# Patient Record
Sex: MALE | Race: WHITE | NOT HISPANIC OR LATINO | Employment: OTHER | URBAN - METROPOLITAN AREA
[De-identification: names, ages, dates, MRNs, and addresses within clinical notes are randomized per-mention and may not be internally consistent; named-entity substitution may affect disease eponyms.]

---

## 2021-08-11 ENCOUNTER — TRANSFERRED RECORDS (OUTPATIENT)
Dept: HEALTH INFORMATION MANAGEMENT | Facility: CLINIC | Age: 51
End: 2021-08-11
Payer: MEDICARE

## 2022-04-27 ENCOUNTER — APPOINTMENT (OUTPATIENT)
Dept: GENERAL RADIOLOGY | Facility: CLINIC | Age: 52
End: 2022-04-27
Attending: EMERGENCY MEDICINE
Payer: MEDICARE

## 2022-04-27 ENCOUNTER — HOSPITAL ENCOUNTER (OUTPATIENT)
Facility: CLINIC | Age: 52
Setting detail: OBSERVATION
Discharge: HOME | End: 2022-04-29
Attending: EMERGENCY MEDICINE | Admitting: INTERNAL MEDICINE
Payer: MEDICARE

## 2022-04-27 ENCOUNTER — APPOINTMENT (OUTPATIENT)
Dept: CT IMAGING | Facility: CLINIC | Age: 52
End: 2022-04-27
Attending: EMERGENCY MEDICINE
Payer: MEDICARE

## 2022-04-27 DIAGNOSIS — R41.82 ALTERED MENTAL STATUS, UNSPECIFIED ALTERED MENTAL STATUS TYPE: ICD-10-CM

## 2022-04-27 DIAGNOSIS — R53.81 PHYSICAL DECONDITIONING: Primary | ICD-10-CM

## 2022-04-27 DIAGNOSIS — S09.90XA CLOSED HEAD INJURY, INITIAL ENCOUNTER: ICD-10-CM

## 2022-04-27 LAB
ALBUMIN SERPL-MCNC: 2.6 G/DL (ref 3.4–5)
ALP SERPL-CCNC: 54 U/L (ref 40–150)
ALT SERPL W P-5'-P-CCNC: 28 U/L (ref 0–70)
ANION GAP SERPL CALCULATED.3IONS-SCNC: 7 MMOL/L (ref 3–14)
AST SERPL W P-5'-P-CCNC: 57 U/L (ref 0–45)
BASOPHILS # BLD AUTO: 0 10E3/UL (ref 0–0.2)
BASOPHILS NFR BLD AUTO: 0 %
BILIRUB SERPL-MCNC: 0.6 MG/DL (ref 0.2–1.3)
BUN SERPL-MCNC: 19 MG/DL (ref 7–30)
CALCIUM SERPL-MCNC: 9.2 MG/DL (ref 8.5–10.1)
CHLORIDE BLD-SCNC: 107 MMOL/L (ref 94–109)
CO2 SERPL-SCNC: 27 MMOL/L (ref 20–32)
CREAT SERPL-MCNC: 1.19 MG/DL (ref 0.66–1.25)
EOSINOPHIL # BLD AUTO: 0.1 10E3/UL (ref 0–0.7)
EOSINOPHIL NFR BLD AUTO: 1 %
ERYTHROCYTE [DISTWIDTH] IN BLOOD BY AUTOMATED COUNT: 14.1 % (ref 10–15)
GFR SERPL CREATININE-BSD FRML MDRD: 73 ML/MIN/1.73M2
GLUCOSE BLD-MCNC: 74 MG/DL (ref 70–99)
HCT VFR BLD AUTO: 35.6 % (ref 40–53)
HGB BLD-MCNC: 11.6 G/DL (ref 13.3–17.7)
IMM GRANULOCYTES # BLD: 0 10E3/UL
IMM GRANULOCYTES NFR BLD: 0 %
INR PPP: 1.26 (ref 0.85–1.15)
LYMPHOCYTES # BLD AUTO: 2.9 10E3/UL (ref 0.8–5.3)
LYMPHOCYTES NFR BLD AUTO: 40 %
MCH RBC QN AUTO: 30 PG (ref 26.5–33)
MCHC RBC AUTO-ENTMCNC: 32.6 G/DL (ref 31.5–36.5)
MCV RBC AUTO: 92 FL (ref 78–100)
MONOCYTES # BLD AUTO: 0.9 10E3/UL (ref 0–1.3)
MONOCYTES NFR BLD AUTO: 13 %
NEUTROPHILS # BLD AUTO: 3.3 10E3/UL (ref 1.6–8.3)
NEUTROPHILS NFR BLD AUTO: 46 %
NRBC # BLD AUTO: 0 10E3/UL
NRBC BLD AUTO-RTO: 0 /100
PLATELET # BLD AUTO: 151 10E3/UL (ref 150–450)
POTASSIUM BLD-SCNC: 3.6 MMOL/L (ref 3.4–5.3)
PROT SERPL-MCNC: 6.1 G/DL (ref 6.8–8.8)
RBC # BLD AUTO: 3.87 10E6/UL (ref 4.4–5.9)
SODIUM SERPL-SCNC: 141 MMOL/L (ref 133–144)
WBC # BLD AUTO: 7.2 10E3/UL (ref 4–11)

## 2022-04-27 PROCEDURE — 36415 COLL VENOUS BLD VENIPUNCTURE: CPT | Performed by: EMERGENCY MEDICINE

## 2022-04-27 PROCEDURE — 99285 EMERGENCY DEPT VISIT HI MDM: CPT | Mod: 25

## 2022-04-27 PROCEDURE — 70450 CT HEAD/BRAIN W/O DYE: CPT

## 2022-04-27 PROCEDURE — 85610 PROTHROMBIN TIME: CPT | Performed by: EMERGENCY MEDICINE

## 2022-04-27 PROCEDURE — 80053 COMPREHEN METABOLIC PANEL: CPT | Performed by: EMERGENCY MEDICINE

## 2022-04-27 PROCEDURE — 72040 X-RAY EXAM NECK SPINE 2-3 VW: CPT

## 2022-04-27 PROCEDURE — C9803 HOPD COVID-19 SPEC COLLECT: HCPCS

## 2022-04-27 PROCEDURE — 96360 HYDRATION IV INFUSION INIT: CPT

## 2022-04-27 PROCEDURE — 96361 HYDRATE IV INFUSION ADD-ON: CPT

## 2022-04-27 PROCEDURE — 85014 HEMATOCRIT: CPT | Performed by: EMERGENCY MEDICINE

## 2022-04-27 ASSESSMENT — ENCOUNTER SYMPTOMS
HEADACHES: 1
ARTHRALGIAS: 0
MYALGIAS: 0
BACK PAIN: 0
NECK PAIN: 1
CONFUSION: 1

## 2022-04-28 PROBLEM — R41.82 ALTERED MENTAL STATUS, UNSPECIFIED ALTERED MENTAL STATUS TYPE: Status: ACTIVE | Noted: 2022-04-28

## 2022-04-28 PROBLEM — S09.90XA CLOSED HEAD INJURY, INITIAL ENCOUNTER: Status: ACTIVE | Noted: 2022-04-28

## 2022-04-28 LAB
ALBUMIN UR-MCNC: NEGATIVE MG/DL
AMMONIA PLAS-SCNC: 36 UMOL/L (ref 10–50)
AMPHETAMINES UR QL SCN: NORMAL
APPEARANCE UR: CLEAR
BARBITURATES UR QL: NORMAL
BENZODIAZ UR QL: NORMAL
BILIRUB UR QL STRIP: NEGATIVE
CANNABINOIDS UR QL SCN: NORMAL
CK SERPL-CCNC: 682 U/L (ref 30–300)
COCAINE UR QL: NORMAL
COLOR UR AUTO: YELLOW
ETHANOL SERPL-MCNC: <0.01 G/DL
FOLATE SERPL-MCNC: 55.1 NG/ML
GLUCOSE UR STRIP-MCNC: NEGATIVE MG/DL
HGB UR QL STRIP: NEGATIVE
HYALINE CASTS: 1 /LPF
KETONES UR STRIP-MCNC: ABNORMAL MG/DL
LEUKOCYTE ESTERASE UR QL STRIP: NEGATIVE
MUCOUS THREADS #/AREA URNS LPF: PRESENT /LPF
NITRATE UR QL: NEGATIVE
NT-PROBNP SERPL-MCNC: 653 PG/ML (ref 0–900)
OPIATES UR QL SCN: NORMAL
PCP UR QL SCN: NORMAL
PH UR STRIP: 5.5 [PH] (ref 5–7)
RBC URINE: 0 /HPF
SARS-COV-2 RNA RESP QL NAA+PROBE: NEGATIVE
SP GR UR STRIP: 1.01 (ref 1–1.03)
TSH SERPL DL<=0.005 MIU/L-ACNC: 1.63 MU/L (ref 0.4–4)
UROBILINOGEN UR STRIP-MCNC: 2 MG/DL
VALPROATE SERPL-MCNC: 106 MG/L
VIT B12 SERPL-MCNC: 603 PG/ML (ref 193–986)
WBC URINE: 1 /HPF

## 2022-04-28 PROCEDURE — 82140 ASSAY OF AMMONIA: CPT | Performed by: EMERGENCY MEDICINE

## 2022-04-28 PROCEDURE — U0005 INFEC AGEN DETEC AMPLI PROBE: HCPCS | Performed by: EMERGENCY MEDICINE

## 2022-04-28 PROCEDURE — 36415 COLL VENOUS BLD VENIPUNCTURE: CPT | Performed by: HOSPITALIST

## 2022-04-28 PROCEDURE — 82746 ASSAY OF FOLIC ACID SERUM: CPT | Performed by: HOSPITALIST

## 2022-04-28 PROCEDURE — 96361 HYDRATE IV INFUSION ADD-ON: CPT

## 2022-04-28 PROCEDURE — 80164 ASSAY DIPROPYLACETIC ACD TOT: CPT | Performed by: EMERGENCY MEDICINE

## 2022-04-28 PROCEDURE — 99225 PR SUBSEQUENT OBSERVATION CARE,LEVEL II: CPT | Performed by: HOSPITALIST

## 2022-04-28 PROCEDURE — 80307 DRUG TEST PRSMV CHEM ANLYZR: CPT | Performed by: EMERGENCY MEDICINE

## 2022-04-28 PROCEDURE — 99220 PR INITIAL OBSERVATION CARE,LEVEL III: CPT | Performed by: INTERNAL MEDICINE

## 2022-04-28 PROCEDURE — 36415 COLL VENOUS BLD VENIPUNCTURE: CPT | Performed by: EMERGENCY MEDICINE

## 2022-04-28 PROCEDURE — 84443 ASSAY THYROID STIM HORMONE: CPT | Performed by: INTERNAL MEDICINE

## 2022-04-28 PROCEDURE — 96360 HYDRATION IV INFUSION INIT: CPT

## 2022-04-28 PROCEDURE — 82077 ASSAY SPEC XCP UR&BREATH IA: CPT | Performed by: EMERGENCY MEDICINE

## 2022-04-28 PROCEDURE — G0378 HOSPITAL OBSERVATION PER HR: HCPCS

## 2022-04-28 PROCEDURE — 250N000013 HC RX MED GY IP 250 OP 250 PS 637: Performed by: INTERNAL MEDICINE

## 2022-04-28 PROCEDURE — 82607 VITAMIN B-12: CPT | Performed by: HOSPITALIST

## 2022-04-28 PROCEDURE — C9803 HOPD COVID-19 SPEC COLLECT: HCPCS

## 2022-04-28 PROCEDURE — 83880 ASSAY OF NATRIURETIC PEPTIDE: CPT | Performed by: HOSPITALIST

## 2022-04-28 PROCEDURE — 81001 URINALYSIS AUTO W/SCOPE: CPT | Performed by: EMERGENCY MEDICINE

## 2022-04-28 PROCEDURE — 82550 ASSAY OF CK (CPK): CPT | Performed by: HOSPITALIST

## 2022-04-28 PROCEDURE — 250N000009 HC RX 250: Performed by: INTERNAL MEDICINE

## 2022-04-28 PROCEDURE — 258N000003 HC RX IP 258 OP 636: Performed by: HOSPITALIST

## 2022-04-28 PROCEDURE — 258N000003 HC RX IP 258 OP 636: Performed by: INTERNAL MEDICINE

## 2022-04-28 RX ORDER — TOPIRAMATE 100 MG/1
100 TABLET, FILM COATED ORAL AT BEDTIME
Status: ON HOLD | COMMUNITY
End: 2022-07-07

## 2022-04-28 RX ORDER — ATENOLOL 25 MG/1
25 TABLET ORAL DAILY
Status: DISCONTINUED | OUTPATIENT
Start: 2022-04-28 | End: 2022-04-29 | Stop reason: HOSPADM

## 2022-04-28 RX ORDER — AMOXICILLIN 250 MG
1 CAPSULE ORAL 2 TIMES DAILY PRN
Status: DISCONTINUED | OUTPATIENT
Start: 2022-04-28 | End: 2022-04-29 | Stop reason: HOSPADM

## 2022-04-28 RX ORDER — SODIUM CHLORIDE 9 MG/ML
INJECTION, SOLUTION INTRAVENOUS CONTINUOUS
Status: ACTIVE | OUTPATIENT
Start: 2022-04-28 | End: 2022-04-28

## 2022-04-28 RX ORDER — LEVOCETIRIZINE DIHYDROCHLORIDE 5 MG/1
5 TABLET, FILM COATED ORAL EVERY EVENING
Status: ON HOLD | COMMUNITY
End: 2022-07-07

## 2022-04-28 RX ORDER — IPRATROPIUM BROMIDE 42 UG/1
2 SPRAY, METERED NASAL 3 TIMES DAILY
Status: DISCONTINUED | OUTPATIENT
Start: 2022-04-28 | End: 2022-04-29 | Stop reason: HOSPADM

## 2022-04-28 RX ORDER — PREDNISOLONE ACETATE 10 MG/ML
1 SUSPENSION/ DROPS OPHTHALMIC DAILY
Status: ON HOLD | COMMUNITY
End: 2022-07-07

## 2022-04-28 RX ORDER — ALBUTEROL SULFATE 90 UG/1
2 AEROSOL, METERED RESPIRATORY (INHALATION) EVERY 6 HOURS PRN
COMMUNITY
End: 2022-07-07

## 2022-04-28 RX ORDER — CARBOXYMETHYLCELLULOSE SODIUM 10 MG/ML
1 GEL OPHTHALMIC AT BEDTIME
Status: ON HOLD | COMMUNITY
End: 2022-07-07

## 2022-04-28 RX ORDER — NICOTINE POLACRILEX 4 MG/1
20 GUM, CHEWING ORAL 2 TIMES DAILY
Status: DISCONTINUED | OUTPATIENT
Start: 2022-04-28 | End: 2022-04-28

## 2022-04-28 RX ORDER — CALCIUM POLYCARBOPHIL 625 MG 625 MG/1
1 TABLET ORAL 2 TIMES DAILY
Status: ON HOLD | COMMUNITY
End: 2022-07-07

## 2022-04-28 RX ORDER — NICOTINE POLACRILEX 4 MG/1
20 GUM, CHEWING ORAL 2 TIMES DAILY
Status: ON HOLD | COMMUNITY
End: 2022-07-07

## 2022-04-28 RX ORDER — TOPIRAMATE 50 MG/1
100 TABLET, FILM COATED ORAL DAILY
Status: DISCONTINUED | OUTPATIENT
Start: 2022-04-28 | End: 2022-04-29 | Stop reason: HOSPADM

## 2022-04-28 RX ORDER — DIVALPROEX SODIUM 500 MG/1
500 TABLET, DELAYED RELEASE ORAL EVERY 8 HOURS SCHEDULED
Status: DISCONTINUED | OUTPATIENT
Start: 2022-04-28 | End: 2022-04-29 | Stop reason: HOSPADM

## 2022-04-28 RX ORDER — BENZOCAINE/MENTHOL 6 MG-10 MG
LOZENGE MUCOUS MEMBRANE 2 TIMES DAILY
Status: ON HOLD | COMMUNITY
End: 2022-07-07

## 2022-04-28 RX ORDER — PREDNISOLONE ACETATE 10 MG/ML
1 SUSPENSION/ DROPS OPHTHALMIC DAILY
Status: DISCONTINUED | OUTPATIENT
Start: 2022-04-28 | End: 2022-04-29 | Stop reason: HOSPADM

## 2022-04-28 RX ORDER — ALBUTEROL SULFATE 90 UG/1
2 AEROSOL, METERED RESPIRATORY (INHALATION) EVERY 6 HOURS PRN
Status: DISCONTINUED | OUTPATIENT
Start: 2022-04-28 | End: 2022-04-29 | Stop reason: HOSPADM

## 2022-04-28 RX ORDER — IPRATROPIUM BROMIDE 42 UG/1
2 SPRAY, METERED NASAL 3 TIMES DAILY
Status: ON HOLD | COMMUNITY
End: 2022-07-07

## 2022-04-28 RX ORDER — LEVOTHYROXINE SODIUM 50 UG/1
50 TABLET ORAL DAILY
Status: DISCONTINUED | OUTPATIENT
Start: 2022-04-28 | End: 2022-04-29 | Stop reason: HOSPADM

## 2022-04-28 RX ORDER — CLONAZEPAM 0.12 MG/1
0.12 TABLET, ORALLY DISINTEGRATING ORAL DAILY PRN
Status: ON HOLD | COMMUNITY
End: 2022-06-09

## 2022-04-28 RX ORDER — KETOCONAZOLE 20 MG/G
CREAM TOPICAL 2 TIMES DAILY PRN
Status: ON HOLD | COMMUNITY
End: 2022-07-07

## 2022-04-28 RX ORDER — MONTELUKAST SODIUM 10 MG/1
10 TABLET ORAL AT BEDTIME
Status: ON HOLD | COMMUNITY
End: 2022-07-07

## 2022-04-28 RX ORDER — LOPERAMIDE HCL 2 MG
2 CAPSULE ORAL 4 TIMES DAILY PRN
Status: ON HOLD | COMMUNITY
End: 2022-07-07

## 2022-04-28 RX ORDER — ONDANSETRON 4 MG/1
4 TABLET, ORALLY DISINTEGRATING ORAL EVERY 6 HOURS PRN
Status: DISCONTINUED | OUTPATIENT
Start: 2022-04-28 | End: 2022-04-29 | Stop reason: HOSPADM

## 2022-04-28 RX ORDER — SERTRALINE HYDROCHLORIDE 100 MG/1
100 TABLET, FILM COATED ORAL DAILY
Status: DISCONTINUED | OUTPATIENT
Start: 2022-04-28 | End: 2022-04-29 | Stop reason: HOSPADM

## 2022-04-28 RX ORDER — RISPERIDONE 2 MG/1
2 TABLET ORAL 2 TIMES DAILY
Status: DISCONTINUED | OUTPATIENT
Start: 2022-04-28 | End: 2022-04-29 | Stop reason: HOSPADM

## 2022-04-28 RX ORDER — MULTIVIT-MIN/IRON/FOLIC ACID/K 18-600-40
2000 CAPSULE ORAL DAILY
Status: ON HOLD | COMMUNITY
End: 2022-07-07

## 2022-04-28 RX ORDER — LORAZEPAM 2 MG/1
2 TABLET ORAL DAILY PRN
Status: ON HOLD | COMMUNITY
End: 2022-06-09

## 2022-04-28 RX ORDER — CARBOXYMETHYLCELLULOSE SODIUM 10 MG/ML
1 GEL OPHTHALMIC AT BEDTIME
Status: DISCONTINUED | OUTPATIENT
Start: 2022-04-28 | End: 2022-04-29 | Stop reason: HOSPADM

## 2022-04-28 RX ORDER — MULTIVITAMIN,THERAPEUTIC
1 TABLET ORAL DAILY
Status: ON HOLD | COMMUNITY
End: 2022-07-07

## 2022-04-28 RX ORDER — ATENOLOL 25 MG/1
25 TABLET ORAL DAILY
Status: ON HOLD | COMMUNITY
End: 2022-04-29

## 2022-04-28 RX ORDER — DOCUSATE SODIUM 100 MG/1
100 CAPSULE, LIQUID FILLED ORAL DAILY
Status: ON HOLD | COMMUNITY
End: 2022-07-07

## 2022-04-28 RX ORDER — RISPERIDONE 2 MG/1
2 TABLET ORAL 2 TIMES DAILY
Status: ON HOLD | COMMUNITY
End: 2022-07-07

## 2022-04-28 RX ORDER — DOCUSATE SODIUM 100 MG/1
100 CAPSULE, LIQUID FILLED ORAL DAILY
Status: DISCONTINUED | OUTPATIENT
Start: 2022-04-28 | End: 2022-04-29 | Stop reason: HOSPADM

## 2022-04-28 RX ORDER — RISPERIDONE 0.5 MG/1
0.5 TABLET ORAL
Status: ON HOLD | COMMUNITY
End: 2022-04-29

## 2022-04-28 RX ORDER — ACETAMINOPHEN 325 MG/1
650 TABLET ORAL EVERY 6 HOURS PRN
Status: DISCONTINUED | OUTPATIENT
Start: 2022-04-28 | End: 2022-04-29 | Stop reason: HOSPADM

## 2022-04-28 RX ORDER — SERTRALINE HYDROCHLORIDE 100 MG/1
100 TABLET, FILM COATED ORAL DAILY
Status: ON HOLD | COMMUNITY
End: 2022-07-07

## 2022-04-28 RX ORDER — DIVALPROEX SODIUM 500 MG/1
500 TABLET, DELAYED RELEASE ORAL 3 TIMES DAILY
Status: ON HOLD | COMMUNITY
End: 2022-06-10

## 2022-04-28 RX ORDER — CHLORHEXIDINE GLUCONATE ORAL RINSE 1.2 MG/ML
15 SOLUTION DENTAL DAILY
Status: ON HOLD | COMMUNITY
End: 2022-07-07

## 2022-04-28 RX ORDER — LEVOCARNITINE 330 MG/1
330 TABLET ORAL 3 TIMES DAILY
Status: DISCONTINUED | OUTPATIENT
Start: 2022-04-28 | End: 2022-04-29 | Stop reason: HOSPADM

## 2022-04-28 RX ORDER — LEVOTHYROXINE SODIUM 50 UG/1
50 TABLET ORAL DAILY
Status: ON HOLD | COMMUNITY
End: 2022-07-07

## 2022-04-28 RX ORDER — SODIUM CHLORIDE 9 MG/ML
INJECTION, SOLUTION INTRAVENOUS CONTINUOUS
Status: DISCONTINUED | OUTPATIENT
Start: 2022-04-28 | End: 2022-04-28

## 2022-04-28 RX ORDER — CLONAZEPAM 0.5 MG/1
0.25 TABLET ORAL DAILY PRN
Status: DISCONTINUED | OUTPATIENT
Start: 2022-04-28 | End: 2022-04-29 | Stop reason: HOSPADM

## 2022-04-28 RX ORDER — AMOXICILLIN 250 MG
2 CAPSULE ORAL 2 TIMES DAILY PRN
Status: DISCONTINUED | OUTPATIENT
Start: 2022-04-28 | End: 2022-04-29 | Stop reason: HOSPADM

## 2022-04-28 RX ORDER — HYDROCORTISONE 10 MG/G
CREAM TOPICAL 2 TIMES DAILY
COMMUNITY
End: 2022-04-28

## 2022-04-28 RX ORDER — ONDANSETRON 2 MG/ML
4 INJECTION INTRAMUSCULAR; INTRAVENOUS EVERY 6 HOURS PRN
Status: DISCONTINUED | OUTPATIENT
Start: 2022-04-28 | End: 2022-04-29 | Stop reason: HOSPADM

## 2022-04-28 RX ORDER — ACETAMINOPHEN 650 MG/1
650 SUPPOSITORY RECTAL EVERY 6 HOURS PRN
Status: DISCONTINUED | OUTPATIENT
Start: 2022-04-28 | End: 2022-04-29 | Stop reason: HOSPADM

## 2022-04-28 RX ORDER — CICLOPIROX OLAMINE 7.7 MG/G
CREAM TOPICAL
Status: ON HOLD | COMMUNITY
End: 2022-07-07

## 2022-04-28 RX ORDER — PANTOPRAZOLE SODIUM 40 MG/1
40 TABLET, DELAYED RELEASE ORAL 2 TIMES DAILY
Status: DISCONTINUED | OUTPATIENT
Start: 2022-04-28 | End: 2022-04-29 | Stop reason: HOSPADM

## 2022-04-28 RX ORDER — CARBOXYMETHYLCELLULOSE SODIUM 5 MG/ML
1 SOLUTION/ DROPS OPHTHALMIC DAILY PRN
Status: ON HOLD | COMMUNITY
End: 2022-07-07

## 2022-04-28 RX ORDER — CARBOXYMETHYLCELLULOSE SODIUM 5 MG/ML
1 SOLUTION/ DROPS OPHTHALMIC DAILY PRN
Status: DISCONTINUED | OUTPATIENT
Start: 2022-04-28 | End: 2022-04-29 | Stop reason: HOSPADM

## 2022-04-28 RX ADMIN — DIVALPROEX SODIUM 500 MG: 500 TABLET, DELAYED RELEASE ORAL at 22:25

## 2022-04-28 RX ADMIN — IPRATROPIUM BROMIDE 2 SPRAY: 42 SPRAY, METERED NASAL at 13:32

## 2022-04-28 RX ADMIN — DIVALPROEX SODIUM 500 MG: 500 TABLET, DELAYED RELEASE ORAL at 05:53

## 2022-04-28 RX ADMIN — CARBOXYMETHYLCELLULOSE SODIUM 1 DROP: 10 GEL OPHTHALMIC at 22:25

## 2022-04-28 RX ADMIN — PREDNISOLONE ACETATE 1 DROP: 10 SUSPENSION/ DROPS OPHTHALMIC at 13:32

## 2022-04-28 RX ADMIN — IPRATROPIUM BROMIDE 2 SPRAY: 42 SPRAY, METERED NASAL at 22:22

## 2022-04-28 RX ADMIN — SERTRALINE HYDROCHLORIDE 100 MG: 100 TABLET ORAL at 08:49

## 2022-04-28 RX ADMIN — LEVOTHYROXINE SODIUM 50 MCG: 50 TABLET ORAL at 13:31

## 2022-04-28 RX ADMIN — RISPERIDONE 2 MG: 2 TABLET ORAL at 08:49

## 2022-04-28 RX ADMIN — PANTOPRAZOLE SODIUM 40 MG: 40 TABLET, DELAYED RELEASE ORAL at 19:46

## 2022-04-28 RX ADMIN — LEVOCARNITINE 330 MG: 330 TABLET ORAL at 19:47

## 2022-04-28 RX ADMIN — SODIUM CHLORIDE: 9 INJECTION, SOLUTION INTRAVENOUS at 13:23

## 2022-04-28 RX ADMIN — DOCUSATE SODIUM 100 MG: 100 CAPSULE, LIQUID FILLED ORAL at 13:32

## 2022-04-28 RX ADMIN — DIVALPROEX SODIUM 500 MG: 500 TABLET, DELAYED RELEASE ORAL at 13:31

## 2022-04-28 RX ADMIN — TOPIRAMATE 100 MG: 50 TABLET, FILM COATED ORAL at 08:49

## 2022-04-28 RX ADMIN — SODIUM CHLORIDE: 9 INJECTION, SOLUTION INTRAVENOUS at 03:27

## 2022-04-28 RX ADMIN — LEVOCARNITINE 330 MG: 330 TABLET ORAL at 15:04

## 2022-04-28 RX ADMIN — RISPERIDONE 2 MG: 2 TABLET ORAL at 19:47

## 2022-04-28 NOTE — PROGRESS NOTES
Observation goals  PRIOR TO DISCHARGE        Comments: -diagnostic tests and consults completed and resulted not met  -vital signs normal or at patient baseline met  -tolerating oral intake to maintain hydration JOSE  -returns to baseline functional status not met  -safe disposition plan has been identified not met  Nurse to notify provider when observation goals have been met and patient is ready for discharge.

## 2022-04-28 NOTE — PROGRESS NOTES
Jagdeep Walker is a 51 year old male admitted early this morning by my partner Dr. Freeman for unwitnessed fall and altered mental status    Patient comfortable lying in bed.  Has difficulty communicating but seems to follow simple commands.  Small bruising over forehead.    Lab studies and imaging results reviewed.      Impression.  Unwitnessed fall.   Altered mental status with history of intellectual disability, schizoaffective disorder, anxiety, neuroleptic malignant syndrome -mental status seems to be improving.    Plan.  Continue monitoring and observation unit.  Telemetry monitoring.  Follow CK levels  Follow TSH, vitamin B12, folic acid levels.  Monitor mental status closely.  Fall precautions.   PT, OT evaluation.  Social work assistance with transition.  Discussed with patient, ED team.  Total time > 25 min

## 2022-04-28 NOTE — CARE PLAN
Observation goals  PRIOR TO DISCHARGE        Comments: -diagnostic tests and consults completed and resulted not met  -vital signs normal or at patient baseline met  -tolerating oral intake to maintain hydration met  -returns to baseline functional status not met  -safe disposition plan has been identified not met  Nurse to notify provider when observation goals have been met and patient is ready for discharge.

## 2022-04-28 NOTE — PHARMACY-ADMISSION MEDICATION HISTORY
Pharmacy Medication History  Admission medication history interview status for the 4/27/2022  admission is complete. See EPIC admission navigator for prior to admission medications     Medication history sources: medication list from group home    Significant changes made to the medication list:   Added all medications.     Medication reconciliation completed by provider prior to medication history? No    Time spent in this activity: 30 minutes     Prior to Admission medications    Medication Sig Last Dose Taking? Auth Provider   albuterol (PROAIR HFA/PROVENTIL HFA/VENTOLIN HFA) 108 (90 Base) MCG/ACT inhaler Inhale 2 puffs into the lungs every 6 hours as needed for shortness of breath / dyspnea or wheezing  Yes Unknown, Entered By History   atenolol (TENORMIN) 25 MG tablet Take 25 mg by mouth daily  Yes Unknown, Entered By History   calcium carbonate 600 mg-vitamin D 400 units (CALTRATE) 600-400 MG-UNIT per tablet Take 1 tablet by mouth daily  Yes Unknown, Entered By History   calcium polycarbophil (FIBERCON) 625 MG tablet Take 1 tablet by mouth 2 times daily  Yes Unknown, Entered By History   carboxymethylcellulose PF (REFRESH LIQUIGEL) 1 % ophthalmic gel Place 1 drop into the right eye At Bedtime  Yes Unknown, Entered By History   carboxymethylcellulose PF (REFRESH PLUS) 0.5 % ophthalmic solution Place 1 drop into both eyes daily as needed for dry eyes  Yes Unknown, Entered By History   chlorhexidine (PERIDEX) 0.12 % solution Swish and spit 15 mLs in mouth daily  Yes Unknown, Entered By History   ciclopirox (LOPROX) 0.77 % cream Apply topically nightly as needed  Yes Unknown, Entered By History   clonazePAM (KLONOPIN) 0.125 MG TBDP ODT tab Take 0.125 mg by mouth daily as needed for anxiety  Yes Unknown, Entered By History   divalproex sodium delayed-release (DEPAKOTE) 500 MG DR tablet Take 500 mg by mouth 3 times daily  Yes Unknown, Entered By History   docusate sodium (COLACE) 100 MG capsule Take 100 mg by mouth  daily  Yes Unknown, Entered By History   hydrocortisone (CORTAID) 1 % external cream Apply topically 2 times daily  Yes Unknown, Entered By History   ipratropium (ATROVENT) 0.06 % nasal spray Spray 2 sprays into both nostrils 3 times daily  Yes Unknown, Entered By History   ketoconazole (NIZORAL) 2 % external cream Apply topically 2 times daily as needed for itching R foot  Yes Unknown, Entered By History   levOCARNitine (CARNITOR) 330 MG tablet Take by mouth 3 times daily  Yes Unknown, Entered By History   levocetirizine (XYZAL) 5 MG tablet Take 5 mg by mouth every evening  Yes Unknown, Entered By History   levothyroxine (SYNTHROID/LEVOTHROID) 50 MCG tablet Take 50 mcg by mouth daily  Yes Unknown, Entered By History   loperamide (IMODIUM) 2 MG capsule Take 2 mg by mouth 4 times daily as needed for diarrhea  Yes Unknown, Entered By History   LORazepam (ATIVAN) 2 MG tablet 2 mg daily as needed for seizures Give bucally  Yes Unknown, Entered By History   montelukast (SINGULAIR) 10 MG tablet Take 10 mg by mouth At Bedtime  Yes Unknown, Entered By History   multivitamin, therapeutic (THERA-VIT) TABS tablet Take 1 tablet by mouth daily  Yes Unknown, Entered By History   omeprazole 20 MG tablet Take 20 mg by mouth 2 times daily  Yes Unknown, Entered By History   polyethylene glycol-propylene glycol (SYSTANE ULTRA) 0.4-0.3 % SOLN ophthalmic solution Place 2 drops into both eyes daily as needed for dry eyes  Yes Unknown, Entered By History   prednisoLONE acetate (PRED FORTE) 1 % ophthalmic suspension Place 1 drop into the right eye daily  Yes Unknown, Entered By History   risperiDONE (RISPERDAL) 0.5 MG tablet Take 0.5 mg by mouth daily (with lunch)  Yes Unknown, Entered By History   risperiDONE (RISPERDAL) 2 MG tablet Take 2 mg by mouth 2 times daily  Yes Unknown, Entered By History   sertraline (ZOLOFT) 100 MG tablet Take 100 mg by mouth daily  Yes Unknown, Entered By History   topiramate (TOPAMAX) 100 MG tablet Take 100  mg by mouth At Bedtime  Yes Unknown, Entered By History   Vitamin D, Cholecalciferol, 25 MCG (1000 UT) TABS Take 2,000 Units by mouth daily  Yes Unknown, Entered By History       The information provided in this note is only as accurate as the sources available at the time of update(s)     Selam Stacy, PharmD, BCCCP

## 2022-04-28 NOTE — ED NOTES
[facility]  ED Nurse Handoff Report    ED Chief complaint: Fall, Head Injury, and Altered Mental Status      ED Diagnosis:   Final diagnoses:   None       Code Status: Full Code    Allergies: No Known Allergies    Patient Story: pt with hx of seizure disorder and schizoaffective disorder brought in by staff from group home for altered mental status that has been getting progressively worse since an unwitnessed fall yesterday.  Focused Assessment:  Pt a&o 3, respirations easy and unlabored. Pt drowsy, but follows commands. Pt has slow speech. Abrasion noted to forehead. Staff was concerned due to worsening altered mental status - pt normally walks independently but needing assistance in ED to stand and walk.     Treatments and/or interventions provided: labs, urine, imaging   CT results as follows:  Final Result   IMPRESSION:     1.  No evidence of acute intracranial hemorrhage or mass effect.   2.  Mild nonspecific white matter changes.   3.  Moderate brain parenchymal volume loss.       Patient's response to treatments and/or interventions: fair    To be done/followed up on inpatient unit:  n/a    Does this patient have any cognitive concerns?: n/a    Activity level - Baseline/Home:  Independent  Activity Level - Current:   Independent and Stand with assist x2    Patient's Preferred language: English   Needed?: No    Isolation: None  Infection: Not Applicable  Patient tested for COVID 19 prior to admission: YES  Bariatric?: No    Vital Signs:   Vitals:    04/27/22 2022 04/27/22 2245 04/27/22 2300 04/28/22 0045   BP: 112/41 (!) 125/97 117/47 (!) 64/49   Pulse: 72  72 67   Resp: 14  11 12   Temp: 99.5  F (37.5  C)      TempSrc: Temporal      SpO2: 95%  95% 93%   Weight:           Cardiac Rhythm:     Was the PSS-3 completed:   Yes  What interventions are required if any?               Family Comments: sister will call for update tomorrow.   OBS brochure/video discussed/provided to  patient/family: N/A              Name of person given brochure if not patient: n/a              Relationship to patient: n/a    For the majority of the shift this patient's behavior was Green.   Behavioral interventions performed were reassurance and information.    ED NURSE PHONE NUMBER: *[phone]

## 2022-04-28 NOTE — ED TRIAGE NOTES
patient brought into ED by group home staff. Patient had unwitnessed fall last night and sustained a head injury. Patient was found down on the ground last night. Staff member who came on staff today noticed patient to be altered. Abrasion and swelling to forehead. Patient sleepy and not himself. Not taking blood thinners per medication paperwork.

## 2022-04-28 NOTE — PROGRESS NOTES
RECEIVING UNIT ED HANDOFF REVIEW    ED Nurse Handoff Report was reviewed by: Radha Fernandez RN on April 28, 2022 at 12:35 PM

## 2022-04-28 NOTE — ED NOTES
Pt urinated in his pants a little bit.  Changed him into diapers, and assisted him with the urinal.

## 2022-04-28 NOTE — ED PROVIDER NOTES
History   Chief Complaint:  Fall, Head Injury, and Altered Mental Status     The history is provided by the patient, a caregiver and medical records.      Jagdeep Walker is a 51 year old male with history of schizoaffective disorder, bipolar disorder, hypothyroidism, and seizure disorder who presents from a group home after an unwitnessed fall. According to group home staff, the patient had an unwitnessed fall last night and sustained a head injury. The patient was found on the ground by staff last night and they believe the fall may have occurred around 1700 yesterday evening. Staff states the patient seems more altered and disoriented compared to his baseline. Staff member notes the patient has had difficulty ambulating, following commands, or answering questions. The patient does not remembered what caused his fall last night. He endorses headaches secondary to his head injury. He endorses neck pain as well. He denies neck pain or pain is his upper or lower extremities. He denies any other recent falls or injuries. The patient has not been maintaining good PO intake.     Review of Systems   Musculoskeletal: Positive for neck pain. Negative for arthralgias, back pain and myalgias.   Neurological: Positive for headaches.   Psychiatric/Behavioral: Positive for confusion.   All other systems reviewed and are negative.    Allergies:  The patient has no known allergies.     Medications:  Atenolol   Klonopin   Depakote   Colace  Carnitor   Ativan   Synthroid   Singulair   Prilosec  Zofran  Risperdal  Zoloft  Topamax   Xyzal    Past Medical History:     Morbid exogenous obesity   Seizures   Depression  Schizoaffective disorder, bipolar type  Intellectual disability  Obstructive sleep apnea on CPAP  Fisher's esophagus  GERD  Hypothyroidism  Bipolar affective disorder  Hypertension   Neuroleptic malignant syndrome     Past Surgical History:    Partial thyroidectomy  Corneal transplant   Esophagogastroduodenoscopy    Cataract removal with IOL, left   Cataract removal      Family History:    Mother - Stroke  Father - Prostate Cancer     Social History:  The patient was accompanied to the emergency department by a group home staff member.  He lives in a group home.     Physical Exam     Patient Vitals for the past 24 hrs:   BP Temp Temp src Pulse Resp SpO2 Weight   04/27/22 2300 117/47 -- -- 72 11 95 % --   04/27/22 2245 (!) 125/97 -- -- -- -- -- --   04/27/22 2022 112/41 99.5  F (37.5  C) Temporal 72 14 95 % --   04/27/22 2016 (!) 112/31 99.8  F (37.7  C) -- 72 18 95 % 88.5 kg (195 lb)     Physical Exam  General: Lying on the gurney with eyes closed, responds to voice.  HENT: mucous membranes moist  CV: regular rate, regular rhythm  Resp: normal effort, clear throughout, no crackles or wheezing  GI: abdomen soft and nontender, no guarding  MSK: no bony tenderness  Skin: Hematoma and abrasion to the forehead.  Extremities: Trace lower extremity edema.  Neuro: Slow to respond, but answers questions.  Somewhat rambling speech.  Pupils equal round and reactive to light, symmetric smile.  Extraocular movements intact.  Strength generally weak, but symmetric in bilateral upper and lower extremities.  Attempts to walk with shuffling gait, but very shaky and unsteady.  Psych: Cooperative    Emergency Department Course   Imaging:  Cervical spine XR, 2-3 views   Final Result   IMPRESSION: No fracture. Normal vertebral heights and alignment. Normal disc spaces and facets for age. Normal extraspinal structures.            Head CT w/o contrast   Final Result   IMPRESSION:     1.  No evidence of acute intracranial hemorrhage or mass effect.   2.  Mild nonspecific white matter changes.   3.  Moderate brain parenchymal volume loss.      Report per radiology    Laboratory:  Labs Ordered and Resulted from Time of ED Arrival to Time of ED Departure   COMPREHENSIVE METABOLIC PANEL - Abnormal       Result Value    Sodium 141      Potassium 3.6       Chloride 107      Carbon Dioxide (CO2) 27      Anion Gap 7      Urea Nitrogen 19      Creatinine 1.19      Calcium 9.2      Glucose 74      Alkaline Phosphatase 54      AST 57 (*)     ALT 28      Protein Total 6.1 (*)     Albumin 2.6 (*)     Bilirubin Total 0.6      GFR Estimate 73     INR - Abnormal    INR 1.26 (*)    CBC WITH PLATELETS AND DIFFERENTIAL - Abnormal    WBC Count 7.2      RBC Count 3.87 (*)     Hemoglobin 11.6 (*)     Hematocrit 35.6 (*)     MCV 92      MCH 30.0      MCHC 32.6      RDW 14.1      Platelet Count 151      % Neutrophils 46      % Lymphocytes 40      % Monocytes 13      % Eosinophils 1      % Basophils 0      % Immature Granulocytes 0      NRBCs per 100 WBC 0      Absolute Neutrophils 3.3      Absolute Lymphocytes 2.9      Absolute Monocytes 0.9      Absolute Eosinophils 0.1      Absolute Basophils 0.0      Absolute Immature Granulocytes 0.0      Absolute NRBCs 0.0     ROUTINE UA WITH MICROSCOPIC REFLEX TO CULTURE   ETHYL ALCOHOL LEVEL   AMMONIA   COVID-19 VIRUS (CORONAVIRUS) BY PCR   DRUG ABUSE SCREEN 77 URINE (FL, RH, SH)      Emergency Department Course:     Reviewed:  I reviewed nursing notes, vitals, past medical history and Care Everywhere    Assessments:  2226 I obtained history and examined the patient as noted above.   0006 I rechecked the patient and explained findings.    Consults:  0200 I spoke to Dr. Freeman of the hospitalist service who accepts the patient for admission.     Disposition:  The patient was admitted to the hospital under the care of Dr. Freeman.     Impression & Plan   Medical Decision Making:  Jagdeep Walker is a 51 year old male with history of schizoaffective disorder, bipolar disorder, hypothyroidism, and seizure disorder who presents from a group home after an unwitnessed fall.   Group home staff at the bedside know the patient well, and states that he is more confused and unsteady on his feet compared to baseline.  The patient is unable to provide  specific history.  On exam, he has low-grade temperature, and low but stable blood pressures.  He has a nonfocal exam, but seems to be processing information slowly, and is shaky and unsteady on his feet.  Labs do not demonstrate an obvious cause of symptoms, including ammonia level, ethanol level, electrolytes, and urinalysis.  CT of the head is negative for intracranial hemorrhage, mass, or other abnormality to explain the patient's symptoms.  Medications could be contributing, and Depakote level is pending.  Patient symptoms could be postconcussive, however the symptoms of shakiness are somewhat unusual for concussion.  He will be brought in for observation and continued work-up.    Diagnosis:    ICD-10-CM    1. Altered mental status, unspecified altered mental status type  R41.82    2. Closed head injury, initial encounter  S09.90XA        Scribe Disclosure:  I, Wendy Zimmerman, am serving as a scribe at 10:16 PM on 4/27/2022 to document services personally performed by Maura Gilliland MD based on my observations and the provider's statements to me.     Maura Gilliland MD  04/28/22 0217

## 2022-04-28 NOTE — H&P
[facility]    History and Physical  Hospitalist       Date of Admission:  4/27/2022  Date of Service (when I saw the patient): 04/28/22    Assessment & Plan   Jagdeep Walker is a 51 year old male who presents with fall, AMS    Note: minimal history obtained from pt given his encephalopathy and underlying cognitive/ mental health issues    Unwitnessed Fall  Encephalopathy, unspecified  Lives in group home. Had unwitnessed fall with head 2 days ago. Staff noted mental status was altered compared with baseline since then. In ED temp 99.8, hypotension as below. Labs generally unremarkable. UA bland. UDS negative. CT head without acute findings. C-spine w/o fx. Possible concussion (has large bruise on forehead), otherwise unclear etiology  - IV fluids  - PT  - monitor    Hypotension  Hx hypertension  [atenolol 25 mg daily]  One reading in ED 64/49, suspect spurious. Other pressures low normal 90-100s systolic.   - IV fluids  - monitor    Seizure disorder  [depakote 500 mg TID, topamax 100 mg daily, levocarnitine 330 mg TID]  - check valproic acid level    Schizoaffective disorder, bipolar type  Intellectual disability  Anxiety   Hx neuroleptic malignant syndrome  [sertraline 100 mg daily, risperidone 2 mg BID and 0.5 mg with lunch, clonazepam 0.125 mg prn daily anxiety]  Follows with Dr. Davila through [facility]. Saw 3/1, no changes to medications noted. Pharmacy rec states still on risperidone 0.5 mg at lunch but on med rec form states no longer on and 3/1 psych note states on risperidone 2 mg BI  - resume meds, will hold risperidone 0.5 mg at lunch for now  - prn clonazepam available    Hypothyroidism   - PTA levothyroxine 50 mcg daily, continue  - check TSH    GERD  - PTA omeprazole 20 mg BID, continue    JACLYN  On CPAP at home, resume    COVID-19 negative    DVT Prophylaxis: Pneumatic Compression Devices  Code Status: Full Code by default    Disposition: Expected discharge in 1-3 days     Donnie  LAUREN Freeman MD  [phone] (P)  Text Page     Primary Care Physician   Physician No Ref-Primary    Chief Complaint   Altered mental status    History is obtained from the medical records    History of Present Illness   Jagdeep Walker is a 51 year old male who presents with altered mental status.  Patient is really not able to provide provide history at this time.  He apparently was at his group home 2 days ago when he had a fall at dinnertime.  He tries to describe the fall and he is able to say bad but he is not able to communicate any symptoms of circumstances surrounding the fall.  He does have a bump on his forehead.  Reportedly per group home staff the last day or 2 he has not been himself and appeared to be more confused prompting his visit today.  He really is unable to state if he has pain or not are any complaints.  He does follow commands readily and easily.  Further history is not obtainable.    Past Medical History    I have reviewed this patient's medical history and updated it with pertinent information if needed.   Seizure disorder  Hypertension  Schizoaffective disorder, bipolar type  Anxiety  Intellectual disability  Hypothyroidism  GERD  JACLYN    Past Surgical History   I have reviewed this patient's surgical history and updated it with pertinent information if needed.  No past surgical history on file.    Prior to Admission Medications   Prior to Admission Medications   Prescriptions Last Dose Informant Patient Reported? Taking?   LORazepam (ATIVAN) 2 MG tablet   Yes Yes   Si mg daily as needed for seizures Give bucally   Vitamin D, Cholecalciferol, 25 MCG (1000 UT) TABS   Yes Yes   Sig: Take 2,000 Units by mouth daily   albuterol (PROAIR HFA/PROVENTIL HFA/VENTOLIN HFA) 108 (90 Base) MCG/ACT inhaler   Yes Yes   Sig: Inhale 2 puffs into the lungs every 6 hours as needed for shortness of breath / dyspnea or wheezing   atenolol (TENORMIN) 25 MG tablet   Yes Yes   Sig: Take 25 mg by mouth  daily   calcium carbonate 600 mg-vitamin D 400 units (CALTRATE) 600-400 MG-UNIT per tablet   Yes Yes   Sig: Take 1 tablet by mouth daily   calcium polycarbophil (FIBERCON) 625 MG tablet   Yes Yes   Sig: Take 1 tablet by mouth 2 times daily   carboxymethylcellulose PF (REFRESH LIQUIGEL) 1 % ophthalmic gel   Yes Yes   Sig: Place 1 drop into the right eye At Bedtime   carboxymethylcellulose PF (REFRESH PLUS) 0.5 % ophthalmic solution   Yes Yes   Sig: Place 1 drop into both eyes daily as needed for dry eyes   chlorhexidine (PERIDEX) 0.12 % solution   Yes Yes   Sig: Swish and spit 15 mLs in mouth daily   ciclopirox (LOPROX) 0.77 % cream   Yes Yes   Sig: Apply topically nightly as needed   clonazePAM (KLONOPIN) 0.125 MG TBDP ODT tab   Yes Yes   Sig: Take 0.125 mg by mouth daily as needed for anxiety   divalproex sodium delayed-release (DEPAKOTE) 500 MG DR tablet   Yes Yes   Sig: Take 500 mg by mouth 3 times daily   docusate sodium (COLACE) 100 MG capsule   Yes Yes   Sig: Take 100 mg by mouth daily   hydrocortisone (CORTAID) 1 % external cream   Yes Yes   Sig: Apply topically 2 times daily   ipratropium (ATROVENT) 0.06 % nasal spray   Yes Yes   Sig: Spray 2 sprays into both nostrils 3 times daily   ketoconazole (NIZORAL) 2 % external cream   Yes Yes   Sig: Apply topically 2 times daily as needed for itching R foot   levOCARNitine (CARNITOR) 330 MG tablet   Yes Yes   Sig: Take by mouth 3 times daily   levocetirizine (XYZAL) 5 MG tablet   Yes Yes   Sig: Take 5 mg by mouth every evening   levothyroxine (SYNTHROID/LEVOTHROID) 50 MCG tablet   Yes Yes   Sig: Take 50 mcg by mouth daily   loperamide (IMODIUM) 2 MG capsule   Yes Yes   Sig: Take 2 mg by mouth 4 times daily as needed for diarrhea   montelukast (SINGULAIR) 10 MG tablet   Yes Yes   Sig: Take 10 mg by mouth At Bedtime   multivitamin, therapeutic (THERA-VIT) TABS tablet   Yes Yes   Sig: Take 1 tablet by mouth daily   omeprazole 20 MG tablet   Yes Yes   Sig: Take 20 mg by  mouth 2 times daily   polyethylene glycol-propylene glycol (SYSTANE ULTRA) 0.4-0.3 % SOLN ophthalmic solution   Yes Yes   Sig: Place 2 drops into both eyes daily as needed for dry eyes   prednisoLONE acetate (PRED FORTE) 1 % ophthalmic suspension   Yes Yes   Sig: Place 1 drop into the right eye daily   risperiDONE (RISPERDAL) 0.5 MG tablet   Yes Yes   Sig: Take 0.5 mg by mouth daily (with lunch)   risperiDONE (RISPERDAL) 2 MG tablet   Yes Yes   Sig: Take 2 mg by mouth 2 times daily   sertraline (ZOLOFT) 100 MG tablet   Yes Yes   Sig: Take 100 mg by mouth daily   topiramate (TOPAMAX) 100 MG tablet   Yes Yes   Sig: Take 100 mg by mouth At Bedtime      Facility-Administered Medications: None     Allergies   No Known Allergies    Social History   I have reviewed this patient's social history and updated it with pertinent information if needed. Jagdeep Walker   does not use tobacco or Etoh. He lives in a group home    Family History   I have reviewed this patient's family history and updated it with pertinent information if needed.   Unable to obtain from pt at this time 2/2 encephalopathy    Review of Systems   The 10 point Review of Systems is unable to be obtained    Physical Exam   Temp: 99.5  F (37.5  C) Temp src: Temporal BP: 114/53 Pulse: 68   Resp: 10 SpO2: 96 % O2 Device: None (Room air)    Vital Signs with Ranges  195 lbs 0 oz    Constitutional: alert, no distress. Difficulty communicating  Eyes: EOMI, PERRL  HEENT: OP clear  Respiratory: CTA B without w/c  Cardiovascular: RRR no murmur. no edema.  GI: soft, nontender, nondistended, BS present  Lymph/Hematologic: no cervical LAD  Genitourinary: deferred  Skin: no rashes or lesions grossly  Musculoskeletal: no deformities or arthritis  Neurologic: CN II-XII, CARLOS  Psychiatric: encephalopathic    Data   Data reviewed today:  I personally reviewed the head CT image(s) showing no acute process.  Recent Labs   Lab 04/27/22  2248   WBC 7.2   HGB 11.6*   MCV 92       INR 1.26*      POTASSIUM 3.6   CHLORIDE 107   CO2 27   BUN 19   CR 1.19   ANIONGAP 7   NASIR 9.2   GLC 74   ALBUMIN 2.6*   PROTTOTAL 6.1*   BILITOTAL 0.6   ALKPHOS 54   ALT 28   AST 57*       Recent Results (from the past 24 hour(s))   Head CT w/o contrast    Narrative    EXAM: CT HEAD W/O CONTRAST  LOCATION: [facility]  DATE/TIME: 4/27/2022 9:32 PM    INDICATION: Facial trauma  COMPARISON: None.  TECHNIQUE: Routine CT Head without IV contrast. Multiplanar reformats. Dose reduction techniques were used.    FINDINGS:  INTRACRANIAL CONTENTS: No evidence of acute intracranial hemorrhage or mass effect. Scattered foci of decreased attenuation within the cerebral hemispheric white matter which are nonspecific, though most commonly ascribed to chronic small vessel ischemic   disease. The ventricles and sulci are prominent consistent with mild brain parenchymal volume loss. Gray-white matter differentiation is maintained. The basilar cisterns are patent.    VISUALIZED ORBITS/SINUSES/MASTOIDS: Bilateral cataract surgery. The partially imaged globes are otherwise unremarkable. The partially imaged paranasal sinuses, mastoid air cells and middle ear cavities are unremarkable.     BONES/SOFT TISSUES: The visualized skull base and calvarium are unremarkable.      Impression    IMPRESSION:    1.  No evidence of acute intracranial hemorrhage or mass effect.  2.  Mild nonspecific white matter changes.  3.  Moderate brain parenchymal volume loss.   Cervical spine XR, 2-3 views    Narrative    EXAM: XR CERVICAL SPINE 2/3 VWS  LOCATION: [facility]  DATE/TIME: 4/27/2022 11:06 PM    INDICATION: fall 2 days ago  COMPARISON: None.  TECHNIQUE: CR Cervical Spine.      Impression    IMPRESSION: No fracture. Normal vertebral heights and alignment. Normal disc spaces and facets for age. Normal extraspinal structures.

## 2022-04-28 NOTE — PLAN OF CARE
Orientation/Cognitive: Aox4; slow to respond  Observation Goals (Met/ Not Met): not met  Mobility Level/Assist Equipment: JOSE: A1 GB + IV pole to bathroom  Fall Risk (Y/N): Y  Behavior Concerns: None  Pain Management:   Tele/VS/O2: VSS on RA. Tele: SR  ABNL Lab/BG:   Diet: Reg  Bowel/Bladder: occasional incontinence  Skin Concerns: contusion, abrasion on forehead  Drains/Devices: PIV: NS infusing @ 100  Tests/Procedures for next shift: PT, OT CC/SW   Anticipated DC date & active delays: 1-2 days  Patient Stated Goal for Today: To discharge tomorrow

## 2022-04-29 ENCOUNTER — APPOINTMENT (OUTPATIENT)
Dept: PHYSICAL THERAPY | Facility: CLINIC | Age: 52
End: 2022-04-29
Attending: INTERNAL MEDICINE
Payer: MEDICARE

## 2022-04-29 VITALS
RESPIRATION RATE: 16 BRPM | DIASTOLIC BLOOD PRESSURE: 71 MMHG | BODY MASS INDEX: 32.78 KG/M2 | WEIGHT: 204 LBS | HEIGHT: 66 IN | HEART RATE: 57 BPM | OXYGEN SATURATION: 100 % | SYSTOLIC BLOOD PRESSURE: 117 MMHG | TEMPERATURE: 98.2 F

## 2022-04-29 LAB
ALBUMIN SERPL-MCNC: 2.1 G/DL (ref 3.4–5)
ANION GAP SERPL CALCULATED.3IONS-SCNC: 4 MMOL/L (ref 3–14)
BUN SERPL-MCNC: 17 MG/DL (ref 7–30)
CALCIUM SERPL-MCNC: 9.1 MG/DL (ref 8.5–10.1)
CHLORIDE BLD-SCNC: 115 MMOL/L (ref 94–109)
CK SERPL-CCNC: 335 U/L (ref 30–300)
CO2 SERPL-SCNC: 26 MMOL/L (ref 20–32)
CREAT SERPL-MCNC: 0.82 MG/DL (ref 0.66–1.25)
GFR SERPL CREATININE-BSD FRML MDRD: >90 ML/MIN/1.73M2
GLUCOSE BLD-MCNC: 92 MG/DL (ref 70–99)
HGB BLD-MCNC: 11.1 G/DL (ref 13.3–17.7)
MAGNESIUM SERPL-MCNC: 2.2 MG/DL (ref 1.6–2.3)
POTASSIUM BLD-SCNC: 3.4 MMOL/L (ref 3.4–5.3)
SODIUM SERPL-SCNC: 145 MMOL/L (ref 133–144)

## 2022-04-29 PROCEDURE — 82550 ASSAY OF CK (CPK): CPT | Performed by: HOSPITALIST

## 2022-04-29 PROCEDURE — 83735 ASSAY OF MAGNESIUM: CPT | Performed by: HOSPITALIST

## 2022-04-29 PROCEDURE — G0378 HOSPITAL OBSERVATION PER HR: HCPCS

## 2022-04-29 PROCEDURE — 99232 SBSQ HOSP IP/OBS MODERATE 35: CPT | Mod: 25

## 2022-04-29 PROCEDURE — 97161 PT EVAL LOW COMPLEX 20 MIN: CPT | Mod: GP

## 2022-04-29 PROCEDURE — 99217 PR OBSERVATION CARE DISCHARGE: CPT | Performed by: HOSPITALIST

## 2022-04-29 PROCEDURE — 97116 GAIT TRAINING THERAPY: CPT | Mod: GP

## 2022-04-29 PROCEDURE — 36415 COLL VENOUS BLD VENIPUNCTURE: CPT | Performed by: HOSPITALIST

## 2022-04-29 PROCEDURE — 250N000013 HC RX MED GY IP 250 OP 250 PS 637: Performed by: INTERNAL MEDICINE

## 2022-04-29 PROCEDURE — 80048 BASIC METABOLIC PNL TOTAL CA: CPT | Performed by: HOSPITALIST

## 2022-04-29 PROCEDURE — 82040 ASSAY OF SERUM ALBUMIN: CPT | Performed by: HOSPITALIST

## 2022-04-29 PROCEDURE — 85018 HEMOGLOBIN: CPT | Performed by: HOSPITALIST

## 2022-04-29 RX ORDER — ATENOLOL 25 MG/1
25 TABLET ORAL DAILY
Qty: 1 TABLET | Refills: 0 | COMMUNITY
Start: 2022-04-29 | End: 2022-07-07

## 2022-04-29 RX ADMIN — SERTRALINE HYDROCHLORIDE 100 MG: 100 TABLET ORAL at 08:08

## 2022-04-29 RX ADMIN — DOCUSATE SODIUM 100 MG: 100 CAPSULE, LIQUID FILLED ORAL at 08:09

## 2022-04-29 RX ADMIN — IPRATROPIUM BROMIDE 2 SPRAY: 42 SPRAY, METERED NASAL at 13:06

## 2022-04-29 RX ADMIN — DIVALPROEX SODIUM 500 MG: 500 TABLET, DELAYED RELEASE ORAL at 13:06

## 2022-04-29 RX ADMIN — ACETAMINOPHEN 650 MG: 325 TABLET ORAL at 03:53

## 2022-04-29 RX ADMIN — LEVOTHYROXINE SODIUM 50 MCG: 50 TABLET ORAL at 08:09

## 2022-04-29 RX ADMIN — LEVOCARNITINE 330 MG: 330 TABLET ORAL at 13:06

## 2022-04-29 RX ADMIN — ATENOLOL 25 MG: 25 TABLET ORAL at 08:09

## 2022-04-29 RX ADMIN — PANTOPRAZOLE SODIUM 40 MG: 40 TABLET, DELAYED RELEASE ORAL at 08:09

## 2022-04-29 RX ADMIN — RISPERIDONE 2 MG: 2 TABLET ORAL at 08:08

## 2022-04-29 RX ADMIN — LEVOCARNITINE 330 MG: 330 TABLET ORAL at 08:09

## 2022-04-29 RX ADMIN — TOPIRAMATE 100 MG: 50 TABLET, FILM COATED ORAL at 08:08

## 2022-04-29 RX ADMIN — PREDNISOLONE ACETATE 1 DROP: 10 SUSPENSION/ DROPS OPHTHALMIC at 13:07

## 2022-04-29 RX ADMIN — DIVALPROEX SODIUM 500 MG: 500 TABLET, DELAYED RELEASE ORAL at 06:38

## 2022-04-29 NOTE — PROGRESS NOTES
PRIOR TO DISCHARGE        Comments: -diagnostic tests and consults completed and resulted met  -vital signs normal or at patient baseline met  -tolerating oral intake to maintain hydration met  -returns to baseline functional status met  -safe disposition plan has been identified met  Nurse to notify provider when observation goals have been met and patient is ready for discharge.

## 2022-04-29 NOTE — UTILIZATION REVIEW
"  Admission Status; Secondary Review Determination         Under the authority of the Utilization Management Committee, the utilization review process indicated a secondary review on the above patient.  The review outcome is based on review of the medical records, discussions with staff, and applying clinical experience noted on the date of the review.        ()      Inpatient Status Appropriate - This patient's medical care is consistent with medical management for inpatient care and reasonable inpatient medical practice.      (xxx) Observation Status Appropriate - This patient does not meet hospital inpatient criteria and is placed in observation status. If this patient's primary payer is Medicare and was admitted as an inpatient, Condition Code 44 should be used and patient status changed to \"observation\".   () Admission Status NOT Appropriate - This patient's medical care is not consistent with medical management for Inpatient or Observation Status.          RATIONALE FOR DETERMINATION     Jagdeep Walker is a 51 year old male with a history of intellectual disability, schizoaffective disorder, anxiety, and neuroleptic malignant syndrome who was admitted on 4/27/2022 due to unwitnessed fall and altered mental status.  Imaging studies negative for acute bleed or fractures.  He was initially admitted to observation status for close clinical monitoring and to assess for need for further investigation..  His mental status has improved slightly but is not back to baseline.  At this time, neurology and psychiatry consultations have been requested.  I spoke with Dr. Glass who states he will likely discharge today.  Observation status is appropriate.    The severity of illness, intensity of service provided, expected LOS and risk for adverse outcome make the care complex, high risk and appropriate for hospital admission.        The information on this document is developed by the utilization review team in order " for the business office to ensure compliance.  This only denotes the appropriateness of proper admission status and does not reflect the quality of care rendered.         The definitions of Inpatient Status and Observation Status used in making the determination above are those provided in the CMS Coverage Manual, Chapter 1 and Chapter 6, section 70.4.      Sincerely,     Shelly Escobedo MD  Physician Advisor   Utilization Review/ Case Management  [facility].

## 2022-04-29 NOTE — PLAN OF CARE
Orientation/Cognitive: AO X4, slow to respond, Seizure precaution, No episode of seizure this shift   Observation Goals (Met/ Not Met): Not Met  Mobility Level/Assist Equipment: A1 GB and walker to bathroom  Fall Risk (Y/N): Yes  Behavior Concerns: None  Pain Management: Prn tylenol x1 for back pain  Tele/VS/O2: Tele SB, VSS on RA  ABNL Lab/BG: , Hgb 11.6 and Albumin 2.6  Diet: Regular  Bowel/Bladder: Incontinence at times  Skin Concerns: Contusion, abrasion on forehead  Drains/Devices: PIV SL  Tests/Procedures for next shift: PT/OT/SW consult today  Anticipated DC date & active delays: 1-2 days  Patient Stated Goal for Today: To discharge today    Observation goals  PRIOR TO DISCHARGE       Comments:   -diagnostic tests and consults completed and resulted Not Met  -vital signs normal or at patient baseline Met  -tolerating oral intake to maintain hydration Met  -returns to baseline functional status Not Met  -safe disposition plan has been identified Not Met  Nurse to notify provider when observation goals have been met and patient is ready for discharge.

## 2022-04-29 NOTE — PROGRESS NOTES
Patient discharged at 4:50 PM to Discharged to home/group home.  IV was discontinued. Pain at time of discharge was 0/10. Belongings returned to patient.  Discharge instructions and medications reviewed with patient.  Patient verbalized understanding and all questions were answered.  Prescriptions given to patient.  At time of discharge, patient condition was stable and left the unit escorted by CNA.

## 2022-04-29 NOTE — PROGRESS NOTES
"   04/29/22 1350   Quick Adds   Quick Adds Certification   Type of Visit Initial PT Evaluation   Living Environment   People in Home other (see comments)  (4 other residents)   Current Living Arrangements group home   Home Accessibility no concerns   Transportation Anticipated health plan transportation   Living Environment Comments Patient reports his bedroom and bathroom are on the main level of the house. There is a basement level but he is able to stay on the main level if needed.   Self-Care   Usual Activity Tolerance good   Current Activity Tolerance moderate   Equipment Currently Used at Home none   Fall history within last six months yes   Number of times patient has fallen within last six months 1   Activity/Exercise/Self-Care Comment Patient reports at baseline he does not use an assistive device. \"I never ask for any help\" Patient states he performs ADLs independently. Group home staff assists with medications, provides meals.   General Information   Onset of Illness/Injury or Date of Surgery 04/27/22   Referring Physician Donnie Freeman MD   Patient/Family Therapy Goals Statement (PT) to go home   Pertinent History of Current Problem (include personal factors and/or comorbidities that impact the POC) Patient is 52 YO M admitted on 4/27/22 after an unwitnessed fall and AMS from his group home.  Patient with PMH including intellectual disability, schizoaffective disorder, anxiety and neuroleptic malignant syndrome.   Existing Precautions/Restrictions fall   General Observations Activity orders: ambulate with assist   Cognition   Affect/Mental Status (Cognition) WFL   Orientation Status (Cognition) oriented x 4   Follows Commands (Cognition) increased processing time needed   Safety Deficit (Cognition) problem-solving   Cognitive Status Comments Patient AOx4, but requires increased time to answer all questions. Increased difficulty with multi-step questions or commands.   Pain Assessment   Patient " Currently in Pain No   Integumentary/Edema   Integumentary/Edema Comments Abrasion with new scab on forehead   Posture    Posture Forward head position;Protracted shoulders   Range of Motion (ROM)   Range of Motion ROM is WFL   Strength (Manual Muscle Testing)   Strength (Manual Muscle Testing) Able to perform R SLR;Able to perform L SLR   Strength Comments WFL for transfers and gait; B hip flexion 3+/5   Bed Mobility   Bed Mobility supine-sit;sit-supine   Supine-Sit [place] (Bed Mobility) supervision   Sit-Supine [place] (Bed Mobility) supervision   Assistive Device (Bed Mobility) bed rails   Comment, (Bed Mobility) Incresaed time   Transfers   Transfers sit-stand transfer   Sit-Stand Transfer   Sit-Stand [place] (Transfers) supervision   Assistive Device (Sit-Stand Transfers) walker, front-wheeled   Comment, (Sit-Stand Transfer) From EOB at lowest height   Gait/Stairs (Locomotion)   [place] Level (Gait) contact guard   Assistive Device (Gait) walker, front-wheeled   Distance in Feet (Required for LE Total Joints) 15' during eval + 140' during treatment   Pattern (Gait) step-through   Comment, (Gait/Stairs) Patient ambulated ~15' in room with FWW and CGA for safety due to poor walker management, pushing out in front of him and decreased safety with turning   Balance   Balance Comments Independent sitting balance; Needs support of walker in standing - guarded posture standing without UE support   Sensory Examination   Sensory Perception patient reports no sensory changes   Coordination   Coordination no deficits were identified   Clinical Impression   Criteria for Skilled Therapeutic Intervention Yes, treatment indicated   PT Diagnosis (PT) impaired mobility   Influenced by the following impairments decreased balance, decreased activity tolerance   Functional limitations due to impairments increased difficulty with bed mobility, transfers and gait   Clinical Presentation (PT Evaluation  Complexity) Stable/Uncomplicated   Clinical Presentation Rationale medical status, clinical judgement   Clinical Decision Making (Complexity) low complexity   Planned Therapy Interventions (PT) balance training;bed mobility training;gait training;patient/family education;strengthening;transfer training   Anticipated Equipment Needs at Discharge (PT) walker, rolling  (DME ordered)   Risk & Benefits of therapy have been explained evaluation/treatment results reviewed;care plan/treatment goals reviewed;risks/benefits reviewed;current/potential barriers reviewed;participants voiced agreement with care plan;participants included;patient   PT Discharge Planning   PT Discharge Recommendation (DC Rec) home with assist;home with home care physical therapy   PT Rationale for DC Rec Patient lives in a group home and is independent with mobility at baseline. Currently, patient requires SBA with support of a walker for mobility. He has assist available at his group home. FWW ordered for home. HHPT recommended to progress safety and independence with mobility.   Therapy Certification   Start of care date 04/29/22   Certification date from 04/29/22   Certification date to 05/05/22   Medical Diagnosis Closed head injury, initial encounter   Total Evaluation Time   Total Evaluation Time (Minutes) 15   Physical Therapy Goals   PT Frequency 3x/week   PT Predicted Duration/Target Date for Goal Attainment 05/04/22   PT Goals Bed Mobility;Transfers;Gait   PT: Bed Mobility Independent;Supine to/from sit   PT: Transfers Modified independent;Sit to/from stand;Assistive device  (FWW)   PT: Gait Modified independent;Rolling walker;150 feet

## 2022-04-29 NOTE — PROGRESS NOTES
PRIOR TO DISCHARGE        Comments: -diagnostic tests and consults completed and resulted not met  -vital signs normal or at patient baseline met  -tolerating oral intake to maintain hydration met  -returns to baseline functional status met  -safe disposition plan has been identified not met  Nurse to notify provider when observation goals have been met and patient is ready for discharge.

## 2022-04-29 NOTE — DISCHARGE SUMMARY
Discharge Summary  Hospitalist    Date of Admission:  4/27/2022  Date of Discharge:  4/29/2022  Discharging Provider: Sarika Glass MD, MD    Primary Care Physician   Physician No Ref-Primary  Primary Care Provider Phone Number: None  Primary Care Provider Fax Number: [phone]    PRINCIPAL DIAGNOSIS  Status post unwitnessed Fall at group home, small bruising over forehead.  Altered mental status -possible concussion related, improved to baseline.  Physical deconditioning in the setting of chronic medical illness.  Mild elevated AST, mild rhabdomyolysis likely from fall.  Mild coagulopathy unclear etiology.  Hypotension on admission  Mild elevated sodium, chloride from iatrogenic fluid resuscitation.  Hypoalbuminemia likely from poor oral intake, dilutional.    Medical problems.  Seizure disorder  Schizoaffective disorder, bipolar type  Intellectual disability  Anxiety   Hx neuroleptic malignant syndrome  Hypothyroidism   GERD  JACLYN  Hypertension.      History of Present Illness   Jagdeep Walker is an 51 year old male who presented with fall.     Hospital Course   Jagdeep Walker is a 51 year old male with history of schizoaffective disorder, intellectual disability, anxiety, group home resident sent into the ED after an unwitnessed fall and altered mental status.    Status post unwitnessed Fall at group home, small bruising over forehead.  Altered mental status -possible concussion related, improved to baseline.  Physical deconditioning in the setting of chronic medical illness.  Lives in group home. Had unwitnessed fall with head 2 days ago. Staff noted mental status was altered compared with baseline since then  And sent him to ED for evaluation.  Admitted to the observation unit.  Afebrile, mild low blood pressures and received fluids patient  No leukocytosis, signs or symptoms of infection.   CT head no acute pathology. X-ray  C-spine w/o fx.  UA bland, UDS negative.  Ethanol level less than 0.01.   Valproic acid level 106.  Vitamin B12, folic acid, TSH within normal limits.  --With supportive care patient's mental status back to baseline.  Psychiatry followed, delirium precautions.  Rehab team followed, recommend ambulate with walker.    Home care services for home PT, RN requested on discharge.  Patient sister who is also the guardian by bedside in agreement with transition back to group home and close neurology follow-up as outpatient.    Mild elevated AST, mild rhabdomyolysis likely from fall.  Mild coagulopathy unclear etiology.  AST 57, .  Bilirubin within normal limits.  INR 1.26.  Follow liver enzymes, INR, CK levels in 1 week.    Hypotension on admission  Hx hypertension  One reading in ED 64/49, suspect spurious. Other pressures low normal 90-100s systolic.   Decreased prior to admission atenolol from 25 mg twice daily to 12.5 mg twice daily   ( blood pressure systolics in 100s to 110s.)  Monitor blood pressure readings, review on provider visit.      Mild elevated sodium, chloride from iatrogenic fluid resuscitation.  Sodium this morning 145, chloride 115.  Received normal saline.  Monitor levels in 1 week.    Hypoalbuminemia likely from poor oral intake, dilutional.  Serum albumin 2.1.  proBNP 653.  No signs or symptoms of volume overload.  Monitor levels in 1 week.  Dietary supplements with Ensure     Seizure disorder  [depakote 500 mg TID, topamax 100 mg daily, levocarnitine 330 mg TID]  Continue PTA meds.  Seizure precautions.  Discussed with patient, sister to follow-up with primary neurologist at the earliest.    Schizoaffective disorder, bipolar type  Intellectual disability  Anxiety   Hx neuroleptic malignant syndrome  [sertraline 100 mg daily, risperidone 2 mg BID and 0.5 mg with lunch, clonazepam 0.125 mg prn daily anxiety]  Follows with Dr. Davila through [facility]. Saw 3/1, no changes to medications noted. Pharmacy rec states still on risperidone 0.5 mg at lunch but on med rec form  states no longer on and 3/1 psych note states on risperidone 2 mg BID.  Inpatient psychiatry team followed, recommend to continue risperidone 2 mg twice daily.  Continue PTA meds as above.  Follow-up with primary psychiatry team per schedule.    Hypothyroidism   TSH within normal limits.  Continue PTA levothyroxine.     GERD  - PTA omeprazole 20 mg BID, continue     JACLYN  On CPAP at home-continue     COVID-19 negative    Sarika Glass MD.    Pending Results   Unresulted Labs Ordered in the Past 30 Days of this Admission     No orders found from 3/28/2022 to 4/28/2022.             Physical Exam   Vitals:    04/27/22 2016 04/28/22 2228   Weight: 88.5 kg (195 lb) 92.5 kg (204 lb)     Vital Signs with Ranges  Temp:  [97.6  F (36.4  C)-99.4  F (37.4  C)] 98.2  F (36.8  C)  Pulse:  [57-78] 57  Resp:  [16] 16  BP: (107-117)/(69-76) 117/71  SpO2:  [94 %-100 %] 100 %  I/O last 3 completed shifts:  In: -   Out: 900 [Urine:900]  PHYSICAL EXAM  GENERAL: Patient is in no distress.  Awake, oriented and can answer simple questions.  HEENT: Bruising over the forehead.  Oropharynx pink, moist. Pupils equal, extraocular muscle movements intact.  No frontal tenderness.  HEART: Regular rate and rhythm. S1S2. No murmurs  LUNGS: Clear to auscultation bilaterally. No expiratory wheeze.  Respirations unlabored  ABDOMEN: Soft, no abdominal tenderness, bowel sounds heard   NEURO: Moving all extremities, no focal weakness.  EXTREMITIES: No pedal edema. 2+ peripheral pulses.  SKIN: Warm, dry. No rash or bruising.  PSYCHIATRY Cooperative  )Consultations This Hospital Stay   PHYSICAL THERAPY ADULT IP CONSULT  OCCUPATIONAL THERAPY ADULT IP CONSULT  CARE MANAGEMENT / SOCIAL WORK IP CONSULT  PSYCHIATRY IP CONSULT    Time Spent on this Encounter   Sarika LABOY MD, personally saw the patient today and spent greater than 30 minutes discharging this patient. Discussed with patient, his sister by the bedside, bedside RN    Discharge  Disposition   Discharged to home  Condition at discharge: Good    Discharge Orders      Home Care Referral      Reason for your hospital stay    You were admitted with concern for unwitnessed fall and altered mental status from group home.  With supportive care symptoms improving, back to baseline.  Noted to have small bruising of the forehead.  Discussed with patient, sister plan for discharge back to group home with close neurology follow-up as outpatient.     Activity    Your activity upon discharge: activity as tolerated     Discharge Instructions    Encourage oral intake.  Fall precautions.  Delirium precautions including   Up during the day with lights on   Lights off at night, avoid interruptions during the night as much as possible   Family visits   Encourage wearing glasses   Reorientation   Avoid opioids, benzodiazepines, anticholinergics if possible.     Continue to ensure proper nutrition, fluid and electrolyte balance     Follow-up and recommended labs and tests     Follow up with primary care provider, Physician No Ref-Primary, within 7 days for hospital follow- up.  The following labs/tests are recommended: Follow-up with PCP in 5 to 7 days or earlier if symptomatic.  Follow BMP, hepatic function panel, CK, INR, hemoglobin in 7 days or earlier if symptomatic.  Must follow-up with primary neurologist at the earliest.  Follow-up with primary psychiatry team per schedule.        .     Monitor and record    Must monitor home blood pressure, heart rate and review on provider visit.     Discharge Instructions    Continue prior to admission CPAP.     Walker Order    DME Documentation:   Describe the reason for need to support medical necessity: unsteady gait with recent fall.     I, the undersigned, certify that the above prescribed supplies are medically necessary for this patient and is both reasonable and necessary in reference to accepted standards of medical and necessary in reference to accepted  standards of medical practice in the treatment of this patient's condition and is not prescribed as a convenience.     Diet    Follow this diet upon discharge: Orders Placed This Encounter      Regular Diet Adult     Diet    Dietary supplements with Ensure as tolerated.       Discharge Medications   Current Discharge Medication List      CONTINUE these medications which have CHANGED    Details   atenolol (TENORMIN) 25 MG tablet Take 0.5 tablets (12.5 mg) by mouth daily Prior to admission dose lowered from 25 mg to 12.5 mg.  Qty: 1 tablet, Refills: 0         CONTINUE these medications which have NOT CHANGED    Details   albuterol (PROAIR HFA/PROVENTIL HFA/VENTOLIN HFA) 108 (90 Base) MCG/ACT inhaler Inhale 2 puffs into the lungs every 6 hours as needed for shortness of breath / dyspnea or wheezing      calcium carbonate 600 mg-vitamin D 400 units (CALTRATE) 600-400 MG-UNIT per tablet Take 1 tablet by mouth daily      calcium polycarbophil (FIBERCON) 625 MG tablet Take 1 tablet by mouth 2 times daily      carboxymethylcellulose PF (REFRESH LIQUIGEL) 1 % ophthalmic gel Place 1 drop into the right eye At Bedtime      carboxymethylcellulose PF (REFRESH PLUS) 0.5 % ophthalmic solution Place 1 drop into both eyes daily as needed for dry eyes      chlorhexidine (PERIDEX) 0.12 % solution Swish and spit 15 mLs in mouth daily      ciclopirox (LOPROX) 0.77 % cream Apply topically nightly as needed      clonazePAM (KLONOPIN) 0.125 MG TBDP ODT tab Take 0.125 mg by mouth daily as needed for anxiety      divalproex sodium delayed-release (DEPAKOTE) 500 MG DR tablet Take 500 mg by mouth 3 times daily      docusate sodium (COLACE) 100 MG capsule Take 100 mg by mouth daily      hydrocortisone (CORTAID) 1 % external cream Apply topically 2 times daily      ipratropium (ATROVENT) 0.06 % nasal spray Spray 2 sprays into both nostrils 3 times daily      ketoconazole (NIZORAL) 2 % external cream Apply topically 2 times daily as needed for  itching R foot      levOCARNitine (CARNITOR) 330 MG tablet Take by mouth 3 times daily      levocetirizine (XYZAL) 5 MG tablet Take 5 mg by mouth every evening      levothyroxine (SYNTHROID/LEVOTHROID) 50 MCG tablet Take 50 mcg by mouth daily      loperamide (IMODIUM) 2 MG capsule Take 2 mg by mouth 4 times daily as needed for diarrhea      LORazepam (ATIVAN) 2 MG tablet 2 mg daily as needed for seizures Give bucally      montelukast (SINGULAIR) 10 MG tablet Take 10 mg by mouth At Bedtime      multivitamin, therapeutic (THERA-VIT) TABS tablet Take 1 tablet by mouth daily      omeprazole 20 MG tablet Take 20 mg by mouth 2 times daily      polyethylene glycol-propylene glycol (SYSTANE ULTRA) 0.4-0.3 % SOLN ophthalmic solution Place 2 drops into both eyes daily as needed for dry eyes      prednisoLONE acetate (PRED FORTE) 1 % ophthalmic suspension Place 1 drop into the right eye daily      risperiDONE (RISPERDAL) 2 MG tablet Take 2 mg by mouth 2 times daily      sertraline (ZOLOFT) 100 MG tablet Take 100 mg by mouth daily      topiramate (TOPAMAX) 100 MG tablet Take 100 mg by mouth At Bedtime      Vitamin D, Cholecalciferol, 25 MCG (1000 UT) TABS Take 2,000 Units by mouth daily           Allergies   No Known Allergies    DATA  Most Recent 3 CBC's:Recent Labs   Lab Test 04/29/22  0625 04/27/22 2248   WBC  --  7.2   HGB 11.1* 11.6*   MCV  --  92   PLT  --  151      Most Recent 3 BMP's:  Recent Labs   Lab Test 04/29/22  0625 04/27/22 2248   * 141   POTASSIUM 3.4 3.6   CHLORIDE 115* 107   CO2 26 27   BUN 17 19   CR 0.82 1.19   ANIONGAP 4 7   NASIR 9.1 9.2   GLC 92 74     Most Recent 2 LFT's:  Recent Labs   Lab Test 04/27/22 2248   AST 57*   ALT 28   ALKPHOS 54   BILITOTAL 0.6     Most Recent INR's and Anticoagulation Dosing History:  Anticoagulation Dose History     Recent Dosing and Labs Latest Ref Rng & Units 4/27/2022    INR 0.85 - 1.15 1.26(H)        Most Recent TSH, T4 and A1c Labs:  Recent Labs   Lab Test  04/28/22  0016   TSH 1.63     Results for orders placed or performed during the hospital encounter of 04/27/22   Head CT w/o contrast    Narrative    EXAM: CT HEAD W/O CONTRAST  LOCATION: [facility]  DATE/TIME: 4/27/2022 9:32 PM    INDICATION: Facial trauma  COMPARISON: None.  TECHNIQUE: Routine CT Head without IV contrast. Multiplanar reformats. Dose reduction techniques were used.    FINDINGS:  INTRACRANIAL CONTENTS: No evidence of acute intracranial hemorrhage or mass effect. Scattered foci of decreased attenuation within the cerebral hemispheric white matter which are nonspecific, though most commonly ascribed to chronic small vessel ischemic   disease. The ventricles and sulci are prominent consistent with mild brain parenchymal volume loss. Gray-white matter differentiation is maintained. The basilar cisterns are patent.    VISUALIZED ORBITS/SINUSES/MASTOIDS: Bilateral cataract surgery. The partially imaged globes are otherwise unremarkable. The partially imaged paranasal sinuses, mastoid air cells and middle ear cavities are unremarkable.     BONES/SOFT TISSUES: The visualized skull base and calvarium are unremarkable.      Impression    IMPRESSION:    1.  No evidence of acute intracranial hemorrhage or mass effect.  2.  Mild nonspecific white matter changes.  3.  Moderate brain parenchymal volume loss.   Cervical spine XR, 2-3 views    Narrative    EXAM: XR CERVICAL SPINE 2/3 VWS  LOCATION: [facility]  DATE/TIME: 4/27/2022 11:06 PM    INDICATION: fall 2 days ago  COMPARISON: None.  TECHNIQUE: CR Cervical Spine.      Impression    IMPRESSION: No fracture. Normal vertebral heights and alignment. Normal disc spaces and facets for age. Normal extraspinal structures.

## 2022-04-29 NOTE — CONSULTS
"      Initial Psychiatric Consult   Consult date: April 29, 2022         Reason for Consult, requesting source:    Altered mental status, history of schizoaffective disorder, intellectual disability  Requesting source: Donnie Freeman    Labs and imaging reviewed. Discussed with nursing staff        HPI:   Jagdeep Walker is a 51 year old male with a history of seizure disorder, hypothyroidism, GERD, JACLYN, schizoaffective disorder, and intellectual disability admitted for unwitnessed fall at his group home and altered mental status. Psychiatry is consulted to evaluate altered mental status in context of psychiatric history.    I met with Jose in his room, he is seen sitting up getting ready to get in shower. He has a large bruise on his forehead. Sister is in room at time of interview, pt agreeable to sister staying. He states that he fell between his bed and the wall and that he was \"banging my head on the wall over and over\". It sounds like he fell either getting into or out of bed, couldn't get up, and started banging his head on the wall in attempt to get someone's attention to help him get up. He's unable to state or recall if he got dizzy before he fell. He denies ever falling in the past, sister confirms this. He is oriented to person, time, place, and situation but has notably slow responses to questions. Sister reports that at baseline he is somewhat slow to respond, but this is worse than usual. He denies any hallucinations, SI or HI. Denies side effects from his medications. Reports he had COVID about 6 months ago and has had poor appetite since then, leading to 20-30 pound weight loss but sister reports that he still eats adequately, just less than his previous large amounts of food. Sleep is okay, \"not as much as I'd like\"- referring to group home waking him up during the week for activities. He doesn't like being in the hospital, hoping to go back to the home soon.        Past Psychiatric History: "   Past history of schizoaffective disorder, bipolar type; intellectual disability; unspecified anxiety disorder. Sees Dr. Sauer outpatient at [facility] who prescribes risperdal 2mg BID, sertraline 100mg daily, and clonazepam 0.125mg daily PRN for anxiety/agitation. He is prescribed Depakote and ativan PRN per neurology for management of seizure disorder        Substance Use and History:   Denies history of substance abuse.        Past Medical History:   PAST MEDICAL HISTORY: No past medical history on file.    PAST SURGICAL HISTORY: No past surgical history on file.          Family History:   FAMILY HISTORY: No family history on file.        Social History:   SOCIAL HISTORY:   Social History     Tobacco Use     Smoking status: Not on file     Smokeless tobacco: Not on file   Substance Use Topics     Alcohol use: Not on file       Lives in a group home in [place]. Has lived in group home settings since age 19 after leaving parents home.         Physical ROS:   The 10 point Review of Systems is negative other than noted in the HPI or here.    Review Of Systems  Skin: bruising  Eyes: negative  Ears/Nose/Throat: negative  Respiratory: No shortness of breath, dyspnea on exertion, cough, or hemoptysis  Cardiovascular: negative  Gastrointestinal: negative  Genitourinary: negative  Musculoskeletal: negative  Neurologic: negative  Psychiatric: negative  Hematologic/Lymphatic/Immunologic: negative  Endocrine: negative          Medications:       atenolol  25 mg Oral Daily     carboxymethylcellulose PF  1 drop Right Eye At Bedtime     divalproex sodium delayed-release  500 mg Oral Q8H CONSTANZA     docusate sodium  100 mg Oral Daily     ipratropium  2 spray Both Nostrils TID     levOCARNitine  330 mg Oral TID     levothyroxine  50 mcg Oral Daily     pantoprazole  40 mg Oral BID     prednisoLONE acetate  1 drop Right Eye Daily     risperiDONE  2 mg Oral BID     sertraline  100 mg Oral Daily     topiramate  100 mg Oral Daily               Allergies:   No Known Allergies       Labs:     Recent Results (from the past 48 hour(s))   Comprehensive metabolic panel    Collection Time: 04/27/22 10:48 PM   Result Value Ref Range    Sodium 141 133 - 144 mmol/L    Potassium 3.6 3.4 - 5.3 mmol/L    Chloride 107 94 - 109 mmol/L    Carbon Dioxide (CO2) 27 20 - 32 mmol/L    Anion Gap 7 3 - 14 mmol/L    Urea Nitrogen 19 7 - 30 mg/dL    Creatinine 1.19 0.66 - 1.25 mg/dL    Calcium 9.2 8.5 - 10.1 mg/dL    Glucose 74 70 - 99 mg/dL    Alkaline Phosphatase 54 40 - 150 U/L    AST 57 (H) 0 - 45 U/L    ALT 28 0 - 70 U/L    Protein Total 6.1 (L) 6.8 - 8.8 g/dL    Albumin 2.6 (L) 3.4 - 5.0 g/dL    Bilirubin Total 0.6 0.2 - 1.3 mg/dL    GFR Estimate 73 >60 mL/min/1.73m2   INR    Collection Time: 04/27/22 10:48 PM   Result Value Ref Range    INR 1.26 (H) 0.85 - 1.15   CBC with platelets and differential    Collection Time: 04/27/22 10:48 PM   Result Value Ref Range    WBC Count 7.2 4.0 - 11.0 10e3/uL    RBC Count 3.87 (L) 4.40 - 5.90 10e6/uL    Hemoglobin 11.6 (L) 13.3 - 17.7 g/dL    Hematocrit 35.6 (L) 40.0 - 53.0 %    MCV 92 78 - 100 fL    MCH 30.0 26.5 - 33.0 pg    MCHC 32.6 31.5 - 36.5 g/dL    RDW 14.1 10.0 - 15.0 %    Platelet Count 151 150 - 450 10e3/uL    % Neutrophils 46 %    % Lymphocytes 40 %    % Monocytes 13 %    % Eosinophils 1 %    % Basophils 0 %    % Immature Granulocytes 0 %    NRBCs per 100 WBC 0 <1 /100    Absolute Neutrophils 3.3 1.6 - 8.3 10e3/uL    Absolute Lymphocytes 2.9 0.8 - 5.3 10e3/uL    Absolute Monocytes 0.9 0.0 - 1.3 10e3/uL    Absolute Eosinophils 0.1 0.0 - 0.7 10e3/uL    Absolute Basophils 0.0 0.0 - 0.2 10e3/uL    Absolute Immature Granulocytes 0.0 <=0.4 10e3/uL    Absolute NRBCs 0.0 10e3/uL   UA with Microscopic reflex to Culture    Collection Time: 04/28/22 12:16 AM    Specimen: Urine, Midstream   Result Value Ref Range    Color Urine Yellow Colorless, Straw, Light Yellow, Yellow    Appearance Urine Clear Clear    Glucose Urine  Negative Negative mg/dL    Bilirubin Urine Negative Negative    Ketones Urine Trace (A) Negative mg/dL    Specific Gravity Urine 1.010 1.003 - 1.035    Blood Urine Negative Negative    pH Urine 5.5 5.0 - 7.0    Protein Albumin Urine Negative Negative mg/dL    Urobilinogen Urine 2.0 Normal, 2.0 mg/dL    Nitrite Urine Negative Negative    Leukocyte Esterase Urine Negative Negative    Mucus Urine Present (A) None Seen /LPF    RBC Urine 0 <=2 /HPF    WBC Urine 1 <=5 /HPF    Hyaline Casts Urine 1 <=2 /LPF   Alcohol level blood    Collection Time: 04/28/22 12:16 AM   Result Value Ref Range    Alcohol ethyl <0.01 <=0.01 g/dL   Ammonia (on ice)    Collection Time: 04/28/22 12:16 AM   Result Value Ref Range    Ammonia 36 10 - 50 umol/L   Drug abuse screen 77 urine (FL, RH, SH)    Collection Time: 04/28/22 12:16 AM   Result Value Ref Range    Amphetamines Urine Screen Negative Screen Negative    Barbiturates Urine Screen Negative Screen Negative    Benzodiazepines Urine Screen Negative Screen Negative    Cannabinoids Urine Screen Negative Screen Negative    Cocaine Urine Screen Negative Screen Negative    Opiates Urine Screen Negative Screen Negative    PCP Urine Screen Negative Screen Negative   TSH with free T4 reflex    Collection Time: 04/28/22 12:16 AM   Result Value Ref Range    TSH 1.63 0.40 - 4.00 mU/L   Asymptomatic COVID-19 Virus (Coronavirus) by PCR Nasopharyngeal    Collection Time: 04/28/22 12:19 AM    Specimen: Nasopharyngeal; Swab   Result Value Ref Range    SARS CoV2 PCR Negative Negative   Valproic acid (Depakote level)    Collection Time: 04/28/22  2:13 AM   Result Value Ref Range    Valproic acid 106   mg/L   Vitamin B12    Collection Time: 04/28/22 10:34 AM   Result Value Ref Range    Vitamin B12 603 193 - 986 pg/mL   Folate    Collection Time: 04/28/22 10:34 AM   Result Value Ref Range    Folic Acid 55.1 >=5.4 ng/mL   CK total    Collection Time: 04/28/22 10:34 AM   Result Value Ref Range     (H) 30  "- 300 U/L   N terminal pro BNP outpatient    Collection Time: 04/28/22 10:34 AM   Result Value Ref Range    N Terminal Pro BNP Outpatient 653 0 - 900 pg/mL   CK total    Collection Time: 04/29/22  6:25 AM   Result Value Ref Range     (H) 30 - 300 U/L   Albumin level    Collection Time: 04/29/22  6:25 AM   Result Value Ref Range    Albumin 2.1 (L) 3.4 - 5.0 g/dL   Hemoglobin    Collection Time: 04/29/22  6:25 AM   Result Value Ref Range    Hemoglobin 11.1 (L) 13.3 - 17.7 g/dL   Basic metabolic panel    Collection Time: 04/29/22  6:25 AM   Result Value Ref Range    Sodium 145 (H) 133 - 144 mmol/L    Potassium 3.4 3.4 - 5.3 mmol/L    Chloride 115 (H) 94 - 109 mmol/L    Carbon Dioxide (CO2) 26 20 - 32 mmol/L    Anion Gap 4 3 - 14 mmol/L    Urea Nitrogen 17 7 - 30 mg/dL    Creatinine 0.82 0.66 - 1.25 mg/dL    Calcium 9.1 8.5 - 10.1 mg/dL    Glucose 92 70 - 99 mg/dL    GFR Estimate >90 >60 mL/min/1.73m2   Magnesium    Collection Time: 04/29/22  6:25 AM   Result Value Ref Range    Magnesium 2.2 1.6 - 2.3 mg/dL          Physical and Psychiatric Examination:     /70 (BP Location: Right arm)   Pulse 78   Temp 97.9  F (36.6  C) (Oral)   Resp 16   Ht 1.676 m (5' 6\")   Wt 92.5 kg (204 lb)   SpO2 94%   BMI 32.93 kg/m    Weight is 204 lbs 0 oz  Body mass index is 32.93 kg/m .    Physical Exam:  I have reviewed the physical exam as documented by by the medical team and agree with findings and assessment and have no additional findings to add at this time.    Mental Status Exam:    Appearance: awake, alert  Attitude:  cooperative  Eye Contact:  fair  Mood:  \"okay\"  Affect:  flat  Speech:  delayed responses, monotone speech  Psychomotor Behavior:  no evidence of tardive dyskinesia, dystonia, or tics  Thought Process:  linear and goal oriented  Associations:  no loose associations  Thought Content:  no evidence of suicidal ideation or homicidal ideation and no evidence of psychotic thought  Insight:  " limited  Judgement:  limited  Oriented to:  time, person, and place  Attention Span and Concentration:  limited  Recent and Remote Memory:  limited                 DSM-5 Diagnosis:   Intellectual Disabilites  319 (F79) Unspecified Intellectual Disability (Intellectual Developmental Disorder)  295.70  (F25) Schizoaffective Disorder Bipolar Type   Delirium, resolving          Assessment:   Jagdeep Walker is a 51 year old male with a history of seizure disorder, schizoaffective disorder and intellectual disability who presented for altered mental status following an unwitnessed fall. He is currently alert and oriented x4 with notable delayed responses that are somewhat more delayed than normal according to his sister. His psychiatric symptoms seem well-managed on his current psychotropic medications and does not seem to be experiencing adverse effects. He does not seem to be oversedated from his medications. It is unclear what caused his fall and subsequent delirium, but unlikely to be related to psychiatric medications.           Summary of Recommendations:   1. Continue current psychotropic medications as ordered    2. Delirium precautions:    Up during the day with lights on    Lights off at night, avoid interruptions during the night as much as possible    Family visits    Encourage wearing glasses    Reorientation     Avoid opioids, benzodiazepines, anticholinergics if possible.      Continue to ensure proper nutrition, fluid and electrolyte balance. Monitor for infections, hypoxia, metabolic derangements, or other causes of delirium.     3. Please reconsult psychiatry if further assistance needed      DILLON Valdivia CNP      Consult/Liaison Psychiatry   [facility]  Contact information available via [facility] Paging/Directory  If I am not available, then [facility] CL line ([phone]) should know who is covering our consult service.

## 2022-04-29 NOTE — PLAN OF CARE
OT orders received, and per consult with PT and nursing it was determined no skilled OT intervention needed as patient at baseline with self-cares and will be returning to his group home at discharge where services provided for IADLs as needed. Therefore, OT to sign off and complete order.

## 2022-04-29 NOTE — CONSULTS
Care Management Initial Consult    General Information  Assessment completed with: Family, Huong and GH  Type of CM/SW Visit: Initial Assessment  -Writer was told patient will be leaving later this afternoon. Writer tried multiple times to call group home, finally getting through.   Writer also spoke to sister, Huong who appears to be patient's Guardian. Writer asked for paperwork, she did not have on hand and referred to group home.  Primary Care Provider verified and updated as needed:     Readmission within the last 30 days:none           Advance Care Planning: Advance Care Planning Reviewed: concerns discussed          Communication Assessment  Patient's communication style: spoken language (English or Bilingual)    Hearing Difficulty or Deaf: no        Cognitive  Cognitive/Neuro/Behavioral: .WDL except, orientation  Level of Consciousness: alert  Arousal Level: opens eyes spontaneously  Orientation: disoriented to, time     Best Language: 0 - No aphasia  Speech: word-finding difficulty    Living Environment:   People in home: facility resident     Current living Arrangements: group home      Able to return to prior arrangements: yes       Family/Social Support:  Care provided by: other (see comments)  Provides care for: no one, unable/limited ability to care for self  Marital Status: Single  Sibling(s), Facility resident(s)/Staff          Description of Support System: Supportive         Current Resources:   Patient receiving home care services: No     Community Resources: Group Home  Equipment currently used at home: none  Supplies currently used at home: Other (CPAP per notes)    Employment/Financial:  Employment Status: disabled        Financial Concerns:             Lifestyle & Psychosocial Needs:  Social Determinants of Health     Tobacco Use: Not on file   Alcohol Use: Not on file   Financial Resource Strain: Not on file   Food Insecurity: Not on file   Transportation Needs: Not on file   Physical  Activity: Not on file   Stress: Not on file   Social Connections: Not on file   Intimate Partner Violence: Not on file   Depression: Not on file   Housing Stability: Not on file       Functional Status:  Prior to admission patient needed assistance:   Dependent ADLs:: Independent  Dependent IADLs:: Cooking, Laundry, Meal Preparation, Medication Management, Money Management, Transportation  Assesssment of Functional Status: At functional baseline    Mental Health Status:  Mental Health Status: No Current Concerns       Chemical Dependency Status:      None noted          Values/Beliefs:  Spiritual, Cultural Beliefs, Religion Practices, Values that affect care: no               Additional Information:  Writer called group home with update. They will pick patient around 4:00pm. Patient did call them while writer was on the phone and they confirmed with the patient.    Olga Louise RN

## 2022-05-02 NOTE — PLAN OF CARE
Physical Therapy Discharge Summary    Reason for therapy discharge:    Discharged to home.    Progress towards therapy goal(s). See goals on Care Plan in [facility] electronic health record for goal details.  Goals not met.  Barriers to achieving goals:   discharge from facility.    Therapy recommendation(s):    Continued therapy is recommended.  Rationale/Recommendations:  Patient lives in a group home and is independent with mobility at baseline. Currently, patient requires SBA with support of a walker for mobility. He has assist available at his group home. FWW ordered for home. HHPT recommended to progress safety and independence with mobility..

## 2022-05-05 NOTE — PROGRESS NOTES
[facility]      OUTPATIENT PHYSICAL THERAPY EVALUATION  PLAN OF TREATMENT FOR OUTPATIENT REHABILITATION  (COMPLETE FOR INITIAL CLAIMS ONLY)  Patient's Last Name, First Name, M.I.  YOB: 1970  RicardoJagdeep gomez                        Provider's Name  [facility] Medical Record No.  [id number]                               Onset Date:  04/27/22   Start of Care Date:  04/29/22      Type:     _X_PT   ___OT   ___SLP Medical Diagnosis:  Closed head injury, initial encounter                        PT Diagnosis:  impaired mobility   Visits from SOC:  1   _________________________________________________________________________________  Plan of Treatment/Functional Goals    Planned Interventions: balance training, bed mobility training, gait training, patient/family education, strengthening, transfer training     Goals: See Physical Therapy Goals on Care Plan in [facility] electronic health record.    Therapy Frequency: 3x/week  Predicted Duration of Therapy Intervention: 05/04/22  _________________________________________________________________________________    I CERTIFY THE NEED FOR THESE SERVICES FURNISHED UNDER        THIS PLAN OF TREATMENT AND WHILE UNDER MY CARE     (Physician co-signature of this document indicates review and certification of the therapy plan).              Certification date from: 04/29/22, Certification date to: 05/05/22    Referring Physician: Donnie Freeman MD            Initial Assessment        See Physical Therapy evaluation dated 04/29/22 in Epic electronic health record.

## 2022-05-08 ENCOUNTER — APPOINTMENT (OUTPATIENT)
Dept: CT IMAGING | Facility: CLINIC | Age: 52
DRG: 438 | End: 2022-05-08
Attending: EMERGENCY MEDICINE
Payer: MEDICARE

## 2022-05-08 ENCOUNTER — APPOINTMENT (OUTPATIENT)
Dept: GENERAL RADIOLOGY | Facility: CLINIC | Age: 52
DRG: 438 | End: 2022-05-08
Attending: EMERGENCY MEDICINE
Payer: MEDICARE

## 2022-05-08 ENCOUNTER — APPOINTMENT (OUTPATIENT)
Dept: MRI IMAGING | Facility: CLINIC | Age: 52
DRG: 438 | End: 2022-05-08
Attending: HOSPITALIST
Payer: MEDICARE

## 2022-05-08 ENCOUNTER — HOSPITAL ENCOUNTER (INPATIENT)
Facility: CLINIC | Age: 52
LOS: 9 days | Discharge: HOME HEALTH | DRG: 438 | End: 2022-05-17
Attending: EMERGENCY MEDICINE | Admitting: HOSPITALIST
Payer: MEDICARE

## 2022-05-08 DIAGNOSIS — R41.82 ALTERED MENTAL STATUS, UNSPECIFIED ALTERED MENTAL STATUS TYPE: ICD-10-CM

## 2022-05-08 DIAGNOSIS — S09.90XA CLOSED HEAD INJURY, INITIAL ENCOUNTER: ICD-10-CM

## 2022-05-08 DIAGNOSIS — K86.89 PERIPANCREATIC FLUID COLLECTION: ICD-10-CM

## 2022-05-08 DIAGNOSIS — R53.1 GENERALIZED WEAKNESS: Primary | ICD-10-CM

## 2022-05-08 DIAGNOSIS — J90 PLEURAL EFFUSION: ICD-10-CM

## 2022-05-08 DIAGNOSIS — R18.8 OTHER ASCITES: ICD-10-CM

## 2022-05-08 DIAGNOSIS — I81 PORTAL VEIN THROMBOSIS: ICD-10-CM

## 2022-05-08 DIAGNOSIS — K85.90 ACUTE PANCREATITIS, UNSPECIFIED COMPLICATION STATUS, UNSPECIFIED PANCREATITIS TYPE: ICD-10-CM

## 2022-05-08 LAB
ALBUMIN SERPL-MCNC: 2.5 G/DL (ref 3.4–5)
ALBUMIN SERPL-MCNC: 2.7 G/DL (ref 3.4–5)
ALBUMIN UR-MCNC: NEGATIVE MG/DL
ALP SERPL-CCNC: 48 U/L (ref 40–150)
ALP SERPL-CCNC: 50 U/L (ref 40–150)
ALT SERPL W P-5'-P-CCNC: 39 U/L (ref 0–70)
ALT SERPL W P-5'-P-CCNC: 39 U/L (ref 0–70)
ANION GAP SERPL CALCULATED.3IONS-SCNC: 5 MMOL/L (ref 3–14)
ANION GAP SERPL CALCULATED.3IONS-SCNC: 8 MMOL/L (ref 3–14)
APPEARANCE UR: CLEAR
AST SERPL W P-5'-P-CCNC: 115 U/L (ref 0–45)
AST SERPL W P-5'-P-CCNC: 123 U/L (ref 0–45)
ATRIAL RATE - MUSE: 468 BPM
BASOPHILS # BLD AUTO: 0 10E3/UL (ref 0–0.2)
BASOPHILS NFR BLD AUTO: 0 %
BILIRUB SERPL-MCNC: 1.3 MG/DL (ref 0.2–1.3)
BILIRUB SERPL-MCNC: 1.5 MG/DL (ref 0.2–1.3)
BILIRUB UR QL STRIP: NEGATIVE
BUN SERPL-MCNC: 20 MG/DL (ref 7–30)
BUN SERPL-MCNC: 24 MG/DL (ref 7–30)
CALCIUM SERPL-MCNC: 10 MG/DL (ref 8.5–10.1)
CALCIUM SERPL-MCNC: 9.3 MG/DL (ref 8.5–10.1)
CHLORIDE BLD-SCNC: 106 MMOL/L (ref 94–109)
CHLORIDE BLD-SCNC: 109 MMOL/L (ref 94–109)
CO2 SERPL-SCNC: 24 MMOL/L (ref 20–32)
CO2 SERPL-SCNC: 26 MMOL/L (ref 20–32)
COLOR UR AUTO: YELLOW
CREAT SERPL-MCNC: 0.77 MG/DL (ref 0.66–1.25)
CREAT SERPL-MCNC: 0.87 MG/DL (ref 0.66–1.25)
CRP SERPL-MCNC: 77.9 MG/L (ref 0–8)
DIASTOLIC BLOOD PRESSURE - MUSE: NORMAL MMHG
EOSINOPHIL # BLD AUTO: 0 10E3/UL (ref 0–0.7)
EOSINOPHIL NFR BLD AUTO: 0 %
ERYTHROCYTE [DISTWIDTH] IN BLOOD BY AUTOMATED COUNT: 14.1 % (ref 10–15)
GFR SERPL CREATININE-BSD FRML MDRD: >90 ML/MIN/1.73M2
GFR SERPL CREATININE-BSD FRML MDRD: >90 ML/MIN/1.73M2
GLUCOSE BLD-MCNC: 73 MG/DL (ref 70–99)
GLUCOSE BLD-MCNC: 81 MG/DL (ref 70–99)
GLUCOSE UR STRIP-MCNC: NEGATIVE MG/DL
HCO3 BLDV-SCNC: 23 MMOL/L (ref 21–28)
HCT VFR BLD AUTO: 36.5 % (ref 40–53)
HGB BLD-MCNC: 11.8 G/DL (ref 13.3–17.7)
HGB UR QL STRIP: NEGATIVE
HOLD SPECIMEN: NORMAL
IMM GRANULOCYTES # BLD: 0 10E3/UL
IMM GRANULOCYTES NFR BLD: 1 %
INTERPRETATION ECG - MUSE: NORMAL
KETONES UR STRIP-MCNC: ABNORMAL MG/DL
LACTATE BLD-SCNC: 1.4 MMOL/L
LEUKOCYTE ESTERASE UR QL STRIP: NEGATIVE
LIPASE SERPL-CCNC: 1730 U/L (ref 73–393)
LYMPHOCYTES # BLD AUTO: 1.5 10E3/UL (ref 0.8–5.3)
LYMPHOCYTES NFR BLD AUTO: 21 %
MCH RBC QN AUTO: 29.7 PG (ref 26.5–33)
MCHC RBC AUTO-ENTMCNC: 32.3 G/DL (ref 31.5–36.5)
MCV RBC AUTO: 92 FL (ref 78–100)
MONOCYTES # BLD AUTO: 1.3 10E3/UL (ref 0–1.3)
MONOCYTES NFR BLD AUTO: 18 %
NEUTROPHILS # BLD AUTO: 4.4 10E3/UL (ref 1.6–8.3)
NEUTROPHILS NFR BLD AUTO: 60 %
NITRATE UR QL: NEGATIVE
NRBC # BLD AUTO: 0 10E3/UL
NRBC BLD AUTO-RTO: 0 /100
P AXIS - MUSE: NORMAL DEGREES
PCO2 BLDV: 37 MM HG (ref 40–50)
PH BLDV: 7.4 [PH] (ref 7.32–7.43)
PH UR STRIP: 6 [PH] (ref 5–7)
PLATELET # BLD AUTO: 155 10E3/UL (ref 150–450)
PO2 BLDV: 42 MM HG (ref 25–47)
POTASSIUM BLD-SCNC: 3.4 MMOL/L (ref 3.4–5.3)
POTASSIUM BLD-SCNC: 3.5 MMOL/L (ref 3.4–5.3)
PR INTERVAL - MUSE: NORMAL MS
PROT SERPL-MCNC: 6 G/DL (ref 6.8–8.8)
PROT SERPL-MCNC: 6.5 G/DL (ref 6.8–8.8)
QRS DURATION - MUSE: 106 MS
QT - MUSE: 412 MS
QTC - MUSE: 466 MS
R AXIS - MUSE: 27 DEGREES
RBC # BLD AUTO: 3.97 10E6/UL (ref 4.4–5.9)
SAO2 % BLDV: 78 % (ref 94–100)
SARS-COV-2 RNA RESP QL NAA+PROBE: NEGATIVE
SODIUM SERPL-SCNC: 138 MMOL/L (ref 133–144)
SODIUM SERPL-SCNC: 140 MMOL/L (ref 133–144)
SP GR UR STRIP: 1.01 (ref 1–1.03)
SYSTOLIC BLOOD PRESSURE - MUSE: NORMAL MMHG
T AXIS - MUSE: 33 DEGREES
TROPONIN I SERPL HS-MCNC: 24 NG/L
TSH SERPL DL<=0.005 MIU/L-ACNC: 2.16 MU/L (ref 0.4–4)
UFH PPP CHRO-ACNC: 1 IU/ML
UROBILINOGEN UR STRIP-MCNC: NORMAL MG/DL
VALPROATE SERPL-MCNC: 88 MG/L
VENTRICULAR RATE- MUSE: 77 BPM
WBC # BLD AUTO: 7.2 10E3/UL (ref 4–11)

## 2022-05-08 PROCEDURE — 99285 EMERGENCY DEPT VISIT HI MDM: CPT | Mod: 25

## 2022-05-08 PROCEDURE — 258N000003 HC RX IP 258 OP 636: Performed by: HOSPITALIST

## 2022-05-08 PROCEDURE — U0003 INFECTIOUS AGENT DETECTION BY NUCLEIC ACID (DNA OR RNA); SEVERE ACUTE RESPIRATORY SYNDROME CORONAVIRUS 2 (SARS-COV-2) (CORONAVIRUS DISEASE [COVID-19]), AMPLIFIED PROBE TECHNIQUE, MAKING USE OF HIGH THROUGHPUT TECHNOLOGIES AS DESCRIBED BY CMS-2020-01-R: HCPCS | Performed by: EMERGENCY MEDICINE

## 2022-05-08 PROCEDURE — 85025 COMPLETE CBC W/AUTO DIFF WBC: CPT | Performed by: EMERGENCY MEDICINE

## 2022-05-08 PROCEDURE — 71046 X-RAY EXAM CHEST 2 VIEWS: CPT

## 2022-05-08 PROCEDURE — 93005 ELECTROCARDIOGRAM TRACING: CPT

## 2022-05-08 PROCEDURE — 36415 COLL VENOUS BLD VENIPUNCTURE: CPT | Performed by: INTERNAL MEDICINE

## 2022-05-08 PROCEDURE — 74177 CT ABD & PELVIS W/CONTRAST: CPT

## 2022-05-08 PROCEDURE — 96366 THER/PROPH/DIAG IV INF ADDON: CPT

## 2022-05-08 PROCEDURE — 84155 ASSAY OF PROTEIN SERUM: CPT | Performed by: INTERNAL MEDICINE

## 2022-05-08 PROCEDURE — 250N000009 HC RX 250: Performed by: EMERGENCY MEDICINE

## 2022-05-08 PROCEDURE — 80165 DIPROPYLACETIC ACID FREE: CPT | Performed by: EMERGENCY MEDICINE

## 2022-05-08 PROCEDURE — 80201 ASSAY OF TOPIRAMATE: CPT | Performed by: EMERGENCY MEDICINE

## 2022-05-08 PROCEDURE — 81003 URINALYSIS AUTO W/O SCOPE: CPT | Performed by: EMERGENCY MEDICINE

## 2022-05-08 PROCEDURE — 85520 HEPARIN ASSAY: CPT | Performed by: EMERGENCY MEDICINE

## 2022-05-08 PROCEDURE — 80164 ASSAY DIPROPYLACETIC ACD TOT: CPT | Performed by: EMERGENCY MEDICINE

## 2022-05-08 PROCEDURE — 36415 COLL VENOUS BLD VENIPUNCTURE: CPT | Performed by: EMERGENCY MEDICINE

## 2022-05-08 PROCEDURE — 250N000011 HC RX IP 250 OP 636: Performed by: EMERGENCY MEDICINE

## 2022-05-08 PROCEDURE — 96365 THER/PROPH/DIAG IV INF INIT: CPT | Mod: 59

## 2022-05-08 PROCEDURE — C9803 HOPD COVID-19 SPEC COLLECT: HCPCS

## 2022-05-08 PROCEDURE — 70450 CT HEAD/BRAIN W/O DYE: CPT

## 2022-05-08 PROCEDURE — 99223 1ST HOSP IP/OBS HIGH 75: CPT | Mod: AI | Performed by: HOSPITALIST

## 2022-05-08 PROCEDURE — 70553 MRI BRAIN STEM W/O & W/DYE: CPT

## 2022-05-08 PROCEDURE — 84484 ASSAY OF TROPONIN QUANT: CPT | Performed by: EMERGENCY MEDICINE

## 2022-05-08 PROCEDURE — 120N000001 HC R&B MED SURG/OB

## 2022-05-08 PROCEDURE — 83605 ASSAY OF LACTIC ACID: CPT

## 2022-05-08 PROCEDURE — A9585 GADOBUTROL INJECTION: HCPCS | Performed by: EMERGENCY MEDICINE

## 2022-05-08 PROCEDURE — 83690 ASSAY OF LIPASE: CPT | Performed by: EMERGENCY MEDICINE

## 2022-05-08 PROCEDURE — 250N000013 HC RX MED GY IP 250 OP 250 PS 637: Performed by: HOSPITALIST

## 2022-05-08 PROCEDURE — 80053 COMPREHEN METABOLIC PANEL: CPT | Performed by: EMERGENCY MEDICINE

## 2022-05-08 PROCEDURE — 84443 ASSAY THYROID STIM HORMONE: CPT | Performed by: HOSPITALIST

## 2022-05-08 PROCEDURE — 96361 HYDRATE IV INFUSION ADD-ON: CPT

## 2022-05-08 PROCEDURE — 96376 TX/PRO/DX INJ SAME DRUG ADON: CPT

## 2022-05-08 PROCEDURE — 255N000002 HC RX 255 OP 636: Performed by: EMERGENCY MEDICINE

## 2022-05-08 PROCEDURE — 250N000009 HC RX 250: Performed by: HOSPITALIST

## 2022-05-08 PROCEDURE — 86140 C-REACTIVE PROTEIN: CPT | Performed by: INTERNAL MEDICINE

## 2022-05-08 PROCEDURE — 258N000003 HC RX IP 258 OP 636: Performed by: EMERGENCY MEDICINE

## 2022-05-08 PROCEDURE — G0378 HOSPITAL OBSERVATION PER HR: HCPCS

## 2022-05-08 RX ORDER — ONDANSETRON 4 MG/1
4 TABLET, ORALLY DISINTEGRATING ORAL EVERY 6 HOURS PRN
Status: DISCONTINUED | OUTPATIENT
Start: 2022-05-08 | End: 2022-05-17 | Stop reason: HOSPADM

## 2022-05-08 RX ORDER — TOPIRAMATE 100 MG/1
100 TABLET, FILM COATED ORAL AT BEDTIME
Status: DISCONTINUED | OUTPATIENT
Start: 2022-05-08 | End: 2022-05-17 | Stop reason: HOSPADM

## 2022-05-08 RX ORDER — ALBUTEROL SULFATE 90 UG/1
2 AEROSOL, METERED RESPIRATORY (INHALATION) EVERY 6 HOURS PRN
Status: DISCONTINUED | OUTPATIENT
Start: 2022-05-08 | End: 2022-05-17 | Stop reason: HOSPADM

## 2022-05-08 RX ORDER — NALOXONE HYDROCHLORIDE 0.4 MG/ML
0.4 INJECTION, SOLUTION INTRAMUSCULAR; INTRAVENOUS; SUBCUTANEOUS
Status: DISCONTINUED | OUTPATIENT
Start: 2022-05-08 | End: 2022-05-17 | Stop reason: HOSPADM

## 2022-05-08 RX ORDER — CARBOXYMETHYLCELLULOSE SODIUM 5 MG/ML
1 SOLUTION/ DROPS OPHTHALMIC DAILY PRN
Status: DISCONTINUED | OUTPATIENT
Start: 2022-05-08 | End: 2022-05-17 | Stop reason: HOSPADM

## 2022-05-08 RX ORDER — GADOBUTROL 604.72 MG/ML
9 INJECTION INTRAVENOUS ONCE
Status: COMPLETED | OUTPATIENT
Start: 2022-05-08 | End: 2022-05-08

## 2022-05-08 RX ORDER — CARBOXYMETHYLCELLULOSE SODIUM 10 MG/ML
1 GEL OPHTHALMIC AT BEDTIME
Status: DISCONTINUED | OUTPATIENT
Start: 2022-05-08 | End: 2022-05-17 | Stop reason: HOSPADM

## 2022-05-08 RX ORDER — POLYETHYLENE GLYCOL 3350 17 G/17G
17 POWDER, FOR SOLUTION ORAL DAILY PRN
Status: DISCONTINUED | OUTPATIENT
Start: 2022-05-08 | End: 2022-05-17 | Stop reason: HOSPADM

## 2022-05-08 RX ORDER — AMOXICILLIN 250 MG
2 CAPSULE ORAL 2 TIMES DAILY
Status: DISCONTINUED | OUTPATIENT
Start: 2022-05-08 | End: 2022-05-17 | Stop reason: HOSPADM

## 2022-05-08 RX ORDER — NALOXONE HYDROCHLORIDE 0.4 MG/ML
0.2 INJECTION, SOLUTION INTRAMUSCULAR; INTRAVENOUS; SUBCUTANEOUS
Status: DISCONTINUED | OUTPATIENT
Start: 2022-05-08 | End: 2022-05-17 | Stop reason: HOSPADM

## 2022-05-08 RX ORDER — OXYCODONE HYDROCHLORIDE 5 MG/1
5 TABLET ORAL EVERY 4 HOURS PRN
Status: DISCONTINUED | OUTPATIENT
Start: 2022-05-08 | End: 2022-05-17 | Stop reason: HOSPADM

## 2022-05-08 RX ORDER — RISPERIDONE 2 MG/1
2 TABLET ORAL 2 TIMES DAILY
Status: DISCONTINUED | OUTPATIENT
Start: 2022-05-08 | End: 2022-05-17 | Stop reason: HOSPADM

## 2022-05-08 RX ORDER — ONDANSETRON 2 MG/ML
4 INJECTION INTRAMUSCULAR; INTRAVENOUS EVERY 6 HOURS PRN
Status: DISCONTINUED | OUTPATIENT
Start: 2022-05-08 | End: 2022-05-17 | Stop reason: HOSPADM

## 2022-05-08 RX ORDER — HEPARIN SODIUM 10000 [USP'U]/100ML
0-5000 INJECTION, SOLUTION INTRAVENOUS CONTINUOUS
Status: DISCONTINUED | OUTPATIENT
Start: 2022-05-08 | End: 2022-05-10

## 2022-05-08 RX ORDER — PREDNISOLONE ACETATE 10 MG/ML
1 SUSPENSION/ DROPS OPHTHALMIC DAILY
Status: DISCONTINUED | OUTPATIENT
Start: 2022-05-08 | End: 2022-05-17 | Stop reason: HOSPADM

## 2022-05-08 RX ORDER — CLONAZEPAM 0.5 MG/1
0.12 TABLET ORAL DAILY PRN
Status: DISCONTINUED | OUTPATIENT
Start: 2022-05-08 | End: 2022-05-17 | Stop reason: HOSPADM

## 2022-05-08 RX ORDER — IPRATROPIUM BROMIDE 42 UG/1
2 SPRAY, METERED NASAL 3 TIMES DAILY
Status: DISCONTINUED | OUTPATIENT
Start: 2022-05-08 | End: 2022-05-17 | Stop reason: HOSPADM

## 2022-05-08 RX ORDER — IOPAMIDOL 755 MG/ML
103 INJECTION, SOLUTION INTRAVASCULAR ONCE
Status: COMPLETED | OUTPATIENT
Start: 2022-05-08 | End: 2022-05-08

## 2022-05-08 RX ORDER — AMOXICILLIN 250 MG
1 CAPSULE ORAL 2 TIMES DAILY
Status: DISCONTINUED | OUTPATIENT
Start: 2022-05-08 | End: 2022-05-17 | Stop reason: HOSPADM

## 2022-05-08 RX ORDER — ACETAMINOPHEN 325 MG/1
650 TABLET ORAL EVERY 6 HOURS PRN
Status: DISCONTINUED | OUTPATIENT
Start: 2022-05-08 | End: 2022-05-17 | Stop reason: HOSPADM

## 2022-05-08 RX ORDER — ACETAMINOPHEN 650 MG/1
650 SUPPOSITORY RECTAL EVERY 6 HOURS PRN
Status: DISCONTINUED | OUTPATIENT
Start: 2022-05-08 | End: 2022-05-17 | Stop reason: HOSPADM

## 2022-05-08 RX ORDER — SERTRALINE HYDROCHLORIDE 100 MG/1
100 TABLET, FILM COATED ORAL DAILY
Status: DISCONTINUED | OUTPATIENT
Start: 2022-05-08 | End: 2022-05-17 | Stop reason: HOSPADM

## 2022-05-08 RX ORDER — SODIUM CHLORIDE 9 MG/ML
INJECTION, SOLUTION INTRAVENOUS CONTINUOUS
Status: DISCONTINUED | OUTPATIENT
Start: 2022-05-08 | End: 2022-05-09

## 2022-05-08 RX ORDER — DIVALPROEX SODIUM 500 MG/1
500 TABLET, DELAYED RELEASE ORAL 3 TIMES DAILY
Status: DISCONTINUED | OUTPATIENT
Start: 2022-05-08 | End: 2022-05-17 | Stop reason: HOSPADM

## 2022-05-08 RX ADMIN — IPRATROPIUM BROMIDE 2 SPRAY: 42 SPRAY NASAL at 21:11

## 2022-05-08 RX ADMIN — SODIUM CHLORIDE: 9 INJECTION, SOLUTION INTRAVENOUS at 18:42

## 2022-05-08 RX ADMIN — HEPARIN SODIUM 1650 UNITS/HR: 10000 INJECTION, SOLUTION INTRAVENOUS at 14:40

## 2022-05-08 RX ADMIN — DIVALPROEX SODIUM 500 MG: 500 TABLET, DELAYED RELEASE ORAL at 21:13

## 2022-05-08 RX ADMIN — SERTRALINE HYDROCHLORIDE 100 MG: 100 TABLET ORAL at 21:13

## 2022-05-08 RX ADMIN — PREDNISOLONE ACETATE 1 DROP: 10 SUSPENSION/ DROPS OPHTHALMIC at 21:07

## 2022-05-08 RX ADMIN — GADOBUTROL 9 ML: 604.72 INJECTION INTRAVENOUS at 14:32

## 2022-05-08 RX ADMIN — SODIUM CHLORIDE 1000 ML: 9 INJECTION, SOLUTION INTRAVENOUS at 10:34

## 2022-05-08 RX ADMIN — IOPAMIDOL 103 ML: 755 INJECTION, SOLUTION INTRAVENOUS at 12:06

## 2022-05-08 RX ADMIN — RISPERIDONE 2 MG: 2 TABLET ORAL at 21:13

## 2022-05-08 RX ADMIN — CARBOXYMETHYLCELLULOSE SODIUM 1 DROP: 10 GEL OPHTHALMIC at 21:12

## 2022-05-08 RX ADMIN — SODIUM CHLORIDE 69 ML: 9 INJECTION, SOLUTION INTRAVENOUS at 12:07

## 2022-05-08 RX ADMIN — TOPIRAMATE 100 MG: 100 TABLET, FILM COATED ORAL at 21:13

## 2022-05-08 ASSESSMENT — ACTIVITIES OF DAILY LIVING (ADL)
ADLS_ACUITY_SCORE: 12
ADLS_ACUITY_SCORE: 22
ADLS_ACUITY_SCORE: 22
ADLS_ACUITY_SCORE: 12
ADLS_ACUITY_SCORE: 22
ADLS_ACUITY_SCORE: 12

## 2022-05-08 NOTE — ED NOTES
[facility]    ED Nurse Handoff Report    ED Chief complaint: Generalized Weakness      ED Diagnosis:   Final diagnoses:   Acute pancreatitis, unspecified complication status, unspecified pancreatitis type   Portal vein thrombosis   Peripancreatic fluid collection   Other ascites   Pleural effusion       Code Status: Full Code    Allergies: No Known Allergies    Patient Story:  Pt arrives from group home with increased weakness. Pt had fall on 4/27 with concussion and facial abrasion. Baseline cognitive delay, seizure disorder, independently moves. Upon arrival appears weak, difficulty finding words and somewhat illogical speech.     Focused Assessment:    History from pt's legal guardian, his sister Lissett, pt has not been acting like like himself for 1 week, c/o stomach pain and has steadily lost 30 pounds in the past 4 months. Pt is able to answer yes or no questions, is poor historian and has no complaints other than leg weakness. Pt road tested in ED with walker + 1x staff, walked unsteadily with slow gait.     Pt appeared with abrasion on forehead from previous fall and has a history of right eye blindness.     Labs Ordered and Resulted from Time of ED Arrival to Time of ED Departure   URINE MACROSCOPIC WITH REFLEX TO MICRO - Abnormal       Result Value    Color Urine Yellow      Appearance Urine Clear      Glucose Urine Negative      Bilirubin Urine Negative      Ketones Urine Trace (*)     Specific Gravity Urine 1.013      Blood Urine Negative      pH Urine 6.0      Protein Albumin Urine Negative      Urobilinogen Urine Normal      Nitrite Urine Negative      Leukocyte Esterase Urine Negative     COMPREHENSIVE METABOLIC PANEL - Abnormal    Sodium 138      Potassium 3.5      Chloride 106      Carbon Dioxide (CO2) 24      Anion Gap 8      Urea Nitrogen 24      Creatinine 0.87      Calcium 10.0      Glucose 73      Alkaline Phosphatase 50       (*)     ALT 39      Protein Total 6.5 (*)      Albumin 2.7 (*)     Bilirubin Total 1.3      GFR Estimate >90     ISTAT GASES LACTATE VENOUS POCT - Abnormal    Lactic Acid POCT 1.4      Bicarbonate Venous POCT 23      O2 Sat, Venous POCT 78 (*)     pCO2V Venous POCT 37 (*)     pH Venous POCT 7.40      pO2 Venous POCT 42     CBC WITH PLATELETS AND DIFFERENTIAL - Abnormal    WBC Count 7.2      RBC Count 3.97 (*)     Hemoglobin 11.8 (*)     Hematocrit 36.5 (*)     MCV 92      MCH 29.7      MCHC 32.3      RDW 14.1      Platelet Count 155      % Neutrophils 60      % Lymphocytes 21      % Monocytes 18      % Eosinophils 0      % Basophils 0      % Immature Granulocytes 1      NRBCs per 100 WBC 0      Absolute Neutrophils 4.4      Absolute Lymphocytes 1.5      Absolute Monocytes 1.3      Absolute Eosinophils 0.0      Absolute Basophils 0.0      Absolute Immature Granulocytes 0.0      Absolute NRBCs 0.0     LIPASE - Abnormal    Lipase 1,730 (*)    COVID-19 VIRUS (CORONAVIRUS) BY PCR - Normal    SARS CoV2 PCR Negative     TROPONIN I - Normal    Troponin I High Sensitivity 24     VALPROIC ACID - Normal    Valproic acid 88     TSH WITH FREE T4 REFLEX - Normal    TSH 2.16     TOPIRAMATE LEVEL   VALPROIC ACID FREE AND TOTAL       MR Brain w/o & w Contrast   Final Result   IMPRESSION:   1.  No acute intracranial abnormality. 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   2.  No mass lesion, obstructive hydrocephalus or pathologic contrast enhancement.   3.  Mild to moderate generalized volume loss.      CT Abdomen Pelvis w Contrast   Final Result   IMPRESSION:    1.  Occlusive thrombosis of the portal vein.   2.  Acute edematous interstitial pancreatitis.   3.  Acute peripancreatic fluid collections measuring 3.0 and 2.7 cm.   4.  Small-moderate volume ascites.   5.  Small pleural effusions.   6.  Groundglass opacities in the lungs may represent infection or edema.      These findings were discussed via telephone by Dr. Solares with Dr. Bravo at 12:55 PM on 5/8/2022.      CT Head  "w/o Contrast   Final Result   IMPRESSION:   1.  No CT evidence for acute intracranial process.   2.  Mild chronic microvascular ischemic changes as above.      XR Chest 2 Views   Final Result   IMPRESSION: Better inspiration on the lateral view.      No hydropneumothorax or fracture. Minimal linear scarring in the right middle lobe. Lungs are otherwise clear. Heart and pulmonary vascularity are normal.            Treatments and/or interventions provided:    Road tested, fluids, labs,  Medications   heparin 25,000 units in 0.45% NaCl 250 mL ANTICOAGULANT infusion (1,650 Units/hr Intravenous New Bag 5/8/22 1440)   0.9% sodium chloride BOLUS (0 mLs Intravenous Stopped 5/8/22 1444)   iopamidol (ISOVUE-370) solution 103 mL (103 mLs Intravenous Given 5/8/22 1206)   Saline flush (69 mLs Intravenous Given 5/8/22 1207)   heparin ANTICOAGULANT Loading dose for HIGH INTENSITY TREATMENT * Give BEFORE starting heparin infusion (7,400 Units Intravenous Given 5/8/22 1438)   gadobutrol (GADAVIST) injection 9 mL (9 mLs Intravenous Given 5/8/22 1432)       Patient's response to treatments and/or interventions:    Pt comforitable laying in bed.     To be done/followed up on inpatient unit:   See any in-patient orders    Does this patient have any cognitive concerns?: Forgetful    Activity level - Baseline/Home:    Cane    Activity Level - Current:    Walker    Patient's Preferred language: English     Needed?: No    Isolation: None  Infection: Not Applicable  Patient tested for COVID 19 prior to admission: YES    Bariatric?: Yes    Vital Signs:   Vitals:    05/08/22 0907 05/08/22 1030 05/08/22 1130 05/08/22 1437   BP: 132/64 96/68 124/88 114/52   Pulse: 83  80 75   Resp: 18      Temp: 98.4  F (36.9  C)      TempSrc: Oral      SpO2: 98% 97% 98% 95%   Height: 1.651 m (5' 5\")          Cardiac Rhythm:     Was the PSS-3 completed:   Yes  What interventions are required if any?               Family Comments: Huong is legal " guardian, would like updates when available. Great historian. [phone]  OBS brochure/video discussed/provided to patient/family: Yes              Name of person given brochure if not patient: Verbally given to Huong              Relationship to patient: Legal Guardian/sister    For the majority of the shift this patient's behavior was Green.  Behavioral interventions performed were .    ED NURSE PHONE NUMBER: *[phone]

## 2022-05-08 NOTE — ED NOTES
Pt walked down hallway and back to room with walker assistance. In total about 30 ft. Pt used staggered steps and swerved as we walked. Pt did not appear stable, and when asked, he did not feel completely stable. Pt stated he would feel better staying in the hospital overnight.

## 2022-05-08 NOTE — PHARMACY-ADMISSION MEDICATION HISTORY
Pharmacy Medication History  Admission medication history interview status for the 5/8/2022  admission is complete. See EPIC admission navigator for prior to admission medications     Location of Interview: Phone with patient's guardian  Medication history sources: Patient's family/friend (Lissett [phone]), Surescripts and MAR ([facility] )    Significant changes made to the medication list:  - No Changes to the medication list.     In the past week, patient estimated taking medication this percent of the time: greater than 90%    Additional medication history information:   - I reviewed the medication list with the patient's guardian via phone.  She did express the following concerns about his medication regimen:   - Divalproex - concern about Three times daily dosing with levels being slightly high. (level today was 88)  - Omeprazole - concern about twice per day dosing and interested in decreasing to once per day dosing.    - Bowel Regimen - concern about scheduled docusate and fiber and some loose stools.     Medication reconciliation completed by provider prior to medication history? No    Time spent in this activity: 30 minutes.     Prior to Admission medications    Medication Sig Last Dose Taking? Auth Provider   albuterol (PROAIR HFA/PROVENTIL HFA/VENTOLIN HFA) 108 (90 Base) MCG/ACT inhaler Inhale 2 puffs into the lungs every 6 hours as needed for shortness of breath / dyspnea or wheezing Past Month at Unknown time Yes Unknown, Entered By History   atenolol (TENORMIN) 25 MG tablet Take 25 mg by mouth daily 5/7/2022 at Unknown time Yes Sarika Glass MD   calcium carbonate 600 mg-vitamin D 400 units (CALTRATE) 600-400 MG-UNIT per tablet Take 1 tablet by mouth daily (with breakfast) 5/7/2022 at Unknown time Yes Unknown, Entered By History   calcium polycarbophil (FIBERCON) 625 MG tablet Take 1 tablet by mouth 2 times daily 5/7/2022 at Unknown time Yes Unknown, Entered By History    carboxymethylcellulose PF (REFRESH LIQUIGEL) 1 % ophthalmic gel Place 1 drop into the right eye At Bedtime 5/7/2022 at Unknown time Yes Unknown, Entered By History   carboxymethylcellulose PF (REFRESH PLUS) 0.5 % ophthalmic solution Place 1 drop into both eyes daily as needed for dry eyes Past Month at Unknown time Yes Unknown, Entered By History   chlorhexidine (PERIDEX) 0.12 % solution Swish and spit 15 mLs in mouth daily 5/7/2022 at Unknown time Yes Unknown, Entered By History   ciclopirox (LOPROX) 0.77 % cream Apply topically nightly as needed Past Month at Unknown time Yes Unknown, Entered By History   clonazePAM (KLONOPIN) 0.125 MG TBDP ODT tab Take 0.125 mg by mouth daily as needed for anxiety Past Month at Unknown time Yes Unknown, Entered By History   divalproex sodium delayed-release (DEPAKOTE) 500 MG DR tablet Take 500 mg by mouth 3 times daily 5/7/2022 at Unknown time Yes Unknown, Entered By History   docusate sodium (COLACE) 100 MG capsule Take 100 mg by mouth daily 5/7/2022 at Unknown time Yes Unknown, Entered By History   hydrocortisone (CORTAID) 1 % external cream Apply topically 2 times daily Past Month at Unknown time Yes Unknown, Entered By History   ipratropium (ATROVENT) 0.06 % nasal spray Spray 2 sprays into both nostrils 3 times daily 5/7/2022 at Unknown time Yes Unknown, Entered By History   ketoconazole (NIZORAL) 2 % external cream Apply topically 2 times daily as needed for itching R foot Past Month at Unknown time Yes Unknown, Entered By History   levOCARNitine (CARNITOR) 330 MG tablet Take by mouth 3 times daily 5/7/2022 at Unknown time Yes Unknown, Entered By History   levocetirizine (XYZAL) 5 MG tablet Take 5 mg by mouth every evening 5/7/2022 at Unknown time Yes Unknown, Entered By History   levothyroxine (SYNTHROID/LEVOTHROID) 50 MCG tablet Take 50 mcg by mouth daily 5/7/2022 at Unknown time Yes Unknown, Entered By History   loperamide (IMODIUM) 2 MG capsule Take 2 mg by mouth 4  times daily as needed for diarrhea Past Month at Unknown time Yes Unknown, Entered By History   LORazepam (ATIVAN) 2 MG tablet Take 2 mg by mouth daily as needed for seizures Give bucally Past Month at Unknown time Yes Unknown, Entered By History   montelukast (SINGULAIR) 10 MG tablet Take 10 mg by mouth At Bedtime 5/7/2022 at Unknown time Yes Unknown, Entered By History   multivitamin, therapeutic (THERA-VIT) TABS tablet Take 1 tablet by mouth daily 5/7/2022 at Unknown time Yes Unknown, Entered By History   omeprazole 20 MG tablet Take 20 mg by mouth 2 times daily 5/7/2022 at Unknown time Yes Unknown, Entered By History   polyethylene glycol-propylene glycol (SYSTANE ULTRA) 0.4-0.3 % SOLN ophthalmic solution Place 2 drops into both eyes daily as needed for dry eyes Past Month at Unknown time Yes Unknown, Entered By History   prednisoLONE acetate (PRED FORTE) 1 % ophthalmic suspension Place 1 drop into the right eye daily 5/7/2022 at Unknown time Yes Unknown, Entered By History   risperiDONE (RISPERDAL) 2 MG tablet Take 2 mg by mouth 2 times daily 5/7/2022 at Unknown time Yes Unknown, Entered By History   sertraline (ZOLOFT) 100 MG tablet Take 100 mg by mouth daily 5/7/2022 at Unknown time Yes Unknown, Entered By History   topiramate (TOPAMAX) 100 MG tablet Take 100 mg by mouth At Bedtime 5/7/2022 at Unknown time Yes Unknown, Entered By History   Vitamin D (Cholecalciferol) 25 MCG (1000 UT) TABS Take 2,000 Units by mouth daily 5/7/2022 at Unknown time Yes Unknown, Entered By History     The information provided in this note is only as accurate as the sources available at the time of update(s)

## 2022-05-08 NOTE — H&P
[facility]    History and Physical - Hospitalist Service       Date of Admission:  5/8/2022    Assessment & Plan      Jagdeep Walker is a 51 year old male with history of developmental delay who lives in a group home, hypothyroidism, GERD, JACLYN, seizures, schizoaffective disorder, bipolar affective disorder, and recent fall with concussion and facial abrasions about 2 weeks ago who presented to the ED via EMS with generalized weakness.  Patient is not the best historian, but does confirm difficulty standing and walking.  He is findings were not lateralizing to one side or the other persistently.  Usually uses a walker but is needing assistance of 2 today.  No report of another fall.  He does have obvious facial abrasion on his forehead which is reportedly from the fall the end of April.  He denies any fevers, chills, coughing, shortness of breath, chest pain, abdominal pain, nausea, vomiting, diarrhea, or changes in urinary habits.  However his sister has relayed that he has been complaining of abdominal pain and has lost 30 pounds in the last 4 months or so.       Acute pancreatitis   Acute peripancreatic fluid collections  Occlusive portal vein thrombosis  Denies abdominal pain today, but has reportedly been telling his sister that it is hurting.  Non-tender on fairly deep palpation in ED. Lipase 1,730.   (h). BR, Alk phos, and ALT WNL.  - CT abdo/pelvis came showing: acute edematous interstitial pancreatitis, acute peripancreatic fluid collections 3.0 and 2.7 cm, small-mod volume ascites, small pleural effusions, GGO in the lungs infection vs edema.  Findings concerning for underlying malignancy also.  I have not been able to update patient's sister with these findings as of mid afternoon - will attempt calling.   - adm to inpatient (initially reg to obs prior to CT abdo results)  - IVF in ED, will continue mIVF  - heparin gtt  - [facility] consultation    Generalized  "weakness  Weight loss   Recent fall and concussion with facial abrasion 4/27  Found to be notably weak at group home which is not his baseline. No report of another fall.  Nothing lateralizing. Answering questions seemingly at his baseline with history of developmental delay and he is oriented appropriately.  He denies any pain currently in ED but had reported abdominal pain to sister (guardian) recently and has had 30 lbs weight loss in the past 4 or 5 months. He was unsteady with walker today on \"road testing\" in ED.  EKG nonischemic, trop neg, UA not infected, valproic acid WNL, lytes & creat normal, , VBG fairly unremarkable, no leukocytosis, hgb 11.8. CT Head no acute pathology.   - PT  - MRI brain ordered  - TSH WNL    History of seizures  No report of recent seizure. PTA depakote noted. Also on topiramate. Level wnl in ED.  - continue PTA regimen when confirmed    History of schizoaffective disorder  History of bipolar affective disorder  Appears stable upon admission.  PTA regimen includes risperidone, sertraline,   - Continue PTA regimen.    Hypothyroidism  PTA replacement to continue once verified    Obstructive of sleep apnea on BiPAP  Appears stable upon admission.  Continue with BiPAP or CPAP when sleeping.    GERD  Stable.  PTA PPI to continue.     COVID 19 screening  Low suspicion, PCR negative 5/8/22      Diet:  reg if passes RN dysphagia screen  DVT Prophylaxis: Pneumatic Compression Devices  Santana Catheter: Not present  Central Lines: None  Cardiac Monitoring: None  Code Status:   Full Code    Clinically Significant Risk Factors Present on Admission             # Hypoalbuminemia: Albumin = 2.7 g/dL (Ref range: 3.4 - 5.0 g/dL) on admission, will monitor as appropriate      # Obesity: Estimated body mass index is 33.95 kg/m  as calculated from the following:    Height as of this encounter: 1.651 m (5' 5\").    Weight as of 4/28/22: 92.5 kg (204 lb).      Disposition Plan   Expected Discharge: " 1-2 days pending work up       The patient's care was discussed with the Bedside Nurse, Patient and ED MD and will attempt to speak with sister.    Barber Tapia MD  Hospitalist Service  [facility]  Securely message with the Vocera Web Console (learn more here)  Text page via [facility] Paging/Directory         ______________________________________________________________________    Chief Complaint   Generalized weakness    History is obtained from the patient and ED staff per group home report - pt is not the most reliable historian    History of Present Illness   Jagdeep Walker is a 51 year old male with history of developmental delay who lives in a group home, hypothyroidism, GERD, JACLYN, seizures, schizoaffective disorder, bipolar affective disorder, and recent fall with concussion and facial abrasions about 2 weeks ago who presented to the ED via EMS with generalized weakness.  Patient is not the best historian, but does confirm difficulty standing and walking.  He is findings were not lateralizing to one side or the other persistently.  Usually uses a walker but is needing assistance of 2 today.  No report of another fall.  He does have obvious facial abrasion on his forehead which is reportedly from the fall the end of April.  He denies any fevers, chills, coughing, shortness of breath, chest pain, abdominal pain, nausea, vomiting, diarrhea, or changes in urinary habits.  However his sister has relayed that he has been complaining of abdominal pain and has lost 30 pounds in the last 4 months or so.     Review of Systems    The 10 point Review of Systems is negative other than noted in the HPI or here.     Past Medical History    I have reviewed this patient's medical history and updated it with pertinent information if needed.   Developmental delay - lives at group home  Hypothyroidism  GERD  Concussion and facial abrasion 4/27/22  Seizure disorder  Schizoaffective disorder  Bipolar  affective disorder  JACLYN on NIPPV    Past Surgical History   I have reviewed this patient's surgical history and updated it with pertinent information if needed.  Partial thyroidectomy  Corneal transplant  EGD, combined  Cataract removal  Cataract extraction with intraocular lens implant    Social History   I have reviewed this patient's social history and updated it with pertinent information if needed.       Family History     Father - prostate cancer  Mother - stroke    Prior to Admission Medications   Prior to Admission Medications   Prescriptions Last Dose Informant Patient Reported? Taking?   LORazepam (ATIVAN) 2 MG tablet   Yes No   Si mg daily as needed for seizures Give bucally   Vitamin D, Cholecalciferol, 25 MCG (1000 UT) TABS   Yes No   Sig: Take 2,000 Units by mouth daily   albuterol (PROAIR HFA/PROVENTIL HFA/VENTOLIN HFA) 108 (90 Base) MCG/ACT inhaler   Yes No   Sig: Inhale 2 puffs into the lungs every 6 hours as needed for shortness of breath / dyspnea or wheezing   atenolol (TENORMIN) 25 MG tablet   Yes No   Sig: Take 0.5 tablets (12.5 mg) by mouth daily Prior to admission dose lowered from 25 mg to 12.5 mg.   calcium carbonate 600 mg-vitamin D 400 units (CALTRATE) 600-400 MG-UNIT per tablet   Yes No   Sig: Take 1 tablet by mouth daily   calcium polycarbophil (FIBERCON) 625 MG tablet   Yes No   Sig: Take 1 tablet by mouth 2 times daily   carboxymethylcellulose PF (REFRESH LIQUIGEL) 1 % ophthalmic gel   Yes No   Sig: Place 1 drop into the right eye At Bedtime   carboxymethylcellulose PF (REFRESH PLUS) 0.5 % ophthalmic solution   Yes No   Sig: Place 1 drop into both eyes daily as needed for dry eyes   chlorhexidine (PERIDEX) 0.12 % solution   Yes No   Sig: Swish and spit 15 mLs in mouth daily   ciclopirox (LOPROX) 0.77 % cream   Yes No   Sig: Apply topically nightly as needed   clonazePAM (KLONOPIN) 0.125 MG TBDP ODT tab   Yes No   Sig: Take 0.125 mg by mouth daily as needed for anxiety   divalproex  sodium delayed-release (DEPAKOTE) 500 MG DR tablet   Yes No   Sig: Take 500 mg by mouth 3 times daily   docusate sodium (COLACE) 100 MG capsule   Yes No   Sig: Take 100 mg by mouth daily   hydrocortisone (CORTAID) 1 % external cream   Yes No   Sig: Apply topically 2 times daily   ipratropium (ATROVENT) 0.06 % nasal spray   Yes No   Sig: Spray 2 sprays into both nostrils 3 times daily   ketoconazole (NIZORAL) 2 % external cream   Yes No   Sig: Apply topically 2 times daily as needed for itching R foot   levOCARNitine (CARNITOR) 330 MG tablet   Yes No   Sig: Take by mouth 3 times daily   levocetirizine (XYZAL) 5 MG tablet   Yes No   Sig: Take 5 mg by mouth every evening   levothyroxine (SYNTHROID/LEVOTHROID) 50 MCG tablet   Yes No   Sig: Take 50 mcg by mouth daily   loperamide (IMODIUM) 2 MG capsule   Yes No   Sig: Take 2 mg by mouth 4 times daily as needed for diarrhea   montelukast (SINGULAIR) 10 MG tablet   Yes No   Sig: Take 10 mg by mouth At Bedtime   multivitamin, therapeutic (THERA-VIT) TABS tablet   Yes No   Sig: Take 1 tablet by mouth daily   omeprazole 20 MG tablet   Yes No   Sig: Take 20 mg by mouth 2 times daily   polyethylene glycol-propylene glycol (SYSTANE ULTRA) 0.4-0.3 % SOLN ophthalmic solution   Yes No   Sig: Place 2 drops into both eyes daily as needed for dry eyes   prednisoLONE acetate (PRED FORTE) 1 % ophthalmic suspension   Yes No   Sig: Place 1 drop into the right eye daily   risperiDONE (RISPERDAL) 2 MG tablet   Yes No   Sig: Take 2 mg by mouth 2 times daily   sertraline (ZOLOFT) 100 MG tablet   Yes No   Sig: Take 100 mg by mouth daily   topiramate (TOPAMAX) 100 MG tablet   Yes No   Sig: Take 100 mg by mouth At Bedtime      Facility-Administered Medications: None     Allergies   No Known Allergies    Physical Exam   Vital Signs: Temp: 98.4  F (36.9  C) Temp src: Oral BP: 96/68 Pulse: 83   Resp: 18 SpO2: 97 % O2 Device: None (Room air)    Weight: 204 lb    Gen: NAD, pleasant, a little  somnolent but able to answer questions reasonably well  HEENT: EOMI, MMM  Resp: no crackles,  no wheezes, no increased work of resp  CV: S1S2 heard, reg rhythm, reg rate  Abdo: soft, nontender, nondistended, bowel sounds present  Ext: calves nontender, well perfused  Neuro: aaox3 - needed a little while to recall year but was able to do so, developmental delay at baseline, CN grossly intact, no facial asymmetry      Data   Data reviewed today: I reviewed all medications, new labs and imaging results over the last 24 hours. I personally reviewed no images or EKG's today.    Recent Labs   Lab 05/08/22  0917   WBC 7.2   HGB 11.8*   MCV 92         POTASSIUM 3.5   CHLORIDE 106   CO2 24   BUN 24   CR 0.87   ANIONGAP 8   NASIR 10.0   GLC 73   ALBUMIN 2.7*   PROTTOTAL 6.5*   BILITOTAL 1.3   ALKPHOS 50   ALT 39   *   LIPASE 1,730*     Recent Results (from the past 24 hour(s))   XR Chest 2 Views    Narrative    EXAM: XR CHEST 2 VW  LOCATION: [facility]  DATE/TIME: 5/8/2022 9:35 AM    INDICATION: generalized weakness, poor historian, recent fall  COMPARISON: None.      Impression    IMPRESSION: Better inspiration on the lateral view.    No hydropneumothorax or fracture. Minimal linear scarring in the right middle lobe. Lungs are otherwise clear. Heart and pulmonary vascularity are normal.   CT Head w/o Contrast    Narrative    EXAM: CT HEAD W/O CONTRAST  LOCATION: [facility]  DATE/TIME: 5/8/2022 9:53 AM    INDICATION: Head trauma, abnormal mental status (Age 19-64y)  COMPARISON: 4/27/2022.  TECHNIQUE: Routine CT Head without IV contrast. Multiplanar reformats. Dose reduction techniques were used.    FINDINGS:  INTRACRANIAL CONTENTS: No intracranial hemorrhage, extraaxial collection, or mass effect.  No CT evidence of acute infarct. Mild presumed chronic small vessel ischemic changes. Posterior fossa structures including cerebellar hemispheres, fourth  ventricle   and CP angle cisterns are clear. Nasopharynx is clear.    Region of the sella and suprasellar cistern are clear.    VISUALIZED ORBITS/SINUSES/MASTOIDS: Prior bilateral cataract surgery. Visualized portions of the orbits are otherwise unremarkable. No paranasal sinus mucosal disease. No middle ear or mastoid effusion.    BONES/SOFT TISSUES: No acute abnormality.      Impression    IMPRESSION:  1.  No CT evidence for acute intracranial process.  2.  Mild chronic microvascular ischemic changes as above.

## 2022-05-08 NOTE — ED NOTES
"Conversation with legal guardien, pt's sister Huong [phone]. She states that pt has seemed \"off\" for the past week and has lost over 30 pounds since January. Pt usually is \"jovial and talks all the time\", now presents as mildly confused, cannot finish his sentences, can't answer many questions. Huong also states that pt has complained of stomach pain, urinating a lot and drinking a lot of fluids. She feels as though he is \"overly sedated\".    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   "

## 2022-05-08 NOTE — ED TRIAGE NOTES
From group home, staff reported increased generalized weakness. At ED 11 days ago from unknown fall, Dx of concussion, and forehead abrasion. Pt required 2x staff assist to walk to stretcher.

## 2022-05-08 NOTE — ED PROVIDER NOTES
History   Chief Complaint:  Generalized Weakness    The history is provided by the patient. The history is limited by a developmental delay.      Jagdeep Walker is a 51 year old male with a history of seizure disorder on Depakote and Topamax, intellectual disability, and mental illness who presents via EMS alone for generalized weakness. Per EMS report, Jagdeep's group home has noticed increasing generalized weakness since he had an unwitnessed fall 4/27/22 after which he was seen in this ER and was admitted to the hospital with diagnosis of concussion and deconditioning. EMS report he typically ambulates with a cane but required a 2 person assist to the stretcher today. Jagdeep tells me he is here for a seizure but is unable to verbalize why he thinks he had a seizure. His primary complaint is leg weakness. He denies any pain, vomiting, diarrhea, cough, or fever, but admits he has recently had poor appetite.    CT HEAD WITHOUT CONTRAST 4/27/2022:  1.  No evidence of acute intracranial hemorrhage or mass effect.  2.  Mild nonspecific white matter changes.  3.  Moderate brain parenchymal volume loss.    Review of Systems   Unable to perform ROS: Other (Intellectual disability)   Constitutional: Positive for appetite change. Negative for fever.   Respiratory: Negative for cough.    Cardiovascular: Negative for chest pain.   Gastrointestinal: Negative for abdominal pain, diarrhea and vomiting.   Neurological: Positive for weakness. Negative for headaches.     Allergies:  Jagdeep denies having any allergies.    Medications:  atenolol   calcium polycarbophil   clonazePAM  divalproex sodium delayed-release  docusate sodium   levOCARNitine  levocetirizine   levothyroxine   loperamide   LORazepam  montelukast   omeprazole   risperiDONE  sertraline  topiramate     Past Medical History:      Bipolar affective disorder  Hypertension   Neuroleptic malignant syndrome   Keratoconus   Schizoaffective disorder  Depression  GERD  "  JACLYN on CPAP   Chronic rhinitis   Fatty liver    Seizures   Hypothyroidism    Past Surgical History:    Partial thyroidectomy  Corneal transplant  EGD, combined  Cataract removal  Cataract extraction with intraocular lens implant    Family History:    Prostate Cancer - Father  Stroke - Mother    Social History:  Jagdeep lives at a group home.   Jagdeep presents via EMS alone.  Has COVID-19 vaccine/booster.    Physical Exam     Patient Vitals for the past 24 hrs:   BP Temp Temp src Pulse Resp SpO2 Height   05/08/22 1437 114/52 -- -- 75 -- 95 % --   05/08/22 1130 124/88 -- -- 80 -- 98 % --   05/08/22 1030 96/68 -- -- -- -- 97 % --   05/08/22 0907 132/64 98.4  F (36.9  C) Oral 83 18 98 % 1.651 m (5' 5\")     Physical Exam  General: Well-developed and well-nourished. Well appearing middle-aged  man. Cooperative.  Head:  Old appearing facial abrasions without evidence of infection.  Eyes:  Conjunctivae, lids, and sclerae are normal.  Neck:  Supple. Normal range of motion.  CV:  Regular rate and rhythm. Normal heart sounds with no murmurs, rubs, or gallops detected.  Resp:  No respiratory distress. Clear to auscultation bilaterally without decreased breath sounds, wheezing, rales, or rhonchi.  GI:  Soft. Non-distended. Non-tender.    MS:  Normal ROM. No bilateral lower extremity edema.  Skin:  Warm. Non-diaphoretic. No pallor.  Neuro: Awake and alert. Normal strength.  Right pupil is fixed, chronic.  Left pupil is round and reactive.  Psych:  Slow, stuttering speech.  Vitals reviewed.    Emergency Department Course     EKG  Indication: Generalized Weakness  Time: 0957  Rate 77 bpm. QRS duration 106. QT/QTc 412/466.   Accelerated Junctional rhythm  Abnormal ECG   No acute ST changes.  No prior EKG for comparison.    Imaging:  MR Brain w/o & w Contrast   Final Result   IMPRESSION:   1.  No acute intracranial abnormality. 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   2.  No mass lesion, obstructive hydrocephalus or " pathologic contrast enhancement.   3.  Mild to moderate generalized volume loss.      CT Abdomen Pelvis w Contrast   Final Result   IMPRESSION:    1.  Occlusive thrombosis of the portal vein.   2.  Acute edematous interstitial pancreatitis.   3.  Acute peripancreatic fluid collections measuring 3.0 and 2.7 cm.   4.  Small-moderate volume ascites.   5.  Small pleural effusions.   6.  Groundglass opacities in the lungs may represent infection or edema.      These findings were discussed via telephone by Dr. Solares with Dr. Bravo at 12:55 PM on 5/8/2022.      CT Head w/o Contrast   Final Result   IMPRESSION:   1.  No CT evidence for acute intracranial process.   2.  Mild chronic microvascular ischemic changes as above.      XR Chest 2 Views   Final Result   IMPRESSION: Better inspiration on the lateral view.      No hydropneumothorax or fracture. Minimal linear scarring in the right middle lobe. Lungs are otherwise clear. Heart and pulmonary vascularity are normal.        Report per radiology    Laboratory:  Labs Ordered and Resulted from Time of ED Arrival to Time of ED Departure   URINE MACROSCOPIC WITH REFLEX TO MICRO - Abnormal       Result Value    Color Urine Yellow      Appearance Urine Clear      Glucose Urine Negative      Bilirubin Urine Negative      Ketones Urine Trace (*)     Specific Gravity Urine 1.013      Blood Urine Negative      pH Urine 6.0      Protein Albumin Urine Negative      Urobilinogen Urine Normal      Nitrite Urine Negative      Leukocyte Esterase Urine Negative     COMPREHENSIVE METABOLIC PANEL - Abnormal    Sodium 138      Potassium 3.5      Chloride 106      Carbon Dioxide (CO2) 24      Anion Gap 8      Urea Nitrogen 24      Creatinine 0.87      Calcium 10.0      Glucose 73      Alkaline Phosphatase 50       (*)     ALT 39      Protein Total 6.5 (*)     Albumin 2.7 (*)     Bilirubin Total 1.3      GFR Estimate >90     ISTAT GASES LACTATE VENOUS POCT - Abnormal    Lactic Acid POCT  1.4      Bicarbonate Venous POCT 23      O2 Sat, Venous POCT 78 (*)     pCO2V Venous POCT 37 (*)     pH Venous POCT 7.40      pO2 Venous POCT 42     CBC WITH PLATELETS AND DIFFERENTIAL - Abnormal    WBC Count 7.2      RBC Count 3.97 (*)     Hemoglobin 11.8 (*)     Hematocrit 36.5 (*)     MCV 92      MCH 29.7      MCHC 32.3      RDW 14.1      Platelet Count 155      % Neutrophils 60      % Lymphocytes 21      % Monocytes 18      % Eosinophils 0      % Basophils 0      % Immature Granulocytes 1      NRBCs per 100 WBC 0      Absolute Neutrophils 4.4      Absolute Lymphocytes 1.5      Absolute Monocytes 1.3      Absolute Eosinophils 0.0      Absolute Basophils 0.0      Absolute Immature Granulocytes 0.0      Absolute NRBCs 0.0     LIPASE - Abnormal    Lipase 1,730 (*)    COVID-19 VIRUS (CORONAVIRUS) BY PCR - Normal    SARS CoV2 PCR Negative     TROPONIN I - Normal    Troponin I High Sensitivity 24     VALPROIC ACID - Normal    Valproic acid 88     TSH WITH FREE T4 REFLEX - Normal    TSH 2.16     TOPIRAMATE LEVEL   VALPROIC ACID FREE AND TOTAL      Emergency Department Course:     Reviewed:  I reviewed nursing notes, vitals, past medical history, and Care Everywhere.    Assessments:  0924 I obtained history and examined the patient as noted above.   1037 Nurse updated me that the patient's sister reported he has had unintentional weight loss, complaints of abdominal pain, and has recently been less talkative than baseline.  1100 I rechecked the patient after his road test. He walked with a walker but was weak and unsteady. He feels too weak to go home.   1336 I checked Jagdeep who was sleeping.    Consults:  1140 I consulted with Dr. Tapia, hospitalist.  1253 I consulted with the radiologist.   1317 I consulted with Dr. Tapia, hospitalist, again about Jagdeep's CT scan.    Interventions:  1034 0.9% sodium chloride BOLUS, IV    Disposition:  The patient was admitted to the hospital under the care of Dr. Tapia  "hospitalist.    Impression & Plan     Medical Decision Making:  Jagdeep is a 51-year-old man who was sent from his group home for generalized weakness which reportedly has worsened in the last 11 days after a fall.  He was seen after this fall with an unremarkable head CT in this emergency department.  History obtained by nurse from the patient's sister is that he also has had complaints of abdominal pain and has been less talkative an \"jovial\" than usual with unintentional weight loss.  Jagdeep has no current abdominal pain and his abdomen is soft and nontender. His only complaint is that his legs feel weak.  He has no focal deficits on exam and repeat head CT today does not reveal intracranial hemorrhage.      There is no evidence of infection including chest x-ray without pneumonia, urinalysis without UTI, and negative COVID-19 testing.  TSH is normal.  Lactate is normal.  There is mildly elevated AST and elevated lipase of 1730.  There is no kidney injury or electrolyte derangements.  EKG is without ischemic changes and troponin is normal.  Topiramate level is pending and he has a therapeutic Depakote level.  He ambulated in the emergency department but was very weak and unsteady.  He is comfortable with plan for admission.  He would likely benefit from a brain MRI.      Given abnormal lipase and AST, in discussion with the hospitalist, he was also sent for CT of the abdomen and pelvis.  Unfortunately, this reveals a portal vein thrombosis in addition to pancreatitis, peripancreatic fluid, ascites, pleural fluid, and groundglass opacities in the lungs.  Heparin was initiated for the portal vein thrombosis.  Dr. Tapia, hospitalist, accepted this patient and has no further orders.    Diagnosis:    ICD-10-CM    1. Acute pancreatitis, unspecified complication status, unspecified pancreatitis type  K85.90    2. Portal vein thrombosis  I81    3. Peripancreatic fluid collection  K86.89    4. Other ascites  R18.8    5. " Pleural effusion  J90       Scribe Disclosure:  I, Norma Freemanong, am serving as a scribe at 9:30 AM on 5/8/2022 to document services personally performed by Tarah Bravo MD based on my observations and the provider's statements to me.     Tarah Bravo MD  05/09/22 1019

## 2022-05-08 NOTE — ED NOTES
Bed: ED02  Expected date:   Expected time:   Means of arrival:   Comments:  Dillon [age] M weakness over 11 days, hx concussion ETA 0813

## 2022-05-09 ENCOUNTER — APPOINTMENT (OUTPATIENT)
Dept: MRI IMAGING | Facility: CLINIC | Age: 52
DRG: 438 | End: 2022-05-09
Attending: PHYSICIAN ASSISTANT
Payer: MEDICARE

## 2022-05-09 LAB
ALBUMIN SERPL-MCNC: 2.3 G/DL (ref 3.4–5)
ALP SERPL-CCNC: 48 U/L (ref 40–150)
ALT SERPL W P-5'-P-CCNC: 35 U/L (ref 0–70)
ANION GAP SERPL CALCULATED.3IONS-SCNC: 7 MMOL/L (ref 3–14)
AST SERPL W P-5'-P-CCNC: 97 U/L (ref 0–45)
BILIRUB SERPL-MCNC: 1.4 MG/DL (ref 0.2–1.3)
BUN SERPL-MCNC: 16 MG/DL (ref 7–30)
CALCIUM SERPL-MCNC: 9.3 MG/DL (ref 8.5–10.1)
CHLORIDE BLD-SCNC: 112 MMOL/L (ref 94–109)
CO2 SERPL-SCNC: 23 MMOL/L (ref 20–32)
CREAT SERPL-MCNC: 0.7 MG/DL (ref 0.66–1.25)
CRP SERPL-MCNC: 94.9 MG/L (ref 0–8)
ERYTHROCYTE [DISTWIDTH] IN BLOOD BY AUTOMATED COUNT: 14.5 % (ref 10–15)
FERRITIN SERPL-MCNC: 630 NG/ML (ref 26–388)
GFR SERPL CREATININE-BSD FRML MDRD: >90 ML/MIN/1.73M2
GLUCOSE BLD-MCNC: 77 MG/DL (ref 70–99)
HCT VFR BLD AUTO: 35.7 % (ref 40–53)
HGB BLD-MCNC: 11.8 G/DL (ref 13.3–17.7)
IRON SATN MFR SERPL: 11 % (ref 15–46)
IRON SERPL-MCNC: 39 UG/DL (ref 35–180)
LIPASE SERPL-CCNC: 1555 U/L (ref 73–393)
MAGNESIUM SERPL-MCNC: 2.4 MG/DL (ref 1.6–2.3)
MAGNESIUM SERPL-MCNC: 2.5 MG/DL (ref 1.6–2.3)
MCH RBC QN AUTO: 30.3 PG (ref 26.5–33)
MCHC RBC AUTO-ENTMCNC: 33.1 G/DL (ref 31.5–36.5)
MCV RBC AUTO: 92 FL (ref 78–100)
PLATELET # BLD AUTO: 159 10E3/UL (ref 150–450)
POTASSIUM BLD-SCNC: 3.2 MMOL/L (ref 3.4–5.3)
POTASSIUM BLD-SCNC: 3.4 MMOL/L (ref 3.4–5.3)
PROT SERPL-MCNC: 5.9 G/DL (ref 6.8–8.8)
RBC # BLD AUTO: 3.89 10E6/UL (ref 4.4–5.9)
SODIUM SERPL-SCNC: 142 MMOL/L (ref 133–144)
TIBC SERPL-MCNC: 344 UG/DL (ref 240–430)
TRIGL SERPL-MCNC: 118 MG/DL
UFH PPP CHRO-ACNC: 0.32 IU/ML
UFH PPP CHRO-ACNC: 0.36 IU/ML
WBC # BLD AUTO: 6.6 10E3/UL (ref 4–11)

## 2022-05-09 PROCEDURE — 36415 COLL VENOUS BLD VENIPUNCTURE: CPT | Performed by: INTERNAL MEDICINE

## 2022-05-09 PROCEDURE — 36415 COLL VENOUS BLD VENIPUNCTURE: CPT | Performed by: HOSPITALIST

## 2022-05-09 PROCEDURE — 36415 COLL VENOUS BLD VENIPUNCTURE: CPT | Performed by: NURSE PRACTITIONER

## 2022-05-09 PROCEDURE — 85520 HEPARIN ASSAY: CPT | Performed by: HOSPITALIST

## 2022-05-09 PROCEDURE — 85027 COMPLETE CBC AUTOMATED: CPT | Performed by: INTERNAL MEDICINE

## 2022-05-09 PROCEDURE — 250N000011 HC RX IP 250 OP 636: Performed by: INTERNAL MEDICINE

## 2022-05-09 PROCEDURE — C9113 INJ PANTOPRAZOLE SODIUM, VIA: HCPCS | Performed by: INTERNAL MEDICINE

## 2022-05-09 PROCEDURE — 86140 C-REACTIVE PROTEIN: CPT | Performed by: PHYSICIAN ASSISTANT

## 2022-05-09 PROCEDURE — 99233 SBSQ HOSP IP/OBS HIGH 50: CPT | Performed by: NURSE PRACTITIONER

## 2022-05-09 PROCEDURE — 999N000157 HC STATISTIC RCP TIME EA 10 MIN

## 2022-05-09 PROCEDURE — 83550 IRON BINDING TEST: CPT | Performed by: NURSE PRACTITIONER

## 2022-05-09 PROCEDURE — 999N000128 HC STATISTIC PERIPHERAL IV START W/O US GUIDANCE

## 2022-05-09 PROCEDURE — 94660 CPAP INITIATION&MGMT: CPT

## 2022-05-09 PROCEDURE — 80053 COMPREHEN METABOLIC PANEL: CPT | Performed by: HOSPITALIST

## 2022-05-09 PROCEDURE — 250N000013 HC RX MED GY IP 250 OP 250 PS 637: Performed by: HOSPITALIST

## 2022-05-09 PROCEDURE — 258N000003 HC RX IP 258 OP 636: Performed by: NURSE PRACTITIONER

## 2022-05-09 PROCEDURE — 83735 ASSAY OF MAGNESIUM: CPT | Performed by: NURSE PRACTITIONER

## 2022-05-09 PROCEDURE — 84478 ASSAY OF TRIGLYCERIDES: CPT | Performed by: NURSE PRACTITIONER

## 2022-05-09 PROCEDURE — 250N000011 HC RX IP 250 OP 636: Performed by: EMERGENCY MEDICINE

## 2022-05-09 PROCEDURE — 255N000002 HC RX 255 OP 636: Performed by: HOSPITALIST

## 2022-05-09 PROCEDURE — 74183 MRI ABD W/O CNTR FLWD CNTR: CPT | Mod: MG

## 2022-05-09 PROCEDURE — 84132 ASSAY OF SERUM POTASSIUM: CPT | Performed by: NURSE PRACTITIONER

## 2022-05-09 PROCEDURE — 258N000003 HC RX IP 258 OP 636: Performed by: HOSPITALIST

## 2022-05-09 PROCEDURE — 250N000013 HC RX MED GY IP 250 OP 250 PS 637: Performed by: NURSE PRACTITIONER

## 2022-05-09 PROCEDURE — A9585 GADOBUTROL INJECTION: HCPCS | Performed by: HOSPITALIST

## 2022-05-09 PROCEDURE — 83690 ASSAY OF LIPASE: CPT | Performed by: HOSPITALIST

## 2022-05-09 PROCEDURE — 82728 ASSAY OF FERRITIN: CPT | Performed by: NURSE PRACTITIONER

## 2022-05-09 PROCEDURE — 120N000001 HC R&B MED SURG/OB

## 2022-05-09 RX ORDER — SODIUM CHLORIDE, SODIUM LACTATE, POTASSIUM CHLORIDE, CALCIUM CHLORIDE 600; 310; 30; 20 MG/100ML; MG/100ML; MG/100ML; MG/100ML
INJECTION, SOLUTION INTRAVENOUS CONTINUOUS
Status: DISCONTINUED | OUTPATIENT
Start: 2022-05-09 | End: 2022-05-12

## 2022-05-09 RX ORDER — POTASSIUM CHLORIDE 1500 MG/1
40 TABLET, EXTENDED RELEASE ORAL ONCE
Status: COMPLETED | OUTPATIENT
Start: 2022-05-09 | End: 2022-05-09

## 2022-05-09 RX ORDER — LEVOTHYROXINE SODIUM 50 UG/1
50 TABLET ORAL
Status: DISCONTINUED | OUTPATIENT
Start: 2022-05-09 | End: 2022-05-17 | Stop reason: HOSPADM

## 2022-05-09 RX ORDER — MONTELUKAST SODIUM 10 MG/1
10 TABLET ORAL AT BEDTIME
Status: DISCONTINUED | OUTPATIENT
Start: 2022-05-09 | End: 2022-05-17 | Stop reason: HOSPADM

## 2022-05-09 RX ORDER — GADOBUTROL 604.72 MG/ML
9 INJECTION INTRAVENOUS ONCE
Status: COMPLETED | OUTPATIENT
Start: 2022-05-09 | End: 2022-05-09

## 2022-05-09 RX ORDER — LEVOCARNITINE 330 MG/1
330 TABLET ORAL 3 TIMES DAILY
Status: DISCONTINUED | OUTPATIENT
Start: 2022-05-09 | End: 2022-05-17 | Stop reason: HOSPADM

## 2022-05-09 RX ORDER — ATENOLOL 25 MG/1
25 TABLET ORAL DAILY
Status: DISCONTINUED | OUTPATIENT
Start: 2022-05-09 | End: 2022-05-17 | Stop reason: HOSPADM

## 2022-05-09 RX ORDER — POTASSIUM CHLORIDE 1.5 G/1.58G
40 POWDER, FOR SOLUTION ORAL ONCE
Status: DISCONTINUED | OUTPATIENT
Start: 2022-05-09 | End: 2022-05-09

## 2022-05-09 RX ADMIN — IPRATROPIUM BROMIDE 2 SPRAY: 42 SPRAY NASAL at 23:58

## 2022-05-09 RX ADMIN — LEVOCARNITINE 330 MG: 330 TABLET ORAL at 23:47

## 2022-05-09 RX ADMIN — GADOBUTROL 9 ML: 604.72 INJECTION INTRAVENOUS at 22:45

## 2022-05-09 RX ADMIN — ACETAMINOPHEN 650 MG: 325 TABLET ORAL at 12:05

## 2022-05-09 RX ADMIN — RISPERIDONE 2 MG: 2 TABLET ORAL at 08:32

## 2022-05-09 RX ADMIN — DIVALPROEX SODIUM 500 MG: 500 TABLET, DELAYED RELEASE ORAL at 16:09

## 2022-05-09 RX ADMIN — LEVOTHYROXINE SODIUM 50 MCG: 50 TABLET ORAL at 16:09

## 2022-05-09 RX ADMIN — DIVALPROEX SODIUM 500 MG: 500 TABLET, DELAYED RELEASE ORAL at 23:47

## 2022-05-09 RX ADMIN — HEPARIN SODIUM 1450 UNITS/HR: 10000 INJECTION, SOLUTION INTRAVENOUS at 05:02

## 2022-05-09 RX ADMIN — PREDNISOLONE ACETATE 1 DROP: 10 SUSPENSION/ DROPS OPHTHALMIC at 09:20

## 2022-05-09 RX ADMIN — SODIUM CHLORIDE: 9 INJECTION, SOLUTION INTRAVENOUS at 08:19

## 2022-05-09 RX ADMIN — CARBOXYMETHYLCELLULOSE SODIUM 1 DROP: 10 GEL OPHTHALMIC at 23:47

## 2022-05-09 RX ADMIN — IPRATROPIUM BROMIDE 2 SPRAY: 42 SPRAY NASAL at 12:45

## 2022-05-09 RX ADMIN — PANTOPRAZOLE SODIUM 40 MG: 40 INJECTION, POWDER, FOR SOLUTION INTRAVENOUS at 08:31

## 2022-05-09 RX ADMIN — MONTELUKAST 10 MG: 10 TABLET, FILM COATED ORAL at 23:47

## 2022-05-09 RX ADMIN — TOPIRAMATE 100 MG: 100 TABLET, FILM COATED ORAL at 23:47

## 2022-05-09 RX ADMIN — POTASSIUM CHLORIDE 40 MEQ: 1500 TABLET, EXTENDED RELEASE ORAL at 18:51

## 2022-05-09 RX ADMIN — HEPARIN SODIUM 1450 UNITS/HR: 10000 INJECTION, SOLUTION INTRAVENOUS at 23:49

## 2022-05-09 RX ADMIN — POTASSIUM CHLORIDE 40 MEQ: 1500 TABLET, EXTENDED RELEASE ORAL at 13:20

## 2022-05-09 RX ADMIN — ATENOLOL 25 MG: 25 TABLET ORAL at 16:09

## 2022-05-09 RX ADMIN — SERTRALINE HYDROCHLORIDE 100 MG: 100 TABLET ORAL at 08:32

## 2022-05-09 RX ADMIN — RISPERIDONE 2 MG: 2 TABLET ORAL at 23:47

## 2022-05-09 RX ADMIN — IPRATROPIUM BROMIDE 2 SPRAY: 42 SPRAY NASAL at 16:15

## 2022-05-09 RX ADMIN — SODIUM CHLORIDE, POTASSIUM CHLORIDE, SODIUM LACTATE AND CALCIUM CHLORIDE 1000 ML: 600; 310; 30; 20 INJECTION, SOLUTION INTRAVENOUS at 13:19

## 2022-05-09 RX ADMIN — DIVALPROEX SODIUM 500 MG: 500 TABLET, DELAYED RELEASE ORAL at 08:32

## 2022-05-09 RX ADMIN — SODIUM CHLORIDE, POTASSIUM CHLORIDE, SODIUM LACTATE AND CALCIUM CHLORIDE: 600; 310; 30; 20 INJECTION, SOLUTION INTRAVENOUS at 20:13

## 2022-05-09 ASSESSMENT — ENCOUNTER SYMPTOMS
VOMITING: 0
WEAKNESS: 1
DIARRHEA: 0
ABDOMINAL PAIN: 0
FEVER: 0
HEADACHES: 0
APPETITE CHANGE: 1
COUGH: 0

## 2022-05-09 ASSESSMENT — ACTIVITIES OF DAILY LIVING (ADL)
ADLS_ACUITY_SCORE: 23
ADLS_ACUITY_SCORE: 44
ADLS_ACUITY_SCORE: 48
ADLS_ACUITY_SCORE: 23
ADLS_ACUITY_SCORE: 23
ADLS_ACUITY_SCORE: 48
ADLS_ACUITY_SCORE: 22
ADLS_ACUITY_SCORE: 44
ADLS_ACUITY_SCORE: 22
ADLS_ACUITY_SCORE: 23
ADLS_ACUITY_SCORE: 44
ADLS_ACUITY_SCORE: 44
ADLS_ACUITY_SCORE: 23
ADLS_ACUITY_SCORE: 44
ADLS_ACUITY_SCORE: 44
ADLS_ACUITY_SCORE: 48
ADLS_ACUITY_SCORE: 22
ADLS_ACUITY_SCORE: 23
ADLS_ACUITY_SCORE: 23
ADLS_ACUITY_SCORE: 44
ADLS_ACUITY_SCORE: 23
ADLS_ACUITY_SCORE: 44
ADLS_ACUITY_SCORE: 44
ADLS_ACUITY_SCORE: 23

## 2022-05-09 NOTE — UTILIZATION REVIEW
Admission Status; Secondary Review Determination    Under the authority of the Utilization Management Committee, the utilization review process indicated a secondary review on the above patient. The review outcome is based on review of the medical records, discussions with staff, and applying clinical experience noted on the date of the review.    (x) Inpatient Status Appropriate - This patient's medical care is consistent with medical management for inpatient care and reasonable inpatient medical practice.    RATIONALE FOR DETERMINATION: 51-year-old male with developmental delay, seizure disorder, intellectual disability, mental illness who presented to the hospital with profound generalized weakness and a 30 pound weight loss during the past 4 months.  Patient found to have intermittent hypotension, CT imaging of the abdomen/pelvis reveals occlusive thrombosis of the portal vein, acute edematous interstitial pancreatitis, acute peripancreatic fluid collections measuring 3 and 2.7 cm, small-moderate volume ascites along with small pleural effusions and ground glass opacities in the lungs.  Patient will require acute anticoagulation and management/evaluation of acute pancreatitis and portal vein thrombosis with IV fluids and further testing including MRCP and possible more intervention more difficult due to patient's need for acute anticoagulation.    At the time of admission with the information available to the attending physician more than 2 nights Hospital complex care was anticipated, based on patient risk of adverse outcome if treated as outpatient and complex care required. Inpatient admission is appropriate based on the Medicare guidelines.    This document was produced using voice recognition software    The information on this document is developed by the utilization review team in order for the business office to ensure compliance. This only denotes the appropriateness of proper admission status and  does not reflect the quality of care rendered.    The definitions of Inpatient Status and Observation Status used in making the determination above are those provided in the CMS Coverage Manual, Chapter 1 and Chapter 6, section 70.4.    Sincerely,    Jone Abarca MD  Utilization Review  Physician Advisor  [facility].

## 2022-05-09 NOTE — PROGRESS NOTES
[facility]    Medicine Progress Note - Hospitalist Service    Date of Admission:  5/8/2022    Assessment & Plan           Jagdeep Walker is a 51 year old male with history of developmental delay who lives in a group home, hypothyroidism, GERD, JACLYN, seizures, schizoaffective disorder, bipolar affective disorder, and recent fall with concussion and facial abrasions about 2 weeks ago who presented to the ED via EMS with generalized weakness.  History from his sister his sister has revealed that he has been complaining of abdominal pain and has lost 30 pounds in the last 4 months or so, diagnosed with acute pancreatitis.     Acute pancreatitis   Acute peripancreatic fluid collections  Occlusive portal vein thrombosis  Abnormal ALT, improving  Denies abdominal pain today, but has reportedly been telling his sister that it is hurting.  Non-tender on fairly deep palpation in ED. Lipase peaked at 1,730.  AST elevated on admission but now downtrending and other LFTs are WNL.    - CT abdo/pelvis 5/8/22 showing: acute edematous interstitial pancreatitis, acute peripancreatic fluid collections 3.0 and 2.7 cm, small-mod volume ascites, small pleural effusions, groundglass opacity in the lungs infection vs edema.  Findings concerning for underlying malignancy also. The gallbladder is distended measuring 9.7 cm in maximal dimension. Thin gallbladder wall. Trace pericholecystic fluid. No biliary ductal dilatation.  - IVF bolus and increase mIVF rate  - heparin gtt for PVT, anticipate he will require at least 6 months of anticoagulation if no other indication arises  -Appreciate recs from [facility] GI consultation patient is pending MRCP hopefully on 5/9/2022 which will give further insight as to etiology  -Check triglycerides  -Recheck CMP in a.m.  -advance diet as soon as possible post GI imaging    Generalized weakness  Weight loss   Recent fall and concussion with facial abrasion 4/27  Found to be  "notably weak at group home which is not his baseline. No report of another fall.  Nothing lateralizing. Answering questions seemingly at his baseline with history of developmental delay and he is oriented appropriately.  He denies any pain currently in ED but had reported abdominal pain to sister (guardian) recently and has had 30 lbs weight loss in the past 4 or 5 months. He was unsteady with walker today on \"road testing\" in ED.  EKG nonischemic, trop neg, UA not infected, valproic acid WNL, lytes & creat normal, , VBG fairly unremarkable, no leukocytosis, hgb 11.8. CT Head no acute pathology.   - PT  - MRI brain 5/8/22 showing small chronic cerebellar infarcts which may contribute to his gait instability  - TSH WNL  -fall precautions    Hypokalemia  Hyperchloremia  - Change IV fluids to LR  - Start electrolyte replacement protocols  - Add on magnesium  -BMP in the a.m.    Anemia, normocytic, mild had been 14 g in 2020, 11.6 end of April 2022 no overt blood loss but now anticoagulated with heparin infusion and anticipate he will require at least 6 months if not longer term anticoagulation  -Normal B12 and folate in April 2022  - Iron studies  - Outpatient follow-up    History of seizures  No report of recent seizure. PTA depakote noted. Also on topiramate. Level wnl in ED.  - continue PTA regimen of Topamax, valproic acid    History of schizoaffective disorder  History of bipolar affective disorder  Appears compensated at time of admission.   - Continue PTA regimen of  risperidone, sertraline    Hypothyroidism  -Continue PTA r levothyroxine    Obstructive of sleep apnea on BiPAP  - Continue with BiPAP or CPAP when sleeping.    GERD  -PTA PPI to continue.     COVID 19 screening  Low suspicion, PCR negative 5/8/22    Hypertension  - Resume PTA atenolol with hold parameters    carnitine deficiency  - Continue PTA levocarnitine    Diet:  reg if passes RN dysphagia screen  DVT Prophylaxis: Pneumatic Compression " "Devices  Santana Catheter: Not present  Central Lines: None  Cardiac Monitoring: None  Code Status:   Full Code         Diet: NPO for Medical/Clinical Reasons Except for: Meds, Ice Chips    DVT Prophylaxis: Heparin infusion for treatment of portal vein thrombus  Santana Catheter: Not present  Central Lines: None  Cardiac Monitoring: None  Code Status: Full Code      Disposition Plan   Expected Discharge: TBD pending further diagnostic  Anticipated discharge location:  Awaiting care coordination huddle  Delays:    Completion of diagnostics       The patient's care was discussed with the Attending Physician, Dr. Fede Jones, Bedside Nurse and Patient's guardian.    DILLON [facility]  Hospitalist Service  [facility]  Securely message with the Vocera Web Console (learn more here)  Text page via HistoryFile Paging/Directory     Clinically Significant Risk Factors Present on Admission             # Obesity: Estimated body mass index is 32.68 kg/m  as calculated from the following:    Height as of this encounter: 1.651 m (5' 5\").    Weight as of this encounter: 89.1 kg (196 lb 6.4 oz).    -Increases all cause mortality  - Outpatient follow-up is appropriate  ______________________________________________________________________    Interval History   No acute events, patient denies dyspnea, BM, nausea, abdominal pain, history is limited by his developmental delay    Data reviewed today: I reviewed all medications, new labs and imaging results over the last 24 hours. I personally reviewed no images or EKG's today.    Physical Exam   Vital Signs: Temp: 98.5  F (36.9  C) Temp src: Axillary BP: 119/59 Pulse: 67   Resp: 18 SpO2: 93 % O2 Device: None (Room air)    Weight: 196 lbs 6.4 oz    General:  adult pt lying in bed without acute distress  Neuro: +follows commands wiggle toes and show 2 fingers bilat, face symmetric, tongue midline, speech fluent  Eyes pupils equal round 3mm briskly reactive bilat, " sclera nonicteric, noninjected, conjunctiva pink,  Head, ENT & mouth: abrasion on L side of forehead, dry oral mucosa  Neck: supple  CV S1S2 no murmurs  resp: CTAB upper lobes  gi:normoactive bowel sounds, soft, nontender, nondisteded  Ext: no edema  Skin: no rashes on exposed skin  Lymph: Deferred  Musculoskeletal no bony joint deformities    Data   Recent Labs   Lab 05/09/22 0923 05/08/22 2032 05/08/22 0917   WBC 6.6  --  7.2   HGB 11.8*  --  11.8*   MCV 92  --  92     --  155    140 138   POTASSIUM 3.2* 3.4 3.5   CHLORIDE 112* 109 106   CO2 23 26 24   BUN 16 20 24   CR 0.70 0.77 0.87   ANIONGAP 7 5 8   NASIR 9.3 9.3 10.0   GLC 77 81 73   ALBUMIN 2.3* 2.5* 2.7*   PROTTOTAL 5.9* 6.0* 6.5*   BILITOTAL 1.4* 1.5* 1.3   ALKPHOS 48 48 50   ALT 35 39 39   AST 97* 115* 123*   LIPASE 1,555*  --  1,730*     No results found for this or any previous visit (from the past 24 hour(s)).  Medications     heparin 1,450 Units/hr (05/09/22 0502)     lactated ringers       sodium chloride 75 mL/hr at 05/09/22 0819       carboxymethylcellulose PF  1 drop Right Eye At Bedtime     divalproex sodium delayed-release  500 mg Oral TID     ipratropium  2 spray Both Nostrils TID     pantoprazole (PROTONIX) IV  40 mg Intravenous Daily with breakfast     prednisoLONE acetate  1 drop Right Eye Daily     risperiDONE  2 mg Oral BID     senna-docusate  1 tablet Oral BID    Or     senna-docusate  2 tablet Oral BID     sertraline  100 mg Oral Daily     topiramate  100 mg Oral At Bedtime

## 2022-05-09 NOTE — CONSULTS
[facility]  Gastroenterology Consultation         Jagdeep Walker  [address] [facility] [address]  51 year old male    Admission Date/Time: 5/8/2022  Primary Care Provider: No Ref-Primary, Physician  Referring / Attending Physician:  Dr. Barber Tapia    We were asked to see the patient in consultation by Dr. Barber Tapia for evaluation of pancreatitis.      CC: generalized weakness    HPI:  Jagdeep Walker is a 51 year old male who has a history of developmental delay who lives in a group home, hypothyroidism, GERD, JACLYN, seizures, schizoaffective disorder, bipolar affective disorder, and recent fall with concussion and facial abrasions about 2 weeks ago who presented with generalized weakness. He is a poor historian.  He denies any fevers, chills, coughing, shortness of breath, chest pain, abdominal pain, nausea, vomiting, diarrhea, or changes in urinary habits.  However his sister has relayed that he has been complaining of abdominal pain and has lost 30 pounds in the last 4 months or so.    His workup has included labs; Lipase 1,730, , ALT and Alk Phos normal.     CT A/P noted acute edematous interstitial pancreatitis, acute peripancreatic fluid collections 3.0 and 2.7 cm, small-mod volume ascites, small pleural effusions, occlusive thrombosis of portal vein.  No ductal dilation. Findings concerning for underlying malignancy also.     ROS: A comprehensive ten point review of systems was negative aside from those in mentioned in the HPI.      PAST MED HX:  I have reviewed this patient's medical history and updated it with pertinent information if needed.   No past medical history on file.    MEDICATIONS: Prior to Admission Medications   Prescriptions Last Dose Informant Patient Reported? Taking?   LORazepam (ATIVAN) 2 MG tablet Past Month at Unknown time  Yes Yes   Sig: Take 2 mg by mouth daily as needed for seizures Give bucally   Vitamin D (Cholecalciferol)  25 MCG (1000 UT) TABS 5/7/2022 at Unknown time  Yes Yes   Sig: Take 2,000 Units by mouth daily   albuterol (PROAIR HFA/PROVENTIL HFA/VENTOLIN HFA) 108 (90 Base) MCG/ACT inhaler Past Month at Unknown time  Yes Yes   Sig: Inhale 2 puffs into the lungs every 6 hours as needed for shortness of breath / dyspnea or wheezing   atenolol (TENORMIN) 25 MG tablet 5/7/2022 at Unknown time  Yes Yes   Sig: Take 25 mg by mouth daily   calcium carbonate 600 mg-vitamin D 400 units (CALTRATE) 600-400 MG-UNIT per tablet 5/7/2022 at Unknown time  Yes Yes   Sig: Take 1 tablet by mouth daily (with breakfast)   calcium polycarbophil (FIBERCON) 625 MG tablet 5/7/2022 at Unknown time  Yes Yes   Sig: Take 1 tablet by mouth 2 times daily   carboxymethylcellulose PF (REFRESH LIQUIGEL) 1 % ophthalmic gel 5/7/2022 at Unknown time  Yes Yes   Sig: Place 1 drop into the right eye At Bedtime   carboxymethylcellulose PF (REFRESH PLUS) 0.5 % ophthalmic solution Past Month at Unknown time  Yes Yes   Sig: Place 1 drop into both eyes daily as needed for dry eyes   chlorhexidine (PERIDEX) 0.12 % solution 5/7/2022 at Unknown time  Yes Yes   Sig: Swish and spit 15 mLs in mouth daily   ciclopirox (LOPROX) 0.77 % cream Past Month at Unknown time  Yes Yes   Sig: Apply topically nightly as needed   clonazePAM (KLONOPIN) 0.125 MG TBDP ODT tab Past Month at Unknown time  Yes Yes   Sig: Take 0.125 mg by mouth daily as needed for anxiety   divalproex sodium delayed-release (DEPAKOTE) 500 MG DR tablet 5/7/2022 at Unknown time  Yes Yes   Sig: Take 500 mg by mouth 3 times daily   docusate sodium (COLACE) 100 MG capsule 5/7/2022 at Unknown time  Yes Yes   Sig: Take 100 mg by mouth daily   hydrocortisone (CORTAID) 1 % external cream Past Month at Unknown time  Yes Yes   Sig: Apply topically 2 times daily   ipratropium (ATROVENT) 0.06 % nasal spray 5/7/2022 at Unknown time  Yes Yes   Sig: Spray 2 sprays into both nostrils 3 times daily   ketoconazole (NIZORAL) 2 %  external cream Past Month at Unknown time  Yes Yes   Sig: Apply topically 2 times daily as needed for itching R foot   levOCARNitine (CARNITOR) 330 MG tablet 5/7/2022 at Unknown time  Yes Yes   Sig: Take by mouth 3 times daily   levocetirizine (XYZAL) 5 MG tablet 5/7/2022 at Unknown time  Yes Yes   Sig: Take 5 mg by mouth every evening   levothyroxine (SYNTHROID/LEVOTHROID) 50 MCG tablet 5/7/2022 at Unknown time  Yes Yes   Sig: Take 50 mcg by mouth daily   loperamide (IMODIUM) 2 MG capsule Past Month at Unknown time  Yes Yes   Sig: Take 2 mg by mouth 4 times daily as needed for diarrhea   montelukast (SINGULAIR) 10 MG tablet 5/7/2022 at Unknown time  Yes Yes   Sig: Take 10 mg by mouth At Bedtime   multivitamin, therapeutic (THERA-VIT) TABS tablet 5/7/2022 at Unknown time  Yes Yes   Sig: Take 1 tablet by mouth daily   omeprazole 20 MG tablet 5/7/2022 at Unknown time  Yes Yes   Sig: Take 20 mg by mouth 2 times daily   polyethylene glycol-propylene glycol (SYSTANE ULTRA) 0.4-0.3 % SOLN ophthalmic solution Past Month at Unknown time  Yes Yes   Sig: Place 2 drops into both eyes daily as needed for dry eyes   prednisoLONE acetate (PRED FORTE) 1 % ophthalmic suspension 5/7/2022 at Unknown time  Yes Yes   Sig: Place 1 drop into the right eye daily   risperiDONE (RISPERDAL) 2 MG tablet 5/7/2022 at Unknown time  Yes Yes   Sig: Take 2 mg by mouth 2 times daily   sertraline (ZOLOFT) 100 MG tablet 5/7/2022 at Unknown time  Yes Yes   Sig: Take 100 mg by mouth daily   topiramate (TOPAMAX) 100 MG tablet 5/7/2022 at Unknown time  Yes Yes   Sig: Take 100 mg by mouth At Bedtime      Facility-Administered Medications: None       ALLERGIES: No Known Allergies    SOCIAL HISTORY:       FAMILY HISTORY:  No family history on file.    PHYSICAL EXAM:   General  Alert, oriented and comfortable  Vital Signs with Ranges  Temp: 98.5  F (36.9  C) Temp src: Axillary BP: 119/59 Pulse: 67   Resp: 18 SpO2: 93 % O2 Device: None (Room air)    I/O last 3  completed shifts:  In: 1165 [I.V.:1165]  Out: 400 [Urine:400]    Constitutional: healthy, alert, no distress and cooperative   Cardiovascular: negative, PMI normal. No lifts, heaves, or thrills. RRR. No murmurs, clicks gallops or rub  Respiratory: negative, Percussion normal. Good diaphragmatic excursion. Lungs clear  Abdomen: Abdomen soft, tender. BS normal. No masses, organomegaly, positive findings: tenderness mild epigastric and RUQ          ADDITIONAL COMMENTS:   I reviewed the patient's new clinical lab test results.   Recent Labs   Lab Test 05/08/22 0917 04/29/22  0625 04/27/22  2248   WBC 7.2  --  7.2   HGB 11.8* 11.1* 11.6*   MCV 92  --  92     --  151   INR  --   --  1.26*     Recent Labs   Lab Test 05/08/22 2032 05/08/22  0917 04/29/22  0625   POTASSIUM 3.4 3.5 3.4   CHLORIDE 109 106 115*   CO2 26 24 26   BUN 20 24 17   ANIONGAP 5 8 4     Recent Labs   Lab Test 05/08/22 2032 05/08/22  0934 05/08/22 0917 04/29/22  0625 04/28/22  0016 04/27/22  2248   ALBUMIN 2.5*  --  2.7* 2.1*  --  2.6*   BILITOTAL 1.5*  --  1.3  --   --  0.6   ALT 39  --  39  --   --  28   *  --  123*  --   --  57*   PROTEIN  --  Negative  --   --  Negative  --    LIPASE  --   --  1,730*  --   --   --        I reviewed the patient's new imaging results.        CONSULTATION ASSESSMENT AND PLAN:    Jagdeep Walker is a 51 year old with h/o developmental delay whom was admitted with weakness and found to have pancreatitis and occlusive thrombosis of portal vein.    Active Problems:  Portal vein thrombosis  Acute pancreatitis   Acute peripancreatic fluid collections  Lipase 1,730, , ALT and Alk Phos normal.   CT A/P noted acute edematous interstitial pancreatitis, acute peripancreatic fluid collections 3.0 and 2.7 cm, small-mod volume ascites, small pleural effusions, occlusive thrombosis of portable vein.  No ductal dilation. Findings concerning for underlying malignancy also.   Findings concerning for acute vs  chronic pancreatitis vs malignancy  Further evaluation with MRCP vs EGD and EUS is warranted. Currently on heparin for portal vein thrombosis, therefore MRCP is recommended    -- MRCP today  -- Daily labs  -- NPO  -- IV pantoprazole        [facility].  Office: [phone]  Cell : [phone] (Dr. Clark)  Cell: [phone] (Isabelle Pantoja PA-C)

## 2022-05-09 NOTE — PLAN OF CARE
Goal Outcome Evaluation:        A&Ox3 - disoriented to situation. Development delays at baseline. VSS on RA. Right eye blindness. Denied pain or nausea this shift. Up A1-2 + GB/W. PIV infusing NS @ 75ml/hr and Heparin @ 1450 units/hr. Next HepXa check scheduled for 0915 - currently one value within range. NPO except ice chips. GI following. Incontinent of B&B - ex cath in place. SW and PT consults in place. Discharge pending improvement.

## 2022-05-09 NOTE — PLAN OF CARE
A&Ox4, developmental delays at baseline. VSS on RA. C/o of headache, prn tylenol given. PIV infusing hep gtt at 1450 units/hr next hepXa check is 5/10 in the am. NPO except ice chips. Incontinent of B&B male external cath. A2 gaitbelt and walker pivot to commode.

## 2022-05-09 NOTE — PLAN OF CARE
Goal Outcome Evaluation:  Shift Note:   Admitted from the the ER at 2015. A&Ox3, disorientated to situation, form a group home. Right eye blind. Pt shaky. VSS on RA. Denies abdominal pain. Incontinent at times. Ax1-2 GB/W. IV infusing NS and Heparin (infusing at 1450 units per hr, Next HepXa recheck is 0315. NPO except for ice chips. GI following. Social work and PT consulted. Discharge pending. Sister Bree updated on pt.

## 2022-05-10 ENCOUNTER — APPOINTMENT (OUTPATIENT)
Dept: PHYSICAL THERAPY | Facility: CLINIC | Age: 52
DRG: 438 | End: 2022-05-10
Attending: HOSPITALIST
Payer: MEDICARE

## 2022-05-10 LAB
ALBUMIN SERPL-MCNC: 2.2 G/DL (ref 3.4–5)
ALP SERPL-CCNC: 46 U/L (ref 40–150)
ALT SERPL W P-5'-P-CCNC: 36 U/L (ref 0–70)
ANION GAP SERPL CALCULATED.3IONS-SCNC: 5 MMOL/L (ref 3–14)
AST SERPL W P-5'-P-CCNC: 69 U/L (ref 0–45)
BILIRUB SERPL-MCNC: 1.2 MG/DL (ref 0.2–1.3)
BUN SERPL-MCNC: 15 MG/DL (ref 7–30)
CALCIUM SERPL-MCNC: 9.3 MG/DL (ref 8.5–10.1)
CHLORIDE BLD-SCNC: 116 MMOL/L (ref 94–109)
CO2 SERPL-SCNC: 25 MMOL/L (ref 20–32)
CREAT SERPL-MCNC: 0.68 MG/DL (ref 0.66–1.25)
CRP SERPL-MCNC: 76.7 MG/L (ref 0–8)
GFR SERPL CREATININE-BSD FRML MDRD: >90 ML/MIN/1.73M2
GLUCOSE BLD-MCNC: 73 MG/DL (ref 70–99)
MAGNESIUM SERPL-MCNC: 2.4 MG/DL (ref 1.6–2.3)
POTASSIUM BLD-SCNC: 3.9 MMOL/L (ref 3.4–5.3)
POTASSIUM BLD-SCNC: 4.1 MMOL/L (ref 3.4–5.3)
PROT SERPL-MCNC: 5.5 G/DL (ref 6.8–8.8)
SODIUM SERPL-SCNC: 146 MMOL/L (ref 133–144)
TOPIRAMATE SERPL-MCNC: 4.3 UG/ML
UFH PPP CHRO-ACNC: 0.46 IU/ML
VALPROATE FREE MFR SERPL: 45 %
VALPROATE FREE SERPL-MCNC: 34 UG/ML
VALPROATE SERPL-MCNC: 75 UG/ML

## 2022-05-10 PROCEDURE — 80053 COMPREHEN METABOLIC PANEL: CPT | Performed by: NURSE PRACTITIONER

## 2022-05-10 PROCEDURE — 94660 CPAP INITIATION&MGMT: CPT

## 2022-05-10 PROCEDURE — 36415 COLL VENOUS BLD VENIPUNCTURE: CPT | Performed by: HOSPITALIST

## 2022-05-10 PROCEDURE — 97116 GAIT TRAINING THERAPY: CPT | Mod: GP

## 2022-05-10 PROCEDURE — 99233 SBSQ HOSP IP/OBS HIGH 50: CPT | Performed by: HOSPITALIST

## 2022-05-10 PROCEDURE — 85520 HEPARIN ASSAY: CPT | Performed by: HOSPITALIST

## 2022-05-10 PROCEDURE — 83735 ASSAY OF MAGNESIUM: CPT | Performed by: NURSE PRACTITIONER

## 2022-05-10 PROCEDURE — 250N000013 HC RX MED GY IP 250 OP 250 PS 637: Performed by: HOSPITALIST

## 2022-05-10 PROCEDURE — 97161 PT EVAL LOW COMPLEX 20 MIN: CPT | Mod: GP

## 2022-05-10 PROCEDURE — 86140 C-REACTIVE PROTEIN: CPT | Performed by: PHYSICIAN ASSISTANT

## 2022-05-10 PROCEDURE — 250N000013 HC RX MED GY IP 250 OP 250 PS 637: Performed by: NURSE PRACTITIONER

## 2022-05-10 PROCEDURE — 120N000001 HC R&B MED SURG/OB

## 2022-05-10 PROCEDURE — C9113 INJ PANTOPRAZOLE SODIUM, VIA: HCPCS | Performed by: INTERNAL MEDICINE

## 2022-05-10 PROCEDURE — 999N000157 HC STATISTIC RCP TIME EA 10 MIN

## 2022-05-10 PROCEDURE — 86301 IMMUNOASSAY TUMOR CA 19-9: CPT | Performed by: PHYSICIAN ASSISTANT

## 2022-05-10 PROCEDURE — 250N000011 HC RX IP 250 OP 636: Performed by: INTERNAL MEDICINE

## 2022-05-10 PROCEDURE — 97530 THERAPEUTIC ACTIVITIES: CPT | Mod: GP

## 2022-05-10 PROCEDURE — 258N000003 HC RX IP 258 OP 636: Performed by: NURSE PRACTITIONER

## 2022-05-10 RX ADMIN — SENNOSIDES AND DOCUSATE SODIUM 2 TABLET: 50; 8.6 TABLET ORAL at 21:16

## 2022-05-10 RX ADMIN — IPRATROPIUM BROMIDE 2 SPRAY: 42 SPRAY NASAL at 21:22

## 2022-05-10 RX ADMIN — IPRATROPIUM BROMIDE 2 SPRAY: 42 SPRAY NASAL at 08:56

## 2022-05-10 RX ADMIN — SODIUM CHLORIDE, POTASSIUM CHLORIDE, SODIUM LACTATE AND CALCIUM CHLORIDE: 600; 310; 30; 20 INJECTION, SOLUTION INTRAVENOUS at 10:13

## 2022-05-10 RX ADMIN — SERTRALINE HYDROCHLORIDE 100 MG: 100 TABLET ORAL at 08:26

## 2022-05-10 RX ADMIN — DIVALPROEX SODIUM 500 MG: 500 TABLET, DELAYED RELEASE ORAL at 16:41

## 2022-05-10 RX ADMIN — LEVOTHYROXINE SODIUM 50 MCG: 50 TABLET ORAL at 06:47

## 2022-05-10 RX ADMIN — ACETAMINOPHEN 650 MG: 325 TABLET ORAL at 18:38

## 2022-05-10 RX ADMIN — IPRATROPIUM BROMIDE 2 SPRAY: 42 SPRAY NASAL at 16:41

## 2022-05-10 RX ADMIN — SODIUM CHLORIDE, POTASSIUM CHLORIDE, SODIUM LACTATE AND CALCIUM CHLORIDE: 600; 310; 30; 20 INJECTION, SOLUTION INTRAVENOUS at 23:56

## 2022-05-10 RX ADMIN — LEVOCARNITINE 330 MG: 330 TABLET ORAL at 21:15

## 2022-05-10 RX ADMIN — PANTOPRAZOLE SODIUM 40 MG: 40 INJECTION, POWDER, FOR SOLUTION INTRAVENOUS at 08:26

## 2022-05-10 RX ADMIN — ATENOLOL 25 MG: 25 TABLET ORAL at 08:26

## 2022-05-10 RX ADMIN — SODIUM CHLORIDE, POTASSIUM CHLORIDE, SODIUM LACTATE AND CALCIUM CHLORIDE: 600; 310; 30; 20 INJECTION, SOLUTION INTRAVENOUS at 03:48

## 2022-05-10 RX ADMIN — DIVALPROEX SODIUM 500 MG: 500 TABLET, DELAYED RELEASE ORAL at 08:26

## 2022-05-10 RX ADMIN — CARBOXYMETHYLCELLULOSE SODIUM 1 DROP: 10 GEL OPHTHALMIC at 21:16

## 2022-05-10 RX ADMIN — RISPERIDONE 2 MG: 2 TABLET ORAL at 21:16

## 2022-05-10 RX ADMIN — LEVOCARNITINE 330 MG: 330 TABLET ORAL at 16:41

## 2022-05-10 RX ADMIN — SODIUM CHLORIDE, POTASSIUM CHLORIDE, SODIUM LACTATE AND CALCIUM CHLORIDE: 600; 310; 30; 20 INJECTION, SOLUTION INTRAVENOUS at 16:59

## 2022-05-10 RX ADMIN — DIVALPROEX SODIUM 500 MG: 500 TABLET, DELAYED RELEASE ORAL at 21:16

## 2022-05-10 RX ADMIN — TOPIRAMATE 100 MG: 100 TABLET, FILM COATED ORAL at 21:16

## 2022-05-10 RX ADMIN — PREDNISOLONE ACETATE 1 DROP: 10 SUSPENSION/ DROPS OPHTHALMIC at 08:55

## 2022-05-10 RX ADMIN — LEVOCARNITINE 330 MG: 330 TABLET ORAL at 08:26

## 2022-05-10 RX ADMIN — RISPERIDONE 2 MG: 2 TABLET ORAL at 08:26

## 2022-05-10 RX ADMIN — MONTELUKAST 10 MG: 10 TABLET, FILM COATED ORAL at 21:15

## 2022-05-10 ASSESSMENT — ACTIVITIES OF DAILY LIVING (ADL)
ADLS_ACUITY_SCORE: 44
ADLS_ACUITY_SCORE: 44
ADLS_ACUITY_SCORE: 40
ADLS_ACUITY_SCORE: 44
ADLS_ACUITY_SCORE: 40
ADLS_ACUITY_SCORE: 44
ADLS_ACUITY_SCORE: 40
ADLS_ACUITY_SCORE: 44
ADLS_ACUITY_SCORE: 40
ADLS_ACUITY_SCORE: 44

## 2022-05-10 NOTE — PLAN OF CARE
A&Ox4, occasionally forgetful. VSS on RA, denies pain. PIV continues to infuse LR and hep gett, hep xa recheck at 0600 this AM. NPO with exception of ice chips. Not oob this shit, external cath in place, incontinent of stool. Went down for an MRI over night. Discharge pending continued progress, GI continuing to follow

## 2022-05-10 NOTE — PLAN OF CARE
A&Ox4, forgetful. VSS on RA. Denies pain. Clear liquid diet tolerating well. Incontinent at times, male external catheter in place with good UOP. PIV infusing LR. A2 gaitbelt and walker.

## 2022-05-10 NOTE — PROGRESS NOTES
[facility]  Gastroenterology Progress Note     Jagdeep Walker MRN# [id number]   YOB: 1970 Age: 51 year old          Assessment and Plan:   Jagdeep Walker is a 51 year old with h/o developmental delay whom was admitted with weakness and found to have pancreatitis and occlusive thrombosis of portal vein.     Active Problems:  Portal vein thrombosis  Acute pancreatitis   Acute peripancreatic fluid collections  -CRP trending up 94.9. LFTs near normal  -CT A/P noted acute edematous interstitial pancreatitis, acute peripancreatic fluid collections 3.0 and 2.7 cm, small-mod volume ascites, small pleural effusions, occlusive thrombosis of portable vein. No ductal dilation. Findings concerning for underlying malignancy also.   - Further evaluation with MRCP vs EGD and EUS is warranted. Currently on heparin for portal vein thrombosis, therefore MRCP is recommended  - MRCP resulted in findings of sequelae of prior pancreatitis, possibly neoplastic cause. Notes occulusion of splenic and portal veins. NO evidence for stones or obstruction of the cystic duct  - Findings concerning for acute on chronic pancreatitis vs malignancy  - Discussed findings with sister, Huong, and did recommend endoscopic ultrasound with possible biopsy to help determine definitive diagnosis. She agrees to further workup.    -- Draw CA 19-9 as pancreatic cancer marker  -- Start clear liquid diet and advance as tolerated  --Consider discontinuation of heparin as limited benefit for splenic/portal vein thrombosis in setting of pancreatitis. WIll need held in setting of potential EUS tomorrow  -- Daily labs  -- plan EUS as inpatient tomorrow vs Thursday  -- NPO at midnight  -- IV pantoprazole              Interval History:   no new complaints, doing well and doing well; no cp, sob, n/v/d, or abd pain.              Review of Systems:   C: NEGATIVE for fever, chills, change in weight  E/M: NEGATIVE for ear, mouth  and throat problems  R: NEGATIVE for significant cough or SOB  CV: NEGATIVE for chest pain, palpitations or peripheral edema             Medications:   I have reviewed this patient's current medications    atenolol  25 mg Oral Daily     carboxymethylcellulose PF  1 drop Right Eye At Bedtime     divalproex sodium delayed-release  500 mg Oral TID     ipratropium  2 spray Both Nostrils TID     levOCARNitine  330 mg Oral TID     levothyroxine  50 mcg Oral QAM AC     montelukast  10 mg Oral At Bedtime     pantoprazole (PROTONIX) IV  40 mg Intravenous Daily with breakfast     prednisoLONE acetate  1 drop Right Eye Daily     risperiDONE  2 mg Oral BID     senna-docusate  1 tablet Oral BID    Or     senna-docusate  2 tablet Oral BID     sertraline  100 mg Oral Daily     topiramate  100 mg Oral At Bedtime                  Physical Exam:   Vitals were reviewed  Vital Signs with Ranges  Temp:  [97.4  F (36.3  C)-98.5  F (36.9  C)] 98.1  F (36.7  C)  Pulse:  [55-71] 60  Resp:  [14-17] 16  BP: (107-124)/(54-67) 112/62  SpO2:  [93 %-96 %] 94 %  I/O last 3 completed shifts:  In: 1600 [I.V.:1600]  Out: -   Constitutional: healthy, alert and no distress   Cardiovascular: negative, PMI normal. No lifts, heaves, or thrills. RRR. No murmurs, clicks gallops or rub  Respiratory: negative, Percussion normal. Good diaphragmatic excursion. Lungs clear  Abdomen: Abdomen soft, non-tender. BS normal. No masses, organomegaly           Data:   I reviewed the patient's new clinical lab test results.   Recent Labs   Lab Test 05/09/22 0923 05/08/22  0917 04/29/22  0625 04/27/22  2248   WBC 6.6 7.2  --  7.2   HGB 11.8* 11.8* 11.1* 11.6*   MCV 92 92  --  92    155  --  151   INR  --   --   --  1.26*     Recent Labs   Lab Test 05/10/22  0724 05/09/22  2350 05/09/22  1755 05/09/22  0923 05/08/22  2032   POTASSIUM 3.9 4.1 3.4 3.2* 3.4   CHLORIDE 116*  --   --  112* 109   CO2 25  --   --  23 26   BUN 15  --   --  16 20   ANIONGAP 5  --   --  7 5      Recent Labs   Lab Test 05/10/22  0724 05/09/22 0923 05/08/22 2032 05/08/22  0934 05/08/22  0917 04/29/22  0625 04/28/22  0016   ALBUMIN 2.2* 2.3* 2.5*  --  2.7*   < >  --    BILITOTAL 1.2 1.4* 1.5*  --  1.3  --   --    ALT 36 35 39  --  39  --   --    AST 69* 97* 115*  --  123*  --   --    PROTEIN  --   --   --  Negative  --   --  Negative   LIPASE  --  1,555*  --   --  1,730*  --   --     < > = values in this interval not displayed.       I reviewed the patient's new imaging results.    All laboratory data reviewed  All imaging studies reviewed by me.    Isabelle Pantoja PA-C,  5/10/2022  [facility]  Office : [phone]  Cell: [phone] (Dr. Clark)  Cell: [phone] (Isabelle Pantoja PA-C)

## 2022-05-10 NOTE — PROGRESS NOTES
"   05/10/22 1015   Quick Adds   Type of Visit Initial PT Evaluation   Living Environment   People in Home other (see comments)  (group home with 4 other residents)   Current Living Arrangements group home   Home Accessibility no concerns   Transportation Anticipated health plan transportation   Living Environment Comments Patient reports his bedroom and bathroom are on the main level of the house. There is a basement level but he is able to stay on the main level if needed.   Self-Care   Usual Activity Tolerance moderate   Current Activity Tolerance fair   Equipment Currently Used at Home none   Fall history within last six months yes   Number of times patient has fallen within last six months 2   Activity/Exercise/Self-Care Comment Patient reports at baseline he does not use an assistive device. Was hospitalized 2 weeks ago following a fall, was supposed to get a FWW from the house but they never received one. \"I never ask for any help\". Patient states he performs ADLs independently. Group home staff assists with medications, provides meals.   General Information   Onset of Illness/Injury or Date of Surgery 05/08/22   Referring Physician Iasbelle Gonzalez, DILLON CNP   Patient/Family Therapy Goals Statement (PT) Patient would like to return home.   Pertinent History of Current Problem (include personal factors and/or comorbidities that impact the POC) 51 year old male with history of developmental delay who lives in a group home, hypothyroidism, GERD, JACLYN, seizures, schizoaffective disorder, bipolar affective disorder, and recent fall with concussion and facial abrasions about 2 weeks ago who presented to the ED via EMS with generalized weakness.  History from his sister his sister has revealed that he has been complaining of abdominal pain and has lost 30 pounds in the last 4 months or so, diagnosed with acute pancreatitis.   Existing Precautions/Restrictions fall   General Observations At group home, should be " checked on intermittently but fell last Saturday night and was unable to get up. Patient reported that he was on the ground for most of the night before he was found.   Cognition   Affect/Mental Status (Cognition) WFL  (developmental delay)   Orientation Status (Cognition) oriented x 4   Follows Commands (Cognition) increased processing time needed   Pain Assessment   Patient Currently in Pain No   Strength (Manual Muscle Testing)   Strength Comments Grossly 4/5 LE strength   Bed Mobility   Bed Mobility supine-sit;sit-supine   Supine-Sit [place] (Bed Mobility) contact guard   Assistive Device (Bed Mobility) bed rails   Comment, (Bed Mobility) Performed supine to sit transfer with CGA and use of bedrails - HOB elevated. Increased time to perform and initially requesting assistance. Patient performed sit to supine transfer with CGA - bed flat and able to swing LE's into bed with no assistance.   Transfers   Transfers sit-stand transfer   Sit-Stand Transfer   Sit-Stand [place] (Transfers) contact guard;minimum assist (75% patient effort)   Assistive Device (Sit-Stand Transfers) walker, front-wheeled   Comment, (Sit-Stand Transfer) Patient performing sit<>stand transfer from EOB with FWW and CGA-min assist x1. Assist to correct posterior lean while standing.   Gait/Stairs (Locomotion)   [place] Level (Gait) contact guard;minimum assist (75% patient effort)   Assistive Device (Gait) walker, front-wheeled   Distance in Feet (Required for LE Total Joints) 5' eval + 10' and 15' treatment   Comment, (Gait/Stairs) Patient completed bout of ambulation during session with CGA-min assist x1 and FWW.   Sensory Examination   Sensory Perception patient reports no sensory changes   Clinical Impression   Criteria for Skilled Therapeutic Intervention Yes, treatment indicated   PT Diagnosis (PT) impaired mobility   Influenced by the following impairments generalized weakness, impaired balance, impaired activity tolerance    Functional limitations due to impairments impaired functional mobility, impaired gait, transfers, and bed mobility   Clinical Presentation (PT Evaluation Complexity) Stable/Uncomplicated   Clinical Presentation Rationale clinical judgement   Clinical Decision Making (Complexity) low complexity   Planned Therapy Interventions (PT) balance training;bed mobility training;gait training;patient/family education;strengthening;transfer training   Anticipated Equipment Needs at Discharge (PT) walker, rolling   Risk & Benefits of therapy have been explained evaluation/treatment results reviewed;care plan/treatment goals reviewed;risks/benefits reviewed;current/potential barriers reviewed;participants voiced agreement with care plan;participants included;patient;sibling   PT Discharge Planning   PT Discharge Recommendation (DC Rec) Transitional Care Facility;home with assist;home with home care physical therapy   PT Rationale for DC Rec Patient below baseline functional status at this time. Requiring CGA-min assist x1 with FWW for transfers and ambulation. Patient previously independent with mobility at baseline. Assist available at group home but unclear to what extent assist can be provided per patient and family. Recommending discharge to TCU for improvement of functional mobility, strength, balance, and independence. If patient/family refuses or patient progresses to mod I while in hospital, patinet could return home with supervision and assist as needed for mobility and cares. Would also recommend HHPT for improvement of balance, safety, and functional mobility.   Total Evaluation Time   Total Evaluation Time (Minutes) 13   Physical Therapy Goals   PT Frequency 5x/week   PT Predicted Duration/Target Date for Goal Attainment 05/17/22   PT Goals Bed Mobility;Transfers;Gait   PT: Bed Mobility Modified independent;Supine to/from sit   PT: Transfers Modified independent;Sit to/from stand;Assistive device   PT: Gait Modified  independent;Rolling walker;150 feet

## 2022-05-10 NOTE — PROGRESS NOTES
[facility]    Medicine Progress Note - Hospitalist Service    Date of Admission:  5/8/2022    Assessment & Plan        Jagdeep Walker is a 51 year old male with history of developmental delay who lives in a group home, hypothyroidism, GERD, JACLYN, seizures, schizoaffective disorder, bipolar affective disorder, and recent fall with concussion and facial abrasions about 2 weeks ago who presented to the ED via EMS with generalized weakness.  History from his sister his sister has revealed that he has been complaining of abdominal pain and has lost 30 pounds in the last 4 months or so, diagnosed with acute pancreatitis.     Acute pancreatitis   Acute peripancreatic fluid collections  Occlusive portal vein thrombosis  Abnormal ALT, improving  Denied abdominal pain in the ED, but had reportedly been telling his sister that it is hurting.  Non-tender on fairly deep palpation in ED. Lipase peaked at 1,730.  AST elevated on admission but now downtrending and other LFTs are WNL.  CT abdo/pelvis 5/8/22 showing: acute edematous interstitial pancreatitis, acute peripancreatic fluid collections 3.0 and 2.7 cm, small-mod volume ascites, small pleural effusions, groundglass opacity in the lungs infection vs edema.  Findings concerning for underlying malignancy also. The gallbladder is distended measuring 9.7 cm in maximal dimension. Thin gallbladder wall. Trace pericholecystic fluid. No biliary ductal dilatation. Occlusive thrombosis of the portal vein.    Admitted to inpatient.    Continue IV fluids.    [facility] consulted, appreciate their assistance.    MRCP on 5/9 showed likely chronic findings in the pancreas, occlusion of the splenic and portal veins, mild right pleural effusion and abdominal ascites.    Initially anticoagulated with IV heparin, felt to be of limited benefit for splenic/portal vein thrombosis in the setting of pancreatitis, will discontinue IV heparin.    Plan for EUS  "tomorrow.    NPO at midnight, clear liquid diet until then.    IV protonix.    Triglycerides within normal limits.    Generalized weakness  Weight loss   Recent fall and concussion with facial abrasion 4/27  Found to be notably weak at group home which is not his baseline. No report of another fall.  Nothing lateralizing. Answering questions seemingly at his baseline with history of developmental delay and he is oriented appropriately.  He denies any pain currently in ED but had reported abdominal pain to sister (guardian) recently and has had 30 lbs weight loss in the past 4 or 5 months. He was unsteady with walker today on \"road testing\" in ED.  EKG nonischemic, trop neg, UA not infected, valproic acid WNL, lytes & creat normal, , VBG fairly unremarkable, no leukocytosis, hgb 11.8. CT Head no acute pathology.     PT consult.    MRI brain 5/8/22 showing small chronic cerebellar infarcts which may contribute to his gait instability.    TSH WNL.    Fall precautions.    Hypokalemia  Hyperchloremia    Continue IV fluids with LR.    Magnesium slightly elevated.    Potassium improved with replacement.    Recheck labs in AM.    Anemia, normocytic, mild   Had been 14 g in 2020, 11.6 end of April 2022, no overt blood loss but now was anticoagulated with heparin infusion.  Normal B12 and folate in April 2022.    Iron studies.    Recheck hemoglobin in AM.    History of seizures  No report of recent seizure. PTA depakote noted. Also on topiramate. Level wnl in ED.    Continue PTA regimen of Topamax and valproic acid.    History of schizoaffective disorder  History of bipolar affective disorder  Appears compensated at time of admission.     Continue PTA regimen of risperidone, sertraline.    Hypothyroidism    Continue PTA levothyroxine.    Obstructive of sleep apnea on BiPAP    Continue with BiPAP or CPAP when sleeping.    GERD    Continue PTA PPI.     COVID 19 screening    Low suspicion, PCR negative " "5/8/22.    Hypertension    Continue PTA atenolol with hold parameters.    Carnitine deficiency    Continue PTA levocarnitine.       Diet: Clear Liquid Diet    DVT Prophylaxis: Pneumatic Compression Devices  Santana Catheter: Not present  Central Lines: None  Cardiac Monitoring: None  Code Status: Full Code      Disposition Plan   Expected Discharge: 05/11/2022     Anticipated discharge location:  Awaiting care coordination huddle  Delays:            The patient's care was discussed with the Bedside Nurse, Patient and GI Consultant.    Fede Jones MD  Hospitalist Service  [facility]  Securely message with the Vocera Web Console (learn more here)  Text page via [employer] Paging/Directory         Clinically Significant Risk Factors Present on Admission             # Obesity: Estimated body mass index is 32.68 kg/m  as calculated from the following:    Height as of this encounter: 1.651 m (5' 5\").    Weight as of this encounter: 89.1 kg (196 lb 6.4 oz).      ______________________________________________________________________    Interval History   Jagdeep Walker was seen this afternoon. He feels OK today. No new complaints/concerns. Denies fevers, chest pain, shortness of breath, nausea, abdominal pain. Discussed plan of care with Dr. Clark.    Data reviewed today: I reviewed all medications, new labs and imaging results over the last 24 hours. I personally reviewed no images or EKG's today.    Physical Exam   Vital Signs: Temp: 98.1  F (36.7  C) Temp src: Axillary BP: 112/62 Pulse: 60   Resp: 16 SpO2: 94 % O2 Device: None (Room air)    Weight: 196 lbs 6.4 oz  Constitutional: awake, alert, cooperative, no apparent distress  Respiratory: clear to auscultation bilaterally, no crackles or wheezing  Cardiovascular: regular rate and rhythm, normal S1 and S2, no murmur noted  GI: normal bowel sounds, soft, non-distended, non-tender  Skin: warm, dry  Musculoskeletal: no lower extremity pitting " edema present  Neurologic: awake, alert, answered questions appropriately, moves all extremities    Data   Recent Labs   Lab 05/10/22  0724 05/09/22  2350 05/09/22  1755 05/09/22 0923 05/08/22 2032 05/08/22  0917   WBC  --   --   --  6.6  --  7.2   HGB  --   --   --  11.8*  --  11.8*   MCV  --   --   --  92  --  92   PLT  --   --   --  159  --  155   *  --   --  142 140 138   POTASSIUM 3.9 4.1 3.4 3.2* 3.4 3.5   CHLORIDE 116*  --   --  112* 109 106   CO2 25  --   --  23 26 24   BUN 15  --   --  16 20 24   CR 0.68  --   --  0.70 0.77 0.87   ANIONGAP 5  --   --  7 5 8   NASIR 9.3  --   --  9.3 9.3 10.0   GLC 73  --   --  77 81 73   ALBUMIN 2.2*  --   --  2.3* 2.5* 2.7*   PROTTOTAL 5.5*  --   --  5.9* 6.0* 6.5*   BILITOTAL 1.2  --   --  1.4* 1.5* 1.3   ALKPHOS 46  --   --  48 48 50   ALT 36  --   --  35 39 39   AST 69*  --   --  97* 115* 123*   LIPASE  --   --   --  1,555*  --  1,730*     Medications     lactated ringers 150 mL/hr at 05/10/22 1013       atenolol  25 mg Oral Daily     carboxymethylcellulose PF  1 drop Right Eye At Bedtime     divalproex sodium delayed-release  500 mg Oral TID     ipratropium  2 spray Both Nostrils TID     levOCARNitine  330 mg Oral TID     levothyroxine  50 mcg Oral QAM AC     montelukast  10 mg Oral At Bedtime     pantoprazole (PROTONIX) IV  40 mg Intravenous Daily with breakfast     prednisoLONE acetate  1 drop Right Eye Daily     risperiDONE  2 mg Oral BID     senna-docusate  1 tablet Oral BID    Or     senna-docusate  2 tablet Oral BID     sertraline  100 mg Oral Daily     topiramate  100 mg Oral At Bedtime

## 2022-05-10 NOTE — ACP (ADVANCE CARE PLANNING)
"Noted chart contains mention of a guardian but we do not have guardianship order / papers from anyone.  These would normally be found scanned in to the ACP documents section. In the absence of papers, sister may be \"next of kin.\"  Papers need to be emailed to [email or web address] for review and addition to chart, if obtained.     CM-RN alerted [employer] team, as they may be able to check the court system for documents.     ADDENDUM: [employer] TEAM HAS OBTAINED DOCUMENTS AND CHART IS UPDATED         System Definitions    Advance Care Planning: Process of discussing and documenting goals, values, beliefs and choices regarding future health care decisions.      Guardian: Court-appointed adult(s) with authority to make designated decisions for an individual who has been deemed by a  to lack competence to make some or all of their own decisions.      Health Care Agents: Adult(s) appointed in a Health Care Directive by an adult with decisional capacity to make health care decisions on their behalf if/when the person loses decisional capacity (as determined by a physician).     Health Care Directive: Legal document created by an adult with decisional capacity. Designates health care treatment choices and/or who would make health care decisions if/when the person loses decisional capacity (as determined by a physician).      POLST/Resuscitation Guidelines:  Signed medical orders outlining patient s choices for life-supporting treatment options. POLST is a nationally standardized form. Resuscitation Guidelines is used for all other signed medical orders forms related to life-support choices. Orders are completed following an informed goals of care discussion with the patient or their surrogate decision maker and are intended to travel with the patient across the care-continuum.      Power of  (POA): NOT related to medical decision making. The term POA should not be used in documentation or " discussions. A POA is a legal document granting authority for financial and property matters.

## 2022-05-10 NOTE — CONSULTS
Care Management Initial Consult    General Information  Assessment completed with: Other, Huong Keenan (GUARDIAN) Sister   [phone]  Type of CM/SW Visit: Initial Assessment  Primary Care Provider verified and updated as needed: Yes (Joel Werner W [phone] ANGELA)   General Information Comments: Neurologist is Dr. Tank Flaherty [facility]  Readmission within the last 30 days:   no  Advance Care Planning:    notified [employer] that Guardian documents needed; they were able to pursue and obtain documentation which is now on file in EPIC    Communication Assessment  Patient's communication style: spoken language (English or Bilingual)    Hearing Difficulty or Deaf: no   Wear Glasses or Blind: no    Cognitive  Cognitive/Neuro/Behavioral: .WDL except, orientation  Level of Consciousness: alert  Arousal Level: opens eyes spontaneously  Orientation: oriented x 4 (forgetfu)  Mood/Behavior: calm, cooperative  Best Language: 0 - No aphasia  Speech: slow    Living Environment:   People in home: facility resident  n/a  Current living Arrangements: group home  Name of Facility: [facility]   Address: [address] [place] [id number]   Able to return to prior arrangements: other (see comments)  Living Arrangement Comments: return to group home TBD pending therapy recommendations and safe discharge plan    Family/Social Support:  Care provided by: self, other (see comments) ([facility] staff)  Provides care for: no one  Marital Status: Single  Support system: Sibling(s), Guardian, Facility resident(s)/Staff          Description of Support System: Supportive, Involved    Support Assessment: Adequate family and caregiver support    Current Resources:   Patient receiving home care services: No   Community Resources: Group Home, Transportation Services ([employer])  Equipment currently used at home: cane, straight  Supplies currently used at home: Other  "(CPAP)    Employment/Financial:  Employment Status: disabled     Financial Concerns: No concerns identified   Finance Comments: active MEDICARE/MEDICARE and MEDICA/MEDICA ACCESS ABILITY [place] insurance    Lifestyle & Psychosocial Needs:  Outpatient \"Social determinants of health\" assessment not done.    Functional Status:  Prior to admission patient needed assistance: See \"additional information\" below     Mental Health Status:  Mental Health Status: Past Concern (developmental delay - does have a guardian)       Chemical Dependency Status:  Chemical Dependency Status: No Current Concerns           Values/Beliefs:  Spiritual, Cultural Beliefs, Latter day Practices, Values that affect care: no             Additional Information:  Spoke with pt's legal guardian (per  and sister Huong Keenan ([phone]) via phone.        Pt lives at Group Home (she notes the phone number is right even though when you call and if they don't answer it sounds funny):      [facility] [place]   Phone: [phone]   Address: [address] [place] [address]  Web: [email or web address]    She is not sure who pt's PCP is.  His Neurologist is Dr. Flaherty (\"has seen for a long time\").   The Pharmacy he uses is \"[facility]  For DME pt has a CPAP machine and cane.  The house does have grab bars in the bathroom.  He has a regular bed but it has a feature that allows the head to be raised.  The group home staff provide medication setup, pt takes the medications independently.  Pt dresses self, but lately group home staff have helped with bathing due to unsteadiness.     For needs, sister states \"he could use a walker.\"  Therapies have not yet assessed, but if home care is needed \"he has not had home care before.\"  Pt/family was provided with the Medicare Compare list for Home Care.  Discussed associated Medicare star ratings to assist with choice for referrals/discharge planning Yes.  Education was given to pt/family that star " "ratings are updated/maintained by Medicare and can be reviewed by visiting www.medicare.gov Yes.  No preference obtained; only prefer whomever the agency with which the group home might normally work.  Whether Home Health or TCU is needed, Guardian would appreciate updates for recommendations when obtained (therapy consult is ordered).      ADDENDUM: after speaking with pt sister, Therapy did add their recommendation.  Note IF TCU, pt may need Level 2 Screen.  Did not talk with sister yet re: TCU.     Recommendation: Transitional Care Facility;home with assist;home with home care physical therapy;  Rationale: Patient below baseline functional status at this time. Requiring CGA-min assist x1 with FWW for transfers and ambulation. Patient previously independent with mobility at baseline. Assist available at home but unclear to what extent assist can be provided per patient and family. Recommending discharge to TCU for improvement of functional mobility, strength, balance, and independence. If patient/family refuses or patient progresses to mod I while in hospital, patinet could return home with supervision and assist as needed for mobility and cares. Would also recommend HHPT for improvement of balance, safety, and functional mobility.      SHAWNEE-RN received at 1515 \"Nursing Facility Chart Coverage Guid for [employer]\" fax noting the following helpful care planning information:    Care Coordinator: Vero Lim phone [phone] email [email or web address] worker: Bree Olvera [phone] [email or web address] waiver program: DD   Primary Physician: Joel Werner phone [phone] / fax [phone]  [facility] [address] [place] [address]  Psychaitrist Tank Sauer -[phone]  Care Team Members (group home)  Ramonita Moreno Group Home Coordinator [phone]  [facility]      Provider name / " Service provided / payment type:   [employer] / Congregate Living / Waiver  [facility] / Case Management / Waiver  [facility]  / Care Coordination / [facility]  [facility] / Supplies and Equipment / [employer]  [employer] / Transportation / Waiver  [employer] / Transportation / Waiver      CM-RN was not able to attempt to reach Group Home caregivers at the updated numbers.  The number at the top of this additional information was unsucessful .        CM team to continue to follow, will assist with referrals to HHC vs TCU as needed.  At this time pt is NPO for biopsy tomorrow.  Will need to speak with group home to determine if pt can return to Group Home with home health or if he will need TCU (likely will require Level 2 screen).     Breana Belcher RN BSN  IP Float Care Coordinator

## 2022-05-11 ENCOUNTER — ANESTHESIA EVENT (OUTPATIENT)
Dept: GASTROENTEROLOGY | Facility: CLINIC | Age: 52
DRG: 438 | End: 2022-05-11
Payer: MEDICARE

## 2022-05-11 LAB
ALBUMIN SERPL-MCNC: 2 G/DL (ref 3.4–5)
ALP SERPL-CCNC: 47 U/L (ref 40–150)
ALT SERPL W P-5'-P-CCNC: 33 U/L (ref 0–70)
ANION GAP SERPL CALCULATED.3IONS-SCNC: 3 MMOL/L (ref 3–14)
AST SERPL W P-5'-P-CCNC: 59 U/L (ref 0–45)
BILIRUB SERPL-MCNC: 1 MG/DL (ref 0.2–1.3)
BUN SERPL-MCNC: 11 MG/DL (ref 7–30)
CALCIUM SERPL-MCNC: 9.5 MG/DL (ref 8.5–10.1)
CANCER AG19-9 SERPL IA-ACNC: 15 U/ML
CHLORIDE BLD-SCNC: 115 MMOL/L (ref 94–109)
CO2 SERPL-SCNC: 25 MMOL/L (ref 20–32)
CREAT SERPL-MCNC: 0.72 MG/DL (ref 0.66–1.25)
ERYTHROCYTE [DISTWIDTH] IN BLOOD BY AUTOMATED COUNT: 15.1 % (ref 10–15)
GFR SERPL CREATININE-BSD FRML MDRD: >90 ML/MIN/1.73M2
GLUCOSE BLD-MCNC: 79 MG/DL (ref 70–99)
HCT VFR BLD AUTO: 38.8 % (ref 40–53)
HGB BLD-MCNC: 12.5 G/DL (ref 13.3–17.7)
MAGNESIUM SERPL-MCNC: 2.2 MG/DL (ref 1.6–2.3)
MCH RBC QN AUTO: 30.4 PG (ref 26.5–33)
MCHC RBC AUTO-ENTMCNC: 32.2 G/DL (ref 31.5–36.5)
MCV RBC AUTO: 94 FL (ref 78–100)
PLATELET # BLD AUTO: 181 10E3/UL (ref 150–450)
POTASSIUM BLD-SCNC: 4.1 MMOL/L (ref 3.4–5.3)
PROT SERPL-MCNC: 5.6 G/DL (ref 6.8–8.8)
RBC # BLD AUTO: 4.11 10E6/UL (ref 4.4–5.9)
SODIUM SERPL-SCNC: 143 MMOL/L (ref 133–144)
WBC # BLD AUTO: 4.3 10E3/UL (ref 4–11)

## 2022-05-11 PROCEDURE — 999N000157 HC STATISTIC RCP TIME EA 10 MIN

## 2022-05-11 PROCEDURE — 250N000013 HC RX MED GY IP 250 OP 250 PS 637: Performed by: HOSPITALIST

## 2022-05-11 PROCEDURE — 120N000001 HC R&B MED SURG/OB

## 2022-05-11 PROCEDURE — 36415 COLL VENOUS BLD VENIPUNCTURE: CPT | Performed by: HOSPITALIST

## 2022-05-11 PROCEDURE — 85027 COMPLETE CBC AUTOMATED: CPT | Performed by: HOSPITALIST

## 2022-05-11 PROCEDURE — 258N000003 HC RX IP 258 OP 636: Performed by: NURSE PRACTITIONER

## 2022-05-11 PROCEDURE — 99232 SBSQ HOSP IP/OBS MODERATE 35: CPT | Performed by: HOSPITALIST

## 2022-05-11 PROCEDURE — 83735 ASSAY OF MAGNESIUM: CPT | Performed by: HOSPITALIST

## 2022-05-11 PROCEDURE — 250N000013 HC RX MED GY IP 250 OP 250 PS 637: Performed by: NURSE PRACTITIONER

## 2022-05-11 PROCEDURE — C9113 INJ PANTOPRAZOLE SODIUM, VIA: HCPCS | Performed by: INTERNAL MEDICINE

## 2022-05-11 PROCEDURE — 94660 CPAP INITIATION&MGMT: CPT

## 2022-05-11 PROCEDURE — 80053 COMPREHEN METABOLIC PANEL: CPT | Performed by: HOSPITALIST

## 2022-05-11 PROCEDURE — 250N000011 HC RX IP 250 OP 636: Performed by: INTERNAL MEDICINE

## 2022-05-11 RX ADMIN — SODIUM CHLORIDE, POTASSIUM CHLORIDE, SODIUM LACTATE AND CALCIUM CHLORIDE: 600; 310; 30; 20 INJECTION, SOLUTION INTRAVENOUS at 21:04

## 2022-05-11 RX ADMIN — CARBOXYMETHYLCELLULOSE SODIUM 1 DROP: 10 GEL OPHTHALMIC at 21:57

## 2022-05-11 RX ADMIN — LEVOTHYROXINE SODIUM 50 MCG: 50 TABLET ORAL at 06:56

## 2022-05-11 RX ADMIN — IPRATROPIUM BROMIDE 2 SPRAY: 42 SPRAY NASAL at 15:34

## 2022-05-11 RX ADMIN — RISPERIDONE 2 MG: 2 TABLET ORAL at 21:57

## 2022-05-11 RX ADMIN — IPRATROPIUM BROMIDE 2 SPRAY: 42 SPRAY NASAL at 21:58

## 2022-05-11 RX ADMIN — TOPIRAMATE 100 MG: 100 TABLET, FILM COATED ORAL at 21:57

## 2022-05-11 RX ADMIN — SERTRALINE HYDROCHLORIDE 100 MG: 100 TABLET ORAL at 08:27

## 2022-05-11 RX ADMIN — DIVALPROEX SODIUM 500 MG: 500 TABLET, DELAYED RELEASE ORAL at 08:27

## 2022-05-11 RX ADMIN — SENNOSIDES AND DOCUSATE SODIUM 1 TABLET: 50; 8.6 TABLET ORAL at 21:57

## 2022-05-11 RX ADMIN — PREDNISOLONE ACETATE 1 DROP: 10 SUSPENSION/ DROPS OPHTHALMIC at 08:30

## 2022-05-11 RX ADMIN — DIVALPROEX SODIUM 500 MG: 500 TABLET, DELAYED RELEASE ORAL at 21:57

## 2022-05-11 RX ADMIN — RISPERIDONE 2 MG: 2 TABLET ORAL at 08:27

## 2022-05-11 RX ADMIN — ATENOLOL 25 MG: 25 TABLET ORAL at 08:27

## 2022-05-11 RX ADMIN — IPRATROPIUM BROMIDE 2 SPRAY: 42 SPRAY NASAL at 08:30

## 2022-05-11 RX ADMIN — SODIUM CHLORIDE, POTASSIUM CHLORIDE, SODIUM LACTATE AND CALCIUM CHLORIDE: 600; 310; 30; 20 INJECTION, SOLUTION INTRAVENOUS at 13:15

## 2022-05-11 RX ADMIN — DIVALPROEX SODIUM 500 MG: 500 TABLET, DELAYED RELEASE ORAL at 15:34

## 2022-05-11 RX ADMIN — SODIUM CHLORIDE, POTASSIUM CHLORIDE, SODIUM LACTATE AND CALCIUM CHLORIDE: 600; 310; 30; 20 INJECTION, SOLUTION INTRAVENOUS at 06:39

## 2022-05-11 RX ADMIN — LEVOCARNITINE 330 MG: 330 TABLET ORAL at 15:34

## 2022-05-11 RX ADMIN — LEVOCARNITINE 330 MG: 330 TABLET ORAL at 21:57

## 2022-05-11 RX ADMIN — LEVOCARNITINE 330 MG: 330 TABLET ORAL at 08:27

## 2022-05-11 RX ADMIN — PANTOPRAZOLE SODIUM 40 MG: 40 INJECTION, POWDER, FOR SOLUTION INTRAVENOUS at 08:27

## 2022-05-11 RX ADMIN — MONTELUKAST 10 MG: 10 TABLET, FILM COATED ORAL at 21:57

## 2022-05-11 ASSESSMENT — ACTIVITIES OF DAILY LIVING (ADL)
ADLS_ACUITY_SCORE: 44
ADLS_ACUITY_SCORE: 44
ADLS_ACUITY_SCORE: 40
ADLS_ACUITY_SCORE: 44
ADLS_ACUITY_SCORE: 41
ADLS_ACUITY_SCORE: 41
ADLS_ACUITY_SCORE: 44
ADLS_ACUITY_SCORE: 41
ADLS_ACUITY_SCORE: 44
ADLS_ACUITY_SCORE: 44
ADLS_ACUITY_SCORE: 41
ADLS_ACUITY_SCORE: 44

## 2022-05-11 NOTE — PROGRESS NOTES
[facility]    Medicine Progress Note - Hospitalist Service    Date of Admission:  5/8/2022    Assessment & Plan        Jagdeep Walker is a 51 year old male with history of developmental delay who lives in a group home, hypothyroidism, GERD, JACLYN, seizures, schizoaffective disorder, bipolar affective disorder, and recent fall with concussion and facial abrasions about 2 weeks ago who presented to the ED via EMS with generalized weakness.  History from his sister his sister has revealed that he has been complaining of abdominal pain and has lost 30 pounds in the last 4 months or so, diagnosed with acute pancreatitis.     Acute pancreatitis   Acute peripancreatic fluid collections  Occlusive portal vein thrombosis  Abnormal ALT, improving  Denied abdominal pain in the ED, but had reportedly been telling his sister that it is hurting.  Non-tender on fairly deep palpation in ED. Lipase peaked at 1,730.  AST elevated on admission but now downtrending and other LFTs are WNL.  CT abdo/pelvis 5/8/22 showing: acute edematous interstitial pancreatitis, acute peripancreatic fluid collections 3.0 and 2.7 cm, small-mod volume ascites, small pleural effusions, groundglass opacity in the lungs infection vs edema.  Findings concerning for underlying malignancy also. The gallbladder is distended measuring 9.7 cm in maximal dimension. Thin gallbladder wall. Trace pericholecystic fluid. No biliary ductal dilatation. Occlusive thrombosis of the portal vein.    Admitted to inpatient.    Continue IV fluids.    [facility] consulted, appreciate their assistance.    MRCP on 5/9 showed likely chronic findings in the pancreas, occlusion of the splenic and portal veins, mild right pleural effusion and abdominal ascites.    Initially anticoagulated with IV heparin, felt to be of limited benefit for splenic/portal vein thrombosis in the setting of pancreatitis.  IV heparin discontinued on 5/10/2022, no plans to restart  "anticoagulation at this time.    GI planning EUS, unable to be done today, plan for tomorrow.    Regular diet for now, n.p.o. at midnight.    IV protonix.    Triglycerides within normal limits.    Generalized weakness  Weight loss   Recent fall and concussion with facial abrasion 4/27  Found to be notably weak at group home which is not his baseline. No report of another fall.  Nothing lateralizing. Answering questions seemingly at his baseline with history of developmental delay and he is oriented appropriately.  He denies any pain currently in ED but had reported abdominal pain to sister (guardian) recently and has had 30 lbs weight loss in the past 4 or 5 months. He was unsteady with walker today on \"road testing\" in ED.  EKG nonischemic, trop neg, UA not infected, valproic acid WNL, lytes & creat normal, , VBG fairly unremarkable, no leukocytosis, hgb 11.8. CT Head no acute pathology.     PT consult.    MRI brain 5/8/22 showed small chronic cerebellar infarcts which may contribute to his gait instability.    TSH WNL.    Fall precautions.    Hypokalemia  Hyperchloremia    Continue IV fluids with LR.    Magnesium slightly elevated.    Potassium improved with replacement.    Recheck labs in AM.    Anemia, normocytic, mild   Had been 14 g in 2020, 11.6 end of April 2022, no overt blood loss but now was anticoagulated with heparin infusion.  Normal B12 and folate in April 2022.    Iron studies suggestive of anemia of chronic disease.    Recheck hemoglobin with labs tomorrow.    History of seizures  No report of recent seizure. PTA depakote noted. Also on topiramate. Level wnl in ED.    Continue PTA regimen of Topamax and valproic acid.    History of schizoaffective disorder  History of bipolar affective disorder  Appears compensated at time of admission.     Continue PTA regimen of risperidone, sertraline.    Hypothyroidism    Continue PTA levothyroxine.    Obstructive of sleep apnea on BiPAP    Continue with " "BiPAP or CPAP when sleeping.    GERD    Continue PTA PPI.     COVID 19 screening    Low suspicion, PCR negative 5/8/22.    Hypertension    Continue PTA atenolol with hold parameters.    Carnitine deficiency    Continue PTA levocarnitine.       Diet: Mechanical/Dental Soft Diet  NPO per Anesthesia Guidelines for Procedure/Surgery Except for: Meds    DVT Prophylaxis: Pneumatic Compression Devices  [place] Catheter: Not present  Central Lines: None  Cardiac Monitoring: None  Code Status: Full Code      Disposition Plan   Expected Discharge: 05/13/2022     Anticipated discharge location:  Awaiting care coordination huddle  Delays:            The patient's care was discussed with the Bedside Nurse, Patient and Patient's Family.    Fede Jones MD  Hospitalist Service  [facility]  Securely message with the Vocera Web Console (learn more here)  Text page via mydeco Paging/Directory         Clinically Significant Risk Factors Present on Admission             # Obesity: Estimated body mass index is 32.68 kg/m  as calculated from the following:    Height as of this encounter: 1.651 m (5' 5\").    Weight as of this encounter: 89.1 kg (196 lb 6.4 oz).      ______________________________________________________________________    Interval History   Jagdeep Walker was seen this morning.  He feels okay today.  Had pancakes for breakfast, thinks he put too much syrup on them.  Denies fevers, chest pain, shortness of breath, nausea, abdominal pain.  His Sister Laura was in the room with him, discussed plan of care.      Data reviewed today: I reviewed all medications, new labs and imaging results over the last 24 hours. I personally reviewed no images or EKG's today.    Physical Exam   Vital Signs: Temp: 98.2  F (36.8  C) Temp src: Oral BP: 119/65 Pulse: 58   Resp: 16 SpO2: 94 % O2 Device: None (Room air)    Weight: 196 lbs 6.4 oz  Constitutional: awake, alert, cooperative, no apparent distress, " sitting up in a chair  Respiratory: clear to auscultation bilaterally, no crackles or wheezing  Cardiovascular: regular rate and rhythm, normal S1 and S2, no murmur noted  GI: normal bowel sounds, soft, non-distended, non-tender  Skin: warm, dry  Musculoskeletal: no lower extremity pitting edema present  Neurologic: awake, alert, answers questions appropriately, moves all extremities    Data   Recent Labs   Lab 05/11/22  0719 05/10/22  0724 05/09/22  2350 05/09/22  1755 05/09/22  0923 05/08/22 2032 05/08/22  0917   WBC  --   --   --   --  6.6  --  7.2   HGB  --   --   --   --  11.8*  --  11.8*   MCV  --   --   --   --  92  --  92   PLT  --   --   --   --  159  --  155    146*  --   --  142   < > 138   POTASSIUM 4.1 3.9 4.1   < > 3.2*   < > 3.5   CHLORIDE 115* 116*  --   --  112*   < > 106   CO2 25 25  --   --  23   < > 24   BUN 11 15  --   --  16   < > 24   CR 0.72 0.68  --   --  0.70   < > 0.87   ANIONGAP 3 5  --   --  7   < > 8   NASIR 9.5 9.3  --   --  9.3   < > 10.0   GLC 79 73  --   --  77   < > 73   ALBUMIN 2.0* 2.2*  --   --  2.3*   < > 2.7*   PROTTOTAL 5.6* 5.5*  --   --  5.9*   < > 6.5*   BILITOTAL 1.0 1.2  --   --  1.4*   < > 1.3   ALKPHOS 47 46  --   --  48   < > 50   ALT 33 36  --   --  35   < > 39   AST 59* 69*  --   --  97*   < > 123*   LIPASE  --   --   --   --  1,555*  --  1,730*    < > = values in this interval not displayed.     Medications     lactated ringers 150 mL/hr at 05/11/22 0639       atenolol  25 mg Oral Daily     carboxymethylcellulose PF  1 drop Right Eye At Bedtime     divalproex sodium delayed-release  500 mg Oral TID     ipratropium  2 spray Both Nostrils TID     levOCARNitine  330 mg Oral TID     levothyroxine  50 mcg Oral QAM AC     montelukast  10 mg Oral At Bedtime     pantoprazole (PROTONIX) IV  40 mg Intravenous Daily with breakfast     prednisoLONE acetate  1 drop Right Eye Daily     risperiDONE  2 mg Oral BID     senna-docusate  1 tablet Oral BID    Or     senna-docusate   2 tablet Oral BID     sertraline  100 mg Oral Daily     topiramate  100 mg Oral At Bedtime

## 2022-05-11 NOTE — PLAN OF CARE
Goal Outcome Evaluation:    A&Ox4, forgetful at times. VSS on RA. Denies pain. NPO for EUS today. Incontinent at times, male external catheter in place with good UOP. PIV infusing LR. A2 gaitbelt and walker.

## 2022-05-11 NOTE — PROGRESS NOTES
[facility]  Gastroenterology Progress Note     Jagdeep Walker MRN# [id number]   YOB: 1970 Age: 51 year old          Assessment and Plan:   Jagdeep Walker is a 51 year old with h/o developmental delay whom was admitted with weakness and found to have pancreatitis and occlusive thrombosis of portal vein.     Active Problems:  Portal vein thrombosis  Acute pancreatitis   Acute peripancreatic fluid collections  -CRP trending up 94.9. LFTs near normal  -CT A/P noted acute edematous interstitial pancreatitis, acute peripancreatic fluid collections 3.0 and 2.7 cm, small-mod volume ascites, small pleural effusions, occlusive thrombosis of portable vein. No ductal dilation. Findings concerning for underlying malignancy also.   - Further evaluation with MRCP vs EGD and EUS is warranted. Currently on heparin for portal vein thrombosis, therefore MRCP is recommended  - MRCP resulted in findings of sequelae of prior pancreatitis, possibly neoplastic cause. Notes occulusion of splenic and portal veins. NO evidence for stones or obstruction of the cystic duct  - Findings concerning for acute on chronic pancreatitis vs malignancy  - Discussed findings with sister, Huong, and did recommend endoscopic ultrasound with possible biopsy to help determine definitive diagnosis. She agrees to further workup.  CA 19-9 is normal at 15.    -- Soft diet  --Consider discontinuation of heparin as limited benefit for splenic/portal vein thrombosis in setting of pancreatitis do not recommend restarting. WIll need held in setting of potential EUS tomorrow  -- Daily labs  -- plan EUS as inpatient tomorrow   -- NPO at midnight  -- IV pantoprazole              Interval History:   no new complaints, doing well and doing well; no cp, sob, n/v/d, or abd pain.              Review of Systems:   C: NEGATIVE for fever, chills, change in weight  E/M: NEGATIVE for ear, mouth and throat problems  R: NEGATIVE for  significant cough or SOB  CV: NEGATIVE for chest pain, palpitations or peripheral edema             Medications:   I have reviewed this patient's current medications    atenolol  25 mg Oral Daily     carboxymethylcellulose PF  1 drop Right Eye At Bedtime     divalproex sodium delayed-release  500 mg Oral TID     ipratropium  2 spray Both Nostrils TID     levOCARNitine  330 mg Oral TID     levothyroxine  50 mcg Oral QAM AC     montelukast  10 mg Oral At Bedtime     pantoprazole (PROTONIX) IV  40 mg Intravenous Daily with breakfast     prednisoLONE acetate  1 drop Right Eye Daily     risperiDONE  2 mg Oral BID     senna-docusate  1 tablet Oral BID    Or     senna-docusate  2 tablet Oral BID     sertraline  100 mg Oral Daily     topiramate  100 mg Oral At Bedtime                  Physical Exam:   Vitals were reviewed  Vital Signs with Ranges  Temp:  [97.4  F (36.3  C)-98.4  F (36.9  C)] 98.2  F (36.8  C)  Pulse:  [54-62] 58  Resp:  [16] 16  BP: (100-119)/(60-72) 119/65  SpO2:  [94 %-98 %] 94 %  I/O last 3 completed shifts:  In: 240 [P.O.:240]  Out: 2675 [Urine:2675]  Constitutional: healthy, alert and no distress   Cardiovascular: negative, PMI normal. No lifts, heaves, or thrills. RRR. No murmurs, clicks gallops or rub  Respiratory: negative, Percussion normal. Good diaphragmatic excursion. Lungs clear  Abdomen: Abdomen soft, non-tender. BS normal. No masses, organomegaly           Data:   I reviewed the patient's new clinical lab test results.   Recent Labs   Lab Test 05/09/22  0923 05/08/22  0917 04/29/22  0625 04/27/22  2248   WBC 6.6 7.2  --  7.2   HGB 11.8* 11.8* 11.1* 11.6*   MCV 92 92  --  92    155  --  151   INR  --   --   --  1.26*     Recent Labs   Lab Test 05/11/22  0719 05/10/22  0724 05/09/22  2350 05/09/22  1755 05/09/22  0923   POTASSIUM 4.1 3.9 4.1   < > 3.2*   CHLORIDE 115* 116*  --   --  112*   CO2 25 25  --   --  23   BUN 11 15  --   --  16   ANIONGAP 3 5  --   --  7    < > = values in this  interval not displayed.     Recent Labs   Lab Test 05/11/22  0719 05/10/22  0724 05/09/22  0923 05/08/22 2032 05/08/22  0934 05/08/22  0917 04/29/22  0625 04/28/22  0016   ALBUMIN 2.0* 2.2* 2.3*   < >  --  2.7*   < >  --    BILITOTAL 1.0 1.2 1.4*   < >  --  1.3  --   --    ALT 33 36 35   < >  --  39  --   --    AST 59* 69* 97*   < >  --  123*  --   --    PROTEIN  --   --   --   --  Negative  --   --  Negative   LIPASE  --   --  1,555*  --   --  1,730*  --   --     < > = values in this interval not displayed.       I reviewed the patient's new imaging results.    All laboratory data reviewed  All imaging studies reviewed by me.    Isabelle Pantoja PA-C,  5/10/2022  [facility]  Office : [phone]  Cell: [phone] (Dr. Clark)  Cell: [phone] (Isabelle Pantoja PA-C)

## 2022-05-11 NOTE — PLAN OF CARE
A&Ox4, forgetful at times. VSS on RA. Denies pain. Mechanical soft diet tolerating well, NPO at midnight. Incontinent at times, male external catheter in place with good UOP. PIV x2 SL and infusing LR @ 150 ml/hr. A1-2 gait belt and walker, sat up in the chair throughout the shift.

## 2022-05-11 NOTE — PROGRESS NOTES
MD Notification    Notified Person: MD    Notified Person Name: Krystina Anderson    Notification Date/Time: 0108    Notification Interaction: Am[employer] pager    Purpose of Notification: FYI level of valproic acid (34)    Orders Received: n/a    Comments: n/a

## 2022-05-12 ENCOUNTER — ANESTHESIA (OUTPATIENT)
Dept: GASTROENTEROLOGY | Facility: CLINIC | Age: 52
DRG: 438 | End: 2022-05-12
Payer: MEDICARE

## 2022-05-12 LAB
ALBUMIN SERPL-MCNC: 2.2 G/DL (ref 3.4–5)
ALP SERPL-CCNC: 50 U/L (ref 40–150)
ALT SERPL W P-5'-P-CCNC: 32 U/L (ref 0–70)
ANION GAP SERPL CALCULATED.3IONS-SCNC: 7 MMOL/L (ref 3–14)
AST SERPL W P-5'-P-CCNC: 35 U/L (ref 0–45)
BILIRUB SERPL-MCNC: 0.6 MG/DL (ref 0.2–1.3)
BUN SERPL-MCNC: 10 MG/DL (ref 7–30)
CALCIUM SERPL-MCNC: 9 MG/DL (ref 8.5–10.1)
CHLORIDE BLD-SCNC: 108 MMOL/L (ref 94–109)
CO2 SERPL-SCNC: 23 MMOL/L (ref 20–32)
CREAT SERPL-MCNC: 0.81 MG/DL (ref 0.66–1.25)
ERYTHROCYTE [DISTWIDTH] IN BLOOD BY AUTOMATED COUNT: 15.2 % (ref 10–15)
GFR SERPL CREATININE-BSD FRML MDRD: >90 ML/MIN/1.73M2
GLUCOSE BLD-MCNC: 115 MG/DL (ref 70–99)
HCT VFR BLD AUTO: 36.3 % (ref 40–53)
HGB BLD-MCNC: 11.4 G/DL (ref 13.3–17.7)
MAGNESIUM SERPL-MCNC: 2 MG/DL (ref 1.6–2.3)
MCH RBC QN AUTO: 30 PG (ref 26.5–33)
MCHC RBC AUTO-ENTMCNC: 31.4 G/DL (ref 31.5–36.5)
MCV RBC AUTO: 96 FL (ref 78–100)
PLATELET # BLD AUTO: 169 10E3/UL (ref 150–450)
POTASSIUM BLD-SCNC: 3.2 MMOL/L (ref 3.4–5.3)
POTASSIUM BLD-SCNC: 3.7 MMOL/L (ref 3.4–5.3)
PROT SERPL-MCNC: 5.6 G/DL (ref 6.8–8.8)
RBC # BLD AUTO: 3.8 10E6/UL (ref 4.4–5.9)
SODIUM SERPL-SCNC: 138 MMOL/L (ref 133–144)
UPPER EUS: NORMAL
UPPER GI ENDOSCOPY: NORMAL
WBC # BLD AUTO: 4.8 10E3/UL (ref 4–11)

## 2022-05-12 PROCEDURE — C9113 INJ PANTOPRAZOLE SODIUM, VIA: HCPCS | Performed by: INTERNAL MEDICINE

## 2022-05-12 PROCEDURE — 250N000013 HC RX MED GY IP 250 OP 250 PS 637: Performed by: HOSPITALIST

## 2022-05-12 PROCEDURE — 250N000011 HC RX IP 250 OP 636: Performed by: INTERNAL MEDICINE

## 2022-05-12 PROCEDURE — 94660 CPAP INITIATION&MGMT: CPT

## 2022-05-12 PROCEDURE — 250N000013 HC RX MED GY IP 250 OP 250 PS 637: Performed by: NURSE PRACTITIONER

## 2022-05-12 PROCEDURE — 999N000157 HC STATISTIC RCP TIME EA 10 MIN

## 2022-05-12 PROCEDURE — 85027 COMPLETE CBC AUTOMATED: CPT | Performed by: HOSPITALIST

## 2022-05-12 PROCEDURE — 258N000003 HC RX IP 258 OP 636: Performed by: NURSE PRACTITIONER

## 2022-05-12 PROCEDURE — 999N000010 HC STATISTIC ANES STAT CODE-CRNA PER MINUTE: Performed by: INTERNAL MEDICINE

## 2022-05-12 PROCEDURE — 83735 ASSAY OF MAGNESIUM: CPT | Performed by: HOSPITALIST

## 2022-05-12 PROCEDURE — 43239 EGD BIOPSY SINGLE/MULTIPLE: CPT | Performed by: INTERNAL MEDICINE

## 2022-05-12 PROCEDURE — 99232 SBSQ HOSP IP/OBS MODERATE 35: CPT | Performed by: HOSPITALIST

## 2022-05-12 PROCEDURE — 36415 COLL VENOUS BLD VENIPUNCTURE: CPT | Performed by: HOSPITALIST

## 2022-05-12 PROCEDURE — 370N000017 HC ANESTHESIA TECHNICAL FEE, PER MIN: Performed by: INTERNAL MEDICINE

## 2022-05-12 PROCEDURE — 0DB78ZX EXCISION OF STOMACH, PYLORUS, VIA NATURAL OR ARTIFICIAL OPENING ENDOSCOPIC, DIAGNOSTIC: ICD-10-PCS | Performed by: INTERNAL MEDICINE

## 2022-05-12 PROCEDURE — 0DB68ZX EXCISION OF STOMACH, VIA NATURAL OR ARTIFICIAL OPENING ENDOSCOPIC, DIAGNOSTIC: ICD-10-PCS | Performed by: INTERNAL MEDICINE

## 2022-05-12 PROCEDURE — 88305 TISSUE EXAM BY PATHOLOGIST: CPT | Mod: TC | Performed by: INTERNAL MEDICINE

## 2022-05-12 PROCEDURE — 250N000011 HC RX IP 250 OP 636

## 2022-05-12 PROCEDURE — 43237 ENDOSCOPIC US EXAM ESOPH: CPT | Performed by: INTERNAL MEDICINE

## 2022-05-12 PROCEDURE — 250N000009 HC RX 250

## 2022-05-12 PROCEDURE — 84132 ASSAY OF SERUM POTASSIUM: CPT | Performed by: HOSPITALIST

## 2022-05-12 PROCEDURE — 120N000001 HC R&B MED SURG/OB

## 2022-05-12 RX ORDER — EPHEDRINE SULFATE 50 MG/ML
INJECTION, SOLUTION INTRAMUSCULAR; INTRAVENOUS; SUBCUTANEOUS PRN
Status: DISCONTINUED | OUTPATIENT
Start: 2022-05-12 | End: 2022-05-12

## 2022-05-12 RX ORDER — ONDANSETRON 4 MG/1
4 TABLET, ORALLY DISINTEGRATING ORAL EVERY 30 MIN PRN
Status: CANCELLED | OUTPATIENT
Start: 2022-05-12

## 2022-05-12 RX ORDER — FLUMAZENIL 0.1 MG/ML
0.2 INJECTION, SOLUTION INTRAVENOUS
Status: CANCELLED | OUTPATIENT
Start: 2022-05-12 | End: 2022-05-12

## 2022-05-12 RX ORDER — ONDANSETRON 2 MG/ML
INJECTION INTRAMUSCULAR; INTRAVENOUS PRN
Status: DISCONTINUED | OUTPATIENT
Start: 2022-05-12 | End: 2022-05-12

## 2022-05-12 RX ORDER — ONDANSETRON 2 MG/ML
4 INJECTION INTRAMUSCULAR; INTRAVENOUS EVERY 30 MIN PRN
Status: CANCELLED | OUTPATIENT
Start: 2022-05-12

## 2022-05-12 RX ORDER — DEXMEDETOMIDINE HYDROCHLORIDE 4 UG/ML
INJECTION, SOLUTION INTRAVENOUS PRN
Status: DISCONTINUED | OUTPATIENT
Start: 2022-05-12 | End: 2022-05-12

## 2022-05-12 RX ORDER — PANTOPRAZOLE SODIUM 40 MG/1
40 TABLET, DELAYED RELEASE ORAL
Status: DISCONTINUED | OUTPATIENT
Start: 2022-05-13 | End: 2022-05-17 | Stop reason: HOSPADM

## 2022-05-12 RX ORDER — SODIUM CHLORIDE, SODIUM LACTATE, POTASSIUM CHLORIDE, CALCIUM CHLORIDE 600; 310; 30; 20 MG/100ML; MG/100ML; MG/100ML; MG/100ML
INJECTION, SOLUTION INTRAVENOUS CONTINUOUS
Status: CANCELLED | OUTPATIENT
Start: 2022-05-12

## 2022-05-12 RX ORDER — ONDANSETRON 2 MG/ML
4 INJECTION INTRAMUSCULAR; INTRAVENOUS EVERY 6 HOURS PRN
Status: CANCELLED | OUTPATIENT
Start: 2022-05-12

## 2022-05-12 RX ORDER — PROPOFOL 10 MG/ML
INJECTION, EMULSION INTRAVENOUS CONTINUOUS PRN
Status: DISCONTINUED | OUTPATIENT
Start: 2022-05-12 | End: 2022-05-12

## 2022-05-12 RX ORDER — MAGNESIUM OXIDE 400 MG/1
400 TABLET ORAL 2 TIMES DAILY
Status: COMPLETED | OUTPATIENT
Start: 2022-05-12 | End: 2022-05-14

## 2022-05-12 RX ORDER — POTASSIUM CHLORIDE 1500 MG/1
40 TABLET, EXTENDED RELEASE ORAL ONCE
Status: COMPLETED | OUTPATIENT
Start: 2022-05-12 | End: 2022-05-12

## 2022-05-12 RX ORDER — LIDOCAINE HYDROCHLORIDE 20 MG/ML
INJECTION, SOLUTION INFILTRATION; PERINEURAL PRN
Status: DISCONTINUED | OUTPATIENT
Start: 2022-05-12 | End: 2022-05-12

## 2022-05-12 RX ORDER — ONDANSETRON 4 MG/1
4 TABLET, ORALLY DISINTEGRATING ORAL EVERY 6 HOURS PRN
Status: CANCELLED | OUTPATIENT
Start: 2022-05-12

## 2022-05-12 RX ADMIN — PROPOFOL 100 MCG/KG/MIN: 10 INJECTION, EMULSION INTRAVENOUS at 09:09

## 2022-05-12 RX ADMIN — Medication 5 MG: at 09:36

## 2022-05-12 RX ADMIN — IPRATROPIUM BROMIDE 2 SPRAY: 42 SPRAY NASAL at 10:30

## 2022-05-12 RX ADMIN — Medication 8 MCG: at 09:12

## 2022-05-12 RX ADMIN — RISPERIDONE 2 MG: 2 TABLET ORAL at 21:11

## 2022-05-12 RX ADMIN — ONDANSETRON 4 MG: 2 INJECTION INTRAMUSCULAR; INTRAVENOUS at 09:10

## 2022-05-12 RX ADMIN — SENNOSIDES AND DOCUSATE SODIUM 1 TABLET: 50; 8.6 TABLET ORAL at 21:11

## 2022-05-12 RX ADMIN — LEVOTHYROXINE SODIUM 50 MCG: 50 TABLET ORAL at 06:35

## 2022-05-12 RX ADMIN — Medication 4 MCG: at 09:08

## 2022-05-12 RX ADMIN — MONTELUKAST 10 MG: 10 TABLET, FILM COATED ORAL at 21:11

## 2022-05-12 RX ADMIN — LEVOCARNITINE 330 MG: 330 TABLET ORAL at 10:28

## 2022-05-12 RX ADMIN — POTASSIUM CHLORIDE 40 MEQ: 1500 TABLET, EXTENDED RELEASE ORAL at 15:50

## 2022-05-12 RX ADMIN — PREDNISOLONE ACETATE 1 DROP: 10 SUSPENSION/ DROPS OPHTHALMIC at 10:30

## 2022-05-12 RX ADMIN — Medication 8 MCG: at 09:10

## 2022-05-12 RX ADMIN — SODIUM CHLORIDE, POTASSIUM CHLORIDE, SODIUM LACTATE AND CALCIUM CHLORIDE: 600; 310; 30; 20 INJECTION, SOLUTION INTRAVENOUS at 03:36

## 2022-05-12 RX ADMIN — IPRATROPIUM BROMIDE 2 SPRAY: 42 SPRAY NASAL at 15:50

## 2022-05-12 RX ADMIN — PANTOPRAZOLE SODIUM 40 MG: 40 INJECTION, POWDER, FOR SOLUTION INTRAVENOUS at 10:26

## 2022-05-12 RX ADMIN — DIVALPROEX SODIUM 500 MG: 500 TABLET, DELAYED RELEASE ORAL at 21:11

## 2022-05-12 RX ADMIN — SODIUM CHLORIDE, POTASSIUM CHLORIDE, SODIUM LACTATE AND CALCIUM CHLORIDE: 600; 310; 30; 20 INJECTION, SOLUTION INTRAVENOUS at 09:36

## 2022-05-12 RX ADMIN — DIVALPROEX SODIUM 500 MG: 500 TABLET, DELAYED RELEASE ORAL at 10:28

## 2022-05-12 RX ADMIN — ATENOLOL 25 MG: 25 TABLET ORAL at 10:28

## 2022-05-12 RX ADMIN — SERTRALINE HYDROCHLORIDE 100 MG: 100 TABLET ORAL at 10:29

## 2022-05-12 RX ADMIN — DIVALPROEX SODIUM 500 MG: 500 TABLET, DELAYED RELEASE ORAL at 15:50

## 2022-05-12 RX ADMIN — Medication 5 MG: at 09:28

## 2022-05-12 RX ADMIN — Medication 5 MG: at 09:26

## 2022-05-12 RX ADMIN — CARBOXYMETHYLCELLULOSE SODIUM 1 DROP: 10 GEL OPHTHALMIC at 21:11

## 2022-05-12 RX ADMIN — LEVOCARNITINE 330 MG: 330 TABLET ORAL at 15:50

## 2022-05-12 RX ADMIN — LIDOCAINE HYDROCHLORIDE 40 MG: 20 INJECTION, SOLUTION INFILTRATION; PERINEURAL at 09:10

## 2022-05-12 RX ADMIN — ACETAMINOPHEN 650 MG: 325 TABLET ORAL at 08:07

## 2022-05-12 RX ADMIN — RISPERIDONE 2 MG: 2 TABLET ORAL at 10:29

## 2022-05-12 RX ADMIN — Medication 400 MG: at 21:11

## 2022-05-12 RX ADMIN — SENNOSIDES AND DOCUSATE SODIUM 1 TABLET: 50; 8.6 TABLET ORAL at 10:29

## 2022-05-12 RX ADMIN — IPRATROPIUM BROMIDE 2 SPRAY: 42 SPRAY NASAL at 21:11

## 2022-05-12 RX ADMIN — TOPIRAMATE 100 MG: 100 TABLET, FILM COATED ORAL at 21:11

## 2022-05-12 RX ADMIN — LEVOCARNITINE 330 MG: 330 TABLET ORAL at 21:11

## 2022-05-12 ASSESSMENT — ACTIVITIES OF DAILY LIVING (ADL)
ADLS_ACUITY_SCORE: 42
ADLS_ACUITY_SCORE: 41
ADLS_ACUITY_SCORE: 42
ADLS_ACUITY_SCORE: 41
ADLS_ACUITY_SCORE: 41

## 2022-05-12 ASSESSMENT — ENCOUNTER SYMPTOMS
SEIZURES: 1
DYSRHYTHMIAS: 0

## 2022-05-12 ASSESSMENT — LIFESTYLE VARIABLES: TOBACCO_USE: 0

## 2022-05-12 NOTE — PLAN OF CARE
Goal Outcome Evaluation:    A&Ox4, forgetful at times. VSS on RA. Denies pain. Mechanical soft diet. NPO at midnight for EUS today. PIV x2 SL and infusing LR @ 150 ml/hr. A1-2 gait belt and walker. Discharge pending.

## 2022-05-12 NOTE — ANESTHESIA POSTPROCEDURE EVALUATION
Patient: Jagdeep Walker    Procedure: Procedure(s):  ENDOSCOPIC ULTRASOUND, ESOPHAGOSCOPY / UPPER GASTROINTESTINAL TRACT (GI)  ESOPHAGOGASTRODUODENOSCOPY, WITH BIOPSY       Anesthesia Type:  MAC    Note:  Disposition: Outpatient   Postop Pain Control: Uneventful            Sign Out: Well controlled pain   PONV: No   Neuro/Psych: Uneventful            Sign Out: Acceptable/Baseline neuro status   Airway/Respiratory: Uneventful            Sign Out: Acceptable/Baseline resp. status   CV/Hemodynamics: Uneventful            Sign Out: Acceptable CV status   Other NRE: NONE   DID A NON-ROUTINE EVENT OCCUR? No           Last vitals:  Vitals Value Taken Time   BP 95/60 05/12/22 1112   Temp 36.2  C (97.2  F) 05/12/22 1112   Pulse 56 05/12/22 1112   Resp 18 05/12/22 1112   SpO2 95 % 05/12/22 1112       Electronically Signed By: Fermin Lam MD  May 12, 2022  1:51 PM

## 2022-05-12 NOTE — ANESTHESIA CARE TRANSFER NOTE
Patient: Jagdeep Walker    Procedure: Procedure(s):  ENDOSCOPIC ULTRASOUND, ESOPHAGOSCOPY / UPPER GASTROINTESTINAL TRACT (GI)  ESOPHAGOGASTRODUODENOSCOPY, WITH BIOPSY       Diagnosis: Pancreatitis [K85.90]  Diagnosis Additional Information: No value filed.    Anesthesia Type:   MAC     Note:    Oropharynx: oropharynx clear of all foreign objects  Level of Consciousness: drowsy  Oxygen Supplementation: face mask  Level of Supplemental Oxygen (L/min / FiO2): 10  Independent Airway: airway patency satisfactory and stable  Dentition: dentition unchanged  Vital Signs Stable: post-procedure vital signs reviewed and stable  Report to RN Given: handoff report given  Patient transferred to: Phase II (endo)    Handoff Report: Identifed the Patient, Identified the Reponsible Provider, Reviewed the pertinent medical history, Discussed the surgical course, Reviewed Intra-OP anesthesia mangement and issues during anesthesia, Set expectations for post-procedure period and Allowed opportunity for questions and acknowledgement of understanding      Vitals:  Vitals Value Taken Time   /62    Temp     Pulse 61    Resp 24 05/12/22 0941   SpO2 90 % 05/12/22 0941   Vitals shown include unvalidated device data.    Electronically Signed By: DILLON Arenas CRNA  May 12, 2022  9:42 AM

## 2022-05-12 NOTE — PROGRESS NOTES
Care Management Follow Up    Length of Stay (days): 4    Expected Discharge Date: 05/13/2022     Concerns to be Addressed:       Patient plan of care discussed at interdisciplinary rounds: Yes    Anticipated Discharge Disposition:       Anticipated Discharge Services:    Anticipated Discharge DME:      Patient/family educated on Medicare website which has current facility and service quality ratings:    Education Provided on the Discharge Plan:    Patient/Family in Agreement with the Plan:      Referrals Placed by CM/SW:    Private pay costs discussed: Not applicable    Additional Information:  Writer completed PAS. PAS Triggered for a level II screening that needs to happen before patient can discharge.     PAS-RR    D: Per DHS regulation, SW completed and submitted PAS-RR to [place] [employer] Direct Connect via the Senior LinkAge Line.  PAS-RR confirmation # is : [id number]    I: SW spoke with guardian and they are aware a PAS-RR has been submitted.  SW reviewed with guardian that they may be contacted for a follow up appointment within 10 days of hospital discharge if their SNF stay is < 30 days.  Contact information for [facility] LinkAge Line was also provided.    A: guardian verbalized understanding.    P: Further questions may be directed to [facility] LinkAge Line at #[phone], option #4 for PAS-RR staff.    BARAK Rocha LSW

## 2022-05-12 NOTE — PROGRESS NOTES
[facility]    Medicine Progress Note - Hospitalist Service    Date of Admission:  5/8/2022    Assessment & Plan        Jagdeep Walker is a 51 year old male with history of developmental delay who lives in a group home, hypothyroidism, GERD, JACLYN, seizures, schizoaffective disorder, bipolar affective disorder, and recent fall with concussion and facial abrasions about 2 weeks ago who presented to the ED via EMS with generalized weakness.  History from his sister his sister has revealed that he has been complaining of abdominal pain and has lost 30 pounds in the last 4 months or so, diagnosed with acute pancreatitis.     Acute pancreatitis   Acute peripancreatic fluid collections  Occlusive portal vein thrombosis  Abnormal ALT, improving  Denied abdominal pain in the ED, but had reportedly been telling his sister that it is hurting.  Non-tender on fairly deep palpation in ED. Lipase peaked at 1,730.  AST elevated on admission but now downtrending and other LFTs are WNL.  Triglycerides within normal limits.  CT abdo/pelvis 5/8/22 showing: acute edematous interstitial pancreatitis, acute peripancreatic fluid collections 3.0 and 2.7 cm, small-mod volume ascites, small pleural effusions, groundglass opacity in the lungs infection vs edema.  Findings concerning for underlying malignancy also. The gallbladder is distended measuring 9.7 cm in maximal dimension. Thin gallbladder wall. Trace pericholecystic fluid. No biliary ductal dilatation. Occlusive thrombosis of the portal vein.    Admitted to inpatient.    Continue IV fluids.    [facility] consulted, appreciate their assistance.    MRCP on 5/9 showed likely chronic findings in the pancreas, occlusion of the splenic and portal veins, mild right pleural effusion and abdominal ascites.    Initially anticoagulated with IV heparin, felt to be of limited benefit for splenic/portal vein thrombosis in the setting of pancreatitis.  IV heparin  "discontinued on 5/10/2022, no plans to restart anticoagulation at this time.    S/p EUS on 5/12/22, showed findings of chronic pancreatitis with pseudocyst, discussed with Dr. Clark.    Low-fat diet.    Switch IV Protonix to oral.    If he does well with a diet today, likely will be ready for discharge tomorrow.    PT recommending TCU, social work is looking into options.    Generalized weakness  Weight loss   Recent fall and concussion with facial abrasion 4/27  Found to be notably weak at group home which is not his baseline. No report of another fall.  Nothing lateralizing. Answering questions seemingly at his baseline with history of developmental delay and he is oriented appropriately.  He denies any pain currently in ED but had reported abdominal pain to sister (guardian) recently and has had 30 lbs weight loss in the past 4 or 5 months. He was unsteady with walker today on \"road testing\" in ED.  EKG nonischemic, trop neg, UA not infected, valproic acid WNL, lytes & creat normal, , VBG fairly unremarkable, no leukocytosis, hgb 11.8. CT Head no acute pathology.     PT consult.    MRI brain 5/8/22 showed small chronic cerebellar infarcts which may contribute to his gait instability.    TSH WNL.    Fall precautions.    Hypokalemia  Hyperchloremia    Stop IV fluids.    Magnesium slightly elevated.    Replace and recheck potassium per RN managed protocol.    Recheck labs in AM.    Anemia, normocytic, mild   Had been 14 g in 2020, 11.6 end of April 2022, no overt blood loss but now was anticoagulated with heparin infusion.  Normal B12 and folate in April 2022.    Iron studies suggestive of anemia of chronic disease.    Hemoglobin 11.4 on 5/12/22.    History of seizures  No report of recent seizure. PTA depakote noted. Also on topiramate. Level wnl in ED.    Continue PTA regimen of Topamax and valproic acid.    History of schizoaffective disorder  History of bipolar affective disorder  Appears compensated at " "time of admission.     Continue PTA regimen of risperidone, sertraline.    Hypothyroidism    Continue PTA levothyroxine.    Obstructive of sleep apnea on BiPAP    Continue with BiPAP or CPAP when sleeping.    GERD    Continue PTA PPI.     COVID 19 screening    Low suspicion, PCR negative 5/8/22.    Hypertension    Continue PTA atenolol with hold parameters.    Carnitine deficiency    Continue PTA levocarnitine.       Diet: Combination Diet Mechanical/Dental Soft Diet; Low Fat Diet    DVT Prophylaxis: Pneumatic Compression Devices  Santana Catheter: Not present  Central Lines: None  Cardiac Monitoring: None  Code Status: Full Code      Disposition Plan   Expected Discharge: 05/14/2022     Anticipated discharge location:  Awaiting care coordination huddle  Delays:            The patient's care was discussed with the Bedside Nurse, Care Coordinator/, Patient and Patient's Family.    Fede Jones MD  Hospitalist Service  [facility]  Securely message with the Vocera Web Console (learn more here)  Text page via [employer] Paging/Directory         Clinically Significant Risk Factors Present on Admission             # Obesity: Estimated body mass index is 32.68 kg/m  as calculated from the following:    Height as of this encounter: 1.651 m (5' 5\").    Weight as of this encounter: 89.1 kg (196 lb 6.4 oz).      ______________________________________________________________________    Interval History   Jagdeep CHIRINOS Denise was seen this morning.  He had endoscopy and EUS this morning, tolerated it well.  Denies fevers, chest pain, shortness breath, nausea, abdominal pain.  Hungry.  Updated his Sister Huong by phone.  Discussed discharge plans.  Will likely be ready to go tomorrow if he is able to tolerate oral intake.  Concerned about him going back to group home right away as he is deconditioned.  Discussed with social work, looking into options for TCU.    Data reviewed today: I reviewed " all medications, new labs and imaging results over the last 24 hours. I personally reviewed no images or EKG's today.    Physical Exam   Vital Signs: Temp: 97.2  F (36.2  C) Temp src: Axillary BP: 95/60 Pulse: 56   Resp: 18 SpO2: 95 % O2 Device: None (Room air) Oxygen Delivery: 4 LPM  Weight: 196 lbs 6.4 oz  Constitutional: awake, alert, cooperative, no apparent distress, sitting up in a chair  Respiratory: clear to auscultation bilaterally, no crackles or wheezing  Cardiovascular: regular rate and rhythm, normal S1 and S2, no murmur noted  GI: normal bowel sounds, soft, non-distended, non-tender  Skin: warm, dry, abrasions on his forehead  Musculoskeletal: no lower extremity pitting edema present  Neurologic: awake, alert, answers questions appropriately, moves all extremities    Data   Recent Labs   Lab 05/12/22  1447 05/11/22  0719 05/10/22  0724 05/09/22  1755 05/09/22  0923 05/08/22  2032 05/08/22  0917   WBC 4.8 4.3  --   --  6.6  --  7.2   HGB 11.4* 12.5*  --   --  11.8*  --  11.8*   MCV 96 94  --   --  92  --  92    181  --   --  159  --  155    143 146*  --  142   < > 138   POTASSIUM 3.2* 4.1 3.9   < > 3.2*   < > 3.5   CHLORIDE 108 115* 116*  --  112*   < > 106   CO2  --  25 25  --  23   < > 24   BUN  --  11 15  --  16   < > 24   CR  --  0.72 0.68  --  0.70   < > 0.87   ANIONGAP  --  3 5  --  7   < > 8   NASIR  --  9.5 9.3  --  9.3   < > 10.0   GLC  --  79 73  --  77   < > 73   ALBUMIN  --  2.0* 2.2*  --  2.3*   < > 2.7*   PROTTOTAL  --  5.6* 5.5*  --  5.9*   < > 6.5*   BILITOTAL  --  1.0 1.2  --  1.4*   < > 1.3   ALKPHOS  --  47 46  --  48   < > 50   ALT  --  33 36  --  35   < > 39   AST  --  59* 69*  --  97*   < > 123*   LIPASE  --   --   --   --  1,555*  --  1,730*    < > = values in this interval not displayed.     Medications       atenolol  25 mg Oral Daily     carboxymethylcellulose PF  1 drop Right Eye At Bedtime     divalproex sodium delayed-release  500 mg Oral TID     ipratropium  2  spray Both Nostrils TID     levOCARNitine  330 mg Oral TID     levothyroxine  50 mcg Oral QAM AC     montelukast  10 mg Oral At Bedtime     pantoprazole (PROTONIX) IV  40 mg Intravenous Daily with breakfast     prednisoLONE acetate  1 drop Right Eye Daily     risperiDONE  2 mg Oral BID     senna-docusate  1 tablet Oral BID    Or     senna-docusate  2 tablet Oral BID     sertraline  100 mg Oral Daily     topiramate  100 mg Oral At Bedtime

## 2022-05-12 NOTE — PLAN OF CARE
Goal Outcome Evaluation:    Plan of care discussed with patient at bedside; unsure of retention.     A/O x 3; disoriented to situation; developmental delay; has a legal gaurdian--sister, Huong Keenan. VSS on RA. Denies pain, nausea, vomiting, or shortness of breath.  Up with A/x 1; gait belt and walker.  Incontinent of bowel and bladder.  K+ 3.2; replaced recheck at 2000; Magnesium 2.0--currently replacing; recheck in am on 5/15/22.  Discharge pending placement.  Follow up outpt on MRI and EUS results.

## 2022-05-12 NOTE — PROGRESS NOTES
Care Management Follow Up    Length of Stay (days): 4    Expected Discharge Date: 05/13/2022     Concerns to be Addressed:     Discharge planning  Patient plan of care discussed at interdisciplinary rounds: Yes    Anticipated Discharge Disposition:  TCU     Anticipated Discharge Services:  TCU  Anticipated Discharge DME:      Patient/family educated on Medicare website which has current facility and service quality ratings:    Education Provided on the Discharge Plan:  Yes, sister Huong (Guardian).  Patient/Family in Agreement with the Plan:  Yes    Referrals Placed by CM/FERNADNO:  Referrals out to [facility] and [facility] per sisters request.   Private pay costs discussed: Not applicable    Additional Information:  FERNANDO spoke with patient's sister Huong regarding TCU placement options. Referrals faxed via [facility].  FERNANDO discussed with Huong that patient triggered for a Level 2 and assessment will need to be completed with [facility]. Huong in understanding.     JESUS MANUEL Gispon, LGSW  [phone]  [facility]

## 2022-05-12 NOTE — ANESTHESIA PREPROCEDURE EVALUATION
Anesthesia Pre-Procedure Evaluation    Patient: Jagdeep Walker   MRN: [id number] : 1970        Procedure : Procedure(s):  ENDOSCOPIC ULTRASOUND, ESOPHAGOSCOPY / UPPER GASTROINTESTINAL TRACT (GI)          No past medical history on file.   No past surgical history on file.   No Known Allergies   Social History     Tobacco Use     Smoking status: Not on file     Smokeless tobacco: Not on file   Substance Use Topics     Alcohol use: Not on file      Wt Readings from Last 1 Encounters:   22 89.1 kg (196 lb 6.4 oz)        Anesthesia Evaluation   Pt has had prior anesthetic. Type: General.    No history of anesthetic complications       ROS/MED HX  ENT/Pulmonary:     (+) sleep apnea, uses CPAP,  (-) tobacco use and asthma   Neurologic:     (+) no peripheral neuropathy seizures, Developmental delay,  (-) no CVA   Cardiovascular:    (-) hypertension, CAD and arrhythmias   METS/Exercise Tolerance:     Hematologic:       Musculoskeletal:       GI/Hepatic: Comment: Pancreatitis, portal vein thrombosis    (+) GERD,     Renal/Genitourinary:    (-) renal disease   Endo:    (-) Type II DM and thyroid disease   Psychiatric/Substance Use:     (+) psychiatric history     Infectious Disease:    (-) Recent Fever   Malignancy:       Other:            Physical Exam    Airway  airway exam normal      Mallampati: II   TM distance: > 3 FB   Neck ROM: full   Mouth opening: > 3 cm    Respiratory Devices and Support         Dental  no notable dental history         Cardiovascular   cardiovascular exam normal          Pulmonary   pulmonary exam normal                     Recent Results (from the past 744 hour(s))   Head CT w/o contrast    Narrative    EXAM: CT HEAD W/O CONTRAST  LOCATION: [facility]  DATE/TIME: 2022 9:32 PM    INDICATION: Facial trauma  COMPARISON: None.  TECHNIQUE: Routine CT Head without IV contrast. Multiplanar reformats. Dose reduction techniques were  used.    FINDINGS:  INTRACRANIAL CONTENTS: No evidence of acute intracranial hemorrhage or mass effect. Scattered foci of decreased attenuation within the cerebral hemispheric white matter which are nonspecific, though most commonly ascribed to chronic small vessel ischemic   disease. The ventricles and sulci are prominent consistent with mild brain parenchymal volume loss. Gray-white matter differentiation is maintained. The basilar cisterns are patent.    VISUALIZED ORBITS/SINUSES/MASTOIDS: Bilateral cataract surgery. The partially imaged globes are otherwise unremarkable. The partially imaged paranasal sinuses, mastoid air cells and middle ear cavities are unremarkable.     BONES/SOFT TISSUES: The visualized skull base and calvarium are unremarkable.      Impression    IMPRESSION:    1.  No evidence of acute intracranial hemorrhage or mass effect.  2.  Mild nonspecific white matter changes.  3.  Moderate brain parenchymal volume loss.   Cervical spine XR, 2-3 views    Addendum: 5/2/2022    Addendum for clinical signs and symptoms: 51 year old male with history of?schizoaffective disorder, bipolar disorder, hypothyroidism, and seizure disorder?who presents from a group home after an unwitnessed fall.?According to group home staff, the   patient had an unwitnessed fall sustained a head injury. The patient was found on the ground by staff. Staff states the patient seems more altered and disoriented compared to his baseline. Staff member notes the patient has had difficulty ambulating,   following commands, or answering questions. The patient does not remember what caused his fall last night. He endorses headaches secondary to his head injury. He endorses neck pain as well.       Narrative    EXAM: XR CERVICAL SPINE 2/3 VWS  LOCATION: [facility]  DATE/TIME: 4/27/2022 11:06 PM    INDICATION: fall 2 days ago  COMPARISON: None.  TECHNIQUE: CR Cervical Spine.      Impression    IMPRESSION: No  fracture. Normal vertebral heights and alignment. Normal disc spaces and facets for age. Normal extraspinal structures.       XR Chest 2 Views    Narrative    EXAM: XR CHEST 2 VW  LOCATION: [facility]  DATE/TIME: 5/8/2022 9:35 AM    INDICATION: generalized weakness, poor historian, recent fall  COMPARISON: None.      Impression    IMPRESSION: Better inspiration on the lateral view.    No hydropneumothorax or fracture. Minimal linear scarring in the right middle lobe. Lungs are otherwise clear. Heart and pulmonary vascularity are normal.   CT Head w/o Contrast    Narrative    EXAM: CT HEAD W/O CONTRAST  LOCATION: [facility]  DATE/TIME: 5/8/2022 9:53 AM    INDICATION: Head trauma, abnormal mental status (Age 19-64y)  COMPARISON: 4/27/2022.  TECHNIQUE: Routine CT Head without IV contrast. Multiplanar reformats. Dose reduction techniques were used.    FINDINGS:  INTRACRANIAL CONTENTS: No intracranial hemorrhage, extraaxial collection, or mass effect.  No CT evidence of acute infarct. Mild presumed chronic small vessel ischemic changes. Posterior fossa structures including cerebellar hemispheres, fourth ventricle   and CP angle cisterns are clear. Nasopharynx is clear.    Region of the sella and suprasellar cistern are clear.    VISUALIZED ORBITS/SINUSES/MASTOIDS: Prior bilateral cataract surgery. Visualized portions of the orbits are otherwise unremarkable. No paranasal sinus mucosal disease. No middle ear or mastoid effusion.    BONES/SOFT TISSUES: No acute abnormality.      Impression    IMPRESSION:  1.  No CT evidence for acute intracranial process.  2.  Mild chronic microvascular ischemic changes as above.   CT Abdomen Pelvis w Contrast    Narrative    EXAM: CT ABDOMEN PELVIS W CONTRAST  LOCATION: [facility]  DATE/TIME: 5/8/2022 12:03 PM    INDICATION: Abdominal pain, acute, nonlocalized  COMPARISON: 11/27/2018  TECHNIQUE: CT scan of the  abdomen and pelvis was performed following injection of IV contrast. Multiplanar reformats were obtained. Dose reduction techniques were used.  CONTRAST: 103mL Isovue 370    FINDINGS: Moderate motion artifact.    LOWER CHEST: Groundglass opacities in the lungs. Small pleural effusions, right greater than left.    HEPATOBILIARY: Diffuse heterogeneous hepatic enhancement. The gallbladder is distended measuring 9.7 cm in maximal dimension. Thin gallbladder wall. Trace pericholecystic fluid. No biliary ductal dilatation.    PANCREAS: Moderate peripancreatic inflammation is consistent with acute edematous interstitial pancreatitis. A 3.0 x 2.7 cm fluid collection in the pancreatic head image 95:3. A 2.7 x 2.7 cm fluid collection pancreatic tail, image 71:3. The main   pancreatic duct is dilated at 0.5 cm.    SPLEEN: The spleen is enlarged measuring 13.9 cm in maximal dimension.    ADRENAL GLANDS: Normal.    KIDNEYS/BLADDER: Simple renal cysts, no follow-up required. Subcentimeter renal hypodensities which are too small to characterize. No hydronephrosis. Mild circumferential bladder wall thickening, likely due to chronic outlet obstruction.    BOWEL: Diverticulosis of the colon. No acute inflammatory change. No obstruction.    LYMPH NODES: Normal.    VASCULATURE: Thrombosis and occlusion of the main portal vein which extends into the right and left branches. The splenic vein is thrombosed. Gastric varices.    PELVIC ORGANS: Small-moderate volume ascites.    MUSCULOSKELETAL: Degenerative changes of the spine and hips.      Impression    IMPRESSION:   1.  Occlusive thrombosis of the portal vein.  2.  Acute edematous interstitial pancreatitis.  3.  Acute peripancreatic fluid collections measuring 3.0 and 2.7 cm.  4.  Small-moderate volume ascites.  5.  Small pleural effusions.  6.  Groundglass opacities in the lungs may represent infection or edema.    These findings were discussed via telephone by Dr. Solares with Dr. Bravo  at 12:55 PM on 5/8/2022.   MR Brain w/o & w Contrast    Narrative    EXAM: MR BRAIN W/O and W CONTRAST  LOCATION: [facility]  DATE/TIME: 5/8/2022 1:55 PM    INDICATION: Head trauma, abnormal mental status (Age 19-64y) Mental status change, persistent or worsening, generalized weakness, gait problems, developmental delay.  COMPARISON: CT head earlier today.  CONTRAST: 9mL Gadavist  TECHNIQUE: Routine multiplanar multisequence head MRI without and with intravenous contrast.    FINDINGS:  INTRACRANIAL CONTENTS: No acute or subacute infarct. 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 No mass, acute hemorrhage, or extra-axial fluid collections. Scattered nonspecific T2/FLAIR hyperintensities within the cerebral white matter most consistent   with mild chronic microvascular ischemic change. Mild-moderate generalized cerebral atrophy. No hydrocephalus. Normal position of the cerebellar tonsils. No pathologic contrast enhancement.    SELLA: No abnormality accounting for technique.    OSSEOUS STRUCTURES/SOFT TISSUES: Normal marrow signal. The major intracranial vascular flow voids are maintained.     ORBITS: No abnormality accounting for technique.     SINUSES/MASTOIDS: No paranasal sinus mucosal disease. No middle ear or mastoid effusion.       Impression    IMPRESSION:  1.  No acute intracranial abnormality. 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  2.  No mass lesion, obstructive hydrocephalus or pathologic contrast enhancement.  3.  Mild to moderate generalized volume loss.   MR Abdomen MRCP w/o & w Contrast    Narrative    EXAM: MR ABDOMEN MRCP W/O and W CONTRAST  LOCATION: [facility]  DATE/TIME: 5/9/2022 10:43 PM    INDICATION: Abdominal pain, biliary obstruction suspected (Ped 0-18y)  COMPARISON: None.  TECHNIQUE: Routine MR liver/pancreas protocol including axial and coronal MRCP sequences. 2D and 3D reconstruction performed by MR technologist including MIP  reconstruction and slab cholangiograms. If performed with contrast, additional dynamic T1 post   IV contrast images.  CONTRAST: 9mL Gadavist     FINDINGS:     MRCP: Gallbladder and common bile duct are normal in caliber with no filling defects seen. The distal common bile duct is partially obscured secondary to a cyst in the pancreatic head. There is no evidence for intrahepatic bile duct dilatation. The   visualized bile ducts show smooth walls.    LIVER: The portal vein is thrombosed with changes most typical for cavernous transformation.    PANCREAS: There are 2 debris laden cysts seen within the pancreas with one extending superiorly from the mid tail and this measures 2.5 cm x 2.3 cm x 1.3 cm and the other is seen in the pancreatic head and this measures 3.1 cm x 2.8 cm x 2.8 cm. There is   no abnormal enhancement seen of the cysts. Cysts appear to communicate with the pancreatic duct. There is moderate atrophy seen involving the pancreas with some associated slight prominence of the pancreatic duct measuring up to 5.2 mm in greatest   radial dimension. There is some irregularity involving the margins of the pancreatic duct most pronounced in the tail region. The above findings are nonspecific, but may represent sequela of prior pancreatitis. Dose surveillance would be of benefit with   a follow-up MRI examination in approximately 3-4 months.    ADDITIONAL FINDINGS: There is a mild amount of abdominal ascites. Mild right pleural effusion. Splenic vein is not seen and there appears to be collaterals present consistent with occlusion.      Impression    IMPRESSION:  1.  Likely chronic findings seen of the pancreas which can be seen with sequelae of prior pancreatitis, although there are some overlapping features which can be seen with changes of neoplasm. Close surveillance with follow-up MRI examination in 3-4   months is recommended.    2.  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    3.  Mild right pleural effusion and abdominal ascites.    4.  No evidence for stones or obstruction of the cystic duct or the visualized portions of the common bile duct. No evidence for bile duct dilatation.       OUTSIDE LABS:  CBC:   Lab Results   Component Value Date    WBC 4.3 05/11/2022    WBC 6.6 05/09/2022    HGB 12.5 (L) 05/11/2022    HGB 11.8 (L) 05/09/2022    HCT 38.8 (L) 05/11/2022    HCT 35.7 (L) 05/09/2022     05/11/2022     05/09/2022     BMP:   Lab Results   Component Value Date     05/11/2022     (H) 05/10/2022    POTASSIUM 4.1 05/11/2022    POTASSIUM 3.9 05/10/2022    CHLORIDE 115 (H) 05/11/2022    CHLORIDE 116 (H) 05/10/2022    CO2 25 05/11/2022    CO2 25 05/10/2022    BUN 11 05/11/2022    BUN 15 05/10/2022    CR 0.72 05/11/2022    CR 0.68 05/10/2022    GLC 79 05/11/2022    GLC 73 05/10/2022     COAGS:   Lab Results   Component Value Date    INR 1.26 (H) 04/27/2022     POC: No results found for: BGM, HCG, HCGS  HEPATIC:   Lab Results   Component Value Date    ALBUMIN 2.0 (L) 05/11/2022    PROTTOTAL 5.6 (L) 05/11/2022    ALT 33 05/11/2022    AST 59 (H) 05/11/2022    ALKPHOS 47 05/11/2022    BILITOTAL 1.0 05/11/2022    YUDITH 36 04/28/2022     OTHER:   Lab Results   Component Value Date    PH 7.40 05/08/2022    LACT 1.4 05/08/2022    NASIR 9.5 05/11/2022    MAG 2.2 05/11/2022    LIPASE 1,555 (H) 05/09/2022    TSH 2.16 05/08/2022    CRP 76.7 (H) 05/10/2022       Anesthesia Plan    ASA Status:  3   NPO Status:  NPO Appropriate    Anesthesia Type: MAC.              Consents    Anesthesia Plan(s) and associated risks, benefits, and realistic alternatives discussed. Questions answered and patient/representative(s) expressed understanding.    - Discussed:     - Discussed with:  Patient, Legal Guardian      - Specific Concerns: Specific risks discussed (but  not limited to): Possibility of intraoperative awareness..        Postoperative Care    Pain management: IV analgesics, Oral pain medications.   PONV prophylaxis: Ondansetron (or other 5HT-3), Dexamethasone or Solumedrol     Comments:                Fermin Lam MD

## 2022-05-12 NOTE — PLAN OF CARE
Goal Outcome Evaluation:      Patient remains stable with vitals in the normal range. Alert and oriented but difficulty explaining his admission. Reported head ache. Relief with tylenol. EUS completed this morning.   Diet advanced to mechanically soft  low fat. Good appetite. Ambulated in the hallways twice this shift. PT recommendation are in for discharge. Will need a new consult if patient's condition changes. Discharge possibly tomorrow.

## 2022-05-12 NOTE — PROGRESS NOTES
I put pt on CPAP +8,21% FOR THE NIGHT.  BBS: CLEAR.SPO2 94%.    MEPLEX/QCELL PLACED. SOME SKIN BREAK DOWN NOTED AROUND FOREHEAD AREA.

## 2022-05-13 ENCOUNTER — APPOINTMENT (OUTPATIENT)
Dept: PHYSICAL THERAPY | Facility: CLINIC | Age: 52
DRG: 438 | End: 2022-05-13
Payer: MEDICARE

## 2022-05-13 LAB
ANION GAP SERPL CALCULATED.3IONS-SCNC: 6 MMOL/L (ref 3–14)
BUN SERPL-MCNC: 8 MG/DL (ref 7–30)
CALCIUM SERPL-MCNC: 9.3 MG/DL (ref 8.5–10.1)
CHLORIDE BLD-SCNC: 110 MMOL/L (ref 94–109)
CO2 SERPL-SCNC: 25 MMOL/L (ref 20–32)
CREAT SERPL-MCNC: 0.77 MG/DL (ref 0.66–1.25)
GFR SERPL CREATININE-BSD FRML MDRD: >90 ML/MIN/1.73M2
GLUCOSE BLD-MCNC: 92 MG/DL (ref 70–99)
POTASSIUM BLD-SCNC: 4.3 MMOL/L (ref 3.4–5.3)
SODIUM SERPL-SCNC: 141 MMOL/L (ref 133–144)

## 2022-05-13 PROCEDURE — 88305 TISSUE EXAM BY PATHOLOGIST: CPT | Mod: 26

## 2022-05-13 PROCEDURE — 94660 CPAP INITIATION&MGMT: CPT

## 2022-05-13 PROCEDURE — 82310 ASSAY OF CALCIUM: CPT | Performed by: HOSPITALIST

## 2022-05-13 PROCEDURE — 97116 GAIT TRAINING THERAPY: CPT | Mod: GP

## 2022-05-13 PROCEDURE — 36415 COLL VENOUS BLD VENIPUNCTURE: CPT | Performed by: HOSPITALIST

## 2022-05-13 PROCEDURE — 999N000157 HC STATISTIC RCP TIME EA 10 MIN

## 2022-05-13 PROCEDURE — 99232 SBSQ HOSP IP/OBS MODERATE 35: CPT | Performed by: INTERNAL MEDICINE

## 2022-05-13 PROCEDURE — 120N000001 HC R&B MED SURG/OB

## 2022-05-13 PROCEDURE — 88342 IMHCHEM/IMCYTCHM 1ST ANTB: CPT | Mod: 26

## 2022-05-13 PROCEDURE — 250N000013 HC RX MED GY IP 250 OP 250 PS 637: Performed by: NURSE PRACTITIONER

## 2022-05-13 PROCEDURE — 250N000013 HC RX MED GY IP 250 OP 250 PS 637: Performed by: HOSPITALIST

## 2022-05-13 RX ADMIN — MONTELUKAST 10 MG: 10 TABLET, FILM COATED ORAL at 21:15

## 2022-05-13 RX ADMIN — IPRATROPIUM BROMIDE 2 SPRAY: 42 SPRAY NASAL at 21:15

## 2022-05-13 RX ADMIN — LEVOCARNITINE 330 MG: 330 TABLET ORAL at 09:32

## 2022-05-13 RX ADMIN — IPRATROPIUM BROMIDE 2 SPRAY: 42 SPRAY NASAL at 10:39

## 2022-05-13 RX ADMIN — SERTRALINE HYDROCHLORIDE 100 MG: 100 TABLET ORAL at 09:32

## 2022-05-13 RX ADMIN — RISPERIDONE 2 MG: 2 TABLET ORAL at 20:19

## 2022-05-13 RX ADMIN — TOPIRAMATE 100 MG: 100 TABLET, FILM COATED ORAL at 21:14

## 2022-05-13 RX ADMIN — Medication 400 MG: at 09:32

## 2022-05-13 RX ADMIN — LEVOCARNITINE 330 MG: 330 TABLET ORAL at 21:15

## 2022-05-13 RX ADMIN — ATENOLOL 25 MG: 25 TABLET ORAL at 09:32

## 2022-05-13 RX ADMIN — LEVOCARNITINE 330 MG: 330 TABLET ORAL at 15:29

## 2022-05-13 RX ADMIN — PREDNISOLONE ACETATE 1 DROP: 10 SUSPENSION/ DROPS OPHTHALMIC at 10:39

## 2022-05-13 RX ADMIN — PANTOPRAZOLE SODIUM 40 MG: 40 TABLET, DELAYED RELEASE ORAL at 06:38

## 2022-05-13 RX ADMIN — IPRATROPIUM BROMIDE 2 SPRAY: 42 SPRAY NASAL at 15:29

## 2022-05-13 RX ADMIN — SENNOSIDES AND DOCUSATE SODIUM 1 TABLET: 50; 8.6 TABLET ORAL at 09:32

## 2022-05-13 RX ADMIN — DIVALPROEX SODIUM 500 MG: 500 TABLET, DELAYED RELEASE ORAL at 21:14

## 2022-05-13 RX ADMIN — LEVOTHYROXINE SODIUM 50 MCG: 50 TABLET ORAL at 06:39

## 2022-05-13 RX ADMIN — SENNOSIDES AND DOCUSATE SODIUM 1 TABLET: 50; 8.6 TABLET ORAL at 20:19

## 2022-05-13 RX ADMIN — DIVALPROEX SODIUM 500 MG: 500 TABLET, DELAYED RELEASE ORAL at 15:29

## 2022-05-13 RX ADMIN — RISPERIDONE 2 MG: 2 TABLET ORAL at 09:32

## 2022-05-13 RX ADMIN — Medication 400 MG: at 20:19

## 2022-05-13 RX ADMIN — DIVALPROEX SODIUM 500 MG: 500 TABLET, DELAYED RELEASE ORAL at 09:32

## 2022-05-13 RX ADMIN — CARBOXYMETHYLCELLULOSE SODIUM 1 DROP: 10 GEL OPHTHALMIC at 21:14

## 2022-05-13 ASSESSMENT — ACTIVITIES OF DAILY LIVING (ADL)
ADLS_ACUITY_SCORE: 42
ADLS_ACUITY_SCORE: 46
ADLS_ACUITY_SCORE: 42
ADLS_ACUITY_SCORE: 46
ADLS_ACUITY_SCORE: 42
ADLS_ACUITY_SCORE: 42

## 2022-05-13 NOTE — PROGRESS NOTES
Care Management Follow Up    Length of Stay (days): 5    Expected Discharge Date: 05/16/2022     Concerns to be Addressed:       Patient plan of care discussed at interdisciplinary rounds: Yes    Anticipated Discharge Disposition:       Anticipated Discharge Services:    Anticipated Discharge DME:      Patient/family educated on Medicare website which has current facility and service quality ratings:    Education Provided on the Discharge Plan:    Patient/Family in Agreement with the Plan:      Referrals Placed by CM/SW:    Private pay costs discussed: Not applicable    Additional Information:  Writer spent time leaving a voicemail for palcement at [facility]. Patient declined at [facility] and [employer]. Writer received a VM from Gloria ALLISON at [facility] asking for a call back asking for admission date so they can complete DD level 2 screening. Writer called back and left VM. Writer received a call from Adon[facility] ([phone]) and [facility] line asking for disposition plan so they can update pas. Writer called back and spoke to adonis that we are still looking for placement.     Elizabeth Way, JESUS MANUEL, LGSW   Social Work   [facility]

## 2022-05-13 NOTE — PLAN OF CARE
Goal Outcome Evaluation:    Patient is A&Ox4, forgetful at times. VSS. Denies pain. Up with assist of 1 and walker/gait belt. L PIV SL. Tolerating soft diet. Bruising generalized. Scabbing on forehead. Discharge to TCU pending Level II assessment, possibly to [facility]. Calls appropriately.

## 2022-05-13 NOTE — PROGRESS NOTES
Pt wore CPAP for the night. Facial abrasion. Mepilex and liquicell on skin.     Veronica Crenshaw, RT

## 2022-05-13 NOTE — PROGRESS NOTES
[facility]    HOSPITALIST PROGRESS NOTE :   --------------------------------------------------    Date of Admission:  5/8/2022    Cumulative Summary: Andrea Walker is a 51 year old male with history of developmental delay who lives in a group home, hypothyroidism, GERD, JACLYN, seizures, schizoaffective disorder, bipolar affective disorder, and recent fall with concussion and facial abrasions about 2 weeks ago who presented to the ED via EMS with generalized weakness.  History from his sister his sister has revealed that he has been complaining of abdominal pain and has lost 30 pounds in the last 4 months or so, diagnosed with acute pancreatitis    Assessment & Plan     Acute pancreatitis   Acute peripancreatic fluid collections  Occlusive portal vein thrombosis  Abnormal ALT, improving  Denied abdominal pain in the ED, but had reportedly been telling his sister that it is hurting.  Non-tender on fairly deep palpation in ED. Lipase peaked at 1,730.  AST elevated on admission but now downtrending and other LFTs are WNL.  Triglycerides within normal limits.  CT abdo/pelvis 5/8/22 showing: acute edematous interstitial pancreatitis, acute peripancreatic fluid collections 3.0 and 2.7 cm, small-mod volume ascites, small pleural effusions, groundglass opacity in the lungs infection vs edema.  Findings concerning for underlying malignancy also. The gallbladder is distended measuring 9.7 cm in maximal dimension. Thin gallbladder wall. Trace pericholecystic fluid. No biliary ductal dilatation. Occlusive thrombosis of the portal vein.    -- Patient care was assumed this morning , seen and examined   -- Off IV fluids   -- [facility] consulted, appreciate their assistance.  -- MRCP on 5/9 showed likely chronic findings in the pancreas, occlusion of the splenic and portal veins, mild right pleural effusion and abdominal ascites.  -- Initially anticoagulated with IV heparin, felt to be of limited benefit  "for splenic/portal vein thrombosis in the setting of pancreatitis.  IV heparin discontinued on 5/10/2022, no plans to restart anticoagulation at this time.  -- S/p EUS on 5/12/22, showed findings of chronic pancreatitis with pseudocyst, discussed with Dr. Clark.  -- GI is recommending to continue patient onoral pantoprazole 40 mg daily  -- CT of A/P in one month to assess cyst and if remains present will recommend necrosectomy  -- Low-fat diet.  -- Patient is medically ready for discharge , discussed with CC and SW , working on safe disposition plan      Generalized weakness  Weight loss   Recent fall and concussion with facial abrasion 4/27  Found to be notably weak at group home which is not his baseline. No report of another fall.  Nothing lateralizing. Answering questions seemingly at his baseline with history of developmental delay and he is oriented appropriately.  He denies any pain currently in ED but had reported abdominal pain to sister (guardian) recently and has had 30 lbs weight loss in the past 4 or 5 months. He was unsteady with walker today on \"road testing\" in ED.  EKG nonischemic, trop neg, UA not infected, valproic acid WNL, lytes & creat normal, , VBG fairly unremarkable, no leukocytosis, hgb 11.8. CT Head no acute pathology.     -- PT consult.  -- MRI brain 5/8/22 showed small chronic cerebellar infarcts which may contribute to his gait instability.  --TSH WNL.  -- Fall precautions.     Hypokalemia  Hyperchloremia    --Stop IV fluids.  --Magnesium slightly elevated.  --Replace and recheck potassium per RN managed protocol.  -- Recheck labs in AM.     Anemia, normocytic, mild   Had been 14 g in 2020, 11.6 end of April 2022, no overt blood loss but now was anticoagulated with heparin infusion.  Normal B12 and folate in April 2022.  -- Iron studies suggestive of anemia of chronic disease.  -- Hemoglobin 11.4 on 5/12/22.     History of seizures  No report of recent seizure. PTA depakote " noted. Also on topiramate. Level wnl in ED.  -- Continue PTA regimen of Topamax and valproic acid.  -- will not make any changes to Valproic acid at this time , will let neuro adjust further in an outpatient setting      History of schizoaffective disorder  History of bipolar affective disorder  Appears compensated at time of admission.   -- Continue PTA regimen of risperidone, sertraline.     Hypothyroidism  -- Continue PTA levothyroxine.     Obstructive of sleep apnea on BiPAP  -- Continue with BiPAP or CPAP when sleeping.     GERD  -- Continue PTA PPI.      COVID 19 screening  -- Low suspicion, PCR negative 5/8/22.     Hypertension;  -- Continue PTA atenolol with hold parameters.     Carnitine deficiency  -- Continue PTA levocarnitine.    Clinically Significant Risk Factors Present on Admission     Diet: Combination Diet Mechanical/Dental Soft Diet; Low Fat Diet    S[place]ana Catheter: Not present  DVT Prophylaxis: Pneumatic Compression Devices  Code Status: Full Code    The patient's care was discussed with the Care Coordinator/ and Patient.    Disposition Plan   Expected Discharge: 05/13/2022     Anticipated discharge location:  Awaiting care coordination huddle  Delays:     Placement - TCU         Entered: Michell Dykes MD 05/13/2022, 8:10 AM       Michell Dykes MD, MD, FACP  Text Page (7am - 6pm)    ----------------------------------------------------------------------------------------------------------------------    Interval History   Care was assumed this morning, patient was seen and examined, sitting in chair, feeling well.  He underwent endoscopy and EUS yesterday, tolerated well.  Discussed with him regarding GI recommendation to continue him on low fiber diet and repeat the CT scan in 1 month.  Plan is for him to go to TCU before he returns to group home.    -Data reviewed today: I reviewed all new labs and imaging results over the last 24 hours.    I personally reviewed no images or EKG's  today.    Physical Exam   Temp: 98.1  F (36.7  C) Temp src: Axillary BP: 100/70 Pulse: 72   Resp: 18 SpO2: 92 % O2 Device: None (Room air) Oxygen Delivery: 4 LPM  Vitals:    05/08/22 2013 05/09/22 0531 05/13/22 0647   Weight: 90.9 kg (200 lb 6.4 oz) 89.1 kg (196 lb 6.4 oz) 97.8 kg (215 lb 11.2 oz)     Vital Signs with Ranges  Temp:  [97.2  F (36.2  C)-98.1  F (36.7  C)] 98.1  F (36.7  C)  Pulse:  [56-72] 72  Resp:  [10-38] 18  BP: ()/(55-81) 100/70  FiO2 (%):  [90 %] 90 %  SpO2:  [90 %-99 %] 92 %  I/O last 3 completed shifts:  In: 2520 [P.O.:1520; I.V.:1000]  Out: 1400 [Urine:1400]    GENERAL: Alert , awake and oriented. NAD. Conversational, appropriate.   HEENT: Normocephalic. EOMI. No icterus or injection. Nares normal.   LUNGS: Clear to auscultation. No dyspnea at rest.   HEART: Regular rate. Extremities perfused.   ABDOMEN: Soft, nontender, and nondistended. Positive bowel sounds.   EXTREMITIES: No LE edema noted.   NEUROLOGIC: Moves extremities x4 on command. No acute focal neurologic abnormalities noted.     Medications       atenolol  25 mg Oral Daily     carboxymethylcellulose PF  1 drop Right Eye At Bedtime     divalproex sodium delayed-release  500 mg Oral TID     ipratropium  2 spray Both Nostrils TID     levOCARNitine  330 mg Oral TID     levothyroxine  50 mcg Oral QAM AC     magnesium oxide  400 mg Oral BID     montelukast  10 mg Oral At Bedtime     pantoprazole  40 mg Oral QAM AC     prednisoLONE acetate  1 drop Right Eye Daily     risperiDONE  2 mg Oral BID     senna-docusate  1 tablet Oral BID    Or     senna-docusate  2 tablet Oral BID     sertraline  100 mg Oral Daily     topiramate  100 mg Oral At Bedtime       Data   Recent Labs   Lab 05/12/22  2030 05/12/22  1447 05/11/22  0719 05/10/22  0724 05/09/22  1755 05/09/22 0923 05/08/22 2032 05/08/22 0917   WBC  --  4.8 4.3  --   --  6.6  --  7.2   HGB  --  11.4* 12.5*  --   --  11.8*  --  11.8*   MCV  --  96 94  --   --  92  --  92   PLT  --   169 181  --   --  159  --  155   NA  --  138 143 146*  --  142   < > 138   POTASSIUM 3.7 3.2* 4.1 3.9   < > 3.2*   < > 3.5   CHLORIDE  --  108 115* 116*  --  112*   < > 106   CO2  --  23 25 25  --  23   < > 24   BUN  --  10 11 15  --  16   < > 24   CR  --  0.81 0.72 0.68  --  0.70   < > 0.87   ANIONGAP  --  7 3 5  --  7   < > 8   NASIR  --  9.0 9.5 9.3  --  9.3   < > 10.0   GLC  --  115* 79 73  --  77   < > 73   ALBUMIN  --  2.2* 2.0* 2.2*  --  2.3*   < > 2.7*   PROTTOTAL  --  5.6* 5.6* 5.5*  --  5.9*   < > 6.5*   BILITOTAL  --  0.6 1.0 1.2  --  1.4*   < > 1.3   ALKPHOS  --  50 47 46  --  48   < > 50   ALT  --  32 33 36  --  35   < > 39   AST  --  35 59* 69*  --  97*   < > 123*   LIPASE  --   --   --   --   --  1,555*  --  1,730*    < > = values in this interval not displayed.       Imaging:   No results found for this or any previous visit (from the past 24 hour(s)).

## 2022-05-13 NOTE — PROGRESS NOTES
[facility]  Gastroenterology Progress Note     Jagdeep Walker MRN# [id number]   YOB: 1970 Age: 51 year old          Assessment and Plan:     Jagdeep Walker is a 51 year old with h/o developmental delay whom was admitted with weakness and found to have pancreatitis and occlusive thrombosis of portal vein.     Active Problems:  Portal vein thrombosis  Acute pancreatitis   Acute peripancreatic fluid collections  -CRP trending up 94.9. LFTs near normal  -CT A/P noted acute edematous interstitial pancreatitis, acute peripancreatic fluid collections 3.0 and 2.7 cm, small-mod volume ascites, small pleural effusions, occlusive thrombosis of portable vein. No ductal dilation. Findings concerning for underlying malignancy also.   - Further evaluation with MRCP vs EGD and EUS is warranted. Currently on heparin for portal vein thrombosis, therefore MRCP is recommended  - MRCP resulted in findings of sequelae of prior pancreatitis, possibly neoplastic cause. Notes occulusion of splenic and portal veins. NO evidence for stones or obstruction of the cystic duct  - Findings concerning for acute on chronic pancreatitis vs malignancy  - Discussed findings with sister, Huong, and did recommend endoscopic ultrasound with possible biopsy to help determine definitive diagnosis. She agrees to further workup.  CA 19-9 is normal at 15.  - 5/12 EGD noted grade C esophagitis and erosive gastropathy  - 5/12 EUS noted 35 mm x 30 mm pseudocyst in the pancreatic head- consistent with necrotic pancreas/chronic pancreatitis    -- Low fat diet  -- Daily labs  -- oral pantoprazole 40 mg daily  -- CT of A/P in one month to assess cyst and if remains present will recommend necrosectomy  -- Ok with GI to discharge patient              Interval History:     no new complaints, doing well and doing well; no cp, sob, n/v/d, or abd pain.              Review of Systems:     C: NEGATIVE for fever, chills, change in  weight  E/M: NEGATIVE for ear, mouth and throat problems  R: NEGATIVE for significant cough or SOB  CV: NEGATIVE for chest pain, palpitations or peripheral edema             Medications:   I have reviewed this patient's current medications   atenolol  25 mg Oral Daily    carboxymethylcellulose PF  1 drop Right Eye At Bedtime    divalproex sodium delayed-release  500 mg Oral TID    ipratropium  2 spray Both Nostrils TID    levOCARNitine  330 mg Oral TID    levothyroxine  50 mcg Oral QAM AC    magnesium oxide  400 mg Oral BID    montelukast  10 mg Oral At Bedtime    pantoprazole  40 mg Oral QAM AC    prednisoLONE acetate  1 drop Right Eye Daily    risperiDONE  2 mg Oral BID    senna-docusate  1 tablet Oral BID    Or    senna-docusate  2 tablet Oral BID    sertraline  100 mg Oral Daily    topiramate  100 mg Oral At Bedtime                  Physical Exam:   Vitals were reviewed  Vital Signs with Ranges  Temp:  [97.2  F (36.2  C)-98.1  F (36.7  C)] 98.1  F (36.7  C)  Pulse:  [56-81] 81  Resp:  [16-38] 18  BP: ()/(60-81) 100/70  FiO2 (%):  [90 %] 90 %  SpO2:  [90 %-96 %] 92 %  I/O last 3 completed shifts:  In: 2520 [P.O.:1520; I.V.:1000]  Out: 1400 [Urine:1400]  Constitutional: healthy, alert and no distress   Cardiovascular: negative, PMI normal. No lifts, heaves, or thrills. RRR. No murmurs, clicks gallops or rub  Respiratory: negative, Percussion normal. Good diaphragmatic excursion. Lungs clear  Abdomen: Abdomen soft, non-tender. BS normal. No masses, organomegaly           Data:   I reviewed the patient's new clinical lab test results.   Recent Labs   Lab Test 05/12/22  1447 05/11/22  0719 05/09/22  0923 04/29/22  0625 04/27/22  2248   WBC 4.8 4.3 6.6   < > 7.2   HGB 11.4* 12.5* 11.8*   < > 11.6*   MCV 96 94 92   < > 92    181 159   < > 151   INR  --   --   --   --  1.26*    < > = values in this interval not displayed.     Recent Labs   Lab Test 05/12/22  2030 05/12/22  1447 05/11/22  0719 05/10/22  0753    POTASSIUM 3.7 3.2* 4.1 3.9   CHLORIDE  --  108 115* 116*   CO2  --  23 25 25   BUN  --  10 11 15   ANIONGAP  --  7 3 5     Recent Labs   Lab Test 05/12/22  1447 05/11/22  0719 05/10/22  0724 05/09/22 0923 05/08/22 2032 05/08/22  0934 05/08/22  0917 04/29/22  0625 04/28/22  0016   ALBUMIN 2.2* 2.0* 2.2* 2.3*   < >  --  2.7*   < >  --    BILITOTAL 0.6 1.0 1.2 1.4*   < >  --  1.3  --   --    ALT 32 33 36 35   < >  --  39  --   --    AST 35 59* 69* 97*   < >  --  123*  --   --    PROTEIN  --   --   --   --   --  Negative  --   --  Negative   LIPASE  --   --   --  1,555*  --   --  1,730*  --   --     < > = values in this interval not displayed.       I reviewed the patient's new imaging results.    All laboratory data reviewed  All imaging studies reviewed by me.    Isabelle Pantoja PA-C,  5/10/2022  [facility]  Office : [phone]  Cell: [phone] (Dr. Clark)  Cell: [phone] (Isabelle Pantoja PA-C)

## 2022-05-13 NOTE — PLAN OF CARE
Goal Outcome Evaluation:      [id number]:    A/Ox4, forgetful at times. VSS with CPAP when sleep, RA baseline. Denies pain/N/V/SOB. Slow response sometimes. On soft diet, not eating this shift. Incontinent B/B at times, no BM this shift, urinal bedside, good voiding. 2 PIV SL Left arm. On K and Mg protocol. Recheck Mg in AM 5/15/22. Follow up with outpt on MRI and EUS results. Discharge pending placement.

## 2022-05-13 NOTE — PLAN OF CARE
Goal Outcome Evaluation:      Patient remains stable with vitals in the normal range. Denies pain. Alert and oriented, but difficulty with details. Good appetite. Up and walking in the hallways with one assist and a walker. Patient's sister has been here this morning. She wanted to know the plan as far as discharge facility and finally getting patient to his original living place.  was contacted. Will update family accordingly.

## 2022-05-14 PROCEDURE — 250N000013 HC RX MED GY IP 250 OP 250 PS 637: Performed by: NURSE PRACTITIONER

## 2022-05-14 PROCEDURE — 250N000013 HC RX MED GY IP 250 OP 250 PS 637: Performed by: HOSPITALIST

## 2022-05-14 PROCEDURE — 120N000001 HC R&B MED SURG/OB

## 2022-05-14 PROCEDURE — 999N000157 HC STATISTIC RCP TIME EA 10 MIN

## 2022-05-14 PROCEDURE — 94660 CPAP INITIATION&MGMT: CPT

## 2022-05-14 PROCEDURE — 99232 SBSQ HOSP IP/OBS MODERATE 35: CPT | Performed by: INTERNAL MEDICINE

## 2022-05-14 RX ADMIN — LEVOCARNITINE 330 MG: 330 TABLET ORAL at 08:41

## 2022-05-14 RX ADMIN — CARBOXYMETHYLCELLULOSE SODIUM 1 DROP: 10 GEL OPHTHALMIC at 21:16

## 2022-05-14 RX ADMIN — DIVALPROEX SODIUM 500 MG: 500 TABLET, DELAYED RELEASE ORAL at 15:49

## 2022-05-14 RX ADMIN — PREDNISOLONE ACETATE 1 DROP: 10 SUSPENSION/ DROPS OPHTHALMIC at 08:46

## 2022-05-14 RX ADMIN — SERTRALINE HYDROCHLORIDE 100 MG: 100 TABLET ORAL at 08:41

## 2022-05-14 RX ADMIN — PANTOPRAZOLE SODIUM 40 MG: 40 TABLET, DELAYED RELEASE ORAL at 06:27

## 2022-05-14 RX ADMIN — DIVALPROEX SODIUM 500 MG: 500 TABLET, DELAYED RELEASE ORAL at 08:41

## 2022-05-14 RX ADMIN — IPRATROPIUM BROMIDE 2 SPRAY: 42 SPRAY NASAL at 15:49

## 2022-05-14 RX ADMIN — Medication 400 MG: at 08:41

## 2022-05-14 RX ADMIN — RISPERIDONE 2 MG: 2 TABLET ORAL at 21:17

## 2022-05-14 RX ADMIN — TOPIRAMATE 100 MG: 100 TABLET, FILM COATED ORAL at 21:17

## 2022-05-14 RX ADMIN — LEVOTHYROXINE SODIUM 50 MCG: 50 TABLET ORAL at 06:27

## 2022-05-14 RX ADMIN — DIVALPROEX SODIUM 500 MG: 500 TABLET, DELAYED RELEASE ORAL at 21:17

## 2022-05-14 RX ADMIN — LEVOCARNITINE 330 MG: 330 TABLET ORAL at 15:49

## 2022-05-14 RX ADMIN — IPRATROPIUM BROMIDE 2 SPRAY: 42 SPRAY NASAL at 08:36

## 2022-05-14 RX ADMIN — RISPERIDONE 2 MG: 2 TABLET ORAL at 08:41

## 2022-05-14 RX ADMIN — Medication 1 DROP: at 08:40

## 2022-05-14 RX ADMIN — ACETAMINOPHEN 650 MG: 325 TABLET ORAL at 08:40

## 2022-05-14 RX ADMIN — ACETAMINOPHEN 650 MG: 325 TABLET ORAL at 13:33

## 2022-05-14 RX ADMIN — SENNOSIDES AND DOCUSATE SODIUM 1 TABLET: 50; 8.6 TABLET ORAL at 21:17

## 2022-05-14 RX ADMIN — IPRATROPIUM BROMIDE 2 SPRAY: 42 SPRAY NASAL at 21:19

## 2022-05-14 RX ADMIN — ATENOLOL 25 MG: 25 TABLET ORAL at 08:41

## 2022-05-14 RX ADMIN — MONTELUKAST 10 MG: 10 TABLET, FILM COATED ORAL at 21:18

## 2022-05-14 RX ADMIN — LEVOCARNITINE 330 MG: 330 TABLET ORAL at 21:17

## 2022-05-14 ASSESSMENT — ACTIVITIES OF DAILY LIVING (ADL)
ADLS_ACUITY_SCORE: 41

## 2022-05-14 NOTE — PROGRESS NOTES
[facility]    HOSPITALIST PROGRESS NOTE :   --------------------------------------------------    Date of Admission:  5/8/2022    Cumulative Summary: Andrea Walker is a 51 year old male with history of developmental delay who lives in a group home, hypothyroidism, GERD, JACLYN, seizures, schizoaffective disorder, bipolar affective disorder, and recent fall with concussion and facial abrasions about 2 weeks ago who presented to the ED via EMS with generalized weakness.  History from his sister his sister has revealed that he has been complaining of abdominal pain and has lost 30 pounds in the last 4 months or so, diagnosed with acute pancreatitis    Assessment & Plan     Acute pancreatitis   Acute peripancreatic fluid collections  Occlusive portal vein thrombosis  Abnormal ALT, improving  Denied abdominal pain in the ED, but had reportedly been telling his sister that it is hurting.  Non-tender on fairly deep palpation in ED. Lipase peaked at 1,730.  AST elevated on admission but now downtrending and other LFTs are WNL.  Triglycerides within normal limits.  CT abdo/pelvis 5/8/22 showing: acute edematous interstitial pancreatitis, acute peripancreatic fluid collections 3.0 and 2.7 cm, small-mod volume ascites, small pleural effusions, groundglass opacity in the lungs infection vs edema.  Findings concerning for underlying malignancy also. The gallbladder is distended measuring 9.7 cm in maximal dimension. Thin gallbladder wall. Trace pericholecystic fluid. No biliary ductal dilatation. Occlusive thrombosis of the portal vein.    -- Patient was seen and examined, feeling well , asking regarding the plan and was updated   -- Off IV fluids for more than 48 hours   -- [facility] were consulted during the stay , appreciate their assistance.  -- MRCP on 5/9 showed likely chronic findings in the pancreas, occlusion of the splenic and portal veins, mild right pleural effusion and abdominal  "ascites.  -- Initially anticoagulated with IV heparin, felt to be of limited benefit for splenic/portal vein thrombosis in the setting of pancreatitis.  IV heparin discontinued on 5/10/2022, no plans to restart anticoagulation at this time.  -- S/p EUS on 5/12/22, showed findings of chronic pancreatitis with pseudocyst, discussed with Dr. Clark.  -- GI is recommending to continue patient on oral pantoprazole 40 mg daily  -- CT of A/P in one month to assess cyst and if remains present will recommend necrosectomy  -- Low-fat diet.  -- Patient is medically ready for discharge , discussed with CC and SW , working on safe disposition plan , referrals are out for TCU     Generalized weakness  Weight loss   Recent fall and concussion with facial abrasion 4/27  Found to be notably weak at group home which is not his baseline. No report of another fall.  Nothing lateralizing. Answering questions seemingly at his baseline with history of developmental delay and he is oriented appropriately.  He denies any pain currently in ED but had reported abdominal pain to sister (guardian) recently and has had 30 lbs weight loss in the past 4 or 5 months. He was unsteady with walker today on \"road testing\" in ED.  EKG nonischemic, trop neg, UA not infected, valproic acid WNL, lytes & creat normal, , VBG fairly unremarkable, no leukocytosis, hgb 11.8. CT Head no acute pathology.     -- PT consult was placed , last assessment was on 05/13 , recommending to be discharged to TCU or home with therapies   -- MRI brain 5/8/22 showed small chronic cerebellar infarcts which may contribute to his gait instability.  --TSH WNL.  -- Fall precautions, plan for discharge to TCU      Hypokalemia  Hyperchloremia    -- Off IV fluids.  -- Magnesium slightly elevated.  -- Replace and recheck potassium per RN managed protocol.  -- Recheck labs intermittently , electrolytes are in normal range       Anemia, normocytic, mild   Had been 14 g in 2020, " 11.6 end of April 2022, no overt blood loss but now was anticoagulated with heparin infusion.  Normal B12 and folate in April 2022.  -- Iron studies suggestive of anemia of chronic disease.  -- Hemoglobin 11.4 on 5/12/22.     History of seizures  No report of recent seizure. PTA depakote noted. Also on topiramate. Level wnl in ED.  -- Continue PTA regimen of Topamax and valproic acid.  -- will not make any changes to Valproic acid at this time , will let neuro adjust further in an outpatient setting      History of schizoaffective disorder  History of bipolar affective disorder  Appears compensated at time of admission.   -- Continue PTA regimen of risperidone, sertraline.     Hypothyroidism  -- Continue PTA levothyroxine.     Obstructive of sleep apnea on BiPAP  -- Continue with BiPAP or CPAP when sleeping.     GERD  -- Continue PTA PPI.      COVID 19 screening  -- Low suspicion, PCR negative 5/8/22.     Hypertension;  -- Continue PTA atenolol with hold parameters.     Carnitine deficiency  -- Continue PTA levocarnitine.    Clinically Significant Risk Factors Present on Admission     Diet: Combination Diet Mechanical/Dental Soft Diet; Low Fat Diet    Santana Catheter: Not present  DVT Prophylaxis: Pneumatic Compression Devices  Code Status: Full Code    The patient's care was discussed with the Care Coordinator/ and Patient.    Disposition Plan   Expected Discharge: 05/16/2022     Anticipated discharge location:  Awaiting care coordination huddle  Delays:     Placement - TCU         Entered: Michell Dykes MD 05/14/2022, 7:47 AM       Michell Dykes MD, MD, FACP  Text Page (7am - 6pm)    ----------------------------------------------------------------------------------------------------------------------    Interval History    Patient was seen and examined , feeling well ,sitting in chair , wanted to know about the discharge plan, updated him that  are working closely and sending referrals to  facilities   Discussed wit patient the plan to have follow up with GI in one month time period   Aware about plan for discharge to TCU before he can be discharged to group home     -Data reviewed today: I reviewed all new labs and imaging results over the last 24 hours.    I personally reviewed no images or EKG's today.    Physical Exam   Temp: 97.6  F (36.4  C) Temp src: Oral BP: 122/73 Pulse: 74   Resp: 16 SpO2: 93 % O2 Device: None (Room air)    Vitals:    05/09/22 0531 05/13/22 0647 05/14/22 0649   Weight: 89.1 kg (196 lb 6.4 oz) 97.8 kg (215 lb 11.2 oz) 97.6 kg (215 lb 3.2 oz)     Vital Signs with Ranges  Temp:  [97.6  F (36.4  C)-99.3  F (37.4  C)] 97.6  F (36.4  C)  Pulse:  [62-81] 74  Resp:  [12-18] 16  BP: (100-122)/(51-73) 122/73  SpO2:  [92 %-97 %] 93 %  I/O last 3 completed shifts:  In: 660 [P.O.:660]  Out: 850 [Urine:850]    GENERAL: Alert , awake and oriented. NAD. Conversational, appropriate.   HEENT: Normocephalic. EOMI. No icterus or injection. Nares normal.   LUNGS: Clear to auscultation. No dyspnea at rest.   HEART: Regular rate. Extremities perfused.   ABDOMEN: Soft, nontender, and nondistended. Positive bowel sounds.   EXTREMITIES: No LE edema noted.   NEUROLOGIC: Moves extremities x4 on command. No acute focal neurologic abnormalities noted.     Medications       atenolol  25 mg Oral Daily     carboxymethylcellulose PF  1 drop Right Eye At Bedtime     divalproex sodium delayed-release  500 mg Oral TID     ipratropium  2 spray Both Nostrils TID     levOCARNitine  330 mg Oral TID     levothyroxine  50 mcg Oral QAM AC     magnesium oxide  400 mg Oral BID     montelukast  10 mg Oral At Bedtime     pantoprazole  40 mg Oral QAM AC     prednisoLONE acetate  1 drop Right Eye Daily     risperiDONE  2 mg Oral BID     senna-docusate  1 tablet Oral BID    Or     senna-docusate  2 tablet Oral BID     sertraline  100 mg Oral Daily     topiramate  100 mg Oral At Bedtime       Data   Recent Labs   Lab  05/13/22  1135 05/12/22  2030 05/12/22  1447 05/11/22  0719 05/09/22  1755 05/09/22 0923 05/08/22 2032 05/08/22 0917   WBC  --   --  4.8 4.3  --  6.6  --  7.2   HGB  --   --  11.4* 12.5*  --  11.8*  --  11.8*   MCV  --   --  96 94  --  92  --  92   PLT  --   --  169 181  --  159  --  155     --  138 143   < > 142   < > 138   POTASSIUM 4.3 3.7 3.2* 4.1   < > 3.2*   < > 3.5   CHLORIDE 110*  --  108 115*   < > 112*   < > 106   CO2 25  --  23 25   < > 23   < > 24   BUN 8  --  10 11   < > 16   < > 24   CR 0.77  --  0.81 0.72   < > 0.70   < > 0.87   ANIONGAP 6  --  7 3   < > 7   < > 8   NASIR 9.3  --  9.0 9.5   < > 9.3   < > 10.0   GLC 92  --  115* 79   < > 77   < > 73   ALBUMIN  --   --  2.2* 2.0*   < > 2.3*   < > 2.7*   PROTTOTAL  --   --  5.6* 5.6*   < > 5.9*   < > 6.5*   BILITOTAL  --   --  0.6 1.0   < > 1.4*   < > 1.3   ALKPHOS  --   --  50 47   < > 48   < > 50   ALT  --   --  32 33   < > 35   < > 39   AST  --   --  35 59*   < > 97*   < > 123*   LIPASE  --   --   --   --   --  1,555*  --  1,730*    < > = values in this interval not displayed.       Imaging:   No results found for this or any previous visit (from the past 24 hour(s)).

## 2022-05-14 NOTE — PROVIDER NOTIFICATION
Notified MD at  1200 and 1408 regarding advancement to regular diet request and permission to go outdoors in wheelchair with family escort.       Text Paged.    Orders were obtained.    Comments: May leave outside with family escort.

## 2022-05-14 NOTE — PLAN OF CARE
Goal Outcome Evaluation:        Pt is AO x4, slightly forgetful/slow to respond. VSS on RA. Denies pain/nausea. Up A1 GB/W to BR, uses urinal while in bed. Tolerating mechanical soft diet well. L PIV SL. Scabs on forehead and knees. Discharge to TCU pending Level II assessment.

## 2022-05-14 NOTE — PROGRESS NOTES
Care Management Follow Up    Length of Stay (days): 6    Expected Discharge Date: 05/16/2022     Concerns to be Addressed:       Patient plan of care discussed at interdisciplinary rounds: Yes    Anticipated Discharge Disposition:       Anticipated Discharge Services:    Anticipated Discharge DME:      Patient/family educated on Medicare website which has current facility and service quality ratings:    Education Provided on the Discharge Plan:    Patient/Family in Agreement with the Plan:      Referrals Placed by CM/SW:    Private pay costs discussed: Not applicable    Additional Information:  Writer received a message from Lilo RUIZ ([phone]) asking for an update on if patient will be discharging to his group home or to a tcu. Writer left a message letting her know that patient will be discharging to a tcu and that referrals are out.    Writer received a message from Bree Crane ([phone]) stating that she is patient's  and asked for an update. Writer let her know that patient will discharge to a tcu and that we haven't found a tcu yet, but referrals are out.    Writer called Huong (guardian and sister) and let her know that we need more tcu choices. She said that she's going to look into that and get back to  with more choices. Writer received a message from Huong stating that she would like tcu referrals sent to [facility], [facility], [facility], [facility], and [facility].    Will continue to follow.    BARAK Bella

## 2022-05-14 NOTE — PLAN OF CARE
[id number] shift. Vitals stable. A&O. Up to chair most of shift, up to bathroom w/ 1 assist walker/gb. Eating well. Stress incontinence at times. Scabs to face and knees from previous fall. Awaiting TCU placement for discharge.

## 2022-05-14 NOTE — PLAN OF CARE
Shift summary [id number]    Neuro: Near Baseline with developmental delay. Pleasant mood, calm, cooperative.  CV:  VSS  GI/:  Taking PO well and voiding well in toilet and urinal with assist.  Ambulation with gait belt and one assist with walker.    Request made to advance to regular diet. Denies abd. Pain or nausea.  Wearing adult briefs.   SKIN:   No changes. Shower completed today.     Tylenol given for generalized aches and hip discomfort.     Discharge plan to TCC in progress.      Goal Outcome Evaluation:    Plan of Care Reviewed With: patient, durable power of      Overall Patient Progress: improving

## 2022-05-15 ENCOUNTER — TRANSFERRED RECORDS (OUTPATIENT)
Dept: MEDSURG UNIT | Facility: CLINIC | Age: 52
End: 2022-05-15
Payer: MEDICARE

## 2022-05-15 LAB
MAGNESIUM SERPL-MCNC: 2.3 MG/DL (ref 1.6–2.3)
POTASSIUM BLD-SCNC: 4 MMOL/L (ref 3.4–5.3)
SARS-COV-2 RNA RESP QL NAA+PROBE: NEGATIVE

## 2022-05-15 PROCEDURE — U0003 INFECTIOUS AGENT DETECTION BY NUCLEIC ACID (DNA OR RNA); SEVERE ACUTE RESPIRATORY SYNDROME CORONAVIRUS 2 (SARS-COV-2) (CORONAVIRUS DISEASE [COVID-19]), AMPLIFIED PROBE TECHNIQUE, MAKING USE OF HIGH THROUGHPUT TECHNOLOGIES AS DESCRIBED BY CMS-2020-01-R: HCPCS | Performed by: INTERNAL MEDICINE

## 2022-05-15 PROCEDURE — 36415 COLL VENOUS BLD VENIPUNCTURE: CPT | Performed by: HOSPITALIST

## 2022-05-15 PROCEDURE — 99232 SBSQ HOSP IP/OBS MODERATE 35: CPT | Performed by: INTERNAL MEDICINE

## 2022-05-15 PROCEDURE — 250N000013 HC RX MED GY IP 250 OP 250 PS 637: Performed by: HOSPITALIST

## 2022-05-15 PROCEDURE — 84132 ASSAY OF SERUM POTASSIUM: CPT | Performed by: INTERNAL MEDICINE

## 2022-05-15 PROCEDURE — 120N000001 HC R&B MED SURG/OB

## 2022-05-15 PROCEDURE — 250N000013 HC RX MED GY IP 250 OP 250 PS 637: Performed by: NURSE PRACTITIONER

## 2022-05-15 PROCEDURE — 999N000157 HC STATISTIC RCP TIME EA 10 MIN

## 2022-05-15 PROCEDURE — 94660 CPAP INITIATION&MGMT: CPT

## 2022-05-15 PROCEDURE — 83735 ASSAY OF MAGNESIUM: CPT | Performed by: HOSPITALIST

## 2022-05-15 RX ADMIN — IPRATROPIUM BROMIDE 2 SPRAY: 42 SPRAY NASAL at 08:22

## 2022-05-15 RX ADMIN — LEVOTHYROXINE SODIUM 50 MCG: 50 TABLET ORAL at 06:28

## 2022-05-15 RX ADMIN — CARBOXYMETHYLCELLULOSE SODIUM 1 DROP: 10 GEL OPHTHALMIC at 21:50

## 2022-05-15 RX ADMIN — DIVALPROEX SODIUM 500 MG: 500 TABLET, DELAYED RELEASE ORAL at 08:19

## 2022-05-15 RX ADMIN — RISPERIDONE 2 MG: 2 TABLET ORAL at 08:19

## 2022-05-15 RX ADMIN — RISPERIDONE 2 MG: 2 TABLET ORAL at 20:42

## 2022-05-15 RX ADMIN — TOPIRAMATE 100 MG: 100 TABLET, FILM COATED ORAL at 21:50

## 2022-05-15 RX ADMIN — MONTELUKAST 10 MG: 10 TABLET, FILM COATED ORAL at 21:50

## 2022-05-15 RX ADMIN — DIVALPROEX SODIUM 500 MG: 500 TABLET, DELAYED RELEASE ORAL at 15:38

## 2022-05-15 RX ADMIN — PANTOPRAZOLE SODIUM 40 MG: 40 TABLET, DELAYED RELEASE ORAL at 06:28

## 2022-05-15 RX ADMIN — IPRATROPIUM BROMIDE 2 SPRAY: 42 SPRAY NASAL at 15:37

## 2022-05-15 RX ADMIN — LEVOCARNITINE 330 MG: 330 TABLET ORAL at 15:38

## 2022-05-15 RX ADMIN — SERTRALINE HYDROCHLORIDE 100 MG: 100 TABLET ORAL at 08:18

## 2022-05-15 RX ADMIN — DIVALPROEX SODIUM 500 MG: 500 TABLET, DELAYED RELEASE ORAL at 21:50

## 2022-05-15 RX ADMIN — IPRATROPIUM BROMIDE 2 SPRAY: 42 SPRAY NASAL at 21:51

## 2022-05-15 RX ADMIN — LEVOCARNITINE 330 MG: 330 TABLET ORAL at 08:18

## 2022-05-15 RX ADMIN — ACETAMINOPHEN 650 MG: 325 TABLET ORAL at 20:42

## 2022-05-15 RX ADMIN — LEVOCARNITINE 330 MG: 330 TABLET ORAL at 21:50

## 2022-05-15 RX ADMIN — ATENOLOL 25 MG: 25 TABLET ORAL at 08:18

## 2022-05-15 RX ADMIN — SENNOSIDES AND DOCUSATE SODIUM 1 TABLET: 50; 8.6 TABLET ORAL at 20:42

## 2022-05-15 RX ADMIN — SENNOSIDES AND DOCUSATE SODIUM 1 TABLET: 50; 8.6 TABLET ORAL at 08:19

## 2022-05-15 RX ADMIN — PREDNISOLONE ACETATE 1 DROP: 10 SUSPENSION/ DROPS OPHTHALMIC at 08:21

## 2022-05-15 ASSESSMENT — ACTIVITIES OF DAILY LIVING (ADL)
ADLS_ACUITY_SCORE: 41
ADLS_ACUITY_SCORE: 41
ADLS_ACUITY_SCORE: 40
ADLS_ACUITY_SCORE: 41
ADLS_ACUITY_SCORE: 42
ADLS_ACUITY_SCORE: 40
ADLS_ACUITY_SCORE: 41
ADLS_ACUITY_SCORE: 40
ADLS_ACUITY_SCORE: 41
ADLS_ACUITY_SCORE: 41

## 2022-05-15 NOTE — PROGRESS NOTES
Care Management Follow Up    Length of Stay (days): 7    Expected Discharge Date: 05/16/2022     Concerns to be Addressed:       Patient plan of care discussed at interdisciplinary rounds: Yes    Anticipated Discharge Disposition:       Anticipated Discharge Services:    Anticipated Discharge DME:      Patient/family educated on Medicare website which has current facility and service quality ratings:    Education Provided on the Discharge Plan:    Patient/Family in Agreement with the Plan:      Referrals Placed by CM/SW:    Private pay costs discussed: Not applicable    Additional Information:  Writer left messages for [facility], [facility], [facility], [facility], and [facility].    SW to follow up on Monday.    BARAK Bella

## 2022-05-15 NOTE — PROGRESS NOTES
[facility]    HOSPITALIST PROGRESS NOTE :   --------------------------------------------------    Date of Admission:  5/8/2022    Cumulative Summary: Andrea Walker is a 51 year old male with history of developmental delay who lives in a group home, hypothyroidism, GERD, JACLYN, seizures, schizoaffective disorder, bipolar affective disorder, and recent fall with concussion and facial abrasions about 2 weeks ago who presented to the ED via EMS with generalized weakness.  History from his sister his sister has revealed that he has been complaining of abdominal pain and has lost 30 pounds in the last 4 months or so, diagnosed with acute pancreatitis    Assessment & Plan     Acute pancreatitis   Acute peripancreatic fluid collections  Occlusive portal vein thrombosis  Abnormal ALT, improving  Denied abdominal pain in the ED, but had reportedly been telling his sister that it is hurting.  Non-tender on fairly deep palpation in ED. Lipase peaked at 1,730.  AST elevated on admission but now downtrending and other LFTs are WNL.  Triglycerides within normal limits.  CT abdo/pelvis 5/8/22 showing: acute edematous interstitial pancreatitis, acute peripancreatic fluid collections 3.0 and 2.7 cm, small-mod volume ascites, small pleural effusions, groundglass opacity in the lungs infection vs edema.  Findings concerning for underlying malignancy also. The gallbladder is distended measuring 9.7 cm in maximal dimension. Thin gallbladder wall. Trace pericholecystic fluid. No biliary ductal dilatation. Occlusive thrombosis of the portal vein.    -- Patient was seen and examined , feeling well , asking regarding plans for discharge , discussed with him that CC and SW are working on the plan   -- Off IV fluids for 3 days , tolerating low fat diet   -- [facility] were consulted during the stay , appreciate their assistance.  -- MRCP on 5/9 showed likely chronic findings in the pancreas, occlusion of the splenic  "and portal veins, mild right pleural effusion and abdominal ascites.  -- Initially anticoagulated with IV heparin, felt to be of limited benefit for splenic/portal vein thrombosis in the setting of pancreatitis.  IV heparin discontinued on 5/10/2022, no plans to restart anticoagulation at this time.  -- S/p EUS on 5/12/22, showed findings of chronic pancreatitis with pseudocyst, discussed with Dr. Clark.  -- GI is recommending to continue patient on oral pantoprazole 40 mg daily  -- CT of A/P in one month to assess cyst and if remains present will recommend necrosectomy  -- Patient is medically ready for discharge , discussed with CC and SW , working on safe disposition plan , referrals are out for TCU     Generalized weakness  Weight loss   Recent fall and concussion with facial abrasion 4/27  Found to be notably weak at group home which is not his baseline. No report of another fall.  Nothing lateralizing. Answering questions seemingly at his baseline with history of developmental delay and he is oriented appropriately.  He denies any pain currently in ED but had reported abdominal pain to sister (guardian) recently and has had 30 lbs weight loss in the past 4 or 5 months. He was unsteady with walker today on \"road testing\" in ED.  EKG nonischemic, trop neg, UA not infected, valproic acid WNL, lytes & creat normal, , VBG fairly unremarkable, no leukocytosis, hgb 11.8. CT Head no acute pathology.     -- PT consult was placed , last assessment was on 05/13 , recommending to be discharged to TCU or home with therapies   -- MRI brain 5/8/22 showed small chronic cerebellar infarcts which may contribute to his gait instability.  --TSH WNL.  -- Fall precautions, plan for discharge to TCU      Hypokalemia  Hyperchloremia    -- Off IV fluids.  -- Replace and recheck potassium per RN managed protocol.  -- Recheck labs intermittently , electrolytes are in normal range       Anemia, normocytic, mild   Had been 14 g in " 2020, 11.6 end of April 2022, no overt blood loss but now was anticoagulated with heparin infusion.  Normal B12 and folate in April 2022.  -- Iron studies suggestive of anemia of chronic disease.  -- Hemoglobin 11.4 on 5/12/22.     History of seizures  No report of recent seizure. PTA depakote noted. Also on topiramate. Level wnl in ED.  -- Continue PTA regimen of Topamax and valproic acid.  -- will not make any changes to Valproic acid at this time , will let neuro adjust further in an outpatient setting      History of schizoaffective disorder  History of bipolar affective disorder  Appears compensated at time of admission.   -- Continue PTA regimen of risperidone, sertraline.     Hypothyroidism  -- Continue PTA levothyroxine.     Obstructive of sleep apnea on BiPAP  -- Continue with BiPAP or CPAP when sleeping.     GERD  -- Continue PTA PPI.      COVID 19 screening  -- Low suspicion, PCR negative 5/8/22.     Hypertension;  -- Continue PTA atenolol with hold parameters.     Carnitine deficiency  -- Continue PTA levocarnitine.    Clinically Significant Risk Factors Present on Admission     Diet: Low Fat Diet    Santana Catheter: Not present  DVT Prophylaxis: Pneumatic Compression Devices  Code Status: Full Code    The patient's care was discussed with the Care Coordinator/ and Patient.    Disposition Plan   Expected Discharge: 05/16/2022     Anticipated discharge location:  Awaiting care coordination huddle  Delays:     Placement - TCU         Entered: Michell Dykes MD 05/15/2022, 7:09 AM     Michell Dykes MD, MD, FACP  Text Page (7am - 6pm)    ----------------------------------------------------------------------------------------------------------------------    Interval History    Patient was seen and examined , feeling well , sitting in chair , discussed with him in detail regarding the discharge plan, updated him that  are working closely and sending referrals to facilities.  Discussed  with patient regarding the plan to have follow-up with GI in 1 month time.  Aware that currently the plan for discharge is to TCU before he can be discharged to group home. He has been tolerating low-fat diet at this time.    -Data reviewed today: I reviewed all new labs and imaging results over the last 24 hours.    I personally reviewed no images or EKG's today.    Physical Exam   Temp: 97.6  F (36.4  C) Temp src: Oral BP: 111/61 Pulse: 59   Resp: 14 SpO2: 95 % O2 Device: None (Room air)    Vitals:    05/13/22 0647 05/14/22 0649 05/15/22 0300   Weight: 97.8 kg (215 lb 11.2 oz) 97.6 kg (215 lb 3.2 oz) 97.2 kg (214 lb 3.2 oz)     Vital Signs with Ranges  Temp:  [97.4  F (36.3  C)-98.2  F (36.8  C)] 97.6  F (36.4  C)  Pulse:  [54-74] 59  Resp:  [14-16] 14  BP: (104-122)/(56-73) 111/61  SpO2:  [93 %-97 %] 95 %  I/O last 3 completed shifts:  In: 2520 [P.O.:2520]  Out: 2175 [Urine:2175]    GENERAL: Alert , awake and oriented. NAD. Conversational, appropriate.   HEENT: Normocephalic. EOMI. No icterus or injection. Nares normal.   LUNGS: Clear to auscultation. No dyspnea at rest.   HEART: Regular rate. Extremities perfused.   ABDOMEN: Soft, nontender, and nondistended. Positive bowel sounds.   EXTREMITIES: No LE edema noted.   NEUROLOGIC: Moves extremities x4 on command. No acute focal neurologic abnormalities noted.     Medications       atenolol  25 mg Oral Daily     carboxymethylcellulose PF  1 drop Right Eye At Bedtime     divalproex sodium delayed-release  500 mg Oral TID     ipratropium  2 spray Both Nostrils TID     levOCARNitine  330 mg Oral TID     levothyroxine  50 mcg Oral QAM AC     montelukast  10 mg Oral At Bedtime     pantoprazole  40 mg Oral QAM AC     prednisoLONE acetate  1 drop Right Eye Daily     risperiDONE  2 mg Oral BID     senna-docusate  1 tablet Oral BID    Or     senna-docusate  2 tablet Oral BID     sertraline  100 mg Oral Daily     topiramate  100 mg Oral At Bedtime       Data   Recent Labs    Lab 05/13/22  1135 05/12/22  2030 05/12/22  1447 05/11/22  0719 05/09/22  1755 05/09/22 0923 05/08/22 2032 05/08/22 0917   WBC  --   --  4.8 4.3  --  6.6  --  7.2   HGB  --   --  11.4* 12.5*  --  11.8*  --  11.8*   MCV  --   --  96 94  --  92  --  92   PLT  --   --  169 181  --  159  --  155     --  138 143   < > 142   < > 138   POTASSIUM 4.3 3.7 3.2* 4.1   < > 3.2*   < > 3.5   CHLORIDE 110*  --  108 115*   < > 112*   < > 106   CO2 25  --  23 25   < > 23   < > 24   BUN 8  --  10 11   < > 16   < > 24   CR 0.77  --  0.81 0.72   < > 0.70   < > 0.87   ANIONGAP 6  --  7 3   < > 7   < > 8   NASIR 9.3  --  9.0 9.5   < > 9.3   < > 10.0   GLC 92  --  115* 79   < > 77   < > 73   ALBUMIN  --   --  2.2* 2.0*   < > 2.3*   < > 2.7*   PROTTOTAL  --   --  5.6* 5.6*   < > 5.9*   < > 6.5*   BILITOTAL  --   --  0.6 1.0   < > 1.4*   < > 1.3   ALKPHOS  --   --  50 47   < > 48   < > 50   ALT  --   --  32 33   < > 35   < > 39   AST  --   --  35 59*   < > 97*   < > 123*   LIPASE  --   --   --   --   --  1,555*  --  1,730*    < > = values in this interval not displayed.       Imaging:   No results found for this or any previous visit (from the past 24 hour(s)).

## 2022-05-15 NOTE — PLAN OF CARE
Uneventful day. Vitals stable. A&O. Up to chair most of the day, ambulated halls a couple times and up to bathroom w/ 1 assist walker/gb. Eating well. Stress incontinence at times. Scabs to face and knees from previous fall. Showered this AM. Awaiting TCU placement for discharge.

## 2022-05-15 NOTE — PLAN OF CARE
Goal Outcome Evaluation:        Pt is AO x4, forgetful at times. VSS on RA. Denies pain. Up A1 GB+W to BR. Uses urinal at bedside, but incontinent at times. No BMs this shift. Tolerating low fat diet, good appetite. Scabbing on face and knees. Discharge pending TCU placement, level 2 screen.

## 2022-05-16 ENCOUNTER — APPOINTMENT (OUTPATIENT)
Dept: PHYSICAL THERAPY | Facility: CLINIC | Age: 52
DRG: 438 | End: 2022-05-16
Payer: MEDICARE

## 2022-05-16 LAB
ALBUMIN SERPL-MCNC: 2.4 G/DL (ref 3.4–5)
ALP SERPL-CCNC: 109 U/L (ref 40–150)
ALT SERPL W P-5'-P-CCNC: 34 U/L (ref 0–70)
ANION GAP SERPL CALCULATED.3IONS-SCNC: 7 MMOL/L (ref 3–14)
AST SERPL W P-5'-P-CCNC: 37 U/L (ref 0–45)
BILIRUB SERPL-MCNC: 0.6 MG/DL (ref 0.2–1.3)
BUN SERPL-MCNC: 15 MG/DL (ref 7–30)
CALCIUM SERPL-MCNC: 9.3 MG/DL (ref 8.5–10.1)
CHLORIDE BLD-SCNC: 108 MMOL/L (ref 94–109)
CO2 SERPL-SCNC: 25 MMOL/L (ref 20–32)
CREAT SERPL-MCNC: 0.9 MG/DL (ref 0.66–1.25)
CRP SERPL-MCNC: 8.1 MG/L (ref 0–8)
GFR SERPL CREATININE-BSD FRML MDRD: >90 ML/MIN/1.73M2
GLUCOSE BLD-MCNC: 115 MG/DL (ref 70–99)
LIPASE SERPL-CCNC: 1461 U/L (ref 73–393)
MAGNESIUM SERPL-MCNC: 2.5 MG/DL (ref 1.6–2.3)
PATH REPORT.ADDENDUM SPEC: NORMAL
PATH REPORT.COMMENTS IMP SPEC: NORMAL
PATH REPORT.FINAL DX SPEC: NORMAL
PATH REPORT.GROSS SPEC: NORMAL
PATH REPORT.MICROSCOPIC SPEC OTHER STN: NORMAL
PATH REPORT.RELEVANT HX SPEC: NORMAL
PHOTO IMAGE: NORMAL
POTASSIUM BLD-SCNC: 3.9 MMOL/L (ref 3.4–5.3)
PROT SERPL-MCNC: 6 G/DL (ref 6.8–8.8)
SODIUM SERPL-SCNC: 140 MMOL/L (ref 133–144)

## 2022-05-16 PROCEDURE — 97116 GAIT TRAINING THERAPY: CPT | Mod: GP

## 2022-05-16 PROCEDURE — 250N000013 HC RX MED GY IP 250 OP 250 PS 637: Performed by: NURSE PRACTITIONER

## 2022-05-16 PROCEDURE — 36415 COLL VENOUS BLD VENIPUNCTURE: CPT | Performed by: PHYSICIAN ASSISTANT

## 2022-05-16 PROCEDURE — 999N000157 HC STATISTIC RCP TIME EA 10 MIN

## 2022-05-16 PROCEDURE — 83735 ASSAY OF MAGNESIUM: CPT | Performed by: INTERNAL MEDICINE

## 2022-05-16 PROCEDURE — 250N000013 HC RX MED GY IP 250 OP 250 PS 637: Performed by: HOSPITALIST

## 2022-05-16 PROCEDURE — 97530 THERAPEUTIC ACTIVITIES: CPT | Mod: GP

## 2022-05-16 PROCEDURE — 250N000013 HC RX MED GY IP 250 OP 250 PS 637: Performed by: PHYSICIAN ASSISTANT

## 2022-05-16 PROCEDURE — 94660 CPAP INITIATION&MGMT: CPT

## 2022-05-16 PROCEDURE — 120N000001 HC R&B MED SURG/OB

## 2022-05-16 PROCEDURE — 86140 C-REACTIVE PROTEIN: CPT | Performed by: INTERNAL MEDICINE

## 2022-05-16 PROCEDURE — 99232 SBSQ HOSP IP/OBS MODERATE 35: CPT | Performed by: INTERNAL MEDICINE

## 2022-05-16 PROCEDURE — 80053 COMPREHEN METABOLIC PANEL: CPT | Performed by: PHYSICIAN ASSISTANT

## 2022-05-16 PROCEDURE — 83690 ASSAY OF LIPASE: CPT | Performed by: PHYSICIAN ASSISTANT

## 2022-05-16 RX ORDER — DICYCLOMINE HCL 20 MG
20 TABLET ORAL
Status: DISCONTINUED | OUTPATIENT
Start: 2022-05-16 | End: 2022-05-17 | Stop reason: HOSPADM

## 2022-05-16 RX ADMIN — RISPERIDONE 2 MG: 2 TABLET ORAL at 09:36

## 2022-05-16 RX ADMIN — Medication 1 DROP: at 22:15

## 2022-05-16 RX ADMIN — LEVOCARNITINE 330 MG: 330 TABLET ORAL at 22:15

## 2022-05-16 RX ADMIN — IPRATROPIUM BROMIDE 2 SPRAY: 42 SPRAY NASAL at 16:10

## 2022-05-16 RX ADMIN — LEVOTHYROXINE SODIUM 50 MCG: 50 TABLET ORAL at 06:13

## 2022-05-16 RX ADMIN — MONTELUKAST 10 MG: 10 TABLET, FILM COATED ORAL at 22:16

## 2022-05-16 RX ADMIN — DIVALPROEX SODIUM 500 MG: 500 TABLET, DELAYED RELEASE ORAL at 09:36

## 2022-05-16 RX ADMIN — PANTOPRAZOLE SODIUM 40 MG: 40 TABLET, DELAYED RELEASE ORAL at 06:13

## 2022-05-16 RX ADMIN — LEVOCARNITINE 330 MG: 330 TABLET ORAL at 09:35

## 2022-05-16 RX ADMIN — OXYCODONE HYDROCHLORIDE 5 MG: 5 TABLET ORAL at 11:00

## 2022-05-16 RX ADMIN — SERTRALINE HYDROCHLORIDE 100 MG: 100 TABLET ORAL at 09:35

## 2022-05-16 RX ADMIN — LEVOCARNITINE 330 MG: 330 TABLET ORAL at 16:10

## 2022-05-16 RX ADMIN — PREDNISOLONE ACETATE 1 DROP: 10 SUSPENSION/ DROPS OPHTHALMIC at 09:39

## 2022-05-16 RX ADMIN — DICYCLOMINE HYDROCHLORIDE 20 MG: 20 TABLET ORAL at 12:55

## 2022-05-16 RX ADMIN — SENNOSIDES AND DOCUSATE SODIUM 1 TABLET: 50; 8.6 TABLET ORAL at 22:15

## 2022-05-16 RX ADMIN — DIVALPROEX SODIUM 500 MG: 500 TABLET, DELAYED RELEASE ORAL at 22:15

## 2022-05-16 RX ADMIN — DIVALPROEX SODIUM 500 MG: 500 TABLET, DELAYED RELEASE ORAL at 16:10

## 2022-05-16 RX ADMIN — CARBOXYMETHYLCELLULOSE SODIUM 1 DROP: 10 GEL OPHTHALMIC at 22:20

## 2022-05-16 RX ADMIN — IPRATROPIUM BROMIDE 2 SPRAY: 42 SPRAY NASAL at 09:36

## 2022-05-16 RX ADMIN — DICYCLOMINE HYDROCHLORIDE 20 MG: 20 TABLET ORAL at 22:17

## 2022-05-16 RX ADMIN — IPRATROPIUM BROMIDE 2 SPRAY: 42 SPRAY NASAL at 22:15

## 2022-05-16 RX ADMIN — DICYCLOMINE HYDROCHLORIDE 20 MG: 20 TABLET ORAL at 16:10

## 2022-05-16 RX ADMIN — TOPIRAMATE 100 MG: 100 TABLET, FILM COATED ORAL at 22:15

## 2022-05-16 RX ADMIN — SENNOSIDES AND DOCUSATE SODIUM 1 TABLET: 50; 8.6 TABLET ORAL at 09:35

## 2022-05-16 RX ADMIN — RISPERIDONE 2 MG: 2 TABLET ORAL at 22:15

## 2022-05-16 ASSESSMENT — ACTIVITIES OF DAILY LIVING (ADL)
ADLS_ACUITY_SCORE: 42
ADLS_ACUITY_SCORE: 41
ADLS_ACUITY_SCORE: 42

## 2022-05-16 NOTE — PROGRESS NOTES
[facility]    HOSPITALIST PROGRESS NOTE :   --------------------------------------------------    Date of Admission:  5/8/2022    Cumulative Summary: Jagdeep Walker is a 51 year old male from [facility], with intellectual, hypothyroidism, GERD, JACLYN, seizures, schizoaffective disorder, bipolar disorder, recent fall with concussion and facial abrasions about 2 weeks before admission who presented to the ED on 5/8 with generalized weakness, abdominal pain and 30 pounds weight loss in the last 4 months.  He was found to have acute on chronic pancreatitis    Assessment & Plan     Acute pancreatitis   Acute peripancreatic fluid collections  Occlusive portal vein thrombosis  Abnormal ALT, improving  -Presented with abdominal pain and 30 pound weight loss in 4 months.    - Lipase peaked at 1,730.    - AST elevated on admission but now downtrending and other LFTs are WNL.    - Triglycerides within normal limits.    - CT abdo/pelvis 5/8/22 showed: acute edematous interstitial pancreatitis, acute peripancreatic fluid collections 3.0 and 2.7 cm, small-mod volume ascites, small pleural effusions, groundglass opacity in the lungs infection vs edema.  Findings concerning for underlying malignancy also. The gallbladder is distended measuring 9.7 cm in maximal dimension. Thin gallbladder wall. Trace pericholecystic fluid. No biliary ductal dilatation. Occlusive thrombosis of the portal vein.  -- [facility] consulted.  -- MRCP on 5/9 showed - sequelae of prior pancreatitis, occlusion of the splenic and portal veins, mild right pleural effusion and abdominal ascites. No stones or obstruction of the cystic duct  - EUS 5/12/22, showed findings of chronic pancreatitis with 35 mm x 30 mm pseudocyst in the pancreatic head.  -- Initially anticoagulated with IV heparin, felt to be of limited benefit for splenic/portal vein thrombosis in the setting of pancreatitis.  IV heparin discontinued on 5/10/2022, no plans  to restart anticoagulation at this time.    --Managed conservatively for acute pancreatitis.  Now off IV fluids for past 3-4 days and tolerating low-fat diet.    -- continue pantoprazole 40 mg daily  -- CT of A/P in one month to assess cyst and if remains present GI will recommend necrosectomy  -- CC and SW working on safe disposition plan, referrals are out for TCU  -- 5/15 -complains of increased lower abdominal pain  --Continue diet  --Monitor labs-lipase, CMP, CRP     Generalized weakness  Unintentional weight loss   Recent fall and concussion with facial abrasion 4/27  - notably weak at group home which is not his baseline.  Neurologically at baseline.    - 30 lbs weight loss in the past 4-5 months.   -Unsteady gait   - EKG nonischemic, trop neg, UA not infected, valproic acid WNL, lytes & creat normal, , VBG fairly unremarkable, no leukocytosis, hgb 11.8. TSH normal  - CT Head no acute pathology.   -- MRI brain 5/8/22 showed small chronic cerebellar infarcts which may contribute to his gait instability.    -- PT consulted, recommending TCU or home with therapies   -- Fall precautions, plan for discharge to TCU      Hypokalemia  Hyperchloremia  -- Off IV fluids.  -- Replace and recheck potassium per RN managed protocol.  -- Recheck labs intermittently , electrolytes are in normal range       Anemia, normocytic, mild   Had been 14 g in 2020, 11.6 end of April 2022, no overt blood loss but now was anticoagulated with heparin infusion.  Normal B12 and folate in April 2022.  -- Iron studies suggestive of anemia of chronic disease.  -- Hemoglobin 11.4 on 5/12/22.     Seizure disorder  No report of recent seizure. PTA - depakote, topiramate. Level wnl in ED.  -- Continue PTA regimen of Topamax and valproic acid.  -- will not make any changes to Valproic acid at this time , will let neuro adjust further in an outpatient setting      Schizoaffective disorder  Bipolar affective disorder  Appears compensated   --  Continue PTA regimen of risperidone, sertraline.     Hypothyroidism  -- Continue PTA levothyroxine.     Obstructive of sleep apnea on BiPAP  -- Continue with BiPAP or CPAP when sleeping.     GERD  -- Continue PTA PPI.      COVID 19 screening  -- Low suspicion, PCR negative 5/8/22.     Hypertension;  -- Continue PTA atenolol with hold parameters.     Carnitine deficiency  -- Continue PTA levocarnitine.    Clinically Significant Risk Factors Present on Admission     Diet: Low Fat Diet    [place] Catheter: Not present  DVT Prophylaxis: Pneumatic Compression Devices  Code Status: Full Code    The patient's care was discussed with the Patient.    Disposition Plan   Expected Discharge: 05/17/2022     Anticipated discharge location:  Awaiting care coordination huddle  Delays:           Entered: Cheryl Lorenzo MD 05/16/2022, 2:22 PM     Cheryl Lorenzo MD  Text Page (7am - 6pm)    ----------------------------------------------------------------------------------------------------------------------    Interval History    Patient lying in bed.  On low-fat diet.  Reports some increased lower abdominal pain today, more than yesterday   No nausea or vomiting   Is wondering about his discharge       -Data reviewed today: I reviewed all new labs and imaging results over the last 24 hours.    I personally reviewed no images or EKG's today.    Physical Exam   Temp: 97.8  F (36.6  C) Temp src: Oral BP: 122/63 Pulse: 75   Resp: 16 SpO2: 97 % O2 Device: None (Room air)    Vitals:    05/14/22 0649 05/15/22 0300 05/16/22 0402   Weight: 97.6 kg (215 lb 3.2 oz) 97.2 kg (214 lb 3.2 oz) 98.2 kg (216 lb 6.4 oz)     Vital Signs with Ranges  Temp:  [97.3  F (36.3  C)-98.1  F (36.7  C)] 97.8  F (36.6  C)  Pulse:  [56-75] 75  Resp:  [16-18] 16  BP: ()/(55-63) 122/63  FiO2 (%):  [1 %] 1 %  SpO2:  [91 %-97 %] 97 %  I/O last 3 completed shifts:  In: 1440 [P.O.:1440]  Out: 2625 [Urine:2625]    GENERAL: Alert , awake and oriented. NAD.  Conversational, appropriate.   HEENT: Normocephalic. EOMI. No icterus or injection. Nares normal.   LUNGS: Clear to auscultation. No dyspnea at rest.   HEART: Regular rate. Extremities perfused.   ABDOMEN: Soft, tenderness in lower abdomen , nondistended. Positive bowel sounds.   EXTREMITIES: No LE edema noted.   NEUROLOGIC: Moves extremities x4 on command. No acute focal neurologic abnormalities noted.     Medications       atenolol  25 mg Oral Daily     carboxymethylcellulose PF  1 drop Right Eye At Bedtime     dicyclomine  20 mg Oral 4x Daily AC & HS     divalproex sodium delayed-release  500 mg Oral TID     ipratropium  2 spray Both Nostrils TID     levOCARNitine  330 mg Oral TID     levothyroxine  50 mcg Oral QAM AC     montelukast  10 mg Oral At Bedtime     pantoprazole  40 mg Oral QAM AC     prednisoLONE acetate  1 drop Right Eye Daily     risperiDONE  2 mg Oral BID     senna-docusate  1 tablet Oral BID    Or     senna-docusate  2 tablet Oral BID     sertraline  100 mg Oral Daily     topiramate  100 mg Oral At Bedtime       Data   Recent Labs   Lab 05/16/22  1302 05/15/22  0846 05/13/22  1135 05/12/22  2030 05/12/22  1447 05/11/22  0719   WBC  --   --   --   --  4.8 4.3   HGB  --   --   --   --  11.4* 12.5*   MCV  --   --   --   --  96 94   PLT  --   --   --   --  169 181     --  141  --  138 143   POTASSIUM 3.9 4.0 4.3   < > 3.2* 4.1   CHLORIDE 108  --  110*  --  108 115*   CO2 25  --  25  --  23 25   BUN 15  --  8  --  10 11   CR 0.90  --  0.77  --  0.81 0.72   ANIONGAP 7  --  6  --  7 3   NASIR 9.3  --  9.3  --  9.0 9.5   *  --  92  --  115* 79   ALBUMIN 2.4*  --   --   --  2.2* 2.0*   PROTTOTAL 6.0*  --   --   --  5.6* 5.6*   BILITOTAL 0.6  --   --   --  0.6 1.0   ALKPHOS 109  --   --   --  50 47   ALT 34  --   --   --  32 33   AST 37  --   --   --  35 59*   LIPASE 1,461*  --   --   --   --   --     < > = values in this interval not displayed.       Imaging:   No results found for this or any  previous visit (from the past 24 hour(s)).

## 2022-05-16 NOTE — PROGRESS NOTES
"Care Management Follow Up    Length of Stay (days): 8    Expected Discharge Date: 05/17/2022     Concerns to be Addressed:       Patient plan of care discussed at interdisciplinary rounds: Yes    Anticipated Discharge Disposition:       Anticipated Discharge Services:    Anticipated Discharge DME:      Patient/family educated on Medicare website which has current facility and service quality ratings:    Education Provided on the Discharge Plan:    Patient/Family in Agreement with the Plan:      Referrals Placed by CM/SW:    Private pay costs discussed: Not applicable    Additional Information:  Per bedside RN, PT is recommending pt be discharge back to .  Called and spoke to Maurizio Max PT asking recommendations and per Maurizio, recommends home care PT.  Called and spoke to pt's Guardian Huong.  Informed Huong of PT recommending home with  PT and Huong was in agreement with this plan.  Offered home care agencies and per Huong, she is ok with any home care agency covered by pt's insurance.  Per Huong, she would like pt to have a walker dispensed at discharge.  Informed Huong that Provider will be notified regarding this.  Informed Huong that pt has a PCP follow-up on May 23rd at 10\"20 AM.  Called pt's  and spoke to Sadaf.  Informed her that pt will be discharging home tomorrow in the am with [facility] PT.  Per Sadaf,  staff will be able to transport pt home.  Per Sadaf, discharge orders will need to be faxed to [phone].  Sent referral to [facility], await if they are able to accept.     Addendum:  Sent referral to [facility] and [facility] Liaison for [facility] confirmed that they are able to accept for  PT.  Informed [facility] to disregard referral.    Addendum:  Received call from Polly,  of  & [facility] for  with questions regarding discharge.  Shared PT eval from today and informed them that [facility] would be following for discharge planning.  Provided Polly with Danyelle- liaison " for [facility] to call and questions regarding home care.  Informed them that pt will discharge home with a walker.  Polly requested new medications at discharge be filled at [facility] and [facility]phany requested discharge order be faxed to [phone].   Care Coordinator will continue to follow for discharge needs.  Marti Rivera, MICHELLE Rivera RN, BSN, OCN   Inpatient Care Coordination [address]  Office: [phone]

## 2022-05-16 NOTE — PLAN OF CARE
Goal Outcome Evaluation:        Pt is AO x4, forgetful at times. VSS on RA. C/o abdominal pain/pressure at start of shift, relieved w/ Tylenol PRN and a BM. Up A1 GB+W to BR. Uses urinal in bed, stress incontinence at times. Tolerating low fat diet, v good appetite. Scabbing on face and knees. Discharge pending TCU placement, level 2 screen.

## 2022-05-16 NOTE — PLAN OF CARE
AOx4, forgetful. BP soft, other VSS on RA. C/o 7/10 abd pain after eating, refused tylenol, oxycodone given x1 with little relief. GI MD aware, see note. Scabs to face and knees. BLE edema +1. Low fat diet, good appetite. Chair for meals. Up A1 GB+W, BR/urinal with assistance. Showered. L PIV SL. Plan discharge back to group home tomorrow with  in home PT and walker. Group home will transport.

## 2022-05-16 NOTE — PROGRESS NOTES
[facility]  Gastroenterology Progress Note     Jagdeep Walker MRN# [id number]   YOB: 1970 Age: 51 year old          Assessment and Plan:   Jagdeep Walker is a 51 year old with h/o developmental delay whom was admitted with weakness and found to have pancreatitis and occlusive thrombosis of portal vein.     Active Problems:  Portal vein thrombosis  Acute pancreatitis   Acute peripancreatic fluid collections  -CRP trending up 94.9. LFTs near normal  -CT A/P noted acute edematous interstitial pancreatitis, acute peripancreatic fluid collections 3.0 and 2.7 cm, small-mod volume ascites, small pleural effusions, occlusive thrombosis of portable vein. No ductal dilation. Findings concerning for underlying malignancy also.   - Further evaluation with MRCP vs EGD and EUS is warranted. Currently on heparin for portal vein thrombosis, therefore MRCP is recommended  - MRCP resulted in findings of sequelae of prior pancreatitis, possibly neoplastic cause. Notes occulusion of splenic and portal veins. NO evidence for stones or obstruction of the cystic duct  - Findings concerning for acute on chronic pancreatitis vs malignancy  - Discussed findings with sister, Huong, and did recommend endoscopic ultrasound with possible biopsy to help determine definitive diagnosis. She agrees to further workup.  CA 19-9 is normal at 15.  5/12 EGD noted grade C esophagitis and erosive gastropathy  - 5/12 EUS noted 35 mm x 30 mm pseudocyst in the pancreatic head- consistent with necrotic pancreas/chronic pancreatitis  - Had lower abdominal pain today after eating- suggestive of colon cramping and less concern for acute pancreatitis. Will order lipase and cmp and start on bentyl 20 mg QID    -- Lowfat diet  -- ordered lipase and cmp  -- CT of A/P in one month to assess cyst and if remains present will recommend necrosectomy  -- oral pantoprazole 40 mg daily              Interval History:   Patient had  n/c until today developed lower abdominal pain after eating. Denies upper abdominal pain              Review of Systems:   C: NEGATIVE for fever, chills, change in weight  E/M: NEGATIVE for ear, mouth and throat problems  R: NEGATIVE for significant cough or SOB  CV: NEGATIVE for chest pain, palpitations or peripheral edema             Medications:   I have reviewed this patient's current medications    atenolol  25 mg Oral Daily     carboxymethylcellulose PF  1 drop Right Eye At Bedtime     dicyclomine  20 mg Oral 4x Daily AC & HS     divalproex sodium delayed-release  500 mg Oral TID     ipratropium  2 spray Both Nostrils TID     levOCARNitine  330 mg Oral TID     levothyroxine  50 mcg Oral QAM AC     montelukast  10 mg Oral At Bedtime     pantoprazole  40 mg Oral QAM AC     prednisoLONE acetate  1 drop Right Eye Daily     risperiDONE  2 mg Oral BID     senna-docusate  1 tablet Oral BID    Or     senna-docusate  2 tablet Oral BID     sertraline  100 mg Oral Daily     topiramate  100 mg Oral At Bedtime                  Physical Exam:   Vitals were reviewed  Vital Signs with Ranges  Temp:  [97.3  F (36.3  C)-98.1  F (36.7  C)] 97.8  F (36.6  C)  Pulse:  [56-75] 75  Resp:  [16-18] 16  BP: ()/(55-63) 122/63  FiO2 (%):  [1 %] 1 %  SpO2:  [91 %-97 %] 97 %  I/O last 3 completed shifts:  In: 1440 [P.O.:1440]  Out: 2625 [Urine:2625]  Constitutional: healthy, alert and no distress   Cardiovascular: negative, PMI normal. No lifts, heaves, or thrills. RRR. No murmurs, clicks gallops or rub  Respiratory: negative, Percussion normal. Good diaphragmatic excursion. Lungs clear  Abdomen: Abdomen soft, non-tender. BS normal. No masses, organomegaly           Data:   I reviewed the patient's new clinical lab test results.   Recent Labs   Lab Test 05/12/22  1447 05/11/22  0719 05/09/22  0923 04/29/22  0625 04/27/22  2248   WBC 4.8 4.3 6.6   < > 7.2   HGB 11.4* 12.5* 11.8*   < > 11.6*   MCV 96 94 92   < > 92    181 159    < > 151   INR  --   --   --   --  1.26*    < > = values in this interval not displayed.     Recent Labs   Lab Test 05/15/22  0846 05/13/22  1135 05/12/22  2030 05/12/22 1447 05/11/22  0719   POTASSIUM 4.0 4.3 3.7 3.2* 4.1   CHLORIDE  --  110*  --  108 115*   CO2  --  25  --  23 25   BUN  --  8  --  10 11   ANIONGAP  --  6  --  7 3     Recent Labs   Lab Test 05/12/22  1447 05/11/22  0719 05/10/22  0724 05/09/22  0923 05/08/22 2032 05/08/22 0934 05/08/22 0917 04/29/22  0625 04/28/22  0016   ALBUMIN 2.2* 2.0* 2.2* 2.3*   < >  --  2.7*   < >  --    BILITOTAL 0.6 1.0 1.2 1.4*   < >  --  1.3  --   --    ALT 32 33 36 35   < >  --  39  --   --    AST 35 59* 69* 97*   < >  --  123*  --   --    PROTEIN  --   --   --   --   --  Negative  --   --  Negative   LIPASE  --   --   --  1,555*  --   --  1,730*  --   --     < > = values in this interval not displayed.       I reviewed the patient's new imaging results.    All laboratory data reviewed  All imaging studies reviewed by me.    Isabelle Pantoja PA-C,  5/10/2022  [facility]  Office : [phone]  Cell: [phone] (Dr. Clark)  Cell: [phone] (Isabelle Pantoja PA-C)

## 2022-05-17 ENCOUNTER — APPOINTMENT (OUTPATIENT)
Dept: PHYSICAL THERAPY | Facility: CLINIC | Age: 52
DRG: 438 | End: 2022-05-17
Payer: MEDICARE

## 2022-05-17 VITALS
OXYGEN SATURATION: 96 % | RESPIRATION RATE: 18 BRPM | WEIGHT: 212.1 LBS | BODY MASS INDEX: 35.34 KG/M2 | HEART RATE: 75 BPM | DIASTOLIC BLOOD PRESSURE: 75 MMHG | TEMPERATURE: 97.4 F | HEIGHT: 65 IN | SYSTOLIC BLOOD PRESSURE: 120 MMHG

## 2022-05-17 LAB
MAGNESIUM SERPL-MCNC: 2.4 MG/DL (ref 1.6–2.3)
POTASSIUM BLD-SCNC: 3.8 MMOL/L (ref 3.4–5.3)

## 2022-05-17 PROCEDURE — 36415 COLL VENOUS BLD VENIPUNCTURE: CPT | Performed by: INTERNAL MEDICINE

## 2022-05-17 PROCEDURE — 99239 HOSP IP/OBS DSCHRG MGMT >30: CPT | Performed by: INTERNAL MEDICINE

## 2022-05-17 PROCEDURE — 97530 THERAPEUTIC ACTIVITIES: CPT | Mod: GP

## 2022-05-17 PROCEDURE — 97116 GAIT TRAINING THERAPY: CPT | Mod: GP

## 2022-05-17 PROCEDURE — 84132 ASSAY OF SERUM POTASSIUM: CPT | Performed by: INTERNAL MEDICINE

## 2022-05-17 PROCEDURE — 250N000013 HC RX MED GY IP 250 OP 250 PS 637: Performed by: PHYSICIAN ASSISTANT

## 2022-05-17 PROCEDURE — 250N000013 HC RX MED GY IP 250 OP 250 PS 637: Performed by: HOSPITALIST

## 2022-05-17 PROCEDURE — 83735 ASSAY OF MAGNESIUM: CPT | Performed by: INTERNAL MEDICINE

## 2022-05-17 PROCEDURE — 250N000013 HC RX MED GY IP 250 OP 250 PS 637: Performed by: NURSE PRACTITIONER

## 2022-05-17 RX ORDER — DICYCLOMINE HCL 20 MG
20 TABLET ORAL
Qty: 120 TABLET | Refills: 0 | Status: ON HOLD | OUTPATIENT
Start: 2022-05-17 | End: 2022-07-07

## 2022-05-17 RX ADMIN — LEVOCARNITINE 330 MG: 330 TABLET ORAL at 08:49

## 2022-05-17 RX ADMIN — PREDNISOLONE ACETATE 1 DROP: 10 SUSPENSION/ DROPS OPHTHALMIC at 09:08

## 2022-05-17 RX ADMIN — SENNOSIDES AND DOCUSATE SODIUM 1 TABLET: 50; 8.6 TABLET ORAL at 08:49

## 2022-05-17 RX ADMIN — PANTOPRAZOLE SODIUM 40 MG: 40 TABLET, DELAYED RELEASE ORAL at 06:18

## 2022-05-17 RX ADMIN — LEVOTHYROXINE SODIUM 50 MCG: 50 TABLET ORAL at 06:18

## 2022-05-17 RX ADMIN — ATENOLOL 25 MG: 25 TABLET ORAL at 08:49

## 2022-05-17 RX ADMIN — DICYCLOMINE HYDROCHLORIDE 20 MG: 20 TABLET ORAL at 06:18

## 2022-05-17 RX ADMIN — SERTRALINE HYDROCHLORIDE 100 MG: 100 TABLET ORAL at 08:49

## 2022-05-17 RX ADMIN — DICYCLOMINE HYDROCHLORIDE 20 MG: 20 TABLET ORAL at 10:57

## 2022-05-17 RX ADMIN — RISPERIDONE 2 MG: 2 TABLET ORAL at 08:50

## 2022-05-17 RX ADMIN — DIVALPROEX SODIUM 500 MG: 500 TABLET, DELAYED RELEASE ORAL at 08:50

## 2022-05-17 ASSESSMENT — ACTIVITIES OF DAILY LIVING (ADL)
ADLS_ACUITY_SCORE: 41
ADLS_ACUITY_SCORE: 44
ADLS_ACUITY_SCORE: 41

## 2022-05-17 NOTE — PROGRESS NOTES
PIV removed. External cath removed. Patient helped to get dressed into his clothes. Belongings gathered, bagged and returned to patient.  Discharge instructions reviewed with patient. All questions answered. Discharge Medication given to patient.   staff called updated and planned for ride to come  patient.

## 2022-05-17 NOTE — PROGRESS NOTES
"Pt has been put on CPAP +8cmH20 with O2 bleed-in and vitals /63 (BP Location: Right arm)   Pulse 68   Temp 98.3  F (36.8  C) (Oral)   Resp 16   Ht 1.651 m (5' 5\")   Wt 98.2 kg (216 lb 6.4 oz)   SpO2 96%   BMI 36.01 kg/m      "

## 2022-05-17 NOTE — PLAN OF CARE
Goal Outcome Evaluation:  1930-0700  A&O x4, forgetful. VSS on RA, taken q4 as ordered. 1 assist GB/RW. Denied pain at rest, stated abdominal pain with ambulation. Was Incontinent of bladder x2, external catheter placed, adequate output. Last BM 5/16. L PIV SL. Low fat diet.  High Pot and Mag protocols, recheck this am.    Discharge back to  today, 5/17.

## 2022-05-17 NOTE — DISCHARGE SUMMARY
[facility]  Hospitalist Discharge Summary      Date of Admission:  5/8/2022  Date of Discharge:  5/17/2022  Discharging Provider: Cheryl Lorenzo MD  Discharge Service: Hospitalist Service    Discharge Diagnoses     Principal diagnosis -     Acute pancreatitis   Acute peripancreatic fluid collections  -Improved with conservative management  -Repeat CT in 1 month.  To be ordered by PCP or GI  -Follow-up with GI in 2 weeks    Additional diagnosis -     Abnormal AST, resolved     Occlusive portal vein thrombosis  -- Noted on CT abdomen/pelvis on 5/8/2022   -- not on anticoagulation     Generalized weakness  -  Improved with PT  -  discharged back to group home with home PT.    Unintentional 30 lb weight loss in 4 months    Recent fall and concussion with facial abrasion 4/27     Hypokalemia  -- Replaced    Hyperchloremia     Anemia, normocytic, mild   -- Iron studies suggestive of anemia of chronic disease.  -- Hemoglobin remained stable during hospital stay      Seizure disorder  - stable on depakote, topiramate     Schizoaffective disorder  Bipolar affective disorder  - stable on risperidone, sertraline.     Hypothyroidism,on levothyroxine.     Obstructive of sleep apnea on BiPAP  -- Continue BiPAP.     GERD, on PPI.      Hypertension, on atenolol .     Carnitine deficiency, on levocarnitine.      Follow-ups Needed After Discharge   Follow-up Appointments     Follow-up and recommended labs and tests       You are scheduled to see Dr. Werner on May 23rd at 10:20 AM for an after   hospital stay follow-up appointment at the [facility]         Follow-up and recommended labs and tests       Follow up with primary care provider, Joel Werner, within 7 days for   hospital follow- up.  No follow up labs or test are needed.    Follow up with Dr. Clark  (GI) within 2-3  for hospital follow- up. No   follow up labs or test are needed.         {Additional follow-up instructions/to-do's  for PCP    : please order CT A/P with IV contrast in 1 month to re-evaluate     Unresulted Labs Ordered in the Past 30 Days of this Admission     No orders found from 4/8/2022 to 5/9/2022.          Discharge Disposition   Discharged to Group home  Condition at discharge: Stable      Hospital Course     Jagdeep Walker, 51 year old male from [facility], with intellectual disability, hypothyroidism, GERD, JACLYN, seizures, schizoaffective disorder, bipolar disorder, recent fall with concussion and facial abrasions about 2 weeks before admission, presented on 5/8/2022 with generalized weakness, abdominal pain and 30 pounds weight loss in the last 4 months.  He was found to have acute on chronic pancreatitis        Acute pancreatitis   Acute peripancreatic fluid collections  Abnormal ALT, improving  - Lipase peaked at 1,730 and then slowly trended down  - AST elevated on admission but then down trending. All LFTs in normal range at discharge    - Triglycerides within normal limits.    - CT abdo/pelvis 5/8/22 showed: acute edematous interstitial pancreatitis, acute peripancreatic fluid collections 3.0 and 2.7 cm, small-mod volume ascites, small pleural effusions, groundglass opacity in the lungs infection vs edema.  Findings concerning for underlying malignancy also. The gallbladder is distended measuring 9.7 cm in maximal dimension. Thin gallbladder wall. Trace pericholecystic fluid. No biliary ductal dilatation. Occlusive thrombosis of the portal vein.  -- [facility] consulted.  -- MRCP on 5/9 showed - sequelae of prior pancreatitis, occlusion of the splenic and portal veins, mild right pleural effusion and abdominal ascites. No stones or obstruction of the cystic duct  - EUS 5/12/22, showed findings of chronic pancreatitis with 35 mm x 30 mm pseudocyst in the pancreatic head.  -- Managed conservatively for acute pancreatitis.  Improved.CRP 95 --> 8.1  -- continue pantoprazole 40 mg daily  -- CT of A/P in one month to  assess cyst and if remains present GI will recommend necrosectomy    Occlusive portal vein thrombosis  -- Noted on CT abdomen/pelvis on 5/8/2022   -- initially anticoagulated with IV heparin, felt to be of limited benefit for splenic/portal vein thrombosis in the setting of pancreatitis.  IV heparin discontinued on 5/10/2022.         Generalized weakness  Unintentional weight loss   Recent fall and concussion with facial abrasion 4/27  - notably weak at group home which is not his baseline.  Neurologically at baseline.    - 30 lbs weight loss in the past 4-5 months.   - Unsteady gait   - EKG nonischemic, trop neg, UA not infected, valproic acid WNL, lytes & creat normal, , VBG fairly unremarkable, no leukocytosis, hgb 11.8. TSH normal  - CT Head no acute pathology.   -- MRI brain 5/8/22 showed small chronic cerebellar infarcts which may contribute to his gait instability.     -- PT consulted.  Patient improved significantly during hospital stay.  He was discharged back to group home with home PT.     Hypokalemia  Hyperchloremia  -- Replaced     Anemia, normocytic, mild   - Hb 14 in 2020, 11.6 end of April 2022, no overt blood loss but now was anticoagulated with heparin infusion.  Normal B12 and folate in April 2022.  -- Iron studies suggestive of anemia of chronic disease.  -- Hemoglobin remained stable during hospital stay      Seizure disorder  - No report of recent seizure.   - PTA - depakote, topiramate. Continue   -- Continue PTA regimen of Topamax and valproic acid.     Schizoaffective disorder  Bipolar affective disorder  - stable  -- Continue PTA regimen of risperidone, sertraline.     Hypothyroidism  -- Continue PTA levothyroxine.     Obstructive of sleep apnea on BiPAP  -- Continue with BiPAP or CPAP when sleeping.     GERD  -- Continue PTA PPI.      COVID 19 screening - PCR negative 5/8/22.     Hypertension;  -- Continue PTA atenolol .     Carnitine deficiency  -- Continue PTA  levocarnitine.    Consultations This Hospital Stay   PHARMACY IP CONSULT  PHARMACY IP CONSULT  GASTROENTEROLOGY IP CONSULT  CARE MANAGEMENT / SOCIAL WORK IP CONSULT  PHYSICAL THERAPY ADULT IP CONSULT  IV TEAM IP CONSULT  PHYSICAL THERAPY ADULT IP CONSULT    Code Status   Full Code    Time Spent on this Encounter   I, Cheryl Lorenzo MD, personally saw the patient today and spent greater than 30 minutes discharging this patient.       Cheryl Lorenzo MD  [place] [id number] [facility]  [address] [place] [address]., [address]  [facility] [address]  Phone: [phone]  ______________________________________________________________________    Physical Exam   Vital Signs: Temp: 97.4  F (36.3  C) Temp src: Oral BP: 120/75 Pulse: 75   Resp: 18 SpO2: 96 % O2 Device: None (Room air)    Weight: 212 lbs 1.6 oz    GENERAL: Alert , awake and oriented. NAD. Conversational, appropriate.   HEENT: Normocephalic. EOMI. No icterus or injection. Nares normal.   LUNGS: Clear to auscultation. No dyspnea at rest.   HEART: Regular rate. Extremities perfused.   ABDOMEN: Soft, tenderness in lower abdomen , nondistended. Positive bowel sounds.   EXTREMITIES: No LE edema noted.   NEUROLOGIC: Moves extremities x4 on command. No acute focal neurologic abnormalities noted.           Primary Care Physician   Joel Werner    Discharge Orders      Home Care Referral      Follow-up and recommended labs and tests     You are scheduled to see Dr. Werner on May 23rd at 10:20 AM for an after hospital stay follow-up appointment at the [facility]     Reason for your hospital stay    pancreatitis     Follow-up and recommended labs and tests     Follow up with primary care provider, Joel Werner, within 7 days for hospital follow- up.  No follow up labs or test are needed.    Follow up with Dr. Clark  (GI) within 2-3  for hospital follow- up. No follow up labs or test are needed.     Activity    Your activity upon discharge: activity as  tolerated     Walker Order    DME Documentation:   Describe the reason for need to support medical necessity: for improvement of balance and safety with functional mobility.     I, the undersigned, certify that the above prescribed supplies are medically necessary for this patient and is both reasonable and necessary in reference to accepted standards of medical and necessary in reference to accepted standards of medical practice in the treatment of this patient's condition and is not prescribed as a convenience.     Diet    Follow this diet upon discharge: Orders Placed This Encounter      Low Fat Diet       Significant Results and Procedures   Results for orders placed or performed during the hospital encounter of 05/08/22   XR Chest 2 Views    Narrative    EXAM: XR CHEST 2 VW  LOCATION: [facility]  DATE/TIME: 5/8/2022 9:35 AM    INDICATION: generalized weakness, poor historian, recent fall  COMPARISON: None.      Impression    IMPRESSION: Better inspiration on the lateral view.    No hydropneumothorax or fracture. Minimal linear scarring in the right middle lobe. Lungs are otherwise clear. Heart and pulmonary vascularity are normal.   CT Head w/o Contrast    Narrative    EXAM: CT HEAD W/O CONTRAST  LOCATION: [facility]  DATE/TIME: 5/8/2022 9:53 AM    INDICATION: Head trauma, abnormal mental status (Age 19-64y)  COMPARISON: 4/27/2022.  TECHNIQUE: Routine CT Head without IV contrast. Multiplanar reformats. Dose reduction techniques were used.    FINDINGS:  INTRACRANIAL CONTENTS: No intracranial hemorrhage, extraaxial collection, or mass effect.  No CT evidence of acute infarct. Mild presumed chronic small vessel ischemic changes. Posterior fossa structures including cerebellar hemispheres, fourth ventricle   and CP angle cisterns are clear. Nasopharynx is clear.    Region of the sella and suprasellar cistern are clear.    VISUALIZED ORBITS/SINUSES/MASTOIDS: Prior  bilateral cataract surgery. Visualized portions of the orbits are otherwise unremarkable. No paranasal sinus mucosal disease. No middle ear or mastoid effusion.    BONES/SOFT TISSUES: No acute abnormality.      Impression    IMPRESSION:  1.  No CT evidence for acute intracranial process.  2.  Mild chronic microvascular ischemic changes as above.   CT Abdomen Pelvis w Contrast    Narrative    EXAM: CT ABDOMEN PELVIS W CONTRAST  LOCATION: [facility]  DATE/TIME: 5/8/2022 12:03 PM    INDICATION: Abdominal pain, acute, nonlocalized  COMPARISON: 11/27/2018  TECHNIQUE: CT scan of the abdomen and pelvis was performed following injection of IV contrast. Multiplanar reformats were obtained. Dose reduction techniques were used.  CONTRAST: 103mL Isovue 370    FINDINGS: Moderate motion artifact.    LOWER CHEST: Groundglass opacities in the lungs. Small pleural effusions, right greater than left.    HEPATOBILIARY: Diffuse heterogeneous hepatic enhancement. The gallbladder is distended measuring 9.7 cm in maximal dimension. Thin gallbladder wall. Trace pericholecystic fluid. No biliary ductal dilatation.    PANCREAS: Moderate peripancreatic inflammation is consistent with acute edematous interstitial pancreatitis. A 3.0 x 2.7 cm fluid collection in the pancreatic head image 95:3. A 2.7 x 2.7 cm fluid collection pancreatic tail, image 71:3. The main   pancreatic duct is dilated at 0.5 cm.    SPLEEN: The spleen is enlarged measuring 13.9 cm in maximal dimension.    ADRENAL GLANDS: Normal.    KIDNEYS/BLADDER: Simple renal cysts, no follow-up required. Subcentimeter renal hypodensities which are too small to characterize. No hydronephrosis. Mild circumferential bladder wall thickening, likely due to chronic outlet obstruction.    BOWEL: Diverticulosis of the colon. No acute inflammatory change. No obstruction.    LYMPH NODES: Normal.    VASCULATURE: Thrombosis and occlusion of the main portal vein which  extends into the right and left branches. The splenic vein is thrombosed. Gastric varices.    PELVIC ORGANS: Small-moderate volume ascites.    MUSCULOSKELETAL: Degenerative changes of the spine and hips.      Impression    IMPRESSION:   1.  Occlusive thrombosis of the portal vein.  2.  Acute edematous interstitial pancreatitis.  3.  Acute peripancreatic fluid collections measuring 3.0 and 2.7 cm.  4.  Small-moderate volume ascites.  5.  Small pleural effusions.  6.  Groundglass opacities in the lungs may represent infection or edema.    These findings were discussed via telephone by Dr. Solares with Dr. Bravo at 12:55 PM on 5/8/2022.   MR Brain w/o & w Contrast    Narrative    EXAM: MR BRAIN W/O and W CONTRAST  LOCATION: [facility]  DATE/TIME: 5/8/2022 1:55 PM    INDICATION: Head trauma, abnormal mental status (Age 19-64y) Mental status change, persistent or worsening, generalized weakness, gait problems, developmental delay.  COMPARISON: CT head earlier today.  CONTRAST: 9mL Gadavist  TECHNIQUE: Routine multiplanar multisequence head MRI without and with intravenous contrast.    FINDINGS:  INTRACRANIAL CONTENTS: No acute or subacute infarct. 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 No mass, acute hemorrhage, or extra-axial fluid collections. Scattered nonspecific T2/FLAIR hyperintensities within the cerebral white matter most consistent   with mild chronic microvascular ischemic change. Mild-moderate generalized cerebral atrophy. No hydrocephalus. Normal position of the cerebellar tonsils. No pathologic contrast enhancement.    SELLA: No abnormality accounting for technique.    OSSEOUS STRUCTURES/SOFT TISSUES: Normal marrow signal. The major intracranial vascular flow voids are maintained.     ORBITS: No abnormality accounting for technique.     SINUSES/MASTOIDS: No paranasal sinus mucosal disease. No middle ear or mastoid effusion.       Impression    IMPRESSION:  1.  No acute  intracranial abnormality. 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  2.  No mass lesion, obstructive hydrocephalus or pathologic contrast enhancement.  3.  Mild to moderate generalized volume loss.   MR Abdomen MRCP w/o & w Contrast    Narrative    EXAM: MR ABDOMEN MRCP W/O and W CONTRAST  LOCATION: [facility]  DATE/TIME: 5/9/2022 10:43 PM    INDICATION: Abdominal pain, biliary obstruction suspected (Ped 0-18y)  COMPARISON: None.  TECHNIQUE: Routine MR liver/pancreas protocol including axial and coronal MRCP sequences. 2D and 3D reconstruction performed by MR technologist including MIP reconstruction and slab cholangiograms. If performed with contrast, additional dynamic T1 post   IV contrast images.  CONTRAST: 9mL Gadavist     FINDINGS:     MRCP: Gallbladder and common bile duct are normal in caliber with no filling defects seen. The distal common bile duct is partially obscured secondary to a cyst in the pancreatic head. There is no evidence for intrahepatic bile duct dilatation. The   visualized bile ducts show smooth walls.    LIVER: The portal vein is thrombosed with changes most typical for cavernous transformation.    PANCREAS: There are 2 debris laden cysts seen within the pancreas with one extending superiorly from the mid tail and this measures 2.5 cm x 2.3 cm x 1.3 cm and the other is seen in the pancreatic head and this measures 3.1 cm x 2.8 cm x 2.8 cm. There is   no abnormal enhancement seen of the cysts. Cysts appear to communicate with the pancreatic duct. There is moderate atrophy seen involving the pancreas with some associated slight prominence of the pancreatic duct measuring up to 5.2 mm in greatest   radial dimension. There is some irregularity involving the margins of the pancreatic duct most pronounced in the tail region. The above findings are nonspecific, but may represent sequela of prior pancreatitis. Dose surveillance would be of benefit with   a follow-up MRI  examination in approximately 3-4 months.    ADDITIONAL FINDINGS: There is a mild amount of abdominal ascites. Mild right pleural effusion. Splenic vein is not seen and there appears to be collaterals present consistent with occlusion.      Impression    IMPRESSION:  1.  Likely chronic findings seen of the pancreas which can be seen with sequelae of prior pancreatitis, although there are some overlapping features which can be seen with changes of neoplasm. Close surveillance with follow-up MRI examination in 3-4   months is recommended.    2.  Occlusion of the splenic and portal veins with cavernous transformation seen involving the intrahepatic portal veins and there are numerous collaterals seen adjacent to the thrombosed splenic vein.    3.  Mild right pleural effusion and abdominal ascites.    4.  No evidence for stones or obstruction of the cystic duct or the visualized portions of the common bile duct. No evidence for bile duct dilatation.       Discharge Medications   Current Discharge Medication List      START taking these medications    Details   dicyclomine (BENTYL) 20 MG tablet Take 1 tablet (20 mg) by mouth 4 times daily (before meals and nightly)  Qty: 120 tablet, Refills: 0    Associated Diagnoses: Acute pancreatitis, unspecified complication status, unspecified pancreatitis type         CONTINUE these medications which have NOT CHANGED    Details   albuterol (PROAIR HFA/PROVENTIL HFA/VENTOLIN HFA) 108 (90 Base) MCG/ACT inhaler Inhale 2 puffs into the lungs every 6 hours as needed for shortness of breath / dyspnea or wheezing      atenolol (TENORMIN) 25 MG tablet Take 25 mg by mouth daily  Qty: 1 tablet, Refills: 0      calcium carbonate 600 mg-vitamin D 400 units (CALTRATE) 600-400 MG-UNIT per tablet Take 1 tablet by mouth daily (with breakfast)      calcium polycarbophil (FIBERCON) 625 MG tablet Take 1 tablet by mouth 2 times daily      carboxymethylcellulose PF (REFRESH LIQUIGEL) 1 % ophthalmic gel  Place 1 drop into the right eye At Bedtime      carboxymethylcellulose PF (REFRESH PLUS) 0.5 % ophthalmic solution Place 1 drop into both eyes daily as needed for dry eyes      chlorhexidine (PERIDEX) 0.12 % solution Swish and spit 15 mLs in mouth daily      ciclopirox (LOPROX) 0.77 % cream Apply topically nightly as needed      clonazePAM (KLONOPIN) 0.125 MG TBDP ODT tab Take 0.125 mg by mouth daily as needed for anxiety      divalproex sodium delayed-release (DEPAKOTE) 500 MG DR tablet Take 500 mg by mouth 3 times daily      docusate sodium (COLACE) 100 MG capsule Take 100 mg by mouth daily      hydrocortisone (CORTAID) 1 % external cream Apply topically 2 times daily      ipratropium (ATROVENT) 0.06 % nasal spray Spray 2 sprays into both nostrils 3 times daily      ketoconazole (NIZORAL) 2 % external cream Apply topically 2 times daily as needed for itching R foot      levOCARNitine (CARNITOR) 330 MG tablet Take by mouth 3 times daily      levocetirizine (XYZAL) 5 MG tablet Take 5 mg by mouth every evening      levothyroxine (SYNTHROID/LEVOTHROID) 50 MCG tablet Take 50 mcg by mouth daily      loperamide (IMODIUM) 2 MG capsule Take 2 mg by mouth 4 times daily as needed for diarrhea      LORazepam (ATIVAN) 2 MG tablet Take 2 mg by mouth daily as needed for seizures Give bucally      montelukast (SINGULAIR) 10 MG tablet Take 10 mg by mouth At Bedtime      multivitamin, therapeutic (THERA-VIT) TABS tablet Take 1 tablet by mouth daily      omeprazole 20 MG tablet Take 20 mg by mouth 2 times daily      polyethylene glycol-propylene glycol (SYSTANE ULTRA) 0.4-0.3 % SOLN ophthalmic solution Place 2 drops into both eyes daily as needed for dry eyes      prednisoLONE acetate (PRED FORTE) 1 % ophthalmic suspension Place 1 drop into the right eye daily      risperiDONE (RISPERDAL) 2 MG tablet Take 2 mg by mouth 2 times daily      sertraline (ZOLOFT) 100 MG tablet Take 100 mg by mouth daily      topiramate (TOPAMAX) 100 MG  tablet Take 100 mg by mouth At Bedtime      Vitamin D (Cholecalciferol) 25 MCG (1000 UT) TABS Take 2,000 Units by mouth daily           Allergies   No Known Allergies

## 2022-05-17 NOTE — PROGRESS NOTES
Care Management Discharge Note    Discharge Date: 05/17/2022       Discharge Disposition:      Discharge Services:      Discharge DME:      Discharge Transportation: health plan transportation    Private pay costs discussed: Not applicable    PAS Confirmation Code:    Patient/family educated on Medicare website which has current facility and service quality ratings:      Education Provided on the Discharge Plan:    Persons Notified of Discharge Plans: bedside RN, pt's Guardian Huong.  Patient/Family in Agreement with the Plan:      Handoff Referral Completed: No    Additional Information:  Patient ready to discharge back to [facility] today with [facility].  Called  and spoke to Cat.  Informed her that pt is ready to discharge back home with [facility], a walker and that discharge orders were faxed to  at  [phone] and that discharge orders were faxed to [facility] at [phone].   Per Cat,  staff will transport and she requested bedside RN to call  once pt is ready for discharge.  Called pt's Guardian, his sister Huong and informed her that pt is discharging back to  with [facility].  Per Huong she had already received a call from [facility].  Informed her of pt's PCP appt and that  will be transporting pt home.  Informed beside RN to call  when pt is ready for discharge and that pt will be discharging with a medication filled here and a walker.    Marti Rivera RN  Marti Rivera RN, BSN, OCN   Inpatient Care Coordination [address]  Office: [phone]

## 2022-05-17 NOTE — PROGRESS NOTES
[facility]  Gastroenterology Progress Note     Jagdeep Walker MRN# [id number]   YOB: 1970 Age: 51 year old          Assessment and Plan:   Jagdeep Walker is a 51 year old with h/o developmental delay whom was admitted with weakness and found to have pancreatitis and occlusive thrombosis of portal vein.     Active Problems:  Portal vein thrombosis  Acute pancreatitis   Acute peripancreatic fluid collections  -CRP trending up 94.9. LFTs near normal  -CT A/P noted acute edematous interstitial pancreatitis, acute peripancreatic fluid collections 3.0 and 2.7 cm, small-mod volume ascites, small pleural effusions, occlusive thrombosis of portable vein. No ductal dilation. Findings concerning for underlying malignancy also.   - Further evaluation with MRCP vs EGD and EUS is warranted. Currently on heparin for portal vein thrombosis, therefore MRCP is recommended  - MRCP resulted in findings of sequelae of prior pancreatitis, possibly neoplastic cause. Notes occulusion of splenic and portal veins. NO evidence for stones or obstruction of the cystic duct  - Findings concerning for acute on chronic pancreatitis vs malignancy  - Discussed findings with sister, Huong, and did recommend endoscopic ultrasound with possible biopsy to help determine definitive diagnosis. She agrees to further workup.  CA 19-9 is normal at 15.  5/12 EGD noted grade C esophagitis and erosive gastropathy  - 5/12 EUS noted 35 mm x 30 mm pseudocyst in the pancreatic head- consistent with necrotic pancreas/chronic pancreatitis  - Had lower abdominal pain today after eating- suggestive of colon cramping and less concern for acute pancreatitis. Will order lipase and cmp and start on bentyl 20 mg QID    -- Lowfat diet  -- ordered lipase and cmp ( lipase elevated but trending down)  -- CT of A/P in one month to assess cyst and if remains present will recommend necrosectomy  -- oral pantoprazole 40 mg daily  -- ok  with GI to discharge patient with plans for outpatient f/u in 1-2 weeks              Interval History:   Patient had n/c. Denies upper abdominal pain              Review of Systems:   C: NEGATIVE for fever, chills, change in weight  E/M: NEGATIVE for ear, mouth and throat problems  R: NEGATIVE for significant cough or SOB  CV: NEGATIVE for chest pain, palpitations or peripheral edema             Medications:   I have reviewed this patient's current medications    atenolol  25 mg Oral Daily     carboxymethylcellulose PF  1 drop Right Eye At Bedtime     dicyclomine  20 mg Oral 4x Daily AC & HS     divalproex sodium delayed-release  500 mg Oral TID     ipratropium  2 spray Both Nostrils TID     levOCARNitine  330 mg Oral TID     levothyroxine  50 mcg Oral QAM AC     montelukast  10 mg Oral At Bedtime     pantoprazole  40 mg Oral QAM AC     prednisoLONE acetate  1 drop Right Eye Daily     risperiDONE  2 mg Oral BID     senna-docusate  1 tablet Oral BID    Or     senna-docusate  2 tablet Oral BID     sertraline  100 mg Oral Daily     topiramate  100 mg Oral At Bedtime                  Physical Exam:   Vitals were reviewed  Vital Signs with Ranges  Temp:  [97.4  F (36.3  C)-98.6  F (37  C)] 97.4  F (36.3  C)  Pulse:  [68-77] 75  Resp:  [16-18] 18  BP: (109-132)/(63-75) 120/75  SpO2:  [93 %-96 %] 96 %  I/O last 3 completed shifts:  In: 2190 [P.O.:2190]  Out: 725 [Urine:725]  Constitutional: healthy, alert and no distress   Cardiovascular: negative, PMI normal. No lifts, heaves, or thrills. RRR. No murmurs, clicks gallops or rub  Respiratory: negative, Percussion normal. Good diaphragmatic excursion. Lungs clear  Abdomen: Abdomen soft, non-tender. BS normal. No masses, organomegaly           Data:   I reviewed the patient's new clinical lab test results.   Recent Labs   Lab Test 05/12/22  1447 05/11/22  0719 05/09/22  0923 04/29/22  0625 04/27/22  2248   WBC 4.8 4.3 6.6   < > 7.2   HGB 11.4* 12.5* 11.8*   < > 11.6*   MCV 96  94 92   < > 92    181 159   < > 151   INR  --   --   --   --  1.26*    < > = values in this interval not displayed.     Recent Labs   Lab Test 05/17/22  0753 05/16/22  1302 05/15/22  0846 05/13/22  1135 05/12/22  2030 05/12/22  1447   POTASSIUM 3.8 3.9 4.0 4.3   < > 3.2*   CHLORIDE  --  108  --  110*  --  108   CO2  --  25  --  25  --  23   BUN  --  15  --  8  --  10   ANIONGAP  --  7  --  6  --  7    < > = values in this interval not displayed.     Recent Labs   Lab Test 05/16/22  1302 05/12/22  1447 05/11/22  0719 05/10/22  0724 05/09/22  0923 05/08/22 2032 05/08/22  0934 05/08/22  0917 04/29/22  0625 04/28/22  0016   ALBUMIN 2.4* 2.2* 2.0*   < > 2.3*   < >  --  2.7*   < >  --    BILITOTAL 0.6 0.6 1.0   < > 1.4*   < >  --  1.3  --   --    ALT 34 32 33   < > 35   < >  --  39  --   --    AST 37 35 59*   < > 97*   < >  --  123*  --   --    PROTEIN  --   --   --   --   --   --  Negative  --   --  Negative   LIPASE 1,461*  --   --   --  1,555*  --   --  1,730*  --   --     < > = values in this interval not displayed.       I reviewed the patient's new imaging results.    All laboratory data reviewed  All imaging studies reviewed by me.    Isabelle Pantoja PA-C,  5/10/2022  [facility]  Office : [phone]  Cell: [phone] (Dr. Clark)  Cell: [phone] (Isabelle Pantoja PA-C)

## 2022-05-17 NOTE — PROGRESS NOTES
Documentation of Face to Face and Certification for Home Health Services    I certify that patient: Jagdeep Walker is under my care and that I, or a nurse practitioner or physician's assistant working with me, had a face-to-face encounter that meets the physician face-to-face encounter requirements with this patient on: 5/17/2022.    This encounter with the patient was in whole, or in part, for the following medical condition, which is the primary reason for home health care: Acute pancreatitis, unstable gait, generalized weakness, intellectual disability .    I certify that, based on my findings, the following services are medically necessary home health services: Physical Therapy.    My clinical findings support the need for the above services because: Physical Therapy Services are needed to assess and treat the following functional impairments: generalized weakness and unstable gait.    Further, I certify that my clinical findings support that this patient is homebound (i.e. absences from home require considerable and taxing effort and are for medical reasons or Islam services or infrequently or of short duration when for other reasons) because: Patient is bedbound due to: intellucatual disability..    Based on the above findings. I certify that this patient is confined to the home and needs intermittent skilled nursing care, physical therapy and/or speech therapy.  The patient is under my care, and I have initiated the establishment of the plan of care.  This patient will be followed by a physician who will periodically review the plan of care.  Physician/Provider to provide follow up care: Joel Werner    Attending [facility] physician (the Medicare certified [facility] provider): Cheryl Lorenzo MD  Physician Signature: See electronic signature associated with these discharge orders.  Date: 5/17/2022

## 2022-05-17 NOTE — PLAN OF CARE
Goal Outcome Evaluation:                  Patient A&0x4. Up with SBA GB and walker.  Abdomin is rounded. He c/o epigastric abdominal pain with touch. Bowel sounds are active.  +2 edema noted in bilateral feet and legs. Lungs clear. On RA. Plan to discharge this shift.

## 2022-05-18 ENCOUNTER — MEDICAL CORRESPONDENCE (OUTPATIENT)
Dept: HEALTH INFORMATION MANAGEMENT | Facility: CLINIC | Age: 52
End: 2022-05-18
Payer: MEDICARE

## 2022-05-18 NOTE — PLAN OF CARE
Physical Therapy Discharge Summary    Reason for therapy discharge:    Discharged to home with home therapy.    Progress towards therapy goal(s). See goals on Care Plan in [facility] electronic health record for goal details.  Goals partially met.  Barriers to achieving goals:   discharge from facility.    Therapy recommendation(s):    Continued therapy is recommended.  Rationale/Recommendations:  home health PT recommended upon discharge for improvement of safety, strength, functional mobility, and overall independence at his facility. .

## 2022-06-04 ENCOUNTER — APPOINTMENT (OUTPATIENT)
Dept: ULTRASOUND IMAGING | Facility: CLINIC | Age: 52
DRG: 438 | End: 2022-06-04
Attending: EMERGENCY MEDICINE
Payer: MEDICARE

## 2022-06-04 ENCOUNTER — APPOINTMENT (OUTPATIENT)
Dept: CT IMAGING | Facility: CLINIC | Age: 52
DRG: 438 | End: 2022-06-04
Attending: HOSPITALIST
Payer: MEDICARE

## 2022-06-04 ENCOUNTER — APPOINTMENT (OUTPATIENT)
Dept: MRI IMAGING | Facility: CLINIC | Age: 52
DRG: 438 | End: 2022-06-04
Attending: HOSPITALIST
Payer: MEDICARE

## 2022-06-04 ENCOUNTER — APPOINTMENT (OUTPATIENT)
Dept: CT IMAGING | Facility: CLINIC | Age: 52
DRG: 438 | End: 2022-06-04
Attending: EMERGENCY MEDICINE
Payer: MEDICARE

## 2022-06-04 ENCOUNTER — HOSPITAL ENCOUNTER (INPATIENT)
Facility: CLINIC | Age: 52
LOS: 6 days | Discharge: NURSING, REHAB OR LONG-TERM CARE | DRG: 438 | End: 2022-06-10
Attending: EMERGENCY MEDICINE | Admitting: HOSPITALIST
Payer: MEDICARE

## 2022-06-04 ENCOUNTER — APPOINTMENT (OUTPATIENT)
Dept: ULTRASOUND IMAGING | Facility: CLINIC | Age: 52
DRG: 438 | End: 2022-06-04
Attending: HOSPITALIST
Payer: MEDICARE

## 2022-06-04 DIAGNOSIS — J90 PLEURAL EFFUSION: ICD-10-CM

## 2022-06-04 DIAGNOSIS — K85.90 ACUTE PANCREATITIS, UNSPECIFIED COMPLICATION STATUS, UNSPECIFIED PANCREATITIS TYPE: ICD-10-CM

## 2022-06-04 DIAGNOSIS — R18.8 OTHER ASCITES: ICD-10-CM

## 2022-06-04 DIAGNOSIS — R29.6 FALLS FREQUENTLY: ICD-10-CM

## 2022-06-04 DIAGNOSIS — M62.81 GENERALIZED MUSCLE WEAKNESS: ICD-10-CM

## 2022-06-04 DIAGNOSIS — R41.82 ALTERED MENTAL STATUS, UNSPECIFIED ALTERED MENTAL STATUS TYPE: Primary | ICD-10-CM

## 2022-06-04 LAB
ABO/RH(D): NORMAL
ALBUMIN SERPL-MCNC: 2.4 G/DL (ref 3.4–5)
ALBUMIN UR-MCNC: NEGATIVE MG/DL
ALP SERPL-CCNC: 55 U/L (ref 40–150)
ALT SERPL W P-5'-P-CCNC: 28 U/L (ref 0–70)
AMMONIA PLAS-SCNC: 33 UMOL/L (ref 10–50)
ANION GAP SERPL CALCULATED.3IONS-SCNC: 9 MMOL/L (ref 3–14)
ANTIBODY SCREEN: NEGATIVE
APPEARANCE UR: CLEAR
AST SERPL W P-5'-P-CCNC: 76 U/L (ref 0–45)
BASE EXCESS BLDV CALC-SCNC: 0.5 MMOL/L (ref -7.7–1.9)
BASOPHILS # BLD AUTO: 0 10E3/UL (ref 0–0.2)
BASOPHILS NFR BLD AUTO: 0 %
BILIRUB SERPL-MCNC: 0.7 MG/DL (ref 0.2–1.3)
BILIRUB UR QL STRIP: NEGATIVE
BUN SERPL-MCNC: 19 MG/DL (ref 7–30)
CALCIUM SERPL-MCNC: 9.2 MG/DL (ref 8.5–10.1)
CHLORIDE BLD-SCNC: 110 MMOL/L (ref 94–109)
CK SERPL-CCNC: 705 U/L (ref 30–300)
CO2 SERPL-SCNC: 23 MMOL/L (ref 20–32)
COLOR UR AUTO: YELLOW
CREAT SERPL-MCNC: 1.01 MG/DL (ref 0.66–1.25)
CRP SERPL-MCNC: 27.5 MG/L (ref 0–8)
EOSINOPHIL # BLD AUTO: 0.1 10E3/UL (ref 0–0.7)
EOSINOPHIL NFR BLD AUTO: 3 %
ERYTHROCYTE [DISTWIDTH] IN BLOOD BY AUTOMATED COUNT: 15.7 % (ref 10–15)
GFR SERPL CREATININE-BSD FRML MDRD: 89 ML/MIN/1.73M2
GLUCOSE BLD-MCNC: 69 MG/DL (ref 70–99)
GLUCOSE UR STRIP-MCNC: NEGATIVE MG/DL
HCO3 BLDV-SCNC: 25 MMOL/L (ref 21–28)
HCT VFR BLD AUTO: 38.7 % (ref 40–53)
HGB BLD-MCNC: 12.5 G/DL (ref 13.3–17.7)
HGB UR QL STRIP: NEGATIVE
HOLD SPECIMEN: NORMAL
HOLD SPECIMEN: NORMAL
IMM GRANULOCYTES # BLD: 0 10E3/UL
IMM GRANULOCYTES NFR BLD: 0 %
KETONES UR STRIP-MCNC: NEGATIVE MG/DL
LEUKOCYTE ESTERASE UR QL STRIP: NEGATIVE
LIPASE SERPL-CCNC: 466 U/L (ref 73–393)
LYMPHOCYTES # BLD AUTO: 1.6 10E3/UL (ref 0.8–5.3)
LYMPHOCYTES NFR BLD AUTO: 36 %
MAGNESIUM SERPL-MCNC: 2.5 MG/DL (ref 1.6–2.3)
MCH RBC QN AUTO: 30.7 PG (ref 26.5–33)
MCHC RBC AUTO-ENTMCNC: 32.3 G/DL (ref 31.5–36.5)
MCV RBC AUTO: 95 FL (ref 78–100)
MONOCYTES # BLD AUTO: 0.5 10E3/UL (ref 0–1.3)
MONOCYTES NFR BLD AUTO: 12 %
NEUTROPHILS # BLD AUTO: 2.2 10E3/UL (ref 1.6–8.3)
NEUTROPHILS NFR BLD AUTO: 49 %
NITRATE UR QL: NEGATIVE
NRBC # BLD AUTO: 0 10E3/UL
NRBC BLD AUTO-RTO: 0 /100
NT-PROBNP SERPL-MCNC: 1029 PG/ML (ref 0–900)
O2/TOTAL GAS SETTING VFR VENT: 100 %
PCO2 BLDV: 41 MM HG (ref 40–50)
PH BLDV: 7.4 [PH] (ref 7.32–7.43)
PH UR STRIP: 6 [PH] (ref 5–7)
PLATELET # BLD AUTO: 106 10E3/UL (ref 150–450)
PO2 BLDV: 32 MM HG (ref 25–47)
POTASSIUM BLD-SCNC: 3.5 MMOL/L (ref 3.4–5.3)
PROCALCITONIN SERPL-MCNC: 0.19 NG/ML
PROT SERPL-MCNC: 5.8 G/DL (ref 6.8–8.8)
RBC # BLD AUTO: 4.07 10E6/UL (ref 4.4–5.9)
RBC URINE: <1 /HPF
SARS-COV-2 RNA RESP QL NAA+PROBE: NEGATIVE
SODIUM SERPL-SCNC: 142 MMOL/L (ref 133–144)
SP GR UR STRIP: 1.02 (ref 1–1.03)
SPECIMEN EXPIRATION DATE: NORMAL
SQUAMOUS EPITHELIAL: <1 /HPF
TSH SERPL DL<=0.005 MIU/L-ACNC: 3.42 MU/L (ref 0.4–4)
UROBILINOGEN UR STRIP-MCNC: 2 MG/DL
WBC # BLD AUTO: 4.5 10E3/UL (ref 4–11)
WBC URINE: 1 /HPF

## 2022-06-04 PROCEDURE — 36415 COLL VENOUS BLD VENIPUNCTURE: CPT | Performed by: HOSPITALIST

## 2022-06-04 PROCEDURE — 83880 ASSAY OF NATRIURETIC PEPTIDE: CPT | Performed by: EMERGENCY MEDICINE

## 2022-06-04 PROCEDURE — U0005 INFEC AGEN DETEC AMPLI PROBE: HCPCS | Performed by: EMERGENCY MEDICINE

## 2022-06-04 PROCEDURE — 250N000013 HC RX MED GY IP 250 OP 250 PS 637: Performed by: HOSPITALIST

## 2022-06-04 PROCEDURE — 96360 HYDRATION IV INFUSION INIT: CPT

## 2022-06-04 PROCEDURE — 82803 BLOOD GASES ANY COMBINATION: CPT | Performed by: HOSPITALIST

## 2022-06-04 PROCEDURE — 80165 DIPROPYLACETIC ACID FREE: CPT | Performed by: HOSPITALIST

## 2022-06-04 PROCEDURE — 86901 BLOOD TYPING SEROLOGIC RH(D): CPT | Performed by: EMERGENCY MEDICINE

## 2022-06-04 PROCEDURE — 82140 ASSAY OF AMMONIA: CPT | Performed by: HOSPITALIST

## 2022-06-04 PROCEDURE — 86140 C-REACTIVE PROTEIN: CPT | Performed by: HOSPITALIST

## 2022-06-04 PROCEDURE — A9585 GADOBUTROL INJECTION: HCPCS | Performed by: HOSPITALIST

## 2022-06-04 PROCEDURE — 250N000011 HC RX IP 250 OP 636: Performed by: EMERGENCY MEDICINE

## 2022-06-04 PROCEDURE — 36415 COLL VENOUS BLD VENIPUNCTURE: CPT | Performed by: EMERGENCY MEDICINE

## 2022-06-04 PROCEDURE — G1004 CDSM NDSC: HCPCS

## 2022-06-04 PROCEDURE — 258N000003 HC RX IP 258 OP 636: Performed by: EMERGENCY MEDICINE

## 2022-06-04 PROCEDURE — 76705 ECHO EXAM OF ABDOMEN: CPT

## 2022-06-04 PROCEDURE — 84443 ASSAY THYROID STIM HORMONE: CPT | Performed by: EMERGENCY MEDICINE

## 2022-06-04 PROCEDURE — 74177 CT ABD & PELVIS W/CONTRAST: CPT | Mod: ME

## 2022-06-04 PROCEDURE — C9803 HOPD COVID-19 SPEC COLLECT: HCPCS

## 2022-06-04 PROCEDURE — 84145 PROCALCITONIN (PCT): CPT | Performed by: HOSPITALIST

## 2022-06-04 PROCEDURE — 96361 HYDRATE IV INFUSION ADD-ON: CPT

## 2022-06-04 PROCEDURE — 80053 COMPREHEN METABOLIC PANEL: CPT | Performed by: EMERGENCY MEDICINE

## 2022-06-04 PROCEDURE — 120N000001 HC R&B MED SURG/OB

## 2022-06-04 PROCEDURE — 82040 ASSAY OF SERUM ALBUMIN: CPT | Performed by: EMERGENCY MEDICINE

## 2022-06-04 PROCEDURE — 83690 ASSAY OF LIPASE: CPT | Performed by: EMERGENCY MEDICINE

## 2022-06-04 PROCEDURE — 82550 ASSAY OF CK (CPK): CPT | Performed by: EMERGENCY MEDICINE

## 2022-06-04 PROCEDURE — 250N000009 HC RX 250: Performed by: HOSPITALIST

## 2022-06-04 PROCEDURE — 70553 MRI BRAIN STEM W/O & W/DYE: CPT | Mod: ME

## 2022-06-04 PROCEDURE — 81001 URINALYSIS AUTO W/SCOPE: CPT | Performed by: HOSPITALIST

## 2022-06-04 PROCEDURE — 93971 EXTREMITY STUDY: CPT | Mod: RT

## 2022-06-04 PROCEDURE — 250N000009 HC RX 250: Performed by: EMERGENCY MEDICINE

## 2022-06-04 PROCEDURE — 99285 EMERGENCY DEPT VISIT HI MDM: CPT | Mod: 25

## 2022-06-04 PROCEDURE — 83735 ASSAY OF MAGNESIUM: CPT | Performed by: EMERGENCY MEDICINE

## 2022-06-04 PROCEDURE — 255N000002 HC RX 255 OP 636: Performed by: HOSPITALIST

## 2022-06-04 PROCEDURE — 99223 1ST HOSP IP/OBS HIGH 75: CPT | Mod: AI | Performed by: HOSPITALIST

## 2022-06-04 PROCEDURE — G0378 HOSPITAL OBSERVATION PER HR: HCPCS

## 2022-06-04 PROCEDURE — 85025 COMPLETE CBC W/AUTO DIFF WBC: CPT | Performed by: EMERGENCY MEDICINE

## 2022-06-04 RX ORDER — AMOXICILLIN 250 MG
2 CAPSULE ORAL 2 TIMES DAILY PRN
Status: DISCONTINUED | OUTPATIENT
Start: 2022-06-04 | End: 2022-06-10 | Stop reason: HOSPADM

## 2022-06-04 RX ORDER — ACETAMINOPHEN 650 MG/1
650 SUPPOSITORY RECTAL EVERY 6 HOURS PRN
Status: DISCONTINUED | OUTPATIENT
Start: 2022-06-04 | End: 2022-06-10 | Stop reason: HOSPADM

## 2022-06-04 RX ORDER — LIDOCAINE 40 MG/G
CREAM TOPICAL
Status: DISCONTINUED | OUTPATIENT
Start: 2022-06-04 | End: 2022-06-10 | Stop reason: HOSPADM

## 2022-06-04 RX ORDER — IPRATROPIUM BROMIDE 42 UG/1
2 SPRAY, METERED NASAL 3 TIMES DAILY
Status: DISCONTINUED | OUTPATIENT
Start: 2022-06-04 | End: 2022-06-10 | Stop reason: HOSPADM

## 2022-06-04 RX ORDER — IOPAMIDOL 755 MG/ML
106 INJECTION, SOLUTION INTRAVASCULAR ONCE
Status: COMPLETED | OUTPATIENT
Start: 2022-06-04 | End: 2022-06-04

## 2022-06-04 RX ORDER — CARBOXYMETHYLCELLULOSE SODIUM 5 MG/ML
1 SOLUTION/ DROPS OPHTHALMIC AT BEDTIME
Status: DISCONTINUED | OUTPATIENT
Start: 2022-06-04 | End: 2022-06-10 | Stop reason: HOSPADM

## 2022-06-04 RX ORDER — GADOBUTROL 604.72 MG/ML
10 INJECTION INTRAVENOUS ONCE
Status: COMPLETED | OUTPATIENT
Start: 2022-06-04 | End: 2022-06-04

## 2022-06-04 RX ORDER — ATENOLOL 25 MG/1
25 TABLET ORAL DAILY
Status: DISCONTINUED | OUTPATIENT
Start: 2022-06-04 | End: 2022-06-10 | Stop reason: HOSPADM

## 2022-06-04 RX ORDER — ONDANSETRON 2 MG/ML
4 INJECTION INTRAMUSCULAR; INTRAVENOUS EVERY 6 HOURS PRN
Status: DISCONTINUED | OUTPATIENT
Start: 2022-06-04 | End: 2022-06-10 | Stop reason: HOSPADM

## 2022-06-04 RX ORDER — AMOXICILLIN 250 MG
1 CAPSULE ORAL 2 TIMES DAILY PRN
Status: DISCONTINUED | OUTPATIENT
Start: 2022-06-04 | End: 2022-06-10 | Stop reason: HOSPADM

## 2022-06-04 RX ORDER — RISPERIDONE 2 MG/1
2 TABLET ORAL 2 TIMES DAILY
Status: DISCONTINUED | OUTPATIENT
Start: 2022-06-04 | End: 2022-06-10 | Stop reason: HOSPADM

## 2022-06-04 RX ORDER — DOCUSATE SODIUM 100 MG/1
100 CAPSULE, LIQUID FILLED ORAL DAILY
Status: DISCONTINUED | OUTPATIENT
Start: 2022-06-04 | End: 2022-06-10 | Stop reason: HOSPADM

## 2022-06-04 RX ORDER — ONDANSETRON 4 MG/1
4 TABLET, ORALLY DISINTEGRATING ORAL EVERY 6 HOURS PRN
Status: DISCONTINUED | OUTPATIENT
Start: 2022-06-04 | End: 2022-06-10 | Stop reason: HOSPADM

## 2022-06-04 RX ORDER — POLYETHYLENE GLYCOL 3350 17 G/17G
17 POWDER, FOR SOLUTION ORAL DAILY PRN
Status: DISCONTINUED | OUTPATIENT
Start: 2022-06-04 | End: 2022-06-10 | Stop reason: HOSPADM

## 2022-06-04 RX ORDER — DICYCLOMINE HCL 20 MG
20 TABLET ORAL
Status: DISCONTINUED | OUTPATIENT
Start: 2022-06-04 | End: 2022-06-10 | Stop reason: HOSPADM

## 2022-06-04 RX ORDER — LEVOCARNITINE 330 MG/1
330 TABLET ORAL 3 TIMES DAILY
Status: DISCONTINUED | OUTPATIENT
Start: 2022-06-04 | End: 2022-06-10 | Stop reason: HOSPADM

## 2022-06-04 RX ORDER — ACETAMINOPHEN 325 MG/1
650 TABLET ORAL EVERY 6 HOURS PRN
Status: DISCONTINUED | OUTPATIENT
Start: 2022-06-04 | End: 2022-06-10 | Stop reason: HOSPADM

## 2022-06-04 RX ORDER — TOPIRAMATE 100 MG/1
100 TABLET, FILM COATED ORAL AT BEDTIME
Status: DISCONTINUED | OUTPATIENT
Start: 2022-06-04 | End: 2022-06-10 | Stop reason: HOSPADM

## 2022-06-04 RX ORDER — PANTOPRAZOLE SODIUM 40 MG/1
40 TABLET, DELAYED RELEASE ORAL 2 TIMES DAILY
Status: DISCONTINUED | OUTPATIENT
Start: 2022-06-04 | End: 2022-06-10 | Stop reason: HOSPADM

## 2022-06-04 RX ORDER — LEVOTHYROXINE SODIUM 50 UG/1
50 TABLET ORAL DAILY
Status: DISCONTINUED | OUTPATIENT
Start: 2022-06-04 | End: 2022-06-10 | Stop reason: HOSPADM

## 2022-06-04 RX ORDER — SERTRALINE HYDROCHLORIDE 100 MG/1
100 TABLET, FILM COATED ORAL DAILY
Status: DISCONTINUED | OUTPATIENT
Start: 2022-06-04 | End: 2022-06-10 | Stop reason: HOSPADM

## 2022-06-04 RX ORDER — CHLORHEXIDINE GLUCONATE ORAL RINSE 1.2 MG/ML
15 SOLUTION DENTAL DAILY
Status: DISCONTINUED | OUTPATIENT
Start: 2022-06-04 | End: 2022-06-10 | Stop reason: HOSPADM

## 2022-06-04 RX ORDER — DIVALPROEX SODIUM 500 MG/1
500 TABLET, DELAYED RELEASE ORAL 3 TIMES DAILY
Status: DISCONTINUED | OUTPATIENT
Start: 2022-06-04 | End: 2022-06-07 | Stop reason: DRUGHIGH

## 2022-06-04 RX ORDER — SODIUM CHLORIDE 9 MG/ML
INJECTION, SOLUTION INTRAVENOUS CONTINUOUS
Status: DISCONTINUED | OUTPATIENT
Start: 2022-06-04 | End: 2022-06-04

## 2022-06-04 RX ORDER — MONTELUKAST SODIUM 10 MG/1
10 TABLET ORAL AT BEDTIME
Status: DISCONTINUED | OUTPATIENT
Start: 2022-06-04 | End: 2022-06-10 | Stop reason: HOSPADM

## 2022-06-04 RX ORDER — PREDNISOLONE ACETATE 10 MG/ML
1 SUSPENSION/ DROPS OPHTHALMIC DAILY
Status: DISCONTINUED | OUTPATIENT
Start: 2022-06-04 | End: 2022-06-10 | Stop reason: HOSPADM

## 2022-06-04 RX ADMIN — SODIUM CHLORIDE 1000 ML: 9 INJECTION, SOLUTION INTRAVENOUS at 07:54

## 2022-06-04 RX ADMIN — DOCUSATE SODIUM 100 MG: 100 CAPSULE, LIQUID FILLED ORAL at 22:23

## 2022-06-04 RX ADMIN — GADOBUTROL 10 ML: 604.72 INJECTION INTRAVENOUS at 20:19

## 2022-06-04 RX ADMIN — SODIUM CHLORIDE 70 ML: 900 INJECTION INTRAVENOUS at 08:39

## 2022-06-04 RX ADMIN — IPRATROPIUM BROMIDE 2 SPRAY: 42 SPRAY NASAL at 18:06

## 2022-06-04 RX ADMIN — PREDNISOLONE ACETATE 1 DROP: 10 SUSPENSION/ DROPS OPHTHALMIC at 18:05

## 2022-06-04 RX ADMIN — SERTRALINE HYDROCHLORIDE 100 MG: 100 TABLET ORAL at 22:23

## 2022-06-04 RX ADMIN — PANTOPRAZOLE SODIUM 40 MG: 40 TABLET, DELAYED RELEASE ORAL at 21:45

## 2022-06-04 RX ADMIN — IOPAMIDOL 106 ML: 755 INJECTION, SOLUTION INTRAVENOUS at 08:38

## 2022-06-04 RX ADMIN — MONTELUKAST 10 MG: 10 TABLET, FILM COATED ORAL at 21:44

## 2022-06-04 RX ADMIN — CHLORHEXIDINE GLUCONATE 15 ML: 1.2 SOLUTION ORAL at 18:08

## 2022-06-04 RX ADMIN — Medication 1 DROP: at 21:44

## 2022-06-04 RX ADMIN — DICYCLOMINE HYDROCHLORIDE 20 MG: 20 TABLET ORAL at 21:45

## 2022-06-04 RX ADMIN — DIVALPROEX SODIUM 500 MG: 500 TABLET, DELAYED RELEASE ORAL at 21:44

## 2022-06-04 RX ADMIN — TOPIRAMATE 100 MG: 100 TABLET, FILM COATED ORAL at 21:45

## 2022-06-04 RX ADMIN — RISPERIDONE 2 MG: 2 TABLET ORAL at 21:45

## 2022-06-04 ASSESSMENT — ENCOUNTER SYMPTOMS
VOMITING: 0
ABDOMINAL PAIN: 1
NECK PAIN: 0
HEADACHES: 1
NAUSEA: 0

## 2022-06-04 ASSESSMENT — ACTIVITIES OF DAILY LIVING (ADL)
DRESSING/BATHING_DIFFICULTY: YES
WEAR_GLASSES_OR_BLIND: YES
ADLS_ACUITY_SCORE: 37
DIFFICULTY_EATING/SWALLOWING: NO
ADLS_ACUITY_SCORE: 37
EQUIPMENT_CURRENTLY_USED_AT_HOME: WALKER, ROLLING
ADLS_ACUITY_SCORE: 37
DRESSING/BATHING: BATHING DIFFICULTY, ASSISTANCE 1 PERSON;DRESSING DIFFICULTY, ASSISTANCE 1 PERSON
ADLS_ACUITY_SCORE: 37
WALKING_OR_CLIMBING_STAIRS: AMBULATION DIFFICULTY, REQUIRES EQUIPMENT
FALL_HISTORY_WITHIN_LAST_SIX_MONTHS: YES
VISION_MANAGEMENT: GLASSES
TOILETING_ISSUES: NO
DOING_ERRANDS_INDEPENDENTLY_DIFFICULTY: NO
WALKING_OR_CLIMBING_STAIRS_DIFFICULTY: YES
CONCENTRATING,_REMEMBERING_OR_MAKING_DECISIONS_DIFFICULTY: YES
ADLS_ACUITY_SCORE: 37
ADLS_ACUITY_SCORE: 31

## 2022-06-04 NOTE — ED TRIAGE NOTES
Triage Assessment     Row Name 06/04/22 0819       Triage Assessment (Adult)    Airway WDL WDL       Respiratory WDL    Respiratory WDL WDL       Skin Circulation/Temperature WDL    Skin Circulation/Temperature WDL X  abrasions to both knees and right elbow       Cardiac WDL    Cardiac WDL WDL       Peripheral/Neurovascular WDL    Peripheral Neurovascular WDL X  BLE edema       Cognitive/Neuro/Behavioral WDL    Cognitive/Neuro/Behavioral WDL X  alert and oriented, lives in group home, cognitive delay

## 2022-06-04 NOTE — ED NOTES
[facility]  ED Nurse Handoff Report    ED Chief complaint: Fall and Generalized Weakness      ED Diagnosis:   Final diagnoses:   Falls frequently   Acute pancreatitis, unspecified complication status, unspecified pancreatitis type   Other ascites   Pleural effusions   Generalized muscle weakness       Code Status: hospitalist to address     Allergies: No Known Allergies    Patient Story: patient arrived to ED via EMS from group home. Pt had unwitnessed fall pta. EMS had to help pt up off the floor. Patient has legal guardian. Patient has BLE edema, right worse than left. Right leg negative for DVT on ultrasound today. Patient needs assist X 1 to use bedside commode/urinal. Some cognitive delay noted. Lab work: CK, lipase and BNP elevated.   CT scan today:   IMPRESSION:   1.  Moderate amount of ascites has increased since the previous exam.  2.  Nonocclusive thrombus within the main portal vein has improved since the previous exam.  3.  Two cystic lesions in the pancreas have similar appearances to the previous exam, and are suspicious for pseudocysts. There is a new fluid collection along the inferior aspect of the stomach, also suspicious for a pseudocyst.  4.  Mild hazy fat stranding about the pancreatic head has improved, although some degree of persistent acute pancreatitis is not excluded.  5.  Small bilateral pleural effusions have increased slightly.  6.  Nodularity of the liver contour raises the possibility of underlying cirrhosis.     Focused Assessment:  See above    Treatments and/or interventions provided:   Patient's response to treatments and/or interventions:     To be done/followed up on inpatient unit:  none at this time    Does this patient have any cognitive concerns?: Forgetful    Activity level - Baseline/Home:  Stand with Assist- uses walker  Activity Level - Current:   Stand with Assist and Walker    Patient's Preferred language: English   Needed?: No    Isolation:  None  Infection: Not Applicable  Patient tested for COVID 19 prior to admission: YES  Bariatric?: No    Vital Signs:   Vitals:    06/04/22 0743 06/04/22 0744 06/04/22 0748 06/04/22 0955   BP:    100/50   Pulse:  74  75   Resp:    17   Temp:   97.7  F (36.5  C)    TempSrc:   Oral    SpO2: 96%   98%       Cardiac Rhythm:     Was the PSS-3 completed:   Yes  What interventions are required if any?  : N/A             Family Comments: patient's sister ainsley called and updated (legal guardian)  OBS brochure/video discussed/provided to patient/family: Yes              Name of person given brochure if not patient:               Relationship to patient:     For the majority of the shift this patient's behavior was Green.   Behavioral interventions performed were .    ED NURSE PHONE NUMBER: [phone]

## 2022-06-04 NOTE — PHARMACY-ADMISSION MEDICATION HISTORY
Pharmacy Medication History  Admission medication history interview status for the 6/4/2022  admission is complete. See EPIC admission navigator for prior to admission medications     Location of Interview: Outside patient room but on unit  Medication history sources: Pt group home did not answer ([phone]).  List they sent was from September.  Called guardian Huong who stated no medications have changed since he discharged from [facility] last month and he likely did not have any medications this morning since he came in so early at 0730    Significant changes made to the medication list:  none    In the past week, patient estimated taking medication this percent of the time: greater than 90%    Additional medication history information:   Confirmed he is still using prednisone eye drops    Medication reconciliation completed by provider prior to medication history? No    Time spent in this activity: 15 mins    Prior to Admission medications    Medication Sig Last Dose Taking? Auth Provider   atenolol (TENORMIN) 25 MG tablet Take 25 mg by mouth daily 6/3/2022 at Unknown time Yes Sarika Glass MD   calcium carbonate 600 mg-vitamin D 400 units (CALTRATE) 600-400 MG-UNIT per tablet Take 1 tablet by mouth daily (with breakfast) 6/3/2022 at Unknown time Yes Unknown, Entered By History   calcium polycarbophil (FIBERCON) 625 MG tablet Take 1 tablet by mouth 2 times daily 6/3/2022 at Unknown time Yes Unknown, Entered By History   carboxymethylcellulose PF (REFRESH LIQUIGEL) 1 % ophthalmic gel Place 1 drop into the right eye At Bedtime 6/3/2022 at Unknown time Yes Unknown, Entered By History   chlorhexidine (PERIDEX) 0.12 % solution Swish and spit 15 mLs in mouth daily 6/3/2022 at Unknown time Yes Unknown, Entered By History   dicyclomine (BENTYL) 20 MG tablet Take 1 tablet (20 mg) by mouth 4 times daily (before meals and nightly) 6/3/2022 at Unknown time Yes Cheryl Lorenzo MD   divalproex sodium delayed-release  (DEPAKOTE) 500 MG DR tablet Take 500 mg by mouth 3 times daily 6/3/2022 at Unknown time Yes Unknown, Entered By History   docusate sodium (COLACE) 100 MG capsule Take 100 mg by mouth daily 6/3/2022 at Unknown time Yes Unknown, Entered By History   hydrocortisone (CORTAID) 1 % external cream Apply topically 2 times daily 6/3/2022 at Unknown time Yes Unknown, Entered By History   ipratropium (ATROVENT) 0.06 % nasal spray Spray 2 sprays into both nostrils 3 times daily 6/3/2022 at Unknown time Yes Unknown, Entered By History   levOCARNitine (CARNITOR) 330 MG tablet Take by mouth 3 times daily 6/3/2022 at Unknown time Yes Unknown, Entered By History   levocetirizine (XYZAL) 5 MG tablet Take 5 mg by mouth every evening 6/3/2022 at Unknown time Yes Unknown, Entered By History   levothyroxine (SYNTHROID/LEVOTHROID) 50 MCG tablet Take 50 mcg by mouth daily 6/3/2022 at Unknown time Yes Unknown, Entered By History   montelukast (SINGULAIR) 10 MG tablet Take 10 mg by mouth At Bedtime 6/3/2022 at Unknown time Yes Unknown, Entered By History   multivitamin, therapeutic (THERA-VIT) TABS tablet Take 1 tablet by mouth daily 6/3/2022 at Unknown time Yes Unknown, Entered By History   omeprazole 20 MG tablet Take 20 mg by mouth 2 times daily 6/3/2022 at Unknown time Yes Unknown, Entered By History   prednisoLONE acetate (PRED FORTE) 1 % ophthalmic suspension Place 1 drop into the right eye daily 6/3/2022 at Unknown time Yes Unknown, Entered By History   risperiDONE (RISPERDAL) 2 MG tablet Take 2 mg by mouth 2 times daily 6/3/2022 at Unknown time Yes Unknown, Entered By History   sertraline (ZOLOFT) 100 MG tablet Take 100 mg by mouth daily 6/3/2022 at Unknown time Yes Unknown, Entered By History   topiramate (TOPAMAX) 100 MG tablet Take 100 mg by mouth At Bedtime 6/3/2022 at Unknown time Yes Unknown, Entered By History   Vitamin D (Cholecalciferol) 50 MCG (2000 UT) CAPS Take 2,000 Units by mouth daily 6/3/2022 at Unknown time Yes  Unknown, Entered By History   albuterol (PROAIR HFA/PROVENTIL HFA/VENTOLIN HFA) 108 (90 Base) MCG/ACT inhaler Inhale 2 puffs into the lungs every 6 hours as needed for shortness of breath / dyspnea or wheezing PRN  Unknown, Entered By History   carboxymethylcellulose PF (REFRESH PLUS) 0.5 % ophthalmic solution Place 1 drop into both eyes daily as needed for dry eyes ([phone])  Unknown, Entered By History   ciclopirox (LOPROX) 0.77 % cream Apply topically nightly as needed PRN  Unknown, Entered By History   clonazePAM (KLONOPIN) 0.125 MG TBDP ODT tab Take 0.125 mg by mouth daily as needed for anxiety PRN  Unknown, Entered By History   ketoconazole (NIZORAL) 2 % external cream Apply topically 2 times daily as needed for itching R foot PRN  Unknown, Entered By History   loperamide (IMODIUM) 2 MG capsule Take 2 mg by mouth 4 times daily as needed for diarrhea PRN  Unknown, Entered By History   LORazepam (ATIVAN) 2 MG tablet Take 2 mg by mouth daily as needed for seizures Give bucally PRN  Unknown, Entered By History   polyethylene glycol-propylene glycol (SYSTANE ULTRA) 0.4-0.3 % SOLN ophthalmic solution Place 2 drops into both eyes daily as needed for dry eyes PRN  Unknown, Entered By History     Trista Zambrano, PharmD     The information provided in this note is only as accurate as the sources available at the time of update(s)

## 2022-06-04 NOTE — PLAN OF CARE
Goal Outcome Evaluation:    Plan of Care Reviewed With: patient   Pt arrived from the ED this evening. A&Ox4 but forgetful. Neuros intact except for tremors in BUEs; R pupil irregular and larger than left (hx of cataracts surgery per pt); Slow and deliberate with heel to shin assessment. No c/o headache. Able to move all extremities but notable for generalized weakness; Up with assist of 1 person/GB at the bedside. UA sent (see result). Head CT done/ result in process. Brain MRI and Echo pending. Abdominal U/S to be done this evening; currently NPO. Evening meds to be given after U/S; on coming RN to follow; Will continue to monitor.

## 2022-06-04 NOTE — ED TRIAGE NOTES
Patient sent to ED from group home by EMS. Patient had an unwitnessed fall while trying to get out of bed. Patient had walker but fell with the walker. Pt states that he was yelling for staff to help him up out of bed but no one came in time. pararmedics had to help pt off of floor. Pt denies injuries from fall. Patient has BLE edema and abrasions to bi lateral knees and right elbow. Patient unsure if he hit his head. He reports abdominal pain when attempting to have bowel movements. Patient alert and oriented at this time. Patient has had multiple falls since being at group home after hospital discharge last month.

## 2022-06-04 NOTE — H&P
"[facility]    History and Physical - Hospitalist Service       Date of Admission:  6/4/2022    Assessment & Plan      Jagdeep Walker is a 51 year old male admitted on 6/4/2022.   Past history of HTN, hypothyroid, seizure disorder, schizoaffective and bipolar disorder, GERD, recent admission 5/8-5/17/22 for acute pancreatitis with pseudocysts, portal vein thrombus who presents from his group home with repeated falls and metabolic encephalopathy.     Acute metabolic encephalopathy of unclear etiology  Frequent falls  Etiology unclear, very broad differential.  Currently highest concern for intracranial pathology (bleed vs stroke) vs hepatic encephalopathy.  Patient and sister noting tremor, balance difficulty, sister reporting he is \"foggy\".  Nodular liver noted on CT abd, with hypoalbuminema and recently elevated INR - exam unclear as to tremor vs asterixis.  Cannot rule out valproic acid toxicity or polypharmacy or partial seizure.  Lower suspicion for infection as afebrile without leukocytosis and offering no infectious complaints.  Neuro exam notable for mild dysconjugate gaze and asymmetric pupil (suspect chronic as clearly had right eye surgery) as well as tremor, orientation to person, place and month.  Lytes ok.  TSH wnl.     - CT head to rule out bleed (head trauma with recent falls)  - MRI brain to assess for new stroke (MRI 5/2022 with chronic small cerebellar strokes)  - neuro checks Q4H  - orthostatic blood pressure  - telemetry  - check procal and CRP (quite low suspicion for SBP or infected pseudocysts so will monitor off abx and hold on paracentesis for now)  - UA  - ammonia level  - valproic acid level (prior free level was elevated at 34 on 5/8/22)  - VBG (hx JACLYN, rule out CO2 retention)  - PT/OT  - will hold on EEG and Neuro consult for now    ADAN  Baseline Cr 0.7, admission Cr 1.0.  Suspect poor oral intake.   - received 1L bolus in ED, will hold on further fluids given " edema  - monitor closely as received contrast 6/4    Recent acute pancreatitis with pseudocysts  Possible cirrhosis  Lower extremity edema  Hypoalbuminemia   See recent discharge summary for details, was to follow up with [facility] in 1 month with repeat CT imaging.  CT 6/4/22 showing moderate ascites, 2 stable pseudocysts but likely 1 new pseudocyst, improved pancreatic inflammation, liver nodularity concerning for cirrhosis.  Hypoalbuminemia may be due to recent illness vs poor intake but cannot rule out cirrhosis, INR was previously elevated as well.   - [facility] consult  - as above, low suspicion for infection  - ammonia as above, obtain coags  - abd US  - 2g Na diet  - TTE to rule out CHF as source of edema    Recent portal vein thrombosis  Diagnosed during prior admission, has not been treated with anticoagulation.  CT 6/4 showing non-occlusive portal vein thrombus has improved from prior.   - monitor off anticoagulation    Thrombocytopenia   Platelet 106 at admission, no history of low platelets.  Possibly related to cirrhosis.  Recent B12 and folate levels wnl.   - monitor CBC    Chronic anemia   Baseline hgb about 12 g/dL, stable at admission.    Seizure disorder  - continue PTA Depakote, topiramate    Bipolar disorder  Schizoaffective disorder  Resides in group home.   - continue PTA Klonopin, risperdal, sertraline    HTN   - continue PTA atenolol    Asthma  JACLYN  - continue PTA Singulair  - continue Bipap at bedtime    Hypothyroid  TSH wnl 6/4/22.   - continue PTA levothyroxine     GERD  Fisher's esophagus  - continue PTA omeprazole    Carnitine deficiency  - continue PTA levocarnitine      Diet:   2g Na  DVT Prophylaxis: Pneumatic Compression Devices  [place] Catheter: Not present  Central Lines: None  Cardiac Monitoring: None  Code Status:   Full code per discussion with sister (guardian) - though note she plans to discuss this further with family as she was hesitant about intubation but after discussing  "general need for intubation if they want full attempt at heart resuscitation agreed to full code    Clinically Significant Risk Factors Present on Admission             # Hypoalbuminemia: Albumin = 2.4 g/dL (Ref range: 3.4 - 5.0 g/dL) on admission, will monitor as appropriate    # Thrombocytopenia: Plts = 106 10e3/uL (Ref range: 150 - 450 10e3/uL) on admission, will monitor for bleeding   # Obesity: Estimated body mass index is 35.3 kg/m  as calculated from the following:    Height as of 5/8/22: 1.651 m (5' 5\").    Weight as of 5/17/22: 96.2 kg (212 lb 1.6 oz).      Disposition Plan   Expected Discharge:   >3 days  Anticipated discharge location:  Awaiting care coordination huddle  Delays:            The patient's care was discussed with the Patient and Patient's Family.    Jason Maldonado MD  Hospitalist Service  [facility]  Securely message with the Vocera Web Console (learn more here)  Text page via [email or web address] Paging/Directory         ______________________________________________________________________    Chief Complaint   Frequent falls     History is obtained from the patient, his sister via phone, discussion with ED provider as well as chart review.      History of Present Illness   Jagdeep Walker is a 51 year old male who was admitted to [facility] 5/8-5/17/22 with acute pancreatitis with pseudocysts and also found to have portal vein thrombosis.  He was discharged back to group home with home therapies.  Since his discharge, he reports he has continued to have balance issues with falls, with most recent fall being this morning.  He denies head trauma today but believes he may have hit his head in the past.  He reports he feels off balance all the time.  He questions if his left leg is weaker than the right, denies any asymmetric weakness of upper extremities.  He reports his sister feels he is not speaking normally though he is not sure if his speech is any different.  Denies any " "dysphagia or other focal neurologic symptoms.  He does note a tremor which is relatively new.  He reports feeling slightly short of breath today but denies any chest pain/pressure.  Denies any fever/chills and reports a mild chronic non-productive cough is at baseline.  Denies other symptoms to suggest source of infection.  Has an otherwise negative review of systems.      Per his sister, Huong, who is his legal guardian, she reports he has been VERY shaky and unsteady with repeated falls ever since his discharge.  She believes he has fallen essentially every day and believes that he hit his head with at least one of these falls.  She states he sounds \"foggy\" though not necessarily confused, stating his \"voice doesn't sound right\" but also not necessarily slurring his words.  She states at baseline he is very conversant and easy to understand and feels this has been a change.  She otherwise notes increasing lower extremity edema.  She has not noticed any focal weakness and is not aware of any recent fever or other illness following discharge from hospital.     Review of Systems    The 10 point Review of Systems is negative other than noted in the HPI or here.     Past Medical History    Depression  Bipolar disorder  Schizoaffective disorder  Seizure history  GERD  Fisher's esophagus   Hypothyroidism  HTN  Hx NMS  Obesity  Hx pancreatitis with pseudocyst formation  Portal vein thrombosis    Past Surgical History   I have reviewed this patient's surgical history and updated it with pertinent information if needed.  Past Surgical History:   Procedure Laterality Date     ENDOSCOPIC ULTRASOUND UPPER GASTROINTESTINAL TRACT (GI) N/A 5/12/2022    Procedure: ENDOSCOPIC ULTRASOUND, ESOPHAGOSCOPY / UPPER GASTROINTESTINAL TRACT (GI);  Surgeon: Massimo Clark MD;  Location: [facility]     ESOPHAGOSCOPY, GASTROSCOPY, DUODENOSCOPY (EGD), COMBINED N/A 5/12/2022    Procedure: ESOPHAGOGASTRODUODENOSCOPY, WITH BIOPSY;  Surgeon: " Massimo Clark MD;  Location:  GI       Social History   I have reviewed this patient's social history and updated it with pertinent information if needed.       Family History     Mother with stroke at 76.  Father with prostate cancer.     Prior to Admission Medications   Prior to Admission Medications   Prescriptions Last Dose Informant Patient Reported? Taking?   LORazepam (ATIVAN) 2 MG tablet   Yes No   Sig: Take 2 mg by mouth daily as needed for seizures Give bucally   Vitamin D (Cholecalciferol) 50 MCG (2000 UT) CAPS   Yes No   Sig: Take 2,000 Units by mouth daily   albuterol (PROAIR HFA/PROVENTIL HFA/VENTOLIN HFA) 108 (90 Base) MCG/ACT inhaler  at PRN  Yes No   Sig: Inhale 2 puffs into the lungs every 6 hours as needed for shortness of breath / dyspnea or wheezing   atenolol (TENORMIN) 25 MG tablet   Yes No   Sig: Take 25 mg by mouth daily   calcium carbonate 600 mg-vitamin D 400 units (CALTRATE) 600-400 MG-UNIT per tablet   Yes No   Sig: Take 1 tablet by mouth daily (with breakfast)   calcium polycarbophil (FIBERCON) 625 MG tablet   Yes No   Sig: Take 1 tablet by mouth 2 times daily   carboxymethylcellulose PF (REFRESH LIQUIGEL) 1 % ophthalmic gel   Yes No   Sig: Place 1 drop into the right eye At Bedtime   carboxymethylcellulose PF (REFRESH PLUS) 0.5 % ophthalmic solution   Yes No   Sig: Place 1 drop into both eyes daily as needed for dry eyes   chlorhexidine (PERIDEX) 0.12 % solution   Yes No   Sig: Swish and spit 15 mLs in mouth daily   ciclopirox (LOPROX) 0.77 % cream   Yes No   Sig: Apply topically nightly as needed   clonazePAM (KLONOPIN) 0.125 MG TBDP ODT tab   Yes No   Sig: Take 0.125 mg by mouth daily as needed for anxiety   dicyclomine (BENTYL) 20 MG tablet   No No   Sig: Take 1 tablet (20 mg) by mouth 4 times daily (before meals and nightly)   divalproex sodium delayed-release (DEPAKOTE) 500 MG DR tablet   Yes No   Sig: Take 500 mg by mouth 3 times daily   docusate sodium (COLACE) 100 MG  capsule   Yes No   Sig: Take 100 mg by mouth daily   hydrocortisone (CORTAID) 1 % external cream   Yes No   Sig: Apply topically 2 times daily   ipratropium (ATROVENT) 0.06 % nasal spray   Yes No   Sig: Spray 2 sprays into both nostrils 3 times daily   ketoconazole (NIZORAL) 2 % external cream   Yes No   Sig: Apply topically 2 times daily as needed for itching R foot   levOCARNitine (CARNITOR) 330 MG tablet   Yes No   Sig: Take by mouth 3 times daily   levocetirizine (XYZAL) 5 MG tablet   Yes No   Sig: Take 5 mg by mouth every evening   levothyroxine (SYNTHROID/LEVOTHROID) 50 MCG tablet   Yes No   Sig: Take 50 mcg by mouth daily   loperamide (IMODIUM) 2 MG capsule   Yes No   Sig: Take 2 mg by mouth 4 times daily as needed for diarrhea   montelukast (SINGULAIR) 10 MG tablet   Yes No   Sig: Take 10 mg by mouth At Bedtime   multivitamin, therapeutic (THERA-VIT) TABS tablet   Yes No   Sig: Take 1 tablet by mouth daily   omeprazole 20 MG tablet   Yes No   Sig: Take 20 mg by mouth 2 times daily   polyethylene glycol-propylene glycol (SYSTANE ULTRA) 0.4-0.3 % SOLN ophthalmic solution   Yes No   Sig: Place 2 drops into both eyes daily as needed for dry eyes   prednisoLONE acetate (PRED FORTE) 1 % ophthalmic suspension   Yes No   Sig: Place 1 drop into the right eye daily   risperiDONE (RISPERDAL) 2 MG tablet   Yes No   Sig: Take 2 mg by mouth 2 times daily   sertraline (ZOLOFT) 100 MG tablet   Yes No   Sig: Take 100 mg by mouth daily   topiramate (TOPAMAX) 100 MG tablet   Yes No   Sig: Take 100 mg by mouth At Bedtime      Facility-Administered Medications: None     Allergies   No Known Allergies    Physical Exam   Vital Signs: Temp: 97.7  F (36.5  C) Temp src: Oral BP: 100/50 Pulse: 75   Resp: 17 SpO2: 98 %      Weight: 0 lbs 0 oz    General Appearance: well nourished male in NAD  Eyes: PERRL, sclera anicteric   HEENT: mucous membranes moist, no neck LAD  Respiratory: lungs CTAB, no wheezes or crackles, no tachypnea    Cardiovascular: RRR, normal s1/s2 without murmur  GI: abdomen soft, normal bowel sounds, nontender, nondistended  Lymph/Hematologic: 1-2+ pitting lower extremity edema   Skin: no rash, mild abrasions over bilateral knees  Musculoskeletal: extremities warm and well perfused  Neurologic: alert, oriented to person, place and month, mild dysconjugate gaze and asymmetric pupil (suspect chronic as clearly had right eye surgery), difficulty following coordination commands so unable to truly assess but significant tremor with finger-nose-finger, 5/5 strength throughout, sensation to light touch intact  Psychiatric: normal affect     Data   Data reviewed today: I reviewed all medications, new labs and imaging results over the last 24 hours. I personally reviewed no images or EKG's today.    Recent Labs   Lab 06/04/22  0754   WBC 4.5   HGB 12.5*   MCV 95   *      POTASSIUM 3.5   CHLORIDE 110*   CO2 23   BUN 19   CR 1.01   ANIONGAP 9   NASIR 9.2   GLC 69*   ALBUMIN 2.4*   PROTTOTAL 5.8*   BILITOTAL 0.7   ALKPHOS 55   ALT 28   AST 76*   LIPASE 466*     Recent Results (from the past 24 hour(s))   CT Abdomen Pelvis w Contrast    Narrative    EXAM: CT ABDOMEN AND PELVIS WITH CONTRAST  LOCATION: [facility]  DATE/TIME: 06/04/2022, 8:27 AM    INDICATION: Abdominal pain. Generalized weakness. Fall.  COMPARISON: CT of the abdomen and pelvis performed 05/08/2022.  TECHNIQUE: CT scan of the abdomen and pelvis was performed following injection of IV contrast. Multiplanar reformats were obtained. Dose reduction techniques were used.  CONTRAST: 106 mL Isovue 370.    FINDINGS:   LOWER CHEST: Small bilateral pleural effusions have increased slightly in size. Small hiatal hernia.    HEPATOBILIARY: Nodularity of the liver contour raises the possibility of cirrhosis. No hepatic masses. The gallbladder is mildly distended. No calcified gallstones are identified.    PANCREAS: A 3.5 cm cystic lesion in the  pancreatic head and a 2.8 cm cystic lesion in the pancreatic tail have similar appearances to the previous exam, and are suspicious for pseudocysts. A 3.3 cm fluid collection along the inferior aspect of the distal   gastric body (series 4 image 83) is new since the previous exam, and is also suspicious for a pseudocyst. Mild hazy fat stranding about the pancreatic head has improved since the previous exam, however, some degree of persistent acute pancreatic   inflammation is not excluded.    SPLEEN: Normal.    ADRENAL GLANDS: Normal.    KIDNEYS/BLADDER: A few left renal cysts are unchanged, and require no routine follow-up. Several smaller hypodensities in both kidneys are too small to characterize, but are unchanged, and may also represent cysts. No hydronephrosis.    BOWEL: No obstruction or inflammatory change.    LYMPH NODES: No lymphadenopathy.    VASCULATURE: Nonocclusive thrombus is noted within the main portal vein (series 4 image 65), less prominent than on the previous exam. The splenic vein, SMV, hepatic veins and IVC appear patent.    PELVIC ORGANS: Normal.    MUSCULOSKELETAL: Moderate amount of ascites has increased slightly since the previous exam. Mild diffuse subcutaneous edema has also increased slightly. Degenerative changes in the visualized thoracolumbar spine.      Impression    IMPRESSION:   1.  Moderate amount of ascites has increased since the previous exam.  2.  Nonocclusive thrombus within the main portal vein has improved since the previous exam.  3.  Two cystic lesions in the pancreas have similar appearances to the previous exam, and are suspicious for pseudocysts. There is a new fluid collection along the inferior aspect of the stomach, also suspicious for a pseudocyst.  4.  Mild hazy fat stranding about the pancreatic head has improved, although some degree of persistent acute pancreatitis is not excluded.  5.  Small bilateral pleural effusions have increased slightly.  6.  Nodularity  of the liver contour raises the possibility of underlying cirrhosis.     US Lower Extremity Venous Duplex Right    Narrative    EXAM: US LOWER EXTREMITY VENOUS DUPLEX RIGHT  LOCATION: [facility]  DATE/TIME: 6/4/2022 8:50 AM    INDICATION: right leg pain and swelling  COMPARISON: None.  TECHNIQUE: Venous Duplex ultrasound of the right lower extremity with and without compression, augmentation and duplex. Color flow and spectral Doppler with waveform analysis performed.    FINDINGS: Exam includes the common femoral, femoral, popliteal, and contralateral common femoral veins as well as segmentally visualized deep calf veins and greater saphenous vein. The right peroneal vein is not well visualized due to body habitus and   limitations in technique.    RIGHT: No deep vein thrombosis. No superficial thrombophlebitis. No popliteal cyst.      Impression    IMPRESSION:  1.  No deep venous thrombosis in the right lower extremity.

## 2022-06-04 NOTE — ED NOTES
This writer contacted patient's sister- Huong whom is his legal guardian. She provided permission to see and treat patient.

## 2022-06-04 NOTE — PROGRESS NOTES
RECEIVING UNIT ED HANDOFF REVIEW    ED Nurse Handoff Report was reviewed by: Gomez Crenshaw RN on June 4, 2022 at 2:47 PM

## 2022-06-04 NOTE — ED PROVIDER NOTES
History   Chief Complaint:  Fall and Generalized Weakness       The history is provided by the patient. The history is limited by the condition of the patient.      Jagdeep Walker is a 51 year old male with a history of hypothyroidism, intellectual disability, and seizures who presents via EMS with generalized weakness and falls. The patient lives in a group home and was heard screaming for help this morning after he fell. The patient states that nobody came to help him so he tried getting out of bed by himself with just a walker and he fell. He states that he hit his head upon falling. As he presents in the ED, he states that he is experiencing abdominal pain, knee pain, and a slight headache. He denies nausea, vomiting, or neck pain. His abdominal pain is exacerbated when passing a bowel movement.     Review of Systems   Gastrointestinal: Positive for abdominal pain. Negative for nausea and vomiting.   Musculoskeletal: Negative for neck pain.   Neurological: Positive for headaches.   All other systems reviewed and are negative.    Allergies:  No known allergies    Medications:  Albuterol inhaler  Atenolol  Calcium carbonate  Calcium polycarbophil  Carboxymethylcellulose PF  Chlorhexidine  Clonazepam  Dicyclomine  Divalproex sodium delayed-release  Docusate sodium  Levocarnitine  Levocetirizine  Levothyroxine  Loperamide  Lorazepam  Montelukast  Multivitamin  Omeprazole  Polyethylene glycol-propylene glycol  Prednisolone acetate  Risperidone  Sertraline  Topiramate  Vitamin D  Ondansetron  Risperidone  Clonazepam    Past Medical History:     Obesity  Seizures  Depression  Intellectual disability  Allergic rhinitis  CTS  Fisher's esophagus  GERD  Hypothyroidism  Bipolar  Hypertension  Neuroleptic malignant syndrome  Keratoconus  JACLYN  Schizoaffective disorder    Past Surgical History:    Thyroidectomy  Corneal transplant  Esophagogastroduodenoscopy  Cataract removal  Endoscopic ultrasound x2     Family History:     Father: cancer  Mother: stroke    Social History:  The patient presents to the ED alone. He arrived to the ED via EMS services.    Physical Exam     Patient Vitals for the past 24 hrs:   BP Temp Temp src Pulse Resp SpO2   06/04/22 0955 100/50 -- -- 75 17 98 %   06/04/22 0748 -- 97.7  F (36.5  C) Oral -- -- --   06/04/22 0744 -- -- -- 74 -- --   06/04/22 0743 -- -- -- -- -- 96 %   06/04/22 0739 (!) 175/124 -- -- -- 17 --     Physical Exam  Nursing note and vitals reviewed.  Constitutional:  Awake and alert. Cooperative.   HENT:   Head:   Atraumatic.  Nose:    Nose normal.   Mouth/Throat:   Mucous membranes are normal.   Eyes:    Conjunctivae normal and EOM are normal.      Pupils are equal, round, and reactive to light.   Neck:    Trachea normal.   Cardiovascular:  Normal rate, regular rhythm, normal heart sounds and normal pulses. No murmur heard.  Pulmonary/Chest:  Effort normal and breath sounds normal.   Abdominal:   Soft. Normal appearance and bowel sounds are normal.      There is some mild diffuse tenderness to palpation.      There is no rebound and no CVA tenderness.   Musculoskeletal:  No cervical spine tenderness to palpation.  Extremities atraumatic x 4 other than some small abrasions to his knees.  Bilateral lower extremity edema present, right greater than left.  Lymphadenopathy:  No cervical adenopathy.   Neurological:   Awake and alert. Normal strength.      No cranial nerve deficit or sensory deficit. GCS eye subscore is 4. GCS verbal subscore is 5. GCS motor subscore is 6.   Skin:    Skin is intact. No rash noted.   Psychiatric:   Normal mood and affect.      Emergency Department Course     Imaging:  US Lower Extremity Venous Duplex Right   Final Result   IMPRESSION:   1.  No deep venous thrombosis in the right lower extremity.      CT Abdomen Pelvis w Contrast   Preliminary Result   IMPRESSION:    1.  Moderate amount of ascites has increased since the previous exam.   2.  Nonocclusive thrombus  within the main portal vein has improved since the previous exam.   3.  Two cystic lesions in the pancreas have similar appearances to the previous exam, and are suspicious for pseudocysts. There is a new fluid collection along the inferior aspect of the stomach, also suspicious for a pseudocyst.   4.  Mild hazy fat stranding about the pancreatic head has improved, although some degree of persistent acute pancreatitis is not excluded.   5.  Small bilateral pleural effusions have increased slightly.   6.  Nodularity of the liver contour raises the possibility of underlying cirrhosis.           Report per radiology    Laboratory:  Labs Ordered and Resulted from Time of ED Arrival to Time of ED Departure   COMPREHENSIVE METABOLIC PANEL - Abnormal       Result Value    Sodium 142      Potassium 3.5      Chloride 110 (*)     Carbon Dioxide (CO2) 23      Anion Gap 9      Urea Nitrogen 19      Creatinine 1.01      Calcium 9.2      Glucose 69 (*)     Alkaline Phosphatase 55      AST 76 (*)     ALT 28      Protein Total 5.8 (*)     Albumin 2.4 (*)     Bilirubin Total 0.7      GFR Estimate 89     MAGNESIUM - Abnormal    Magnesium 2.5 (*)    NT PROBNP INPATIENT - Abnormal    N terminal Pro BNP Inpatient 1,029 (*)    CK TOTAL - Abnormal     (*)    CBC WITH PLATELETS AND DIFFERENTIAL - Abnormal    WBC Count 4.5      RBC Count 4.07 (*)     Hemoglobin 12.5 (*)     Hematocrit 38.7 (*)     MCV 95      MCH 30.7      MCHC 32.3      RDW 15.7 (*)     Platelet Count 106 (*)     % Neutrophils 49      % Lymphocytes 36      % Monocytes 12      % Eosinophils 3      % Basophils 0      % Immature Granulocytes 0      NRBCs per 100 WBC 0      Absolute Neutrophils 2.2      Absolute Lymphocytes 1.6      Absolute Monocytes 0.5      Absolute Eosinophils 0.1      Absolute Basophils 0.0      Absolute Immature Granulocytes 0.0      Absolute NRBCs 0.0     LIPASE - Abnormal    Lipase 466 (*)    TSH WITH FREE T4 REFLEX - Normal    TSH 3.42      COVID-19 VIRUS (CORONAVIRUS) BY PCR   TYPE AND SCREEN, ADULT    ABO/RH(D) B POS      Antibody Screen Negative      SPECIMEN EXPIRATION DATE [id number]     ABO/RH TYPE AND SCREEN      Emergency Department Course:    Reviewed:  I reviewed nursing notes, vitals, past medical history and Care Everywhere    Assessments:  0742 I obtained history and examined the patient as noted above.     Consults:  1001 I spoke with Dr. Maldonado from the hospitalist service regarding the patient's presentation, workup here in the ED, and plan of care.    Interventions:  0754 NS 1 L IV    Disposition:  The patient was admitted to the hospital under the care of Dr. Maldonado.     Impression & Plan     Medical Decision Making:  This is a 51-year-old male who was brought in by EMS from his group home due to frequent falls and generalized weakness.  He does not appear to have suffered any significant injuries from his falls, and therefore I do not feel that he requires any imaging to assess for injuries such as a CT scan of his head.  However I felt it was reasonable given his history and the recent admission to proceed with the above blood work and CT scan of his abdomen and pelvis.  We also obtained an ultrasound of his right lower extremity to rule out a DVT.  His work-up shows the above findings and specifically what appears to be another pseudocyst of his pancreas as well as ascites and pleural effusions.  He is not in respiratory distress, and he does not appear to have significant acute pancreatitis.  However I think it is best that he be brought into the hospital for further evaluation and management given his repetitive falls and weakness.  I subsequently spoke with Dr. Maldonado, who will be taking care of him.      Diagnosis:    ICD-10-CM    1. Falls frequently  R29.6    2. Acute pancreatitis, unspecified complication status, unspecified pancreatitis type  K85.90    3. Other ascites  R18.8    4. Pleural effusions  J90    5.  Generalized muscle weakness  M62.81      Scribe Disclosure:  I, Tr Miranda, am serving as a scribe at 7:42 AM on 6/4/2022 to document services personally performed by Olman Pascual MD based on my observations and the provider's statements to me.          Olman Pascual MD  06/04/22 1200

## 2022-06-04 NOTE — CONSULTS
51 yr old male with h/o idiopathic pancreatitis and pseudocyst.  Malnutrition. Low Alb  H/O falls and weekness  CT improving from pancreas stand point.  Supportive care.    Full consult in AM    Massimo Clark MD FACP  [facility]

## 2022-06-05 ENCOUNTER — APPOINTMENT (OUTPATIENT)
Dept: GENERAL RADIOLOGY | Facility: CLINIC | Age: 52
DRG: 438 | End: 2022-06-05
Attending: PSYCHIATRY & NEUROLOGY
Payer: MEDICARE

## 2022-06-05 ENCOUNTER — APPOINTMENT (OUTPATIENT)
Dept: OCCUPATIONAL THERAPY | Facility: CLINIC | Age: 52
DRG: 438 | End: 2022-06-05
Attending: HOSPITALIST
Payer: MEDICARE

## 2022-06-05 ENCOUNTER — APPOINTMENT (OUTPATIENT)
Dept: CARDIOLOGY | Facility: CLINIC | Age: 52
DRG: 438 | End: 2022-06-05
Attending: HOSPITALIST
Payer: MEDICARE

## 2022-06-05 ENCOUNTER — HOSPITAL ENCOUNTER (INPATIENT)
Dept: NEUROLOGY | Facility: CLINIC | Age: 52
Discharge: HOME | DRG: 438 | End: 2022-06-05
Attending: HOSPITALIST
Payer: MEDICARE

## 2022-06-05 ENCOUNTER — APPOINTMENT (OUTPATIENT)
Dept: PHYSICAL THERAPY | Facility: CLINIC | Age: 52
DRG: 438 | End: 2022-06-05
Attending: HOSPITALIST
Payer: MEDICARE

## 2022-06-05 LAB
ALBUMIN SERPL-MCNC: 2.4 G/DL (ref 3.4–5)
ALP SERPL-CCNC: 55 U/L (ref 40–150)
ALT SERPL W P-5'-P-CCNC: 27 U/L (ref 0–70)
ANION GAP SERPL CALCULATED.3IONS-SCNC: 7 MMOL/L (ref 3–14)
APTT PPP: 31 SECONDS (ref 22–38)
AST SERPL W P-5'-P-CCNC: 61 U/L (ref 0–45)
BILIRUB SERPL-MCNC: 0.7 MG/DL (ref 0.2–1.3)
BUN SERPL-MCNC: 14 MG/DL (ref 7–30)
CALCIUM SERPL-MCNC: 9.1 MG/DL (ref 8.5–10.1)
CHLORIDE BLD-SCNC: 109 MMOL/L (ref 94–109)
CO2 SERPL-SCNC: 25 MMOL/L (ref 20–32)
CREAT SERPL-MCNC: 0.88 MG/DL (ref 0.66–1.25)
ERYTHROCYTE [DISTWIDTH] IN BLOOD BY AUTOMATED COUNT: 15.8 % (ref 10–15)
GFR SERPL CREATININE-BSD FRML MDRD: >90 ML/MIN/1.73M2
GLUCOSE BLD-MCNC: 88 MG/DL (ref 70–99)
HCT VFR BLD AUTO: 37.5 % (ref 40–53)
HGB BLD-MCNC: 12 G/DL (ref 13.3–17.7)
INR PPP: 1.73 (ref 0.85–1.15)
LVEF ECHO: NORMAL
MCH RBC QN AUTO: 30.2 PG (ref 26.5–33)
MCHC RBC AUTO-ENTMCNC: 32 G/DL (ref 31.5–36.5)
MCV RBC AUTO: 95 FL (ref 78–100)
PLATELET # BLD AUTO: 92 10E3/UL (ref 150–450)
POTASSIUM BLD-SCNC: 3.2 MMOL/L (ref 3.4–5.3)
POTASSIUM BLD-SCNC: 3.5 MMOL/L (ref 3.4–5.3)
PROT SERPL-MCNC: 5.7 G/DL (ref 6.8–8.8)
RBC # BLD AUTO: 3.97 10E6/UL (ref 4.4–5.9)
SODIUM SERPL-SCNC: 141 MMOL/L (ref 133–144)
WBC # BLD AUTO: 3.8 10E3/UL (ref 4–11)

## 2022-06-05 PROCEDURE — 250N000013 HC RX MED GY IP 250 OP 250 PS 637: Performed by: HOSPITALIST

## 2022-06-05 PROCEDURE — 36415 COLL VENOUS BLD VENIPUNCTURE: CPT | Performed by: HOSPITALIST

## 2022-06-05 PROCEDURE — 93306 TTE W/DOPPLER COMPLETE: CPT | Mod: 26 | Performed by: INTERNAL MEDICINE

## 2022-06-05 PROCEDURE — 97535 SELF CARE MNGMENT TRAINING: CPT | Mod: GO

## 2022-06-05 PROCEDURE — 97110 THERAPEUTIC EXERCISES: CPT | Mod: GP

## 2022-06-05 PROCEDURE — 95816 EEG AWAKE AND DROWSY: CPT

## 2022-06-05 PROCEDURE — 80053 COMPREHEN METABOLIC PANEL: CPT | Performed by: HOSPITALIST

## 2022-06-05 PROCEDURE — 85027 COMPLETE CBC AUTOMATED: CPT | Performed by: HOSPITALIST

## 2022-06-05 PROCEDURE — 97530 THERAPEUTIC ACTIVITIES: CPT | Mod: GP

## 2022-06-05 PROCEDURE — 97166 OT EVAL MOD COMPLEX 45 MIN: CPT | Mod: GO

## 2022-06-05 PROCEDURE — 36415 COLL VENOUS BLD VENIPUNCTURE: CPT | Performed by: PSYCHIATRY & NEUROLOGY

## 2022-06-05 PROCEDURE — 84132 ASSAY OF SERUM POTASSIUM: CPT | Performed by: HOSPITALIST

## 2022-06-05 PROCEDURE — 99233 SBSQ HOSP IP/OBS HIGH 50: CPT | Performed by: HOSPITALIST

## 2022-06-05 PROCEDURE — 80201 ASSAY OF TOPIRAMATE: CPT | Performed by: PSYCHIATRY & NEUROLOGY

## 2022-06-05 PROCEDURE — 97161 PT EVAL LOW COMPLEX 20 MIN: CPT | Mod: GP

## 2022-06-05 PROCEDURE — 85610 PROTHROMBIN TIME: CPT | Performed by: HOSPITALIST

## 2022-06-05 PROCEDURE — 85730 THROMBOPLASTIN TIME PARTIAL: CPT | Performed by: HOSPITALIST

## 2022-06-05 PROCEDURE — 120N000001 HC R&B MED SURG/OB

## 2022-06-05 PROCEDURE — 999N000208 ECHOCARDIOGRAM COMPLETE

## 2022-06-05 PROCEDURE — 255N000002 HC RX 255 OP 636: Performed by: HOSPITALIST

## 2022-06-05 PROCEDURE — 72170 X-RAY EXAM OF PELVIS: CPT

## 2022-06-05 RX ORDER — POTASSIUM CHLORIDE 1500 MG/1
40 TABLET, EXTENDED RELEASE ORAL ONCE
Status: COMPLETED | OUTPATIENT
Start: 2022-06-05 | End: 2022-06-05

## 2022-06-05 RX ADMIN — IPRATROPIUM BROMIDE 2 SPRAY: 42 SPRAY NASAL at 08:50

## 2022-06-05 RX ADMIN — DICYCLOMINE HYDROCHLORIDE 20 MG: 20 TABLET ORAL at 07:06

## 2022-06-05 RX ADMIN — MONTELUKAST 10 MG: 10 TABLET, FILM COATED ORAL at 21:29

## 2022-06-05 RX ADMIN — LEVOCARNITINE 330 MG: 330 TABLET ORAL at 17:45

## 2022-06-05 RX ADMIN — DIVALPROEX SODIUM 500 MG: 500 TABLET, DELAYED RELEASE ORAL at 08:43

## 2022-06-05 RX ADMIN — Medication 1 DROP: at 21:29

## 2022-06-05 RX ADMIN — LEVOCARNITINE 330 MG: 330 TABLET ORAL at 21:32

## 2022-06-05 RX ADMIN — POTASSIUM CHLORIDE 40 MEQ: 1500 TABLET, EXTENDED RELEASE ORAL at 08:43

## 2022-06-05 RX ADMIN — DOCUSATE SODIUM 100 MG: 100 CAPSULE, LIQUID FILLED ORAL at 08:43

## 2022-06-05 RX ADMIN — PANTOPRAZOLE SODIUM 40 MG: 40 TABLET, DELAYED RELEASE ORAL at 21:29

## 2022-06-05 RX ADMIN — TOPIRAMATE 100 MG: 100 TABLET, FILM COATED ORAL at 21:29

## 2022-06-05 RX ADMIN — ATENOLOL 25 MG: 25 TABLET ORAL at 08:43

## 2022-06-05 RX ADMIN — DICYCLOMINE HYDROCHLORIDE 20 MG: 20 TABLET ORAL at 17:45

## 2022-06-05 RX ADMIN — DICYCLOMINE HYDROCHLORIDE 20 MG: 20 TABLET ORAL at 11:41

## 2022-06-05 RX ADMIN — PANTOPRAZOLE SODIUM 40 MG: 40 TABLET, DELAYED RELEASE ORAL at 08:43

## 2022-06-05 RX ADMIN — LEVOTHYROXINE SODIUM 50 MCG: 50 TABLET ORAL at 07:06

## 2022-06-05 RX ADMIN — DIVALPROEX SODIUM 500 MG: 500 TABLET, DELAYED RELEASE ORAL at 21:29

## 2022-06-05 RX ADMIN — LEVOCARNITINE 330 MG: 330 TABLET ORAL at 08:43

## 2022-06-05 RX ADMIN — PREDNISOLONE ACETATE 1 DROP: 10 SUSPENSION/ DROPS OPHTHALMIC at 08:50

## 2022-06-05 RX ADMIN — CHLORHEXIDINE GLUCONATE 15 ML: 1.2 SOLUTION ORAL at 08:44

## 2022-06-05 RX ADMIN — HUMAN ALBUMIN MICROSPHERES AND PERFLUTREN 6 ML: 10; .22 INJECTION, SOLUTION INTRAVENOUS at 11:22

## 2022-06-05 RX ADMIN — SERTRALINE HYDROCHLORIDE 100 MG: 100 TABLET ORAL at 08:43

## 2022-06-05 RX ADMIN — DICYCLOMINE HYDROCHLORIDE 20 MG: 20 TABLET ORAL at 21:29

## 2022-06-05 RX ADMIN — RISPERIDONE 2 MG: 2 TABLET ORAL at 21:29

## 2022-06-05 RX ADMIN — DIVALPROEX SODIUM 500 MG: 500 TABLET, DELAYED RELEASE ORAL at 17:45

## 2022-06-05 RX ADMIN — IPRATROPIUM BROMIDE 2 SPRAY: 42 SPRAY NASAL at 21:32

## 2022-06-05 RX ADMIN — IPRATROPIUM BROMIDE 2 SPRAY: 42 SPRAY NASAL at 17:46

## 2022-06-05 RX ADMIN — RISPERIDONE 2 MG: 2 TABLET ORAL at 08:43

## 2022-06-05 ASSESSMENT — ACTIVITIES OF DAILY LIVING (ADL)
ADLS_ACUITY_SCORE: 38
ADLS_ACUITY_SCORE: 37
PREVIOUS_RESPONSIBILITIES: LAUNDRY
ADLS_ACUITY_SCORE: 37
ADLS_ACUITY_SCORE: 38
ADLS_ACUITY_SCORE: 38
ADLS_ACUITY_SCORE: 41
ADLS_ACUITY_SCORE: 37
ADLS_ACUITY_SCORE: 42
ADLS_ACUITY_SCORE: 43
ADLS_ACUITY_SCORE: 43
ADLS_ACUITY_SCORE: 42
ADLS_ACUITY_SCORE: 37

## 2022-06-05 NOTE — PROGRESS NOTES
"[facility]    Medicine Progress Note - Hospitalist Service    Date of Admission:  6/4/2022    Assessment & Plan        Jagdeep Walker is a 51 year old male admitted on 6/4/2022.   Past history of HTN, hypothyroid, seizure disorder, schizoaffective and bipolar disorder, GERD, recent admission 5/8-5/17/22 for acute pancreatitis with pseudocysts, portal vein thrombus who presents from his group home with repeated falls and metabolic encephalopathy.     Acute metabolic encephalopathy of unclear etiology  Frequent falls  Etiology unclear, very broad differential.  Currently highest concern for intracranial pathology (bleed vs stroke) vs hepatic encephalopathy.  Patient and sister noting tremor, balance difficulty, sister reporting he is \"foggy\".  Nodular liver noted on CT abd, with hypoalbuminema and recently elevated INR - exam unclear as to tremor vs asterixis.  Cannot rule out valproic acid toxicity or polypharmacy or partial seizure.  Lower suspicion for infection as afebrile without leukocytosis and offering no infectious complaints.  Neuro exam notable for mild dysconjugate gaze and asymmetric pupil (suspect chronic as clearly had right eye surgery) as well as tremor, orientation to person, place and month.  Lytes ok.  TSH wnl.  - Admitted to inpatient.  - CT head showed no acute changes.  - MRI brain showed no acute changes.  - Neuro checks Qshift.  - Orthostatic blood pressure negative.  - Telemetry.  - Procal and CRP mildly elevated, recheck in AM.  - UA negative for signs of infection.  - Ammonia level within normal limits.  - Valproic acid level pending (prior free level was elevated at 34 on 5/8/22).  - VBG OK on 6/4/22.  - PT/OT consulted.  - Mental status much improved today, etiology still unclear.  - Family has concerns about frequent falls recently.  - Will ask neurology to evaluate patient, appreciate their assistance.  - Plan for EEG tomorrow, apparently this had been " recommended recently by his outpatient neurologist.  - Care transitions consult, suspect he will need TCU at the time of discharge.    ADAN  Baseline Cr 0.7, admission Cr 1.0.  Suspect poor oral intake.   - Received 1L bolus in ED, will hold on further fluids given edema.  - Creatinine improved, recheck in AM.  - Monitor closely as received contrast 6/4.    Recent acute pancreatitis with pseudocysts  Possible cirrhosis  Lower extremity edema  Hypoalbuminemia   See recent discharge summary for details, was to follow up with [facility] in 1 month with repeat CT imaging.  CT 6/4/22 showing moderate ascites, 2 stable pseudocysts but likely 1 new pseudocyst, improved pancreatic inflammation, liver nodularity concerning for cirrhosis.  Hypoalbuminemia may be due to recent illness vs poor intake but cannot rule out cirrhosis, INR was previously elevated as well.   - [facility] consult.  - As above, low suspicion for infection.  - Ammonia as above, obtain coags.  - Abdominal US showed findings concerning for cirrhosis as well as a small amount of ascites.  - 2g Na diet.  - TTE did not show evidence of CHF as source of edema..    Recent portal vein thrombosis  Diagnosed during prior admission, has not been treated with anticoagulation.  CT 6/4 showing non-occlusive portal vein thrombus has improved from prior.   - Anticoagulation not recommended by GI during previous admission.    Thrombocytopenia   Platelet 106 at admission, no history of low platelets.  Possibly related to cirrhosis.  Recent B12 and folate levels wnl.  - Monitor CBC.    Chronic anemia   - Baseline hemoglobin about 12 g/dL, stable at admission.    Seizure disorder  - Continue PTA Depakote, topiramate.    Bipolar disorder  Schizoaffective disorder  Resides in group home.   - Continue PTA Klonopin, risperdal, sertraline.    Hypertension  - Continue PTA atenolol.    Asthma  JACLYN  - Continue PTA Singulair.  - Continue Bipap at bedtime.    Hypothyroid  TSH wnl  "6/4/22.   - Continue PTA levothyroxine.     GERD  Fisher's esophagus  - Continue PTA omeprazole.    Carnitine deficiency  - Continue PTA levocarnitine.        Diet: Combination Diet 2 gm NA Diet    DVT Prophylaxis: Pneumatic Compression Devices  Santana Catheter: Not present  Central Lines: None  Cardiac Monitoring: ACTIVE order. Indication: frequent falls  Code Status: Full Code      Disposition Plan   Expected Discharge: 06/07/2022     Anticipated discharge location:  Awaiting care coordination huddle  Delays:            The patient's care was discussed with the Bedside Nurse, Patient and Patient's Family.    Fede Jones MD  Hospitalist Service  [facility]  Securely message with the Vocera Web Console (learn more here)  Text page via [employer] Paging/[employer]PI ([employer]         Clinically Significant Risk Factors Present on Admission             # Obesity: Estimated body mass index is 35.31 kg/m  as calculated from the following:    Height as of this encounter: 1.651 m (5' 5\").    Weight as of this encounter: 96.3 kg (212 lb 3.2 oz).      ______________________________________________________________________    Interval History   Jagdeep Walker was seen this afternoon. He seems to be doing better today. Two family members were in the room with him, both state he seems to be back to baseline as far as his mental status. Family had questions about his liver, pancreatitis, results of imaging, plan of care. Concern about recent falls, would like further investigation, feel that he needs TCU at time of discharge.    Data reviewed today: I reviewed all medications, new labs and imaging results over the last 24 hours. I personally reviewed no images or EKG's today.    Physical Exam   Vital Signs: Temp: 97.2  F (36.2  C) Temp src: Axillary BP: 99/66 Pulse: 51   Resp: 18 SpO2: 92 %      Weight: 212 lbs 3.2 oz  Constitutional: awake, alert, cooperative, no apparent distress, appropriate in conversation " with his family, laying in the hospital bed, talking on the phone with a friend.  Remainder of exam deferred at this time.    Data   Recent Labs   Lab 06/05/22  1239 06/05/22  0728 06/04/22  0754   WBC  --  3.8* 4.5   HGB  --  12.0* 12.5*   MCV  --  95 95   PLT  --  92* 106*   INR  --  1.73*  --    NA  --  141 142   POTASSIUM 3.5 3.2* 3.5   CHLORIDE  --  109 110*   CO2  --  25 23   BUN  --  14 19   CR  --  0.88 1.01   ANIONGAP  --  7 9   NASIR  --  9.1 9.2   GLC  --  88 69*   ALBUMIN  --  2.4* 2.4*   PROTTOTAL  --  5.7* 5.8*   BILITOTAL  --  0.7 0.7   ALKPHOS  --  55 55   ALT  --  27 28   AST  --  61* 76*   LIPASE  --   --  466*     Medications       atenolol  25 mg Oral Daily     carboxymethylcellulose PF  1 drop Right Eye At Bedtime     chlorhexidine  15 mL Swish & Spit Daily     dicyclomine  20 mg Oral 4x Daily AC & HS     divalproex sodium delayed-release  500 mg Oral TID     docusate sodium  100 mg Oral Daily     ipratropium  2 spray Both Nostrils TID     levOCARNitine  330 mg Oral TID     levothyroxine  50 mcg Oral Daily     montelukast  10 mg Oral At Bedtime     pantoprazole  40 mg Oral BID     prednisoLONE acetate  1 drop Right Eye Daily     risperiDONE  2 mg Oral BID     sertraline  100 mg Oral Daily     sodium chloride (PF)  3 mL Intracatheter Q8H     topiramate  100 mg Oral At Bedtime

## 2022-06-05 NOTE — PROVIDER NOTIFICATION
MD Notification    Notified Person: MD    Notified Person Name: Jason    Notification Date/Time: June 4, 2022 @ 10:10 PM     Notification Interaction: Amcom page    Purpose of Notification: Pt's afternoon meds held d/t NPO for MRI/US. Pt began eating at 2145 after procedure. Should I give synthroid now, late, or hold until AM?    Orders Received: Hold dose until AM.    Comments:

## 2022-06-05 NOTE — PROGRESS NOTES
"SPIRITUAL HEALTH SERVICES Progress Note  [facility]     Admissions Request.    Jagdeep was watching tennis on tv when I visited.     He spoke of concussion and struggling to find words or understand me at times \"so we could connect.\"     He was able to express that he wondered about how long he'd have to stay in hospital and why he has trouble walking.    He also shared that he hopes he doesn't hit his head again, and he spoke of talking with his sisters regularly. Jagdeep named wanting to \"go back to the house again,\" and that his family also wants that for him.    I offered words of reassurance and encouragement, and said a blessing. I invited Jagdeep to ask for help from nursing to contact  again if he desires.     remains available.    Rev Kylee Bearden  Associate   Spiritual Health Phone Line [phone]  Spiritual Health Pager [phone]  "

## 2022-06-05 NOTE — PROGRESS NOTES
06/05/22 1605   Quick Adds   Type of Visit Initial PT Evaluation   Living Environment   People in Home facility resident   Current Living Arrangements group home   Home Accessibility no concerns   Transportation Anticipated health plan transportation   Living Environment Comments Pt lives in accessible group home.   Self-Care   Usual Activity Tolerance moderate   Current Activity Tolerance fair   Equipment Currently Used at Home walker, rolling;grab bar, tub/shower   Fall history within last six months yes   Number of times patient has fallen within last six months 5   Activity/Exercise/Self-Care Comment Pt has been using FWW for ambulation since last admission 5/17. Pt's family reports pt receives assist with shower, dressing, meals, medications. Pt reports occaisionally needed assist to get in and out of the bed.  Pt was working with home PT about 1-2x per week since last hospitalization. Pt was unable to get self off of floor after each fall.   General Information   Onset of Illness/Injury or Date of Surgery 06/04/22   Referring Physician Jason Maldonado MD   Patient/Family Therapy Goals Statement (PT) Family would like pt to go to TCU prior to returning to group home.   Pertinent History of Current Problem (include personal factors and/or comorbidities that impact the POC) Pt is a 51 y.o. male admitted on 6/4/22 with repeated falls and metabolic encephalopathy. PMHx significant for HTN, hypothyroid, seizure disorder, schizoaffective and bipolar disorder, GERD, recent admission 5/8-5/17/22 for acute pancreatitis with pseudocysts, portal vein thrombus.   Existing Precautions/Restrictions fall;cardiac;seizures   Cognition   Affect/Mental Status (Cognition) WFL   Orientation Status (Cognition) oriented x 4   Follows Commands (Cognition) delayed response/completion;increased processing time needed;follows one-step commands;50-74% accuracy;physical/tactile prompts required;repetition of directions required   Pain  Assessment   Patient Currently in Pain No   Integumentary/Edema   Integumentary/Edema no deficits were identifed   Posture    Posture Forward head position;Protracted shoulders   Range of Motion (ROM)   ROM Comment Grossly WFL BLE and UE   Strength (Manual Muscle Testing)   Strength (Manual Muscle Testing) Able to perform R SLR;Able to perform L SLR;MMT: Knee   Strength Comments Global weakness observed with functional transfers.   Left Knee (Manual Muscle Testing)   Knee Flexion - Left Side (3+/5) fair plus, left   Knee Extension - Left Side (4/5) good, left   Right Knee (Manual Muscle Testing)   Knee Flexion - Right Side (3+/5) fair plus, right   Knee Extension - Right Side (4/5) good, right   Bed Mobility   Comment, (Bed Mobility) NA   Transfers   Comment, (Transfers) Sit<>stand to FWW, Starr, pt relies heavily on posterior legs on chair, highlighting weakness in LE's   Gait/Stairs (Locomotion)   Distance in Feet (Required for LE Total Joints) 5'eval + 125' treatment   Comment, (Gait/Stairs) Amb with FWW, Starr. Impaired balance, variable step lengths, wide MONSERRAT.   Balance   Balance Comments Able to maintain seated balance with BUE support. Able to maintain standing balance in FWW, Starr initially.   Sensory Examination   Sensory Perception patient reports no sensory changes   Coordination   Coordination Comments Slight tremor noted in L UE at rest and with movement intermittently.   Clinical Impression   Criteria for Skilled Therapeutic Intervention Yes, treatment indicated   PT Diagnosis (PT) Impaired gait   Influenced by the following impairments decreased functional strength, decreased activity tolerance, impaired balance   Functional limitations due to impairments fall risk, impaired functional independence, impaired ADLs and IADLs   Clinical Presentation (PT Evaluation Complexity) Evolving/Changing   Clinical Presentation Rationale per MR and clinical judgement   Clinical Decision Making (Complexity) low  complexity   Planned Therapy Interventions (PT) balance training;bed mobility training;gait training;home exercise program;neuromuscular re-education;ROM (range of motion);strengthening;stretching;patient/family education;transfer training;progressive activity/exercise   Risk & Benefits of therapy have been explained evaluation/treatment results reviewed;care plan/treatment goals reviewed;risks/benefits reviewed;current/potential barriers reviewed;participants voiced agreement with care plan;participants included;patient;sibling   PT Discharge Planning   PT Discharge Recommendation (DC Rec) Transitional Care Facility   PT Rationale for DC Rec Pt is well below baseline functional independence, strength and balance. Pt has had 5 falls recently and continues to be a fall risk, depsite returning home with home therapies from last hospitalization. Pt would benefit from continued skilled therapy to progress functional strength and balance prior to returning to group home safely. Pt requires close 24H supervision and assist of 1 for transfers and ambulation.   PT Brief overview of current status STS to FWW, Starr. Amb with FWW, Starr   Total Evaluation Time   Total Evaluation Time (Minutes) 5   Physical Therapy Goals   PT Frequency 5x/week   PT Predicted Duration/Target Date for Goal Attainment 06/12/22   PT Goals Bed Mobility;Transfers;Gait;PT Goal 1   PT: Bed Mobility Modified independent;Supine to/from sit;Rolling;Bridging   PT: Transfers Modified independent;Sit to/from stand;Bed to/from chair;Assistive device   PT: Gait Modified independent;Assistive device;Rolling walker;Greater than 200 feet   PT: Goal 1 Pt will score >22/30 on FGA to demonstrate a decreased risk for falls.

## 2022-06-05 NOTE — PLAN OF CARE
Goal Outcome Evaluation:    Plan of Care Reviewed With: patient      [id number]: A&Ox4, baseline mild cognitive delay, forgetful at times. VSS. Tele NSR, can be nikko at times. Denies pain. Up A1/GB/Walker. Tolerating 2g Na diet. Continent - sometimes doesn't make it to the bathroom in time. PIV JEREMY. Neurology saw patient this evening - EEG done. XR of pelvis ordered, noted pain in right hip with ambulation.

## 2022-06-05 NOTE — PROVIDER NOTIFICATION
MD Notification    Notified Person: MD    Notified Person Name: Meche    Notification Date/Time: June 5, 2022 @ 1:23 AM     Notification Interaction: Amcom page    Purpose of Notification: FYI - brain MRI and abd US results available in chart. GI will follow up in AM.    Orders Received:    Comments:

## 2022-06-05 NOTE — PROGRESS NOTES
[facility] EEG #  portable, ordered by Dr. Fede Jones      Pt has developmental delay.      Awake on recording.   He can follow commands.   No activations.

## 2022-06-05 NOTE — PLAN OF CARE
Shift Note: [id number]    A&Ox4, baseline mild cognitive delay, forgetful at times. VSS on RA, ex soft Bps. LS-diminished. Q4 neuros. Irregular, fixed R pupil, nonreactive to light - hx of cataract surgery last year. 2g Na diet, tolerating well. Ate a large lunch, spit up once he sat up once. Tele- NSR. BLE edema 2+. Cont B/B, incontinent of bladder at times. A1- GB/W- walker at baseline. 2 PIV-SL. Echo today. K+ 3.2- started on protocol, recheck 3.5, order in to redraw tomorrow morning. WBC 3.8, Hgb 12 (chronic amenia), Plt 92  Echo today. PT/OT following. Discharge back to group home pending clinical findings.

## 2022-06-05 NOTE — CONSULTS
"REPORT OF CONSULTATION  Patient Name: Jagdeep Walker   MRN: [id number]   : 1970   Admission Date/Time: 2022  7:37 AM   Primary Care Physician: Joel Werner   Consulting Physician: Lynda Huerta MD    REASON FOR CONSULTATION  I am asked to see this patient in consultation at the request of Jason Maldonado MD for evaluation of encephalopathy and frequent falls.     HPI: This is a 51 year old male with history of intellectual disability, hypertension, hypothyroid, schizoaffective and bipolar disorder, seizure disorder, GERD, pancreatitis with pseudocyst, portal vein thrombosis who was admitted on  from his group home with repeated falls and metabolic encephalopathy.  Patient is on Depakote and levocarnitine for his seizure disorder.  He is also on topiramate and clonazepam.  In the ER patient complained of abdominal pain and was evaluated with CT abdomen and pelvis as well as ultrasound of his right lower extremity to rule out DVT.  He was diagnosed with ascites and pleural effusions and was admitted for further cares.  Per patient's family report patient have been having \"foggy \"appearance, balance difficulties and tremor.  Patient was evaluated with CT and MRI of the brain which showed no acute changes.  He had orthostatic blood pressures done due to concern for falls and those were negative.  Ammonia level was within normal limits.  Depakote level drawn and still pending.  Patient was diagnosed with ADAN, suspected to have poor oral intake.  Not able to find notes from patient's neurologist.    Currently patient reported that he has been falling intermittently due to his legs feeling rubbery.  Patient reported that at times he is walking and his legs are trying to give out, he tries to grab onto his walker and can hold his balance.  This been going on since February since patient was sick with his pancreatitis.  Patient has been following with Dr. Flaherty in [facility] for this concern, was " recommended to have EEG.  Per sisters and the patient patient did not have actual seizures for quite a while.  Reportedly had generalized tonic-clonic seizures years back, however, most recently would have nonepileptic seizures concerning for dystonia.  Patient has been having more tremors.  Patient reported that has really hard stools and has pain with defecation.  No sensory loss with his bowel movements or urination.  When asked about his hydration patient reported that sometimes would not drink enough water, he insisted that he was okay to take 1 pop per day.      PAST MEDICAL HISTORY  No past medical history on file.     PAST SURGICAL HISTORY  Past Surgical History:   Procedure Laterality Date     ENDOSCOPIC ULTRASOUND UPPER GASTROINTESTINAL TRACT (GI) N/A 5/12/2022    Procedure: ENDOSCOPIC ULTRASOUND, ESOPHAGOSCOPY / UPPER GASTROINTESTINAL TRACT (GI);  Surgeon: Massimo Clark MD;  Location:  GI     ESOPHAGOSCOPY, GASTROSCOPY, DUODENOSCOPY (EGD), COMBINED N/A 5/12/2022    Procedure: ESOPHAGOGASTRODUODENOSCOPY, WITH BIOPSY;  Surgeon: Massimo Clark MD;  Location:  GI        ALLERGIES/SENSITIVITIES  No Known Allergies      CURRENT MEDS  No current outpatient medications on file.        SOCIAL HISTORY  Social History     Socioeconomic History     Marital status: Single     Spouse name: Not on file     Number of children: Not on file     Years of education: Not on file     Highest education level: Not on file   Occupational History     Not on file   Tobacco Use     Smoking status: Not on file     Smokeless tobacco: Not on file   Substance and Sexual Activity     Alcohol use: Not on file     Drug use: Not on file     Sexual activity: Not on file   Other Topics Concern     Not on file   Social History Narrative     Not on file     Social Determinants of Health     Financial Resource Strain: Not on file   Food Insecurity: Not on file   Transportation Needs: Not on file   Physical Activity: Not on  "file   Stress: Not on file   Social Connections: Not on file   Intimate Partner Violence: Not on file   Housing Stability: Not on file       FAMILY HISTORY  No family history on file.     REVIEW OF SYSTEMS: Systems (general, cardiovascular, respiratory, ENT/eyes, GI, , musculo-skeletal, neuro, psychiatric, allergology-immunologic and endocrine) were reviewed with pertinent positives noted in HPI and otherwise all others were negative.     EXAM: BP 99/66 (BP Location: Left arm)   Pulse 51   Temp 97.2  F (36.2  C) (Axillary)   Resp 18   Ht 1.651 m (5' 5\")   Wt 96.3 kg (212 lb 3.2 oz)   SpO2 92%   BMI 35.31 kg/m     General Appearance: no acute distress  HEENT: NC/AT  Neck: full passive ROM; no carotid bruits  Chest: CTAB; normal respiratory effort  Cardio: RRR; no murmurs; normal peripheral pulses  Abd: NT/ND  Mental status: Alert and able to answer questions well. Speech clear and language are within normal limits.  Cranial nerves: Pupils unequal, right somewhat misshapened, left is round, and they both reactive to light. Extraocular movements intact. Visual fields grossly full to confrontation. Facial strength normal and sensation intact bilaterally. Palate elevation symmetric. Hearing intact. Tongue protrusion midline. Shoulder shrug symmetric.  Motor: Muscle tone and bulk are normal.  Strength within normal limits in all extremities, except the right lower extremity hip flexion was 4/5.  Patient had mild postural tremors right hand more than left.  Sensory: Light touch sensation was normal.  Coordination: Finger-to-nose, heel-to-examiner's hand are performed normally.   Reflexes: Symmetric and within normal limits. Flexor plantar responses bilaterally.  Gait: Antalgic to the right leg, patient was able to ambulate without assist    IMAGING: I have personally reviewed the patient's imaging:   MRI brain:  IMPRESSION:  1.  No recent infarct, intracranial mass, abnormal enhancement or evidence of intracranial " hemorrhage.  2.  Mild to moderate volume loss and mild presumed chronic small vessel ischemic changes.  3.  Tiny chronic infarcts in the cerebellar hemispheres.  LABS:     Component      Latest Ref Rng & Units 6/5/2022   Sodium      133 - 144 mmol/L 141   Potassium      3.4 - 5.3 mmol/L 3.2 (L)   Chloride      94 - 109 mmol/L 109   Carbon Dioxide      20 - 32 mmol/L 25   Anion Gap      3 - 14 mmol/L 7   Urea Nitrogen      7 - 30 mg/dL 14   Creatinine      0.66 - 1.25 mg/dL 0.88   Calcium      8.5 - 10.1 mg/dL 9.1   Glucose      70 - 99 mg/dL 88   Alkaline Phosphatase      40 - 150 U/L 55   AST      0 - 45 U/L 61 (H)   ALT      0 - 70 U/L 27   Protein Total      6.8 - 8.8 g/dL 5.7 (L)   Albumin      3.4 - 5.0 g/dL 2.4 (L)   Bilirubin Total      0.2 - 1.3 mg/dL 0.7   GFR Estimate      >60 mL/min/1.73m2 >90   WBC      4.0 - 11.0 10e3/uL 3.8 (L)   RBC Count      4.40 - 5.90 10e6/uL 3.97 (L)   Hemoglobin      13.3 - 17.7 g/dL 12.0 (L)   Hematocrit      40.0 - 53.0 % 37.5 (L)   MCV      78 - 100 fL 95   MCH      26.5 - 33.0 pg 30.2   MCHC      31.5 - 36.5 g/dL 32.0   RDW      10.0 - 15.0 % 15.8 (H)   Platelet Count      150 - 450 10e3/uL 92 (L)       CONSULTATION IMPRESSION/RECOMMENDATIONS:   Active Problems:    Pleural effusion    Generalized muscle weakness    Falls frequently    Other ascites    Acute pancreatitis, unspecified complication status, unspecified pancreatitis type     This is a 51 year old man with history of epilepsy who presents with frequent falls.  Per patient he falls because his legs giving out.  Apparently it occurs intermittently.  On exam patient has some weakness with his right lower extremity hip flexion.  MRI brain with small vessel ischemic changes.  Patient reportedly found to be dehydrated.  He was just diagnosed with pleural effusions.   Apparently patient seizure disorder has been well controlled, with no apparent seizures for the last 3 years.  - Discussed that I recommend x-ray of the  hips to make sure no fractures or other concerns of his right hip  - Depakote level pending, I ordered Topamax level which is also still pending  - Discussed that in the setting of dehydration and decreased kidney function Topamax can have effect on the brain.  Per discussion with patient and his sisters there was a concern whether he needs both seizure medications, he does not have any headaches.  He has been started on those medications years back and has not been having any active seizures recently  - Will evaluate with EEG, and if normal will consider taper down Depakote given patient has tremors on exam.      Will continue to follow.   Please page with questions: pager [phone]  I thank Dr. Jason aMldonado for the opportunity to participate in the patient's care.     Total consult time 110 minutes with the time spent on chart review, imaging and lab results review, and more than 50 % of the time spent on coordination of cares and face-to-face time with the patient including counseling regarding pathophysiology of the above conditions, results of the tests and further directions of care.     Lynda Huerta MD  [facility]

## 2022-06-05 NOTE — PROVIDER NOTIFICATION
"MD Notification    Notified Person: MD    Notified Person Name: Fede Jones     Notification Date/Time: 06/05/22  8:02 AM      Notification Interaction: Germaine    Purpose of Notification: \"FYI: Pt's AM labs came back. K+ 3.2, do you want him on the protocol?\" \"Platelets 92\"     Orders Received:    Comments:    "

## 2022-06-05 NOTE — UTILIZATION REVIEW
Admission Status; Secondary Review Determination       Under the authority of the Utilization Management Committee, the utilization review process indicated a secondary review on the above patient. The review outcome is based on review of the medical records, discussions with staff, and applying clinical experience noted on the date of the review.     (x) Inpatient Status Appropriate - This patient's medical care is consistent with medical management for inpatient care and reasonable inpatient medical practice.     RATIONALE FOR DETERMINATION   51 year old male admitted on 6/4/2022.   Past history of HTN, hypothyroid, seizure disorder, schizoaffective and bipolar disorder, GERD, recent admission 5/8-5/17/22 for acute pancreatitis with pseudocysts, portal vein thrombus who presents from his group home with repeated falls and metabolic encephalopathy.   Patient requires inpatient admission versus short stay observation or outpatient treatment for the following reasons: Significant past medical history complex with multiple issues to be addressed including patient worsening encephalopathy requiring further work-up specially in the setting of liver cirrhosis, anticipated extensive work-up including neurological work-up, neuroimaging, evaluation by gastroenterology.  Patient has Medicare with extensive past medical history and multiple issues to be addressed as well as comprehensive work-up anticipated care for more than 2 midnights in the hospital is reasonable.    This is a conditional review for a Medicare patient, at the time of this review patient is anticipated to require at least 1 more night of hospital care; however if plan changes and discharges before 2 midnights please send for post discharge review.      This document was produced using voice recognition software       The information on this document is developed by the utilization review team in order for the business office to ensure compliance. This  only denotes the appropriateness of proper admission status and does not reflect the quality of care rendered.   The definitions of Inpatient Status and Observation Status used in making the determination above are those provided in the CMS Coverage Manual, Chapter 1 and Chapter 6, section 70.4.   Sincerely,   STEPHANIE KOENIG MD   System Medical Director   Utilization [facility].

## 2022-06-05 NOTE — PLAN OF CARE
Goal Outcome Evaluation:        A&Ox4, baseline mild cognitive delays. Irregular, fixed R pupil, nonreactive to light - hx of cataract surgery. VSS on RA, refused Bipap overnight. Brain MRI and abd US completed in evening, MD notified - see chart for results. Tolerating 2g Na diet, good appetite. Continent of B&B, no BM this shift. PIV SL. Up A1 with GB. Discharge back to group home pending clinical findings.

## 2022-06-05 NOTE — PROGRESS NOTES
"   06/05/22 1500   Quick Adds   Type of Visit Initial Occupational Therapy Evaluation   Living Environment   People in Home other (see comments)  (lives in group home with 3 other people and supervision staff 24/7)   Current Living Arrangements group home   Home Accessibility no concerns   Transportation Anticipated health plan transportation   Living Environment Comments Pt lives in accessible group home. Pt has walk in shower with grab bars and a standard toilet. uses FWW at baseline. Multiple falls reported   Self-Care   Usual Activity Tolerance moderate   Current Activity Tolerance poor   Equipment Currently Used at Home walker, rolling   Fall history within last six months yes   Number of times patient has fallen within last six months 4   Activity/Exercise/Self-Care Comment Sisters report \"multiple\"   Instrumental Activities of Daily Living (IADL)   Previous Responsibilities laundry  (Also completes dishes)   IADL Comments Supervising staff at group home completes most ADL tasks. Pt not responsible for medications or finances. Pt is a hatfield of the [facility] and has an active guardian   General Information   Onset of Illness/Injury or Date of Surgery 06/04/22   Referring Physician Jason Maldonado MD   Patient/Family Therapy Goal Statement (OT) To go to group home/prevent more falls from happening   Additional Occupational Profile Info/Pertinent History of Current Problem Per ED chart \"Jagdeep Walker is a 51 year old male with a history of hypothyroidism, intellectual disability, and seizures who presents via EMS with generalized weakness and falls. The patient lives in a group home and was heard screaming for help this morning after he fell. The patient states that nobody came to help him so he tried getting out of bed by himself with just a walker and he fell. He states that he hit his head upon falling. As he presents in the ED, he states that he is experiencing abdominal pain, knee pain, and a slight headache. " "He denies nausea, vomiting, or neck pain. His abdominal pain is exacerbated when passing a bowel movement.\"   Existing Precautions/Restrictions fall   Limitations/Impairments safety/cognitive  (Intellectual disability at baseline)   General Observations and Info Pt is a very pleasant 51 year old male admitted to hospital after a fall. Sisters Laura and Bree were present during evaluation and able to give details on prior living situation and baseline notes. VSS on RA, denies dizziness, benefits from repeated instruction and short/simple commands. A&Ox4, but confused   Cognitive Status Examination   Orientation Status orientation to person, place and time   Cognitive Status Comments confused at times, intellectual disability   Visual Perception   Visual Impairment/Limitations corrective lenses full-time   Impact of Vision Impairment on Function (Vision) States that he lost his glasses in the ED   Pain Assessment   Patient Currently in Pain No   Posture   Posture forward head position;protracted shoulders   Range of Motion Comprehensive   General Range of Motion bilateral upper extremity ROM WFL   Coordination   Upper Extremity Coordination No deficits were identified   Bed Mobility   Bed Mobility supine-sit;sit-supine   Supine-Sit [place] (Bed Mobility) supervision   Sit-Supine [place] (Bed Mobility) supervision   Assistive Device (Bed Mobility) bed rails   Comment (Bed Mobility) Increased time   Transfers   Transfers sit-stand transfer   Sit-Stand Transfer   Sit-Stand [place] (Transfers) minimum assist (75% patient effort)   Assistive Device (Sit-Stand Transfers) walker, standard   Sit/Stand Transfer Comments Hand held assist, very unsteady   Balance   Balance Assessment standing balance: dynamic   Balance Comments Increased postural sway noted   Activities of Daily Living   BADL Assessment/Intervention lower body dressing;grooming   Lower Body Dressing Assessment/Training   Position (Lower Body " Dressing) edge of bed sitting   Comment, (Lower Body Dressing) to don socks, unable to reach down/achieve figure four sit   [place] Level (Lower Body Dressing) moderate assist (50% patient effort)   Grooming Assessment/Training   Position (Grooming) sink side   [place] Level (Grooming) modified independence;supervision   Comment, (Grooming) to wash face, vc's for sequencing and thoroughness   Clinical Impression   Criteria for Skilled Therapeutic Interventions Met (OT) Yes, treatment indicated   OT Diagnosis Decreased independence from generalized weakness   Influenced by the following impairments weakness, cognition   OT Problem List-Impairments impacting ADL problems related to;activity tolerance impaired;balance;strength;postural control;inability to direct their own care   Assessment of Occupational Performance 3-5 Performance Deficits   Identified Performance Deficits dressing, toileting, grooming, iadl participation, functional transfer   Planned Therapy Interventions (OT) ADL retraining;strengthening;transfer training;progressive activity/exercise;home program guidelines;balance training   Clinical Decision Making Complexity (OT) moderate complexity   Risk & Benefits of therapy have been explained evaluation/treatment results reviewed;participants voiced agreement with care plan;participants included;patient;sibling   Clinical Impression Comments Pt is below baseline in all adl's. Significant generalized weakness is impacting usual participation. Pt IND at baseline for self cares. Pt reports falling multiple times, sisters confirming. Pt is not safe to return home with strength status   OT Discharge Planning   OT Discharge Recommendation (DC Rec) Transitional Care Facility   OT Rationale for DC Rec OT recommending TCU to regain strength and maximize safety back at group home. At this time, he is unsafe to return without additional therapies. Pt has fallen multiple times within the past two months.  Pt is at increased risk for falling again d/t cognitive status and minimal assistance at the group home   OT Brief overview of current status mod A for ADL, supervision and vc's for sequencing tasks   Total Evaluation Time (Minutes)   Total Evaluation Time (Minutes) 8   OT Goals   Therapy Frequency (OT) 5 times/wk   OT Predicted Duration/Target Date for Goal Attainment 06/11/22   OT Goals Hygiene/Grooming;Upper Body Dressing;Lower Body Dressing;Transfers;Toilet Transfer/Toileting   OT: Hygiene/Grooming modified independent   OT: Upper Body Dressing Modified independent   OT: Lower Body Dressing Modified independent   OT: Transfer Modified independent   OT: Toilet Transfer/Toileting Modified independent

## 2022-06-05 NOTE — PROVIDER NOTIFICATION
MD Notification    Notified Person: MD    Notified Person Name: Yifan    Notification Date/Time: June 4, 2022 @ 8:58 PM     Notification Interaction: Amcom page    Purpose of Notification: MRI complete, US still pending availability with no indication for openings in next few hours. Pt is requesting dinner, okay to eat and make NPO after meal to plan for abd US in morning?    US called @ [phone] and ready for pt, disregard previous page.    Orders Received: Okay    Comments:

## 2022-06-06 ENCOUNTER — APPOINTMENT (OUTPATIENT)
Dept: MRI IMAGING | Facility: CLINIC | Age: 52
DRG: 438 | End: 2022-06-06
Attending: PSYCHIATRY & NEUROLOGY
Payer: MEDICARE

## 2022-06-06 ENCOUNTER — APPOINTMENT (OUTPATIENT)
Dept: MRI IMAGING | Facility: CLINIC | Age: 52
DRG: 438 | End: 2022-06-06
Attending: INTERNAL MEDICINE
Payer: MEDICARE

## 2022-06-06 ENCOUNTER — APPOINTMENT (OUTPATIENT)
Dept: OCCUPATIONAL THERAPY | Facility: CLINIC | Age: 52
DRG: 438 | End: 2022-06-06
Attending: INTERNAL MEDICINE
Payer: MEDICARE

## 2022-06-06 LAB
ANION GAP SERPL CALCULATED.3IONS-SCNC: 5 MMOL/L (ref 3–14)
BUN SERPL-MCNC: 13 MG/DL (ref 7–30)
CALCIUM SERPL-MCNC: 9 MG/DL (ref 8.5–10.1)
CHLORIDE BLD-SCNC: 111 MMOL/L (ref 94–109)
CO2 SERPL-SCNC: 25 MMOL/L (ref 20–32)
CREAT SERPL-MCNC: 0.89 MG/DL (ref 0.66–1.25)
ERYTHROCYTE [DISTWIDTH] IN BLOOD BY AUTOMATED COUNT: 15.9 % (ref 10–15)
FOLATE SERPL-MCNC: 18.7 NG/ML
GFR SERPL CREATININE-BSD FRML MDRD: >90 ML/MIN/1.73M2
GLUCOSE BLD-MCNC: 80 MG/DL (ref 70–99)
HCT VFR BLD AUTO: 36.2 % (ref 40–53)
HGB BLD-MCNC: 11.8 G/DL (ref 13.3–17.7)
HOLD SPECIMEN: NORMAL
HOLD SPECIMEN: NORMAL
MCH RBC QN AUTO: 30.8 PG (ref 26.5–33)
MCHC RBC AUTO-ENTMCNC: 32.6 G/DL (ref 31.5–36.5)
MCV RBC AUTO: 95 FL (ref 78–100)
PLATELET # BLD AUTO: 95 10E3/UL (ref 150–450)
POTASSIUM BLD-SCNC: 3.7 MMOL/L (ref 3.4–5.3)
RBC # BLD AUTO: 3.83 10E6/UL (ref 4.4–5.9)
SODIUM SERPL-SCNC: 141 MMOL/L (ref 133–144)
VALPROATE FREE MFR SERPL: 31 %
VALPROATE FREE SERPL-MCNC: 21 UG/ML
VALPROATE SERPL-MCNC: 67 UG/ML
VIT B12 SERPL-MCNC: 693 PG/ML (ref 193–986)
WBC # BLD AUTO: 3.8 10E3/UL (ref 4–11)

## 2022-06-06 PROCEDURE — 99232 SBSQ HOSP IP/OBS MODERATE 35: CPT | Performed by: HOSPITALIST

## 2022-06-06 PROCEDURE — 85014 HEMATOCRIT: CPT | Performed by: HOSPITALIST

## 2022-06-06 PROCEDURE — 72158 MRI LUMBAR SPINE W/O & W/DYE: CPT | Mod: ME

## 2022-06-06 PROCEDURE — 84630 ASSAY OF ZINC: CPT | Performed by: PSYCHIATRY & NEUROLOGY

## 2022-06-06 PROCEDURE — 97535 SELF CARE MNGMENT TRAINING: CPT | Mod: GO | Performed by: OCCUPATIONAL THERAPIST

## 2022-06-06 PROCEDURE — 74183 MRI ABD W/O CNTR FLWD CNTR: CPT | Mod: MG

## 2022-06-06 PROCEDURE — 36415 COLL VENOUS BLD VENIPUNCTURE: CPT | Performed by: HOSPITALIST

## 2022-06-06 PROCEDURE — 255N000002 HC RX 255 OP 636: Performed by: HOSPITALIST

## 2022-06-06 PROCEDURE — A9585 GADOBUTROL INJECTION: HCPCS | Performed by: HOSPITALIST

## 2022-06-06 PROCEDURE — 36415 COLL VENOUS BLD VENIPUNCTURE: CPT | Performed by: PSYCHIATRY & NEUROLOGY

## 2022-06-06 PROCEDURE — 120N000001 HC R&B MED SURG/OB

## 2022-06-06 PROCEDURE — 94660 CPAP INITIATION&MGMT: CPT

## 2022-06-06 PROCEDURE — 86255 FLUORESCENT ANTIBODY SCREEN: CPT | Performed by: PSYCHIATRY & NEUROLOGY

## 2022-06-06 PROCEDURE — 82746 ASSAY OF FOLIC ACID SERUM: CPT | Performed by: PSYCHIATRY & NEUROLOGY

## 2022-06-06 PROCEDURE — 82525 ASSAY OF COPPER: CPT | Performed by: PSYCHIATRY & NEUROLOGY

## 2022-06-06 PROCEDURE — 999N000157 HC STATISTIC RCP TIME EA 10 MIN

## 2022-06-06 PROCEDURE — 250N000013 HC RX MED GY IP 250 OP 250 PS 637: Performed by: HOSPITALIST

## 2022-06-06 PROCEDURE — 84446 ASSAY OF VITAMIN E: CPT | Performed by: PSYCHIATRY & NEUROLOGY

## 2022-06-06 PROCEDURE — 82607 VITAMIN B-12: CPT | Performed by: PSYCHIATRY & NEUROLOGY

## 2022-06-06 PROCEDURE — 80048 BASIC METABOLIC PNL TOTAL CA: CPT | Performed by: HOSPITALIST

## 2022-06-06 PROCEDURE — 84590 ASSAY OF VITAMIN A: CPT | Performed by: PSYCHIATRY & NEUROLOGY

## 2022-06-06 RX ORDER — GADOBUTROL 604.72 MG/ML
10 INJECTION INTRAVENOUS ONCE
Status: COMPLETED | OUTPATIENT
Start: 2022-06-07 | End: 2022-06-07

## 2022-06-06 RX ORDER — GADOBUTROL 604.72 MG/ML
10 INJECTION INTRAVENOUS ONCE
Status: COMPLETED | OUTPATIENT
Start: 2022-06-06 | End: 2022-06-06

## 2022-06-06 RX ADMIN — DICYCLOMINE HYDROCHLORIDE 20 MG: 20 TABLET ORAL at 21:30

## 2022-06-06 RX ADMIN — PREDNISOLONE ACETATE 1 DROP: 10 SUSPENSION/ DROPS OPHTHALMIC at 08:13

## 2022-06-06 RX ADMIN — TOPIRAMATE 100 MG: 100 TABLET, FILM COATED ORAL at 21:30

## 2022-06-06 RX ADMIN — LEVOCARNITINE 330 MG: 330 TABLET ORAL at 15:58

## 2022-06-06 RX ADMIN — SERTRALINE HYDROCHLORIDE 100 MG: 100 TABLET ORAL at 08:07

## 2022-06-06 RX ADMIN — Medication 1 DROP: at 21:40

## 2022-06-06 RX ADMIN — PANTOPRAZOLE SODIUM 40 MG: 40 TABLET, DELAYED RELEASE ORAL at 21:30

## 2022-06-06 RX ADMIN — GADOBUTROL 10 ML: 604.72 INJECTION INTRAVENOUS at 04:26

## 2022-06-06 RX ADMIN — DICYCLOMINE HYDROCHLORIDE 20 MG: 20 TABLET ORAL at 06:58

## 2022-06-06 RX ADMIN — IPRATROPIUM BROMIDE 2 SPRAY: 42 SPRAY NASAL at 08:17

## 2022-06-06 RX ADMIN — DICYCLOMINE HYDROCHLORIDE 20 MG: 20 TABLET ORAL at 15:58

## 2022-06-06 RX ADMIN — IPRATROPIUM BROMIDE 2 SPRAY: 42 SPRAY NASAL at 15:58

## 2022-06-06 RX ADMIN — DIVALPROEX SODIUM 500 MG: 500 TABLET, DELAYED RELEASE ORAL at 21:30

## 2022-06-06 RX ADMIN — DOCUSATE SODIUM 100 MG: 100 CAPSULE, LIQUID FILLED ORAL at 08:07

## 2022-06-06 RX ADMIN — CHLORHEXIDINE GLUCONATE 15 ML: 1.2 SOLUTION ORAL at 08:07

## 2022-06-06 RX ADMIN — LEVOCARNITINE 330 MG: 330 TABLET ORAL at 08:07

## 2022-06-06 RX ADMIN — LEVOTHYROXINE SODIUM 50 MCG: 50 TABLET ORAL at 06:58

## 2022-06-06 RX ADMIN — DIVALPROEX SODIUM 500 MG: 500 TABLET, DELAYED RELEASE ORAL at 15:58

## 2022-06-06 RX ADMIN — DICYCLOMINE HYDROCHLORIDE 20 MG: 20 TABLET ORAL at 11:05

## 2022-06-06 RX ADMIN — MONTELUKAST 10 MG: 10 TABLET, FILM COATED ORAL at 21:30

## 2022-06-06 RX ADMIN — IPRATROPIUM BROMIDE 2 SPRAY: 42 SPRAY NASAL at 21:40

## 2022-06-06 RX ADMIN — DIVALPROEX SODIUM 500 MG: 500 TABLET, DELAYED RELEASE ORAL at 08:07

## 2022-06-06 RX ADMIN — RISPERIDONE 2 MG: 2 TABLET ORAL at 08:07

## 2022-06-06 RX ADMIN — LEVOCARNITINE 330 MG: 330 TABLET ORAL at 21:30

## 2022-06-06 RX ADMIN — PANTOPRAZOLE SODIUM 40 MG: 40 TABLET, DELAYED RELEASE ORAL at 08:07

## 2022-06-06 RX ADMIN — RISPERIDONE 2 MG: 2 TABLET ORAL at 21:30

## 2022-06-06 ASSESSMENT — ACTIVITIES OF DAILY LIVING (ADL)
ADLS_ACUITY_SCORE: 43
ADLS_ACUITY_SCORE: 42
ADLS_ACUITY_SCORE: 42
ADLS_ACUITY_SCORE: 43
ADLS_ACUITY_SCORE: 42
ADLS_ACUITY_SCORE: 43
ADLS_ACUITY_SCORE: 42
ADLS_ACUITY_SCORE: 43
ADLS_ACUITY_SCORE: 39
ADLS_ACUITY_SCORE: 42
ADLS_ACUITY_SCORE: 42
ADLS_ACUITY_SCORE: 43

## 2022-06-06 NOTE — PROGRESS NOTES
[facility]  Gastroenterology Progress Note     Jagdeep Walker MRN# [id number]   YOB: 1970 Age: 51 year old          Assessment and Plan:   Jagdeep Walker is a 51 year old male admitted on 6/4/2022.   Past history of HTN, hypothyroid, seizure disorder, schizoaffective and bipolar disorder, GERD, recent admission 5/8-5/17/22 for acute pancreatitis with pseudocysts, portal vein thrombus who presents from his group home with repeated falls and metabolic encephalopathy.  He has a history of acute on chronic pancreatitis and CT noted findings concerning for liver cirrhosis.    Recent acute pancreatitis with pseudocysts  Possible cirrhosis  Lower extremity edema  Hypoalbuminemia   CT 6/4/22 showing moderate ascites, 2 stable pseudocysts but likely 1 new pseudocyst, improved pancreatic inflammation, liver nodularity concerning for cirrhosis.    6/4/22 Abdominal ultrasound noted coarsened hepatic echotexture and fibrosis. With scattered ascites.  MRCP 6/5/22 significant for pancreatitis, 2 pseudocysts, and perigastric fluid collection withouth evidence of pancreatic necrosis, mild pancreatic ductal dilation with narrowing oteh common bile dut secondary to the pseudocyst in the pancreatic head. Large volume ascites, slightly increased int eh interval. Normal appearing liver without findings of liver cirrhosis.  Hypoalbuminemia may be due to recent illness vs poor intake but cannot rule out cirrhosis, INR was previously elevated as well.   Ammonia normal at 33.  Albumin 2.4  No need to treat portal vein thrombosis  Cause of pancreatitis may be due to drug induced injury vs autoimmune disease  ( valproic acid/topiramate level pending)    - MRCP did not confirm findings of liver cirrhosis  - 2g Na diet and continue work on nutrition  - daily labs              Interval History:   no new complaints, doing well and doing well; no cp, sob, n/v/d, or abd pain.              Review of  Systems:   C: NEGATIVE for fever, chills, change in weight  E/M: NEGATIVE for ear, mouth and throat problems  R: NEGATIVE for significant cough or SOB  CV: NEGATIVE for chest pain, palpitations or peripheral edema             Medications:   I have reviewed this patient's current medications    atenolol  25 mg Oral Daily     carboxymethylcellulose PF  1 drop Right Eye At Bedtime     chlorhexidine  15 mL Swish & Spit Daily     dicyclomine  20 mg Oral 4x Daily AC & HS     divalproex sodium delayed-release  500 mg Oral TID     docusate sodium  100 mg Oral Daily     ipratropium  2 spray Both Nostrils TID     levOCARNitine  330 mg Oral TID     levothyroxine  50 mcg Oral Daily     montelukast  10 mg Oral At Bedtime     pantoprazole  40 mg Oral BID     prednisoLONE acetate  1 drop Right Eye Daily     risperiDONE  2 mg Oral BID     sertraline  100 mg Oral Daily     sodium chloride (PF)  3 mL Intracatheter Q8H     topiramate  100 mg Oral At Bedtime                  Physical Exam:   Vitals were reviewed  Vital Signs with Ranges  Temp:  [97.2  F (36.2  C)-97.9  F (36.6  C)] 97.4  F (36.3  C)  Pulse:  [51-65] 53  Resp:  [16-18] 18  BP: ()/(59-69) 123/69  FiO2 (%):  [21 %] 21 %  SpO2:  [92 %-97 %] 96 %  I/O last 3 completed shifts:  In: 603 [P.O.:600; I.V.:3]  Out: 600 [Urine:600]  Constitutional: healthy, alert and no distress   Cardiovascular: negative, PMI normal. No lifts, heaves, or thrills. RRR. No murmurs, clicks gallops or rub  Respiratory: negative, Percussion normal. Good diaphragmatic excursion. Lungs clear  Abdomen: Abdomen soft, non-tender. BS normal. No masses, organomegaly             Data:   I reviewed the patient's new clinical lab test results.   Recent Labs   Lab Test 06/06/22  0659 06/05/22  0728 06/04/22  0754 04/29/22  0625 04/27/22  2248   WBC 3.8* 3.8* 4.5   < > 7.2   HGB 11.8* 12.0* 12.5*   < > 11.6*   MCV 95 95 95   < > 92   PLT 95* 92* 106*   < > 151   INR  --  1.73*  --   --  1.26*    < > =  values in this interval not displayed.     Recent Labs   Lab Test 06/06/22  0659 06/05/22  1239 06/05/22  0728 06/04/22  0754   POTASSIUM 3.7 3.5 3.2* 3.5   CHLORIDE 111*  --  109 110*   CO2 25  --  25 23   BUN 13  --  14 19   ANIONGAP 5  --  7 9     Recent Labs   Lab Test 06/05/22  0728 06/04/22  1653 06/04/22  0754 05/16/22  1302 05/10/22  0724 05/09/22  0923 05/08/22  2032 05/08/22  0934 04/29/22  0625 04/28/22  0016   ALBUMIN 2.4*  --  2.4* 2.4*   < > 2.3*   < >  --    < >  --    BILITOTAL 0.7  --  0.7 0.6   < > 1.4*   < >  --    < >  --    ALT 27  --  28 34   < > 35   < >  --    < >  --    AST 61*  --  76* 37   < > 97*   < >  --    < >  --    PROTEIN  --  Negative  --   --   --   --   --  Negative  --  Negative   LIPASE  --   --  466* 1,461*  --  1,555*  --   --    < >  --     < > = values in this interval not displayed.       I reviewed the patient's new imaging results.    All laboratory data reviewed  All imaging studies reviewed by me.    Isabelle Pantoja PA-C,  6/6/2022  [facility]  Office : [phone]  Cell: [phone] (Dr. Clark)  Cell: [phone] (Isabelle Pantoja PA-C)

## 2022-06-06 NOTE — PLAN OF CARE
Goal Outcome Evaluation:        A&Ox4, baseline mild cognitive delay, forgetful at times. VSS. RA during day, BiPAP overnight. Tele NSR, occasional bradycardia. Denies pain/N/V at rest, mild pain in R hip with ambulation. Up A1/GB/Walker. Tolerating 2g Na diet. Continent - gentle reminders to use urinal help prevent episodes of urgency. PIV x2 SL. Abdominal MRCP completed - see chart for results. Depakote and topomax levels in process. Discharge pending clinical findings.

## 2022-06-06 NOTE — CONSULTS
[facility]  Gastroenterology Consultation         Jagdeep Walker  [address] [id number]  51 year old male    Admission Date/Time: 6/4/2022  Primary Care Provider: Joel Werner  Referring / Attending Physician: Jason Workman    We were asked to see the patient in consultation by Dr. Jason Workman for evaluation of pancreatitis.      CC: Idiopathic pancreatitis    HPI:  Jagdeep Walker is a 51 year old male who has history of seizure disorder schizoaffective disorder bipolar disorder hypertension hypothyroidism gastroesophageal reflux disease with recent hospitalization patient was discharged on 17 May patient was diagnosed with idiopathic pancreatitis with a pseudocyst portal vein thrombosis patient clinically improved patient was doing fairly well patient was discharged however patient had episode of fall prior to last admission patient continues to have falls with metabolic encephalopathy weakness fatigue new CT findings are consistent with some improvement in pancreatic finding patient also 2 new lesions patient CT also suggesting a possible nodular liver portal vein thrombosis has improved.  Patient continues to have inflammatory changes involving the pancreas patient was evaluated with EGD and endoscopic ultrasound during his last evaluation.  Patient labs are showing some improvement in his lipase and CRP patient has a low albumin and pleural effusions platelet count is 92,000 patient's INR is 1.7.  Patient is overall starting to feel better patient is responding to verbal command very pleasant    ROS: A comprehensive ten point review of systems was negative aside from those in mentioned in the HPI.      PAST MED HX:  I have reviewed this patient's medical history and updated it with pertinent information if needed.   No past medical history on file.    MEDICATIONS: Prior to Admission Medications   Prescriptions Last Dose Informant Patient Reported?  Taking?   LORazepam (ATIVAN) 2 MG tablet PRN  Yes No   Sig: Take 2 mg by mouth daily as needed for seizures Give bucally   Vitamin D (Cholecalciferol) 50 MCG (2000 UT) CAPS 6/3/2022 at Unknown time  Yes Yes   Sig: Take 2,000 Units by mouth daily   albuterol (PROAIR HFA/PROVENTIL HFA/VENTOLIN HFA) 108 (90 Base) MCG/ACT inhaler PRN  Yes No   Sig: Inhale 2 puffs into the lungs every 6 hours as needed for shortness of breath / dyspnea or wheezing   atenolol (TENORMIN) 25 MG tablet 6/3/2022 at Unknown time  Yes Yes   Sig: Take 25 mg by mouth daily   calcium carbonate 600 mg-vitamin D 400 units (CALTRATE) 600-400 MG-UNIT per tablet 6/3/2022 at Unknown time  Yes Yes   Sig: Take 1 tablet by mouth daily (with breakfast)   calcium polycarbophil (FIBERCON) 625 MG tablet 6/3/2022 at Unknown time  Yes Yes   Sig: Take 1 tablet by mouth 2 times daily   carboxymethylcellulose PF (REFRESH LIQUIGEL) 1 % ophthalmic gel 6/3/2022 at Unknown time  Yes Yes   Sig: Place 1 drop into the right eye At Bedtime   carboxymethylcellulose PF (REFRESH PLUS) 0.5 % ophthalmic solution ([phone])  Yes No   Sig: Place 1 drop into both eyes daily as needed for dry eyes   chlorhexidine (PERIDEX) 0.12 % solution 6/3/2022 at Unknown time  Yes Yes   Sig: Swish and spit 15 mLs in mouth daily   ciclopirox (LOPROX) 0.77 % cream PRN  Yes No   Sig: Apply topically nightly as needed   clonazePAM (KLONOPIN) 0.125 MG TBDP ODT tab PRN  Yes No   Sig: Take 0.125 mg by mouth daily as needed for anxiety   dicyclomine (BENTYL) 20 MG tablet 6/3/2022 at Unknown time  No Yes   Sig: Take 1 tablet (20 mg) by mouth 4 times daily (before meals and nightly)   divalproex sodium delayed-release (DEPAKOTE) 500 MG DR tablet 6/3/2022 at Unknown time  Yes Yes   Sig: Take 500 mg by mouth 3 times daily   docusate sodium (COLACE) 100 MG capsule 6/3/2022 at Unknown time  Yes Yes   Sig: Take 100 mg by mouth daily   hydrocortisone (CORTAID) 1 % external cream 6/3/2022 at Unknown time   Yes Yes   Sig: Apply topically 2 times daily   ipratropium (ATROVENT) 0.06 % nasal spray 6/3/2022 at Unknown time  Yes Yes   Sig: Spray 2 sprays into both nostrils 3 times daily   ketoconazole (NIZORAL) 2 % external cream PRN  Yes No   Sig: Apply topically 2 times daily as needed for itching R foot   levOCARNitine (CARNITOR) 330 MG tablet 6/3/2022 at Unknown time  Yes Yes   Sig: Take by mouth 3 times daily   levocetirizine (XYZAL) 5 MG tablet 6/3/2022 at Unknown time  Yes Yes   Sig: Take 5 mg by mouth every evening   levothyroxine (SYNTHROID/LEVOTHROID) 50 MCG tablet 6/3/2022 at Unknown time  Yes Yes   Sig: Take 50 mcg by mouth daily   loperamide (IMODIUM) 2 MG capsule PRN  Yes No   Sig: Take 2 mg by mouth 4 times daily as needed for diarrhea   montelukast (SINGULAIR) 10 MG tablet 6/3/2022 at Unknown time  Yes Yes   Sig: Take 10 mg by mouth At Bedtime   multivitamin, therapeutic (THERA-VIT) TABS tablet 6/3/2022 at Unknown time  Yes Yes   Sig: Take 1 tablet by mouth daily   omeprazole 20 MG tablet 6/3/2022 at Unknown time  Yes Yes   Sig: Take 20 mg by mouth 2 times daily   polyethylene glycol-propylene glycol (SYSTANE ULTRA) 0.4-0.3 % SOLN ophthalmic solution PRN  Yes No   Sig: Place 2 drops into both eyes daily as needed for dry eyes   prednisoLONE acetate (PRED FORTE) 1 % ophthalmic suspension 6/3/2022 at Unknown time  Yes Yes   Sig: Place 1 drop into the right eye daily   risperiDONE (RISPERDAL) 2 MG tablet 6/3/2022 at Unknown time  Yes Yes   Sig: Take 2 mg by mouth 2 times daily   sertraline (ZOLOFT) 100 MG tablet 6/3/2022 at Unknown time  Yes Yes   Sig: Take 100 mg by mouth daily   topiramate (TOPAMAX) 100 MG tablet 6/3/2022 at Unknown time  Yes Yes   Sig: Take 100 mg by mouth At Bedtime      Facility-Administered Medications: None       ALLERGIES: No Known Allergies    SOCIAL HISTORY:       FAMILY HISTORY:  No family history on file.    PHYSICAL EXAM:   General awake alert oriented comfortable  Vital Signs with  Ranges  Temp: 97.5  F (36.4  C) Temp src: Oral BP: 107/59 Pulse: 56   Resp: 16 SpO2: 97 % O2 Device: None (Room air)    I/O last 3 completed shifts:  In: 603 [P.O.:600; I.V.:3]  Out: 500 [Urine:500]    Constitutional: healthy, alert and no distress   Cardiovascular: negative, PMI normal. No lifts, heaves, or thrills. RRR. No murmurs, clicks gallops or rub  Respiratory: negative, Percussion normal. Good diaphragmatic excursion. Lungs clear  Head: Normocephalic. No masses, lesions, tenderness or abnormalities  Neck: Neck supple. No adenopathy. Thyroid symmetric, normal size,, Carotids without bruits.  Abdomen: Abdomen soft, non-tender. BS normal. No masses, organomegaly  SKIN: no suspicious lesions or rashes          ADDITIONAL COMMENTS:   I reviewed the patient's new clinical lab test results.   Recent Labs   Lab Test 06/05/22  0728 06/04/22  0754 05/12/22  1447 04/29/22  0625 04/27/22  2248   WBC 3.8* 4.5 4.8   < > 7.2   HGB 12.0* 12.5* 11.4*   < > 11.6*   MCV 95 95 96   < > 92   PLT 92* 106* 169   < > 151   INR 1.73*  --   --   --  1.26*    < > = values in this interval not displayed.     Recent Labs   Lab Test 06/05/22  1239 06/05/22  0728 06/04/22  0754 05/17/22  0753 05/16/22  1302   POTASSIUM 3.5 3.2* 3.5   < > 3.9   CHLORIDE  --  109 110*  --  108   CO2  --  25 23  --  25   BUN  --  14 19  --  15   ANIONGAP  --  7 9  --  7    < > = values in this interval not displayed.     Recent Labs   Lab Test 06/05/22  0728 06/04/22  1653 06/04/22  0754 05/16/22  1302 05/10/22  0724 05/09/22  0923 05/08/22  2032 05/08/22  0934 04/29/22  0625 04/28/22  0016   ALBUMIN 2.4*  --  2.4* 2.4*   < > 2.3*   < >  --    < >  --    BILITOTAL 0.7  --  0.7 0.6   < > 1.4*   < >  --    < >  --    ALT 27  --  28 34   < > 35   < >  --    < >  --    AST 61*  --  76* 37   < > 97*   < >  --    < >  --    PROTEIN  --  Negative  --   --   --   --   --  Negative  --  Negative   LIPASE  --   --  466* 1,461*  --  1,555*  --   --    < >  --     < >  = values in this interval not displayed.       I reviewed the patient's new imaging results.        CONSULTATION ASSESSMENT AND PLAN:    Active Problems:    Pleural effusion    Assessment: Very pleasant 51-year-old gentleman with history of idiopathic pancreatitis portal vein thrombosis history of abnormal CT findings of possible cirrhosis patient has low albumin ascites low platelet count finding consistent with malnutrition patient has been having multiple falls.  I will recommend to continue on supportive care consider further evaluation and management with possible nasojejunal tube placement and nutritional support I will recommend him to be evaluated further for nutritional supplements and nutritional support I will recommend repeat MRI to evaluate liver and pancreas further to rule out any other underlying cause contributing into his pancreatitis possible differential can include drug-induced liver and pancreatic injury patient does not have any history of drinking patient was not found to have any gallstone problems.  Other less likely differential can include autoimmune pancreatitis or autoimmune diseases patient work-up in the past was negative I will recommend repeat labs again.  Continue on full liquid diet.  GI will continue to follow along.                Massimo Clark MD, FACP  [facility].  Office: [phone]  Cell : [phone]      [facility]  Ph: [phone] Fax: [phone]

## 2022-06-06 NOTE — PROGRESS NOTES
[facility]  Neuroscience and Spine [place]  Neurology Daily Note                  Interval History:   Mr. Walker is a 51-year-old gentleman who lives in a group home and has a history for schizoaffective disorder, bipolar disorder and generalized tonic-clonic seizures since childhood.  He also has a history for pancreatitis with pseudocyst, portal venous thrombosis and was admitted because of tremulous  Upper extremities and frequent falls.  He was seen by Dr. Pate in consultation on 6/5, please refer to that consultation for further details.  Patient's sister reported that over the past 3 weeks his gait has deteriorated, prior to that, he was walking independently and had resorted to using a walker.  These falls are unexplained, not preceded by dizziness or vertiginous symptoms and sometimes he is unable to pick himself up from before.  He reported having numbness in the feet, no symptoms to indicate neurogenic claudication, however.  He also indicated that he has had bladder urgency, no bowel dysfunction was reported.  He has been following with Dr. Flaherty in [place] [facility] for his seizures and reportedly has had nonepileptic spells concerning for dystonia.       Review of Systems:          Medications:   Scheduled Meds:    atenolol  25 mg Oral Daily     carboxymethylcellulose PF  1 drop Right Eye At Bedtime     chlorhexidine  15 mL Swish & Spit Daily     dicyclomine  20 mg Oral 4x Daily AC & HS     divalproex sodium delayed-release  500 mg Oral TID     docusate sodium  100 mg Oral Daily     ipratropium  2 spray Both Nostrils TID     levOCARNitine  330 mg Oral TID     levothyroxine  50 mcg Oral Daily     montelukast  10 mg Oral At Bedtime     pantoprazole  40 mg Oral BID     prednisoLONE acetate  1 drop Right Eye Daily     risperiDONE  2 mg Oral BID     sertraline  100 mg Oral Daily     sodium chloride (PF)  3 mL Intracatheter Q8H     topiramate  100 mg Oral At Bedtime     PRN Meds:  "acetaminophen **OR** acetaminophen, lidocaine 4%, lidocaine (buffered or not buffered), melatonin, ondansetron **OR** ondansetron, polyethylene glycol, senna-docusate **OR** senna-docusate, sodium chloride (PF)        Physical Exam:   Vitals: Blood pressure 131/74, pulse 87, temperature 98  F (36.7  C), temperature source Oral, resp. rate 18, height 1.651 m (5' 5\"), weight 96.8 kg (213 lb 8 oz), SpO2 96 %.  General Appearance:    He was in no apparent respiratory distress  Neuro:       Mental Status Exam:   He was very alert and the speech is fluent, fund of knowledge was limited but he was oriented to time place and person did follow simple and complex commands, sometimes with difficulty.       Cranial Nerves: The extraocular movements were full without nystagmoid movements and visual fields were intact confrontation and there was no facial asymmetry.          Motor:   There was normal tone in all 4 extremities, however, asterixis in both upper extremities and a postural tremoring of the outstretched hands, there were no dystonic movements.  Grasp were symmetrical at 5/5.  In the lower extremities, there was giveaway weakness in the iliopsoas at 4/5 but there was no weakness in the dorsiflexors or plantar flexors of both feet.                     Coordination: Patient had difficulty following complex commands, however, there did not appear to be any finger-to-nose dysmetria.       Gait: The gait was deliberate and hesitant, without luisa ataxia and Romberg and tandem walking were not tested.  Cardiovascular: Regular rate and rhythm, no m/r/g  Lungs: Clear to auscultation  Abdomen: Soft, not tender, not destended  Extremities: No clubbing, no cyanosis, no edema       Data:   ROUTINE IP LABS (Last 3results)  CBC RESULTS:     Recent Labs   Lab 06/06/22  0659 06/05/22  0728 06/04/22  0754   WBC 3.8* 3.8* 4.5   RBC 3.83* 3.97* 4.07*   HGB 11.8* 12.0* 12.5*   HCT 36.2* 37.5* 38.7*   PLT 95* 92* 106*     Basic Metabolic " Panel:  Recent Labs   Lab 06/06/22  0659 06/05/22  1239 06/05/22  0728 06/04/22  0754     --  141 142   POTASSIUM 3.7 3.5 3.2* 3.5   CHLORIDE 111*  --  109 110*   CO2 25  --  25 23   BUN 13  --  14 19   CR 0.89  --  0.88 1.01   GLC 80  --  88 69*   NASIR 9.0  --  9.1 9.2     INR:  Recent Labs   Lab 06/05/22  0728   INR 1.73*      Lipid Profile:  Recent Labs   Lab Test 05/09/22  1415   TRIG 118     TSH:  TSH   Date Value Ref Range Status   06/04/2022 3.42 0.40 - 4.00 mU/L Final   ,   Vitamin B12:   Lab Results   Component Value Date    B12 603 04/28/2022      A1C: No results found for: A1C    Images were reviewed by me personally:  MRI brain 6/4/2022:  No recent infarct, intracranial mass, no abnormal enhancement, no evidence for hydrocephalus.  Mild to moderate volume loss, with presumed, chronic small vessel ischemic changes and tiny chronic infarcts in the cerebellar hemispheres.    Depakote level was 75 total, free was 34, Topamax level was 4.3 on 5/8/2022  Assessment:  --Frequent falls and unsteady gait, and shakiness, progressive over the past 3 weeks, prior to this patient was walking independently, according to his sister-  -in addition, the patient's sister reports that the patient has had frequent spells where he would stiffen up, without loss of consciousness, he has been evaluated by his epileptologist, Dr. Flaherty, apparently had inpatient video assessment of medication, no clear etiology for the spells.  -The etiology is unclear, MRI of the brain was reviewed, revealing no evidence of hydrocephalus or parenchymal lesions.  --No evidence for concerns regarding orthostatic hypotension.  --These appear to be accelerated symptoms over the past 3weeks, record memory impairment but concerns regarding bladder urgency.    -Disconjugate gaze, no clear evidence  opthalmoplegia, no ptosis .    -Tremors, most likely toxic, metabolic, physiological tremors.  Recommendations:    - Vitamin B12/ folate panel, vitamin  A and E levels, zinc levels.  MRI of the lumbosacral spine.  PT/OT evaluate and treat is a fall risk.  --With recent accelerated symptoms, although not typical, would recommend panel for paraneoplastic antibodies and immune encephalitis .panel  --- History for generalized tonic-clonic seizures, the patient has been seizure-free for several years, last no recent changes made with his AED  40 minutes was spent with this patient, during which, more than 50% of the time was spent in counseling and directing care.  The pathophysiology, differential diagnosis and management was discussed with the patient and consistent, they had questions which were addressed to their satisfaction.    Delores Montero MD  [facility]  Office number [phone]

## 2022-06-06 NOTE — PROGRESS NOTES
"[facility]    Medicine Progress Note - Hospitalist Service    Date of Admission:  6/4/2022    Assessment & Plan        Jagdeep Walker is a 51 year old male admitted on 6/4/2022.   Past history of HTN, hypothyroid, seizure disorder, schizoaffective and bipolar disorder, GERD, recent admission 5/8-5/17/22 for acute pancreatitis with pseudocysts, portal vein thrombus who presents from his group home with repeated falls and metabolic encephalopathy.     Acute metabolic encephalopathy of unclear etiology  Frequent falls  Etiology unclear, very broad differential.  Currently highest concern for intracranial pathology (bleed vs stroke) vs hepatic encephalopathy.  Patient and sister noting tremor, balance difficulty, sister reporting he is \"foggy\".  Nodular liver noted on CT abd, with hypoalbuminema and recently elevated INR - exam unclear as to tremor vs asterixis.  Cannot rule out valproic acid toxicity or polypharmacy or partial seizure.  Lower suspicion for infection as afebrile without leukocytosis and offering no infectious complaints.  Neuro exam notable for mild dysconjugate gaze and asymmetric pupil (suspect chronic as clearly had right eye surgery) as well as tremor, orientation to person, place and month.  Lytes ok.  TSH wnl.  - Admitted to inpatient.  - CT head showed no acute changes.  - MRI brain showed no acute changes.  - Neuro checks Qshift.  - Orthostatic blood pressure negative.  - Telemetry.  - Procal and CRP mildly elevated, recheck in AM.  - UA negative for signs of infection.  - Ammonia level within normal limits.  - Valproic acid level pending (prior free level was elevated at 34 on 5/8/22).  - Topiramate level pending.  - VBG OK on 6/4/22.  - PT/OT consulted.  - Mental status much improved today, etiology of episode still unclear.  - Family has concerns about frequent falls recently.  - Neurology consulted, appreciate their assistance.  - Had EEG last night, results " pending.  - Care transitions consulted, will need TCU at the time of discharge.    ADAN  Baseline Cr 0.7, admission Cr 1.0.  Suspect poor oral intake.   - Received 1L bolus in ED, will hold on further fluids given edema.  - Creatinine improved, recheck in AM.  - Monitor closely as received contrast 6/4.    Recent acute pancreatitis with pseudocysts  Possible cirrhosis  Lower extremity edema  Hypoalbuminemia   See recent discharge summary for details, was to follow up with [facility] in 1 month with repeat CT imaging.  CT 6/4/22 showing moderate ascites, 2 stable pseudocysts but likely 1 new pseudocyst, improved pancreatic inflammation, liver nodularity concerning for cirrhosis.  Hypoalbuminemia may be due to recent illness vs poor intake but cannot rule out cirrhosis, INR was previously elevated as well.   - [facility] consult.  - As above, low suspicion for infection.  - Ammonia as above, obtain coags.  - Abdominal US showed findings concerning for cirrhosis as well as a small amount of ascites.  - 2g Na diet.  - TTE did not show evidence of CHF as source of edema..    Recent portal vein thrombosis  Diagnosed during prior admission, has not been treated with anticoagulation.  CT 6/4 showing non-occlusive portal vein thrombus has improved from prior.   - Anticoagulation not recommended by GI.    Thrombocytopenia   Platelet 106 at admission, no history of low platelets.  Possibly related to cirrhosis.  Recent B12 and folate levels wnl.  - Monitor CBC.    Chronic anemia   - Baseline hemoglobin about 12 g/dL, stable at admission.    Seizure disorder  - Continue PTA Depakote, topiramate.    Bipolar disorder  Schizoaffective disorder  Resides in group home.   - Continue PTA Klonopin, risperdal, sertraline.    Hypertension  - Continue PTA atenolol.    Asthma  JACLYN  - Continue PTA Singulair.  - Continue Bipap at bedtime.    Hypothyroid  TSH wnl 6/4/22.   - Continue PTA levothyroxine.     GERD  Fisher's esophagus  - Continue  "PTA omeprazole.    Carnitine deficiency  - Continue PTA levocarnitine.        Diet: Combination Diet 2 gm NA Diet  Room Service    DVT Prophylaxis: Pneumatic Compression Devices  Santana Catheter: Not present  Central Lines: None  Cardiac Monitoring: ACTIVE order. Indication: frequent falls  Code Status: Full Code      Disposition Plan   Expected Discharge: 06/07/2022 likely TCU in the near future  Anticipated discharge location:  Awaiting care coordination huddle  Delays:           The patient's care was discussed with the Bedside Nurse and Patient.    Fede Jones MD  Hospitalist Service  [facility]  Securely message with the Vocera Web Console (learn more here)  Text page via [employer] Paging/Directory         Clinically Significant Risk Factors Present on Admission             # Obesity: Estimated body mass index is 35.53 kg/m  as calculated from the following:    Height as of this encounter: 1.651 m (5' 5\").    Weight as of this encounter: 96.8 kg (213 lb 8 oz).      ______________________________________________________________________    Interval History   Jagdeep Walker was seen this afternoon. He feels pretty good today. No new complaints/concerns. Still feels weak. Denies fevers, chest pain, shortness of breath, nausea, abdominal pain.    Data reviewed today: I reviewed all medications, new labs and imaging results over the last 24 hours. I personally reviewed no images or EKG's today.    Physical Exam   Vital Signs: Temp: 97.3  F (36.3  C) Temp src: Oral BP: (!) 150/123 Pulse: 69   Resp: 18 SpO2: 96 % O2 Device: None (Room air)    Weight: 213 lbs 8 oz  Constitutional: awake, alert, cooperative, no apparent distress, sitting up in a chair  Respiratory: clear to auscultation bilaterally, no crackles or wheezing  Cardiovascular: regular rate and rhythm, normal S1 and S2, no murmur noted  GI: normal bowel sounds, soft, non-distended, non-tender  Skin: warm, dry  Musculoskeletal: " 1-2+ lower extremity pitting edema present  Neurologic: awake, alert, answered questions appropriately, moves all extremities    Data   Recent Labs   Lab 06/06/22  0659 06/05/22  1239 06/05/22  0728 06/04/22  0754   WBC 3.8*  --  3.8* 4.5   HGB 11.8*  --  12.0* 12.5*   MCV 95  --  95 95   PLT 95*  --  92* 106*   INR  --   --  1.73*  --      --  141 142   POTASSIUM 3.7 3.5 3.2* 3.5   CHLORIDE 111*  --  109 110*   CO2 25  --  25 23   BUN 13  --  14 19   CR 0.89  --  0.88 1.01   ANIONGAP 5  --  7 9   NASIR 9.0  --  9.1 9.2   GLC 80  --  88 69*   ALBUMIN  --   --  2.4* 2.4*   PROTTOTAL  --   --  5.7* 5.8*   BILITOTAL  --   --  0.7 0.7   ALKPHOS  --   --  55 55   ALT  --   --  27 28   AST  --   --  61* 76*   LIPASE  --   --   --  466*     Medications       atenolol  25 mg Oral Daily     carboxymethylcellulose PF  1 drop Right Eye At Bedtime     chlorhexidine  15 mL Swish & Spit Daily     dicyclomine  20 mg Oral 4x Daily AC & HS     divalproex sodium delayed-release  500 mg Oral TID     docusate sodium  100 mg Oral Daily     ipratropium  2 spray Both Nostrils TID     levOCARNitine  330 mg Oral TID     levothyroxine  50 mcg Oral Daily     montelukast  10 mg Oral At Bedtime     pantoprazole  40 mg Oral BID     prednisoLONE acetate  1 drop Right Eye Daily     risperiDONE  2 mg Oral BID     sertraline  100 mg Oral Daily     sodium chloride (PF)  3 mL Intracatheter Q8H     topiramate  100 mg Oral At Bedtime

## 2022-06-06 NOTE — PLAN OF CARE
AOx4, forgetful. VSS on RA. Tele: NSR. No c/o pain. Mild tremors. R elbow scrap, SUSANNA. Regular diet, good appetite. Room service assisted. Up A1, GB + W to BR. Ambulated cortez x1. Showered. R PIV SL. K check in AM. Lumbar MRI ordered. Plan pending improvement.

## 2022-06-07 ENCOUNTER — APPOINTMENT (OUTPATIENT)
Dept: PHYSICAL THERAPY | Facility: CLINIC | Age: 52
DRG: 438 | End: 2022-06-07
Attending: PHYSICIAN ASSISTANT
Payer: MEDICARE

## 2022-06-07 ENCOUNTER — APPOINTMENT (OUTPATIENT)
Dept: ULTRASOUND IMAGING | Facility: CLINIC | Age: 52
DRG: 438 | End: 2022-06-07
Attending: PHYSICIAN ASSISTANT
Payer: MEDICARE

## 2022-06-07 LAB
% LINING CELLS, BODY FLUID: 1 %
ALBUMIN BODY FLUID SOURCE: NORMAL
ALBUMIN FLD-MCNC: 1 G/DL
ALBUMIN SERPL-MCNC: 2.4 G/DL (ref 3.4–5)
APPEARANCE FLD: ABNORMAL
CELL COUNT BODY FLUID SOURCE: ABNORMAL
COLOR FLD: YELLOW
GRAM STAIN RESULT: NORMAL
GRAM STAIN RESULT: NORMAL
LYMPHOCYTES NFR FLD MANUAL: 53 %
MONOS+MACROS NFR FLD MANUAL: 41 %
NEUTS BAND NFR FLD MANUAL: 5 %
POTASSIUM BLD-SCNC: 3.5 MMOL/L (ref 3.4–5.3)
PROT FLD-MCNC: 1.9 G/DL
PROT SERPL-MCNC: 5.5 G/DL (ref 6.8–8.8)
PROTEIN BODY FLUID SOURCE: NORMAL
TOPIRAMATE SERPL-MCNC: 4.8 UG/ML
WBC # FLD AUTO: 274 /UL

## 2022-06-07 PROCEDURE — 120N000001 HC R&B MED SURG/OB

## 2022-06-07 PROCEDURE — 272N000047 US PARACENTESIS WITHOUT ALBUMIN

## 2022-06-07 PROCEDURE — 36415 COLL VENOUS BLD VENIPUNCTURE: CPT | Performed by: HOSPITALIST

## 2022-06-07 PROCEDURE — A9585 GADOBUTROL INJECTION: HCPCS | Performed by: HOSPITALIST

## 2022-06-07 PROCEDURE — 999N000157 HC STATISTIC RCP TIME EA 10 MIN

## 2022-06-07 PROCEDURE — 82040 ASSAY OF SERUM ALBUMIN: CPT | Performed by: HOSPITALIST

## 2022-06-07 PROCEDURE — 0W9G3ZZ DRAINAGE OF PERITONEAL CAVITY, PERCUTANEOUS APPROACH: ICD-10-PCS | Performed by: RADIOLOGY

## 2022-06-07 PROCEDURE — 84157 ASSAY OF PROTEIN OTHER: CPT | Performed by: PHYSICIAN ASSISTANT

## 2022-06-07 PROCEDURE — 250N000013 HC RX MED GY IP 250 OP 250 PS 637: Performed by: HOSPITALIST

## 2022-06-07 PROCEDURE — 99233 SBSQ HOSP IP/OBS HIGH 50: CPT | Performed by: HOSPITALIST

## 2022-06-07 PROCEDURE — 97530 THERAPEUTIC ACTIVITIES: CPT | Mod: GP

## 2022-06-07 PROCEDURE — 94660 CPAP INITIATION&MGMT: CPT

## 2022-06-07 PROCEDURE — 250N000009 HC RX 250: Performed by: RADIOLOGY

## 2022-06-07 PROCEDURE — 87070 CULTURE OTHR SPECIMN AEROBIC: CPT | Performed by: HOSPITALIST

## 2022-06-07 PROCEDURE — 82042 OTHER SOURCE ALBUMIN QUAN EA: CPT | Performed by: PHYSICIAN ASSISTANT

## 2022-06-07 PROCEDURE — 97116 GAIT TRAINING THERAPY: CPT | Mod: GP

## 2022-06-07 PROCEDURE — 87205 SMEAR GRAM STAIN: CPT | Performed by: PHYSICIAN ASSISTANT

## 2022-06-07 PROCEDURE — 84132 ASSAY OF SERUM POTASSIUM: CPT | Performed by: HOSPITALIST

## 2022-06-07 PROCEDURE — 84155 ASSAY OF PROTEIN SERUM: CPT | Performed by: HOSPITALIST

## 2022-06-07 PROCEDURE — 255N000002 HC RX 255 OP 636: Performed by: HOSPITALIST

## 2022-06-07 PROCEDURE — 89051 BODY FLUID CELL COUNT: CPT | Performed by: PHYSICIAN ASSISTANT

## 2022-06-07 RX ORDER — LIDOCAINE HYDROCHLORIDE 10 MG/ML
10 INJECTION, SOLUTION EPIDURAL; INFILTRATION; INTRACAUDAL; PERINEURAL ONCE
Status: COMPLETED | OUTPATIENT
Start: 2022-06-07 | End: 2022-06-07

## 2022-06-07 RX ORDER — DIVALPROEX SODIUM 500 MG/1
500 TABLET, DELAYED RELEASE ORAL 2 TIMES DAILY
Status: DISCONTINUED | OUTPATIENT
Start: 2022-06-08 | End: 2022-06-10 | Stop reason: HOSPADM

## 2022-06-07 RX ORDER — DIVALPROEX SODIUM 250 MG/1
250 TABLET, DELAYED RELEASE ORAL AT BEDTIME
Status: DISCONTINUED | OUTPATIENT
Start: 2022-06-07 | End: 2022-06-10 | Stop reason: HOSPADM

## 2022-06-07 RX ADMIN — LEVOCARNITINE 330 MG: 330 TABLET ORAL at 08:28

## 2022-06-07 RX ADMIN — ATENOLOL 25 MG: 25 TABLET ORAL at 08:28

## 2022-06-07 RX ADMIN — PANTOPRAZOLE SODIUM 40 MG: 40 TABLET, DELAYED RELEASE ORAL at 21:16

## 2022-06-07 RX ADMIN — Medication 1 DROP: at 21:16

## 2022-06-07 RX ADMIN — LIDOCAINE HYDROCHLORIDE 10 ML: 10 INJECTION, SOLUTION EPIDURAL; INFILTRATION; INTRACAUDAL; PERINEURAL at 11:11

## 2022-06-07 RX ADMIN — DICYCLOMINE HYDROCHLORIDE 20 MG: 20 TABLET ORAL at 21:16

## 2022-06-07 RX ADMIN — DICYCLOMINE HYDROCHLORIDE 20 MG: 20 TABLET ORAL at 06:56

## 2022-06-07 RX ADMIN — IPRATROPIUM BROMIDE 2 SPRAY: 42 SPRAY NASAL at 21:16

## 2022-06-07 RX ADMIN — DICYCLOMINE HYDROCHLORIDE 20 MG: 20 TABLET ORAL at 15:57

## 2022-06-07 RX ADMIN — SERTRALINE HYDROCHLORIDE 100 MG: 100 TABLET ORAL at 08:28

## 2022-06-07 RX ADMIN — IPRATROPIUM BROMIDE 2 SPRAY: 42 SPRAY NASAL at 08:28

## 2022-06-07 RX ADMIN — RISPERIDONE 2 MG: 2 TABLET ORAL at 21:16

## 2022-06-07 RX ADMIN — PREDNISOLONE ACETATE 1 DROP: 10 SUSPENSION/ DROPS OPHTHALMIC at 08:30

## 2022-06-07 RX ADMIN — IPRATROPIUM BROMIDE 2 SPRAY: 42 SPRAY NASAL at 15:57

## 2022-06-07 RX ADMIN — TOPIRAMATE 100 MG: 100 TABLET, FILM COATED ORAL at 21:16

## 2022-06-07 RX ADMIN — LEVOCARNITINE 330 MG: 330 TABLET ORAL at 21:16

## 2022-06-07 RX ADMIN — CHLORHEXIDINE GLUCONATE 15 ML: 1.2 SOLUTION ORAL at 08:30

## 2022-06-07 RX ADMIN — DIVALPROEX SODIUM 500 MG: 500 TABLET, DELAYED RELEASE ORAL at 15:57

## 2022-06-07 RX ADMIN — LEVOCARNITINE 330 MG: 330 TABLET ORAL at 15:57

## 2022-06-07 RX ADMIN — LEVOTHYROXINE SODIUM 50 MCG: 50 TABLET ORAL at 06:56

## 2022-06-07 RX ADMIN — PANTOPRAZOLE SODIUM 40 MG: 40 TABLET, DELAYED RELEASE ORAL at 08:28

## 2022-06-07 RX ADMIN — DICYCLOMINE HYDROCHLORIDE 20 MG: 20 TABLET ORAL at 12:02

## 2022-06-07 RX ADMIN — DIVALPROEX SODIUM 500 MG: 500 TABLET, DELAYED RELEASE ORAL at 08:28

## 2022-06-07 RX ADMIN — GADOBUTROL 10 ML: 604.72 INJECTION INTRAVENOUS at 00:36

## 2022-06-07 RX ADMIN — MONTELUKAST 10 MG: 10 TABLET, FILM COATED ORAL at 21:15

## 2022-06-07 RX ADMIN — RISPERIDONE 2 MG: 2 TABLET ORAL at 08:28

## 2022-06-07 ASSESSMENT — ACTIVITIES OF DAILY LIVING (ADL)
ADLS_ACUITY_SCORE: 42
ADLS_ACUITY_SCORE: 42
ADLS_ACUITY_SCORE: 41
ADLS_ACUITY_SCORE: 42
ADLS_ACUITY_SCORE: 41
ADLS_ACUITY_SCORE: 41
ADLS_ACUITY_SCORE: 39
ADLS_ACUITY_SCORE: 41
ADLS_ACUITY_SCORE: 41

## 2022-06-07 NOTE — CONSULTS
Care Management Initial Consult    General Information  Assessment completed with: Family, Huong, Guardian, (sister)  Type of CM/SW Visit: Offer D/C Planning    Primary Care Provider verified and updated as needed:     Readmission within the last 30 days: previous discharge plan unsuccessful      Reason for Consult: discharge planning  Advance Care Planning: Advance Care Planning Reviewed: concerns discussed     General Information Comments: Patient was hospitalized 5/8/22 and was discharged back to Group Home. Sister feels that patient should have TCU at discharge where he can get more therapies.    Communication Assessment  Patient's communication style: spoken language (English or Bilingual)    Hearing Difficulty or Deaf: no   Wear Glasses or Blind: yes    Cognitive  Cognitive/Neuro/Behavioral: WDL, .WDL except, speech  Level of Consciousness: alert  Arousal Level: opens eyes spontaneously  Orientation: oriented x 4  Mood/Behavior: calm, cooperative  Best Language: 0 - No aphasia  Speech: slow    Living Environment:   People in home: facility resident     Current living Arrangements: group home      Able to return to prior arrangements: yes  Living Arrangement Comments: Group johnna: [employer]    Family/Social Support:  Care provided by: other (see comments) (assisted Staff)  Provides care for: no one, unable/limited ability to care for self     Sibling(s), Facility resident(s)/Staff          Description of Support System: Supportive, Involved    Support Assessment: Adequate family and caregiver support, Adequate social supports    Current Resources:   Patient receiving home care services:  Had home care 2x/week after previous hospital discharge.     Community Resources:  Group Home     Care Coordinator: Vero Lim phone [phone] email [email or web address] worker: Bree Olvera [phone] [email or web address] program: ROCIO   Primary Physician: Joel Werner  phone [phone] / fax [phone]  Clinic: [facility] [address] [place] [address]  Psychaitrist Tank Sauer [phone]  Care Team Members (group home)  [facility] Coordinator [phone]  [facility]       Provider name / Service provided / payment type:   [employer] [facility] / Congregate Living / Waiver  [facility] / Case Management / Waiver  [facility]  / Care Coordination / [employer]  [facility] / Supplies and Equipment / [employer]  [employer] [facility] / Transportation / Waiver  [employer] / Transportation / Waiver      Equipment currently used at home: walker, rolling, grab bar, tub/shower  Supplies currently used at home:      Employment/Financial:  Employment Status: disabled        Financial Concerns: No concerns identified   Referral to Financial Worker: No       Lifestyle & Psychosocial Needs:  Social Determinants of Health     Tobacco Use: Not on file   Alcohol Use: Not on file   Financial Resource Strain: Not on file   Food Insecurity: Not on file   Transportation Needs: Not on file   Physical Activity: Not on file   Stress: Not on file   Social Connections: Not on file   Intimate Partner Violence: Not on file   Depression: Not on file   Housing Stability: Not on file       Functional Status:  Prior to admission patient needed assistance:    Patient was recently admitted to this hospital on 5/8/22 and discharged back to Group Home on 5/17/22.  SW spoke with patient's sister who feels that patient did not get enough therapies and feels that patient would benefit from TCU stay.        Mental Health Status:      Dx of Bipolar Disorder, Schizoaffective disorder , Developmental Delay    Chemical Dependency Status:      None indicated          Values/Beliefs:  Spiritual, Cultural Beliefs, Synagogue Practices, Values that affect care:                 Additional Information:  SW consulted to assist with  discharge planning for patient who is recommended to TCU placement.  FERNANDO spoke with patient's sister Huong, who is also his guardian, who assists with placement for patient.  FERNANDO discussed TCU recommendations and she is requesting referrals be sent to facilities in the [facility].  Places she is aware of that she would like to be considered are [facility], [facility], [facility], [facility], [facility], [facility], [facility]. FERNANDO sent referrals via [facility].    Prior admission, patient triggered for a level 2 assessment due to Developmental Delay. Patient will likely need level 2 assessment this hospitalization prior to discharge. FERNANDO discussed with Huong and she is in understanding.     JESUS MANUEL Gipson, LGSW  [phone]  [facility]

## 2022-06-07 NOTE — PROGRESS NOTES
[facility]  Gastroenterology Progress Note     Jagdeep Walker MRN# [id number]   YOB: 1970 Age: 51 year old          Assessment and Plan:   Jagdeep Walker is a 51 year old male admitted on 6/4/2022.   Past history of HTN, hypothyroid, seizure disorder, schizoaffective and bipolar disorder, GERD, recent admission 5/8-5/17/22 for acute pancreatitis with pseudocysts, portal vein thrombus who presents from his group home with repeated falls and metabolic encephalopathy.  He has a history of acute on chronic pancreatitis and CT noted findings concerning for liver cirrhosis.    Recent acute pancreatitis with pseudocysts  Possible cirrhosis  Lower extremity edema  Hypoalbuminemia   CT 6/4/22 showing moderate ascites, 2 stable pseudocysts but likely 1 new pseudocyst, improved pancreatic inflammation, liver nodularity concerning for cirrhosis.    6/4/22 Abdominal ultrasound noted coarsened hepatic echotexture and fibrosis. With scattered ascites.  MRCP 6/5/22 significant for pancreatitis, 2 pseudocysts, and perigastric fluid collection withouth evidence of pancreatic necrosis, mild pancreatic ductal dilation with narrowing oteh common bile dut secondary to the pseudocyst in the pancreatic head. Large volume ascites, slightly increased int eh interval. Normal appearing liver without findings of liver cirrhosis.  Hypoalbuminemia may be due to recent illness vs poor intake but cannot rule out cirrhosis, INR was previously elevated as well.   Ammonia normal at 33.  Albumin 2.4  No need to treat portal vein thrombosis  Cause of pancreatitis may be due to drug induced injury vs autoimmune disease  ( valproic acid/topiramate level pending)  Has significant abdominal distention with ascites noted on imagine- abdomen firm- would benefit from paracentesis and cell count to r/o SBP.    - MRCP did not confirm findings of liver cirrhosis  - 2g Na diet and continue work on nutrition  - daily  labs  -- Paracentesis              Interval History:   no new complaints, doing well and doing well; no cp, sob, n/v/d, c/o abdominal pain worse with bending              Review of Systems:   C: NEGATIVE for fever, chills, change in weight  E/M: NEGATIVE for ear, mouth and throat problems  R: NEGATIVE for significant cough or SOB  CV: NEGATIVE for chest pain, palpitations or peripheral edema             Medications:   I have reviewed this patient's current medications    atenolol  25 mg Oral Daily     carboxymethylcellulose PF  1 drop Right Eye At Bedtime     chlorhexidine  15 mL Swish & Spit Daily     dicyclomine  20 mg Oral 4x Daily AC & HS     divalproex sodium delayed-release  500 mg Oral TID     docusate sodium  100 mg Oral Daily     ipratropium  2 spray Both Nostrils TID     levOCARNitine  330 mg Oral TID     levothyroxine  50 mcg Oral Daily     montelukast  10 mg Oral At Bedtime     pantoprazole  40 mg Oral BID     prednisoLONE acetate  1 drop Right Eye Daily     risperiDONE  2 mg Oral BID     sertraline  100 mg Oral Daily     sodium chloride (PF)  3 mL Intracatheter Q8H     topiramate  100 mg Oral At Bedtime                  Physical Exam:   Vitals were reviewed  Vital Signs with Ranges  Temp:  [97.2  F (36.2  C)-98.2  F (36.8  C)] 97.3  F (36.3  C)  Pulse:  [] 86  Resp:  [16-18] 16  BP: (117-150)/() 134/89  SpO2:  [94 %-97 %] 95 %  I/O last 3 completed shifts:  In: 1310 [P.O.:1310]  Out: 500 [Urine:500]  Constitutional: healthy, alert and no distress   Cardiovascular: negative, PMI normal. No lifts, heaves, or thrills. RRR. No murmurs, clicks gallops or rub  Respiratory: negative, Percussion normal. Good diaphragmatic excursion. Lungs clear  Abdomen: Abdomen firm, mildly tender. distended BS normal. No masses, organomegaly             Data:   I reviewed the patient's new clinical lab test results.   Recent Labs   Lab Test 06/06/22  0659 06/05/22  0728 06/04/22  0754 04/29/22  0625 04/27/22  2350    WBC 3.8* 3.8* 4.5   < > 7.2   HGB 11.8* 12.0* 12.5*   < > 11.6*   MCV 95 95 95   < > 92   PLT 95* 92* 106*   < > 151   INR  --  1.73*  --   --  1.26*    < > = values in this interval not displayed.     Recent Labs   Lab Test 06/07/22  0709 06/06/22  0659 06/05/22  1239 06/05/22  0728 06/04/22  0754   POTASSIUM 3.5 3.7 3.5 3.2* 3.5   CHLORIDE  --  111*  --  109 110*   CO2  --  25  --  25 23   BUN  --  13  --  14 19   ANIONGAP  --  5  --  7 9     Recent Labs   Lab Test 06/05/22  0728 06/04/22  1653 06/04/22  0754 05/16/22  1302 05/10/22  0724 05/09/22  0923 05/08/22 2032 05/08/22  0934 04/29/22  0625 04/28/22  0016   ALBUMIN 2.4*  --  2.4* 2.4*   < > 2.3*   < >  --    < >  --    BILITOTAL 0.7  --  0.7 0.6   < > 1.4*   < >  --    < >  --    ALT 27  --  28 34   < > 35   < >  --    < >  --    AST 61*  --  76* 37   < > 97*   < >  --    < >  --    PROTEIN  --  Negative  --   --   --   --   --  Negative  --  Negative   LIPASE  --   --  466* 1,461*  --  1,555*  --   --    < >  --     < > = values in this interval not displayed.       I reviewed the patient's new imaging results.    All laboratory data reviewed  All imaging studies reviewed by me.    Isabelle Pantoja PA-C,  6/6/2022  [facility]  Office : [phone]  Cell: [phone] (Dr. Clark)  Cell: [phone] (Isabelle Pantoja PA-C)

## 2022-06-07 NOTE — PLAN OF CARE
AOx4, forgetful. VSS on RA. Tele: NSR. No c/o pain. Mild tremors. R elbow scrap, SUSANNA. Regular diet, good appetite. Up A1, GB + W to BR. Chair for meals. Ambulated cortez x1. Showered. R PIV SL. K check in AM. Paracentesis completed, 1.8L, dressing CDI. Family updated. Plan pending improvement and TCU placement.

## 2022-06-07 NOTE — PLAN OF CARE
19:00-07:30  A&O- forgetful at times. VSS- hypertensive at times, on RA. Tele- NSR. Denied pain during shift. Ax1 GB+W. Mild tremors in both extremities. Edema noted in both LE. Incontinent of B&B at times, needs assistance w/ urinal. BM x1 this morning. R PIV SL. K protocol, recheck in AM. MRI of lumbar completed-see results. Discharge pending.

## 2022-06-07 NOTE — PROGRESS NOTES
"[facility]    Medicine Progress Note - Hospitalist Service    Date of Admission:  6/4/2022    Assessment & Plan        Jagdeep Walker is a 51 year old male admitted on 6/4/2022.   Past history of HTN, hypothyroid, seizure disorder, schizoaffective and bipolar disorder, GERD, recent admission 5/8-5/17/22 for acute pancreatitis with pseudocysts, portal vein thrombus who presents from his group home with repeated falls and metabolic encephalopathy.     Acute metabolic encephalopathy of unclear etiology  Frequent falls  Etiology unclear, very broad differential.  Currently highest concern for intracranial pathology (bleed vs stroke) vs hepatic encephalopathy.  Patient and sister noting tremor, balance difficulty, sister reporting he is \"foggy\".  Nodular liver noted on CT abd, with hypoalbuminema and recently elevated INR - exam unclear as to tremor vs asterixis.  Cannot rule out valproic acid toxicity or polypharmacy or partial seizure.  Lower suspicion for infection as afebrile without leukocytosis and offering no infectious complaints.  Neuro exam notable for mild dysconjugate gaze and asymmetric pupil (suspect chronic as clearly had right eye surgery) as well as tremor, orientation to person, place and month.  Lytes ok.  TSH wnl.  - Admitted to inpatient.  - CT head showed no acute changes.  - MRI brain showed no acute changes.  - Neuro checks Qshift.  - Orthostatic blood pressure negative.  - Telemetry.  - Procal and CRP mildly elevated, recheck in AM.  - UA negative for signs of infection.  - Ammonia level within normal limits.  - Valproic acid and topiramate levels pending.  - VBG OK on 6/4/22.  - PT/OT consulted.  - Mental status much improved overall, etiology of episode still unclear.  - Concern about frequent falls recently.  - Neurology consulted, appreciate their assistance.  - EEG on 6/5/22 was mildly abnormal with findings seen in mild encephalopathy.  - MRI lumbar spine on " 6/6/22 did not have any findings that would explain his recent falls, see full report in Epic.  - Vitamin B12 and folate within normal limits.  - Zinc, copper, vitamin A, and vitamin E levels pending.  - Paraneoplastic panel pending.  - Care transitions consulted, will need TCU at the time of discharge.    Acute kidney injury  Baseline Cr 0.7, admission Cr 1.0.  Suspect poor oral intake.   - Received 1L bolus in ED, will hold on further fluids given edema.  - Creatinine improved, recheck in AM.  - Monitor closely as received contrast 6/4.    Recent acute pancreatitis with pseudocysts  Possible cirrhosis  Lower extremity edema  Hypoalbuminemia  Ascites  See recent discharge summary for details, was to follow up with [facility] in 1 month with repeat CT imaging.  CT 6/4/22 showing moderate ascites, 2 stable pseudocysts but likely 1 new pseudocyst, improved pancreatic inflammation, liver nodularity concerning for cirrhosis.  Hypoalbuminemia may be due to recent illness vs poor intake but cannot rule out cirrhosis, INR was previously elevated as well.   - [facility] consult.  - As above, low suspicion for infection.  - Ammonia as above, obtain coags.  - Abdominal US showed findings concerning for cirrhosis as well as a small amount of ascites.  - 2g Na diet.  - TTE did not show evidence of CHF as source of edema.  - MRCP showed large volume ascites. Patient now with worsening abdominal distention and some abdominal discomfort.   - Plan for diagnostic and therapeutic paracentesis today.    Recent portal vein thrombosis  Diagnosed during prior admission, has not been treated with anticoagulation.  CT 6/4 showing non-occlusive portal vein thrombus has improved from prior.   - Anticoagulation not recommended by GI.    Thrombocytopenia   Platelet 106 at admission, no history of low platelets.  Possibly related to cirrhosis.  Recent B12 and folate levels wnl.  - Monitor CBC.    Chronic anemia   - Baseline hemoglobin about 12  "g/dL, stable at admission.    Seizure disorder  - Continue PTA Depakote, topiramate.    Bipolar disorder  Schizoaffective disorder  Resides in group home.   - Continue PTA Klonopin, risperdal, sertraline.    Hypertension  - Continue PTA atenolol.    Asthma  JACLYN  - Continue PTA Singulair.  - Continue Bipap at bedtime.    Hypothyroid  TSH wnl 6/4/22.   - Continue PTA levothyroxine.     GERD  Fisher's esophagus  - Continue PTA omeprazole.    Carnitine deficiency  - Continue PTA levocarnitine.        Diet: Combination Diet 2 gm NA Diet  Room Service    DVT Prophylaxis: Pneumatic Compression Devices  Santana Catheter: Not present  Central Lines: None  Cardiac Monitoring: ACTIVE order. Indication: frequent falls  Code Status: Full Code      Disposition Plan   Expected Discharge: 06/08/2022     Anticipated discharge location:  Awaiting care coordination huddle  Delays:            The patient's care was discussed with the Bedside Nurse, Patient and GI Consultant.    Fede Jones MD  Hospitalist Service  [facility]  Securely message with the Vocera Web Console (learn more here)  Text page via [employer] Paging/Directory         Clinically Significant Risk Factors Present on Admission             # Obesity: Estimated body mass index is 35.86 kg/m  as calculated from the following:    Height as of this encounter: 1.651 m (5' 5\").    Weight as of this encounter: 97.7 kg (215 lb 7.5 oz).      ______________________________________________________________________    Interval History   Jagdeep Walker was seen this morning. He feels OK. Some abdominal discomfort with he stands up or bends over, none at rest. Denies fever, chills, sweats. No chest pain, shortness of breath, nausea. Feels like he is getting stronger with therapies. Hopes to go home soon. Updated his sister, Huong, by phone this morning.    Data reviewed today: I reviewed all medications, new labs and imaging results over the last 24 " hours. I personally reviewed no images or EKG's today.    Physical Exam   Vital Signs: Temp: 97.3  F (36.3  C) Temp src: Oral BP: 134/89 Pulse: 86   Resp: 16 SpO2: 95 % O2 Device: None (Room air)    Weight: 215 lbs 7.52 oz  Constitutional: awake, alert, cooperative, no apparent distress, sitting up in a chair  Respiratory: clear to auscultation bilaterally, no crackles or wheezing  Cardiovascular: regular rate and rhythm, normal S1 and S2, no murmur noted  GI: normal bowel sounds, soft, distended, mild generalized tenderness  Skin: warm, dry  Musculoskeletal: 1+ lower extremity pitting edema present  Neurologic: awake, alert, answers questions appropriately, moves all extremities    Data   Recent Labs   Lab 06/07/22  0709 06/06/22  0659 06/05/22  1239 06/05/22  0728 06/04/22  0754   WBC  --  3.8*  --  3.8* 4.5   HGB  --  11.8*  --  12.0* 12.5*   MCV  --  95  --  95 95   PLT  --  95*  --  92* 106*   INR  --   --   --  1.73*  --    NA  --  141  --  141 142   POTASSIUM 3.5 3.7 3.5 3.2* 3.5   CHLORIDE  --  111*  --  109 110*   CO2  --  25  --  25 23   BUN  --  13  --  14 19   CR  --  0.89  --  0.88 1.01   ANIONGAP  --  5  --  7 9   NASIR  --  9.0  --  9.1 9.2   GLC  --  80  --  88 69*   ALBUMIN  --   --   --  2.4* 2.4*   PROTTOTAL  --   --   --  5.7* 5.8*   BILITOTAL  --   --   --  0.7 0.7   ALKPHOS  --   --   --  55 55   ALT  --   --   --  27 28   AST  --   --   --  61* 76*   LIPASE  --   --   --   --  466*     Medications       atenolol  25 mg Oral Daily     carboxymethylcellulose PF  1 drop Right Eye At Bedtime     chlorhexidine  15 mL Swish & Spit Daily     dicyclomine  20 mg Oral 4x Daily AC & HS     divalproex sodium delayed-release  500 mg Oral TID     docusate sodium  100 mg Oral Daily     ipratropium  2 spray Both Nostrils TID     levOCARNitine  330 mg Oral TID     levothyroxine  50 mcg Oral Daily     montelukast  10 mg Oral At Bedtime     pantoprazole  40 mg Oral BID     prednisoLONE acetate  1 drop Right Eye  Daily     risperiDONE  2 mg Oral BID     sertraline  100 mg Oral Daily     sodium chloride (PF)  3 mL Intracatheter Q8H     topiramate  100 mg Oral At Bedtime

## 2022-06-07 NOTE — PROGRESS NOTES
[facility]  Neuroscience and [facility] [place]  Neurology Daily Note              Interval History:    To assess the etiology for his frequent falls, he has had orthostatic blood pressures done which were negative.  Brain MRI showed no acute changes, no evidence for concerns regarding hydrocephalus or posterior fossa lesion, however, there was incidental finding chronic cerebellar infarcts.  MRI of the lumbar spine's 6/6/2022 revealed grade 1 retrolisthesis of L2 on L3 and L5 on S1 but no evidence of clip critical central canal stenosis or lesions in the cord equina  There was bilateral neural foraminal narrowing at L3-L4.  6/46/22 valproic acid level was 67, free valproic acid was 31, which is in the low toxic range.  Mr. Walker is a 51-year-old gentleman who lives in a group home and has a history for schizoaffective disorder, bipolar disorder and generalized tonic-clonic seizures since childhood.  He also has a history for pancreatitis with pseudocyst, portal venous thrombosis and was admitted because of tremulous  Upper extremities and frequent falls.  He was seen by Dr. Huerta in consultation on 6/5, please refer to that consultation for further details.  Patient's sister reported that over the past 3 weeks his gait has deteriorated, prior to that, he was walking independently and had resorted to using a walker.  These falls are unexplained, not preceded by dizziness or vertiginous symptoms and sometimes he is unable to pick himself up from before.  He reported having numbness in the feet, no symptoms to indicate neurogenic claudication, however.  He also indicated that he has had bladder urgency, no bowel dysfunction was reported.  He has been following with Dr. Flaherty in [place] [facility] for his seizures and reportedly has had nonepileptic spells concerning for dystonia.  He is on Depakote and levocarnitine for his seizures and he is also on topiramate and clonazepam.    r :[id number]        "Medications:   Scheduled Meds:    atenolol  25 mg Oral Daily     carboxymethylcellulose PF  1 drop Right Eye At Bedtime     chlorhexidine  15 mL Swish & Spit Daily     dicyclomine  20 mg Oral 4x Daily AC & HS     divalproex sodium delayed-release  500 mg Oral TID     docusate sodium  100 mg Oral Daily     ipratropium  2 spray Both Nostrils TID     levOCARNitine  330 mg Oral TID     levothyroxine  50 mcg Oral Daily     montelukast  10 mg Oral At Bedtime     pantoprazole  40 mg Oral BID     prednisoLONE acetate  1 drop Right Eye Daily     risperiDONE  2 mg Oral BID     sertraline  100 mg Oral Daily     sodium chloride (PF)  3 mL Intracatheter Q8H     topiramate  100 mg Oral At Bedtime     PRN Meds: acetaminophen **OR** acetaminophen, lidocaine 4%, lidocaine (buffered or not buffered), melatonin, ondansetron **OR** ondansetron, polyethylene glycol, senna-docusate **OR** senna-docusate, sodium chloride (PF)        Physical Exam:   Vitals: Blood pressure 104/74, pulse 68, temperature 97.5  F (36.4  C), temperature source Oral, resp. rate 16, height 1.651 m (5' 5\"), weight 97.7 kg (215 lb 7.5 oz), SpO2 96 %.    Neuro:       Mental Status Exam:   He was very alert with fluent speech, fund of knowledge was limited but he was able to follow simple and complex commands fairly well.  He was oriented to place and person       Cranial Nerves: The extraocular movements were full and there were no nystagmoid movements and there were no dystonic movements seen facial animation was appropriate.           Motor:   There was normal tone in all 4 extremities but he had a postural tremoring of the outstretched hands and mild asterixis.  There was fairly good strength, proximally and distally in both upper and lower extremities at 4+/5.                         Coordination: There was no dysmetria to finger-to-nose testing, no dysmetria to heel-to-shin testing also.       Gait: Not tested  Cardiovascular: Regular rate and rhythm, no " m/r/g  Lungs: Clear to auscultation  Abdomen: Soft, not tender, not destended  Extremities: No clubbing, no cyanosis, no edema       Data:   ROUTINE IP LABS (Last 3results)  CBC RESULTS:     Recent Labs   Lab 06/06/22  0659 06/05/22  0728 06/04/22  0754   WBC 3.8* 3.8* 4.5   RBC 3.83* 3.97* 4.07*   HGB 11.8* 12.0* 12.5*   HCT 36.2* 37.5* 38.7*   PLT 95* 92* 106*     Basic Metabolic Panel:  Recent Labs   Lab 06/07/22  0709 06/06/22  0659 06/05/22  1239 06/05/22  0728 06/04/22  0754   NA  --  141  --  141 142   POTASSIUM 3.5 3.7 3.5 3.2* 3.5   CHLORIDE  --  111*  --  109 110*   CO2  --  25  --  25 23   BUN  --  13  --  14 19   CR  --  0.89  --  0.88 1.01   GLC  --  80  --  88 69*   NASIR  --  9.0  --  9.1 9.2     INR:  Recent Labs   Lab 06/05/22  0728   INR 1.73*      Lipid Profile:  Recent Labs   Lab Test 05/09/22  1415   TRIG 118     TSH:  TSH   Date Value Ref Range Status   06/04/2022 3.42 0.40 - 4.00 mU/L Final   ,   Vitamin B12:   Lab Results   Component Value Date    B12 693 06/06/2022      A1C: No results found for: A1C     .  Images were personally reviewed    MRI of the lumbar spine's 6/6/2022 revealed grade 1 retrolisthesis of L2 on L3 and L5 on S1 but no evidence of clip critical central canal stenosis or lesions in the cord equina  There was bilateral neural foraminal narrowing at L3-L4.    MRI of the brain 6/4/2022 revealed no acute or subacute infarcts, no extra-axial fluid collection, scattered nonspecific T2/FLAIR hyperintensities within the cerebral white matter consistent with mild chronic microvascular ischemic changes.  There were small chronic infarcts in the cerebellar hemispheres.    Assessment:  --Frequent falls and unsteady gait, and shakiness, progressive over the past 3 weeks, prior to this patient was walking independently, according to his sister-  -in addition, the patient's sister reports that the patient has had frequent spells where he would stiffen up, without loss of consciousness, he  has been evaluated by his epileptologist, Dr. Flaherty, apparently had inpatient video assessment of medication, no clear etiology for the spells.  -The etiology is unclear, MRI of the brain was reviewed, revealing no evidence of hydrocephalus or parenchymal lesions.  --No evidence for concerns regarding orthostatic hypotension.  --These appear to be accelerated symptoms over the past 3weeks, record memory impairment but concerns regarding bladder urgency.    -Disconjugate gaze, no clear evidence  opthalmoplegia, no ptosis .     -Tremors, most likely toxic, metabolic, physiological tremors.  Recommendations:    - Vitamin B12/ folate panel, vitamin A and E levels, zinc levels.  MRI of the lumbosacral spine.  PT/OT evaluate and treat is a fall risk.  --With recent accelerated symptoms, although not typical, would recommend panel for paraneoplastic antibodies and immune encephalitis .panel  --- History for generalized tonic-clonic seizures, the patient has been seizure-free for several years, last no recent changes made with his AED  -  In view of his continued uncontrollable tremors, and the patient has been seizure-free for several years, he does have a slightly high free valproic acid level and would recommend decreasing Depakote to Depakote  mg twice daily and 250 mg once daily and continue to monitor response.  Laboratory studies are outstanding, these were done to assess for toxic, metabolic myelopathy    30 minutes was spent with this patient, during which, more than 50% of the time was spent in counseling and directing care.           Delores Montero MD  [facility], [facility]  Office number [phone]

## 2022-06-08 LAB
POTASSIUM BLD-SCNC: 3.3 MMOL/L (ref 3.4–5.3)
POTASSIUM BLD-SCNC: 3.7 MMOL/L (ref 3.4–5.3)

## 2022-06-08 PROCEDURE — 250N000013 HC RX MED GY IP 250 OP 250 PS 637: Performed by: HOSPITALIST

## 2022-06-08 PROCEDURE — 36415 COLL VENOUS BLD VENIPUNCTURE: CPT | Performed by: HOSPITALIST

## 2022-06-08 PROCEDURE — 84132 ASSAY OF SERUM POTASSIUM: CPT | Performed by: HOSPITALIST

## 2022-06-08 PROCEDURE — 120N000001 HC R&B MED SURG/OB

## 2022-06-08 PROCEDURE — 999N000157 HC STATISTIC RCP TIME EA 10 MIN

## 2022-06-08 PROCEDURE — 94660 CPAP INITIATION&MGMT: CPT

## 2022-06-08 PROCEDURE — 250N000013 HC RX MED GY IP 250 OP 250 PS 637: Performed by: PSYCHIATRY & NEUROLOGY

## 2022-06-08 PROCEDURE — 99232 SBSQ HOSP IP/OBS MODERATE 35: CPT | Performed by: HOSPITALIST

## 2022-06-08 RX ORDER — POTASSIUM CHLORIDE 1500 MG/1
40 TABLET, EXTENDED RELEASE ORAL ONCE
Status: COMPLETED | OUTPATIENT
Start: 2022-06-08 | End: 2022-06-08

## 2022-06-08 RX ADMIN — LEVOTHYROXINE SODIUM 50 MCG: 50 TABLET ORAL at 06:55

## 2022-06-08 RX ADMIN — ACETAMINOPHEN 650 MG: 325 TABLET ORAL at 13:41

## 2022-06-08 RX ADMIN — DIVALPROEX SODIUM 250 MG: 250 TABLET, DELAYED RELEASE ORAL at 21:23

## 2022-06-08 RX ADMIN — MONTELUKAST 10 MG: 10 TABLET, FILM COATED ORAL at 21:24

## 2022-06-08 RX ADMIN — TOPIRAMATE 100 MG: 100 TABLET, FILM COATED ORAL at 21:24

## 2022-06-08 RX ADMIN — IPRATROPIUM BROMIDE 2 SPRAY: 42 SPRAY NASAL at 08:31

## 2022-06-08 RX ADMIN — RISPERIDONE 2 MG: 2 TABLET ORAL at 08:30

## 2022-06-08 RX ADMIN — IPRATROPIUM BROMIDE 2 SPRAY: 42 SPRAY NASAL at 21:26

## 2022-06-08 RX ADMIN — DICYCLOMINE HYDROCHLORIDE 20 MG: 20 TABLET ORAL at 13:35

## 2022-06-08 RX ADMIN — IPRATROPIUM BROMIDE 2 SPRAY: 42 SPRAY NASAL at 17:47

## 2022-06-08 RX ADMIN — LEVOCARNITINE 330 MG: 330 TABLET ORAL at 21:24

## 2022-06-08 RX ADMIN — PANTOPRAZOLE SODIUM 40 MG: 40 TABLET, DELAYED RELEASE ORAL at 08:30

## 2022-06-08 RX ADMIN — CHLORHEXIDINE GLUCONATE 15 ML: 1.2 SOLUTION ORAL at 08:41

## 2022-06-08 RX ADMIN — RISPERIDONE 2 MG: 2 TABLET ORAL at 20:05

## 2022-06-08 RX ADMIN — PREDNISOLONE ACETATE 1 DROP: 10 SUSPENSION/ DROPS OPHTHALMIC at 08:31

## 2022-06-08 RX ADMIN — DICYCLOMINE HYDROCHLORIDE 20 MG: 20 TABLET ORAL at 21:24

## 2022-06-08 RX ADMIN — LEVOCARNITINE 330 MG: 330 TABLET ORAL at 08:30

## 2022-06-08 RX ADMIN — DIVALPROEX SODIUM 500 MG: 500 TABLET, DELAYED RELEASE ORAL at 13:34

## 2022-06-08 RX ADMIN — SERTRALINE HYDROCHLORIDE 100 MG: 100 TABLET ORAL at 08:29

## 2022-06-08 RX ADMIN — Medication 1 DROP: at 21:23

## 2022-06-08 RX ADMIN — DICYCLOMINE HYDROCHLORIDE 20 MG: 20 TABLET ORAL at 17:46

## 2022-06-08 RX ADMIN — DIVALPROEX SODIUM 500 MG: 500 TABLET, DELAYED RELEASE ORAL at 08:31

## 2022-06-08 RX ADMIN — DICYCLOMINE HYDROCHLORIDE 20 MG: 20 TABLET ORAL at 06:55

## 2022-06-08 RX ADMIN — PANTOPRAZOLE SODIUM 40 MG: 40 TABLET, DELAYED RELEASE ORAL at 20:05

## 2022-06-08 RX ADMIN — LEVOCARNITINE 330 MG: 330 TABLET ORAL at 17:46

## 2022-06-08 RX ADMIN — POTASSIUM CHLORIDE 40 MEQ: 1500 TABLET, EXTENDED RELEASE ORAL at 10:37

## 2022-06-08 ASSESSMENT — ACTIVITIES OF DAILY LIVING (ADL)
ADLS_ACUITY_SCORE: 46
ADLS_ACUITY_SCORE: 42
ADLS_ACUITY_SCORE: 46
ADLS_ACUITY_SCORE: 46
ADLS_ACUITY_SCORE: 44
ADLS_ACUITY_SCORE: 46
ADLS_ACUITY_SCORE: 42
ADLS_ACUITY_SCORE: 42
ADLS_ACUITY_SCORE: 44
ADLS_ACUITY_SCORE: 46
ADLS_ACUITY_SCORE: 44
ADLS_ACUITY_SCORE: 42

## 2022-06-08 NOTE — PLAN OF CARE
Patient is A&O, forgetful at times. VSS - nikko at times- on RA. SBA walker/GB.  Tele-NSR. Denied pain during shift. Regular diet-room service. Inc of B/B at times. Large inc  this morning. NANCY LUNA. Discharge plan pending.

## 2022-06-08 NOTE — PROGRESS NOTES
Care Management Follow Up    Length of Stay (days): 4    Expected Discharge Date: 06/09/2022     Concerns to be Addressed: Discharge Planning      Patient plan of care discussed at interdisciplinary rounds: Yes    Anticipated Discharge Disposition:  [facility]     Anticipated Discharge Services:  TCU  Anticipated Discharge DME:      Patient/family educated on Medicare website which has current facility and service quality ratings:  Yes  Education Provided on the Discharge Plan:    Patient/Family in Agreement with the Plan:      Referrals Placed by CM/SW:  [facility]  Private pay costs discussed: Not applicable    Additional Information:  SW is following for discharge planning. SW received confirmation from [facility] and [facility] that they would be able to accept patient to their facilities.     SW received call from Breana Amezcua with [facility] Pre-Petition Screening ([phone]) and they are able to come out to assess patient on 6/9 for Level 2 screening. FERNANDO placed call to patient's sister Huong to discuss and Level 2 assessment is scheduled for 1:30 on 6/9.      FERNANDO discussed facilities that have accepted patient ([facility] and [facility]) with Huong and she would like confirm bed with [facility]. FERNANDO spoke with Hayley and confirmed bed for discharge tomorrow afternoon. FERNANDO updated physician on discharge location.        JESUS MANUEL Gipson, [facility]  [phone]  [facility]

## 2022-06-08 NOTE — PLAN OF CARE
Reason for Admission: acute pancreatitis, pleural effusion, frequent falls. From group home.      Cognitive/Mentation: A&Ox4. Forgetful at times  Neuro/CMS: remains unchanged. Baseline tremors to BUE, asymmetric pupil, and generalized weakness.   VS: stable on room air  Tele: NSR  GI: continent. Multiple soft/loose stools this shift. Dr Jones notified. Laxative held this am.  : continent, voiding to B/R  Pulmonary: Denies SOB. L/s clear.   Cardiovascular: Denies chest pain  Pain: Denies  Drains: PIV SL  Skin: scabs to bilateral elbow. Blanchable redness to coccyx and inner thighs. Scattered bruising   Activity: SBA with walker+GB. Up in chair for meals.   Diet: 2gr NA diet. Good appetite      Therapies recs: TCU    Discharge/Plan: GI/Neurology following. Discharge pending progress.

## 2022-06-08 NOTE — PROGRESS NOTES
Patient triggered for a level II assessment to be completed before discharge.       PAS-RR    D: Per DHS regulation, SW completed and submitted PAS-RR to [place] [employer] Direct Connect via the Senior [employer] Line.  PAS-RR confirmation # is : [id number]    I: SW spoke with sister and they are aware a PAS-RR has been submitted.  SW reviewed with sister that they may be contacted for a follow up appointment within 10 days of hospital discharge if their SNF stay is < 30 days.  Contact information for [facility] Line was also provided.    A: sister verbalized understanding.    P: Further questions may be directed to [facility] Line at #[phone], option #4 for PAS-RR staff.    BARAK Rocha

## 2022-06-08 NOTE — PROGRESS NOTES
[facility]  Gastroenterology Progress Note     Jagdeep Walker MRN# [id number]   YOB: 1970 Age: 51 year old          Assessment and Plan:   Jagdeep Walker is a 51 year old male admitted on 6/4/2022.   Past history of HTN, hypothyroid, seizure disorder, schizoaffective and bipolar disorder, GERD, recent admission 5/8-5/17/22 for acute pancreatitis with pseudocysts, portal vein thrombus who presents from his group home with repeated falls and metabolic encephalopathy.  He has a history of acute on chronic pancreatitis and CT noted findings concerning for liver cirrhosis.    Recent acute pancreatitis with pseudocysts  Possible cirrhosis  Lower extremity edema  Hypoalbuminemia   CT 6/4/22 showing moderate ascites, 2 stable pseudocysts but likely 1 new pseudocyst, improved pancreatic inflammation, liver nodularity concerning for cirrhosis.    6/4/22 Abdominal ultrasound noted coarsened hepatic echotexture and fibrosis. With scattered ascites.  MRCP 6/5/22 significant for pancreatitis, 2 pseudocysts, and perigastric fluid collection withouth evidence of pancreatic necrosis, mild pancreatic ductal dilation with narrowing oteh common bile dut secondary to the pseudocyst in the pancreatic head. Large volume ascites, slightly increased int eh interval. Normal appearing liver without findings of liver cirrhosis.  Hypoalbuminemia may be due to recent illness vs poor intake but cannot rule out cirrhosis, INR was previously elevated as well.   Ammonia normal at 33.  Albumin 2.4  No need to treat portal vein thrombosis  Cause of pancreatitis may be due to drug induced injury vs autoimmune disease  ( valproic acid/topiramate level pending)  6/7 Paracentesis removed 1.8 L of fluid. Hazy. Total cell count 274, 5% neutrophils, no organisms seen.     - MRCP did not confirm findings of liver cirrhosis  - 2g Na diet and continue work on nutrition  - daily labs  -- GI will continue to follow  along peripherally.               Interval History:   no new complaints, doing well and doing well; no cp, sob, n/v/d               Review of Systems:   C: NEGATIVE for fever, chills, change in weight  E/M: NEGATIVE for ear, mouth and throat problems  R: NEGATIVE for significant cough or SOB  CV: NEGATIVE for chest pain, palpitations or peripheral edema             Medications:   I have reviewed this patient's current medications    atenolol  25 mg Oral Daily     carboxymethylcellulose PF  1 drop Right Eye At Bedtime     chlorhexidine  15 mL Swish & Spit Daily     dicyclomine  20 mg Oral 4x Daily AC & HS     divalproex sodium delayed-release  250 mg Oral At Bedtime     divalproex sodium delayed-release  500 mg Oral BID 09 12     docusate sodium  100 mg Oral Daily     ipratropium  2 spray Both Nostrils TID     levOCARNitine  330 mg Oral TID     levothyroxine  50 mcg Oral Daily     montelukast  10 mg Oral At Bedtime     pantoprazole  40 mg Oral BID     potassium chloride  40 mEq Oral Once     prednisoLONE acetate  1 drop Right Eye Daily     risperiDONE  2 mg Oral BID     sertraline  100 mg Oral Daily     sodium chloride (PF)  3 mL Intracatheter Q8H     topiramate  100 mg Oral At Bedtime                  Physical Exam:   Vitals were reviewed  Vital Signs with Ranges  Temp:  [97.5  F (36.4  C)-98.7  F (37.1  C)] 98.7  F (37.1  C)  Pulse:  [54-83] 83  Resp:  [16] 16  BP: ()/(70-77) 92/77  FiO2 (%):  [21 %] 21 %  SpO2:  [92 %-96 %] 92 %  I/O last 3 completed shifts:  In: 810 [P.O.:810]  Out: 550 [Urine:550]  Constitutional: healthy, alert and no distress   Cardiovascular: negative, PMI normal. No lifts, heaves, or thrills. RRR. No murmurs, clicks gallops or rub  Respiratory: negative, Percussion normal. Good diaphragmatic excursion. Lungs clear  Abdomen: Abdomen firm, mildly tender. distended BS normal. No masses, organomegaly             Data:   I reviewed the patient's new clinical lab test results.   Recent Labs    Lab Test 06/06/22  0659 06/05/22  0728 06/04/22  0754 04/29/22  0625 04/27/22  2248   WBC 3.8* 3.8* 4.5   < > 7.2   HGB 11.8* 12.0* 12.5*   < > 11.6*   MCV 95 95 95   < > 92   PLT 95* 92* 106*   < > 151   INR  --  1.73*  --   --  1.26*    < > = values in this interval not displayed.     Recent Labs   Lab Test 06/08/22  0743 06/07/22  0709 06/06/22  0659 06/05/22  1239 06/05/22  0728 06/04/22  0754   POTASSIUM 3.3* 3.5 3.7   < > 3.2* 3.5   CHLORIDE  --   --  111*  --  109 110*   CO2  --   --  25  --  25 23   BUN  --   --  13  --  14 19   ANIONGAP  --   --  5  --  7 9    < > = values in this interval not displayed.     Recent Labs   Lab Test 06/07/22  0709 06/05/22  0728 06/04/22  1653 06/04/22  0754 05/16/22  1302 05/10/22  0724 05/09/22  0923 05/08/22 2032 05/08/22  0934 04/29/22 0625 04/28/22  0016   ALBUMIN 2.4* 2.4*  --  2.4* 2.4*   < > 2.3*   < >  --    < >  --    BILITOTAL  --  0.7  --  0.7 0.6   < > 1.4*   < >  --    < >  --    ALT  --  27  --  28 34   < > 35   < >  --    < >  --    AST  --  61*  --  76* 37   < > 97*   < >  --    < >  --    PROTEIN  --   --  Negative  --   --   --   --   --  Negative  --  Negative   LIPASE  --   --   --  466* 1,461*  --  1,555*  --   --    < >  --     < > = values in this interval not displayed.       I reviewed the patient's new imaging results.    All laboratory data reviewed  All imaging studies reviewed by me.    Isabelle Pantoja PA-C,  6/6/2022  [facility]  Office : [phone]  Cell: [phone] (Dr. Clark)  Cell: [phone] (Isabelle Pantoja PA-C)

## 2022-06-08 NOTE — PROGRESS NOTES
Pt wore Bipap for the night. mepilex and liquicell on. Alarm set at 10.     Veronica Crenshaw, RT

## 2022-06-08 NOTE — PROGRESS NOTES
"[facility]    Medicine Progress Note - Hospitalist Service    Date of Admission:  6/4/2022    Assessment & Plan        Jagdeep Walker is a 51 year old male admitted on 6/4/2022.   Past history of HTN, hypothyroid, seizure disorder, schizoaffective and bipolar disorder, GERD, recent admission 5/8-5/17/22 for acute pancreatitis with pseudocysts, portal vein thrombus who presents from his group home with repeated falls and metabolic encephalopathy.     Acute metabolic encephalopathy of unclear etiology  Frequent falls  Etiology unclear, very broad differential.  Currently highest concern for intracranial pathology (bleed vs stroke) vs hepatic encephalopathy.  Patient and sister noting tremor, balance difficulty, sister reporting he is \"foggy\".  Nodular liver noted on CT abd, with hypoalbuminema and recently elevated INR - exam unclear as to tremor vs asterixis.  Cannot rule out valproic acid toxicity or polypharmacy or partial seizure.  Lower suspicion for infection as afebrile without leukocytosis and offering no infectious complaints.  Neuro exam notable for mild dysconjugate gaze and asymmetric pupil (suspect chronic as clearly had right eye surgery) as well as tremor, orientation to person, place and month.  Lytes ok.  TSH wnl.  - Admitted to inpatient.  - CT head showed no acute changes.  - MRI brain showed no acute changes.  - Neuro checks Qshift.  - Orthostatic blood pressure negative.  - Telemetry.  - Procal and CRP mildly elevated, recheck in AM.  - UA negative for signs of infection.  - Ammonia level within normal limits.  - Valproic acid slightly elevated, topiramate level slightly low.  - VBG OK on 6/4/22.  - PT/OT consulted.  - Mental status much improved overall, etiology of episode still unclear.  - Concern about frequent falls recently.  - Neurology consulted, appreciate their assistance.  - EEG on 6/5/22 was mildly abnormal with findings seen in mild encephalopathy.  - MRI " lumbar spine on 6/6/22 did not have any findings that would explain his recent falls, see full report in Epic.  - Vitamin B12 and folate within normal limits.  - Zinc, copper, vitamin A, and vitamin E levels pending.  - Paraneoplastic panel pending.  - Care transitions consulted, will need TCU at the time of discharge. Ask social work to contact family today regarding discharge planning.    Acute kidney injury  Baseline Cr 0.7, admission Cr 1.0.  Suspect poor oral intake.   - Received 1L bolus in ED, will hold on further fluids given edema.  - Creatinine improved, recheck intermittently.  - Monitor closely as received contrast 6/4.    Recent acute pancreatitis with pseudocysts  Possible cirrhosis  Lower extremity edema  Hypoalbuminemia  Ascites  See recent discharge summary for details, was to follow up with [facility] in 1 month with repeat CT imaging.  CT 6/4/22 showing moderate ascites, 2 stable pseudocysts but likely 1 new pseudocyst, improved pancreatic inflammation, liver nodularity concerning for cirrhosis.  Hypoalbuminemia may be due to recent illness vs poor intake but cannot rule out cirrhosis, INR was previously elevated as well.   - [facility] consult.  - As above, low suspicion for infection.  - Ammonia as above, obtain coags.  - Abdominal US showed findings concerning for cirrhosis as well as a small amount of ascites.  - 2g Na diet.  - TTE did not show evidence of CHF as source of edema.  - MRCP showed large volume ascites. Patient now with worsening abdominal distention and some abdominal discomfort.  - S/p diagnostic and therapeutic paracentesis on 6/7/22. 1.8 liters of fluid removed. Labs not consistent with SBP.    Recent portal vein thrombosis  Diagnosed during prior admission, has not been treated with anticoagulation.  CT 6/4 showing non-occlusive portal vein thrombus has improved from prior.   - Anticoagulation not recommended by GI.    Thrombocytopenia   Platelet 106 at admission, no history of  low platelets.  Possibly related to cirrhosis.  Recent B12 and folate levels wnl.  - Monitor CBC.    Chronic anemia   - Baseline hemoglobin about 12 g/dL, stable at admission.    Seizure disorder  - Continue PTA topiramate.  - Free Depakote level was elevated, Depakote dose decreased by Neurology on 6/8/22.    Bipolar disorder  Schizoaffective disorder  Resides in group home.   - Continue PTA Klonopin, risperdal, sertraline.    Hypertension  - Continue PTA atenolol.    Asthma  JACLYN  - Continue PTA Singulair.  - Continue Bipap at bedtime.    Hypothyroid  TSH wnl 6/4/22.   - Continue PTA levothyroxine.     GERD  Fisher's esophagus  - Continue PTA omeprazole.    Carnitine deficiency  - Continue PTA levocarnitine.        Diet: Combination Diet 2 gm NA Diet  Room Service    DVT Prophylaxis: Pneumatic Compression Devices  [place] Catheter: Not present  Central Lines: None  Cardiac Monitoring: ACTIVE order. Indication: frequent falls  Code Status: Full Code      Disposition Plan   Expected Discharge: 06/09/2022     Anticipated discharge location:  Awaiting care coordination huddle  Delays:            The patient's care was discussed with the Bedside Nurse, Patient and Patient's Family.    Fede Jones MD  Hospitalist Service  [facility]  Securely message with the [employer] Web Console (learn more here)  Text page via [employer] Paging/Directory         Clinically Significant Risk Factors Present on Admission                    ______________________________________________________________________    Interval History   Jagdeep Walker was seen this afternoon.  He feels okay today.  Denies fevers, chest pain, shortness of breath, nausea.  Nursing notes that he has had several loose stools this morning.  Has had a little abdominal discomfort at times.  His Sister Huong was in the room with him today.  Discussed plan of care.    Data reviewed today: I reviewed all medications, new labs and imaging  results over the last 24 hours. I personally reviewed no images or EKG's today.    Physical Exam   Vital Signs: Temp: 98.7  F (37.1  C) Temp src: Oral BP: 92/77 Pulse: 83   Resp: 16 SpO2: 92 % O2 Device: None (Room air)    Weight: 215 lbs 7.52 oz  Constitutional: awake, alert, cooperative, no apparent distress, sitting up in a chair eating lunch  Respiratory: clear to auscultation bilaterally, no crackles or wheezing  Cardiovascular: regular rate and rhythm, normal S1 and S2, no murmur noted  GI: normal bowel sounds, soft, non-distended, non-tender  Skin: warm, dry  Musculoskeletal: no lower extremity pitting edema present  Neurologic: awake, alert, answered questions appropriately, moves all extremities    Data   Recent Labs   Lab 06/08/22  0743 06/07/22  0709 06/06/22  0659 06/05/22  1239 06/05/22  0728 06/04/22  0754   WBC  --   --  3.8*  --  3.8* 4.5   HGB  --   --  11.8*  --  12.0* 12.5*   MCV  --   --  95  --  95 95   PLT  --   --  95*  --  92* 106*   INR  --   --   --   --  1.73*  --    NA  --   --  141  --  141 142   POTASSIUM 3.3* 3.5 3.7   < > 3.2* 3.5   CHLORIDE  --   --  111*  --  109 110*   CO2  --   --  25  --  25 23   BUN  --   --  13  --  14 19   CR  --   --  0.89  --  0.88 1.01   ANIONGAP  --   --  5  --  7 9   NASIR  --   --  9.0  --  9.1 9.2   GLC  --   --  80  --  88 69*   ALBUMIN  --  2.4*  --   --  2.4* 2.4*   PROTTOTAL  --  5.5*  --   --  5.7* 5.8*   BILITOTAL  --   --   --   --  0.7 0.7   ALKPHOS  --   --   --   --  55 55   ALT  --   --   --   --  27 28   AST  --   --   --   --  61* 76*   LIPASE  --   --   --   --   --  466*    < > = values in this interval not displayed.     Medications       atenolol  25 mg Oral Daily     carboxymethylcellulose PF  1 drop Right Eye At Bedtime     chlorhexidine  15 mL Swish & Spit Daily     dicyclomine  20 mg Oral 4x Daily AC & HS     divalproex sodium delayed-release  250 mg Oral At Bedtime     divalproex sodium delayed-release  500 mg Oral BID 09 12      docusate sodium  100 mg Oral Daily     ipratropium  2 spray Both Nostrils TID     levOCARNitine  330 mg Oral TID     levothyroxine  50 mcg Oral Daily     montelukast  10 mg Oral At Bedtime     pantoprazole  40 mg Oral BID     prednisoLONE acetate  1 drop Right Eye Daily     risperiDONE  2 mg Oral BID     sertraline  100 mg Oral Daily     sodium chloride (PF)  3 mL Intracatheter Q8H     topiramate  100 mg Oral At Bedtime

## 2022-06-09 LAB
ALBUMIN SERPL-MCNC: 2.4 G/DL (ref 3.4–5)
ALP SERPL-CCNC: 56 U/L (ref 40–150)
ALT SERPL W P-5'-P-CCNC: 20 U/L (ref 0–70)
ANION GAP SERPL CALCULATED.3IONS-SCNC: 5 MMOL/L (ref 3–14)
AST SERPL W P-5'-P-CCNC: 24 U/L (ref 0–45)
BILIRUB SERPL-MCNC: 0.9 MG/DL (ref 0.2–1.3)
BUN SERPL-MCNC: 12 MG/DL (ref 7–30)
CALCIUM SERPL-MCNC: 9 MG/DL (ref 8.5–10.1)
CHLORIDE BLD-SCNC: 110 MMOL/L (ref 94–109)
CO2 SERPL-SCNC: 26 MMOL/L (ref 20–32)
COPPER SERPL-MCNC: 117.5 UG/DL
CREAT SERPL-MCNC: 0.91 MG/DL (ref 0.66–1.25)
ERYTHROCYTE [DISTWIDTH] IN BLOOD BY AUTOMATED COUNT: 16 % (ref 10–15)
GFR SERPL CREATININE-BSD FRML MDRD: >90 ML/MIN/1.73M2
GLUCOSE BLD-MCNC: 79 MG/DL (ref 70–99)
HCT VFR BLD AUTO: 37.5 % (ref 40–53)
HGB BLD-MCNC: 12.3 G/DL (ref 13.3–17.7)
MCH RBC QN AUTO: 30.4 PG (ref 26.5–33)
MCHC RBC AUTO-ENTMCNC: 32.8 G/DL (ref 31.5–36.5)
MCV RBC AUTO: 93 FL (ref 78–100)
PLATELET # BLD AUTO: 116 10E3/UL (ref 150–450)
POTASSIUM BLD-SCNC: 3.9 MMOL/L (ref 3.4–5.3)
PROT SERPL-MCNC: 5.3 G/DL (ref 6.8–8.8)
RBC # BLD AUTO: 4.04 10E6/UL (ref 4.4–5.9)
SODIUM SERPL-SCNC: 141 MMOL/L (ref 133–144)
WBC # BLD AUTO: 4.2 10E3/UL (ref 4–11)
ZINC SERPL-MCNC: 55.6 UG/DL

## 2022-06-09 PROCEDURE — 99232 SBSQ HOSP IP/OBS MODERATE 35: CPT | Performed by: HOSPITALIST

## 2022-06-09 PROCEDURE — 250N000011 HC RX IP 250 OP 636: Performed by: HOSPITALIST

## 2022-06-09 PROCEDURE — 250N000013 HC RX MED GY IP 250 OP 250 PS 637: Performed by: PHYSICIAN ASSISTANT

## 2022-06-09 PROCEDURE — 250N000013 HC RX MED GY IP 250 OP 250 PS 637: Performed by: HOSPITALIST

## 2022-06-09 PROCEDURE — 999N000157 HC STATISTIC RCP TIME EA 10 MIN

## 2022-06-09 PROCEDURE — 94660 CPAP INITIATION&MGMT: CPT

## 2022-06-09 PROCEDURE — 80053 COMPREHEN METABOLIC PANEL: CPT | Performed by: HOSPITALIST

## 2022-06-09 PROCEDURE — 250N000013 HC RX MED GY IP 250 OP 250 PS 637: Performed by: PSYCHIATRY & NEUROLOGY

## 2022-06-09 PROCEDURE — 36415 COLL VENOUS BLD VENIPUNCTURE: CPT | Performed by: HOSPITALIST

## 2022-06-09 PROCEDURE — 120N000001 HC R&B MED SURG/OB

## 2022-06-09 PROCEDURE — 85014 HEMATOCRIT: CPT | Performed by: HOSPITALIST

## 2022-06-09 RX ORDER — HYDROMORPHONE HCL IN WATER/PF 6 MG/30 ML
0.2 PATIENT CONTROLLED ANALGESIA SYRINGE INTRAVENOUS ONCE
Status: COMPLETED | OUTPATIENT
Start: 2022-06-09 | End: 2022-06-09

## 2022-06-09 RX ADMIN — PANCRELIPASE 2 CAPSULE: 36000; 180000; 114000 CAPSULE, DELAYED RELEASE PELLETS ORAL at 18:50

## 2022-06-09 RX ADMIN — RISPERIDONE 2 MG: 2 TABLET ORAL at 21:40

## 2022-06-09 RX ADMIN — Medication 1 DROP: at 21:41

## 2022-06-09 RX ADMIN — IPRATROPIUM BROMIDE 2 SPRAY: 42 SPRAY NASAL at 11:00

## 2022-06-09 RX ADMIN — PANCRELIPASE 2 CAPSULE: 36000; 180000; 114000 CAPSULE, DELAYED RELEASE PELLETS ORAL at 14:20

## 2022-06-09 RX ADMIN — MONTELUKAST 10 MG: 10 TABLET, FILM COATED ORAL at 21:40

## 2022-06-09 RX ADMIN — DICYCLOMINE HYDROCHLORIDE 20 MG: 20 TABLET ORAL at 16:58

## 2022-06-09 RX ADMIN — ACETAMINOPHEN 650 MG: 325 TABLET ORAL at 11:45

## 2022-06-09 RX ADMIN — IPRATROPIUM BROMIDE 2 SPRAY: 42 SPRAY NASAL at 23:35

## 2022-06-09 RX ADMIN — ACETAMINOPHEN 650 MG: 325 TABLET ORAL at 02:53

## 2022-06-09 RX ADMIN — LEVOCARNITINE 330 MG: 330 TABLET ORAL at 11:02

## 2022-06-09 RX ADMIN — DICYCLOMINE HYDROCHLORIDE 20 MG: 20 TABLET ORAL at 11:02

## 2022-06-09 RX ADMIN — DIVALPROEX SODIUM 500 MG: 500 TABLET, DELAYED RELEASE ORAL at 11:03

## 2022-06-09 RX ADMIN — DIVALPROEX SODIUM 250 MG: 250 TABLET, DELAYED RELEASE ORAL at 21:40

## 2022-06-09 RX ADMIN — DIVALPROEX SODIUM 500 MG: 500 TABLET, DELAYED RELEASE ORAL at 14:19

## 2022-06-09 RX ADMIN — TOPIRAMATE 100 MG: 100 TABLET, FILM COATED ORAL at 21:41

## 2022-06-09 RX ADMIN — RISPERIDONE 2 MG: 2 TABLET ORAL at 11:03

## 2022-06-09 RX ADMIN — PREDNISOLONE ACETATE 1 DROP: 10 SUSPENSION/ DROPS OPHTHALMIC at 11:00

## 2022-06-09 RX ADMIN — DOCUSATE SODIUM 100 MG: 100 CAPSULE, LIQUID FILLED ORAL at 11:02

## 2022-06-09 RX ADMIN — PANCRELIPASE 2 CAPSULE: 36000; 180000; 114000 CAPSULE, DELAYED RELEASE PELLETS ORAL at 11:02

## 2022-06-09 RX ADMIN — ATENOLOL 25 MG: 25 TABLET ORAL at 11:02

## 2022-06-09 RX ADMIN — IPRATROPIUM BROMIDE 2 SPRAY: 42 SPRAY NASAL at 17:07

## 2022-06-09 RX ADMIN — PANTOPRAZOLE SODIUM 40 MG: 40 TABLET, DELAYED RELEASE ORAL at 11:02

## 2022-06-09 RX ADMIN — HYDROMORPHONE HYDROCHLORIDE 0.2 MG: 0.2 INJECTION, SOLUTION INTRAMUSCULAR; INTRAVENOUS; SUBCUTANEOUS at 23:35

## 2022-06-09 RX ADMIN — CHLORHEXIDINE GLUCONATE 15 ML: 1.2 SOLUTION ORAL at 11:03

## 2022-06-09 RX ADMIN — DICYCLOMINE HYDROCHLORIDE 20 MG: 20 TABLET ORAL at 06:37

## 2022-06-09 RX ADMIN — PANTOPRAZOLE SODIUM 40 MG: 40 TABLET, DELAYED RELEASE ORAL at 21:40

## 2022-06-09 RX ADMIN — LEVOCARNITINE 330 MG: 330 TABLET ORAL at 16:58

## 2022-06-09 RX ADMIN — SERTRALINE HYDROCHLORIDE 100 MG: 100 TABLET ORAL at 11:03

## 2022-06-09 RX ADMIN — LEVOTHYROXINE SODIUM 50 MCG: 50 TABLET ORAL at 06:37

## 2022-06-09 RX ADMIN — DICYCLOMINE HYDROCHLORIDE 20 MG: 20 TABLET ORAL at 21:40

## 2022-06-09 ASSESSMENT — ACTIVITIES OF DAILY LIVING (ADL)
ADLS_ACUITY_SCORE: 46

## 2022-06-09 NOTE — PROGRESS NOTES
Pt use bipap overnight. Mepilex and liquicell placed. Alarm set at 10.     Veronica Crenshaw, RT

## 2022-06-09 NOTE — PROGRESS NOTES
[facility]  Neuro[facility] [place]  Neurology Daily Note            Interval History:   Ms. Walker is a 51-year-old patient who lives in a group home and has a history for acute pancreatitis, 5/8/2022 with pseudocysts and portal vein thrombosis.  He has a history for schizoaffective disorder and bipolar disorder and has a seizure seizure disorder and is being followed by Dr. Flaherty at [facility] for seizures.  The patient is said to have been seizure-free for many years but is currently on Topamax and valproic acid.  He was admitted 6/4/2022 with concerns regarding possible toxic, metabolic encephalopathy, but his history was notable for the fact that he has been plagued with tremors and has had frequent, unexplained falls.    Because of this concern, he was seen by Dr. Huerta who in consultation on 6/5/2022, he had had MRI of the brain which showed no acute changes, no orthostatic blood pressure changes to explain his gait impairment.  Because of ongoing tremors, and reports that the patient had remained seizure-free for years, Dr. Huerta had recommended decreasing his Depakote dose, he appears to be tolerating this change but it would be too soon to see whether this affects the severity of his tremors.  There is still no clear explanation for his gait instability and frequent falls.  MRI of the lumbar spine 6/7/2022 revealed no significant central canal stenosis or neural compression  Laboratory studies included vitamin B12 level 693, copper level is pending, vitamin A and vitamin  E levels are pending and zinc level is pending, a paraneoplastic panel has been sent in the results are pending.  Valproic acid total was 67 on 6/4/2022, free was  slightly elevated at 31 and the Topamax level was low at 4.8         Review of Systems:   The 10 point Review of Systems is negative other than noted in the HPI       Medications:   Scheduled Meds:    amylase-lipase-protease  2 capsule Oral TID  "w/meals     atenolol  25 mg Oral Daily     carboxymethylcellulose PF  1 drop Right Eye At Bedtime     chlorhexidine  15 mL Swish & Spit Daily     dicyclomine  20 mg Oral 4x Daily AC & HS     divalproex sodium delayed-release  250 mg Oral At Bedtime     divalproex sodium delayed-release  500 mg Oral BID 09 12     docusate sodium  100 mg Oral Daily     ipratropium  2 spray Both Nostrils TID     levOCARNitine  330 mg Oral TID     levothyroxine  50 mcg Oral Daily     montelukast  10 mg Oral At Bedtime     pantoprazole  40 mg Oral BID     prednisoLONE acetate  1 drop Right Eye Daily     risperiDONE  2 mg Oral BID     sertraline  100 mg Oral Daily     sodium chloride (PF)  3 mL Intracatheter Q8H     topiramate  100 mg Oral At Bedtime     PRN Meds: acetaminophen **OR** acetaminophen, lidocaine 4%, lidocaine (buffered or not buffered), melatonin, ondansetron **OR** ondansetron, polyethylene glycol, senna-docusate **OR** senna-docusate, sodium chloride (PF)        Physical Exam:   Vitals: Blood pressure 121/84, pulse 83, temperature 97.3  F (36.3  C), temperature source Oral, resp. rate 16, height 1.651 m (5' 5\"), weight 95.8 kg (211 lb 4.8 oz), SpO2 100 %.  General Appearance: He was in no apparent respiratory distress but is complaining of abdominal pain  Neuro:       Mental Status Exam:   He was very alert today with fluent speech, fund of knowledge was limited but he was able to follow simple and complex commands quite well and was oriented to place and person.                Motor: He had fairly good strength, proximally and distally at 4+/5 in both upper and lower extremities.           Reflexes: 2+/4 in the biceps, triceps, at ankles, trace, patellar's were 2+/4, plantars were equivocal with withdrawal.       Sensory: Intact to all primary modalities.                       Gait: Not tested due to his abdominal pain.  Cardiovascular: Regular rate and rhythm, no m/r/g  Lungs: Clear to auscultation  Abdomen: Soft, not " tender, not destended  Extremities: No clubbing, no cyanosis, no edema       Data:   ROUTINE IP LABS (Last 3results)  CBC RESULTS:     Recent Labs   Lab 06/09/22  0639 06/06/22  0659 06/05/22  0728   WBC 4.2 3.8* 3.8*   RBC 4.04* 3.83* 3.97*   HGB 12.3* 11.8* 12.0*   HCT 37.5* 36.2* 37.5*   * 95* 92*     Basic Metabolic Panel:  Recent Labs   Lab 06/09/22  0639 06/08/22  1428 06/08/22  0743 06/07/22  0709 06/06/22  0659 06/05/22  1239 06/05/22  0728     --   --   --  141  --  141   POTASSIUM 3.9 3.7 3.3*   < > 3.7   < > 3.2*   CHLORIDE 110*  --   --   --  111*  --  109   CO2 26  --   --   --  25  --  25   BUN 12  --   --   --  13  --  14   CR 0.91  --   --   --  0.89  --  0.88   GLC 79  --   --   --  80  --  88   NASIR 9.0  --   --   --  9.0  --  9.1    < > = values in this interval not displayed.     INR:  Recent Labs   Lab 06/05/22  0728   INR 1.73*      Lipid Profile:  Recent Labs   Lab Test 05/09/22  1415   TRIG 118     TSH:  TSH   Date Value Ref Range Status   06/04/2022 3.42 0.40 - 4.00 mU/L Final   ,   Vitamin B12:   Lab Results   Component Value Date    B12 693 06/06/2022          Assessment:  --Frequent falls and unsteady gait, and shakiness, progressive over the past 3 weeks, prior to this patient was walking independently, according to his sister-  -in addition, the patient's sister reports that the patient has had frequent spells where he would stiffen up, without loss of consciousness, he has been evaluated by his epileptologist, Dr. Flaherty, apparently had inpatient video assessment of medication, no clear etiology for the spells.  -The etiology is unclear, MRI of the brain was reviewed, revealing no evidence of hydrocephalus or parenchymal lesions.  --No evidence for concerns regarding orthostatic hypotension.  --These appear to be accelerated symptoms over the past 3weeks, record memory impairment but concerns regarding bladder urgency.    -Disconjugate gaze, no clear  evidence  opthalmoplegia, no ptosis .     -Tremors, most likely toxic, metabolic, physiological tremors.  Recommendations:    - Vitamin B12/ folate panel, vitamin A and E levels, zinc levels.  MRI revealed no central canal stenosis or neural compression  PT/OT evaluate and treat is a fall risk.  --With recent accelerated symptoms, although not typical, would recommend panel for paraneoplastic antibodies and immune encephalitis .panel    - History for generalized, tonic-clonic seizures, the patient has been seizure-free for years, no recent changes in his outpatient AED to explain his worsening symptoms.  - In view of his continued uncontrollable tremors, and the patient has been seizure-free for several years, he does have a slightly high free valproic acid level and would recommend decreasing Depakote to Depakote  mg twice daily and 250 mg once daily and continue to monitor response.  Laboratory studies are outstanding, these were done to assess for toxic, metabolic myelopathy    Delores Montero MD  [facility], [facility]  Office number [phone]

## 2022-06-09 NOTE — PROGRESS NOTES
"[facility]    Medicine Progress Note - Hospitalist Service    Date of Admission:  6/4/2022    Assessment & Plan        Jagdeep Walker is a 51 year old male admitted on 6/4/2022.   Past history of HTN, hypothyroid, seizure disorder, schizoaffective and bipolar disorder, GERD, recent admission 5/8-5/17/22 for acute pancreatitis with pseudocysts, portal vein thrombus who presents from his group home with repeated falls and metabolic encephalopathy.     Acute metabolic encephalopathy of unclear etiology  Frequent falls  Etiology unclear, very broad differential.  Currently highest concern for intracranial pathology (bleed vs stroke) vs hepatic encephalopathy.  Patient and sister noting tremor, balance difficulty, sister reporting he is \"foggy\".  Nodular liver noted on CT abd, with hypoalbuminema and recently elevated INR - exam unclear as to tremor vs asterixis.  Cannot rule out valproic acid toxicity or polypharmacy or partial seizure.  Lower suspicion for infection as afebrile without leukocytosis and offering no infectious complaints.  Neuro exam notable for mild dysconjugate gaze and asymmetric pupil (suspect chronic as clearly had right eye surgery) as well as tremor, orientation to person, place and month.  Lytes ok.  TSH wnl.  - Admitted to inpatient.  - CT head showed no acute changes.  - MRI brain showed no acute changes.  - Neuro checks Qshift.  - Orthostatic blood pressure negative.  - Telemetry.  - Procal and CRP mildly elevated, recheck in AM.  - UA negative for signs of infection.  - Ammonia level within normal limits.  - Valproic acid slightly elevated, topiramate level slightly low.  - VBG OK on 6/4/22.  - PT/OT consulted.  - Mental status much improved overall, etiology of episode still unclear.  - Concern about frequent falls recently.  - Neurology consulted, appreciate their assistance.  - EEG on 6/5/22 was mildly abnormal with findings seen in mild encephalopathy.  - MRI " lumbar spine on 6/6/22 did not have any findings that would explain his recent falls, see full report in Epic.  - Vitamin B12 and folate within normal limits.  - Zinc, copper, vitamin A, and vitamin E levels pending.  - Paraneoplastic panel pending.  - Care transitions consulted, will need TCU at the time of discharge. Likely discharge to TCU tomorrow.    Acute kidney injury ruled out  - Creatinine has been near baseline of 0.7-1.0.    Recent acute pancreatitis with pseudocysts  Possible cirrhosis  Lower extremity edema  Hypoalbuminemia  Ascites  See recent discharge summary for details, was to follow up with [facility] in 1 month with repeat CT imaging.  CT 6/4/22 showing moderate ascites, 2 stable pseudocysts but likely 1 new pseudocyst, improved pancreatic inflammation, liver nodularity concerning for cirrhosis.  Hypoalbuminemia may be due to recent illness vs poor intake but cannot rule out cirrhosis, INR was previously elevated as well.   - [facility] consult.  - As above, low suspicion for infection.  - Ammonia as above, obtain coags.  - Abdominal US showed findings concerning for cirrhosis as well as a small amount of ascites.  - 2g Na diet.  - TTE did not show evidence of CHF as source of edema.  - MRCP showed large volume ascites. Patient developed worsening abdominal distention and some abdominal discomfort.  - S/p diagnostic and therapeutic paracentesis on 6/7/22. 1.8 liters of fluid removed. Labs not consistent with SBP. Symptoms improved following paracentesis, but not resolved.  - Creon added by GI on 6/9 due to ongoing abdominal discomfort and loose stools.    Recent portal vein thrombosis  Diagnosed during prior admission, has not been treated with anticoagulation.  CT 6/4 showing non-occlusive portal vein thrombus has improved from prior.   - Anticoagulation not recommended by GI.    Thrombocytopenia   Platelet 106 at admission, no history of low platelets.  Possibly related to cirrhosis.  Recent B12  and folate levels wnl.  - Monitor CBC.    Chronic anemia   - Baseline hemoglobin about 12 g/dL, stable at admission.    Seizure disorder  - Continue PTA topiramate.  - Free Depakote level was elevated, Depakote dose decreased by Neurology on 6/8/22.    Bipolar disorder  Schizoaffective disorder  Resides in group home.   - Continue PTA Klonopin, risperdal, sertraline.    Hypertension  - Continue PTA atenolol.    Asthma  JACLYN  - Continue PTA Singulair.  - Continue Bipap at bedtime.    Hypothyroid  TSH wnl 6/4/22.   - Continue PTA levothyroxine.     GERD  Fisher's esophagus  - Continue PTA omeprazole.    Carnitine deficiency  - Continue PTA levocarnitine.        Diet: Combination Diet 2 gm NA Diet  Room Service    DVT Prophylaxis: Pneumatic Compression Devices  [place]ana Catheter: Not present  Central Lines: None  Cardiac Monitoring: ACTIVE order. Indication: frequent falls  Code Status: Full Code      Disposition Plan   Expected Discharge: 06/09/2022     Anticipated discharge location:  Awaiting care coordination huddle  Delays:            The patient's care was discussed with the Bedside Nurse, Care Coordinator/, Patient and Patient's Family.    Fede Jones MD  Hospitalist Service  [facility]  Securely message with the Vocera Web Console (learn more here)  Text page via [employer] Paging/Directory         Clinically Significant Risk Factors Present on Admission                    ______________________________________________________________________    Interval History   Jagdeep Walker was seen this morning. Still has some intermittent abdominal discomfort. Loose stools a little better. Denies fevers, chest pain, shortness of breath, nausea. Updated his sister by phone this morning.    Data reviewed today: I reviewed all medications, new labs and imaging results over the last 24 hours. I personally reviewed no images or EKG's today.    Physical Exam   Vital Signs: Temp: 97.3  F  (36.3  C) Temp src: Oral BP: 123/86 Pulse: 100   Resp: 16 SpO2: 100 % O2 Device: None (Room air)    Weight: 211 lbs 4.8 oz  Constitutional: awake, alert, cooperative, no apparent distress, laying in the hospital bed  Respiratory: clear to auscultation bilaterally, no crackles or wheezing  Cardiovascular: regular rate and rhythm, normal S1 and S2, and no murmur noted  GI: normal bowel sounds, soft, non-distended, minimal generalized tenderness  Skin: warm, dry  Musculoskeletal: no lower extremity pitting edema present  Neurologic: awake, alert, answers questions appropriately, moves all extremities    Data   Recent Labs   Lab 06/09/22  0639 06/08/22  1428 06/08/22  0743 06/07/22  0709 06/06/22  0659 06/05/22  1239 06/05/22  0728 06/04/22  0754   WBC 4.2  --   --   --  3.8*  --  3.8* 4.5   HGB 12.3*  --   --   --  11.8*  --  12.0* 12.5*   MCV 93  --   --   --  95  --  95 95   *  --   --   --  95*  --  92* 106*   INR  --   --   --   --   --   --  1.73*  --      --   --   --  141  --  141 142   POTASSIUM 3.9 3.7 3.3* 3.5 3.7   < > 3.2* 3.5   CHLORIDE 110*  --   --   --  111*  --  109 110*   CO2 26  --   --   --  25  --  25 23   BUN 12  --   --   --  13  --  14 19   CR 0.91  --   --   --  0.89  --  0.88 1.01   ANIONGAP 5  --   --   --  5  --  7 9   NASIR 9.0  --   --   --  9.0  --  9.1 9.2   GLC 79  --   --   --  80  --  88 69*   ALBUMIN 2.4*  --   --  2.4*  --   --  2.4* 2.4*   PROTTOTAL 5.3*  --   --  5.5*  --   --  5.7* 5.8*   BILITOTAL 0.9  --   --   --   --   --  0.7 0.7   ALKPHOS 56  --   --   --   --   --  55 55   ALT 20  --   --   --   --   --  27 28   AST 24  --   --   --   --   --  61* 76*   LIPASE  --   --   --   --   --   --   --  466*    < > = values in this interval not displayed.     Medications       amylase-lipase-protease  2 capsule Oral TID w/meals     atenolol  25 mg Oral Daily     carboxymethylcellulose PF  1 drop Right Eye At Bedtime     chlorhexidine  15 mL Swish & Spit Daily      dicyclomine  20 mg Oral 4x Daily AC & HS     divalproex sodium delayed-release  250 mg Oral At Bedtime     divalproex sodium delayed-release  500 mg Oral BID 09 12     docusate sodium  100 mg Oral Daily     ipratropium  2 spray Both Nostrils TID     levOCARNitine  330 mg Oral TID     levothyroxine  50 mcg Oral Daily     montelukast  10 mg Oral At Bedtime     pantoprazole  40 mg Oral BID     prednisoLONE acetate  1 drop Right Eye Daily     risperiDONE  2 mg Oral BID     sertraline  100 mg Oral Daily     sodium chloride (PF)  3 mL Intracatheter Q8H     topiramate  100 mg Oral At Bedtime

## 2022-06-09 NOTE — PLAN OF CARE
Goal Outcome Evaluation:        6/8/22 [id number]  Pt A/O x4. VSS on RA, BiPAP overnight. On tele - NSR. PRN tylenol given x1 for abd pain. R PIV SL. Incont B/B. Up SBA w/ GB + W. On 2g Na diet. Discharge to [facility] TCU in afternoon (6/9) after level 2 assessment is complete.

## 2022-06-09 NOTE — PROGRESS NOTES
[facility]  Gastroenterology Progress Note     Jagdeep Walker MRN# [id number]   YOB: 1970 Age: 51 year old          Assessment and Plan:   Jagdeep Walker is a 51 year old male admitted on 6/4/2022.   Past history of HTN, hypothyroid, seizure disorder, schizoaffective and bipolar disorder, GERD, recent admission 5/8-5/17/22 for acute pancreatitis with pseudocysts, portal vein thrombus who presents from his group home with repeated falls and metabolic encephalopathy.  He has a history of acute on chronic pancreatitis and CT noted findings concerning for liver cirrhosis.    Recent acute pancreatitis with pseudocysts  Possible cirrhosis  Lower extremity edema  Hypoalbuminemia   CT 6/4/22 showing moderate ascites, 2 stable pseudocysts but likely 1 new pseudocyst, improved pancreatic inflammation, liver nodularity concerning for cirrhosis.    6/4/22 Abdominal ultrasound noted coarsened hepatic echotexture and fibrosis. With scattered ascites.  MRCP 6/5/22 significant for pancreatitis, 2 pseudocysts, and perigastric fluid collection withouth evidence of pancreatic necrosis, mild pancreatic ductal dilation with narrowing oteh common bile dut secondary to the pseudocyst in the pancreatic head. Large volume ascites, slightly increased int eh interval. Normal appearing liver without findings of liver cirrhosis.  Hypoalbuminemia may be due to recent illness vs poor intake but cannot rule out cirrhosis, INR was previously elevated as well.   Ammonia normal at 33.  Albumin 2.4  No need to treat portal vein thrombosis  Cause of pancreatitis may be due to drug induced injury vs autoimmune disease  ( valproic acid/topiramate level pending)  6/7 Paracentesis removed 1.8 L of fluid. Hazy. Total cell count 274, 5% neutrophils, no organisms seen.     - MRCP did not confirm findings of liver cirrhosis  - 2g Na diet and continue work on nutrition  - Start on creon 36k 2 capsules TID with  meals for chronic pancreatitis- to see if improves pain and stool consistency  -- GI will continue to follow along peripherally.               Interval History:   C/o pain with eating, looser stools. Pain with BM.              Review of Systems:   C: NEGATIVE for fever, chills, change in weight  E/M: NEGATIVE for ear, mouth and throat problems  R: NEGATIVE for significant cough or SOB  CV: NEGATIVE for chest pain, palpitations or peripheral edema             Medications:   I have reviewed this patient's current medications    amylase-lipase-protease  2 capsule Oral TID w/meals     atenolol  25 mg Oral Daily     carboxymethylcellulose PF  1 drop Right Eye At Bedtime     chlorhexidine  15 mL Swish & Spit Daily     dicyclomine  20 mg Oral 4x Daily AC & HS     divalproex sodium delayed-release  250 mg Oral At Bedtime     divalproex sodium delayed-release  500 mg Oral BID 09 12     docusate sodium  100 mg Oral Daily     ipratropium  2 spray Both Nostrils TID     levOCARNitine  330 mg Oral TID     levothyroxine  50 mcg Oral Daily     montelukast  10 mg Oral At Bedtime     pantoprazole  40 mg Oral BID     prednisoLONE acetate  1 drop Right Eye Daily     risperiDONE  2 mg Oral BID     sertraline  100 mg Oral Daily     sodium chloride (PF)  3 mL Intracatheter Q8H     topiramate  100 mg Oral At Bedtime                  Physical Exam:   Vitals were reviewed  Vital Signs with Ranges  Temp:  [96.8  F (36  C)-98  F (36.7  C)] 97.3  F (36.3  C)  Pulse:  [] 100  Resp:  [16-18] 16  BP: (112-123)/(61-86) 123/86  FiO2 (%):  [21 %] 21 %  SpO2:  [91 %-100 %] 100 %  I/O last 3 completed shifts:  In: 920 [P.O.:920]  Out: -   Constitutional: healthy, alert and no distress   Cardiovascular: negative, PMI normal. No lifts, heaves, or thrills. RRR. No murmurs, clicks gallops or rub  Respiratory: negative, Percussion normal. Good diaphragmatic excursion. Lungs clear  Abdomen: Abdomen firm, mildly tender. distended BS normal. No masses,  organomegaly             Data:   I reviewed the patient's new clinical lab test results.   Recent Labs   Lab Test 06/09/22  0639 06/06/22  0659 06/05/22  0728 04/29/22  0625 04/27/22  2248   WBC 4.2 3.8* 3.8*   < > 7.2   HGB 12.3* 11.8* 12.0*   < > 11.6*   MCV 93 95 95   < > 92   * 95* 92*   < > 151   INR  --   --  1.73*  --  1.26*    < > = values in this interval not displayed.     Recent Labs   Lab Test 06/09/22  0639 06/08/22  1428 06/08/22  0743 06/07/22  0709 06/06/22  0659 06/05/22  1239 06/05/22  0728   POTASSIUM 3.9 3.7 3.3*   < > 3.7   < > 3.2*   CHLORIDE 110*  --   --   --  111*  --  109   CO2 26  --   --   --  25  --  25   BUN 12  --   --   --  13  --  14   ANIONGAP 5  --   --   --  5  --  7    < > = values in this interval not displayed.     Recent Labs   Lab Test 06/09/22  0639 06/07/22  0709 06/05/22  0728 06/04/22  1653 06/04/22  0754 05/16/22  1302 05/10/22  0724 05/09/22  0923 05/08/22  2032 05/08/22  0934 04/29/22  0625 04/28/22  0016   ALBUMIN 2.4* 2.4* 2.4*  --  2.4* 2.4*   < > 2.3*   < >  --    < >  --    BILITOTAL 0.9  --  0.7  --  0.7 0.6   < > 1.4*   < >  --    < >  --    ALT 20  --  27  --  28 34   < > 35   < >  --    < >  --    AST 24  --  61*  --  76* 37   < > 97*   < >  --    < >  --    PROTEIN  --   --   --  Negative  --   --   --   --   --  Negative  --  Negative   LIPASE  --   --   --   --  466* 1,461*  --  1,555*  --   --    < >  --     < > = values in this interval not displayed.       I reviewed the patient's new imaging results.    All laboratory data reviewed  All imaging studies reviewed by me.    Isabelle Pantoja PA-C,  6/6/2022  [facility]  Office : [phone]  Cell: [phone] (Dr. Clark)  Cell: [phone] (Isabelle Pantoja PA-C)

## 2022-06-10 ENCOUNTER — APPOINTMENT (OUTPATIENT)
Dept: OCCUPATIONAL THERAPY | Facility: CLINIC | Age: 52
DRG: 438 | End: 2022-06-10
Attending: PHYSICIAN ASSISTANT
Payer: MEDICARE

## 2022-06-10 VITALS
HEIGHT: 65 IN | SYSTOLIC BLOOD PRESSURE: 115 MMHG | RESPIRATION RATE: 18 BRPM | BODY MASS INDEX: 35.22 KG/M2 | HEART RATE: 73 BPM | DIASTOLIC BLOOD PRESSURE: 67 MMHG | TEMPERATURE: 97.7 F | WEIGHT: 211.4 LBS | OXYGEN SATURATION: 96 %

## 2022-06-10 LAB
A-TOCOPHEROL VIT E SERPL-MCNC: 6.2 MG/L
ANNOTATION COMMENT IMP: NORMAL
BETA+GAMMA TOCOPHEROL SERPL-MCNC: 0.8 MG/L
PARANEOPLASTIC AB SER QL IF: NORMAL
RETINYL PALMITATE SERPL-MCNC: 0.04 MG/L
VIT A SERPL-MCNC: 0.45 MG/L

## 2022-06-10 PROCEDURE — 99239 HOSP IP/OBS DSCHRG MGMT >30: CPT | Performed by: HOSPITALIST

## 2022-06-10 PROCEDURE — 250N000013 HC RX MED GY IP 250 OP 250 PS 637: Performed by: PSYCHIATRY & NEUROLOGY

## 2022-06-10 PROCEDURE — 250N000013 HC RX MED GY IP 250 OP 250 PS 637: Performed by: PHYSICIAN ASSISTANT

## 2022-06-10 PROCEDURE — 250N000013 HC RX MED GY IP 250 OP 250 PS 637: Performed by: HOSPITALIST

## 2022-06-10 PROCEDURE — 97530 THERAPEUTIC ACTIVITIES: CPT | Mod: GO | Performed by: OCCUPATIONAL THERAPIST

## 2022-06-10 PROCEDURE — 97535 SELF CARE MNGMENT TRAINING: CPT | Mod: GO | Performed by: OCCUPATIONAL THERAPIST

## 2022-06-10 RX ORDER — DIVALPROEX SODIUM 500 MG/1
500 TABLET, DELAYED RELEASE ORAL 2 TIMES DAILY
Status: ON HOLD | DISCHARGE
Start: 2022-06-10 | End: 2022-07-07

## 2022-06-10 RX ORDER — ACETAMINOPHEN 325 MG/1
650 TABLET ORAL EVERY 6 HOURS PRN
Status: ON HOLD | DISCHARGE
Start: 2022-06-10 | End: 2022-07-07

## 2022-06-10 RX ORDER — DIVALPROEX SODIUM 250 MG/1
250 TABLET, DELAYED RELEASE ORAL AT BEDTIME
DISCHARGE
Start: 2022-06-10 | End: 2022-07-07

## 2022-06-10 RX ORDER — CLONAZEPAM 0.12 MG/1
0.12 TABLET, ORALLY DISINTEGRATING ORAL DAILY PRN
Qty: 10 TABLET | Refills: 0 | Status: SHIPPED | OUTPATIENT
Start: 2022-06-10 | End: 2022-07-07

## 2022-06-10 RX ORDER — LORAZEPAM 2 MG/1
2 TABLET ORAL DAILY PRN
Qty: 10 TABLET | Refills: 0 | Status: ON HOLD | OUTPATIENT
Start: 2022-06-10 | End: 2022-07-07

## 2022-06-10 RX ADMIN — SERTRALINE HYDROCHLORIDE 100 MG: 100 TABLET ORAL at 08:33

## 2022-06-10 RX ADMIN — RISPERIDONE 2 MG: 2 TABLET ORAL at 08:58

## 2022-06-10 RX ADMIN — IPRATROPIUM BROMIDE 2 SPRAY: 42 SPRAY NASAL at 08:32

## 2022-06-10 RX ADMIN — CHLORHEXIDINE GLUCONATE 15 ML: 1.2 SOLUTION ORAL at 08:33

## 2022-06-10 RX ADMIN — PREDNISOLONE ACETATE 1 DROP: 10 SUSPENSION/ DROPS OPHTHALMIC at 08:33

## 2022-06-10 RX ADMIN — LEVOTHYROXINE SODIUM 50 MCG: 50 TABLET ORAL at 06:45

## 2022-06-10 RX ADMIN — DICYCLOMINE HYDROCHLORIDE 20 MG: 20 TABLET ORAL at 06:45

## 2022-06-10 RX ADMIN — LEVOCARNITINE 330 MG: 330 TABLET ORAL at 08:33

## 2022-06-10 RX ADMIN — DICYCLOMINE HYDROCHLORIDE 20 MG: 20 TABLET ORAL at 11:43

## 2022-06-10 RX ADMIN — DIVALPROEX SODIUM 500 MG: 500 TABLET, DELAYED RELEASE ORAL at 08:33

## 2022-06-10 RX ADMIN — DOCUSATE SODIUM 100 MG: 100 CAPSULE, LIQUID FILLED ORAL at 08:34

## 2022-06-10 RX ADMIN — PANTOPRAZOLE SODIUM 40 MG: 40 TABLET, DELAYED RELEASE ORAL at 08:33

## 2022-06-10 RX ADMIN — PANCRELIPASE 2 CAPSULE: 36000; 180000; 114000 CAPSULE, DELAYED RELEASE PELLETS ORAL at 11:43

## 2022-06-10 RX ADMIN — ATENOLOL 25 MG: 25 TABLET ORAL at 08:33

## 2022-06-10 RX ADMIN — PANCRELIPASE 2 CAPSULE: 36000; 180000; 114000 CAPSULE, DELAYED RELEASE PELLETS ORAL at 08:33

## 2022-06-10 RX ADMIN — DIVALPROEX SODIUM 500 MG: 500 TABLET, DELAYED RELEASE ORAL at 11:43

## 2022-06-10 ASSESSMENT — ACTIVITIES OF DAILY LIVING (ADL)
ADLS_ACUITY_SCORE: 46

## 2022-06-10 NOTE — DISCHARGE SUMMARY
Pt A/Ox4, VSS on RA. Denies abd pain. Up A1 gb/w. Up in chair and worked with OT today. Tolerating 2 gram Na diet.     Discharge Note    Patient discharged to TCU via private vehicle  accompanied by sister.  IV: Discontinued  Prescriptions sent with patient to discharge facility .   Belongings reviewed and sent with patient and family.   Home medications returned to patient: NA  Equipment sent with: N/A.   patient and family verbalizes understanding of discharge instructions. AVS given to patient and family.

## 2022-06-10 NOTE — PROGRESS NOTES
Care Management Discharge Note    Discharge Date: 06/10/2022       Discharge Disposition: [facility]    Discharge Services:  TCU    Discharge DME:      Discharge Transportation: Sister Huong will come to get patient at 1:30.     Private pay costs discussed: Not applicable    PAS Confirmation Code: [id number]  Patient/family educated on Medicare website which has current facility and service quality ratings:      Education Provided on the Discharge Plan:  Yes  Persons Notified of Discharge Plans: Huong - sister/guardian. ([phone])  Patient/Family in Agreement with the Plan:  Yes    Handoff Referral Completed: No    Additional Information:  Received discharge orders for patient to discharge to TCU today. FERNANDO spoke with sister Huong who is going to transport patient at 1:30 today. FERNANDO updated facility and faxed discharge orders.    FERNANDO placed call to Breana Amezcua with [facility] Pre-Petition Screening to update that patient is ready for discharge today. Breana requesting discharge summary be sent to her for completion. FERNANDO sent email and left VM asking for confirmation of receipt.     Fernando Temple, MSW, LGSW  [phone]  [facility]

## 2022-06-10 NOTE — PROVIDER NOTIFICATION
MD Notification    Notified Person: MD    Notified Person Name: Dr. Beasley    Notification Date/Time: 2229 6/9/22    Notification Interaction: AMCOM    Purpose of Notification: Pt c/o 8/10 pain, not controlled with tylenol, can something else be ordered?    Orders Received:    Comments:

## 2022-06-10 NOTE — PLAN OF CARE
Goal Outcome Evaluation:    A/O x4. VSS on RA, BiPAP overnight. On tele - NSR. PRN tylenol given x1 for abd pain. R PIV SL. Incont B/B. Up SBA w/ GB + W. On 2g Na diet. Likely discharge to TCU tomorrow per Hospitalist note.

## 2022-06-10 NOTE — PLAN OF CARE
Goal Outcome Evaluation:        6/9/22 [id number]  Pt A/O x4. VSS on RA, BiPAP overnight. On tele - NSR. One time dose of dilaudid given x1 for abd pain. R PIV SL. Incont B/B. Up SBA w/ GB + W. On 2g Na diet. Discharge to [facility] TCU pending.

## 2022-06-10 NOTE — DISCHARGE SUMMARY
"Physician Discharge Summary        Primary Care Physician:  Joel Werner Admission Date: 6/4/2022   Discharge Provider: Bob Beasley DO Discharge Date: 6/10/2022   Disposition: TCU Code Status: Full Code   Activity: As tolerated, with assistance.  Diet: Orders Placed This Encounter      Combination Diet 2 gm NA Diet      Diet        Condition at Discharge: stable.       Discharge Diagnoses/Hospital Summary:      Jagdeep Walker is a 51 year old male admitted on 6/4/2022.   Past history of HTN, hypothyroid, seizure disorder, schizoaffective and bipolar disorder, GERD, recent admission 5/8-5/17/22 for acute pancreatitis with pseudocysts, portal vein thrombus who presents from his group home with repeated falls and metabolic encephalopathy.      Acute metabolic encephalopathy of unclear etiology  Frequent falls  Etiology unclear, very broad differential.  Currently highest concern for intracranial pathology (bleed vs stroke) vs hepatic encephalopathy.  Patient and sister noting tremor, balance difficulty, sister reporting he is \"foggy\".  Nodular liver noted on CT abd, with hypoalbuminema and recently elevated INR - exam unclear as to tremor vs asterixis.  Cannot rule out valproic acid toxicity or polypharmacy or partial seizure.  Lower suspicion for infection as afebrile without leukocytosis and offering no infectious complaints.  Neuro exam notable for mild dysconjugate gaze and asymmetric pupil (suspect chronic as clearly had right eye surgery) as well as tremor, orientation to person, place and month.  Lytes ok.  TSH wnl.  - CT head showed no acute changes.  - MRI brain showed no acute changes.  - Orthostatic blood pressure negative.  - UA negative for signs of infection.  - Ammonia level within normal limits.  - VBG OK on 6/4/22.  - PT/OT.  - Neurology consulted, appreciate their assistance.  - EEG on 6/5/22 was mildly abnormal with findings seen in mild encephalopathy.  - MRI lumbar spine on 6/6/22 " did not have any findings that would explain his recent falls, see full report in Epic.  - Vitamin B12 and folate within normal limits.  - Zinc mildly low, copper normal, vitamin A, and vitamin E levels pending.  - Paraneoplastic panel pending.  - Close outpatient follow up.      Acute kidney injury ruled out  - Creatinine has been near baseline of 0.7-1.0.     Recent acute pancreatitis with pseudocysts  Possible cirrhosis  Lower extremity edema  Hypoalbuminemia  Ascites  See recent discharge summary for details, was to follow up with Eduardo WYMAN in 1 month with repeat CT imaging.  CT 6/4/22 showing moderate ascites, 2 stable pseudocysts but likely 1 new pseudocyst, improved pancreatic inflammation, liver nodularity concerning for cirrhosis.  Hypoalbuminemia may be due to recent illness vs poor intake but cannot rule out cirrhosis, INR was previously elevated as well.   - GI consult.  - As above, low suspicion for infection.  - Ammonia as above  - Abdominal US showed findings concerning for cirrhosis as well as a small amount of ascites.  - 2g Na diet.  - TTE did not show evidence of CHF as source of edema.  - MRCP showed large volume ascites. Patient developed worsening abdominal distention and some abdominal discomfort.  - S/p diagnostic and therapeutic paracentesis on 6/7/22. 1.8 liters of fluid removed. Labs not consistent with SBP. Symptoms improved following paracentesis, but not resolved.  - Creon added by GI on 6/9 due to ongoing abdominal discomfort and loose stools.     Recent portal vein thrombosis  Diagnosed during prior admission, has not been treated with anticoagulation.  CT 6/4 showing non-occlusive portal vein thrombus has improved from prior.   - Anticoagulation not recommended by GI.     Thrombocytopenia   Platelet 106 at admission, no history of low platelets.  Possibly related to cirrhosis.  Recent B12 and folate levels wnl.  - Monitor CBC.     Chronic anemia   - Baseline hemoglobin about 12 g/dL,  stable at admission.     Seizure disorder  - Continue PTA topiramate.  - Free Depakote level was elevated, Depakote dose decreased by Neurology on 6/8/22.     Bipolar disorder  Schizoaffective disorder  Resides in group home.   - Continue PTA Klonopin, risperdal, sertraline.     Hypertension  - Continue PTA atenolol.     Asthma  JACLYN  - Continue PTA Singulair.  - Continue Bipap at bedtime.     Hypothyroid  TSH wnl 6/4/22.   - Continue PTA levothyroxine.      GERD  Fisher's esophagus  - Continue PTA omeprazole.     Carnitine deficiency  - Continue PTA levocarnitine.           Discharge Medications:      Review of your medicines      START taking      Dose / Directions   acetaminophen 325 MG tablet  Commonly known as: TYLENOL  Used for: Altered mental status, unspecified altered mental status type      Dose: 650 mg  Take 2 tablets (650 mg) by mouth every 6 hours as needed for mild pain  Refills: 0     amylase-lipase-protease [id number] units Cpep  Commonly known as: CREON 36  Used for: Altered mental status, unspecified altered mental status type      Dose: 2 capsule  Take 2 capsules by mouth 3 times daily (with meals)  Refills: 0        CONTINUE these medicines which may have CHANGED, or have new prescriptions. If we are uncertain of the size of tablets/capsules you have at home, strength may be listed as something that might have changed.      Dose / Directions   * divalproex sodium delayed-release 500 MG DR tablet  Commonly known as: DEPAKOTE  This may have changed:     when to take this    additional instructions  Used for: Altered mental status, unspecified altered mental status type      Dose: 500 mg  Take 1 tablet (500 mg) by mouth 2 times daily With AM meds and early afternoon around 1500.  Refills: 0     * divalproex sodium delayed-release 250 MG DR tablet  Commonly known as: DEPAKOTE  This may have changed: You were already taking a medication with the same name, and this prescription was added.  Make sure you understand how and when to take each.  Used for: Altered mental status, unspecified altered mental status type      Dose: 250 mg  Take 1 tablet (250 mg) by mouth At Bedtime  Refills: 0         * This list has 2 medication(s) that are the same as other medications prescribed for you. Read the directions carefully, and ask your doctor or other care provider to review them with you.            CONTINUE these medicines which have NOT CHANGED      Dose / Directions   albuterol 108 (90 Base) MCG/ACT inhaler  Commonly known as: PROAIR HFA/PROVENTIL HFA/VENTOLIN HFA      Dose: 2 puff  Inhale 2 puffs into the lungs every 6 hours as needed for shortness of breath / dyspnea or wheezing  Refills: 0     atenolol 25 MG tablet  Commonly known as: TENORMIN      Dose: 25 mg  Take 25 mg by mouth daily  Quantity: 1 tablet  Refills: 0     calcium carbonate 600 mg-vitamin D 400 units 600-400 MG-UNIT per tablet  Commonly known as: CALTRATE      Dose: 1 tablet  Take 1 tablet by mouth daily (with breakfast)  Refills: 0     calcium polycarbophil 625 MG tablet  Commonly known as: FIBERCON      Dose: 1 tablet  Take 1 tablet by mouth 2 times daily  Refills: 0     * carboxymethylcellulose PF 1 % ophthalmic gel  Commonly known as: REFRESH LIQUIGEL      Dose: 1 drop  Place 1 drop into the right eye At Bedtime  Refills: 0     * carboxymethylcellulose PF 0.5 % ophthalmic solution  Commonly known as: REFRESH PLUS      Dose: 1 drop  Place 1 drop into both eyes daily as needed for dry eyes  Refills: 0     chlorhexidine 0.12 % solution  Commonly known as: PERIDEX      Dose: 15 mL  Swish and spit 15 mLs in mouth daily  Refills: 0     ciclopirox 0.77 % cream  Commonly known as: LOPROX      Apply topically nightly as needed  Refills: 0     clonazePAM 0.125 MG Tbdp ODT tab  Commonly known as: klonoPIN  Used for: Altered mental status, unspecified altered mental status type      Dose: 0.125 mg  Take 1 tablet (0.125 mg) by mouth daily as needed  for anxiety  Quantity: 10 tablet  Refills: 0     dicyclomine 20 MG tablet  Commonly known as: BENTYL      Dose: 20 mg  Take 1 tablet (20 mg) by mouth 4 times daily (before meals and nightly)  Quantity: 120 tablet  Refills: 0     docusate sodium 100 MG capsule  Commonly known as: COLACE      Dose: 100 mg  Take 100 mg by mouth daily  Refills: 0     hydrocortisone 1 % external cream  Commonly known as: CORTAID      Apply topically 2 times daily  Refills: 0     ipratropium 0.06 % nasal spray  Commonly known as: ATROVENT      Dose: 2 spray  Spray 2 sprays into both nostrils 3 times daily  Refills: 0     ketoconazole 2 % external cream  Commonly known as: NIZORAL      Apply topically 2 times daily as needed for itching R foot  Refills: 0     levOCARNitine 330 MG tablet  Commonly known as: CARNITOR      Take by mouth 3 times daily  Refills: 0     levocetirizine 5 MG tablet  Commonly known as: XYZAL      Dose: 5 mg  Take 5 mg by mouth every evening  Refills: 0     levothyroxine 50 MCG tablet  Commonly known as: SYNTHROID/LEVOTHROID      Dose: 50 mcg  Take 50 mcg by mouth daily  Refills: 0     loperamide 2 MG capsule  Commonly known as: IMODIUM      Dose: 2 mg  Take 2 mg by mouth 4 times daily as needed for diarrhea  Refills: 0     LORazepam 2 MG tablet  Commonly known as: ATIVAN  Used for: Altered mental status, unspecified altered mental status type      Dose: 2 mg  Take 1 tablet (2 mg) by mouth daily as needed for seizures Give bucally  Quantity: 10 tablet  Refills: 0     montelukast 10 MG tablet  Commonly known as: SINGULAIR      Dose: 10 mg  Take 10 mg by mouth At Bedtime  Refills: 0     multivitamin, therapeutic Tabs tablet      Dose: 1 tablet  Take 1 tablet by mouth daily  Refills: 0     omeprazole 20 MG tablet      Dose: 20 mg  Take 20 mg by mouth 2 times daily  Refills: 0     polyethylene glycol-propylene glycol 0.4-0.3 % Soln ophthalmic solution  Commonly known as: SYSTANE ULTRA      Dose: 2 drop  Place 2 drops  into both eyes daily as needed for dry eyes  Refills: 0     prednisoLONE acetate 1 % ophthalmic suspension  Commonly known as: PRED FORTE      Dose: 1 drop  Place 1 drop into the right eye daily  Refills: 0     risperiDONE 2 MG tablet  Commonly known as: risperDAL      Dose: 2 mg  Take 2 mg by mouth 2 times daily  Refills: 0     sertraline 100 MG tablet  Commonly known as: ZOLOFT      Dose: 100 mg  Take 100 mg by mouth daily  Refills: 0     topiramate 100 MG tablet  Commonly known as: TOPAMAX      Dose: 100 mg  Take 100 mg by mouth At Bedtime  Refills: 0     Vitamin D (Cholecalciferol) 50 MCG (2000 UT) Caps      Dose: 2,000 Units  Take 2,000 Units by mouth daily  Refills: 0         * This list has 2 medication(s) that are the same as other medications prescribed for you. Read the directions carefully, and ask your doctor or other care provider to review them with you.               Where to get your medicines      Some of these will need a paper prescription and others can be bought over the counter. Ask your nurse if you have questions.    Bring a paper prescription for each of these medications    clonazePAM 0.125 MG Tbdp ODT tab    LORazepam 2 MG tablet               Discharge Instructions:  Follow up appointment with Primary Care Physician: Joel Werner  Follow up appointment with Specialist: Neurology as directed.        Vital Signs in last 24 hours:    Temp:  [97.2  F (36.2  C)-97.7  F (36.5  C)] 97.7  F (36.5  C)  Pulse:  [53-83] 73  Resp:  [16-18] 18  BP: ()/(61-84) 115/67  SpO2:  [94 %-96 %] 96 %    GENERAL: Alert. NAD. Conversational.   HEENT: Normocephalic. EOMI. No icterus or injection. Nares normal.   LUNGS: Clear to auscultation. No dyspnea at rest.   HEART: Regular rate. Extremities perfused.   ABDOMEN: Soft, nontender, and nondistended. Positive bowel sounds.   EXTREMITIES: No LE edema noted.   NEUROLOGIC: Moves extremities x4 spontaneously.       Total Time on discharge process is: 33  minutes    Dr. Bob Beasley DO, MBA  Internal Medicine Hospitalist  6/10/2022

## 2022-06-11 ENCOUNTER — PATIENT OUTREACH (OUTPATIENT)
Dept: CARE COORDINATION | Facility: CLINIC | Age: 52
End: 2022-06-11
Payer: MEDICARE

## 2022-06-11 DIAGNOSIS — Z71.89 OTHER SPECIFIED COUNSELING: ICD-10-CM

## 2022-06-11 NOTE — PLAN OF CARE
Physical Therapy Discharge Summary    Reason for therapy discharge:    Discharged to transitional care facility.    Progress towards therapy goal(s). See goals on Care Plan in [facility] electronic health record for goal details.  Goals partially met.  Barriers to achieving goals:   discharge from facility.    Therapy recommendation(s):    Continued therapy is recommended.  Rationale/Recommendations:  cont PT at TCU to further address deficits and optimize functional recovery.

## 2022-06-12 ENCOUNTER — LAB REQUISITION (OUTPATIENT)
Dept: LAB | Facility: CLINIC | Age: 52
End: 2022-06-12
Payer: MEDICARE

## 2022-06-12 DIAGNOSIS — Z11.1 ENCOUNTER FOR SCREENING FOR RESPIRATORY TUBERCULOSIS: ICD-10-CM

## 2022-06-12 LAB — BACTERIA FLD CULT: NO GROWTH

## 2022-06-12 NOTE — PROGRESS NOTES
[facility]  PRIMARY CARE PROVIDER AND CLINIC:  Joel Werner MD, [address] / [facility]  Chief Complaint   Patient presents with     [facility] Medical Record Number:  [id number]  Place of Service where encounter took place:  [facility] ([facility] - [facility] [[id number]]    Jagdeep Walker  is a 51 year old  (1970), admitted to the above facility from  [facility]. Hospital stay 6/4/22 through 6/10/22..  Admitted to this facility for  rehab, medical management and nursing care.    HPI:    HPI information obtained from: facility chart records, facility staff and patient report.   Brief Summary of Hospital Course:   Updates on Status Since Skilled nursing Admission:     Patient Jagdeep Walker is a 51 yr old male admitted to [facility] for rehabilitation s/p hospitalization [facility] 6/4-6/10/22 for metabolic encephalopathy of unclear etiology, falls, and weakness  recent hospitalization 5/8-5/17/22 for acute pancreatitis with pseudocysts, portal vein thrombus    PMHx seizure disorder, schizoaffective and bipolar disorder, JACLYN, asthma, chronic anemia, Fisher's esophagus, GERD, HTN, hypothyroidism  Lives in group home     Follow-up    Neurology Clinic 2-3 wks  gastrointestinal specialist Dr. Clark 2-3 for pancreatic follow up        TODAY  States he is eager to return to group home  Reports he feels safe at home  Understands he is at [facility] to get stronger  States he walks with walker at baseline  Has supportive siblings  Confusion noted, difficulty with comprehension, has tangential thinking, mood appear stable  Only physical complaint is mild groin discomfort at times. States he is having regular elimination  Wishes to be FULL code, sister Huong his guardian signed POLST     Per consult with    Resident lives in group home; no stairs to enter.He  has assist with meals, meds. Was previously ind with dressing, showers. He has walker, sc, gb. Guardian in place (his sister, Huong). Goal is to return to group home when able.     Per therapies patient walking 245 feet     CODE STATUS/ADVANCE DIRECTIVES DISCUSSION:   CPR/Full code   Patient's living condition: lives in group home  ALLERGIES: Patient has no known allergies.  PAST MEDICAL HISTORY:  has a past medical history of Anxiety and Thyroid disease.  PAST SURGICAL HISTORY:   has a past surgical history that includes Endoscopic ultrasound upper gastrointestinal tract (GI) (N/A, 5/12/2022) and Esophagoscopy, gastroscopy, duodenoscopy (EGD), combined (N/A, 5/12/2022).  FAMILY HISTORY: family history is not on file.  SOCIAL HISTORY:       Post Discharge Medication Reconciliation Status: discharge medications reconciled and changed, per note/orders    Current Outpatient Medications   Medication Sig Dispense Refill     acetaminophen (TYLENOL) 325 MG tablet Take 2 tablets (650 mg) by mouth every 6 hours as needed for mild pain       albuterol (PROAIR HFA/PROVENTIL HFA/VENTOLIN HFA) 108 (90 Base) MCG/ACT inhaler Inhale 2 puffs into the lungs every 6 hours as needed for shortness of breath / dyspnea or wheezing       amylase-lipase-protease (CREON 36) [id number] units CPEP Take 2 capsules by mouth 3 times daily (with meals)       atenolol (TENORMIN) 25 MG tablet Take 25 mg by mouth daily 1 tablet 0     calcium carbonate 600 mg-vitamin D 400 units (CALTRATE) 600-400 MG-UNIT per tablet Take 1 tablet by mouth daily (with breakfast)       calcium polycarbophil (FIBERCON) 625 MG tablet Take 1 tablet by mouth 2 times daily       carboxymethylcellulose PF (REFRESH LIQUIGEL) 1 % ophthalmic gel Place 1 drop into the right eye At Bedtime       carboxymethylcellulose PF (REFRESH PLUS) 0.5 % ophthalmic solution Place 1 drop into both eyes daily as needed for dry eyes       chlorhexidine (PERIDEX) 0.12 % solution Swish and  spit 15 mLs in mouth daily       ciclopirox (LOPROX) 0.77 % cream Apply topically nightly as needed       clonazePAM (KLONOPIN) 0.125 MG TBDP ODT tab Take 1 tablet (0.125 mg) by mouth daily as needed for anxiety 10 tablet 0     dicyclomine (BENTYL) 20 MG tablet Take 1 tablet (20 mg) by mouth 4 times daily (before meals and nightly) 120 tablet 0     divalproex sodium delayed-release (DEPAKOTE) 250 MG DR tablet Take 1 tablet (250 mg) by mouth At Bedtime       divalproex sodium delayed-release (DEPAKOTE) 500 MG DR tablet Take 1 tablet (500 mg) by mouth 2 times daily With AM meds and early afternoon around 1500.       docusate sodium (COLACE) 100 MG capsule Take 100 mg by mouth daily       hydrocortisone (CORTAID) 1 % external cream Apply topically 2 times daily       ipratropium (ATROVENT) 0.06 % nasal spray Spray 2 sprays into both nostrils 3 times daily       ketoconazole (NIZORAL) 2 % external cream Apply topically 2 times daily as needed for itching R foot       levOCARNitine (CARNITOR) 330 MG tablet Take by mouth 3 times daily       levocetirizine (XYZAL) 5 MG tablet Take 5 mg by mouth every evening       levothyroxine (SYNTHROID/LEVOTHROID) 50 MCG tablet Take 50 mcg by mouth daily       loperamide (IMODIUM) 2 MG capsule Take 2 mg by mouth 4 times daily as needed for diarrhea       LORazepam (ATIVAN) 2 MG tablet Take 1 tablet (2 mg) by mouth daily as needed for seizures Give bucally 10 tablet 0     montelukast (SINGULAIR) 10 MG tablet Take 10 mg by mouth At Bedtime       multivitamin, therapeutic (THERA-VIT) TABS tablet Take 1 tablet by mouth daily       omeprazole 20 MG tablet Take 20 mg by mouth 2 times daily       polyethylene glycol-propylene glycol (SYSTANE ULTRA) 0.4-0.3 % SOLN ophthalmic solution Place 2 drops into both eyes daily as needed for dry eyes       prednisoLONE acetate (PRED FORTE) 1 % ophthalmic suspension Place 1 drop into the right eye daily       risperiDONE (RISPERDAL) 2 MG tablet Take 2 mg  "by mouth 2 times daily       sertraline (ZOLOFT) 100 MG tablet Take 100 mg by mouth daily       topiramate (TOPAMAX) 100 MG tablet Take 100 mg by mouth At Bedtime       Vitamin D (Cholecalciferol) 50 MCG (2000 UT) CAPS Take 2,000 Units by mouth daily         ROS:  10 point ROS of systems including Constitutional, Eyes, Respiratory, Cardiovascular, Gastroenterology, Genitourinary, Integumentary, Musculoskeletal, Psychiatric were all negative except for pertinent positives noted in my HPI.    Vitals:  /72   Pulse 62   Temp 98  F (36.7  C)   Resp 18   Ht 1.651 m (5' 5\")   Wt 95.3 kg (210 lb)   SpO2 91%   BMI 34.95 kg/m    Exam:  GENERAL APPEARANCE:  Alert, in no distress  ENT:  Mouth and posterior oropharynx normal, moist mucous membranes  EYES:  EOM, conjunctivae, lids, pupils and irises normal  NECK:  No adenopathy,masses or thyromegaly  RESP:  respiratory effort and palpation of chest normal, lungs clear to auscultation , no respiratory distress  CV:  Palpation and auscultation of heart done , regular rate and rhythm, no murmur, rub, or gallop  ABDOMEN:  normal bowel sounds, soft, nontender, no hepatosplenomegaly or other masses  M/S:   sitting in WC  SKIN:  Inspection of skin and subcutaneous tissue +1 edema bilateral LE   NEURO:   Cranial nerves 2-12 are normal tested and grossly at patient's baseline  PSYCH:  oriented X 3    Lab/Diagnostic data:  Labs done in SNF are in [facility]. Please refer to them using [facility] Everywhere.   Last Comprehensive Metabolic Panel:  Sodium   Date Value Ref Range Status   06/09/2022 141 133 - 144 mmol/L Final     Potassium   Date Value Ref Range Status   06/09/2022 3.9 3.4 - 5.3 mmol/L Final     Chloride   Date Value Ref Range Status   06/09/2022 110 (H) 94 - 109 mmol/L Final     Carbon Dioxide (CO2)   Date Value Ref Range Status   06/09/2022 26 20 - 32 mmol/L Final     Anion Gap   Date Value Ref Range Status   06/09/2022 5 3 - 14 mmol/L Final     Glucose   Date " Value Ref Range Status   06/09/2022 79 70 - 99 mg/dL Final     Urea Nitrogen   Date Value Ref Range Status   06/09/2022 12 7 - 30 mg/dL Final     Creatinine   Date Value Ref Range Status   06/09/2022 0.91 0.66 - 1.25 mg/dL Final     GFR Estimate   Date Value Ref Range Status   06/09/2022 >90 >60 mL/min/1.73m2 Final     Comment:     Effective December 21, 2021 eGFRcr in adults is calculated using the 2021 CKD-EPI creatinine equation which includes age and gender (Jakob et al., NEJ, DOI: 10.1056/LZZRhn3562194)     Calcium   Date Value Ref Range Status   06/09/2022 9.0 8.5 - 10.1 mg/dL Final     Bilirubin Total   Date Value Ref Range Status   06/09/2022 0.9 0.2 - 1.3 mg/dL Final     Alkaline Phosphatase   Date Value Ref Range Status   06/09/2022 56 40 - 150 U/L Final     ALT   Date Value Ref Range Status   06/09/2022 20 0 - 70 U/L Final     AST   Date Value Ref Range Status   06/09/2022 24 0 - 45 U/L Final         Lab Results   Component Value Date    WBC 4.2 06/09/2022     Lab Results   Component Value Date    RBC 4.04 06/09/2022     Lab Results   Component Value Date    HGB 12.3 06/09/2022     Lab Results   Component Value Date    HCT 37.5 06/09/2022     No components found for: MCT  Lab Results   Component Value Date    MCV 93 06/09/2022     Lab Results   Component Value Date    MCH 30.4 06/09/2022     Lab Results   Component Value Date    MCHC 32.8 06/09/2022     Lab Results   Component Value Date    RDW 16.0 06/09/2022     Lab Results   Component Value Date     06/09/2022         ASSESSMENT/PLAN:    Acute metabolic encephalopathy of unclear etiology  Frequent falls  weakness  Per epic review:  Reported to have tremor, balance difficulty and foggy thinking  Negative acute findings CT head and MRI brain  MRI lumbar spine on 6/6/22 did not have any findings that would explain his recent falls  Negative orthostatic hypotension  Ammonia levels within normal limits   negative UA  VBG ok  Vitamin B12 and folate  within normal limits.    Nodular liver noted on CT abd, with hypoalbuminema and recently elevated INR - exam unclear as to tremor vs asterixis.    Cannot rule out valproic acid toxicity or polypharmacy or partial seizure.    Lower suspicion for infection as afebrile without leukocytosis and offering no infectious complaints.    Neuro exam notable for mild dysconjugate gaze and asymmetric pupil (suspect chronic as clearly had right eye surgery) as well as tremor, orientation to person, place and month.  Lytes ok.  TSH wnl.  -- Zinc mildly low, copper normal, vitamin A, and vitamin E levels pending.  - Paraneoplastic panel pending.  - EEG on 6/5/22 was mildly abnormal with findings seen in mild encephalopathy.    Plan to follow up outpatient neurologist   Continue therapies, pending cognitive testing   involved in safe plan home, lives in group home. Prior functional status walking with walker     Per consult with   Resident lives in group home; no stairs to enter.He has assist with meals, meds. Was previously ind with dressing, showers. He has walker, sc, gb. Guardian in place (his sister, Huong). Goal is to return to group home when able.     House psychologist,  and speech therapy referral      Recent acute pancreatitis with pseudocysts  Possible cirrhosis  Lower extremity edema  Hypoalbuminemia  Ascites  Per hospital report:   Per prior hospital discharge patient to follow up with Eduardo WYMAN in 1 month with repeat CT imaging.  CT 6/4/22 showing moderate ascites, 2 stable pseudocysts but likely 1 new pseudocyst, improved pancreatic inflammation, liver nodularity concerning for cirrhosis.  Hypoalbuminemia may be due to recent illness vs poor intake but cannot rule out cirrhosis, INR was previously elevated as well.   - As above, low suspicion for infection.  - Ammonia unremarkable  - Abdominal US showed findings concerning for cirrhosis as well as a small amount of  ascites.  - 2g Na diet.  - TTE did not show evidence of CHF as source of edema.  - MRCP showed large volume ascites. Patient developed worsening abdominal distention and some abdominal discomfort.  - S/p diagnostic and therapeutic paracentesis on 6/7/22. 1.8 liters of fluid removed. Labs not consistent with SBP. Symptoms improved following paracentesis, but not resolved.  - Creon added by GI on 6/9 due to ongoing abdominal discomfort and loose stools.    Continue Creon  Follow up gastrointestinal specialist  Monitor abdominal discomfort, dietary intake and stools  Continue fibercon  Denies gastrointestinal upset  Eating meals, staff marking patient eating % meals  Medium stool marked 6/11/22  TG shapes on AM and off HS for edema lower extremity bilateral      Recent portal vein thrombosis  CT 6/4 showing non-occlusive portal vein thrombus has improved from prior.   anticoagulation not recommended by gastrointestinal   Monitor      Thrombocytopenia   Platelet 106 at admission, no history of low platelets.  Possibly related to cirrhosis. Recent B12 and folate levels wnl.  Monitor CBC     Chronic anemia   hemoglobin ~12 g/dL at baseline  chronic managed   No signs and symptoms bleeding  Monitor      Seizure disorder  Unclear last seizure  Continue topiramate.  Free Depakote level was elevated, Depakote dose decreased by Neurology on 6/8/22  Monitor   Follow up neurologist as advised      Bipolar disorder  Schizoaffective disorder  Resides in group home  Mood stable  Continue Klonopin, risperdal, sertraline  Consider house psychologist   Follow up psychiatrist as advised, Dr. Bree Merino       Hypertension  chronic managed   Continue atenolol.     Asthma  JACLYN  Denies shortness of breath  Does state he sleeps with raised HOB   Continue Singulair  Continue as needed albuterol inhalers   Continue Bipap at bedtime per hospital orders, sister Huong report patient uses CPAP. She will try and get to the facility  Until  CPAP arrive, order for oxygen 2L nasal cannula continuous at night     Hypothyroid  TSH wnl 6/4/22.   Continue levothyroxine current dose  Monitor      GERD  Fisher's esophagus  Denies gastrointestinal upset   Continue omeprazole  Follow up gastrointestinal specialist as advised      Carnitine deficiency  Continue levocarnitine     Advanced care planning  Elected FULL code    Follow-up    Neurology Clinic 2-3 wks  Dr. Clark 2-3 for pancreatic follow up      Other: will check post void residual, had c/o groin discomfort, monitor         Total time spent with patient visit at the [facility] was 39 min including patient visit and review of past records. Greater than 50% of total time spent with counseling and coordinating care due to discussion on goals of care, bowel management, functional goals, edema management, and discharge planning.     Electronically signed by:  DILLON Saini CNP

## 2022-06-13 ENCOUNTER — TRANSITIONAL CARE UNIT VISIT (OUTPATIENT)
Dept: GERIATRICS | Facility: CLINIC | Age: 52
End: 2022-06-13
Payer: MEDICARE

## 2022-06-13 VITALS
OXYGEN SATURATION: 92 % | RESPIRATION RATE: 16 BRPM | BODY MASS INDEX: 34.85 KG/M2 | SYSTOLIC BLOOD PRESSURE: 114 MMHG | HEIGHT: 65 IN | HEART RATE: 77 BPM | WEIGHT: 209.2 LBS | DIASTOLIC BLOOD PRESSURE: 74 MMHG | TEMPERATURE: 97.8 F

## 2022-06-13 VITALS
SYSTOLIC BLOOD PRESSURE: 110 MMHG | HEART RATE: 62 BPM | HEIGHT: 65 IN | RESPIRATION RATE: 18 BRPM | BODY MASS INDEX: 34.99 KG/M2 | DIASTOLIC BLOOD PRESSURE: 72 MMHG | WEIGHT: 210 LBS | OXYGEN SATURATION: 91 % | TEMPERATURE: 98 F

## 2022-06-13 DIAGNOSIS — F31.9 BIPOLAR AFFECTIVE DISORDER, REMISSION STATUS UNSPECIFIED (H): ICD-10-CM

## 2022-06-13 DIAGNOSIS — J45.909 UNCOMPLICATED ASTHMA, UNSPECIFIED ASTHMA SEVERITY, UNSPECIFIED WHETHER PERSISTENT: ICD-10-CM

## 2022-06-13 DIAGNOSIS — G47.33 OSA (OBSTRUCTIVE SLEEP APNEA): ICD-10-CM

## 2022-06-13 DIAGNOSIS — G40.909 SEIZURE DISORDER (H): ICD-10-CM

## 2022-06-13 DIAGNOSIS — I10 HYPERTENSION, UNSPECIFIED TYPE: ICD-10-CM

## 2022-06-13 DIAGNOSIS — E03.9 HYPOTHYROIDISM, UNSPECIFIED TYPE: ICD-10-CM

## 2022-06-13 DIAGNOSIS — M62.81 GENERALIZED MUSCLE WEAKNESS: ICD-10-CM

## 2022-06-13 DIAGNOSIS — R41.82 ALTERED MENTAL STATUS, UNSPECIFIED ALTERED MENTAL STATUS TYPE: ICD-10-CM

## 2022-06-13 DIAGNOSIS — I81 PORTAL VEIN THROMBOSIS: ICD-10-CM

## 2022-06-13 DIAGNOSIS — R18.8 OTHER ASCITES: ICD-10-CM

## 2022-06-13 DIAGNOSIS — R29.6 FALLS FREQUENTLY: ICD-10-CM

## 2022-06-13 DIAGNOSIS — F20.89 OTHER SCHIZOPHRENIA (H): ICD-10-CM

## 2022-06-13 DIAGNOSIS — K85.90 PANCREATITIS, UNSPECIFIED PANCREATITIS TYPE: Primary | ICD-10-CM

## 2022-06-13 PROCEDURE — 86481 TB AG RESPONSE T-CELL SUSP: CPT | Performed by: NURSE PRACTITIONER

## 2022-06-13 PROCEDURE — P9604 ONE-WAY ALLOW PRORATED TRIP: HCPCS | Performed by: NURSE PRACTITIONER

## 2022-06-13 PROCEDURE — 99310 SBSQ NF CARE HIGH MDM 45: CPT | Performed by: NURSE PRACTITIONER

## 2022-06-13 PROCEDURE — 36415 COLL VENOUS BLD VENIPUNCTURE: CPT | Performed by: NURSE PRACTITIONER

## 2022-06-13 NOTE — LETTER
6/13/2022        RE: Jagdeep Walker  [address]  [facility] [address]        [facility]  PRIMARY CARE PROVIDER AND CLINIC:  Joel Werner MD, [address] / [facility]  Chief Complaint   Patient presents with     [facility] Medical Record Number:  [id number]  Place of Service where encounter took place:  [facility] [place] ([place]) [[id number]]    Jagdeep Walker  is a 51 year old  (1970), admitted to the above facility from  [facility]. Hospital stay 6/4/22 through 6/10/22..  Admitted to this facility for  rehab, medical management and nursing care.    HPI:    HPI information obtained from: facility chart records, facility staff and patient report.   Brief Summary of Hospital Course:   Updates on Status Since Skilled nursing Admission:     Patient Jagdeep Walker is a 51 yr old male admitted to [facility] for rehabilitation s/p hospitalization [facility] 6/4-6/10/22 for metabolic encephalopathy of unclear etiology, falls, and weakness  recent hospitalization 5/8-5/17/22 for acute pancreatitis with pseudocysts, portal vein thrombus    PMHx seizure disorder, schizoaffective and bipolar disorder, JACLYN, asthma, chronic anemia, Fisher's esophagus, GERD, HTN, hypothyroidism  Lives in group home     Follow-up    Neurology Clinic 2-3 wks  gastrointestinal specialist Dr. Clark 2-3 for pancreatic follow up        TODAY  States he is eager to return to group home  Reports he feels safe at home  Understands he is at [facility] to get stronger  States he walks with walker at baseline  Has supportive siblings  Confusion noted, difficulty with comprehension, has tangential thinking, mood appear stable  Only physical complaint is mild groin discomfort at times. States he is having regular elimination  Wishes to be FULL code, sister Huong his guardian  signed POLST     Per consult with    Resident lives in group home; no stairs to enter.He has assist with meals, meds. Was previously ind with dressing, showers. He has walker, sc, gb. Guardian in place (his sister, Huong). Goal is to return to group home when able.     Per therapies patient walking 245 feet     CODE STATUS/ADVANCE DIRECTIVES DISCUSSION:   CPR/Full code   Patient's living condition: lives in group home  ALLERGIES: Patient has no known allergies.  PAST MEDICAL HISTORY:  has a past medical history of Anxiety and Thyroid disease.  PAST SURGICAL HISTORY:   has a past surgical history that includes Endoscopic ultrasound upper gastrointestinal tract (GI) (N/A, 5/12/2022) and Esophagoscopy, gastroscopy, duodenoscopy (EGD), combined (N/A, 5/12/2022).  FAMILY HISTORY: family history is not on file.  SOCIAL HISTORY:       Post Discharge Medication Reconciliation Status: discharge medications reconciled and changed, per note/orders    Current Outpatient Medications   Medication Sig Dispense Refill     acetaminophen (TYLENOL) 325 MG tablet Take 2 tablets (650 mg) by mouth every 6 hours as needed for mild pain       albuterol (PROAIR HFA/PROVENTIL HFA/VENTOLIN HFA) 108 (90 Base) MCG/ACT inhaler Inhale 2 puffs into the lungs every 6 hours as needed for shortness of breath / dyspnea or wheezing       amylase-lipase-protease (CREON 36) [id number] units CPEP Take 2 capsules by mouth 3 times daily (with meals)       atenolol (TENORMIN) 25 MG tablet Take 25 mg by mouth daily 1 tablet 0     calcium carbonate 600 mg-vitamin D 400 units (CALTRATE) 600-400 MG-UNIT per tablet Take 1 tablet by mouth daily (with breakfast)       calcium polycarbophil (FIBERCON) 625 MG tablet Take 1 tablet by mouth 2 times daily       carboxymethylcellulose PF (REFRESH LIQUIGEL) 1 % ophthalmic gel Place 1 drop into the right eye At Bedtime       carboxymethylcellulose PF (REFRESH PLUS) 0.5 % ophthalmic solution Place 1  drop into both eyes daily as needed for dry eyes       chlorhexidine (PERIDEX) 0.12 % solution Swish and spit 15 mLs in mouth daily       ciclopirox (LOPROX) 0.77 % cream Apply topically nightly as needed       clonazePAM (KLONOPIN) 0.125 MG TBDP ODT tab Take 1 tablet (0.125 mg) by mouth daily as needed for anxiety 10 tablet 0     dicyclomine (BENTYL) 20 MG tablet Take 1 tablet (20 mg) by mouth 4 times daily (before meals and nightly) 120 tablet 0     divalproex sodium delayed-release (DEPAKOTE) 250 MG DR tablet Take 1 tablet (250 mg) by mouth At Bedtime       divalproex sodium delayed-release (DEPAKOTE) 500 MG DR tablet Take 1 tablet (500 mg) by mouth 2 times daily With AM meds and early afternoon around 1500.       docusate sodium (COLACE) 100 MG capsule Take 100 mg by mouth daily       hydrocortisone (CORTAID) 1 % external cream Apply topically 2 times daily       ipratropium (ATROVENT) 0.06 % nasal spray Spray 2 sprays into both nostrils 3 times daily       ketoconazole (NIZORAL) 2 % external cream Apply topically 2 times daily as needed for itching R foot       levOCARNitine (CARNITOR) 330 MG tablet Take by mouth 3 times daily       levocetirizine (XYZAL) 5 MG tablet Take 5 mg by mouth every evening       levothyroxine (SYNTHROID/LEVOTHROID) 50 MCG tablet Take 50 mcg by mouth daily       loperamide (IMODIUM) 2 MG capsule Take 2 mg by mouth 4 times daily as needed for diarrhea       LORazepam (ATIVAN) 2 MG tablet Take 1 tablet (2 mg) by mouth daily as needed for seizures Give bucally 10 tablet 0     montelukast (SINGULAIR) 10 MG tablet Take 10 mg by mouth At Bedtime       multivitamin, therapeutic (THERA-VIT) TABS tablet Take 1 tablet by mouth daily       omeprazole 20 MG tablet Take 20 mg by mouth 2 times daily       polyethylene glycol-propylene glycol (SYSTANE ULTRA) 0.4-0.3 % SOLN ophthalmic solution Place 2 drops into both eyes daily as needed for dry eyes       prednisoLONE acetate (PRED FORTE) 1 %  "ophthalmic suspension Place 1 drop into the right eye daily       risperiDONE (RISPERDAL) 2 MG tablet Take 2 mg by mouth 2 times daily       sertraline (ZOLOFT) 100 MG tablet Take 100 mg by mouth daily       topiramate (TOPAMAX) 100 MG tablet Take 100 mg by mouth At Bedtime       Vitamin D (Cholecalciferol) 50 MCG (2000 UT) CAPS Take 2,000 Units by mouth daily         ROS:  10 point ROS of systems including Constitutional, Eyes, Respiratory, Cardiovascular, Gastroenterology, Genitourinary, Integumentary, Musculoskeletal, Psychiatric were all negative except for pertinent positives noted in my HPI.    Vitals:  /72   Pulse 62   Temp 98  F (36.7  C)   Resp 18   Ht 1.651 m (5' 5\")   Wt 95.3 kg (210 lb)   SpO2 91%   BMI 34.95 kg/m    Exam:  GENERAL APPEARANCE:  Alert, in no distress  ENT:  Mouth and posterior oropharynx normal, moist mucous membranes  EYES:  EOM, conjunctivae, lids, pupils and irises normal  NECK:  No adenopathy,masses or thyromegaly  RESP:  respiratory effort and palpation of chest normal, lungs clear to auscultation , no respiratory distress  CV:  Palpation and auscultation of heart done , regular rate and rhythm, no murmur, rub, or gallop  ABDOMEN:  normal bowel sounds, soft, nontender, no hepatosplenomegaly or other masses  M/S:   sitting in WC  SKIN:  Inspection of skin and subcutaneous tissue +1 edema bilateral LE   NEURO:   Cranial nerves 2-12 are normal tested and grossly at patient's baseline  PSYCH:  oriented X 3    Lab/Diagnostic data:  Labs done in SNF [facility]. Please refer to them using [employer] Everywhere.   Last Comprehensive Metabolic Panel:  Sodium   Date Value Ref Range Status   06/09/2022 141 133 - 144 mmol/L Final     Potassium   Date Value Ref Range Status   06/09/2022 3.9 3.4 - 5.3 mmol/L Final     Chloride   Date Value Ref Range Status   06/09/2022 110 (H) 94 - 109 mmol/L Final     Carbon Dioxide (CO2)   Date Value Ref Range Status   06/09/2022 26 20 - 32 " mmol/L Final     Anion Gap   Date Value Ref Range Status   06/09/2022 5 3 - 14 mmol/L Final     Glucose   Date Value Ref Range Status   06/09/2022 79 70 - 99 mg/dL Final     Urea Nitrogen   Date Value Ref Range Status   06/09/2022 12 7 - 30 mg/dL Final     Creatinine   Date Value Ref Range Status   06/09/2022 0.91 0.66 - 1.25 mg/dL Final     GFR Estimate   Date Value Ref Range Status   06/09/2022 >90 >60 mL/min/1.73m2 Final     Comment:     Effective December 21, 2021 eGFRcr in adults is calculated using the 2021 CKD-EPI creatinine equation which includes age and gender (Jakob et al., NEJ, DOI: 10.1056/URAGxn0344500)     Calcium   Date Value Ref Range Status   06/09/2022 9.0 8.5 - 10.1 mg/dL Final     Bilirubin Total   Date Value Ref Range Status   06/09/2022 0.9 0.2 - 1.3 mg/dL Final     Alkaline Phosphatase   Date Value Ref Range Status   06/09/2022 56 40 - 150 U/L Final     ALT   Date Value Ref Range Status   06/09/2022 20 0 - 70 U/L Final     AST   Date Value Ref Range Status   06/09/2022 24 0 - 45 U/L Final         Lab Results   Component Value Date    WBC 4.2 06/09/2022     Lab Results   Component Value Date    RBC 4.04 06/09/2022     Lab Results   Component Value Date    HGB 12.3 06/09/2022     Lab Results   Component Value Date    HCT 37.5 06/09/2022     No components found for: MCT  Lab Results   Component Value Date    MCV 93 06/09/2022     Lab Results   Component Value Date    MCH 30.4 06/09/2022     Lab Results   Component Value Date    MCHC 32.8 06/09/2022     Lab Results   Component Value Date    RDW 16.0 06/09/2022     Lab Results   Component Value Date     06/09/2022         ASSESSMENT/PLAN:    Acute metabolic encephalopathy of unclear etiology  Frequent falls  weakness  Per epic review:  Reported to have tremor, balance difficulty and foggy thinking  Negative acute findings CT head and MRI brain  MRI lumbar spine on 6/6/22 did not have any findings that would explain his recent  falls  Negative orthostatic hypotension  Ammonia levels within normal limits   negative UA  VBG ok  Vitamin B12 and folate within normal limits.    Nodular liver noted on CT abd, with hypoalbuminema and recently elevated INR - exam unclear as to tremor vs asterixis.    Cannot rule out valproic acid toxicity or polypharmacy or partial seizure.    Lower suspicion for infection as afebrile without leukocytosis and offering no infectious complaints.    Neuro exam notable for mild dysconjugate gaze and asymmetric pupil (suspect chronic as clearly had right eye surgery) as well as tremor, orientation to person, place and month.  Lytes ok.  TSH wnl.  -- Zinc mildly low, copper normal, vitamin A, and vitamin E levels pending.  - Paraneoplastic panel pending.  - EEG on 6/5/22 was mildly abnormal with findings seen in mild encephalopathy.    Plan to follow up outpatient neurologist   Continue therapies, pending cognitive testing   involved in safe plan home, lives in group home. Prior functional status walking with walker     Per consult with   Resident lives in group home; no stairs to enter.He has assist with meals, meds. Was previously ind with dressing, showers. He has walker, sc, gb. Guardian in place (his sister, Huong). Goal is to return to group home when able.      Recent acute pancreatitis with pseudocysts  Possible cirrhosis  Lower extremity edema  Hypoalbuminemia  Ascites  Per hospital report:   Per prior hospital discharge patient to follow up with Eduardo WYMAN in 1 month with repeat CT imaging.  CT 6/4/22 showing moderate ascites, 2 stable pseudocysts but likely 1 new pseudocyst, improved pancreatic inflammation, liver nodularity concerning for cirrhosis.  Hypoalbuminemia may be due to recent illness vs poor intake but cannot rule out cirrhosis, INR was previously elevated as well.   - As above, low suspicion for infection.  - Ammonia unremarkable  - Abdominal US showed  findings concerning for cirrhosis as well as a small amount of ascites.  - 2g Na diet.  - TTE did not show evidence of CHF as source of edema.  - MRCP showed large volume ascites. Patient developed worsening abdominal distention and some abdominal discomfort.  - S/p diagnostic and therapeutic paracentesis on 6/7/22. 1.8 liters of fluid removed. Labs not consistent with SBP. Symptoms improved following paracentesis, but not resolved.  - Creon added by GI on 6/9 due to ongoing abdominal discomfort and loose stools.    Continue Creon  Follow up gastrointestinal specialist  Monitor abdominal discomfort, dietary intake and stools  Continue fibercon  Denies gastrointestinal upset  Eating meals, staff marking patient eating % meals  Medium stool marked 6/11/22  TG shapes on AM and off HS for edema lower extremity bilateral      Recent portal vein thrombosis  CT 6/4 showing non-occlusive portal vein thrombus has improved from prior.   anticoagulation not recommended by gastrointestinal   Monitor      Thrombocytopenia   Platelet 106 at admission, no history of low platelets.  Possibly related to cirrhosis. Recent B12 and folate levels wnl.  Monitor CBC     Chronic anemia   hemoglobin ~12 g/dL at baseline  chronic managed   No signs and symptoms bleeding  Monitor      Seizure disorder  Unclear last seizure  Continue topiramate.  Free Depakote level was elevated, Depakote dose decreased by Neurology on 6/8/22  Monitor   Follow up neurologist as advised      Bipolar disorder  Schizoaffective disorder  Resides in group home  Mood stable  Continue Klonopin, risperdal, sertraline  Consider house psychologist   Follow up psychiatrist as advised, Dr. Bree Merino       Hypertension  chronic managed   Continue atenolol.     Asthma  JACLYN  Denies shortness of breath  Does state he sleeps with raised HOB   Continue Singulair  Continue as needed albuterol inhalers   Continue Bipap at bedtime      Hypothyroid  TSH wnl 6/4/22.    Continue levothyroxine current dose  Monitor      GERD  Fisher's esophagus  Denies gastrointestinal upset   Continue omeprazole  Follow up gastrointestinal specialist as advised      Carnitine deficiency  Continue levocarnitine     Advanced care planning  Elected FULL code    Follow-up    Neurology Clinic 2-3 wks  Dr. Clark 2-3 for pancreatic follow up          Total time spent with patient visit at the [facility] was 39 min including patient visit and review of past records. Greater than 50% of total time spent with counseling and coordinating care due to discussion on goals of care, bowel management, functional goals, edema management, and discharge planning.     Electronically signed by:  DILLON Saini CNP                           Sincerely,        DILLON Saini CNP

## 2022-06-13 NOTE — PROGRESS NOTES
[facility]  PRIMARY CARE PROVIDER AND CLINIC:  Joel Werner MD, [address] / [facility]  Chief Complaint   Patient presents with     Nursing Home Acute     MD visit     [place] Medical Record Number:  [id number]  Place of Service where encounter took place:  [facility] (TCU)     Pt was seen for an initial TCU visit by Dr Jovani CHIRINOS Pierreki  is a 51 year old  (1970), admitted to the above facility from  [facility]. Hospital stay 6/4/22 through 6/10/22..  Admitted to this facility for  rehab, medical management and nursing care.      Hospital course was reviewed by me, is as per the hospital discharge summary and NP note      Pt is a 51 yr old male admitted to [facility] for rehabilitation s/p hospitalization [facility] 6/4-6/10/22 for metabolic encephalopathy of unclear etiology, falls, and weakness.  History of recent hospitalization 5/8-5/17/22 for acute (idiopathic) pancreatitis with pseudocysts, portal vein thrombus    PMHx  Acute pancreatitis of unclear etiology, seizure disorder, schizoaffective and bipolar disorder, JACLYN, asthma, chronic anemia, Fisher's esophagus, GERD, HTN, hypothyroidism    Etiology of encephalopathy unclear. MRI brain no acute change  EEG mildly abnormal  Ammonia level nl  Paraneoplastic studies neg  Free VPA sl elevated; neurology recommended decrease in VPA to 1250 mg daily (in view of encephalopathy and tremors)      Significant recent medical history of acute pancreatitis with pseudocysts, possible cirrhosis (though MRI did not suggest this) ascites.  Recent GI review:    CT 6/4/22 showing moderate ascites, 2 stable pseudocysts but likely 1 new pseudocyst, improved pancreatic inflammation, liver nodularity concerning for cirrhosis.    6/4/22 Abdominal ultrasound noted coarsened hepatic echotexture and fibrosis. With scattered ascites.  MRCP 6/5/22  "significant for pancreatitis, 2 pseudocysts, and perigastric fluid collection withouth evidence of pancreatic necrosis, mild pancreatic ductal dilation with narrowing oteh common bile dut secondary to the pseudocyst in the pancreatic head. Large volume ascites, slightly increased int eh interval. Normal appearing liver without findings of liver cirrhosis.  Hypoalbuminemia may be due to recent illness vs poor intake but cannot rule out cirrhosis, INR was previously elevated as well.   Ammonia normal at 33.  Albumin 2.4  No need to treat portal vein thrombosis  Cause of pancreatitis may be due to drug induced injury vs autoimmune disease  ( valproic acid/topiramate level pending)  6/7 Paracentesis removed 1.8 L of fluid. Hazy. Total cell count 274, 5% neutrophils, no organisms     Pt states he feels \"OK\"  He notes mid abd pain which he states is chronic  Appetite fair  No nausea or emesis  He denies constipation  He denies chest pain, cough, SOB  He is walking with his walker in therapy         CODE STATUS/ADVANCE DIRECTIVES DISCUSSION:   CPR/Full code   Patient's living condition: lives in group home  ALLERGIES: Patient has no known allergies.  PAST MEDICAL HISTORY: As noted above  PAST SURGICAL HISTORY:   has a past surgical history that includes Endoscopic ultrasound upper gastrointestinal tract (GI) (N/A, 5/12/2022) and Esophagoscopy, gastroscopy, duodenoscopy (EGD), combined (N/A, 5/12/2022).  FAMILY HISTORY: was not obtained by me.  SOCIAL HISTORY:   lives in a group home        Current Outpatient Medications   Medication Sig Dispense Refill     acetaminophen (TYLENOL) 325 MG tablet Take 2 tablets (650 mg) by mouth every 6 hours as needed for mild pain       albuterol (PROAIR HFA/PROVENTIL HFA/VENTOLIN HFA) 108 (90 Base) MCG/ACT inhaler Inhale 2 puffs into the lungs every 6 hours as needed for shortness of breath / dyspnea or wheezing       amylase-lipase-protease (CREON 36) [id number] units CPEP Take 2 " capsules by mouth 3 times daily (with meals)       atenolol (TENORMIN) 25 MG tablet Take 25 mg by mouth daily 1 tablet 0     calcium carbonate 600 mg-vitamin D 400 units (CALTRATE) 600-400 MG-UNIT per tablet Take 1 tablet by mouth daily (with breakfast)       calcium polycarbophil (FIBERCON) 625 MG tablet Take 1 tablet by mouth 2 times daily       carboxymethylcellulose PF (REFRESH LIQUIGEL) 1 % ophthalmic gel Place 1 drop into the right eye At Bedtime       carboxymethylcellulose PF (REFRESH PLUS) 0.5 % ophthalmic solution Place 1 drop into both eyes daily as needed for dry eyes       chlorhexidine (PERIDEX) 0.12 % solution Swish and spit 15 mLs in mouth daily       ciclopirox (LOPROX) 0.77 % cream Apply topically nightly as needed       clonazePAM (KLONOPIN) 0.125 MG TBDP ODT tab Take 1 tablet (0.125 mg) by mouth daily as needed for anxiety 10 tablet 0     dicyclomine (BENTYL) 20 MG tablet Take 1 tablet (20 mg) by mouth 4 times daily (before meals and nightly) 120 tablet 0     divalproex sodium delayed-release (DEPAKOTE) 250 MG DR tablet Take 1 tablet (250 mg) by mouth At Bedtime       divalproex sodium delayed-release (DEPAKOTE) 500 MG DR tablet Take 1 tablet (500 mg) by mouth 2 times daily With AM meds and early afternoon around 1500.       docusate sodium (COLACE) 100 MG capsule Take 100 mg by mouth daily       hydrocortisone (CORTAID) 1 % external cream Apply topically 2 times daily       ipratropium (ATROVENT) 0.06 % nasal spray Spray 2 sprays into both nostrils 3 times daily       ketoconazole (NIZORAL) 2 % external cream Apply topically 2 times daily as needed for itching R foot       levOCARNitine (CARNITOR) 330 MG tablet Take by mouth 3 times daily       levocetirizine (XYZAL) 5 MG tablet Take 5 mg by mouth every evening       levothyroxine (SYNTHROID/LEVOTHROID) 50 MCG tablet Take 50 mcg by mouth daily       loperamide (IMODIUM) 2 MG capsule Take 2 mg by mouth 4 times daily as needed for diarrhea        "LORazepam (ATIVAN) 2 MG tablet Take 1 tablet (2 mg) by mouth daily as needed for seizures Give bucally 10 tablet 0     montelukast (SINGULAIR) 10 MG tablet Take 10 mg by mouth At Bedtime       multivitamin, therapeutic (THERA-VIT) TABS tablet Take 1 tablet by mouth daily       omeprazole 20 MG tablet Take 20 mg by mouth 2 times daily       polyethylene glycol-propylene glycol (SYSTANE ULTRA) 0.4-0.3 % SOLN ophthalmic solution Place 2 drops into both eyes daily as needed for dry eyes       prednisoLONE acetate (PRED FORTE) 1 % ophthalmic suspension Place 1 drop into the right eye daily       risperiDONE (RISPERDAL) 2 MG tablet Take 2 mg by mouth 2 times daily       sertraline (ZOLOFT) 100 MG tablet Take 100 mg by mouth daily       topiramate (TOPAMAX) 100 MG tablet Take 100 mg by mouth At Bedtime       Vitamin D (Cholecalciferol) 50 MCG (2000 UT) CAPS Take 2,000 Units by mouth daily         ROS:  10 point ROS of systems including Constitutional, Eyes, Respiratory, Cardiovascular, Gastroenterology, Genitourinary, Integumentary, Musculoskeletal, Psychiatric were all negative except for pertinent positives noted in my HPI.    Vitals:  /74   Pulse 77   Temp 97.8  F (36.6  C)   Resp 16   Ht 1.651 m (5' 5\")   Wt 94.9 kg (209 lb 3.2 oz)   SpO2 92%   BMI 34.81 kg/m       Exam:  GENERAL APPEARANCE:  Alert, in no distress, sitting in the dining area, preparing to eat  Well nourished appearing  ENT:  Oral mucosa moist  EYES:  No eye redness or drainage  NECK:  supple  RESP:  RR 12, unlabored. Lungs clear  CV:  RRR no M  ABDOMEN: soft, protuberant, very mild mid abd tenderness without rebound or guarding  M/S:  No LE edema  SKIN:  No rash  NEURO: alert, in NAD, oriented to person, place. Limited insight into recent circumstances leading to admission  Face symmetric, affect mildly blunted  Resting tremors both hands  No gross focal weakness  Gait was not assessed              Last Comprehensive Metabolic " Panel:  Sodium   Date Value Ref Range Status   06/09/2022 141 133 - 144 mmol/L Final     Potassium   Date Value Ref Range Status   06/09/2022 3.9 3.4 - 5.3 mmol/L Final     Chloride   Date Value Ref Range Status   06/09/2022 110 (H) 94 - 109 mmol/L Final     Carbon Dioxide (CO2)   Date Value Ref Range Status   06/09/2022 26 20 - 32 mmol/L Final     Anion Gap   Date Value Ref Range Status   06/09/2022 5 3 - 14 mmol/L Final     Glucose   Date Value Ref Range Status   06/09/2022 79 70 - 99 mg/dL Final     Urea Nitrogen   Date Value Ref Range Status   06/09/2022 12 7 - 30 mg/dL Final     Creatinine   Date Value Ref Range Status   06/09/2022 0.91 0.66 - 1.25 mg/dL Final     GFR Estimate   Date Value Ref Range Status   06/09/2022 >90 >60 mL/min/1.73m2 Final     Comment:     Effective December 21, 2021 eGFRcr in adults is calculated using the 2021 CKD-EPI creatinine equation which includes age and gender (Jakob et al., NEJ, DOI: 10.1056/TXPVdv7879842)     Calcium   Date Value Ref Range Status   06/09/2022 9.0 8.5 - 10.1 mg/dL Final     Bilirubin Total   Date Value Ref Range Status   06/09/2022 0.9 0.2 - 1.3 mg/dL Final     Alkaline Phosphatase   Date Value Ref Range Status   06/09/2022 56 40 - 150 U/L Final     ALT   Date Value Ref Range Status   06/09/2022 20 0 - 70 U/L Final     AST   Date Value Ref Range Status   06/09/2022 24 0 - 45 U/L Final         Lab Results   Component Value Date    WBC 4.2 06/09/2022     Lab Results   Component Value Date    RBC 4.04 06/09/2022     Lab Results   Component Value Date    HGB 12.3 06/09/2022     Lab Results   Component Value Date    HCT 37.5 06/09/2022     No components found for: MCT  Lab Results   Component Value Date    MCV 93 06/09/2022     Lab Results   Component Value Date    MCH 30.4 06/09/2022     Lab Results   Component Value Date    MCHC 32.8 06/09/2022     Lab Results   Component Value Date    RDW 16.0 06/09/2022     Lab Results   Component Value Date      06/09/2022         ASSESSMENT/PLAN:    Acute metabolic encephalopathy of unclear etiology  Frequent falls  Weakness   History of schizoaffective disorder, seizures, on multiple medications  Neg neuro and metabolic w/u except sl elevation of free VPA  Overall appears improved., ? At or close to functional baseline  Plan therapies, repeat VPA at some point  Neuro f/u       Recent acute pancreatitis with pseudocysts  Hypoalbuminemia  Ascites  MRI did not indicate cirrhosis. No structural biliary process identified  Etiology unclear, ? meds  Plan monitor abd exam. GI f/u. Continue Creon         Recent portal vein thrombosis  CT 6/4 showing non-occlusive portal vein thrombus has improved from prior.   anticoagulation not recommended by gastrointestinal   Monitor      Thrombocytopenia   Possibly secondary to VPA, in view of absence of splenomegaly  Mild, stable  Plan monitor       Seizure disorder  stable  Continue topiramate.  Free Depakote level was elevated, Depakote dose decreased by Neurology on 6/8/22  Monitor   Follow up neurologist     Bipolar disorder  Schizoaffective disorder  Resides in group home  Mood stable  Unclear if meds were contributing to recent decline in status  Continue Klonopin, risperdal, sertraline  Follow up psychiatrist as advised, Dr. Bree Merino       Hypertension  Continue atenolol.     Asthma  JACLYN  Appears stable  Plan  Continue Singulair  Continue as needed albuterol inhalers   Continue CPAP    Hypothyroid  TSH wnl 6/4/22.   Continue levothyroxine current dose  Monitor      GERD  Fisher's esophagus  Denies gastrointestinal upset   Continue omeprazole         Kush Hahn MD

## 2022-06-14 ENCOUNTER — TRANSITIONAL CARE UNIT VISIT (OUTPATIENT)
Dept: GERIATRICS | Facility: CLINIC | Age: 52
End: 2022-06-14
Payer: MEDICARE

## 2022-06-14 ENCOUNTER — TRANSFERRED RECORDS (OUTPATIENT)
Dept: MEDSURG UNIT | Facility: CLINIC | Age: 52
End: 2022-06-14

## 2022-06-14 ENCOUNTER — HOSPITAL ENCOUNTER (INPATIENT)
Facility: CLINIC | Age: 52
LOS: 26 days | Discharge: NURSING, REHAB OR LONG-TERM CARE | DRG: 438 | End: 2022-07-10
Attending: EMERGENCY MEDICINE | Admitting: INTERNAL MEDICINE
Payer: MEDICARE

## 2022-06-14 ENCOUNTER — TELEPHONE (OUTPATIENT)
Dept: GERIATRICS | Facility: CLINIC | Age: 52
End: 2022-06-14
Payer: MEDICARE

## 2022-06-14 ENCOUNTER — DOCUMENTATION ONLY (OUTPATIENT)
Dept: GERIATRICS | Facility: CLINIC | Age: 52
End: 2022-06-14

## 2022-06-14 ENCOUNTER — APPOINTMENT (OUTPATIENT)
Dept: ULTRASOUND IMAGING | Facility: CLINIC | Age: 52
DRG: 438 | End: 2022-06-14
Attending: EMERGENCY MEDICINE
Payer: MEDICARE

## 2022-06-14 ENCOUNTER — APPOINTMENT (OUTPATIENT)
Dept: CT IMAGING | Facility: CLINIC | Age: 52
DRG: 438 | End: 2022-06-14
Attending: EMERGENCY MEDICINE
Payer: MEDICARE

## 2022-06-14 DIAGNOSIS — N30.00 ACUTE CYSTITIS WITHOUT HEMATURIA: ICD-10-CM

## 2022-06-14 DIAGNOSIS — I81 PORTAL VEIN THROMBOSIS: ICD-10-CM

## 2022-06-14 DIAGNOSIS — G47.33 OSA (OBSTRUCTIVE SLEEP APNEA): ICD-10-CM

## 2022-06-14 DIAGNOSIS — G40.909 SEIZURE DISORDER (H): ICD-10-CM

## 2022-06-14 DIAGNOSIS — I10 HYPERTENSION, UNSPECIFIED TYPE: ICD-10-CM

## 2022-06-14 DIAGNOSIS — R41.82 ALTERED MENTAL STATUS, UNSPECIFIED ALTERED MENTAL STATUS TYPE: Primary | ICD-10-CM

## 2022-06-14 DIAGNOSIS — K85.90 PANCREATITIS, UNSPECIFIED PANCREATITIS TYPE: ICD-10-CM

## 2022-06-14 DIAGNOSIS — K85.00 IDIOPATHIC ACUTE PANCREATITIS, UNSPECIFIED COMPLICATION STATUS: ICD-10-CM

## 2022-06-14 DIAGNOSIS — R41.82 ALTERED MENTAL STATUS, UNSPECIFIED ALTERED MENTAL STATUS TYPE: ICD-10-CM

## 2022-06-14 DIAGNOSIS — J90 PLEURAL EFFUSION: ICD-10-CM

## 2022-06-14 DIAGNOSIS — E03.9 ACQUIRED HYPOTHYROIDISM: ICD-10-CM

## 2022-06-14 DIAGNOSIS — K86.3 PANCREATIC PSEUDOCYST: Primary | ICD-10-CM

## 2022-06-14 LAB
ABO/RH(D): NORMAL
ALBUMIN SERPL-MCNC: 2.4 G/DL (ref 3.4–5)
ALP SERPL-CCNC: 75 U/L (ref 40–150)
ALT SERPL W P-5'-P-CCNC: 18 U/L (ref 0–70)
AMMONIA PLAS-SCNC: 49 UMOL/L (ref 10–50)
ANION GAP SERPL CALCULATED.3IONS-SCNC: 7 MMOL/L (ref 3–14)
ANTIBODY SCREEN: NEGATIVE
APPEARANCE FLD: ABNORMAL
AST SERPL W P-5'-P-CCNC: 21 U/L (ref 0–45)
BASOPHILS # BLD AUTO: 0 10E3/UL (ref 0–0.2)
BASOPHILS NFR BLD AUTO: 0 %
BILIRUB SERPL-MCNC: 0.6 MG/DL (ref 0.2–1.3)
BUN SERPL-MCNC: 14 MG/DL (ref 7–30)
CALCIUM SERPL-MCNC: 9 MG/DL (ref 8.5–10.1)
CELL COUNT BODY FLUID SOURCE: ABNORMAL
CHLORIDE BLD-SCNC: 106 MMOL/L (ref 94–109)
CO2 SERPL-SCNC: 28 MMOL/L (ref 20–32)
COLOR FLD: YELLOW
CREAT SERPL-MCNC: 0.87 MG/DL (ref 0.66–1.25)
EOSINOPHIL # BLD AUTO: 0.1 10E3/UL (ref 0–0.7)
EOSINOPHIL NFR BLD AUTO: 2 %
ERYTHROCYTE [DISTWIDTH] IN BLOOD BY AUTOMATED COUNT: 16.6 % (ref 10–15)
GAMMA INTERFERON BACKGROUND BLD IA-ACNC: 0.04 IU/ML
GFR SERPL CREATININE-BSD FRML MDRD: >90 ML/MIN/1.73M2
GLUCOSE BLD-MCNC: 132 MG/DL (ref 70–99)
GLUCOSE BODY FLUID SOURCE: NORMAL
GLUCOSE FLD-MCNC: 116 MG/DL
HCT VFR BLD AUTO: 38.9 % (ref 40–53)
HGB BLD-MCNC: 12.6 G/DL (ref 13.3–17.7)
IMM GRANULOCYTES # BLD: 0 10E3/UL
IMM GRANULOCYTES NFR BLD: 1 %
INR PPP: 1.64 (ref 0.85–1.15)
LACTATE SERPL-SCNC: 1.9 MMOL/L (ref 0.7–2)
LD BODY BODY FLUID SOURCE: NORMAL
LDH FLD L TO P-CCNC: 121 U/L
LDH SERPL L TO P-CCNC: 241 U/L (ref 85–227)
LIPASE SERPL-CCNC: 689 U/L (ref 73–393)
LYMPHOCYTES # BLD AUTO: 1.7 10E3/UL (ref 0.8–5.3)
LYMPHOCYTES NFR BLD AUTO: 30 %
LYMPHOCYTES NFR FLD MANUAL: NORMAL %
M TB IFN-G BLD-IMP: NEGATIVE
M TB IFN-G CD4+ BCKGRND COR BLD-ACNC: 3.31 IU/ML
MCH RBC QN AUTO: 31.1 PG (ref 26.5–33)
MCHC RBC AUTO-ENTMCNC: 32.4 G/DL (ref 31.5–36.5)
MCV RBC AUTO: 96 FL (ref 78–100)
MITOGEN IGNF BCKGRD COR BLD-ACNC: 0 IU/ML
MITOGEN IGNF BCKGRD COR BLD-ACNC: 0.01 IU/ML
MONOCYTES # BLD AUTO: 1 10E3/UL (ref 0–1.3)
MONOCYTES NFR BLD AUTO: 18 %
MONOS+MACROS NFR FLD MANUAL: NORMAL %
NEUTROPHILS # BLD AUTO: 2.8 10E3/UL (ref 1.6–8.3)
NEUTROPHILS NFR BLD AUTO: 49 %
NEUTS BAND NFR FLD MANUAL: NORMAL %
NRBC # BLD AUTO: 0 10E3/UL
NRBC BLD AUTO-RTO: 0 /100
PLATELET # BLD AUTO: 185 10E3/UL (ref 150–450)
POTASSIUM BLD-SCNC: 3.9 MMOL/L (ref 3.4–5.3)
PROT FLD-MCNC: 2.3 G/DL
PROT SERPL-MCNC: 5.4 G/DL (ref 6.8–8.8)
PROTEIN BODY FLUID SOURCE: NORMAL
QUANTIFERON MITOGEN: 3.35 IU/ML
QUANTIFERON NIL TUBE: 0.04 IU/ML
QUANTIFERON TB1 TUBE: 0.05 IU/ML
QUANTIFERON TB2 TUBE: 0.04
RBC # BLD AUTO: 4.05 10E6/UL (ref 4.4–5.9)
SARS-COV-2 RNA RESP QL NAA+PROBE: NEGATIVE
SODIUM SERPL-SCNC: 141 MMOL/L (ref 133–144)
SPECIMEN EXPIRATION DATE: NORMAL
VALPROATE SERPL-MCNC: 76 MG/L
WBC # BLD AUTO: 5.7 10E3/UL (ref 4–11)

## 2022-06-14 PROCEDURE — C9803 HOPD COVID-19 SPEC COLLECT: HCPCS

## 2022-06-14 PROCEDURE — 88112 CYTOPATH CELL ENHANCE TECH: CPT | Mod: TC | Performed by: EMERGENCY MEDICINE

## 2022-06-14 PROCEDURE — 89051 BODY FLUID CELL COUNT: CPT | Performed by: EMERGENCY MEDICINE

## 2022-06-14 PROCEDURE — 74177 CT ABD & PELVIS W/CONTRAST: CPT

## 2022-06-14 PROCEDURE — 93005 ELECTROCARDIOGRAM TRACING: CPT

## 2022-06-14 PROCEDURE — 250N000011 HC RX IP 250 OP 636: Performed by: EMERGENCY MEDICINE

## 2022-06-14 PROCEDURE — 87205 SMEAR GRAM STAIN: CPT | Performed by: EMERGENCY MEDICINE

## 2022-06-14 PROCEDURE — 83615 LACTATE (LD) (LDH) ENZYME: CPT | Performed by: EMERGENCY MEDICINE

## 2022-06-14 PROCEDURE — 36415 COLL VENOUS BLD VENIPUNCTURE: CPT | Performed by: EMERGENCY MEDICINE

## 2022-06-14 PROCEDURE — 120N000001 HC R&B MED SURG/OB

## 2022-06-14 PROCEDURE — 86850 RBC ANTIBODY SCREEN: CPT | Performed by: EMERGENCY MEDICINE

## 2022-06-14 PROCEDURE — 83605 ASSAY OF LACTIC ACID: CPT | Performed by: EMERGENCY MEDICINE

## 2022-06-14 PROCEDURE — 82140 ASSAY OF AMMONIA: CPT | Performed by: EMERGENCY MEDICINE

## 2022-06-14 PROCEDURE — 99223 1ST HOSP IP/OBS HIGH 75: CPT | Mod: AI | Performed by: INTERNAL MEDICINE

## 2022-06-14 PROCEDURE — 83690 ASSAY OF LIPASE: CPT | Performed by: STUDENT IN AN ORGANIZED HEALTH CARE EDUCATION/TRAINING PROGRAM

## 2022-06-14 PROCEDURE — 84157 ASSAY OF PROTEIN OTHER: CPT | Performed by: EMERGENCY MEDICINE

## 2022-06-14 PROCEDURE — 85610 PROTHROMBIN TIME: CPT | Performed by: EMERGENCY MEDICINE

## 2022-06-14 PROCEDURE — 0W993ZZ DRAINAGE OF RIGHT PLEURAL CAVITY, PERCUTANEOUS APPROACH: ICD-10-PCS | Performed by: RADIOLOGY

## 2022-06-14 PROCEDURE — 82150 ASSAY OF AMYLASE: CPT | Performed by: STUDENT IN AN ORGANIZED HEALTH CARE EDUCATION/TRAINING PROGRAM

## 2022-06-14 PROCEDURE — 88305 TISSUE EXAM BY PATHOLOGIST: CPT | Mod: TC | Performed by: EMERGENCY MEDICINE

## 2022-06-14 PROCEDURE — 80164 ASSAY DIPROPYLACETIC ACD TOT: CPT | Performed by: EMERGENCY MEDICINE

## 2022-06-14 PROCEDURE — 83690 ASSAY OF LIPASE: CPT | Performed by: EMERGENCY MEDICINE

## 2022-06-14 PROCEDURE — 80053 COMPREHEN METABOLIC PANEL: CPT | Performed by: EMERGENCY MEDICINE

## 2022-06-14 PROCEDURE — 85025 COMPLETE CBC W/AUTO DIFF WBC: CPT | Performed by: EMERGENCY MEDICINE

## 2022-06-14 PROCEDURE — U0005 INFEC AGEN DETEC AMPLI PROBE: HCPCS | Performed by: EMERGENCY MEDICINE

## 2022-06-14 PROCEDURE — 82945 GLUCOSE OTHER FLUID: CPT | Performed by: EMERGENCY MEDICINE

## 2022-06-14 PROCEDURE — 272N000706 US THORACENTESIS

## 2022-06-14 PROCEDURE — 87070 CULTURE OTHR SPECIMN AEROBIC: CPT | Performed by: EMERGENCY MEDICINE

## 2022-06-14 PROCEDURE — 86901 BLOOD TYPING SEROLOGIC RH(D): CPT | Performed by: EMERGENCY MEDICINE

## 2022-06-14 PROCEDURE — 99304 1ST NF CARE SF/LOW MDM 25: CPT | Performed by: INTERNAL MEDICINE

## 2022-06-14 PROCEDURE — 87040 BLOOD CULTURE FOR BACTERIA: CPT | Performed by: EMERGENCY MEDICINE

## 2022-06-14 PROCEDURE — 250N000009 HC RX 250: Performed by: EMERGENCY MEDICINE

## 2022-06-14 PROCEDURE — 99285 EMERGENCY DEPT VISIT HI MDM: CPT | Mod: 25

## 2022-06-14 RX ORDER — IOPAMIDOL 755 MG/ML
95 INJECTION, SOLUTION INTRAVASCULAR ONCE
Status: DISCONTINUED | OUTPATIENT
Start: 2022-06-14 | End: 2022-06-17

## 2022-06-14 RX ORDER — IOPAMIDOL 755 MG/ML
95 INJECTION, SOLUTION INTRAVASCULAR ONCE
Status: COMPLETED | OUTPATIENT
Start: 2022-06-14 | End: 2022-06-14

## 2022-06-14 RX ADMIN — IOPAMIDOL 95 ML: 755 INJECTION, SOLUTION INTRAVENOUS at 23:26

## 2022-06-14 RX ADMIN — LIDOCAINE HYDROCHLORIDE 10 ML: 10 INJECTION, SOLUTION EPIDURAL; INFILTRATION; INTRACAUDAL; PERINEURAL at 21:48

## 2022-06-14 RX ADMIN — SODIUM CHLORIDE 72 ML: 900 INJECTION INTRAVENOUS at 23:27

## 2022-06-14 ASSESSMENT — ACTIVITIES OF DAILY LIVING (ADL): ADLS_ACUITY_SCORE: 35

## 2022-06-14 NOTE — TELEPHONE ENCOUNTER
[facility] Triage Nurse Telephone Encounter    Provider: DILLON Pacheco CNP   Facility: [facility] Facility Type:  TCU    Caller: Anita  Call Back Number: [phone]    Allergies:  No Known Allergies     Reason for call: Facility nurse reported that pt has diminished lung sounds on the right side. Pt had an episode of SOB and low sats - 85% right after therapy this morning. After pt used rescue inhaler and rested, sats went back to low 90s.    Verbal Order/Direction given by Provider: Chest xray.    Provider giving Order:  Kush Hahn MD    Verbal Order given to: Anita Davis RN

## 2022-06-14 NOTE — PROGRESS NOTES
[facility]iatric[facility] Lab Note     Provider: Kush Hahn MD  Facility: [facility] Facility Type:  TCU    No Known Allergies    Labs Reviewed by provider: CXR       Verbal Order/Direction given by Provider: Send patient to ED.    Provider giving Order:  Kush Hahn MD    Verbal Order given to: Facility nurse    Terri Davis RN

## 2022-06-14 NOTE — ED TRIAGE NOTES
Pt presented to ED via EMS with complaint of SOB. Pt is from a senior living facility, NH completed chest XR today showing pleural effusion. EMS reports hx of asthma. EMS administered duo neb and placed pt on 2L via EMS. EMS placed 18g left AC.      Triage Assessment     Row Name 06/14/22 4611       Respiratory WDL    Rhythm/Pattern, Respiratory shortness of breath;shallow       [place] Coma Scale    Best Eye Response 4-->(E4) spontaneous    Best Motor Response 6-->(M6) obeys commands    Best Verbal Response 5-->(V5) oriented    [place] Coma Scale Score 15

## 2022-06-15 ENCOUNTER — APPOINTMENT (OUTPATIENT)
Dept: ULTRASOUND IMAGING | Facility: CLINIC | Age: 52
DRG: 438 | End: 2022-06-15
Attending: STUDENT IN AN ORGANIZED HEALTH CARE EDUCATION/TRAINING PROGRAM
Payer: MEDICARE

## 2022-06-15 ENCOUNTER — APPOINTMENT (OUTPATIENT)
Dept: GENERAL RADIOLOGY | Facility: CLINIC | Age: 52
DRG: 438 | End: 2022-06-15
Attending: STUDENT IN AN ORGANIZED HEALTH CARE EDUCATION/TRAINING PROGRAM
Payer: MEDICARE

## 2022-06-15 PROBLEM — K86.3 PANCREATIC PSEUDOCYST: Status: ACTIVE | Noted: 2022-06-15

## 2022-06-15 PROBLEM — K85.00 IDIOPATHIC ACUTE PANCREATITIS, UNSPECIFIED COMPLICATION STATUS: Status: ACTIVE | Noted: 2022-06-15

## 2022-06-15 LAB
ALBUMIN UR-MCNC: NEGATIVE MG/DL
AMYLASE BODY FLUID SOURCE: NORMAL
AMYLASE FLD-CCNC: 504 U/L
AMYLASE SERPL-CCNC: 142 U/L (ref 30–110)
APPEARANCE UR: CLEAR
BASE EXCESS BLDA CALC-SCNC: 1.7 MMOL/L (ref -9–1.8)
BILIRUB UR QL STRIP: NEGATIVE
COLOR UR AUTO: YELLOW
CRP SERPL-MCNC: 33.6 MG/L (ref 0–8)
GLUCOSE UR STRIP-MCNC: NEGATIVE MG/DL
HCO3 BLD-SCNC: 27 MMOL/L (ref 21–28)
HGB UR QL STRIP: NEGATIVE
KETONES UR STRIP-MCNC: NEGATIVE MG/DL
LEUKOCYTE ESTERASE UR QL STRIP: NEGATIVE
LIPASE BODY FLUID SOURCE: NORMAL
LIPASE FLD-CCNC: 2143 U/L
NITRATE UR QL: NEGATIVE
O2/TOTAL GAS SETTING VFR VENT: 40 %
PCO2 BLD: 46 MM HG (ref 35–45)
PH BLD: 7.39 [PH] (ref 7.35–7.45)
PH UR STRIP: 6.5 [PH] (ref 5–7)
PO2 BLD: 68 MM HG (ref 80–105)
POTASSIUM BLD-SCNC: 4 MMOL/L (ref 3.4–5.3)
RBC URINE: 1 /HPF
SP GR UR STRIP: 1.02 (ref 1–1.03)
UROBILINOGEN UR STRIP-MCNC: NORMAL MG/DL
WBC URINE: 1 /HPF

## 2022-06-15 PROCEDURE — 36415 COLL VENOUS BLD VENIPUNCTURE: CPT | Performed by: STUDENT IN AN ORGANIZED HEALTH CARE EDUCATION/TRAINING PROGRAM

## 2022-06-15 PROCEDURE — 81001 URINALYSIS AUTO W/SCOPE: CPT | Performed by: EMERGENCY MEDICINE

## 2022-06-15 PROCEDURE — 272N000706 US THORACENTESIS

## 2022-06-15 PROCEDURE — 258N000003 HC RX IP 258 OP 636: Performed by: INTERNAL MEDICINE

## 2022-06-15 PROCEDURE — 36415 COLL VENOUS BLD VENIPUNCTURE: CPT | Performed by: PHYSICIAN ASSISTANT

## 2022-06-15 PROCEDURE — 250N000011 HC RX IP 250 OP 636: Performed by: STUDENT IN AN ORGANIZED HEALTH CARE EDUCATION/TRAINING PROGRAM

## 2022-06-15 PROCEDURE — 250N000013 HC RX MED GY IP 250 OP 250 PS 637: Performed by: INTERNAL MEDICINE

## 2022-06-15 PROCEDURE — 120N000001 HC R&B MED SURG/OB

## 2022-06-15 PROCEDURE — 99233 SBSQ HOSP IP/OBS HIGH 50: CPT | Performed by: STUDENT IN AN ORGANIZED HEALTH CARE EDUCATION/TRAINING PROGRAM

## 2022-06-15 PROCEDURE — 84132 ASSAY OF SERUM POTASSIUM: CPT | Performed by: STUDENT IN AN ORGANIZED HEALTH CARE EDUCATION/TRAINING PROGRAM

## 2022-06-15 PROCEDURE — 82784 ASSAY IGA/IGD/IGG/IGM EACH: CPT | Performed by: PHYSICIAN ASSISTANT

## 2022-06-15 PROCEDURE — 250N000009 HC RX 250: Performed by: RADIOLOGY

## 2022-06-15 PROCEDURE — 36600 WITHDRAWAL OF ARTERIAL BLOOD: CPT

## 2022-06-15 PROCEDURE — 93005 ELECTROCARDIOGRAM TRACING: CPT

## 2022-06-15 PROCEDURE — 36415 COLL VENOUS BLD VENIPUNCTURE: CPT | Performed by: EMERGENCY MEDICINE

## 2022-06-15 PROCEDURE — 86140 C-REACTIVE PROTEIN: CPT | Performed by: EMERGENCY MEDICINE

## 2022-06-15 PROCEDURE — P9041 ALBUMIN (HUMAN),5%, 50ML: HCPCS | Performed by: STUDENT IN AN ORGANIZED HEALTH CARE EDUCATION/TRAINING PROGRAM

## 2022-06-15 PROCEDURE — 82803 BLOOD GASES ANY COMBINATION: CPT | Performed by: STUDENT IN AN ORGANIZED HEALTH CARE EDUCATION/TRAINING PROGRAM

## 2022-06-15 PROCEDURE — 96360 HYDRATION IV INFUSION INIT: CPT

## 2022-06-15 PROCEDURE — 82150 ASSAY OF AMYLASE: CPT | Performed by: PHYSICIAN ASSISTANT

## 2022-06-15 PROCEDURE — 999N000157 HC STATISTIC RCP TIME EA 10 MIN

## 2022-06-15 PROCEDURE — 93010 ELECTROCARDIOGRAM REPORT: CPT | Performed by: INTERNAL MEDICINE

## 2022-06-15 PROCEDURE — 71045 X-RAY EXAM CHEST 1 VIEW: CPT

## 2022-06-15 PROCEDURE — 0W993ZZ DRAINAGE OF RIGHT PLEURAL CAVITY, PERCUTANEOUS APPROACH: ICD-10-PCS | Performed by: RADIOLOGY

## 2022-06-15 PROCEDURE — 71045 X-RAY EXAM CHEST 1 VIEW: CPT | Mod: 76

## 2022-06-15 PROCEDURE — 96361 HYDRATE IV INFUSION ADD-ON: CPT

## 2022-06-15 RX ORDER — ALBUTEROL SULFATE 90 UG/1
2 AEROSOL, METERED RESPIRATORY (INHALATION) EVERY 6 HOURS PRN
Status: DISCONTINUED | OUTPATIENT
Start: 2022-06-15 | End: 2022-07-10 | Stop reason: HOSPADM

## 2022-06-15 RX ORDER — HYDROMORPHONE HCL IN WATER/PF 6 MG/30 ML
0.2 PATIENT CONTROLLED ANALGESIA SYRINGE INTRAVENOUS
Status: DISCONTINUED | OUTPATIENT
Start: 2022-06-15 | End: 2022-06-19

## 2022-06-15 RX ORDER — ATENOLOL 25 MG/1
25 TABLET ORAL DAILY
Status: DISCONTINUED | OUTPATIENT
Start: 2022-06-15 | End: 2022-06-24

## 2022-06-15 RX ORDER — LEVOTHYROXINE SODIUM 50 UG/1
50 TABLET ORAL DAILY
Status: DISCONTINUED | OUTPATIENT
Start: 2022-06-15 | End: 2022-06-24

## 2022-06-15 RX ORDER — NALOXONE HYDROCHLORIDE 0.4 MG/ML
0.4 INJECTION, SOLUTION INTRAMUSCULAR; INTRAVENOUS; SUBCUTANEOUS
Status: DISCONTINUED | OUTPATIENT
Start: 2022-06-15 | End: 2022-07-10 | Stop reason: HOSPADM

## 2022-06-15 RX ORDER — LIDOCAINE HYDROCHLORIDE 10 MG/ML
10 INJECTION, SOLUTION EPIDURAL; INFILTRATION; INTRACAUDAL; PERINEURAL ONCE
Status: COMPLETED | OUTPATIENT
Start: 2022-06-15 | End: 2022-06-15

## 2022-06-15 RX ORDER — LEVOCARNITINE 330 MG/1
330 TABLET ORAL 3 TIMES DAILY
Status: DISCONTINUED | OUTPATIENT
Start: 2022-06-15 | End: 2022-06-24

## 2022-06-15 RX ORDER — PROCHLORPERAZINE MALEATE 10 MG
10 TABLET ORAL EVERY 6 HOURS PRN
Status: DISCONTINUED | OUTPATIENT
Start: 2022-06-15 | End: 2022-07-10 | Stop reason: HOSPADM

## 2022-06-15 RX ORDER — SODIUM CHLORIDE, SODIUM LACTATE, POTASSIUM CHLORIDE, CALCIUM CHLORIDE 600; 310; 30; 20 MG/100ML; MG/100ML; MG/100ML; MG/100ML
INJECTION, SOLUTION INTRAVENOUS CONTINUOUS
Status: DISCONTINUED | OUTPATIENT
Start: 2022-06-15 | End: 2022-06-18

## 2022-06-15 RX ORDER — ONDANSETRON 4 MG/1
4 TABLET, ORALLY DISINTEGRATING ORAL EVERY 6 HOURS PRN
Status: DISCONTINUED | OUTPATIENT
Start: 2022-06-15 | End: 2022-07-10 | Stop reason: HOSPADM

## 2022-06-15 RX ORDER — PANTOPRAZOLE SODIUM 40 MG/1
40 TABLET, DELAYED RELEASE ORAL 2 TIMES DAILY
Status: DISCONTINUED | OUTPATIENT
Start: 2022-06-15 | End: 2022-06-22

## 2022-06-15 RX ORDER — ZINC SULFATE 50(220)MG
220 CAPSULE ORAL DAILY
Status: DISCONTINUED | OUTPATIENT
Start: 2022-06-15 | End: 2022-06-24

## 2022-06-15 RX ORDER — TOPIRAMATE 100 MG/1
100 TABLET, FILM COATED ORAL AT BEDTIME
Status: DISCONTINUED | OUTPATIENT
Start: 2022-06-15 | End: 2022-06-24

## 2022-06-15 RX ORDER — NALOXONE HYDROCHLORIDE 0.4 MG/ML
0.2 INJECTION, SOLUTION INTRAMUSCULAR; INTRAVENOUS; SUBCUTANEOUS
Status: DISCONTINUED | OUTPATIENT
Start: 2022-06-15 | End: 2022-07-10 | Stop reason: HOSPADM

## 2022-06-15 RX ORDER — HYOSCYAMINE SULFATE 0.38 MG/1
375 TABLET, EXTENDED RELEASE ORAL EVERY 12 HOURS PRN
Status: DISCONTINUED | OUTPATIENT
Start: 2022-06-15 | End: 2022-07-01

## 2022-06-15 RX ORDER — PROCHLORPERAZINE 25 MG
25 SUPPOSITORY, RECTAL RECTAL EVERY 12 HOURS PRN
Status: DISCONTINUED | OUTPATIENT
Start: 2022-06-15 | End: 2022-07-10 | Stop reason: HOSPADM

## 2022-06-15 RX ORDER — DICYCLOMINE HCL 20 MG
20 TABLET ORAL
Status: DISCONTINUED | OUTPATIENT
Start: 2022-06-15 | End: 2022-06-15

## 2022-06-15 RX ORDER — DIVALPROEX SODIUM 250 MG/1
250 TABLET, DELAYED RELEASE ORAL AT BEDTIME
Status: DISCONTINUED | OUTPATIENT
Start: 2022-06-15 | End: 2022-06-23

## 2022-06-15 RX ORDER — ACETAMINOPHEN 325 MG/1
650 TABLET ORAL EVERY 6 HOURS PRN
Status: CANCELLED | OUTPATIENT
Start: 2022-06-15

## 2022-06-15 RX ORDER — ACETAMINOPHEN 325 MG/1
650 TABLET ORAL EVERY 6 HOURS PRN
Status: DISCONTINUED | OUTPATIENT
Start: 2022-06-15 | End: 2022-06-20

## 2022-06-15 RX ORDER — LEVOCETIRIZINE DIHYDROCHLORIDE 5 MG/1
5 TABLET, FILM COATED ORAL EVERY EVENING
Status: DISCONTINUED | OUTPATIENT
Start: 2022-06-15 | End: 2022-06-24

## 2022-06-15 RX ORDER — ALBUMIN, HUMAN INJ 5% 5 %
50 SOLUTION INTRAVENOUS ONCE
Status: COMPLETED | OUTPATIENT
Start: 2022-06-15 | End: 2022-06-15

## 2022-06-15 RX ORDER — PREDNISOLONE ACETATE 10 MG/ML
1 SUSPENSION/ DROPS OPHTHALMIC DAILY
Status: DISCONTINUED | OUTPATIENT
Start: 2022-06-15 | End: 2022-07-10 | Stop reason: HOSPADM

## 2022-06-15 RX ORDER — ONDANSETRON 2 MG/ML
4 INJECTION INTRAMUSCULAR; INTRAVENOUS EVERY 6 HOURS PRN
Status: DISCONTINUED | OUTPATIENT
Start: 2022-06-15 | End: 2022-07-10 | Stop reason: HOSPADM

## 2022-06-15 RX ORDER — IPRATROPIUM BROMIDE 42 UG/1
2 SPRAY, METERED NASAL 3 TIMES DAILY
Status: DISCONTINUED | OUTPATIENT
Start: 2022-06-15 | End: 2022-07-10 | Stop reason: HOSPADM

## 2022-06-15 RX ORDER — SERTRALINE HYDROCHLORIDE 100 MG/1
100 TABLET, FILM COATED ORAL DAILY
Status: DISCONTINUED | OUTPATIENT
Start: 2022-06-15 | End: 2022-06-24

## 2022-06-15 RX ORDER — CARBOXYMETHYLCELLULOSE SODIUM 10 MG/ML
1 GEL OPHTHALMIC AT BEDTIME
Status: DISCONTINUED | OUTPATIENT
Start: 2022-06-15 | End: 2022-07-10 | Stop reason: HOSPADM

## 2022-06-15 RX ORDER — ACETAMINOPHEN 650 MG/1
650 SUPPOSITORY RECTAL EVERY 6 HOURS PRN
Status: DISCONTINUED | OUTPATIENT
Start: 2022-06-15 | End: 2022-06-20

## 2022-06-15 RX ORDER — DIVALPROEX SODIUM 500 MG/1
500 TABLET, DELAYED RELEASE ORAL 2 TIMES DAILY
Status: DISCONTINUED | OUTPATIENT
Start: 2022-06-15 | End: 2022-06-23

## 2022-06-15 RX ORDER — RISPERIDONE 2 MG/1
2 TABLET ORAL 2 TIMES DAILY
Status: DISCONTINUED | OUTPATIENT
Start: 2022-06-15 | End: 2022-06-24

## 2022-06-15 RX ORDER — CARBOXYMETHYLCELLULOSE SODIUM 5 MG/ML
1 SOLUTION/ DROPS OPHTHALMIC DAILY PRN
Status: DISCONTINUED | OUTPATIENT
Start: 2022-06-15 | End: 2022-07-10 | Stop reason: HOSPADM

## 2022-06-15 RX ORDER — LIDOCAINE 40 MG/G
CREAM TOPICAL
Status: DISCONTINUED | OUTPATIENT
Start: 2022-06-15 | End: 2022-06-22

## 2022-06-15 RX ORDER — MONTELUKAST SODIUM 10 MG/1
10 TABLET ORAL AT BEDTIME
Status: DISCONTINUED | OUTPATIENT
Start: 2022-06-15 | End: 2022-06-24

## 2022-06-15 RX ORDER — OXYCODONE HYDROCHLORIDE 5 MG/1
5 TABLET ORAL EVERY 4 HOURS PRN
Status: DISCONTINUED | OUTPATIENT
Start: 2022-06-15 | End: 2022-06-19

## 2022-06-15 RX ADMIN — ATENOLOL 25 MG: 25 TABLET ORAL at 08:02

## 2022-06-15 RX ADMIN — ACETAMINOPHEN 650 MG: 325 TABLET ORAL at 11:13

## 2022-06-15 RX ADMIN — ALBUMIN HUMAN 50 G: 0.05 INJECTION, SOLUTION INTRAVENOUS at 18:00

## 2022-06-15 RX ADMIN — DICYCLOMINE HYDROCHLORIDE 20 MG: 20 TABLET ORAL at 08:02

## 2022-06-15 RX ADMIN — PANCRELIPASE 2 CAPSULE: 36000; 180000; 114000 CAPSULE, DELAYED RELEASE PELLETS ORAL at 08:02

## 2022-06-15 RX ADMIN — MONTELUKAST 10 MG: 10 TABLET, FILM COATED ORAL at 21:12

## 2022-06-15 RX ADMIN — TOPIRAMATE 100 MG: 100 TABLET, FILM COATED ORAL at 21:11

## 2022-06-15 RX ADMIN — CARBOXYMETHYLCELLULOSE SODIUM 1 DROP: 10 GEL OPHTHALMIC at 21:12

## 2022-06-15 RX ADMIN — LEVOCARNITINE 330 MG: 330 TABLET ORAL at 21:12

## 2022-06-15 RX ADMIN — RISPERIDONE 2 MG: 2 TABLET ORAL at 08:02

## 2022-06-15 RX ADMIN — SERTRALINE HYDROCHLORIDE 100 MG: 100 TABLET ORAL at 08:02

## 2022-06-15 RX ADMIN — SODIUM CHLORIDE, POTASSIUM CHLORIDE, SODIUM LACTATE AND CALCIUM CHLORIDE: 600; 310; 30; 20 INJECTION, SOLUTION INTRAVENOUS at 16:21

## 2022-06-15 RX ADMIN — DIVALPROEX SODIUM 500 MG: 250 TABLET, DELAYED RELEASE ORAL at 16:35

## 2022-06-15 RX ADMIN — DIVALPROEX SODIUM 500 MG: 250 TABLET, DELAYED RELEASE ORAL at 08:02

## 2022-06-15 RX ADMIN — LEVOTHYROXINE SODIUM 50 MCG: 50 TABLET ORAL at 08:02

## 2022-06-15 RX ADMIN — IPRATROPIUM BROMIDE 2 SPRAY: 42 SPRAY NASAL at 16:35

## 2022-06-15 RX ADMIN — IPRATROPIUM BROMIDE 2 SPRAY: 42 SPRAY NASAL at 21:12

## 2022-06-15 RX ADMIN — OXYCODONE HYDROCHLORIDE 5 MG: 5 TABLET ORAL at 11:13

## 2022-06-15 RX ADMIN — RISPERIDONE 2 MG: 2 TABLET ORAL at 21:11

## 2022-06-15 RX ADMIN — DIVALPROEX SODIUM 250 MG: 250 TABLET, DELAYED RELEASE ORAL at 21:11

## 2022-06-15 RX ADMIN — PANCRELIPASE 2 CAPSULE: 36000; 180000; 114000 CAPSULE, DELAYED RELEASE PELLETS ORAL at 17:11

## 2022-06-15 RX ADMIN — LIDOCAINE HYDROCHLORIDE 10 ML: 10 INJECTION, SOLUTION EPIDURAL; INFILTRATION; INTRACAUDAL; PERINEURAL at 15:39

## 2022-06-15 RX ADMIN — SODIUM CHLORIDE, POTASSIUM CHLORIDE, SODIUM LACTATE AND CALCIUM CHLORIDE: 600; 310; 30; 20 INJECTION, SOLUTION INTRAVENOUS at 03:22

## 2022-06-15 RX ADMIN — LEVOCETIRIZINE DIHYDROCHLORIDE 5 MG: 5 TABLET ORAL at 21:12

## 2022-06-15 RX ADMIN — LEVOCARNITINE 330 MG: 330 TABLET ORAL at 08:02

## 2022-06-15 RX ADMIN — PANTOPRAZOLE SODIUM 40 MG: 40 TABLET, DELAYED RELEASE ORAL at 21:11

## 2022-06-15 ASSESSMENT — ACTIVITIES OF DAILY LIVING (ADL)
ADLS_ACUITY_SCORE: 43
ADLS_ACUITY_SCORE: 37
ADLS_ACUITY_SCORE: 35
ADLS_ACUITY_SCORE: 35
ADLS_ACUITY_SCORE: 43
ADLS_ACUITY_SCORE: 35
ADLS_ACUITY_SCORE: 35
ADLS_ACUITY_SCORE: 43
ADLS_ACUITY_SCORE: 35
ADLS_ACUITY_SCORE: 35

## 2022-06-15 NOTE — ED NOTES
Primo fit leaked, linens changed and patient cleaned up. Repositioned onto L side. Sister Huong contacted, updated by bedside RN, and spoke with patient.

## 2022-06-15 NOTE — CONSULTS
PULMONARY NOTE      Chart check. Large unilateral pleural effusion s/p thora with 1L of fluid removed. Suggest repeat diagnostic / therapeutic thoracentesis followed by cxr. Once pleural space as empty as possible, will then need noncon CT chest.May need multiple thoras depending on volume of fluid removed. Ok to remove > 1L during single procedure.    I'm aware of the request for a Pulmonary consultation on this patient. I will be by later today or tomorrow morning to complete the consultation evaluation. If concerns of an immediate nature arise earlier, please give me a call.    Thank you.    Fede Hermosillo MD  Pulmonary & Critical Care Medicine  [facility]   Pager: [phone]  Office:[phone]

## 2022-06-15 NOTE — ED PROVIDER NOTES
History     Chief Complaint:    Shortness of Breath      HPI   Jagdeep Walker is a 51 year old male who presents with shortness of breath and large pleural effusion found on chest xray obtained at TCU.  History is gathered from sister in the room and another sister on phone, both are his guardians.  He has been in a group home since age 18.  This is his third time in the hospital in the last couple of months.  They note the first time he was diagnosed with pancreatitis and pseudocyst and required paracentesis also noted to have a portal vein thrombosis.  Anticoagulation was not suggested at the time.  He then had a second visit for unsteadiness, frequent falls and change in mentation and had a thorough work-up including CT, MRI, EEG, neurology consult and was discharged to a TCU 4 days ago.  Patient was noted that he seemed to be short of breath at the TCU and an x-ray was obtained today showing a large pleural effusion and near collapse of the lung.  Sisters note that he has lost significant amount of weight in the last couple of months.  They note he is not at his baseline in terms of cognition or ability to walk.    Review of Systems  + shortness of breath, unsteady gait, weakness, weight loss  - fever, chills  All other systems negative    Allergies:      No Known Allergies      Medications:      acetaminophen (TYLENOL) 325 MG tablet  albuterol (PROAIR HFA/PROVENTIL HFA/VENTOLIN HFA) 108 (90 Base) MCG/ACT inhaler  amylase-lipase-protease (CREON 36) 811238-19683-318264 units CPEP  atenolol (TENORMIN) 25 MG tablet  calcium carbonate 600 mg-vitamin D 400 units (CALTRATE) 600-400 MG-UNIT per tablet  calcium polycarbophil (FIBERCON) 625 MG tablet  carboxymethylcellulose PF (REFRESH LIQUIGEL) 1 % ophthalmic gel  carboxymethylcellulose PF (REFRESH PLUS) 0.5 % ophthalmic solution  chlorhexidine (PERIDEX) 0.12 % solution  clonazePAM (KLONOPIN) 0.125 MG TBDP ODT tab  dicyclomine (BENTYL) 20 MG tablet  divalproex  sodium delayed-release (DEPAKOTE) 250 MG DR tablet  divalproex sodium delayed-release (DEPAKOTE) 500 MG DR tablet  docusate sodium (COLACE) 100 MG capsule  ipratropium (ATROVENT) 0.06 % nasal spray  ketoconazole (NIZORAL) 2 % external cream  levOCARNitine (CARNITOR) 330 MG tablet  levocetirizine (XYZAL) 5 MG tablet  levothyroxine (SYNTHROID/LEVOTHROID) 50 MCG tablet  loperamide (IMODIUM) 2 MG capsule  LORazepam (ATIVAN) 2 MG tablet  montelukast (SINGULAIR) 10 MG tablet  multivitamin, therapeutic (THERA-VIT) TABS tablet  omeprazole 20 MG tablet  polyethylene glycol-propylene glycol (SYSTANE ULTRA) 0.4-0.3 % SOLN ophthalmic solution  prednisoLONE acetate (PRED FORTE) 1 % ophthalmic suspension  risperiDONE (RISPERDAL) 2 MG tablet  sertraline (ZOLOFT) 100 MG tablet  topiramate (TOPAMAX) 100 MG tablet  Vitamin D (Cholecalciferol) 50 MCG (2000 UT) CAPS  ciclopirox (LOPROX) 0.77 % cream  hydrocortisone (CORTAID) 1 % external cream        Past Medical History:        Past Medical History:   Diagnosis Date     Anxiety      Fisher esophagus      Benign essential hypertension      Bipolar disorder (H)      Depression      Obesity      Pancreatitis      Portal vein thrombosis      Schizoaffective disorder, bipolar type (H)      Seizure disorder (H)      Thyroid disease      Patient Active Problem List    Diagnosis Date Noted     Pancreatic pseudocyst 06/15/2022     Priority: Medium     Idiopathic acute pancreatitis, unspecified complication status 06/15/2022     Priority: Medium     Pleural effusion 06/04/2022     Priority: Medium     Generalized muscle weakness 06/04/2022     Priority: Medium     Falls frequently 06/04/2022     Priority: Medium     Other ascites 06/04/2022     Priority: Medium     Acute pancreatitis, unspecified complication status, unspecified pancreatitis type 06/04/2022     Priority: Medium     Generalized weakness 05/08/2022     Priority: Medium     Portal vein thrombosis 05/08/2022     Priority: Medium      Closed head injury, initial encounter 04/28/2022     Priority: Medium     Altered mental status, unspecified altered mental status type 04/28/2022     Priority: Medium        Past Surgical History:        Past Surgical History:   Procedure Laterality Date     ENDOSCOPIC ULTRASOUND UPPER GASTROINTESTINAL TRACT (GI) N/A 5/12/2022    Procedure: ENDOSCOPIC ULTRASOUND, ESOPHAGOSCOPY / UPPER GASTROINTESTINAL TRACT (GI);  Surgeon: Massimo Clark MD;  Location:  GI     ESOPHAGOSCOPY, GASTROSCOPY, DUODENOSCOPY (EGD), COMBINED N/A 5/12/2022    Procedure: ESOPHAGOGASTRODUODENOSCOPY, WITH BIOPSY;  Surgeon: Massimo Clark MD;  Location:  GI       Family History:        Family History   Problem Relation Age of Onset     Cerebrovascular Disease Mother 76     Prostate Cancer Father        Social History:    Joel Werner  The patient presents to the ED with sister/legal guardian    Physical Exam     Patient Vitals for the past 24 hrs:   BP Temp Temp src Pulse Resp SpO2   06/15/22 0015 112/70 -- -- 67 16 94 %   06/15/22 0013 -- -- -- 64 15 94 %   06/15/22 0012 -- -- -- 64 14 95 %   06/15/22 0011 -- -- -- 64 11 95 %   06/15/22 0010 -- -- -- 63 13 94 %   06/15/22 0008 -- -- -- 64 16 95 %   06/15/22 0007 -- -- -- 64 15 94 %   06/15/22 0006 -- -- -- 63 14 92 %   06/15/22 0005 -- -- -- 63 12 95 %   06/15/22 0004 -- -- -- 64 13 94 %   06/15/22 0003 -- -- -- 63 16 93 %   06/15/22 0002 -- -- -- 64 12 96 %   06/15/22 0001 -- -- -- 61 12 95 %   06/14/22 2359 -- -- -- 63 12 94 %   06/14/22 2358 -- -- -- 63 13 94 %   06/14/22 2357 -- -- -- 63 17 95 %   06/14/22 2356 -- -- -- 64 12 92 %   06/14/22 2354 -- -- -- 63 11 93 %   06/14/22 2353 -- -- -- 63 17 92 %   06/14/22 2352 -- -- -- 64 14 91 %   06/14/22 2351 -- -- -- 64 14 93 %   06/14/22 2350 -- -- -- 64 13 93 %   06/14/22 2348 -- -- -- 64 19 90 %   06/14/22 2347 -- -- -- 64 12 92 %   06/14/22 2346 -- -- -- 65 17 --   06/14/22 2344 -- -- -- 66 19 91 %   06/14/22 2343  -- -- -- 64 11 93 %   06/14/22 2342 -- -- -- 64 11 (!) 80 %   06/14/22 2341 -- -- -- 63 13 92 %   06/14/22 2339 -- -- -- 65 14 92 %   06/14/22 2338 -- -- -- 63 12 92 %   06/14/22 2337 -- -- -- 62 19 90 %   06/14/22 2336 -- -- -- 63 15 (!) 88 %   06/14/22 2335 -- -- -- 63 16 (!) 88 %   06/14/22 2334 -- -- -- 63 16 (!) 88 %   06/14/22 2333 -- -- -- 64 14 (!) 89 %   06/14/22 2332 -- -- -- 65 22 (!) 89 %   06/14/22 2331 -- -- -- 65 16 91 %   06/14/22 2329 -- -- -- 66 (!) 31 91 %   06/14/22 2304 -- -- -- 65 11 92 %   06/14/22 2303 -- -- -- 65 10 91 %   06/14/22 2302 -- -- -- 65 11 91 %   06/14/22 2301 -- -- -- 65 14 92 %   06/14/22 2300 -- -- -- 68 16 92 %   06/14/22 2258 -- -- -- 64 11 90 %   06/14/22 2257 -- -- -- 65 14 91 %   06/14/22 2256 -- -- -- 65 13 91 %   06/14/22 2254 -- -- -- 64 12 91 %   06/14/22 2253 -- -- -- 64 11 92 %   06/14/22 2247 -- -- -- -- -- (!) 83 %   06/14/22 2100 107/83 -- -- 70 20 94 %   06/14/22 2030 119/78 -- -- 72 13 --   06/14/22 2000 121/70 -- -- 74 14 --   06/14/22 1930 110/86 -- -- 76 17 91 %   06/14/22 1900 102/85 -- -- 75 20 93 %   06/14/22 1845 (!) 119/91 97.5  F (36.4  C) Temporal 79 27 92 %       Physical Exam  GENERAL: well developed, pleasant  HEAD: atraumatic  EYES: pupils reactive, extraocular muscles intact, conjunctivae normal  ENT:  mucus membranes moist  NECK:  trachea midline, normal range of motion  RESPIRATORY: mild tachypnea, diminished lung sounds on right  CVS: normal S1/S2, no murmurs, intact distal pulses  ABDOMEN: soft, nontender, nondistention  MUSCULOSKELETAL: no deformities  SKIN: warm and dry, no acute rashes or ulceration  NEURO: GCS 15, cranial nerves intact, alert and oriented x3  PSYCH:  Flat affect        Emergency Department Course       ECG results from 05/08/22   EKG 12-lead, tracing only     Value    Systolic Blood Pressure     Diastolic Blood Pressure     Ventricular Rate 77    Atrial Rate 468    OH Interval     QRS Duration 106        QTc 466     P Axis     R AXIS 27    T Axis 33    Interpretation ECG      Accelerated Junctional rhythm  Abnormal ECG  No previous ECGs available  Confirmed by GENERATED REPORT, COMPUTER ([id number]),  Aasen, Bradley ([id number]) on 5/8/2022 8:12:02 PM         Imaging:  CT Chest/Abdomen/Pelvis w Contrast   Final Result   IMPRESSION:   1.  Acute pancreatitis with slightly improved inflammatory changes. Pseudocysts within the pancreatic head and tail not significantly changed.   2.  Gastric wall thickening has progressed consistent with secondary gastritis with new pseudocyst along the proximal posterior gastric wall.   3.  Ascites adjacent to the liver has decreased with a similar amount of free pelvic fluid.   4.  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   5.  Wall thickening involving the cecum and transverse colon likely secondarily involved.   6.  Very large right pleural effusion has significantly increased and now results in near complete opacification of the right hemithorax. Smaller left pleural effusion and left basilar atelectasis.      US Thoracentesis   Final Result   IMPRESSION:     Successful ultrasound-guided right thoracentesis.      CHARLES HURLEY MD            SYSTEM ID:  [id number]        Report per radiology    Laboratory:  Labs Ordered and Resulted from Time of ED Arrival to Time of ED Departure   INR - Abnormal       Result Value    INR 1.64 (*)    CBC WITH PLATELETS AND DIFFERENTIAL - Abnormal    WBC Count 5.7      RBC Count 4.05 (*)     Hemoglobin 12.6 (*)     Hematocrit 38.9 (*)     MCV 96      MCH 31.1      MCHC 32.4      RDW 16.6 (*)     Platelet Count 185      % Neutrophils 49      % Lymphocytes 30      % Monocytes 18      % Eosinophils 2      % Basophils 0      % Immature Granulocytes 1      NRBCs per 100 WBC 0      Absolute Neutrophils 2.8      Absolute Lymphocytes 1.7      Absolute Monocytes 1.0      Absolute Eosinophils 0.1      Absolute Basophils 0.0       Absolute Immature Granulocytes 0.0      Absolute NRBCs 0.0     COMPREHENSIVE METABOLIC PANEL - Abnormal    Sodium 141      Potassium 3.9      Chloride 106      Carbon Dioxide (CO2) 28      Anion Gap 7      Urea Nitrogen 14      Creatinine 0.87      Calcium 9.0      Glucose 132 (*)     Alkaline Phosphatase 75      AST 21      ALT 18      Protein Total 5.4 (*)     Albumin 2.4 (*)     Bilirubin Total 0.6      GFR Estimate >90     LACTATE DEHYDROGENASE - Abnormal    Lactate Dehydrogenase 241 (*)    LIPASE - Abnormal    Lipase 689 (*)    CELL COUNT BODY FLUID - Abnormal    Color Yellow      Clarity Cloudy (*)     Cell Count Fluid Source Pleural Cavity, Right     LACTIC ACID WHOLE BLOOD - Normal    Lactic Acid 1.9     AMMONIA - Normal    Ammonia 49     COVID-19 VIRUS (CORONAVIRUS) BY PCR - Normal    SARS CoV2 PCR Negative     VALPROIC ACID - Normal    Valproic acid 76     GLUCOSE FLUID    Glucose Fluid Source Pleural Cavity, Right      Glucose fluid 116     LACTATE DEHYDROGENASE FLUID    LD Fluid Source Pleural Cavity, Right      Lactate dehydrogenase fluid 121     PROTEIN FLUID    Protein Fluid Source Pleural Cavity, Right      Protein Total Fluid 2.3     DIFERENTIAL BODY FLUID    % Neutrophils        % Lymphocytes        % Monocyte/Macrophages       ROUTINE UA WITH MICROSCOPIC REFLEX TO CULTURE   TYPE AND SCREEN, ADULT    ABO/RH(D) B POS      Antibody Screen Negative      SPECIMEN EXPIRATION DATE [id number]     NON-GYNECOLOGIC CYTOLOGY   AEROBIC BACTERIAL CULTURE ROUTINE    Gram Stain Result No organisms seen     BLOOD CULTURE   BLOOD CULTURE   ABO/RH TYPE AND SCREEN   CELL COUNT WITH DIFFERENTIAL FLUID       Procedures:  Thoracentesis per radiology, 1,000 ml removed right chest    Emergency Department Course:             Reviewed:    I reviewed nursing notes, vitals and past medical history    Assessments:   I obtained history and examined the patient as noted above.    I rechecked the patient and explained  findings.       Consults:   IR radiology         Interventions:    Medications   iopamidol (ISOVUE-370) solution 95 mL (has no administration in time range)   Saline flush (has no administration in time range)   Saline Flush - CT (72 mLs Intravenous Given 6/14/22 2327)   iopamidol (ISOVUE-370) solution 95 mL (95 mLs Intravenous Given 6/14/22 2326)   lidocaine 1 % 10 mL (10 mLs Subcutaneous Given 6/14/22 2148)       Disposition:  The patient was admitted to the hospital under the care of Dr. Lopez.     Impression & Plan            CMS Diagnoses:        Medical Decision Making:  Patient presents from rehab with sister for shortness of breath and large pleural effusion that was found on chest xray today at rehab.  Sister notes frustration as he has been in and out of the hospital several times now without a clear understanding of his underlying problem.  He has had significant workup for his confusion and discharge to rehab just 4 days ago.  Patient is requiring oxygen.  I requested thoracentesis and he had 1,000 ml removed.  CT chest/abd/pelivs was obtained after the thoracentesis and shows large pleural effusion, pancreatis and pseudocysts as noted above.  I spoke with hospitalist Dr. Lopez for admission.  He is requiring 5L oxymask.  Sister was here to help sign consent for thoracentesis and has left prior to CT results have returned.  Patient is not able to give much meaningful information.  Sisters will need to be contacted or hopefully will be bedside tomorrow.    Critical Care time:  none    Covid-19  Jagdeep Walker was evaluated during a global COVID-19 pandemic, which necessitated consideration that the patient might be at risk for infection with the SARS-CoV-2 virus that causes COVID-19.   Applicable protocols for evaluation were followed during the patient's care.   COVID-19 was considered as part of the patient's evaluation.    Diagnosis:    ICD-10-CM    1. Pleural effusion  J90    2. Idiopathic  acute pancreatitis, unspecified complication status  K85.00    3. Pancreatic pseudocyst  K86.3        Discharge Medications:  New Prescriptions    No medications on file         Scribe Disclosure:  Osbaldo LABOY. MD Uri, am serving as a scribe at 7:03 PM on 6/14/2022 to document services personally performed by Osbaldo Grewal MD based on my observations and the provider's statements to me.      Osbaldo Grewal MD  06/15/22 0033

## 2022-06-15 NOTE — H&P
[facility]    History and Physical - Hospitalist Service       Date of Admission:  6/14/2022    Assessment & Plan   Jagdeep Walker is a 51 year-old male with complex history including seizure disorder, schizoaffective disorder, recent pancreatitis with pseudocysts, history of portal vein thrombosis, recent admission for metabolic encephalopathy of unclear etiology who presents with shortness of breath and hypoxia with large pleural effusion on outside chest x-ray.  Admitted on 6/14/2022.     Large right pleural effusion   Acute hypoxic respiratory failure  *Presents from TCU with shortness of breath and hypoxia to 85%, CXR at TCU with large right-sided pleural effusion with subtotal collapse of the right lung  *US thoracentesis with 1000 ml removed   *CT chest/abdomen/pelvis post thoracentesis with persistent large right pleural effusion with near complete opacification of the right hemithorax, smaller left pleural effusion and left basilar atelectasis  *Recent echocardiogram 6/4 normal.   - Pleural fluid studies consistent with transudate, specimen clotted for cell count, normal glucose, no organisms on gram stain   - Await cytology   - Pulmonary consulted to evaluate for repeat thoracentesis versus chest tube placement as well as additional work-up for etiology  - Wean oxygen as tolerated    Acute/subacute pancreatitis with pseudocyst   *Hospitalized 5/8-5/17/22 for pancreatitis with pseudocyst formation. Unclear etiology, EUS unrevealing for etiology, possibly due to medications  *CT on admission with acute pancreatitis with slightly improved inflammatory changes, pseudocysts not significantly changed  *Sister notes intake has remained generally poor and has complained of pain with eating, not significantly tender on exam at present  - GI (Edu[facility]) consulted  - NPO  - Add on CRP  - IVF with LR  - May need to consider [place] placement   - Consult nutrition   - IV hydromorphone PRN    Recent  metabolic encephalopathy   Mild zinc deficiency   *Extensively worked up during admission 6/4-6/10/22 including head CT, MRI brain, EEG, paraneoplastic ab, extensive laboratory work-up mostly unremarkable with exception of mildly low zinc.  *Per sister, patient does have some cognitive impairment at baseline, though is typically oriented to self, family and can carry on an extensive conversation especially regarding topics he enjoys (eg sports). He remains below his baseline.   - Start zinc supplementation   - Re-orient as needed  - Maintain normal day/night, sleep wake cycles  - Minimize sedating/altering medications as able  - Treat separate conditions as detailed above/below    Hx of portal vein thrombosis  *Diagnosed during admission 5/2022, improved on subsequent CT and not treated with anticoagulation   *Non-occlusive main portal vein thrombosis, splenic vein thrombosis seen on admission CT  - GI consulted as above     Depression  Anxiety  Schizoaffective disorder, bipolar type  - Continue prior to admission sertraline, risperidone    Seizure disorder  - Continue prior to admission topiramate, Depakote    Carnitine deficiency  - Continue prior to admission levocarnitine     Hypertension  - Continue prior to admission atenolol     Fisher's esophagus  - Continue prior to admission PPI    Hypothyroidism  - Continue prior to admission levothyroxine    Seasonal allergies  - Continue prior to admission levocetirizine       Diet:   NPO  DVT Prophylaxis: Pneumatic Compression Devices  [place] Catheter: Not present  Code Status:   Full code     Disposition Plan   Admit to inpatient. Anticipate greater than or equal to 2 midnights prior to discharge.     Entered: Andrew Lopez MD 06/14/2022, 10:46 PM     The patient's care was discussed with the patient, patient's sister (Huong)     Clinically Significant Risk Factors Present on Admission             # Hypoalbuminemia: Albumin = 2.4 g/dL (Ref range: 3.4 - 5.0 g/dL) on  "admission, will monitor as appropriate   # Coagulation Defect: INR = 1.64 (Ref range: 0.85 - 1.15) and/or PTT = N/A on admission, will monitor for bleeding    # Obesity: Estimated body mass index is 34.81 kg/m  as calculated from the following:    Height as of 6/13/22: 1.651 m (5' 5\").    Weight as of 6/13/22: 94.9 kg (209 lb 3.2 oz).           Andrew Lopez MD  [facility]    ______________________________________________________________________    Chief Complaint   Shortness of breath    History of Present Illness   Jagdeep Walker is a 51 year-old male who presents with the above chief complaint.    History is obtained from discussion with the Emergency Department provider, discussion with the patient's Sister Huong.  History from the patient is limited by his underlying cognitive impairment.  He is able to state that he had some shortness of breath, currently denies any pain, but otherwise is not able to provide much meaningful recent history.  He was recently hospitalized from 6/4 through 6/10 for metabolic encephalopathy and frequent falls with extensive work-up not clearly revealing of etiology.  He was discharged to TCU.  While at TCU on the day of admission he complained of shortness of breath and was noted to be hypoxic.  Chest x-ray was obtained which revealed a large right-sided pleural effusion and so he was sent to the emergency department for further evaluation.  According to his sister, he remains below his cognitive baseline since last admission.  He has also had ongoing decreased intake and has complained to her of pain with eating.    In the Emergency Department, the patient was seen by Dr. Grewal, with whom I discussed the patient's presenting symptoms and emergency department course.  Initial vital signs were a temperature of 97.5F, HR 79, /91, RR 27, SpO2 92% on 4L nasal cannula. Patient underwent US thoracentesis with 1L removed. Hospitalists were contacted " for admission to the hospital.     Review of Systems    Complete 10 point review of systems assessed and negative except as noted in HPI.    Past Medical History    I have reviewed this patient's medical history and updated it with pertinent information if needed.   Past Medical History:   Diagnosis Date     Anxiety      Fisher esophagus      Benign essential hypertension      Bipolar disorder (H)      Depression      Obesity      Pancreatitis      Portal vein thrombosis      Schizoaffective disorder, bipolar type (H)      Seizure disorder (H)      Thyroid disease        Past Surgical History   I have reviewed this patient's surgical history and updated it with pertinent information if needed.  Past Surgical History:   Procedure Laterality Date     ENDOSCOPIC ULTRASOUND UPPER GASTROINTESTINAL TRACT (GI) N/A 5/12/2022    Procedure: ENDOSCOPIC ULTRASOUND, ESOPHAGOSCOPY / UPPER GASTROINTESTINAL TRACT (GI);  Surgeon: Massimo Clark MD;  Location:  GI     ESOPHAGOSCOPY, GASTROSCOPY, DUODENOSCOPY (EGD), COMBINED N/A 5/12/2022    Procedure: ESOPHAGOGASTRODUODENOSCOPY, WITH BIOPSY;  Surgeon: Massimo Clark MD;  Location:  GI         Social History   Never smoker. Occasional alcohol use.     Currently in TCU, otherwise lives in group home.     Family History   I have reviewed this patient's family history and updated it with pertinent information if needed.   Family History   Problem Relation Age of Onset     Cerebrovascular Disease Mother 76     Prostate Cancer Father        Prior to Admission Medications   Prior to Admission Medications   Prescriptions Last Dose Informant Patient Reported? Taking?   LORazepam (ATIVAN) 2 MG tablet prn med  No Yes   Sig: Take 1 tablet (2 mg) by mouth daily as needed for seizures Give bucally   Vitamin D (Cholecalciferol) 50 MCG (2000 UT) CAPS 6/14/2022 at Unknown time  Yes Yes   Sig: Take 2,000 Units by mouth daily   acetaminophen (TYLENOL) 325 MG tablet prn med  No Yes    Sig: Take 2 tablets (650 mg) by mouth every 6 hours as needed for mild pain   albuterol (PROAIR HFA/PROVENTIL HFA/VENTOLIN HFA) 108 (90 Base) MCG/ACT inhaler prn med  Yes Yes   Sig: Inhale 2 puffs into the lungs every 6 hours as needed for shortness of breath / dyspnea or wheezing   amylase-lipase-protease (CREON 36) [id number] units CPEP 6/14/2022 at x3  No Yes   Sig: Take 2 capsules by mouth 3 times daily (with meals)   atenolol (TENORMIN) 25 MG tablet 6/14/2022 at Unknown time  Yes Yes   Sig: Take 25 mg by mouth daily   calcium carbonate 600 mg-vitamin D 400 units (CALTRATE) 600-400 MG-UNIT per tablet 6/14/2022 at Unknown time  Yes Yes   Sig: Take 1 tablet by mouth daily (with breakfast)   calcium polycarbophil (FIBERCON) 625 MG tablet 6/14/2022 at am  Yes Yes   Sig: Take 1 tablet by mouth 2 times daily   carboxymethylcellulose PF (REFRESH LIQUIGEL) 1 % ophthalmic gel 6/13/2022 at Unknown time  Yes Yes   Sig: Place 1 drop into the right eye At Bedtime   carboxymethylcellulose PF (REFRESH PLUS) 0.5 % ophthalmic solution prn med  Yes Yes   Sig: Place 1 drop into both eyes daily as needed for dry eyes   chlorhexidine (PERIDEX) 0.12 % solution 6/14/2022 at Unknown time  Yes Yes   Sig: Swish and spit 15 mLs in mouth daily   ciclopirox (LOPROX) 0.77 % cream Not Taking at Unknown time  Yes No   Sig: Apply topically nightly as needed   Patient not taking: Reported on 6/14/2022   clonazePAM (KLONOPIN) 0.125 MG TBDP ODT tab prn med  No Yes   Sig: Take 1 tablet (0.125 mg) by mouth daily as needed for anxiety   dicyclomine (BENTYL) 20 MG tablet 6/14/2022 at x3  No Yes   Sig: Take 1 tablet (20 mg) by mouth 4 times daily (before meals and nightly)   divalproex sodium delayed-release (DEPAKOTE) 250 MG DR tablet 6/13/2022 at Unknown time  No Yes   Sig: Take 1 tablet (250 mg) by mouth At Bedtime   divalproex sodium delayed-release (DEPAKOTE) 500 MG DR tablet 6/14/2022 at x2  No Yes   Sig: Take 1 tablet (500 mg) by  mouth 2 times daily With AM meds and early afternoon around 1500.   docusate sodium (COLACE) 100 MG capsule 6/14/2022 at Unknown time  Yes Yes   Sig: Take 100 mg by mouth daily   hydrocortisone (CORTAID) 1 % external cream Not Taking at Unknown time  Yes No   Sig: Apply topically 2 times daily   Patient not taking: Reported on 6/14/2022   ipratropium (ATROVENT) 0.06 % nasal spray 6/14/2022 at x2  Yes Yes   Sig: Spray 2 sprays into both nostrils 3 times daily   ketoconazole (NIZORAL) 2 % external cream prn med  Yes Yes   Sig: Apply topically 2 times daily as needed for itching R foot   levOCARNitine (CARNITOR) 330 MG tablet 6/14/2022 at x2  Yes Yes   Sig: Take by mouth 3 times daily   levocetirizine (XYZAL) 5 MG tablet 6/13/2022 at Unknown time  Yes Yes   Sig: Take 5 mg by mouth every evening   levothyroxine (SYNTHROID/LEVOTHROID) 50 MCG tablet 6/14/2022 at Unknown time  Yes Yes   Sig: Take 50 mcg by mouth daily   loperamide (IMODIUM) 2 MG capsule prn med  Yes Yes   Sig: Take 2 mg by mouth 4 times daily as needed for diarrhea   montelukast (SINGULAIR) 10 MG tablet 6/13/2022 at Unknown time  Yes Yes   Sig: Take 10 mg by mouth At Bedtime   multivitamin, therapeutic (THERA-VIT) TABS tablet 6/14/2022 at Unknown time  Yes Yes   Sig: Take 1 tablet by mouth daily   omeprazole 20 MG tablet 6/14/2022 at am  Yes Yes   Sig: Take 20 mg by mouth 2 times daily   polyethylene glycol-propylene glycol (SYSTANE ULTRA) 0.4-0.3 % SOLN ophthalmic solution prn med  Yes Yes   Sig: Place 2 drops into both eyes daily as needed for dry eyes   prednisoLONE acetate (PRED FORTE) 1 % ophthalmic suspension 6/14/2022 at Unknown time  Yes Yes   Sig: Place 1 drop into the right eye daily   risperiDONE (RISPERDAL) 2 MG tablet 6/14/2022 at am  Yes Yes   Sig: Take 2 mg by mouth 2 times daily   sertraline (ZOLOFT) 100 MG tablet 6/14/2022 at Unknown time  Yes Yes   Sig: Take 100 mg by mouth daily   topiramate (TOPAMAX) 100 MG tablet 6/13/2022 at Unknown  time  Yes Yes   Sig: Take 100 mg by mouth At Bedtime      Facility-Administered Medications: None     Allergies   No Known Allergies    Physical Exam   Vital Signs: Temp: 97.5  F (36.4  C) Temp src: Temporal BP: 121/70 Pulse: 70   Resp: 20 SpO2: 94 % O2 Device: Nasal cannula Oxygen Delivery: 4 LPM  Weight: 0 lbs 0 oz    Constitutional:  NAD  Eyes: PERRL, EOMI  HENT: Oropharynx clear, MMM  Respiratory: Diminished air sounds throughout the right lung field, normal effort at rest, left lung clear.    Cardiovascular: RRR. Bilateral lower extremity edema to below the knee.   GI: Soft, non-tender, non-distended. No rebound tenderness or guarding.    Skin: Warm, dry   Neurologic: Speaks mostly with eyes closed. Following some commands. Oriented to place, month. Not able to state year or clearly describe events leading to hospitalization.    Psychiatric: Normal affect, appropriate      Data   Data reviewed today: I reviewed all medications, new labs and imaging results over the last 24 hours. I personally reviewed no images or EKG's today.    Recent Labs   Lab 06/14/22  1920 06/14/22  1903 06/09/22  0639 06/08/22  1428   WBC  --  5.7 4.2  --    HGB  --  12.6* 12.3*  --    MCV  --  96 93  --    PLT  --  185 116*  --    INR  --  1.64*  --   --      --  141  --    POTASSIUM 3.9  --  3.9 3.7   CHLORIDE 106  --  110*  --    CO2 28  --  26  --    BUN 14  --  12  --    CR 0.87  --  0.91  --    ANIONGAP 7  --  5  --    NASIR 9.0  --  9.0  --    *  --  79  --    ALBUMIN 2.4*  --  2.4*  --    PROTTOTAL 5.4*  --  5.3*  --    BILITOTAL 0.6  --  0.9  --    ALKPHOS 75  --  56  --    ALT 18  --  20  --    AST 21  --  24  --    LIPASE 689*  --   --   --        Recent Results (from the past 24 hour(s))   US Thoracentesis    [facility] RADIOLOGY  Location: [facility]  DATE: 6/14/2022    PROCEDURE: IMAGING GUIDED RIGHT THORACENTESIS    INDICATION: Shortness of breath. Large right-sided pleural  effusion..    CONSENT: The risks, benefits and alternatives of an imaging guided  thoracentesis were discussed with the patient and patient's sister in  detail. All questions were answered. Informed consent was given to  proceed with the procedure.    COMPLICATIONS: No immediate complications.    PROCEDURE:    A limited ultrasound was performed for localization purposes.    Using sterile technique 10 mL of Xylocaine was infused into the local  soft tissues. Under direct ultrasound guidance a 5 Danish catheter was  inserted into the pleural effusion.    A total of 1000 mL of clear yellow pleural fluid was removed and sent  to the lab if diagnostic analysis was requested.    FINDINGS:  The initial ultrasound shows a pleural effusion.    Images obtained during catheter placement show the access needle with  tip in the pleural fluid.      Impression    IMPRESSION:    Successful ultrasound-guided right thoracentesis.    CHARLES HURLEY MD         SYSTEM ID:  [id number]

## 2022-06-15 NOTE — PROGRESS NOTES
RECEIVING UNIT ED HANDOFF REVIEW    ED Nurse Handoff Report was reviewed by: Debbie Rutherford RN on Nely 15, 2022 at 11:40 AM

## 2022-06-15 NOTE — CONSULTS
[facility]  Gastroenterology Consultation         Jagdeep Walker  [address] [facility] [id number]  51 year old male    Admission Date/Time: 6/14/2022  Primary Care Provider: Joel Werner  Referring / Attending Physician:  Dr. Andrew Lopez    We were asked to see the patient in consultation by Dr. Andrew Lopez for evaluation of ongoing pancreatitis .      CC: shortness of breath    HPI:  Jagdeep Walker is a 51 year old male who has a history of developmental delay who lives in a group home, hypothyroidism, GERD, JACLYN, seizures, schizoaffective disorder, bipolar affective disorder, acute on chronic pancreatitis, recent admission for pleural effusion and recent fall with concussion and presented with shortness of breath from TCU and found to have recurrent right pleural effusion.  He is a poor historian.  He denies any fevers, chills, , chest pain,  nausea, vomiting, diarrhea, or changes in urinary habits.  He has been complaining of abdominal pain and has lost 30-40 pounds in the last 4 months or so. Last admission treated with dicyclomine that seemed mildly effective for abdominal pain.     His workup has included labs; Lipase 1,730->689, AST, ALT and Alk Phos normal. Lactic acid 1.9. WBC wnl, Hgb 12.6, Pleural fluid- cloudy, amylase 504, lipase fluid pending, pending cell count an aerobic bacteria, glucose 116.     6/14 CT A/P note improved inflammatory changes in pancreas. Pseudocysts not significantly changed. Gastric wall thickening has progressed consistent with secondary gastritis with new pseudocyst along the proximal posterior gastric wall. Ascites decreased. Wall thickening involving the cecum and transverse colon likely secondarily involved. Large right pleural effusion has significantly increased.    ROS: A comprehensive ten point review of systems was negative aside from those in mentioned in the HPI.      PAST MED HX:  I have reviewed this patient's  medical history and updated it with pertinent information if needed.   Past Medical History:   Diagnosis Date     Anxiety      Fisher esophagus      Benign essential hypertension      Bipolar disorder (H)      Depression      Obesity      Pancreatitis      Portal vein thrombosis      Schizoaffective disorder, bipolar type (H)      Seizure disorder (H)      Thyroid disease        MEDICATIONS:   Prior to Admission Medications   Prescriptions Last Dose Informant Patient Reported? Taking?   LORazepam (ATIVAN) 2 MG tablet prn med  No Yes   Sig: Take 1 tablet (2 mg) by mouth daily as needed for seizures Give bucally   Vitamin D (Cholecalciferol) 50 MCG (2000 UT) CAPS 6/14/2022 at Unknown time  Yes Yes   Sig: Take 2,000 Units by mouth daily   acetaminophen (TYLENOL) 325 MG tablet prn med  No Yes   Sig: Take 2 tablets (650 mg) by mouth every 6 hours as needed for mild pain   albuterol (PROAIR HFA/PROVENTIL HFA/VENTOLIN HFA) 108 (90 Base) MCG/ACT inhaler prn med  Yes Yes   Sig: Inhale 2 puffs into the lungs every 6 hours as needed for shortness of breath / dyspnea or wheezing   amylase-lipase-protease (CREON 36) 812012-43436-166220 units CPEP 6/14/2022 at x3  No Yes   Sig: Take 2 capsules by mouth 3 times daily (with meals)   atenolol (TENORMIN) 25 MG tablet 6/14/2022 at Unknown time  Yes Yes   Sig: Take 25 mg by mouth daily   calcium carbonate 600 mg-vitamin D 400 units (CALTRATE) 600-400 MG-UNIT per tablet 6/14/2022 at Unknown time  Yes Yes   Sig: Take 1 tablet by mouth daily (with breakfast)   calcium polycarbophil (FIBERCON) 625 MG tablet 6/14/2022 at am  Yes Yes   Sig: Take 1 tablet by mouth 2 times daily   carboxymethylcellulose PF (REFRESH LIQUIGEL) 1 % ophthalmic gel 6/13/2022 at Unknown time  Yes Yes   Sig: Place 1 drop into the right eye At Bedtime   carboxymethylcellulose PF (REFRESH PLUS) 0.5 % ophthalmic solution prn med  Yes Yes   Sig: Place 1 drop into both eyes daily as needed for dry eyes   chlorhexidine  (PERIDEX) 0.12 % solution 6/14/2022 at Unknown time  Yes Yes   Sig: Swish and spit 15 mLs in mouth daily   ciclopirox (LOPROX) 0.77 % cream Not Taking at Unknown time  Yes No   Sig: Apply topically nightly as needed   Patient not taking: Reported on 6/14/2022   clonazePAM (KLONOPIN) 0.125 MG TBDP ODT tab prn med  No Yes   Sig: Take 1 tablet (0.125 mg) by mouth daily as needed for anxiety   dicyclomine (BENTYL) 20 MG tablet 6/14/2022 at x3  No Yes   Sig: Take 1 tablet (20 mg) by mouth 4 times daily (before meals and nightly)   divalproex sodium delayed-release (DEPAKOTE) 250 MG DR tablet 6/13/2022 at Unknown time  No Yes   Sig: Take 1 tablet (250 mg) by mouth At Bedtime   divalproex sodium delayed-release (DEPAKOTE) 500 MG DR tablet 6/14/2022 at x2  No Yes   Sig: Take 1 tablet (500 mg) by mouth 2 times daily With AM meds and early afternoon around 1500.   docusate sodium (COLACE) 100 MG capsule 6/14/2022 at Unknown time  Yes Yes   Sig: Take 100 mg by mouth daily   hydrocortisone (CORTAID) 1 % external cream Not Taking at Unknown time  Yes No   Sig: Apply topically 2 times daily   Patient not taking: Reported on 6/14/2022   ipratropium (ATROVENT) 0.06 % nasal spray 6/14/2022 at x2  Yes Yes   Sig: Spray 2 sprays into both nostrils 3 times daily   ketoconazole (NIZORAL) 2 % external cream prn med  Yes Yes   Sig: Apply topically 2 times daily as needed for itching R foot   levOCARNitine (CARNITOR) 330 MG tablet 6/14/2022 at x2  Yes Yes   Sig: Take by mouth 3 times daily   levocetirizine (XYZAL) 5 MG tablet 6/13/2022 at Unknown time  Yes Yes   Sig: Take 5 mg by mouth every evening   levothyroxine (SYNTHROID/LEVOTHROID) 50 MCG tablet 6/14/2022 at Unknown time  Yes Yes   Sig: Take 50 mcg by mouth daily   loperamide (IMODIUM) 2 MG capsule prn med  Yes Yes   Sig: Take 2 mg by mouth 4 times daily as needed for diarrhea   montelukast (SINGULAIR) 10 MG tablet 6/13/2022 at Unknown time  Yes Yes   Sig: Take 10 mg by mouth At  Bedtime   multivitamin, therapeutic (THERA-VIT) TABS tablet 6/14/2022 at Unknown time  Yes Yes   Sig: Take 1 tablet by mouth daily   omeprazole 20 MG tablet 6/14/2022 at am  Yes Yes   Sig: Take 20 mg by mouth 2 times daily   polyethylene glycol-propylene glycol (SYSTANE ULTRA) 0.4-0.3 % SOLN ophthalmic solution prn med  Yes Yes   Sig: Place 2 drops into both eyes daily as needed for dry eyes   prednisoLONE acetate (PRED FORTE) 1 % ophthalmic suspension 6/14/2022 at Unknown time  Yes Yes   Sig: Place 1 drop into the right eye daily   risperiDONE (RISPERDAL) 2 MG tablet 6/14/2022 at am  Yes Yes   Sig: Take 2 mg by mouth 2 times daily   sertraline (ZOLOFT) 100 MG tablet 6/14/2022 at Unknown time  Yes Yes   Sig: Take 100 mg by mouth daily   topiramate (TOPAMAX) 100 MG tablet 6/13/2022 at Unknown time  Yes Yes   Sig: Take 100 mg by mouth At Bedtime      Facility-Administered Medications: None       ALLERGIES: No Known Allergies    SOCIAL HISTORY:  Social History     Tobacco Use     Smoking status: Never Smoker       FAMILY HISTORY:  Family History   Problem Relation Age of Onset     Cerebrovascular Disease Mother 76     Prostate Cancer Father        PHYSICAL EXAM:   General  Alert, oriented and comfortable  Vital Signs with Ranges  Temp: 97.5  F (36.4  C) Temp src: Temporal BP: 102/63 Pulse: 58   Resp: 13 SpO2: 95 % O2 Device: Nasal cannula Oxygen Delivery: 5 LPM  No intake/output data recorded.    Constitutional: healthy, alert and no distress   Cardiovascular: negative, PMI normal. No lifts, heaves, or thrills. RRR. No murmurs, clicks gallops or rub  Respiratory: negative, Percussion normal. Good diaphragmatic excursion. Lungs clear  Abdomen: Abdomen soft, non-tender during exam. BS normal. No masses, organomegaly,      ADDITIONAL COMMENTS:   I reviewed the patient's new clinical lab test results.   Recent Labs   Lab Test 06/14/22  1903 06/09/22  0639 06/06/22  0659 06/05/22  0728 04/29/22  0625 04/27/22  2248   WBC  5.7 4.2 3.8* 3.8*   < > 7.2   HGB 12.6* 12.3* 11.8* 12.0*   < > 11.6*   MCV 96 93 95 95   < > 92    116* 95* 92*   < > 151   INR 1.64*  --   --  1.73*  --  1.26*    < > = values in this interval not displayed.     Recent Labs   Lab Test 06/14/22  1920 06/09/22  0639 06/08/22  1428 06/07/22  0709 06/06/22  0659   POTASSIUM 3.9 3.9 3.7   < > 3.7   CHLORIDE 106 110*  --   --  111*   CO2 28 26  --   --  25   BUN 14 12  --   --  13   ANIONGAP 7 5  --   --  5    < > = values in this interval not displayed.     Recent Labs   Lab Test 06/15/22  0034 06/14/22 1920 06/09/22  0639 06/07/22  0709 06/05/22  0728 06/04/22  1653 06/04/22  0754 05/16/22  1302 05/08/22  2032 05/08/22  0934   ALBUMIN  --  2.4* 2.4* 2.4* 2.4*  --  2.4* 2.4*   < >  --    BILITOTAL  --  0.6 0.9  --  0.7  --  0.7 0.6   < >  --    ALT  --  18 20  --  27  --  28 34   < >  --    AST  --  21 24  --  61*  --  76* 37   < >  --    PROTEIN Negative  --   --   --   --  Negative  --   --   --  Negative   LIPASE  --  689*  --   --   --   --  466* 1,461*   < >  --     < > = values in this interval not displayed.       I reviewed the patient's new imaging results.        CONSULTATION ASSESSMENT AND PLAN:    Jagdeep Walker s a 51 year old male admitted with right sided pleural effusion and GI consulted to manage pancreatitis.    Active Problems:  Pleural effusion  Pancreatic pseudocyst  Idiopathic acute pancreatitis, unspecified complication status  *Lipase 1,730->689, AST, ALT and Alk Phos normal. Lactic acid 1.9. WBC wnl, Hgb 12.6,    *6/14 CT A/P note improved inflammatory changes in pancreas. Pseudocysts not significantly changed. Gastric wall thickening has progressed consistent with secondary gastritis with new pseudocyst along the proximal posterior gastric wall. Ascites decreased. Wall thickening involving the cecum and transverse colon likely secondarily involved. Large right pleural effusion has significantly increased.  Cause of pleural effusions  may be due to pancreatitis and possible development of fistula. Amylase results on pleural cavity- 504 ( unknown normal range- will compare to serum levels. Dr. Clark will further evaluate patient today and consider EUS with ERCP   Patients pain in abdomen is in LLQ associated with bowel movements- suggestive of IBS/colon cramping.  Cause of pancreatitis is idiopathic- no evidence of gallstones or alcohol use. May be due to medication or autoimmune    -- draw IgG4 level and serum amylase levels  -- NPO at midnight for possible endoscopy ultrasound with possible ERCP. Dr. Clark   -- Ok with GI to start diet and advance as tolerated  -- trial of hyoscyamine 0.375 mg BID      [facility].  Office: [phone]  Cell : [phone] (Dr. Clark)  Cell: [phone] (Isabelle Pantoja PA-C)

## 2022-06-15 NOTE — PLAN OF CARE
Goal Outcome Evaluation:      Pt arrived on unit around 1200 from ED. Alert to self, pt lethargic during assessment. VSS on 5L via oxymask ex bradycardia, 45-50 bpm - MD notified. EKG & CXR ordered. No S/S of pain or N/V. Regular diet - NPO at midnight for possible endoscopy w/ ERCP tomorrow. No intake this shift - pt lethargic. Up A2 - not OOB this shift. T/R Q2hr. R lung sounds absent d/t collapsed lung. L lung sounds clear. +3 edema BLE. Redness noted to sacrum/coccyx - mepi in place. MD requested for frequent BP checks. Possible thoracentesis again this afternoon. Discharge plan pending.

## 2022-06-15 NOTE — PROGRESS NOTES
[facility]    Medicine Progress Note - Hospitalist Service    Date of Admission:  6/14/2022    Assessment & Plan          Jagdeep Walker is a 51 year-old male with complex history including seizure disorder, schizoaffective disorder, recent pancreatitis with pseudocysts, history of portal vein thrombosis, recent admission for metabolic encephalopathy of unclear etiology who presents with shortness of breath and hypoxia with large pleural effusion on outside chest x-ray.  Admitted on 6/14/2022.      Large right pleural effusion   Acute hypoxic respiratory failure  S/p thoracentesis 6/14 with 1L out  S/p thoracentesis 6/15 with 2.2L out  *Presents from TCU with shortness of breath and hypoxia to 85%, CXR at TCU with large right-sided pleural effusion with subtotal collapse of the right lung  *CT chest/abdomen/pelvis post thoracentesis with persistent large right pleural effusion with near complete opacification of the right hemithorax, smaller left pleural effusion and left basilar atelectasis  *Recent echocardiogram 6/4 normal.   - Pleural fluid studies consistent with transudate, specimen clotted for cell count, normal glucose, no organisms on gram stain   - Await cytology   - Pulmonary consulted to evaluate for repeat thoracentesis versus chest tube placement as well as additional work-up for etiology. With elevated lipase and amylase in pleural fluid, suspect it communicates with pancreatic fluid  - Wean oxygen as tolerated  - continue thoracentesis as able  - plan for CT chest after fluid gone     Acute/subacute pancreatitis with pseudocyst   *Hospitalized 5/8-5/17/22 for pancreatitis with pseudocyst formation. Unclear etiology, EUS unrevealing for etiology, possibly due to medications  *CT on admission with acute pancreatitis with slightly improved inflammatory changes, pseudocysts not significantly changed  *Sister notes intake has remained generally poor and has complained of pain  with eating, not significantly tender on exam at present  - GI ([facility]) consulted  - NPO at [facility]  - plan EGD 6/16  - IVF with [facility]  - May need to consider [place] placement   - Consult nutrition   - IV hydromorphone PRN     Recent metabolic encephalopathy   Mild zinc deficiency   *Extensively worked up during admission 6/4-6/10/22 including head CT, MRI brain, EEG, paraneoplastic ab, extensive laboratory work-up mostly unremarkable with exception of mildly low zinc.  *Per sister, patient does have some cognitive impairment at baseline, though is typically oriented to self, family and can carry on an extensive conversation especially regarding topics he enjoys (eg sports). He remains below his baseline.   - Start zinc supplementation   - Re-orient as needed  - Maintain normal day/night, sleep wake cycles  - Minimize sedating/altering medications as able  - Treat separate conditions as detailed above/below     Hx of portal vein thrombosis  *Diagnosed during admission 5/2022, improved on subsequent CT and not treated with anticoagulation   *Non-occlusive main portal vein thrombosis, splenic vein thrombosis seen on admission CT  - GI consulted as above      Depression  Anxiety  Schizoaffective disorder, bipolar type  - Continue prior to admission sertraline, risperidone     Seizure disorder  - Continue prior to admission topiramate, Depakote     Carnitine deficiency  - Continue prior to admission levocarnitine      Hypertension  - Continue prior to admission atenolol      Fisher's esophagus  - Continue prior to admission PPI     Hypothyroidism  - Continue prior to admission levothyroxine     Seasonal allergies  - Continue prior to admission levocetirizine       Diet: NPO for Medical/Clinical Reasons Except for: Meds  NPO per Anesthesia Guidelines for Procedure/Surgery Except for: Meds    DVT Prophylaxis: Pneumatic Compression Devices  [place] Catheter: Not present  Central Lines: None  Cardiac Monitoring: None  Code Status: Full  "Code      Disposition Plan   Expected Discharge:    Anticipated discharge location:  Awaiting care coordination huddle  Delays:            The patient's care was discussed with the Bedside Nurse, Patient and Patient's Family.    Tonny Kingston MD  Hospitalist Service  [facility]  Securely message with the Vocera Web Console (learn more here)  Text page via Digital Trowel Paging/Directory         Clinically Significant Risk Factors Present on Admission             # Hypoalbuminemia: Albumin = 2.4 g/dL (Ref range: 3.4 - 5.0 g/dL) on admission, will monitor as appropriate   # Coagulation Defect: INR = 1.64 (Ref range: 0.85 - 1.15) and/or PTT = N/A on admission, will monitor for bleeding    # Obesity: Estimated body mass index is 34.81 kg/m  as calculated from the following:    Height as of 6/13/22: 1.651 m (5' 5\").    Weight as of 6/13/22: 94.9 kg (209 lb 3.2 oz).      ______________________________________________________________________    Interval History   CXR appears improved after thoracentesis. When initially seen, patient hypotensive, bradycardic. After thoracentesis much more awake interactive.  No complaints.    Fluid still present after thoracentesis, unable to pull more because patient was becoming uncomfortable.     Updated sister/guardian in room and by phone     Data reviewed today: I reviewed all medications, new labs and imaging results over the last 24 hours. I personally reviewed the chest x-ray image(s) showing with large effusion, improved on post thoracentesis film. .    Physical Exam   Vital Signs: Temp: 98.3  F (36.8  C) Temp src: Oral BP: 102/61 Pulse: 53   Resp: 20 SpO2: 92 % O2 Device: Nasal cannula Oxygen Delivery: 5 LPM  Weight: 0 lbs 0 oz  Constitutional: Awake, alert to self, cooperative, no apparent distress  Respiratory: absent breath sounds on right.   Cardiovascular: mild bradycardia and rhythm, normal S1 and S2, and no murmur noted  GI: Normal bowel sounds, soft, " non-distended, non-tender  Skin/Integumen: No rashes, no cyanosis, no edema  Other:       Data   Recent Labs   Lab 06/15/22  1616 06/14/22  1920 06/14/22  1903 06/09/22  0639   WBC  --   --  5.7 4.2   HGB  --   --  12.6* 12.3*   MCV  --   --  96 93   PLT  --   --  185 116*   INR  --   --  1.64*  --    NA  --  141  --  141   POTASSIUM 4.0 3.9  --  3.9   CHLORIDE  --  106  --  110*   CO2  --  28  --  26   BUN  --  14  --  12   CR  --  0.87  --  0.91   ANIONGAP  --  7  --  5   NASIR  --  9.0  --  9.0   GLC  --  132*  --  79   ALBUMIN  --  2.4*  --  2.4*   PROTTOTAL  --  5.4*  --  5.3*   BILITOTAL  --  0.6  --  0.9   ALKPHOS  --  75  --  56   ALT  --  18  --  20   AST  --  21  --  24   LIPASE  --  689*  --   --      Recent Results (from the past 24 hour(s))   US Thoracentesis    [facility] RADIOLOGY  Location: [facility]  DATE: 6/14/2022    PROCEDURE: IMAGING GUIDED RIGHT THORACENTESIS    INDICATION: Shortness of breath. Large right-sided pleural effusion..    CONSENT: The risks, benefits and alternatives of an imaging guided  thoracentesis were discussed with the patient and patient's sister in  detail. All questions were answered. Informed consent was given to  proceed with the procedure.    COMPLICATIONS: No immediate complications.    PROCEDURE:    A limited ultrasound was performed for localization purposes.    Using sterile technique 10 mL of Xylocaine was infused into the local  soft tissues. Under direct ultrasound guidance a 5 Lithuan[place] catheter was  inserted into the pleural effusion.    A total of 1000 mL of clear yellow pleural fluid was removed and sent  to the lab if diagnostic analysis was requested.    FINDINGS:  The initial ultrasound shows a pleural effusion.    Images obtained during catheter placement show the access needle with  tip in the pleural fluid.      Impression    IMPRESSION:    Successful ultrasound-guided right thoracentesis.    CHARLES HURLEY MD         SYSTEM  ID:  [id number]   CT Chest/Abdomen/Pelvis w Contrast    Narrative    EXAM: CT CHEST/ABDOMEN/PELVIS W CONTRAST  LOCATION: [facility]  DATE/TIME: 6/14/2022 11:26 PM    INDICATION: Unknown primary, initial workup  COMPARISON: MRCP 06/06/2022 and CT abdomen and pelvis 05/08/2022  TECHNIQUE: CT scan of the chest, abdomen, and pelvis was performed following injection of IV contrast. Multiplanar reformats were obtained. Dose reduction techniques were used.   CONTRAST: 95 mL Isovue 370    FINDINGS:   LUNGS AND PLEURA: Very large right pleural effusion has significantly increased in size and now results in near complete opacification of the right hemithorax with compressive atelectasis of the right upper and lower lobes. Smaller left pleural effusion   has also increased in size with subsegmental atelectasis left lower lobe.    MEDIASTINUM/AXILLAE: Narrowing involving the right and left mainstem bronchi. No enlarged mediastinal or hilar lymph nodes.    CORONARY ARTERY CALCIFICATION: None.    HEPATOBILIARY: Ascites adjacent to the liver is slightly smaller. Moderate distention of the gallbladder.    PANCREAS: 3.0 x 2.9 cm pancreatic head cyst and 2.8 x 2.6 cm pancreatic tail cysts are unchanged. Inflammatory changes involving the head, body, and tail of pancreas have slightly improved.    SPLEEN: Mild splenic enlargement measuring 14 cm.    ADRENAL GLANDS: Normal.    KIDNEYS/BLADDER: Bilateral renal cysts measuring up to 3.4 cm in size. No hydronephrosis. Moderate distention of the bladder.    BOWEL: Wall thickening involving the lesser curvature the stomach has progressed and there is now a well-defined bilobed fluid pocket along the proximal posterior gastric wall measuring 3.8 x 3.1 cm on image 165 of series 3.  Slight wall thickening   involving the cecum as well as the transverse colon. No evidence for bowel obstruction. Scattered colonic diverticula.    LYMPH NODES: Normal.    VASCULATURE: The  main portal vein is quite small and contains some nonocclusive thrombus, with cavernous transformation. The splenic vein is thrombosed. Gastric varices. Numerous collateral vessels in the lower pelvis adjacent to the rectal wall.    PELVIC ORGANS: Moderate amount of free pelvic fluid is not significantly changed    MUSCULOSKELETAL: Extensive edematous changes in the subcutaneous tissues. Hypertrophic changes thoracic spine.      Impression    IMPRESSION:  1.  Acute pancreatitis with slightly improved inflammatory changes. Pseudocysts within the pancreatic head and tail not significantly changed.  2.  Gastric wall thickening has progressed consistent with secondary gastritis with new pseudocyst along the proximal posterior gastric wall.  3.  Ascites adjacent to the liver has decreased with a similar amount of free pelvic fluid.  4.  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  5.  Wall thickening involving the cecum and transverse colon likely secondarily involved.  6.  Very large right pleural effusion has significantly increased and now results in near complete opacification of the right hemithorax. Smaller left pleural effusion and left basilar atelectasis.   XR Chest Port 1 View    Narrative    XR CHEST PORT 1 VIEW   6/15/2022 2:29 PM     HISTORY: worsening dyspnea, hypotension, bradycardia, known large  effusion.    COMPARISON: CT chest, abdomen and pelvis 6/14/2022.      Impression    IMPRESSION: Large right pleural effusion again noted with complete  opacification of the right hemithorax. Slight leftward mediastinal  shift which could indicate element of tension, although it is not  significantly changed from recent CT. Small left pleural effusion.  Left lung is otherwise clear. No acute bony abnormality.    FLACO ABRAHAM MD         SYSTEM ID:  [id number]   US Thoracentesis    Narrative    ULTRASOUND GUIDED THORACENTESIS  6/15/2022 4:00 PM     HISTORY: Right-sided  effusion, okay to take >1L per pulmonology.    FINDINGS: Ultrasound was used to evaluate for the presence and best  approach for drainage of a pleural effusion. Written and oral informed  consent was obtained. A pause for the cause procedure to verify the  correct patient and correct procedure. The skin overlying the right  chest posteriorly was prepped and draped in the usual sterile fashion.  The subcutaneous tissues were anesthetized with 10 mL 1% lidocaine. A  catheter was advanced into the pleural space and 2200 mL of  diana  colored fluid was drained. Patient was monitored by nurse under my  direct supervision throughout the exam. Ultrasound images were  permanently stored.  There were no immediate complications. Patient  left the ultrasound suite in satisfactory condition.      Impression    IMPRESSION: Technically successful thoracentesis without immediate  complications.     Medications     lactated ringers 125 mL/hr at 06/15/22 1621       amylase-lipase-protease  2 capsule Oral TID w/meals     atenolol  25 mg Oral Daily     carboxymethylcellulose PF  1 drop Right Eye At Bedtime     divalproex sodium delayed-release  250 mg Oral At Bedtime     divalproex sodium delayed-release  500 mg Oral BID     iopamidol (ISOVUE-370)  95 mL Intravenous Once     ipratropium  2 spray Both Nostrils TID     levOCARNitine  330 mg Oral TID     levocetirizine  5 mg Oral QPM     levothyroxine  50 mcg Oral Daily     montelukast  10 mg Oral At Bedtime     pantoprazole  40 mg Oral BID     prednisoLONE acetate  1 drop Right Eye Daily     risperiDONE  2 mg Oral BID     sodium chloride 0.9 %  72 mL Intravenous Once     sertraline  100 mg Oral Daily     sodium chloride (PF)  3 mL Intracatheter Q8H     topiramate  100 mg Oral At Bedtime     zinc sulfate  220 mg Oral Daily

## 2022-06-15 NOTE — PHARMACY-ADMISSION MEDICATION HISTORY
Pharmacy Medication History  Admission medication history interview status for the 6/14/2022  admission is complete. See EPIC admission navigator for prior to admission medications     Medication history sources: MAR ([facility])    Medication reconciliation completed by provider prior to medication history? No    Time spent in this activity: 15 minutes    Prior to Admission medications    Medication Sig Last Dose Taking? Auth Provider Long Term End Date   acetaminophen (TYLENOL) 325 MG tablet Take 2 tablets (650 mg) by mouth every 6 hours as needed for mild pain prn med Yes Bob Beasley, DO     albuterol (PROAIR HFA/PROVENTIL HFA/VENTOLIN HFA) 108 (90 Base) MCG/ACT inhaler Inhale 2 puffs into the lungs every 6 hours as needed for shortness of breath / dyspnea or wheezing prn med Yes Unknown, Entered By History Yes    amylase-lipase-protease (CREON 36) 787858-00652-080467 units CPEP Take 2 capsules by mouth 3 times daily (with meals) 6/14/2022 at x3 Yes Bob Beasley, DO     atenolol (TENORMIN) 25 MG tablet Take 25 mg by mouth daily 6/14/2022 at Unknown time Yes Sarika Glass MD Yes    calcium carbonate 600 mg-vitamin D 400 units (CALTRATE) 600-400 MG-UNIT per tablet Take 1 tablet by mouth daily (with breakfast) 6/14/2022 at Unknown time Yes Unknown, Entered By History     calcium polycarbophil (FIBERCON) 625 MG tablet Take 1 tablet by mouth 2 times daily 6/14/2022 at am Yes Unknown, Entered By History     carboxymethylcellulose PF (REFRESH LIQUIGEL) 1 % ophthalmic gel Place 1 drop into the right eye At Bedtime 6/13/2022 at Unknown time Yes Unknown, Entered By History     carboxymethylcellulose PF (REFRESH PLUS) 0.5 % ophthalmic solution Place 1 drop into both eyes daily as needed for dry eyes prn med Yes Unknown, Entered By History     chlorhexidine (PERIDEX) 0.12 % solution Swish and spit 15 mLs in mouth daily 6/14/2022 at Unknown time Yes Unknown, Entered By History      clonazePAM (KLONOPIN) 0.125 MG TBDP ODT tab Take 1 tablet (0.125 mg) by mouth daily as needed for anxiety prn med Yes Bob Beasley, DO Yes    dicyclomine (BENTYL) 20 MG tablet Take 1 tablet (20 mg) by mouth 4 times daily (before meals and nightly) 6/14/2022 at x3 Yes Cheryl Lorenzo MD     divalproex sodium delayed-release (DEPAKOTE) 250 MG DR tablet Take 1 tablet (250 mg) by mouth At Bedtime 6/13/2022 at Unknown time Yes Bob Beasley, DO Yes    divalproex sodium delayed-release (DEPAKOTE) 500 MG DR tablet Take 1 tablet (500 mg) by mouth 2 times daily With AM meds and early afternoon around 1500. 6/14/2022 at x2 Yes Bob Beasley DO Yes    docusate sodium (COLACE) 100 MG capsule Take 100 mg by mouth daily 6/14/2022 at Unknown time Yes Unknown, Entered By History     ipratropium (ATROVENT) 0.06 % nasal spray Spray 2 sprays into both nostrils 3 times daily 6/14/2022 at x2 Yes Unknown, Entered By History     ketoconazole (NIZORAL) 2 % external cream Apply topically 2 times daily as needed for itching R foot prn med Yes Unknown, Entered By History     levOCARNitine (CARNITOR) 330 MG tablet Take by mouth 3 times daily 6/14/2022 at x2 Yes Unknown, Entered By History Yes    levocetirizine (XYZAL) 5 MG tablet Take 5 mg by mouth every evening 6/13/2022 at Unknown time Yes Unknown, Entered By History     levothyroxine (SYNTHROID/LEVOTHROID) 50 MCG tablet Take 50 mcg by mouth daily 6/14/2022 at Unknown time Yes Unknown, Entered By History Yes    loperamide (IMODIUM) 2 MG capsule Take 2 mg by mouth 4 times daily as needed for diarrhea prn med Yes Unknown, Entered By History     LORazepam (ATIVAN) 2 MG tablet Take 1 tablet (2 mg) by mouth daily as needed for seizures Give bucally prn med Yes Bob Beasley DO     montelukast (SINGULAIR) 10 MG tablet Take 10 mg by mouth At Bedtime 6/13/2022 at Unknown time Yes Unknown, Entered By History Yes    multivitamin, therapeutic (THERA-VIT) TABS tablet  Take 1 tablet by mouth daily 6/14/2022 at Unknown time Yes Unknown, Entered By History     omeprazole 20 MG tablet Take 20 mg by mouth 2 times daily 6/14/2022 at am Yes Unknown, Entered By History     polyethylene glycol-propylene glycol (SYSTANE ULTRA) 0.4-0.3 % SOLN ophthalmic solution Place 2 drops into both eyes daily as needed for dry eyes prn med Yes Unknown, Entered By History     prednisoLONE acetate (PRED FORTE) 1 % ophthalmic suspension Place 1 drop into the right eye daily 6/14/2022 at Unknown time Yes Unknown, Entered By History     risperiDONE (RISPERDAL) 2 MG tablet Take 2 mg by mouth 2 times daily 6/14/2022 at am Yes Unknown, Entered By History Yes    sertraline (ZOLOFT) 100 MG tablet Take 100 mg by mouth daily 6/14/2022 at Unknown time Yes Unknown, Entered By History Yes    topiramate (TOPAMAX) 100 MG tablet Take 100 mg by mouth At Bedtime 6/13/2022 at Unknown time Yes Unknown, Entered By History Yes    Vitamin D (Cholecalciferol) 50 MCG (2000 UT) CAPS Take 2,000 Units by mouth daily 6/14/2022 at Unknown time Yes Unknown, Entered By History     ciclopirox (LOPROX) 0.77 % cream Apply topically nightly as needed  Patient not taking: Reported on 6/14/2022 Not Taking at Unknown time  Unknown, Entered By History     hydrocortisone (CORTAID) 1 % external cream Apply topically 2 times daily  Patient not taking: Reported on 6/14/2022 Not Taking at Unknown time  Unknown, Entered By History         The information provided in this note is only as accurate as the sources available at the time of update(s)   Leatha KasperD

## 2022-06-15 NOTE — PROGRESS NOTES
Dash's test performed prior to radial ABG draw. Collateral circulation confirmed.   ABG drawn right wrist. Oxymask on 5 LPM.

## 2022-06-15 NOTE — ED NOTES
[facility]  ED Nurse Handoff Report    ED Chief complaint: Shortness of Breath      ED Diagnosis:   Final diagnoses:   Pleural effusion       Code Status: Full Code    Allergies: No Known Allergies    Patient Story:  Pt presented to ED via EMS with complaint of SOB. Pt is from a senior living facility, NH completed chest XR today showing pleural effusion. EMS reports hx of asthma. EMS administered duo neb and placed pt on 2L via EMS. EMS placed 18g left AC.     Family reports increased falls and confusion recently.     IR performed thoracentesis on right side.     Focused Assessment:  Pt requiring more oxygen; currently on 5L with oxymask    Treatments and/or interventions provided: See MAR  Patient's response to treatments and/or interventions: Still requiring supplemental O2    To be done/followed up on inpatient unit:  See orders    Does this patient have any cognitive concerns?: Cognitive issues at baseline; more lethargic and confused per family    Activity level - Baseline/Home:  Stand with Assist  Activity Level - Current:   Total Care    Patient's Preferred language: English   Needed?: No    Isolation: None  Infection: Not Applicable  Patient tested for COVID 19 prior to admission: YES  Bariatric?: No    Vital Signs:   Vitals:    06/14/22 2348 06/14/22 2350 06/14/22 2351 06/14/22 2352   BP:       Pulse: 64 64 64 64   Resp: 19 13 14 14   Temp:       TempSrc:       SpO2: 90% 93% 93% 91%       Cardiac Rhythm:     Was the PSS-3 completed:   Yes  What interventions are required if any?               Family Comments: Patient has an active HCA; see contact information in chart  OBS brochure/video discussed/provided to patient/family: N/A              Name of person given brochure if not patient:               Relationship to patient:     For the majority of the shift this patient's behavior was Green.   Behavioral interventions performed were N/A.    ED NURSE PHONE NUMBER:  [phone]

## 2022-06-16 ENCOUNTER — APPOINTMENT (OUTPATIENT)
Dept: ULTRASOUND IMAGING | Facility: CLINIC | Age: 52
DRG: 438 | End: 2022-06-16
Attending: HOSPITALIST
Payer: MEDICARE

## 2022-06-16 ENCOUNTER — APPOINTMENT (OUTPATIENT)
Dept: GENERAL RADIOLOGY | Facility: CLINIC | Age: 52
DRG: 438 | End: 2022-06-16
Attending: INTERNAL MEDICINE
Payer: MEDICARE

## 2022-06-16 LAB
ANION GAP SERPL CALCULATED.3IONS-SCNC: 3 MMOL/L (ref 3–14)
BUN SERPL-MCNC: 14 MG/DL (ref 7–30)
CALCIUM SERPL-MCNC: 9 MG/DL (ref 8.5–10.1)
CHLORIDE BLD-SCNC: 112 MMOL/L (ref 94–109)
CO2 SERPL-SCNC: 30 MMOL/L (ref 20–32)
CREAT SERPL-MCNC: 0.74 MG/DL (ref 0.66–1.25)
ERYTHROCYTE [DISTWIDTH] IN BLOOD BY AUTOMATED COUNT: 16.7 % (ref 10–15)
GFR SERPL CREATININE-BSD FRML MDRD: >90 ML/MIN/1.73M2
GLUCOSE BLD-MCNC: 78 MG/DL (ref 70–99)
HCT VFR BLD AUTO: 36.4 % (ref 40–53)
HGB BLD-MCNC: 11.7 G/DL (ref 13.3–17.7)
MCH RBC QN AUTO: 30.6 PG (ref 26.5–33)
MCHC RBC AUTO-ENTMCNC: 32.1 G/DL (ref 31.5–36.5)
MCV RBC AUTO: 95 FL (ref 78–100)
PLATELET # BLD AUTO: 145 10E3/UL (ref 150–450)
POTASSIUM BLD-SCNC: 3.4 MMOL/L (ref 3.4–5.3)
POTASSIUM BLD-SCNC: 4.1 MMOL/L (ref 3.4–5.3)
RBC # BLD AUTO: 3.82 10E6/UL (ref 4.4–5.9)
SODIUM SERPL-SCNC: 145 MMOL/L (ref 133–144)
WBC # BLD AUTO: 4.4 10E3/UL (ref 4–11)

## 2022-06-16 PROCEDURE — 250N000013 HC RX MED GY IP 250 OP 250 PS 637: Performed by: INTERNAL MEDICINE

## 2022-06-16 PROCEDURE — 71046 X-RAY EXAM CHEST 2 VIEWS: CPT

## 2022-06-16 PROCEDURE — 36415 COLL VENOUS BLD VENIPUNCTURE: CPT | Performed by: INTERNAL MEDICINE

## 2022-06-16 PROCEDURE — 258N000003 HC RX IP 258 OP 636: Performed by: INTERNAL MEDICINE

## 2022-06-16 PROCEDURE — 250N000011 HC RX IP 250 OP 636: Performed by: INTERNAL MEDICINE

## 2022-06-16 PROCEDURE — 120N000001 HC R&B MED SURG/OB

## 2022-06-16 PROCEDURE — 250N000009 HC RX 250: Performed by: INTERNAL MEDICINE

## 2022-06-16 PROCEDURE — 99233 SBSQ HOSP IP/OBS HIGH 50: CPT | Performed by: HOSPITALIST

## 2022-06-16 PROCEDURE — 85018 HEMOGLOBIN: CPT | Performed by: INTERNAL MEDICINE

## 2022-06-16 PROCEDURE — 82310 ASSAY OF CALCIUM: CPT | Performed by: INTERNAL MEDICINE

## 2022-06-16 PROCEDURE — 84132 ASSAY OF SERUM POTASSIUM: CPT | Performed by: HOSPITALIST

## 2022-06-16 PROCEDURE — 0W993ZZ DRAINAGE OF RIGHT PLEURAL CAVITY, PERCUTANEOUS APPROACH: ICD-10-PCS | Performed by: RADIOLOGY

## 2022-06-16 PROCEDURE — 36415 COLL VENOUS BLD VENIPUNCTURE: CPT | Performed by: HOSPITALIST

## 2022-06-16 PROCEDURE — 250N000013 HC RX MED GY IP 250 OP 250 PS 637: Performed by: HOSPITALIST

## 2022-06-16 PROCEDURE — 250N000009 HC RX 250: Performed by: RADIOLOGY

## 2022-06-16 PROCEDURE — 272N000706 US THORACENTESIS

## 2022-06-16 RX ORDER — POTASSIUM CHLORIDE 1500 MG/1
40 TABLET, EXTENDED RELEASE ORAL ONCE
Status: COMPLETED | OUTPATIENT
Start: 2022-06-16 | End: 2022-06-16

## 2022-06-16 RX ORDER — LIDOCAINE HYDROCHLORIDE 10 MG/ML
10 INJECTION, SOLUTION EPIDURAL; INFILTRATION; INTRACAUDAL; PERINEURAL ONCE
Status: COMPLETED | OUTPATIENT
Start: 2022-06-16 | End: 2022-06-16

## 2022-06-16 RX ADMIN — IPRATROPIUM BROMIDE 2 SPRAY: 42 SPRAY NASAL at 21:56

## 2022-06-16 RX ADMIN — TOPIRAMATE 100 MG: 100 TABLET, FILM COATED ORAL at 21:56

## 2022-06-16 RX ADMIN — RISPERIDONE 2 MG: 2 TABLET ORAL at 19:50

## 2022-06-16 RX ADMIN — PANTOPRAZOLE SODIUM 40 MG: 40 TABLET, DELAYED RELEASE ORAL at 09:02

## 2022-06-16 RX ADMIN — DIVALPROEX SODIUM 500 MG: 250 TABLET, DELAYED RELEASE ORAL at 09:02

## 2022-06-16 RX ADMIN — ZINC SULFATE 220 MG (50 MG) CAPSULE 220 MG: CAPSULE at 09:02

## 2022-06-16 RX ADMIN — LEVOCARNITINE 330 MG: 330 TABLET ORAL at 09:02

## 2022-06-16 RX ADMIN — POTASSIUM CHLORIDE 40 MEQ: 1500 TABLET, EXTENDED RELEASE ORAL at 14:26

## 2022-06-16 RX ADMIN — PANTOPRAZOLE SODIUM 40 MG: 40 TABLET, DELAYED RELEASE ORAL at 21:56

## 2022-06-16 RX ADMIN — LEVOCETIRIZINE DIHYDROCHLORIDE 5 MG: 5 TABLET ORAL at 19:50

## 2022-06-16 RX ADMIN — DIVALPROEX SODIUM 500 MG: 250 TABLET, DELAYED RELEASE ORAL at 14:26

## 2022-06-16 RX ADMIN — LEVOTHYROXINE SODIUM 50 MCG: 50 TABLET ORAL at 09:02

## 2022-06-16 RX ADMIN — DIVALPROEX SODIUM 250 MG: 250 TABLET, DELAYED RELEASE ORAL at 21:56

## 2022-06-16 RX ADMIN — HYDROMORPHONE HYDROCHLORIDE 0.2 MG: 0.2 INJECTION, SOLUTION INTRAMUSCULAR; INTRAVENOUS; SUBCUTANEOUS at 11:28

## 2022-06-16 RX ADMIN — LEVOCARNITINE 330 MG: 330 TABLET ORAL at 19:50

## 2022-06-16 RX ADMIN — CARBOXYMETHYLCELLULOSE SODIUM 1 DROP: 10 GEL OPHTHALMIC at 21:56

## 2022-06-16 RX ADMIN — LIDOCAINE HYDROCHLORIDE 10 ML: 10 INJECTION, SOLUTION EPIDURAL; INFILTRATION; INTRACAUDAL; PERINEURAL at 13:40

## 2022-06-16 RX ADMIN — PANCRELIPASE 2 CAPSULE: 36000; 180000; 114000 CAPSULE, DELAYED RELEASE PELLETS ORAL at 09:02

## 2022-06-16 RX ADMIN — MONTELUKAST 10 MG: 10 TABLET, FILM COATED ORAL at 21:56

## 2022-06-16 RX ADMIN — SERTRALINE HYDROCHLORIDE 100 MG: 100 TABLET ORAL at 09:02

## 2022-06-16 RX ADMIN — IPRATROPIUM BROMIDE 2 SPRAY: 42 SPRAY NASAL at 16:56

## 2022-06-16 RX ADMIN — SODIUM CHLORIDE, POTASSIUM CHLORIDE, SODIUM LACTATE AND CALCIUM CHLORIDE: 600; 310; 30; 20 INJECTION, SOLUTION INTRAVENOUS at 14:40

## 2022-06-16 RX ADMIN — RISPERIDONE 2 MG: 2 TABLET ORAL at 09:02

## 2022-06-16 RX ADMIN — SODIUM CHLORIDE, POTASSIUM CHLORIDE, SODIUM LACTATE AND CALCIUM CHLORIDE: 600; 310; 30; 20 INJECTION, SOLUTION INTRAVENOUS at 05:30

## 2022-06-16 RX ADMIN — LEVOCARNITINE 330 MG: 330 TABLET ORAL at 14:26

## 2022-06-16 RX ADMIN — PREDNISOLONE ACETATE 1 DROP: 10 SUSPENSION/ DROPS OPHTHALMIC at 09:12

## 2022-06-16 RX ADMIN — ATENOLOL 25 MG: 25 TABLET ORAL at 09:02

## 2022-06-16 RX ADMIN — IPRATROPIUM BROMIDE 2 SPRAY: 42 SPRAY NASAL at 09:12

## 2022-06-16 ASSESSMENT — ACTIVITIES OF DAILY LIVING (ADL)
ADLS_ACUITY_SCORE: 57
ADLS_ACUITY_SCORE: 43
ADLS_ACUITY_SCORE: 57
ADLS_ACUITY_SCORE: 43
ADLS_ACUITY_SCORE: 57
ADLS_ACUITY_SCORE: 57
ADLS_ACUITY_SCORE: 43
ADLS_ACUITY_SCORE: 43
ADLS_ACUITY_SCORE: 57
ADLS_ACUITY_SCORE: 57

## 2022-06-16 NOTE — PROGRESS NOTES
[facility]    Medicine Progress Note - Hospitalist Service    Date of Admission:  6/14/2022    Assessment & Plan          Jagdeep Walker is a 51 year-old male with complex history including seizure disorder, schizoaffective disorder, recent pancreatitis with pseudocysts, history of portal vein thrombosis, recent admission for metabolic encephalopathy of unclear etiology who presents with shortness of breath and hypoxia with large pleural effusion on outside chest x-ray.  Admitted on 6/14/2022.      Large right pleural effusion   Acute hypoxic respiratory failure  S/p thoracentesis 6/14 with 1L out  S/p thoracentesis 6/15 with 2.2L out  *Presents from TCU with shortness of breath and hypoxia to 85%, CXR at TCU with large right-sided pleural effusion with subtotal collapse of the right lung  *CT chest/abdomen/pelvis post thoracentesis with persistent large right pleural effusion with near complete opacification of the right hemithorax, smaller left pleural effusion and left basilar atelectasis  *Recent echocardiogram 6/4 normal.   - Pleural fluid studies consistent with transudate, specimen clotted for cell count, normal glucose, no organisms on gram stain   - cytology pending  - Pulmonary consulted to evaluate for repeat thoracentesis versus chest tube placement as well as additional work-up for etiology. With elevated lipase and amylase in pleural fluid, suspect it communicates with pancreatic fluid  - Wean oxygen as tolerated  - continue thoracentesis as able  - plan for CT chest after fluid gone; has received thoracentesis 6/14 and 6/15. Will plan to repeat again today. If no fluid present, then will obtain CT chest.     Acute/subacute pancreatitis with pseudocyst   *Hospitalized 5/8-5/17/22 for pancreatitis with pseudocyst formation. Unclear etiology, EUS unrevealing for etiology, possibly due to medications  *CT on admission with acute pancreatitis with slightly improved inflammatory  changes, pseudocysts not significantly changed  *Sister notes intake has remained generally poor and has complained of pain with eating, not significantly tender on exam at present  - GI ([facility]) consulted: input appreciated. Pancreatitis etiology idiopathic. May need EUS with ERCP.   - NPO at [facility]  - plan EGD 6/16  - IVF with LR  - May need to consider NJ placement for supplemental nutrition. Will plan for this after EGD and thoracentesis today. Likely to place for tomorrow.   - Consult nutrition   - IV hydromorphone PRN     Recent metabolic encephalopathy   Mild zinc deficiency   *Extensively worked up during admission 6/4-6/10/22 including head CT, MRI brain, EEG, paraneoplastic ab, extensive laboratory work-up mostly unremarkable with exception of mildly low zinc.  *Per sister, patient does have some cognitive impairment at baseline, though is typically oriented to self, family and can carry on an extensive conversation especially regarding topics he enjoys (eg sports). He remains below his baseline.   - Start zinc supplementation   - Re-orient as needed  - Maintain normal day/night, sleep wake cycles  - Minimize sedating/altering medications as able  - Treat separate conditions as detailed above/below     Hx of portal vein thrombosis  *Diagnosed during admission 5/2022, improved on subsequent CT and not treated with anticoagulation   *Non-occlusive main portal vein thrombosis, splenic vein thrombosis seen on admission CT  - GI consulted as above      Depression  Anxiety  Schizoaffective disorder, bipolar type  - Continue prior to admission sertraline, risperidone     Seizure disorder  - Continue prior to admission topiramate, Depakote     Carnitine deficiency  - Continue prior to admission levocarnitine      Hypertension  - Continue prior to admission atenolol      Fisher's esophagus  - Continue prior to admission PPI     Hypothyroidism  - Continue prior to admission levothyroxine     Seasonal allergies  -  "Continue prior to admission levocetirizine            Diet: NPO per Anesthesia Guidelines for Procedure/Surgery Except for: Meds    DVT Prophylaxis: Pneumatic Compression Devices  [place] Catheter: Not present  Central Lines: None  Cardiac Monitoring: None  Code Status: Full Code      Disposition Plan   Expected Discharge: 06/18/2022     Anticipated discharge location:  Awaiting care coordination huddle  Delays:          The patient's care was discussed with the Patient.    Isa Araiza MD  Hospitalist Service  [facility]  Securely message with the Vocera Web Console (learn more here)  Text page via WrapMail Paging/Directory         Clinically Significant Risk Factors Present on Admission             # Obesity: Estimated body mass index is 35.36 kg/m  as calculated from the following:    Height as of 6/13/22: 1.651 m (5' 5\").    Weight as of this encounter: 96.4 kg (212 lb 8 oz).      ______________________________________________________________________    Interval History   No complaints    Data reviewed today: I reviewed all medications, new labs and imaging results over the last 24 hours. I personally reviewed no images or EKG's today.    Physical Exam   Vital Signs: Temp: 97.6  F (36.4  C) Temp src: Oral BP: 103/53 Pulse: 53   Resp: 16 SpO2: 94 % O2 Device: None (Room air) Oxygen Delivery: 4 LPM  Weight: 212 lbs 8 oz  General Appearance: Well appearing for stated age.  Respiratory: CTAB, no rales or ronchi  Cardiovascular: S1, S2 normal, no murmurs  GI: non-tender on palpation, BS present      Data   Recent Labs   Lab 06/16/22  0802 06/15/22  1616 06/14/22  1920 06/14/22  1903   WBC 4.4  --   --  5.7   HGB 11.7*  --   --  12.6*   MCV 95  --   --  96   *  --   --  185   INR  --   --   --  1.64*   *  --  141  --    POTASSIUM 3.4 4.0 3.9  --    CHLORIDE 112*  --  106  --    CO2 30  --  28  --    BUN 14  --  14  --    CR 0.74  --  0.87  --    ANIONGAP 3  --  7  --    NASIR 9.0  --  " 9.0  --    GLC 78  --  132*  --    ALBUMIN  --   --  2.4*  --    PROTTOTAL  --   --  5.4*  --    BILITOTAL  --   --  0.6  --    ALKPHOS  --   --  75  --    ALT  --   --  18  --    AST  --   --  21  --    LIPASE  --   --  689*  --      Recent Results (from the past 24 hour(s))   XR Chest Port 1 View    Narrative    XR CHEST PORT 1 VIEW   6/15/2022 2:29 PM     HISTORY: worsening dyspnea, hypotension, bradycardia, known large  effusion.    COMPARISON: CT chest, abdomen and pelvis 6/14/2022.      Impression    IMPRESSION: Large right pleural effusion again noted with complete  opacification of the right hemithorax. Slight leftward mediastinal  shift which could indicate element of tension, although it is not  significantly changed from recent CT. Small left pleural effusion.  Left lung is otherwise clear. No acute bony abnormality.    FLACO ABRAHAM MD         SYSTEM ID:  [id number]   US Thoracentesis    Narrative    ULTRASOUND GUIDED THORACENTESIS  6/15/2022 4:00 PM     HISTORY: Right-sided effusion, okay to take >1L per pulmonology.    FINDINGS: Ultrasound was used to evaluate for the presence and best  approach for drainage of a pleural effusion. Written and oral informed  consent was obtained. A pause for the cause procedure to verify the  correct patient and correct procedure. The skin overlying the right  chest posteriorly was prepped and draped in the usual sterile fashion.  The subcutaneous tissues were anesthetized with 10 mL 1% lidocaine. A  catheter was advanced into the pleural space and 2200 mL of  diana  colored fluid was drained. Patient was monitored by nurse under my  direct supervision throughout the exam. Ultrasound images were  permanently stored.  There were no immediate complications. Patient  left the ultrasound suite in satisfactory condition.      Impression    IMPRESSION: Technically successful thoracentesis without immediate  complications.    JEANNIE ARREOLA MD         SYSTEM ID:  [id number]   XR  Chest Port 1 View    Narrative    EXAM: XR CHEST PORT 1 VIEW  LOCATION: [facility]  DATE/TIME: 6/15/2022 5:40 PM    INDICATION: Post thoracentesis eval of fluid level, midline shift  COMPARISON: 06/15/2022      Impression    IMPRESSION:     Lungs are hypoexpanded.     Improved but persistent small versus small-moderate right pleural effusion post thoracentesis. Significantly improved expansion of the right lung, though moderate basilar predominant volume loss / atelectasis persists. No obvious pneumothorax.    Hypoexpanded left lung with mild basilar opacities suggesting atelectasis (and/or small left pleural effusion).     The patient is mildly rotated, otherwise no substantial cardiomediastinal shift.

## 2022-06-16 NOTE — PROGRESS NOTES
[facility]  Gastroenterology Progress Note     Jagdeep Walker MRN# [id number]   YOB: 1970 Age: 51 year old          Assessment and Plan:   Jagdeep Walker s a 51 year old male admitted with right sided pleural effusion and GI consulted to manage pancreatitis.     Active Problems:  Pleural effusion  Pancreatic pseudocyst  Idiopathic acute pancreatitis, unspecified complication status  *Lipase 1,730->689, AST, ALT and Alk Phos normal. Lactic acid 1.9. WBC wnl, Hgb 12.6,    *6/14 CT A/P note improved inflammatory changes in pancreas. Pseudocysts not significantly changed. Gastric wall thickening has progressed consistent with secondary gastritis with new pseudocyst along the proximal posterior gastric wall. Ascites decreased. Wall thickening involving the cecum and transverse colon likely secondarily involved. Large right pleural effusion has significantly increased.  Cause of pleural effusions may be due to pancreatitis and possible development of fistula. Amylase results on pleural cavity- 504 ( unknown normal range- will compare to serum levels. Dr. Clark will further evaluate patient today and consider EUS with ERCP. Pleural effusion is on right and less likely due to pancreatitis. Fluid collection may be due to more nutrition and low albumin.  Patient not able to keep up with nutritional needs and pancreas remains inflamed. Keeping NPO and starting on TPN vs NJ for a few weeks would be beneficial  Cause of pancreatitis is idiopathic- no evidence of gallstones or alcohol use. May be due to medication or autoimmune     -- draw IgG4 level (pending)and serum amylase levels- elevated consistent with chronic pancreatitis  -- Will need supplemental nutrition, TPN vs NJ likely for a few weeks to limit pancreatic inflammation, increase albumin and limit fluid accumulation.  -- Daily labs  -- No plans for EGD or ERCP today- pending course -will consider                Interval  History:   no new complaints, denies chest pain, pain is controlled and has ongoing abdominal pain only with moderate pressure              Review of Systems:   C: NEGATIVE for fever, chills, change in weight  E/M: NEGATIVE for ear, mouth and throat problems  R: NEGATIVE for significant cough or SOB  CV: NEGATIVE for chest pain, palpitations or peripheral edema             Medications:   I have reviewed this patient's current medications    amylase-lipase-protease  2 capsule Oral TID w/meals     atenolol  25 mg Oral Daily     carboxymethylcellulose PF  1 drop Right Eye At Bedtime     divalproex sodium delayed-release  250 mg Oral At Bedtime     divalproex sodium delayed-release  500 mg Oral BID     iopamidol (ISOVUE-370)  95 mL Intravenous Once     ipratropium  2 spray Both Nostrils TID     levOCARNitine  330 mg Oral TID     levocetirizine  5 mg Oral QPM     levothyroxine  50 mcg Oral Daily     montelukast  10 mg Oral At Bedtime     pantoprazole  40 mg Oral BID     prednisoLONE acetate  1 drop Right Eye Daily     risperiDONE  2 mg Oral BID     sodium chloride 0.9 %  72 mL Intravenous Once     sertraline  100 mg Oral Daily     sodium chloride (PF)  3 mL Intracatheter Q8H     topiramate  100 mg Oral At Bedtime     zinc sulfate  220 mg Oral Daily                  Physical Exam:   Vitals were reviewed  Vital Signs with Ranges  Temp:  [97.5  F (36.4  C)-98.3  F (36.8  C)] 97.6  F (36.4  C)  Pulse:  [45-61] 53  Resp:  [15-26] 16  BP: ()/(41-74) 103/53  SpO2:  [88 %-100 %] 94 %  I/O last 3 completed shifts:  In: 660 [P.O.:660]  Out: 650 [Urine:650]  Constitutional: alert, mild distress, cooperative and pale   Cardiovascular: negative, PMI normal. No lifts, heaves, or thrills. RRR. No murmurs, clicks gallops or rub  Respiratory: wheezy, Percussion normal. Good diaphragmatic excursion.   Abdomen: Abdomen soft, non-tender. BS normal. No masses, organomegaly           Data:   I reviewed the patient's new clinical lab  test results.   Recent Labs   Lab Test 06/16/22  0802 06/14/22  1903 06/09/22  0639 06/06/22  0659 06/05/22  0728 04/29/22  0625 04/27/22  2248   WBC 4.4 5.7 4.2   < > 3.8*   < > 7.2   HGB 11.7* 12.6* 12.3*   < > 12.0*   < > 11.6*   MCV 95 96 93   < > 95   < > 92   * 185 116*   < > 92*   < > 151   INR  --  1.64*  --   --  1.73*  --  1.26*    < > = values in this interval not displayed.     Recent Labs   Lab Test 06/16/22  0802 06/15/22  1616 06/14/22 1920 06/09/22  0639   POTASSIUM 3.4 4.0 3.9 3.9   CHLORIDE 112*  --  106 110*   CO2 30  --  28 26   BUN 14  --  14 12   ANIONGAP 3  --  7 5     Recent Labs   Lab Test 06/15/22  1011 06/15/22  0034 06/14/22 1920 06/09/22  0639 06/07/22  0709 06/05/22  0728 06/04/22  1653 06/04/22  0754 05/16/22  1302 05/08/22  2032 05/08/22  0934   ALBUMIN  --   --  2.4* 2.4* 2.4* 2.4*  --  2.4* 2.4*   < >  --    BILITOTAL  --   --  0.6 0.9  --  0.7  --  0.7 0.6   < >  --    ALT  --   --  18 20  --  27  --  28 34   < >  --    AST  --   --  21 24  --  61*  --  76* 37   < >  --    PROTEIN  --  Negative  --   --   --   --  Negative  --   --   --  Negative   LIPASE  --   --  689*  --   --   --   --  466* 1,461*   < >  --    AMYLASE 142*  --   --   --   --   --   --   --   --   --   --     < > = values in this interval not displayed.       I reviewed the patient's new imaging results.    All laboratory data reviewed  All imaging studies reviewed by me.    Isabelle Pantoja PA-C,  6/16/2022  [facility]  Office : [phone]  Cell: [phone] (Dr. Clark)  Cell: [phone] (Isabelle Pantoja PA-C)

## 2022-06-16 NOTE — PLAN OF CARE
Goal Outcome Evaluation:      A/Ox3; disoriented to situation. VSS ex bradycardic - maintained low 50's all day & 92-94% on 4L. C/o sharp abdominal pain w/ tenderness to whole abdomen - MD notified, PRN dilaudid given x1, effective. Denies N/V. NPO - EGD & ERCP not done today, will be considered again tomorrow. Pt kept NPO d/t pancreatitis - discussing either TPN vs. NG to support nutritional needs. Up A1 GB/W. T/R Q2H when in bed. Up in chair this afternoon. Redness to sacrum - mepi in place. Incont; male external cath in place - frequently leaking. X1 BM this shift. R thoracentesis site w/ dermabond; CDI. Pt down for another thoracentesis this afternoon - 1350 ml removed. VSS upon return from [facility]. K+ = 3.4 - recheck scheduled for 1830. Discharge pending improvement.

## 2022-06-16 NOTE — CONSULTS
Care Management Initial Consult    General Information  Assessment completed with: Other (Guardian), Huong (sister)  Type of CM/SW Visit: Initial Assessment    Primary Care Provider verified and updated as needed: Yes   Readmission within the last 30 days:        Reason for Consult: discharge planning  Advance Care Planning: Advance Care Planning Reviewed:  (Has ACP docs)          Communication Assessment  Patient's communication style: spoken language (English or Bilingual)    Hearing Difficulty or Deaf: no   Wear Glasses or Blind: yes    Cognitive  Cognitive/Neuro/Behavioral: .WDL except  Level of Consciousness: alert, confused  Arousal Level: opens eyes spontaneously  Orientation: disoriented to, situation  Mood/Behavior: calm  Best Language: 0 - No aphasia  Speech: slow, word-finding difficulty    Living Environment:   People in home: facility resident     Current living Arrangements: group home      Able to return to prior arrangements: yes       Family/Social Support:  Care provided by: self, other (see comments) (facility staff)  Provides care for: no one  Marital Status: Single  Guardian, Sibling(s)          Description of Support System: Involved, Supportive    Support Assessment: Adequate social supports, Adequate family and caregiver support    Current Resources:   Patient receiving home care services: No     Community Resources: None  Equipment currently used at home: walker, rolling  Supplies currently used at home: None    Employment/Financial:  Employment Status:          Financial Concerns:             Lifestyle & Psychosocial Needs:  Social Determinants of Health     Tobacco Use: Unknown     Smoking Tobacco Use: Never Smoker     Smokeless Tobacco Use: Unknown   Alcohol Use: Not on file   Financial Resource Strain: Not on file   Food Insecurity: Not on file   Transportation Needs: Not on file   Physical Activity: Not on file   Stress: Not on file   Social Connections: Not on file   Intimate Partner  Violence: Not on file   Depression: Not on file   Housing Stability: Not on file       Functional Status:  Prior to admission patient needed assistance:              Mental Health Status:          Chemical Dependency Status:                Values/Beliefs:  Spiritual, Cultural Beliefs, Jain Practices, Values that affect care: no               Additional Information:  Per care management/social work consult for discharge planning, patient was admitted on 6/14/2022 with large pleural effusion on outside chest x-ray. Tentative date of discharge is 6/21/2022. Writer called Huong (guardian) and she said that patient lives at [facility] in [place]. She said that patient receives some assistance, but does most of his tasks on his own unless he asks. Patient uses a walker and has a CPAP machine. She said that patient came from [facility], but doesn't have a bed hold. She would like patient to go back there at discharge. Writer said she would send a referral.      Writer sent referral via DOD to [facility].    Addendum 1517: [facility] accepted patient for when patient is medically clear to discharge. Writer called Huong (guardian) and let her know that [facility] accepted patient for when he's medically clear to discharge.     BARAK Bella

## 2022-06-16 NOTE — PLAN OF CARE
Goal Outcome Evaluation:        [id number]:  A&Ox3; unable to state situation.  VSS except bradycardic; saturations maintaining 92-94% on 4L.  Denies pain.  NPO since midnight for possible EUS or ERCP today; patient verbalized understanding.  PIV x 2; one SL and one infusing LR @ 125 mL/hr.  Incontinent; male external catheter in-place which frequently leaks.  R thoracentesis site with Dermabond; CDI.  Patient did not get OOB overnight.  Discharge pending progress.

## 2022-06-16 NOTE — CONSULTS
PULMONOLOGY CONSULTATION  Date of service: 6/16/2022    [facility]    _____________________________________________________    Jagdeep Walker  51 year old male  [id number]  [address] [facility] [id number]    Primary Care Provider:  Joel Werner  Admission Date: 6/14/2022  Hospital Attending Physician:  Isa Clark MD  ________________________________________    CHIEF COMPLAINT : I was asked to see this patient by Dr. Clark for evaluation of large unilateral pleural effusion    Informant: EHR and patient    HISTORY OF PRESENT ILLNESS     Jagdeep Walker is a 51 year-old male with complex history including seizure disorder, schizoaffective disorder, recent pancreatitis with pseudocysts, history of portal vein thrombosis, recent admission for metabolic encephalopathy of unclear etiology who presents with shortness of breath and hypoxia with large pleural effusion on outside chest x-ray.  Has had 3 thoracenteses since admission and feeling better. Denies cough or sob, on 4L o2. Nonsmoker.       HOME MEDICATIONS     Medications Prior to Admission   Medication Sig Dispense Refill Last Dose     acetaminophen (TYLENOL) 325 MG tablet Take 2 tablets (650 mg) by mouth every 6 hours as needed for mild pain   prn med     albuterol (PROAIR HFA/PROVENTIL HFA/VENTOLIN HFA) 108 (90 Base) MCG/ACT inhaler Inhale 2 puffs into the lungs every 6 hours as needed for shortness of breath / dyspnea or wheezing   prn med     amylase-lipase-protease (CREON 36) 291893-41256-022729 units CPEP Take 2 capsules by mouth 3 times daily (with meals)   6/14/2022 at x3     atenolol (TENORMIN) 25 MG tablet Take 25 mg by mouth daily 1 tablet 0 6/14/2022 at Unknown time     calcium carbonate 600 mg-vitamin D 400 units (CALTRATE) 600-400 MG-UNIT per tablet Take 1 tablet by mouth daily (with breakfast)   6/14/2022 at Unknown time     calcium polycarbophil (FIBERCON) 625 MG tablet Take 1 tablet by  mouth 2 times daily   6/14/2022 at am     carboxymethylcellulose PF (REFRESH LIQUIGEL) 1 % ophthalmic gel Place 1 drop into the right eye At Bedtime   6/13/2022 at Unknown time     carboxymethylcellulose PF (REFRESH PLUS) 0.5 % ophthalmic solution Place 1 drop into both eyes daily as needed for dry eyes   prn med     chlorhexidine (PERIDEX) 0.12 % solution Swish and spit 15 mLs in mouth daily   6/14/2022 at Unknown time     clonazePAM (KLONOPIN) 0.125 MG TBDP ODT tab Take 1 tablet (0.125 mg) by mouth daily as needed for anxiety 10 tablet 0 prn med     dicyclomine (BENTYL) 20 MG tablet Take 1 tablet (20 mg) by mouth 4 times daily (before meals and nightly) 120 tablet 0 6/14/2022 at x3     divalproex sodium delayed-release (DEPAKOTE) 250 MG DR tablet Take 1 tablet (250 mg) by mouth At Bedtime   6/13/2022 at Unknown time     divalproex sodium delayed-release (DEPAKOTE) 500 MG DR tablet Take 1 tablet (500 mg) by mouth 2 times daily With AM meds and early afternoon around 1500.   6/14/2022 at x2     docusate sodium (COLACE) 100 MG capsule Take 100 mg by mouth daily   6/14/2022 at Unknown time     ipratropium (ATROVENT) 0.06 % nasal spray Spray 2 sprays into both nostrils 3 times daily   6/14/2022 at x2     ketoconazole (NIZORAL) 2 % external cream Apply topically 2 times daily as needed for itching R foot   prn med     levOCARNitine (CARNITOR) 330 MG tablet Take by mouth 3 times daily   6/14/2022 at x2     levocetirizine (XYZAL) 5 MG tablet Take 5 mg by mouth every evening   6/13/2022 at Unknown time     levothyroxine (SYNTHROID/LEVOTHROID) 50 MCG tablet Take 50 mcg by mouth daily   6/14/2022 at Unknown time     loperamide (IMODIUM) 2 MG capsule Take 2 mg by mouth 4 times daily as needed for diarrhea   prn med     LORazepam (ATIVAN) 2 MG tablet Take 1 tablet (2 mg) by mouth daily as needed for seizures Give bucally 10 tablet 0 prn med     montelukast (SINGULAIR) 10 MG tablet Take 10 mg by mouth At Bedtime   6/13/2022 at  Unknown time     multivitamin, therapeutic (THERA-VIT) TABS tablet Take 1 tablet by mouth daily   6/14/2022 at Unknown time     omeprazole 20 MG tablet Take 20 mg by mouth 2 times daily   6/14/2022 at am     polyethylene glycol-propylene glycol (SYSTANE ULTRA) 0.4-0.3 % SOLN ophthalmic solution Place 2 drops into both eyes daily as needed for dry eyes   prn med     prednisoLONE acetate (PRED FORTE) 1 % ophthalmic suspension Place 1 drop into the right eye daily   6/14/2022 at Unknown time     risperiDONE (RISPERDAL) 2 MG tablet Take 2 mg by mouth 2 times daily   6/14/2022 at am     sertraline (ZOLOFT) 100 MG tablet Take 100 mg by mouth daily   6/14/2022 at Unknown time     topiramate (TOPAMAX) 100 MG tablet Take 100 mg by mouth At Bedtime   6/13/2022 at Unknown time     Vitamin D (Cholecalciferol) 50 MCG (2000 UT) CAPS Take 2,000 Units by mouth daily   6/14/2022 at Unknown time     ciclopirox (LOPROX) 0.77 % cream Apply topically nightly as needed (Patient not taking: Reported on 6/14/2022)   Not Taking at Unknown time     hydrocortisone (CORTAID) 1 % external cream Apply topically 2 times daily (Patient not taking: Reported on 6/14/2022)   Not Taking at Unknown time       PAST MEDICAL HISTORY      Past Medical History:   Diagnosis Date     Anxiety      Fisher esophagus      Benign essential hypertension      Bipolar disorder (H)      Depression      Obesity      Pancreatitis      Portal vein thrombosis      Schizoaffective disorder, bipolar type (H)      Seizure disorder (H)      Thyroid disease      Past Surgical History:   Procedure Laterality Date     ENDOSCOPIC ULTRASOUND UPPER GASTROINTESTINAL TRACT (GI) N/A 5/12/2022    Procedure: ENDOSCOPIC ULTRASOUND, ESOPHAGOSCOPY / UPPER GASTROINTESTINAL TRACT (GI);  Surgeon: Massimo Clark MD;  Location:  GI     ESOPHAGOSCOPY, GASTROSCOPY, DUODENOSCOPY (EGD), COMBINED N/A 5/12/2022    Procedure: ESOPHAGOGASTRODUODENOSCOPY, WITH BIOPSY;  Surgeon: Massimo Clark  "MD Rose;  Location:  GI       ALLERGIES   No Known Allergies    SOCIAL / SUBSTANCE HISTORY     Social History     Socioeconomic History     Marital status: Single     Spouse name: Not on file     Number of children: Not on file     Years of education: Not on file     Highest education level: Not on file   Occupational History     Not on file   Tobacco Use     Smoking status: Never Smoker     Smokeless tobacco: Not on file   Substance and Sexual Activity     Alcohol use: Not on file     Comment: Occasional     Drug use: Not on file     Sexual activity: Not on file   Other Topics Concern     Not on file   Social History Narrative     Not on file     Social Determinants of Health     Financial Resource Strain: Not on file   Food Insecurity: Not on file   Transportation Needs: Not on file   Physical Activity: Not on file   Stress: Not on file   Social Connections: Not on file   Intimate Partner Violence: Not on file   Housing Stability: Not on file       FAMILY HISTORY     Family History   Problem Relation Age of Onset     Cerebrovascular Disease Mother 76     Prostate Cancer Father        REVIEW OF SYSTEMS   A comprehensive review of systems was negative except for items noted in HPI/Subjective.    PHYSICAL EXAMINATION   Temp (24hrs), Av.9  F (36.6  C), Min:97.6  F (36.4  C), Max:98.2  F (36.8  C)    Vital signs:  Temp: 97.9  F (36.6  C) Temp src: Oral BP: 95/70 Pulse: 58   Resp: 16 SpO2: 99 % O2 Device: Nasal cannula Oxygen Delivery: 4 LPM   Weight: 96.4 kg (212 lb 8 oz)  Estimated body mass index is 35.36 kg/m  as calculated from the following:    Height as of 22: 1.651 m (5' 5\").    Weight as of this encounter: 96.4 kg (212 lb 8 oz).        I/O last 3 completed shifts:  In: 660 [P.O.:660]  Out: 1250 [Urine:1250]    CONSTITUTIONAL/GENERAL APPEARANCE: Alert male. No Apparent Distress. Disheveled   PSYCHIATRIC: Flat mood and affect.   EARS, NOSE,THROAT,MOUTH: External ears and nose overall normal. nl " oral mucosa.   NECK: Neck appearance normal. No neck masses and the thyroid not enlarged.   RESPIRATORY: Non-labored effort. Dec right base  CARDIOVASCULAR: S1, S2, regular  ABDOMEN: round, soft, non-tender  LYMPHATIC: no cervical lymphadenopathy.  SKIN: Skin color was normal.    LABORATORY ASSESSMENT    Arterial Blood Gas  Recent Labs   Lab 06/15/22  1454   PH 7.39   PCO2 46*   PO2 68*   HCO3 27   O2PER 40     CBC  Recent Labs   Lab 06/16/22  0802 06/14/22  1903   WBC 4.4 5.7   RBC 3.82* 4.05*   HGB 11.7* 12.6*   HCT 36.4* 38.9*   MCV 95 96   MCH 30.6 31.1   MCHC 32.1 32.4   RDW 16.7* 16.6*   * 185     BMP  Recent Labs   Lab 06/16/22  0802 06/15/22  1616 06/14/22  1920   *  --  141   POTASSIUM 3.4 4.0 3.9   CHLORIDE 112*  --  106   NASIR 9.0  --  9.0   CO2 30  --  28   BUN 14  --  14   CR 0.74  --  0.87   GLC 78  --  132*     INR  Recent Labs   Lab 06/14/22  1903   INR 1.64*      BNPNo lab results found in last 7 days.  VENOUS BLOOD GASESNo lab results found in last 7 days.      Pleural Fluid Labs:    Glucose 116  Lipase 2143  Prot  2.3  Amylase 504      Additional labs and/or comments:     IMAGING      Thora 6/14: 1L  Thora 6/15: 2.2L  Thora 6/16: 1.3L      PFT & OTHER TESTING       ASSESSMENT / PLAN      Pulmonary diagnoses:  Abnl CT/CXR R91.8  Pleural effusion  Resp fail acute J96.00      ASSESSMENT : Jagdeep Walker is a 51 year-old male with complex history including seizure disorder, schizoaffective disorder, recent pancreatitis with pseudocysts, history of portal vein thrombosis, recent admission for metabolic encephalopathy of unclear etiology who presents with shortness of breath and hypoxia with large pleural effusion on outside chest x-ray.  Has had 3 thoracenteses since admission and feeling better. Suspect effusion related to pancreatitis. Malignancy possible but seems less likely given bland fluid and elevated amylase and lipase. Once pleural space dry, will need ct chest      PLAN:    Repeat cxr ordered  If residual pleural fluid, repeat thora and would also repeat pleural fluid studies  Once pleural space dry, then non contrast ct chest to evaluate pulmonary parennchyma  Aggressive IS and up to chair  Wean o2 as able, goal sat ~ 88-90%  Will follow      Fede Hermosillo  [facility]  Office: [phone]  Pager: [phone]

## 2022-06-16 NOTE — PROVIDER NOTIFICATION
Dr. Kingston notified of BP 80/50 and HR mid 40s-50s. Provider ordered Albumin and came to assess pt.

## 2022-06-16 NOTE — CONSULTS
"CLINICAL NUTRITION SERVICES  -  ASSESSMENT NOTE      RECOMMENDATIONS FOR MD/PROVIDER TO ORDER:   Consider diet advancement vs nutrition support in next 24 - 48 hours   Recommendations Ordered by Registered Dietitian (RD):   - Nutrition education: discussed RD role in care and availability during admit   Future/Additional Recommendations:  - Monitor for diet advancement vs nutrition support (EN recommendations below)  - Monitor weight and lab trends   Malnutrition:  % Weight Loss:  Weight loss does not meet criteria for malnutrition   % Intake:  </= 50% for >/= 5 days (severe malnutrition)  Subcutaneous Fat Loss:  Orbital region mild depletion and Upper arm region moderate depletion  Muscle Loss:  Temporal region mild depletion, Clavicle bone region mild depletion and Dorsal hand region mild depletion  Fluid Retention:  Mild to trace edema (left and right legs) - unclear if nutrition related    Malnutrition Diagnosis: Moderate malnutrition  In Context of:  Acute illness or injury  Chronic illness or disease     REASON FOR ASSESSMENT  Jagdeep Walker is a 51 year old male seen by Registered Dietitian for Provider Order - Malnutrition      NUTRITION HISTORY  - Information obtained from chart review, pt, and sister  - PMH of seizure disorder, schizoaffective disorder, recent pancreatitis with pseudocysts, history of portal vein thrombosis, recent admission for metabolic encephalopathy of unclear etiology   -- admitted with shortness of breath and hypoxia with large pleural effusion on outside chest x-ray  - Per chart chart review, Patient also have significant malnutrition and low albumin I will recommend to consider further evaluation with nasojejunal tube feeding and nutritional support (6/15)  - Pt seen by RD for weight management last in 2019 through [facility] "[employer]"  - Per discussion with pt and sister, pt reported having some difficulty with his eating recently because of his \"head.\" He lives at a group home where " "they prepare meals for him and he is able to eat snacks, like chips. Per sister, he ate lucretia flood before being admitted, though he is supposed to follow a low-fat diet at home. Pt reported he normally weighs 212# and per sister, he has lost about 30# over the year. Pt reported no N/V or diarrhea or constipation PTA. Though he reported that sometimes when he eats it is difficult to swallow and he feels he may throw up.     CURRENT NUTRITION ORDERS  Diet Order:     NPO for possible procedure    6/15: regular diet  6/15: NPO    Current Intake/Tolerance:  - Per discussion with pt and pt's sister, pt reported his last meal was last night and he had pizza, a coke, and chips. He reported he still has somewhat of an appetite, but it should be larger since he has not eaten since last night. Pt reported no N/V or diarrhea/constipation. Sister asking to speak with RD before discharge for diet education for low-fat/pancreatitis.  - Per nursing flow sheets, pt consuming 75%  - Per [facility], pt received 1 good meal of 1190 kcal and 46 g protein on 6/15      NUTRITION FOCUSED PHYSICAL ASSESSMENT FOR DIAGNOSING MALNUTRITION)  Yes         Obtained from Chart/Interdisciplinary Team:  6/16: per gastroenterology provider note, Keeping NPO and starting on TPN vs NJ for a few weeks would be beneficial    ANTHROPOMETRICS  Height: 5' 5\" (165.1 cm)  Weight: 212 lbs 8 oz  Body mass index is 35.36 kg/m .  Weight Status:  Obesity Grade II BMI 35-39.9  IBW: 61.8 kg  % IBW: 156%  Weight History: weight loss of ~24# (10%) from 8 months ago, weight now appears stable for past 2 months  06/13/22 : 94.9 kg (209 lb 3.2 oz)   06/12/22 : 95.3 kg (210 lb)   06/10/22 : 95.9 kg (211 lb 6.4 oz)   05/17/22 : 96.2 kg (212 lb 1.6 oz)   04/28/22 : 92.5 kg (204 lb)   10/08/21 : 233# (annual physical)     LABS  Labs reviewed  - sodium: 145 (H, trending up- previously WNL)  - K+: 3.4 (WNL, trending down)  - BUN: 14 (WNL, stable)  - creatinine: 0.74 " (WNL, trending down)  - amylase: 142 (H, 6/15)  - vitamin A: 0.45 (WNL, 6/6)  - vitamin B12: 693 (WNL, 6/6)  - vitamin E: 6.2 (WNL, 6/6)  - zinc: 55.6 (L, 6/6) -> receiving zinc sulfate capsule daily    MEDICATIONS  Medications reviewed  - creon 36 x2 TID with meals  - protonix tablet x2 daily  - topamax daily  - zinc sulfate capsule daily  - lactated ringers infusion @ 125 mL/hr continous    ASSESSED NUTRITION NEEDS PER APPROVED PRACTICE GUIDELINES:    Dosing Weight 70.5 kg (adjusted, based on most recent weight of 96.4 kg on 6/16)  Estimated Energy Needs: 1763+ kcals (25+ Kcal/Kg per ASPEN)  Justification: BMI, acute pancreatitis  Estimated Protein Needs: 85 - 106 grams protein (1.2 -1.5 g pro/Kg)  Justification: preservation of lean body mass, increased needs r/t acute pancreatitis (aim higher to 1.5 g/kg per ASPEN)  Estimated Fluid Needs: (1 mL/Kcal)  Justification: per provider pending fluid status    MALNUTRITION:  % Weight Loss:  Weight loss does not meet criteria for malnutrition   % Intake:  </= 50% for >/= 5 days (severe malnutrition)  Subcutaneous Fat Loss:  Orbital region mild depletion and Upper arm region moderate depletion  Muscle Loss:  Temporal region mild depletion, Clavicle bone region mild depletion and Dorsal hand region mild depletion  Fluid Retention:  Mild to trace edema (left and right legs) - unclear if nutrition related    Malnutrition Diagnosis: Moderate malnutrition  In Context of:  Acute illness or injury  Chronic illness or disease    NUTRITION DIAGNOSIS:  Inadequate oral intake related to interruptions to diet order - NPO vs regular diet, swallowing difficulties, decreased appetite r/t pancreatitis flare-ups as evidenced by NPO x1 day, < 50% intake over past 5 days since admit, 10% weight loss in past 8 months      NUTRITION INTERVENTIONS  Recommendations / Nutrition Prescription  - TF recommendations, if needed:  Jevity 1.5 via NJ @ 50 mL/hr x 24 hours + 3 packets Prosource to  provide 1200 mL, 1920 kcal (27 kcal/kg), 110 g protein (1.6 g/kg), 259 g CHO, 60 g fat, 25 g fiber, and 912 mL free water.    Once begin TFs, begin the following pancreatic enzyme regimen and recommend order each of the following:   (please copy and paste administration instructions into each order)  A) Sodium Bicarb tablet (325 mg), 1 tablet q 4 hrs via Jtube. Administration Instructions: Crush 1 tablet and mix into 15 ml of warm water and use this solution to mix with Creon pancreatic enzymes. DO NOT administer directly into Jtube (to be mixed into TF formula with Creon enzyme - see Creon enzyme order)   B) Creon 12, 2 capsules q 4 hrs via Jtube. Administration Instructions:   --If TF rate is running @ 10-20 ml/hr, administer 1 capsule q 4 hrs;   --If TF rate is running @ 30-50 ml/hr, increase to 2 capsules q 4 hrs.    **Open capsule and empty contents into 15 ml sodium bicarb solution (see sodium bicarb order), let dissolve for about 20-30 minutes and then add this solution to the amount of TF formula hung in TF bag every 4 hrs (i.e., once TF @ goal infusion 50 ml/hr will mix 2 capsules into 200 ml of TF formula every 4 hrs).   *Note: this enzyme regimen with TF @ goal infusion will provide approx 2042 units of lipase/gram of total Fat daily and approp dosing initially for pancreatic insufficiency with more elemental TF formula.     Implementation  Nutrition education: Provided education on RD role in care and availability during admit      Nutrition Goals  Diet advancement vs nutrition support within 24-48 hours    MONITORING AND EVALUATION:  Progress towards goals will be monitored and evaluated per protocol and Practice Guidelines      Marina Guy

## 2022-06-17 ENCOUNTER — APPOINTMENT (OUTPATIENT)
Dept: GENERAL RADIOLOGY | Facility: CLINIC | Age: 52
DRG: 438 | End: 2022-06-17
Attending: INTERNAL MEDICINE
Payer: MEDICARE

## 2022-06-17 ENCOUNTER — APPOINTMENT (OUTPATIENT)
Dept: ULTRASOUND IMAGING | Facility: CLINIC | Age: 52
DRG: 438 | End: 2022-06-17
Attending: INTERNAL MEDICINE
Payer: MEDICARE

## 2022-06-17 ENCOUNTER — APPOINTMENT (OUTPATIENT)
Dept: CT IMAGING | Facility: CLINIC | Age: 52
DRG: 438 | End: 2022-06-17
Attending: INTERNAL MEDICINE
Payer: MEDICARE

## 2022-06-17 LAB
AMYLASE BODY FLUID SOURCE: NORMAL
AMYLASE FLD-CCNC: 343 U/L
APPEARANCE FLD: ABNORMAL
ATRIAL RATE - MUSE: 50 BPM
CELL COUNT BODY FLUID SOURCE: ABNORMAL
CHOLEST FLD-MCNC: <50 MG/DL
CHOLESTEROL BODY FLUID SOURCE: NORMAL
COLOR FLD: YELLOW
DIASTOLIC BLOOD PRESSURE - MUSE: NORMAL MMHG
GLUCOSE BODY FLUID SOURCE: NORMAL
GLUCOSE FLD-MCNC: 78 MG/DL
IGG SERPL-MCNC: 1045 MG/DL (ref 610–1616)
IGG1 SER-MCNC: 609 MG/DL (ref 382–929)
IGG2 SER-MCNC: 245 MG/DL (ref 242–700)
IGG3 SER-MCNC: 64 MG/DL (ref 22–176)
IGG4 SER-MCNC: 55 MG/DL (ref 4–86)
INTERPRETATION ECG - MUSE: NORMAL
LD BODY BODY FLUID SOURCE: NORMAL
LDH FLD L TO P-CCNC: 86 U/L
LDH SERPL L TO P-CCNC: 151 U/L (ref 85–227)
LYMPHOCYTES NFR FLD MANUAL: NORMAL %
MONOS+MACROS NFR FLD MANUAL: NORMAL %
NEUTS BAND NFR FLD MANUAL: NORMAL %
P AXIS - MUSE: 30 DEGREES
PATH REPORT.COMMENTS IMP SPEC: NORMAL
PATH REPORT.FINAL DX SPEC: NORMAL
PATH REPORT.GROSS SPEC: NORMAL
PATH REPORT.MICROSCOPIC SPEC OTHER STN: NORMAL
PATH REPORT.RELEVANT HX SPEC: NORMAL
PH BODY FLUID SOURCE: NORMAL
PH FLD: 8.5 PH
POTASSIUM BLD-SCNC: 4 MMOL/L (ref 3.4–5.3)
PR INTERVAL - MUSE: 134 MS
PROT FLD-MCNC: 2 G/DL
PROT SERPL-MCNC: 5.1 G/DL (ref 6.8–8.8)
PROTEIN BODY FLUID SOURCE: NORMAL
QRS DURATION - MUSE: 108 MS
QT - MUSE: 442 MS
QTC - MUSE: 402 MS
R AXIS - MUSE: 46 DEGREES
SUBCLASSES, PERCENT: 93 %
SYSTOLIC BLOOD PRESSURE - MUSE: NORMAL MMHG
T AXIS - MUSE: 48 DEGREES
VENTRICULAR RATE- MUSE: 50 BPM

## 2022-06-17 PROCEDURE — 120N000001 HC R&B MED SURG/OB

## 2022-06-17 PROCEDURE — 36415 COLL VENOUS BLD VENIPUNCTURE: CPT | Performed by: HOSPITALIST

## 2022-06-17 PROCEDURE — 250N000009 HC RX 250: Performed by: RADIOLOGY

## 2022-06-17 PROCEDURE — 87070 CULTURE OTHR SPECIMN AEROBIC: CPT | Performed by: INTERNAL MEDICINE

## 2022-06-17 PROCEDURE — 36415 COLL VENOUS BLD VENIPUNCTURE: CPT | Performed by: INTERNAL MEDICINE

## 2022-06-17 PROCEDURE — 84155 ASSAY OF PROTEIN SERUM: CPT | Performed by: INTERNAL MEDICINE

## 2022-06-17 PROCEDURE — 0W993ZZ DRAINAGE OF RIGHT PLEURAL CAVITY, PERCUTANEOUS APPROACH: ICD-10-PCS | Performed by: RADIOLOGY

## 2022-06-17 PROCEDURE — 83615 LACTATE (LD) (LDH) ENZYME: CPT | Performed by: INTERNAL MEDICINE

## 2022-06-17 PROCEDURE — 272N000706 US THORACENTESIS

## 2022-06-17 PROCEDURE — 71250 CT THORAX DX C-: CPT

## 2022-06-17 PROCEDURE — 88112 CYTOPATH CELL ENHANCE TECH: CPT | Mod: 26

## 2022-06-17 PROCEDURE — 250N000013 HC RX MED GY IP 250 OP 250 PS 637: Performed by: INTERNAL MEDICINE

## 2022-06-17 PROCEDURE — 82150 ASSAY OF AMYLASE: CPT | Performed by: INTERNAL MEDICINE

## 2022-06-17 PROCEDURE — 87015 SPECIMEN INFECT AGNT CONCNTJ: CPT | Performed by: INTERNAL MEDICINE

## 2022-06-17 PROCEDURE — 250N000009 HC RX 250: Performed by: INTERNAL MEDICINE

## 2022-06-17 PROCEDURE — 258N000003 HC RX IP 258 OP 636: Performed by: INTERNAL MEDICINE

## 2022-06-17 PROCEDURE — 99233 SBSQ HOSP IP/OBS HIGH 50: CPT | Performed by: INTERNAL MEDICINE

## 2022-06-17 PROCEDURE — 89051 BODY FLUID CELL COUNT: CPT | Performed by: INTERNAL MEDICINE

## 2022-06-17 PROCEDURE — 88305 TISSUE EXAM BY PATHOLOGIST: CPT | Mod: 26

## 2022-06-17 PROCEDURE — 83986 ASSAY PH BODY FLUID NOS: CPT | Performed by: INTERNAL MEDICINE

## 2022-06-17 PROCEDURE — 82945 GLUCOSE OTHER FLUID: CPT | Performed by: INTERNAL MEDICINE

## 2022-06-17 PROCEDURE — 84132 ASSAY OF SERUM POTASSIUM: CPT | Performed by: HOSPITALIST

## 2022-06-17 PROCEDURE — 84157 ASSAY OF PROTEIN OTHER: CPT | Performed by: INTERNAL MEDICINE

## 2022-06-17 PROCEDURE — 999N000065 XR ABDOMEN PORT 1 VIEWS

## 2022-06-17 PROCEDURE — 82465 ASSAY BLD/SERUM CHOLESTEROL: CPT | Performed by: INTERNAL MEDICINE

## 2022-06-17 RX ORDER — AMINO AC/PROTEIN HYDR/WHEY PRO 10G-100/30
1 LIQUID (ML) ORAL 3 TIMES DAILY
Status: DISCONTINUED | OUTPATIENT
Start: 2022-06-17 | End: 2022-07-10 | Stop reason: HOSPADM

## 2022-06-17 RX ORDER — LIDOCAINE HYDROCHLORIDE 10 MG/ML
10 INJECTION, SOLUTION EPIDURAL; INFILTRATION; INTRACAUDAL; PERINEURAL ONCE
Status: COMPLETED | OUTPATIENT
Start: 2022-06-17 | End: 2022-06-17

## 2022-06-17 RX ORDER — LIDOCAINE HYDROCHLORIDE 20 MG/ML
5 SOLUTION OROPHARYNGEAL ONCE
Status: COMPLETED | OUTPATIENT
Start: 2022-06-17 | End: 2022-06-17

## 2022-06-17 RX ORDER — DEXTROSE MONOHYDRATE 100 MG/ML
INJECTION, SOLUTION INTRAVENOUS CONTINUOUS PRN
Status: DISCONTINUED | OUTPATIENT
Start: 2022-06-17 | End: 2022-07-10 | Stop reason: HOSPADM

## 2022-06-17 RX ORDER — SODIUM BICARBONATE 325 MG/1
325 TABLET ORAL EVERY 4 HOURS
Status: DISCONTINUED | OUTPATIENT
Start: 2022-06-17 | End: 2022-07-10 | Stop reason: HOSPADM

## 2022-06-17 RX ADMIN — IPRATROPIUM BROMIDE 2 SPRAY: 42 SPRAY NASAL at 16:44

## 2022-06-17 RX ADMIN — PREDNISOLONE ACETATE 1 DROP: 10 SUSPENSION/ DROPS OPHTHALMIC at 08:42

## 2022-06-17 RX ADMIN — RISPERIDONE 2 MG: 2 TABLET ORAL at 21:32

## 2022-06-17 RX ADMIN — DIVALPROEX SODIUM 250 MG: 250 TABLET, DELAYED RELEASE ORAL at 21:33

## 2022-06-17 RX ADMIN — IPRATROPIUM BROMIDE 2 SPRAY: 42 SPRAY NASAL at 21:36

## 2022-06-17 RX ADMIN — DIVALPROEX SODIUM 500 MG: 250 TABLET, DELAYED RELEASE ORAL at 08:41

## 2022-06-17 RX ADMIN — PANCRELIPASE 1 CAPSULE: 60000; 12000; 38000 CAPSULE, DELAYED RELEASE PELLETS ORAL at 17:00

## 2022-06-17 RX ADMIN — Medication 1 PACKET: at 21:35

## 2022-06-17 RX ADMIN — ATENOLOL 25 MG: 25 TABLET ORAL at 08:41

## 2022-06-17 RX ADMIN — TOPIRAMATE 100 MG: 100 TABLET, FILM COATED ORAL at 21:32

## 2022-06-17 RX ADMIN — RISPERIDONE 2 MG: 2 TABLET ORAL at 08:41

## 2022-06-17 RX ADMIN — SODIUM BICARBONATE 325 MG: 325 TABLET ORAL at 17:00

## 2022-06-17 RX ADMIN — SODIUM CHLORIDE, POTASSIUM CHLORIDE, SODIUM LACTATE AND CALCIUM CHLORIDE: 600; 310; 30; 20 INJECTION, SOLUTION INTRAVENOUS at 17:40

## 2022-06-17 RX ADMIN — DIVALPROEX SODIUM 500 MG: 250 TABLET, DELAYED RELEASE ORAL at 16:43

## 2022-06-17 RX ADMIN — LEVOCARNITINE 330 MG: 330 TABLET ORAL at 21:33

## 2022-06-17 RX ADMIN — LEVOTHYROXINE SODIUM 50 MCG: 50 TABLET ORAL at 08:41

## 2022-06-17 RX ADMIN — SODIUM CHLORIDE, POTASSIUM CHLORIDE, SODIUM LACTATE AND CALCIUM CHLORIDE: 600; 310; 30; 20 INJECTION, SOLUTION INTRAVENOUS at 08:45

## 2022-06-17 RX ADMIN — PANTOPRAZOLE SODIUM 40 MG: 40 TABLET, DELAYED RELEASE ORAL at 08:41

## 2022-06-17 RX ADMIN — LIDOCAINE HYDROCHLORIDE 10 ML: 10 INJECTION, SOLUTION EPIDURAL; INFILTRATION; INTRACAUDAL; PERINEURAL at 12:03

## 2022-06-17 RX ADMIN — SERTRALINE HYDROCHLORIDE 100 MG: 100 TABLET ORAL at 08:41

## 2022-06-17 RX ADMIN — PANTOPRAZOLE SODIUM 40 MG: 40 TABLET, DELAYED RELEASE ORAL at 21:34

## 2022-06-17 RX ADMIN — PANCRELIPASE 1 CAPSULE: 60000; 12000; 38000 CAPSULE, DELAYED RELEASE PELLETS ORAL at 21:29

## 2022-06-17 RX ADMIN — SODIUM BICARBONATE 325 MG: 325 TABLET ORAL at 21:29

## 2022-06-17 RX ADMIN — LIDOCAINE HYDROCHLORIDE 5 ML: 20 SOLUTION ORAL; TOPICAL at 13:22

## 2022-06-17 RX ADMIN — LEVOCETIRIZINE DIHYDROCHLORIDE 5 MG: 5 TABLET ORAL at 21:32

## 2022-06-17 RX ADMIN — IPRATROPIUM BROMIDE 2 SPRAY: 42 SPRAY NASAL at 08:42

## 2022-06-17 RX ADMIN — SODIUM CHLORIDE, POTASSIUM CHLORIDE, SODIUM LACTATE AND CALCIUM CHLORIDE: 600; 310; 30; 20 INJECTION, SOLUTION INTRAVENOUS at 00:38

## 2022-06-17 RX ADMIN — MONTELUKAST 10 MG: 10 TABLET, FILM COATED ORAL at 21:34

## 2022-06-17 RX ADMIN — LEVOCARNITINE 330 MG: 330 TABLET ORAL at 08:41

## 2022-06-17 RX ADMIN — CARBOXYMETHYLCELLULOSE SODIUM 1 DROP: 10 GEL OPHTHALMIC at 21:34

## 2022-06-17 RX ADMIN — ZINC SULFATE 220 MG (50 MG) CAPSULE 220 MG: CAPSULE at 08:41

## 2022-06-17 RX ADMIN — LEVOCARNITINE 330 MG: 330 TABLET ORAL at 13:09

## 2022-06-17 ASSESSMENT — ACTIVITIES OF DAILY LIVING (ADL)
ADLS_ACUITY_SCORE: 53
ADLS_ACUITY_SCORE: 57
ADLS_ACUITY_SCORE: 53
ADLS_ACUITY_SCORE: 57
ADLS_ACUITY_SCORE: 57
ADLS_ACUITY_SCORE: 53
ADLS_ACUITY_SCORE: 57
ADLS_ACUITY_SCORE: 53
ADLS_ACUITY_SCORE: 53
ADLS_ACUITY_SCORE: 57
ADLS_ACUITY_SCORE: 53
ADLS_ACUITY_SCORE: 53

## 2022-06-17 NOTE — PLAN OF CARE
A&Ox3, disoriented to situation. VSS on 1L oxygen while sleeping, continues to be bradycardic but asymptomatic. A1 w/b to bathroom, occasional incontinence. Remains NPO, plan for possible NG placement today for TF. GI and pulmonology continue to follow.

## 2022-06-17 NOTE — PLAN OF CARE
VSS on room air. Patient denies pain. R thoracentesis with 0.5L off. Site CDI. CT continues to show persistent large pleural effusion. Pulmonology recommends thoracic surgery consult. NJ tube placed this afternoon. Tube feedings initiated. NPO. GI continues to follow.

## 2022-06-17 NOTE — CONSULTS
TF consult received - plan to place NJ feeding tube today  Per MD, pt may be on EN for the next few weeks    Pt is currently on Creon 36 enzymes TID (not receiving at NPO)  IVF @ 125 mL/hr    PLAN:  Jevity 1.5 via NJ @ 50 mL/hr x 24 hours + 3 packets Prosource = 1920 kcal (27 kcal/kg), 110 g protein (1.6 g/kg), 259 g CHO, 60 g fat, 25 g fiber, and 912 mL free water.   Will start TF at 20 mL/hr.  Increase by 15 mL every 24 hrs to goal rate of 50 mL/hr.      With initiation of TF, begin the following pancreatic enzyme regimen:   A) Sodium Bicarb tablet (325 mg), 1 tablet q 4 hrs via NJ.       Administration Instructions: Crush 1 tablet and mix into 15 ml of warm water and use this solution to mix with Creon pancreatic enzymes. DO NOT administer directly into Jtube (will be mixed into TF formula + Creon enzyme mixture - see Creon enzyme order)     B) Creon 12 --> 2 capsules every 4 hrs via NJ.        Administration Instructions:   --If TF rate is running @ 10-20 ml/hr, administer 1 capsule every 4 hrs;   --If TF rate is running @ 30-50 ml/hr, increase to 2 capsules every 4 hrs.     ** Open the capsule and empty contents into the 15 mL sodium bicarb solution (see sodium bicarb order), let dissolve for about 20-30 minutes and then add this solution to the volume of TF formula that pt receives every 4 hrs (i.e., once TF @ goal infusion of 50 mL/hr, will mix 2 capsules into 200 ml of TF formula every 4 hrs).     * Administer via the open tube feeding system (pour into a bag and hang).  Will repeat this process every 4 hrs.  A new can of tube feed is not required with every feeding.  Once a can is opened, it can be placed in the frig and reused until gone.    * Note: this enzyme regimen with TF @ goal infusion will provide approx 2042 units of lipase/gram of total Fat daily, and appropriate dosing initially for pancreatic insufficiency with more elemental TF formula.       Priscila Elizondo, RD, LD  Clinical Dietitian - [facility]  [facility]   Pager - [phone]

## 2022-06-17 NOTE — PROGRESS NOTES
"PULMONOLOGY PROGRESS NOTE    Date of Admission: 6/14/2022    CC/Reason for Hospital visit: large unilateral effusion  SUBJECTIVE      Feels about the same, has had 4 thoras. Remains on low flow o2.        ROS: A Problem Pertinent review of systems was negative except for items noted in HPI.  Past Medical, Family, and Social/Substance History has been reviewed: No interval changes.   OBJECTIVE   Vital signs:  Temp: 97.4  F (36.3  C) Temp src: Oral BP: 95/56 Pulse: 60   Resp: 16 SpO2: 90 % O2 Device: None (Room air) Oxygen Delivery: 1 LPM   Weight: 96.4 kg (212 lb 8 oz)  Estimated body mass index is 35.36 kg/m  as calculated from the following:    Height as of 6/13/22: 1.651 m (5' 5\").    Weight as of this encounter: 96.4 kg (212 lb 8 oz).        I/O last 3 completed shifts:  In: 1843 [I.V.:1843]  Out: 200 [Urine:200]       CONSTITUTIONAL/GENERAL APPEARANCE: Alert male. No Apparent Distress. Disheveled   PSYCHIATRIC: Flat mood and affect.   EARS, NOSE,THROAT,MOUTH: External ears and nose overall normal. nl oral mucosa.   NECK: Neck appearance normal. No neck masses and the thyroid not enlarged.   RESPIRATORY: Non-labored effort. Dec right base      LABORATORY ASSESSMENT    Arterial Blood Gas  Recent Labs   Lab 06/15/22  1454   PH 7.39   PCO2 46*   PO2 68*   HCO3 27   O2PER 40     CBC  Recent Labs   Lab 06/16/22  0802 06/14/22  1903   WBC 4.4 5.7   RBC 3.82* 4.05*   HGB 11.7* 12.6*   HCT 36.4* 38.9*   MCV 95 96   MCH 30.6 31.1   MCHC 32.1 32.4   RDW 16.7* 16.6*   * 185     BMP  Recent Labs   Lab 06/17/22  0730 06/16/22  1749 06/16/22  0802 06/15/22  1616 06/14/22  1920   NA  --   --  145*  --  141   POTASSIUM 4.0 4.1 3.4 4.0 3.9   CHLORIDE  --   --  112*  --  106   NASIR  --   --  9.0  --  9.0   CO2  --   --  30  --  28   BUN  --   --  14  --  14   CR  --   --  0.74  --  0.87   GLC  --   --  78  --  132*     INR  Recent Labs   Lab 06/14/22  1903   INR 1.64*      BNPNo lab results found in last 7 days.  VENOUS " BLOOD GASESNo lab results found in last 7 days.      Additional labs and/or comments:     IMAGING      Thora 6/14: 1L  Thora 6/15: 2.2L  Thora 6/16: 1.3L  Thora 6/17: 0.5L    PFT & OTHER TESTING       ASSESSMENT / PLAN      Pulmonary diagnoses:  Abnl CT/CXR R91.8  Pleural effusion  Resp fail acute J96.00        ASSESSMENT : Jagdeep Walker is a 51 year-old male with complex history including seizure disorder, schizoaffective disorder, recent pancreatitis with pseudocysts, history of portal vein thrombosis, recent admission for metabolic encephalopathy of unclear etiology who presents with shortness of breath and hypoxia with large pleural effusion on outside chest x-ray.  Has had 3 thoracenteses since admission and feeling better. Suspect effusion related to pancreatitis. Malignancy possible but seems less likely given bland fluid and elevated amylase and lipase.        PLAN:   CT chest 6/17 with large unilateral pleural effusion. Unsure if defect in diaphragm vs poor oncotic pressure with malnutrition.   Challenging situation and suggest consult to thoracic surgery regarding utility of chest tube vs pleurodesis given rapidly re-accumulating pleural effusion  Aggressive IS and up to chair  Wean o2 as able, goal sat ~ 88-90%  Will follow        Fede Hermosillo  [facility]  Office: [phone]  Pager: [phone]

## 2022-06-17 NOTE — PLAN OF CARE
[id number] shift. O2 weaned to room air. BP soft and bradycardiac.  Disorientated to situation, pleasant. Up to chair and ambulated to bathroom w/ 1 assist walker/gb, repos in bed. NPO. Incontinent of urine at times. K replaced on days, re-check 4.1. GI and Pulmonology following, plan for repeat CXR this evening and possible NG placement for TF tomorrow.

## 2022-06-17 NOTE — PROGRESS NOTES
[facility]  Gastroenterology Progress Note     Jagdeep Walker MRN# [id number]   YOB: 1970 Age: 51 year old          Assessment and Plan:   Jagdeep Walker s a 51 year old male admitted with right sided pleural effusion and GI consulted to manage pancreatitis.     Active Problems:  Pleural effusion  Pancreatic pseudocyst  Idiopathic acute pancreatitis, unspecified complication status  *Lipase 1,730->689, AST, ALT and Alk Phos normal. Lactic acid 1.9. WBC wnl, Hgb 12.6,    *6/14 CT A/P note improved inflammatory changes in pancreas. Pseudocysts not significantly changed. Gastric wall thickening has progressed consistent with secondary gastritis with new pseudocyst along the proximal posterior gastric wall. Ascites decreased. Wall thickening involving the cecum and transverse colon likely secondarily involved. Large right pleural effusion has significantly increased.  Cause of pleural effusions may be due to pancreatitis and possible development of fistula. Amylase results on pleural cavity- 504 ( unknown normal range- will compare to serum levels. Dr. Clark will further evaluate patient today and consider EUS with ERCP. Pleural effusion is on right and less likely due to pancreatitis. Fluid collection may be due to more nutrition and low albumin.  Patient not able to keep up with nutritional needs and pancreas remains inflamed. Keeping NPO and starting on TPN vs NJ for a few weeks would be beneficial  Cause of pancreatitis is idiopathic- no evidence of gallstones or alcohol use. May be due to medication      -- IgG4 level normal and no evidence of autoimmune pancreatitis  -- Will need supplemental nutrition, TPN vs NJ likely for a few weeks to limit pancreatic inflammation, increase albumin and limit fluid accumulation.  -- Daily labs  -- No plans for EGD or ERCP today- pending course -will consider                Interval History:   no new complaints, denies chest pain,  pain is controlled and has ongoing abdominal pain only with moderate pressure              Review of Systems:   C: NEGATIVE for fever, chills, change in weight  E/M: NEGATIVE for ear, mouth and throat problems  R: NEGATIVE for significant cough or SOB  CV: NEGATIVE for chest pain, palpitations or peripheral edema             Medications:   I have reviewed this patient's current medications    amylase-lipase-protease  2 capsule Oral TID w/meals     atenolol  25 mg Oral Daily     carboxymethylcellulose PF  1 drop Right Eye At Bedtime     divalproex sodium delayed-release  250 mg Oral At Bedtime     divalproex sodium delayed-release  500 mg Oral BID     ipratropium  2 spray Both Nostrils TID     levOCARNitine  330 mg Oral TID     levocetirizine  5 mg Oral QPM     levothyroxine  50 mcg Oral Daily     montelukast  10 mg Oral At Bedtime     pantoprazole  40 mg Oral BID     prednisoLONE acetate  1 drop Right Eye Daily     risperiDONE  2 mg Oral BID     sertraline  100 mg Oral Daily     sodium chloride (PF)  3 mL Intracatheter Q8H     topiramate  100 mg Oral At Bedtime     zinc sulfate  220 mg Oral Daily                  Physical Exam:   Vitals were reviewed  Vital Signs with Ranges  Temp:  [97.4  F (36.3  C)-97.9  F (36.6  C)] 97.4  F (36.3  C)  Pulse:  [47-60] 60  Resp:  [16] 16  BP: ()/(56-70) 95/56  SpO2:  [82 %-99 %] 90 %  I/O last 3 completed shifts:  In: 1843 [I.V.:1843]  Out: 600 [Urine:600]  Constitutional: alert, mild distress, cooperative and pale   Cardiovascular: negative, PMI normal. No lifts, heaves, or thrills. RRR. No murmurs, clicks gallops or rub  Respiratory: wheezy, Percussion normal. Good diaphragmatic excursion.   Abdomen: Abdomen soft, non-tender. BS normal. No masses, organomegaly           Data:   I reviewed the patient's new clinical lab test results.   Recent Labs   Lab Test 06/16/22  0802 06/14/22  1903 06/09/22  0639 06/06/22  0659 06/05/22  0728 04/29/22  0625 04/27/22  2248   WBC 4.4  5.7 4.2   < > 3.8*   < > 7.2   HGB 11.7* 12.6* 12.3*   < > 12.0*   < > 11.6*   MCV 95 96 93   < > 95   < > 92   * 185 116*   < > 92*   < > 151   INR  --  1.64*  --   --  1.73*  --  1.26*    < > = values in this interval not displayed.     Recent Labs   Lab Test 06/17/22  0730 06/16/22  1749 06/16/22  0802 06/15/22  1616 06/14/22  1920 06/09/22  0639   POTASSIUM 4.0 4.1 3.4   < > 3.9 3.9   CHLORIDE  --   --  112*  --  106 110*   CO2  --   --  30  --  28 26   BUN  --   --  14  --  14 12   ANIONGAP  --   --  3  --  7 5    < > = values in this interval not displayed.     Recent Labs   Lab Test 06/15/22  1011 06/15/22  0034 06/14/22  1920 06/09/22  0639 06/07/22  0709 06/05/22  0728 06/04/22  1653 06/04/22  0754 05/16/22  1302 05/08/22  2032 05/08/22  0934   ALBUMIN  --   --  2.4* 2.4* 2.4* 2.4*  --  2.4* 2.4*   < >  --    BILITOTAL  --   --  0.6 0.9  --  0.7  --  0.7 0.6   < >  --    ALT  --   --  18 20  --  27  --  28 34   < >  --    AST  --   --  21 24  --  61*  --  76* 37   < >  --    PROTEIN  --  Negative  --   --   --   --  Negative  --   --   --  Negative   LIPASE  --   --  689*  --   --   --   --  466* 1,461*   < >  --    AMYLASE 142*  --   --   --   --   --   --   --   --   --   --     < > = values in this interval not displayed.       I reviewed the patient's new imaging results.    All laboratory data reviewed  All imaging studies reviewed by me.    Isabelle Pantoja PA-C,  6/16/2022  [facility]  Office : [phone]  Cell: [phone] (Dr. Clark)  Cell: [phone] (Isabelle Pantoja PA-C)

## 2022-06-17 NOTE — PROGRESS NOTES
[facility]    Medicine Progress Note - Hospitalist Service    Date of Admission:  6/14/2022    Assessment & Plan          Jagdeep Walker is a 51 year-old male with complex history including seizure disorder, schizoaffective disorder, recent pancreatitis with pseudocysts, history of portal vein thrombosis, recent admission for metabolic encephalopathy of unclear etiology who presents with shortness of breath and hypoxia with large pleural effusion on outside chest x-ray.  Admitted on 6/14/2022.      Large right pleural effusion   Acute hypoxic respiratory failure  S/p thoracentesis 6/14 with 1L out  S/p thoracentesis 6/15 with 2.2L out  *Presents from TCU with shortness of breath and hypoxia to 85%, CXR at TCU with large right-sided pleural effusion with subtotal collapse of the right lung  *CT chest/abdomen/pelvis post thoracentesis with persistent large right pleural effusion with near complete opacification of the right hemithorax, smaller left pleural effusion and left basilar atelectasis.  Recent echocardiogram 6/4 normal.  - Pleural fluid studies consistent with transudate, specimen clotted for cell count, normal glucose, no organisms on gram stain.  Cytology pending.  -Appreciate pulmonary input, seen 6/16, current consensus is that effusion is secondary to possible acute pancreatitis due to the presence of elevated lipase and amylase in the pleural fluid.  Repeat thoracentesis ordered on 6/17, following that CT chest ordered.  --Follow-up on the labs ordered on 6/17 (pleural fluid)  -Patient is currently resting in room air.  -So far patient had thoracentesis on 6/14, 6/15, 6/16 and 6/17.     Acute/subacute pancreatitis with pseudocyst   *Hospitalized 5/8-5/17/22 for pancreatitis with pseudocyst formation. Unclear etiology, EUS unrevealing for etiology, possibly due to medications  *CT on admission with acute pancreatitis with slightly improved inflammatory changes, pseudocysts  not significantly changed  *Sister notes intake has remained generally poor and has complained of pain with eating, not significantly tender on exam at present.  -Seen by Dr. Clark, current plan is to start nutrition, discussed with patient's guardian, will place Nj tube today by x-ray, nutrition consulted, patient might need to continue tube feeding for at least 3 weeks.  -Continue n.p.o. status, continue pain control, nutrition consulted.     Recent metabolic encephalopathy   Mild zinc deficiency   *Extensively worked up during admission 6/4-6/10/22 including head CT, MRI brain, EEG, paraneoplastic ab, extensive laboratory work-up mostly unremarkable with exception of mildly low zinc.  *Per sister, patient does have some cognitive impairment at baseline, though is typically oriented to self, family and can carry on an extensive conversation especially regarding topics he enjoys (eg sports). He remains below his baseline.   - Start zinc supplementation, his mental status is close to baseline now.       Hx of portal vein thrombosis  *Diagnosed during admission 5/2022, improved on subsequent CT and not treated with anticoagulation   *Non-occlusive main portal vein thrombosis, splenic vein thrombosis seen on admission CT  - GI consulted as above      Depression  Anxiety  Schizoaffective disorder, bipolar type  - Continue prior to admission sertraline, risperidone     Seizure disorder  - Continue prior to admission topiramate, Depakote     Carnitine deficiency  - Continue prior to admission levocarnitine      Hypertension  - Continue prior to admission atenolol      Fisher's esophagus  - Continue prior to admission PPI     Hypothyroidism  - Continue prior to admission levothyroxine     Seasonal allergies  - Continue prior to admission levocetirizine            Diet: NPO per Anesthesia Guidelines for Procedure/Surgery Except for: Meds    DVT Prophylaxis: Pneumatic Compression Devices  [place] Catheter: Not  "present  Central Lines: None  Cardiac Monitoring: None  Code Status: Full Code      Disposition Plan   Expected Discharge: 06/21/2022     Anticipated discharge location:  Awaiting care coordination huddle  Delays:          The patient's care was discussed with family and the Patient.    Patsy Gannon MD  Hospitalist Service  [facility]  Securely message with the Vocera Web Console (learn more here)  Text page via [employer] Paging/Directory         Clinically Significant Risk Factors Present on Admission             # Obesity: Estimated body mass index is 35.36 kg/m  as calculated from the following:    Height as of 6/13/22: 1.651 m (5' 5\").    Weight as of this encounter: 96.4 kg (212 lb 8 oz).  # Moderate Malnutrition: based on nutrition assessment     ______________________________________________________________________    Interval History   Patient is resting, pain well controlled, off of oxygen today, he had few questions about next step in planning.    Data reviewed today: I reviewed all medications, new labs and imaging results over the last 24 hours. I personally reviewed no images or EKG's today.    Physical Exam   Vital Signs: Temp: 97.4  F (36.3  C) Temp src: Oral BP: 95/56 Pulse: 60   Resp: 16 SpO2: 90 % O2 Device: None (Room air) Oxygen Delivery: 1 LPM  Weight: 212 lbs 8 oz  Constitutional: Awake, alert, cooperative, no apparent distress  Respiratory: Breath sounds reduced bilateral bases right greater than left  Cardiovascular: Regular rate and rhythm, normal S1 and S2, and no murmur noted  GI: Abdominal fullness noted, epigastric tenderness present, bowel sounds present.  Skin/Integumen/MSK: No rashes, no cyanosis, no edema  Neuro : moving all 4 extremities, no focal deficit noted       Data   Recent Labs   Lab 06/17/22  1035 06/17/22  0730 06/16/22  1749 06/16/22  0802 06/15/22  1616 06/14/22  1920 06/14/22  1903   WBC  --   --   --  4.4  --   --  5.7   HGB  --   --   --  " 11.7*  --   --  12.6*   MCV  --   --   --  95  --   --  96   PLT  --   --   --  145*  --   --  185   INR  --   --   --   --   --   --  1.64*   NA  --   --   --  145*  --  141  --    POTASSIUM  --  4.0 4.1 3.4   < > 3.9  --    CHLORIDE  --   --   --  112*  --  106  --    CO2  --   --   --  30  --  28  --    BUN  --   --   --  14  --  14  --    CR  --   --   --  0.74  --  0.87  --    ANIONGAP  --   --   --  3  --  7  --    NASIR  --   --   --  9.0  --  9.0  --    GLC  --   --   --  78  --  132*  --    ALBUMIN  --   --   --   --   --  2.4*  --    PROTTOTAL 5.1*  --   --   --   --  5.4*  --    BILITOTAL  --   --   --   --   --  0.6  --    ALKPHOS  --   --   --   --   --  75  --    ALT  --   --   --   --   --  18  --    AST  --   --   --   --   --  21  --    LIPASE  --   --   --   --   --  689*  --     < > = values in this interval not displayed.     Recent Results (from the past 24 hour(s))   US Thoracentesis    Narrative    ULTRASOUND GUIDED THORACENTESIS  6/16/2022 1:59 PM     HISTORY: Pleural effusion    FINDINGS: Ultrasound was used to evaluate for the presence and best  approach for drainage of a pleural effusion. Written and oral informed  consent was obtained. A pause for the cause procedure to verify the  correct patient and correct procedure. The skin overlying the right  chest posteriorly was prepped and draped in the usual sterile fashion.  The subcutaneous tissues were anesthetized with 10 mL 1% lidocaine. A  catheter was advanced into the pleural space and 1350 mL of  diana-colored fluid was drained. Drainage was stopped at the request  of the patient. Patient was monitored by nurse under my direct  supervision throughout the exam. Ultrasound images were permanently  stored.  There were no immediate complications. Patient left the  ultrasound suite in satisfactory condition.      Impression    IMPRESSION: Technically successful thoracentesis without immediate  complications.    JEANNIE ARREOLA MD         SYSTEM  ID:  [id number]   XR Chest 2 Views    Narrative    EXAM: XR CHEST 2 VW  LOCATION: [facility]  DATE/TIME: 6/16/2022 6:02 PM    INDICATION: Pleural effusion. s p multiple recent thoras. ? any residual pleural fluid  COMPARISON: 6/15/2022      Impression    IMPRESSION: Significant decrease volume of right pleural effusion with previous exam demonstrating complete opacification of right hemithorax. There is now aeration of upper lobe but persistent opacity at right base consistent with moderate residual   right pleural effusion. There is also small residual left pleural effusion. Heart size normal

## 2022-06-18 ENCOUNTER — APPOINTMENT (OUTPATIENT)
Dept: INTERVENTIONAL RADIOLOGY/VASCULAR | Facility: CLINIC | Age: 52
DRG: 438 | End: 2022-06-18
Attending: HOSPITALIST
Payer: MEDICARE

## 2022-06-18 ENCOUNTER — APPOINTMENT (OUTPATIENT)
Dept: GENERAL RADIOLOGY | Facility: CLINIC | Age: 52
DRG: 438 | End: 2022-06-18
Attending: NURSE PRACTITIONER
Payer: MEDICARE

## 2022-06-18 LAB
ALBUMIN SERPL-MCNC: 2.2 G/DL (ref 3.4–5)
ALP SERPL-CCNC: 56 U/L (ref 40–150)
ALT SERPL W P-5'-P-CCNC: 24 U/L (ref 0–70)
ANION GAP SERPL CALCULATED.3IONS-SCNC: 4 MMOL/L (ref 3–14)
ANION GAP SERPL CALCULATED.3IONS-SCNC: 5 MMOL/L (ref 3–14)
AST SERPL W P-5'-P-CCNC: 23 U/L (ref 0–45)
BASE EXCESS BLDV CALC-SCNC: 1.9 MMOL/L (ref -7.7–1.9)
BILIRUB SERPL-MCNC: 0.6 MG/DL (ref 0.2–1.3)
BUN SERPL-MCNC: 16 MG/DL (ref 7–30)
BUN SERPL-MCNC: 17 MG/DL (ref 7–30)
CALCIUM SERPL-MCNC: 8.9 MG/DL (ref 8.5–10.1)
CALCIUM SERPL-MCNC: 9.2 MG/DL (ref 8.5–10.1)
CHLORIDE BLD-SCNC: 112 MMOL/L (ref 94–109)
CHLORIDE BLD-SCNC: 113 MMOL/L (ref 94–109)
CO2 SERPL-SCNC: 26 MMOL/L (ref 20–32)
CO2 SERPL-SCNC: 28 MMOL/L (ref 20–32)
CREAT SERPL-MCNC: 0.78 MG/DL (ref 0.66–1.25)
CREAT SERPL-MCNC: 0.85 MG/DL (ref 0.66–1.25)
ERYTHROCYTE [DISTWIDTH] IN BLOOD BY AUTOMATED COUNT: 16.8 % (ref 10–15)
GFR SERPL CREATININE-BSD FRML MDRD: >90 ML/MIN/1.73M2
GFR SERPL CREATININE-BSD FRML MDRD: >90 ML/MIN/1.73M2
GLUCOSE BLD-MCNC: 81 MG/DL (ref 70–99)
GLUCOSE BLD-MCNC: 89 MG/DL (ref 70–99)
HCO3 BLDV-SCNC: 28 MMOL/L (ref 21–28)
HCT VFR BLD AUTO: 39.9 % (ref 40–53)
HGB BLD-MCNC: 12.6 G/DL (ref 13.3–17.7)
INR PPP: 1.74 (ref 0.85–1.15)
LACTATE SERPL-SCNC: 0.9 MMOL/L (ref 0.7–2)
LIPASE SERPL-CCNC: 579 U/L (ref 73–393)
MCH RBC QN AUTO: 30.7 PG (ref 26.5–33)
MCHC RBC AUTO-ENTMCNC: 31.6 G/DL (ref 31.5–36.5)
MCV RBC AUTO: 97 FL (ref 78–100)
O2/TOTAL GAS SETTING VFR VENT: 5 %
PCO2 BLDV: 50 MM HG (ref 40–50)
PH BLDV: 7.36 [PH] (ref 7.32–7.43)
PLATELET # BLD AUTO: 152 10E3/UL (ref 150–450)
PO2 BLDV: 29 MM HG (ref 25–47)
POTASSIUM BLD-SCNC: 3.8 MMOL/L (ref 3.4–5.3)
POTASSIUM BLD-SCNC: 3.8 MMOL/L (ref 3.4–5.3)
POTASSIUM BLD-SCNC: 4.2 MMOL/L (ref 3.4–5.3)
PROT SERPL-MCNC: 4.8 G/DL (ref 6.8–8.8)
RBC # BLD AUTO: 4.1 10E6/UL (ref 4.4–5.9)
SODIUM SERPL-SCNC: 143 MMOL/L (ref 133–144)
SODIUM SERPL-SCNC: 145 MMOL/L (ref 133–144)
WBC # BLD AUTO: 4.6 10E3/UL (ref 4–11)

## 2022-06-18 PROCEDURE — 120N000001 HC R&B MED SURG/OB

## 2022-06-18 PROCEDURE — 272N000500 HC NEEDLE CR2

## 2022-06-18 PROCEDURE — C1769 GUIDE WIRE: HCPCS

## 2022-06-18 PROCEDURE — 250N000009 HC RX 250: Performed by: RADIOLOGY

## 2022-06-18 PROCEDURE — 88305 TISSUE EXAM BY PATHOLOGIST: CPT | Mod: TC | Performed by: HOSPITALIST

## 2022-06-18 PROCEDURE — 258N000003 HC RX IP 258 OP 636: Performed by: INTERNAL MEDICINE

## 2022-06-18 PROCEDURE — 36415 COLL VENOUS BLD VENIPUNCTURE: CPT | Performed by: INTERNAL MEDICINE

## 2022-06-18 PROCEDURE — 85610 PROTHROMBIN TIME: CPT | Performed by: NURSE PRACTITIONER

## 2022-06-18 PROCEDURE — 85027 COMPLETE CBC AUTOMATED: CPT | Performed by: NURSE PRACTITIONER

## 2022-06-18 PROCEDURE — 36415 COLL VENOUS BLD VENIPUNCTURE: CPT | Performed by: NURSE PRACTITIONER

## 2022-06-18 PROCEDURE — 84132 ASSAY OF SERUM POTASSIUM: CPT | Performed by: NURSE PRACTITIONER

## 2022-06-18 PROCEDURE — 0W9930Z DRAINAGE OF RIGHT PLEURAL CAVITY WITH DRAINAGE DEVICE, PERCUTANEOUS APPROACH: ICD-10-PCS | Performed by: RADIOLOGY

## 2022-06-18 PROCEDURE — 87015 SPECIMEN INFECT AGNT CONCNTJ: CPT | Performed by: RADIOLOGY

## 2022-06-18 PROCEDURE — 84132 ASSAY OF SERUM POTASSIUM: CPT | Performed by: INTERNAL MEDICINE

## 2022-06-18 PROCEDURE — 250N000013 HC RX MED GY IP 250 OP 250 PS 637: Performed by: INTERNAL MEDICINE

## 2022-06-18 PROCEDURE — 272N000196 HC ACCESSORY CR5

## 2022-06-18 PROCEDURE — 999N000065 XR CHEST PORT 1 VIEW

## 2022-06-18 PROCEDURE — 83605 ASSAY OF LACTIC ACID: CPT | Performed by: NURSE PRACTITIONER

## 2022-06-18 PROCEDURE — 32557 INSERT CATH PLEURA W/ IMAGE: CPT

## 2022-06-18 PROCEDURE — C1729 CATH, DRAINAGE: HCPCS

## 2022-06-18 PROCEDURE — 82803 BLOOD GASES ANY COMBINATION: CPT | Performed by: NURSE PRACTITIONER

## 2022-06-18 PROCEDURE — 87070 CULTURE OTHR SPECIMN AEROBIC: CPT | Performed by: RADIOLOGY

## 2022-06-18 PROCEDURE — 258N000002 HC RX IP 258 OP 250: Performed by: HOSPITALIST

## 2022-06-18 PROCEDURE — 250N000011 HC RX IP 250 OP 636: Performed by: INTERNAL MEDICINE

## 2022-06-18 PROCEDURE — 83690 ASSAY OF LIPASE: CPT | Performed by: NURSE PRACTITIONER

## 2022-06-18 PROCEDURE — 99233 SBSQ HOSP IP/OBS HIGH 50: CPT | Performed by: HOSPITALIST

## 2022-06-18 PROCEDURE — 80048 BASIC METABOLIC PNL TOTAL CA: CPT | Performed by: INTERNAL MEDICINE

## 2022-06-18 PROCEDURE — 99232 SBSQ HOSP IP/OBS MODERATE 35: CPT | Performed by: NURSE PRACTITIONER

## 2022-06-18 RX ORDER — FLUMAZENIL 0.1 MG/ML
0.2 INJECTION, SOLUTION INTRAVENOUS
Status: DISCONTINUED | OUTPATIENT
Start: 2022-06-18 | End: 2022-06-18 | Stop reason: HOSPADM

## 2022-06-18 RX ORDER — NALOXONE HYDROCHLORIDE 0.4 MG/ML
0.2 INJECTION, SOLUTION INTRAMUSCULAR; INTRAVENOUS; SUBCUTANEOUS
Status: DISCONTINUED | OUTPATIENT
Start: 2022-06-18 | End: 2022-06-18 | Stop reason: HOSPADM

## 2022-06-18 RX ORDER — NALOXONE HYDROCHLORIDE 0.4 MG/ML
0.4 INJECTION, SOLUTION INTRAMUSCULAR; INTRAVENOUS; SUBCUTANEOUS
Status: DISCONTINUED | OUTPATIENT
Start: 2022-06-18 | End: 2022-06-18 | Stop reason: HOSPADM

## 2022-06-18 RX ORDER — SODIUM CHLORIDE 450 MG/100ML
INJECTION, SOLUTION INTRAVENOUS CONTINUOUS
Status: DISCONTINUED | OUTPATIENT
Start: 2022-06-18 | End: 2022-06-19

## 2022-06-18 RX ORDER — FENTANYL CITRATE 50 UG/ML
25-50 INJECTION, SOLUTION INTRAMUSCULAR; INTRAVENOUS EVERY 5 MIN PRN
Status: DISCONTINUED | OUTPATIENT
Start: 2022-06-18 | End: 2022-06-18 | Stop reason: HOSPADM

## 2022-06-18 RX ADMIN — HYDROMORPHONE HYDROCHLORIDE 0.2 MG: 0.2 INJECTION, SOLUTION INTRAMUSCULAR; INTRAVENOUS; SUBCUTANEOUS at 19:34

## 2022-06-18 RX ADMIN — PANCRELIPASE 1 CAPSULE: 60000; 12000; 38000 CAPSULE, DELAYED RELEASE PELLETS ORAL at 02:00

## 2022-06-18 RX ADMIN — SODIUM CHLORIDE, POTASSIUM CHLORIDE, SODIUM LACTATE AND CALCIUM CHLORIDE: 600; 310; 30; 20 INJECTION, SOLUTION INTRAVENOUS at 02:08

## 2022-06-18 RX ADMIN — DIVALPROEX SODIUM 500 MG: 250 TABLET, DELAYED RELEASE ORAL at 09:31

## 2022-06-18 RX ADMIN — SODIUM BICARBONATE 325 MG: 325 TABLET ORAL at 05:35

## 2022-06-18 RX ADMIN — PANCRELIPASE 1 CAPSULE: 60000; 12000; 38000 CAPSULE, DELAYED RELEASE PELLETS ORAL at 09:31

## 2022-06-18 RX ADMIN — SERTRALINE HYDROCHLORIDE 100 MG: 100 TABLET ORAL at 09:31

## 2022-06-18 RX ADMIN — IPRATROPIUM BROMIDE 2 SPRAY: 42 SPRAY NASAL at 21:59

## 2022-06-18 RX ADMIN — LIDOCAINE HYDROCHLORIDE 10 ML: 10 INJECTION, SOLUTION INFILTRATION; PERINEURAL at 18:11

## 2022-06-18 RX ADMIN — MONTELUKAST 10 MG: 10 TABLET, FILM COATED ORAL at 21:58

## 2022-06-18 RX ADMIN — RISPERIDONE 2 MG: 2 TABLET ORAL at 09:31

## 2022-06-18 RX ADMIN — LEVOTHYROXINE SODIUM 50 MCG: 50 TABLET ORAL at 09:31

## 2022-06-18 RX ADMIN — SODIUM BICARBONATE 325 MG: 325 TABLET ORAL at 21:43

## 2022-06-18 RX ADMIN — PANTOPRAZOLE SODIUM 40 MG: 40 TABLET, DELAYED RELEASE ORAL at 21:58

## 2022-06-18 RX ADMIN — PANCRELIPASE 1 CAPSULE: 60000; 12000; 38000 CAPSULE, DELAYED RELEASE PELLETS ORAL at 05:35

## 2022-06-18 RX ADMIN — SODIUM CHLORIDE: 4.5 INJECTION, SOLUTION INTRAVENOUS at 14:08

## 2022-06-18 RX ADMIN — LEVOCARNITINE 330 MG: 330 TABLET ORAL at 21:58

## 2022-06-18 RX ADMIN — ALBUTEROL SULFATE 2 PUFF: 90 AEROSOL, METERED RESPIRATORY (INHALATION) at 02:25

## 2022-06-18 RX ADMIN — SODIUM CHLORIDE, POTASSIUM CHLORIDE, SODIUM LACTATE AND CALCIUM CHLORIDE: 600; 310; 30; 20 INJECTION, SOLUTION INTRAVENOUS at 09:58

## 2022-06-18 RX ADMIN — PANTOPRAZOLE SODIUM 40 MG: 40 TABLET, DELAYED RELEASE ORAL at 09:31

## 2022-06-18 RX ADMIN — CARBOXYMETHYLCELLULOSE SODIUM 1 DROP: 10 GEL OPHTHALMIC at 21:59

## 2022-06-18 RX ADMIN — SODIUM BICARBONATE 325 MG: 325 TABLET ORAL at 02:00

## 2022-06-18 RX ADMIN — ALBUTEROL SULFATE 2 PUFF: 90 AEROSOL, METERED RESPIRATORY (INHALATION) at 09:54

## 2022-06-18 RX ADMIN — TOPIRAMATE 100 MG: 100 TABLET, FILM COATED ORAL at 21:58

## 2022-06-18 RX ADMIN — DIVALPROEX SODIUM 250 MG: 250 TABLET, DELAYED RELEASE ORAL at 21:58

## 2022-06-18 RX ADMIN — ACETAMINOPHEN 650 MG: 325 TABLET ORAL at 02:00

## 2022-06-18 RX ADMIN — LEVOCARNITINE 330 MG: 330 TABLET ORAL at 14:07

## 2022-06-18 RX ADMIN — SODIUM BICARBONATE 325 MG: 325 TABLET ORAL at 14:07

## 2022-06-18 RX ADMIN — LEVOCARNITINE 330 MG: 330 TABLET ORAL at 09:31

## 2022-06-18 RX ADMIN — SODIUM BICARBONATE 325 MG: 325 TABLET ORAL at 09:42

## 2022-06-18 RX ADMIN — ZINC SULFATE 220 MG (50 MG) CAPSULE 220 MG: CAPSULE at 09:31

## 2022-06-18 RX ADMIN — Medication 1 PACKET: at 09:33

## 2022-06-18 RX ADMIN — HYDROMORPHONE HYDROCHLORIDE 0.2 MG: 0.2 INJECTION, SOLUTION INTRAMUSCULAR; INTRAVENOUS; SUBCUTANEOUS at 21:21

## 2022-06-18 RX ADMIN — ATENOLOL 25 MG: 25 TABLET ORAL at 09:31

## 2022-06-18 RX ADMIN — IPRATROPIUM BROMIDE 2 SPRAY: 42 SPRAY NASAL at 09:32

## 2022-06-18 RX ADMIN — PREDNISOLONE ACETATE 1 DROP: 10 SUSPENSION/ DROPS OPHTHALMIC at 09:32

## 2022-06-18 RX ADMIN — RISPERIDONE 2 MG: 2 TABLET ORAL at 21:58

## 2022-06-18 RX ADMIN — PANCRELIPASE 1 CAPSULE: 60000; 12000; 38000 CAPSULE, DELAYED RELEASE PELLETS ORAL at 14:07

## 2022-06-18 RX ADMIN — DIVALPROEX SODIUM 500 MG: 250 TABLET, DELAYED RELEASE ORAL at 15:27

## 2022-06-18 RX ADMIN — Medication 1 PACKET: at 15:28

## 2022-06-18 RX ADMIN — Medication 1 PACKET: at 21:59

## 2022-06-18 RX ADMIN — PANCRELIPASE 2 CAPSULE: 60000; 12000; 38000 CAPSULE, DELAYED RELEASE PELLETS ORAL at 21:45

## 2022-06-18 RX ADMIN — LEVOCETIRIZINE DIHYDROCHLORIDE 5 MG: 5 TABLET ORAL at 21:58

## 2022-06-18 RX ADMIN — IPRATROPIUM BROMIDE 2 SPRAY: 42 SPRAY NASAL at 15:25

## 2022-06-18 ASSESSMENT — ACTIVITIES OF DAILY LIVING (ADL)
ADLS_ACUITY_SCORE: 55
ADLS_ACUITY_SCORE: 53
ADLS_ACUITY_SCORE: 53
ADLS_ACUITY_SCORE: 55
ADLS_ACUITY_SCORE: 53
ADLS_ACUITY_SCORE: 55
ADLS_ACUITY_SCORE: 53
ADLS_ACUITY_SCORE: 53

## 2022-06-18 NOTE — PLAN OF CARE
VSS on room air. No c/o pain. Denies SOB at rest but appears JAIME. Expiratory wheezes noted. PRN inhaler x1. Encourage IS use. Up with assist x1 + walker, gait-belt. Ambulates to bathroom but also uses urinal at bedside. Incontinent x2 today. Medium loose BM x1. Blanchable redness to coccyx- new mepilex applied. Thora sites CDI. Scattered bruising. Tolerating tube feeding through NJ tube. [place] unchanged. Rate to increase to 35mL/hr this evening. Thoracic surgery consulted and recommend chest tube. Chest tube placed in IR this evening. Report given to [facility] on General Surgery. Patient to transfer to room 220 after procedure. Belongings and medications sent. Sister Huong (guardian) notified of plan of care.

## 2022-06-18 NOTE — IR NOTE
RADIOLOGY POST PROCEDURE NOTE    Patient name: Jagdeep Walker  MRN: [id number]  : 1970    Pre-procedure diagnosis: Recurrent right pleural effusion  Post-procedure diagnosis: Same    Procedure Date/Time: 2022  6:21 PM  Procedure: US guided right 12 Fr Chest tube.  1 L drained by gravity into pleurovac immediately, and placed clamp.  Patient has had pain after drainages ALL instances previously.  Will plan for gentle drainage to begin with gravity drainage and water seal.    If/when drainage tapers, may try suction.    May unclamp tube upstairs, though would watch patient closely for discomfort and clamp again, as needed.    CXR tonight and in AM.    Estimated blood loss: 2 ml  Specimen(s) collected with description: Right pleural fluid, sample to lab for gram stain and culture.    The patient tolerated the procedure well with no immediate complications.  Significant findings:  Please see above.    See imaging dictation for procedural details.    Provider name: Bethel Hinojosa MD  Assistant(s):None

## 2022-06-18 NOTE — PROGRESS NOTES
Thoracic Surgery:    Reviewed-- notes, hx and recent chest imaging reviewed. D/W Dr. Logan. 51-yr-old with recurrent large R pleural effusions, transudate, cytology negative, Gram stain no organisms and cultures NGTD. Has had 4 US-guided thoracenteses. Pancreatitis.     Recommend IR place right indwelling chest tube and place on suction (20 mmHg). Daily CXRs. Will follow.     Jennifer Sanders PA-C with Dr. Cesar Logan  [facility]  Cell [phone]

## 2022-06-18 NOTE — PROGRESS NOTES
[facility]    Medicine Progress Note - Hospitalist Service    Date of Admission:  6/14/2022    Assessment & Plan          Jagdeep Walker is a 51 year-old male with complex history including seizure disorder, schizoaffective disorder, recent pancreatitis with pseudocysts, history of portal vein thrombosis, recent admission for metabolic encephalopathy of unclear etiology who presents with shortness of breath and hypoxia with large pleural effusion on outside chest x-ray.  Admitted on 6/14/2022.      Large right pleural effusion   Acute hypoxic respiratory failure  S/p thoracentesis 6/14 with 1L out  S/p thoracentesis 6/15 with 2.2L out  S/p thoracentesis on 6/18 with 0.5 L out  *Presents from TCU with shortness of breath and hypoxia to 85%, CXR at TCU with large right-sided pleural effusion with subtotal collapse of the right lung  *CT chest/abdomen/pelvis post thoracentesis with persistent large right pleural effusion with near complete opacification of the right hemithorax, smaller left pleural effusion and left basilar atelectasis.  Recent echocardiogram 6/4 normal.  Pleural fluid studies consistent with transudate, specimen clotted for cell count, normal glucose, no organisms on gram stain.  Cytology negative.  Given lipase and amylase increase, perhaps pleural effusion related to pancreatitis with fistula or leak versus malnutrition related to severe pancreatitis  plan  -Appreciate pulmonary input  - repeat cytology, try to add on to yesterdays  - GI following  - thoracic surgery consultation to eval for chest tube versus pleurodesis  - on postpyloric feeds via feeding tube to improve nutrition     Acute/subacute pancreatitis with pseudocyst   *Hospitalized 5/8-5/17/22 for pancreatitis with pseudocyst formation. Unclear etiology, EUS unrevealing for etiology, possibly due to medications  *CT on admission with acute pancreatitis with slightly improved inflammatory changes, pseudocysts  not significantly changed  *Sister notes intake has remained generally poor and has complained of pain with eating, not significantly tender on exam at present.  -on tube feeds  -Continue n.p.o. status, continue pain control, nutrition consulted.     Recent metabolic encephalopathy   Mild zinc deficiency   *Extensively worked up during admission 6/4-6/10/22 including head CT, MRI brain, EEG, paraneoplastic ab, extensive laboratory work-up mostly unremarkable with exception of mildly low zinc.  *Per sister, patient does have some cognitive impairment at baseline, though is typically oriented to self, family and can carry on an extensive conversation especially regarding topics he enjoys (eg sports). He remains below his baseline.   - Start zinc supplementation, his mental status is close to baseline now.       Hx of portal vein thrombosis  *Diagnosed during admission 5/2022, improved on subsequent CT and not treated with anticoagulation   *Non-occlusive main portal vein thrombosis, splenic vein thrombosis seen on admission CT  - GI consulted as above, they advised no need to treat PVT previous admission (6/9/2022)     Depression  Anxiety  Schizoaffective disorder, bipolar type  - Continue prior to admission sertraline, risperidone     Seizure disorder  - Continue prior to admission topiramate, Depakote     Carnitine deficiency  - Continue prior to admission levocarnitine      Hypertension  - Continue prior to admission atenolol      Fisher's esophagus  - Continue prior to admission PPI     Hypothyroidism  - Continue prior to admission levothyroxine     Seasonal allergies  - Continue prior to admission levocetirizine            Diet: NPO per Anesthesia Guidelines for Procedure/Surgery Except for: Meds  Adult Formula Drip Feeding: Continuous Jevity 1.5; Nasojejunal; Goal Rate: 50; mL/hr; Medication - Feeding Tube Flush Frequency: At least 15-30 mL water before and after medication administration and with tube  "clogging; Amount to Send (Nutrition u...    DVT Prophylaxis: Pneumatic Compression Devices. Need pharmacologic ppx pending surgical consult today  Santana Catheter: Not present  Central Lines: None  Cardiac Monitoring: None  Code Status: Full Code      Disposition Plan   Expected Discharge: 06/21/2022     Anticipated discharge location:  Awaiting care coordination huddle  Delays:          The patient's care was discussed with family and the Patient.    Donnie Richmond DO  Hospitalist Service  [facility]  Securely message with the Vocera Web Console (learn more here)  Text page via [employer] book Paging/Directory         Clinically Significant Risk Factors Present on Admission             # Obesity: Estimated body mass index is 35.78 kg/m  as calculated from the following:    Height as of 6/13/22: 1.651 m (5' 5\").    Weight as of this encounter: 97.5 kg (215 lb).  # Moderate Malnutrition: based on nutrition assessment     ______________________________________________________________________    Interval History   Records reviewed. More or less comfortable, less pain today. Tolerating tube feeds.    Data reviewed today: I reviewed all medications, new labs and imaging results over the last 24 hours. I personally reviewed   CT with very large right pleural effusion    Physical Exam   Vital Signs: Temp: 97.4  F (36.3  C) Temp src: Oral BP: 117/67 Pulse: 57   Resp: 16 SpO2: 92 % O2 Device: Nasal cannula Oxygen Delivery: 1 LPM  Weight: 215 lbs 0 oz  Constitutional: Awake, alert, cooperative, no apparent distress  Respiratory: Breath sounds reduced bilateral bases right greater than left  Cardiovascular: Regular rate and rhythm, normal S1 and S2, and no murmur noted  GI: Abdominal fullness noted, epigastric tenderness present, bowel sounds present.  Skin/Integumen/MSK: No rashes, no cyanosis, no edema  Neuro : moving all 4 extremities, no focal deficit noted       Data   Recent Labs   Lab 06/17/22  1035 " 06/17/22  0730 06/16/22  1749 06/16/22  0802 06/15/22  1616 06/14/22  1920 06/14/22  1903   WBC  --   --   --  4.4  --   --  5.7   HGB  --   --   --  11.7*  --   --  12.6*   MCV  --   --   --  95  --   --  96   PLT  --   --   --  145*  --   --  185   INR  --   --   --   --   --   --  1.64*   NA  --   --   --  145*  --  141  --    POTASSIUM  --  4.0 4.1 3.4   < > 3.9  --    CHLORIDE  --   --   --  112*  --  106  --    CO2  --   --   --  30  --  28  --    BUN  --   --   --  14  --  14  --    CR  --   --   --  0.74  --  0.87  --    ANIONGAP  --   --   --  3  --  7  --    NASIR  --   --   --  9.0  --  9.0  --    GLC  --   --   --  78  --  132*  --    ALBUMIN  --   --   --   --   --  2.4*  --    PROTTOTAL 5.1*  --   --   --   --  5.4*  --    BILITOTAL  --   --   --   --   --  0.6  --    ALKPHOS  --   --   --   --   --  75  --    ALT  --   --   --   --   --  18  --    AST  --   --   --   --   --  21  --    LIPASE  --   --   --   --   --  689*  --     < > = values in this interval not displayed.     Recent Results (from the past 24 hour(s))   US Thoracentesis    Narrative    EXAM:  1. RIGHT THORACENTESIS  2. ULTRASOUND GUIDANCE  LOCATION: [facility]  DATE/TIME: 6/17/2022 12:21 PM    INDICATION: Pleural effusion.    PROCEDURE: Informed consent obtained. Time out performed. The chest  was prepped and draped in a sterile fashion. 10 mL of 1% lidocaine was  infused into local soft tissues. A 5 Syriac catheter system was  introduced into the pleural effusion under ultrasound guidance.    0.5 liters of clear fluid were removed and sent to lab if requested.    Patient tolerated procedure well.    Ultrasound images have been permanently captured for documentation.      Impression    IMPRESSION:  Status post ultrasound-guided right thoracentesis.    CASEY JAQUEZ MD         SYSTEM ID:  [id number]   CT Chest w/o Contrast    Narrative    CT CHEST WITHOUT CONTRAST 6/17/2022 1:00 PM     HISTORY: Pneumonia, effusion or abscess  suspected, x-ray done.    TECHNIQUE: CT scan of the chest was performed without IV contrast.  Multiplanar reformats were obtained. Dose reduction techniques were  used.  CONTRAST: None.  COMPARISON: 6/14/2022    FINDINGS:     LUNGS AND PLEURA: Large right pleural effusion, only minimally  decreased from previous despite multiple thoracenteses. A small left  pleural effusion is stable. Compressive atelectasis in the right lower  lobe and lesser extent in the right middle and upper lobes. Minimal  compressive atelectasis left lower lobe. Ossified granulomas in the  right lower lobe. Lungs are otherwise clear.    MEDIASTINUM/AXILLAE: No lymphadenopathy. No visible coronary artery  calcification.    UPPER ABDOMEN: Changes of pancreatitis have not appreciably changed  from recent abdominal CT.      Impression    IMPRESSION:  1.  Persistent large right pleural effusion despite interval  thoracenteses. Small left pleural effusion.  2.  Partially visualized changes of pancreatitis have not appreciably  changed from prior abdominal CT.    CASEY JAQUEZ MD         SYSTEM ID:  [id number]   XR Abdomen Port 1 View    Narrative    XR ABDOMEN PORT 1 VIEWS 6/17/2022 2:22 PM    HISTORY: Verify small bowel feeding tube bedside placement    COMPARISON: 6/14/2022      Impression    IMPRESSION: Feeding tube in good position with tip in the distal  duodenum. Bowel gas pattern is nonobstructed.    CASEY JAQUEZ MD         SYSTEM ID:  [id number]

## 2022-06-18 NOTE — PLAN OF CARE
Pt is A&Ox3. Disoriented to situation. Slow to respond. VSS on RA. Congested cough, encouraged IS use. Used PRN inhaler. C/o lower abdominal pain, managed with PRN tylenol. NPO. TF running at 20mL/hr, rate to be increased by 15mL 24 hours after initiation (1730). Incontinent of urine at times. No BM. Mepi to coccyx. X3 thora sites CDI.  Up A1 with GB+W. K+ WNL, recheck this AM. R PIV infusing LR @ 125mL/hr. GI following. Thoracic surgery consult pending. Discharge pending.

## 2022-06-18 NOTE — IR NOTE
Interventional Radiology Intra-procedural Nursing Note    Patient Name: Jagdeep Walker  Medical Record Number: [id number]  Today's Date: June 18, 2022    Start Time: 1802  End of procedure time: 1822  Procedure: Chest tube placement with possible moderate sedation  Report given to: Nelson MARTINEZ  Time pt departs:  1829    Other Notes: Pt into IR suite 1 via cart. Pt awake and alert. To table in Sitting position. Monitoring equipment applied. VSS. Dr. Hinojosa in room. Time out and procedure started. Pt tolerated procedure well. Debrief with Dr. Hinojosa. Chest Tube placed and pressure held until hemostasis achieved. Dressing CDI. No complications. Pt transferred to general surgury.    Medications:    Lidocaine 1% 10 ml    Hakeem Sommers RN

## 2022-06-19 ENCOUNTER — APPOINTMENT (OUTPATIENT)
Dept: GENERAL RADIOLOGY | Facility: CLINIC | Age: 52
DRG: 438 | End: 2022-06-19
Attending: RADIOLOGY
Payer: MEDICARE

## 2022-06-19 LAB
ANION GAP SERPL CALCULATED.3IONS-SCNC: 4 MMOL/L (ref 3–14)
BACTERIA BLD CULT: NO GROWTH
BUN SERPL-MCNC: 17 MG/DL (ref 7–30)
CALCIUM SERPL-MCNC: 8.8 MG/DL (ref 8.5–10.1)
CHLORIDE BLD-SCNC: 113 MMOL/L (ref 94–109)
CO2 SERPL-SCNC: 27 MMOL/L (ref 20–32)
CREAT SERPL-MCNC: 0.76 MG/DL (ref 0.66–1.25)
ERYTHROCYTE [DISTWIDTH] IN BLOOD BY AUTOMATED COUNT: 16.8 % (ref 10–15)
GFR SERPL CREATININE-BSD FRML MDRD: >90 ML/MIN/1.73M2
GLUCOSE BLD-MCNC: 110 MG/DL (ref 70–99)
GRAM STAIN RESULT: NORMAL
GRAM STAIN RESULT: NORMAL
HCT VFR BLD AUTO: 38.2 % (ref 40–53)
HGB BLD-MCNC: 12.3 G/DL (ref 13.3–17.7)
MCH RBC QN AUTO: 30.8 PG (ref 26.5–33)
MCHC RBC AUTO-ENTMCNC: 32.2 G/DL (ref 31.5–36.5)
MCV RBC AUTO: 96 FL (ref 78–100)
PLATELET # BLD AUTO: 142 10E3/UL (ref 150–450)
POTASSIUM BLD-SCNC: 4 MMOL/L (ref 3.4–5.3)
RBC # BLD AUTO: 4 10E6/UL (ref 4.4–5.9)
SODIUM SERPL-SCNC: 144 MMOL/L (ref 133–144)
WBC # BLD AUTO: 7.3 10E3/UL (ref 4–11)

## 2022-06-19 PROCEDURE — 250N000013 HC RX MED GY IP 250 OP 250 PS 637: Performed by: INTERNAL MEDICINE

## 2022-06-19 PROCEDURE — 258N000002 HC RX IP 258 OP 250: Performed by: HOSPITALIST

## 2022-06-19 PROCEDURE — 258N000003 HC RX IP 258 OP 636: Performed by: HOSPITALIST

## 2022-06-19 PROCEDURE — 250N000011 HC RX IP 250 OP 636: Performed by: HOSPITALIST

## 2022-06-19 PROCEDURE — 71045 X-RAY EXAM CHEST 1 VIEW: CPT

## 2022-06-19 PROCEDURE — 99233 SBSQ HOSP IP/OBS HIGH 50: CPT | Performed by: HOSPITALIST

## 2022-06-19 PROCEDURE — 250N000011 HC RX IP 250 OP 636: Performed by: INTERNAL MEDICINE

## 2022-06-19 PROCEDURE — 85014 HEMATOCRIT: CPT | Performed by: HOSPITALIST

## 2022-06-19 PROCEDURE — 36415 COLL VENOUS BLD VENIPUNCTURE: CPT | Performed by: HOSPITALIST

## 2022-06-19 PROCEDURE — 120N000001 HC R&B MED SURG/OB

## 2022-06-19 PROCEDURE — 250N000013 HC RX MED GY IP 250 OP 250 PS 637: Performed by: HOSPITALIST

## 2022-06-19 PROCEDURE — 80048 BASIC METABOLIC PNL TOTAL CA: CPT | Performed by: HOSPITALIST

## 2022-06-19 RX ORDER — OXYCODONE HCL 5 MG/5 ML
5 SOLUTION, ORAL ORAL
Status: DISCONTINUED | OUTPATIENT
Start: 2022-06-19 | End: 2022-06-20

## 2022-06-19 RX ORDER — ENOXAPARIN SODIUM 100 MG/ML
40 INJECTION SUBCUTANEOUS EVERY 24 HOURS
Status: DISCONTINUED | OUTPATIENT
Start: 2022-06-19 | End: 2022-06-19

## 2022-06-19 RX ORDER — SODIUM CHLORIDE 9 MG/ML
INJECTION, SOLUTION INTRAVENOUS CONTINUOUS
Status: DISCONTINUED | OUTPATIENT
Start: 2022-06-19 | End: 2022-06-21

## 2022-06-19 RX ORDER — HYDROMORPHONE HYDROCHLORIDE 1 MG/ML
.3-.5 INJECTION, SOLUTION INTRAMUSCULAR; INTRAVENOUS; SUBCUTANEOUS
Status: DISCONTINUED | OUTPATIENT
Start: 2022-06-19 | End: 2022-07-10 | Stop reason: HOSPADM

## 2022-06-19 RX ADMIN — PREDNISOLONE ACETATE 1 DROP: 10 SUSPENSION/ DROPS OPHTHALMIC at 10:00

## 2022-06-19 RX ADMIN — RISPERIDONE 2 MG: 2 TABLET ORAL at 09:26

## 2022-06-19 RX ADMIN — CARBOXYMETHYLCELLULOSE SODIUM 1 DROP: 10 GEL OPHTHALMIC at 22:20

## 2022-06-19 RX ADMIN — PANCRELIPASE 2 CAPSULE: 60000; 12000; 38000 CAPSULE, DELAYED RELEASE PELLETS ORAL at 06:16

## 2022-06-19 RX ADMIN — IPRATROPIUM BROMIDE 2 SPRAY: 42 SPRAY NASAL at 10:00

## 2022-06-19 RX ADMIN — MONTELUKAST 10 MG: 10 TABLET, FILM COATED ORAL at 22:14

## 2022-06-19 RX ADMIN — DIVALPROEX SODIUM 250 MG: 250 TABLET, DELAYED RELEASE ORAL at 22:14

## 2022-06-19 RX ADMIN — PANCRELIPASE 2 CAPSULE: 60000; 12000; 38000 CAPSULE, DELAYED RELEASE PELLETS ORAL at 14:02

## 2022-06-19 RX ADMIN — HYDROMORPHONE HYDROCHLORIDE 0.5 MG: 1 INJECTION, SOLUTION INTRAMUSCULAR; INTRAVENOUS; SUBCUTANEOUS at 13:59

## 2022-06-19 RX ADMIN — RISPERIDONE 2 MG: 2 TABLET ORAL at 22:14

## 2022-06-19 RX ADMIN — DIVALPROEX SODIUM 500 MG: 250 TABLET, DELAYED RELEASE ORAL at 09:27

## 2022-06-19 RX ADMIN — Medication 1 PACKET: at 22:20

## 2022-06-19 RX ADMIN — HYDROMORPHONE HYDROCHLORIDE 0.2 MG: 0.2 INJECTION, SOLUTION INTRAMUSCULAR; INTRAVENOUS; SUBCUTANEOUS at 04:19

## 2022-06-19 RX ADMIN — SODIUM CHLORIDE: 9 INJECTION, SOLUTION INTRAVENOUS at 22:21

## 2022-06-19 RX ADMIN — SODIUM BICARBONATE 325 MG: 325 TABLET ORAL at 09:29

## 2022-06-19 RX ADMIN — LEVOCARNITINE 330 MG: 330 TABLET ORAL at 14:01

## 2022-06-19 RX ADMIN — PANTOPRAZOLE SODIUM 40 MG: 40 TABLET, DELAYED RELEASE ORAL at 09:28

## 2022-06-19 RX ADMIN — PANCRELIPASE 2 CAPSULE: 60000; 12000; 38000 CAPSULE, DELAYED RELEASE PELLETS ORAL at 09:31

## 2022-06-19 RX ADMIN — Medication 1 PACKET: at 10:44

## 2022-06-19 RX ADMIN — SODIUM CHLORIDE: 4.5 INJECTION, SOLUTION INTRAVENOUS at 03:42

## 2022-06-19 RX ADMIN — SODIUM BICARBONATE 325 MG: 325 TABLET ORAL at 22:14

## 2022-06-19 RX ADMIN — LEVOTHYROXINE SODIUM 50 MCG: 50 TABLET ORAL at 09:28

## 2022-06-19 RX ADMIN — HYDROMORPHONE HYDROCHLORIDE 0.2 MG: 0.2 INJECTION, SOLUTION INTRAMUSCULAR; INTRAVENOUS; SUBCUTANEOUS at 09:25

## 2022-06-19 RX ADMIN — SODIUM CHLORIDE: 9 INJECTION, SOLUTION INTRAVENOUS at 14:08

## 2022-06-19 RX ADMIN — SODIUM BICARBONATE 325 MG: 325 TABLET ORAL at 01:50

## 2022-06-19 RX ADMIN — TOPIRAMATE 100 MG: 100 TABLET, FILM COATED ORAL at 22:14

## 2022-06-19 RX ADMIN — SERTRALINE HYDROCHLORIDE 100 MG: 100 TABLET ORAL at 09:28

## 2022-06-19 RX ADMIN — SODIUM BICARBONATE 325 MG: 325 TABLET ORAL at 18:21

## 2022-06-19 RX ADMIN — IPRATROPIUM BROMIDE 2 SPRAY: 42 SPRAY NASAL at 22:20

## 2022-06-19 RX ADMIN — ENOXAPARIN SODIUM 40 MG: 40 INJECTION SUBCUTANEOUS at 18:21

## 2022-06-19 RX ADMIN — Medication 1 PACKET: at 18:27

## 2022-06-19 RX ADMIN — PANTOPRAZOLE SODIUM 40 MG: 40 TABLET, DELAYED RELEASE ORAL at 22:14

## 2022-06-19 RX ADMIN — ZINC SULFATE 220 MG (50 MG) CAPSULE 220 MG: CAPSULE at 09:27

## 2022-06-19 RX ADMIN — SODIUM BICARBONATE 325 MG: 325 TABLET ORAL at 06:16

## 2022-06-19 RX ADMIN — LEVOCETIRIZINE DIHYDROCHLORIDE 5 MG: 5 TABLET ORAL at 22:14

## 2022-06-19 RX ADMIN — LEVOCARNITINE 330 MG: 330 TABLET ORAL at 22:14

## 2022-06-19 RX ADMIN — DIVALPROEX SODIUM 500 MG: 250 TABLET, DELAYED RELEASE ORAL at 14:02

## 2022-06-19 RX ADMIN — PANCRELIPASE 2 CAPSULE: 60000; 12000; 38000 CAPSULE, DELAYED RELEASE PELLETS ORAL at 18:26

## 2022-06-19 RX ADMIN — SODIUM BICARBONATE 325 MG: 325 TABLET ORAL at 14:01

## 2022-06-19 RX ADMIN — OXYCODONE HYDROCHLORIDE 5 MG: 5 SOLUTION ORAL at 16:48

## 2022-06-19 RX ADMIN — PANCRELIPASE 2 CAPSULE: 60000; 12000; 38000 CAPSULE, DELAYED RELEASE PELLETS ORAL at 22:20

## 2022-06-19 RX ADMIN — IPRATROPIUM BROMIDE 2 SPRAY: 42 SPRAY NASAL at 17:04

## 2022-06-19 RX ADMIN — LEVOCARNITINE 330 MG: 330 TABLET ORAL at 09:27

## 2022-06-19 RX ADMIN — HYDROMORPHONE HYDROCHLORIDE 0.2 MG: 0.2 INJECTION, SOLUTION INTRAMUSCULAR; INTRAVENOUS; SUBCUTANEOUS at 00:28

## 2022-06-19 RX ADMIN — PANCRELIPASE 2 CAPSULE: 60000; 12000; 38000 CAPSULE, DELAYED RELEASE PELLETS ORAL at 01:50

## 2022-06-19 RX ADMIN — HYDROMORPHONE HYDROCHLORIDE 0.2 MG: 0.2 INJECTION, SOLUTION INTRAMUSCULAR; INTRAVENOUS; SUBCUTANEOUS at 12:09

## 2022-06-19 ASSESSMENT — ACTIVITIES OF DAILY LIVING (ADL)
ADLS_ACUITY_SCORE: 55
ADLS_ACUITY_SCORE: 57
ADLS_ACUITY_SCORE: 57
ADLS_ACUITY_SCORE: 55
ADLS_ACUITY_SCORE: 55
ADLS_ACUITY_SCORE: 57
ADLS_ACUITY_SCORE: 55
ADLS_ACUITY_SCORE: 57
ADLS_ACUITY_SCORE: 55
ADLS_ACUITY_SCORE: 57

## 2022-06-19 NOTE — PLAN OF CARE
Goal Outcome Evaluation:      pt is AOx2, not to time or situation, forgetful, some word finding difficulties but able to make need known. On 5L NC. Chest tube trial. Goal for less than 500ml out per hour. If over 500 clamp. Chest tube atrium changed at 0400. NPO except pills. Tube feeds running through NJ at 35ml/hr. Goal is 50. Changed tubing. Needs help repositioning in bed. Did not pee overnight. Bladder scanned for 495. Waiting for doctor order to straight cath

## 2022-06-19 NOTE — PROGRESS NOTES
Thoracic Surgery:    /58 (BP Location: Left arm)   Pulse 65   Temp 97.8  F (36.6  C) (Axillary)   Resp 16   Wt 89.3 kg (196 lb 13.9 oz)   SpO2 95%   BMI 32.76 kg/m    On 4 L    CXR: no PTX nor trapped lung, good drainage of right pleural space, right pigtail drain in pace at right base    CT output: 4240 ml over 24 hours    Agree with chest tube to water seal and agree with IR orders regarding clamping. Goal of decreased drainage per 24 hours.    D/W Dr. Logan-- very low likelihood that surgical pleurodesis would be successful in this context of very large, quickly reaccumulating transudative right pleural effusion. Needs medical mgmt of source, which is likely related to pancreatitis/pancreatic pseudocysts (pleural fluid with increased amylase and lipase).    Jennifer Sanders PA-C with Dr. Cesra Logan  [facility]  Cell [phone]

## 2022-06-19 NOTE — PROVIDER NOTIFICATION
MD Notification    Notified Person: MD    Notified Person Name: Dr. Lopez    Notification Date/Time: 6/19/22 4643    Notification Interaction:  page    Purpose of Notification:pt unable to void. Bladder scanned for 495ml    Orders Received: pending    Comments:

## 2022-06-19 NOTE — PLAN OF CARE
Goal Outcome Evaluation:                    Patient A&0x1-2.  Up with lift.  NPO.  TF running through NJ at 35ml. To be increased by 15ml at 2200 to reach goal rate.  Chest tube on R side to gravity. 370ml out this shift.  No crepitus or air leak noted.  Lungs diminished.  HOB at 30 or higher.    C/o ELQ pain and R back pain. IV dilaudid 0.5 given. He is incontinent at times.  Urinating well. On 2 ltrs 02 via NC with 02 carmen in the mid 90's. PIV running NS at 125ml/hr.   He has guardians in place.

## 2022-06-19 NOTE — PLAN OF CARE
"Received pt at 1845, pt is AOx2, not to time or situation, forgetful, some word finding difficulties but able to make need known. Chest tube was unclamped at bedside.     @1905, pt chest tube drainage was overflowing into suction chamber, drainage system changed,   @1920, pt put out another 1L, total of 2L since arrival and 3L since IR. Pt started having SOB, c/o of \"heart pain.\" BP soft, need more O2, RRT called.     Chest tube clamped at 1930, unclamped at 2045, pt put out 600cc in about 1hr. Chest tube clamped again at 10pm. And unclamped at 11pm.     No crepitus found during shift.      Tube feeding restarted at 10pm 35cc/hr, no residual with NJ, pt able to take meds with thickened water, no swallowing issues noted, assist x2, incontinent care given, repositioned q2hrs. Will cont to monitor.     "

## 2022-06-19 NOTE — PROGRESS NOTES
[facility]    Medicine Progress Note - Hospitalist Service    Date of Admission:  6/14/2022    Assessment & Plan          Jagdeep Walker is a 51 year-old male with complex history including seizure disorder, schizoaffective disorder, recent pancreatitis with pseudocysts, history of portal vein thrombosis, recent admission for metabolic encephalopathy of unclear etiology who presents with shortness of breath and hypoxia with large pleural effusion on outside chest x-ray.  Admitted on 6/14/2022.      Large right pleural effusion   Acute hypoxic respiratory failure  S/p thoracentesis 6/14 with 1L out  S/p thoracentesis 6/15 with 2.2L out  S/p thoracentesis on 6/18 with 0.5 L out  *Presents from TCU with shortness of breath and hypoxia to 85%, CXR at TCU with large right-sided pleural effusion with subtotal collapse of the right lung  *CT chest/abdomen/pelvis post thoracentesis with persistent large right pleural effusion with near complete opacification of the right hemithorax, smaller left pleural effusion and left basilar atelectasis.  Recent echocardiogram 6/4 normal.  Pleural fluid studies consistent with transudate, specimen clotted for cell count, normal glucose, no organisms on gram stain.  Cytology negative.  Given lipase and amylase increase, perhaps pleural effusion related to pancreatitis with fistula or leak versus malnutrition related to severe pancreatitis  Chest tube placed on 6/18  plan  -Appreciate pulmonary input  - repeat cytology pending  - GI following  - thoracic surgery following, chest tube with 4 liters out first 24 hours  - monitor CT outputs and adjust rate of fluids as needed     Acute/subacute pancreatitis with pseudocyst   *Hospitalized 5/8-5/17/22 for pancreatitis with pseudocyst formation. Unclear etiology, EUS unrevealing for etiology, possibly due to medications  *CT on admission with acute pancreatitis with slightly improved inflammatory changes,  pseudocysts not significantly changed  *Sister notes intake has remained generally poor and has complained of pain with eating, not significantly tender on exam at present.  -on tube feeds  -Continue n.p.o. status, continue pain control, nutrition consulted.     Recent metabolic encephalopathy   Mild zinc deficiency   *Extensively worked up during admission 6/4-6/10/22 including head CT, MRI brain, EEG, paraneoplastic ab, extensive laboratory work-up mostly unremarkable with exception of mildly low zinc.  *Per sister, patient does have some cognitive impairment at baseline, though is typically oriented to self, family and can carry on an extensive conversation especially regarding topics he enjoys (eg sports). He remains below his baseline.   - Start zinc supplementation, his mental status is close to baseline now.       Hx of portal vein thrombosis  *Diagnosed during admission 5/2022, improved on subsequent CT and not treated with anticoagulation   *Non-occlusive main portal vein thrombosis, splenic vein thrombosis seen on admission CT  - GI consulted as above, they advised no need to treat PVT previous admission (6/9/2022)     Depression  Anxiety  Schizoaffective disorder, bipolar type  - Continue prior to admission sertraline, risperidone     Seizure disorder  - Continue prior to admission topiramate, Depakote     Carnitine deficiency  - Continue prior to admission levocarnitine      Hypertension  - Continue prior to admission atenolol      Fisher's esophagus  - Continue prior to admission PPI     Hypothyroidism  - Continue prior to admission levothyroxine     Seasonal allergies  - Continue prior to admission levocetirizine            Diet: Adult Formula Drip Feeding: Continuous Jevity 1.5; Nasojejunal; Goal Rate: 50; mL/hr; Medication - Feeding Tube Flush Frequency: At least 15-30 mL water before and after medication administration and with tube clogging; Amount to Send (Nutrition u...  NPO per Anesthesia  Guidelines for Procedure/Surgery Except for: Meds    DVT Prophylaxis: Pneumatic Compression Devices.  Santana Catheter: Not present  Central Lines: None  Cardiac Monitoring: None  Code Status: Full Code      Disposition Plan   Expected Discharge: 06/20/2022     Anticipated discharge location:  Awaiting care coordination huddle  Delays:          The patient's care was discussed with family and the Patient and guardian    Donnie Ricmhond DO  Hospitalist Service  [facility]  Securely message with the Vocera Web Console (learn more here)  Text page via [employer] Paging/Directory         Clinically Significant Risk Factors Present on Admission               # Moderate Malnutrition: based on nutrition assessment     ______________________________________________________________________    Interval History   More pain today, so pain medications increased. 4 liters out after cp placement, RRT called for chest pain.    He does not endorse cp today, but having abdominal pain. No other issues    Data reviewed today: I reviewed all medications, new labs and imaging results over the last 24 hours. I personally reviewed   xr with improved pleural effusion    Physical Exam   Vital Signs: Temp: 97.8  F (36.6  C) Temp src: Axillary BP: 103/58 Pulse: 65   Resp: 16 SpO2: 93 % O2 Device: Nasal cannula Oxygen Delivery: 3 LPM  Weight: 196 lbs 13.93 oz  Constitutional: Awake, alert, cooperative, no apparent distress  Respiratory: Breath sounds reduced bilateral bases right greater than left  Cardiovascular: Regular rate and rhythm, normal S1 and S2, and no murmur noted  GI: Abdominal fullness noted, epigastric tenderness present, bowel sounds present.  Skin/Integumen/MSK: No rashes, no cyanosis, no edema  Neuro : moving all 4 extremities, no focal deficit noted       Data   Recent Labs   Lab 06/19/22  0622 06/18/22  2041 06/18/22  0805 06/17/22  1035 06/16/22  1749 06/16/22  0802 06/15/22  1616 06/14/22  1920  06/14/22  1903   0000   WBC 7.3 4.6  --   --   --  4.4  --   --  5.7  --    HGB 12.3* 12.6*  --   --   --  11.7*  --   --  12.6*  --    MCV 96 97  --   --   --  95  --   --  96  --    * 152  --   --   --  145*  --   --  185  --    INR  --  1.74*  --   --   --   --   --   --  1.64*  --     145* 143  --   --  145*  --  141  --    < >   POTASSIUM 4.0 4.2 3.8  3.8  --    < > 3.4   < > 3.9  --   --    CHLORIDE 113* 113* 112*  --   --  112*  --  106  --    < >   CO2 27 28 26  --   --  30  --  28  --    < >   BUN 17 17 16  --   --  14  --  14  --    < >   CR 0.76 0.85 0.78  --   --  0.74  --  0.87  --    < >   ANIONGAP 4 4 5  --   --  3  --  7  --    < >   NASIR 8.8 8.9 9.2  --   --  9.0  --  9.0  --    < >   * 89 81  --   --  78  --  132*  --    < >   ALBUMIN  --  2.2*  --   --   --   --   --  2.4*  --   --    PROTTOTAL  --  4.8*  --  5.1*  --   --   --  5.4*  --    < >   BILITOTAL  --  0.6  --   --   --   --   --  0.6  --   --    ALKPHOS  --  56  --   --   --   --   --  75  --   --    ALT  --  24  --   --   --   --   --  18  --   --    AST  --  23  --   --   --   --   --  21  --   --    LIPASE  --  579*  --   --   --   --   --  689*  --   --     < > = values in this interval not displayed.     Recent Results (from the past 24 hour(s))   XR Chest Port 1 View    Narrative    EXAM: XR CHEST PORT 1 VIEW  LOCATION: [facility]  DATE/TIME: 6/18/2022 7:45 PM    INDICATION: Recent R chest tube, RRT  COMPARISON: CT chest 6/17/2022 and chest x-ray 6/16/2022      Impression    IMPRESSION: Heart size and pulmonary vascularity normal. Small right pleural effusion has decreased in size, now with a pigtail chest tube located at the right base. No pneumothorax. Subsegmental atelectasis right lower lung field. A feeding tube enters   the stomach with the tip projected off the bottom of film. There is a linear foreign body projected over the central mediastinum, not present on yesterday's CT  chest exam and of indeterminate location.   XR Chest Port 1 View    Narrative    EXAM: XR CHEST PORT 1 VIEW  LOCATION: [facility]  DATE/TIME: 6/19/2022 9:59 AM    INDICATION: Right pleural effusion and chest tube.  COMPARISON: 6/18/2022      Impression    IMPRESSION: Similar right basilar pigtail drain and enteric tube. The lungs are hypoinflated. Probable trace effusions and associated atelectasis persists. Heart size is stable. No pneumothorax.

## 2022-06-19 NOTE — CODE/RAPID RESPONSE
"[facility]    House BRANDI RRT Note  6/18/2022   Time Called: 1913    RRT called for: Chest and abdominal pain    Assessment & Plan     Acute R sided chest pain suspect 2/2 re-expansion in setting of R chest tube placement for recurrent R pleural effusion.  Acute abdominal pain suspect 2/2 acute/subacute pancreatitis with pseudocyst.    - Upon arrival, pt lying in bed, awake, alert, in mild distress.  Nursing notes pt arrived from IR post chest tube placement at ~ 1845; however, since arrival, pt has had ~ 3L serosanguinous pleural drainage, now reporting R sided chest and abdominal pain.  Pt's O2 sats > 90% on RA; however, nursing placed pt on 4L O2 via NC to see if would help with SOB feeling.  Pt appears to be holding breath (?in setting of pain) for short periods, mildly tachypneic, clear lung sounds BUL, crackles RLL.  Pt's abdomen round, distended, tender to palpation throughout.  Pt's SBP low 100s, RR 10s-low 20s.     INTERVENTIONS:  - Stat CXR  - Stat CMP, lactic acid, CBC, lipase, INR  - Clamp chest tube until 2030 as pt has had 3L pleural drainage since chest tube placement  - Noted thoracic surgery requests chest tube to be placed to suction at 20 mmHg; however, IR notes pt with history of having copious pleural drainage, recommends gravity drainage and water seal initially.  IR has orders in place stating \"Clamp Chest tube IF 1st hour output greater than 1000 mL/hr for 1 hour then resume drainage.  Subsequent hours:  Clamp Chest tube IF hourly output greater than 500 mL/hr for 1 hour then resume drainage;  Once the chest tube output is less than 500 mL per hour, then keep open to drainage.\"  - PRN dilaudid now  - Updated pt's legal guardian/sister, Huong, regarding above; all questions answered    At the end of the RRT pt remains hemodynamically stable, on minimal O2     Discussed with and defer further cares to nursing and hospitalist     Interval History     Jagdeep Walker is " a 51 year old male who was admitted on 6/14/2022 for SOB, hypoxia, large R pleural effusion on outside CXR.    Medical history significant for: Seizure disorder, schizoaffective disorder bipolar type, pancreatitis with pseudocyst, portal vein thrombosis, zinc deficiency, depression, anxiety, carnitine deficiency, HTN, Fisher's esophagus, hypothyroidism     Code Status: Full Code    Allergies   No Known Allergies    Physical Exam   Vital Signs with Ranges:  Temp:  [97.4  F (36.3  C)-98.5  F (36.9  C)] 97.7  F (36.5  C)  Pulse:  [57-66] 59  Resp:  [16-18] 18  BP: (110-117)/(67-71) 113/71  SpO2:  [86 %-92 %] 90 %  I/O last 3 completed shifts:  In: 1592 [I.V.:1312; NG/GT:280]  Out: 200 [Urine:200]    Constitutional: Pt lying in bed, awake, alert, in mild distress, holding breath (?in setting of pain) for short periods  Neck: No upper airway wheezes or stridor noted  Pulmonary: In mild respiratory distress, mildly tachypneic, clear lung sounds BUL, crackles RLL, no use of accessory muscles noted, appears to be holding breath (?in setting of pain) for short periods  Cardiovascular: Regular rate and rhythm, normal S1S2, no murmur, rub or gallop noted  GI:  Round, distended, tender to palpation throughout, hypoactive bowel sounds, no overt guarding or rebound tenderness noted  Skin/Integumen: Warm, dry, pink  Neuro: Awake, alert, no obvious focal neuro deficit noted  Psych:  Calm  Extremities: Moving all extremities     Data       IMAGING: (X-ray/CT/MRI)   Recent Results (from the past 24 hour(s))   XR Chest Port 1 View    Narrative    EXAM: XR CHEST PORT 1 VIEW  LOCATION: [facility]  DATE/TIME: 6/18/2022 7:45 PM    INDICATION: Recent R chest tube, RRT  COMPARISON: CT chest 6/17/2022 and chest x-ray 6/16/2022      Impression    IMPRESSION: Heart size and pulmonary vascularity normal. Small right pleural effusion has decreased in size, now with a pigtail chest tube located at the right base. No  pneumothorax. Subsegmental atelectasis right lower lung field. A feeding tube enters   the stomach with the tip projected off the bottom of film. There is a linear foreign body projected over the central mediastinum, not present on yesterday's CT chest exam and of indeterminate location.       CBC with Diff:  Recent Labs   Lab Test 06/18/22 2041   WBC 4.6   HGB 12.6*   MCV 97      INR 1.74*        Comprehensive Metabolic Panel:  Recent Labs   Lab 06/18/22 2041   *   POTASSIUM 4.2   CHLORIDE 113*   CO2 28   ANIONGAP 4   GLC 89   BUN 17   CR 0.85   GFRESTIMATED >90   NASIR 8.9   PROTTOTAL 4.8*   ALBUMIN 2.2*   BILITOTAL 0.6   ALKPHOS 56   AST 23   ALT 24       INR:    Recent Labs   Lab Test 06/18/22 2041   INR 1.74*       Recent Labs   Lab 06/18/22 2041   PHV 7.36   PO2V 29   PCO2V 50   HCO3V 28       Recent Labs   Lab 06/18/22 2041 06/14/22  1903   LACT 0.9 1.9       Recent Labs   Lab 06/18/22 2041 06/14/22  1920   LIPASE 579* 689*       Time Spent on this Encounter   I spent 20 minutes on the unit/floor managing the care of Jagdeep CHIRINOS Ricardopatricia. Over 50% of my time was spent counseling the patient and/or coordinating care regarding services listed in this note.    DILLON Malagon [facility] BRANDI

## 2022-06-20 ENCOUNTER — APPOINTMENT (OUTPATIENT)
Dept: GENERAL RADIOLOGY | Facility: CLINIC | Age: 52
DRG: 438 | End: 2022-06-20
Attending: NURSE PRACTITIONER
Payer: MEDICARE

## 2022-06-20 LAB
ALBUMIN SERPL-MCNC: 1.9 G/DL (ref 3.4–5)
ALP SERPL-CCNC: 72 U/L (ref 40–150)
ALT SERPL W P-5'-P-CCNC: 15 U/L (ref 0–70)
ANION GAP SERPL CALCULATED.3IONS-SCNC: 2 MMOL/L (ref 3–14)
AST SERPL W P-5'-P-CCNC: 20 U/L (ref 0–45)
BACTERIA BLD CULT: NO GROWTH
BACTERIA PLR CULT: NO GROWTH
BILIRUB SERPL-MCNC: 0.5 MG/DL (ref 0.2–1.3)
BUN SERPL-MCNC: 16 MG/DL (ref 7–30)
CALCIUM SERPL-MCNC: 8.5 MG/DL (ref 8.5–10.1)
CHLORIDE BLD-SCNC: 112 MMOL/L (ref 94–109)
CO2 SERPL-SCNC: 29 MMOL/L (ref 20–32)
CREAT SERPL-MCNC: 0.74 MG/DL (ref 0.66–1.25)
ERYTHROCYTE [DISTWIDTH] IN BLOOD BY AUTOMATED COUNT: 16.4 % (ref 10–15)
GFR SERPL CREATININE-BSD FRML MDRD: >90 ML/MIN/1.73M2
GLUCOSE BLD-MCNC: 109 MG/DL (ref 70–99)
GLUCOSE BLDC GLUCOMTR-MCNC: 109 MG/DL (ref 70–99)
GRAM STAIN RESULT: NORMAL
GRAM STAIN RESULT: NORMAL
HCT VFR BLD AUTO: 41.4 % (ref 40–53)
HGB BLD-MCNC: 13.2 G/DL (ref 13.3–17.7)
LIPASE SERPL-CCNC: 1236 U/L (ref 73–393)
MAGNESIUM SERPL-MCNC: 2.3 MG/DL (ref 1.6–2.3)
MCH RBC QN AUTO: 31.4 PG (ref 26.5–33)
MCHC RBC AUTO-ENTMCNC: 31.9 G/DL (ref 31.5–36.5)
MCV RBC AUTO: 99 FL (ref 78–100)
PHOSPHATE SERPL-MCNC: 3.9 MG/DL (ref 2.5–4.5)
PLATELET # BLD AUTO: 134 10E3/UL (ref 150–450)
POTASSIUM BLD-SCNC: 3.8 MMOL/L (ref 3.4–5.3)
PROCALCITONIN SERPL-MCNC: 0.27 NG/ML
PROT SERPL-MCNC: 5 G/DL (ref 6.8–8.8)
RBC # BLD AUTO: 4.2 10E6/UL (ref 4.4–5.9)
SODIUM SERPL-SCNC: 143 MMOL/L (ref 133–144)
TROPONIN I SERPL HS-MCNC: 6 NG/L
TROPONIN I SERPL HS-MCNC: 6 NG/L
TROPONIN I SERPL HS-MCNC: 8 NG/L
WBC # BLD AUTO: 4.1 10E3/UL (ref 4–11)

## 2022-06-20 PROCEDURE — 84484 ASSAY OF TROPONIN QUANT: CPT | Performed by: HOSPITALIST

## 2022-06-20 PROCEDURE — 36415 COLL VENOUS BLD VENIPUNCTURE: CPT | Performed by: NURSE PRACTITIONER

## 2022-06-20 PROCEDURE — 250N000013 HC RX MED GY IP 250 OP 250 PS 637: Performed by: HOSPITALIST

## 2022-06-20 PROCEDURE — 71045 X-RAY EXAM CHEST 1 VIEW: CPT

## 2022-06-20 PROCEDURE — 99232 SBSQ HOSP IP/OBS MODERATE 35: CPT | Performed by: HOSPITALIST

## 2022-06-20 PROCEDURE — 250N000013 HC RX MED GY IP 250 OP 250 PS 637: Performed by: INTERNAL MEDICINE

## 2022-06-20 PROCEDURE — 93010 ELECTROCARDIOGRAM REPORT: CPT | Performed by: INTERNAL MEDICINE

## 2022-06-20 PROCEDURE — 83735 ASSAY OF MAGNESIUM: CPT | Performed by: INTERNAL MEDICINE

## 2022-06-20 PROCEDURE — 258N000003 HC RX IP 258 OP 636: Performed by: HOSPITALIST

## 2022-06-20 PROCEDURE — 120N000001 HC R&B MED SURG/OB

## 2022-06-20 PROCEDURE — 84145 PROCALCITONIN (PCT): CPT | Performed by: NURSE PRACTITIONER

## 2022-06-20 PROCEDURE — 85027 COMPLETE CBC AUTOMATED: CPT | Performed by: HOSPITALIST

## 2022-06-20 PROCEDURE — 93005 ELECTROCARDIOGRAM TRACING: CPT

## 2022-06-20 PROCEDURE — 250N000013 HC RX MED GY IP 250 OP 250 PS 637: Performed by: NURSE PRACTITIONER

## 2022-06-20 PROCEDURE — 83690 ASSAY OF LIPASE: CPT | Performed by: HOSPITALIST

## 2022-06-20 PROCEDURE — 36415 COLL VENOUS BLD VENIPUNCTURE: CPT | Performed by: HOSPITALIST

## 2022-06-20 PROCEDURE — 84484 ASSAY OF TROPONIN QUANT: CPT | Performed by: NURSE PRACTITIONER

## 2022-06-20 PROCEDURE — 84100 ASSAY OF PHOSPHORUS: CPT | Performed by: INTERNAL MEDICINE

## 2022-06-20 PROCEDURE — 80053 COMPREHEN METABOLIC PANEL: CPT | Performed by: HOSPITALIST

## 2022-06-20 RX ORDER — AMOXICILLIN 250 MG
1 CAPSULE ORAL 2 TIMES DAILY PRN
Status: DISCONTINUED | OUTPATIENT
Start: 2022-06-20 | End: 2022-06-27

## 2022-06-20 RX ORDER — ACETAMINOPHEN 325 MG/10.15ML
650 LIQUID ORAL EVERY 6 HOURS PRN
Status: DISCONTINUED | OUTPATIENT
Start: 2022-06-20 | End: 2022-07-06

## 2022-06-20 RX ORDER — POLYETHYLENE GLYCOL 3350 17 G/17G
17 POWDER, FOR SOLUTION ORAL DAILY PRN
Status: DISCONTINUED | OUTPATIENT
Start: 2022-06-20 | End: 2022-06-27

## 2022-06-20 RX ORDER — ACETAMINOPHEN 325 MG/1
650 TABLET ORAL EVERY 6 HOURS PRN
Status: DISCONTINUED | OUTPATIENT
Start: 2022-06-20 | End: 2022-07-10 | Stop reason: HOSPADM

## 2022-06-20 RX ORDER — ACETAMINOPHEN 650 MG/1
650 SUPPOSITORY RECTAL EVERY 6 HOURS PRN
Status: DISCONTINUED | OUTPATIENT
Start: 2022-06-20 | End: 2022-07-10 | Stop reason: HOSPADM

## 2022-06-20 RX ORDER — OXYCODONE HCL 5 MG/5 ML
2.5-5 SOLUTION, ORAL ORAL
Status: DISCONTINUED | OUTPATIENT
Start: 2022-06-20 | End: 2022-07-10 | Stop reason: HOSPADM

## 2022-06-20 RX ADMIN — SODIUM BICARBONATE 325 MG: 325 TABLET ORAL at 10:18

## 2022-06-20 RX ADMIN — RISPERIDONE 2 MG: 2 TABLET ORAL at 21:10

## 2022-06-20 RX ADMIN — ACETAMINOPHEN 650 MG: 325 SUSPENSION ORAL at 11:52

## 2022-06-20 RX ADMIN — OXYCODONE HYDROCHLORIDE 5 MG: 5 SOLUTION ORAL at 02:04

## 2022-06-20 RX ADMIN — OXYCODONE HYDROCHLORIDE 5 MG: 5 SOLUTION ORAL at 06:01

## 2022-06-20 RX ADMIN — Medication 1 DROP: at 11:52

## 2022-06-20 RX ADMIN — SODIUM BICARBONATE 325 MG: 325 TABLET ORAL at 06:03

## 2022-06-20 RX ADMIN — RISPERIDONE 2 MG: 2 TABLET ORAL at 09:23

## 2022-06-20 RX ADMIN — PANCRELIPASE 2 CAPSULE: 60000; 12000; 38000 CAPSULE, DELAYED RELEASE PELLETS ORAL at 02:14

## 2022-06-20 RX ADMIN — SERTRALINE HYDROCHLORIDE 100 MG: 100 TABLET ORAL at 09:23

## 2022-06-20 RX ADMIN — IPRATROPIUM BROMIDE 2 SPRAY: 42 SPRAY NASAL at 21:11

## 2022-06-20 RX ADMIN — SODIUM CHLORIDE: 9 INJECTION, SOLUTION INTRAVENOUS at 21:17

## 2022-06-20 RX ADMIN — PANTOPRAZOLE SODIUM 40 MG: 40 TABLET, DELAYED RELEASE ORAL at 21:10

## 2022-06-20 RX ADMIN — TOPIRAMATE 100 MG: 100 TABLET, FILM COATED ORAL at 21:11

## 2022-06-20 RX ADMIN — SODIUM BICARBONATE 325 MG: 325 TABLET ORAL at 02:14

## 2022-06-20 RX ADMIN — ZINC SULFATE 220 MG (50 MG) CAPSULE 220 MG: CAPSULE at 09:22

## 2022-06-20 RX ADMIN — PANTOPRAZOLE SODIUM 40 MG: 40 TABLET, DELAYED RELEASE ORAL at 09:23

## 2022-06-20 RX ADMIN — ATENOLOL 25 MG: 25 TABLET ORAL at 09:23

## 2022-06-20 RX ADMIN — PANCRELIPASE 2 CAPSULE: 60000; 12000; 38000 CAPSULE, DELAYED RELEASE PELLETS ORAL at 18:03

## 2022-06-20 RX ADMIN — IPRATROPIUM BROMIDE 2 SPRAY: 42 SPRAY NASAL at 09:39

## 2022-06-20 RX ADMIN — Medication 1 PACKET: at 09:31

## 2022-06-20 RX ADMIN — LEVOCARNITINE 330 MG: 330 TABLET ORAL at 21:10

## 2022-06-20 RX ADMIN — IPRATROPIUM BROMIDE 2 SPRAY: 42 SPRAY NASAL at 18:01

## 2022-06-20 RX ADMIN — PANCRELIPASE 2 CAPSULE: 60000; 12000; 38000 CAPSULE, DELAYED RELEASE PELLETS ORAL at 06:03

## 2022-06-20 RX ADMIN — PREDNISOLONE ACETATE 1 DROP: 10 SUSPENSION/ DROPS OPHTHALMIC at 09:39

## 2022-06-20 RX ADMIN — Medication 1 PACKET: at 21:11

## 2022-06-20 RX ADMIN — LEVOCARNITINE 330 MG: 330 TABLET ORAL at 13:44

## 2022-06-20 RX ADMIN — DIVALPROEX SODIUM 500 MG: 250 TABLET, DELAYED RELEASE ORAL at 14:15

## 2022-06-20 RX ADMIN — LEVOCETIRIZINE DIHYDROCHLORIDE 5 MG: 5 TABLET ORAL at 21:10

## 2022-06-20 RX ADMIN — SODIUM BICARBONATE 325 MG: 325 TABLET ORAL at 14:04

## 2022-06-20 RX ADMIN — LEVOTHYROXINE SODIUM 50 MCG: 50 TABLET ORAL at 09:23

## 2022-06-20 RX ADMIN — SODIUM BICARBONATE 325 MG: 325 TABLET ORAL at 21:10

## 2022-06-20 RX ADMIN — SODIUM CHLORIDE: 9 INJECTION, SOLUTION INTRAVENOUS at 10:39

## 2022-06-20 RX ADMIN — DIVALPROEX SODIUM 250 MG: 250 TABLET, DELAYED RELEASE ORAL at 21:10

## 2022-06-20 RX ADMIN — OXYCODONE HYDROCHLORIDE 5 MG: 5 SOLUTION ORAL at 09:26

## 2022-06-20 RX ADMIN — DIVALPROEX SODIUM 500 MG: 250 TABLET, DELAYED RELEASE ORAL at 09:20

## 2022-06-20 RX ADMIN — PANCRELIPASE 2 CAPSULE: 60000; 12000; 38000 CAPSULE, DELAYED RELEASE PELLETS ORAL at 21:10

## 2022-06-20 RX ADMIN — CARBOXYMETHYLCELLULOSE SODIUM 1 DROP: 10 GEL OPHTHALMIC at 21:11

## 2022-06-20 RX ADMIN — SODIUM BICARBONATE 325 MG: 325 TABLET ORAL at 18:03

## 2022-06-20 RX ADMIN — PANCRELIPASE 2 CAPSULE: 60000; 12000; 38000 CAPSULE, DELAYED RELEASE PELLETS ORAL at 10:18

## 2022-06-20 RX ADMIN — MONTELUKAST 10 MG: 10 TABLET, FILM COATED ORAL at 21:11

## 2022-06-20 RX ADMIN — LEVOCARNITINE 330 MG: 330 TABLET ORAL at 09:22

## 2022-06-20 RX ADMIN — Medication 1 PACKET: at 18:02

## 2022-06-20 RX ADMIN — PANCRELIPASE 2 CAPSULE: 60000; 12000; 38000 CAPSULE, DELAYED RELEASE PELLETS ORAL at 14:04

## 2022-06-20 ASSESSMENT — ACTIVITIES OF DAILY LIVING (ADL)
ADLS_ACUITY_SCORE: 53
ADLS_ACUITY_SCORE: 57
ADLS_ACUITY_SCORE: 53
ADLS_ACUITY_SCORE: 57
ADLS_ACUITY_SCORE: 53
ADLS_ACUITY_SCORE: 51
ADLS_ACUITY_SCORE: 51
ADLS_ACUITY_SCORE: 57
ADLS_ACUITY_SCORE: 51
ADLS_ACUITY_SCORE: 57
ADLS_ACUITY_SCORE: 57
ADLS_ACUITY_SCORE: 51

## 2022-06-20 NOTE — PROGRESS NOTES
Thoracic Surgery:    /70 (BP Location: Left arm)   Pulse 79   Temp 97.8  F (36.6  C) (Oral)   Resp 16   Wt 89.3 kg (196 lb 13.9 oz)   SpO2 93%   BMI 32.76 kg/m     On 3 L NC  Right chest tube: serous output, no air leak, 770 ml over 24 hours, 100 ml on nights    S: Complains of moderate pain anterior to right chest tube. No other complaints.   O: CT: serous output  CT site: old blood on dressing, stay-fix in place. No hematoma nor crepitus  P: Keep chest tube in place on water seal  Await further decrease in output prior to CT removal  Medical mgmt of pancreatitis/pseudocysts  TF and IVF as per Hospitalists  D/W RN to re-dress chest tube site and place new stay-fix to assure chest tube does not inadvertently dislodge    Jennifer Sanders PA-C with Dr. [facility]  Cell [phone]      Time spent at patient bedside, floor and care unit with patient counseling and/ or coordination of care (>50% of visit) is 15 minutes

## 2022-06-20 NOTE — CODE/RAPID RESPONSE
[facility]    RRT Note  6/20/2022   Time Called: 1045am    RRT called for: Chest pain    Assessment & Plan   IMPRESSION & PLAN:    Jagdeep Walker is a 51 year old male w/ PMH of seizure disorder, schizoaffective disorder, hypertension, hypothyroidism, pancreatitis with recent pseudocyst and portal vein thrombus who was admitted on 6/14/2022 for dyspnea and diagnosed with pleural effusions and respiratory failure thought to be secondary to his pancreatitis.  He has had at least 3 thoracenteses during his 6-day hospitalization, all large volume.  Continues to have small bore chest drainage system in place.    Patient reported chest pain nursing staff on the a.m. of 6/20/2022 prompting the rapid response.  Patient has been medicated with Zoloft, risperidone, oxycodone thus far this morning and largely gives one-word answers.  On my arrival he is not distressed appearing, not diaphoretic, her rate is 85, SPO2 94% on 3 L, /77, recheck 118/71, repeat checks 96/64.  Respirate is 12, oral temp is 90.5.  Chest tube drainage system is on waterseal, there is no air leak.  There is no subcutaneous emphysema on palpation of the right anterior, axillary or in the periinsertion site.    Chest pain is located in the mid chest/sternal area, there is no radiation.  She has not able to describe chest pain this time, there may be some associated pleurisy.  Pain is associated with SOB.   Pain is not reproducible palpation    Cardiac Risk Factors?   Sex: ?   Hypertension:     PE/DVT Risk Factors?   Personal History: Of portal vein thrombus secondary to pancreatitis    Recent Surgery/Hospitalization:  Other Immobilization:     ?   I personally reviewed the radiographs of the chest which by my read shows ongoing large right pleural effusion with bronchograms, chest tube drainage system is at the most distal end of the lungs.  Fairly similar to study from 6/19/2022.  Pneumothorax by my  interpretation    Impression  Chest pain atypical  Differential diagnosis:  -May be related to ongoing large pleural effusion  - May be related to pancreatic pseudocyst  - Considered pneumothorax but no air leak in the chest tube system  - Considered pneumonia but he has a normal white count on 6/20/2022 and has not been afebrile  - Considered PE given his history of portal vein thrombus, Wells score is 1.5 so this puts him at lower risk    INTERVENTIONS:  -Stat EKG shows normal sinus rhythm normal axis, subtle ST depression in V3 and V4 but otherwise quite similar to his study from 6/15/2022.  -Glucose 109 mg/dL  -Stat portable chest x-ray, my interpretation as above  - Add on procalcitonin  -Troponin to a.m. sample and check another now  - Mobilize patient out of bed to chair to help fully facilitate further pleural fluid drainage  -Vigorous pulmonary hygiene  - I examined the insertion site in the chest tube system do not appreciate any kinks blockages of the drainage system.  Additionally nursing staff report thoracic surgery PA-C recently evaluated patient in the chest tube system as well  -Telemetry monitoring for 24 hours  -Give a dose of as needed Tylenol    Working diagnosis: Suspect pulmonary etiology of his chest x-ray more so than ACS or cardiac etiology    At the end of the RRT chest x-ray and EKG have been completed and reviewed, laboratory data are pending.  Chest pain had significantly improved without any pharmacologic intervention.      disposition continue current level of care    Discussed with and defer further cares to hospitalist attending physician Dr. Donnie Richmond     RN was also concerned about patient's mentation and that she thinks he is a little confused.  I suspect that the Zoloft, risperidone and oxycodone are all contributing to this.  He was able to briskly follow commands during my exam    Code Status: Full Code    Allergies   No Known Allergies    Physical Exam   Vital Signs  with Ranges:  Temp:  [97  F (36.1  C)-97.8  F (36.6  C)] 97.8  F (36.6  C)  Pulse:  [67-86] 85  Resp:  [14-16] 16  BP: (103-130)/(62-78) 118/71  SpO2:  [91 %-95 %] 95 %  I/O last 3 completed shifts:  In: 7492.42 [I.V.:6600.42; NG/GT:892]  Out: 1320 [Urine:550; Chest Tube:770]      Constitutional: vs as above   General:  adult pt lying in bed without acute distress   GCS:   Motor 6=Obeys commands   Verbal 4=Confused   Eye Opening 4=Spontaneous   Total: 14       Neuro: +follows commands wiggle toes and show 2 fingers bilat, face symmetric, tongue midline, speech fluent  Eyes defer  Head, ENT & mouth: NC/AT,  mouth moist oral mucosa  Neck: supple  CV S1S2 no murmur  resp: Strong moist cough, few rhonchi in right upper lobes, no rales, wheezes.  Right posterior small bore chest tube drainage system in place, no subcutaneous emphysema in the periinsertion site, mid axillary line or anterior chest wall, system is to waterseal, no air leak  gi:normoactive bowel sounds, soft, nontender, nondisteded  Ext: no edema, possible subtle mottling versus abrasions or ecchymosis on his knees  Skin: no rashes on exposed skin  Lymph: Defer  Musculoskeletal no bony joint deformities      Data     EKG:  Interpreted by DILLON Hatfield CNP  Time reviewed: 1054am  Symptoms at time of EKG: Chest pain  Rhythm: normal sinus   Rate: Normal  Axis: Normal  Ectopy: none  Conduction: normal  ST Segments/ T Waves: ST Segment Depression, subtle V3 and V4  Q Waves: none  Comparison to prior: Very similar compared to study from 6/15/2022 although I did not appreciate the ST depression on that day      Troponin:    Pending    IMAGING: (X-ray/CT/MRI)   Recent Results (from the past 24 hour(s))   XR Chest Port 1 View    Narrative    CHEST ONE VIEW  6/20/2022 11:12 AM     HISTORY: RRT    COMPARISON: June 19, 2022      Impression    IMPRESSION: Low lung volumes. Similar minimal atelectasis and/or  infiltrate at both lung bases. Enteric tube tip below  the margin of  study.       CBC with Diff:  Recent Labs   Lab Test 06/20/22  0715 06/19/22  0622 06/18/22  2041   WBC 4.1   < > 4.6   HGB 13.2*   < > 12.6*   MCV 99   < > 97   *   < > 152   INR  --   --  1.74*    < > = values in this interval not displayed.          Comprehensive Metabolic Panel:  Recent Labs   Lab 06/20/22  1049 06/20/22  0715   NA  --  143   POTASSIUM  --  3.8   CHLORIDE  --  112*   CO2  --  29   ANIONGAP  --  2*   * 109*   BUN  --  16   CR  --  0.74   GFRESTIMATED  --  >90   NASIR  --  8.5   MAG  --  2.3   PHOS  --  3.9   PROTTOTAL  --  5.0*   ALBUMIN  --  1.9*   BILITOTAL  --  0.5   ALKPHOS  --  72   AST  --  20   ALT  --  15     Time Spent on this Encounter   I spent 34 minutes (1050 - 1024am) of critical care time on the unit/floor managing the care of Jagdeep Walker. Upon evaluation, this patient had a high probability of imminent or life-threatening deterioration due to chest pain, which required my direct attention, intervention, and personal management including interpretation of EKG and chest x-ray 100% of my time was spent at the bedside counseling the patient and/or coordinating care regarding services listed in this note.    Isabelle Gonzalez, [facility]  Hospitalist [place] BR[facility]I  [phone]

## 2022-06-20 NOTE — PLAN OF CARE
Goal Outcome Evaluation:    [id number]    Assumed patient care at 2300. Jose had a good evening. Here with SOB initially, has had recurrent pleural effusion on right side. Oriented to self only, remains confused and difficult to assess at times due to irritability and wanting to sleep. VSS on 3L via NC. Pain managed with positional changes and PRN oxycodone. Chest tube remains in place on right side, dressing intact with dried drainage, serosanguinous output, total of 100 ml this shift. No air leak noted. LS diminished, shallow breaths. NJ tube to R nostril at 84cm remains intact, Jevity 1.5 running at goal rate of 50ml/hr. Abdomen remains rounded, but soft, BS active. Bladder scanned for 566cc at 0230, straight cathed for 550. Bladder scanned at 0600 299cc. NPO. Q2H repos. IVF continue, tolerating well. Anticipate discharge when cleared. Will continue to plan of care.

## 2022-06-20 NOTE — PROGRESS NOTES
[facility]  Gastroenterology Progress Note     Jagdeep Walker MRN# [id number]   YOB: 1970 Age: 51 year old          Assessment and Plan:   Jagdeep Walker s a 51 year old male admitted with right sided pleural effusion and GI consulted to manage pancreatitis.     Active Problems:  Pleural effusion  Pancreatic pseudocyst  Idiopathic acute pancreatitis, unspecified complication status  *Lipase 1,730->689, AST, ALT and Alk Phos normal. Lactic acid 1.9. WBC wnl, Hgb 12.6,    *6/14 CT A/P note improved inflammatory changes in pancreas. Pseudocysts not significantly changed. Gastric wall thickening has progressed consistent with secondary gastritis with new pseudocyst along the proximal posterior gastric wall. Ascites decreased. Wall thickening involving the cecum and transverse colon likely secondarily involved. Large right pleural effusion has significantly increased.  *Cause of pleural effusions may be due to pancreatitis and possible development of pancreaticopleural fistula. Amylase results on pleural cavity- 504 ( >1000 international unit(s) suggestive of pancreaticopleural fistula). Pleural effusion is on right and less likely due to pancreatitis. These factors make a pancreaticopleural effusion less likely- however still need to r/o.  * Has permanent chest tube in place per thoracic surgery  *Consider ERCP to r/o pancreaticopleural fistula  *Cause of pancreatitis is idiopathic- no evidence of gallstones or alcohol use. May be due to medication. Autoimmune pancreatitis r/o IgG4 levels normal  * Had NJ placed and receiving tube feeds    -- Daily labs  -- Will plan ERCP tomorrow  -- Hold tube feeds at midnight              Interval History:   no new complaints, denies chest pain, pain is controlled and has ongoing abdominal pain only with moderate pressure              Review of Systems:   C: NEGATIVE for fever, chills, change in weight  E/M: NEGATIVE for ear, mouth and  throat problems  R: NEGATIVE for significant cough or SOB  CV: NEGATIVE for chest pain, palpitations or peripheral edema             Medications:   I have reviewed this patient's current medications    amylase-lipase-protease  1-2 capsule Per Feeding Tube Q4H     atenolol  25 mg Oral Daily     carboxymethylcellulose PF  1 drop Right Eye At Bedtime     divalproex sodium delayed-release  250 mg Oral At Bedtime     divalproex sodium delayed-release  500 mg Oral BID     ipratropium  2 spray Both Nostrils TID     levOCARNitine  330 mg Oral TID     levocetirizine  5 mg Oral QPM     levothyroxine  50 mcg Oral Daily     montelukast  10 mg Oral At Bedtime     pantoprazole  40 mg Oral BID     prednisoLONE acetate  1 drop Right Eye Daily     protein modular  1 packet Per Feeding Tube TID     risperiDONE  2 mg Oral BID     sertraline  100 mg Oral Daily     sodium bicarbonate  325 mg Per Feeding Tube Q4H     sodium chloride (PF)  3 mL Intracatheter Q8H     topiramate  100 mg Oral At Bedtime     zinc sulfate  220 mg Oral Daily                  Physical Exam:   Vitals were reviewed  Vital Signs with Ranges  Temp:  [97  F (36.1  C)-97.8  F (36.6  C)] 97.8  F (36.6  C)  Pulse:  [67-81] 79  Resp:  [16] 16  BP: (108-130)/(62-78) 111/70  SpO2:  [91 %-93 %] 93 %  I/O last 3 completed shifts:  In: 7492.42 [I.V.:6600.42; NG/GT:892]  Out: 1320 [Urine:550; Chest Tube:770]  Constitutional: alert, mild distress, cooperative and pale   Cardiovascular: negative, PMI normal. No lifts, heaves, or thrills. RRR. No murmurs, clicks gallops or rub  Respiratory: wheezy, Percussion normal. Good diaphragmatic excursion.   Abdomen: Abdomen soft, non-tender. BS normal. No masses, organomegaly           Data:   I reviewed the patient's new clinical lab test results.   Recent Labs   Lab Test 06/20/22  0715 06/19/22  0622 06/18/22  2041 06/16/22  0802 06/14/22  1903 06/06/22  0659 06/05/22  0728   WBC 4.1 7.3 4.6   < > 5.7   < > 3.8*   HGB 13.2* 12.3* 12.6*    < > 12.6*   < > 12.0*   MCV 99 96 97   < > 96   < > 95   * 142* 152   < > 185   < > 92*   INR  --   --  1.74*  --  1.64*  --  1.73*    < > = values in this interval not displayed.     Recent Labs   Lab Test 06/20/22  0715 06/19/22 0622 06/18/22 2041   POTASSIUM 3.8 4.0 4.2   CHLORIDE 112* 113* 113*   CO2 29 27 28   BUN 16 17 17   ANIONGAP 2* 4 4     Recent Labs   Lab Test 06/20/22  0715 06/18/22  2041 06/15/22  1011 06/15/22  0034 06/14/22  1920 06/05/22  0728 06/04/22  1653 05/08/22 2032 05/08/22  0934   ALBUMIN 1.9* 2.2*  --   --  2.4*   < >  --    < >  --    BILITOTAL 0.5 0.6  --   --  0.6   < >  --    < >  --    ALT 15 24  --   --  18   < >  --    < >  --    AST 20 23  --   --  21   < >  --    < >  --    PROTEIN  --   --   --  Negative  --   --  Negative  --  Negative   LIPASE 1,236* 579*  --   --  689*  --   --    < >  --    AMYLASE  --   --  142*  --   --   --   --   --   --     < > = values in this interval not displayed.       I reviewed the patient's new imaging results.    All laboratory data reviewed  All imaging studies reviewed by me.    Isabelle Pantoja PA-C,  6/16/2022  [facility]  Office : [phone]  Cell: [phone] (Dr. Clark)  Cell: [phone] (Isabelle Pantoja PA-C)

## 2022-06-20 NOTE — PROGRESS NOTES
CLINICAL NUTRITION SERVICES - REASSESSMENT NOTE      Malnutrition: (6/16)  % Weight Loss:  Weight loss does not meet criteria for malnutrition   % Intake:  </= 50% for >/= 5 days (severe malnutrition)  Subcutaneous Fat Loss:  Orbital region mild depletion and Upper arm region moderate depletion  Muscle Loss:  Temporal region mild depletion, Clavicle bone region mild depletion and Dorsal hand region mild depletion  Fluid Retention:  Mild to trace edema (left and right legs) - unclear if nutrition related     Malnutrition Diagnosis: Moderate malnutrition  In Context of:  Acute illness or injury  Chronic illness or disease       EVALUATION OF PROGRESS TOWARD GOALS   Diet:    NPO    Nutrition Support:    Type of Feeding Tube: (6/17) ND FT  Enteral Frequency:  Continuous  Enteral Regimen: Jevity 1.5 @ 50 mL/hr (goal rate) + 1 packet Prosource TID  Total Enteral Provisions: 1920 kcal (28 kcal/kg), 110 g protein (1.6 g/kg), 259 g CHO, 60 g fat, 25 g fiber, and 912 mL free water.   Free Water Flush: 60 mL every 4 hrs    Pt is on Creon - which needs to be mixed with TF formula to aid in digestion  Pancreatic enzyme regimen:   A) Sodium Bicarb tablet (325 mg), 1 tablet q 4 hrs via ND.       Administration Instructions: Crush 1 tablet and mix into 15 ml of warm water and use this solution to mix with Creon pancreatic enzymes. DO NOT administer directly into fdg tube (will be mixed into TF formula + Creon enzyme mixture - see Creon enzyme order)     B) Creon 12 --> 2 capsules every 4 hrs via ND.        Administration Instructions:   --If TF rate is running @ 50 ml/hr, give 2 capsules every 4 hrs.     ** Open the capsule and empty contents into the 15 mL sodium bicarb solution (see sodium bicarb order), let dissolve for about 20-30 minutes and then add this solution to the volume of TF formula that pt receives every 4 hrs (i.e., TF @ goal infusion of 50 mL/hr, will mix 2 capsules into 200 ml of TF formula every 4 hrs).      *  Administer via the open tube feeding system (pour into a bag and hang).  Will repeat this process every 4 hrs.  A new can of tube feed is not required with every feeding.  Once a can is opened, it can be placed in the frig and reused until gone.    Intake/Tolerance:    Chart reviewed  Pt tolerating TF at goal rate  (6/18) BM x3    IVF @ 75 mL/hr      ASSESSED NUTRITION NEEDS:  Dosing Weight 69 kg (adjusted, based on most recent weight of 89.3 kg on 6/19)  Estimated Energy Needs: 6621-0390 kcals (25-30 Kcal/Kg)  Justification: acute pancreatitis  Estimated Protein Needs:  grams protein (1.2 -1.5 g pro/Kg)  Justification: preservation of lean body mass, increased needs r/t acute pancreatitis      NEW FINDINGS:   6/18: right chest tube placed    Previous Goals (6/16):   Diet advancement vs nutrition support within 24-48 hours  Evaluation: Met    Previous Nutrition Diagnosis (6/16):   Inadequate oral intake related to interruptions to diet order - NPO vs regular diet, swallowing difficulties, decreased appetite r/t pancreatitis flare-ups as evidenced by NPO x1 day, < 50% intake over past 5 days since admit, 10% weight loss in past 8 months  Evaluation: Improving, modified below        CURRENT NUTRITION DIAGNOSIS  No nutrition diagnosis identified at this time as EN meeting estimated nutrition needs    INTERVENTIONS  Recommendations / Nutrition Prescription  NPO    Continue current EN regimen      Goals  EN to meet % estimated needs      MONITORING AND EVALUATION:  Progress towards goals will be monitored and evaluated per protocol and Practice Guidelines

## 2022-06-20 NOTE — PROGRESS NOTES
"PULMONOLOGY PROGRESS NOTE    Date of Admission: 6/14/2022    CC/Reason for Hospital visit: large unilateral effusion  SUBJECTIVE      No new events overnight,  Afebrile. No worsening respiratory complaints at this time.Feels about the same, has had 4 thoracentesis Remains on low flow o2.        ROS: A Problem Pertinent review of systems was negative except for items noted in HPI.  Past Medical, Family, and Social/Substance History has been reviewed: No interval changes.   OBJECTIVE   Vital signs:  Temp: 97.8  F (36.6  C) Temp src: Oral BP: 111/70 Pulse: 79   Resp: 16 SpO2: 93 % O2 Device: Nasal cannula Oxygen Delivery: 3 LPM   Weight: 89.3 kg (196 lb 13.9 oz)  Estimated body mass index is 32.76 kg/m  as calculated from the following:    Height as of 6/13/22: 1.651 m (5' 5\").    Weight as of this encounter: 89.3 kg (196 lb 13.9 oz).        I/O last 3 completed shifts:  In: 7492.42 [I.V.:6600.42; NG/GT:892]  Out: 1320 [Urine:550; Chest Tube:770]       CONSTITUTIONAL/GENERAL APPEARANCE: Alert male. No Apparent Distress. Disheveled   PSYCHIATRIC: Flat mood and affect.   EARS, NOSE,THROAT,MOUTH: External ears and nose overall normal. nl oral mucosa.   NECK: Neck appearance normal. No neck masses and the thyroid not enlarged.   RESPIRATORY: Non-labored effort. Dec right base      LABORATORY ASSESSMENT    Arterial Blood Gas  Recent Labs   Lab 06/18/22  2041 06/15/22  1454   PH  --  7.39   PCO2  --  46*   PO2  --  68*   HCO3  --  27   O2PER 5 40     CBC  Recent Labs   Lab 06/20/22  0715 06/19/22  0622 06/18/22 2041 06/16/22  0802   WBC 4.1 7.3 4.6 4.4   RBC 4.20* 4.00* 4.10* 3.82*   HGB 13.2* 12.3* 12.6* 11.7*   HCT 41.4 38.2* 39.9* 36.4*   MCV 99 96 97 95   MCH 31.4 30.8 30.7 30.6   MCHC 31.9 32.2 31.6 32.1   RDW 16.4* 16.8* 16.8* 16.7*   * 142* 152 145*     BMP  Recent Labs   Lab 06/20/22  0715 06/19/22  0622 06/18/22 2041 06/18/22  0805    144 145* 143   POTASSIUM 3.8 4.0 4.2 3.8  3.8   CHLORIDE 112* 113* " 113* 112*   NASIR 8.5 8.8 8.9 9.2   CO2 29 27 28 26   BUN 16 17 17 16   CR 0.74 0.76 0.85 0.78   * 110* 89 81     INR  Recent Labs   Lab 06/18/22 2041 06/14/22  1903   INR 1.74* 1.64*      BNPNo lab results found in last 7 days.  VENOUS BLOOD GASES  Recent Labs   Lab 06/18/22 2041   PHV 7.36   PCO2V 50   PO2V 29   HCO3V 28   DOMONIQUE 1.9         Additional labs and/or comments:     IMAGING      Thora 6/14: 1L  Thora 6/15: 2.2L  Thora 6/16: 1.3L  Thora 6/17: 0.5L    PFT & OTHER TESTING       ASSESSMENT / PLAN      Pulmonary diagnoses:  Abnl CT/CXR R91.8  Pleural effusion  Resp fail acute J96.00        ASSESSMENT : Jagdeep Walker is a 51 year-old male with complex history including seizure disorder, schizoaffective disorder, recent pancreatitis with pseudocysts, history of portal vein thrombosis, recent admission for metabolic encephalopathy of unclear etiology who presents with shortness of breath and hypoxia with large pleural effusion on outside chest x-ray.  Has had 4 thoracenteses since admission and feeling better. Suspect effusion related to pancreatitis. Malignancy possible but seems less likely given bland fluid and elevated amylase and lipase.        PLAN:   Appreciate thoracic surgery evaluation, chest tube Still draining significant transudative fluid with high amylase 770 mL in the last 24 hours.  24-hour drainage should be below 100mL to even consider removing pleural drainage, and this may take days to weeks to resolve   Needs medical mgmt of source, which is likely related to pancreatitis/pancreatic pseudocysts (pleural fluid with increased amylase and lipase).  Monitor strict intake and output in order to adequately supplement,  Discussed with hospitalist  Agree with GI assessment to discuss further management of pseudocyst.  Nutritional supplementation as able in order to maintain good protein status and prevent worsening oncotic status  Aggressive IS and up to chair  Wean o2 as able, goal sat ~  88-94%  Please call if any questions        Ruiz Galdamez M.D.  Pulmonary, Critical Care and Sleep Medicine  [facility]  Pager: [phone]  Office:[phone]

## 2022-06-20 NOTE — PLAN OF CARE
Pt is AOx2, not to time or situation, somnolent throughout shift, pain is managed with oxy, chest tube dressing wnl, total output for shift is 190cc serosanguinous drainage, to gravity drainage, no crepitus or airleak noted, NPO, able to swallow pills whole, NJ wnl, no residual, feed goal rate met 50cc/hr started at 1030pm, abdomen soft, nontender, DTV, bladder scan for 396 at 1030pm, IVF, repositioned q2hrs, will cont to monitor.

## 2022-06-20 NOTE — PROGRESS NOTES
[facility]    Medicine Progress Note - Hospitalist Service    Date of Admission:  6/14/2022    Assessment & Plan          Jagdeep Walker is a 51 year-old male with complex history including seizure disorder, schizoaffective disorder, recent pancreatitis with pseudocysts, history of portal vein thrombosis, recent admission for metabolic encephalopathy of unclear etiology who presents with shortness of breath and hypoxia with large pleural effusion on outside chest x-ray.  Admitted on 6/14/2022.      Large right pleural effusion   Acute hypoxic respiratory failure  S/p thoracentesis 6/14 with 1L out  S/p thoracentesis 6/15 with 2.2L out  S/p thoracentesis on 6/18 with 0.5 L out  *Presents from TCU with shortness of breath and hypoxia to 85%, CXR at TCU with large right-sided pleural effusion with subtotal collapse of the right lung  *CT chest/abdomen/pelvis post thoracentesis with persistent large right pleural effusion with near complete opacification of the right hemithorax, smaller left pleural effusion and left basilar atelectasis.  Recent echocardiogram 6/4 normal.  Pleural fluid studies consistent with transudate, specimen clotted for cell count, normal glucose, no organisms on gram stain.  Cytology negative.  Given lipase and amylase increase, perhaps pleural effusion related to pancreatitis with fistula or leak versus malnutrition related to severe pancreatitis  Chest tube placed on 6/18  RRT for 6/20 chest pain, resolved with pain medications  plan  -Appreciate pulmonary input  - repeat cytology pending  - GI following  - thoracic surgery following, chest tube with 4 liters out first 24 hours  - monitor CT outputs and adjust rate of fluids as needed  - ercp tomorrow  - most likely cp is related to his chest tube. He has had CP each thoracentesis and RRT x2 after nontunneled chest tube placement  - cycle troponins in case cardiac, but doubt     Acute/subacute pancreatitis with  pseudocyst   *Hospitalized 5/8-5/17/22 for pancreatitis with pseudocyst formation. Unclear etiology, EUS unrevealing for etiology, possibly due to medications  *CT on admission with acute pancreatitis with slightly improved inflammatory changes, pseudocysts not significantly changed  *Sister notes intake has remained generally poor and has complained of pain with eating, not significantly tender on exam at present.  -on tube feeds  -Continue n.p.o. status, continue pain control, nutrition consulted  - ercp tomorrow     Recent metabolic encephalopathy   Mild zinc deficiency   *Extensively worked up during admission 6/4-6/10/22 including head CT, MRI brain, EEG, paraneoplastic ab, extensive laboratory work-up mostly unremarkable with exception of mildly low zinc.  *Per sister, patient does have some cognitive impairment at baseline, though is typically oriented to self, family and can carry on an extensive conversation especially regarding topics he enjoys (eg sports). He remains below his baseline.   - Start zinc supplementation  - likely delirium in the context of pain, hospitalization, etc. nonfocal exam       Hx of portal vein thrombosis  *Diagnosed during admission 5/2022, improved on subsequent CT and not treated with anticoagulation   *Non-occlusive main portal vein thrombosis, splenic vein thrombosis seen on admission CT  - GI consulted as above, they advised no need to treat PVT previous admission (6/9/2022)     Depression  Anxiety  Schizoaffective disorder, bipolar type  - Continue prior to admission sertraline, risperidone     Seizure disorder  - Continue prior to admission topiramate, Depakote     Carnitine deficiency  - Continue prior to admission levocarnitine      Hypertension  - Continue prior to admission atenolol      Fisher's esophagus  - Continue prior to admission PPI     Hypothyroidism  - Continue prior to admission levothyroxine     Seasonal allergies  - Continue prior to admission  levocetirizine            Diet: Adult Formula Drip Feeding: Continuous Jevity 1.5; Nasojejunal; Goal Rate: 50; mL/hr; Medication - Feeding Tube Flush Frequency: At least 15-30 mL water before and after medication administration and with tube clogging; Amount to Send (Nutrition u...  NPO per Anesthesia Guidelines for Procedure/Surgery Except for: Meds  NPO per Anesthesia Guidelines for Procedure/Surgery Except for: Meds    DVT Prophylaxis: Pneumatic Compression Devices.  Santana Catheter: Not present  Central Lines: None  Cardiac Monitoring: ACTIVE order. Indication: chest pain  Code Status: Full Code      Disposition Plan   Expected Discharge: 06/22/2022     Anticipated discharge location:  Awaiting care coordination huddle  Delays:          The patient's care was discussed with family and the Patient and guardian    Donnie Richmond DO  Hospitalist Service  [facility]  Securely message with the Vocera Web Console (learn more here)  Text page via [employer] Paging/Directory         Clinically Significant Risk Factors Present on Admission               # Moderate Malnutrition: based on nutrition assessment     ______________________________________________________________________    Interval History   RRT for chest pain, improved with pain medications, now no current cp. continues abdominal pain, unchanged.    Data reviewed today: I reviewed all medications, new labs and imaging results over the last 24 hours. I personally reviewed   xr with improved pleural effusion    Physical Exam   Vital Signs: Temp: 97.8  F (36.6  C) Temp src: Oral BP: 117/74 Pulse: 81   Resp: 16 SpO2: 93 % O2 Device: Nasal cannula Oxygen Delivery: 3 LPM  Weight: 196 lbs 13.93 oz  Constitutional: Awake, alert, cooperative, no apparent distress  Respiratory: Breath sounds reduced bilateral bases right greater than left  Cardiovascular: Regular rate and rhythm, normal S1 and S2, and no murmur noted  GI: Abdominal fullness  noted, epigastric tenderness present, bowel sounds present.  Skin/Integumen/MSK: No rashes, no cyanosis, no edema  Neuro : moving all 4 extremities, no focal deficit noted       Data   Recent Labs   Lab 06/20/22  1049 06/20/22  0715 06/19/22  0622 06/18/22  2041 06/14/22  1920 06/14/22  1903   WBC  --  4.1 7.3 4.6   < > 5.7   HGB  --  13.2* 12.3* 12.6*   < > 12.6*   MCV  --  99 96 97   < > 96   PLT  --  134* 142* 152   < > 185   INR  --   --   --  1.74*  --  1.64*   NA  --  143 144 145*   < >  --    POTASSIUM  --  3.8 4.0 4.2   < >  --    CHLORIDE  --  112* 113* 113*   < >  --    CO2  --  29 27 28   < >  --    BUN  --  16 17 17   < >  --    CR  --  0.74 0.76 0.85   < >  --    ANIONGAP  --  2* 4 4   < >  --    NASIR  --  8.5 8.8 8.9   < >  --    * 109* 110* 89   < >  --    ALBUMIN  --  1.9*  --  2.2*   < >  --    PROTTOTAL  --  5.0*  --  4.8*   < >  --    BILITOTAL  --  0.5  --  0.6   < >  --    ALKPHOS  --  72  --  56   < >  --    ALT  --  15  --  24   < >  --    AST  --  20  --  23   < >  --    LIPASE  --  1,236*  --  579*   < >  --     < > = values in this interval not displayed.     Recent Results (from the past 24 hour(s))   XR Chest Port 1 View    Narrative    CHEST ONE VIEW  6/20/2022 11:12 AM     HISTORY: RRT    COMPARISON: June 19, 2022      Impression    IMPRESSION: Low lung volumes. Similar minimal atelectasis and/or  infiltrate at both lung bases. Enteric tube tip below the margin of  study.    JEANNIE ARREOLA MD         SYSTEM ID:  [id number]

## 2022-06-21 ENCOUNTER — ANESTHESIA EVENT (OUTPATIENT)
Dept: SURGERY | Facility: CLINIC | Age: 52
DRG: 438 | End: 2022-06-21
Payer: MEDICARE

## 2022-06-21 ENCOUNTER — ANESTHESIA (OUTPATIENT)
Dept: SURGERY | Facility: CLINIC | Age: 52
DRG: 438 | End: 2022-06-21
Payer: MEDICARE

## 2022-06-21 ENCOUNTER — APPOINTMENT (OUTPATIENT)
Dept: GENERAL RADIOLOGY | Facility: CLINIC | Age: 52
DRG: 438 | End: 2022-06-21
Attending: HOSPITALIST
Payer: MEDICARE

## 2022-06-21 ENCOUNTER — APPOINTMENT (OUTPATIENT)
Dept: GENERAL RADIOLOGY | Facility: CLINIC | Age: 52
DRG: 438 | End: 2022-06-21
Attending: THORACIC SURGERY (CARDIOTHORACIC VASCULAR SURGERY)
Payer: MEDICARE

## 2022-06-21 LAB
ALBUMIN SERPL-MCNC: 1.7 G/DL (ref 3.4–5)
ALP SERPL-CCNC: 51 U/L (ref 40–150)
ALT SERPL W P-5'-P-CCNC: 12 U/L (ref 0–70)
ANION GAP SERPL CALCULATED.3IONS-SCNC: 2 MMOL/L (ref 3–14)
AST SERPL W P-5'-P-CCNC: 23 U/L (ref 0–45)
BILIRUB SERPL-MCNC: 0.5 MG/DL (ref 0.2–1.3)
BUN SERPL-MCNC: 17 MG/DL (ref 7–30)
CALCIUM SERPL-MCNC: 8.8 MG/DL (ref 8.5–10.1)
CHLORIDE BLD-SCNC: 116 MMOL/L (ref 94–109)
CO2 SERPL-SCNC: 26 MMOL/L (ref 20–32)
CREAT SERPL-MCNC: 0.67 MG/DL (ref 0.66–1.25)
ERCP: NORMAL
ERYTHROCYTE [DISTWIDTH] IN BLOOD BY AUTOMATED COUNT: 16.3 % (ref 10–15)
GFR SERPL CREATININE-BSD FRML MDRD: >90 ML/MIN/1.73M2
GLUCOSE BLD-MCNC: 72 MG/DL (ref 70–99)
GLUCOSE BLDC GLUCOMTR-MCNC: 75 MG/DL (ref 70–99)
HCT VFR BLD AUTO: 39.7 % (ref 40–53)
HGB BLD-MCNC: 12.6 G/DL (ref 13.3–17.7)
MCH RBC QN AUTO: 31 PG (ref 26.5–33)
MCHC RBC AUTO-ENTMCNC: 31.7 G/DL (ref 31.5–36.5)
MCV RBC AUTO: 98 FL (ref 78–100)
PLATELET # BLD AUTO: 138 10E3/UL (ref 150–450)
POTASSIUM BLD-SCNC: 3.9 MMOL/L (ref 3.4–5.3)
PROT SERPL-MCNC: 4.6 G/DL (ref 6.8–8.8)
RBC # BLD AUTO: 4.06 10E6/UL (ref 4.4–5.9)
SODIUM SERPL-SCNC: 144 MMOL/L (ref 133–144)
WBC # BLD AUTO: 5.4 10E3/UL (ref 4–11)

## 2022-06-21 PROCEDURE — 250N000009 HC RX 250: Performed by: NURSE ANESTHETIST, CERTIFIED REGISTERED

## 2022-06-21 PROCEDURE — 85027 COMPLETE CBC AUTOMATED: CPT | Performed by: HOSPITALIST

## 2022-06-21 PROCEDURE — 71045 X-RAY EXAM CHEST 1 VIEW: CPT

## 2022-06-21 PROCEDURE — C1877 STENT, NON-COAT/COV W/O DEL: HCPCS | Performed by: INTERNAL MEDICINE

## 2022-06-21 PROCEDURE — 360N000083 HC SURGERY LEVEL 3 W/ FLUORO, PER MIN: Performed by: INTERNAL MEDICINE

## 2022-06-21 PROCEDURE — 0FHD8DZ INSERTION OF INTRALUMINAL DEVICE INTO PANCREATIC DUCT, VIA NATURAL OR ARTIFICIAL OPENING ENDOSCOPIC: ICD-10-PCS | Performed by: INTERNAL MEDICINE

## 2022-06-21 PROCEDURE — 250N000013 HC RX MED GY IP 250 OP 250 PS 637: Performed by: INTERNAL MEDICINE

## 2022-06-21 PROCEDURE — 250N000013 HC RX MED GY IP 250 OP 250 PS 637: Performed by: NURSE PRACTITIONER

## 2022-06-21 PROCEDURE — 370N000017 HC ANESTHESIA TECHNICAL FEE, PER MIN: Performed by: INTERNAL MEDICINE

## 2022-06-21 PROCEDURE — 999N000141 HC STATISTIC PRE-PROCEDURE NURSING ASSESSMENT: Performed by: INTERNAL MEDICINE

## 2022-06-21 PROCEDURE — 258N000003 HC RX IP 258 OP 636: Performed by: NURSE ANESTHETIST, CERTIFIED REGISTERED

## 2022-06-21 PROCEDURE — 99232 SBSQ HOSP IP/OBS MODERATE 35: CPT | Performed by: HOSPITALIST

## 2022-06-21 PROCEDURE — 250N000011 HC RX IP 250 OP 636: Performed by: NURSE ANESTHETIST, CERTIFIED REGISTERED

## 2022-06-21 PROCEDURE — 250N000013 HC RX MED GY IP 250 OP 250 PS 637: Performed by: HOSPITALIST

## 2022-06-21 PROCEDURE — 36415 COLL VENOUS BLD VENIPUNCTURE: CPT | Performed by: HOSPITALIST

## 2022-06-21 PROCEDURE — 120N000001 HC R&B MED SURG/OB

## 2022-06-21 PROCEDURE — 710N000009 HC RECOVERY PHASE 1, LEVEL 1, PER MIN: Performed by: INTERNAL MEDICINE

## 2022-06-21 PROCEDURE — 258N000003 HC RX IP 258 OP 636: Performed by: ANESTHESIOLOGY

## 2022-06-21 PROCEDURE — 258N000003 HC RX IP 258 OP 636: Performed by: HOSPITALIST

## 2022-06-21 PROCEDURE — 80053 COMPREHEN METABOLIC PANEL: CPT | Performed by: HOSPITALIST

## 2022-06-21 PROCEDURE — 258N000001 HC RX 258: Performed by: HOSPITALIST

## 2022-06-21 PROCEDURE — 74330 X-RAY BILE/PANC ENDOSCOPY: CPT

## 2022-06-21 DEVICE — STENT ZIMMON PANCREA 5FRX11CM SGL PIGTAIL G27194
Type: IMPLANTABLE DEVICE | Site: PANCREATIC DUCT | Status: NON-FUNCTIONAL
Removed: 2022-07-28

## 2022-06-21 RX ORDER — HYDROMORPHONE HCL IN WATER/PF 6 MG/30 ML
0.4 PATIENT CONTROLLED ANALGESIA SYRINGE INTRAVENOUS EVERY 5 MIN PRN
Status: DISCONTINUED | OUTPATIENT
Start: 2022-06-21 | End: 2022-06-21 | Stop reason: HOSPADM

## 2022-06-21 RX ORDER — FLUMAZENIL 0.1 MG/ML
0.2 INJECTION, SOLUTION INTRAVENOUS
Status: ACTIVE | OUTPATIENT
Start: 2022-06-21 | End: 2022-06-22

## 2022-06-21 RX ORDER — ONDANSETRON 2 MG/ML
4 INJECTION INTRAMUSCULAR; INTRAVENOUS EVERY 6 HOURS PRN
Status: DISCONTINUED | OUTPATIENT
Start: 2022-06-21 | End: 2022-06-21

## 2022-06-21 RX ORDER — SODIUM CHLORIDE, SODIUM LACTATE, POTASSIUM CHLORIDE, CALCIUM CHLORIDE 600; 310; 30; 20 MG/100ML; MG/100ML; MG/100ML; MG/100ML
INJECTION, SOLUTION INTRAVENOUS CONTINUOUS
Status: DISCONTINUED | OUTPATIENT
Start: 2022-06-21 | End: 2022-06-21 | Stop reason: HOSPADM

## 2022-06-21 RX ORDER — PROPOFOL 10 MG/ML
INJECTION, EMULSION INTRAVENOUS CONTINUOUS PRN
Status: DISCONTINUED | OUTPATIENT
Start: 2022-06-21 | End: 2022-06-21

## 2022-06-21 RX ORDER — ONDANSETRON 2 MG/ML
INJECTION INTRAMUSCULAR; INTRAVENOUS PRN
Status: DISCONTINUED | OUTPATIENT
Start: 2022-06-21 | End: 2022-06-21

## 2022-06-21 RX ORDER — LIDOCAINE 40 MG/G
CREAM TOPICAL
Status: DISCONTINUED | OUTPATIENT
Start: 2022-06-21 | End: 2022-06-21 | Stop reason: HOSPADM

## 2022-06-21 RX ORDER — OXYCODONE HYDROCHLORIDE 5 MG/1
5 TABLET ORAL EVERY 4 HOURS PRN
Status: DISCONTINUED | OUTPATIENT
Start: 2022-06-21 | End: 2022-06-21 | Stop reason: HOSPADM

## 2022-06-21 RX ORDER — ONDANSETRON 2 MG/ML
4 INJECTION INTRAMUSCULAR; INTRAVENOUS EVERY 30 MIN PRN
Status: DISCONTINUED | OUTPATIENT
Start: 2022-06-21 | End: 2022-06-21 | Stop reason: HOSPADM

## 2022-06-21 RX ORDER — EPHEDRINE SULFATE 50 MG/ML
INJECTION, SOLUTION INTRAMUSCULAR; INTRAVENOUS; SUBCUTANEOUS PRN
Status: DISCONTINUED | OUTPATIENT
Start: 2022-06-21 | End: 2022-06-21

## 2022-06-21 RX ORDER — ONDANSETRON 4 MG/1
4 TABLET, ORALLY DISINTEGRATING ORAL EVERY 30 MIN PRN
Status: DISCONTINUED | OUTPATIENT
Start: 2022-06-21 | End: 2022-06-21 | Stop reason: HOSPADM

## 2022-06-21 RX ORDER — FENTANYL CITRATE 50 UG/ML
50 INJECTION, SOLUTION INTRAMUSCULAR; INTRAVENOUS EVERY 5 MIN PRN
Status: DISCONTINUED | OUTPATIENT
Start: 2022-06-21 | End: 2022-06-21 | Stop reason: HOSPADM

## 2022-06-21 RX ORDER — CALCIUM CARBONATE 500 MG/1
500 TABLET, CHEWABLE ORAL 3 TIMES DAILY PRN
Status: DISCONTINUED | OUTPATIENT
Start: 2022-06-21 | End: 2022-07-01

## 2022-06-21 RX ORDER — ONDANSETRON 4 MG/1
4 TABLET, ORALLY DISINTEGRATING ORAL EVERY 6 HOURS PRN
Status: DISCONTINUED | OUTPATIENT
Start: 2022-06-21 | End: 2022-06-21

## 2022-06-21 RX ADMIN — LEVOCARNITINE 330 MG: 330 TABLET ORAL at 14:26

## 2022-06-21 RX ADMIN — SODIUM CHLORIDE: 9 INJECTION, SOLUTION INTRAVENOUS at 07:55

## 2022-06-21 RX ADMIN — RISPERIDONE 2 MG: 2 TABLET ORAL at 09:31

## 2022-06-21 RX ADMIN — PROPOFOL 100 MCG/KG/MIN: 10 INJECTION, EMULSION INTRAVENOUS at 17:15

## 2022-06-21 RX ADMIN — LEVOTHYROXINE SODIUM 50 MCG: 50 TABLET ORAL at 09:31

## 2022-06-21 RX ADMIN — SERTRALINE HYDROCHLORIDE 100 MG: 100 TABLET ORAL at 09:28

## 2022-06-21 RX ADMIN — SODIUM CHLORIDE, POTASSIUM CHLORIDE, SODIUM LACTATE AND CALCIUM CHLORIDE: 600; 310; 30; 20 INJECTION, SOLUTION INTRAVENOUS at 16:55

## 2022-06-21 RX ADMIN — PANTOPRAZOLE SODIUM 40 MG: 40 TABLET, DELAYED RELEASE ORAL at 09:30

## 2022-06-21 RX ADMIN — ACETAMINOPHEN 650 MG: 325 SUSPENSION ORAL at 14:28

## 2022-06-21 RX ADMIN — DIVALPROEX SODIUM 500 MG: 250 TABLET, DELAYED RELEASE ORAL at 09:29

## 2022-06-21 RX ADMIN — PREDNISOLONE ACETATE 1 DROP: 10 SUSPENSION/ DROPS OPHTHALMIC at 10:02

## 2022-06-21 RX ADMIN — IPRATROPIUM BROMIDE 2 SPRAY: 42 SPRAY NASAL at 16:10

## 2022-06-21 RX ADMIN — DIVALPROEX SODIUM 500 MG: 250 TABLET, DELAYED RELEASE ORAL at 14:23

## 2022-06-21 RX ADMIN — DEXMEDETOMIDINE HYDROCHLORIDE 8 MCG: 100 INJECTION, SOLUTION INTRAVENOUS at 17:13

## 2022-06-21 RX ADMIN — ZINC SULFATE 220 MG (50 MG) CAPSULE 220 MG: CAPSULE at 09:30

## 2022-06-21 RX ADMIN — DEXMEDETOMIDINE HYDROCHLORIDE 8 MCG: 100 INJECTION, SOLUTION INTRAVENOUS at 17:07

## 2022-06-21 RX ADMIN — IPRATROPIUM BROMIDE 2 SPRAY: 42 SPRAY NASAL at 10:03

## 2022-06-21 RX ADMIN — DEXTROSE AND SODIUM CHLORIDE: 5; 450 INJECTION, SOLUTION INTRAVENOUS at 11:02

## 2022-06-21 RX ADMIN — ONDANSETRON 4 MG: 2 INJECTION INTRAMUSCULAR; INTRAVENOUS at 17:30

## 2022-06-21 RX ADMIN — ATENOLOL 25 MG: 25 TABLET ORAL at 09:30

## 2022-06-21 RX ADMIN — Medication 10 MG: at 17:20

## 2022-06-21 RX ADMIN — OXYCODONE HYDROCHLORIDE 2.5 MG: 5 SOLUTION ORAL at 06:15

## 2022-06-21 RX ADMIN — LEVOCARNITINE 330 MG: 330 TABLET ORAL at 09:30

## 2022-06-21 RX ADMIN — PHENYLEPHRINE HYDROCHLORIDE 150 MCG: 10 INJECTION INTRAVENOUS at 17:50

## 2022-06-21 RX ADMIN — CARBOXYMETHYLCELLULOSE SODIUM 1 DROP: 10 GEL OPHTHALMIC at 21:26

## 2022-06-21 RX ADMIN — PHENYLEPHRINE HYDROCHLORIDE 100 MCG: 10 INJECTION INTRAVENOUS at 17:28

## 2022-06-21 RX ADMIN — PHENYLEPHRINE HYDROCHLORIDE 150 MCG: 10 INJECTION INTRAVENOUS at 17:43

## 2022-06-21 ASSESSMENT — ACTIVITIES OF DAILY LIVING (ADL)
ADLS_ACUITY_SCORE: 51
ADLS_ACUITY_SCORE: 51
ADLS_ACUITY_SCORE: 57
ADLS_ACUITY_SCORE: 51
ADLS_ACUITY_SCORE: 57
ADLS_ACUITY_SCORE: 51
ADLS_ACUITY_SCORE: 57
ADLS_ACUITY_SCORE: 51
ADLS_ACUITY_SCORE: 57

## 2022-06-21 ASSESSMENT — ENCOUNTER SYMPTOMS: SEIZURES: 1

## 2022-06-21 NOTE — PLAN OF CARE
Pt is AOx2, not to time or situation, speech garbled with word finding difficulties, able to make needs known, swallow pills whole, NPO maintained with NJ feeding, will stop at midnight, chest tube R lung wnl no crepitus or air leak noted, 0cc output for last 4hrs, voided well, tele NSR, somnolent during shift, repositioned q2hrs. Pending ERCP tomorrow.

## 2022-06-21 NOTE — PROGRESS NOTES
"Thoracic Surgery Progress Note    S: Patient seen this morning. Wanted to sleep more and discomfort at CT site.     O:  /63 (BP Location: Left arm)   Pulse 79   Temp 97.8  F (36.6  C) (Oral)   Resp 17   Wt 85 kg (187 lb 6.3 oz)   SpO2 92%   BMI 31.18 kg/m       CT output decreased. Old blood on dressing. Confirmed tubing was not kinked. Attempted to flush with saline and unable to push saline or pull drainage out. Appears occluded with the smaller diameter pigtail catheter.    A/P:   - Keep chest tube in place, not draining well nor much volume out. Will consider removal dependent on ERCP results today.  - Culture of pleural fluid on 6/17 demonstrating: \"Isolated in broth only Gram positive bacilli, resembling diphtheroids\". Culture of pleural fluid from 6/18 with no growth to date. Recommend ID consult to decide if antimicrobial therapy is indicated.   - Medical management of pancreatitis/pseudocysts per IM/GI      Lilo Madden PA-C with Dr. Cesar Logan MD  [facility]  Office: [phone]  Cell: [phone]    "

## 2022-06-21 NOTE — PLAN OF CARE
Pt here with plural effusion. A&Ox3. Chest tube insertion site reddened with moderate serosanguinous drainage -dressing changed, to water seal, patent with 20ml serosanguinous drainage - flushed by PA this morning, aware of decreased output. Garbled speech, rambles. Medium formed BM. Bladder scanned 609 ml-jimenez placed. Tube feed on hold for ERCP.  VSS. Takes pills hold one at time. Up with lift-up in chair for two hours. Turned and reposition q2. Incontinent of bowel and bladder. mild pain, decreased tylenol. Plan ERCP.     Addendum: returned to floor at 1900. ERCP completed. VSS on 6L. Settled back in new room.

## 2022-06-21 NOTE — PROVIDER NOTIFICATION
"MD Notification    Notified Person: MD    Notified Person Name: Tommy    Notification Date/Time:06/21/2022 at 1200pm    Notification Interaction:    Purpose of Notification: \"6/17 1215pm aerobic right plural fluid culture with gram+bacilli resembling diaphtheriods \"    FYI minimal output since flushed this morning.    Orders Received: ID consulted    Comments:    "

## 2022-06-21 NOTE — PROGRESS NOTES
ERCP with pancreatic duct cannulation.  Fairly tight regular pancreatic duct in the head of pancreas up to pancreatic neck.  Distal pancreatic duct was dilated up to 5 mm and a pancreatic duct leak with communicating pseudocysts and possible extending toward pulmonary space was noticed.  Pancreatic sphincterotomy was done in the 5 [place] 11 cm long single pigtail stent was placed extending up to mid body to distal body of pancreas with good pancreatic duct flow with good drainage of contrast and secretions after stent placement.  Findings are quite consistent with pancreatic duct leak leading to communicating pseudocyst and possible right pleural effusion due to pancreatic or pulmonary fistula.  Continue on supportive care strict n.p.o. nasojejunal tube feeding.  Continue on antibiotics.  Finding and plan discussed with patient's family and also with the hospitalist team    Massimo WYMAN

## 2022-06-21 NOTE — PROGRESS NOTES
[facility]    Medicine Progress Note - Hospitalist Service    Date of Admission:  6/14/2022    Assessment & Plan          Jagdeep Walker is a 51 year-old male with complex history including seizure disorder, schizoaffective disorder, recent pancreatitis with pseudocysts, history of portal vein thrombosis, recent admission for metabolic encephalopathy of unclear etiology who presents with shortness of breath and hypoxia with large pleural effusion on outside chest x-ray.  Admitted on 6/14/2022.      Large right pleural effusion   Acute hypoxic respiratory failure  S/p thoracentesis 6/14 with 1L out  S/p thoracentesis 6/15 with 2.2L out  S/p thoracentesis on 6/18 with 0.5 L out  *Presents from TCU with shortness of breath and hypoxia to 85%, CXR at TCU with large right-sided pleural effusion with subtotal collapse of the right lung  *CT chest/abdomen/pelvis post thoracentesis with persistent large right pleural effusion with near complete opacification of the right hemithorax, smaller left pleural effusion and left basilar atelectasis.  Recent echocardiogram 6/4 normal.  Pleural fluid studies consistent with transudate, specimen clotted for cell count, normal glucose, no organisms on gram stain.  Cytology negative x 2  Given lipase and amylase increase, perhaps pleural effusion related to pancreatitis with fistula or leak versus malnutrition related to severe pancreatitis  Chest tube placed on 6/18  RRT for 6/20 chest pain, resolved with pain medications  Culture from the pleural with diptheroids in broth only, c/w contaminant  Output decreasing, but unclear if related to chest tube failure on 6/21  plan   -Appreciate pulmonary and GI consultations  - thoracic surgery following, chest tube with minimal out now, may have either clogged or migrated (not seen on CXR), defer management to thoracic surgery. todays xr does not show much effusion  - ercp today  - no CP today  - repeat cxr  tomorrow.     Acute/subacute pancreatitis with pseudocyst   *Hospitalized 5/8-5/17/22 for pancreatitis with pseudocyst formation. Unclear etiology, EUS unrevealing for etiology, possibly due to medications  *CT on admission with acute pancreatitis with slightly improved inflammatory changes, pseudocysts not significantly changed  *Sister notes intake has remained generally poor and has complained of pain with eating, not significantly tender on exam at present.  -on tube feeds  -Continue n.p.o. status, continue pain control, nutrition consulted  - ercp today      Recent metabolic encephalopathy   Mild zinc deficiency   *Extensively worked up during admission 6/4-6/10/22 including head CT, MRI brain, EEG, paraneoplastic ab, extensive laboratory work-up mostly unremarkable with exception of mildly low zinc.  *Per sister, patient does have some cognitive impairment at baseline, though is typically oriented to self, family and can carry on an extensive conversation especially regarding topics he enjoys (eg sports). He remains below his baseline.   - Start zinc supplementation  - likely delirium in the context of pain, hospitalization, etc. nonfocal exam       Hx of portal vein thrombosis  *Diagnosed during admission 5/2022, improved on subsequent CT and not treated with anticoagulation   *Non-occlusive main portal vein thrombosis, splenic vein thrombosis seen on admission CT  - GI consulted as above, they advised no need to treat PVT previous admission (6/9/2022)     Depression  Anxiety  Schizoaffective disorder, bipolar type  - Continue prior to admission sertraline, risperidone     Seizure disorder  - Continue prior to admission topiramate, Depakote     Carnitine deficiency  - Continue prior to admission levocarnitine      Hypertension  - Continue prior to admission atenolol      Fisher's esophagus  - Continue prior to admission PPI     Hypothyroidism  - Continue prior to admission levothyroxine     Seasonal  allergies  - Continue prior to admission levocetirizine            Diet: Adult Formula Drip Feeding: Continuous Jevity 1.5; Nasojejunal; Goal Rate: 50; mL/hr; Medication - Feeding Tube Flush Frequency: At least 15-30 mL water before and after medication administration and with tube clogging; Amount to Send (Nutrition u...  NPO per Anesthesia Guidelines for Procedure/Surgery Except for: Meds    DVT Prophylaxis: Pneumatic Compression Devices. Needs restart of lovenox after ERCP  Santana Catheter: Not present  Central Lines: None  Cardiac Monitoring: ACTIVE order. Indication: chest pain  Code Status: Full Code      Disposition Plan   Expected Discharge: 06/24/2022     Anticipated discharge location:  Awaiting care coordination huddle  Delays:          The patient's care was discussed with family and the Patient and guardian    Donnie Richmond DO  Hospitalist Service  [facility]  Securely message with the Vocera Web Console (learn more here)  Text page via [employer] Paging/Directory         Clinically Significant Risk Factors Present on Admission               # Moderate Malnutrition: based on nutrition assessment     ______________________________________________________________________    Interval History   More awake and alert today. Reports pain minimal, mostly in the abdomen. Minimal out the CT, and the cxr shows improved pleural effusion. Thoracic surgeyr tried to flush but did not flush    Data reviewed today: I reviewed all medications, new labs and imaging results over the last 24 hours. I personally reviewed   xr with mild bibasila opacities but not significant pleural effusion    Physical Exam   Vital Signs: Temp: 97.8  F (36.6  C) Temp src: Oral BP: 103/63 Pulse: 79   Resp: 17 SpO2: 92 % O2 Device: Nasal cannula Oxygen Delivery: 3 LPM  Weight: 187 lbs 6.26 oz  Constitutional: Awake, alert, cooperative, no apparent distress  Respiratory: Breath sounds reduced bilateral bases right  greater than left  Cardiovascular: Regular rate and rhythm, normal S1 and S2, and no murmur noted  GI: Abdominal fullness noted, epigastric tenderness present, bowel sounds present.  Skin/Integumen/MSK: No rashes, no cyanosis, no edema  Neuro : moving all 4 extremities, no focal deficit noted       Data   Recent Labs   Lab 06/21/22  0704 06/20/22  1049 06/20/22  0715 06/19/22  0622 06/18/22  2041 06/14/22  1920 06/14/22  1903   WBC 5.4  --  4.1 7.3 4.6   < > 5.7   HGB 12.6*  --  13.2* 12.3* 12.6*   < > 12.6*   MCV 98  --  99 96 97   < > 96   *  --  134* 142* 152   < > 185   INR  --   --   --   --  1.74*  --  1.64*     --  143 144 145*   < >  --    POTASSIUM 3.9  --  3.8 4.0 4.2   < >  --    CHLORIDE 116*  --  112* 113* 113*   < >  --    CO2 26  --  29 27 28   < >  --    BUN 17  --  16 17 17   < >  --    CR 0.67  --  0.74 0.76 0.85   < >  --    ANIONGAP 2*  --  2* 4 4   < >  --    NASIR 8.8  --  8.5 8.8 8.9   < >  --    GLC 72 109* 109* 110* 89   < >  --    ALBUMIN 1.7*  --  1.9*  --  2.2*   < >  --    PROTTOTAL 4.6*  --  5.0*  --  4.8*   < >  --    BILITOTAL 0.5  --  0.5  --  0.6   < >  --    ALKPHOS 51  --  72  --  56   < >  --    ALT 12  --  15  --  24   < >  --    AST 23  --  20  --  23   < >  --    LIPASE  --   --  1,236*  --  579*   < >  --     < > = values in this interval not displayed.     Recent Results (from the past 24 hour(s))   XR Chest Port 1 View    Narrative    CHEST PORTABLE ONE VIEW  6/21/2022 8:52 AM     HISTORY: Chest tube placement.    COMPARISON: June 20, 2020      Impression    IMPRESSION: Heart size is normal. Esophagogastric tube courses below  the level of the stomach, though outside field-of-view. Presumed  elevation of right hemidiaphragm. Right pigtail catheter is excluded  from field-of-view, as was identified on previous exam, though not  identified on today's exam. Scattered bibasilar opacities again  identified and unchanged. No pneumothorax.    FLACO MARTINEZ MD          SYSTEM ID:  [id number]

## 2022-06-21 NOTE — PROGRESS NOTES
[facility]  Gastroenterology Progress Note     Jagdeep Walker MRN# [id number]   YOB: 1970 Age: 51 year old          Assessment and Plan:   Jagdeep Walker s a 51 year old male admitted with right sided pleural effusion and GI consulted to manage pancreatitis.     Active Problems:  Pleural effusion  Pancreatic pseudocyst  Idiopathic acute pancreatitis, unspecified complication status  *Lipase 1,730->689, AST, ALT and Alk Phos normal. Lactic acid 1.9. WBC wnl, Hgb 12.6,    *6/14 CT A/P note improved inflammatory changes in pancreas. Pseudocysts not significantly changed. Gastric wall thickening has progressed consistent with secondary gastritis with new pseudocyst along the proximal posterior gastric wall. Ascites decreased. Wall thickening involving the cecum and transverse colon likely secondarily involved. Large right pleural effusion has significantly increased.  *Cause of pleural effusions may be due to pancreatitis and possible development of pancreaticopleural fistula. Amylase results on pleural cavity- 504 ( >1000 international unit(s) suggestive of pancreaticopleural fistula). Pleural effusion is on right and less likely due to pancreatitis. These factors make a pancreaticopleural effusion less likely- however still need to r/o.  * Has permanent chest tube in place per thoracic surgery  *Consider ERCP to r/o pancreaticopleural fistula  *Cause of pancreatitis is idiopathic- no evidence of gallstones or alcohol use. May be due to medication. Autoimmune pancreatitis r/o IgG4 levels normal  * Had NJ placed and receiving tube feeds    -- Daily labs  -- Will plan ERCP today  -- Tube feeds at midnight              Interval History:   no new complaints, denies chest pain, pain is controlled and has ongoing abdominal pain only with moderate pressure              Review of Systems:   C: NEGATIVE for fever, chills, change in weight  E/M: NEGATIVE for ear, mouth and throat  problems  R: NEGATIVE for significant cough or SOB  CV: NEGATIVE for chest pain, palpitations or peripheral edema             Medications:   I have reviewed this patient's current medications    amylase-lipase-protease  1-2 capsule Per Feeding Tube Q4H     atenolol  25 mg Oral Daily     carboxymethylcellulose PF  1 drop Right Eye At Bedtime     divalproex sodium delayed-release  250 mg Oral At Bedtime     divalproex sodium delayed-release  500 mg Oral BID     ipratropium  2 spray Both Nostrils TID     levOCARNitine  330 mg Oral TID     levocetirizine  5 mg Oral QPM     levothyroxine  50 mcg Oral Daily     montelukast  10 mg Oral At Bedtime     pantoprazole  40 mg Oral BID     prednisoLONE acetate  1 drop Right Eye Daily     protein modular  1 packet Per Feeding Tube TID     risperiDONE  2 mg Oral BID     sertraline  100 mg Oral Daily     sodium bicarbonate  325 mg Per Feeding Tube Q4H     sodium chloride (PF)  3 mL Intracatheter Q8H     topiramate  100 mg Oral At Bedtime     zinc sulfate  220 mg Oral Daily                  Physical Exam:   Vitals were reviewed  Vital Signs with Ranges  Temp:  [97  F (36.1  C)-98.1  F (36.7  C)] 97.8  F (36.6  C)  Pulse:  [60-86] 79  Resp:  [16-17] 17  BP: ()/(52-74) 103/63  SpO2:  [92 %-95 %] 92 %  I/O last 3 completed shifts:  In: 2848 [P.O.:250; I.V.:2178; NG/GT:420]  Out: 1300 [Urine:1160; Chest Tube:140]  Constitutional: alert, mild distress, cooperative and pale   Cardiovascular: negative, PMI normal. No lifts, heaves, or thrills. RRR. No murmurs, clicks gallops or rub  Respiratory: wheezy, Percussion normal. Good diaphragmatic excursion.   Abdomen: Abdomen soft, non-tender. BS normal. No masses, organomegaly           Data:   I reviewed the patient's new clinical lab test results.   Recent Labs   Lab Test 06/21/22  0704 06/20/22  0715 06/19/22  0622 06/18/22  2041 06/16/22  0802 06/14/22  1903 06/06/22  0659 06/05/22  0728   WBC 5.4 4.1 7.3 4.6   < > 5.7   < > 3.8*   HGB  12.6* 13.2* 12.3* 12.6*   < > 12.6*   < > 12.0*   MCV 98 99 96 97   < > 96   < > 95   * 134* 142* 152   < > 185   < > 92*   INR  --   --   --  1.74*  --  1.64*  --  1.73*    < > = values in this interval not displayed.     Recent Labs   Lab Test 06/21/22  0704 06/20/22  0715 06/19/22 0622   POTASSIUM 3.9 3.8 4.0   CHLORIDE 116* 112* 113*   CO2 26 29 27   BUN 17 16 17   ANIONGAP 2* 2* 4     Recent Labs   Lab Test 06/21/22  0704 06/20/22  0715 06/18/22  2041 06/15/22  1011 06/15/22  0034 06/14/22  1920 06/05/22  0728 06/04/22  1653 05/08/22  2032 05/08/22  0934   ALBUMIN 1.7* 1.9* 2.2*  --   --  2.4*   < >  --    < >  --    BILITOTAL 0.5 0.5 0.6  --   --  0.6   < >  --    < >  --    ALT 12 15 24  --   --  18   < >  --    < >  --    AST 23 20 23  --   --  21   < >  --    < >  --    PROTEIN  --   --   --   --  Negative  --   --  Negative  --  Negative   LIPASE  --  1,236* 579*  --   --  689*  --   --    < >  --    AMYLASE  --   --   --  142*  --   --   --   --   --   --     < > = values in this interval not displayed.       I reviewed the patient's new imaging results.    All laboratory data reviewed  All imaging studies reviewed by me.    Isabelle Pantoja PA-C,  6/16/2022  [facility]  Office : [phone]  Cell: [phone] (Dr. Clark)  Cell: [phone] (Isabelle Pantoja PA-C)

## 2022-06-21 NOTE — PROGRESS NOTES
"PULMONOLOGY PROGRESS NOTE    Date of Admission: 6/14/2022    CC/Reason for Hospital visit: large unilateral effusion  SUBJECTIVE      Complains of mild discomfort on chest tube site. No new events overnight,  Afebrile. No worsening respiratory complaints at this time. Remains on low flow o2.        ROS: A Problem Pertinent review of systems was negative except for items noted in HPI.  Past Medical, Family, and Social/Substance History has been reviewed: No interval changes.   OBJECTIVE   Vital signs:  Temp: 97  F (36.1  C) Temp src: Oral BP: 95/52 Pulse: 79   Resp: 17 SpO2: 92 % O2 Device: Nasal cannula Oxygen Delivery: 2 LPM   Weight: 85 kg (187 lb 6.3 oz)  Estimated body mass index is 31.18 kg/m  as calculated from the following:    Height as of 6/13/22: 1.651 m (5' 5\").    Weight as of this encounter: 85 kg (187 lb 6.3 oz).        I/O last 3 completed shifts:  In: 2848 [P.O.:250; I.V.:2178; NG/GT:420]  Out: 1300 [Urine:1160; Chest Tube:140]       CONSTITUTIONAL/GENERAL APPEARANCE: Alert male. No Apparent Distress. Disheveled   PSYCHIATRIC: Flat mood and affect.   EARS, NOSE,THROAT,MOUTH: External ears and nose overall normal. nl oral mucosa.   NECK: Neck appearance normal. No neck masses and the thyroid not enlarged.   RESPIRATORY: Non-labored effort. Dec right base      LABORATORY ASSESSMENT    Arterial Blood Gas  Recent Labs   Lab 06/18/22  2041 06/15/22  1454   PH  --  7.39   PCO2  --  46*   PO2  --  68*   HCO3  --  27   O2PER 5 40     CBC  Recent Labs   Lab 06/21/22  0704 06/20/22  0715 06/19/22  0622 06/18/22  2041   WBC 5.4 4.1 7.3 4.6   RBC 4.06* 4.20* 4.00* 4.10*   HGB 12.6* 13.2* 12.3* 12.6*   HCT 39.7* 41.4 38.2* 39.9*   MCV 98 99 96 97   MCH 31.0 31.4 30.8 30.7   MCHC 31.7 31.9 32.2 31.6   RDW 16.3* 16.4* 16.8* 16.8*   * 134* 142* 152     BMP  Recent Labs   Lab 06/21/22  0704 06/20/22  1049 06/20/22  0715 06/19/22  0622 06/18/22 2041     --  143 144 145*   POTASSIUM 3.9  --  3.8 4.0 4.2 "   CHLORIDE 116*  --  112* 113* 113*   NASIR 8.8  --  8.5 8.8 8.9   CO2 26  --  29 27 28   BUN 17  --  16 17 17   CR 0.67  --  0.74 0.76 0.85   GLC 72 109* 109* 110* 89     INR  Recent Labs   Lab 06/18/22 2041 06/14/22  1903   INR 1.74* 1.64*      BNPNo lab results found in last 7 days.  VENOUS BLOOD GASES  Recent Labs   Lab 06/18/22 2041   PHV 7.36   PCO2V 50   PO2V 29   HCO3V 28   DOMONIQUE 1.9         Additional labs and/or comments:     IMAGING      Thora 6/14: 1L  Thora 6/15: 2.2L  Thora 6/16: 1.3L  Thora 6/17: 0.5L    PFT & OTHER TESTING       ASSESSMENT / PLAN      Pulmonary diagnoses:  Abnl CT/CXR R91.8  Pleural effusion  Resp fail acute J96.00        ASSESSMENT : Jagdeep Walker is a 51 year-old male with complex history including seizure disorder, schizoaffective disorder, recent pancreatitis with pseudocysts, history of portal vein thrombosis, recent admission for metabolic encephalopathy of unclear etiology who presents with shortness of breath and hypoxia with large pleural effusion on outside chest x-ray.  Has had 4 thoracenteses since admission and feeling better. Suspect effusion related to pancreatitis. Malignancy possible but seems less likely given bland fluid and elevated amylase and lipase.        PLAN:   Appreciate thoracic surgery evaluation, chest tube Still draining significant transudative fluid with high amylase 140 mL in the last 24 hours.  24-hour drainage should be below 100mL to even consider removing pleural drainage, and this may take days to weeks to resolve  Thoracic following   Needs medical mgmt of source, which is likely related to pancreatitis/pancreatic pseudocysts.  Monitor strict intake and output in order to adequately supplement,  Agree with GI assessment to discuss further management of pseudocyst.  Nutritional supplementation as able in order to maintain good protein status and prevent worsening oncotic status  Aggressive IS and up to chair  Wean o2 as able, goal sat ~  88-94%  Please call if any questions        Ruiz Galdamez M.D.  Pulmonary, Critical Care and Sleep Medicine  [facility]  Pager: [phone]  Office:[phone]

## 2022-06-21 NOTE — PLAN OF CARE
Pt here with plural effusion. Disoriented to place, situation & month/date (knows  year). Neuros - garbled speech, slow to respond, illogical speech at times. VSS. C/o chest pain this AM - RRT called, see MD note. Tele NSR. NPO diet with TF running at goal. Takes pills whole one at a time. Up with lift - sat in chair for 1 hour. T/R Q2. Difficult to assess pain - rated pain severe this AM however pain has been med-ow since - oxycodone given this AM, tylenol given around 12. Pt scoring green on the Aggression Stop Light Tool. Plan for ERCP tomorrow, NPO at midnight.

## 2022-06-21 NOTE — ANESTHESIA CARE TRANSFER NOTE
Patient: Jagdeep Walker    Procedure: Procedure(s):  ENDOSCOPIC RETROGRADE CHOLANGIOPANCREATOGRAPHY, COMPLEX       Diagnosis: Pleural effusion [J90]  Idiopathic acute pancreatitis, unspecified complication status [K85.00]  Diagnosis Additional Information: No value filed.    Anesthesia Type:   General     Note:    Oropharynx: oral airway in place  Level of Consciousness: drowsy  Oxygen Supplementation: face mask    Independent Airway: airway patency satisfactory and stable  Dentition: dentition unchanged  Vital Signs Stable: post-procedure vital signs reviewed and stable  Report to RN Given: handoff report given  Patient transferred to: PACU    Handoff Report: Identifed the Patient, Identified the Reponsible Provider, Reviewed the pertinent medical history, Discussed the surgical course, Reviewed Intra-OP anesthesia mangement and issues during anesthesia, Set expectations for post-procedure period and Allowed opportunity for questions and acknowledgement of understanding      Vitals:  Vitals Value Taken Time   BP     Temp     Pulse 70 06/21/22 1755   Resp 11 06/21/22 1755   SpO2 95 % 06/21/22 1755   Vitals shown include unvalidated device data.    Electronically Signed By: DILLON Butler CRNA  June 21, 2022  5:56 PM

## 2022-06-21 NOTE — PLAN OF CARE
Goal Outcome Evaluation:    Pt here with plural effusion. Pt is disoriented to place, time, & situation. VSS. PRN oxy given x1 this AM for pain. Tele normal sinus nikko, will discontinue in AM. NPO diet started at midnight. NG tube flushed 60ml, stopped feedings and clamped. Garbled and illogical speech at time. Chest tube is patent, 2 clamps at bedside. No output this shift. PIV infusing NS 100ml. Incontinent of urine, no BM. Plan for ERCP today at 1630. Needs consent from legal guardian (sister).     Plan of Care Reviewed With: patient     Overall Patient Progress: no change

## 2022-06-21 NOTE — ANESTHESIA PREPROCEDURE EVALUATION
Anesthesia Pre-Procedure Evaluation    Patient: Jagdeep Walker   MRN: [id number] : 1970        Procedure : Procedure(s):  ENDOSCOPIC RETROGRADE CHOLANGIOPANCREATOGRAPHY, COMPLEX          Past Medical History:   Diagnosis Date     Anxiety      Fisher esophagus      Benign essential hypertension      Bipolar disorder (H)      Depression      Obesity      Pancreatitis      Portal vein thrombosis      Schizoaffective disorder, bipolar type (H)      Seizure disorder (H)      Thyroid disease       Past Surgical History:   Procedure Laterality Date     ENDOSCOPIC ULTRASOUND UPPER GASTROINTESTINAL TRACT (GI) N/A 2022    Procedure: ENDOSCOPIC ULTRASOUND, ESOPHAGOSCOPY / UPPER GASTROINTESTINAL TRACT (GI);  Surgeon: Massimo Clark MD;  Location:  GI     ESOPHAGOSCOPY, GASTROSCOPY, DUODENOSCOPY (EGD), COMBINED N/A 2022    Procedure: ESOPHAGOGASTRODUODENOSCOPY, WITH BIOPSY;  Surgeon: Massimo Clark MD;  Location:  GI     IR CHEST TUBE PLACEMENT NON-TUNNELED RIGHT  2022      No Known Allergies   Social History     Tobacco Use     Smoking status: Never Smoker     Smokeless tobacco: Not on file   Substance Use Topics     Alcohol use: Not on file     Comment: Occasional      Wt Readings from Last 1 Encounters:   22 85 kg (187 lb 6.3 oz)        Anesthesia Evaluation            ROS/MED HX  ENT/Pulmonary:       Neurologic: Comment: Metabolic encephalopathy    (+) seizures,     Cardiovascular:     (+) hypertension-----Previous cardiac testing   Echo: Date:  Results:  Procedure  Complete Echo Adult. Optison (NDC #[id number]) given intravenously.  ______________________________________________________________________________  Interpretation Summary     Right ventricle systolic pressure estimate normal  IVC diameter <2.1 cm collapsing >50% with sniff suggests a normal RA pressure  of 3 mmHg.  The right ventricle is normal in structure, function and size.  Normal transthoracic  echocardiogram.  ______________________________________________________________________________  Left Ventricle  The left ventricle is normal in size. There is normal left ventricular wall  thickness. The visual ejection fraction is 55-60%. Left ventricular diastolic  function is normal. Normal left ventricular wall motion.     Right Ventricle  The right ventricle is normal in structure, function and size.     Atria  Normal left atrial size. Right atrial size is normal. Intact atrial septum.     Mitral Valve  The mitral valve is normal in structure and function.     Tricuspid Valve  The tricuspid valve is normal in structure and function. Right ventricle  systolic pressure estimate normal. There is mild (1+) tricuspid regurgitation.  IVC diameter <2.1 cm collapsing >50% with sniff suggests a normal RA pressure  of 3 mmHg.     Aortic Valve  The aortic valve is normal in structure and function.     Pulmonic Valve  Normal pulmonic valve.     Vessels  Normal size aorta. Normal size ascending aorta. IVC diameter <2.1 cm  collapsing >50% with sniff suggests a normal RA pressure of 3 mmHg.     Pericardium  The pericardium appears normal.     Rhythm  Sinus rhythm was noted.  ______________________________________________________________________________  MMode/2D Measurements & Calculations     IVSd: 0.81 cm  Stress Test: Date: Results:    ECG Reviewed: Date: Results:    Cath: Date: Results:      METS/Exercise Tolerance:     Hematologic:       Musculoskeletal:       GI/Hepatic: Comment: Pancreatitis  Portal vein thrombosis  NJ tube feedings    (+) esophageal disease, liver disease,  (-) GERD   Renal/Genitourinary:    (-) renal disease   Endo:     (+) thyroid problem, hypothyroidism, Obesity,  (-) Type I DM and Type II DM   Psychiatric/Substance Use:     (+) psychiatric history bipolar     Infectious Disease:       Malignancy:       Other:            Physical Exam    Airway        Mallampati: II   TM distance: > 3 FB   Neck  ROM: full   Mouth opening: > 3 cm    Respiratory Devices and Support         Dental  no notable dental history         Cardiovascular   cardiovascular exam normal          Pulmonary   pulmonary exam normal                OUTSIDE LABS:  CBC:   Lab Results   Component Value Date    WBC 5.4 06/21/2022    WBC 4.1 06/20/2022    HGB 12.6 (L) 06/21/2022    HGB 13.2 (L) 06/20/2022    HCT 39.7 (L) 06/21/2022    HCT 41.4 06/20/2022     (L) 06/21/2022     (L) 06/20/2022     BMP:   Lab Results   Component Value Date     06/21/2022     06/20/2022    POTASSIUM 3.9 06/21/2022    POTASSIUM 3.8 06/20/2022    CHLORIDE 116 (H) 06/21/2022    CHLORIDE 112 (H) 06/20/2022    CO2 26 06/21/2022    CO2 29 06/20/2022    BUN 17 06/21/2022    BUN 16 06/20/2022    CR 0.67 06/21/2022    CR 0.74 06/20/2022    GLC 72 06/21/2022     (H) 06/20/2022     COAGS:   Lab Results   Component Value Date    PTT 31 06/05/2022    INR 1.74 (H) 06/18/2022     POC: No results found for: BGM, HCG, HCGS  HEPATIC:   Lab Results   Component Value Date    ALBUMIN 1.7 (L) 06/21/2022    PROTTOTAL 4.6 (L) 06/21/2022    ALT 12 06/21/2022    AST 23 06/21/2022    ALKPHOS 51 06/21/2022    BILITOTAL 0.5 06/21/2022    YUDITH 49 06/14/2022     OTHER:   Lab Results   Component Value Date    PH 7.39 06/15/2022    LACT 0.9 06/18/2022    NASIR 8.8 06/21/2022    PHOS 3.9 06/20/2022    MAG 2.3 06/20/2022    LIPASE 1,236 (H) 06/20/2022    AMYLASE 142 (H) 06/15/2022    TSH 3.42 06/04/2022    CRP 33.6 (H) 06/15/2022       Anesthesia Plan    ASA Status:  3      Anesthesia Type: MAC.   Induction: Intravenous.           Consents    Anesthesia Plan(s) and associated risks, benefits, and realistic alternatives discussed. Questions answered and patient/representative(s) expressed understanding.    - Discussed:     - Discussed with:  Legal Guardian         Postoperative Care    Pain management: IV analgesics, Oral pain medications.   PONV prophylaxis: Ondansetron  (or other 5HT-3)     Comments:                Alberto Gallardo MD

## 2022-06-22 ENCOUNTER — APPOINTMENT (OUTPATIENT)
Dept: GENERAL RADIOLOGY | Facility: CLINIC | Age: 52
DRG: 438 | End: 2022-06-22
Attending: PHYSICIAN ASSISTANT
Payer: MEDICARE

## 2022-06-22 ENCOUNTER — APPOINTMENT (OUTPATIENT)
Dept: GENERAL RADIOLOGY | Facility: CLINIC | Age: 52
DRG: 438 | End: 2022-06-22
Attending: THORACIC SURGERY (CARDIOTHORACIC VASCULAR SURGERY)
Payer: MEDICARE

## 2022-06-22 ENCOUNTER — APPOINTMENT (OUTPATIENT)
Dept: INTERVENTIONAL RADIOLOGY/VASCULAR | Facility: CLINIC | Age: 52
DRG: 438 | End: 2022-06-22
Attending: THORACIC SURGERY (CARDIOTHORACIC VASCULAR SURGERY)
Payer: MEDICARE

## 2022-06-22 ENCOUNTER — APPOINTMENT (OUTPATIENT)
Dept: GENERAL RADIOLOGY | Facility: CLINIC | Age: 52
DRG: 438 | End: 2022-06-22
Payer: MEDICARE

## 2022-06-22 LAB
AMMONIA PLAS-SCNC: 25 UMOL/L (ref 10–50)
ATRIAL RATE - MUSE: 86 BPM
BASE EXCESS BLDA CALC-SCNC: -0.3 MMOL/L (ref -9–1.8)
CREAT SERPL-MCNC: 0.76 MG/DL (ref 0.66–1.25)
DIASTOLIC BLOOD PRESSURE - MUSE: NORMAL MMHG
GFR SERPL CREATININE-BSD FRML MDRD: >90 ML/MIN/1.73M2
GLUCOSE BLDC GLUCOMTR-MCNC: 110 MG/DL (ref 70–99)
HCO3 BLD-SCNC: 26 MMOL/L (ref 21–28)
HIV 1+2 AB+HIV1P24 AG SERPLBLD IA.RAPID: NON REACTIVE
HIV 1+2 AB+HIV1P24 AG SERPLBLD IA.RAPID: NON REACTIVE
HIV INTERPRETATION: NORMAL
HOLD SPECIMEN: NORMAL
INTERPRETATION ECG - MUSE: NORMAL
LACTATE SERPL-SCNC: 1.1 MMOL/L (ref 0.7–2)
O2/TOTAL GAS SETTING VFR VENT: 12 %
P AXIS - MUSE: 22 DEGREES
PCO2 BLD: 46 MM HG (ref 35–45)
PH BLD: 7.35 [PH] (ref 7.35–7.45)
PLATELET # BLD AUTO: 141 10E3/UL (ref 150–450)
PO2 BLD: 67 MM HG (ref 80–105)
POTASSIUM BLD-SCNC: 3.8 MMOL/L (ref 3.4–5.3)
PR INTERVAL - MUSE: 128 MS
PROCALCITONIN SERPL-MCNC: 0.18 NG/ML
QRS DURATION - MUSE: 100 MS
QT - MUSE: 384 MS
QTC - MUSE: 459 MS
R AXIS - MUSE: 6 DEGREES
SYSTOLIC BLOOD PRESSURE - MUSE: NORMAL MMHG
T AXIS - MUSE: 17 DEGREES
VENTRICULAR RATE- MUSE: 86 BPM

## 2022-06-22 PROCEDURE — 74018 RADEX ABDOMEN 1 VIEW: CPT

## 2022-06-22 PROCEDURE — 999N000065 XR ABDOMEN PORT 1 VIEWS

## 2022-06-22 PROCEDURE — 250N000009 HC RX 250: Performed by: INTERNAL MEDICINE

## 2022-06-22 PROCEDURE — 250N000011 HC RX IP 250 OP 636: Performed by: NURSE PRACTITIONER

## 2022-06-22 PROCEDURE — 0W29X0Z CHANGE DRAINAGE DEVICE IN RIGHT PLEURAL CAVITY, EXTERNAL APPROACH: ICD-10-PCS | Performed by: RADIOLOGY

## 2022-06-22 PROCEDURE — 99233 SBSQ HOSP IP/OBS HIGH 50: CPT | Performed by: INTERNAL MEDICINE

## 2022-06-22 PROCEDURE — 99152 MOD SED SAME PHYS/QHP 5/>YRS: CPT

## 2022-06-22 PROCEDURE — 250N000009 HC RX 250: Performed by: NURSE PRACTITIONER

## 2022-06-22 PROCEDURE — 84132 ASSAY OF SERUM POTASSIUM: CPT | Performed by: INTERNAL MEDICINE

## 2022-06-22 PROCEDURE — 272N000196 HC ACCESSORY CR5

## 2022-06-22 PROCEDURE — 999N000104 HIV RAPID ANTIBODY SCREEN: Performed by: INTERNAL MEDICINE

## 2022-06-22 PROCEDURE — 36415 COLL VENOUS BLD VENIPUNCTURE: CPT | Performed by: INTERNAL MEDICINE

## 2022-06-22 PROCEDURE — 250N000013 HC RX MED GY IP 250 OP 250 PS 637: Performed by: NURSE PRACTITIONER

## 2022-06-22 PROCEDURE — 250N000013 HC RX MED GY IP 250 OP 250 PS 637: Performed by: INTERNAL MEDICINE

## 2022-06-22 PROCEDURE — 36600 WITHDRAWAL OF ARTERIAL BLOOD: CPT

## 2022-06-22 PROCEDURE — 99222 1ST HOSP IP/OBS MODERATE 55: CPT | Performed by: INTERNAL MEDICINE

## 2022-06-22 PROCEDURE — 999N000157 HC STATISTIC RCP TIME EA 10 MIN

## 2022-06-22 PROCEDURE — C1769 GUIDE WIRE: HCPCS

## 2022-06-22 PROCEDURE — 82140 ASSAY OF AMMONIA: CPT

## 2022-06-22 PROCEDURE — C1729 CATH, DRAINAGE: HCPCS

## 2022-06-22 PROCEDURE — 999N000065 XR CHEST PORT 1 VIEW

## 2022-06-22 PROCEDURE — 36415 COLL VENOUS BLD VENIPUNCTURE: CPT | Performed by: HOSPITALIST

## 2022-06-22 PROCEDURE — 85049 AUTOMATED PLATELET COUNT: CPT | Performed by: HOSPITALIST

## 2022-06-22 PROCEDURE — 82803 BLOOD GASES ANY COMBINATION: CPT

## 2022-06-22 PROCEDURE — 71045 X-RAY EXAM CHEST 1 VIEW: CPT

## 2022-06-22 PROCEDURE — 258N000001 HC RX 258: Performed by: HOSPITALIST

## 2022-06-22 PROCEDURE — 83605 ASSAY OF LACTIC ACID: CPT

## 2022-06-22 PROCEDURE — 82565 ASSAY OF CREATININE: CPT | Performed by: HOSPITALIST

## 2022-06-22 PROCEDURE — 84145 PROCALCITONIN (PCT): CPT | Performed by: INTERNAL MEDICINE

## 2022-06-22 PROCEDURE — 99291 CRITICAL CARE FIRST HOUR: CPT

## 2022-06-22 PROCEDURE — 258N000003 HC RX IP 258 OP 636: Performed by: INTERNAL MEDICINE

## 2022-06-22 PROCEDURE — 120N000013 HC R&B IMCU

## 2022-06-22 PROCEDURE — 250N000011 HC RX IP 250 OP 636: Performed by: INTERNAL MEDICINE

## 2022-06-22 PROCEDURE — 36415 COLL VENOUS BLD VENIPUNCTURE: CPT

## 2022-06-22 RX ORDER — FENTANYL CITRATE 50 UG/ML
25-50 INJECTION, SOLUTION INTRAMUSCULAR; INTRAVENOUS EVERY 5 MIN PRN
Status: DISCONTINUED | OUTPATIENT
Start: 2022-06-22 | End: 2022-06-22 | Stop reason: HOSPADM

## 2022-06-22 RX ORDER — FLUMAZENIL 0.1 MG/ML
0.2 INJECTION, SOLUTION INTRAVENOUS
Status: DISCONTINUED | OUTPATIENT
Start: 2022-06-22 | End: 2022-06-22 | Stop reason: HOSPADM

## 2022-06-22 RX ORDER — NALOXONE HYDROCHLORIDE 0.4 MG/ML
0.4 INJECTION, SOLUTION INTRAMUSCULAR; INTRAVENOUS; SUBCUTANEOUS
Status: DISCONTINUED | OUTPATIENT
Start: 2022-06-22 | End: 2022-06-22 | Stop reason: HOSPADM

## 2022-06-22 RX ORDER — LIDOCAINE 40 MG/G
CREAM TOPICAL
Status: DISCONTINUED | OUTPATIENT
Start: 2022-06-22 | End: 2022-07-10 | Stop reason: HOSPADM

## 2022-06-22 RX ORDER — NALOXONE HYDROCHLORIDE 0.4 MG/ML
0.2 INJECTION, SOLUTION INTRAMUSCULAR; INTRAVENOUS; SUBCUTANEOUS
Status: DISCONTINUED | OUTPATIENT
Start: 2022-06-22 | End: 2022-06-22 | Stop reason: HOSPADM

## 2022-06-22 RX ADMIN — ZINC SULFATE 220 MG (50 MG) CAPSULE 220 MG: CAPSULE at 08:53

## 2022-06-22 RX ADMIN — SERTRALINE HYDROCHLORIDE 100 MG: 100 TABLET ORAL at 08:53

## 2022-06-22 RX ADMIN — LEVOCETIRIZINE DIHYDROCHLORIDE 5 MG: 5 TABLET ORAL at 23:14

## 2022-06-22 RX ADMIN — RISPERIDONE 2 MG: 2 TABLET ORAL at 23:14

## 2022-06-22 RX ADMIN — LIDOCAINE HYDROCHLORIDE 7 ML: 10 INJECTION, SOLUTION INFILTRATION; PERINEURAL at 15:58

## 2022-06-22 RX ADMIN — PANCRELIPASE 1 CAPSULE: 60000; 12000; 38000 CAPSULE, DELAYED RELEASE PELLETS ORAL at 09:01

## 2022-06-22 RX ADMIN — VALPROATE SODIUM 750 MG: 100 INJECTION, SOLUTION INTRAVENOUS at 00:06

## 2022-06-22 RX ADMIN — ACETAMINOPHEN 650 MG: 325 TABLET ORAL at 23:14

## 2022-06-22 RX ADMIN — PANTOPRAZOLE SODIUM 40 MG: 40 TABLET, DELAYED RELEASE ORAL at 08:54

## 2022-06-22 RX ADMIN — DIVALPROEX SODIUM 500 MG: 250 TABLET, DELAYED RELEASE ORAL at 09:02

## 2022-06-22 RX ADMIN — FENTANYL CITRATE 50 MCG: 50 INJECTION, SOLUTION INTRAMUSCULAR; INTRAVENOUS at 15:53

## 2022-06-22 RX ADMIN — MIDAZOLAM 1 MG: 1 INJECTION INTRAMUSCULAR; INTRAVENOUS at 15:53

## 2022-06-22 RX ADMIN — IPRATROPIUM BROMIDE 2 SPRAY: 42 SPRAY NASAL at 09:02

## 2022-06-22 RX ADMIN — TOPIRAMATE 100 MG: 100 TABLET, FILM COATED ORAL at 23:14

## 2022-06-22 RX ADMIN — PROCHLORPERAZINE EDISYLATE 10 MG: 5 INJECTION INTRAMUSCULAR; INTRAVENOUS at 14:35

## 2022-06-22 RX ADMIN — SODIUM BICARBONATE 325 MG: 325 TABLET ORAL at 09:02

## 2022-06-22 RX ADMIN — ONDANSETRON 4 MG: 2 INJECTION INTRAMUSCULAR; INTRAVENOUS at 13:15

## 2022-06-22 RX ADMIN — ATENOLOL 25 MG: 25 TABLET ORAL at 08:54

## 2022-06-22 RX ADMIN — LEVOCARNITINE 330 MG: 330 TABLET ORAL at 09:02

## 2022-06-22 RX ADMIN — IPRATROPIUM BROMIDE 2 SPRAY: 42 SPRAY NASAL at 23:32

## 2022-06-22 RX ADMIN — RISPERIDONE 2 MG: 2 TABLET ORAL at 08:54

## 2022-06-22 RX ADMIN — CARBOXYMETHYLCELLULOSE SODIUM 1 DROP: 10 GEL OPHTHALMIC at 22:53

## 2022-06-22 RX ADMIN — LEVOCARNITINE 330 MG: 330 TABLET ORAL at 23:14

## 2022-06-22 RX ADMIN — MONTELUKAST 10 MG: 10 TABLET, FILM COATED ORAL at 23:14

## 2022-06-22 RX ADMIN — MIDAZOLAM 1 MG: 1 INJECTION INTRAMUSCULAR; INTRAVENOUS at 15:58

## 2022-06-22 RX ADMIN — LEVOTHYROXINE SODIUM 50 MCG: 50 TABLET ORAL at 08:54

## 2022-06-22 RX ADMIN — DEXTROSE AND SODIUM CHLORIDE: 5; 450 INJECTION, SOLUTION INTRAVENOUS at 07:02

## 2022-06-22 RX ADMIN — PREDNISOLONE ACETATE 1 DROP: 10 SUSPENSION/ DROPS OPHTHALMIC at 09:03

## 2022-06-22 RX ADMIN — ONDANSETRON 4 MG: 2 INJECTION INTRAMUSCULAR; INTRAVENOUS at 20:25

## 2022-06-22 ASSESSMENT — ACTIVITIES OF DAILY LIVING (ADL)
ADLS_ACUITY_SCORE: 53
ADLS_ACUITY_SCORE: 49
ADLS_ACUITY_SCORE: 53
ADLS_ACUITY_SCORE: 53
ADLS_ACUITY_SCORE: 49
ADLS_ACUITY_SCORE: 53
ADLS_ACUITY_SCORE: 49
ADLS_ACUITY_SCORE: 49
ADLS_ACUITY_SCORE: 53
ADLS_ACUITY_SCORE: 49
ADLS_ACUITY_SCORE: 53
ADLS_ACUITY_SCORE: 53

## 2022-06-22 NOTE — CONSULTS
Consult Date: 06/22/2022    REFERRING PHYSICIAN:  Patsy Gannon MD    IMPRESSION:    1.  A 51-year-old male, recurrent right pleural effusion, seemingly sympathetic or related to a connection to underlying pancreatitis, fluid consistently transudative, no major clinical sepsis, now with culture growing diphtheroids.  This is very likely a contaminant.  The overall clinical picture is not suggestive of infected pleural fluid.  2.  Recent pancreatitis with pseudocyst formation, presumably current pleural process related.  3.  Underlying schizoaffective disorder, bipolar disorder and some cognitive dysfunction.  4.  Seizure disorder.    RECOMMENDATIONS:   Overall picture suggests this is not infected fluid nor pseudocyst infected.  No major clinical sepsis.  I do not think the culture growing diphtheroids in broth changed the clinical impression nor requires treatment at this point, so I would watch off antibiotics.  Obviously some risk of infection here.  If evolves to be the case, likely beyond antibiotics, further interventions on the fluid, etc., may be required, but for now, watch off antibiotics.    HISTORY OF PRESENT ILLNESS:  This 51-year-old male is seen in consultation.  He has been in the hospital for the last 10 days.  He has major pleural fluid on the right.  It has been tapped, is transudative, initially negative and then blood cultures negative.  He has not been on antibiotics nor perceived to be infected.  It is being drained.  He now has a drain culture growing diphtheroids, unlikely significant.  No antibiotics yet started    PAST MEDICAL HISTORY:    1.  History of schizoaffective disorder, bipolar disorder and some cognitive dysfunction, history of chronic seizure disorder.  2.  History of the recent pancreatitis.    ALLERGIES:  NONE LISTED.    SOCIAL AND FAMILY HISTORY:  No known resistant pathogens.  No recent travel exposures, but significant health care contact.    MEDICATIONS:  As  listed.    REVIEW OF SYSTEMS:  No major fevers, chills, sweats or illness symptoms.    PHYSICAL EXAMINATION:    GENERAL:  The patient appears his stated age, some cognitive dysfunction and otherwise not ill looking, not tachycardic, not tachypneic.  HEENT:  No thrush or intraoral lesions.  Pupils reactive.  NECK:  Supple and nontender.  HEART:  Regular rhythm, no major murmur.  LUNGS:  Decreased breath sounds on the right.  ABDOMEN:  Soft and nontender, although maybe a slight bit of tenderness in the upper abdomen.  EXTREMITIES:  No major rash or skin lesions.    LABORATORY DATA:  Only positive micro is the fluid culture with broth only diphtheroids.  White count normal. Procalcitonin recently negative, a repeat now also unremarkable.    Thank you very much for this consultation.  I will follow the patient with you.     Yunior Garcia MD        D: 2022   T: 2022   MT: YOAV    Name:     SHIRAZ PIÑA  MRN:      [id number]        Account:      [id number]   :      1970           Consult Date: 2022     Document: [id number]

## 2022-06-22 NOTE — PROVIDER NOTIFICATION
MD Notification    Notified Person: MD    Notified Person Name:      Notification Date/Time: 8:52am    Notification Interaction: web page    Purpose of Notification: NJ     Orders Received:    Comments:

## 2022-06-22 NOTE — PRE-PROCEDURE
GENERAL PRE-PROCEDURE:   Procedure:  Right chest tube replacement, reposition with possible IV moderate sedation   Date/Time:  6/22/2022 8:40 AM    Written consent obtained?: Yes    Risks and benefits: Risks, benefits and alternatives were discussed    Consent given by:  Patient  Patient states understanding of procedure being performed: Yes    Patient's understanding of procedure matches consent: Yes    Procedure consent matches procedure scheduled: Yes    Expected level of sedation:  Moderate  Appropriately NPO:  Yes  ASA Class:  3  Mallampati  :  Grade 4- soft palate obscured by base of tongue  Lungs:  Lungs clear with good breath sounds bilaterally and other (comment)  Lung exam comment:  Decreased bilaterally  Heart:  Normal heart sounds and rate  History & Physical reviewed:  History and physical reviewed and no updates needed  Statement of review:  I have reviewed the lab findings, diagnostic data, medications, and the plan for sedation  I will get consent from patient's sister.     Thanks [facility] Interventional Radiology CNP ([phone]) (phone [phone])

## 2022-06-22 NOTE — PROGRESS NOTES
THORACIC SURGERY    Reviewed ERCP    reaccumulation of pleural fluid on the right    No output from  CT    Have IR reevaluate CT.  May need new right CT    Will follow    EPHRAIM NAGY MD [facility] ONCOLOGY THORACIC SURGERY  CELL:  [phone]  OFFICE: [phone]

## 2022-06-22 NOTE — CONSULTS
ID consult dictated IMP 1 52 yo male reaccumulating transudative r pleural effusion, presumably sympathetic to or direct connection to pancreatitis/psedocyts, no fevr, WBC nl fluid transudate now cx broth diptheroids    REc Agree with prior to this imprssion not infecion and this cx does not change this likely contamiant not a logical pathogen for this issue

## 2022-06-22 NOTE — ANESTHESIA POSTPROCEDURE EVALUATION
Patient: Jagdeep Walker    Procedure: Procedure(s):  ENDOSCOPIC RETROGRADE CHOLANGIOPANCREATOGRAPHY, COMPLEX       Anesthesia Type:  General    Note:  Disposition: Inpatient   Postop Pain Control: Uneventful            Sign Out: Well controlled pain   PONV: No   Neuro/Psych: Uneventful            Sign Out: Acceptable/Baseline neuro status   Airway/Respiratory: Uneventful            Sign Out: Acceptable/Baseline resp. status   CV/Hemodynamics: Uneventful            Sign Out: Acceptable CV status; No obvious hypovolemia; No obvious fluid overload   Other NRE: NONE   DID A NON-ROUTINE EVENT OCCUR?            Last vitals:  Vitals Value Taken Time   BP 93/48 06/21/22 1850   Temp 36.7  C (98  F) 06/21/22 1840   Pulse 74 06/21/22 1856   Resp 9 06/21/22 1856   SpO2 91 % 06/21/22 1856   Vitals shown include unvalidated device data.    Electronically Signed By: Bev Alcazar  June 21, 2022  8:53 PM

## 2022-06-22 NOTE — PROGRESS NOTES
Pt came back from a procedure in IR @4:30pm. He became lethargic and oxygen was desatting. Writer increased his oxygen to 15L and called for a RRT at 4:45. At that time chest tube was clamped as was told from IR to clamp for 1 hour.

## 2022-06-22 NOTE — IR NOTE
Interventional Radiology Intra-procedural Nursing Note    Patient Name: Jagdeep Walker  Medical Record Number: [id number]  Today's Date: June 22, 2022    Procedure: Right chest tube exchange, reposition  Start time: 15:45  End time: 16:00  Report provided to: MICHELLE Faustin  Patient depart time and location: 16:18 to 2219    Patient entered Interventional Radiology Suite number 2 via cart. Patient awake, alert and orientated. Assisted onto procedural table in Supinr position. Prepped and draped.  Dr. Moya in room. Time out and procedure started. Vital signs stable. Telemetry reading Sinus Rhythm.    Procedure well tolerated by patient without complications. Procedure end with debrief by Dr. Brenner. Gauze and tagaderm dressing applied to Right chest tube site  interventional procedure access site.    1000ml immediate output from chest tube on reconnection to [facility]. Orders received from Dr. Moya to clamp chest tube for ONE HOUR then to reconnect to -20 cm wall suction.      Administered medication totals:  Lidocaine 1% 7 mL Intradermal  Versed 2 mg IVP  Fentanyl 50 mcg IVP    Last dose of sedation administered at 15:58

## 2022-06-22 NOTE — PROVIDER NOTIFICATION
MD Notification    Notified Person: MD    Notified Person Name:    Notification Date/Time: 3:04pm    Notification Interaction: web page    Purpose of Notification: change in breathing & abdominal distention      Orders Received:    Comments:

## 2022-06-22 NOTE — PROVIDER NOTIFICATION
MD Notification    Notified Person: MD    Notified Person Name:    Notification Date/Time: 9:30am    Notification Interaction: phone page    Purpose of Notification: NJ tube placement    Orders Received:    Comments:

## 2022-06-22 NOTE — PROVIDER NOTIFICATION
MD Notification    Notified Person: MD    Notified Person Name:     Notification Date/Time: 7:50am    Notification Interaction: [employer] messaging    Purpose of Notification: NJ placement     Orders Received:    Comments:

## 2022-06-22 NOTE — PROGRESS NOTES
[facility]    Medicine Progress Note - Hospitalist Service    Date of Admission:  6/14/2022    Assessment & Plan        Jagdeep Walker is a 51 year-old male with complex history including seizure disorder, schizoaffective disorder, recent pancreatitis with pseudocysts, history of portal vein thrombosis, recent admission for metabolic encephalopathy of unclear etiology who presents with shortness of breath and hypoxia with large pleural effusion on outside chest x-ray.  Admitted on 6/14/2022.      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  Recent echocardiogram 6/4 normal.  Pleural fluid studies consistent with transudate, specimen clotted for cell count, normal glucose, no organisms on gram stain.  Cytology negative x 2  Given lipase and amylase increase, perhaps pleural effusion related to pancreatitis with fistula or leak versus malnutrition related to severe pancreatitis  Chest tube placed on 6/18  RRT for 6/20 chest pain, resolved with pain medications  * Culture from the pleural with diptheroids in broth only, c/w contaminant   - Appreciate pulmonary and GI consultations  - CT surgery following and appreciate their recommendations.  Defer chest tube management to them.  Concern this may have become clogged and being evaluated by IR     Addendum:   Patient having increased work of breathing after new NJ placed.  Suspect this may be due to  re-accumulation of pleural fluid as the CT appears to be clogged  - Patient to go to IR and get chest tube exchanged  - If continues to worsen after CT exchange suggest RRT     Acute/subacute pancreatitis with pseudocyst   *Hospitalized 5/8-5/17/22 for pancreatitis with pseudocyst formation. Unclear etiology, EUS unrevealing for etiology, possibly due to medications  *CT on admission with acute pancreatitis with slightly improved inflammatory changes, pseudocysts not significantly changed  *Sister notes intake has remained generally poor and has complained of pain with eating, not significantly tender on exam at present.  - New NG ordered.  Continue tube feeds   - Continue NPO.  Per GI will need to be NPO until repeat ERCP in 4 weeks.  Will need to discuss PEG with patient and guardian  - GI following and appreciate their recommendations     Recent metabolic encephalopathy   Mild zinc deficiency   *Extensively worked up during admission 6/4-6/10/22 including head CT, MRI brain, EEG, paraneoplastic ab, extensive laboratory work-up mostly unremarkable with exception of mildly low zinc.  *Per sister, patient does have some cognitive impairment at baseline, though is typically oriented to self, family and can carry on an extensive conversation especially regarding topics he enjoys (eg sports). He remains below his baseline.   - Continue zinc supplementation    Hx of portal vein thrombosis  * Diagnosed during admission 5/2022, improved on subsequent CT and not treated with anticoagulation   * Non-occlusive main portal vein thrombosis, splenic vein thrombosis seen on admission CT  - GI consulted as above, they advised no need to treat PVT previous admission (6/9/2022)     Depression  Anxiety  Schizoaffective disorder, bipolar type  - Continue prior to admission sertraline, risperidone     Seizure disorder  - Continue prior to admission topiramate, Depakote     Carnitine deficiency  - Continue prior to admission  levocarnitine      Hypertension  - Continue prior to admission atenolol      Fisher's esophagus  - Continue prior to admission PPI     Hypothyroidism  - Continue prior to admission levothyroxine     Seasonal allergies  - Continue prior to admission levocetirizine       Diet: Adult Formula Drip Feeding: Continuous Jevity 1.5; Nasojejunal; Goal Rate: 50; mL/hr; Medication - Feeding Tube Flush Frequency: At least 15-30 mL water before and after medication administration and with tube clogging; Amount to Send (Nutrition u...  NPO for Medical/Clinical Reasons Except for: Meds    DVT Prophylaxis: Pneumatic Compression Devices  Santana Catheter: PRESENT, indication: Retention  Central Lines: None  Cardiac Monitoring: ACTIVE order. Indication: chest pain  Code Status: Full Code      Disposition Plan   Expected Discharge: 06/24/2022     Anticipated discharge location:  Awaiting care coordination huddle  Delays:         The patient's care was discussed with the Bedside Nurse, Care Coordinator/ and Patient.    Time Spent on this Encounter   I spent 38 minutes on the unit/floor managing the care of the patient.  Over 50% of my time was spent on the following:   - Counseling the patient and/or family regarding:  Diagnostic work up so far, treatment options and disposition planning  - Coordination of care with the: Patient     Jose Roberto BIlya Upton,   Hospitalist Service  [facility]  Securely message with the Vocera Web Console (learn more here)  Text page via [employer] Paging/Directory         Clinically Significant Risk Factors Present on Admission                    ______________________________________________________________________    Interval History   Patient seen and examined.  No acute events over night.  No fevers.  No hypoxia.  No NG currently and discussed with patient we will need to reinsert.  No pain currently.     Data reviewed today: I reviewed all medications, new labs and  imaging results over the last 24 hours. I personally reviewed no images or EKG's today.    Physical Exam   Vital Signs: Temp: 97.8  F (36.6  C) Temp src: Oral BP: 95/55 Pulse: 66   Resp: 16 SpO2: 92 % O2 Device: Oxymask Oxygen Delivery: 1 LPM  Weight: 153 lbs 7.04 oz  General Appearance: Resting comfortably. NAD   Respiratory: Diminished breath sounds to right lung base.  No obvious wheezing or respiratory distress  Cardiovascular: RRR.  No obvious murmurs  GI: Soft.  Mildly distended.  Bowel sounds noted  Skin: No obvious rashes or cyanosis   Other: Alert.  Chest tube in place.  No significant lower extremity edema      Data   Recent Labs   Lab 06/22/22  0738 06/21/22  1923 06/21/22  0704 06/20/22  1049 06/20/22  0715 06/19/22  0622 06/18/22  2041   WBC  --   --  5.4  --  4.1 7.3 4.6   HGB  --   --  12.6*  --  13.2* 12.3* 12.6*   MCV  --   --  98  --  99 96 97   *  --  138*  --  134* 142* 152   INR  --   --   --   --   --   --  1.74*   NA  --   --  144  --  143 144 145*   POTASSIUM 3.8  --  3.9  --  3.8 4.0 4.2   CHLORIDE  --   --  116*  --  112* 113* 113*   CO2  --   --  26  --  29 27 28   BUN  --   --  17  --  16 17 17   CR 0.76  --  0.67  --  0.74 0.76 0.85   ANIONGAP  --   --  2*  --  2* 4 4   NASIR  --   --  8.8  --  8.5 8.8 8.9   GLC  --  75 72 109* 109* 110* 89   ALBUMIN  --   --  1.7*  --  1.9*  --  2.2*   PROTTOTAL  --   --  4.6*  --  5.0*  --  4.8*   BILITOTAL  --   --  0.5  --  0.5  --  0.6   ALKPHOS  --   --  51  --  72  --  56   ALT  --   --  12  --  15  --  24   AST  --   --  23  --  20  --  23   LIPASE  --   --   --   --  1,236*  --  579*     Recent Results (from the past 24 hour(s))   XR ERCP    Narrative    ERCP  6/21/2022 5:35 PM     HISTORY: ERCP    COMPARISON: None.      Impression    IMPRESSION: Four spot fluoroscopic images obtained during ERCP.  Contrast appears to be within the pancreatic duct, though limited  visualization. There may be a stent by completion. Total fluoroscopic  time  is 0.8 minutes.    FLACO MARTINEZ MD         SYSTEM ID:  [id number]   XR Chest Port 1 View    Narrative    EXAM: XR CHEST PORT 1 VIEW  LOCATION: [facility]  DATE/TIME: 6/22/2022 5:50 AM    INDICATION: chest tube placement  COMPARISON: 06/21/2022      Impression    IMPRESSION: Interval removal of feeding tube. Heart size is normal. There is persistent left basilar atelectasis with interval decrease in left pleural effusion. Large right pleural effusion has developed in the interval. Small bore right-sided chest   tube is noted over the lung base. No pneumothorax.   XR Abdomen Port 1 View    Narrative    ABDOMEN ONE VIEW PORTABLE  6/22/2022 1:14 PM     HISTORY: Confirm feeding tube placement.     COMPARISON: June 17, 2022       Impression    IMPRESSION: Feeding tube tip in the distal duodenum.

## 2022-06-22 NOTE — PLAN OF CARE
Goal Outcome Evaluation:    Pt A&Ox3, disoriented to situation. Garbled speech at times. VSS on 4L via oxymask, continuing to wean as able. Denied pain overnight. Chest tube to water seal, no air leak noted. Scant amount of serous output. Santana patent, adequate UOP. PIV infusing IVF at 100 ml/hr. Strict NPO. NJ no longer in place after ERCP, may need replaced today. Turn/Repo, on pulsate mattress. Discharge pending.     Plan of Care Reviewed With: patient     Overall Patient Progress: improving

## 2022-06-22 NOTE — PROGRESS NOTES
Patient is strict NPO per GI. He does not have NJ in place at this time.    Patient's Depakote was changed to IV form for tonight. NJ to be placed tomorrow, if he continues to be in NPO.

## 2022-06-22 NOTE — PROGRESS NOTES
Patient is on IR schedule today Wednesday 6/22/22 for a Right chest tube exchange, reposition.     I attempted to flush the chest tube this morning without success.     -Labs WNL for procedure.    -Orders for NPO have been entered.   -Phone consent was obtained from sister/guardian Huong after explaining the procedure, risks and benefits and consent is in IR.     Please contact the IR department at [phone] for procedural related questions.     Discussed with MICHELLE Mcdonnell today.    Thanks, [facility] Interventional Radiology CNP ([phone]) (phone [phone])           Quadrants Reporting?: 0

## 2022-06-22 NOTE — CODE/RAPID RESPONSE
[facility]    RRT Note  6/22/2022   Time Called: 4:40 PM    Reason for RRT: Hypoxia and AMS    Code Status: Full Code    I was called to evaluate Jagdeep Walker, who is a 51 year old male who was admitted on 6/14/2022 for SOB and hypoxia with large pleural effusion. PMH includes seizure disorder, schizophrenia disorder, recent pancreatitis with pseudocyst, history of portal vein thrombosis, and recent admission for metabolic encephalopathy of unclear etiology.    Assessment & Plan     Acute Hypoxic Hypercapnic Respiratory Failure 2/2 recurrent large right pleural effusion  Abdominal pain suspect 2/2 to clamping of right chest tube  Hypotension 2/2 hypovolemia related to large chest tube output  Ptosis of left eye 2/2 to eye strain as patient is blind in right eye; Patient's guardian Huong reports patient has intermittent ptosis of left eye at baseline  Dysphonia 2/2 NJT placement  Encephalopathy suspect 2/2 to hypoxia and hypercapnia vs. Recent sedation from chest tube procedure   RN reports that patient came back from IR for right chest tube replacement about 15 minutes prior to her calling an RRT. During Chest tube placement patient received 2 mg midazolam and 50 mcg Fentanyl.  Upon arrival patient is hypoxic with SPO2 of 90% on 15 L oxygen mask. Other VS include T 98.5, /53, HR 82,  RR 30.  He is in acute respiratory distress, tachypneic and using sternocleidomastoid accessory muscle use. He opens eyes spontaneously but drifts off to sleep. His voice is horse. RN reports she noticed patients voice became horse after NJT was placed this morning. On exam right chest tube is clamped.  Patient immediately put out 1 L of serous fluid from his chest tube after it was unclamped.  Patient's oxygen improved to 93% on 15 L, and work of breathing improved. Right lung more diminished than left, no wheezing, rales, or rhonchi. No murmur, rub, or gallop. No peripheral edema. ROS limited due  to AMS. RN reported that she was instructed to clamp the chest tube after patient put out 1 L, and to unclamp in 1 hour, then hooked up to -20 suction.    Patient was placed on HFNC at 55% FiO2 on 30 L/min due to PaO2 of 67 and ABG.  Patient's oxygenation improved.  Patient's blood pressure did become soft in the 90s over 60s with maps in the low 60s.  At this time, chest tube was reclamped.  During this time patient developed acute abdominal pain.  Patient was tachypneic, diaphoretic, no change in oxygen requirements.  His chest tube was immediately unclamped and his symptoms resolved.  Patient no longer complained of abdominal pain, no longer had labored breathing, and reports that he feels much better.     Spoke with Dr. Logan with thoracic surgery regarding patient's large volume output from new chest tube of 3600 mL since new CT placed about 1 hour ago. He did not recommend clamping chest tube for large volume outputs. He expects large outputs as patient had an estimated 3L fluid recognized on imaging. He would like chest tube left to water seal.     Differentials: pneumothorax, compression atelectasis, chest tube mechanical issue, PE, pneumonia, ARDS, sepsis, abdominal perforation, stroke, metabolic encephalopathy    INTERVENTIONS:    - CXR: Pleural catheter in place on the right, improved right effusion and associated compressive atelectasis and/or infiltrate. Minimal left lower lobe atelectasis and/or infiltrate. No pneumothorax.  - ABG 7.35/46/67/26  - HFNC  - Abd xray- not remarkable for free air, NJT in good position  - lactic acid 1.1  - ammonia 25  - Consider giving albumin if develops hypotension  [facility]  - [facility] to [facility]    Goals of Care   Patient's guardian Huong was at bedside during my evaluation.  We discussed that patient's large chest tube output may contribute to low blood pressure.  We talked about giving fluid or albumin to correct hypotension, but in the event that he did  not respond to those interventions the next step would be vasopressor support and transferred to the ICU.  Huong confirmed that she would like us to pursue vasopressor support and ICU transfer if necessary.    At the end of my evaluation patient's respiratory status has significantly improved and he is hemodynamically stable. He remains on HFNC at 50% FiO2 on 30L/min.  He does not have abdominal pain.  He is less restless and more alert.  He will transfer to [facility].  Discussed with and defer further cares to Hospitalist Dr. Upton.    DILLON Weinberg [facility]  Hospitalist Service - House BRANDI  Pager: [phone] (7a - 6p)      Physical Exam   Vital Signs with Ranges:  Temp:  [97  F (36.1  C)-98.3  F (36.8  C)] 97.7  F (36.5  C)  Pulse:  [66-90] 80  Resp:  [11-28] 22  BP: ()/() 113/69  FiO2 (%):  [50 %] 50 %  SpO2:  [90 %-96 %] 95 %  I/O last 3 completed shifts:  In: 2501 [I.V.:2501]  Out: 1400 [Urine:1400]            Physical Exam   General:  Ill appearing, in acute distress.   Skin:  Warm, dry. No rashes or lesions on exposed skin.  HEENT:  Normocephalic, atraumatic; EOMs grossly intact.  Neck:  Supple. Trachea midline.  Chest:  Breath sounds diminished in Right middle and lower lobe. Labored breathing, tachypneic on 15L oxymask.  Cardiovascular:  RRR, no rub or murmur. No peripheral edema.  Abdomen:  Soft, non-tender, non-distended.   Musculoskeletal:  Moves all four extremities. No muscle atrophy.  Neurological:  Left eye ptosis; has intermittent at baseline. Dysphonia. Oriented x3. Equal strength in all 4 extremities.  Psychiatric:  Restless    Data     IMAGING: (X-ray/CT/MRI)   Recent Results (from the past 24 hour(s))   XR Chest Port 1 View    Narrative    EXAM: XR CHEST PORT 1 VIEW  LOCATION: [facility]  DATE/TIME: 6/22/2022 5:50 AM    INDICATION: chest tube placement  COMPARISON: 06/21/2022      Impression    IMPRESSION: Interval removal of feeding tube. Heart size is  normal. There is persistent left basilar atelectasis with interval decrease in left pleural effusion. Large right pleural effusion has developed in the interval. Small bore right-sided chest   tube is noted over the lung base. No pneumothorax.   XR Abdomen Port 1 View    Narrative    ABDOMEN ONE VIEW PORTABLE  6/22/2022 1:14 PM     HISTORY: Confirm feeding tube placement.     COMPARISON: June 17, 2022       Impression    IMPRESSION: Feeding tube tip in the distal duodenum.    JEANNIE ARREOLA MD         SYSTEM ID:  [id number]   XR Chest Port 1 View    Narrative    EXAM: XR CHEST PORT 1 VIEW  LOCATION: [facility]  DATE/TIME: 6/22/2022 4:54 PM    INDICATION: SOB  COMPARISON: 06/22/2022 at 5:31 AM      Impression    IMPRESSION: Pleural catheter in place on the right, improved right effusion and associated compressive atelectasis and/or infiltrate. Minimal left lower lobe atelectasis and/or infiltrate. No pneumothorax.       CBC with Diff:  Recent Labs   Lab Test 06/22/22  0738 06/21/22  0704 06/19/22  0622 06/18/22  2041   WBC  --  5.4   < > 4.6   HGB  --  12.6*   < > 12.6*   MCV  --  98   < > 97   * 138*   < > 152   INR  --   --   --  1.74*    < > = values in this interval not displayed.      No results found for: RETICABSCT  No results found for: RETP    Comprehensive Metabolic Panel:  Recent Labs   Lab 06/22/22  1717 06/22/22  0738 06/21/22  1923 06/21/22  0704 06/20/22  1049 06/20/22  0715   NA  --   --   --  144  --  143   POTASSIUM  --  3.8  --  3.9  --  3.8   CHLORIDE  --   --   --  116*  --  112*   CO2  --   --   --  26  --  29   ANIONGAP  --   --   --  2*  --  2*   *  --    < > 72   < > 109*   BUN  --   --   --  17  --  16   CR  --  0.76  --  0.67  --  0.74   GFRESTIMATED  --  >90  --  >90  --  >90   NASIR  --   --   --  8.8  --  8.5   MAG  --   --   --   --   --  2.3   PHOS  --   --   --   --   --  3.9   PROTTOTAL  --   --   --  4.6*  --  5.0*   ALBUMIN  --   --   --  1.7*   --  1.9*   BILITOTAL  --   --   --  0.5  --  0.5   ALKPHOS  --   --   --  51  --  72   AST  --   --   --  23  --  20   ALT  --   --   --  12  --  15    < > = values in this interval not displayed.         Time Spent on this Encounter   CRITICAL CARE TIME  I spent 60 minutes of critical care time on the unit/floor managing the care of Jagdeep Walker. Upon evaluation, this patient had a high probability of imminent or life-threatening deterioration due to Acute Hypoxic Respiratory Failure which required my direct attention, intervention, and personal management. 100% of my time was spent at the bedside counseling the patient and/or coordinating care regarding services listed in this note.

## 2022-06-22 NOTE — PROGRESS NOTES
[facility]  Gastroenterology Progress Note     Jagdeep Walker MRN# [id number]   YOB: 1970 Age: 51 year old          Assessment and Plan:   Jagdeep Walker s a 51 year old male admitted with right sided pleural effusion and GI consulted to manage pancreatitis.     Active Problems:  Pleural effusion  Pancreatic pseudocyst  Idiopathic acute pancreatitis, unspecified complication status  Pancreaticopleural fistula  *Lipase 1,730->689, AST, ALT and Alk Phos normal. Lactic acid 1.9. WBC wnl, Hgb 12.6,    *6/14 CT A/P note improved inflammatory changes in pancreas. Pseudocysts not significantly changed. Gastric wall thickening has progressed consistent with secondary gastritis with new pseudocyst along the proximal posterior gastric wall. Ascites decreased. Wall thickening involving the cecum and transverse colon likely secondarily involved. Large right pleural effusion has significantly increased.  * Has permanent chest tube in place per thoracic surgery- being replaced on 6/22  *Cause of pancreatitis is idiopathic- no evidence of gallstones or alcohol use. May be due to medication. 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 Contrast was injected and flowed through pancreatic ductal system and extravasated from the pancreatic duct in the body of the pancreas with pancreatic duct leak and communicating pseudocyst. Pancreatic sphincterotomy perfomred. One stent placed into ventral pancreatic duct.  *These findings confirm pancreaticopleural fistula  * Could consider octreotide if chest tube output has significant output of more than a liter a day and not improving  NJ tube removed- replaced today- awaiting NJ placement confirmation on xray    -- Repeat ERCP in 4 weeks to determine if fistula closed  -- NPO until repeat ERCP and will need ongoing tube feeds   -- Daily labs              Interval  History:   no new complaints, denies chest pain, pain is controlled and has ongoing abdominal pain only with moderate pressure              Review of Systems:   C: NEGATIVE for fever, chills, change in weight  E/M: NEGATIVE for ear, mouth and throat problems  R: NEGATIVE for significant cough or SOB  CV: NEGATIVE for chest pain, palpitations or peripheral edema             Medications:   I have reviewed this patient's current medications    amylase-lipase-protease  1-2 capsule Per Feeding Tube Q4H     atenolol  25 mg Oral Daily     carboxymethylcellulose PF  1 drop Right Eye At Bedtime     divalproex sodium delayed-release  250 mg Oral At Bedtime     divalproex sodium delayed-release  500 mg Oral BID     ipratropium  2 spray Both Nostrils TID     levOCARNitine  330 mg Oral TID     levocetirizine  5 mg Oral QPM     levothyroxine  50 mcg Oral Daily     montelukast  10 mg Oral At Bedtime     pantoprazole  40 mg Oral BID     prednisoLONE acetate  1 drop Right Eye Daily     protein modular  1 packet Per Feeding Tube TID     risperiDONE  2 mg Oral BID     sertraline  100 mg Oral Daily     sodium bicarbonate  325 mg Per Feeding Tube Q4H     sodium chloride (PF)  3 mL Intracatheter Q8H     topiramate  100 mg Oral At Bedtime     zinc sulfate  220 mg Oral Daily                  Physical Exam:   Vitals were reviewed  Vital Signs with Ranges  Temp:  [97  F (36.1  C)-98.5  F (36.9  C)] 97.7  F (36.5  C)  Pulse:  [61-79] 70  Resp:  [9-18] 18  BP: ()/(48-78) 113/70  SpO2:  [92 %-97 %] 93 %  I/O last 3 completed shifts:  In: 1100 [P.O.:100; I.V.:1000]  Out: 1320 [Urine:1300; Emesis/NG output:20]  Constitutional: alert, mild distress, cooperative and pale   Cardiovascular: negative, PMI normal. No lifts, heaves, or thrills. RRR. No murmurs, clicks gallops or rub  Respiratory: wheezy, Percussion normal. Good diaphragmatic excursion.   Abdomen: Abdomen soft, non-tender. BS normal. No masses, organomegaly           Data:   I  reviewed the patient's new clinical lab test results.   Recent Labs   Lab Test 06/22/22  0738 06/21/22  0704 06/20/22  0715 06/19/22  0622 06/18/22 2041 06/16/22  0802 06/14/22  1903 06/06/22  0659 06/05/22  0728   WBC  --  5.4 4.1 7.3 4.6   < > 5.7   < > 3.8*   HGB  --  12.6* 13.2* 12.3* 12.6*   < > 12.6*   < > 12.0*   MCV  --  98 99 96 97   < > 96   < > 95   * 138* 134* 142* 152   < > 185   < > 92*   INR  --   --   --   --  1.74*  --  1.64*  --  1.73*    < > = values in this interval not displayed.     Recent Labs   Lab Test 06/22/22  0738 06/21/22  0704 06/20/22  0715 06/19/22  0622   POTASSIUM 3.8 3.9 3.8 4.0   CHLORIDE  --  116* 112* 113*   CO2  --  26 29 27   BUN  --  17 16 17   ANIONGAP  --  2* 2* 4     Recent Labs   Lab Test 06/21/22  0704 06/20/22  0715 06/18/22  2041 06/15/22  1011 06/15/22  0034 06/14/22  1920 06/05/22  0728 06/04/22  1653 05/08/22  2032 05/08/22  0934   ALBUMIN 1.7* 1.9* 2.2*  --   --  2.4*   < >  --    < >  --    BILITOTAL 0.5 0.5 0.6  --   --  0.6   < >  --    < >  --    ALT 12 15 24  --   --  18   < >  --    < >  --    AST 23 20 23  --   --  21   < >  --    < >  --    PROTEIN  --   --   --   --  Negative  --   --  Negative  --  Negative   LIPASE  --  1,236* 579*  --   --  689*  --   --    < >  --    AMYLASE  --   --   --  142*  --   --   --   --   --   --     < > = values in this interval not displayed.       I reviewed the patient's new imaging results.    All laboratory data reviewed  All imaging studies reviewed by me.    Isabelle Pantoja PA-C,  6/16/2022  [facility]  Office : [phone]  Cell: [phone] (Dr. Clark)  Cell: [phone] (Isabelle Pantoja PA-C)

## 2022-06-22 NOTE — IR NOTE
IR CHEST TUBE REPO/REPL NON TUNNELED RIGHT  6/22/2022 4:12 PM     HISTORY:  The patient has a large right pleural effusion that is not draining with an existing 12 [place] right nephrostomy tube.    COMPARISON: Chest x-ray dated 6/22/2022    FINDINGS: After obtaining informed consent, the patient was placed in a supine position on the fluoroscopy table. The skin entry site and external portions of existing right-sided chest tube were prepped and draped in the usual sterile manner. Contrast was injected through the tube. This showed it to be in the right pleural space; however, the tube was plugged. Therefore, it was removed over a stiff Glidewire and a new 14 Emir[place] pigtail was passed into the right pleural space. There was immediate drainage of yellow serous fluid upon connecting the tube to the pleura vac suction apparatus. The catheter was sutured to the patient's skin. A fluoroscopic image was saved to document catheter position.    I determined this patient to be an appropriate candidate for the planned sedation and procedure and reassessed the patient immediately prior to sedation and procedure. Moderate intravenous conscious sedation was supervised by me. The patient was independently monitored by a registered nurse assigned to the Department of radiology using automated blood pressure, EKG and pulse oximetry. The patient tolerated the procedure well. There were no immediate postprocedure complications. The patient's vital signs were monitored by radiology nursing staff under my supervision and remained stable throughout the study. Radiation dose for this scan was reduced using automated exposure control, adjustment of the mA and/or kV according to patient size, or iterative reconstruction technique.    MEDICATIONS: Versed 2 mg, fentanyl 50 mcg    Sedation time: 15 minutes    Fluoroscopy time: 0.6 minutes    Total fluoroscopy dose: 27.29   Contrast: 20 cc Isovue    IMPRESSION: Left-sided chest tube replaced  and upsized as above. The catheter will be hooked to Pleur-evac suction at -20 cm H2O.

## 2022-06-23 ENCOUNTER — APPOINTMENT (OUTPATIENT)
Dept: GENERAL RADIOLOGY | Facility: CLINIC | Age: 52
DRG: 438 | End: 2022-06-23
Attending: THORACIC SURGERY (CARDIOTHORACIC VASCULAR SURGERY)
Payer: MEDICARE

## 2022-06-23 LAB
BACTERIA PLR CULT: ABNORMAL
GLUCOSE BLDC GLUCOMTR-MCNC: 110 MG/DL (ref 70–99)
GLUCOSE BLDC GLUCOMTR-MCNC: 117 MG/DL (ref 70–99)
GLUCOSE BLDC GLUCOMTR-MCNC: 118 MG/DL (ref 70–99)
GLUCOSE BLDC GLUCOMTR-MCNC: 136 MG/DL (ref 70–99)
GLUCOSE BLDC GLUCOMTR-MCNC: 172 MG/DL (ref 70–99)
GRAM STAIN RESULT: ABNORMAL
GRAM STAIN RESULT: ABNORMAL
HBV SURFACE AG SERPL QL IA: NONREACTIVE
HCV RNA SERPL NAA+PROBE-ACNC: NOT DETECTED IU/ML
HIV 1+2 AB+HIV1 P24 AG SERPL QL IA: NONREACTIVE

## 2022-06-23 PROCEDURE — 71045 X-RAY EXAM CHEST 1 VIEW: CPT

## 2022-06-23 PROCEDURE — 250N000013 HC RX MED GY IP 250 OP 250 PS 637: Performed by: INTERNAL MEDICINE

## 2022-06-23 PROCEDURE — 250N000011 HC RX IP 250 OP 636: Performed by: INTERNAL MEDICINE

## 2022-06-23 PROCEDURE — 250N000013 HC RX MED GY IP 250 OP 250 PS 637

## 2022-06-23 PROCEDURE — 250N000013 HC RX MED GY IP 250 OP 250 PS 637: Performed by: HOSPITALIST

## 2022-06-23 PROCEDURE — 120N000013 HC R&B IMCU

## 2022-06-23 PROCEDURE — 250N000013 HC RX MED GY IP 250 OP 250 PS 637: Performed by: NURSE PRACTITIONER

## 2022-06-23 PROCEDURE — 258N000003 HC RX IP 258 OP 636: Performed by: PHYSICIAN ASSISTANT

## 2022-06-23 PROCEDURE — 99233 SBSQ HOSP IP/OBS HIGH 50: CPT | Performed by: INTERNAL MEDICINE

## 2022-06-23 PROCEDURE — 250N000011 HC RX IP 250 OP 636: Mod: JA | Performed by: PHYSICIAN ASSISTANT

## 2022-06-23 RX ORDER — DIVALPROEX SODIUM 125 MG/1
250 CAPSULE, COATED PELLETS ORAL AT BEDTIME
Status: DISCONTINUED | OUTPATIENT
Start: 2022-06-23 | End: 2022-06-24

## 2022-06-23 RX ORDER — DIVALPROEX SODIUM 125 MG/1
500 CAPSULE, COATED PELLETS ORAL EVERY 12 HOURS SCHEDULED
Status: DISCONTINUED | OUTPATIENT
Start: 2022-06-23 | End: 2022-06-24

## 2022-06-23 RX ADMIN — IPRATROPIUM BROMIDE 2 SPRAY: 42 SPRAY NASAL at 08:23

## 2022-06-23 RX ADMIN — PREDNISOLONE ACETATE 1 DROP: 10 SUSPENSION/ DROPS OPHTHALMIC at 08:24

## 2022-06-23 RX ADMIN — LEVOCARNITINE 330 MG: 330 TABLET ORAL at 14:16

## 2022-06-23 RX ADMIN — ATENOLOL 25 MG: 25 TABLET ORAL at 08:21

## 2022-06-23 RX ADMIN — SODIUM BICARBONATE 325 MG: 325 TABLET ORAL at 22:55

## 2022-06-23 RX ADMIN — Medication 40 MG: at 20:05

## 2022-06-23 RX ADMIN — DIVALPROEX SODIUM 500 MG: 125 CAPSULE, COATED PELLETS ORAL at 20:00

## 2022-06-23 RX ADMIN — LEVOTHYROXINE SODIUM 50 MCG: 50 TABLET ORAL at 08:22

## 2022-06-23 RX ADMIN — SODIUM BICARBONATE 325 MG: 325 TABLET ORAL at 14:16

## 2022-06-23 RX ADMIN — SODIUM BICARBONATE 325 MG: 325 TABLET ORAL at 17:07

## 2022-06-23 RX ADMIN — RISPERIDONE 2 MG: 2 TABLET ORAL at 08:22

## 2022-06-23 RX ADMIN — TOPIRAMATE 100 MG: 100 TABLET, FILM COATED ORAL at 22:55

## 2022-06-23 RX ADMIN — ONDANSETRON 4 MG: 2 INJECTION INTRAMUSCULAR; INTRAVENOUS at 04:34

## 2022-06-23 RX ADMIN — PANCRELIPASE 1 CAPSULE: 60000; 12000; 38000 CAPSULE, DELAYED RELEASE PELLETS ORAL at 10:58

## 2022-06-23 RX ADMIN — OCTREOTIDE ACETATE 50 MCG/HR: 200 INJECTION, SOLUTION INTRAVENOUS; SUBCUTANEOUS at 11:12

## 2022-06-23 RX ADMIN — IPRATROPIUM BROMIDE 2 SPRAY: 42 SPRAY NASAL at 22:57

## 2022-06-23 RX ADMIN — PANCRELIPASE 1 CAPSULE: 60000; 12000; 38000 CAPSULE, DELAYED RELEASE PELLETS ORAL at 14:16

## 2022-06-23 RX ADMIN — SODIUM BICARBONATE 325 MG: 325 TABLET ORAL at 10:58

## 2022-06-23 RX ADMIN — Medication 40 MG: at 01:11

## 2022-06-23 RX ADMIN — OXYCODONE HYDROCHLORIDE 5 MG: 5 SOLUTION ORAL at 18:15

## 2022-06-23 RX ADMIN — ZINC SULFATE 220 MG (50 MG) CAPSULE 220 MG: CAPSULE at 08:22

## 2022-06-23 RX ADMIN — PANCRELIPASE 1 CAPSULE: 60000; 12000; 38000 CAPSULE, DELAYED RELEASE PELLETS ORAL at 22:55

## 2022-06-23 RX ADMIN — MONTELUKAST 10 MG: 10 TABLET, FILM COATED ORAL at 22:55

## 2022-06-23 RX ADMIN — DIVALPROEX SODIUM 500 MG: 125 CAPSULE, COATED PELLETS ORAL at 08:30

## 2022-06-23 RX ADMIN — LEVOCETIRIZINE DIHYDROCHLORIDE 5 MG: 5 TABLET ORAL at 20:01

## 2022-06-23 RX ADMIN — Medication 1 PACKET: at 22:58

## 2022-06-23 RX ADMIN — LEVOCARNITINE 330 MG: 330 TABLET ORAL at 20:00

## 2022-06-23 RX ADMIN — LEVOCARNITINE 330 MG: 330 TABLET ORAL at 08:21

## 2022-06-23 RX ADMIN — DIVALPROEX SODIUM 250 MG: 125 CAPSULE, COATED PELLETS ORAL at 01:34

## 2022-06-23 RX ADMIN — PANCRELIPASE 1 CAPSULE: 60000; 12000; 38000 CAPSULE, DELAYED RELEASE PELLETS ORAL at 17:07

## 2022-06-23 RX ADMIN — Medication 40 MG: at 08:23

## 2022-06-23 RX ADMIN — ACETAMINOPHEN 650 MG: 325 TABLET ORAL at 20:01

## 2022-06-23 RX ADMIN — DIVALPROEX SODIUM 250 MG: 125 CAPSULE, COATED PELLETS ORAL at 22:55

## 2022-06-23 RX ADMIN — RISPERIDONE 2 MG: 2 TABLET ORAL at 20:00

## 2022-06-23 RX ADMIN — CARBOXYMETHYLCELLULOSE SODIUM 1 DROP: 10 GEL OPHTHALMIC at 22:56

## 2022-06-23 RX ADMIN — Medication 1 PACKET: at 15:42

## 2022-06-23 RX ADMIN — SERTRALINE HYDROCHLORIDE 100 MG: 100 TABLET ORAL at 08:23

## 2022-06-23 RX ADMIN — IPRATROPIUM BROMIDE 2 SPRAY: 42 SPRAY NASAL at 15:43

## 2022-06-23 ASSESSMENT — ACTIVITIES OF DAILY LIVING (ADL)
ADLS_ACUITY_SCORE: 49

## 2022-06-23 NOTE — PROGRESS NOTES
[facility]    Infectious Disease Progress Note    Date of Service (when I saw the patient): 06/23/2022     Assessment & Plan   Jagdeep Walker is a 51 year old male who was admitted on 6/14/2022.     IMPRESSION:    1.  A 51-year-old male, recurrent right pleural effusion, seemingly sympathetic or related to a connection to underlying pancreatitis, fluid consistently transudative, no major clinical sepsis, now with culture growing diphtheroids.  This is very likely a contaminant.  The overall clinical picture is not suggestive of infected pleural fluid.  2.  Recent pancreatitis with pseudocyst formation, presumably current pleural process related.  3.  Underlying schizoaffective disorder, bipolar disorder and some cognitive dysfunction.  4.  Seizure disorder.     RECOMMENDATIONS:   Overall picture suggests this is not infected fluid nor pseudocyst infected.  No major clinical sepsis.  I do not think the culture growing diphtheroids in broth changed the clinical impression nor requires treatment at this point, so I would watch off antibiotics.  Obviously some risk of infection here.  If evolves to be the case, likely beyond antibiotics, further interventions on the fluid, etc., may be required, but for now, watch off antibiotics.    Will follow peripherally please recall if ques     Josh Varela MD    Interval History   Tolerating antibiotics ok   No new rashes or issues with antibiotics   Labs reviewed   Afebrile     Physical Exam   Temp: 98.1  F (36.7  C) Temp src: Axillary BP: 122/73 Pulse: 67   Resp: 11 SpO2: 94 % O2 Device: None (Room air) Oxygen Delivery: 35 LPM  Vitals:    06/21/22 0551 06/22/22 0600 06/23/22 0500   Weight: 85 kg (187 lb 6.3 oz) 69.6 kg (153 lb 7 oz) 84.8 kg (187 lb)     Vital Signs with Ranges  Temp:  [98  F (36.7  C)-99.2  F (37.3  C)] 98.1  F (36.7  C)  Pulse:  [67-89] 67  Resp:  [10-30] 11  BP: ()/(52-85) 122/73  FiO2 (%):  [35 %-50 %] 35 %  SpO2:  [90 %-98  %] 94 %    Constitutional: Awake\  Lungs: Chest tube in place   Cardiovascular: Regular rate and rhythm, normal S1 and S2, and no murmur noted  Abdomen: Normal bowel sounds, soft, non-distended, non-tender  Skin: No rashes, no cyanosis, no edema  Other:    Medications     dextrose       octreotide (sandoSTATIN) infusion ADULT 50 mcg/hr (06/23/22 1112)       amylase-lipase-protease  1-2 capsule Per Feeding Tube Q4H     atenolol  25 mg Oral Daily     carboxymethylcellulose PF  1 drop Right Eye At Bedtime     divalproex sodium delayed-release  250 mg Oral or NG Tube At Bedtime     divalproex sodium delayed-release  500 mg Oral or NG Tube Q12H CONSTANZA (08/20)     ipratropium  2 spray Both Nostrils TID     levOCARNitine  330 mg Oral TID     levocetirizine  5 mg Oral QPM     levothyroxine  50 mcg Oral Daily     montelukast  10 mg Oral At Bedtime     pantoprazole  40 mg Oral or Feeding Tube BID     prednisoLONE acetate  1 drop Right Eye Daily     protein modular  1 packet Per Feeding Tube TID     risperiDONE  2 mg Oral BID     sertraline  100 mg Oral Daily     sodium bicarbonate  325 mg Per Feeding Tube Q4H     sodium chloride (PF)  3 mL Intracatheter Q8H     topiramate  100 mg Oral At Bedtime     zinc sulfate  220 mg Oral Daily       Data   All microbiology laboratory data reviewed.  Recent Labs   Lab Test 06/22/22  0738 06/21/22  0704 06/20/22  0715 06/19/22  0622   WBC  --  5.4 4.1 7.3   HGB  --  12.6* 13.2* 12.3*   HCT  --  39.7* 41.4 38.2*   MCV  --  98 99 96   * 138* 134* 142*     Recent Labs   Lab Test 06/22/22  0738 06/21/22  0704 06/20/22  0715   CR 0.76 0.67 0.74     No lab results found.  No lab results found.    Invalid input(s): [place]

## 2022-06-23 NOTE — PROGRESS NOTES
Jagdeep Walker seen briefly today. He had the right chest tube exchanged 6/22 as the tube was plugged and CXR showing an increased pleural effusion.     Clear yellow fluid flowing from the chest tube. 2290 mL removed yesterday and today 1700 so far drained. (Initial drain amount after first day of placement was 3660 mL on 6/18)     Chest tube is draining freely today.     Thanks, Pamela [facility] Interventional Radiology CNP ([phone]) (phone [phone])

## 2022-06-23 NOTE — PROVIDER NOTIFICATION
MD Notification    Notified Person: MD    Notified Person Name: Wagner    Notification Date/Time: 7:30pm    Notification Interaction: web page    Purpose of Notification: need [facility] orders to transfer pt    Orders Received:    Comments:

## 2022-06-23 NOTE — PROGRESS NOTES
CLINICAL NUTRITION SERVICES - REASSESSMENT NOTE      Recommendations Ordered by Registered Dietitian (RD):  - Restart and continue with TF + creon 12 as ordered   Future/Additional Recommendations:   - Monitor need for PEJ  - Monitor weight and emesis/BM trends   Malnutrition:   % Weight Loss:  > 2% in 1 week (severe malnutrition): 12% weight loss since 6/16, suspect fluid-related with some true weight loss  % Intake:  Decreased intake does not meet criteria for malnutrition - pt receiving TF  Subcutaneous Fat Loss:  Orbital region mild depletion and Upper arm region moderate depletion - per RD note 6/16  Muscle Loss:  Temporal region mild depletion, Clavicle bone region mild depletion and Dorsal hand region mild depletion - per RD note 6/16  Fluid Retention: trace to Mild generalized edema    Malnutrition Diagnosis: Moderate malnutrition  In Context of:  Acute illness or injury     EVALUATION OF PROGRESS TOWARD GOALS   Diet: NPO    Nutrition Support:   Enteral:  Type of Feeding Tube: NJ  Enteral Frequency:  Continuous  Enteral Regimen:Jevity 1.5 via NJ @ 50 mL/hr x 24 hours + 3 packets Prosource  Total Enteral Provisions: 1920 kcal (28 kcal/kg), 110 g protein (1.6 g/kg), 259 g CHO, 60 g fat, 25 g fiber, and 912 mL free water.   Free Water Flush: 60 mL q 4 hours    + Creon 12 --> 2 capsules every 4 hrs via NJ.   --If TF rate is running @ 10-20 ml/hr, administer 1 capsule every 4 hrs;   --If TF rate is running @ 30-50 ml/hr, increase to 2 capsules every 4 hrs.       6/18: TF started @ 20 mL/hr  6/21: TF @ goal  6/21: NJ removed for ERCP   6/22: NJ replaced  6/23: plan to restart Tube feeds    Intake/Tolerance:   - Per discussion with pt's mother, TF is going well but has had some vomiting. Mother reported she expects him to be needing TF for a while. Pt unable to speak during visit d/t HFNC.     ASSESSED NUTRITION NEEDS:  Dosing Weight 69 kg (adjusted, based on most recent weight of 89.3 kg on 6/19)  Estimated Energy  Needs: 2326-2533 kcals (25-30 Kcal/Kg)  Justification: acute pancreatitis  Estimated Protein Needs:  grams protein (1.2 -1.5 g pro/Kg)  Justification: preservation of lean body mass, increased needs r/t acute pancreatitis    NEW FINDINGS:   General:  - : right chest tube exchange, reposition  - : RRT called d/t SOB, abdominal distention    Weight:  - weight loss of -4.5 kg since : suspect fluid-related with some true weight loss  - overall weight loss of -11.6 kg since admit wt  (12%): suspect fluid-related with some true weight loss    Labs:   - sodium: 144 (WNL, stable)  - K+: 3.8 (WNL, stable, )  - BUN: 17 (WNL, stable)  - creatinine: 0.76 (WNL, variable, )  - ma.3 (WNL, )  - phos: 3.9 (WNL, )  - BGM:     Medications:  - creon 12 1-2 capsules per TF  - protonix  - prednisolone  - topamax  - zinc sulfate capsule 220 mg daily  - octreotide in D5W 250 mL @ 10 mL/hr    GI:  - Per  provider note, Restart Tube feeds- consider PEJ  - : ERCP revealed dilation of pancreatic duct in the body of the pancreas  - BM x2 on   - 2x emesis on     Resp.:  - status poor and on 35 LPM  - HFNC    Previous Goals:   EN to meet % estimated needs  Evaluation: Not met - TF interruptions    Previous Nutrition Diagnosis:   No nutrition diagnosis identified at this time as EN meeting estimated nutrition needs  Evaluation: Updated       MALNUTRITION  % Weight Loss:  > 2% in 1 week (severe malnutrition): 12% weight loss since , suspect fluid-related with some true weight loss  % Intake:  Decreased intake does not meet criteria for malnutrition - pt receiving TF  Subcutaneous Fat Loss:  Orbital region mild depletion and Upper arm region moderate depletion - per RD note   Muscle Loss:  Temporal region mild depletion, Clavicle bone region mild depletion and Dorsal hand region mild depletion - per RD note   Fluid Retention: trace to Mild generalized  edema    Malnutrition Diagnosis: Moderate malnutrition  In Context of:  Acute illness or injury    CURRENT NUTRITION DIAGNOSIS  Inadequate protein-energy intake related to reliance on nutrition support with interruptions to TF as evidenced by need for TF to meet 100% of estimated needs, ~12% weight loss since 6/16    INTERVENTIONS  Recommendations / Nutrition Prescription  - Restart and continue with TF + creon 12 as ordered    Implementation  EN Schedule- continue as ordered    Goals  TF to meet a minimum of 25 kcal/kg and 1.2 g PRO/kg daily (per dosing wt 69 kg).      MONITORING AND EVALUATION:  Progress towards goals will be monitored and evaluated per protocol and Practice Guidelines      Marina Guy

## 2022-06-23 NOTE — PROGRESS NOTES
[facility]    Medicine Progress Note - Hospitalist Service    Date of Admission:  6/14/2022    Assessment & Plan        Jagdeep Walker is a 51 year-old male with complex history including seizure disorder, schizoaffective disorder, recent pancreatitis with pseudocysts, history of portal vein thrombosis, recent admission for metabolic encephalopathy of unclear etiology who presents with shortness of breath and hypoxia with large pleural effusion on outside chest x-ray.  Admitted on 6/14/2022.      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  Recent echocardiogram 6/4 normal.  Pleural fluid studies consistent with transudate, specimen clotted for cell count, normal glucose, no organisms on gram stain.  Cytology negative x 2  Given lipase and amylase increase, perhaps pleural effusion related to pancreatitis with fistula or leak versus malnutrition related to severe pancreatitis  Chest tube placed on 6/18 and replaced on 6/22 as it appeared clogged  RRT for 6/20 chest pain, resolved with pain medications  6/22: Respiratory status worsened, likely due to worsening pleural effusions   * Culture from the pleural with diptheroids in broth only, c/w contaminant  - Appreciate pulmonary and GI consultations  - CT surgery following and appreciate their recommendations.  Defer chest tube management to them    Acute/subacute pancreatitis with pseudocyst   *Hospitalized  5/8-5/17/22 for pancreatitis with pseudocyst formation. Unclear etiology, EUS unrevealing for etiology, possibly due to medications  *CT on admission with acute pancreatitis with slightly improved inflammatory changes, pseudocysts not significantly changed  *Sister notes intake has remained generally poor and has complained of pain with eating, not significantly tender on exam at present.  - New NG ordered.  Continue tube feeds   - Continue NPO.  Per GI will need to be NPO until repeat ERCP in 4 weeks.  Discussed PEJ with guardian on phone on 6/23.  Will likely need to proceed early next week once respiratory status stabilizes  - GI following and appreciate their recommendations.  Possibly starting octreotide for 72 hours     Recent metabolic encephalopathy   Mild zinc deficiency   *Extensively worked up during admission 6/4-6/10/22 including head CT, MRI brain, EEG, paraneoplastic ab, extensive laboratory work-up mostly unremarkable with exception of mildly low zinc.  *Per sister, patient does have some cognitive impairment at baseline, though is typically oriented to self, family and can carry on an extensive conversation especially regarding topics he enjoys (eg sports). He remains below his baseline.   - Continue zinc supplementation    Hx of portal vein thrombosis  * Diagnosed during admission 5/2022, improved on subsequent CT and not treated with anticoagulation   * Non-occlusive main portal vein thrombosis, splenic vein thrombosis seen on admission CT  - GI consulted as above, they advised no need to treat PVT previous admission (6/9/2022)     Depression  Anxiety  Schizoaffective disorder, bipolar type  - Continue prior to admission sertraline, risperidone     Seizure disorder  - Continue prior to admission topiramate, Depakote     Carnitine deficiency  - Continue prior to admission levocarnitine      Hypertension  - Continue prior to admission atenolol      Fisher's esophagus  - Continue prior to admission  PPI     Hypothyroidism  - Continue prior to admission levothyroxine     Seasonal allergies  - Continue prior to admission levocetirizine         Diet: Adult Formula Drip Feeding: Continuous Jevity 1.5; Nasojejunal; Goal Rate: 50; mL/hr; Medication - Feeding Tube Flush Frequency: At least 15-30 mL water before and after medication administration and with tube clogging; Amount to Send (Nutrition u...  NPO for Medical/Clinical Reasons Except for: Meds    DVT Prophylaxis: Pneumatic Compression Devices  Santana Catheter: PRESENT, indication: Retention  Central Lines: None  Cardiac Monitoring: ACTIVE order. Indication: Tachyarrhythmias, acute (48 hours)  Code Status: Full Code      Disposition Plan          The patient's care was discussed with the Bedside Nurse, Care Coordinator/, Patient and Patient's Family.    Time Spent on this Encounter   I spent 40 minutes on the unit/floor managing the care of the patient.  Over 50% of my time was spent on the following:   - Counseling the patient and/or family regarding: Diagnostic work up so far, treatment options and disposition planning  - Coordination of care with the: Patient and family     Jose Roberto Upton DO  Hospitalist Service  [facility]  Securely message with the Vocera Web Console (learn more here)  Text page via [employer] for [employer] Glass Paging/Directory         Clinically Significant Risk Factors Present on Admission                      ______________________________________________________________________    Interval History   Patient seen and examined.  Yesterday evening had worsening respiratory status.  RRT called, please see house officer note for further details.  On my evaluation today breathing is improving and titrating down HFNC.  No fevers.  No pain currently.  Tolerating NJ    Data reviewed today: I reviewed all medications, new labs and imaging results over the last 24 hours. I personally reviewed no images or EKG's  today.    Physical Exam   Vital Signs: Temp: 98.5  F (36.9  C) Temp src: Oral BP: 114/73 Pulse: 74   Resp: 16 SpO2: 91 % O2 Device: None (Room air) Oxygen Delivery: 35 LPM  Weight: 187 lbs 0 oz  General Appearance: Sleeping but arousable to voice.  NAD   Respiratory: Right chest tube in.  No respiratory distress on HFNC.  No obvious wheezing  Cardiovascular: RRR.  No obvious murmurs  GI: Soft.  Moderately distended  Skin: No obvious rashes or cyanosis to exposed skin  Other: Alert.  Trace lower extremity edema     Data   Recent Labs   Lab 06/23/22  1220 06/23/22  0824 06/23/22  0623 06/22/22  1717 06/22/22  0738 06/21/22  1923 06/21/22  0704 06/20/22  1049 06/20/22  0715 06/19/22  0622 06/18/22  2041   WBC  --   --   --   --   --   --  5.4  --  4.1 7.3 4.6   HGB  --   --   --   --   --   --  12.6*  --  13.2* 12.3* 12.6*   MCV  --   --   --   --   --   --  98  --  99 96 97   PLT  --   --   --   --  141*  --  138*  --  134* 142* 152   INR  --   --   --   --   --   --   --   --   --   --  1.74*   NA  --   --   --   --   --   --  144  --  143 144 145*   POTASSIUM  --   --   --   --  3.8  --  3.9  --  3.8 4.0 4.2   CHLORIDE  --   --   --   --   --   --  116*  --  112* 113* 113*   CO2  --   --   --   --   --   --  26  --  29 27 28   BUN  --   --   --   --   --   --  17  --  16 17 17   CR  --   --   --   --  0.76  --  0.67  --  0.74 0.76 0.85   ANIONGAP  --   --   --   --   --   --  2*  --  2* 4 4   NASIR  --   --   --   --   --   --  8.8  --  8.5 8.8 8.9   * 110* 118*   < >  --    < > 72   < > 109* 110* 89   ALBUMIN  --   --   --   --   --   --  1.7*  --  1.9*  --  2.2*   PROTTOTAL  --   --   --   --   --   --  4.6*  --  5.0*  --  4.8*   BILITOTAL  --   --   --   --   --   --  0.5  --  0.5  --  0.6   ALKPHOS  --   --   --   --   --   --  51  --  72  --  56   ALT  --   --   --   --   --   --  12  --  15  --  24   AST  --   --   --   --   --   --  23  --  20  --  23   LIPASE  --   --   --   --   --   --   --   --   1,236*  --  579*    < > = values in this interval not displayed.     Recent Results (from the past 24 hour(s))   IR Chest Tube Repo/Repl Non Tunneled Right    Narrative    INTERVENTIONAL RADIOLOGY CHEST TUBE REPOSITION/REPLACE NON TUNNELED  RIGHT  6/22/2022 4:12 PM     HISTORY:  The patient has a large right pleural effusion that is not  draining with an existing 12 German right nephrostomy tube.    COMPARISON: Chest x-ray dated 6/22/2022.    PROCEDURE: After obtaining informed consent, the patient was placed in  a supine position on the fluoroscopy table. The skin entry site and  external portions of existing right-sided chest tube were prepped and  draped in the usual sterile manner. Contrast was injected through the  tube. This showed it to be in the right pleural space; however, the  tube was plugged. Therefore, it was removed over a stiff Glidewire and  a new 14 German pigtail was passed into the right pleural space. There  was immediate drainage of yellow serous fluid upon connecting the tube  to the Pleur-evac suction apparatus. The catheter was sutured to the  patient's skin. A fluoroscopic image was saved to document catheter  position.    I determined this patient to be an appropriate candidate for the  planned sedation and procedure and reassessed the patient immediately  prior to sedation and procedure. Moderate intravenous conscious  sedation was supervised by me. The patient was independently monitored  by a registered nurse assigned to the Department of radiology using  automated blood pressure, EKG and pulse oximetry. The patient  tolerated the procedure well. There were no immediate postprocedure  complications. The patient's vital signs were monitored by radiology  nursing staff under my supervision and remained stable throughout the  study. Radiation dose for this scan was reduced using automated  exposure control, adjustment of the mA and/or kV according to patient  size, or iterative  reconstruction technique.    MEDICATIONS: Versed 2 mg, fentanyl 50 mcg    Sedation time: 15 minutes    Fluoroscopy time: 0.6 minutes    Total fluoroscopy dose: 27.29 mGy Air Kerma    Contrast: 20 mL Isovue      Impression    IMPRESSION: Left-sided chest tube replaced and upsized as above. The  catheter will be hooked to Pleur-evac suction at -20 cm H2O.    XR Chest Port 1 View    Narrative    EXAM: XR CHEST PORT 1 VIEW  LOCATION: [facility]  DATE/TIME: 6/22/2022 4:54 PM    INDICATION: SOB  COMPARISON: 06/22/2022 at 5:31 AM      Impression    IMPRESSION: Pleural catheter in place on the right, improved right effusion and associated compressive atelectasis and/or infiltrate. Minimal left lower lobe atelectasis and/or infiltrate. No pneumothorax.   XR Abdomen Port 1 View    Narrative    EXAM: XR ABDOMEN PORT 1 VIEWS  LOCATION: [facility]  DATE/TIME: 6/22/2022 5:43 PM    INDICATION: Abdominal pain, recent ERCP, feeding tube placed today  COMPARISON: None.      Impression    IMPRESSION: Weighted tip feeding tube in good position with its tip in the region the duodenal/jejunal junction. Pancreatic stent in place. Degenerative changes in the spine.    XR Chest Port 1 View    Narrative    EXAM: XR CHEST PORT 1 VIEW  LOCATION: [facility]  DATE/TIME: 6/23/2022 5:38 AM    INDICATION: chest tube placement  COMPARISON: 06/22/2022      Impression    IMPRESSION: Prior seen opacification of the mid/lower right hemithorax has resolved. There is been a slight change in positioning of the right pigtail catheter with no pneumothorax identified. Otherwise the exam is stable with again seen slight   infiltrate/atelectasis in the left lung base and slight linear atelectasis seen in the perihilar regions bilaterally. Enteric tube is below the diaphragm. Advanced hypertrophic changes are seen of the spine.

## 2022-06-23 NOTE — PROVIDER NOTIFICATION
MD Notification    Notified Person: MD    Notified Person Name:Yudi Pennington    Notification Date/Time: 0000    Notification Interaction:webpage    Purpose of Notification: pt NPO, requested replacement Depokote  to administer via NJ.     Orders Received: Depakote(sprinkles)    Comments:

## 2022-06-23 NOTE — PLAN OF CARE
Pt transferred from general surgery approx at 1940.   Pt AOX3 and confused, restless at times.  VSS.on HFNC 35%f 35LPM. Tele NSR. LS diminished. Complained of abd discomfort, dry heaves at times, PRN Zofran x1 during shift. AX2 with lift and repo.  NPO,meds through NJ tube. NJ tube clamped during shift,landmark unchanged. free water flushes given with meds.  Chest tube to -20 suction.serous drainage noted. abd soft non tender. Santana intact with adequate urine output.

## 2022-06-23 NOTE — CARE PLAN
MD paged regaurding no labs drawn today, and high output from CT since placement, awaiting orders.

## 2022-06-23 NOTE — PROGRESS NOTES
[facility]  Gastroenterology Progress Note     Jagdeep Walker MRN# [id number]   YOB: 1970 Age: 51 year old          Assessment and Plan:   Jagdeep Walker s a 51 year old male admitted with right sided pleural effusion and GI consulted to manage pancreatitis.     Active Problems:  Pleural effusion  Pancreatic pseudocyst  Idiopathic acute pancreatitis, unspecified complication status  Pancreaticopleural fistula  *Lipase 1,730->689, AST, ALT and Alk Phos normal. Lactic acid 1.9. WBC wnl, Hgb 12.6,    *6/14 CT A/P note improved inflammatory changes in pancreas. Pseudocysts not significantly changed. Gastric wall thickening has progressed consistent with secondary gastritis with new pseudocyst along the proximal posterior gastric wall. Ascites decreased. Wall thickening involving the cecum and transverse colon likely secondarily involved. Large right pleural effusion has significantly increased.  *Cause of pancreatitis is idiopathic- no evidence of gallstones or alcohol use. May be due to medication. 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 Contrast was injected and flowed through pancreatic ductal system and extravasated from the pancreatic duct in the body of the pancreas with pancreatic duct leak and communicating pseudocyst. Pancreatic sphincterotomy performed. One stent placed into ventral pancreatic duct.  *These findings confirm pancreaticopleural fistula  NJ tube in place  * Chest tube replaced yesterday- output was 1000mL immediately on replacement, 3600 mL total in first few hours since placement- Had RRT called due to shortness of breath and abdominal distention and 870 this a.m.- may benefit from octreotide- and if has no improvement in output would stop after 72 hours  * Respiratory status poor and on 35 LPM    -- Start octreotide 50 mcg/hr drip for 72 hours and if CT output  improves will continue otherwise can consider stopping  -- Repeat ERCP in 4 weeks to determine if fistula closed  -- Restart Tube feeds- consider PEJ  -- Daily labs              Interval History:   no new complaints, denies chest pain, pain is controlled and has ongoing abdominal pain. Had RRT called after significant change in respirations and abdominal distention               Review of Systems:   C: NEGATIVE for fever, chills, change in weight  E/M: NEGATIVE for ear, mouth and throat problems  R: NEGATIVE for significant cough or SOB  CV: NEGATIVE for chest pain, palpitations or peripheral edema             Medications:   I have reviewed this patient's current medications    amylase-lipase-protease  1-2 capsule Per Feeding Tube Q4H     atenolol  25 mg Oral Daily     carboxymethylcellulose PF  1 drop Right Eye At Bedtime     divalproex sodium delayed-release  250 mg Oral or NG Tube At Bedtime     divalproex sodium delayed-release  500 mg Oral or NG Tube Q12H [facility] (08/20)     ipratropium  2 spray Both Nostrils TID     levOCARNitine  330 mg Oral TID     levocetirizine  5 mg Oral QPM     levothyroxine  50 mcg Oral Daily     montelukast  10 mg Oral At Bedtime     pantoprazole  40 mg Oral or Feeding Tube BID     prednisoLONE acetate  1 drop Right Eye Daily     protein modular  1 packet Per Feeding Tube TID     risperiDONE  2 mg Oral BID     sertraline  100 mg Oral Daily     sodium bicarbonate  325 mg Per Feeding Tube Q4H     sodium chloride (PF)  3 mL Intracatheter Q8H     topiramate  100 mg Oral At Bedtime     zinc sulfate  220 mg Oral Daily                  Physical Exam:   Vitals were reviewed  Vital Signs with Ranges  Temp:  [97.7  F (36.5  C)-99.2  F (37.3  C)] 98  F (36.7  C)  Pulse:  [70-90] 79  Resp:  [10-30] 16  BP: ()/() 123/85  FiO2 (%):  [35 %-50 %] 35 %  SpO2:  [90 %-98 %] 96 %  I/O last 3 completed shifts:  In: 1561 [I.V.:1501; NG/GT:60]  Out: 4265 [Urine:1245; Chest Tube:3020]  Constitutional:  alert, mild distress, cooperative and pale   Cardiovascular: negative, PMI normal. No lifts, heaves, or thrills. RRR. No murmurs, clicks gallops or rub  Respiratory: wheezy, Percussion normal. Good diaphragmatic excursion.   Abdomen: Abdomen soft, non-tender. BS normal. No masses, organomegaly           Data:   I reviewed the patient's new clinical lab test results.   Recent Labs   Lab Test 06/22/22  0738 06/21/22  0704 06/20/22  0715 06/19/22  0622 06/18/22  2041 06/16/22  0802 06/14/22  1903 06/06/22  0659 06/05/22  0728   WBC  --  5.4 4.1 7.3 4.6   < > 5.7   < > 3.8*   HGB  --  12.6* 13.2* 12.3* 12.6*   < > 12.6*   < > 12.0*   MCV  --  98 99 96 97   < > 96   < > 95   * 138* 134* 142* 152   < > 185   < > 92*   INR  --   --   --   --  1.74*  --  1.64*  --  1.73*    < > = values in this interval not displayed.     Recent Labs   Lab Test 06/22/22  0738 06/21/22  0704 06/20/22  0715 06/19/22 0622   POTASSIUM 3.8 3.9 3.8 4.0   CHLORIDE  --  116* 112* 113*   CO2  --  26 29 27   BUN  --  17 16 17   ANIONGAP  --  2* 2* 4     Recent Labs   Lab Test 06/21/22  0704 06/20/22  0715 06/18/22  2041 06/15/22  1011 06/15/22  0034 06/14/22  1920 06/05/22  0728 06/04/22  1653 05/08/22  2032 05/08/22  0934   ALBUMIN 1.7* 1.9* 2.2*  --   --  2.4*   < >  --    < >  --    BILITOTAL 0.5 0.5 0.6  --   --  0.6   < >  --    < >  --    ALT 12 15 24  --   --  18   < >  --    < >  --    AST 23 20 23  --   --  21   < >  --    < >  --    PROTEIN  --   --   --   --  Negative  --   --  Negative  --  Negative   LIPASE  --  1,236* 579*  --   --  689*  --   --    < >  --    AMYLASE  --   --   --  142*  --   --   --   --   --   --     < > = values in this interval not displayed.       I reviewed the patient's new imaging results.    All laboratory data reviewed  All imaging studies reviewed by me.    Isabelle Pantoja PA-C,  6/16/2022  [facility]  Office : [phone]  Cell: [phone] (Dr. Clark)  Cell: [phone]  (Isabelle Pantoja PA-C)

## 2022-06-23 NOTE — PLAN OF CARE
Date: June 23, 2022  Shift: 0671-1915  Name: Jagdeep Walker  Age: 51 year old  YOB: 1970    Reason for Admission: Pancreatic pseudocyst [K86.3]  Pleural effusion [J90]  Idiopathic acute pancreatitis, unspecified complication status [K85.00]     Cognitive/Mentation: Cognitive delay, sleepy during the shift but able to talk and answer questions appropriately  Neuros/CMS: LE's cool, trace edema in LE's, pulses palpable    VS: VSS on HFNC - 30% FiO2, 30L  Cardiac: Tele NSR  GI: No BM during shift, bowel sounds active in all quadrants  : Santana in place, patent, adequate output  Pulmonary: Lung sounds diminished   Pain: Reported pain in right side 5/10, prn oxy given     Drains: Chest tube on right lateral anterior, patent - serous drainage. PIV RAC, infusing. NJ tube  Skin: Blanchable redness on coccyx. Scattered bruising. Thoracentesis site on right side.   Activity: Has not gotten out of bed - T/R Q2    Diet: NPO - Tube feedings via NJ tube @ 20 mL/hr     Discharge: Back to group home when medically stable     Shift summary: Sitter in the room due to patient pulling at lines. Able to wean O2 today while patients sats remains above 90% O2. Meds being crushed and given via NJ tube. Blood sugar checks Q4 - last sugar was 172 - MD notified and orders to continue monitor Q4 and if consistent above 200's then page.

## 2022-06-23 NOTE — PROGRESS NOTES
Thoracic Surgery:    /73 (BP Location: Left arm)   Pulse 74   Temp 98.5  F (36.9  C) (Oral)   Resp 16   Wt 84.8 kg (187 lb)   SpO2 91%   BMI 31.12 kg/m      CXR: right pleural space well drained, lungs clear, small bore right chest tube in good position at right base  CT: serous output, 750 ml output this morning (3000 output last evening after IR exchanged chest tube)    On 35 LPM HFNC    Last evening's events noted-- RRT for respiratory distress/change in breathing pattern. Now on HFNC.     S: Sitter in the room- Jose does not offer any complaints aside from the room light being too bright when I turn it on.   O: Right CT: no kinks, secured appropriately  CT: no air leak  P: Cont Chest tube to 20 mmHg suction- monitor output  Daily CXR  Octreotide as per GI  NPO    Jennifer Sanders PA-C with Dr. Cesar Logan  [facility]  Cell [phone]

## 2022-06-24 ENCOUNTER — APPOINTMENT (OUTPATIENT)
Dept: GENERAL RADIOLOGY | Facility: CLINIC | Age: 52
DRG: 438 | End: 2022-06-24
Attending: THORACIC SURGERY (CARDIOTHORACIC VASCULAR SURGERY)
Payer: MEDICARE

## 2022-06-24 LAB
BACTERIA PLR CULT: NO GROWTH
GLUCOSE BLDC GLUCOMTR-MCNC: 121 MG/DL (ref 70–99)
GLUCOSE BLDC GLUCOMTR-MCNC: 167 MG/DL (ref 70–99)
PATH REPORT.COMMENTS IMP SPEC: NORMAL
PATH REPORT.FINAL DX SPEC: NORMAL
PATH REPORT.GROSS SPEC: NORMAL
PATH REPORT.MICROSCOPIC SPEC OTHER STN: NORMAL
PATH REPORT.RELEVANT HX SPEC: NORMAL

## 2022-06-24 PROCEDURE — 99233 SBSQ HOSP IP/OBS HIGH 50: CPT | Performed by: INTERNAL MEDICINE

## 2022-06-24 PROCEDURE — 120N000013 HC R&B IMCU

## 2022-06-24 PROCEDURE — 999N000157 HC STATISTIC RCP TIME EA 10 MIN

## 2022-06-24 PROCEDURE — 250N000013 HC RX MED GY IP 250 OP 250 PS 637: Performed by: HOSPITALIST

## 2022-06-24 PROCEDURE — 250N000013 HC RX MED GY IP 250 OP 250 PS 637: Performed by: NURSE PRACTITIONER

## 2022-06-24 PROCEDURE — 258N000003 HC RX IP 258 OP 636: Performed by: PHYSICIAN ASSISTANT

## 2022-06-24 PROCEDURE — 88112 CYTOPATH CELL ENHANCE TECH: CPT | Mod: 26

## 2022-06-24 PROCEDURE — 250N000013 HC RX MED GY IP 250 OP 250 PS 637: Performed by: INTERNAL MEDICINE

## 2022-06-24 PROCEDURE — 250N000011 HC RX IP 250 OP 636: Mod: JA | Performed by: PHYSICIAN ASSISTANT

## 2022-06-24 PROCEDURE — 250N000013 HC RX MED GY IP 250 OP 250 PS 637

## 2022-06-24 PROCEDURE — 71045 X-RAY EXAM CHEST 1 VIEW: CPT

## 2022-06-24 PROCEDURE — 250N000011 HC RX IP 250 OP 636: Performed by: INTERNAL MEDICINE

## 2022-06-24 PROCEDURE — 88305 TISSUE EXAM BY PATHOLOGIST: CPT | Mod: 26

## 2022-06-24 RX ORDER — LEVOCARNITINE 330 MG/1
330 TABLET ORAL 3 TIMES DAILY
Status: DISCONTINUED | OUTPATIENT
Start: 2022-06-24 | End: 2022-07-02

## 2022-06-24 RX ORDER — LEVOCETIRIZINE DIHYDROCHLORIDE 5 MG/1
5 TABLET, FILM COATED ORAL EVERY EVENING
Status: DISCONTINUED | OUTPATIENT
Start: 2022-06-24 | End: 2022-07-10 | Stop reason: HOSPADM

## 2022-06-24 RX ORDER — ATENOLOL 25 MG/1
25 TABLET ORAL DAILY
Status: DISCONTINUED | OUTPATIENT
Start: 2022-06-25 | End: 2022-07-03

## 2022-06-24 RX ORDER — MONTELUKAST SODIUM 10 MG/1
10 TABLET ORAL AT BEDTIME
Status: DISCONTINUED | OUTPATIENT
Start: 2022-06-24 | End: 2022-07-10 | Stop reason: HOSPADM

## 2022-06-24 RX ORDER — LEVOTHYROXINE SODIUM 50 UG/1
50 TABLET ORAL DAILY
Status: DISCONTINUED | OUTPATIENT
Start: 2022-06-25 | End: 2022-07-10 | Stop reason: HOSPADM

## 2022-06-24 RX ORDER — ZINC SULFATE 50(220)MG
220 CAPSULE ORAL DAILY
Status: DISCONTINUED | OUTPATIENT
Start: 2022-06-25 | End: 2022-07-10 | Stop reason: HOSPADM

## 2022-06-24 RX ORDER — RISPERIDONE 2 MG/1
2 TABLET ORAL 2 TIMES DAILY
Status: DISCONTINUED | OUTPATIENT
Start: 2022-06-24 | End: 2022-07-10 | Stop reason: HOSPADM

## 2022-06-24 RX ORDER — SERTRALINE HYDROCHLORIDE 100 MG/1
100 TABLET, FILM COATED ORAL DAILY
Status: DISCONTINUED | OUTPATIENT
Start: 2022-06-25 | End: 2022-07-10 | Stop reason: HOSPADM

## 2022-06-24 RX ORDER — TOPIRAMATE 100 MG/1
100 TABLET, FILM COATED ORAL AT BEDTIME
Status: DISCONTINUED | OUTPATIENT
Start: 2022-06-24 | End: 2022-07-01

## 2022-06-24 RX ADMIN — Medication 1 PACKET: at 16:22

## 2022-06-24 RX ADMIN — VALPROIC ACID 500 MG: 250 SOLUTION ORAL at 16:22

## 2022-06-24 RX ADMIN — VALPROIC ACID 250 MG: 250 SOLUTION ORAL at 22:04

## 2022-06-24 RX ADMIN — PANCRELIPASE 2 CAPSULE: 60000; 12000; 38000 CAPSULE, DELAYED RELEASE PELLETS ORAL at 20:58

## 2022-06-24 RX ADMIN — LEVOCETIRIZINE DIHYDROCHLORIDE 5 MG: 5 TABLET ORAL at 20:59

## 2022-06-24 RX ADMIN — ONDANSETRON 4 MG: 2 INJECTION INTRAMUSCULAR; INTRAVENOUS at 09:00

## 2022-06-24 RX ADMIN — ATENOLOL 25 MG: 25 TABLET ORAL at 08:45

## 2022-06-24 RX ADMIN — SODIUM BICARBONATE 325 MG: 325 TABLET ORAL at 20:59

## 2022-06-24 RX ADMIN — SODIUM BICARBONATE 325 MG: 325 TABLET ORAL at 05:39

## 2022-06-24 RX ADMIN — IPRATROPIUM BROMIDE 2 SPRAY: 42 SPRAY NASAL at 11:15

## 2022-06-24 RX ADMIN — MONTELUKAST 10 MG: 10 TABLET, FILM COATED ORAL at 22:04

## 2022-06-24 RX ADMIN — ACETAMINOPHEN 650 MG: 325 SUSPENSION ORAL at 09:09

## 2022-06-24 RX ADMIN — LEVOCARNITINE 330 MG: 330 TABLET ORAL at 20:59

## 2022-06-24 RX ADMIN — LEVOCARNITINE 330 MG: 330 TABLET ORAL at 08:45

## 2022-06-24 RX ADMIN — Medication 1 PACKET: at 22:20

## 2022-06-24 RX ADMIN — PREDNISOLONE ACETATE 1 DROP: 10 SUSPENSION/ DROPS OPHTHALMIC at 11:16

## 2022-06-24 RX ADMIN — SODIUM BICARBONATE 325 MG: 325 TABLET ORAL at 01:30

## 2022-06-24 RX ADMIN — Medication 40 MG: at 20:59

## 2022-06-24 RX ADMIN — RISPERIDONE 2 MG: 2 TABLET ORAL at 08:45

## 2022-06-24 RX ADMIN — VALPROIC ACID 500 MG: 250 SOLUTION ORAL at 11:13

## 2022-06-24 RX ADMIN — LEVOTHYROXINE SODIUM 50 MCG: 50 TABLET ORAL at 08:45

## 2022-06-24 RX ADMIN — Medication 40 MG: at 08:53

## 2022-06-24 RX ADMIN — SODIUM BICARBONATE 325 MG: 325 TABLET ORAL at 11:13

## 2022-06-24 RX ADMIN — Medication 1 PACKET: at 08:54

## 2022-06-24 RX ADMIN — IPRATROPIUM BROMIDE 2 SPRAY: 42 SPRAY NASAL at 22:21

## 2022-06-24 RX ADMIN — SODIUM BICARBONATE 325 MG: 325 TABLET ORAL at 17:10

## 2022-06-24 RX ADMIN — LEVOCARNITINE 330 MG: 330 TABLET ORAL at 16:21

## 2022-06-24 RX ADMIN — PANCRELIPASE 2 CAPSULE: 60000; 12000; 38000 CAPSULE, DELAYED RELEASE PELLETS ORAL at 17:11

## 2022-06-24 RX ADMIN — PANCRELIPASE 1 CAPSULE: 60000; 12000; 38000 CAPSULE, DELAYED RELEASE PELLETS ORAL at 05:39

## 2022-06-24 RX ADMIN — PANCRELIPASE 1 CAPSULE: 60000; 12000; 38000 CAPSULE, DELAYED RELEASE PELLETS ORAL at 01:30

## 2022-06-24 RX ADMIN — IPRATROPIUM BROMIDE 2 SPRAY: 42 SPRAY NASAL at 16:23

## 2022-06-24 RX ADMIN — CARBOXYMETHYLCELLULOSE SODIUM 1 DROP: 10 GEL OPHTHALMIC at 22:04

## 2022-06-24 RX ADMIN — TOPIRAMATE 100 MG: 100 TABLET, FILM COATED ORAL at 22:04

## 2022-06-24 RX ADMIN — SERTRALINE HYDROCHLORIDE 100 MG: 100 TABLET ORAL at 08:45

## 2022-06-24 RX ADMIN — OCTREOTIDE ACETATE 50 MCG/HR: 200 INJECTION, SOLUTION INTRAVENOUS; SUBCUTANEOUS at 12:30

## 2022-06-24 RX ADMIN — ZINC SULFATE 220 MG (50 MG) CAPSULE 220 MG: CAPSULE at 08:44

## 2022-06-24 RX ADMIN — RISPERIDONE 2 MG: 2 TABLET ORAL at 20:59

## 2022-06-24 RX ADMIN — PANCRELIPASE 1 CAPSULE: 60000; 12000; 38000 CAPSULE, DELAYED RELEASE PELLETS ORAL at 11:13

## 2022-06-24 ASSESSMENT — ACTIVITIES OF DAILY LIVING (ADL)
ADLS_ACUITY_SCORE: 49
ADLS_ACUITY_SCORE: 53
ADLS_ACUITY_SCORE: 49
ADLS_ACUITY_SCORE: 53
ADLS_ACUITY_SCORE: 49
ADLS_ACUITY_SCORE: 49

## 2022-06-24 NOTE — PLAN OF CARE
Neuro: lethargic, becoming more awake, forgetful, oriented to self  Tele/cardiac: SR  Respiration: HFNC 30LPM  30%Fio2   Activity: A2 turn and repo, can shift weight by self at times  Pain: tylenol given for pain  Drips: octreotide @ 50 mcg/min  Drains/tubes: 1 chest tube to 1 atrium to -20 cm H2O suction, large serous drainage noted, total shift output 1250ml, chest tube dressing intact, no air leak/crepitus,  Skin: blanchable redness to coccyx, mepilex in place  GI/: NJ with continuous TF 20 ml/hr, Q4 60 ml water flushes, Increase by 15 mL every 24 hrs to goal rate of 50 mL/hr.  rate to be adjusted today at 1200  Aggression color: green  Isolation: none  Plan: cardiothoracic, GI following

## 2022-06-24 NOTE — PROGRESS NOTES
[facility]    Medicine Progress Note - Hospitalist Service    Date of Admission:  6/14/2022    Assessment & Plan             Jagdeep Walker is a 51 year-old male with complex history including seizure disorder, schizoaffective disorder, recent pancreatitis with pseudocysts, history of portal vein thrombosis, recent admission for metabolic encephalopathy of unclear etiology who presents with shortness of breath and hypoxia with large pleural effusion on outside chest x-ray.  Admitted on 6/14/2022.      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  Recent echocardiogram 6/4 normal.  Pleural fluid studies consistent with transudate, specimen clotted for cell count, normal glucose, no organisms on gram stain.  Cytology negative x 2  Given lipase and amylase increase, perhaps pleural effusion related to pancreatitis with fistula or leak versus malnutrition related to severe pancreatitis  Chest tube placed on 6/18 and replaced on 6/22 as it appeared clogged  RRT for 6/20 chest pain, resolved with pain medications  6/22: Respiratory status worsened, likely due to worsening pleural effusions from his clogged chest tube.  Chest tube exchanged and has had significant output since with suction   * Culture from the pleural with diptheroids in broth only, c/w contaminant  - Titrate O2 as able.  Still on HFNC but hopefully can come off over next 24 hours  - ID consulted  for the pleural culture results.  Felt contaminant and no need for antibiotics   - Pulmonary was following and appreciate their assistance  - GI following as below   - CT surgery and IR following and appreciate their recommendations.  Defer chest tube management to them    Acute/subacute pancreatitis with pseudocyst   *Hospitalized 5/8-5/17/22 for pancreatitis with pseudocyst formation. Unclear etiology, EUS unrevealing for etiology, possibly due to medications  *CT on admission with acute pancreatitis with slightly improved inflammatory changes, pseudocysts not significantly changed  *Sister notes intake has remained generally poor and has complained of pain with eating, not significantly tender on exam at present.  - NJ in place.  Continue tube feeds   - Continue NPO.  Per GI will need to be NPO until repeat ERCP in 4 weeks.  Discussed PEJ with guardian on phone on 6/23.  Will likely need to proceed early next week once respiratory status stabilizes  - GI following and appreciate their recommendations.  Started octreotide for 72 hours     Recent metabolic encephalopathy   Mild zinc deficiency   *Extensively worked up during admission 6/4-6/10/22 including head CT, MRI brain, EEG, paraneoplastic ab, extensive laboratory work-up mostly unremarkable with exception of mildly low zinc.  *Per sister, patient does have some cognitive impairment at baseline, though is typically oriented to self, family and can carry on an extensive conversation especially regarding topics he enjoys (eg sports). He remains below his baseline.   - Continue zinc supplementation    Hx of portal vein thrombosis  * Diagnosed during admission 5/2022, improved on subsequent CT and not treated with anticoagulation   * Non-occlusive main portal vein thrombosis, splenic vein thrombosis seen on admission CT  - GI consulted as above, they advised no need to treat PVT previous admission (6/9/2022)     Depression  Anxiety  Schizoaffective disorder,  bipolar type  - Continue prior to admission sertraline, risperidone     Seizure disorder  - Continue prior to admission topiramate, Depakote     Carnitine deficiency  - Continue prior to admission levocarnitine      Hypertension  - Continue prior to admission atenolol      Fisher's esophagus  - Continue prior to admission PPI     Hypothyroidism  - Continue prior to admission levothyroxine     Seasonal allergies  - Continue prior to admission levocetirizine         Diet: Adult Formula Drip Feeding: Continuous Jevity 1.5; Nasojejunal; Goal Rate: 50; mL/hr; Medication - Feeding Tube Flush Frequency: At least 15-30 mL water before and after medication administration and with tube clogging; Amount to Send (Nutrition u...  NPO for Medical/Clinical Reasons Except for: Meds    DVT Prophylaxis: Pneumatic Compression Devices  [place] Catheter: PRESENT, indication: Retention  Central Lines: None  Cardiac Monitoring: ACTIVE order. Indication: Tachyarrhythmias, acute (48 hours)  Code Status: Full Code      Disposition Plan       The patient's care was discussed with the Bedside Nurse, Care Coordinator/ and Patient.    Time Spent on this Encounter   I spent 38 minutes on the unit/floor managing the care of the patient.  Over 50% of my time was spent on the following:   - Counseling the patient and/or family regarding:  Diagnostic work up so far, treatment options and disposition planning  - Coordination of care with the: Patient      Jose Roberto Upton DO  Hospitalist Service  [facility]  Securely message with the Vocera Web Console (learn more here)  Text page via [employer] Paging/Directory         Clinically Significant Risk Factors Present on Admission                      ______________________________________________________________________    Interval History   Patient seen and examined.  No acute events over night.  Still hypoxic on HFNC.  No fevers noted.  Patient appears more fatigued  today.  Tolerating tube feeds at this time.     Data reviewed today: I reviewed all medications, new labs and imaging results over the last 24 hours. I personally reviewed no images or EKG's today.    Physical Exam   Vital Signs: Temp: 97.5  F (36.4  C) Temp src: Axillary BP: 117/68 Pulse: 79   Resp: 18 SpO2: 95 % O2 Device: High Flow Nasal Cannula (HFNC) Oxygen Delivery: 30 LPM  Weight: 183 lbs 4.8 oz  General Appearance: Sleeping but minimally arousable to verbal stimuli.  NAD  Respiratory: Crackles noted.  No respiratory distress on HFNC  Cardiovascular: RRR.  No obvious murmurs  GI: Soft.  Moderately distended  Skin: No obvious rashes or cyanosis to exposed skin  Other: Chest tube in place.  Lower extremity edema noted      Data   Recent Labs   Lab 06/24/22  0657 06/24/22  0214 06/23/22  1634 06/22/22  1717 06/22/22  0738 06/21/22  1923 06/21/22  0704 06/20/22  1049 06/20/22  0715 06/19/22  0622 06/18/22  2041   WBC  --   --   --   --   --   --  5.4  --  4.1 7.3 4.6   HGB  --   --   --   --   --   --  12.6*  --  13.2* 12.3* 12.6*   MCV  --   --   --   --   --   --  98  --  99 96 97   PLT  --   --   --   --  141*  --  138*  --  134* 142* 152   INR  --   --   --   --   --   --   --   --   --   --  1.74*   NA  --   --   --   --   --   --  144  --  143 144 145*   POTASSIUM  --   --   --   --  3.8  --  3.9  --  3.8 4.0 4.2   CHLORIDE  --   --   --   --   --   --  116*  --  112* 113* 113*   CO2  --   --   --   --   --   --  26  --  29 27 28   BUN  --   --   --   --   --   --  17  --  16 17 17   CR  --   --   --   --  0.76  --  0.67  --  0.74 0.76 0.85   ANIONGAP  --   --   --   --   --   --  2*  --  2* 4 4   NASIR  --   --   --   --   --   --  8.8  --  8.5 8.8 8.9   * 121* 172*   < >  --    < > 72   < > 109* 110* 89   ALBUMIN  --   --   --   --   --   --  1.7*  --  1.9*  --  2.2*   PROTTOTAL  --   --   --   --   --   --  4.6*  --  5.0*  --  4.8*   BILITOTAL  --   --   --   --   --   --  0.5  --  0.5  --  0.6    ALKPHOS  --   --   --   --   --   --  51  --  72  --  56   ALT  --   --   --   --   --   --  12  --  15  --  24   AST  --   --   --   --   --   --  23  --  20  --  23   LIPASE  --   --   --   --   --   --   --   --  1,236*  --  579*    < > = values in this interval not displayed.     Recent Results (from the past 24 hour(s))   XR Chest Port 1 View    Narrative    EXAM: XR CHEST PORT 1 VIEW  LOCATION: [facility]  DATE/TIME: 6/24/2022 5:41 AM    INDICATION: chest tube placement  COMPARISON: 06/23/2022      Impression    IMPRESSION: Feeding tube courses in the upper abdomen, its tip not included. Right-sided pigtail catheter terminates of the right costophrenic angle, not significantly changed. Lung volumes have diminished with increasing bibasilar atelectasis, left   greater than right. No significant pneumothorax. No other significant interval change.

## 2022-06-24 NOTE — PROGRESS NOTES
[facility]  Gastroenterology Progress Note     Jagdeep aWlker MRN# [id number]   YOB: 1970 Age: 51 year old          Assessment and Plan:   Jagdeep Walker s a 51 year old male admitted with right sided pleural effusion and GI consulted to manage pancreatitis.     Active Problems:  Pleural effusion  Pancreatic pseudocyst  Idiopathic acute pancreatitis, unspecified complication status  Pancreaticopleural fistula  *Lipase 1,730->689, AST, ALT and Alk Phos normal. Lactic acid 1.9. WBC wnl, Hgb 12.6,    *6/14 CT A/P note improved inflammatory changes in pancreas. Pseudocysts not significantly changed. Gastric wall thickening has progressed consistent with secondary gastritis with new pseudocyst along the proximal posterior gastric wall. Ascites decreased. Wall thickening involving the cecum and transverse colon likely secondarily involved. Large right pleural effusion has significantly increased.  *Cause of pancreatitis is idiopathic- no evidence of gallstones or alcohol use. May be due to medication. 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 Contrast was injected and flowed through pancreatic ductal system and extravasated from the pancreatic duct in the body of the pancreas with pancreatic duct leak and communicating pseudocyst. Pancreatic sphincterotomy performed. One stent placed into ventral pancreatic duct.  *These findings confirm pancreaticopleural fistula  NJ tube in place  * Chest tube replaced yesterday- [id number] mL total in first few hours since placement. Total output yesterday 1250 mL  * Respiratory status improving now on 30 LPM    -- Start octreotide 50 mcg/hr drip for 72 hours and if CT output improves will continue otherwise can consider stopping  -- Repeat ERCP in 4 weeks to determine if fistula closed  -- Restart Tube feeds- consider PEJ with improvement in respiratory  status  -- Daily labs              Interval History:   no new complaints, denies chest pain, pain is controlled and has ongoing abdominal pain. Had RRT called after significant change in respirations and abdominal distention               Review of Systems:   C: NEGATIVE for fever, chills, change in weight  E/M: NEGATIVE for ear, mouth and throat problems  R: NEGATIVE for significant cough or SOB  CV: NEGATIVE for chest pain, palpitations or peripheral edema             Medications:   I have reviewed this patient's current medications    amylase-lipase-protease  1-2 capsule Per Feeding Tube Q4H     [START ON 6/25/2022] atenolol  25 mg Oral or NG Tube Daily     carboxymethylcellulose PF  1 drop Right Eye At Bedtime     ipratropium  2 spray Both Nostrils TID     levOCARNitine  330 mg Oral or NG Tube TID     levocetirizine  5 mg Oral or NG Tube QPM     [START ON 6/25/2022] levothyroxine  50 mcg Oral or NG Tube Daily     montelukast  10 mg Oral or NG Tube At Bedtime     pantoprazole  40 mg Oral or Feeding Tube BID     prednisoLONE acetate  1 drop Right Eye Daily     protein modular  1 packet Per Feeding Tube TID     risperiDONE  2 mg Oral or NG Tube BID     [START ON 6/25/2022] sertraline  100 mg Oral or NG Tube Daily     sodium bicarbonate  325 mg Per Feeding Tube Q4H     sodium chloride (PF)  3 mL Intracatheter Q8H     topiramate  100 mg Oral or NG Tube At Bedtime     valproic acid  250 mg Oral or NG Tube At Bedtime     valproic acid  500 mg Oral or NG Tube BID     [START ON 6/25/2022] zinc sulfate  220 mg Oral or NG Tube Daily                  Physical Exam:   Vitals were reviewed  Vital Signs with Ranges  Temp:  [97.5  F (36.4  C)-98.2  F (36.8  C)] 97.5  F (36.4  C)  Pulse:  [66-85] 79  Resp:  [10-18] 18  BP: (102-127)/(58-82) 117/68  FiO2 (%):  [30 %] 30 %  SpO2:  [90 %-95 %] 95 %  I/O last 3 completed shifts:  In: 60 [NG/GT:60]  Out: 3630 [Urine:1160; Chest Tube:2470]  Constitutional: alert, mild distress,  cooperative and pale   Cardiovascular: negative, PMI normal. No lifts, heaves, or thrills. RRR. No murmurs, clicks gallops or rub  Respiratory: wheezy, Percussion normal. Good diaphragmatic excursion.   Abdomen: Abdomen soft, non-tender. BS normal. No masses, organomegaly           Data:   I reviewed the patient's new clinical lab test results.   Recent Labs   Lab Test 06/22/22  0738 06/21/22  0704 06/20/22  0715 06/19/22  0622 06/18/22  2041 06/16/22  0802 06/14/22  1903 06/06/22  0659 06/05/22  0728   WBC  --  5.4 4.1 7.3 4.6   < > 5.7   < > 3.8*   HGB  --  12.6* 13.2* 12.3* 12.6*   < > 12.6*   < > 12.0*   MCV  --  98 99 96 97   < > 96   < > 95   * 138* 134* 142* 152   < > 185   < > 92*   INR  --   --   --   --  1.74*  --  1.64*  --  1.73*    < > = values in this interval not displayed.     Recent Labs   Lab Test 06/22/22  0738 06/21/22  0704 06/20/22  0715 06/19/22 0622   POTASSIUM 3.8 3.9 3.8 4.0   CHLORIDE  --  116* 112* 113*   CO2  --  26 29 27   BUN  --  17 16 17   ANIONGAP  --  2* 2* 4     Recent Labs   Lab Test 06/21/22  0704 06/20/22  0715 06/18/22  2041 06/15/22  1011 06/15/22  0034 06/14/22  1920 06/05/22  0728 06/04/22  1653 05/08/22  2032 05/08/22  0934   ALBUMIN 1.7* 1.9* 2.2*  --   --  2.4*   < >  --    < >  --    BILITOTAL 0.5 0.5 0.6  --   --  0.6   < >  --    < >  --    ALT 12 15 24  --   --  18   < >  --    < >  --    AST 23 20 23  --   --  21   < >  --    < >  --    PROTEIN  --   --   --   --  Negative  --   --  Negative  --  Negative   LIPASE  --  1,236* 579*  --   --  689*  --   --    < >  --    AMYLASE  --   --   --  142*  --   --   --   --   --   --     < > = values in this interval not displayed.       I reviewed the patient's new imaging results.    All laboratory data reviewed  All imaging studies reviewed by me.    Isabelle Pantoja PA-C,  6/16/2022  [facility]  Office : [phone]  Cell: [phone] (Dr. Clark)  Cell: [phone] (Isabelle Pantoja PA-C)

## 2022-06-24 NOTE — PROGRESS NOTES
Chart review.     Jagdeep chest tube demonstrates good output.     IR will follow peripherally.     Thanks, Pamela [facility] Interventional Radiology CNP [phone]) (phone [phone])

## 2022-06-24 NOTE — PROGRESS NOTES
Thoracic Surgery Progress Note    S: Says he is uncomfortable, but cannot tell me where/why. Does not like the bright lights. States he is breathing okay.    O:  /70 (BP Location: Left arm)   Pulse 85   Temp 97.5  F (36.4  C) (Axillary)   Resp 11   Wt 83.1 kg (183 lb 4.8 oz)   SpO2 94%   BMI 30.50 kg/m    General: Lying in bed, responds to direct questions with yes/no answers. No distress.  Resp: On 30 LPM HFNC  CT: No air leak. To -20 mmHg suction. Serous drainage. Dressing intact and no kinks in tubing.    CXR:   Recent Results (from the past 24 hour(s))   XR Chest Port 1 View    Narrative    EXAM: XR CHEST PORT 1 VIEW  LOCATION: [facility]  DATE/TIME: 6/24/2022 5:41 AM    INDICATION: chest tube placement  COMPARISON: 06/23/2022      Impression    IMPRESSION: Feeding tube courses in the upper abdomen, its tip not included. Right-sided pigtail catheter terminates of the right costophrenic angle, not significantly changed. Lung volumes have diminished with increasing bibasilar atelectasis, left   greater than right. No significant pneumothorax. No other significant interval change.       Plan:  - Continue CT to suction and monitor outputs  - Daily CXR  - NPO, on TF  - Octreotide per GARO Madden PA-C with Dr. Cesar MACKEY Oncology Thoracic Surgery  Office: [phone]  Cell: [phone]

## 2022-06-25 ENCOUNTER — APPOINTMENT (OUTPATIENT)
Dept: GENERAL RADIOLOGY | Facility: CLINIC | Age: 52
DRG: 438 | End: 2022-06-25
Attending: THORACIC SURGERY (CARDIOTHORACIC VASCULAR SURGERY)
Payer: MEDICARE

## 2022-06-25 LAB
ANION GAP SERPL CALCULATED.3IONS-SCNC: 4 MMOL/L (ref 3–14)
BASE EXCESS BLDV CALC-SCNC: 8.2 MMOL/L (ref -7.7–1.9)
BUN SERPL-MCNC: 33 MG/DL (ref 7–30)
CALCIUM SERPL-MCNC: 9.1 MG/DL (ref 8.5–10.1)
CHLORIDE BLD-SCNC: 117 MMOL/L (ref 94–109)
CO2 SERPL-SCNC: 33 MMOL/L (ref 20–32)
CREAT SERPL-MCNC: 0.86 MG/DL (ref 0.66–1.25)
CREAT SERPL-MCNC: 0.88 MG/DL (ref 0.66–1.25)
ERYTHROCYTE [DISTWIDTH] IN BLOOD BY AUTOMATED COUNT: 16.7 % (ref 10–15)
GFR SERPL CREATININE-BSD FRML MDRD: >90 ML/MIN/1.73M2
GFR SERPL CREATININE-BSD FRML MDRD: >90 ML/MIN/1.73M2
GLUCOSE BLD-MCNC: 163 MG/DL (ref 70–99)
GLUCOSE BLDC GLUCOMTR-MCNC: 140 MG/DL (ref 70–99)
GLUCOSE BLDC GLUCOMTR-MCNC: 150 MG/DL (ref 70–99)
HCO3 BLDV-SCNC: 34 MMOL/L (ref 21–28)
HCT VFR BLD AUTO: 40.9 % (ref 40–53)
HGB BLD-MCNC: 13 G/DL (ref 13.3–17.7)
LACTATE SERPL-SCNC: 1.6 MMOL/L (ref 0.7–2)
MCH RBC QN AUTO: 30.9 PG (ref 26.5–33)
MCHC RBC AUTO-ENTMCNC: 31.8 G/DL (ref 31.5–36.5)
MCV RBC AUTO: 97 FL (ref 78–100)
O2/TOTAL GAS SETTING VFR VENT: 2 %
OXYHGB MFR BLDV: 86 % (ref 70–75)
PCO2 BLDV: 51 MM HG (ref 40–50)
PH BLDV: 7.43 [PH] (ref 7.32–7.43)
PLATELET # BLD AUTO: 157 10E3/UL (ref 150–450)
PLATELET # BLD AUTO: 158 10E3/UL (ref 150–450)
PO2 BLDV: 54 MM HG (ref 25–47)
POTASSIUM BLD-SCNC: 3.7 MMOL/L (ref 3.4–5.3)
RBC # BLD AUTO: 4.21 10E6/UL (ref 4.4–5.9)
SODIUM SERPL-SCNC: 154 MMOL/L (ref 133–144)
WBC # BLD AUTO: 6.4 10E3/UL (ref 4–11)

## 2022-06-25 PROCEDURE — 71045 X-RAY EXAM CHEST 1 VIEW: CPT

## 2022-06-25 PROCEDURE — 258N000003 HC RX IP 258 OP 636: Performed by: PHYSICIAN ASSISTANT

## 2022-06-25 PROCEDURE — 36415 COLL VENOUS BLD VENIPUNCTURE: CPT | Performed by: HOSPITALIST

## 2022-06-25 PROCEDURE — 120N000013 HC R&B IMCU

## 2022-06-25 PROCEDURE — 85027 COMPLETE CBC AUTOMATED: CPT | Performed by: HOSPITALIST

## 2022-06-25 PROCEDURE — 250N000013 HC RX MED GY IP 250 OP 250 PS 637: Performed by: INTERNAL MEDICINE

## 2022-06-25 PROCEDURE — 250N000011 HC RX IP 250 OP 636: Mod: JA | Performed by: PHYSICIAN ASSISTANT

## 2022-06-25 PROCEDURE — 80048 BASIC METABOLIC PNL TOTAL CA: CPT | Performed by: HOSPITALIST

## 2022-06-25 PROCEDURE — 250N000013 HC RX MED GY IP 250 OP 250 PS 637: Performed by: HOSPITALIST

## 2022-06-25 PROCEDURE — 85049 AUTOMATED PLATELET COUNT: CPT | Performed by: HOSPITALIST

## 2022-06-25 PROCEDURE — 36415 COLL VENOUS BLD VENIPUNCTURE: CPT

## 2022-06-25 PROCEDURE — 83605 ASSAY OF LACTIC ACID: CPT

## 2022-06-25 PROCEDURE — 82565 ASSAY OF CREATININE: CPT

## 2022-06-25 PROCEDURE — 250N000013 HC RX MED GY IP 250 OP 250 PS 637

## 2022-06-25 PROCEDURE — 99291 CRITICAL CARE FIRST HOUR: CPT

## 2022-06-25 PROCEDURE — 85049 AUTOMATED PLATELET COUNT: CPT

## 2022-06-25 PROCEDURE — 250N000013 HC RX MED GY IP 250 OP 250 PS 637: Performed by: NURSE PRACTITIONER

## 2022-06-25 PROCEDURE — 82805 BLOOD GASES W/O2 SATURATION: CPT

## 2022-06-25 PROCEDURE — 99232 SBSQ HOSP IP/OBS MODERATE 35: CPT | Performed by: HOSPITALIST

## 2022-06-25 PROCEDURE — 258N000003 HC RX IP 258 OP 636

## 2022-06-25 RX ORDER — DEXTROSE MONOHYDRATE 50 MG/ML
INJECTION, SOLUTION INTRAVENOUS CONTINUOUS
Status: DISCONTINUED | OUTPATIENT
Start: 2022-06-26 | End: 2022-06-27

## 2022-06-25 RX ADMIN — SODIUM BICARBONATE 325 MG: 325 TABLET ORAL at 08:41

## 2022-06-25 RX ADMIN — TOPIRAMATE 100 MG: 100 TABLET, FILM COATED ORAL at 22:12

## 2022-06-25 RX ADMIN — DEXTROSE MONOHYDRATE: 50 INJECTION, SOLUTION INTRAVENOUS at 23:53

## 2022-06-25 RX ADMIN — ZINC SULFATE 220 MG (50 MG) CAPSULE 220 MG: CAPSULE at 08:42

## 2022-06-25 RX ADMIN — Medication 40 MG: at 09:02

## 2022-06-25 RX ADMIN — OCTREOTIDE ACETATE 50 MCG/HR: 200 INJECTION, SOLUTION INTRAVENOUS; SUBCUTANEOUS at 14:36

## 2022-06-25 RX ADMIN — IPRATROPIUM BROMIDE 2 SPRAY: 42 SPRAY NASAL at 17:02

## 2022-06-25 RX ADMIN — PANCRELIPASE 2 CAPSULE: 60000; 12000; 38000 CAPSULE, DELAYED RELEASE PELLETS ORAL at 12:54

## 2022-06-25 RX ADMIN — Medication 1 PACKET: at 17:02

## 2022-06-25 RX ADMIN — LEVOCARNITINE 330 MG: 330 TABLET ORAL at 20:23

## 2022-06-25 RX ADMIN — IPRATROPIUM BROMIDE 2 SPRAY: 42 SPRAY NASAL at 09:53

## 2022-06-25 RX ADMIN — PREDNISOLONE ACETATE 1 DROP: 10 SUSPENSION/ DROPS OPHTHALMIC at 08:44

## 2022-06-25 RX ADMIN — MONTELUKAST 10 MG: 10 TABLET, FILM COATED ORAL at 22:12

## 2022-06-25 RX ADMIN — PANCRELIPASE 2 CAPSULE: 60000; 12000; 38000 CAPSULE, DELAYED RELEASE PELLETS ORAL at 17:01

## 2022-06-25 RX ADMIN — ACETAMINOPHEN 650 MG: 325 TABLET ORAL at 22:12

## 2022-06-25 RX ADMIN — PANCRELIPASE 2 CAPSULE: 60000; 12000; 38000 CAPSULE, DELAYED RELEASE PELLETS ORAL at 01:07

## 2022-06-25 RX ADMIN — SODIUM BICARBONATE 325 MG: 325 TABLET ORAL at 01:07

## 2022-06-25 RX ADMIN — Medication 1 PACKET: at 22:21

## 2022-06-25 RX ADMIN — RISPERIDONE 2 MG: 2 TABLET ORAL at 08:43

## 2022-06-25 RX ADMIN — LEVOCETIRIZINE DIHYDROCHLORIDE 5 MG: 5 TABLET ORAL at 20:23

## 2022-06-25 RX ADMIN — ATENOLOL 25 MG: 25 TABLET ORAL at 08:43

## 2022-06-25 RX ADMIN — SODIUM BICARBONATE 325 MG: 325 TABLET ORAL at 12:54

## 2022-06-25 RX ADMIN — CARBOXYMETHYLCELLULOSE SODIUM 1 DROP: 10 GEL OPHTHALMIC at 22:12

## 2022-06-25 RX ADMIN — SODIUM BICARBONATE 325 MG: 325 TABLET ORAL at 17:01

## 2022-06-25 RX ADMIN — PANCRELIPASE 2 CAPSULE: 60000; 12000; 38000 CAPSULE, DELAYED RELEASE PELLETS ORAL at 04:35

## 2022-06-25 RX ADMIN — VALPROIC ACID 250 MG: 250 SOLUTION ORAL at 22:12

## 2022-06-25 RX ADMIN — VALPROIC ACID 500 MG: 250 SOLUTION ORAL at 08:40

## 2022-06-25 RX ADMIN — LEVOTHYROXINE SODIUM 50 MCG: 50 TABLET ORAL at 08:42

## 2022-06-25 RX ADMIN — SODIUM BICARBONATE 325 MG: 325 TABLET ORAL at 04:35

## 2022-06-25 RX ADMIN — PANCRELIPASE 2 CAPSULE: 60000; 12000; 38000 CAPSULE, DELAYED RELEASE PELLETS ORAL at 20:23

## 2022-06-25 RX ADMIN — VALPROIC ACID 500 MG: 250 SOLUTION ORAL at 17:01

## 2022-06-25 RX ADMIN — Medication 40 MG: at 20:57

## 2022-06-25 RX ADMIN — LEVOCARNITINE 330 MG: 330 TABLET ORAL at 14:06

## 2022-06-25 RX ADMIN — IPRATROPIUM BROMIDE 2 SPRAY: 42 SPRAY NASAL at 22:21

## 2022-06-25 RX ADMIN — SODIUM BICARBONATE 325 MG: 325 TABLET ORAL at 20:23

## 2022-06-25 RX ADMIN — RISPERIDONE 2 MG: 2 TABLET ORAL at 20:47

## 2022-06-25 RX ADMIN — LEVOCARNITINE 330 MG: 330 TABLET ORAL at 08:43

## 2022-06-25 RX ADMIN — SERTRALINE HYDROCHLORIDE 100 MG: 100 TABLET ORAL at 08:42

## 2022-06-25 RX ADMIN — PANCRELIPASE 2 CAPSULE: 60000; 12000; 38000 CAPSULE, DELAYED RELEASE PELLETS ORAL at 08:42

## 2022-06-25 RX ADMIN — Medication 1 PACKET: at 08:43

## 2022-06-25 ASSESSMENT — ACTIVITIES OF DAILY LIVING (ADL)
ADLS_ACUITY_SCORE: 49
ADLS_ACUITY_SCORE: 53
ADLS_ACUITY_SCORE: 49
ADLS_ACUITY_SCORE: 53
ADLS_ACUITY_SCORE: 49
ADLS_ACUITY_SCORE: 53
ADLS_ACUITY_SCORE: 53
ADLS_ACUITY_SCORE: 49
ADLS_ACUITY_SCORE: 53
ADLS_ACUITY_SCORE: 53

## 2022-06-25 NOTE — PROGRESS NOTES
THORACIC SURGERY    Moderate CT output  CXR r pleural space well drained    Continue CT suction    EPHRAIM NAGY MD [facility] ONCOLOGY THORACIC SURGERY  CELL:  [phone]  OFFICE: [phone]

## 2022-06-25 NOTE — PROGRESS NOTES
[facility]    Hospitalist Progress Note    Interval History     Patient is awake and very drowsy.  Currently on 3 L on Ventimask.  Denies any significant abdominal pain, nausea or vomiting.  Had reduced output through the chest tube.  Has NG tube in place.  Santana catheter in place.    -Data reviewed today: I reviewed all new labs and imaging results over the last 24 hours. I personally reviewed the chest x-ray image(s) showing Drain right-sided pleural effusion.    Physical Exam   Temp: 97.9  F (36.6  C) Temp src: Axillary BP: 109/67 Pulse: 77   Resp: 17 SpO2: 97 % O2 Device: High Flow Nasal Cannula (HFNC) Oxygen Delivery: 3 LPM  Vitals:    06/23/22 0500 06/24/22 0525 06/25/22 0100   Weight: 84.8 kg (187 lb) 83.1 kg (183 lb 4.8 oz) 84.1 kg (185 lb 6.5 oz)     Vital Signs with Ranges  Temp:  [97.4  F (36.3  C)-100  F (37.8  C)] 97.9  F (36.6  C)  Pulse:  [68-92] 77  Resp:  [9-18] 17  BP: ()/(60-84) 109/67  FiO2 (%):  [30 %] 30 %  SpO2:  [92 %-97 %] 97 %  I/O last 3 completed shifts:  In: 1691 [NG/GT:1185]  Out: 2991 [Urine:1450; Chest Tube:1541]    Physical Exam  Constitutional:       Appearance: Normal appearance.   HENT:      Head: Normocephalic.   Eyes:      Pupils: Pupils are equal, round, and reactive to light.   Cardiovascular:      Rate and Rhythm: Regular rhythm. Tachycardia present.      Pulses: Normal pulses.      Heart sounds: Normal heart sounds.   Pulmonary:      Effort: Respiratory distress present.      Breath sounds: Normal breath sounds.      Comments: Has right-sided chest tube in place with basal crackles present.  Tachypneic.  Abdominal:      General: Abdomen is flat. Bowel sounds are normal. There is no distension.      Tenderness: There is no abdominal tenderness. There is no guarding.      Comments: NJ tube in place.   Genitourinary:     Comments: Urinary catheter in place.  Musculoskeletal:      Cervical back: Normal range of motion.      Comments: Generalized  weakness.  Not assessed.   Skin:     General: Skin is warm and dry.   Neurological:      General: No focal deficit present.           Medications     dextrose       octreotide (sandoSTATIN) infusion ADULT 50 mcg/hr (06/24/22 1230)       amylase-lipase-protease  1-2 capsule Per Feeding Tube Q4H     atenolol  25 mg Oral or NG Tube Daily     carboxymethylcellulose PF  1 drop Right Eye At Bedtime     ipratropium  2 spray Both Nostrils TID     levOCARNitine  330 mg Oral or NG Tube TID     levocetirizine  5 mg Oral or NG Tube QPM     levothyroxine  50 mcg Oral or NG Tube Daily     montelukast  10 mg Oral or NG Tube At Bedtime     pantoprazole  40 mg Oral or Feeding Tube BID     prednisoLONE acetate  1 drop Right Eye Daily     protein modular  1 packet Per Feeding Tube TID     risperiDONE  2 mg Oral or NG Tube BID     sertraline  100 mg Oral or NG Tube Daily     sodium bicarbonate  325 mg Per Feeding Tube Q4H     sodium chloride (PF)  3 mL Intracatheter Q8H     topiramate  100 mg Oral or NG Tube At Bedtime     valproic acid  250 mg Oral or NG Tube At Bedtime     valproic acid  500 mg Oral or NG Tube BID     zinc sulfate  220 mg Oral or NG Tube Daily       Data   Recent Labs   Lab 06/25/22  1157 06/25/22  0757 06/25/22  0540 06/24/22  0657 06/22/22  1717 06/22/22  0738 06/21/22  1923 06/21/22  0704 06/20/22  1049 06/20/22  0715 06/19/22  0622 06/18/22  2041   WBC  --   --   --   --   --   --   --  5.4  --  4.1 7.3 4.6   HGB  --   --   --   --   --   --   --  12.6*  --  13.2* 12.3* 12.6*   MCV  --   --   --   --   --   --   --  98  --  99 96 97   PLT  --   --  157  --   --  141*  --  138*  --  134* 142* 152   INR  --   --   --   --   --   --   --   --   --   --   --  1.74*   NA  --   --   --   --   --   --   --  144  --  143 144 145*   POTASSIUM  --   --   --   --   --  3.8  --  3.9  --  3.8 4.0 4.2   CHLORIDE  --   --   --   --   --   --   --  116*  --  112* 113* 113*   CO2  --   --   --   --   --   --   --  26  --  29 27  28   BUN  --   --   --   --   --   --   --  17  --  16 17 17   CR  --   --  0.88  --   --  0.76  --  0.67  --  0.74 0.76 0.85   ANIONGAP  --   --   --   --   --   --   --  2*  --  2* 4 4   NASIR  --   --   --   --   --   --   --  8.8  --  8.5 8.8 8.9   * 150*  --  167*   < >  --    < > 72   < > 109* 110* 89   ALBUMIN  --   --   --   --   --   --   --  1.7*  --  1.9*  --  2.2*   PROTTOTAL  --   --   --   --   --   --   --  4.6*  --  5.0*  --  4.8*   BILITOTAL  --   --   --   --   --   --   --  0.5  --  0.5  --  0.6   ALKPHOS  --   --   --   --   --   --   --  51  --  72  --  56   ALT  --   --   --   --   --   --   --  12  --  15  --  24   AST  --   --   --   --   --   --   --  23  --  20  --  23   LIPASE  --   --   --   --   --   --   --   --   --  1,236*  --  579*    < > = values in this interval not displayed.       Recent Results (from the past 24 hour(s))   XR Chest Port 1 View    Narrative    EXAM: XR CHEST PORTABLE 1 VIEW  LOCATION: [facility]  DATE/TIME: 06/25/2022, 4:40 AM    INDICATION: Chest tube placement.  COMPARISON: 06/24/2022.      Impression    IMPRESSION: Enteric tube tip below the diaphragm. Smallbore chest tube over the right lower chest. No pneumothorax. Shallow inspiration. Right lung is clear. Minimal hazy infiltrate versus atelectasis left lung base. Normal heart size and pulmonary   vascularity. Hypertrophic changes thoracic spine.           Assessment & Plan               Jagdeep Walker is a 51 year-old male with complex history including seizure disorder, schizoaffective disorder, recent pancreatitis with pseudocysts, history of portal vein thrombosis, recent admission for metabolic encephalopathy of unclear etiology who presents with shortness of breath and hypoxia with large pleural effusion on outside chest x-ray.  Admitted on 6/14/2022.      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  Recent echocardiogram 6/4 normal.  Pleural fluid studies consistent with transudate, specimen clotted for cell count, normal glucose, no organisms on gram stain.  Cytology negative x 2  Given lipase and amylase increase, perhaps pleural effusion related to pancreatitis with fistula or leak versus malnutrition related to severe pancreatitis  Chest tube placed on 6/18 and replaced on 6/22 as it appeared clogged  RRT for 6/20 chest pain, resolved with pain medications  6/22: Respiratory status worsened, likely due to worsening pleural effusions from his clogged chest tube.  Chest tube exchanged and has had significant output since with suction   * Culture from the pleural with diptheroids in broth only, c/w contaminant  - Titrate O2 as able.  Currently on Ventimask at 3 L.  We will continue to titrate down.  - ID consulted for the pleural culture results.  Felt contaminant and no need for antibiotics   - Pulmonary was following and appreciate their assistance  - GI following as below   - CT surgery and IR following and appreciate their recommendations.  Defer chest tube management to them.  Chest x-ray reviewed by CT surgery team today.  Recommend continuing CT with suction.    Acute/subacute pancreatitis with pseudocyst   *Hospitalized 5/8-5/17/22 for pancreatitis with pseudocyst formation. Unclear etiology, EUS unrevealing for etiology, possibly due to medications  *CT on admission with acute pancreatitis with slightly improved inflammatory changes,  pseudocysts not significantly changed  *Sister notes intake has remained generally poor and has complained of pain with eating, not significantly tender on exam at present.  - NJ in place.  Continue tube feeds   - Continue NPO.  Per GI will need to be NPO until repeat ERCP in 4 weeks.  Discussed PEJ with guardian on phone on 6/23.  Will likely need to proceed early next week once respiratory status stabilizes.   - GI following and appreciate their recommendations.  Started octreotide for 72 hours     Recent metabolic encephalopathy   Mild zinc deficiency   *Extensively worked up during admission 6/4-6/10/22 including head CT, MRI brain, EEG, paraneoplastic ab, extensive laboratory work-up mostly unremarkable with exception of mildly low zinc.  *Per sister, patient does have some cognitive impairment at baseline, though is typically oriented to self, family and can carry on an extensive conversation especially regarding topics he enjoys (eg sports). He remains below his baseline.   - Continue zinc supplementation    Hx of portal vein thrombosis  * Diagnosed during admission 5/2022, improved on subsequent CT and not treated with anticoagulation   * Non-occlusive main portal vein thrombosis, splenic vein thrombosis seen on admission CT  - GI consulted as above, they advised no need to treat PVT previous admission (6/9/2022)     Depression  Anxiety  Schizoaffective disorder, bipolar type  - Continue prior to admission sertraline, risperidone     Seizure disorder  - Continue prior to admission topiramate, Depakote     Carnitine deficiency  - Continue prior to admission levocarnitine      Hypertension  - Continue prior to admission atenolol      Fisher's esophagus  - Continue prior to admission PPI     Hypothyroidism  - Continue prior to admission levothyroxine     Seasonal allergies  - Continue prior to admission levocetirizine      DVT Prophylaxis: Pneumatic Compression Devices  Code Status: Full Code  Disposition:  When medically stable      Iveth Robles MD, MD  [phone](p)  [phone]( c)

## 2022-06-25 NOTE — PLAN OF CARE
1900h-0730h: Pt AOx3, disoriented to time. Speaks slowly & slurred. O2Sat 94% on HFNC w/ FIO2 30% & 30lpm. Cleared breath sound in L lung & diminished in R lung. CT on RU back to atrium to suction -[id number], w/ serous output. No air leak or crepitus noted. Santana in place w/ adequate yellow output. Normoactive BS x4. Last BM 06/25. NPO. NJ tube in R naris secured w/ bridle. On continuous enteral feeding: Jevity 1.5 @ 35 ml/hr w/ 60 ml/4h water flushes. Feeding well tolerated. Pt is moderately impaired, repositioned to sides every 2h. Skin intact on bony prominences. No edema on BLE, sensation intact w/ pulses palpable. IV Ocreotide 50 mcg/hr infusing on R arm.      Reported sharp chest pain that lasted for 20 secs, pt refused intervention. No changes so far w/ his tele: NSR & other VSS are normal.     Tele: NSR  For creatinine, BMP, Phos & Mg at 6 am.  To adjust enteral feeding to goal range at 12 nn 06/25

## 2022-06-25 NOTE — PROGRESS NOTES
DATE & TIME: 06/24/22; 7a-7p   Cognitive Concerns/ Orientation : A/O to person, very lethargic and speech is difficult to understand   BEHAVIOR & AGGRESSION TOOL COLOR: green  CIWA SCORE: n/a   ABNL VS/O2: HFNC 30LPM, 30% FiO2  MOBILITY: A2 turn and repo; pt was refusing to turn and repo until end of shift. Pt can shift weight in bed ind.   PAIN MANAGMENT: tylenol given for pain at 0900  DIET: NPO; TF 35mL/hr; 60mL auto flushes with TF. Due to increase goal TF rate at 6/25 1600  BOWEL/BLADDER: Incontinent bowel, jimenez cath  ABNL LAB/BG: elevated glucose  DRAIN/DEVICES: jimenez, PIVx1, CTx1, -20 suction, no air leak, no crepitus; atrium changed at 1430. CT drainage 591  TELEMETRY RHYTHM: NSR  SKIN: Redness blanchable behind ears where HFNC strap hits.     OTHER IMPORTANT INFO: Pt lethargic. Pt has been bedfast all day and typically refused to be turned.

## 2022-06-25 NOTE — PROGRESS NOTES
[facility]  Gastroenterology Progress Note     Jagdeep Walker MRN# [id number]   YOB: 1970 Age: 51 year old          Assessment and Plan:   Jagdeep Walker s a 51 year old male admitted with right sided pleural effusion and GI consulted to manage pancreatitis.     Active Problems:  Pleural effusion  Pancreatic pseudocyst  Idiopathic acute pancreatitis, unspecified complication status  Pancreaticopleural fistula  *Lipase 1,730->689, AST, ALT and Alk Phos normal. Lactic acid 1.9. WBC wnl, Hgb 12.6,    *6/14 CT A/P note improved inflammatory changes in pancreas. Pseudocysts not significantly changed. Gastric wall thickening has progressed consistent with secondary gastritis with new pseudocyst along the proximal posterior gastric wall. Ascites decreased. Wall thickening involving the cecum and transverse colon likely secondarily involved. Large right pleural effusion has significantly increased.  *Cause of pancreatitis is idiopathic- no evidence of gallstones or alcohol use. May be due to medication. 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 Contrast was injected and flowed through pancreatic ductal system and extravasated from the pancreatic duct in the body of the pancreas with pancreatic duct leak and communicating pseudocyst. Pancreatic sphincterotomy performed. One stent placed into ventral pancreatic duct.  *These findings confirm pancreaticopleural fistula  NJ tube in place  * Chest tube output still significant but less each day- thoracic surgery following  * Respiratory status improving now on 30 LPM    -- Start octreotide 50 mcg/hr drip for 72 hours and if CT output improves will continue otherwise can consider stopping  -- Repeat ERCP in 4 weeks to determine if fistula closed  -- Restart Tube feeds- consider PEJ with improvement in respiratory status  -- Daily labs               Interval History:   no new complaints, denies chest pain, pain is controlled and has ongoing abdominal pain. Had RRT called after significant change in respirations and abdominal distention               Review of Systems:   C: NEGATIVE for fever, chills, change in weight  E/M: NEGATIVE for ear, mouth and throat problems  R: NEGATIVE for significant cough or SOB  CV: NEGATIVE for chest pain, palpitations or peripheral edema             Medications:   I have reviewed this patient's current medications    amylase-lipase-protease  1-2 capsule Per Feeding Tube Q4H     atenolol  25 mg Oral or NG Tube Daily     carboxymethylcellulose PF  1 drop Right Eye At Bedtime     ipratropium  2 spray Both Nostrils TID     levOCARNitine  330 mg Oral or NG Tube TID     levocetirizine  5 mg Oral or NG Tube QPM     levothyroxine  50 mcg Oral or NG Tube Daily     montelukast  10 mg Oral or NG Tube At Bedtime     pantoprazole  40 mg Oral or Feeding Tube BID     prednisoLONE acetate  1 drop Right Eye Daily     protein modular  1 packet Per Feeding Tube TID     risperiDONE  2 mg Oral or NG Tube BID     sertraline  100 mg Oral or NG Tube Daily     sodium bicarbonate  325 mg Per Feeding Tube Q4H     sodium chloride (PF)  3 mL Intracatheter Q8H     topiramate  100 mg Oral or NG Tube At Bedtime     valproic acid  250 mg Oral or NG Tube At Bedtime     valproic acid  500 mg Oral or NG Tube BID     zinc sulfate  220 mg Oral or NG Tube Daily                  Physical Exam:   Vitals were reviewed  Vital Signs with Ranges  Temp:  [97.4  F (36.3  C)-100  F (37.8  C)] 98  F (36.7  C)  Pulse:  [68-92] 85  Resp:  [9-18] 10  BP: ()/(60-84) 118/81  FiO2 (%):  [30 %] 30 %  SpO2:  [92 %-95 %] 95 %  I/O last 3 completed shifts:  In: 1691 [NG/GT:1185]  Out: 2991 [Urine:1450; Chest Tube:1541]  Constitutional: alert, mild distress, cooperative and pale   Cardiovascular: negative, PMI normal. No lifts, heaves, or thrills. RRR. No murmurs, clicks gallops or  rub  Respiratory: wheezy, Percussion normal. Good diaphragmatic excursion.   Abdomen: Abdomen soft, non-tender. BS normal. No masses, organomegaly           Data:   I reviewed the patient's new clinical lab test results.   Recent Labs   Lab Test 06/25/22  0540 06/22/22  0738 06/21/22  0704 06/20/22  0715 06/19/22  0622 06/18/22 2041 06/16/22  0802 06/14/22  1903 06/06/22  0659 06/05/22  0728   WBC  --   --  5.4 4.1 7.3 4.6   < > 5.7   < > 3.8*   HGB  --   --  12.6* 13.2* 12.3* 12.6*   < > 12.6*   < > 12.0*   MCV  --   --  98 99 96 97   < > 96   < > 95    141* 138* 134* 142* 152   < > 185   < > 92*   INR  --   --   --   --   --  1.74*  --  1.64*  --  1.73*    < > = values in this interval not displayed.     Recent Labs   Lab Test 06/22/22  0738 06/21/22  0704 06/20/22  0715 06/19/22 0622   POTASSIUM 3.8 3.9 3.8 4.0   CHLORIDE  --  116* 112* 113*   CO2  --  26 29 27   BUN  --  17 16 17   ANIONGAP  --  2* 2* 4     Recent Labs   Lab Test 06/21/22  0704 06/20/22  0715 06/18/22  2041 06/15/22  1011 06/15/22  0034 06/14/22  1920 06/05/22  0728 06/04/22  1653 05/08/22  2032 05/08/22  0934   ALBUMIN 1.7* 1.9* 2.2*  --   --  2.4*   < >  --    < >  --    BILITOTAL 0.5 0.5 0.6  --   --  0.6   < >  --    < >  --    ALT 12 15 24  --   --  18   < >  --    < >  --    AST 23 20 23  --   --  21   < >  --    < >  --    PROTEIN  --   --   --   --  Negative  --   --  Negative  --  Negative   LIPASE  --  1,236* 579*  --   --  689*  --   --    < >  --    AMYLASE  --   --   --  142*  --   --   --   --   --   --     < > = values in this interval not displayed.       I reviewed the patient's new imaging results.    All laboratory data reviewed  All imaging studies reviewed by me.    Isabelle Pantoja PA-C,  6/16/2022  [facility]  Office : [phone]  Cell: [phone] (Dr. Clark)  Cell: [phone] (Isabelle Pantoja PA-C)

## 2022-06-25 NOTE — PROVIDER NOTIFICATION
Text page to Hospitalist Dr Dowling    please call pt's sister, she is upset that no doctor has called for two days, she is also concerned about pts worsening garbled speech, her number is [phone]. Thank you!

## 2022-06-26 ENCOUNTER — APPOINTMENT (OUTPATIENT)
Dept: MRI IMAGING | Facility: CLINIC | Age: 52
DRG: 438 | End: 2022-06-26
Attending: HOSPITALIST
Payer: MEDICARE

## 2022-06-26 ENCOUNTER — APPOINTMENT (OUTPATIENT)
Dept: GENERAL RADIOLOGY | Facility: CLINIC | Age: 52
DRG: 438 | End: 2022-06-26
Attending: THORACIC SURGERY (CARDIOTHORACIC VASCULAR SURGERY)
Payer: MEDICARE

## 2022-06-26 ENCOUNTER — HOSPITAL ENCOUNTER (INPATIENT)
Dept: NEUROLOGY | Facility: CLINIC | Age: 52
Discharge: HOME | DRG: 438 | End: 2022-06-26
Attending: PSYCHIATRY & NEUROLOGY
Payer: MEDICARE

## 2022-06-26 LAB
SODIUM SERPL-SCNC: 149 MMOL/L (ref 133–144)
SODIUM SERPL-SCNC: 150 MMOL/L (ref 133–144)
SODIUM SERPL-SCNC: 150 MMOL/L (ref 133–144)
SODIUM SERPL-SCNC: 154 MMOL/L (ref 133–144)

## 2022-06-26 PROCEDURE — 80201 ASSAY OF TOPIRAMATE: CPT | Performed by: PSYCHIATRY & NEUROLOGY

## 2022-06-26 PROCEDURE — 71045 X-RAY EXAM CHEST 1 VIEW: CPT

## 2022-06-26 PROCEDURE — 84295 ASSAY OF SERUM SODIUM: CPT

## 2022-06-26 PROCEDURE — G1010 CDSM STANSON: HCPCS

## 2022-06-26 PROCEDURE — 99233 SBSQ HOSP IP/OBS HIGH 50: CPT | Performed by: HOSPITALIST

## 2022-06-26 PROCEDURE — 250N000013 HC RX MED GY IP 250 OP 250 PS 637: Performed by: INTERNAL MEDICINE

## 2022-06-26 PROCEDURE — 120N000013 HC R&B IMCU

## 2022-06-26 PROCEDURE — 84295 ASSAY OF SERUM SODIUM: CPT | Performed by: HOSPITALIST

## 2022-06-26 PROCEDURE — 250N000013 HC RX MED GY IP 250 OP 250 PS 637

## 2022-06-26 PROCEDURE — 36415 COLL VENOUS BLD VENIPUNCTURE: CPT | Performed by: PSYCHIATRY & NEUROLOGY

## 2022-06-26 PROCEDURE — 74183 MRI ABD W/O CNTR FLWD CNTR: CPT | Mod: MG

## 2022-06-26 PROCEDURE — 255N000002 HC RX 255 OP 636: Performed by: HOSPITALIST

## 2022-06-26 PROCEDURE — 95816 EEG AWAKE AND DROWSY: CPT

## 2022-06-26 PROCEDURE — 36415 COLL VENOUS BLD VENIPUNCTURE: CPT

## 2022-06-26 PROCEDURE — 250N000011 HC RX IP 250 OP 636: Performed by: HOSPITALIST

## 2022-06-26 PROCEDURE — A9585 GADOBUTROL INJECTION: HCPCS | Performed by: HOSPITALIST

## 2022-06-26 PROCEDURE — 250N000013 HC RX MED GY IP 250 OP 250 PS 637: Performed by: HOSPITALIST

## 2022-06-26 PROCEDURE — 80165 DIPROPYLACETIC ACID FREE: CPT | Performed by: HOSPITALIST

## 2022-06-26 PROCEDURE — P9047 ALBUMIN (HUMAN), 25%, 50ML: HCPCS | Performed by: HOSPITALIST

## 2022-06-26 RX ORDER — GADOBUTROL 604.72 MG/ML
8 INJECTION INTRAVENOUS ONCE
Status: COMPLETED | OUTPATIENT
Start: 2022-06-26 | End: 2022-06-26

## 2022-06-26 RX ORDER — ALBUMIN (HUMAN) 12.5 G/50ML
25 SOLUTION INTRAVENOUS ONCE
Status: COMPLETED | OUTPATIENT
Start: 2022-06-26 | End: 2022-06-26

## 2022-06-26 RX ADMIN — LEVOCARNITINE 330 MG: 330 TABLET ORAL at 14:57

## 2022-06-26 RX ADMIN — TOPIRAMATE 100 MG: 100 TABLET, FILM COATED ORAL at 22:52

## 2022-06-26 RX ADMIN — ATENOLOL 25 MG: 25 TABLET ORAL at 10:03

## 2022-06-26 RX ADMIN — RISPERIDONE 2 MG: 2 TABLET ORAL at 11:41

## 2022-06-26 RX ADMIN — PANCRELIPASE 2 CAPSULE: 60000; 12000; 38000 CAPSULE, DELAYED RELEASE PELLETS ORAL at 04:27

## 2022-06-26 RX ADMIN — LEVOTHYROXINE SODIUM 50 MCG: 50 TABLET ORAL at 10:02

## 2022-06-26 RX ADMIN — MONTELUKAST 10 MG: 10 TABLET, FILM COATED ORAL at 22:52

## 2022-06-26 RX ADMIN — IPRATROPIUM BROMIDE 2 SPRAY: 42 SPRAY NASAL at 10:04

## 2022-06-26 RX ADMIN — SODIUM BICARBONATE 325 MG: 325 TABLET ORAL at 00:51

## 2022-06-26 RX ADMIN — LEVOCETIRIZINE DIHYDROCHLORIDE 5 MG: 5 TABLET ORAL at 22:52

## 2022-06-26 RX ADMIN — GADOBUTROL 8 ML: 604.72 INJECTION INTRAVENOUS at 22:09

## 2022-06-26 RX ADMIN — PREDNISOLONE ACETATE 1 DROP: 10 SUSPENSION/ DROPS OPHTHALMIC at 10:01

## 2022-06-26 RX ADMIN — CARBOXYMETHYLCELLULOSE SODIUM 1 DROP: 10 GEL OPHTHALMIC at 22:51

## 2022-06-26 RX ADMIN — SERTRALINE HYDROCHLORIDE 100 MG: 100 TABLET ORAL at 10:03

## 2022-06-26 RX ADMIN — PANCRELIPASE 2 CAPSULE: 60000; 12000; 38000 CAPSULE, DELAYED RELEASE PELLETS ORAL at 00:52

## 2022-06-26 RX ADMIN — RISPERIDONE 2 MG: 2 TABLET ORAL at 20:22

## 2022-06-26 RX ADMIN — LEVOCARNITINE 330 MG: 330 TABLET ORAL at 22:51

## 2022-06-26 RX ADMIN — Medication 1 PACKET: at 10:00

## 2022-06-26 RX ADMIN — Medication 40 MG: at 10:02

## 2022-06-26 RX ADMIN — SODIUM BICARBONATE 325 MG: 325 TABLET ORAL at 04:27

## 2022-06-26 RX ADMIN — Medication 1 PACKET: at 16:34

## 2022-06-26 RX ADMIN — Medication 1 PACKET: at 22:53

## 2022-06-26 RX ADMIN — ZINC SULFATE 220 MG (50 MG) CAPSULE 220 MG: CAPSULE at 10:02

## 2022-06-26 RX ADMIN — LEVOCARNITINE 330 MG: 330 TABLET ORAL at 10:02

## 2022-06-26 RX ADMIN — ALBUMIN HUMAN 25 G: 0.25 SOLUTION INTRAVENOUS at 16:35

## 2022-06-26 RX ADMIN — Medication 40 MG: at 22:51

## 2022-06-26 ASSESSMENT — ACTIVITIES OF DAILY LIVING (ADL)
ADLS_ACUITY_SCORE: 47
ADLS_ACUITY_SCORE: 49
ADLS_ACUITY_SCORE: 53
ADLS_ACUITY_SCORE: 49
ADLS_ACUITY_SCORE: 53
ADLS_ACUITY_SCORE: 49
ADLS_ACUITY_SCORE: 53

## 2022-06-26 NOTE — PROGRESS NOTES
Yvette GOINS. Regarding the concern of the pt's sister. She said that her brother's speech is more slurred & that's not his baseline.Pt is lethargic lately too though alert oriented to place, situation & to self.

## 2022-06-26 NOTE — PROVIDER NOTIFICATION
Notified MD at 0900  AM regarding increase in chest tube output, family request for plan of care update, Sodium bicarb tablet needed for CREON administration and currently held due to hypernatremia.  Held Valproic acid until serum level checked and per Huong family legal guardian request. .      Spoke with: Dr. Robles    Orders were obtained.    Comments: Discussed plan of care with Huong and patient.      Addendum at 1240 pm:   Nutritionist paged re: fluid volume deficit calculation and pharmacist endorsed no other administration route concerning Creon admin.

## 2022-06-26 NOTE — PROGRESS NOTES
Brief House NP Note    Asked by cross-cover PA to assess patient for concerns of increasing lethargy after concerns were brought up by patient's sister that he was much more lethargic than his baseline. Patient was admitted for recurrent right pleural effusion and had a thoracentesis and chest tube placed, cultures with concern for contamination, otherwise negative for infectious process. At baseline the patient has cognitive delays and is only oriented to self. He recently had an extensive workup for neurological causes including CT, MRI brain, EEG, paraneoplastic ab, and laboratory work-up which revealed no demonstrable cause for his lethargy other than metabolic encephalopathy.    Upon my arrival the patient was supine and sleeping, he was difficult to wake from sleep for a neurological exam and during the exam I had to provide constant stimulation to keep him awake. He was able to follow commands and had no focal neurological defects but was globally lethargic. Right pupil is unequal at baseline but reactive. Patient was warm and dry, 2+ pulses in extremities. Per RN he had been similarly lethargic for at least 2 days and it appears that documentation from admission reflects metabolic encephalopathy that had been ongoing throughout his stay. I did not suspect any stroke or intracranial process but I ordered labs to check for signs of infection or electrolyte imbalances as well as hypercapnia. I anticipate his AEDs are also contributing to his lethargy and perhaps those doses could be adjusted if no other cause is found.    Mixed metabolic and ?toxic encephalopathy 2/2 ?Hypernatremia    Plan:  -- BMP, CBC, VBG  -- If no other cause is found, could check AED levels in the morning to rule out toxic levels    ADDENDUM:   Patient now febrile 100.8 so I ordered a lactic acid level and patient is newly hypernatremic at 154 so I started D5W @75ml/hr and added sodium checks Q4h.    DILLON Villegas, CNP  Hospitalist -  House SHORTY  Text me on the [employer] shorty for a textback  Text page with web based paging for a callback

## 2022-06-26 NOTE — PROGRESS NOTES
[facility]    Hospitalist Progress Note    Interval History     Patient is awake and more alert this morning.  Had an RRT yesterday for increased lethargy and was found to have hypernatremia.  Was started on D5.  Sodium slowly improving.  Family at bedside.  Had extensive discussion with his sister.    Is quite concerned that valproate is causing his recurrent pancreatitis.  She wanted to stop.  He continues to have very high output through his chest tube.  He has had a liter output since this morning 7 AM.    -Data reviewed today: I reviewed all new labs and imaging results over the last 24 hours. I personally reviewed the chest x-ray image(s) showing Drain right-sided pleural effusion.    Physical Exam   Temp: 98.5  F (36.9  C) Temp src: Axillary BP: 113/76 Pulse: 65   Resp: 13 SpO2: 93 % O2 Device: None (Room air) Oxygen Delivery: 5 LPM  Vitals:    06/24/22 0525 06/25/22 0100 06/26/22 0531   Weight: 83.1 kg (183 lb 4.8 oz) 84.1 kg (185 lb 6.5 oz) 83.7 kg (184 lb 9.6 oz)     Vital Signs with Ranges  Temp:  [98.2  F (36.8  C)-100.8  F (38.2  C)] 98.5  F (36.9  C)  Pulse:  [65-89] 65  Resp:  [8-15] 13  BP: (103-132)/(63-88) 113/76  FiO2 (%):  [3 %] 3 %  SpO2:  [89 %-99 %] 93 %  I/O last 3 completed shifts:  In: 2430 [I.V.:1003; NG/GT:1427]  Out: 3115 [Urine:1530; Chest Tube:1585]    Physical Exam  Constitutional:       Appearance: Normal appearance.   HENT:      Head: Normocephalic.   Eyes:      Pupils: Pupils are equal, round, and reactive to light.   Cardiovascular:      Rate and Rhythm: Regular rhythm. Tachycardia present.      Pulses: Normal pulses.      Heart sounds: Normal heart sounds.   Pulmonary:      Effort: Respiratory distress present.      Breath sounds: Normal breath sounds.      Comments: Has right-sided chest tube in place with basal crackles present.  Tachypneic.  Abdominal:      General: Abdomen is flat. Bowel sounds are normal. There is no distension.      Tenderness:  There is no abdominal tenderness. There is no guarding.      Comments: NJ tube in place.   Genitourinary:     Comments: Urinary catheter in place.  Musculoskeletal:      Cervical back: Normal range of motion.      Comments: Generalized weakness.  Not assessed.   Skin:     General: Skin is warm and dry.   Neurological:      General: No focal deficit present.           Medications     dextrose       D5W 100 mL/hr at 06/26/22 1450       albumin human  25 g Intravenous Once     [Held by provider] amylase-lipase-protease  1-2 capsule Per Feeding Tube Q4H     atenolol  25 mg Oral or NG Tube Daily     carboxymethylcellulose PF  1 drop Right Eye At Bedtime     ipratropium  2 spray Both Nostrils TID     levOCARNitine  330 mg Oral or NG Tube TID     levocetirizine  5 mg Oral or NG Tube QPM     levothyroxine  50 mcg Oral or NG Tube Daily     montelukast  10 mg Oral or NG Tube At Bedtime     pantoprazole  40 mg Oral or Feeding Tube BID     prednisoLONE acetate  1 drop Right Eye Daily     protein modular  1 packet Per Feeding Tube TID     risperiDONE  2 mg Oral or NG Tube BID     sertraline  100 mg Oral or NG Tube Daily     [Held by provider] sodium bicarbonate  325 mg Per Feeding Tube Q4H     sodium chloride (PF)  3 mL Intracatheter Q8H     topiramate  100 mg Oral or NG Tube At Bedtime     [Held by provider] valproic acid  250 mg Oral or NG Tube At Bedtime     [Held by provider] valproic acid  500 mg Oral or NG Tube BID     zinc sulfate  220 mg Oral or NG Tube Daily       Data   Recent Labs   Lab 06/26/22  1007 06/26/22  0515 06/26/22  0131 06/25/22  2234 06/25/22  1157 06/25/22  0757 06/25/22  0540 06/22/22  1717 06/22/22  0738 06/21/22  1923 06/21/22  0704 06/20/22  1049 06/20/22  0715   WBC  --   --   --  6.4  --   --   --   --   --   --  5.4  --  4.1   HGB  --   --   --  13.0*  --   --   --   --   --   --  12.6*  --  13.2*   MCV  --   --   --  97  --   --   --   --   --   --  98  --  99   PLT  --   --   --  158  --   --   157  --  141*  --  138*  --  134*   * 149* 154* 154*  --   --   --   --   --   --  144  --  143   POTASSIUM  --   --   --  3.7  --   --   --   --  3.8  --  3.9  --  3.8   CHLORIDE  --   --   --  117*  --   --   --   --   --   --  116*  --  112*   CO2  --   --   --  33*  --   --   --   --   --   --  26  --  29   BUN  --   --   --  33*  --   --   --   --   --   --  17  --  16   CR  --   --   --  0.86  --   --  0.88  --  0.76  --  0.67  --  0.74   ANIONGAP  --   --   --  4  --   --   --   --   --   --  2*  --  2*   NASIR  --   --   --  9.1  --   --   --   --   --   --  8.8  --  8.5   GLC  --   --   --  163* 140* 150*  --    < >  --    < > 72   < > 109*   ALBUMIN  --   --   --   --   --   --   --   --   --   --  1.7*  --  1.9*   PROTTOTAL  --   --   --   --   --   --   --   --   --   --  4.6*  --  5.0*   BILITOTAL  --   --   --   --   --   --   --   --   --   --  0.5  --  0.5   ALKPHOS  --   --   --   --   --   --   --   --   --   --  51  --  72   ALT  --   --   --   --   --   --   --   --   --   --  12  --  15   AST  --   --   --   --   --   --   --   --   --   --  23  --  20   LIPASE  --   --   --   --   --   --   --   --   --   --   --   --  1,236*    < > = values in this interval not displayed.       Recent Results (from the past 24 hour(s))   XR Chest Port 1 View    Narrative    EXAM: XR CHEST PORT 1 VIEW  LOCATION: [facility]  DATE/TIME: 6/26/2022 5:35 AM    INDICATION: chest tube placement  COMPARISON: 6/25/2022.    FINDINGS: Pigtail catheter again projects over the right lung base. Nasoenteric feeding tube passes in the stomach. There is no pneumothorax. Right hemidiaphragm is elevated. Right pleural effusion appears increased in interval. The left lung is clear.      Impression    IMPRESSION: No pneumothorax.         Assessment & Plan               Jagdeep Walker is a 51 year-old male with complex history including seizure disorder, schizoaffective disorder, recent pancreatitis  with pseudocysts, history of portal vein thrombosis, recent admission for metabolic encephalopathy of unclear etiology who presents with shortness of breath and hypoxia with large pleural effusion on outside chest x-ray.  Admitted on 6/14/2022.      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  Recent echocardiogram 6/4 normal.  Pleural fluid studies consistent with transudate, specimen clotted for cell count, normal glucose, no organisms on gram stain.  Cytology negative x 2  Given lipase and amylase increase, perhaps pleural effusion related to pancreatitis with fistula or leak versus malnutrition related to severe pancreatitis  Chest tube placed on 6/18 and replaced on 6/22 as it appeared clogged  RRT for 6/20 chest pain, resolved with pain medications  6/22: Respiratory status worsened, likely due to worsening pleural effusions from his clogged chest tube.  Chest tube exchanged and has had significant output since with suction   * Culture from the pleural with diptheroids in broth only, c/w contaminant  - Titrate O2 as able.  Currently on Ventimask at 3 L.  We will continue to titrate down.  - ID consulted for the pleural culture results.  Felt contaminant and no need for antibiotics   - Pulmonary was following and appreciate their assistance  - GI following as below   - CT surgery and IR following and appreciate their recommendations.  Defer chest tube  management to them.  Chest x-ray reviewed by CT surgery team today.  Recommend continuing CT with suction.  Pleurodesis not appropriate in this situation.  -Patient continues to have significant output through the chest tube despite 4 days of octreotide.  After discussion with GI it was decided to discontinue octreotide.  Although it has been felt that this was all pancreatico pleural fistula there is no imaging evidence of this.  We will repeat MRCP to evaluate again.  Question if this could also be hepatic hydrothorax since patient had ascites in the past but now that has improved but he keeps having transudative pleural effusion.  CT chest abdomen pelvis pelvis done on 6/14/2022 did show cavernous transformation of portal vein with gastric varices suggestive of portal hypertension and he had ascites at that time.  Fluid amylase also is only marginally elevated whereas in pancreatico pleural fistula amylase levels are usually over 100,000.    Ordered IV albumin 25% 25 g for severe hypoproteinemia .  This should help if this is in fact transudative effusion secondary to low protein levels.  We will hold off on diuresis for now due to his hypernatremia.    Acute/subacute pancreatitis with pseudocyst   *Hospitalized 5/8-5/17/22 for pancreatitis with pseudocyst formation. Unclear etiology, EUS unrevealing for etiology, possibly due to medications  *CT on admission with acute pancreatitis with slightly improved inflammatory changes, pseudocysts not significantly changed  *Sister notes intake has remained generally poor and has complained of pain with eating, not significantly tender on exam at present.  - NJ in place.  Continue tube feeds   - Continue NPO.  Per GI will need to be NPO until repeat ERCP in 4 weeks.  Discussed PEJ with guardian on phone on 6/23.  Will likely need to proceed early next week once respiratory status stabilizes.   - GI following and appreciate their recommendations.  Stop octreotide.  Stop  Creon supplementation since he is getting postpyloric feeds for which we do not need pancreatic enzymes.  Patient has been getting sodium bicarb along with his Creon which could be worsening his hyponatremia.  He also has metabolic alkalosis.     Recent metabolic encephalopathy   Mild zinc deficiency   *Extensively worked up during admission 6/4-6/10/22 including head CT, MRI brain, EEG, paraneoplastic ab, extensive laboratory work-up mostly unremarkable with exception of mildly low zinc.  *Per sister, patient does have some cognitive impairment at baseline, though is typically oriented to self, family and can carry on an extensive conversation especially regarding topics he enjoys (eg sports). He remains below his baseline.   - Continue zinc supplementation    Hx of portal vein thrombosis  * Diagnosed during admission 5/2022, improved on subsequent CT and not treated with anticoagulation   * Non-occlusive main portal vein thrombosis, splenic vein thrombosis seen on admission CT  - GI consulted as above, they advised no need to treat PVT previous admission (6/9/2022)  -This could be causing significant portal hypertension and worsening ascites which in turn could be causing worsening pleural effusion.     Depression  Anxiety  Schizoaffective disorder, bipolar type  - Continue prior to admission sertraline, risperidone     Seizure disorder  - Continue prior to admission topiramate, Depakote.  -Discontinued Depakote today.  Neurology consulted.  Valproic acid levels pending.  Concerned that his antiseizure medications could be contributing to his pancreatitis.  -Neurology also recommended EEG.  -Topiramate levels ordered.     Carnitine deficiency  - Continue prior to admission levocarnitine      Hypertension  - Continue prior to admission atenolol      Fisher's esophagus  - Continue prior to admission PPI     Hypothyroidism  - Continue prior to admission levothyroxine     Seasonal allergies  - Continue prior to  admission levocetirizine      DVT Prophylaxis: Pneumatic Compression Devices  Code Status: Full Code  Disposition: When medically stable      Iveth Robles MD, MD  [phone](p)  [phone]( c)

## 2022-06-26 NOTE — CONSULTS
[facility]    Neurology Consultation    Jagdeep Walker MRN# [id number]   YOB: 1970 Age: 51 year old    Code Status:Full Code   Date of Admission: 6/14/2022  Date of Consult:06/26/2022                                                                                       Assessment and Plan:                                         #.  Encephalopathy-most likely metabolic in origin given the hypernatraemia and intercurrent medical problems.  His lethargy has improved today, suggesting that correction of medical/pulmonary factors is resolving the issue.  However, he does have considerable ongoing dysarthria.  -- Await the results of valproate level.  Check topiramate level  Continue to correct hypernatremia.  Will recheck EEG.  If further improvement does not occur, consider brain imaging or neuromuscular testing.  Dr. Cooper will cover for general neurology service tomorrow.  Plan was discussed with patient's daughter at the bedside        ----------------------------------------------------------------------------------  ----------------------------------------------------------------------------------  Reason for consult: as above       Chief Complaint:   Lethargy, slurred speech  History is obtained from the patient / chart       History of Present Illness:   This patient is a 51 year old male who presents with increasing lethargy and slurred speech over the last 2 days.    He has a history of longstanding mental disability/schizoaffective disorder for which he lives in a group home.  He also carries a diagnosis of seizure disorder which has been relatively inactive, this condition started when he was approximately 16..  He is followed by Dr. Flaherty from [place] [facility].  Several years ago he underwent a inpatient evaluation with withdrawal of medication no seizures were observed.  He was advised to stay on Depakote and topiramate long-term.  He has been quite ill since  late April 2022, requiring several admissions for acute pancreatitis, occlusive portal vein thrombosis.  He was seen by my general neurology colleagues during his more recent admission 6/4/2022 when he was diagnosed with metabolic encephalopathy.    An EEG done at that time revealed mild slowing.  No seizure activity was observed.  He was found to have a slightly low zinc level.  He was readmitted 6/14, from TCU because of shortness of breath.  Diagnosed with left pleural effusion which is being managed by thoracic surgery, infectious disease services.  For management of pancreatitis, he is on [place] feeding tube.  With plan ERCP next week.           Past Medical History:     Past Medical History:   Diagnosis Date     Anxiety      Fisher esophagus      Benign essential hypertension      Bipolar disorder (H)      Depression      Obesity      Pancreatitis      Portal vein thrombosis      Schizoaffective disorder, bipolar type (H)      Seizure disorder (H)      Thyroid disease          Past Surgical History:     Past Surgical History:   Procedure Laterality Date     ENDOSCOPIC RETROGRADE CHOLANGIOPANCREATOGRAM COMPLEX N/A 6/21/2022    Procedure: ENDOSCOPIC RETROGRADE CHOLANGIOPANCREATOGRAPHY, COMPLEX;  Surgeon: Massimo Clark MD;  Location:  OR     ENDOSCOPIC ULTRASOUND UPPER GASTROINTESTINAL TRACT (GI) N/A 5/12/2022    Procedure: ENDOSCOPIC ULTRASOUND, ESOPHAGOSCOPY / UPPER GASTROINTESTINAL TRACT (GI);  Surgeon: Massimo Clark MD;  Location:  GI     ESOPHAGOSCOPY, GASTROSCOPY, DUODENOSCOPY (EGD), COMBINED N/A 5/12/2022    Procedure: ESOPHAGOGASTRODUODENOSCOPY, WITH BIOPSY;  Surgeon: Massimo Clark MD;  Location:  GI     IR CHEST TUBE PLACEMENT NON-TUNNELED RIGHT  6/18/2022     IR CHEST TUBE REPOSITION/REPLACEMENT NON-TUNNELED RIGHT  6/22/2022          Social History:     Social History     Socioeconomic History     Marital status: Single     Spouse name: None     Number of children: None      Years of education: None     Highest education level: None   Tobacco Use     Smoking status: Never Smoker     Smokeless tobacco: Never Used   Vaping Use     Vaping Use: Never used   Substance and Sexual Activity     Alcohol use: Not Currently     Comment: Occasional     Drug use: Never     Patient denies smoking, no significant alcohol intake, denies illicit drugs use lives in a group home      Family History:     Family History   Problem Relation Age of Onset     Cerebrovascular Disease Mother 76     Prostate Cancer Father      Reviewed and not felt to be contributory.        Home Medications:     Prior to Admission Medications   Prescriptions Last Dose Informant Patient Reported? Taking?   LORazepam (ATIVAN) 2 MG tablet prn med  No Yes   Sig: Take 1 tablet (2 mg) by mouth daily as needed for seizures Give bucally   Vitamin D (Cholecalciferol) 50 MCG (2000 UT) CAPS 6/14/2022 at Unknown time  Yes Yes   Sig: Take 2,000 Units by mouth daily   acetaminophen (TYLENOL) 325 MG tablet prn med  No Yes   Sig: Take 2 tablets (650 mg) by mouth every 6 hours as needed for mild pain   albuterol (PROAIR HFA/PROVENTIL HFA/VENTOLIN HFA) 108 (90 Base) MCG/ACT inhaler prn med  Yes Yes   Sig: Inhale 2 puffs into the lungs every 6 hours as needed for shortness of breath / dyspnea or wheezing   amylase-lipase-protease (CREON 36) [id number] units CPEP 6/14/2022 at x3  No Yes   Sig: Take 2 capsules by mouth 3 times daily (with meals)   atenolol (TENORMIN) 25 MG tablet 6/14/2022 at Unknown time  Yes Yes   Sig: Take 25 mg by mouth daily   calcium carbonate 600 mg-vitamin D 400 units (CALTRATE) 600-400 MG-UNIT per tablet 6/14/2022 at Unknown time  Yes Yes   Sig: Take 1 tablet by mouth daily (with breakfast)   calcium polycarbophil (FIBERCON) 625 MG tablet 6/14/2022 at am  Yes Yes   Sig: Take 1 tablet by mouth 2 times daily   carboxymethylcellulose PF (REFRESH LIQUIGEL) 1 % ophthalmic gel 6/13/2022 at Unknown time  Yes Yes   Sig:  Place 1 drop into the right eye At Bedtime   carboxymethylcellulose PF (REFRESH PLUS) 0.5 % ophthalmic solution prn med  Yes Yes   Sig: Place 1 drop into both eyes daily as needed for dry eyes   chlorhexidine (PERIDEX) 0.12 % solution 6/14/2022 at Unknown time  Yes Yes   Sig: Swish and spit 15 mLs in mouth daily   ciclopirox (LOPROX) 0.77 % cream Not Taking at Unknown time  Yes No   Sig: Apply topically nightly as needed   Patient not taking: Reported on 6/14/2022   clonazePAM (KLONOPIN) 0.125 MG TBDP ODT tab prn med  No Yes   Sig: Take 1 tablet (0.125 mg) by mouth daily as needed for anxiety   dicyclomine (BENTYL) 20 MG tablet 6/14/2022 at x3  No Yes   Sig: Take 1 tablet (20 mg) by mouth 4 times daily (before meals and nightly)   divalproex sodium delayed-release (DEPAKOTE) 250 MG DR tablet 6/13/2022 at Unknown time  No Yes   Sig: Take 1 tablet (250 mg) by mouth At Bedtime   divalproex sodium delayed-release (DEPAKOTE) 500 MG DR tablet 6/14/2022 at x2  No Yes   Sig: Take 1 tablet (500 mg) by mouth 2 times daily With AM meds and early afternoon around 1500.   docusate sodium (COLACE) 100 MG capsule 6/14/2022 at Unknown time  Yes Yes   Sig: Take 100 mg by mouth daily   hydrocortisone (CORTAID) 1 % external cream Not Taking at Unknown time  Yes No   Sig: Apply topically 2 times daily   Patient not taking: Reported on 6/14/2022   ipratropium (ATROVENT) 0.06 % nasal spray 6/14/2022 at x2  Yes Yes   Sig: Spray 2 sprays into both nostrils 3 times daily   ketoconazole (NIZORAL) 2 % external cream prn med  Yes Yes   Sig: Apply topically 2 times daily as needed for itching R foot   levOCARNitine (CARNITOR) 330 MG tablet 6/14/2022 at x2  Yes Yes   Sig: Take by mouth 3 times daily   levocetirizine (XYZAL) 5 MG tablet 6/13/2022 at Unknown time  Yes Yes   Sig: Take 5 mg by mouth every evening   levothyroxine (SYNTHROID/LEVOTHROID) 50 MCG tablet 6/14/2022 at Unknown time  Yes Yes   Sig: Take 50 mcg by mouth daily   loperamide  (IMODIUM) 2 MG capsule prn med  Yes Yes   Sig: Take 2 mg by mouth 4 times daily as needed for diarrhea   montelukast (SINGULAIR) 10 MG tablet 6/13/2022 at Unknown time  Yes Yes   Sig: Take 10 mg by mouth At Bedtime   multivitamin, therapeutic (THERA-VIT) TABS tablet 6/14/2022 at Unknown time  Yes Yes   Sig: Take 1 tablet by mouth daily   omeprazole 20 MG tablet 6/14/2022 at am  Yes Yes   Sig: Take 20 mg by mouth 2 times daily   polyethylene glycol-propylene glycol (SYSTANE ULTRA) 0.4-0.3 % SOLN ophthalmic solution prn med  Yes Yes   Sig: Place 2 drops into both eyes daily as needed for dry eyes   prednisoLONE acetate (PRED FORTE) 1 % ophthalmic suspension 6/14/2022 at Unknown time  Yes Yes   Sig: Place 1 drop into the right eye daily   risperiDONE (RISPERDAL) 2 MG tablet 6/14/2022 at am  Yes Yes   Sig: Take 2 mg by mouth 2 times daily   sertraline (ZOLOFT) 100 MG tablet 6/14/2022 at Unknown time  Yes Yes   Sig: Take 100 mg by mouth daily   topiramate (TOPAMAX) 100 MG tablet 6/13/2022 at Unknown time  Yes Yes   Sig: Take 100 mg by mouth At Bedtime      Facility-Administered Medications: None          Allergy:   No Known Allergies       Inpatient Medications:   Scheduled Meds:    amylase-lipase-protease  1-2 capsule Per Feeding Tube Q4H     atenolol  25 mg Oral or NG Tube Daily     carboxymethylcellulose PF  1 drop Right Eye At Bedtime     ipratropium  2 spray Both Nostrils TID     levOCARNitine  330 mg Oral or NG Tube TID     levocetirizine  5 mg Oral or NG Tube QPM     levothyroxine  50 mcg Oral or NG Tube Daily     montelukast  10 mg Oral or NG Tube At Bedtime     pantoprazole  40 mg Oral or Feeding Tube BID     prednisoLONE acetate  1 drop Right Eye Daily     protein modular  1 packet Per Feeding Tube TID     risperiDONE  2 mg Oral or NG Tube BID     sertraline  100 mg Oral or NG Tube Daily     [Held by provider] sodium bicarbonate  325 mg Per Feeding Tube Q4H     sodium chloride (PF)  3 mL Intracatheter Q8H      topiramate  100 mg Oral or NG Tube At Bedtime     [Held by provider] valproic acid  250 mg Oral or NG Tube At Bedtime     [Held by provider] valproic acid  500 mg Oral or NG Tube BID     zinc sulfate  220 mg Oral or NG Tube Daily     PRN Meds: acetaminophen, acetaminophen **OR** acetaminophen, albuterol, calcium carbonate, carboxymethylcellulose PF, dextrose, HYDROmorphone, hyoscyamine ER, hypromellose-dextran, lidocaine 4%, lidocaine (buffered or not buffered), melatonin, naloxone **OR** naloxone **OR** naloxone **OR** naloxone, ondansetron **OR** ondansetron, oxyCODONE, polyethylene glycol, prochlorperazine **OR** prochlorperazine **OR** prochlorperazine, senna-docusate, sodium chloride (PF)        Review of Systems    Otherwise noncontributory  CONSTITUTIONAL: Low-grade fever  INTEGUMENTARY/SKIN: no rash or obvious new lesions  ENT/MOUTH: Nasojejunal feeding tube in place, facemask in place for oxygen  RESP: Chest tube in place  CV: negative for chest pain, palpitations or peripheral edema  GI: Dependent on NG feeding tubes  : Catheterized  MUSCULOSKELETAL: no myalgias, arthralgias or join efffusion  ENDOCRINE: Negative  PSYCHIATRIC: History of schizoaffective disorder  LYMPHATIC: no new lymphadenopathy  HEME: no bleeding or easy bruisability  NEURO: see HPI       Physical Exam:   Physical Exam   Vitals:  Height:Data Unavailable  Weight:184 lbs 9.6 oz   Temp: 98.3  F (36.8  C) Temp src: Axillary BP: 125/80 Pulse: 78   Resp: 12 SpO2: 98 % O2 Device: Oxymask Oxygen Delivery: 3 LPM  General Appearance:  No acute distress,  Neuro:       Mental Status Exam:   Alert.  When given choices, he is oriented to place and his age.  He has difficulty with the month.  Communication is difficult as he is severely dysarthric.  When he is angry, his articulation is improved.  His sister reports that this is well below his baseline which is clear speech.  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  Pupil is irregular and nonreactive.  Vision grossly intact in the left eye and pupil is reactive.  Extraocular movements intact.  Mild facial weakness-difficulty following commands.  Tongue movement is strong and intact.  Sometimes needs demonstration to perform tasks to imitate.  Shoulder movement and neck movement intact.  Hearing intact          Motor:   Tone bulk and strength intact x4.          Reflexes: Symmetrically intact, difficult to elicit.  Plantar signs normal       Sensory: Vibration and light touch intact x4                 Coordination: Difficulty fully cooperating but grossly intact x4       Gait: Unable  Neck: no nuchal rigidity, normal thyroid. No carotid bruits.    Extremities: No clubbing, no cyanosis, no edema       Data:   ROUTINE IP LABS   CBC RESULTS:     Recent Labs   Lab 06/25/22  2234 06/25/22  0540 06/22/22  0738 06/21/22  0704 06/20/22  0715   WBC 6.4  --   --  5.4 4.1   RBC 4.21*  --   --  4.06* 4.20*   HGB 13.0*  --   --  12.6* 13.2*   HCT 40.9  --   --  39.7* 41.4    157 141* 138* 134*     Basic Metabolic Panel:   Recent Labs   Lab Test 06/26/22  1007 06/26/22  0515 06/26/22  0131 06/25/22  2234 06/25/22  1157 06/25/22  0757 06/25/22  0540 06/22/22  1717 06/22/22  0738 06/21/22  1923 06/21/22  0704 06/20/22  1049 06/20/22  0715   * 149* 154* 154*  --   --   --   --   --   --  144  --  143   POTASSIUM  --   --   --  3.7  --   --   --   --  3.8  --  3.9  --  3.8   CHLORIDE  --   --   --  117*  --   --   --   --   --   --  116*  --  112*   CO2  --   --   --  33*  --   --   --   --   --   --  26  --  29   BUN  --   --   --  33*  --   --   --   --   --   --  17  --  16   CR  --   --   --  0.86  --   --  0.88  --  0.76  --  0.67  --  0.74   GLC  --   --   --  163* 140* 150*  --    < >  --    < > 72   < > 109*   NASIR  --   --   --  9.1  --   --   --   --   --   --  8.8  --  8.5    < > = values in this  interval not displayed.     Liver panel:  Recent Labs   Lab Test 06/21/22  0704 06/20/22  0715 06/18/22  2041 06/17/22  1035 06/14/22  1920 06/09/22  0639   PROTTOTAL 4.6* 5.0* 4.8* 5.1* 5.4* 5.3*   ALBUMIN 1.7* 1.9* 2.2*  --  2.4* 2.4*   BILITOTAL 0.5 0.5 0.6  --  0.6 0.9   ALKPHOS 51 72 56  --  75 56   AST 23 20 23  --  21 24   ALT 12 15 24  --  18 20     Lipid Profile:  Recent Labs   Lab Test 05/09/22  1415   TRIG 118     Thyroid Panel:  Recent Labs   Lab Test 06/04/22  0754 05/08/22  0917 04/28/22  0016   TSH 3.42 2.16 1.63      Vitamin B12:   Recent Labs   Lab Test 06/06/22  1643 04/28/22  1034   B12 [id number]           IMAGING:     CT head: Last CT 6/4/2022-no acute change- attenuation changes consistent with small vessel ischemic disease.      Time: 70minutes evaluation and management.     Adrianne Horton M.D.  [facility] Neurology, [facility].  Office [phone]

## 2022-06-26 NOTE — PROGRESS NOTES
Cross Cover Note    Paged re slurred speech that has been going for 2 days. No focal neurologic deficits noted on exam per RN but limited due to lethargy. Discuss with Osvaldo Duenas NP who will come to assess patient. In the past issues with the chest tube lead to increasing confusion and will evaluate for other etiologist.    Please page if I can be of further assistance,    Bridget Denton PA-C

## 2022-06-26 NOTE — CODE/RAPID RESPONSE
[facility]  House BRANDI RRT Note  6/25/2022   Time called: 2100  RRT called for: Lethargy  Code status: Full Code    Assessment & Plan   I was paged to the bedside to evaluate Mr. Jagdeep Walker for an acute episode of worsening lethargy. Asked by cross-cover PA to assess patient for concerns of increasing lethargy after concerns were brought up by patient's sister that he was much more lethargic than his baseline. Patient was admitted for recurrent right pleural effusion and had a thoracentesis and chest tube placed, cultures with concern for contamination, otherwise negative for infectious process. At baseline the patient has cognitive delays and is only oriented to self. He recently had an extensive workup for neurological causes including CT, MRI brain, EEG, paraneoplastic ab, and laboratory work-up which revealed no demonstrable cause for his lethargy other than metabolic encephalopathy.     Upon my arrival the patient was supine and sleeping, he was difficult to wake from sleep for a neurological exam and during the exam I had to provide constant stimulation to keep him awake. He was able to follow commands and had no focal neurological defects but was globally lethargic. Right pupil is unequal at baseline but reactive. Patient was warm and dry, 2+ pulses in extremities. Per RN he had been similarly lethargic for at least 2 days and it appears that documentation from admission reflects metabolic encephalopathy that had been ongoing throughout his stay. Patient was started on Creon with sodium bicarb additive on 6/17 and has been NPO, may need increase in free water flushes.     Ref Range & Units 10:34 PM   (6/25/22)  5:40 AM   (6/25/22) 3 d ago   (6/22/22) 3 d ago   (6/22/22) 4 d ago   (6/21/22) 5 d ago   (6/20/22) 6 d ago   (6/19/22)     Sodium 133 - 144 mmol/L 154 High      144  143  144          Diagnosis:  -- Metabolic encephalopathy 2/2 hypernatremia    Plan:  -- D5W   @75ml/hr  -- Q4hr sodium levels    At the conclusion of this RRT patient was hemodynamically stable and will remain in [facility]    His history is significant for:  Past Medical History:   Diagnosis Date     Anxiety      Fisher esophagus      Benign essential hypertension      Bipolar disorder (H)      Depression      Obesity      Pancreatitis      Portal vein thrombosis      Schizoaffective disorder, bipolar type (H)      Seizure disorder (H)      Thyroid disease      Past Surgical History:   Procedure Laterality Date     ENDOSCOPIC RETROGRADE CHOLANGIOPANCREATOGRAM COMPLEX N/A 6/21/2022    Procedure: ENDOSCOPIC RETROGRADE CHOLANGIOPANCREATOGRAPHY, COMPLEX;  Surgeon: Massimo Clark MD;  Location:  OR     ENDOSCOPIC ULTRASOUND UPPER GASTROINTESTINAL TRACT (GI) N/A 5/12/2022    Procedure: ENDOSCOPIC ULTRASOUND, ESOPHAGOSCOPY / UPPER GASTROINTESTINAL TRACT (GI);  Surgeon: Massimo Clark MD;  Location:  GI     ESOPHAGOSCOPY, GASTROSCOPY, DUODENOSCOPY (EGD), COMBINED N/A 5/12/2022    Procedure: ESOPHAGOGASTRODUODENOSCOPY, WITH BIOPSY;  Surgeon: Massimo Clark MD;  Location:  GI     IR CHEST TUBE PLACEMENT NON-TUNNELED RIGHT  6/18/2022     IR CHEST TUBE REPOSITION/REPLACEMENT NON-TUNNELED RIGHT  6/22/2022       Review of Systems   The 10 point Review of Systems is negative other than noted in the HPI or here.     Allergies   No Known Allergies    Physical Exam   Physical Exam  Constitutional:       General: He is not in acute distress.     Appearance: He is not ill-appearing.      Comments: Somnolent   HENT:      Head: Normocephalic.      Right Ear: External ear normal.      Left Ear: External ear normal.      Nose: Nose normal.      Mouth/Throat:      Mouth: Mucous membranes are moist.   Eyes:      Extraocular Movements: Extraocular movements intact.      Conjunctiva/sclera: Conjunctivae normal.   Cardiovascular:      Rate and Rhythm: Normal rate and regular rhythm.      Pulses: Normal pulses.       Heart sounds: No murmur heard.  Pulmonary:      Effort: Pulmonary effort is normal.      Breath sounds: Normal breath sounds.   Abdominal:      General: Abdomen is flat.      Palpations: Abdomen is soft.      Tenderness: There is no abdominal tenderness. There is no guarding.   Musculoskeletal:         General: No swelling or tenderness. Normal range of motion.      Cervical back: Normal range of motion.      Right lower leg: No edema.      Left lower leg: No edema.   Skin:     General: Skin is warm and dry.      Capillary Refill: Capillary refill takes less than 2 seconds.   Neurological:      General: No focal deficit present.      Mental Status: He is disoriented.      Comments: Somnolent, requires vigorous stimulation to arouse   Psychiatric:         Mood and Affect: Mood normal.         Vital Signs with Ranges:  Temp:  [97.9  F (36.6  C)-100.8  F (38.2  C)] 100.8  F (38.2  C)  Pulse:  [73-92] 89  Resp:  [8-18] 12  BP: (103-132)/(63-81) 132/63  FiO2 (%):  [3 %-30 %] 3 %  SpO2:  [92 %-97 %] 97 %  I/O last 3 completed shifts:  In: 1025 [I.V.:3; NG/GT:777]  Out: 4205 [Urine:2325; Chest Tube:1880]    Data   Results for orders placed or performed during the hospital encounter of 06/14/22 (from the past 24 hour(s))   XR Chest Port 1 View    Narrative    EXAM: XR CHEST PORTABLE 1 VIEW  LOCATION: [facility]  DATE/TIME: 06/25/2022, 4:40 AM    INDICATION: Chest tube placement.  COMPARISON: 06/24/2022.      Impression    IMPRESSION: Enteric tube tip below the diaphragm. Smallbore chest tube over the right lower chest. No pneumothorax. Shallow inspiration. Right lung is clear. Minimal hazy infiltrate versus atelectasis left lung base. Normal heart size and pulmonary   vascularity. Hypertrophic changes thoracic spine.     Platelet count   Result Value Ref Range    Platelet Count 157 150 - 450 10e3/uL   Creatinine   Result Value Ref Range    Creatinine 0.88 0.66 - 1.25 mg/dL    GFR Estimate >90  >60 mL/min/1.73m2   Glucose by meter   Result Value Ref Range    GLUCOSE BY METER POCT 150 (H) 70 - 99 mg/dL   Glucose by meter   Result Value Ref Range    GLUCOSE BY METER POCT 140 (H) 70 - 99 mg/dL   Blood gas venous and oxyhgb   Result Value Ref Range    pH Venous 7.43 7.32 - 7.43    pCO2 Venous 51 (H) 40 - 50 mm Hg    pO2 Venous 54 (H) 25 - 47 mm Hg    Bicarbonate Venous 34 (H) 21 - 28 mmol/L    FIO2 2     Oxyhemoglobin Venous 86 (H) 70 - 75 %    Base Excess/Deficit (+/-) 8.2 (H) -7.7 - 1.9 mmol/L   CBC with platelets   Result Value Ref Range    WBC Count 6.4 4.0 - 11.0 10e3/uL    RBC Count 4.21 (L) 4.40 - 5.90 10e6/uL    Hemoglobin 13.0 (L) 13.3 - 17.7 g/dL    Hematocrit 40.9 40.0 - 53.0 %    MCV 97 78 - 100 fL    MCH 30.9 26.5 - 33.0 pg    MCHC 31.8 31.5 - 36.5 g/dL    RDW 16.7 (H) 10.0 - 15.0 %    Platelet Count 158 150 - 450 10e3/uL   Basic metabolic panel   Result Value Ref Range    Sodium 154 (H) 133 - 144 mmol/L    Potassium 3.7 3.4 - 5.3 mmol/L    Chloride 117 (H) 94 - 109 mmol/L    Carbon Dioxide (CO2) 33 (H) 20 - 32 mmol/L    Anion Gap 4 3 - 14 mmol/L    Urea Nitrogen 33 (H) 7 - 30 mg/dL    Creatinine 0.86 0.66 - 1.25 mg/dL    Calcium 9.1 8.5 - 10.1 mg/dL    Glucose 163 (H) 70 - 99 mg/dL    GFR Estimate >90 >60 mL/min/1.73m2     COVID-19 PCR Results    COVID-19 PCR Results 1/6/22 4/28/22 5/8/22 5/15/22 6/4/22 6/14/22   COVID-19 Virus by PCR (External Result) Positive (A)        SARS CoV2 PCR  Negative Negative Negative Negative Negative   (A) Abnormal value       Comments are available for some flowsheets but are not being displayed.         COVID-19 Antibody Results, Testing for Immunity    COVID-19 Antibody Results, Testing for Immunity   No data to display.             Time Spent on this Encounter   I spent 60 minutes of critical care time on the unit/floor managing the care of Jagdeep CHIRINOS Ricardopatricia. Upon evaluation, this patient had a high probability of imminent or life-threatening deterioration due  to hypernatremia, which required my direct attention, intervention, and personal management. 100% of my time was spent at the bedside counseling the patient and/or coordinating care regarding services listed in this note.    DILLON Villegas, CNP  Hospitalist - House [facility]  Text me on the [employer] shorty for a textback  Text page with web based paging for a callback

## 2022-06-26 NOTE — PLAN OF CARE
Received pt more lethargic compared yesterday. Oriented to place, self & situation. According to the sister, pt speech was not slurred PTA. [place] NP came & assessed the pt. Base on neuro exam, stroke is not suspected. NP ordered BMP, CBC & VBG. Mentioned to NP that pt is on valproic acid & topiramate which could potentially contribute to his lethargy as well.     Tylenol given for temp 100.8 F. Other VS stable.   [place] NP ordered lactic to rule out infection.     Hypernatremia w/ serum Na result of 154, D5% 75 ml/hr infusion started. Serum Na checked every 4h. IV ocreotide 50 mcg/hr infusing on R arm.     Tolerates enteral feeding. Jevity 1.5 50 ml/hr (goal rate) infusing thru NJT w/ water flushes of 60 ml/hr. Santana in place, draining adequate output.     CT on RL back to atrium to suction -20 cmH2O, w/ serous output. No air leak or crepitus noted. Cleared breath sound in L lung, R upper lobe, diminished in RML & RLL.     Trace edema on BLE, sensation intact w/ palpable pulses.  Tele NSR.    Latest serum Na 149

## 2022-06-26 NOTE — PLAN OF CARE
[id number]: Patient lethargic, arouses to voice. Speech garbled, whispers, follow commands. Turning and repositioning pt in bed. VSS on 3 L via oxymask. Redness noted on tip of nose, mepilite placed for protection. CT dressing CDI, CT patent with serous output, 175 ml out this shift. Santana with adequate output. TF now at goal rate  of 50 ml an hr w/ q4 hr water flushes. Pt NPO, providing oral cares. No discharge plans yet until pt medically stable.

## 2022-06-27 ENCOUNTER — APPOINTMENT (OUTPATIENT)
Dept: MRI IMAGING | Facility: CLINIC | Age: 52
DRG: 438 | End: 2022-06-27
Attending: PSYCHIATRY & NEUROLOGY
Payer: MEDICARE

## 2022-06-27 ENCOUNTER — APPOINTMENT (OUTPATIENT)
Dept: GENERAL RADIOLOGY | Facility: CLINIC | Age: 52
DRG: 438 | End: 2022-06-27
Attending: THORACIC SURGERY (CARDIOTHORACIC VASCULAR SURGERY)
Payer: MEDICARE

## 2022-06-27 LAB
ALBUMIN SERPL-MCNC: 1.8 G/DL (ref 3.4–5)
ALP SERPL-CCNC: 144 U/L (ref 40–150)
ALT SERPL W P-5'-P-CCNC: 42 U/L (ref 0–70)
ANION GAP SERPL CALCULATED.3IONS-SCNC: 3 MMOL/L (ref 3–14)
AST SERPL W P-5'-P-CCNC: 67 U/L (ref 0–45)
BILIRUB SERPL-MCNC: 1.7 MG/DL (ref 0.2–1.3)
BUN SERPL-MCNC: 35 MG/DL (ref 7–30)
CALCIUM SERPL-MCNC: 9.1 MG/DL (ref 8.5–10.1)
CHLORIDE BLD-SCNC: 113 MMOL/L (ref 94–109)
CO2 SERPL-SCNC: 33 MMOL/L (ref 20–32)
CREAT SERPL-MCNC: 0.82 MG/DL (ref 0.66–1.25)
CRP SERPL-MCNC: 76.3 MG/L (ref 0–8)
ERYTHROCYTE [DISTWIDTH] IN BLOOD BY AUTOMATED COUNT: 16.3 % (ref 10–15)
GFR SERPL CREATININE-BSD FRML MDRD: >90 ML/MIN/1.73M2
GLUCOSE BLD-MCNC: 176 MG/DL (ref 70–99)
GLUCOSE BLDC GLUCOMTR-MCNC: 113 MG/DL (ref 70–99)
GLUCOSE BLDC GLUCOMTR-MCNC: 141 MG/DL (ref 70–99)
GLUCOSE BLDC GLUCOMTR-MCNC: 144 MG/DL (ref 70–99)
GLUCOSE BLDC GLUCOMTR-MCNC: 166 MG/DL (ref 70–99)
HCT VFR BLD AUTO: 39.3 % (ref 40–53)
HGB BLD-MCNC: 12.3 G/DL (ref 13.3–17.7)
LIPASE SERPL-CCNC: 152 U/L (ref 73–393)
MAGNESIUM SERPL-MCNC: 2.7 MG/DL (ref 1.6–2.3)
MCH RBC QN AUTO: 30.4 PG (ref 26.5–33)
MCHC RBC AUTO-ENTMCNC: 31.3 G/DL (ref 31.5–36.5)
MCV RBC AUTO: 97 FL (ref 78–100)
PHOSPHATE SERPL-MCNC: 2.9 MG/DL (ref 2.5–4.5)
PLATELET # BLD AUTO: 159 10E3/UL (ref 150–450)
POTASSIUM BLD-SCNC: 3.7 MMOL/L (ref 3.4–5.3)
PROT SERPL-MCNC: 5.4 G/DL (ref 6.8–8.8)
RBC # BLD AUTO: 4.05 10E6/UL (ref 4.4–5.9)
SODIUM SERPL-SCNC: 146 MMOL/L (ref 133–144)
SODIUM SERPL-SCNC: 148 MMOL/L (ref 133–144)
SODIUM SERPL-SCNC: 149 MMOL/L (ref 133–144)
VALPROATE FREE MFR SERPL: 32 %
VALPROATE FREE SERPL-MCNC: 16 UG/ML
VALPROATE SERPL-MCNC: 50 UG/ML
WBC # BLD AUTO: 5.5 10E3/UL (ref 4–11)

## 2022-06-27 PROCEDURE — 85048 AUTOMATED LEUKOCYTE COUNT: CPT | Performed by: INTERNAL MEDICINE

## 2022-06-27 PROCEDURE — 250N000013 HC RX MED GY IP 250 OP 250 PS 637: Performed by: NURSE PRACTITIONER

## 2022-06-27 PROCEDURE — 84295 ASSAY OF SERUM SODIUM: CPT | Performed by: INTERNAL MEDICINE

## 2022-06-27 PROCEDURE — 86140 C-REACTIVE PROTEIN: CPT | Performed by: INTERNAL MEDICINE

## 2022-06-27 PROCEDURE — P9047 ALBUMIN (HUMAN), 25%, 50ML: HCPCS | Performed by: PHYSICIAN ASSISTANT

## 2022-06-27 PROCEDURE — 250N000013 HC RX MED GY IP 250 OP 250 PS 637: Performed by: INTERNAL MEDICINE

## 2022-06-27 PROCEDURE — 84295 ASSAY OF SERUM SODIUM: CPT | Performed by: HOSPITALIST

## 2022-06-27 PROCEDURE — 36415 COLL VENOUS BLD VENIPUNCTURE: CPT | Performed by: INTERNAL MEDICINE

## 2022-06-27 PROCEDURE — 83735 ASSAY OF MAGNESIUM: CPT | Performed by: INTERNAL MEDICINE

## 2022-06-27 PROCEDURE — 258N000003 HC RX IP 258 OP 636: Performed by: HOSPITALIST

## 2022-06-27 PROCEDURE — 85014 HEMATOCRIT: CPT | Performed by: INTERNAL MEDICINE

## 2022-06-27 PROCEDURE — 250N000011 HC RX IP 250 OP 636: Performed by: PHYSICIAN ASSISTANT

## 2022-06-27 PROCEDURE — 99232 SBSQ HOSP IP/OBS MODERATE 35: CPT | Performed by: INTERNAL MEDICINE

## 2022-06-27 PROCEDURE — 83690 ASSAY OF LIPASE: CPT | Performed by: INTERNAL MEDICINE

## 2022-06-27 PROCEDURE — 250N000013 HC RX MED GY IP 250 OP 250 PS 637

## 2022-06-27 PROCEDURE — 71045 X-RAY EXAM CHEST 1 VIEW: CPT

## 2022-06-27 PROCEDURE — 86301 IMMUNOASSAY TUMOR CA 19-9: CPT | Performed by: INTERNAL MEDICINE

## 2022-06-27 PROCEDURE — 80053 COMPREHEN METABOLIC PANEL: CPT | Performed by: HOSPITALIST

## 2022-06-27 PROCEDURE — 36415 COLL VENOUS BLD VENIPUNCTURE: CPT | Performed by: HOSPITALIST

## 2022-06-27 PROCEDURE — G1010 CDSM STANSON: HCPCS

## 2022-06-27 PROCEDURE — 84100 ASSAY OF PHOSPHORUS: CPT | Performed by: INTERNAL MEDICINE

## 2022-06-27 PROCEDURE — 250N000013 HC RX MED GY IP 250 OP 250 PS 637: Performed by: HOSPITALIST

## 2022-06-27 PROCEDURE — 120N000013 HC R&B IMCU

## 2022-06-27 RX ORDER — POLYETHYLENE GLYCOL 3350 17 G/17G
17 POWDER, FOR SOLUTION ORAL DAILY PRN
Status: DISCONTINUED | OUTPATIENT
Start: 2022-06-27 | End: 2022-07-10 | Stop reason: HOSPADM

## 2022-06-27 RX ORDER — AMOXICILLIN 250 MG
1 CAPSULE ORAL 2 TIMES DAILY PRN
Status: DISCONTINUED | OUTPATIENT
Start: 2022-06-27 | End: 2022-07-10 | Stop reason: HOSPADM

## 2022-06-27 RX ORDER — ALBUMIN (HUMAN) 12.5 G/50ML
25 SOLUTION INTRAVENOUS EVERY 8 HOURS
Status: DISCONTINUED | OUTPATIENT
Start: 2022-06-27 | End: 2022-06-29

## 2022-06-27 RX ADMIN — Medication 1 PACKET: at 15:28

## 2022-06-27 RX ADMIN — TOPIRAMATE 100 MG: 100 TABLET, FILM COATED ORAL at 21:02

## 2022-06-27 RX ADMIN — LEVOCARNITINE 330 MG: 330 TABLET ORAL at 21:02

## 2022-06-27 RX ADMIN — IPRATROPIUM BROMIDE 2 SPRAY: 42 SPRAY NASAL at 15:27

## 2022-06-27 RX ADMIN — ZINC SULFATE 220 MG (50 MG) CAPSULE 220 MG: CAPSULE at 08:08

## 2022-06-27 RX ADMIN — IPRATROPIUM BROMIDE 2 SPRAY: 42 SPRAY NASAL at 08:22

## 2022-06-27 RX ADMIN — PREDNISOLONE ACETATE 1 DROP: 10 SUSPENSION/ DROPS OPHTHALMIC at 08:22

## 2022-06-27 RX ADMIN — LEVOCARNITINE 330 MG: 330 TABLET ORAL at 08:09

## 2022-06-27 RX ADMIN — RISPERIDONE 2 MG: 2 TABLET ORAL at 08:08

## 2022-06-27 RX ADMIN — CARBOXYMETHYLCELLULOSE SODIUM 1 DROP: 10 GEL OPHTHALMIC at 21:03

## 2022-06-27 RX ADMIN — Medication 40 MG: at 08:08

## 2022-06-27 RX ADMIN — ALBUMIN HUMAN 25 G: 0.25 SOLUTION INTRAVENOUS at 18:01

## 2022-06-27 RX ADMIN — Medication 1 PACKET: at 23:21

## 2022-06-27 RX ADMIN — LEVOCETIRIZINE DIHYDROCHLORIDE 5 MG: 5 TABLET ORAL at 21:02

## 2022-06-27 RX ADMIN — RISPERIDONE 2 MG: 2 TABLET ORAL at 21:02

## 2022-06-27 RX ADMIN — SERTRALINE HYDROCHLORIDE 100 MG: 100 TABLET ORAL at 08:08

## 2022-06-27 RX ADMIN — Medication 40 MG: at 21:03

## 2022-06-27 RX ADMIN — LEVOCARNITINE 330 MG: 330 TABLET ORAL at 14:05

## 2022-06-27 RX ADMIN — LEVOTHYROXINE SODIUM 50 MCG: 50 TABLET ORAL at 08:08

## 2022-06-27 RX ADMIN — DEXTROSE MONOHYDRATE: 50 INJECTION, SOLUTION INTRAVENOUS at 01:58

## 2022-06-27 RX ADMIN — ACETAMINOPHEN 650 MG: 325 SUSPENSION ORAL at 15:28

## 2022-06-27 RX ADMIN — ALBUMIN HUMAN 25 G: 0.25 SOLUTION INTRAVENOUS at 10:44

## 2022-06-27 RX ADMIN — ATENOLOL 25 MG: 25 TABLET ORAL at 08:08

## 2022-06-27 RX ADMIN — MONTELUKAST 10 MG: 10 TABLET, FILM COATED ORAL at 21:03

## 2022-06-27 RX ADMIN — Medication 1 PACKET: at 08:24

## 2022-06-27 ASSESSMENT — ACTIVITIES OF DAILY LIVING (ADL)
ADLS_ACUITY_SCORE: 53
ADLS_ACUITY_SCORE: 49
ADLS_ACUITY_SCORE: 49
ADLS_ACUITY_SCORE: 53
ADLS_ACUITY_SCORE: 49
ADLS_ACUITY_SCORE: 49

## 2022-06-27 NOTE — PROGRESS NOTES
[facility]    Hospitalist Progress Note    Assessment & Plan   Jagdeep Walker is a 51 year-old male with complex history including seizure disorder, schizoaffective disorder, recent pancreatitis with pseudocysts, history of portal vein thrombosis, recent admission for metabolic encephalopathy of unclear etiology who presents with shortness of breath and hypoxia with large pleural effusion on outside chest x-ray.  Admitted on 6/14/2022.      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  Recent echocardiogram 6/4 normal.  Pleural fluid studies consistent with transudate, specimen clotted for cell count, normal glucose, no organisms on gram stain.  Cytology negative x 2  Given lipase and amylase increase, perhaps pleural effusion related to pancreatitis with fistula or leak versus malnutrition related to severe pancreatitis  Chest tube placed on 6/18 and replaced on 6/22 as it appeared clogged  RRT for 6/20 chest pain, resolved with pain medications  6/22: Respiratory status worsened, likely due to worsening pleural effusions from his clogged chest tube.  Chest tube exchanged and has had significant output since with suction   * Culture from the pleural with diptheroids in broth only, c/w contaminant  - Titrate O2 as able.  Currently on Ventimask at 3 L.  We will continue to titrate down.  - ID consulted for the pleural culture  results.  Felt contaminant and no need for antibiotics   -Patient is followed by GI, pulmonary, neurology and ID  - CT surgery and IR following and appreciate their recommendations.    Chest tube management as per CT surgery.    Current recommendation is to continue CT with suction.  Pleurodesis not appropriate in this situation.  -Patient continues to have significant output through the chest tube despite 4 days of octreotide.  After discussion with GI it was decided to discontinue octreotide.  Although it has been felt that this was all pancreatico pleural fistula there is no imaging evidence of this.  We will repeat MRCP to evaluate again.  Question if this could also be hepatic hydrothorax since patient had ascites in the past but now that has improved but he keeps having transudative pleural effusion.  CT chest abdomen pelvis pelvis done on 6/14/2022 did show cavernous transformation of portal vein with gastric varices suggestive of portal hypertension and he had ascites at that time.  Fluid amylase also is only marginally elevated whereas in pancreatico pleural fistula amylase levels are usually over 100,000.     Patient has pain scheduled on 25 percentage albumin every 8 hours, albumin level is 1.8 today, it was 2.2 on presentation.  Hypernatremia  --Slowly improving, add free water to G-tube feeding.  Acute/subacute pancreatitis with pseudocyst   *Hospitalized 5/8-5/17/22 for pancreatitis with pseudocyst formation. Unclear etiology, EUS unrevealing for etiology, possibly due to medications  *CT on admission with acute pancreatitis with slightly improved inflammatory changes, pseudocysts not significantly changed  *Sister notes intake has remained generally poor and has complained of pain with eating, not significantly tender on exam at present.  - NJ in place.  Continue tube feeds   - Continue NPO.  Per GI will need to be NPO until repeat ERCP in 4 weeks.    Patient is currently on room air, will request IR  for PEJ tube placement  - GI following and appreciate their recommendations.  Stop octreotide.  Stop Creon supplementation since he is getting postpyloric feeds for which we do not need pancreatic enzymes.  Patient has been getting sodium bicarb along with his Creon which could be worsening his hyponatremia.  He also has metabolic alkalosis.     Recent metabolic encephalopathy   Mild zinc deficiency   *Extensively worked up during admission 6/4-6/10/22 including head CT, MRI brain, EEG, paraneoplastic ab, extensive laboratory work-up mostly unremarkable with exception of mildly low zinc.  *Per sister, patient does have some cognitive impairment at baseline, though is typically oriented to self, family and can carry on an extensive conversation especially regarding topics he enjoys (eg sports). He remains below his baseline.   - Continue zinc supplementation     Hx of portal vein thrombosis  * Diagnosed during admission 5/2022, improved on subsequent CT and not treated with anticoagulation   * Non-occlusive main portal vein thrombosis, splenic vein thrombosis seen on admission CT  - GI consulted as above, they advised no need to treat PVT previous admission (6/9/2022)  -This could be causing significant portal hypertension and worsening ascites which in turn could be causing worsening pleural effusion.     Depression  Anxiety  Schizoaffective disorder, bipolar type  - Continue prior to admission sertraline, risperidone     Seizure disorder  -PTA on topiramate and Depakote, currently Depakote is on hold, levels are pending, continue topiramate.  Neurology has been consulted for patient's encephalopathy, current plan is to obtain MRI of the brain, EEG obtained.  Topiramate levels pending.     Carnitine deficiency  - Continue prior to admission levocarnitine      Hypertension  - Continue prior to admission atenolol      Fisher's esophagus  - Continue prior to admission PPI     Hypothyroidism  - Continue prior to  admission levothyroxine     Seasonal allergies  - Continue prior to admission levocetirizine    DVT Prophylaxis: SCDs, will order Lovenox after j-tube placement  Code Status: Full Code     Disposition: Expected discharge in few days    Patsy Gannon MD  Text Page   (7am to 6pm)    Interval History   Patient is very sleepy, barely responds to questions, he does open his eyes.  I did contact patient's sister (guardian) and updated him.  Discussed about PEJ tube placement, sister is on board with the idea, orders placed.    -Data reviewed today: I reviewed all new labs and imaging results over the last 24 hours.  Physical Exam   Temp: 98.2  F (36.8  C) Temp src: Oral BP: 102/73 Pulse: 58   Resp: 16 SpO2: 93 % O2 Device: None (Room air) Oxygen Delivery: 2 LPM  Vitals:    06/25/22 0100 06/26/22 0531 06/27/22 0518   Weight: 84.1 kg (185 lb 6.5 oz) 83.7 kg (184 lb 9.6 oz) 82.4 kg (181 lb 11.2 oz)     Vital Signs with Ranges  Temp:  [97.9  F (36.6  C)-98.8  F (37.1  C)] 98.2  F (36.8  C)  Pulse:  [57-73] 58  Resp:  [9-24] 16  BP: ()/(46-73) 102/73  SpO2:  [92 %-99 %] 93 %  I/O last 3 completed shifts:  In: 3715.25 [I.V.:2294.25; NG/GT:1421]  Out: 3730 [Urine:1810; Chest Tube:1920]    Constitutional: Sleepy, NG tube noted  Respiratory: Breath sounds reduced at the bases, left-sided crackles present, chest tube present  Cardiovascular: Regular rate and rhythm, normal S1 and S2, and no murmur noted  GI: Normal bowel sounds, soft, non-distended, non-tender  Skin/Integumen: No rashes, no cyanosis, no edema  Neuro : moving all 4 extremities, no focal deficit noted     Medications     dextrose       D5W 100 mL/hr at 06/27/22 0800       albumin human  25 g Intravenous Q8H     [Held by provider] amylase-lipase-protease  1-2 capsule Per Feeding Tube Q4H     atenolol  25 mg Oral or NG Tube Daily     carboxymethylcellulose PF  1 drop Right Eye At Bedtime     ipratropium  2 spray Both Nostrils TID     levOCARNitine  330 mg Oral  or NG Tube TID     levocetirizine  5 mg Oral or NG Tube QPM     levothyroxine  50 mcg Oral or NG Tube Daily     montelukast  10 mg Oral or NG Tube At Bedtime     pantoprazole  40 mg Oral or Feeding Tube BID     prednisoLONE acetate  1 drop Right Eye Daily     protein modular  1 packet Per Feeding Tube TID     risperiDONE  2 mg Oral or NG Tube BID     sertraline  100 mg Oral or NG Tube Daily     [Held by provider] sodium bicarbonate  325 mg Per Feeding Tube Q4H     sodium chloride (PF)  3 mL Intracatheter Q8H     topiramate  100 mg Oral or NG Tube At Bedtime     [Held by provider] valproic acid  250 mg Oral or NG Tube At Bedtime     [Held by provider] valproic acid  500 mg Oral or NG Tube BID     zinc sulfate  220 mg Oral or NG Tube Daily       Data   Recent Labs   Lab 06/27/22  1221 06/27/22  1114 06/27/22  0842 06/27/22  0648 06/27/22  0249 06/26/22  0131 06/25/22  2234 06/25/22  0757 06/25/22  0540 06/22/22  1717 06/22/22  0738 06/21/22  1923 06/21/22  0704   WBC  --   --   --  5.5  --   --  6.4  --   --   --   --   --  5.4   HGB  --   --   --  12.3*  --   --  13.0*  --   --   --   --   --  12.6*   MCV  --   --   --  97  --   --  97  --   --   --   --   --  98   PLT  --   --   --  159  --   --  158  --  157  --  141*  --  138*   *  --   --  149*  149* 149*   < > 154*  --   --   --   --   --  144   POTASSIUM  --   --   --  3.7  --   --  3.7  --   --   --  3.8  --  3.9   CHLORIDE  --   --   --  113*  --   --  117*  --   --   --   --   --  116*   CO2  --   --   --  33*  --   --  33*  --   --   --   --   --  26   BUN  --   --   --  35*  --   --  33*  --   --   --   --   --  17   CR  --   --   --  0.82  --   --  0.86  --  0.88  --  0.76  --  0.67   ANIONGAP  --   --   --  3  --   --  4  --   --   --   --   --  2*   NASIR  --   --   --  9.1  --   --  9.1  --   --   --   --   --  8.8   GLC  --  166* 144* 176*  --   --  163*   < >  --    < >  --    < > 72   ALBUMIN  --   --   --  1.8*  --   --   --   --   --   --    --   --  1.7*   PROTTOTAL  --   --   --  5.4*  --   --   --   --   --   --   --   --  4.6*   BILITOTAL  --   --   --  1.7*  --   --   --   --   --   --   --   --  0.5   ALKPHOS  --   --   --  144  --   --   --   --   --   --   --   --  51   ALT  --   --   --  42  --   --   --   --   --   --   --   --  12   AST  --   --   --  67*  --   --   --   --   --   --   --   --  23   LIPASE  --   --   --  152  --   --   --   --   --   --   --   --   --     < > = values in this interval not displayed.     Recent Labs   Lab 06/27/22  1114 06/27/22  0842 06/27/22  0648 06/25/22  2234 06/25/22  1157   * 144* 176* 163* 140*       Imaging:   Recent Results (from the past 24 hour(s))   MR Abdomen MRCP w/o & w Contrast    Narrative    EXAM: MR ABDOMEN MRCP W/O AND W CONTRAST  LOCATION: [facility]  DATE/TIME: 6/26/2022 9:54 PM    INDICATION: Pancreatic pleural fistula.  COMPARISON: None.  TECHNIQUE: Routine MR liver/pancreas protocol including axial and coronal MRCP sequences. 2D and 3D reconstruction performed by MR technologist including MIP reconstruction and slab cholangiograms. If performed with contrast, additional dynamic T1 post   IV contrast images.  CONTRAST: 8 mL Gadavist.    FINDINGS:     MRCP: There is loss of detail given patient motion. No obvious filling defects seen within the gallbladder or bile ducts. The common bile duct is normal in caliber measuring approximately 6 mm in greatest radial dimension. There is no dilatation of the   pancreatic duct at the mid body through tail regions.    LIVER: Normal.    PANCREAS: In the head of the pancreas there is a heterogenous cystic mass measuring 4.5 cm x 4.1 cm x 4.0 cm with somewhat thick peripheral wall enhancement and this appears to communicate via a fistulous tract anteriorly extending into the duodenum.   This is nonspecific, but could represent sequelae from prior pancreatitis (pseudocyst) or possibly changes of neoplasm. In the tail  of the pancreas there is a 3.6 cm x 4.2 cm x 3.1 cm thin peripheral enhancing cystic mass. This is nonspecific, and could   represent changes of neoplasm or sequelae from pancreatitis such as pseudocyst formation. The pancreatic duct appears to extend posterior to the pancreatic tail mass with no obvious communication. There is a suggestion of communication of the pancreatic   duct to the pancreatic head cystic mass although detail is somewhat limited especially on the MRCP images given motion. I do not appreciate any obvious fistulous tracking seen from the pancreatic tail cystic mass.    ADDITIONAL FINDINGS: There are numerous cysts seen in both kidneys to include findings of a proteinaceous cyst in the left kidney. There is a mild left pleural effusion with mild bibasilar atelectasis.      Impression    IMPRESSION:  1.  Two peripheral enhancing cystic masses in the pancreas, the one in the tail appears to have a fistulous tract extending into the adjacent duodenum. The one in the tail shows no obvious fistulous tracking, however detail is limited given motion on the   MRCP imaging.    2.  The bile ducts are normal in caliber.    3.  The visualized pancreatic duct is normal in caliber.    4.  I believe the pancreatic duct does extend into the pancreatic head cystic mass, but not the pancreatic tail cystic mass.    5.  Mild bibasilar atelectasis with a mild left pleural effusion.   XR Chest Port 1 View    Narrative    EXAM: XR CHEST PORT 1 VIEW  LOCATION: [facility]  DATE/TIME: 6/27/2022 6:00 AM    INDICATION: Chest tube placement.  COMPARISON: 06/26/2022.      Impression    IMPRESSION: There is new partial loss of the left hemidiaphragm medially consistent with infiltrate/atelectasis left lower lobe. Otherwise the chest is stable with again seen pigtail catheter projected over the inferior aspect of the right hemithorax   laterally with no pneumothorax. Enteric tube is seen with tip below  the left hemidiaphragm.

## 2022-06-27 NOTE — PROGRESS NOTES
Thoracic Surgery:    BP 97/46 (BP Location: Left arm, Patient Position: Semi-Castillo's)   Pulse 61   Temp 98.2  F (36.8  C) (Oral)   Resp 13   Wt 82.4 kg (181 lb 11.2 oz)   SpO2 93%   BMI 30.24 kg/m    On room air    CT output: 1920 ml over 24 hours    CXR right pigtail catheter in good position at right base, no significant right effusion, no PTX, left lung with atelectasis/infiltrate LLL    Continue CT to suction  No plans for Thoracic Surgical intervention. Needs medical mgmt of ascites, as large recurrent right pleural effusion thought secondary to pancreas or liver process.     Will follow peripherally    Jennifer Sanders PA-C with Dr. Cesar Logan  [facility]  Cell [phone]

## 2022-06-27 NOTE — PLAN OF CARE
1900h-0730h: Lethargy improving. More alert tonight. Disoriented to time. Speech still slurred but more words are clear compared yesterday. MRCP of abdomen done tonight, pending result. Shallow breaths, dips to 88% while sleeping hooked back to 2lpm via oxymask. Held some meds & enteral feeding before procedure. Lung sound clear in L lobe, RUL, diminished in RML & RLL.  x1 CT to atrium to -20 cmH2O suction, serous output.     NJ in R naris. On continuous Jevity 1.5 50 ml/hr & water flushes 60 ml/hr. Tolerates feeding. AM nurse sent message to nutritionist to evaluate volume intake, still waiting for reply. Santana in place draining yellow urine. Santana care done, slight redness on the site; transferred stat-lock to R thigh to prevent pulling. Last BM 06/26. Normoactive BS x4.      Hypernatremia monitoring every 6h. D5W infusing at 100 ml/hr on R arm.     Atrium full, replaced.( 6am -6am )Total 24h CT output: 2070 ml

## 2022-06-27 NOTE — PROGRESS NOTES
[facility] [place]  Gastroenterology Progress Note     Jagdeep Walker MRN# [id number]   YOB: 1970 Age: 51 year old          Assessment and Plan:       Pleural effusion    Pancreatic pseudocyst    Idiopathic acute pancreatitis, unspecified complication status  Patient continues to have slow progress patient still has significant pulmonary fluid output patient also have abdominal ascites patient was evaluated with ERCP patient was found to have ductal leak communicating with a pseudocyst which was treated with sphincterotomy and drain patient's lipase in the pleural fluid was only 500.  It is unclear the underlying cause of patient's pancreatitis.  I will recommend to continue on current treatment plan with strict n.p.o. nasojejunal tube feeding will discontinue Creon.  Repeat labs again tomorrow including CRP and MRCP to delineate if there is any direct communication.  Other possible differential can include hepatopulmonary syndrome given patient's CT findings worrisome for underlying cirrhosis.  Patient has been significantly sick and respiratory compromised due to severe pleural effusion.  Patient is currently being followed by pulmonology and thoracic surgery.  Patient's findings were discussed in detail with the hospitalist team.  GI will continue to follow along closely.  I will recommend to repeat labs tomorrow including CA 19-9.            Pleural effusion  Idiopathic acute pancreatitis, unspecified complication status  Pancreatic pseudocyst      Interval History:     doing well; no cp, sob, n/v/d, or abd pain.              Review of Systems:     C: NEGATIVE for fever, chills, change in weight  E/M: NEGATIVE for ear, mouth and throat problems  R: NEGATIVE for significant cough or SOB  CV: NEGATIVE for chest pain, palpitations or peripheral edema             Medications:   I have reviewed this patient's current medications    [Held by provider] amylase-lipase-protease  1-2  capsule Per Feeding Tube Q4H     atenolol  25 mg Oral or NG Tube Daily     carboxymethylcellulose PF  1 drop Right Eye At Bedtime     ipratropium  2 spray Both Nostrils TID     levOCARNitine  330 mg Oral or NG Tube TID     levocetirizine  5 mg Oral or NG Tube QPM     levothyroxine  50 mcg Oral or NG Tube Daily     montelukast  10 mg Oral or NG Tube At Bedtime     pantoprazole  40 mg Oral or Feeding Tube BID     prednisoLONE acetate  1 drop Right Eye Daily     protein modular  1 packet Per Feeding Tube TID     risperiDONE  2 mg Oral or NG Tube BID     sertraline  100 mg Oral or NG Tube Daily     [Held by provider] sodium bicarbonate  325 mg Per Feeding Tube Q4H     sodium chloride (PF)  3 mL Intracatheter Q8H     topiramate  100 mg Oral or NG Tube At Bedtime     [Held by provider] valproic acid  250 mg Oral or NG Tube At Bedtime     [Held by provider] valproic acid  500 mg Oral or NG Tube BID     zinc sulfate  220 mg Oral or NG Tube Daily                  Physical Exam:   Vitals were reviewed  Vital Signs with Ranges  Temp:  [98.3  F (36.8  C)-100  F (37.8  C)] 98.5  F (36.9  C)  Pulse:  [64-89] 64  Resp:  [9-15] 14  BP: (112-147)/(59-88) 147/59  SpO2:  [89 %-99 %] 92 %  I/O last 3 completed shifts:  In: 2430 [I.V.:1003; NG/GT:1427]  Out: 3115 [Urine:1530; Chest Tube:1585]  Cardiovascular: negative, PMI normal. No lifts, heaves, or thrills. RRR. No murmurs, clicks gallops or rub  Respiratory: positive chest tube  Abdomen: Abdomen soft, non-tender. BS normal. No masses, organomegaly  SKIN: no suspicious lesions or rashes             Data:   I reviewed the patient's new clinical lab test results.   Recent Labs   Lab Test 06/25/22  2234 06/25/22  0540 06/22/22  0738 06/21/22  0704 06/20/22  0715 06/19/22  0622 06/18/22  2041 06/16/22  0802 06/14/22  1903 06/06/22  0659 06/05/22  0728   WBC 6.4  --   --  5.4 4.1   < > 4.6   < > 5.7   < > 3.8*   HGB 13.0*  --   --  12.6* 13.2*   < > 12.6*   < > 12.6*   < > 12.0*   MCV 97   --   --  98 99   < > 97   < > 96   < > 95    157 141* 138* 134*   < > 152   < > 185   < > 92*   INR  --   --   --   --   --   --  1.74*  --  1.64*  --  1.73*    < > = values in this interval not displayed.     Recent Labs   Lab Test 06/25/22  2234 06/22/22  0738 06/21/22  0704 06/20/22  0715   POTASSIUM 3.7 3.8 3.9 3.8   CHLORIDE 117*  --  116* 112*   CO2 33*  --  26 29   BUN 33*  --  17 16   ANIONGAP 4  --  2* 2*     Recent Labs   Lab Test 06/21/22  0704 06/20/22  0715 06/18/22  2041 06/15/22  1011 06/15/22  0034 06/14/22  1920 06/05/22  0728 06/04/22  1653 05/08/22 2032 05/08/22  0934   ALBUMIN 1.7* 1.9* 2.2*  --   --  2.4*   < >  --    < >  --    BILITOTAL 0.5 0.5 0.6  --   --  0.6   < >  --    < >  --    ALT 12 15 24  --   --  18   < >  --    < >  --    AST 23 20 23  --   --  21   < >  --    < >  --    PROTEIN  --   --   --   --  Negative  --   --  Negative  --  Negative   LIPASE  --  1,236* 579*  --   --  689*  --   --    < >  --    AMYLASE  --   --   --  142*  --   --   --   --   --   --     < > = values in this interval not displayed.       I reviewed the patient's new imaging results.    All laboratory data reviewed  All imaging studies reviewed by me.    Massimo Clark MD,  6/26/2022  [facility]  Office : [phone]  Cell: [phone]      [facility]  Ph: [phone] Fax: [phone]

## 2022-06-27 NOTE — PROGRESS NOTES
[facility]  Gastroenterology Progress Note     Jagdeep Walker MRN# [id number]   YOB: 1970 Age: 51 year old          Assessment and Plan:   Jagdeep Walker s a 51 year old male admitted with right sided pleural effusion and GI consulted to manage pancreatitis.     Active Problems:  Pleural effusion  Pancreatic pseudocyst  Idiopathic acute pancreatitis, unspecified complication status  Pancreaticopleural fistula  *Lipase 1,730->689, AST, ALT and Alk Phos normal. Lactic acid 1.9. WBC wnl, Hgb 12.6,    *6/14 CT A/P note improved inflammatory changes in pancreas. Pseudocysts not significantly changed. Gastric wall thickening has progressed consistent with secondary gastritis with new pseudocyst along the proximal posterior gastric wall. Ascites decreased. Wall thickening involving the cecum and transverse colon likely secondarily involved. Large right pleural effusion has significantly increased.  *Cause of pancreatitis is idiopathic- no evidence of gallstones or alcohol use. May be due to medication. 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 Contrast was injected and flowed through pancreatic ductal system and extravasated from the pancreatic duct in the body of the pancreas with pancreatic duct leak and communicating pseudocyst. Pancreatic sphincterotomy performed. One stent placed into ventral pancreatic duct.  *These findings confirm pancreaticopleural fistula  *NJ tube in place  * Chest tube output still significant around 2700 ml daily  * Respiratory status improving now on 2 LPM  *6/26/22 MRCP significant for 2 peripheral enhancing cystic masses- one in tail  2.6 x 4.2 x 3.1 cm and head halley 2.5 cm x 4.1 cm x 4.0 cm. The head lesion has a fistulous tract extending in the adjacent duodenum- one in tail has no obvious fistulous tract. No mention of stent placement or pancreaticopleural  fistula.  MRCP measurement of cystic lesions in pancreas not listed on MRCP from 6/6/22, however 5/9/22 MRCP noted 2.5 x 2.3 x 1.3 cm tail lesion and 3.1 x 2.8 x 2.8 cm head lesion.   * CRP ordered and elevated at 73, Lipase 152 and normal  * Albumin 1.8- recommend above 3.0 to improve ascites/effusion  * CA 19-9 ordered and pending- given concern for possible neoplastic cause    -- Octreotide stopped yesterday as was not significantly effective   -- Consider repeat ERCP around 7/21, will follow with imaging and may consider EUS with necrosectomy if minimal improvement  -- Recommend PEJ tube placement now that respiratory status improved  -- Daily labs  -- Start albumin 25% every 8 hours until albumin is 3.0 or higher  --Called radiology for additional comments- cysts have enlarged since 6/6/22 and appear necrotic in nature. Not certain of ventral pancreatic duct cyst stent in place. Abdomin xray would confirm  -- Expect multiple weeks until able to tolerate oral intake- need to see evidence of fistula closure              Interval History:   no new complaints, denies chest pain, pain is controlled and has ongoing abdominal pain. Had RRT called after significant change in respirations and abdominal distention               Review of Systems:   C: NEGATIVE for fever, chills, change in weight  E/M: NEGATIVE for ear, mouth and throat problems  R: NEGATIVE for significant cough or SOB  CV: NEGATIVE for chest pain, palpitations or peripheral edema             Medications:   I have reviewed this patient's current medications    albumin human  25 g Intravenous Q8H     [Held by provider] amylase-lipase-protease  1-2 capsule Per Feeding Tube Q4H     atenolol  25 mg Oral or NG Tube Daily     carboxymethylcellulose PF  1 drop Right Eye At Bedtime     ipratropium  2 spray Both Nostrils TID     levOCARNitine  330 mg Oral or NG Tube TID     levocetirizine  5 mg Oral or NG Tube QPM     levothyroxine  50 mcg Oral or NG Tube Daily      montelukast  10 mg Oral or NG Tube At Bedtime     pantoprazole  40 mg Oral or Feeding Tube BID     prednisoLONE acetate  1 drop Right Eye Daily     protein modular  1 packet Per Feeding Tube TID     risperiDONE  2 mg Oral or NG Tube BID     sertraline  100 mg Oral or NG Tube Daily     [Held by provider] sodium bicarbonate  325 mg Per Feeding Tube Q4H     sodium chloride (PF)  3 mL Intracatheter Q8H     topiramate  100 mg Oral or NG Tube At Bedtime     [Held by provider] valproic acid  250 mg Oral or NG Tube At Bedtime     [Held by provider] valproic acid  500 mg Oral or NG Tube BID     zinc sulfate  220 mg Oral or NG Tube Daily                  Physical Exam:   Vitals were reviewed  Vital Signs with Ranges  Temp:  [97.9  F (36.6  C)-98.8  F (37.1  C)] 98.8  F (37.1  C)  Pulse:  [61-81] 61  Resp:  [9-24] 13  BP: ()/(46-76) 97/46  SpO2:  [89 %-99 %] 93 %  I/O last 3 completed shifts:  In: 3715.25 [I.V.:2294.25; NG/GT:1421]  Out: 3730 [Urine:1810; Chest Tube:1920]  Constitutional: alert, mild distress, cooperative and pale   Cardiovascular: negative, PMI normal. No lifts, heaves, or thrills. RRR. No murmurs, clicks gallops or rub  Respiratory: wheezy, Percussion normal. Good diaphragmatic excursion.   Abdomen: Abdomen soft, non-tender. BS normal. No masses, organomegaly           Data:   I reviewed the patient's new clinical lab test results.   Recent Labs   Lab Test 06/27/22  0648 06/25/22  2234 06/25/22  0540 06/22/22  0738 06/21/22  0704 06/19/22  0622 06/18/22  2041 06/16/22  0802 06/14/22  1903 06/06/22  0659 06/05/22  0728   WBC 5.5 6.4  --   --  5.4   < > 4.6   < > 5.7   < > 3.8*   HGB 12.3* 13.0*  --   --  12.6*   < > 12.6*   < > 12.6*   < > 12.0*   MCV 97 97  --   --  98   < > 97   < > 96   < > 95    158 157   < > 138*   < > 152   < > 185   < > 92*   INR  --   --   --   --   --   --  1.74*  --  1.64*  --  1.73*    < > = values in this interval not displayed.     Recent Labs   Lab Test  06/27/22  0648 06/25/22  2234 06/22/22  0738 06/21/22  0704   POTASSIUM 3.7 3.7 3.8 3.9   CHLORIDE 113* 117*  --  116*   CO2 33* 33*  --  26   BUN 35* 33*  --  17   ANIONGAP 3 4  --  2*     Recent Labs   Lab Test 06/27/22  0648 06/21/22  0704 06/20/22  0715 06/18/22 2041 06/15/22  1011 06/15/22  0034 06/05/22  0728 06/04/22  1653 05/08/22 2032 05/08/22  0934   ALBUMIN 1.8* 1.7* 1.9* 2.2*  --   --    < >  --    < >  --    BILITOTAL 1.7* 0.5 0.5 0.6  --   --    < >  --    < >  --    ALT 42 12 15 24  --   --    < >  --    < >  --    AST 67* 23 20 23  --   --    < >  --    < >  --    PROTEIN  --   --   --   --   --  Negative  --  Negative  --  Negative   LIPASE 152  --  1,236* 579*  --   --    < >  --    < >  --    AMYLASE  --   --   --   --  142*  --   --   --   --   --     < > = values in this interval not displayed.       I reviewed the patient's new imaging results.    All laboratory data reviewed  All imaging studies reviewed by me.    Isabelle Pantoja PA-C,  6/16/2022  [facility]  Office : [phone]  Cell: [phone] (Dr. Clark)  Cell: [phone] (Isabelle Pantoja PA-C)

## 2022-06-27 NOTE — PLAN OF CARE
Problem: Plan of Care - These are the overarching goals to be used throughout the patient stay.    Goal: Optimal Comfort and Wellbeing  Outcome: Ongoing, Progressing   Goal Outcome Evaluation: Comfort promoted through q2h repo     Pt. Alert and oriented to self and place. Vital signs stable on RA, sister reports CPAP use at [facility]- uses oxymask occasionally while sleeping. Assist of 2 to repo q2h. Tolerating TF at 50cc/hr (goal). NJ landmarked at 93cm. Lung sounds dim on R, clear on L. Bowel sounds active, + flatus. BM today, adequate urine output via jimenez. CT CDI, no airleak. Output significantly less today, at 750 ml over 12 hours. Pain managed with Tylenol x1. Denies nausea. Tele SR with shortened NV interval. Last NA level 146. Pt to be NPO at midnight for PEJ tube placement

## 2022-06-27 NOTE — PROGRESS NOTES
Pt has MR Abdomen MRCP, moved levocarinitine, levocetirizine & pantoprazole to 2200h as per advise of the MRI tech. Only risperdal given prior procedure.

## 2022-06-27 NOTE — PROGRESS NOTES
SPIRITUAL HEALTH SERVICES  SPIRITUAL ASSESSMENT [place] Note  [facility] [place] SOURCE: Length of Stay    Introduced myself and spiritual health services to Jagdeep, who declined a visit at this time due to wishing to sleep. He shared that he would be open to a visit at another time.    PLAN: Will follow up in 1-2 days.    Desiree Lizarraga  Associate   Pager [phone]  Phone [phone]

## 2022-06-27 NOTE — PROGRESS NOTES
Notified provider about indwelling jimenez catheter discussed removal or continued need.    Did provider choose to remove indwelling jimenez catheter No    Provider's jimenez indication for keeping indwelling jimenez catheter Retention, I/Os.    Is there an order for indwelling jimenez catheter Yes or no: No, notified MD to place order for indwelling jimenez.    **If indwelling jimenez catheter necessity must be evaluated daily.

## 2022-06-27 NOTE — PLAN OF CARE
Shift Summary [id number]:    Neuro:  Lethargy improving. Calm, Cooperative, Alert to self, place and disoriented to time and situation.  Slurred speech improving.    Pulm:  Weaned to Room Air. Shallow breathing when sleeping and Oxymask utilized prn 3 -5 L.  Left Chest Tube large volume output (> 1000 this shift serous).     GI/:  Denies nausea and pain. Aggressive oral care today  Dry mucosa.  Soft Yellow tan BM today.   NPO. NJ Tube secure at 93 cm with tube feeds [id number].  Off For MR Abdomen possibly this evening. MRI Checklist completed with [facility] Guardian. Santana catheter secure and draining well.   Mobility:  OOB with Lift to Chair. Able to weight bear 30 seconds with 2 assist, GBW. FALL RISK.  SKIN:  No acute concerns.    Hypernatremia monitoring every 6 hours. Lab Collect.  D5W increased to 100 ml/ hr per PIV.     Goal Outcome Evaluation:    Plan of Care Reviewed With: patient, durable power of      Overall Patient Progress: improving    Outcome Evaluation: Discussed plan of care in full with Huong (guardian)  and Dr. Robles.

## 2022-06-28 ENCOUNTER — APPOINTMENT (OUTPATIENT)
Dept: INTERVENTIONAL RADIOLOGY/VASCULAR | Facility: CLINIC | Age: 52
DRG: 438 | End: 2022-06-28
Attending: SURGERY
Payer: MEDICARE

## 2022-06-28 ENCOUNTER — APPOINTMENT (OUTPATIENT)
Dept: GENERAL RADIOLOGY | Facility: CLINIC | Age: 52
DRG: 438 | End: 2022-06-28
Attending: THORACIC SURGERY (CARDIOTHORACIC VASCULAR SURGERY)
Payer: MEDICARE

## 2022-06-28 LAB
ALBUMIN SERPL-MCNC: 2.9 G/DL (ref 3.4–5)
ANION GAP SERPL CALCULATED.3IONS-SCNC: 4 MMOL/L (ref 3–14)
BUN SERPL-MCNC: 33 MG/DL (ref 7–30)
CALCIUM SERPL-MCNC: 9.4 MG/DL (ref 8.5–10.1)
CANCER AG19-9 SERPL IA-ACNC: 26 U/ML
CHLORIDE BLD-SCNC: 115 MMOL/L (ref 94–109)
CO2 SERPL-SCNC: 30 MMOL/L (ref 20–32)
CREAT SERPL-MCNC: 0.82 MG/DL (ref 0.66–1.25)
CRP SERPL-MCNC: 59.5 MG/L (ref 0–8)
GFR SERPL CREATININE-BSD FRML MDRD: >90 ML/MIN/1.73M2
GLUCOSE BLD-MCNC: 105 MG/DL (ref 70–99)
GLUCOSE BLDC GLUCOMTR-MCNC: 110 MG/DL (ref 70–99)
GLUCOSE BLDC GLUCOMTR-MCNC: 127 MG/DL (ref 70–99)
GLUCOSE BLDC GLUCOMTR-MCNC: 134 MG/DL (ref 70–99)
GLUCOSE BLDC GLUCOMTR-MCNC: 137 MG/DL (ref 70–99)
GLUCOSE BLDC GLUCOMTR-MCNC: 96 MG/DL (ref 70–99)
PLATELET # BLD AUTO: 140 10E3/UL (ref 150–450)
POTASSIUM BLD-SCNC: 3.7 MMOL/L (ref 3.4–5.3)
SODIUM SERPL-SCNC: 149 MMOL/L (ref 133–144)
SODIUM SERPL-SCNC: 149 MMOL/L (ref 133–144)
SODIUM SERPL-SCNC: 150 MMOL/L (ref 133–144)
SODIUM SERPL-SCNC: 152 MMOL/L (ref 133–144)
TOPIRAMATE SERPL-MCNC: 3.2 UG/ML

## 2022-06-28 PROCEDURE — 36415 COLL VENOUS BLD VENIPUNCTURE: CPT | Performed by: HOSPITALIST

## 2022-06-28 PROCEDURE — 99233 SBSQ HOSP IP/OBS HIGH 50: CPT | Performed by: INTERNAL MEDICINE

## 2022-06-28 PROCEDURE — 250N000013 HC RX MED GY IP 250 OP 250 PS 637: Performed by: INTERNAL MEDICINE

## 2022-06-28 PROCEDURE — 86140 C-REACTIVE PROTEIN: CPT | Performed by: PHYSICIAN ASSISTANT

## 2022-06-28 PROCEDURE — 84295 ASSAY OF SERUM SODIUM: CPT | Performed by: HOSPITALIST

## 2022-06-28 PROCEDURE — 250N000009 HC RX 250: Performed by: RADIOLOGY

## 2022-06-28 PROCEDURE — 250N000011 HC RX IP 250 OP 636: Performed by: PHYSICIAN ASSISTANT

## 2022-06-28 PROCEDURE — 250N000013 HC RX MED GY IP 250 OP 250 PS 637

## 2022-06-28 PROCEDURE — C1769 GUIDE WIRE: HCPCS

## 2022-06-28 PROCEDURE — 36415 COLL VENOUS BLD VENIPUNCTURE: CPT | Performed by: INTERNAL MEDICINE

## 2022-06-28 PROCEDURE — 120N000013 HC R&B IMCU

## 2022-06-28 PROCEDURE — 258N000003 HC RX IP 258 OP 636: Performed by: INTERNAL MEDICINE

## 2022-06-28 PROCEDURE — 250N000013 HC RX MED GY IP 250 OP 250 PS 637: Performed by: HOSPITALIST

## 2022-06-28 PROCEDURE — 272N000187 HC ACCESSORY CR11

## 2022-06-28 PROCEDURE — 272N000116 HC CATH CR1

## 2022-06-28 PROCEDURE — 82040 ASSAY OF SERUM ALBUMIN: CPT | Performed by: PHYSICIAN ASSISTANT

## 2022-06-28 PROCEDURE — P9047 ALBUMIN (HUMAN), 25%, 50ML: HCPCS | Performed by: PHYSICIAN ASSISTANT

## 2022-06-28 PROCEDURE — 272N000571 HC SHEATH CR8

## 2022-06-28 PROCEDURE — 0DHA3UZ INSERTION OF FEEDING DEVICE INTO JEJUNUM, PERCUTANEOUS APPROACH: ICD-10-PCS | Performed by: RADIOLOGY

## 2022-06-28 PROCEDURE — 71045 X-RAY EXAM CHEST 1 VIEW: CPT

## 2022-06-28 PROCEDURE — 250N000009 HC RX 250: Performed by: NURSE PRACTITIONER

## 2022-06-28 PROCEDURE — 49440 PLACE GASTROSTOMY TUBE PERC: CPT

## 2022-06-28 PROCEDURE — 84295 ASSAY OF SERUM SODIUM: CPT | Performed by: INTERNAL MEDICINE

## 2022-06-28 PROCEDURE — 85049 AUTOMATED PLATELET COUNT: CPT | Performed by: HOSPITALIST

## 2022-06-28 RX ORDER — FLUMAZENIL 0.1 MG/ML
0.2 INJECTION, SOLUTION INTRAVENOUS
Status: DISCONTINUED | OUTPATIENT
Start: 2022-06-28 | End: 2022-06-28 | Stop reason: HOSPADM

## 2022-06-28 RX ORDER — FENTANYL CITRATE 50 UG/ML
25-50 INJECTION, SOLUTION INTRAMUSCULAR; INTRAVENOUS EVERY 5 MIN PRN
Status: DISCONTINUED | OUTPATIENT
Start: 2022-06-28 | End: 2022-06-28 | Stop reason: HOSPADM

## 2022-06-28 RX ORDER — NALOXONE HYDROCHLORIDE 0.4 MG/ML
0.4 INJECTION, SOLUTION INTRAMUSCULAR; INTRAVENOUS; SUBCUTANEOUS
Status: DISCONTINUED | OUTPATIENT
Start: 2022-06-28 | End: 2022-06-28 | Stop reason: HOSPADM

## 2022-06-28 RX ORDER — NALOXONE HYDROCHLORIDE 0.4 MG/ML
0.2 INJECTION, SOLUTION INTRAMUSCULAR; INTRAVENOUS; SUBCUTANEOUS
Status: DISCONTINUED | OUTPATIENT
Start: 2022-06-28 | End: 2022-06-28 | Stop reason: HOSPADM

## 2022-06-28 RX ORDER — DEXTROSE MONOHYDRATE 50 MG/ML
INJECTION, SOLUTION INTRAVENOUS CONTINUOUS
Status: DISCONTINUED | OUTPATIENT
Start: 2022-06-28 | End: 2022-06-30

## 2022-06-28 RX ADMIN — Medication 1 PACKET: at 22:05

## 2022-06-28 RX ADMIN — RISPERIDONE 2 MG: 2 TABLET ORAL at 20:58

## 2022-06-28 RX ADMIN — ZINC SULFATE 220 MG (50 MG) CAPSULE 220 MG: CAPSULE at 09:06

## 2022-06-28 RX ADMIN — CARBOXYMETHYLCELLULOSE SODIUM 1 DROP: 10 GEL OPHTHALMIC at 21:00

## 2022-06-28 RX ADMIN — ALBUMIN HUMAN 25 G: 0.25 SOLUTION INTRAVENOUS at 01:48

## 2022-06-28 RX ADMIN — RISPERIDONE 2 MG: 2 TABLET ORAL at 09:06

## 2022-06-28 RX ADMIN — Medication 40 MG: at 20:50

## 2022-06-28 RX ADMIN — SERTRALINE HYDROCHLORIDE 100 MG: 100 TABLET ORAL at 09:06

## 2022-06-28 RX ADMIN — ALBUMIN HUMAN 25 G: 0.25 SOLUTION INTRAVENOUS at 20:44

## 2022-06-28 RX ADMIN — LEVOTHYROXINE SODIUM 50 MCG: 50 TABLET ORAL at 09:06

## 2022-06-28 RX ADMIN — IPRATROPIUM BROMIDE 2 SPRAY: 42 SPRAY NASAL at 09:15

## 2022-06-28 RX ADMIN — TOPIRAMATE 100 MG: 100 TABLET, FILM COATED ORAL at 21:01

## 2022-06-28 RX ADMIN — MONTELUKAST 10 MG: 10 TABLET, FILM COATED ORAL at 21:01

## 2022-06-28 RX ADMIN — ATENOLOL 25 MG: 25 TABLET ORAL at 09:06

## 2022-06-28 RX ADMIN — LEVOCARNITINE 330 MG: 330 TABLET ORAL at 09:06

## 2022-06-28 RX ADMIN — DEXTROSE MONOHYDRATE: 50 INJECTION, SOLUTION INTRAVENOUS at 12:07

## 2022-06-28 RX ADMIN — Medication 40 MG: at 09:05

## 2022-06-28 RX ADMIN — ALBUMIN HUMAN 25 G: 0.25 SOLUTION INTRAVENOUS at 12:08

## 2022-06-28 RX ADMIN — LEVOCARNITINE 330 MG: 330 TABLET ORAL at 20:58

## 2022-06-28 RX ADMIN — LIDOCAINE HYDROCHLORIDE 8 ML: 10; .005 INJECTION, SOLUTION EPIDURAL; INFILTRATION; INTRACAUDAL; PERINEURAL at 14:56

## 2022-06-28 RX ADMIN — SODIUM CHLORIDE 1 BAG: 9 INJECTION, SOLUTION INTRAVENOUS at 15:02

## 2022-06-28 RX ADMIN — PREDNISOLONE ACETATE 1 DROP: 10 SUSPENSION/ DROPS OPHTHALMIC at 09:34

## 2022-06-28 RX ADMIN — IPRATROPIUM BROMIDE 2 SPRAY: 42 SPRAY NASAL at 15:55

## 2022-06-28 RX ADMIN — IPRATROPIUM BROMIDE 2 SPRAY: 42 SPRAY NASAL at 21:00

## 2022-06-28 RX ADMIN — LEVOCETIRIZINE DIHYDROCHLORIDE 5 MG: 5 TABLET ORAL at 20:58

## 2022-06-28 ASSESSMENT — ACTIVITIES OF DAILY LIVING (ADL)
ADLS_ACUITY_SCORE: 53

## 2022-06-28 NOTE — PROGRESS NOTES
[facility]  Gastroenterology Progress Note     Jagdeep Walker MRN# [id number]   YOB: 1970 Age: 51 year old          Assessment and Plan:   Jagdeep Walker s a 51 year old male admitted with right sided pleural effusion and GI consulted to manage pancreatitis.     Active Problems:  Pleural effusion  Pancreatic pseudocyst  Idiopathic acute pancreatitis, unspecified complication status  Pancreaticopleural fistula  *Lipase 1,730->689, AST, ALT and Alk Phos normal. Lactic acid 1.9. WBC wnl, Hgb 12.6,    *6/14 CT A/P note improved inflammatory changes in pancreas. Pseudocysts not significantly changed. Gastric wall thickening has progressed consistent with secondary gastritis with new pseudocyst along the proximal posterior gastric wall. Ascites decreased. Wall thickening involving the cecum and transverse colon likely secondarily involved. Large right pleural effusion has significantly increased.  *Cause of pancreatitis is idiopathic- no evidence of gallstones or alcohol use. May be due to medication. 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 Contrast was injected and flowed through pancreatic ductal system and extravasated from the pancreatic duct in the body of the pancreas with pancreatic duct leak and communicating pseudocyst. Pancreatic sphincterotomy performed. One stent placed into ventral pancreatic duct.  *These findings confirm pancreaticopleural fistula  *NJ tube in place  * Chest tube output still significant around 2700 ml daily  * Respiratory status atable now on 2 LPM  *6/26/22 MRCP significant for 2 peripheral enhancing cystic masses- one in tail  2.6 x 4.2 x 3.1 cm and head mass 2.5 cm x 4.1 cm x 4.0 cm. The head lesion has a fistulous tract extending in the adjacent duodenum- one in tail has no obvious fistulous tract. No mention of stent placement or pancreaticopleural  fistula.  MRCP measurement of cystic lesions in pancreas not listed on MRCP from 6/6/22, however 5/9/22 MRCP noted 2.5 x 2.3 x 1.3 cm tail lesion and 3.1 x 2.8 x 2.8 cm head lesion.   * CRP elevated at 73, Lipase 152 and normal  * Albumin 1.8- recommend above 3.0 to improve ascites/effusion  * CA 19-9 ordered and pending- given concern for possible neoplastic cause    -- Octreotide stopped yesterday as was not significantly effective   -- Consider repeat ERCP around 7/21, will follow with imaging and may consider EUS with necrosectomy if minimal improvement  -- Recommend PEJ tube placement now that respiratory status improved- Appreciate surgery consult and recommended IR consulted for placement  -- Daily labs  -- continue albumin 25% every 8 hours until albumin is 3.0 or higher  --Called radiology for additional comments- cysts have enlarged since 6/6/22 and appear necrotic in nature. Not certain of ventral pancreatic duct cyst stent in place. Abdominal xray would confirm  -- Expect multiple weeks until able to tolerate oral intake- need to see evidence of fistula closure              Interval History:   no new complaints, denies chest pain, pain is controlled and has ongoing abdominal pain. Unable to communicate.              Review of Systems:   C: NEGATIVE for fever, chills, change in weight  E/M: NEGATIVE for ear, mouth and throat problems  R: NEGATIVE for significant cough or SOB  CV: NEGATIVE for chest pain, palpitations or peripheral edema             Medications:   I have reviewed this patient's current medications    albumin human  25 g Intravenous Q8H     [Held by provider] amylase-lipase-protease  1-2 capsule Per Feeding Tube Q4H     atenolol  25 mg Oral or NG Tube Daily     carboxymethylcellulose PF  1 drop Right Eye At Bedtime     ipratropium  2 spray Both Nostrils TID     levOCARNitine  330 mg Oral or NG Tube TID     levocetirizine  5 mg Oral or NG Tube QPM     levothyroxine  50 mcg Oral or NG Tube  Daily     montelukast  10 mg Oral or NG Tube At Bedtime     pantoprazole  40 mg Oral or Feeding Tube BID     prednisoLONE acetate  1 drop Right Eye Daily     protein modular  1 packet Per Feeding Tube TID     risperiDONE  2 mg Oral or NG Tube BID     sertraline  100 mg Oral or NG Tube Daily     [Held by provider] sodium bicarbonate  325 mg Per Feeding Tube Q4H     sodium chloride (PF)  3 mL Intracatheter Q8H     topiramate  100 mg Oral or NG Tube At Bedtime     [Held by provider] valproic acid  250 mg Oral or NG Tube At Bedtime     [Held by provider] valproic acid  500 mg Oral or NG Tube BID     zinc sulfate  220 mg Oral or NG Tube Daily                  Physical Exam:   Vitals were reviewed  Vital Signs with Ranges  Temp:  [98  F (36.7  C)-99.1  F (37.3  C)] 98.6  F (37  C)  Pulse:  [57-66] 64  Resp:  [11-18] 12  BP: ()/(41-73) 101/59  SpO2:  [87 %-97 %] 93 %  I/O last 3 completed shifts:  In: 2310 [I.V.:900; NG/GT:460]  Out: 4435 [Urine:2985; Chest Tube:1450]  Constitutional: alert, mild distress, cooperative and pale   Cardiovascular: negative, PMI normal. No lifts, heaves, or thrills. RRR. No murmurs, clicks gallops or rub  Respiratory: wheezy, Percussion normal. Good diaphragmatic excursion.   Abdomen: Abdomen soft, non-tender. BS normal. No masses, organomegaly           Data:   I reviewed the patient's new clinical lab test results.   Recent Labs   Lab Test 06/28/22  0509 06/27/22  0648 06/25/22  2234 06/22/22  0738 06/21/22  0704 06/19/22  0622 06/18/22  2041 06/16/22  0802 06/14/22  1903 06/06/22  0659 06/05/22  0728   WBC  --  5.5 6.4  --  5.4   < > 4.6   < > 5.7   < > 3.8*   HGB  --  12.3* 13.0*  --  12.6*   < > 12.6*   < > 12.6*   < > 12.0*   MCV  --  97 97  --  98   < > 97   < > 96   < > 95   * 159 158   < > 138*   < > 152   < > 185   < > 92*   INR  --   --   --   --   --   --  1.74*  --  1.64*  --  1.73*    < > = values in this interval not displayed.     Recent Labs   Lab Test  06/28/22  0509 06/27/22  0648 06/25/22  2234   POTASSIUM 3.7 3.7 3.7   CHLORIDE 115* 113* 117*   CO2 30 33* 33*   BUN 33* 35* 33*   ANIONGAP 4 3 4     Recent Labs   Lab Test 06/27/22  0648 06/21/22  0704 06/20/22  0715 06/18/22  2041 06/15/22  1011 06/15/22  0034 06/05/22  0728 06/04/22  1653 05/08/22 2032 05/08/22  0934   ALBUMIN 1.8* 1.7* 1.9* 2.2*  --   --    < >  --    < >  --    BILITOTAL 1.7* 0.5 0.5 0.6  --   --    < >  --    < >  --    ALT 42 12 15 24  --   --    < >  --    < >  --    AST 67* 23 20 23  --   --    < >  --    < >  --    PROTEIN  --   --   --   --   --  Negative  --  Negative  --  Negative   LIPASE 152  --  1,236* 579*  --   --    < >  --    < >  --    AMYLASE  --   --   --   --  142*  --   --   --   --   --     < > = values in this interval not displayed.       I reviewed the patient's new imaging results.    All laboratory data reviewed  All imaging studies reviewed by me.    Isabelle Pantoja PA-C,  6/16/2022  [facility]  Office : [phone]  Cell: [phone] (Dr. Clark)  Cell: [phone] (Isabelle Pantoja PA-C)

## 2022-06-28 NOTE — PROGRESS NOTES
Surgery Note    Consult received for consideration of gastrojejunal feeding tube. I would recommend initial attempt at placement with IR given pt significant comorbidities. Please re-consult general surgery if IR is unable to place feeding tube. In regard to MRCP findings with pancreaticoduodenal and pancreaticopleural fistula, I would recommend transfer to a center with hepatobiliary surgeons, consider the [place] or [place].     Karin Holden MD  [facility]  [phone]

## 2022-06-28 NOTE — PROGRESS NOTES
[facility]    Hospitalist Progress Note    Assessment & Plan   Jagdeep Walker is a 51 year-old male with complex history including seizure disorder, schizoaffective disorder, recent pancreatitis with pseudocysts, history of portal vein thrombosis, recent admission for metabolic encephalopathy of unclear etiology who presents with shortness of breath and hypoxia with large pleural effusion on outside chest x-ray.  Admitted on 6/14/2022.      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  Recent echocardiogram 6/4 normal.  Pleural fluid studies consistent with transudate, specimen clotted for cell count, normal glucose, no organisms on gram stain.  Cytology negative x 2  Given lipase and amylase increase, perhaps pleural effusion related to pancreatitis with fistula or leak versus malnutrition related to severe pancreatitis  Chest tube placed on 6/18 and replaced on 6/22 as it appeared clogged  RRT for 6/20 chest pain, resolved with pain medications  6/22: Respiratory status worsened, likely due to worsening pleural effusions from his clogged chest tube.  Chest tube exchanged and has had significant output since with suction   * Culture from the pleural with diptheroids in broth only, c/w contaminant  - Titrate O2 as able.  Currently on Ventimask at 3 L.  We will continue to titrate down.  - ID consulted for the pleural culture  results.  Felt contaminant and no need for antibiotics   -Patient is followed by GI, pulmonary, neurology and ID  - CT surgery and IR following and appreciate their recommendations.    Chest tube management as per CT surgery.    Current recommendation is to continue CT with suction.  Pleurodesis not appropriate in this situation.  -Patient continues to have significant output through the chest tube despite 4 days of octreotide.  After discussion with GI it was decided to discontinue octreotide.  Although it has been felt that this was all pancreatico pleural fistula there is no imaging evidence of this.  We will repeat MRCP to evaluate again.  Question if this could also be hepatic hydrothorax since patient had ascites in the past but now that has improved but he keeps having transudative pleural effusion.  CT chest abdomen pelvis pelvis done on 6/14/2022 did show cavernous transformation of portal vein with gastric varices suggestive of portal hypertension and he had ascites at that time.  Fluid amylase also is only marginally elevated whereas in pancreatico pleural fistula amylase levels are usually over 100,000.     Patient has pain scheduled on 25 percentage albumin every 8 hours, albumin level is 1.8 today, it was 2.2 on presentation.  Hypernatremia  --Slowly improving, patient was n.p.o. so did not receive free water overnight, sodium has gone up to 149 today, increased free water and added D5W for now.  Acute/subacute pancreatitis with pseudocyst   *Hospitalized 5/8-5/17/22 for pancreatitis with pseudocyst formation. Unclear etiology, EUS unrevealing for etiology, possibly due to medications  *CT on admission with acute pancreatitis with slightly improved inflammatory changes, pseudocysts not significantly changed.  - NJ in place.  Continue tube feeds, n.p.o. for now  - Continue NPO.  Per GI will need to be NPO until repeat ERCP in 4 weeks.    Patient is currently on room air, will request IR for PEJ tube  placement, earlier IR had mentioned that could not place the PEJ  tube so requested surgery consult, currently after discussion between surgery and IR the plan is to place PEJ tube with IR today.  - GI following and appreciate their recommendations.  Stop octreotide.  Stop Creon supplementation since he is getting postpyloric feeds for which we do not need pancreatic enzymes.  Patient has been getting sodium bicarb along with his Creon which could be worsening his hyponatremia.  He also has metabolic alkalosis.     Recent metabolic encephalopathy   Mild zinc deficiency   *Extensively worked up during admission 6/4-6/10/22 including head CT, MRI brain, EEG, paraneoplastic ab, extensive laboratory work-up mostly unremarkable with exception of mildly low zinc.  *Per sister, patient does have some cognitive impairment at baseline, though is typically oriented to self, family and can carry on an extensive conversation especially regarding topics he enjoys (eg sports). He remains below his baseline.  -MRI brain has been repeated again on 6/27 without any new findings .  - Continue zinc supplementation     Hx of portal vein thrombosis  * Diagnosed during admission 5/2022, improved on subsequent CT and not treated with anticoagulation   * Non-occlusive main portal vein thrombosis, splenic vein thrombosis seen on admission CT  - GI consulted as above, they advised no need to treat PVT previous admission (6/9/2022)  -This could be causing significant portal hypertension and worsening ascites which in turn could be causing worsening pleural effusion.     Depression  Anxiety  Schizoaffective disorder, bipolar type  - Continue prior to admission sertraline, risperidone     Seizure disorder  -PTA on topiramate and Depakote, currently Depakote is on hold, levels are pending, continue topiramate.  Neurology has been consulted for patient's encephalopathy, current plan is to obtain MRI of the brain, EEG obtained.  Topiramate levels  pending.     Carnitine deficiency  - Continue prior to admission levocarnitine      Hypertension  - Continue prior to admission atenolol      Fisher's esophagus  - Continue prior to admission PPI     Hypothyroidism  - Continue prior to admission levothyroxine     Seasonal allergies  - Continue prior to admission levocetirizine    DVT Prophylaxis: SCDs, will order Lovenox after j-tube placement  Code Status: Full Code     Disposition: Expected discharge in few days    Patsy Gannon MD  Text Page   (7am to 6pm)    Interval History   Patient occasionally opens eyes and answers questions,  very short answers, is n.p.o. for PEJ tube placement full    -Data reviewed today: I reviewed all new labs and imaging results over the last 24 hours.  Physical Exam   Temp: 98.6  F (37  C) Temp src: Axillary BP: 99/53 Pulse: 62   Resp: 22 SpO2: 96 % O2 Device: Oxymask Oxygen Delivery: 1 LPM  Vitals:    06/26/22 0531 06/27/22 0518 06/28/22 0534   Weight: 83.7 kg (184 lb 9.6 oz) 82.4 kg (181 lb 11.2 oz) 80.8 kg (178 lb 1.6 oz)     Vital Signs with Ranges  Temp:  [98  F (36.7  C)-99.1  F (37.3  C)] 98.6  F (37  C)  Pulse:  [58-66] 62  Resp:  [11-22] 22  BP: ()/(41-67) 99/53  SpO2:  [87 %-97 %] 96 %  I/O last 3 completed shifts:  In: 2310 [I.V.:900; NG/GT:460]  Out: 4435 [Urine:2985; Chest Tube:1450]    Constitutional: Sleepy, Nj tube noted  Respiratory: Breath sounds reduced at the bases, left-sided crackles present, chest tube present  Cardiovascular: Regular rate and rhythm, normal S1 and S2, and no murmur noted  GI: Normal bowel sounds, soft, non-distended, non-tender  Skin/Integumen: No rashes, no cyanosis, no edema  Neuro : moving all 4 extremities, no focal deficit noted     Medications     dextrose       D5W 100 mL/hr at 06/28/22 1207       albumin human  25 g Intravenous Q8H     [Held by provider] amylase-lipase-protease  1-2 capsule Per Feeding Tube Q4H     atenolol  25 mg Oral or NG Tube Daily      carboxymethylcellulose PF  1 drop Right Eye At Bedtime     ipratropium  2 spray Both Nostrils TID     levOCARNitine  330 mg Oral or NG Tube TID     levocetirizine  5 mg Oral or NG Tube QPM     levothyroxine  50 mcg Oral or NG Tube Daily     montelukast  10 mg Oral or NG Tube At Bedtime     pantoprazole  40 mg Oral or Feeding Tube BID     prednisoLONE acetate  1 drop Right Eye Daily     protein modular  1 packet Per Feeding Tube TID     risperiDONE  2 mg Oral or NG Tube BID     sertraline  100 mg Oral or NG Tube Daily     [Held by provider] sodium bicarbonate  325 mg Per Feeding Tube Q4H     sodium chloride (PF)  3 mL Intracatheter Q8H     topiramate  100 mg Oral or NG Tube At Bedtime     [Held by provider] valproic acid  250 mg Oral or NG Tube At Bedtime     [Held by provider] valproic acid  500 mg Oral or NG Tube BID     zinc sulfate  220 mg Oral or NG Tube Daily       Data   Recent Labs   Lab 06/28/22  1211 06/28/22  1149 06/28/22  0509 06/28/22  0350 06/28/22  0008 06/27/22  2323 06/27/22  0842 06/27/22  0648 06/26/22  0131 06/25/22  2234   WBC  --   --   --   --   --   --   --  5.5  --  6.4   HGB  --   --   --   --   --   --   --  12.3*  --  13.0*   MCV  --   --   --   --   --   --   --  97  --  97   PLT  --   --  140*  --   --   --   --  159  --  158   *  --  149*  149*  --   --  148*   < > 149*  149*   < > 154*   POTASSIUM  --   --  3.7  --   --   --   --  3.7  --  3.7   CHLORIDE  --   --  115*  --   --   --   --  113*  --  117*   CO2  --   --  30  --   --   --   --  33*  --  33*   BUN  --   --  33*  --   --   --   --  35*  --  33*   CR  --   --  0.82  --   --   --   --  0.82  --  0.86   ANIONGAP  --   --  4  --   --   --   --  3  --  4   NASIR  --   --  9.4  --   --   --   --  9.1  --  9.1   GLC  --  110* 105* 96   < >  --    < > 176*  --  163*   ALBUMIN  --   --  2.9*  --   --   --   --  1.8*  --   --    PROTTOTAL  --   --   --   --   --   --   --  5.4*  --   --    BILITOTAL  --   --   --   --   --    --   --  1.7*  --   --    ALKPHOS  --   --   --   --   --   --   --  144  --   --    ALT  --   --   --   --   --   --   --  42  --   --    AST  --   --   --   --   --   --   --  67*  --   --    LIPASE  --   --   --   --   --   --   --  152  --   --     < > = values in this interval not displayed.     Recent Labs   Lab 06/28/22  1149 06/28/22  0509 06/28/22  0350 06/28/22  0008 06/27/22  2244   * 105* 96 134* 113*       Imaging:   Recent Results (from the past 24 hour(s))   MR Brain w/o Contrast    Narrative    EXAM: MR BRAIN W/O CONTRAST  LOCATION: [facility]  DATE/TIME: 6/27/2022 9:43 PM    INDICATION: dysarthria  COMPARISON: 5/8/2022  TECHNIQUE: Routine multiplanar multisequence head MRI without intravenous contrast.    FINDINGS:  INTRACRANIAL CONTENTS: No acute or subacute infarct. No mass, acute hemorrhage, or extra-axial fluid collections. Chronic lacunar infarcts in the bilateral cerebellar hemisphere. Mild to moderate generalized cerebral atrophy. No hydrocephalus. Normal   position of the cerebellar tonsils.     SELLA: No abnormality accounting for technique.    OSSEOUS STRUCTURES/SOFT TISSUES: Normal marrow signal. The major intracranial vascular flow voids are maintained.     ORBITS: No abnormality accounting for technique.     SINUSES/MASTOIDS: No paranasal sinus mucosal disease. Scattered fluid/membrane thickening in the mastoid air cells bilaterally.       Impression    IMPRESSION:  1.  No acute findings.  2.  Prominent atrophy for age.  3.  Small chronic lacunar infarcts in the cerebellum.   XR Chest Port 1 View    Narrative    EXAM: XR CHEST PORT 1 VIEW  LOCATION: [facility]  DATE/TIME: 6/28/2022 5:53 AM    INDICATION: chest tube placement  COMPARISON: None.      Impression    IMPRESSION: The right pigtail catheter is unchanged in position. Mild atelectasis of the right lung base is again noted. No pneumothorax is seen on the current exam. The  enteric tube is again seen in unchanged appearance from prior exam.

## 2022-06-28 NOTE — PLAN OF CARE
IMC status. Alert and oriented to self most of the shift. Intermittently knows that he's in the hospital but does not know why. VSS on RA while awake but desats to 87-89% while sleeping. Placed on 1-2L O2 via Oxymask. Assist of 2 with lift, t/r q2. NPO. Refuses oral cares. On tube feeds 50ml/hr with 100ml Q6 free water flushes, stopped at midnight. Plan for J tube placement today, 6/28/22. NJ at 93cm. Lungs sounds diminished. CT with serous drainage, to -20suction; dressing CDI. No crepitus/ airleak. BS +. BM-. Voiding adequately via jimenez. Denies pain/nausea. Tele SR with short CT. PIV saline locked.

## 2022-06-28 NOTE — PROGRESS NOTES
Notified provider about indwelling jimenez catheter discussed removal or continued need.    Did provider choose to remove indwelling jimenez catheter Yes and discussed external catheter as a good alternative.

## 2022-06-28 NOTE — PROGRESS NOTES
CLINICAL NUTRITION SERVICES - REASSESSMENT NOTE      Future/Additional Recommendations:   When able to resume TF, recommend change eventual TF Jevity 1.5 goal rate to 55 mL/hr x 22 hrs (hold 1 hour before and 1 hour after Synthroid) = 1815 kcals, 77 gm pro, 920 mL H20, 261 gm CHO, 25 gm fiber  Continue Prosource 1 packet TID = 120 kcals, 33 gm pro  Total (TF + Prosource):  1935 kcals (29 kcal/kg), 110 gm pro (1.65 gm/kg and 110% protein needs)   Malnutrition:   % Weight Loss:  > 2% in 1 week (severe malnutrition): 16% weight loss since 6/16, suspect fluid-related with some true weight loss  % Intake:  Decreased intake does not meet criteria for malnutrition - pt receiving TF  Subcutaneous Fat Loss:  Orbital region mild depletion and Upper arm region moderate depletion - per RD note 6/16  Muscle Loss:  Temporal region mild depletion, Clavicle bone region mild depletion and Dorsal hand region mild depletion - per RD note 6/16  Fluid Retention: trace to Mild generalized edema     Malnutrition Diagnosis: Moderate malnutrition  In Context of:  Acute illness or injury       EVALUATION OF PROGRESS TOWARD GOALS   Diet:  NPO    Nutrition Support:    - TF held at MN for procedure today.  - D5 IVF running at 100 mL/hr = 120 gm CHO, 408 kcals  - Otherwise, TF has been running as below:    Nutrition Support Enteral:  Type of Feeding Tube: NJ  Enteral Frequency:  Continuous  Enteral Regimen:  Jevity 1.5 at 50 mL/hr x 24 hours + 3 packets Prosource  Total Enteral Provisions: 1920 kcal (29 kcal/kg), 110 g protein (1.65 g/kg), 259 g CHO, 60 g fat, 25 g fiber, and 912 mL free water.   Free Water Flush: 150 mL every 4 hrs (increased further today)    Intake/Tolerance:   Stool Pattern:  x3 on 6/24, x1 on 6/25, x1 on 6/24, none 6/27, and x1 thus far today.  BGM:  141, 113, 134, 96, 110 - acceptable  Na 149 (H)  BUN 33 (H)  CRP 59.5 (H).  I/O: 2310/4435. Wt: 80.8 kg - down 15.6 kg (16%) since admission. BMI: 29.64 (130% IBW). Pt with 1+  "trace bilateral hip and BLE edema.      REASSESSED NUTRITION NEEDS: (Modified using a different dosing weight)  Dosing Weight:  66.6 kg (re-adjusted using current lower wt today)  Estimated Energy Needs: 5154-9012 kcals (25-30 Kcal/Kg)  Justification: overweight, acute pancreatitis  Estimated Protein Needs:  grams protein (1.2 -1.5 g pro/Kg)  Justification: increased needs r/t acute pancreatitis, repletion    NEW FINDINGS:   Medications:  Creon and bicarbonate tabs were discontinued 6/26.  --> Per Provider note 6/27, \"Stop Creon supplementation since he is getting postpyloric feeds for which we do not need pancreatic enzymes.  Patient has been getting sodium bicarb along with his Creon which could be worsening his hyponatremia.  He also has metabolic alkalosis.\"  Continues on Zinc Sulfate capsule 220 mg daily  Synthroid medication via FT started 6/25 (need to hold TF 1 hr before and 1 hr after administration).    Procedure:  Pt having GJ feeding tube placed today in IR.  --> If unable to place in IR, surgery will be consulted for options.    Previous Goals (6/23):   TF to meet a minimum of 25 kcal/kg and 1.2 g PRO/kg daily (per dosing wt 69 kg).  Evaluation: Not met as TF on hold    Previous Nutrition Diagnosis (6/23):   Inadequate protein-energy intake related to reliance on nutrition support with interruptions to TF as evidenced by need for TF to meet 100% of estimated needs, ~12% weight loss since 6/16  Evaluation:  Declining      CURRENT NUTRITION DIAGNOSIS  Inadequate protein-energy intake related to TF held for procedure as evidenced by D5 IVF meeting 25% energy and 0% protein needs    INTERVENTIONS  Recommendations / Nutrition Prescription  When able to resume TF, recommend change eventual TF Jevity 1.5 goal rate to 55 mL/hr x 22 hrs (hold 1 hour before and 1 hour after Synthroid) = 1815 kcals, 77 gm pro, 920 mL H20, 261 gm CHO, 25 gm fiber  Continue Prosource 1 packet TID = 120 kcals, 33 gm pro  Total " (TF + Prosource):  1935 kcals (29 kcal/kg), 110 gm pro (1.65 gm/kg and 110% protein needs)    Implementation  None at this time    Goals  TF will be resumed and will meet % estimated needs    MONITORING AND EVALUATION:  Progress towards goals will be monitored and evaluated per protocol and Practice Guidelines    Marli Mcdaniel, RD, LD, CNSC

## 2022-06-28 NOTE — PROGRESS NOTES
Patient is on IR schedule Tuesday 6/28 for a GJ tube placement at ~ 1530.     -Labs WNL for procedure.    -Orders for NPO have been entered.   -Phone consent was obtained from guardian and sister Huong after explaining the procedure, risks and benefits and consent is in IR.     Please contact the IR department at [phone] for procedural related questions.     Thanks, Pamela [facility] Interventional Radiology CNP ([phone]) (phone [phone])

## 2022-06-28 NOTE — IR NOTE
Interventional Radiology Intra-procedural Nursing Note    Patient Name: Jagdeep Walker  Medical Record Number: [id number]  Today's Date: June 28, 2022    Procedure: Gastro jejunostomy tube placement with IV moderate sedation   Start time: 1448  End time: 1512  Report provided to: Ria RN  Patient depart time and location: 1525 to Room [facility]    Note: Patient entered Interventional Radiology Suite number 2 via cart. Patient awake, alert and oriented. Assisted onto procedural table in supine position. Prepped and draped.  Dr. Moya in room. Time out and procedure started. Vital signs stable. Telemetry reading SB.    Procedure well tolerated by patient without complications. Procedure end with debrief by Dr. Moya.  Gauze and island dressing applied to LUQ, dressing is c/d/i.        Administered medication totals:  Lidocaine with Epinephrine 8 mL Intradermal

## 2022-06-28 NOTE — PRE-PROCEDURE
GENERAL PRE-PROCEDURE:   Procedure:  Gastro jejunostomy tube placement with IV moderate sedation   Date/Time:  6/28/2022 2:11 PM    Written consent obtained?: Yes    Risks and benefits: Risks, benefits and alternatives were discussed    Consent given by:  Patient  Patient states understanding of procedure being performed: Yes    Patient's understanding of procedure matches consent: Yes    Procedure consent matches procedure scheduled: Yes    Expected level of sedation:  Moderate  Appropriately NPO:  Yes  ASA Class:  3  Mallampati  :  Grade 4- soft palate obscured by base of tongue  Lungs:  Lungs clear with good breath sounds bilaterally and other (comment)  Lung exam comment:  Decreased in bases   Heart:  Normal heart sounds and rate  History & Physical reviewed:  History and physical reviewed and no updates needed  Statement of review:  I have reviewed the lab findings, diagnostic data, medications, and the plan for sedation

## 2022-06-28 NOTE — PLAN OF CARE
Patient disoriented to self. VSS on room air, tele SR. Denies pain. Assist x2 w/ gait belt and walker. Lung sounds clear in upper fields and diminished in lower fields. GI/ normoactive. Small loose BM 6/28. Blanchable redness on sacrum w/ mepilex in place. PIV in R forearm saline locked and PIV in R hand infusing D5 at 100ml/hr.   PEJ placed today at 1400 - dressing CDI - change 6/29 @ 1600. NPO and no feedings until 2015. Santana in place w/ adequate UO. R posterior chest tube to -20 section - output 390 from [id number]. Continue plan of care.

## 2022-06-28 NOTE — PROGRESS NOTES
Surgery Update    Discussed with Dr. Moya with IR and he will attempt IR placement of GJ tube. If unable, will then discuss surgical options for placement. Pt is very high risk regardless of which option is taken to place a feeding tube and a surgical tube would be the highest risk. Will follow along.     Karin Holden MD  [facility]  [phone]

## 2022-06-29 LAB
ALBUMIN SERPL-MCNC: 3.2 G/DL (ref 3.4–5)
ANION GAP SERPL CALCULATED.3IONS-SCNC: 5 MMOL/L (ref 3–14)
BUN SERPL-MCNC: 32 MG/DL (ref 7–30)
CALCIUM SERPL-MCNC: 9.5 MG/DL (ref 8.5–10.1)
CHLORIDE BLD-SCNC: 116 MMOL/L (ref 94–109)
CO2 SERPL-SCNC: 28 MMOL/L (ref 20–32)
CREAT SERPL-MCNC: 0.9 MG/DL (ref 0.66–1.25)
CRP SERPL-MCNC: 107 MG/L (ref 0–8)
ERYTHROCYTE [DISTWIDTH] IN BLOOD BY AUTOMATED COUNT: 15.9 % (ref 10–15)
GFR SERPL CREATININE-BSD FRML MDRD: >90 ML/MIN/1.73M2
GLUCOSE BLD-MCNC: 111 MG/DL (ref 70–99)
GLUCOSE BLD-MCNC: 159 MG/DL (ref 70–99)
GLUCOSE BLDC GLUCOMTR-MCNC: 118 MG/DL (ref 70–99)
GLUCOSE BLDC GLUCOMTR-MCNC: 123 MG/DL (ref 70–99)
GLUCOSE BLDC GLUCOMTR-MCNC: 126 MG/DL (ref 70–99)
GLUCOSE BLDC GLUCOMTR-MCNC: 147 MG/DL (ref 70–99)
HCT VFR BLD AUTO: 34.5 % (ref 40–53)
HGB BLD-MCNC: 10.7 G/DL (ref 13.3–17.7)
MCH RBC QN AUTO: 30.5 PG (ref 26.5–33)
MCHC RBC AUTO-ENTMCNC: 31 G/DL (ref 31.5–36.5)
MCV RBC AUTO: 98 FL (ref 78–100)
PLATELET # BLD AUTO: 176 10E3/UL (ref 150–450)
POTASSIUM BLD-SCNC: 3.3 MMOL/L (ref 3.4–5.3)
POTASSIUM BLD-SCNC: 3.6 MMOL/L (ref 3.4–5.3)
RBC # BLD AUTO: 3.51 10E6/UL (ref 4.4–5.9)
SODIUM SERPL-SCNC: 147 MMOL/L (ref 133–144)
SODIUM SERPL-SCNC: 147 MMOL/L (ref 133–144)
SODIUM SERPL-SCNC: 149 MMOL/L (ref 133–144)
SODIUM SERPL-SCNC: 149 MMOL/L (ref 133–144)
SODIUM SERPL-SCNC: 151 MMOL/L (ref 133–144)
WBC # BLD AUTO: 11.1 10E3/UL (ref 4–11)

## 2022-06-29 PROCEDURE — 82947 ASSAY GLUCOSE BLOOD QUANT: CPT | Performed by: INTERNAL MEDICINE

## 2022-06-29 PROCEDURE — 250N000011 HC RX IP 250 OP 636: Performed by: PHYSICIAN ASSISTANT

## 2022-06-29 PROCEDURE — 258N000003 HC RX IP 258 OP 636: Performed by: INTERNAL MEDICINE

## 2022-06-29 PROCEDURE — 85027 COMPLETE CBC AUTOMATED: CPT | Performed by: INTERNAL MEDICINE

## 2022-06-29 PROCEDURE — 250N000013 HC RX MED GY IP 250 OP 250 PS 637

## 2022-06-29 PROCEDURE — 99233 SBSQ HOSP IP/OBS HIGH 50: CPT | Performed by: INTERNAL MEDICINE

## 2022-06-29 PROCEDURE — 250N000013 HC RX MED GY IP 250 OP 250 PS 637: Performed by: INTERNAL MEDICINE

## 2022-06-29 PROCEDURE — 86140 C-REACTIVE PROTEIN: CPT | Performed by: PHYSICIAN ASSISTANT

## 2022-06-29 PROCEDURE — 36415 COLL VENOUS BLD VENIPUNCTURE: CPT | Performed by: INTERNAL MEDICINE

## 2022-06-29 PROCEDURE — 250N000013 HC RX MED GY IP 250 OP 250 PS 637: Performed by: HOSPITALIST

## 2022-06-29 PROCEDURE — 250N000011 HC RX IP 250 OP 636: Performed by: INTERNAL MEDICINE

## 2022-06-29 PROCEDURE — 82040 ASSAY OF SERUM ALBUMIN: CPT | Performed by: PHYSICIAN ASSISTANT

## 2022-06-29 PROCEDURE — 84295 ASSAY OF SERUM SODIUM: CPT | Performed by: HOSPITALIST

## 2022-06-29 PROCEDURE — 120N000013 HC R&B IMCU

## 2022-06-29 PROCEDURE — 36415 COLL VENOUS BLD VENIPUNCTURE: CPT | Performed by: HOSPITALIST

## 2022-06-29 PROCEDURE — P9047 ALBUMIN (HUMAN), 25%, 50ML: HCPCS | Performed by: PHYSICIAN ASSISTANT

## 2022-06-29 PROCEDURE — 84132 ASSAY OF SERUM POTASSIUM: CPT | Performed by: INTERNAL MEDICINE

## 2022-06-29 RX ORDER — POTASSIUM CHLORIDE 1.5 G/1.58G
40 POWDER, FOR SOLUTION ORAL ONCE
Status: COMPLETED | OUTPATIENT
Start: 2022-06-29 | End: 2022-06-29

## 2022-06-29 RX ORDER — ENOXAPARIN SODIUM 100 MG/ML
30 INJECTION SUBCUTANEOUS EVERY 24 HOURS
Status: DISCONTINUED | OUTPATIENT
Start: 2022-06-29 | End: 2022-07-01

## 2022-06-29 RX ADMIN — DEXTROSE MONOHYDRATE: 50 INJECTION, SOLUTION INTRAVENOUS at 00:00

## 2022-06-29 RX ADMIN — IPRATROPIUM BROMIDE 2 SPRAY: 42 SPRAY NASAL at 15:57

## 2022-06-29 RX ADMIN — POTASSIUM CHLORIDE 40 MEQ: 1.5 POWDER, FOR SOLUTION ORAL at 10:35

## 2022-06-29 RX ADMIN — RISPERIDONE 2 MG: 2 TABLET ORAL at 09:01

## 2022-06-29 RX ADMIN — IPRATROPIUM BROMIDE 2 SPRAY: 42 SPRAY NASAL at 09:21

## 2022-06-29 RX ADMIN — SERTRALINE HYDROCHLORIDE 100 MG: 100 TABLET ORAL at 09:00

## 2022-06-29 RX ADMIN — LEVOCARNITINE 330 MG: 330 TABLET ORAL at 09:00

## 2022-06-29 RX ADMIN — TOPIRAMATE 100 MG: 100 TABLET, FILM COATED ORAL at 21:29

## 2022-06-29 RX ADMIN — Medication 40 MG: at 21:04

## 2022-06-29 RX ADMIN — DEXTROSE MONOHYDRATE: 50 INJECTION, SOLUTION INTRAVENOUS at 22:30

## 2022-06-29 RX ADMIN — Medication 1 PACKET: at 21:27

## 2022-06-29 RX ADMIN — Medication 1 PACKET: at 09:14

## 2022-06-29 RX ADMIN — MONTELUKAST 10 MG: 10 TABLET, FILM COATED ORAL at 21:29

## 2022-06-29 RX ADMIN — ATENOLOL 25 MG: 25 TABLET ORAL at 09:01

## 2022-06-29 RX ADMIN — Medication 40 MG: at 09:13

## 2022-06-29 RX ADMIN — ENOXAPARIN SODIUM 30 MG: 30 INJECTION SUBCUTANEOUS at 15:50

## 2022-06-29 RX ADMIN — ALBUMIN HUMAN 25 G: 0.25 SOLUTION INTRAVENOUS at 04:22

## 2022-06-29 RX ADMIN — CARBOXYMETHYLCELLULOSE SODIUM 1 DROP: 10 GEL OPHTHALMIC at 21:27

## 2022-06-29 RX ADMIN — RISPERIDONE 2 MG: 2 TABLET ORAL at 21:04

## 2022-06-29 RX ADMIN — LEVOCETIRIZINE DIHYDROCHLORIDE 5 MG: 5 TABLET ORAL at 21:04

## 2022-06-29 RX ADMIN — ZINC SULFATE 220 MG (50 MG) CAPSULE 220 MG: CAPSULE at 09:00

## 2022-06-29 RX ADMIN — PREDNISOLONE ACETATE 1 DROP: 10 SUSPENSION/ DROPS OPHTHALMIC at 09:21

## 2022-06-29 RX ADMIN — Medication 1 PACKET: at 15:51

## 2022-06-29 RX ADMIN — LEVOTHYROXINE SODIUM 50 MCG: 50 TABLET ORAL at 09:01

## 2022-06-29 RX ADMIN — IPRATROPIUM BROMIDE 2 SPRAY: 42 SPRAY NASAL at 21:27

## 2022-06-29 RX ADMIN — LEVOCARNITINE 330 MG: 330 TABLET ORAL at 21:04

## 2022-06-29 RX ADMIN — LEVOCARNITINE 330 MG: 330 TABLET ORAL at 13:22

## 2022-06-29 ASSESSMENT — ACTIVITIES OF DAILY LIVING (ADL)
ADLS_ACUITY_SCORE: 53

## 2022-06-29 NOTE — PLAN OF CARE
IMC status. Alert and oriented x3. Disoriented to situation. VSS ex. Soft BP on RA. Not OOB this shift, assist of 2 otherwise. T/r q2. NPO. Oral cares done. Restarted tube feeds at 2000. TF running at goal 50ml/hr with 150ml Q4 free water flushes. GJ tube in place. Lungs sounds diminished. CT with serous drainage, to -20suction. CT site leaking, dressing and sheets were saturated, new dressing applied. No crepitus/ airleak. BS +. BM-. Voiding adequately via jimenez. Denies pain/nausea. Tele SR with short MT. IVF infusing.    0500 Jimenez removed. External cath placed. Due to void.

## 2022-06-29 NOTE — PROVIDER NOTIFICATION
MD Notification    Notified Person: MD    Notified Person Name: Jagdeep     Notification Date/Time:    Notification Interaction: [employer] messaging    Purpose of Notification: K 3.3, checking to see is replacement protocol wanted    Orders Received: K protocol ordered    Comments:

## 2022-06-29 NOTE — PROGRESS NOTES
[facility]  Gastroenterology Progress Note     Jagdeep Walker MRN# [id number]   YOB: 1970 Age: 51 year old          Assessment and Plan:   Jagdeep Walker s a 51 year old male admitted with right sided pleural effusion and GI consulted to manage pancreatitis.     Active Problems:  Pleural effusion  Pancreatic pseudocyst  Idiopathic acute pancreatitis, unspecified complication status  Pancreaticopleural fistula  *Lipase 1,730->689, AST, ALT and Alk Phos normal. Lactic acid 1.9. WBC wnl, Hgb 12.6,    *6/14 CT A/P note improved inflammatory changes in pancreas. Pseudocysts not significantly changed. Gastric wall thickening has progressed consistent with secondary gastritis with new pseudocyst along the proximal posterior gastric wall. Ascites decreased. Wall thickening involving the cecum and transverse colon likely secondarily involved. Large right pleural effusion has significantly increased.  *Cause of pancreatitis is idiopathic- no evidence of gallstones or alcohol use. May be due to medication. 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 Contrast was injected and flowed through pancreatic ductal system and extravasated from the pancreatic duct in the body of the pancreas with pancreatic duct leak and communicating pseudocyst. Pancreatic sphincterotomy performed. One stent placed into ventral pancreatic duct.  *These findings confirm pancreaticopleural fistula  *NJ tube in place  * Chest tube output- significant around 4043-8079 ml daily  * Respiratory status greatly improved on room air.  *6/26/22 MRCP significant for 2 peripheral enhancing cystic masses- one in tail  2.6 x 4.2 x 3.1 cm and head mass 2.5 cm x 4.1 cm x 4.0 cm. The head lesion has a fistulous tract extending in the adjacent duodenum- one in tail has no obvious fistulous tract. No mention of stent placement or  pancreaticopleural fistula. Per radiology above tail/head pseudocysts are enlarging comparted to prior MRCP.  MRCP measurement of cystic lesions in pancreas not listed on MRCP from 6/6/22, however 5/9/22 MRCP noted 2.5 x 2.3 x 1.3 cm tail lesion and 3.1 x 2.8 x 2.8 cm head lesion.    * CRP elevated at 73->107 this a.m. likely from GJ placement- will follow, Lipase 152 and normal  * Albumin 1.8- recommend above 3.0 to improve ascites/effusion. Today was 3.2  * CA 19-9  Normal at 26. Unlikely malignancy      -- Consider repeat ERCP around 7/21, will follow with imaging and may consider EUS with necrosectomy if minimal improvement  -- GJ placed by IR on 6/28/22. Appreciate help. Will need to keep NPO to allow improvement in pancreatitis and fistula  -- Expect multiple weeks until able to tolerate oral intake- need to see evidence of fistula closure--  --  Will hold albumin infusions and recheck albumin in 48 hours                Interval History:   no new complaints, denies chest pain, pain is controlled and has ongoing abdominal pain. Unable to communicate.              Review of Systems:   C: NEGATIVE for fever, chills, change in weight  E/M: NEGATIVE for ear, mouth and throat problems  R: NEGATIVE for significant cough or SOB  CV: NEGATIVE for chest pain, palpitations or peripheral edema             Medications:   I have reviewed this patient's current medications    albumin human  25 g Intravenous Q8H     [Held by provider] amylase-lipase-protease  1-2 capsule Per Feeding Tube Q4H     atenolol  25 mg Oral or NG Tube Daily     carboxymethylcellulose PF  1 drop Right Eye At Bedtime     ipratropium  2 spray Both Nostrils TID     levOCARNitine  330 mg Oral or NG Tube TID     levocetirizine  5 mg Oral or NG Tube QPM     levothyroxine  50 mcg Oral or NG Tube Daily     montelukast  10 mg Oral or NG Tube At Bedtime     pantoprazole  40 mg Oral or Feeding Tube BID     prednisoLONE acetate  1 drop Right Eye Daily     protein  modular  1 packet Per Feeding Tube TID     risperiDONE  2 mg Oral or NG Tube BID     sertraline  100 mg Oral or NG Tube Daily     [Held by provider] sodium bicarbonate  325 mg Per Feeding Tube Q4H     sodium chloride (PF)  3 mL Intracatheter Q8H     topiramate  100 mg Oral or NG Tube At Bedtime     [Held by provider] valproic acid  250 mg Oral or NG Tube At Bedtime     [Held by provider] valproic acid  500 mg Oral or NG Tube BID     zinc sulfate  220 mg Oral or NG Tube Daily                  Physical Exam:   Vitals were reviewed  Vital Signs with Ranges  Temp:  [98.1  F (36.7  C)-99.1  F (37.3  C)] 98.1  F (36.7  C)  Pulse:  [52-74] 53  Resp:  [11-30] 15  BP: ()/(45-91) 89/55  SpO2:  [87 %-100 %] 93 %  I/O last 3 completed shifts:  In: 1770 [I.V.:900; NG/GT:470]  Out: 3330 [Urine:2600; Chest Tube:730]  Constitutional: alert, mild distress, cooperative and pale   Cardiovascular: negative, PMI normal. No lifts, heaves, or thrills. RRR. No murmurs, clicks gallops or rub  Respiratory: wheezy, Percussion normal. Good diaphragmatic excursion.   Abdomen: Abdomen soft, non-tender. BS normal. No masses, organomegaly           Data:   I reviewed the patient's new clinical lab test results.   Recent Labs   Lab Test 06/29/22  0514 06/28/22  0509 06/27/22  0648 06/25/22  2234 06/19/22  0622 06/18/22  2041 06/16/22  0802 06/14/22  1903 06/06/22  0659 06/05/22  0728   WBC 11.1*  --  5.5 6.4   < > 4.6   < > 5.7   < > 3.8*   HGB 10.7*  --  12.3* 13.0*   < > 12.6*   < > 12.6*   < > 12.0*   MCV 98  --  97 97   < > 97   < > 96   < > 95    140* 159 158   < > 152   < > 185   < > 92*   INR  --   --   --   --   --  1.74*  --  1.64*  --  1.73*    < > = values in this interval not displayed.     Recent Labs   Lab Test 06/29/22  0514 06/28/22  0509 06/27/22  0648   POTASSIUM 3.3* 3.7 3.7   CHLORIDE 116* 115* 113*   CO2 28 30 33*   BUN 32* 33* 35*   ANIONGAP 5 4 3     Recent Labs   Lab Test 06/29/22 0514 06/28/22  0509  06/27/22  0648 06/21/22  0704 06/20/22  0715 06/18/22  2041 06/15/22  1011 06/15/22  0034 06/05/22  0728 06/04/22  1653 05/08/22 2032 05/08/22  0934   ALBUMIN 3.2* 2.9* 1.8* 1.7* 1.9* 2.2*  --   --    < >  --    < >  --    BILITOTAL  --   --  1.7* 0.5 0.5 0.6  --   --    < >  --    < >  --    ALT  --   --  42 12 15 24  --   --    < >  --    < >  --    AST  --   --  67* 23 20 23  --   --    < >  --    < >  --    PROTEIN  --   --   --   --   --   --   --  Negative  --  Negative  --  Negative   LIPASE  --   --  152  --  1,236* 579*  --   --    < >  --    < >  --    AMYLASE  --   --   --   --   --   --  142*  --   --   --   --   --     < > = values in this interval not displayed.       I reviewed the patient's new imaging results.    All laboratory data reviewed  All imaging studies reviewed by me.    Isabelle Pantoja PA-C,  6/16/2022  [facility]  Office : [phone]  Cell: [phone] (Dr. Clark)  Cell: [phone] (Isabelle Pantoja PA-C)

## 2022-06-29 NOTE — PROGRESS NOTES
DATE & TIME: 6/29/22; 7a-7p    Cognitive Concerns/ Orientation : A/O x2 to person and place  BEHAVIOR & AGGRESSION TOOL COLOR: green  CIWA SCORE: n/a   ABNL VS/O2: VSS on RA; trending bradycardic; BP soft  MOBILITY: A2 w/ lift sheet. Pt remained in bed for shift; turned and repo q2  PAIN MANAGMENT: non pharm pain interventions; no medications given for pain  DIET: NPO; pt on TF at 55mL/hr with 200mL flush; q4 BS checks  BOWEL/BLADDER: incontinent B/B; external catheter placed. Pt voiding adequately; loose BM x2  ABNL LAB/BG: K+ low; replacement protocol followed; new result normal range; Na trending down  DRAIN/DEVICES: CTx1. GJ tube; PIVx1 infusing   TELEMETRY RHYTHM: NSR  SKIN: WDL X: CTx1; GJ tube; PIV x1 infusing  TESTS/PROCEDURES: n/a  OTHER IMPORTANT INFO: pt was uncooperative at times when providing daily cares. Pt became annoyed with orientation questions and refused to answer. Refused oral cares at times

## 2022-06-29 NOTE — PROGRESS NOTES
[facility]    Hospitalist Progress Note    Assessment & Plan   Jagdeep Walker is a 51 year-old male with complex history including seizure disorder, schizoaffective disorder, recent pancreatitis with pseudocysts, history of portal vein thrombosis, recent admission for metabolic encephalopathy of unclear etiology who presents with shortness of breath and hypoxia with large pleural effusion on outside chest x-ray.  Admitted on 6/14/2022.      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  Recent echocardiogram 6/4 normal.  Pleural fluid studies consistent with transudate, specimen clotted for cell count, normal glucose, no organisms on gram stain.  Cytology negative x 2  Given lipase and amylase increase, perhaps pleural effusion related to pancreatitis with fistula or leak versus malnutrition related to severe pancreatitis  Chest tube placed on 6/18 and replaced on 6/22 as it appeared clogged  RRT for 6/20 chest pain, resolved with pain medications  6/22: Respiratory status worsened, likely due to worsening pleural effusions from his clogged chest tube.  Chest tube exchanged and has had significant output since with suction   * Culture from the pleural with diptheroids in broth only, c/w contaminant  - Titrate O2 as able.  Currently on Ventimask at 3 L.  We will continue to titrate down.  - ID consulted for the pleural culture  results.  Felt contaminant and no need for antibiotics   -Patient is followed by GI, pulmonary, neurology and ID  - CT surgery and IR following and appreciate their recommendations.    Chest tube management as per CT surgery.    Current recommendation is to continue CT with suction.  Pleurodesis not appropriate in this situation.  -Patient continues to have significant output through the chest tube despite 4 days of octreotide.  After discussion with GI it was decided to discontinue octreotide.  Although it has been felt that this was all pancreatico pleural fistula there is no imaging evidence of this.  We will repeat MRCP to evaluate again.  Question if this could also be hepatic hydrothorax since patient had ascites in the past but now that has improved but he keeps having transudative pleural effusion.  CT chest abdomen pelvis pelvis done on 6/14/2022 did show cavernous transformation of portal vein with gastric varices suggestive of portal hypertension and he had ascites at that time.  Fluid amylase also is only marginally elevated whereas in pancreatico pleural fistula amylase levels are usually over 100,000.     Patient has pain scheduled on 25 percentage albumin every 8 hours, albumin level at start of drip was 1.8 today, it was 2.2 on presentation.now 3.2.  Hypernatremia  --Slowly improving, patient was n.p.o. so did not receive free water overnight, sodium has gone up to 149 on 6/28, increased free water and added D5W for now.  -- Increase free water further, he was n.p.o. temporarily yesterday, will wait and see what her sodium heads, possibly can reduce D5W today and stop by tomorrow.  Acute/subacute pancreatitis with pseudocyst   *Hospitalized 5/8-5/17/22 for pancreatitis with pseudocyst formation. Unclear etiology, EUS unrevealing for etiology, possibly due to medications  *CT on admission with acute pancreatitis with slightly improved inflammatory changes, pseudocysts not significantly changed.  -he  had a PEJ tube placed with IR on 6/28, currently tube feeds are at goal, plan is to do ERCP in 4 weeks, patient should be n.p.o. until then.  - GI following and appreciate their recommendations.  Stop octreotide.  Stop Creon supplementation since he is getting postpyloric feeds for which we do not need pancreatic enzymes.  Patient has been getting sodium bicarb along with his Creon which could be worsening his hyponatremia.  He also has metabolic alkalosis.  Patient was given albumin every 8 hourly for 48 hours, albumin levels improved, current plan is to hold off on IV albumin and review his albumin levels and 1 or 2 days.     Recent metabolic encephalopathy   Mild zinc deficiency   *Extensively worked up during admission 6/4-6/10/22 including head CT, MRI brain, EEG, paraneoplastic ab, extensive laboratory work-up mostly unremarkable with exception of mildly low zinc.  *Per sister, patient does have some cognitive impairment at baseline, though is typically oriented to self, family and can carry on an extensive conversation especially regarding topics he enjoys (eg sports). He remains below his baseline.  -MRI brain has been repeated again on 6/27 without any new findings .  - Continue zinc supplementation     Hx of portal vein thrombosis  * Diagnosed during admission 5/2022, improved on subsequent CT and not treated with anticoagulation   * Non-occlusive main portal vein thrombosis, splenic vein thrombosis seen on admission CT  - GI consulted as above, they advised no need to treat PVT previous admission (6/9/2022)  -This could be causing significant portal hypertension and worsening ascites which in turn could be causing worsening pleural effusion.     Depression  Anxiety  Schizoaffective disorder, bipolar type  - Continue prior to admission sertraline, risperidone     Seizure disorder  -PTA on topiramate and Depakote, currently Depakote is on hold, levels are pending, continue topiramate.  Neurology has been  consulted for patient's encephalopathy, current plan is to obtain MRI of the brain, EEG obtained.  MRI did not indicate any new changes.  Topiramate levels pending.     Carnitine deficiency  - Continue prior to admission levocarnitine      Hypertension  - Continue prior to admission atenolol      Fisher's esophagus  - Continue prior to admission PPI     Hypothyroidism  - Continue prior to admission levothyroxine     Seasonal allergies  - Continue prior to admission levocetirizine    DVT Prophylaxis: We will order Lovenox for now.  Code Status: Full Code     Disposition: Expected discharge in few days    Patsy Gannon MD  Text Page   (7am to 6pm)    Interval History   Patient occasionally opens eyes and answers questions.    -Data reviewed today: I reviewed all new labs and imaging results over the last 24 hours.  Physical Exam   Temp: 99.2  F (37.3  C) Temp src: Oral BP: 90/53 Pulse: 53   Resp: 14 SpO2: 94 % O2 Device: None (Room air) Oxygen Delivery: 1 LPM  Vitals:    06/26/22 0531 06/27/22 0518 06/28/22 0534   Weight: 83.7 kg (184 lb 9.6 oz) 82.4 kg (181 lb 11.2 oz) 80.8 kg (178 lb 1.6 oz)     Vital Signs with Ranges  Temp:  [98.1  F (36.7  C)-99.2  F (37.3  C)] 99.2  F (37.3  C)  Pulse:  [52-74] 53  Resp:  [11-25] 14  BP: ()/(45-91) 90/53  SpO2:  [92 %-100 %] 94 %  I/O last 3 completed shifts:  In: 1770 [I.V.:900; NG/GT:470]  Out: 3330 [Urine:2600; Chest Tube:730]    Constitutional: Sleepy, Nj tube noted, 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  Corneal edema noted there.  Respiratory: Breath sounds reduced at the bases, left-sided crackles present, chest tube present  Cardiovascular: Regular rate and rhythm, normal S1 and S2, and no murmur noted  GI: Normal bowel sounds, soft, non-distended, non-tender  Skin/Integumen: No rashes, no cyanosis, no edema  Neuro : moving all 4 extremities, no focal deficit noted     Medications     dextrose       D5W 100 mL/hr at 06/29/22 0000       [Held by provider]  amylase-lipase-protease  1-2 capsule Per Feeding Tube Q4H     atenolol  25 mg Oral or NG Tube Daily     carboxymethylcellulose PF  1 drop Right Eye At Bedtime     ipratropium  2 spray Both Nostrils TID     levOCARNitine  330 mg Oral or NG Tube TID     levocetirizine  5 mg Oral or NG Tube QPM     levothyroxine  50 mcg Oral or NG Tube Daily     montelukast  10 mg Oral or NG Tube At Bedtime     pantoprazole  40 mg Oral or Feeding Tube BID     prednisoLONE acetate  1 drop Right Eye Daily     protein modular  1 packet Per Feeding Tube TID     risperiDONE  2 mg Oral or NG Tube BID     sertraline  100 mg Oral or NG Tube Daily     [Held by provider] sodium bicarbonate  325 mg Per Feeding Tube Q4H     sodium chloride (PF)  3 mL Intracatheter Q8H     topiramate  100 mg Oral or NG Tube At Bedtime     [Held by provider] valproic acid  250 mg Oral or NG Tube At Bedtime     [Held by provider] valproic acid  500 mg Oral or NG Tube BID     zinc sulfate  220 mg Oral or NG Tube Daily       Data   Recent Labs   Lab 06/29/22  1218 06/29/22  1037 06/29/22  0514 06/29/22  0413 06/29/22  0016 06/28/22  1149 06/28/22  0509 06/27/22  0842 06/27/22  0648 06/26/22  0131 06/25/22  2234   WBC  --   --  11.1*  --   --   --   --   --  5.5  --  6.4   HGB  --   --  10.7*  --   --   --   --   --  12.3*  --  13.0*   MCV  --   --  98  --   --   --   --   --  97  --  97   PLT  --   --  176  --   --   --  140*  --  159  --  158   *  --  149*  149*  --  151*   < > 149*  149*   < > 149*  149*   < > 154*   POTASSIUM  --   --  3.3*  --   --   --  3.7  --  3.7  --  3.7   CHLORIDE  --   --  116*  --   --   --  115*  --  113*  --  117*   CO2  --   --  28  --   --   --  30  --  33*  --  33*   BUN  --   --  32*  --   --   --  33*  --  35*  --  33*   CR  --   --  0.90  --   --   --  0.82  --  0.82  --  0.86   ANIONGAP  --   --  5  --   --   --  4  --  3  --  4   NASIR  --   --  9.5  --   --   --  9.4  --  9.1  --  9.1   GLC  --  118* 111* 147* 159*   < >  105*   < > 176*  --  163*   ALBUMIN  --   --  3.2*  --   --   --  2.9*  --  1.8*   < >  --    PROTTOTAL  --   --   --   --   --   --   --   --  5.4*  --   --    BILITOTAL  --   --   --   --   --   --   --   --  1.7*  --   --    ALKPHOS  --   --   --   --   --   --   --   --  144  --   --    ALT  --   --   --   --   --   --   --   --  42  --   --    AST  --   --   --   --   --   --   --   --  67*  --   --    LIPASE  --   --   --   --   --   --   --   --  152  --   --     < > = values in this interval not displayed.     Recent Labs   Lab 06/29/22  1037 06/29/22  0514 06/29/22  0413 06/29/22  0016 06/28/22  2231   * 111* 147* 159* 137*       Imaging:   Recent Results (from the past 24 hour(s))   IR Gastro Jejunostomy Tube Placement    Narrative    G-J TUBE PLACEMENT 6/28/2022 3:11 PM    HISTORY: Unable to take adequate oral nutrition. Patient requires a GJ  tube for nutritional purposes. Patient has a gastric outlet  obstruction and therefore jejunal tube required for feedings.    DESCRIPTION OF PROCEDURE: After obtaining informed consent, the  patient was placed in a supine position on the fluoroscopy table. The  liver edge was marked. A nasogastric tube had been placed into the  stomach. The stomach was inflated with air.     Two T-TAC suture anchors were used to enter the stomach. A small skin  incision was made in between the T-TAC entry sites. An 18-gauge needle  was used to enter the stomach through the incision. A wire was passed  and curled in the stomach. The wire was then maneuvered into the  proximal jejunum using a RIGOBERTO one catheter. A stiff wire was placed. A  tract for a GJ tube was dilated. An 18 Finnish peel-away sheath was  placed. Through the peel-away sheath, a 16 Finnish G-J tube was placed.  The balloon was inflated with a mixture of 1 mL contrast and 9 mL  saline. Balloon was pulled back so that it was against the anterior  stomach wall. Contrast was injected to document adequate  positioning.  A fluoroscopic image was saved to document tube position.     The patient was independently monitored by a registered nurse assigned  to the Department of radiology using automated blood pressure, EKG and  pulse oximetry. The patient tolerated the procedure well. There were  no immediate postprocedure complications. The patient's vital signs  were monitored by radiology nursing staff under my supervision and  remained stable throughout the study. Radiation dose for this scan was  reduced using automated exposure control, adjustment of the mA and/or  kV according to patient size, or iterative reconstruction technique.    MEDICATIONS: None.    SEDATION TIME: None    FLUOROSCOPY TIME: 9.1 minutes    FLUOROSCOPIC DOSE: 209 mGy Air Kerma    NUMBER OF FLUOROSCOPIC IMAGES: 3    CONTRAST: 45 cc Omnipaque      Impression    IMPRESSION: GJ tube placed as above.    DEE COOMBS MD         SYSTEM ID:  [id number]

## 2022-06-30 ENCOUNTER — APPOINTMENT (OUTPATIENT)
Dept: GENERAL RADIOLOGY | Facility: CLINIC | Age: 52
DRG: 438 | End: 2022-06-30
Attending: THORACIC SURGERY (CARDIOTHORACIC VASCULAR SURGERY)
Payer: MEDICARE

## 2022-06-30 LAB
ALBUMIN UR-MCNC: 50 MG/DL
APPEARANCE UR: ABNORMAL
BACTERIA #/AREA URNS HPF: ABNORMAL /HPF
BILIRUB UR QL STRIP: NEGATIVE
COLOR UR AUTO: YELLOW
GLUCOSE BLD-MCNC: 116 MG/DL (ref 70–99)
GLUCOSE BLDC GLUCOMTR-MCNC: 120 MG/DL (ref 70–99)
GLUCOSE UR STRIP-MCNC: NEGATIVE MG/DL
HGB UR QL STRIP: ABNORMAL
HOLD SPECIMEN: NORMAL
KETONES UR STRIP-MCNC: NEGATIVE MG/DL
LEUKOCYTE ESTERASE UR QL STRIP: ABNORMAL
MUCOUS THREADS #/AREA URNS LPF: PRESENT /LPF
NITRATE UR QL: NEGATIVE
PH UR STRIP: 8 [PH] (ref 5–7)
POTASSIUM BLD-SCNC: 3.4 MMOL/L (ref 3.4–5.3)
RBC URINE: 33 /HPF
SODIUM SERPL-SCNC: 143 MMOL/L (ref 133–144)
SODIUM SERPL-SCNC: 144 MMOL/L (ref 133–144)
SODIUM SERPL-SCNC: 144 MMOL/L (ref 133–144)
SODIUM SERPL-SCNC: 147 MMOL/L (ref 133–144)
SP GR UR STRIP: 1.01 (ref 1–1.03)
UROBILINOGEN UR STRIP-MCNC: 12 MG/DL
VALPROATE SERPL-MCNC: <3 MG/L
WBC CLUMPS #/AREA URNS HPF: PRESENT /HPF
WBC URINE: >182 /HPF

## 2022-06-30 PROCEDURE — 250N000013 HC RX MED GY IP 250 OP 250 PS 637: Performed by: INTERNAL MEDICINE

## 2022-06-30 PROCEDURE — 84295 ASSAY OF SERUM SODIUM: CPT | Performed by: HOSPITALIST

## 2022-06-30 PROCEDURE — 250N000013 HC RX MED GY IP 250 OP 250 PS 637: Performed by: HOSPITALIST

## 2022-06-30 PROCEDURE — 36415 COLL VENOUS BLD VENIPUNCTURE: CPT | Performed by: INTERNAL MEDICINE

## 2022-06-30 PROCEDURE — 36415 COLL VENOUS BLD VENIPUNCTURE: CPT | Performed by: HOSPITALIST

## 2022-06-30 PROCEDURE — 71045 X-RAY EXAM CHEST 1 VIEW: CPT

## 2022-06-30 PROCEDURE — 84132 ASSAY OF SERUM POTASSIUM: CPT | Performed by: INTERNAL MEDICINE

## 2022-06-30 PROCEDURE — 99233 SBSQ HOSP IP/OBS HIGH 50: CPT | Performed by: INTERNAL MEDICINE

## 2022-06-30 PROCEDURE — 82947 ASSAY GLUCOSE BLOOD QUANT: CPT | Performed by: INTERNAL MEDICINE

## 2022-06-30 PROCEDURE — 250N000013 HC RX MED GY IP 250 OP 250 PS 637

## 2022-06-30 PROCEDURE — 250N000011 HC RX IP 250 OP 636: Performed by: INTERNAL MEDICINE

## 2022-06-30 PROCEDURE — 81001 URINALYSIS AUTO W/SCOPE: CPT | Performed by: INTERNAL MEDICINE

## 2022-06-30 PROCEDURE — 80164 ASSAY DIPROPYLACETIC ACD TOT: CPT | Performed by: INTERNAL MEDICINE

## 2022-06-30 PROCEDURE — 87086 URINE CULTURE/COLONY COUNT: CPT | Performed by: INTERNAL MEDICINE

## 2022-06-30 PROCEDURE — 120N000013 HC R&B IMCU

## 2022-06-30 RX ORDER — CEFTRIAXONE 1 G/1
1 INJECTION, POWDER, FOR SOLUTION INTRAMUSCULAR; INTRAVENOUS EVERY 24 HOURS
Status: DISCONTINUED | OUTPATIENT
Start: 2022-06-30 | End: 2022-07-04

## 2022-06-30 RX ADMIN — ENOXAPARIN SODIUM 30 MG: 30 INJECTION SUBCUTANEOUS at 16:58

## 2022-06-30 RX ADMIN — PANCRELIPASE 2 CAPSULE: 60000; 12000; 38000 CAPSULE, DELAYED RELEASE PELLETS ORAL at 16:58

## 2022-06-30 RX ADMIN — PREDNISOLONE ACETATE 1 DROP: 10 SUSPENSION/ DROPS OPHTHALMIC at 09:00

## 2022-06-30 RX ADMIN — Medication 40 MG: at 20:32

## 2022-06-30 RX ADMIN — LEVOTHYROXINE SODIUM 50 MCG: 50 TABLET ORAL at 08:54

## 2022-06-30 RX ADMIN — IPRATROPIUM BROMIDE 2 SPRAY: 42 SPRAY NASAL at 17:04

## 2022-06-30 RX ADMIN — ZINC SULFATE 220 MG (50 MG) CAPSULE 220 MG: CAPSULE at 08:54

## 2022-06-30 RX ADMIN — MONTELUKAST 10 MG: 10 TABLET, FILM COATED ORAL at 22:47

## 2022-06-30 RX ADMIN — Medication 1 PACKET: at 16:59

## 2022-06-30 RX ADMIN — Medication 40 MG: at 08:54

## 2022-06-30 RX ADMIN — SODIUM BICARBONATE 325 MG: 325 TABLET ORAL at 20:33

## 2022-06-30 RX ADMIN — Medication 1 PACKET: at 09:00

## 2022-06-30 RX ADMIN — LEVOCARNITINE 330 MG: 330 TABLET ORAL at 20:25

## 2022-06-30 RX ADMIN — TOPIRAMATE 100 MG: 100 TABLET, FILM COATED ORAL at 22:48

## 2022-06-30 RX ADMIN — LEVOCARNITINE 330 MG: 330 TABLET ORAL at 08:54

## 2022-06-30 RX ADMIN — ATENOLOL 25 MG: 25 TABLET ORAL at 08:54

## 2022-06-30 RX ADMIN — PANCRELIPASE 2 CAPSULE: 60000; 12000; 38000 CAPSULE, DELAYED RELEASE PELLETS ORAL at 20:33

## 2022-06-30 RX ADMIN — IPRATROPIUM BROMIDE 2 SPRAY: 42 SPRAY NASAL at 08:59

## 2022-06-30 RX ADMIN — LEVOCARNITINE 330 MG: 330 TABLET ORAL at 14:40

## 2022-06-30 RX ADMIN — RISPERIDONE 2 MG: 2 TABLET ORAL at 08:54

## 2022-06-30 RX ADMIN — CARBOXYMETHYLCELLULOSE SODIUM 1 DROP: 10 GEL OPHTHALMIC at 22:48

## 2022-06-30 RX ADMIN — SODIUM BICARBONATE 325 MG: 325 TABLET ORAL at 12:52

## 2022-06-30 RX ADMIN — SODIUM BICARBONATE 325 MG: 325 TABLET ORAL at 16:59

## 2022-06-30 RX ADMIN — CEFTRIAXONE SODIUM 1 G: 1 INJECTION, POWDER, FOR SOLUTION INTRAMUSCULAR; INTRAVENOUS at 14:40

## 2022-06-30 RX ADMIN — SERTRALINE HYDROCHLORIDE 100 MG: 100 TABLET ORAL at 08:54

## 2022-06-30 RX ADMIN — PANCRELIPASE 2 CAPSULE: 60000; 12000; 38000 CAPSULE, DELAYED RELEASE PELLETS ORAL at 12:52

## 2022-06-30 RX ADMIN — Medication 1 PACKET: at 22:49

## 2022-06-30 RX ADMIN — LEVOCETIRIZINE DIHYDROCHLORIDE 5 MG: 5 TABLET ORAL at 20:25

## 2022-06-30 RX ADMIN — RISPERIDONE 2 MG: 2 TABLET ORAL at 20:25

## 2022-06-30 ASSESSMENT — ACTIVITIES OF DAILY LIVING (ADL)
ADLS_ACUITY_SCORE: 49
ADLS_ACUITY_SCORE: 53
ADLS_ACUITY_SCORE: 49
ADLS_ACUITY_SCORE: 53
ADLS_ACUITY_SCORE: 49
ADLS_ACUITY_SCORE: 53
ADLS_ACUITY_SCORE: 49

## 2022-06-30 NOTE — PROGRESS NOTES
[facility]  Gastroenterology Progress Note     Jagdeep Walker MRN# [id number]   YOB: 1970 Age: 51 year old          Assessment and Plan:   Jagdeep Walker s a 51 year old male admitted with right sided pleural effusion and GI consulted to manage pancreatitis.     Active Problems:  Pleural effusion  Pancreatic pseudocyst  Idiopathic acute pancreatitis, unspecified complication status  Pancreaticopleural fistula  *Lipase 1,730->689, AST, ALT and Alk Phos normal. Lactic acid 1.9. WBC wnl, Hgb 12.6,    *6/14 CT A/P note improved inflammatory changes in pancreas. Pseudocysts not significantly changed. Gastric wall thickening has progressed consistent with secondary gastritis with new pseudocyst along the proximal posterior gastric wall. Ascites decreased. Wall thickening involving the cecum and transverse colon likely secondarily involved. Large right pleural effusion has significantly increased.  *Cause of pancreatitis is idiopathic- no evidence of gallstones or alcohol use. May be due to medication. 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 Contrast was injected and flowed through pancreatic ductal system and extravasated from the pancreatic duct in the body of the pancreas with pancreatic duct leak and communicating pseudocyst. Pancreatic sphincterotomy performed. One stent placed into ventral pancreatic duct.  *These findings confirm pancreaticopleural fistula  *NJ tube in place  * Chest tube output greatly improved only 125 ml in last 14 hours  * Respiratory status greatly improved on room air.  *6/26/22 MRCP significant for 2 peripheral enhancing cystic masses- one in tail  2.6 x 4.2 x 3.1 cm and head mass 2.5 cm x 4.1 cm x 4.0 cm. The head lesion has a fistulous tract extending in the adjacent duodenum- one in tail has no obvious fistulous tract. No mention of stent placement or  pancreaticopleural fistula. Per radiology above tail/head pseudocysts are enlarging comparted to prior MRCP.  MRCP measurement of cystic lesions in pancreas not listed on MRCP from 6/6/22, however 5/9/22 MRCP noted 2.5 x 2.3 x 1.3 cm tail lesion and 3.1 x 2.8 x 2.8 cm head lesion.    * CRP elevated at 73->107 this a.m. likely from GJ placement- will follow, Lipase 152 and normal  * Albumin 1.8- recommend above 3.0 to improve ascites/effusion. Today was 3.2  * CA 19-9  Normal at 26. Unlikely malignancy      -- Consider repeat ERCP around 7/21, will follow with imaging and may consider EUS with necrosectomy if minimal improvement  -- GJ placed by IR on 6/28/22. Appreciate help. Will need to keep NPO to allow improvement in pancreatitis and fistula  -- Continue creon- given known pancreatitis- would not see reason to check fecal elastase given clinical picture  -- Expect multiple weeks until able to tolerate oral intake- need to see evidence of fistula closure--  --  Will hold albumin infusions and recheck albumin in 48 hours                Interval History:   no new complaints, denies chest pain, pain is controlled and has ongoing abdominal pain. Unable to communicate.              Review of Systems:   C: NEGATIVE for fever, chills, change in weight  E/M: NEGATIVE for ear, mouth and throat problems  R: NEGATIVE for significant cough or SOB  CV: NEGATIVE for chest pain, palpitations or peripheral edema             Medications:   I have reviewed this patient's current medications    [Held by provider] amylase-lipase-protease  1-2 capsule Per Feeding Tube Q4H     atenolol  25 mg Oral or NG Tube Daily     carboxymethylcellulose PF  1 drop Right Eye At Bedtime     enoxaparin ANTICOAGULANT  30 mg Subcutaneous Q24H     ipratropium  2 spray Both Nostrils TID     levOCARNitine  330 mg Oral or NG Tube TID     levocetirizine  5 mg Oral or NG Tube QPM     levothyroxine  50 mcg Oral or NG Tube Daily     montelukast  10 mg Oral or  NG Tube At Bedtime     pantoprazole  40 mg Oral or Feeding Tube BID     prednisoLONE acetate  1 drop Right Eye Daily     protein modular  1 packet Per Feeding Tube TID     risperiDONE  2 mg Oral or NG Tube BID     sertraline  100 mg Oral or NG Tube Daily     [Held by provider] sodium bicarbonate  325 mg Per Feeding Tube Q4H     sodium chloride (PF)  3 mL Intracatheter Q8H     topiramate  100 mg Oral or NG Tube At Bedtime     [Held by provider] valproic acid  250 mg Oral or NG Tube At Bedtime     [Held by provider] valproic acid  500 mg Oral or NG Tube BID     zinc sulfate  220 mg Oral or NG Tube Daily                  Physical Exam:   Vitals were reviewed  Vital Signs with Ranges  Temp:  [98.2  F (36.8  C)-99.2  F (37.3  C)] 98.3  F (36.8  C)  Pulse:  [53-71] 68  Resp:  [12-28] 18  BP: ()/(47-67) 92/54  SpO2:  [90 %-97 %] 97 %  I/O last 3 completed shifts:  In: 2938.33 [I.V.:1363.33; NG/GT:940]  Out: 2610 [Urine:2000; Chest Tube:610]  Constitutional: alert, mild distress, cooperative and pale   Cardiovascular: negative, PMI normal. No lifts, heaves, or thrills. RRR. No murmurs, clicks gallops or rub  Respiratory: wheezy, Percussion normal. Good diaphragmatic excursion.   Abdomen: Abdomen soft, non-tender. BS normal. No masses, organomegaly           Data:   I reviewed the patient's new clinical lab test results.   Recent Labs   Lab Test 06/29/22  0514 06/28/22  0509 06/27/22  0648 06/25/22  2234 06/19/22  0622 06/18/22  2041 06/16/22  0802 06/14/22  1903 06/06/22  0659 06/05/22  0728   WBC 11.1*  --  5.5 6.4   < > 4.6   < > 5.7   < > 3.8*   HGB 10.7*  --  12.3* 13.0*   < > 12.6*   < > 12.6*   < > 12.0*   MCV 98  --  97 97   < > 97   < > 96   < > 95    140* 159 158   < > 152   < > 185   < > 92*   INR  --   --   --   --   --  1.74*  --  1.64*  --  1.73*    < > = values in this interval not displayed.     Recent Labs   Lab Test 06/30/22  0614 06/29/22  1527 06/29/22  0514 06/28/22  0509 06/27/22  0648    POTASSIUM 3.4 3.6 3.3* 3.7 3.7   CHLORIDE  --   --  116* 115* 113*   CO2  --   --  28 30 33*   BUN  --   --  32* 33* 35*   ANIONGAP  --   --  5 4 3     Recent Labs   Lab Test 06/29/22  0514 06/28/22  0509 06/27/22  0648 06/21/22  0704 06/20/22  0715 06/18/22 2041 06/15/22  1011 06/15/22  0034 06/05/22  0728 06/04/22  1653 05/08/22 2032 05/08/22  0934   ALBUMIN 3.2* 2.9* 1.8* 1.7* 1.9* 2.2*  --   --    < >  --    < >  --    BILITOTAL  --   --  1.7* 0.5 0.5 0.6  --   --    < >  --    < >  --    ALT  --   --  42 12 15 24  --   --    < >  --    < >  --    AST  --   --  67* 23 20 23  --   --    < >  --    < >  --    PROTEIN  --   --   --   --   --   --   --  Negative  --  Negative  --  Negative   LIPASE  --   --  152  --  1,236* 579*  --   --    < >  --    < >  --    AMYLASE  --   --   --   --   --   --  142*  --   --   --   --   --     < > = values in this interval not displayed.       I reviewed the patient's new imaging results.    All laboratory data reviewed  All imaging studies reviewed by me.    Isabelle Pantoja PA-C,  6/16/2022  [facility]  Office : [phone]  Cell: [phone] (Dr. Clark)  Cell: [phone] (Isabelle Pantoja PA-C)

## 2022-06-30 NOTE — PROGRESS NOTES
[facility]    Hospitalist Progress Note    Assessment & Plan   Jagdeep Walker is a 51 year-old male with complex history including seizure disorder, schizoaffective disorder, recent pancreatitis with pseudocysts, history of portal vein thrombosis, recent admission for metabolic encephalopathy of unclear etiology who presents with shortness of breath and hypoxia with large pleural effusion on outside chest x-ray.  Admitted on 6/14/2022.      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  Recent echocardiogram 6/4 normal.  Pleural fluid studies consistent with transudate, specimen clotted for cell count, normal glucose, no organisms on gram stain.  Cytology negative x 2  Given lipase and amylase increase, perhaps pleural effusion related to pancreatitis with fistula or leak versus malnutrition related to severe pancreatitis  Chest tube placed on 6/18 and replaced on 6/22 as it appeared clogged  RRT for 6/20 chest pain, resolved with pain medications  6/22: Respiratory status worsened, likely due to worsening pleural effusions from his clogged chest tube.  Chest tube exchanged and has had significant output since with suction   * Culture from the pleural with diptheroids in broth only, c/w contaminant  - Titrate O2 as able.  Currently on Ventimask at 3 L.  We will continue to titrate down.  - ID consulted for the pleural culture  results.  Felt contaminant and no need for antibiotics   -Patient is followed by GI, pulmonary, neurology and ID  - CT surgery and IR following and appreciate their recommendations.    Chest tube management as per CT surgery.    Current recommendation is to continue CT with suction.  Pleurodesis not appropriate in this situation.  -Patient continues to have significant output through the chest tube despite 4 days of octreotide.  After discussion with GI it was decided to discontinue octreotide.  Although it has been felt that this was all pancreatico pleural fistula there is no imaging evidence of this.  We will repeat MRCP to evaluate again.  Question if this could also be hepatic hydrothorax since patient had ascites in the past but now that has improved but he keeps having transudative pleural effusion.  CT chest abdomen pelvis pelvis done on 6/14/2022 did show cavernous transformation of portal vein with gastric varices suggestive of portal hypertension and he had ascites at that time.  Fluid amylase also is only marginally elevated whereas in pancreatico pleural fistula amylase levels are usually over 100,000.     Patient has pain scheduled on 25 percentage albumin every 8 hours, albumin level at start of drip was 1.8 today, it was 2.2 on presentation.went up to 3.2 on 6/29, plan is to repeat on 7/1.  --Patient seems to be stable except for the chest tube, will need input from CT surgery and GI as to what to do with the chest tube going forward.  UTI  --White count was slightly elevated, requested UA which is abnormal,  started on Rocephin 6/30.  Hypernatremia  --Slowly improving, patient was n.p.o. so did not receive free water overnight, sodium has gone up to 149 on 6/28, increased free water and added D5W for now.  -- Sodium is back to normal at 144, increased free water through the GJ tube, D5W stopped.  Acute/subacute pancreatitis with pseudocyst   *Hospitalized 5/8-5/17/22 for pancreatitis with pseudocyst  formation. Unclear etiology, EUS unrevealing for etiology, possibly due to medications  *CT on admission with acute pancreatitis with slightly improved inflammatory changes, pseudocysts not significantly changed.  -he had a PEJ tube placed with IR on 6/28, currently tube feeds are at goal, plan is to do ERCP in 4 weeks, patient should be n.p.o. until then.  - GI following and appreciate their recommendations.  Stop octreotide.  Stop Creon supplementation since he is getting postpyloric feeds for which we do not need pancreatic enzymes.  Patient has been getting sodium bicarb along with his Creon which could be worsening his hyponatremia.  He also has metabolic alkalosis.  Patient was given albumin every 8 hourly for 48 hours, albumin levels improved, current plan is to hold off on IV albumin and review his albumin levels and 1 or 2 days.     Recent metabolic encephalopathy   Mild zinc deficiency   *Extensively worked up during admission 6/4-6/10/22 including head CT, MRI brain, EEG, paraneoplastic ab, extensive laboratory work-up mostly unremarkable with exception of mildly low zinc.  *Per sister, patient does have some cognitive impairment at baseline, though is typically oriented to self, family and can carry on an extensive conversation especially regarding topics he enjoys (eg sports). He remains below his baseline.  -MRI brain has been repeated again on 6/27 without any new findings .  - Continue zinc supplementation     Hx of portal vein thrombosis  * Diagnosed during admission 5/2022, improved on subsequent CT and not treated with anticoagulation   * Non-occlusive main portal vein thrombosis, splenic vein thrombosis seen on admission CT  - GI consulted as above, they advised no need to treat PVT previous admission (6/9/2022)  -This could be causing significant portal hypertension and worsening ascites which in turn could be causing worsening pleural effusion.     Depression  Anxiety  Schizoaffective disorder,  bipolar type  - Continue prior to admission sertraline, risperidone     Seizure disorder  -PTA on topiramate and Depakote, currently Depakote is on hold, levels are pending, continue topiramate.  Neurology has been consulted for patient's encephalopathy, repeated MRI of the brain, EEG obtained.  MRI did not indicate any new changes.  .  --Patient is currently on topiramate, Depakote is on hold, Depakote levels reordered on 6/30, depending on the levels can restart Depakote as early as 7/1.     Carnitine deficiency  - Continue prior to admission levocarnitine      Hypertension  - Continue prior to admission atenolol      Fisher's esophagus  - Continue prior to admission PPI     Hypothyroidism  - Continue prior to admission levothyroxine     Seasonal allergies  - Continue prior to admission levocetirizine    DVT Prophylaxis: We will order Lovenox for now.  Code Status: Full Code     Disposition: Expected discharge in few days    Patsy Gannon MD  Text Page   (7am to 6pm)    Interval History   Patient is more alert today, earlier overnight the output was 200 mL in the chest tube, when he started moving around it went up, he is currently sitting in the chair, will get PT/OT evaluate patient today.  He has a indwelling Santana catheter, UA obtained which is abnormal.    -Data reviewed today: I reviewed all new labs and imaging results over the last 24 hours.  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  There is corneal edema present.  Respiratory: Breath sounds reduced at the bases, left-sided crackles present, chest tube present  Cardiovascular: Regular rate and rhythm, normal S1 and S2, and no murmur noted  GI: Normal bowel sounds, soft, non-distended, non-tender  Skin/Integumen: No rashes, no cyanosis, no edema  Neuro : moving all 4 extremities, no focal deficit noted     Medications     dextrose         amylase-lipase-protease  1-2 capsule Per Feeding Tube Q4H     atenolol  25 mg Oral or NG Tube Daily     carboxymethylcellulose PF  1 drop Right Eye At Bedtime     cefTRIAXone  1 g Intravenous Q24H     enoxaparin ANTICOAGULANT  30 mg Subcutaneous Q24H     ipratropium  2 spray Both Nostrils TID     levOCARNitine  330 mg Oral or NG Tube TID     levocetirizine  5 mg Oral or NG Tube QPM     levothyroxine  50 mcg Oral or NG Tube Daily     montelukast  10 mg Oral or NG Tube At Bedtime     pantoprazole  40 mg Oral or Feeding Tube BID     prednisoLONE acetate  1 drop Right Eye Daily     protein modular  1 packet Per Feeding Tube TID     risperiDONE  2 mg Oral or NG Tube BID     sertraline  100 mg Oral or NG Tube Daily     sodium bicarbonate  325 mg Per Feeding Tube Q4H     sodium chloride (PF)  3 mL Intracatheter Q8H     topiramate  100 mg Oral or NG Tube At Bedtime     [Held by provider] valproic acid  250 mg Oral or NG Tube At Bedtime     [Held by provider] valproic acid  500 mg Oral or NG Tube BID     zinc sulfate  220 mg Oral or NG Tube Daily       Data   Recent Labs   Lab 06/30/22  1139 06/30/22  0614 06/30/22  0422 06/30/22  0007 06/29/22  2155 06/29/22  1637 06/29/22  1527 06/29/22  1037 06/29/22  0514 06/28/22  1149 06/28/22  0509 06/27/22  0842 06/27/22  0648 06/26/22  0131 06/25/22  2234   WBC  --   --   --   --   --   --   --   --  11.1*  --   --   --  5.5  --  6.4   HGB  --   --   --   --   --   --   --   --  10.7*  --   --   --  12.3*  --  13.0*    MCV  --   --   --   --   --   --   --   --  98  --   --   --  97  --  97   PLT  --   --   --   --   --   --   --   --  176  --  140*  --  159  --  158    143  --  147*  --   --  147*   < > 149*  149*   < > 149*  149*   < > 149*  149*   < > 154*   POTASSIUM  --  3.4  --   --   --   --  3.6  --  3.3*  --  3.7  --  3.7  --  3.7   CHLORIDE  --   --   --   --   --   --   --   --  116*  --  115*  --  113*  --  117*   CO2  --   --   --   --   --   --   --   --  28  --  30  --  33*  --  33*   BUN  --   --   --   --   --   --   --   --  32*  --  33*  --  35*  --  33*   CR  --   --   --   --   --   --   --   --  0.90  --  0.82  --  0.82  --  0.86   ANIONGAP  --   --   --   --   --   --   --   --  5  --  4  --  3  --  4   NASIR  --   --   --   --   --   --   --   --  9.5  --  9.4  --  9.1  --  9.1   GLC  --   --  120* 116* 123*   < >  --    < > 111*   < > 105*   < > 176*  --  163*   ALBUMIN  --   --   --   --   --   --   --   --  3.2*  --  2.9*  --  1.8*   < >  --    PROTTOTAL  --   --   --   --   --   --   --   --   --   --   --   --  5.4*  --   --    BILITOTAL  --   --   --   --   --   --   --   --   --   --   --   --  1.7*  --   --    ALKPHOS  --   --   --   --   --   --   --   --   --   --   --   --  144  --   --    ALT  --   --   --   --   --   --   --   --   --   --   --   --  42  --   --    AST  --   --   --   --   --   --   --   --   --   --   --   --  67*  --   --    LIPASE  --   --   --   --   --   --   --   --   --   --   --   --  152  --   --     < > = values in this interval not displayed.     Recent Labs   Lab 06/30/22  0422 06/30/22  0007 06/29/22  2155 06/29/22  1637 06/29/22  1037   * 116* 123* 126* 118*       Imaging:   Recent Results (from the past 24 hour(s))   XR Chest Port 1 View    Narrative    EXAM: XR CHEST PORT 1 VIEW  LOCATION: [facility]  DATE/TIME: 6/30/2022 5:40 AM    INDICATION: pleural effusion  COMPARISON: 6/28/2022.    FINDINGS: Pigtail drainage  catheter projects over the right lung base laterally. No pneumothorax. The heart size is normal. The right hemidiaphragm is elevated and there is bibasilar atelectasis. The lungs are otherwise clear. Degenerative disease in the   spine.      Impression    IMPRESSION: No pneumothorax.

## 2022-06-30 NOTE — PLAN OF CARE
Pt alert and oriented x2, disoriented to place,time,situation. Uncooperative at times with cares.refused oral cares. VSS except soft BP.Tele NSR.  NPO, tube feed running 55ml/hr with 200ml/hr flush q4hrs . AX2 w/lift q2 reposition. Adequate urine output via external cath,no BM noted. CTx1 to -20 suction with yellow drainage. GJ tube CDI, new dressing applied during shift.

## 2022-06-30 NOTE — PROGRESS NOTES
"SPIRITUAL HEALTH SERVICES Progress Note  [facility]    Saw pt Jagdeep Walker per follow up visit.    Illness Narrative - Jagdeep named coming to the hospital for the pain in his stomach    Distress - Jagdeep shared that his hospitalization has been \"going on and on and on\" and he hopes that he'll be able to discharge soon. He shared his concern about if and when he might be able to return to work at some point. 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    Coping - Jagdeep named support of his family (4 sisters and 3 brothers and his in-laws) who have been coming to visit.    Meaning-Making - Not named    Plan - Will follow up in 4-5 days. [facility] remains available for support.    Desiree Lizarraga [facility]  Associate   [phone] (pager)    "

## 2022-07-01 ENCOUNTER — APPOINTMENT (OUTPATIENT)
Dept: GENERAL RADIOLOGY | Facility: CLINIC | Age: 52
DRG: 438 | End: 2022-07-01
Attending: THORACIC SURGERY (CARDIOTHORACIC VASCULAR SURGERY)
Payer: MEDICARE

## 2022-07-01 ENCOUNTER — APPOINTMENT (OUTPATIENT)
Dept: PHYSICAL THERAPY | Facility: CLINIC | Age: 52
DRG: 438 | End: 2022-07-01
Attending: INTERNAL MEDICINE
Payer: MEDICARE

## 2022-07-01 ENCOUNTER — APPOINTMENT (OUTPATIENT)
Dept: OCCUPATIONAL THERAPY | Facility: CLINIC | Age: 52
DRG: 438 | End: 2022-07-01
Attending: INTERNAL MEDICINE
Payer: MEDICARE

## 2022-07-01 LAB
ALBUMIN SERPL-MCNC: 2.6 G/DL (ref 3.4–5)
ANION GAP SERPL CALCULATED.3IONS-SCNC: 6 MMOL/L (ref 3–14)
BACTERIA UR CULT: ABNORMAL
BUN SERPL-MCNC: 38 MG/DL (ref 7–30)
CALCIUM SERPL-MCNC: 9 MG/DL (ref 8.5–10.1)
CHLORIDE BLD-SCNC: 116 MMOL/L (ref 94–109)
CO2 SERPL-SCNC: 27 MMOL/L (ref 20–32)
CREAT SERPL-MCNC: 0.89 MG/DL (ref 0.66–1.25)
ERYTHROCYTE [DISTWIDTH] IN BLOOD BY AUTOMATED COUNT: 15.3 % (ref 10–15)
GFR SERPL CREATININE-BSD FRML MDRD: >90 ML/MIN/1.73M2
GLUCOSE BLD-MCNC: 121 MG/DL (ref 70–99)
GLUCOSE BLDC GLUCOMTR-MCNC: 103 MG/DL (ref 70–99)
GLUCOSE BLDC GLUCOMTR-MCNC: 110 MG/DL (ref 70–99)
GLUCOSE BLDC GLUCOMTR-MCNC: 117 MG/DL (ref 70–99)
HCT VFR BLD AUTO: 35.7 % (ref 40–53)
HGB BLD-MCNC: 11.3 G/DL (ref 13.3–17.7)
MCH RBC QN AUTO: 30 PG (ref 26.5–33)
MCHC RBC AUTO-ENTMCNC: 31.7 G/DL (ref 31.5–36.5)
MCV RBC AUTO: 95 FL (ref 78–100)
PLATELET # BLD AUTO: 247 10E3/UL (ref 150–450)
POTASSIUM BLD-SCNC: 3.4 MMOL/L (ref 3.4–5.3)
RBC # BLD AUTO: 3.77 10E6/UL (ref 4.4–5.9)
SODIUM SERPL-SCNC: 148 MMOL/L (ref 133–144)
SODIUM SERPL-SCNC: 149 MMOL/L (ref 133–144)
SODIUM SERPL-SCNC: 149 MMOL/L (ref 133–144)
SODIUM SERPL-SCNC: 151 MMOL/L (ref 133–144)
SODIUM SERPL-SCNC: 152 MMOL/L (ref 133–144)
WBC # BLD AUTO: 7.8 10E3/UL (ref 4–11)

## 2022-07-01 PROCEDURE — 250N000011 HC RX IP 250 OP 636: Performed by: HOSPITALIST

## 2022-07-01 PROCEDURE — 82040 ASSAY OF SERUM ALBUMIN: CPT | Performed by: PHYSICIAN ASSISTANT

## 2022-07-01 PROCEDURE — 250N000013 HC RX MED GY IP 250 OP 250 PS 637

## 2022-07-01 PROCEDURE — 36415 COLL VENOUS BLD VENIPUNCTURE: CPT | Performed by: INTERNAL MEDICINE

## 2022-07-01 PROCEDURE — 250N000013 HC RX MED GY IP 250 OP 250 PS 637: Performed by: HOSPITALIST

## 2022-07-01 PROCEDURE — 84295 ASSAY OF SERUM SODIUM: CPT | Performed by: HOSPITALIST

## 2022-07-01 PROCEDURE — 85014 HEMATOCRIT: CPT | Performed by: INTERNAL MEDICINE

## 2022-07-01 PROCEDURE — 84295 ASSAY OF SERUM SODIUM: CPT | Performed by: INTERNAL MEDICINE

## 2022-07-01 PROCEDURE — 97535 SELF CARE MNGMENT TRAINING: CPT | Mod: GO | Performed by: OCCUPATIONAL THERAPIST

## 2022-07-01 PROCEDURE — 250N000013 HC RX MED GY IP 250 OP 250 PS 637: Performed by: INTERNAL MEDICINE

## 2022-07-01 PROCEDURE — 99233 SBSQ HOSP IP/OBS HIGH 50: CPT | Performed by: HOSPITALIST

## 2022-07-01 PROCEDURE — 36415 COLL VENOUS BLD VENIPUNCTURE: CPT | Performed by: HOSPITALIST

## 2022-07-01 PROCEDURE — 97116 GAIT TRAINING THERAPY: CPT | Mod: GP

## 2022-07-01 PROCEDURE — 97166 OT EVAL MOD COMPLEX 45 MIN: CPT | Mod: GO | Performed by: OCCUPATIONAL THERAPIST

## 2022-07-01 PROCEDURE — 97530 THERAPEUTIC ACTIVITIES: CPT | Mod: GP

## 2022-07-01 PROCEDURE — 97161 PT EVAL LOW COMPLEX 20 MIN: CPT | Mod: GP

## 2022-07-01 PROCEDURE — 120N000013 HC R&B IMCU

## 2022-07-01 PROCEDURE — 71045 X-RAY EXAM CHEST 1 VIEW: CPT

## 2022-07-01 PROCEDURE — 250N000011 HC RX IP 250 OP 636: Performed by: INTERNAL MEDICINE

## 2022-07-01 RX ORDER — ENOXAPARIN SODIUM 100 MG/ML
40 INJECTION SUBCUTANEOUS EVERY 24 HOURS
Status: DISCONTINUED | OUTPATIENT
Start: 2022-07-01 | End: 2022-07-10 | Stop reason: HOSPADM

## 2022-07-01 RX ORDER — TOPIRAMATE 50 MG/1
100 TABLET, FILM COATED ORAL EVERY MORNING
Status: DISCONTINUED | OUTPATIENT
Start: 2022-07-02 | End: 2022-07-10 | Stop reason: HOSPADM

## 2022-07-01 RX ORDER — TOPIRAMATE 50 MG/1
50 TABLET, FILM COATED ORAL AT BEDTIME
Status: DISCONTINUED | OUTPATIENT
Start: 2022-07-01 | End: 2022-07-10 | Stop reason: HOSPADM

## 2022-07-01 RX ADMIN — SODIUM BICARBONATE 325 MG: 325 TABLET ORAL at 09:27

## 2022-07-01 RX ADMIN — PANCRELIPASE 2 CAPSULE: 60000; 12000; 38000 CAPSULE, DELAYED RELEASE PELLETS ORAL at 13:05

## 2022-07-01 RX ADMIN — PANCRELIPASE 2 CAPSULE: 60000; 12000; 38000 CAPSULE, DELAYED RELEASE PELLETS ORAL at 05:02

## 2022-07-01 RX ADMIN — LEVOTHYROXINE SODIUM 50 MCG: 50 TABLET ORAL at 09:27

## 2022-07-01 RX ADMIN — RISPERIDONE 2 MG: 2 TABLET ORAL at 09:28

## 2022-07-01 RX ADMIN — SODIUM BICARBONATE 325 MG: 325 TABLET ORAL at 00:49

## 2022-07-01 RX ADMIN — MONTELUKAST 10 MG: 10 TABLET, FILM COATED ORAL at 21:51

## 2022-07-01 RX ADMIN — LEVOCARNITINE 330 MG: 330 TABLET ORAL at 21:52

## 2022-07-01 RX ADMIN — PREDNISOLONE ACETATE 1 DROP: 10 SUSPENSION/ DROPS OPHTHALMIC at 12:47

## 2022-07-01 RX ADMIN — LEVOCARNITINE 330 MG: 330 TABLET ORAL at 09:27

## 2022-07-01 RX ADMIN — ENOXAPARIN SODIUM 40 MG: 40 INJECTION SUBCUTANEOUS at 17:04

## 2022-07-01 RX ADMIN — PANCRELIPASE 2 CAPSULE: 60000; 12000; 38000 CAPSULE, DELAYED RELEASE PELLETS ORAL at 00:49

## 2022-07-01 RX ADMIN — ZINC SULFATE 220 MG (50 MG) CAPSULE 220 MG: CAPSULE at 09:27

## 2022-07-01 RX ADMIN — SODIUM BICARBONATE 325 MG: 325 TABLET ORAL at 05:02

## 2022-07-01 RX ADMIN — IPRATROPIUM BROMIDE 2 SPRAY: 42 SPRAY NASAL at 22:20

## 2022-07-01 RX ADMIN — Medication 40 MG: at 09:39

## 2022-07-01 RX ADMIN — ATENOLOL 25 MG: 25 TABLET ORAL at 09:27

## 2022-07-01 RX ADMIN — Medication 40 MG: at 22:04

## 2022-07-01 RX ADMIN — SODIUM BICARBONATE 325 MG: 325 TABLET ORAL at 21:50

## 2022-07-01 RX ADMIN — PANCRELIPASE 2 CAPSULE: 60000; 12000; 38000 CAPSULE, DELAYED RELEASE PELLETS ORAL at 21:50

## 2022-07-01 RX ADMIN — IPRATROPIUM BROMIDE 2 SPRAY: 42 SPRAY NASAL at 12:47

## 2022-07-01 RX ADMIN — RISPERIDONE 2 MG: 2 TABLET ORAL at 21:52

## 2022-07-01 RX ADMIN — Medication 1 PACKET: at 17:35

## 2022-07-01 RX ADMIN — IPRATROPIUM BROMIDE 2 SPRAY: 42 SPRAY NASAL at 17:19

## 2022-07-01 RX ADMIN — CEFTRIAXONE SODIUM 1 G: 1 INJECTION, POWDER, FOR SOLUTION INTRAMUSCULAR; INTRAVENOUS at 13:05

## 2022-07-01 RX ADMIN — SERTRALINE HYDROCHLORIDE 100 MG: 100 TABLET ORAL at 09:27

## 2022-07-01 RX ADMIN — PANCRELIPASE 2 CAPSULE: 60000; 12000; 38000 CAPSULE, DELAYED RELEASE PELLETS ORAL at 17:04

## 2022-07-01 RX ADMIN — LEVOCETIRIZINE DIHYDROCHLORIDE 5 MG: 5 TABLET ORAL at 21:52

## 2022-07-01 RX ADMIN — SODIUM BICARBONATE 325 MG: 325 TABLET ORAL at 13:05

## 2022-07-01 RX ADMIN — LEVOCARNITINE 330 MG: 330 TABLET ORAL at 13:05

## 2022-07-01 RX ADMIN — SODIUM BICARBONATE 325 MG: 325 TABLET ORAL at 17:04

## 2022-07-01 RX ADMIN — PANCRELIPASE 2 CAPSULE: 60000; 12000; 38000 CAPSULE, DELAYED RELEASE PELLETS ORAL at 09:27

## 2022-07-01 RX ADMIN — Medication 1 PACKET: at 13:05

## 2022-07-01 ASSESSMENT — ACTIVITIES OF DAILY LIVING (ADL)
ADLS_ACUITY_SCORE: 57
ADLS_ACUITY_SCORE: 57
ADLS_ACUITY_SCORE: 53
ADLS_ACUITY_SCORE: 57
ADLS_ACUITY_SCORE: 53
ADLS_ACUITY_SCORE: 57
ADLS_ACUITY_SCORE: 53

## 2022-07-01 NOTE — PROGRESS NOTES
"   07/01/22 1100   Quick Adds   Type of Visit Initial Occupational Therapy Evaluation   Living Environment   People in Home facility resident   Current Living Arrangements group home   Home Accessibility stairs to enter home;stairs within home   Number of Stairs, Main Entrance 3   Stair Railings, Main Entrance none   Number of Stairs, Within Home, Primary ten   Stair Railings, Within Home, Primary railings safe and in good condition   Living Environment Comments Per chart: \"Patient reports his bedroom and bathroom are on the main level of the house. There is a basement level but he is able to stay on the main level if needed.\"  (Pt. with diffulty decsribing home layout)   Self-Care   Usual Activity Tolerance fair   Current Activity Tolerance poor   Fall history within last six months yes   Number of times patient has fallen within last six months 5   Activity/Exercise/Self-Care Comment pt reports he showers self and dresses self.  (. \"I never ask for any help\". Patient states he performs ADLs independently. 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   Existing Precautions/Restrictions fall   Limitations/Impairments safety/cognitive   General Observations and Info Chest tube in place with increasedcaregiver need for management   Cognitive Status Examination   Orientation Status orientation to person, place and time   Cognitive Status Comments confused at times, intellectual disability "   Pain Assessment   Patient Currently in Pain No   Range of Motion Comprehensive   General Range of Motion bilateral upper extremity ROM WFL   Strength Comprehensive (MMT)   General Manual Muscle Testing (MMT) Assessment no strength deficits identified   Bed Mobility   Supine-Sit [place] (Bed Mobility) minimum assist (75% patient effort)   Assistive Device (Bed Mobility) bed rails   Comment (Bed Mobility) Increased time to sequence verbal cues   Transfers   Transfers sit-stand transfer   Sit-Stand Transfer   Sit-Stand [place] (Transfers) minimum assist (75% patient effort)   Assistive Device (Sit-Stand Transfers) walker, front-wheeled   Sit/Stand Transfer Comments Unsteady in addition to monitor leads and chest tube   Clinical Impression   OT Diagnosis weakness, decreased mobility and safety for ADL   OT Problem List-Impairments impacting ADL problems related to;activity tolerance impaired;cognition;mobility;strength   Assessment of Occupational Performance 3-5 Performance Deficits   Identified Performance Deficits ADL, UE dressing, LE dressing, Toilet transfer, Safety with ADL mobility   Planned Therapy Interventions (OT) ADL retraining;cognition;strengthening;progressive activity/exercise   Clinical Decision Making Complexity (OT) moderate complexity   Risk & Benefits of therapy have been explained evaluation/treatment results reviewed;care plan/treatment goals reviewed;risks/benefits reviewed;current/potential barriers reviewed;participants voiced agreement with care plan;participants included;patient   OT Discharge Planning   OT Discharge Recommendation (DC Rec) Transitional Care Facility   OT Rationale for DC Rec pt. is below baseline level of I for his group home setting. He is unsafe to return home with recent history of falls over the past two months. Increased risk for falls due to physical and cognitive deficits. Needs increased assist to sequence cues and use of assist devices due to baseline  cognitve defcits. Will have minimal A at group home. Further TCU rehab to work on safety and mobility strategiesfor ADL  is indicated.   OT Brief overview of current status MOD A for ADl   OT Goals   Therapy Frequency (OT) 3 times/wk   OT Predicted Duration/Target Date for Goal Attainment 07/08/22   OT Goals Hygiene/Grooming;Upper Body Dressing;Lower Body Dressing;Toilet Transfer/Toileting   OT: Upper Body Dressing Modified independent   OT: Lower Body Dressing Modified independent   OT: Transfer Supervision/stand-by assist   OT: Toilet Transfer/Toileting Supervision/stand-by assist

## 2022-07-01 NOTE — PROGRESS NOTES
Care Management Follow Up    Length of Stay (days): 17    Expected Discharge Date: 07/04/2022     Concerns to be Addressed:       Patient plan of care discussed at interdisciplinary rounds: Yes    Anticipated Discharge Disposition: Transitional Care     Anticipated Discharge Services: None  Anticipated Discharge DME: None    Patient/family educated on Medicare website which has current facility and service quality ratings: yes  Education Provided on the Discharge Plan:    Patient/Family in Agreement with the Plan: yes    Referrals Placed by CM/SW: Post Acute Facilities  Private pay costs discussed: Not applicable    Additional Information:  SW updated [facility] that pt is getting close to being ready for discharge. They can still accept pt. SW following for discharge.       JESUS MANUEL Montes, LGSW  [phone] Desk phone  [phone] Cell/text (Preferred)  [facility]

## 2022-07-01 NOTE — PROGRESS NOTES
CLINICAL NUTRITION SERVICES - REASSESSMENT NOTE      Malnutrition:   % Weight Loss:  > 2% in 1 week (severe malnutrition): 16% weight loss since 6/16, suspect fluid-related with some true weight loss  % Intake:  Decreased intake does not meet criteria for malnutrition - pt receiving TF  Subcutaneous Fat Loss:  Orbital region mild depletion and Upper arm region moderate depletion - per RD note 6/16  Muscle Loss:  Temporal region mild depletion, Clavicle bone region mild depletion and Dorsal hand region mild depletion - per RD note 6/16  Fluid Retention: trace to Mild generalized edema     Malnutrition Diagnosis: Moderate malnutrition  In Context of:  Acute illness or injury       EVALUATION OF PROGRESS TOWARD GOALS   Diet:  NPO    Nutrition Support:  TF was resumed 6/28 (2000) at 50 mL/hr and then rate was increased slightly by RD on 6/29 as below:    Nutrition Support Enteral:  Type of Feeding Tube: Jejunostomy port of GJ tube  Enteral Frequency:  Continuous  Enteral Regimen: Jevity 1.5 at 55 mL/hr x 22 hrs (hold 1 hour before and 1 hour after Synthroid)   Total Enteral Provisions: 1815 kcals, 77 gm pro, 920 mL H20, 261 gm CHO, 25 gm fiber  Free Water Flush: 230 mL every 4 hrs (increased further by Provider)    Prosource 1 packet TID = 120 kcals, 33 gm pro  Total (TF + Prosource): 1935 kcals (29 kcal/kg), 110 gm pro (1.7 gm/kg and 100% pro needs)     The D5 containing IVF was discontinued 6/30.    ASSESSED NUTRITION NEEDS:   Dosing Weight:  66.6 kg (adjusted)  Estimated Energy Needs: 8558-2651 kcals (25-30 Kcal/Kg)  Justification: overweight, acute pancreatitis  Estimated Protein Needs:  grams protein (1.2 -1.5 g pro/Kg)  Justification: increased needs r/t acute pancreatitis, repletion    NEW FINDINGS:   Stool Pattern: x1 on 6/28, x3 on 6/29, none on 6/30, and x1 thus far today.  Labs:  Na 151 (H), BUN 38 (H)  BGM: 103, 117 - acceptable  Medications:  Zinc sulfate, Creon and bicarbonate restarted 6/30  Weight:   81.3 kg today - up 0.5 kg since 6/28.  Disposition:  Plan TCU at time of discharge.    Previous Goals (6/28):   TF will be resumed and will meet % estimated needs  Evaluation: Met    Previous Nutrition Diagnosis (6/28):   Inadequate protein-energy intake related to TF held for procedure as evidenced by D5 IVF meeting 25% energy and 0% protein needs  Evaluation:  Resolved      CURRENT NUTRITION DIAGNOSIS  No nutrition diagnosis identified at this time     INTERVENTIONS  Recommendations / Nutrition Prescription  Continue TF as above    Implementation  Collaboration and Referral of Nutrition care - Discussed with Pharmacist on 6/30 regarding resumption of Creon and bicarb.    Goals  TF + Prosource will continue to meet % estimated needs    MONITORING AND EVALUATION:  Progress towards goals will be monitored and evaluated per protocol and Practice Guidelines    Marli Mdcaniel RD, LD, CNSC

## 2022-07-01 NOTE — PROGRESS NOTES
"   07/01/22 1100   Quick Adds   Type of Visit Initial Occupational Therapy Evaluation   Living Environment   People in Home facility resident   Current Living Arrangements group home   Home Accessibility stairs to enter home;stairs within home   Number of Stairs, Main Entrance 3   Stair Railings, Main Entrance none   Number of Stairs, Within Home, Primary ten   Stair Railings, Within Home, Primary railings safe and in good condition   Living Environment Comments Per chart: \"Patient reports his bedroom and bathroom are on the main level of the house. There is a basement level but he is able to stay on the main level if needed.\"  (Pt. with diffulty decsribing home layout)   Self-Care   Usual Activity Tolerance fair   Current Activity Tolerance poor   Fall history within last six months yes   Number of times patient has fallen within last six months 5   Activity/Exercise/Self-Care Comment pt reports he showers self and dresses self.  (. \"I never ask for any help\". Patient states he performs ADLs independently. 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   Cognitive Status Examination   Orientation Status orientation to person, place and time   Cognitive Status Comments confused at times, intellectual disability   Pain Assessment   Patient Currently in Pain No   Range of Motion Comprehensive   General Range of Motion bilateral upper extremity ROM WFL   Strength Comprehensive (MMT)   General " Manual Muscle Testing (MMT) Assessment no strength deficits identified   Bed Mobility   Supine-Sit [place] (Bed Mobility) minimum assist (75% patient effort)   Assistive Device (Bed Mobility) bed rails   Comment (Bed Mobility) Increased time to sequence verbal cues   Transfers   Transfers sit-stand transfer   Sit-Stand Transfer   Sit-Stand [place] (Transfers) minimum assist (75% patient effort)   Assistive Device (Sit-Stand Transfers) walker, front-wheeled   Sit/Stand Transfer Comments Unsteady in addition to monitor leads and chest tube   Clinical Impression   OT Diagnosis weakness, decreased mobility and safety for ADL   OT Problem List-Impairments impacting ADL problems related to;activity tolerance impaired;cognition;mobility;strength   Assessment of Occupational Performance 3-5 Performance Deficits   Identified Performance Deficits ADL, UE dressing, LE dressing, Toilet transfer, Safety with ADL mobility   Planned Therapy Interventions (OT) ADL retraining;cognition;strengthening;progressive activity/exercise   Clinical Decision Making Complexity (OT) moderate complexity   Risk & Benefits of therapy have been explained evaluation/treatment results reviewed;care plan/treatment goals reviewed;risks/benefits reviewed;current/potential barriers reviewed;participants voiced agreement with care plan;participants included;patient   OT Discharge Planning   OT Discharge Recommendation (DC Rec) Transitional Care Facility   OT Rationale for DC Rec pt. is below baseline level of I for his group home setting. He is unsafe to return home with recent history of falls over the past two months. Increased risk for falls due to physical and cognitive deficits. Needs increased assist to sequence cues and use of assist devices due to baseline cognitve defcits. Will have minimal A at group home. Further TCU rehab to work on safety and mobility strategiesfor ADL  is indicated.   OT Brief overview of current status MOD A for  ADl   OT Goals   Therapy Frequency (OT) 3 times/wk   OT Predicted Duration/Target Date for Goal Attainment 07/08/22   OT Goals Hygiene/Grooming;Upper Body Dressing;Lower Body Dressing;Toilet Transfer/Toileting   OT: Upper Body Dressing Modified independent   OT: Lower Body Dressing Modified independent   OT: Transfer Supervision/stand-by assist   OT: Toilet Transfer/Toileting Supervision/stand-by assist

## 2022-07-01 NOTE — PROGRESS NOTES
[facility]  Gastroenterology Progress Note     Jagdeep Walker MRN# [id number]   YOB: 1970 Age: 51 year old          Assessment and Plan:   Jagdeep Walker s a 51 year old male admitted with right sided pleural effusion and GI consulted to manage pancreatitis.     Active Problems:  Pleural effusion  Pancreatic pseudocyst  Idiopathic acute pancreatitis, unspecified complication status  Pancreaticopleural fistula  *Lipase 1,730->689, AST, ALT and Alk Phos normal. Lactic acid 1.9. WBC wnl, Hgb 12.6,    *6/14 CT A/P note improved inflammatory changes in pancreas. Pseudocysts not significantly changed. Gastric wall thickening has progressed consistent with secondary gastritis with new pseudocyst along the proximal posterior gastric wall. Ascites decreased. Wall thickening involving the cecum and transverse colon likely secondarily involved. Large right pleural effusion has significantly increased.  *Cause of pancreatitis is idiopathic- no evidence of gallstones or alcohol use. May be due to medication. 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 Contrast was injected and flowed through pancreatic ductal system and extravasated from the pancreatic duct in the body of the pancreas with pancreatic duct leak and communicating pseudocyst. Pancreatic sphincterotomy performed. One stent placed into ventral pancreatic duct.  *These findings confirm pancreaticopleural fistula  *NJ tube in place  * Chest tube output greatly improved only 125 ml in last 14 hours  * Respiratory status greatly improved on room air.  *6/26/22 MRCP significant for 2 peripheral enhancing cystic masses- one in tail  2.6 x 4.2 x 3.1 cm and head mass 2.5 cm x 4.1 cm x 4.0 cm. The head lesion has a fistulous tract extending in the adjacent duodenum- one in tail has no obvious fistulous tract. No mention of stent placement or  pancreaticopleural fistula. Per radiology above tail/head pseudocysts are enlarging comparted to prior MRCP.  MRCP measurement of cystic lesions in pancreas not listed on MRCP from 6/6/22, however 5/9/22 MRCP noted 2.5 x 2.3 x 1.3 cm tail lesion and 3.1 x 2.8 x 2.8 cm head lesion.    * CRP elevated at 73->107 this a.m. likely from GJ placement- will follow, Lipase 152 and normal  * Albumin 1.8- recommend above 3.0 to improve ascites/effusion. Today was 3.2  * CA 19-9  Normal at 26. Unlikely malignancy      -- Consider repeat ERCP around 7/21, will follow with imaging and may consider EUS with necrosectomy if minimal improvement  -- GJ placed by IR on 6/28/22. Appreciate help. Will need to keep NPO to allow improvement in pancreatitis and fistula  -- Continue creon- given known pancreatitis- would not see reason to check fecal elastase given clinical picture  -- Expect multiple weeks until able to tolerate oral intake- need to see evidence of fistula closure--  -- Recheck albumin tomorrow and if below 2.5 may need to restart albumin.  -- However if able to sent to TCU- GI is ok with this plan when able. We will follow closely as outpatient                Interval History:   no new complaints, denies chest pain, pain is controlled and has ongoing abdominal pain.              Review of Systems:   C: NEGATIVE for fever, chills, change in weight  E/M: NEGATIVE for ear, mouth and throat problems  R: NEGATIVE for significant cough or SOB  CV: NEGATIVE for chest pain, palpitations or peripheral edema             Medications:   I have reviewed this patient's current medications    amylase-lipase-protease  1-2 capsule Per Feeding Tube Q4H     atenolol  25 mg Oral or NG Tube Daily     carboxymethylcellulose PF  1 drop Right Eye At Bedtime     cefTRIAXone  1 g Intravenous Q24H     enoxaparin ANTICOAGULANT  40 mg Subcutaneous Q24H     ipratropium  2 spray Both Nostrils TID     levOCARNitine  330 mg Oral or NG Tube TID      levocetirizine  5 mg Oral or NG Tube QPM     levothyroxine  50 mcg Oral or NG Tube Daily     montelukast  10 mg Oral or NG Tube At Bedtime     pantoprazole  40 mg Oral or Feeding Tube BID     prednisoLONE acetate  1 drop Right Eye Daily     protein modular  1 packet Per Feeding Tube TID     risperiDONE  2 mg Oral or NG Tube BID     sertraline  100 mg Oral or NG Tube Daily     sodium bicarbonate  325 mg Per Feeding Tube Q4H     sodium chloride (PF)  3 mL Intracatheter Q8H     topiramate  100 mg Oral or NG Tube At Bedtime     topiramate  50 mg Oral At Bedtime     zinc sulfate  220 mg Oral or NG Tube Daily                  Physical Exam:   Vitals were reviewed  Vital Signs with Ranges  Temp:  [98.1  F (36.7  C)-98.5  F (36.9  C)] 98.5  F (36.9  C)  Pulse:  [60-73] 62  Resp:  [12-22] 16  BP: ()/(45-68) 97/55  SpO2:  [93 %-97 %] 96 %  I/O last 3 completed shifts:  In: 1848 [I.V.:3; NG/GT:1845]  Out: 1550 [Urine:1150; Chest Tube:400]  Constitutional: alert, mild distress, cooperative and pale   Cardiovascular: negative, PMI normal. No lifts, heaves, or thrills. RRR. No murmurs, clicks gallops or rub  Respiratory: wheezy, Percussion normal. Good diaphragmatic excursion.   Abdomen: Abdomen soft, non-tender. BS normal. No masses, organomegaly           Data:   I reviewed the patient's new clinical lab test results.   Recent Labs   Lab Test 07/01/22  0518 06/29/22  0514 06/28/22  0509 06/27/22  0648 06/19/22  0622 06/18/22  2041 06/16/22  0802 06/14/22  1903 06/06/22  0659 06/05/22  0728   WBC 7.8 11.1*  --  5.5   < > 4.6   < > 5.7   < > 3.8*   HGB 11.3* 10.7*  --  12.3*   < > 12.6*   < > 12.6*   < > 12.0*   MCV 95 98  --  97   < > 97   < > 96   < > 95    176 140* 159   < > 152   < > 185   < > 92*   INR  --   --   --   --   --  1.74*  --  1.64*  --  1.73*    < > = values in this interval not displayed.     Recent Labs   Lab Test 07/01/22  0518 06/30/22  0614 06/29/22  1527 06/29/22  0514 06/28/22  0509    POTASSIUM 3.4 3.4 3.6 3.3* 3.7   CHLORIDE 116*  --   --  116* 115*   CO2 27  --   --  28 30   BUN 38*  --   --  32* 33*   ANIONGAP 6  --   --  5 4     Recent Labs   Lab Test 07/01/22  0518 06/30/22  0927 06/29/22  0514 06/28/22  0509 06/27/22  0648 06/21/22  0704 06/20/22  0715 06/18/22  2041 06/15/22  1011 06/15/22  0034 06/05/22  0728 06/04/22  1653   ALBUMIN 2.6*  --  3.2* 2.9* 1.8* 1.7* 1.9* 2.2*  --   --    < >  --    BILITOTAL  --   --   --   --  1.7* 0.5 0.5 0.6  --   --    < >  --    ALT  --   --   --   --  42 12 15 24  --   --    < >  --    AST  --   --   --   --  67* 23 20 23  --   --    < >  --    PROTEIN  --  50 *  --   --   --   --   --   --   --  Negative  --  Negative   LIPASE  --   --   --   --  152  --  1,236* 579*  --   --    < >  --    AMYLASE  --   --   --   --   --   --   --   --  142*  --   --   --     < > = values in this interval not displayed.       I reviewed the patient's new imaging results.    All laboratory data reviewed  All imaging studies reviewed by me.    Isabelle Pantoja PA-C,  6/16/2022  [facility]  Office : [phone]  Cell: [phone] (Dr. Clark)  Cell: [phone] (Isabelle Pantoja PA-C)

## 2022-07-01 NOTE — PLAN OF CARE
Goal Outcome Evaluation:    Plan of Care Reviewed With: patient     Overall Patient Progress: improving       Continue IMC status  VSS Bp Soft. A/Ox2 self and place inconsistent with answers. Incisions- GJ tube dressing clean and dry . Pain- pt report intermittent pain, controlled with ice and heat packs. PIV saline locked. Up with Ax2 Lift. Diet- strict NPO tube feeding running at 55ml per hour. BM- no bowel movement this shift. Voiding- external catheter.  UA came back postive- IV antibiotics started LS-clear diminished. Chest tube to suction output see flow sheet. PT and OT consulted. Creon started see MAR for administration details. Continue plan of care as ordered.

## 2022-07-01 NOTE — PROGRESS NOTES
"   07/01/22 1008   Quick Adds   Type of Visit Initial PT Evaluation   Living Environment   Current Living Arrangements group home   Home Accessibility stairs to enter home;stairs within home   Number of Stairs, Main Entrance 3  (later pt says no ADRIAN)   Stair Railings, Main Entrance none   Number of Stairs, Within Home, Primary ten   Stair Railings, Within Home, Primary railings safe and in good condition   Living Environment Comments Per chart: \"Patient reports his bedroom and bathroom are on the main level of the house. There is a basement level but he is able to stay on the main level if needed.\"   Self-Care   Usual Activity Tolerance fair   Current Activity Tolerance poor   Fall history within last six months yes   Number of times patient has fallen within last six months 5  (per chart)   Activity/Exercise/Self-Care Comment Anabel no AD with falls, RW recommended. Per PT on 5/10/22: \"Patient reports at baseline he does not use an assistive device. Was hospitalized 2 weeks ago following a fall, was supposed to get a FWW from the house but they never received one. \"I never ask for any help\". Patient states he performs ADLs independently. Group home staff assists with medications, provides meals\"   General Information   Onset of Illness/Injury or Date of Surgery 06/14/22   Referring Physician Patsy Gannon MD   Patient/Family Therapy Goals Statement (PT) To not fall.   Pertinent History of Current Problem (include personal factors and/or comorbidities that impact the POC) Resp failure from R pleural effusion (from fistula vs hepatic hydrothorax), metabolic enceph, Anx & Dep, Schizoaffective disorder, bipolar, Sz, HTN, etc.   Existing Precautions/Restrictions seizures   Weight-Bearing Status - LUE full weight-bearing  (all)   Cognition   Safety Deficit (Cognition) ability to follow commands;at risk behavior observed;insight into deficits/self-awareness;judgment;problem-solving;safety precautions awareness;safety " precautions follow-through/compliance   Pain Assessment   Patient Currently in Pain Yes, see Vital Sign flowsheet  (chest - act to tolerance, sklled assist)   Posture    Posture Comments Flexed functionally.   Strength (Manual Muscle Testing)   Strength Comments Grossly deconditoined, exacerbated functionally by limited UE & core use given pain.   Bed Mobility   Comment, (Bed Mobility) [place] supine-sit technique cues, with rail.   Transfers   Comment, (Transfers) Pt unable to stand with UE assist per usual given pain. (See PT rx for skilled progressions)   Gait/Stairs (Locomotion)   Distance in Feet (Required for LE Total Joints) 75' +35' [place] unsteady RW RA, line assist, O2 monitoring, pt stopping as depends falling with unable to self-adjust.   Balance   Balance Comments [place] initial standing balance, RW dependent, hx falls - falls risk.   Coordination   Coordination Comments WFL   Muscle Tone   Muscle Tone Comments WFL   Clinical Impression   Criteria for Skilled Therapeutic Intervention Yes, treatment indicated   PT Diagnosis (PT) Impaired mobility   Influenced by the following impairments Impaired strength, balance, activity tolerance   Functional limitations due to impairments Impaired independent mobility & living   Clinical Presentation (PT Evaluation Complexity) Stable/Uncomplicated   Clinical Decision Making (Complexity) low complexity   Planned Therapy Interventions (PT) balance training;bed mobility training;gait training;home exercise program;neuromuscular re-education;patient/family education;postural re-education;stair training;strengthening;transfer training;progressive activity/exercise;home program guidelines;risk factor education   Anticipated Equipment Needs at Discharge (PT) walker, rolling   Risk & Benefits of therapy have been explained evaluation/treatment results reviewed;care plan/treatment goals reviewed;risks/benefits reviewed;current/potential barriers reviewed;participants voiced  agreement with care plan;participants included;patient   PT Discharge Planning   PT Discharge Recommendation (DC Rec) home with assist;home with home care physical therapy;Transitional Care Facility   PT Rationale for DC Rec Pt is struggling at baseline (Anabel no AD with falls, RW recommended) - lives in group home with stairs. To go home safely, he will need consistent walker use and 24hr hands on Ax1 for all mobility, pending stairs for home accessibility.   Pt would benefit from rehab stay before return home for safer mobility, decrease falls and risk of injury for more success with community living.   PT Brief overview of current status Aster bed mobility, tx, 35' + 75' RW unsteady with LOB, RA vitals stable.   Total Evaluation Time   Total Evaluation Time (Minutes) 15   Physical Therapy Goals   PT Frequency 6x/week   PT Predicted Duration/Target Date for Goal Attainment 07/08/22   PT Goals Bed Mobility;Transfers;Gait;Stairs   PT: Bed Mobility Modified independent   PT: Transfers Supervision/stand-by assist;Sit to/from stand;Bed to/from chair  (least restrictive device)   PT: Gait Supervision/stand-by assist;150 feet  (least restrictive device)   PT: Stairs 10 stairs;Supervision/stand-by assist;Rail on right

## 2022-07-01 NOTE — PLAN OF CARE
Goal Outcome Evaluation:    Pt. Alert and oriented x2-3, disoriented to situation and place at times. Vital signs stable on RA except soft Bps. Up with lift, turn/repo Q2H. Strict NPO. TF at goal rate of 55 ml/hr. Lung sounds diminished. CT connected to -20 suction, no air leak or crepitus, scant leaking around insertion site. Bowel sounds +, + flatus. BM -, adequate urine output. Blanchable redness to coccyx and right upper back. G-Tube dressing CDI. Intermittent pain at G-tube site managed with heat/ice. Denies nausea. Tele SR/Junctional.

## 2022-07-01 NOTE — PROGRESS NOTES
[facility]    Hospitalist Progress Note      Assessment & Plan   Jagdeep Walker is a 51 year old male who was admitted on 6/14/2022 with shortness of breath and hypoxia with large pleural effusion on outside chest x-ray.     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  Recent echocardiogram 6/4 normal.  Pleural fluid studies consistent with transudate, specimen clotted for cell count, normal glucose, no organisms on gram stain.  Cytology negative x 2  Given lipase and amylase increase, perhaps pleural effusion related to pancreatitis with fistula or leak versus malnutrition related to severe pancreatitis  Chest tube placed on 6/18 and replaced on 6/22 as it appeared clogged  RRT for 6/20 chest pain, resolved with pain medications  6/22: Respiratory status worsened, likely due to worsening pleural effusions from his clogged chest tube.  Chest tube exchanged and has had significant output since with suction   * Culture from the pleural with diptheroids in broth only, c/w contaminant  - weaned off oxygen since 6/29  - ID consulted for the pleural culture results.  Felt contaminant and no need for antibiotics   -Patient is followed by GI, pulmonary, neurology  - CT surgery and IR following and appreciate their recommendations. Chest tube management as per CT surgery. Current recommendation is to continue CT with suction. Pleurodesis not appropriate in this  Patient Education     Controlling High Blood Pressure  High blood pressure (hypertension) is often called the silent killer. This is because many people who have it, don’t know it. It can be very dangerous. High blood pressure can raise your risk of heart attack, stroke, heart disease, and heart failure. Controlling your blood pressure can decrease your risk of these problems. It's important to know the appropriate blood pressure range and remember to check your blood pressure regularly. Doing so can save your life.  Blood pressure measurements are given as 2 numbers. Systolic blood pressure is the upper number. This is the pressure when the heart contracts. Diastolic blood pressure is the lower number. This is the pressure when the heart relaxes between beats.  Blood pressure is categorized as normal, elevated, or stage 1 or stage 2 high blood pressure:  · Normal blood pressure is systolic of less than 120 and diastolic of less than 80 (120/80)  · Elevated blood pressure is systolic of 120 to 129 and diastolic less than 80  · Stage 1 high blood pressure is systolic of 130 to 139 or diastolic between 80 to 89  · Stage 2 high blood pressure is when systolic is 140 or higher or the diastolic is 90 or higher  A heart-healthy lifestyle can help you control your blood pressure without medicines. Here are some things you can do to pursue a heart-healthy lifestyle:    Choose heart-healthy foods  · Select low-salt, low-fat foods. Limit sodium intake to 2,400 mg per day or the amount suggested by your healthcare provider.  · Limit canned, dried, cured, packaged, and fast foods. These can contain a lot of salt.  · Eat 8 to 10 servings of fruits and vegetables every day.  · Choose lean meats, fish, or chicken.  · Eat whole-grain pasta, brown rice, and beans.  · Eat 2 to 3 servings of low-fat or fat-free dairy products.  · Ask your doctor about the DASH eating plan. This plan helps reduce blood pressure.  · When you go to a  restaurant, ask that your meal be prepared with no added salt.    Stay at a healthy weight  · Ask your healthcare provider how many calories to eat a day. Then stick to that number.  · Ask your healthcare provider what weight range is healthiest for you. If you are overweight, a weight loss of only 3% to 5% of your body weight can help lower blood pressure. Generally, a good weight loss goal is to lose 10% of your body weight in a year.  · Limit snacks and sweets.  · Get regular exercise.    Get up and get active  · Find activities you enjoy that can be done alone or with friends or family. Such activities might include bicycling, dancing, walking, or jogging.  · Park farther away from building entrances to walk more.  · Use stairs instead of the elevator.  · When you can, walk or bike instead of driving.  · [place] leaves, garden, or do household repairs.  · Be active at a moderate to vigorous level of physical activity for at least 40 minutes for a minimum of 3 to 4 days a week.     Manage stress  · Make time to relax and enjoy life. Find time to laugh.  · Communicate your concerns with your loved ones and your healthcare provider.  · Visit with family and friends, and keep up with hobbies.    Limit alcohol and quit smoking  · Men should have no more than 2 drinks per day.  · Women should have no more than 1 drink per day.  · Talk with your healthcare provider about quitting smoking. Smoking significantly increases your risk for heart disease and stroke. Ask your healthcare provider about community smoking cessation programs and other options.    Medicines  If lifestyle changes aren’t enough, your healthcare provider may prescribe high blood pressure medicine. Take all medicines as prescribed. If you have any questions about your medicines, ask your healthcare provider before stopping or changing them.  [employer] last reviewed this educational content on 6/1/2019  © 3759-0003 [employer]. [address] [place] [address]  situation.  -Patient continues to have significant output through the chest tube despite 4 days of octreotide.  After discussion with GI it was decided to discontinue octreotide.  Although it has been felt that this was all pancreatico pleural fistula there is no imaging evidence of this.    - plan repeat ERCP around 7/21/22   - continue creon  - GJ in place since 6/28, continue NPO  - recheck albumin on 7/2 after holding albumin infusions, if <2.5, needs albumin restarted    Hypernatremia  -- increase free water to 250ml q4h from 200ml q4h  - sodiums q12h  - BMP in AM    Acute/subacute pancreatitis with pseudocyst   *Hospitalized 5/8-5/17/22 for pancreatitis with pseudocyst formation. Unclear etiology, EUS unrevealing for etiology, possibly due to medications  *CT on admission with acute pancreatitis with slightly improved inflammatory changes, pseudocysts not significantly changed.  -he had a PEJ tube placed with IR on 6/28, currently tube feeds are at goal, plan is to do ERCP in ~7/21 weeks, patient should be n.p.o. until then.    Recent metabolic encephalopathy   Mild zinc deficiency   *Extensively worked up during admission 6/4-6/10/22 including head CT, MRI brain, EEG, paraneoplastic ab, extensive laboratory work-up mostly unremarkable with exception of mildly low zinc.  *Per sister, patient does have some cognitive impairment at baseline, though is typically oriented to self, family and can carry on an extensive conversation especially regarding topics he enjoys (eg sports). He remains below his baseline.  -MRI brain has been repeated again on 6/27 without any new findings .  - Continue zinc supplementation     Hx of portal vein thrombosis  * Diagnosed during admission 5/2022, improved on subsequent CT and not treated with anticoagulation   * Non-occlusive main portal vein thrombosis, splenic vein thrombosis seen on admission CT  - GI consulted as above, they advised no need to treat PVT previous admission  (6/9/2022)  -This could be causing significant portal hypertension and worsening ascites which in turn could be causing worsening pleural effusion.     Urinary tract infection  UA large leuk esterase, >182 WBCs, many bacteria. Urine cx with >100k e coli  - rocephin 1g daily 2/7  - follow up urine cx sensitivities    Depression  Anxiety  Schizoaffective disorder, bipolar type  - Continue prior to admission sertraline, risperidone     Seizure disorder  PTA on topiramate and Depakote, depakote low, topiramate continued--level is low. MRI shows no changes. EEG no epileptiform discharge, only generalized encephalopathy  - hold depakote given association with pancreatitis  - continue topiramate, continue 100mg in AM, add 50mg in PM (given low level) on 7/1     Carnitine deficiency  - Continue prior to admission levocarnitine      Hypertension  - Continue prior to admission atenolol      Fisher's esophagus  - Continue prior to admission PPI     Hypothyroidism  - Continue prior to admission levothyroxine     Seasonal allergies  - Continue prior to admission levocetirizine    Moderate malnutrition in setting of acute illness/injury  Appreciate nutrition    Clinically Significant Risk Factors Present on Admission                        DVT Prophylaxis: Enoxaparin (Lovenox) SQ  Code Status: Full Code     Expected Discharge Date: 07/04/2022        Discharge Comments: CT drainage issues, ERCP 6/21 6/25 Ct in place, [place], octreotide gtt, HFNC  6/29- CT still in place  7/1 Vinod cornell at d/c, CT still in      7/1: pending albumin and sodium levels 7/2    Dianna Parada DO  Hospitalist Service  [facility]  Securely message with the Vocera Web Console (learn more here)  Text Page (7am - 6pm) via [facility] Paging/Directory      Interval History   Patient seen and examined. Possible discharge over the weekend if his numbers look okay. He reports some abdominal discomfort, which he blames [place] [address]. All rights reserved. This information is not intended as a substitute for professional medical care. Always follow your healthcare professional's instructions.            tube. Otherwise he appears comfortable. Having BMs. He has been working with therapy. Discussed with SW and GI.  Spent 35 minutes with >50% time spent reviewing chart including long hospital stay, evaluating patient, discussing with specialist/SW.    -Data reviewed today: I reviewed all new labs and imaging results over the last 24 hours. I personally reviewed no images or EKG's today.    Physical Exam   Temp: 98.3  F (36.8  C) Temp src: Oral BP: 97/55 Pulse: 62   Resp: 16 SpO2: 96 % O2 Device: None (Room air)    Vitals:    06/30/22 0625 06/30/22 0702 07/01/22 0538   Weight: 81.2 kg (179 lb) 85.5 kg (188 lb 8 oz) 81.3 kg (179 lb 3.2 oz)     Vital Signs with Ranges  Temp:  [97.5  F (36.4  C)-98.5  F (36.9  C)] 98.3  F (36.8  C)  Pulse:  [60-73] 62  Resp:  [14-22] 16  BP: ()/(45-68) 97/55  SpO2:  [94 %-97 %] 96 %  I/O last 3 completed shifts:  In: 2095 [NG/GT:1435]  Out: 1350 [Urine:950; Chest Tube:400]    Constitutional: Awake, alert, cooperative, no apparent distress, blind in right eye  Respiratory: decreased breath sounds overall, left chest tube in place  Cardiovascular: Regular rate and rhythm, normal S1 and S2, and no murmur noted  GI: Normal bowel sounds, soft, non-distended, non-tender, GJ tube without surrounding erythema  Skin/Integumen: No rashes, no cyanosis, no edema  Other:     Medications     dextrose         amylase-lipase-protease  1-2 capsule Per Feeding Tube Q4H     atenolol  25 mg Oral or NG Tube Daily     carboxymethylcellulose PF  1 drop Right Eye At Bedtime     cefTRIAXone  1 g Intravenous Q24H     enoxaparin ANTICOAGULANT  40 mg Subcutaneous Q24H     ipratropium  2 spray Both Nostrils TID     levOCARNitine  330 mg Oral or NG Tube TID     levocetirizine  5 mg Oral or NG Tube QPM     levothyroxine  50 mcg Oral or NG Tube Daily     montelukast  10 mg Oral or NG Tube At Bedtime     pantoprazole  40 mg Oral or Feeding Tube BID     prednisoLONE acetate  1 drop Right Eye Daily     protein  modular  1 packet Per Feeding Tube TID     risperiDONE  2 mg Oral or NG Tube BID     sertraline  100 mg Oral or NG Tube Daily     sodium bicarbonate  325 mg Per Feeding Tube Q4H     sodium chloride (PF)  3 mL Intracatheter Q8H     topiramate  100 mg Oral or NG Tube At Bedtime     topiramate  50 mg Oral At Bedtime     zinc sulfate  220 mg Oral or NG Tube Daily       Data   Recent Labs   Lab 07/01/22  1641 07/01/22  1250 07/01/22  1245 07/01/22  0928 07/01/22  0518 07/01/22  0007 06/30/22  1139 06/30/22  0614 06/29/22  1637 06/29/22  1527 06/29/22  1037 06/29/22  0514 06/28/22  1149 06/28/22  0509 06/27/22  0842 06/27/22  0648   WBC  --   --   --   --  7.8  --   --   --   --   --   --  11.1*  --   --   --  5.5   HGB  --   --   --   --  11.3*  --   --   --   --   --   --  10.7*  --   --   --  12.3*   MCV  --   --   --   --  95  --   --   --   --   --   --  98  --   --   --  97   PLT  --   --   --   --  247  --   --   --   --   --   --  176  --  140*  --  159   NA  --   --  151*  --  149*  149* 148*   < > 143   < > 147*   < > 149*  149*   < > 149*  149*   < > 149*  149*   POTASSIUM  --   --   --   --  3.4  --   --  3.4  --  3.6  --  3.3*  --  3.7  --  3.7   CHLORIDE  --   --   --   --  116*  --   --   --   --   --   --  116*  --  115*  --  113*   CO2  --   --   --   --  27  --   --   --   --   --   --  28  --  30  --  33*   BUN  --   --   --   --  38*  --   --   --   --   --   --  32*  --  33*  --  35*   CR  --   --   --   --  0.89  --   --   --   --   --   --  0.90  --  0.82  --  0.82   ANIONGAP  --   --   --   --  6  --   --   --   --   --   --  5  --  4  --  3   NASIR  --   --   --   --  9.0  --   --   --   --   --   --  9.5  --  9.4  --  9.1   * 117*  --  103* 121*  --   --   --    < >  --    < > 111*   < > 105*   < > 176*   ALBUMIN  --   --   --   --  2.6*  --   --   --   --   --   --  3.2*  --  2.9*  --  1.8*   PROTTOTAL  --   --   --   --   --   --   --   --   --   --   --   --   --   --   --  5.4*    BILITOTAL  --   --   --   --   --   --   --   --   --   --   --   --   --   --   --  1.7*   ALKPHOS  --   --   --   --   --   --   --   --   --   --   --   --   --   --   --  144   ALT  --   --   --   --   --   --   --   --   --   --   --   --   --   --   --  42   AST  --   --   --   --   --   --   --   --   --   --   --   --   --   --   --  67*   LIPASE  --   --   --   --   --   --   --   --   --   --   --   --   --   --   --  152    < > = values in this interval not displayed.       Recent Results (from the past 24 hour(s))   XR Chest Port 1 View    Narrative    EXAM: CHEST SINGLE VIEW PORTABLE  LOCATION: [facility]  DATE/TIME: 7/1/2022 5:50 AM    INDICATION: Pleural effusion.  COMPARISON: 06/30/2022 at 0543 hours.      Impression    IMPRESSION:   1. No significant interval change since the recent comparison study.  2. A right pleural drainage catheter projects over the lateral aspect of the lower right hemithorax. No visualized pneumothorax.

## 2022-07-02 ENCOUNTER — APPOINTMENT (OUTPATIENT)
Dept: GENERAL RADIOLOGY | Facility: CLINIC | Age: 52
DRG: 438 | End: 2022-07-02
Attending: THORACIC SURGERY (CARDIOTHORACIC VASCULAR SURGERY)
Payer: MEDICARE

## 2022-07-02 LAB
ANION GAP SERPL CALCULATED.3IONS-SCNC: 5 MMOL/L (ref 3–14)
BUN SERPL-MCNC: 45 MG/DL (ref 7–30)
CALCIUM SERPL-MCNC: 9.1 MG/DL (ref 8.5–10.1)
CHLORIDE BLD-SCNC: 118 MMOL/L (ref 94–109)
CO2 SERPL-SCNC: 28 MMOL/L (ref 20–32)
CREAT SERPL-MCNC: 0.98 MG/DL (ref 0.66–1.25)
ERYTHROCYTE [DISTWIDTH] IN BLOOD BY AUTOMATED COUNT: 15.4 % (ref 10–15)
GFR SERPL CREATININE-BSD FRML MDRD: >90 ML/MIN/1.73M2
GLUCOSE BLD-MCNC: 114 MG/DL (ref 70–99)
HCT VFR BLD AUTO: 34.7 % (ref 40–53)
HGB BLD-MCNC: 11.1 G/DL (ref 13.3–17.7)
MAGNESIUM SERPL-MCNC: 2.6 MG/DL (ref 1.6–2.3)
MCH RBC QN AUTO: 29.9 PG (ref 26.5–33)
MCHC RBC AUTO-ENTMCNC: 32 G/DL (ref 31.5–36.5)
MCV RBC AUTO: 94 FL (ref 78–100)
PHOSPHATE SERPL-MCNC: 4.4 MG/DL (ref 2.5–4.5)
PLATELET # BLD AUTO: 293 10E3/UL (ref 150–450)
POTASSIUM BLD-SCNC: 3.4 MMOL/L (ref 3.4–5.3)
POTASSIUM BLD-SCNC: 3.6 MMOL/L (ref 3.4–5.3)
RBC # BLD AUTO: 3.71 10E6/UL (ref 4.4–5.9)
SODIUM SERPL-SCNC: 150 MMOL/L (ref 133–144)
SODIUM SERPL-SCNC: 151 MMOL/L (ref 133–144)
SODIUM SERPL-SCNC: 151 MMOL/L (ref 133–144)
WBC # BLD AUTO: 10.5 10E3/UL (ref 4–11)

## 2022-07-02 PROCEDURE — 85027 COMPLETE CBC AUTOMATED: CPT | Performed by: HOSPITALIST

## 2022-07-02 PROCEDURE — 36415 COLL VENOUS BLD VENIPUNCTURE: CPT | Performed by: INTERNAL MEDICINE

## 2022-07-02 PROCEDURE — 84100 ASSAY OF PHOSPHORUS: CPT | Performed by: HOSPITALIST

## 2022-07-02 PROCEDURE — 71045 X-RAY EXAM CHEST 1 VIEW: CPT

## 2022-07-02 PROCEDURE — 250N000013 HC RX MED GY IP 250 OP 250 PS 637: Performed by: INTERNAL MEDICINE

## 2022-07-02 PROCEDURE — 83735 ASSAY OF MAGNESIUM: CPT | Performed by: HOSPITALIST

## 2022-07-02 PROCEDURE — 99233 SBSQ HOSP IP/OBS HIGH 50: CPT | Performed by: INTERNAL MEDICINE

## 2022-07-02 PROCEDURE — 250N000013 HC RX MED GY IP 250 OP 250 PS 637

## 2022-07-02 PROCEDURE — 82310 ASSAY OF CALCIUM: CPT | Performed by: HOSPITALIST

## 2022-07-02 PROCEDURE — 250N000011 HC RX IP 250 OP 636: Performed by: INTERNAL MEDICINE

## 2022-07-02 PROCEDURE — 84132 ASSAY OF SERUM POTASSIUM: CPT | Performed by: INTERNAL MEDICINE

## 2022-07-02 PROCEDURE — 258N000003 HC RX IP 258 OP 636: Performed by: INTERNAL MEDICINE

## 2022-07-02 PROCEDURE — 250N000011 HC RX IP 250 OP 636: Performed by: HOSPITALIST

## 2022-07-02 PROCEDURE — 250N000013 HC RX MED GY IP 250 OP 250 PS 637: Performed by: HOSPITALIST

## 2022-07-02 PROCEDURE — 84295 ASSAY OF SERUM SODIUM: CPT | Performed by: HOSPITALIST

## 2022-07-02 PROCEDURE — 36415 COLL VENOUS BLD VENIPUNCTURE: CPT | Performed by: HOSPITALIST

## 2022-07-02 PROCEDURE — 120N000001 HC R&B MED SURG/OB

## 2022-07-02 RX ORDER — DEXTROSE MONOHYDRATE 50 MG/ML
INJECTION, SOLUTION INTRAVENOUS CONTINUOUS
Status: DISCONTINUED | OUTPATIENT
Start: 2022-07-02 | End: 2022-07-03

## 2022-07-02 RX ORDER — POTASSIUM CHLORIDE 20MEQ/15ML
40 LIQUID (ML) ORAL ONCE
Status: COMPLETED | OUTPATIENT
Start: 2022-07-02 | End: 2022-07-02

## 2022-07-02 RX ADMIN — Medication 40 MG: at 08:18

## 2022-07-02 RX ADMIN — SENNOSIDES AND DOCUSATE SODIUM 1 TABLET: 50; 8.6 TABLET ORAL at 22:50

## 2022-07-02 RX ADMIN — RISPERIDONE 2 MG: 2 TABLET ORAL at 08:07

## 2022-07-02 RX ADMIN — SODIUM BICARBONATE 325 MG: 325 TABLET ORAL at 12:59

## 2022-07-02 RX ADMIN — RISPERIDONE 2 MG: 2 TABLET ORAL at 20:26

## 2022-07-02 RX ADMIN — IPRATROPIUM BROMIDE 2 SPRAY: 42 SPRAY NASAL at 23:15

## 2022-07-02 RX ADMIN — MONTELUKAST 10 MG: 10 TABLET, FILM COATED ORAL at 23:05

## 2022-07-02 RX ADMIN — TOPIRAMATE 100 MG: 100 TABLET, FILM COATED ORAL at 08:10

## 2022-07-02 RX ADMIN — Medication 1 PACKET: at 00:20

## 2022-07-02 RX ADMIN — Medication 1 PACKET: at 16:56

## 2022-07-02 RX ADMIN — ZINC SULFATE 220 MG (50 MG) CAPSULE 220 MG: CAPSULE at 08:07

## 2022-07-02 RX ADMIN — CEFTRIAXONE SODIUM 1 G: 1 INJECTION, POWDER, FOR SOLUTION INTRAMUSCULAR; INTRAVENOUS at 13:42

## 2022-07-02 RX ADMIN — ENOXAPARIN SODIUM 40 MG: 40 INJECTION SUBCUTANEOUS at 16:55

## 2022-07-02 RX ADMIN — IPRATROPIUM BROMIDE 2 SPRAY: 42 SPRAY NASAL at 16:55

## 2022-07-02 RX ADMIN — Medication 1 PACKET: at 09:23

## 2022-07-02 RX ADMIN — PANCRELIPASE 2 CAPSULE: 60000; 12000; 38000 CAPSULE, DELAYED RELEASE PELLETS ORAL at 12:59

## 2022-07-02 RX ADMIN — TOPIRAMATE 50 MG: 50 TABLET ORAL at 00:21

## 2022-07-02 RX ADMIN — PANCRELIPASE 2 CAPSULE: 60000; 12000; 38000 CAPSULE, DELAYED RELEASE PELLETS ORAL at 17:01

## 2022-07-02 RX ADMIN — PREDNISOLONE ACETATE 1 DROP: 10 SUSPENSION/ DROPS OPHTHALMIC at 08:21

## 2022-07-02 RX ADMIN — SERTRALINE HYDROCHLORIDE 100 MG: 100 TABLET ORAL at 08:07

## 2022-07-02 RX ADMIN — Medication 40 MG: at 23:05

## 2022-07-02 RX ADMIN — SODIUM BICARBONATE 325 MG: 325 TABLET ORAL at 17:01

## 2022-07-02 RX ADMIN — SODIUM BICARBONATE 325 MG: 325 TABLET ORAL at 02:25

## 2022-07-02 RX ADMIN — TOPIRAMATE 50 MG: 50 TABLET ORAL at 23:05

## 2022-07-02 RX ADMIN — LEVOCARNITINE 330 MG: 330 TABLET ORAL at 13:43

## 2022-07-02 RX ADMIN — Medication 1 PACKET: at 22:49

## 2022-07-02 RX ADMIN — SODIUM BICARBONATE 325 MG: 325 TABLET ORAL at 08:58

## 2022-07-02 RX ADMIN — LEVOCETIRIZINE DIHYDROCHLORIDE 5 MG: 5 TABLET ORAL at 20:26

## 2022-07-02 RX ADMIN — PANCRELIPASE 2 CAPSULE: 60000; 12000; 38000 CAPSULE, DELAYED RELEASE PELLETS ORAL at 02:26

## 2022-07-02 RX ADMIN — DEXTROSE MONOHYDRATE: 50 INJECTION, SOLUTION INTRAVENOUS at 10:52

## 2022-07-02 RX ADMIN — LEVOTHYROXINE SODIUM 50 MCG: 50 TABLET ORAL at 08:07

## 2022-07-02 RX ADMIN — IPRATROPIUM BROMIDE 2 SPRAY: 42 SPRAY NASAL at 08:21

## 2022-07-02 RX ADMIN — PANCRELIPASE 2 CAPSULE: 60000; 12000; 38000 CAPSULE, DELAYED RELEASE PELLETS ORAL at 08:58

## 2022-07-02 RX ADMIN — LEVOCARNITINE 330 MG: 330 TABLET ORAL at 08:07

## 2022-07-02 RX ADMIN — CARBOXYMETHYLCELLULOSE SODIUM 1 DROP: 10 GEL OPHTHALMIC at 23:15

## 2022-07-02 RX ADMIN — POTASSIUM CHLORIDE 40 MEQ: 20 SOLUTION ORAL at 08:30

## 2022-07-02 RX ADMIN — PANCRELIPASE 2 CAPSULE: 60000; 12000; 38000 CAPSULE, DELAYED RELEASE PELLETS ORAL at 20:58

## 2022-07-02 RX ADMIN — SODIUM BICARBONATE 325 MG: 325 TABLET ORAL at 20:58

## 2022-07-02 ASSESSMENT — ACTIVITIES OF DAILY LIVING (ADL)
ADLS_ACUITY_SCORE: 53
ADLS_ACUITY_SCORE: 55
ADLS_ACUITY_SCORE: 53
ADLS_ACUITY_SCORE: 57
ADLS_ACUITY_SCORE: 53

## 2022-07-02 NOTE — PLAN OF CARE
Pt A&Ox2-3, slow to respond with word finding difficulties at times. Occasionally confused to situation and place, continues to reorient as needed throughout the day. VSS on RA sating >90%. Tele: NSR. LS diminished. BS +, x2 loose stools. Tube feed running at goal rate, free water flush reduced to 200 ml q4h. Pt started on D5W @75 ml/hr. Pt denies pain throughout shift. Chest tube to -20 wall suction with minimal serous output throughout shift, dressing with scant drainage around insertion site, no air leak or crepitus present. G tube dressing CDI. x2 and walker for short distances.     Goal Outcome Evaluation:  Plan of Care Reviewed With: patient, family   Overall Patient Progress: no change  Outcome Evaluation: Pt sodium elevated, started D5W

## 2022-07-02 NOTE — PROVIDER NOTIFICATION
MD Notification    Notified Person: MD    Notified Person Name: delfino    Notification Date/Time: 7/2/22 1220    Notification Interaction: vocera message    Purpose of Notification: does pt still need IMC status?    Orders Received: discontinue    Comments:

## 2022-07-02 NOTE — PROGRESS NOTES
[facility]  Gastroenterology Progress Note     Jagdeep Walker MRN# [id number]   YOB: 1970 Age: 51 year old          Assessment and Plan:   Jagdeep Walker s a 51 year old male admitted with right sided pleural effusion and GI consulted to manage pancreatitis.     Active Problems:  Pleural effusion  Pancreatic pseudocyst  Idiopathic acute pancreatitis, unspecified complication status  Pancreaticopleural fistula  *Lipase 1,730->689, AST, ALT and Alk Phos normal. Lactic acid 1.9. WBC wnl, Hgb 12.6,    *6/14 CT A/P note improved inflammatory changes in pancreas. Pseudocysts not significantly changed. Gastric wall thickening has progressed consistent with secondary gastritis with new pseudocyst along the proximal posterior gastric wall. Ascites decreased. Wall thickening involving the cecum and transverse colon likely secondarily involved. Large right pleural effusion has significantly increased.  *Cause of pancreatitis is idiopathic- no evidence of gallstones or alcohol use. May be due to medication. 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 Contrast was injected and flowed through pancreatic ductal system and extravasated from the pancreatic duct in the body of the pancreas with pancreatic duct leak and communicating pseudocyst. Pancreatic sphincterotomy performed. One stent placed into ventral pancreatic duct.  *These findings confirm pancreaticopleural fistula  *NJ tube in place  * Chest tube output greatly improved only 125 ml in last 14 hours  * Respiratory status greatly improved on room air.  *6/26/22 MRCP significant for 2 peripheral enhancing cystic masses- one in tail  2.6 x 4.2 x 3.1 cm and head mass 2.5 cm x 4.1 cm x 4.0 cm. The head lesion has a fistulous tract extending in the adjacent duodenum- one in tail has no obvious fistulous tract. No mention of stent placement or  pancreaticopleural fistula. Per radiology above tail/head pseudocysts are enlarging comparted to prior MRCP.  MRCP measurement of cystic lesions in pancreas not listed on MRCP from 6/6/22, however 5/9/22 MRCP noted 2.5 x 2.3 x 1.3 cm tail lesion and 3.1 x 2.8 x 2.8 cm head lesion.    * CRP elevated at 73->107 this a.m. likely from GJ placement- will follow, Lipase 152 and normal  * Albumin 1.8- recommend above 3.0 to improve ascites/effusion. Today was 3.2  * CA 19-9  Normal at 26. Unlikely malignancy      --7/2/22- Consider repeat ERCP around 7/21, will follow with imaging and may consider EUS with necrosectomy if minimal improvement  -- GJ placed by IR on 6/28/22. Appreciate help. Will need to keep NPO to allow improvement in pancreatitis and fistula  -- Continue creon- given known pancreatitis- would not see reason to check fecal elastase given clinical picture  -- Expect multiple weeks until able to tolerate oral intake- need to see evidence of fistula closure--  -- Daily albumin level and if below 2.5 may need to restart albumin.  -- However if able to sent to TCU- GI is ok with this plan when able. We will follow closely as outpatient                Interval History:   no new complaints, denies chest pain, pain is controlled and has ongoing abdominal pain, but he reports improved today.               Review of Systems:   C: NEGATIVE for fever, chills, change in weight  E/M: NEGATIVE for ear, mouth and throat problems  R: NEGATIVE for significant cough or SOB  CV: NEGATIVE for chest pain, palpitations or peripheral edema             Medications:   I have reviewed this patient's current medications    amylase-lipase-protease  1-2 capsule Per Feeding Tube Q4H     atenolol  25 mg Oral or NG Tube Daily     carboxymethylcellulose PF  1 drop Right Eye At Bedtime     cefTRIAXone  1 g Intravenous Q24H     enoxaparin ANTICOAGULANT  40 mg Subcutaneous Q24H     ipratropium  2 spray Both Nostrils TID     levOCARNitine  330  mg Oral or NG Tube TID     levocetirizine  5 mg Oral or NG Tube QPM     levothyroxine  50 mcg Oral or NG Tube Daily     montelukast  10 mg Oral or NG Tube At Bedtime     pantoprazole  40 mg Oral or Feeding Tube BID     prednisoLONE acetate  1 drop Right Eye Daily     protein modular  1 packet Per Feeding Tube TID     risperiDONE  2 mg Oral or NG Tube BID     sertraline  100 mg Oral or NG Tube Daily     sodium bicarbonate  325 mg Per Feeding Tube Q4H     sodium chloride (PF)  3 mL Intracatheter Q8H     topiramate  100 mg Oral or NG Tube QAM     topiramate  50 mg Oral At Bedtime     zinc sulfate  220 mg Oral or NG Tube Daily                  Physical Exam:   Vitals were reviewed  Vital Signs with Ranges  Temp:  [97.3  F (36.3  C)-98.3  F (36.8  C)] 97.3  F (36.3  C)  Pulse:  [52-64] 53  Resp:  [12-23] 12  BP: ()/(46-70) 90/52  SpO2:  [94 %-98 %] 95 %  I/O last 3 completed shifts:  In: 2872.5 [NG/GT:960]  Out: 825 [Urine:625; Chest Tube:200]  Constitutional: alert, mild distress, cooperative and pale   Cardiovascular: negative, PMI normal. No lifts, heaves, or thrills. RRR. No murmurs, clicks gallops or rub  Respiratory: wheezy, Percussion normal. Good diaphragmatic excursion.   Abdomen: Abdomen soft, non-tender. BS normal. No masses, organomegaly           Data:   I reviewed the patient's new clinical lab test results.   Recent Labs   Lab Test 07/02/22  0609 07/01/22  0518 06/29/22  0514 06/19/22  0622 06/18/22  2041 06/16/22  0802 06/14/22  1903 06/06/22  0659 06/05/22  0728   WBC 10.5 7.8 11.1*   < > 4.6   < > 5.7   < > 3.8*   HGB 11.1* 11.3* 10.7*   < > 12.6*   < > 12.6*   < > 12.0*   MCV 94 95 98   < > 97   < > 96   < > 95    247 176   < > 152   < > 185   < > 92*   INR  --   --   --   --  1.74*  --  1.64*  --  1.73*    < > = values in this interval not displayed.     Recent Labs   Lab Test 07/02/22  0609 07/01/22  0518 06/30/22  0614 06/29/22  1527 06/29/22  0514   POTASSIUM 3.4 3.4 3.4   < > 3.3*    CHLORIDE 118* 116*  --   --  116*   CO2 28 27  --   --  28   BUN 45* 38*  --   --  32*   ANIONGAP 5 6  --   --  5    < > = values in this interval not displayed.     Recent Labs   Lab Test 07/01/22  0518 06/30/22  0927 06/29/22  0514 06/28/22  0509 06/27/22  0648 06/21/22  0704 06/20/22  0715 06/18/22  2041 06/15/22  1011 06/15/22  0034 06/05/22  0728 06/04/22  1653   ALBUMIN 2.6*  --  3.2* 2.9* 1.8* 1.7* 1.9* 2.2*  --   --    < >  --    BILITOTAL  --   --   --   --  1.7* 0.5 0.5 0.6  --   --    < >  --    ALT  --   --   --   --  42 12 15 24  --   --    < >  --    AST  --   --   --   --  67* 23 20 23  --   --    < >  --    PROTEIN  --  50 *  --   --   --   --   --   --   --  Negative  --  Negative   LIPASE  --   --   --   --  152  --  1,236* 579*  --   --    < >  --    AMYLASE  --   --   --   --   --   --   --   --  142*  --   --   --     < > = values in this interval not displayed.       I reviewed the patient's new imaging results.    All laboratory data reviewed  All imaging studies reviewed by me.  Patient plan/progress discussed with Dr. Clark.     Ely Bailey PA-C,  7/2/2022  [facility]  Office : [phone]  Cell: [phone] (Dr. Clark)  Cell: [phone] (Ely Bailey PA-C

## 2022-07-02 NOTE — PLAN OF CARE
"Goal Outcome Evaluation:      Neuro: AAOX3, unsure of situation.  CV: Reg S1S2; sinus nikko-sinus rhythm-junctional rhythm at times with EDINSON < 0.12. SBP ranges from low 90s to 100s.  GI/: Expelled large soft BM today in briefs. Also voids in briefs. Abd non-tender, soft, with active bowel sounds.  Mobility: Assisted with repositioning in bed. Up with GB & FWW w/1 assist (from physical therapy) to restroom & chair. Steady; denies dizziness.  Skin: Scattered minor bruising. Blanchable redness on sacrum/coccyx.  Pulm: Diminished lung sounds; few scattered crackles in bases. SpO2 > 95% on RA. R chest tube \"pigtail\" intact with serous drainage & on suction of 20 mmHg. Biocclusive dressing CDI.  Diet: On tube feeding via PEG of 1.5 J @ 55 mL/hr with H2O flushes. Tolerating well: no N/V.  Pain: Denies pain when asked but does groan when repositioned.   Plan: Monitor electrolytes; maintain nutrition. Eventual tx back to group home.                   "

## 2022-07-02 NOTE — PROVIDER NOTIFICATION
MD Notification    Notified Person: MD    Notified Person Name: delfino    Notification Date/Time: 7/2/2022 0819    Notification Interaction: vocera message    Purpose of Notification: can we get hold parameters for atenolol (pt BP 90/52 and HR 50's currently).     Orders Received:    Comments:

## 2022-07-02 NOTE — PROGRESS NOTES
[facility]    Hospitalist Progress Note      Assessment & Plan   Jagdeep Walker is a 51 year old male who was admitted on 6/14/2022 with shortness of breath and hypoxia with large pleural effusion on outside chest x-ray.     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  Recent echocardiogram 6/4 normal.  Pleural fluid studies consistent with transudate, specimen clotted for cell count, normal glucose, no organisms on gram stain.  Cytology negative x 2  Given lipase and amylase increase, perhaps pleural effusion related to pancreatitis with fistula or leak versus malnutrition related to severe pancreatitis  Chest tube placed on 6/18 and replaced on 6/22 as it appeared clogged  RRT for 6/20 chest pain, resolved with pain medications  6/22: Respiratory status worsened, likely due to worsening pleural effusions from his clogged chest tube.  Chest tube exchanged and has had significant output since with suction   * Culture from the pleural with diptheroids in broth only, c/w contaminant  - weaned off oxygen since 6/29  - ID consulted for the pleural culture results.  Felt contaminant and no need for antibiotics   -Patient is followed by GI, pulmonary, neurology  - CT surgery and IR following and appreciate their recommendations. Chest tube management as per CT surgery. Current recommendation is to continue CT with suction. Pleurodesis not appropriate in this  situation.  -Patient continues to have significant output through the chest tube despite 4 days of octreotide.  After discussion with GI it was decided to discontinue octreotide.  Although it has been felt that this was all pancreatico pleural fistula there is no imaging evidence of this.    - plan repeat ERCP around 7/21/22   - continue creon  - GJ in place since 6/28, continue NPO  - recheck albumin on 7/2 after holding albumin infusions, if <2.5, needs albumin restarted    Hypernatremia  Free water 200ml q 4hours   - sodiums q12h  - BMP in AM  Sodium is elevated today at 151 so start him on D5W at 75 mill per hour    Acute/subacute pancreatitis with pseudocyst   *Hospitalized 5/8-5/17/22 for pancreatitis with pseudocyst formation. Unclear etiology, EUS unrevealing for etiology, possibly due to medications  *CT on admission with acute pancreatitis with slightly improved inflammatory changes, pseudocysts not significantly changed.  -he had a PEJ tube placed with IR on 6/28, currently tube feeds are at goal, plan is to do ERCP in ~7/21 weeks, patient should be n.p.o. until then.    Recent metabolic encephalopathy   Mild zinc deficiency   *Extensively worked up during admission 6/4-6/10/22 including head CT, MRI brain, EEG, paraneoplastic ab, extensive laboratory work-up mostly unremarkable with exception of mildly low zinc.  *Per sister, patient does have some cognitive impairment at baseline, though is typically oriented to self, family and can carry on an extensive conversation especially regarding topics he enjoys (eg sports). He remains below his baseline.  -MRI brain has been repeated again on 6/27 without any new findings .  - Continue zinc supplementation     Hx of portal vein thrombosis  * Diagnosed during admission 5/2022, improved on subsequent CT and not treated with anticoagulation   * Non-occlusive main portal vein thrombosis, splenic vein thrombosis seen on admission CT  - GI consulted as above, they  advised no need to treat PVT previous admission (6/9/2022)  -This could be causing significant portal hypertension and worsening ascites which in turn could be causing worsening pleural effusion.     Urinary tract infection  UA large leuk esterase, >182 WBCs, many bacteria. Urine cx with >100k e coli  - rocephin 1g daily 2/7  - follow up urine cx sensitivities    Depression  Anxiety  Schizoaffective disorder, bipolar type  - Continue prior to admission sertraline, risperidone     Seizure disorder  PTA on topiramate and Depakote, depakote low, topiramate continued--level is low. MRI shows no changes. EEG no epileptiform discharge, only generalized encephalopathy  - hold depakote given association with pancreatitis  - continue topiramate, continue 100mg in AM, add 50mg in PM (given low level) on 7/1     Carnitine deficiency  - Continue prior to admission levocarnitine      Hypertension  - Continue prior to admission atenolol      Fisher's esophagus  - Continue prior to admission PPI     Hypothyroidism  - Continue prior to admission levothyroxine     Seasonal allergies  - Continue prior to admission levocetirizine    Moderate malnutrition in setting of acute illness/injury  Appreciate nutrition    Clinically Significant Risk Factors Present on Admission                        DVT Prophylaxis: Enoxaparin (Lovenox) SQ  Code Status: Full Code     Expected Discharge Date: 07/04/2022        Discharge Comments: CT drainage issues, ERCP 6/21 6/25 Ct in place, NJ, octreotide gtt, HFNC  6/29- CT still in place  7/1 Vinod cornell at d/c, CT still in      7/1: pending albumin and sodium levels 7/2    Jinny Carbajal MD   Page [phone](7AM-6PM)      Interval History   Patient seen and examined.  Denies any abdominal pain currently.  No nausea vomiting.  Had 1 large BM this morning with tube feeds.  Sodium remains elevated at 151 today    -Data reviewed today: I reviewed all new labs and imaging results over the last 24  hours. I personally reviewed no images or EKG's today.    Physical Exam   Temp: 97.3  F (36.3  C) Temp src: Oral BP: 103/67 Pulse: 80   Resp: 29 SpO2: 98 % O2 Device: None (Room air)    Vitals:    06/30/22 0702 07/01/22 0538 07/02/22 0519   Weight: 85.5 kg (188 lb 8 oz) 81.3 kg (179 lb 3.2 oz) 81.3 kg (179 lb 3.7 oz)     Vital Signs with Ranges  Temp:  [97.3  F (36.3  C)-98.3  F (36.8  C)] 97.3  F (36.3  C)  Pulse:  [52-80] 80  Resp:  [12-29] 29  BP: ()/(39-70) 103/67  SpO2:  [94 %-98 %] 98 %  I/O last 3 completed shifts:  In: 2872.5 [NG/GT:960]  Out: 825 [Urine:625; Chest Tube:200]    Constitutional: Awake, alert, cooperative, no apparent distress, blind in right eye  Respiratory: decreased breath sounds overall, left chest tube in place  Cardiovascular: Regular rate and rhythm, normal S1 and S2, and no murmur noted  GI: Normal bowel sounds, soft, non-distended, non-tender, GJ tube without surrounding erythema  Skin/Integumen: No rashes, no cyanosis, no edema  Other:     Medications     dextrose       D5W 75 mL/hr at 07/02/22 1052       amylase-lipase-protease  1-2 capsule Per Feeding Tube Q4H     atenolol  25 mg Oral or NG Tube Daily     carboxymethylcellulose PF  1 drop Right Eye At Bedtime     cefTRIAXone  1 g Intravenous Q24H     enoxaparin ANTICOAGULANT  40 mg Subcutaneous Q24H     ipratropium  2 spray Both Nostrils TID     levOCARNitine  330 mg Oral or NG Tube TID     levocetirizine  5 mg Oral or NG Tube QPM     levothyroxine  50 mcg Oral or NG Tube Daily     montelukast  10 mg Oral or NG Tube At Bedtime     pantoprazole  40 mg Oral or Feeding Tube BID     prednisoLONE acetate  1 drop Right Eye Daily     protein modular  1 packet Per Feeding Tube TID     risperiDONE  2 mg Oral or NG Tube BID     sertraline  100 mg Oral or NG Tube Daily     sodium bicarbonate  325 mg Per Feeding Tube Q4H     sodium chloride (PF)  3 mL Intracatheter Q8H     topiramate  100 mg Oral or NG Tube QAM     topiramate  50 mg Oral  At Bedtime     zinc sulfate  220 mg Oral or NG Tube Daily       Data   Recent Labs   Lab 07/02/22  0609 07/01/22  1808 07/01/22  1641 07/01/22  1250 07/01/22  1245 07/01/22  0928 07/01/22  0518 06/30/22  1139 06/30/22  0614 06/29/22  1037 06/29/22  0514 06/27/22  0842 06/27/22  0648   WBC 10.5  --   --   --   --   --  7.8  --   --   --  11.1*  --  5.5   HGB 11.1*  --   --   --   --   --  11.3*  --   --   --  10.7*  --  12.3*   MCV 94  --   --   --   --   --  95  --   --   --  98  --  97     --   --   --   --   --  247  --   --   --  176   < > 159   *  151* 152*  --   --  151*  --  149*  149*   < > 143   < > 149*  149*   < > 149*  149*   POTASSIUM 3.4  --   --   --   --   --  3.4  --  3.4   < > 3.3*   < > 3.7   CHLORIDE 118*  --   --   --   --   --  116*  --   --   --  116*   < > 113*   CO2 28  --   --   --   --   --  27  --   --   --  28   < > 33*   BUN 45*  --   --   --   --   --  38*  --   --   --  32*   < > 35*   CR 0.98  --   --   --   --   --  0.89  --   --   --  0.90   < > 0.82   ANIONGAP 5  --   --   --   --   --  6  --   --   --  5   < > 3   NASIR 9.1  --   --   --   --   --  9.0  --   --   --  9.5   < > 9.1   *  --  110* 117*  --    < > 121*  --   --    < > 111*   < > 176*   ALBUMIN  --   --   --   --   --   --  2.6*  --   --   --  3.2*   < > 1.8*   PROTTOTAL  --   --   --   --   --   --   --   --   --   --   --   --  5.4*   BILITOTAL  --   --   --   --   --   --   --   --   --   --   --   --  1.7*   ALKPHOS  --   --   --   --   --   --   --   --   --   --   --   --  144   ALT  --   --   --   --   --   --   --   --   --   --   --   --  42   AST  --   --   --   --   --   --   --   --   --   --   --   --  67*   LIPASE  --   --   --   --   --   --   --   --   --   --   --   --  152    < > = values in this interval not displayed.       Recent Results (from the past 24 hour(s))   XR Chest Port 1 View    Narrative    EXAM: XR CHEST PORT 1 VIEW  LOCATION: [facility]  HOSPITAL  DATE/TIME: 7/2/2022 8:06 AM    INDICATION: pleural effusion  COMPARISON: 07/01/2022 at 0558 hours       Impression    IMPRESSION:  No change from the prior study. A pigtail catheter is over the right lateral costophrenic sulcus. No pneumothorax or pleural fluid appreciated. The right hemidiaphragm is mildly elevated. No airspace disease or edema. Normal size of the   heart. Degenerative changes are seen in the spine.

## 2022-07-03 ENCOUNTER — APPOINTMENT (OUTPATIENT)
Dept: GENERAL RADIOLOGY | Facility: CLINIC | Age: 52
DRG: 438 | End: 2022-07-03
Attending: THORACIC SURGERY (CARDIOTHORACIC VASCULAR SURGERY)
Payer: MEDICARE

## 2022-07-03 LAB
ALBUMIN SERPL-MCNC: 2.3 G/DL (ref 3.4–5)
ALP SERPL-CCNC: 118 U/L (ref 40–150)
ALT SERPL W P-5'-P-CCNC: 47 U/L (ref 0–70)
ANION GAP SERPL CALCULATED.3IONS-SCNC: 3 MMOL/L (ref 3–14)
AST SERPL W P-5'-P-CCNC: 41 U/L (ref 0–45)
BILIRUB SERPL-MCNC: 1.2 MG/DL (ref 0.2–1.3)
BUN SERPL-MCNC: 42 MG/DL (ref 7–30)
CALCIUM SERPL-MCNC: 9.4 MG/DL (ref 8.5–10.1)
CHLORIDE BLD-SCNC: 115 MMOL/L (ref 94–109)
CO2 SERPL-SCNC: 29 MMOL/L (ref 20–32)
CREAT SERPL-MCNC: 0.98 MG/DL (ref 0.66–1.25)
ERYTHROCYTE [DISTWIDTH] IN BLOOD BY AUTOMATED COUNT: 15.2 % (ref 10–15)
GFR SERPL CREATININE-BSD FRML MDRD: >90 ML/MIN/1.73M2
GLUCOSE BLD-MCNC: 135 MG/DL (ref 70–99)
HCT VFR BLD AUTO: 33.2 % (ref 40–53)
HGB BLD-MCNC: 10.5 G/DL (ref 13.3–17.7)
MCH RBC QN AUTO: 30.2 PG (ref 26.5–33)
MCHC RBC AUTO-ENTMCNC: 31.6 G/DL (ref 31.5–36.5)
MCV RBC AUTO: 95 FL (ref 78–100)
PLATELET # BLD AUTO: 313 10E3/UL (ref 150–450)
POTASSIUM BLD-SCNC: 3.5 MMOL/L (ref 3.4–5.3)
PROT SERPL-MCNC: 6 G/DL (ref 6.8–8.8)
RBC # BLD AUTO: 3.48 10E6/UL (ref 4.4–5.9)
SODIUM SERPL-SCNC: 147 MMOL/L (ref 133–144)
SODIUM SERPL-SCNC: 147 MMOL/L (ref 133–144)
WBC # BLD AUTO: 12.3 10E3/UL (ref 4–11)

## 2022-07-03 PROCEDURE — 71045 X-RAY EXAM CHEST 1 VIEW: CPT

## 2022-07-03 PROCEDURE — 36415 COLL VENOUS BLD VENIPUNCTURE: CPT | Performed by: INTERNAL MEDICINE

## 2022-07-03 PROCEDURE — 258N000003 HC RX IP 258 OP 636: Performed by: INTERNAL MEDICINE

## 2022-07-03 PROCEDURE — 120N000001 HC R&B MED SURG/OB

## 2022-07-03 PROCEDURE — 250N000013 HC RX MED GY IP 250 OP 250 PS 637: Performed by: HOSPITALIST

## 2022-07-03 PROCEDURE — 84295 ASSAY OF SERUM SODIUM: CPT | Performed by: HOSPITALIST

## 2022-07-03 PROCEDURE — P9047 ALBUMIN (HUMAN), 25%, 50ML: HCPCS | Performed by: INTERNAL MEDICINE

## 2022-07-03 PROCEDURE — 250N000013 HC RX MED GY IP 250 OP 250 PS 637: Performed by: INTERNAL MEDICINE

## 2022-07-03 PROCEDURE — 250N000011 HC RX IP 250 OP 636: Performed by: HOSPITALIST

## 2022-07-03 PROCEDURE — 36415 COLL VENOUS BLD VENIPUNCTURE: CPT | Performed by: HOSPITALIST

## 2022-07-03 PROCEDURE — 80053 COMPREHEN METABOLIC PANEL: CPT | Performed by: INTERNAL MEDICINE

## 2022-07-03 PROCEDURE — 250N000011 HC RX IP 250 OP 636: Performed by: INTERNAL MEDICINE

## 2022-07-03 PROCEDURE — 99233 SBSQ HOSP IP/OBS HIGH 50: CPT | Performed by: INTERNAL MEDICINE

## 2022-07-03 PROCEDURE — 250N000013 HC RX MED GY IP 250 OP 250 PS 637

## 2022-07-03 PROCEDURE — 85027 COMPLETE CBC AUTOMATED: CPT | Performed by: INTERNAL MEDICINE

## 2022-07-03 RX ORDER — ALBUMIN (HUMAN) 12.5 G/50ML
25 SOLUTION INTRAVENOUS EVERY 12 HOURS
Status: DISCONTINUED | OUTPATIENT
Start: 2022-07-03 | End: 2022-07-05

## 2022-07-03 RX ADMIN — RISPERIDONE 2 MG: 2 TABLET ORAL at 21:45

## 2022-07-03 RX ADMIN — RISPERIDONE 2 MG: 2 TABLET ORAL at 09:56

## 2022-07-03 RX ADMIN — ENOXAPARIN SODIUM 40 MG: 40 INJECTION SUBCUTANEOUS at 17:37

## 2022-07-03 RX ADMIN — LEVOTHYROXINE SODIUM 50 MCG: 50 TABLET ORAL at 09:56

## 2022-07-03 RX ADMIN — PANCRELIPASE 2 CAPSULE: 60000; 12000; 38000 CAPSULE, DELAYED RELEASE PELLETS ORAL at 21:37

## 2022-07-03 RX ADMIN — SODIUM CHLORIDE 500 ML: 9 INJECTION, SOLUTION INTRAVENOUS at 16:06

## 2022-07-03 RX ADMIN — TOPIRAMATE 50 MG: 50 TABLET ORAL at 21:46

## 2022-07-03 RX ADMIN — SODIUM BICARBONATE 325 MG: 325 TABLET ORAL at 02:08

## 2022-07-03 RX ADMIN — PANCRELIPASE 1 CAPSULE: 60000; 12000; 38000 CAPSULE, DELAYED RELEASE PELLETS ORAL at 02:09

## 2022-07-03 RX ADMIN — Medication 1 PACKET: at 22:14

## 2022-07-03 RX ADMIN — SERTRALINE HYDROCHLORIDE 100 MG: 100 TABLET ORAL at 09:56

## 2022-07-03 RX ADMIN — CEFTRIAXONE SODIUM 1 G: 1 INJECTION, POWDER, FOR SOLUTION INTRAMUSCULAR; INTRAVENOUS at 13:59

## 2022-07-03 RX ADMIN — IPRATROPIUM BROMIDE 2 SPRAY: 42 SPRAY NASAL at 22:16

## 2022-07-03 RX ADMIN — MONTELUKAST 10 MG: 10 TABLET, FILM COATED ORAL at 21:46

## 2022-07-03 RX ADMIN — PANCRELIPASE 2 CAPSULE: 60000; 12000; 38000 CAPSULE, DELAYED RELEASE PELLETS ORAL at 10:12

## 2022-07-03 RX ADMIN — ALBUMIN HUMAN 25 G: 0.25 SOLUTION INTRAVENOUS at 09:58

## 2022-07-03 RX ADMIN — TOPIRAMATE 100 MG: 100 TABLET, FILM COATED ORAL at 09:57

## 2022-07-03 RX ADMIN — PREDNISOLONE ACETATE 1 DROP: 10 SUSPENSION/ DROPS OPHTHALMIC at 10:10

## 2022-07-03 RX ADMIN — CARBOXYMETHYLCELLULOSE SODIUM 1 DROP: 10 GEL OPHTHALMIC at 21:56

## 2022-07-03 RX ADMIN — ATENOLOL 25 MG: 25 TABLET ORAL at 09:59

## 2022-07-03 RX ADMIN — PANCRELIPASE 2 CAPSULE: 60000; 12000; 38000 CAPSULE, DELAYED RELEASE PELLETS ORAL at 17:48

## 2022-07-03 RX ADMIN — SODIUM BICARBONATE 325 MG: 325 TABLET ORAL at 17:48

## 2022-07-03 RX ADMIN — ALBUMIN HUMAN 25 G: 0.25 SOLUTION INTRAVENOUS at 21:58

## 2022-07-03 RX ADMIN — LEVOCETIRIZINE DIHYDROCHLORIDE 5 MG: 5 TABLET ORAL at 21:45

## 2022-07-03 RX ADMIN — Medication 40 MG: at 22:08

## 2022-07-03 RX ADMIN — SODIUM BICARBONATE 325 MG: 325 TABLET ORAL at 13:59

## 2022-07-03 RX ADMIN — Medication 40 MG: at 10:00

## 2022-07-03 RX ADMIN — SODIUM BICARBONATE 325 MG: 325 TABLET ORAL at 04:59

## 2022-07-03 RX ADMIN — SODIUM BICARBONATE 325 MG: 325 TABLET ORAL at 10:12

## 2022-07-03 RX ADMIN — IPRATROPIUM BROMIDE 2 SPRAY: 42 SPRAY NASAL at 17:37

## 2022-07-03 RX ADMIN — ZINC SULFATE 220 MG (50 MG) CAPSULE 220 MG: CAPSULE at 09:56

## 2022-07-03 RX ADMIN — PANCRELIPASE 1 CAPSULE: 60000; 12000; 38000 CAPSULE, DELAYED RELEASE PELLETS ORAL at 04:59

## 2022-07-03 RX ADMIN — SODIUM BICARBONATE 325 MG: 325 TABLET ORAL at 21:37

## 2022-07-03 RX ADMIN — DEXTROSE MONOHYDRATE: 50 INJECTION, SOLUTION INTRAVENOUS at 04:35

## 2022-07-03 RX ADMIN — PANCRELIPASE 2 CAPSULE: 60000; 12000; 38000 CAPSULE, DELAYED RELEASE PELLETS ORAL at 13:59

## 2022-07-03 RX ADMIN — Medication 1 PACKET: at 13:59

## 2022-07-03 RX ADMIN — IPRATROPIUM BROMIDE 2 SPRAY: 42 SPRAY NASAL at 10:00

## 2022-07-03 RX ADMIN — Medication 1 PACKET: at 17:40

## 2022-07-03 ASSESSMENT — ACTIVITIES OF DAILY LIVING (ADL)
ADLS_ACUITY_SCORE: 55
ADLS_ACUITY_SCORE: 55
ADLS_ACUITY_SCORE: 57
ADLS_ACUITY_SCORE: 55
ADLS_ACUITY_SCORE: 55
ADLS_ACUITY_SCORE: 57
ADLS_ACUITY_SCORE: 55
ADLS_ACUITY_SCORE: 57
ADLS_ACUITY_SCORE: 55

## 2022-07-03 NOTE — PLAN OF CARE
Pt transferred to ortho spine. Pt disoriented to situation. VSS on RA. LS diminished. Bowels active, flatus+ Tube feed running at goal rate. denies pain. Chest tube to -20 wall suction with minimal serous output, dressing with scant drainage around insertion site, no air leak or crepitus present. G tube dressing CDI. Up w/ 2 and walker

## 2022-07-03 NOTE — PROGRESS NOTES
"Provider notified- \"Pt's BP this afternoon is 85/55. Fluids stopped this am, Albumin given. Do you want to order fluid bolus? TY\"   "

## 2022-07-03 NOTE — PROGRESS NOTES
THORACIC SURGERY    Minimal CT output  CXR right pleural space well drained    Leave CT in for 24 hrs.  If output remains low, OK to D/C CT    EPHRAIM NAGY MD [facility] ONCOLOGY THORACIC SURGERY  CELL:  [phone]  OFFICE: [phone]

## 2022-07-03 NOTE — PLAN OF CARE
Goal Outcome Evaluation:    Plan of Care Reviewed With: patient        AOx3 (not situation). Slow, slurred speech per baseline. LS diminished.  Soft BPs, on 1 Lpm via NC overnight,  assist of 2 w/ lift (apparently pt is normally up with assist of 2 and walker) . Chest tube patent and draining, -20 to wall suction. Dressing changed at chest tube insertion site d/t old dressing being saturated leaking through chux. Tolerates tube feeding at goal rate of 55 ml/hr, gtube patent and infusing. Incontinent of B&B, external male catheter placed. Portable CXR done this AM, results pending

## 2022-07-03 NOTE — PROGRESS NOTES
[facility]    Hospitalist Progress Note      Assessment & Plan   Jagdeep Walker is a 51 year old male who was admitted on 6/14/2022 with shortness of breath and hypoxia with large pleural effusion on outside chest x-ray.     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  Recent echocardiogram 6/4 normal.  Pleural fluid studies consistent with transudate, specimen clotted for cell count, normal glucose, no organisms on gram stain.  Cytology negative x 2  Given lipase and amylase increase, perhaps pleural effusion related to pancreatitis with fistula or leak versus malnutrition related to severe pancreatitis  Chest tube placed on 6/18 and replaced on 6/22 as it appeared clogged  RRT for 6/20 chest pain, resolved with pain medications  6/22: Respiratory status worsened, likely due to worsening pleural effusions from his clogged chest tube.  Chest tube exchanged and has had significant output since with suction   * Culture from the pleural with diptheroids in broth only, c/w contaminant  - weaned off oxygen since 6/29  - ID consulted for the pleural culture results.  Felt contaminant and no need for antibiotics   -Patient is followed by GI, pulmonary, neurology  - CT surgery and IR following and appreciate their recommendations. Chest tube management as per CT surgery. Current recommendation is to continue CT with suction. Pleurodesis not appropriate in this  situation.  -Patient continues to have significant output through the chest tube despite 4 days of octreotide.  After discussion with GI it was decided to discontinue octreotide.  Although it has been felt that this was all pancreatico pleural fistula there is no imaging evidence of this.    - plan repeat ERCP around 7/21/22   - continue creon  - GJ in place since 6/28, continue NPO  -Getting tube feeds via the PEG tube  Started on IV albumin 25 g every 12 hours today for low albumin of 2.3  As per thoracic surgery pleural effusion appears well drained keep the tube in for today and should be able to remove it in the next 24 hours        Hypernatremia  Free water 200ml q 4hours   - sodiums q12h  - BMP in AM  Sodium is elevated today at 151 so start him on D5W at 75 mill per hour  Sodium improved today to 147 so IV fluids discontinued    Acute/subacute pancreatitis with pseudocyst   *Hospitalized 5/8-5/17/22 for pancreatitis with pseudocyst formation. Unclear etiology, EUS unrevealing for etiology, possibly due to medications  *CT on admission with acute pancreatitis with slightly improved inflammatory changes, pseudocysts not significantly changed.  -he had a PEJ tube placed with IR on 6/28, currently tube feeds are at goal, plan is to do ERCP in ~7/21 weeks, patient should be n.p.o. until then.    Recent metabolic encephalopathy   Mild zinc deficiency   *Extensively worked up during admission 6/4-6/10/22 including head CT, MRI brain, EEG, paraneoplastic ab, extensive laboratory work-up mostly unremarkable with exception of mildly low zinc.  *Per sister, patient does have some cognitive impairment at baseline, though is typically oriented to self, family and can carry on an extensive conversation especially regarding topics he enjoys (eg sports). He remains below his baseline.  -MRI brain has been repeated again on 6/27 without any new findings .  - Continue zinc supplementation     Hx of portal vein thrombosis  *  Diagnosed during admission 5/2022, improved on subsequent CT and not treated with anticoagulation   * Non-occlusive main portal vein thrombosis, splenic vein thrombosis seen on admission CT  - GI consulted as above, they advised no need to treat PVT previous admission (6/9/2022)  -This could be causing significant portal hypertension and worsening ascites which in turn could be causing worsening pleural effusion.     Urinary tract infection  UA large leuk esterase, >182 WBCs, many bacteria. Urine cx with >100k e coli  - rocephin 1g daily 2/7  - follow up urine cx sensitivities    Depression  Anxiety  Schizoaffective disorder, bipolar type  - Continue prior to admission sertraline, risperidone     Seizure disorder  PTA on topiramate and Depakote, depakote low, topiramate continued--level is low. MRI shows no changes. EEG no epileptiform discharge, only generalized encephalopathy  - hold depakote given association with pancreatitis  - continue topiramate, continue 100mg in AM, add 50mg in PM (given low level) on 7/1     Carnitine deficiency  - Continue prior to admission levocarnitine      Hypertension  - Continue prior to admission atenolol      Fisher's esophagus  - Continue prior to admission PPI     Hypothyroidism  - Continue prior to admission levothyroxine     Seasonal allergies  - Continue prior to admission levocetirizine    Moderate malnutrition in setting of acute illness/injury  Appreciate nutrition    Clinically Significant Risk Factors Present on Admission                        DVT Prophylaxis: Enoxaparin (Lovenox) SQ  Code Status: Full Code     Expected Discharge Date: 07/05/2022        Discharge Comments: CT drainage issues, ERCP 6/21 6/25 Ct in place, [place], octreotide gtt, HFNC  6/29- CT still in place  7/1 Vinod cornell at d/c, CT still in.      7/1: pending albumin and sodium levels    Jinny Carbajal MD   Page [phone](7AM-6PM)      Interval History   Patient seen and examined.  Denies any  abdominal pain currently.  No nausea vomiting.  Albumin returned low at 2.3 restarted on IV albumin today.  Tolerating tube feeds    -Data reviewed today: I reviewed all new labs and imaging results over the last 24 hours. I personally reviewed no images or EKG's today.    Physical Exam   Temp: 97.5  F (36.4  C) Temp src: Axillary BP: 100/56 Pulse: 61   Resp: 16 SpO2: 96 % O2 Device: Nasal cannula Oxygen Delivery: 1 LPM  Vitals:    07/01/22 0538 07/02/22 0519 07/03/22 0531   Weight: 81.3 kg (179 lb 3.2 oz) 81.3 kg (179 lb 3.7 oz) 81.5 kg (179 lb 9.6 oz)     Vital Signs with Ranges  Temp:  [97.3  F (36.3  C)-99.5  F (37.5  C)] 97.5  F (36.4  C)  Pulse:  [51-77] 61  Resp:  [16-20] 16  BP: ()/(50-67) 100/56  SpO2:  [92 %-99 %] 96 %  I/O last 3 completed shifts:  In: 1484 [NG/GT:1055]  Out: 580 [Urine:550; Chest Tube:30]    Constitutional: Awake, alert, cooperative, no apparent distress, blind in right eye  Respiratory: decreased breath sounds overall, left chest tube in place  Cardiovascular: Regular rate and rhythm, normal S1 and S2, and no murmur noted  GI: Normal bowel sounds, soft, non-distended, non-tender, GJ tube without surrounding erythema  Skin/Integumen: No rashes, no cyanosis, no edema  Other:     Medications     dextrose         albumin human  25 g Intravenous Q12H     amylase-lipase-protease  1-2 capsule Per Feeding Tube Q4H     atenolol  25 mg Oral or NG Tube Daily     carboxymethylcellulose PF  1 drop Right Eye At Bedtime     cefTRIAXone  1 g Intravenous Q24H     enoxaparin ANTICOAGULANT  40 mg Subcutaneous Q24H     ipratropium  2 spray Both Nostrils TID     levocetirizine  5 mg Oral or NG Tube QPM     levothyroxine  50 mcg Oral or NG Tube Daily     montelukast  10 mg Oral or NG Tube At Bedtime     pantoprazole  40 mg Oral or Feeding Tube BID     prednisoLONE acetate  1 drop Right Eye Daily     protein modular  1 packet Per Feeding Tube TID     risperiDONE  2 mg Oral or NG Tube BID     sertraline   100 mg Oral or NG Tube Daily     sodium bicarbonate  325 mg Per Feeding Tube Q4H     sodium chloride (PF)  3 mL Intracatheter Q8H     topiramate  100 mg Oral or NG Tube QAM     topiramate  50 mg Oral At Bedtime     zinc sulfate  220 mg Oral or NG Tube Daily       Data   Recent Labs   Lab 07/03/22  0631 07/02/22  1848 07/02/22  1212 07/02/22  0609 07/01/22  1808 07/01/22  1641 07/01/22  0928 07/01/22  0518 06/27/22  0842 06/27/22  0648   WBC 12.3*  --   --  10.5  --   --   --  7.8   < > 5.5   HGB 10.5*  --   --  11.1*  --   --   --  11.3*   < > 12.3*   MCV 95  --   --  94  --   --   --  95   < > 97     --   --  293  --   --   --  247   < > 159   * 150*  --  151*  151*   < >  --    < > 149*  149*   < > 149*  149*   POTASSIUM 3.5  --  3.6 3.4  --   --   --  3.4   < > 3.7   CHLORIDE 115*  --   --  118*  --   --   --  116*   < > 113*   CO2 29  --   --  28  --   --   --  27   < > 33*   BUN 42*  --   --  45*  --   --   --  38*   < > 35*   CR 0.98  --   --  0.98  --   --   --  0.89   < > 0.82   ANIONGAP 3  --   --  5  --   --   --  6   < > 3   NASIR 9.4  --   --  9.1  --   --   --  9.0   < > 9.1   *  --   --  114*  --  110*   < > 121*   < > 176*   ALBUMIN 2.3*  --   --   --   --   --   --  2.6*   < > 1.8*   PROTTOTAL 6.0*  --   --   --   --   --   --   --   --  5.4*   BILITOTAL 1.2  --   --   --   --   --   --   --   --  1.7*   ALKPHOS 118  --   --   --   --   --   --   --   --  144   ALT 47  --   --   --   --   --   --   --   --  42   AST 41  --   --   --   --   --   --   --   --  67*   LIPASE  --   --   --   --   --   --   --   --   --  152    < > = values in this interval not displayed.       Recent Results (from the past 24 hour(s))   XR Chest Port 1 View    Narrative    EXAM: XR CHEST PORT 1 VIEW  LOCATION: [facility]  DATE/TIME: 7/3/2022 4:55 AM    INDICATION: pleural effusion  COMPARISON: 07/02/2022.      Impression    IMPRESSION: Right lateral basal pigtail pleural  drainage catheter unchanged in position. No significant pleural fluid. Mild elevation of the right hemidiaphragm and right basilar atelectasis. Left lung clear. Normal heart size and pulmonary vascularity.   No pneumothorax.

## 2022-07-03 NOTE — PLAN OF CARE
Pt A&Ox3, speech garbled, word finding difficulty. Denies pain, SOB. Ext 2A w/ lift/ bed mobility. Chest tube to -20 cont suction. Output marked at 1700cc total. Cont tube feed at 55cc/hr, pt tolerating. Remains NPO. IV fluids discont' this am, Albumin infused per order. Ext cath present. Developed hypotension this afternoon, Atenolol discont' & 500cc NS bolus per verbal order. Chest tube dressing changed, sutures intact.

## 2022-07-03 NOTE — PROGRESS NOTES
[facility]  Gastroenterology Progress Note     Jagdeep Walker MRN# [id number]   YOB: 1970 Age: 51 year old          Assessment and Plan:   Jagdeep Walker s a 51 year old male admitted with right sided pleural effusion and GI consulted to manage pancreatitis.     Active Problems:  Pleural effusion  Pancreatic pseudocyst  Idiopathic acute pancreatitis, unspecified complication status  Pancreaticopleural fistula  *Lipase 1,730->689, AST, ALT and Alk Phos normal. Lactic acid 1.9. WBC wnl, Hgb 12.6,    *6/14 CT A/P note improved inflammatory changes in pancreas. Pseudocysts not significantly changed. Gastric wall thickening has progressed consistent with secondary gastritis with new pseudocyst along the proximal posterior gastric wall. Ascites decreased. Wall thickening involving the cecum and transverse colon likely secondarily involved. Large right pleural effusion has significantly increased.  *Cause of pancreatitis is idiopathic- no evidence of gallstones or alcohol use. May be due to medication. 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 Contrast was injected and flowed through pancreatic ductal system and extravasated from the pancreatic duct in the body of the pancreas with pancreatic duct leak and communicating pseudocyst. Pancreatic sphincterotomy performed. One stent placed into ventral pancreatic duct.  *These findings confirm pancreaticopleural fistula  *NJ tube in place  * Chest tube output greatly improved only 125 ml in last 14 hours  * Respiratory status greatly improved on room air.  *6/26/22 MRCP significant for 2 peripheral enhancing cystic masses- one in tail  2.6 x 4.2 x 3.1 cm and head mass 2.5 cm x 4.1 cm x 4.0 cm. The head lesion has a fistulous tract extending in the adjacent duodenum- one in tail has no obvious fistulous tract. No mention of stent placement or  pancreaticopleural fistula. Per radiology above tail/head pseudocysts are enlarging comparted to prior MRCP.  MRCP measurement of cystic lesions in pancreas not listed on MRCP from 6/6/22, however 5/9/22 MRCP noted 2.5 x 2.3 x 1.3 cm tail lesion and 3.1 x 2.8 x 2.8 cm head lesion.    * CRP elevated at 73->107 this a.m. likely from GJ placement- will follow, Lipase 152 and normal  * Albumin 1.8- recommend above 3.0 to improve ascites/effusion. Today was 3.2  * CA 19-9  Normal at 26. Unlikely malignancy      --7/3/22- Consider repeat ERCP in 4-6 weeks after previous ERCP for possible stent exchange, will follow with imaging and may consider EUS with necrosectomy if minimal improvement  -- GJ placed by IR on 6/28/22. Appreciate help. Will need to keep NPO to allow improvement in pancreatitis and fistula  -- Continue creon- given known pancreatitis- would not see reason to check fecal elastase given clinical picture  -- Expect multiple weeks until able to tolerate oral intake- need to see evidence of fistula closure--  -- Daily albumin level and if below 2.5 may need to restart albumin.  -- Continue on current treatment plan with tube feeding.    -- Continue to monitor pleural fluid output.    -- Patient findings are worrisome for possible hepatopulmonary versus pancreatic or pulmonary relation leading to his pleural effusions continue to monitor his nutritional status and albumin  -- However if able to sent to TCU- GI is ok with this plan when able. We will follow closely as outpatient                Interval History:   no new complaints, denies chest pain, pain is controlled and has ongoing abdominal pain, but he reports improved today.               Review of Systems:   C: NEGATIVE for fever, chills, change in weight  E/M: NEGATIVE for ear, mouth and throat problems  R: NEGATIVE for significant cough or SOB  CV: NEGATIVE for chest pain, palpitations or peripheral edema             Medications:   I have reviewed this  patient's current medications    albumin human  25 g Intravenous Q12H     amylase-lipase-protease  1-2 capsule Per Feeding Tube Q4H     atenolol  25 mg Oral or NG Tube Daily     carboxymethylcellulose PF  1 drop Right Eye At Bedtime     cefTRIAXone  1 g Intravenous Q24H     enoxaparin ANTICOAGULANT  40 mg Subcutaneous Q24H     ipratropium  2 spray Both Nostrils TID     levocetirizine  5 mg Oral or NG Tube QPM     levothyroxine  50 mcg Oral or NG Tube Daily     montelukast  10 mg Oral or NG Tube At Bedtime     pantoprazole  40 mg Oral or Feeding Tube BID     prednisoLONE acetate  1 drop Right Eye Daily     protein modular  1 packet Per Feeding Tube TID     risperiDONE  2 mg Oral or NG Tube BID     sertraline  100 mg Oral or NG Tube Daily     sodium bicarbonate  325 mg Per Feeding Tube Q4H     sodium chloride (PF)  3 mL Intracatheter Q8H     topiramate  100 mg Oral or NG Tube QAM     topiramate  50 mg Oral At Bedtime     zinc sulfate  220 mg Oral or NG Tube Daily                  Physical Exam:   Vitals were reviewed  Vital Signs with Ranges  Temp:  [97.3  F (36.3  C)-99.5  F (37.5  C)] 97.5  F (36.4  C)  Pulse:  [51-80] 61  Resp:  [16-29] 16  BP: ()/(39-67) 100/56  SpO2:  [92 %-99 %] 96 %  I/O last 3 completed shifts:  In: 1484 [NG/GT:1055]  Out: 580 [Urine:550; Chest Tube:30]  Constitutional: alert, mild distress, cooperative and pale   Cardiovascular: negative, PMI normal. No lifts, heaves, or thrills. RRR. No murmurs, clicks gallops or rub  Respiratory: wheezy, Percussion normal. Good diaphragmatic excursion.   Abdomen: Abdomen soft, non-tender. BS normal. No masses, organomegaly           Data:   I reviewed the patient's new clinical lab test results.   Recent Labs   Lab Test 07/03/22  0631 07/02/22  0609 07/01/22  0518 06/19/22  0622 06/18/22  2041 06/16/22  0802 06/14/22  1903 06/06/22  0659 06/05/22  0728   WBC 12.3* 10.5 7.8   < > 4.6   < > 5.7   < > 3.8*   HGB 10.5* 11.1* 11.3*   < > 12.6*   < > 12.6*    < > 12.0*   MCV 95 94 95   < > 97   < > 96   < > 95    293 247   < > 152   < > 185   < > 92*   INR  --   --   --   --  1.74*  --  1.64*  --  1.73*    < > = values in this interval not displayed.     Recent Labs   Lab Test 07/03/22  0631 07/02/22  1212 07/02/22  0609 07/01/22  0518   POTASSIUM 3.5 3.6 3.4 3.4   CHLORIDE 115*  --  118* 116*   CO2 29 -- 28 27   BUN 42*  --  45* 38*   ANIONGAP 3  --  5 6     Recent Labs   Lab Test 07/03/22  0631 07/01/22  0518 06/30/22  0927 06/29/22  0514 06/28/22  0509 06/27/22  0648 06/21/22  0704 06/20/22  0715 06/18/22  2041 06/15/22  1011 06/15/22  0034 06/05/22  0728 06/04/22  1653   ALBUMIN 2.3* 2.6*  --  3.2*   < > 1.8* 1.7* 1.9* 2.2*  --   --    < >  --    BILITOTAL 1.2  --   --   --   --  1.7* 0.5 0.5 0.6  --   --    < >  --    ALT 47  --   --   --   --  42 12 15 24  --   --    < >  --    AST 41  --   --   --   --  67* 23 20 23  --   --    < >  --    PROTEIN  --   --  50 *  --   --   --   --   --   --   --  Negative  --  Negative   LIPASE  --   --   --   --   --  152  --  1,236* 579*  --   --    < >  --    AMYLASE  --   --   --   --   --   --   --   --   --  142*  --   --   --     < > = values in this interval not displayed.       I reviewed the patient's new imaging results.    All laboratory data reviewed  All imaging studies reviewed by me.  Patient plan/progress discussed with Dr. Clark.     Ely Bailey PA-C,  7/2/2022[facility]  Office : [phone]  Cell: [phone] (Dr. Clark)  Cell: [phone] (Ely Bailey PA-C

## 2022-07-04 ENCOUNTER — APPOINTMENT (OUTPATIENT)
Dept: GENERAL RADIOLOGY | Facility: CLINIC | Age: 52
DRG: 438 | End: 2022-07-04
Attending: THORACIC SURGERY (CARDIOTHORACIC VASCULAR SURGERY)
Payer: MEDICARE

## 2022-07-04 LAB
ALBUMIN SERPL-MCNC: 3.2 G/DL (ref 3.4–5)
ALP SERPL-CCNC: 108 U/L (ref 40–150)
ALT SERPL W P-5'-P-CCNC: 36 U/L (ref 0–70)
ANION GAP SERPL CALCULATED.3IONS-SCNC: 3 MMOL/L (ref 3–14)
ANION GAP SERPL CALCULATED.3IONS-SCNC: 6 MMOL/L (ref 3–14)
AST SERPL W P-5'-P-CCNC: 25 U/L (ref 0–45)
BILIRUB SERPL-MCNC: 0.7 MG/DL (ref 0.2–1.3)
BUN SERPL-MCNC: 30 MG/DL (ref 7–30)
BUN SERPL-MCNC: 34 MG/DL (ref 7–30)
CALCIUM SERPL-MCNC: 9.4 MG/DL (ref 8.5–10.1)
CALCIUM SERPL-MCNC: 9.4 MG/DL (ref 8.5–10.1)
CHLORIDE BLD-SCNC: 119 MMOL/L (ref 94–109)
CHLORIDE BLD-SCNC: 120 MMOL/L (ref 94–109)
CO2 SERPL-SCNC: 24 MMOL/L (ref 20–32)
CO2 SERPL-SCNC: 28 MMOL/L (ref 20–32)
CREAT SERPL-MCNC: 0.74 MG/DL (ref 0.66–1.25)
CREAT SERPL-MCNC: 0.91 MG/DL (ref 0.66–1.25)
GFR SERPL CREATININE-BSD FRML MDRD: >90 ML/MIN/1.73M2
GFR SERPL CREATININE-BSD FRML MDRD: >90 ML/MIN/1.73M2
GLUCOSE BLD-MCNC: 103 MG/DL (ref 70–99)
GLUCOSE BLD-MCNC: 107 MG/DL (ref 70–99)
GLUCOSE BLDC GLUCOMTR-MCNC: 106 MG/DL (ref 70–99)
MAGNESIUM SERPL-MCNC: 2.6 MG/DL (ref 1.6–2.3)
PHOSPHATE SERPL-MCNC: 3.5 MG/DL (ref 2.5–4.5)
PLATELET # BLD AUTO: 332 10E3/UL (ref 150–450)
POTASSIUM BLD-SCNC: 3.7 MMOL/L (ref 3.4–5.3)
POTASSIUM BLD-SCNC: 3.8 MMOL/L (ref 3.4–5.3)
PROT SERPL-MCNC: 6.5 G/DL (ref 6.8–8.8)
SODIUM SERPL-SCNC: 150 MMOL/L (ref 133–144)

## 2022-07-04 PROCEDURE — 250N000013 HC RX MED GY IP 250 OP 250 PS 637: Performed by: INTERNAL MEDICINE

## 2022-07-04 PROCEDURE — 250N000013 HC RX MED GY IP 250 OP 250 PS 637: Performed by: HOSPITALIST

## 2022-07-04 PROCEDURE — 85049 AUTOMATED PLATELET COUNT: CPT | Performed by: HOSPITALIST

## 2022-07-04 PROCEDURE — 258N000003 HC RX IP 258 OP 636: Performed by: INTERNAL MEDICINE

## 2022-07-04 PROCEDURE — 250N000013 HC RX MED GY IP 250 OP 250 PS 637

## 2022-07-04 PROCEDURE — 120N000001 HC R&B MED SURG/OB

## 2022-07-04 PROCEDURE — 36415 COLL VENOUS BLD VENIPUNCTURE: CPT | Performed by: HOSPITALIST

## 2022-07-04 PROCEDURE — 99232 SBSQ HOSP IP/OBS MODERATE 35: CPT | Performed by: INTERNAL MEDICINE

## 2022-07-04 PROCEDURE — 80053 COMPREHEN METABOLIC PANEL: CPT | Performed by: HOSPITALIST

## 2022-07-04 PROCEDURE — 71045 X-RAY EXAM CHEST 1 VIEW: CPT

## 2022-07-04 PROCEDURE — 250N000011 HC RX IP 250 OP 636: Performed by: HOSPITALIST

## 2022-07-04 PROCEDURE — 84100 ASSAY OF PHOSPHORUS: CPT | Performed by: INTERNAL MEDICINE

## 2022-07-04 PROCEDURE — P9047 ALBUMIN (HUMAN), 25%, 50ML: HCPCS | Performed by: INTERNAL MEDICINE

## 2022-07-04 PROCEDURE — 84295 ASSAY OF SERUM SODIUM: CPT | Performed by: INTERNAL MEDICINE

## 2022-07-04 PROCEDURE — 83735 ASSAY OF MAGNESIUM: CPT | Performed by: INTERNAL MEDICINE

## 2022-07-04 PROCEDURE — 250N000011 HC RX IP 250 OP 636: Performed by: INTERNAL MEDICINE

## 2022-07-04 PROCEDURE — 36415 COLL VENOUS BLD VENIPUNCTURE: CPT | Performed by: INTERNAL MEDICINE

## 2022-07-04 RX ORDER — CEFUROXIME AXETIL 500 MG/1
500 TABLET ORAL EVERY 12 HOURS SCHEDULED
Status: DISCONTINUED | OUTPATIENT
Start: 2022-07-04 | End: 2022-07-06

## 2022-07-04 RX ORDER — DEXTROSE MONOHYDRATE 50 MG/ML
INJECTION, SOLUTION INTRAVENOUS CONTINUOUS
Status: DISCONTINUED | OUTPATIENT
Start: 2022-07-04 | End: 2022-07-10 | Stop reason: HOSPADM

## 2022-07-04 RX ADMIN — PANCRELIPASE 2 CAPSULE: 60000; 12000; 38000 CAPSULE, DELAYED RELEASE PELLETS ORAL at 16:11

## 2022-07-04 RX ADMIN — Medication 1 PACKET: at 09:38

## 2022-07-04 RX ADMIN — CEFUROXIME AXETIL 500 MG: 500 TABLET ORAL at 21:30

## 2022-07-04 RX ADMIN — PANCRELIPASE 2 CAPSULE: 60000; 12000; 38000 CAPSULE, DELAYED RELEASE PELLETS ORAL at 20:02

## 2022-07-04 RX ADMIN — IPRATROPIUM BROMIDE 2 SPRAY: 42 SPRAY NASAL at 21:40

## 2022-07-04 RX ADMIN — Medication 40 MG: at 21:31

## 2022-07-04 RX ADMIN — IPRATROPIUM BROMIDE 2 SPRAY: 42 SPRAY NASAL at 08:33

## 2022-07-04 RX ADMIN — Medication 40 MG: at 08:35

## 2022-07-04 RX ADMIN — MONTELUKAST 10 MG: 10 TABLET, FILM COATED ORAL at 21:30

## 2022-07-04 RX ADMIN — PANCRELIPASE 2 CAPSULE: 60000; 12000; 38000 CAPSULE, DELAYED RELEASE PELLETS ORAL at 06:12

## 2022-07-04 RX ADMIN — LEVOTHYROXINE SODIUM 50 MCG: 50 TABLET ORAL at 08:27

## 2022-07-04 RX ADMIN — RISPERIDONE 2 MG: 2 TABLET ORAL at 07:40

## 2022-07-04 RX ADMIN — CARBOXYMETHYLCELLULOSE SODIUM 1 DROP: 10 GEL OPHTHALMIC at 21:30

## 2022-07-04 RX ADMIN — ENOXAPARIN SODIUM 40 MG: 40 INJECTION SUBCUTANEOUS at 15:06

## 2022-07-04 RX ADMIN — SERTRALINE HYDROCHLORIDE 100 MG: 100 TABLET ORAL at 07:40

## 2022-07-04 RX ADMIN — ALBUMIN HUMAN 25 G: 0.25 SOLUTION INTRAVENOUS at 07:35

## 2022-07-04 RX ADMIN — DEXTROSE AND SODIUM CHLORIDE: 5; 900 INJECTION, SOLUTION INTRAVENOUS at 13:13

## 2022-07-04 RX ADMIN — PANCRELIPASE 2 CAPSULE: 60000; 12000; 38000 CAPSULE, DELAYED RELEASE PELLETS ORAL at 09:37

## 2022-07-04 RX ADMIN — RISPERIDONE 2 MG: 2 TABLET ORAL at 21:30

## 2022-07-04 RX ADMIN — Medication 1 PACKET: at 15:07

## 2022-07-04 RX ADMIN — OXYCODONE HYDROCHLORIDE 5 MG: 5 SOLUTION ORAL at 16:11

## 2022-07-04 RX ADMIN — SODIUM BICARBONATE 325 MG: 325 TABLET ORAL at 06:13

## 2022-07-04 RX ADMIN — SODIUM BICARBONATE 325 MG: 325 TABLET ORAL at 16:11

## 2022-07-04 RX ADMIN — PREDNISOLONE ACETATE 1 DROP: 10 SUSPENSION/ DROPS OPHTHALMIC at 08:33

## 2022-07-04 RX ADMIN — ZINC SULFATE 220 MG (50 MG) CAPSULE 220 MG: CAPSULE at 08:27

## 2022-07-04 RX ADMIN — SODIUM BICARBONATE 325 MG: 325 TABLET ORAL at 20:03

## 2022-07-04 RX ADMIN — Medication 1 PACKET: at 21:43

## 2022-07-04 RX ADMIN — PANCRELIPASE 2 CAPSULE: 60000; 12000; 38000 CAPSULE, DELAYED RELEASE PELLETS ORAL at 01:49

## 2022-07-04 RX ADMIN — TOPIRAMATE 50 MG: 50 TABLET ORAL at 21:30

## 2022-07-04 RX ADMIN — IPRATROPIUM BROMIDE 2 SPRAY: 42 SPRAY NASAL at 15:07

## 2022-07-04 RX ADMIN — SODIUM BICARBONATE 325 MG: 325 TABLET ORAL at 09:37

## 2022-07-04 RX ADMIN — CEFUROXIME AXETIL 500 MG: 500 TABLET ORAL at 14:02

## 2022-07-04 RX ADMIN — TOPIRAMATE 100 MG: 100 TABLET, FILM COATED ORAL at 08:27

## 2022-07-04 RX ADMIN — ALBUMIN HUMAN 25 G: 0.25 SOLUTION INTRAVENOUS at 21:31

## 2022-07-04 RX ADMIN — LEVOCETIRIZINE DIHYDROCHLORIDE 5 MG: 5 TABLET ORAL at 21:30

## 2022-07-04 RX ADMIN — SODIUM BICARBONATE 325 MG: 325 TABLET ORAL at 01:50

## 2022-07-04 ASSESSMENT — ACTIVITIES OF DAILY LIVING (ADL)
ADLS_ACUITY_SCORE: 57
ADLS_ACUITY_SCORE: 55
ADLS_ACUITY_SCORE: 57
ADLS_ACUITY_SCORE: 55

## 2022-07-04 NOTE — PROGRESS NOTES
Pt. A&OX2-3. VSS on RA. Not OOB this shift. Denied pain.Turn/repo. has chest tube with -20 suctioning at  -20,  dressing CD. Tube feed running 55 mL/hr  tolerating well.

## 2022-07-04 NOTE — PROGRESS NOTES
[facility]    Hospitalist Progress Note      Assessment & Plan   Jagdeep Walker is a 51 year old male who was admitted on 6/14/2022 with shortness of breath and hypoxia with large pleural effusion on outside chest x-ray.     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  Recent echocardiogram 6/4 normal.  Pleural fluid studies consistent with transudate, specimen clotted for cell count, normal glucose, no organisms on gram stain.  Cytology negative x 2  Given lipase and amylase increase, perhaps pleural effusion related to pancreatitis with fistula or leak versus malnutrition related to severe pancreatitis  Chest tube placed on 6/18 and replaced on 6/22 as it appeared clogged  RRT for 6/20 chest pain, resolved with pain medications  6/22: Respiratory status worsened, likely due to worsening pleural effusions from his clogged chest tube.  Chest tube exchanged and has had significant output since with suction   * Culture from the pleural with diptheroids in broth only, c/w contaminant  - weaned off oxygen since 6/29  - ID consulted for the pleural culture results.  Felt contaminant and no need for antibiotics   -Patient is followed by GI, pulmonary, neurology  - CT surgery and IR following and appreciate their recommendations. Chest tube management as per CT surgery. Current recommendation is to continue CT with suction. Pleurodesis not appropriate in this  situation.  -Patient continues to have significant output through the chest tube despite 4 days of octreotide.  After discussion with GI it was decided to discontinue octreotide.  Although it has been felt that this was all pancreatico pleural fistula there is no imaging evidence of this.    - plan repeat ERCP around 7/21/22   - continue creon  - GJ in place since 6/28, continue NPO  -Getting tube feeds via the PEG tube  Started on IV albumin 25 g every 12 hours today for low albumin of 2.3  As per thoracic surgery pleural effusion appears well drained keep the tube in for today and should be able to remove it in the next 24 hours by IR    Recheck CMP to evaluate for albumin level    Hypernatremia  Free water 200ml q 4hours   - sodiums q12h  - BMP in AM  Sodium is elevated today at 151 so start him on D5W at 75 mill per hour  Sodium improved today to 147 so IV fluids discontinued  Sodium at 150 today.  Restart D5W at 50 mill per hour    Acute/subacute pancreatitis with pseudocyst   *Hospitalized 5/8-5/17/22 for pancreatitis with pseudocyst formation. Unclear etiology, EUS unrevealing for etiology, possibly due to medications  *CT on admission with acute pancreatitis with slightly improved inflammatory changes, pseudocysts not significantly changed.  -he had a PEJ tube placed with IR on 6/28, currently tube feeds are at goal, plan is to do ERCP in ~7/21 weeks, patient should be n.p.o. until then.    Recent metabolic encephalopathy   Mild zinc deficiency   *Extensively worked up during admission 6/4-6/10/22 including head CT, MRI brain, EEG, paraneoplastic ab, extensive laboratory work-up mostly unremarkable with exception of mildly low zinc.  *Per sister, patient does have some cognitive impairment at baseline, though is typically oriented to self, family and can carry on an extensive conversation especially regarding topics he enjoys (eg sports). He remains below his baseline.  -MRI brain has been repeated again on  6/27 without any new findings .  - Continue zinc supplementation     Hx of portal vein thrombosis  * Diagnosed during admission 5/2022, improved on subsequent CT and not treated with anticoagulation   * Non-occlusive main portal vein thrombosis, splenic vein thrombosis seen on admission CT  - GI consulted as above, they advised no need to treat PVT previous admission (6/9/2022)  -This could be causing significant portal hypertension and worsening ascites which in turn could be causing worsening pleural effusion.     Urinary tract infection  UA large leuk esterase, >182 WBCs, many bacteria. Urine cx with >100k e coli  - rocephin 1g daily 7/7  Urine culture with pansensitive E. coli except for ampicillin    Depression  Anxiety  Schizoaffective disorder, bipolar type  - Continue prior to admission sertraline, risperidone     Seizure disorder  PTA on topiramate and Depakote, depakote low, topiramate continued--level is low. MRI shows no changes. EEG no epileptiform discharge, only generalized encephalopathy  - hold depakote given association with pancreatitis  - continue topiramate, continue 100mg in AM, add 50mg in PM (given low level) on 7/1     Carnitine deficiency  - Continue prior to admission levocarnitine      Hypertension  -Blood pressure was running low systolics in the 80s so IV fluid bolus was given and atenolol was discontinued 2 days ago     Fisher's esophagus  - Continue prior to admission PPI     Hypothyroidism  - Continue prior to admission levothyroxine     Seasonal allergies  - Continue prior to admission levocetirizine    Moderate malnutrition in setting of acute illness/injury  Appreciate nutrition    Clinically Significant Risk Factors Present on Admission                        DVT Prophylaxis: Enoxaparin (Lovenox) SQ  Code Status: Full Code     Expected Discharge Date: In the next 1 to 2 days if remains stable and sodium normalized and albumin levels normalized  Discharge Comments: CT drainage  issues, ERCP 6/21 6/25 Ct in place, NJ, octreotide gtt, HFNC  6/29- CT still in place  7/1 Vinod cornell at d/c, CT still in.      7/1: pending albumin and sodium levels    Jinny Carbajal MD   Page [phone](7AM-6PM)      Interval History   Patient seen and examined.  Denies any abdominal pain currently.  No nausea vomiting.  No acute issues since yesterday.  Sodium increased to 150 today    -Data reviewed today: I reviewed all new labs and imaging results over the last 24 hours. I personally reviewed no images or EKG's today.    Physical Exam   Temp: 98.4  F (36.9  C) Temp src: Oral BP: 102/58 Pulse: 60   Resp: 18 SpO2: 96 % O2 Device: None (Room air)    Vitals:    07/01/22 0538 07/02/22 0519 07/03/22 0531   Weight: 81.3 kg (179 lb 3.2 oz) 81.3 kg (179 lb 3.7 oz) 81.5 kg (179 lb 9.6 oz)     Vital Signs with Ranges  Temp:  [97.2  F (36.2  C)-98.4  F (36.9  C)] 98.4  F (36.9  C)  Pulse:  [54-61] 60  Resp:  [16-18] 18  BP: ()/(47-58) 102/58  SpO2:  [96 %-98 %] 96 %  I/O last 3 completed shifts:  In: -   Out: 1300 [Urine:1300]    Constitutional: Awake, alert, cooperative, no apparent distress, blind in right eye  Respiratory: decreased breath sounds overall, left chest tube in place  Cardiovascular: Regular rate and rhythm, normal S1 and S2, and no murmur noted  GI: Normal bowel sounds, soft, non-distended, non-tender, GJ tube without surrounding erythema  Skin/Integumen: No rashes, no cyanosis, no edema  Other:     Medications     dextrose         albumin human  25 g Intravenous Q12H     amylase-lipase-protease  1-2 capsule Per Feeding Tube Q4H     carboxymethylcellulose PF  1 drop Right Eye At Bedtime     cefTRIAXone  1 g Intravenous Q24H     enoxaparin ANTICOAGULANT  40 mg Subcutaneous Q24H     ipratropium  2 spray Both Nostrils TID     levocetirizine  5 mg Oral or NG Tube QPM     levothyroxine  50 mcg Oral or NG Tube Daily     montelukast  10 mg Oral or NG Tube At Bedtime     pantoprazole  40 mg Oral  or Feeding Tube BID     prednisoLONE acetate  1 drop Right Eye Daily     protein modular  1 packet Per Feeding Tube TID     risperiDONE  2 mg Oral or NG Tube BID     sertraline  100 mg Oral or NG Tube Daily     sodium bicarbonate  325 mg Per Feeding Tube Q4H     sodium chloride (PF)  3 mL Intracatheter Q8H     topiramate  100 mg Oral or NG Tube QAM     topiramate  50 mg Oral At Bedtime     zinc sulfate  220 mg Oral or NG Tube Daily       Data   Recent Labs   Lab 07/04/22  0550 07/04/22  0202 07/03/22  1754 07/03/22  0631 07/02/22  1848 07/02/22  1212 07/02/22  0609 07/01/22  0928 07/01/22  0518   WBC  --   --   --  12.3*  --   --  10.5  --  7.8   HGB  --   --   --  10.5*  --   --  11.1*  --  11.3*   MCV  --   --   --  95  --   --  94  --  95     --   --  313  --   --  293  --  247   *  150*  --  147* 147*   < >  --  151*  151*   < > 149*  149*   POTASSIUM 3.7  --   --  3.5  --  3.6 3.4  --  3.4   CHLORIDE 120*  --   --  115*  --   --  118*  --  116*   CO2 24  --   --  29  --   --  28  --  27   BUN 34*  --   --  42*  --   --  45*  --  38*   CR 0.74  --   --  0.98  --   --  0.98  --  0.89   ANIONGAP 6  --   --  3  --   --  5  --  6   NASIR 9.4  --   --  9.4  --   --  9.1  --  9.0   * 106*  --  135*  --   --  114*   < > 121*   ALBUMIN  --   --   --  2.3*  --   --   --   --  2.6*   PROTTOTAL  --   --   --  6.0*  --   --   --   --   --    BILITOTAL  --   --   --  1.2  --   --   --   --   --    ALKPHOS  --   --   --  118  --   --   --   --   --    ALT  --   --   --  47  --   --   --   --   --    AST  --   --   --  41  --   --   --   --   --     < > = values in this interval not displayed.       Recent Results (from the past 24 hour(s))   XR Chest Port 1 View    Narrative    EXAM: XR CHEST PORT 1 VIEW  LOCATION: [facility]  DATE/TIME: 7/4/2022 5:22 AM    INDICATION: Pleural effusion.  COMPARISON: 07/03/2022      Impression    IMPRESSION:     Redemonstrated right pigtail  pleural drainage catheter. No right pleural effusion or pneumothorax.    No focal airspace disease. No left pleural effusion or pneumothorax.    The cardiomediastinal silhouette is unremarkable.

## 2022-07-04 NOTE — PROGRESS NOTES
Pt A/Ox3, ds to situation,speech slow, garbled and difficult to comprehend at times. Back pain decreased with Oxycodone. A2 Lift, total care for transfers/bed mobility. Chest tube dressing CDI,  functioning appropriately, 0 output. Male external cath in place. NPO.TF running at goal rate to NJ tube. K protocol, K WNL today, lab recheck ordered for tomorrow. Albumin replaced today. Plan: chest tube to be removed possibly tomorrow.

## 2022-07-04 NOTE — PLAN OF CARE
Goal Outcome Evaluation:  [id number]  Pt A&Ox3, speech slow, garbled and difficult to comprehend at times. Pt has difficulties with word finding. Denies pain or SOB. A2 Lift, total care for transfers/bed mobility. Chest tube dressing CDI, chest tube connected to -20 suction, functioning appropriately. Output unchanged from 1500 at 1700cc total. Remains NPO, oral cares provided by RONDA. IV NS 500cc bolus given for low BP's, BP 90/47 P 61 at recheck, patient asymptomatic. Albumin replacements ordered for low albumin. Sodium recheck this evening unchanged at 147. Pt incon bladder, ex cath in place with excellent output. TF running at goal rate to NJ tube with 200ml FWF q4hrs. K protocol, K WNL today, lab recheck ordered for tomorrow. Per providers note if chest tube outputs remain low, will remove chest tube tomorrow.

## 2022-07-05 ENCOUNTER — APPOINTMENT (OUTPATIENT)
Dept: PHYSICAL THERAPY | Facility: CLINIC | Age: 52
DRG: 438 | End: 2022-07-05
Attending: THORACIC SURGERY (CARDIOTHORACIC VASCULAR SURGERY)
Payer: MEDICARE

## 2022-07-05 ENCOUNTER — APPOINTMENT (OUTPATIENT)
Dept: GENERAL RADIOLOGY | Facility: CLINIC | Age: 52
DRG: 438 | End: 2022-07-05
Attending: THORACIC SURGERY (CARDIOTHORACIC VASCULAR SURGERY)
Payer: MEDICARE

## 2022-07-05 LAB
ANION GAP SERPL CALCULATED.3IONS-SCNC: 4 MMOL/L (ref 3–14)
BUN SERPL-MCNC: 28 MG/DL (ref 7–30)
CALCIUM SERPL-MCNC: 9.8 MG/DL (ref 8.5–10.1)
CHLORIDE BLD-SCNC: 122 MMOL/L (ref 94–109)
CO2 SERPL-SCNC: 26 MMOL/L (ref 20–32)
CREAT SERPL-MCNC: 0.8 MG/DL (ref 0.66–1.25)
GFR SERPL CREATININE-BSD FRML MDRD: >90 ML/MIN/1.73M2
GLUCOSE BLD-MCNC: 134 MG/DL (ref 70–99)
POTASSIUM BLD-SCNC: 3.5 MMOL/L (ref 3.4–5.3)
SODIUM SERPL-SCNC: 150 MMOL/L (ref 133–144)
SODIUM SERPL-SCNC: 152 MMOL/L (ref 133–144)

## 2022-07-05 PROCEDURE — 36415 COLL VENOUS BLD VENIPUNCTURE: CPT | Performed by: HOSPITALIST

## 2022-07-05 PROCEDURE — 84295 ASSAY OF SERUM SODIUM: CPT | Performed by: HOSPITALIST

## 2022-07-05 PROCEDURE — 250N000011 HC RX IP 250 OP 636: Performed by: INTERNAL MEDICINE

## 2022-07-05 PROCEDURE — 250N000013 HC RX MED GY IP 250 OP 250 PS 637: Performed by: INTERNAL MEDICINE

## 2022-07-05 PROCEDURE — 97116 GAIT TRAINING THERAPY: CPT | Mod: GP

## 2022-07-05 PROCEDURE — 250N000011 HC RX IP 250 OP 636: Performed by: HOSPITALIST

## 2022-07-05 PROCEDURE — 250N000013 HC RX MED GY IP 250 OP 250 PS 637

## 2022-07-05 PROCEDURE — 120N000001 HC R&B MED SURG/OB

## 2022-07-05 PROCEDURE — 258N000003 HC RX IP 258 OP 636: Performed by: INTERNAL MEDICINE

## 2022-07-05 PROCEDURE — 250N000013 HC RX MED GY IP 250 OP 250 PS 637: Performed by: HOSPITALIST

## 2022-07-05 PROCEDURE — 84295 ASSAY OF SERUM SODIUM: CPT | Performed by: INTERNAL MEDICINE

## 2022-07-05 PROCEDURE — 71045 X-RAY EXAM CHEST 1 VIEW: CPT

## 2022-07-05 PROCEDURE — 36415 COLL VENOUS BLD VENIPUNCTURE: CPT | Performed by: INTERNAL MEDICINE

## 2022-07-05 PROCEDURE — 0WP930Z REMOVAL OF DRAINAGE DEVICE FROM RIGHT PLEURAL CAVITY, PERCUTANEOUS APPROACH: ICD-10-PCS | Performed by: NURSE PRACTITIONER

## 2022-07-05 PROCEDURE — P9047 ALBUMIN (HUMAN), 25%, 50ML: HCPCS | Performed by: INTERNAL MEDICINE

## 2022-07-05 PROCEDURE — 99232 SBSQ HOSP IP/OBS MODERATE 35: CPT | Performed by: INTERNAL MEDICINE

## 2022-07-05 RX ADMIN — IPRATROPIUM BROMIDE 2 SPRAY: 42 SPRAY NASAL at 08:04

## 2022-07-05 RX ADMIN — DEXTROSE MONOHYDRATE: 50 INJECTION, SOLUTION INTRAVENOUS at 17:01

## 2022-07-05 RX ADMIN — PANCRELIPASE 2 CAPSULE: 60000; 12000; 38000 CAPSULE, DELAYED RELEASE PELLETS ORAL at 05:55

## 2022-07-05 RX ADMIN — IPRATROPIUM BROMIDE 2 SPRAY: 42 SPRAY NASAL at 15:17

## 2022-07-05 RX ADMIN — RISPERIDONE 2 MG: 2 TABLET ORAL at 20:51

## 2022-07-05 RX ADMIN — TOPIRAMATE 100 MG: 100 TABLET, FILM COATED ORAL at 08:46

## 2022-07-05 RX ADMIN — SODIUM BICARBONATE 325 MG: 325 TABLET ORAL at 01:06

## 2022-07-05 RX ADMIN — Medication 1 PACKET: at 21:58

## 2022-07-05 RX ADMIN — Medication 1 PACKET: at 15:17

## 2022-07-05 RX ADMIN — LEVOCETIRIZINE DIHYDROCHLORIDE 5 MG: 5 TABLET ORAL at 20:51

## 2022-07-05 RX ADMIN — PREDNISOLONE ACETATE 1 DROP: 10 SUSPENSION/ DROPS OPHTHALMIC at 08:05

## 2022-07-05 RX ADMIN — SODIUM BICARBONATE 325 MG: 325 TABLET ORAL at 05:55

## 2022-07-05 RX ADMIN — PANCRELIPASE 1 CAPSULE: 60000; 12000; 38000 CAPSULE, DELAYED RELEASE PELLETS ORAL at 12:00

## 2022-07-05 RX ADMIN — Medication 40 MG: at 20:51

## 2022-07-05 RX ADMIN — Medication 40 MG: at 08:01

## 2022-07-05 RX ADMIN — RISPERIDONE 2 MG: 2 TABLET ORAL at 07:39

## 2022-07-05 RX ADMIN — CARBOXYMETHYLCELLULOSE SODIUM 1 DROP: 10 GEL OPHTHALMIC at 21:57

## 2022-07-05 RX ADMIN — Medication 1 PACKET: at 08:54

## 2022-07-05 RX ADMIN — PANCRELIPASE 2 CAPSULE: 60000; 12000; 38000 CAPSULE, DELAYED RELEASE PELLETS ORAL at 01:06

## 2022-07-05 RX ADMIN — SERTRALINE HYDROCHLORIDE 100 MG: 100 TABLET ORAL at 07:39

## 2022-07-05 RX ADMIN — SODIUM BICARBONATE 325 MG: 325 TABLET ORAL at 20:48

## 2022-07-05 RX ADMIN — LEVOTHYROXINE SODIUM 50 MCG: 50 TABLET ORAL at 08:46

## 2022-07-05 RX ADMIN — ZINC SULFATE 220 MG (50 MG) CAPSULE 220 MG: CAPSULE at 08:46

## 2022-07-05 RX ADMIN — MONTELUKAST 10 MG: 10 TABLET, FILM COATED ORAL at 21:57

## 2022-07-05 RX ADMIN — PANCRELIPASE 1 CAPSULE: 60000; 12000; 38000 CAPSULE, DELAYED RELEASE PELLETS ORAL at 16:29

## 2022-07-05 RX ADMIN — CEFUROXIME AXETIL 500 MG: 500 TABLET ORAL at 09:33

## 2022-07-05 RX ADMIN — SODIUM BICARBONATE 325 MG: 325 TABLET ORAL at 12:00

## 2022-07-05 RX ADMIN — CEFUROXIME AXETIL 500 MG: 500 TABLET ORAL at 20:51

## 2022-07-05 RX ADMIN — ENOXAPARIN SODIUM 40 MG: 40 INJECTION SUBCUTANEOUS at 15:17

## 2022-07-05 RX ADMIN — TOPIRAMATE 50 MG: 50 TABLET ORAL at 21:57

## 2022-07-05 RX ADMIN — PANCRELIPASE 2 CAPSULE: 60000; 12000; 38000 CAPSULE, DELAYED RELEASE PELLETS ORAL at 20:48

## 2022-07-05 RX ADMIN — IPRATROPIUM BROMIDE 2 SPRAY: 42 SPRAY NASAL at 21:58

## 2022-07-05 RX ADMIN — ALBUMIN HUMAN 25 G: 0.25 SOLUTION INTRAVENOUS at 07:44

## 2022-07-05 RX ADMIN — SODIUM BICARBONATE 325 MG: 325 TABLET ORAL at 16:29

## 2022-07-05 ASSESSMENT — ACTIVITIES OF DAILY LIVING (ADL)
ADLS_ACUITY_SCORE: 57
ADLS_ACUITY_SCORE: 53
ADLS_ACUITY_SCORE: 57
ADLS_ACUITY_SCORE: 53

## 2022-07-05 NOTE — PLAN OF CARE
Goal Outcome Evaluation:    DATE & TIME: 07/05/22, [id number]  Summary: Pleural Effusion  Cognitive Concerns/ Orientation : A&O x 4, garbled speech  BEHAVIOR & AGGRESSION TOOL COLOR: GREEN  ABNL VS/O2: VSS on RA  MOBILITY: A x 2 GB/W, turn and repo q 2 hr. Pt able to walk to chair with A x1.   PAIN MANAGMENT: Denies  DIET: NPO, G-tube continuous feeding- tubing changed at 1230  BOWEL/BLADDER: Incontinent of B/B, External male catheter   ABNL LAB/BG: Na 150- MD aware  DRAIN/DEVICES: R. PIV, G/J Tube, R. Chest tube removed today at 1200, External male catheter- tends to leak  SKIN: CDI- pink blanchable coccyx/sacrum- Mepilex aplied  D/C DAY/GOALS/PLACE: TBD

## 2022-07-05 NOTE — PROGRESS NOTES
[facility]    Hospitalist Progress Note      Assessment & Plan   Jagdeep Walker is a 51 year old male who was admitted on 6/14/2022 with shortness of breath and hypoxia with large pleural effusion on outside chest x-ray.     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  Recent echocardiogram 6/4 normal.  Pleural fluid studies consistent with transudate, specimen clotted for cell count, normal glucose, no organisms on gram stain.  Cytology negative x 2  Given lipase and amylase increase, perhaps pleural effusion related to pancreatitis with fistula or leak versus malnutrition related to severe pancreatitis  Chest tube placed on 6/18 and replaced on 6/22 as it appeared clogged  RRT for 6/20 chest pain, resolved with pain medications  6/22: Respiratory status worsened, likely due to worsening pleural effusions from his clogged chest tube.  Chest tube exchanged and has had significant output since with suction   * Culture from the pleural with diptheroids in broth only, c/w contaminant  - weaned off oxygen since 6/29  - ID consulted for the pleural culture results.  Felt contaminant and no need for antibiotics   -Patient is followed by GI, pulmonary, neurology  - CT surgery and IR following and appreciate their recommendations. Chest tube management as per CT surgery. Current recommendation is to continue CT with suction. Pleurodesis not appropriate in this  situation.  -Patient continues to have significant output through the chest tube despite 4 days of octreotide.  After discussion with GI it was decided to discontinue octreotide.  Although it has been felt that this was all pancreatico pleural fistula there is no imaging evidence of this.    - plan repeat ERCP around 7/21/22   - continue creon  - GJ in place since 6/28, continue NPO  -Getting tube feeds via the PEG tube  Albumin up to 3.2 today so IV albumin discontinued   Right sided pigtail catheter chest tube removal today     Hypernatremia  Free water 200ml q 4hours   - sodiums q12h  - BMP in AM  Sodium is elevated today at 151 so start him on D5W at 75 mill per hour  Sodium improved today to 147 so IV fluids discontinued  Sodium at 150 today.  Restarted D5W at 100 mill per hour    Acute/subacute pancreatitis with pseudocyst   *Hospitalized 5/8-5/17/22 for pancreatitis with pseudocyst formation. Unclear etiology, EUS unrevealing for etiology, possibly due to medications  *CT on admission with acute pancreatitis with slightly improved inflammatory changes, pseudocysts not significantly changed.  -he had a PEJ tube placed with IR on 6/28, currently tube feeds are at goal, plan is to do ERCP in ~7/21 weeks, patient should be n.p.o. until then.    Recent metabolic encephalopathy   Mild zinc deficiency   *Extensively worked up during admission 6/4-6/10/22 including head CT, MRI brain, EEG, paraneoplastic ab, extensive laboratory work-up mostly unremarkable with exception of mildly low zinc.  *Per sister, patient does have some cognitive impairment at baseline, though is typically oriented to self, family and can carry on an extensive conversation especially regarding topics he enjoys (eg sports). He remains below his baseline.  -MRI brain has been repeated again on 6/27 without any new findings .  - Continue zinc supplementation     Hx of portal vein thrombosis  * Diagnosed during admission 5/2022, improved on  subsequent CT and not treated with anticoagulation   * Non-occlusive main portal vein thrombosis, splenic vein thrombosis seen on admission CT  - GI consulted as above, they advised no need to treat PVT previous admission (6/9/2022)  -This could be causing significant portal hypertension and worsening ascites which in turn could be causing worsening pleural effusion.     Urinary tract infection  UA large leuk esterase, >182 WBCs, many bacteria. Urine cx with >100k e coli  - rocephin 1g daily on 6/30/22 switched via G tube to CEftin BID   Urine culture with pansensitive E. coli except for ampicillin    Depression  Anxiety  Schizoaffective disorder, bipolar type  - Continue prior to admission sertraline, risperidone     Seizure disorder  PTA on topiramate and Depakote, depakote low, topiramate continued--level is low. MRI shows no changes. EEG no epileptiform discharge, only generalized encephalopathy  - hold depakote given association with pancreatitis  - continue topiramate, continue 100mg in AM, add 50mg in PM (given low level) on 7/1     Carnitine deficiency  - Continue prior to admission levocarnitine      Hypertension  -Blood pressure was running low systolics in the 80s so IV fluid bolus was given and atenolol was discontinued 2 days ago     Fisher's esophagus  - Continue prior to admission PPI     Hypothyroidism  - Continue prior to admission levothyroxine     Seasonal allergies  - Continue prior to admission levocetirizine    Moderate malnutrition in setting of acute illness/injury  Appreciate nutrition    Clinically Significant Risk Factors Present on Admission                        DVT Prophylaxis: Enoxaparin (Lovenox) SQ  Code Status: Full Code     Expected Discharge Date: In the next 1 to 2 days if remains stable and sodium normalized   Discharge Comments: CT drainage issues, ERCP 6/21 6/25 Ct in place, NJ, octreotide gtt, HFNC  6/29- CT still in place  7/1 Vinod cornell at d/c, CT still in.       7/1: pending albumin and sodium levels    Jinny Carbajal MD   Page [phone](7AM-6PM)      Interval History   Patient seen and examined.  Denies any abdominal pain currently.  No nausea vomiting.  No acute issues since yesterday.  Sodium from today pending   -Data reviewed today: I reviewed all new labs and imaging results over the last 24 hours. I personally reviewed no images or EKG's today.    Physical Exam   Temp: 98.2  F (36.8  C) Temp src: Oral BP: 105/54 Pulse: 59   Resp: 17 SpO2: 96 % O2 Device: None (Room air)    Vitals:    07/02/22 0519 07/03/22 0531 07/05/22 0603   Weight: 81.3 kg (179 lb 3.7 oz) 81.5 kg (179 lb 9.6 oz) 74 kg (163 lb 2.3 oz)     Vital Signs with Ranges  Temp:  [97.7  F (36.5  C)-98.2  F (36.8  C)] 98.2  F (36.8  C)  Pulse:  [58-60] 59  Resp:  [16-18] 17  BP: (100-105)/(51-59) 105/54  SpO2:  [95 %-98 %] 96 %  I/O last 3 completed shifts:  In: 600 [NG/GT:600]  Out: 700 [Urine:700]    Constitutional: Awake, alert, cooperative, no apparent distress, blind in right eye  Respiratory: decreased breath sounds overall,  chest tube in place  Cardiovascular: Regular rate and rhythm, normal S1 and S2, and no murmur noted  GI: Normal bowel sounds, soft, non-distended, non-tender, GJ tube without surrounding erythema  Skin/Integumen: No rashes, no cyanosis, no edema  Other:     Medications     dextrose       D5W 100 mL/hr at 07/05/22 0936       amylase-lipase-protease  1-2 capsule Per Feeding Tube Q4H     carboxymethylcellulose PF  1 drop Right Eye At Bedtime     cefuroxime  500 mg Per Feeding Tube Q12H [facility] (08/20)     enoxaparin ANTICOAGULANT  40 mg Subcutaneous Q24H     ipratropium  2 spray Both Nostrils TID     levocetirizine  5 mg Oral or NG Tube QPM     levothyroxine  50 mcg Oral or NG Tube Daily     montelukast  10 mg Oral or NG Tube At Bedtime     pantoprazole  40 mg Oral or Feeding Tube BID     prednisoLONE acetate  1 drop Right Eye Daily     protein modular  1 packet Per Feeding Tube TID      risperiDONE  2 mg Oral or NG Tube BID     sertraline  100 mg Oral or NG Tube Daily     sodium bicarbonate  325 mg Per Feeding Tube Q4H     sodium chloride (PF)  3 mL Intracatheter Q8H     topiramate  100 mg Oral or NG Tube QAM     topiramate  50 mg Oral At Bedtime     zinc sulfate  220 mg Oral or NG Tube Daily       Data   Recent Labs   Lab 07/05/22  0630 07/04/22  1814 07/04/22  1227 07/04/22  0550 07/04/22  0202 07/03/22  1754 07/03/22  0631 07/02/22  1212 07/02/22  0609 07/01/22  0928 07/01/22  0518   WBC  --   --   --   --   --   --  12.3*  --  10.5  --  7.8   HGB  --   --   --   --   --   --  10.5*  --  11.1*  --  11.3*   MCV  --   --   --   --   --   --  95  --  94  --  95   PLT  --   --   --  332  --   --  313  --  293  --  247   * 150* 150* 150*  150*  --    < > 147*   < > 151*  151*   < > 149*  149*   POTASSIUM  --   --  3.8 3.7  --   --  3.5   < > 3.4  --  3.4   CHLORIDE  --   --  119* 120*  --   --  115*  --  118*  --  116*   CO2  --   --  28 24  --   --  29  --  28  --  27   BUN  --   --  30 34*  --   --  42*  --  45*  --  38*   CR  --   --  0.91 0.74  --   --  0.98  --  0.98  --  0.89   ANIONGAP  --   --  3 6  --   --  3  --  5  --  6   NASIR  --   --  9.4 9.4  --   --  9.4  --  9.1  --  9.0   GLC  --   --  107* 103* 106*  --  135*  --  114*   < > 121*   ALBUMIN  --   --  3.2*  --   --   --  2.3*  --   --   --  2.6*   PROTTOTAL  --   --  6.5*  --   --   --  6.0*  --   --   --   --    BILITOTAL  --   --  0.7  --   --   --  1.2  --   --   --   --    ALKPHOS  --   --  108  --   --   --  118  --   --   --   --    ALT  --   --  36  --   --   --  47  --   --   --   --    AST  --   --  25  --   --   --  41  --   --   --   --     < > = values in this interval not displayed.       Recent Results (from the past 24 hour(s))   XR Chest Port 1 View    Narrative    EXAM: XR CHEST PORT 1 VIEW  LOCATION: [facility]  DATE/TIME: 7/5/2022 5:15 AM    INDICATION: pleural  effusion  COMPARISON: None.      Impression    IMPRESSION: Unchanged small right pleural effusion with indwelling pigtail drainage catheter. No pneumothorax. No appreciable airspace consolidation.    Cardiomediastinal silhouette is normal.

## 2022-07-05 NOTE — PROGRESS NOTES
A&OOx3, disoriented to situation. VSS on RA, Ax2 w/ lift. Chest tube dressing CDI, functioning properly, 0 output. Male external cath in [;ace. NPO w/ TF running to [place] tube. Chest tube removal possibly today

## 2022-07-05 NOTE — PROGRESS NOTES
Request from Jennifer to have Jagdeep right chest tube removed as outputs have decreased and CXR showed no pleural effusion.     Sister was in the room at the time of tube removal.     After explaining the process and answering questions the chest tube was removed intact with some pain which passed quickly. Gauze and tape pressure dressing was placed over the site.     Instructions on dressing changes placed under patient care orders.     Thanks, Pamela [facility] CNP ([phone]) (phone [phone])

## 2022-07-05 NOTE — PROGRESS NOTES
[facility]  Gastroenterology Progress Note     Jagdeep Walker MRN# [id number]   YOB: 1970 Age: 51 year old          Assessment and Plan:   Jagdeep Walker s a 51 year old male admitted with right sided pleural effusion and GI consulted to manage pancreatitis.     Active Problems:  Pleural effusion  Pancreatic pseudocyst  Idiopathic acute pancreatitis, unspecified complication status  Pancreaticopleural fistula   *6/14 CT A/P note improved inflammatory changes in pancreas. Pseudocysts not significantly changed. Gastric wall thickening has progressed consistent with secondary gastritis with new pseudocyst along the proximal posterior gastric wall. Ascites decreased. Wall thickening involving the cecum and transverse colon likely secondarily involved. Large right pleural effusion has significantly increased.  *Cause of pancreatitis is idiopathic- no evidence of gallstones or alcohol use. May be due to medication. 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 Contrast was injected and flowed through pancreatic ductal system and extravasated from the pancreatic duct in the body of the pancreas with pancreatic duct leak and communicating pseudocyst. Pancreatic sphincterotomy performed. One stent placed into ventral pancreatic duct.  *These findings confirm pancreaticopleural fistula  * Chest tube output greatly improved only 125 ml in last 14 hours  * Respiratory status greatly improved on room air.  *6/26/22 MRCP significant for 2 peripheral enhancing cystic masses- one in tail  2.6 x 4.2 x 3.1 cm and head mass 2.5 cm x 4.1 cm x 4.0 cm. The head lesion has a fistulous tract extending in the adjacent duodenum- one in tail has no obvious fistulous tract. No mention of stent placement or pancreaticopleural fistula. Per radiology above tail/head pseudocysts are enlarging comparted to prior MRCP.  MRCP  measurement of cystic lesions in pancreas not listed on MRCP from 6/6/22, however 5/9/22 MRCP noted 2.5 x 2.3 x 1.3 cm tail lesion and 3.1 x 2.8 x 2.8 cm head lesion.    * CRP elevated at 73->107 this a.m. likely from GJ placement- will follow, Lipase 152 and normal  * Albumin 1.8- recommend above 3.0 to improve ascites/effusion. Today was 3.2  * CA 19-9  Normal at 26. Unlikely malignancy   * GJ placed by IR on 6/28/22      -- Consider repeat ERCP around 7/21, will follow with imaging and may consider EUS with necrosectomy if minimal improvement   -- Continue creon- given known pancreatitis- would not see reason to check fecal elastase given clinical picture  -- Expect multiple weeks until able to tolerate oral intake- need to see evidence of fistula closure--  -- Chest tube possibly removed today  -- Transfer to TCU ok with GI when able. We will follow closely as outpatient                Interval History:   no new complaints, denies chest pain, pain is controlled and has ongoing abdominal pain.              Review of Systems:   C: NEGATIVE for fever, chills, change in weight  E/M: NEGATIVE for ear, mouth and throat problems  R: NEGATIVE for significant cough or SOB  CV: NEGATIVE for chest pain, palpitations or peripheral edema             Medications:   I have reviewed this patient's current medications    amylase-lipase-protease  1-2 capsule Per Feeding Tube Q4H     carboxymethylcellulose PF  1 drop Right Eye At Bedtime     cefuroxime  500 mg Per Feeding Tube Q12H [facility] (08/20)     enoxaparin ANTICOAGULANT  40 mg Subcutaneous Q24H     ipratropium  2 spray Both Nostrils TID     levocetirizine  5 mg Oral or NG Tube QPM     levothyroxine  50 mcg Oral or NG Tube Daily     montelukast  10 mg Oral or NG Tube At Bedtime     pantoprazole  40 mg Oral or Feeding Tube BID     prednisoLONE acetate  1 drop Right Eye Daily     protein modular  1 packet Per Feeding Tube TID     risperiDONE  2 mg Oral or NG Tube BID     sertraline   100 mg Oral or NG Tube Daily     sodium bicarbonate  325 mg Per Feeding Tube Q4H     sodium chloride (PF)  3 mL Intracatheter Q8H     topiramate  100 mg Oral or NG Tube QAM     topiramate  50 mg Oral At Bedtime     zinc sulfate  220 mg Oral or NG Tube Daily                  Physical Exam:   Vitals were reviewed  Vital Signs with Ranges  Temp:  [97.7  F (36.5  C)-98.2  F (36.8  C)] 98.1  F (36.7  C)  Pulse:  [58-60] 60  Resp:  [16-18] 16  BP: (100-104)/(51-59) 104/51  SpO2:  [95 %-98 %] 98 %  I/O last 3 completed shifts:  In: 600 [NG/GT:600]  Out: 700 [Urine:700]  Constitutional: alert, mild distress, cooperative and pale   Cardiovascular: negative, PMI normal. No lifts, heaves, or thrills. RRR. No murmurs, clicks gallops or rub  Respiratory: wheezy, Percussion normal. Good diaphragmatic excursion.   Abdomen: Abdomen soft, non-tender. BS normal. No masses, organomegaly           Data:   I reviewed the patient's new clinical lab test results.   Recent Labs   Lab Test 07/04/22  0550 07/03/22  0631 07/02/22  0609 07/01/22  0518 06/19/22  0622 06/18/22  2041 06/16/22  0802 06/14/22  1903 06/06/22  0659 06/05/22  0728   WBC  --  12.3* 10.5 7.8   < > 4.6   < > 5.7   < > 3.8*   HGB  --  10.5* 11.1* 11.3*   < > 12.6*   < > 12.6*   < > 12.0*   MCV  --  95 94 95   < > 97   < > 96   < > 95    313 293 247   < > 152   < > 185   < > 92*   INR  --   --   --   --   --  1.74*  --  1.64*  --  1.73*    < > = values in this interval not displayed.     Recent Labs   Lab Test 07/04/22  1227 07/04/22  0550 07/03/22  0631   POTASSIUM 3.8 3.7 3.5   CHLORIDE 119* 120* 115*   CO2 28 24 29   BUN 30 34* 42*   ANIONGAP 3 6 3     Recent Labs   Lab Test 07/04/22  1227 07/03/22  0631 07/01/22  0518 06/30/22  0927 06/28/22  0509 06/27/22  0648 06/21/22  0704 06/20/22  0715 06/18/22  2041 06/15/22  1011 06/15/22  0034 06/05/22  0728 06/04/22  1653   ALBUMIN 3.2* 2.3* 2.6*  --    < > 1.8*   < > 1.9* 2.2*  --   --    < >  --    BILITOTAL 0.7 1.2   --   --   --  1.7*   < > 0.5 0.6  --   --    < >  --    ALT 36 47  --   --   --  42   < > 15 24  --   --    < >  --    AST 25 41  --   --   --  67*   < > 20 23  --   --    < >  --    PROTEIN  --   --   --  50 *  --   --   --   --   --   --  Negative  --  Negative   LIPASE  --   --   --   --   --  152  --  1,236* 579*  --   --    < >  --    AMYLASE  --   --   --   --   --   --   --   --   --  142*  --   --   --     < > = values in this interval not displayed.       I reviewed the patient's new imaging results.    All laboratory data reviewed  All imaging studies reviewed by me.    Isabelle Pantoja PA-C,  6/16/2022  [facility]  Office : [phone]  Cell: [phone] (Dr. Clark)  Cell: [phone] (Isabelle Pantoja PA-C)

## 2022-07-06 ENCOUNTER — APPOINTMENT (OUTPATIENT)
Dept: PHYSICAL THERAPY | Facility: CLINIC | Age: 52
DRG: 438 | End: 2022-07-06
Attending: INTERNAL MEDICINE
Payer: MEDICARE

## 2022-07-06 LAB
ANION GAP SERPL CALCULATED.3IONS-SCNC: 6 MMOL/L (ref 3–14)
BUN SERPL-MCNC: 28 MG/DL (ref 7–30)
CALCIUM SERPL-MCNC: 9.1 MG/DL (ref 8.5–10.1)
CHLORIDE BLD-SCNC: 117 MMOL/L (ref 94–109)
CO2 SERPL-SCNC: 23 MMOL/L (ref 20–32)
CREAT SERPL-MCNC: 0.74 MG/DL (ref 0.66–1.25)
GFR SERPL CREATININE-BSD FRML MDRD: >90 ML/MIN/1.73M2
GLUCOSE BLD-MCNC: 128 MG/DL (ref 70–99)
GLUCOSE BLDC GLUCOMTR-MCNC: 111 MG/DL (ref 70–99)
POTASSIUM BLD-SCNC: 3.5 MMOL/L (ref 3.4–5.3)
SODIUM SERPL-SCNC: 146 MMOL/L (ref 133–144)

## 2022-07-06 PROCEDURE — 250N000011 HC RX IP 250 OP 636: Performed by: HOSPITALIST

## 2022-07-06 PROCEDURE — 97110 THERAPEUTIC EXERCISES: CPT | Mod: GP

## 2022-07-06 PROCEDURE — 97116 GAIT TRAINING THERAPY: CPT | Mod: GP

## 2022-07-06 PROCEDURE — 250N000013 HC RX MED GY IP 250 OP 250 PS 637: Performed by: INTERNAL MEDICINE

## 2022-07-06 PROCEDURE — 36415 COLL VENOUS BLD VENIPUNCTURE: CPT | Performed by: INTERNAL MEDICINE

## 2022-07-06 PROCEDURE — 250N000013 HC RX MED GY IP 250 OP 250 PS 637: Performed by: HOSPITALIST

## 2022-07-06 PROCEDURE — 80048 BASIC METABOLIC PNL TOTAL CA: CPT | Performed by: INTERNAL MEDICINE

## 2022-07-06 PROCEDURE — 120N000001 HC R&B MED SURG/OB

## 2022-07-06 PROCEDURE — 250N000013 HC RX MED GY IP 250 OP 250 PS 637

## 2022-07-06 PROCEDURE — 258N000003 HC RX IP 258 OP 636: Performed by: INTERNAL MEDICINE

## 2022-07-06 PROCEDURE — 99232 SBSQ HOSP IP/OBS MODERATE 35: CPT | Performed by: INTERNAL MEDICINE

## 2022-07-06 RX ORDER — METHOCARBAMOL 500 MG/1
500-1000 TABLET, FILM COATED ORAL 4 TIMES DAILY PRN
Status: DISCONTINUED | OUTPATIENT
Start: 2022-07-06 | End: 2022-07-10 | Stop reason: HOSPADM

## 2022-07-06 RX ORDER — ACETAMINOPHEN 325 MG/10.15ML
650 LIQUID ORAL EVERY 6 HOURS PRN
Status: DISCONTINUED | OUTPATIENT
Start: 2022-07-06 | End: 2022-07-10 | Stop reason: HOSPADM

## 2022-07-06 RX ORDER — CEFUROXIME AXETIL 500 MG/1
500 TABLET ORAL EVERY 12 HOURS SCHEDULED
Status: COMPLETED | OUTPATIENT
Start: 2022-07-06 | End: 2022-07-07

## 2022-07-06 RX ADMIN — CARBOXYMETHYLCELLULOSE SODIUM 1 DROP: 10 GEL OPHTHALMIC at 21:14

## 2022-07-06 RX ADMIN — IPRATROPIUM BROMIDE 2 SPRAY: 42 SPRAY NASAL at 09:11

## 2022-07-06 RX ADMIN — Medication 40 MG: at 10:13

## 2022-07-06 RX ADMIN — Medication 1 PACKET: at 21:13

## 2022-07-06 RX ADMIN — RISPERIDONE 2 MG: 2 TABLET ORAL at 09:10

## 2022-07-06 RX ADMIN — PANCRELIPASE 2 CAPSULE: 60000; 12000; 38000 CAPSULE, DELAYED RELEASE PELLETS ORAL at 21:13

## 2022-07-06 RX ADMIN — SODIUM BICARBONATE 325 MG: 325 TABLET ORAL at 00:42

## 2022-07-06 RX ADMIN — OXYCODONE HYDROCHLORIDE 5 MG: 5 SOLUTION ORAL at 17:23

## 2022-07-06 RX ADMIN — PREDNISOLONE ACETATE 1 DROP: 10 SUSPENSION/ DROPS OPHTHALMIC at 09:10

## 2022-07-06 RX ADMIN — PANCRELIPASE 2 CAPSULE: 60000; 12000; 38000 CAPSULE, DELAYED RELEASE PELLETS ORAL at 08:47

## 2022-07-06 RX ADMIN — SODIUM BICARBONATE 325 MG: 325 TABLET ORAL at 12:52

## 2022-07-06 RX ADMIN — ZINC SULFATE 220 MG (50 MG) CAPSULE 220 MG: CAPSULE at 09:10

## 2022-07-06 RX ADMIN — SODIUM BICARBONATE 325 MG: 325 TABLET ORAL at 21:13

## 2022-07-06 RX ADMIN — ACETAMINOPHEN 650 MG: 325 SUSPENSION ORAL at 12:52

## 2022-07-06 RX ADMIN — ENOXAPARIN SODIUM 40 MG: 40 INJECTION SUBCUTANEOUS at 16:00

## 2022-07-06 RX ADMIN — CEFUROXIME AXETIL 500 MG: 500 TABLET ORAL at 21:13

## 2022-07-06 RX ADMIN — Medication 1 PACKET: at 16:01

## 2022-07-06 RX ADMIN — PANCRELIPASE 2 CAPSULE: 60000; 12000; 38000 CAPSULE, DELAYED RELEASE PELLETS ORAL at 16:00

## 2022-07-06 RX ADMIN — TOPIRAMATE 50 MG: 50 TABLET ORAL at 21:14

## 2022-07-06 RX ADMIN — RISPERIDONE 2 MG: 2 TABLET ORAL at 21:13

## 2022-07-06 RX ADMIN — LEVOTHYROXINE SODIUM 50 MCG: 50 TABLET ORAL at 09:10

## 2022-07-06 RX ADMIN — TOPIRAMATE 100 MG: 100 TABLET, FILM COATED ORAL at 09:10

## 2022-07-06 RX ADMIN — IPRATROPIUM BROMIDE 2 SPRAY: 42 SPRAY NASAL at 21:14

## 2022-07-06 RX ADMIN — Medication 1 PACKET: at 10:13

## 2022-07-06 RX ADMIN — PANCRELIPASE 2 CAPSULE: 60000; 12000; 38000 CAPSULE, DELAYED RELEASE PELLETS ORAL at 00:42

## 2022-07-06 RX ADMIN — MONTELUKAST 10 MG: 10 TABLET, FILM COATED ORAL at 21:14

## 2022-07-06 RX ADMIN — Medication 40 MG: at 21:13

## 2022-07-06 RX ADMIN — SERTRALINE HYDROCHLORIDE 100 MG: 100 TABLET ORAL at 09:10

## 2022-07-06 RX ADMIN — IPRATROPIUM BROMIDE 2 SPRAY: 42 SPRAY NASAL at 16:01

## 2022-07-06 RX ADMIN — SODIUM BICARBONATE 325 MG: 325 TABLET ORAL at 04:47

## 2022-07-06 RX ADMIN — PANCRELIPASE 2 CAPSULE: 60000; 12000; 38000 CAPSULE, DELAYED RELEASE PELLETS ORAL at 12:52

## 2022-07-06 RX ADMIN — CEFUROXIME AXETIL 500 MG: 500 TABLET ORAL at 10:13

## 2022-07-06 RX ADMIN — METHOCARBAMOL 500 MG: 500 TABLET ORAL at 14:57

## 2022-07-06 RX ADMIN — DEXTROSE MONOHYDRATE: 50 INJECTION, SOLUTION INTRAVENOUS at 03:34

## 2022-07-06 RX ADMIN — SODIUM BICARBONATE 325 MG: 325 TABLET ORAL at 16:00

## 2022-07-06 RX ADMIN — PANCRELIPASE 2 CAPSULE: 60000; 12000; 38000 CAPSULE, DELAYED RELEASE PELLETS ORAL at 04:47

## 2022-07-06 RX ADMIN — LEVOCETIRIZINE DIHYDROCHLORIDE 5 MG: 5 TABLET ORAL at 21:13

## 2022-07-06 RX ADMIN — SODIUM BICARBONATE 325 MG: 325 TABLET ORAL at 08:47

## 2022-07-06 ASSESSMENT — ACTIVITIES OF DAILY LIVING (ADL)
ADLS_ACUITY_SCORE: 53
ADLS_ACUITY_SCORE: 57
ADLS_ACUITY_SCORE: 57
ADLS_ACUITY_SCORE: 53
ADLS_ACUITY_SCORE: 57
ADLS_ACUITY_SCORE: 57
ADLS_ACUITY_SCORE: 52
ADLS_ACUITY_SCORE: 53

## 2022-07-06 NOTE — PROGRESS NOTES
CLINICAL NUTRITION SERVICES - REASSESSMENT NOTE      Malnutrition (7/1):   % Weight Loss:  > 2% in 1 week (severe malnutrition): 16% weight loss since 6/16, suspect fluid-related with some true weight loss  % Intake:  Decreased intake does not meet criteria for malnutrition - pt receiving TF  Subcutaneous Fat Loss:  Orbital region mild depletion and Upper arm region moderate depletion - per RD note 6/16  Muscle Loss:  Temporal region mild depletion, Clavicle bone region mild depletion and Dorsal hand region mild depletion - per RD note 6/16  Fluid Retention: trace to Mild generalized edema     Malnutrition Diagnosis: Moderate malnutrition  In Context of:  Acute illness or injury       EVALUATION OF PROGRESS TOWARD GOALS   Diet:  NPO    Nutrition Support:  Patient continues on goal TF regimen as follows ~    Nutrition Support Enteral:  Type of Feeding Tube: Jejunostomy port of GJ tube   Enteral Frequency:  Continuous  Enteral Regimen: Jevity 1.5 at 55 mL/hr x 22 hours (hold 1 hour before and 1 hour after Synthroid)  Total Enteral Provisions: 1815 kcal, 77 g protein, 920 mL H2O, 261 g CHO, 25 g fiber   Free Water Flush: 300 mL every 4 hours   Total fluid = 2720 mL (42 mL/kg)    ProSource 1 pkt TID = 120 kcal and 33 g protein   Total (TF + ProSource)= 1935 kcal (30 kcal/kg), 110 g protein (1.7 g/kg and 110% needs)       Intake/Tolerance:    Sodium trends:  Today: Na 146 (H) <-- (7/5) 152 (H) <-- (7/4) 150 (H) <-- (7/3) 147 (H)  Patient was receiving D5 IVF yesterday at 100 mL/hr, sodium much improved with this so today D5 was decreased to 30 mL/hr and flushes increased from 200 mL every 4 hours   I/O 400/800, wt today 74 kg (down 6.8 kg from 6/28) - Patient now 120% IBW --> Has been on IV Albumin until yesterday     K normal  Glucose 128   BM x 1 today and x 2 on 7/3      ASSESSED NUTRITION NEEDS:  Dosing Weight::  65 kg (adjusted wt based on current wt of 74 kg 7/5)  Estimated Energy Needs: 1390-2815 kcals  (25-30 Kcal/Kg)  Justification: overweight, acute pancreatitis  Estimated Protein Needs:  grams protein (1.2 -1.5 g pro/Kg)  Justification: increased needs r/t acute pancreatitis, repletion      NEW FINDINGS:   Patient continues to receive Creon 12, Synthroid, Zinc Sulfate, Sodium Bicarb    Previous Goals (7/1):   TF + Prosource will continue to meet % estimated needs  Evaluation: Met    Previous Nutrition Diagnosis (7/1):   No nutrition diagnosis identified at this time   Evaluation: No change      CURRENT NUTRITION DIAGNOSIS  No nutrition diagnosis identified at this time    INTERVENTIONS  Recommendations / Nutrition Prescription  Continue goal TF regimen as above     Implementation  Collaboration and Referral of Nutrition care:  Discussed fluid status with RN via phone     Goals  Patient will continue to meet % needs from TF regimen while NPO     MONITORING AND EVALUATION:  Progress towards goals will be monitored and evaluated per protocol and Practice Guidelines    Xiomara Montanez, RD, LD, CNSC   Clinical Dietitian - [facility]

## 2022-07-06 NOTE — PROGRESS NOTES
[facility]    Hospitalist Progress Note      Assessment & Plan   Jagdeep Walker is a 51 year old male who was admitted on 6/14/2022 with shortness of breath and hypoxia with large pleural effusion on outside chest x-ray.     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  Recent echocardiogram 6/4 normal.  Pleural fluid studies consistent with transudate, specimen clotted for cell count, normal glucose, no organisms on gram stain.  Cytology negative x 2  Given lipase and amylase increase, perhaps pleural effusion related to pancreatitis with fistula or leak versus malnutrition related to severe pancreatitis  Chest tube placed on 6/18 and replaced on 6/22 as it appeared clogged  RRT for 6/20 chest pain, resolved with pain medications  6/22: Respiratory status worsened, likely due to worsening pleural effusions from his clogged chest tube.  Chest tube exchanged and has had significant output since with suction   * Culture from the pleural with diptheroids in broth only, c/w contaminant  - weaned off oxygen since 6/29  - ID consulted for the pleural culture results.  Felt contaminant and no need for antibiotics   -Patient is followed by GI, pulmonary, neurology  - CT surgery and IR following and appreciate their recommendations. Chest tube management as per CT surgery. Current recommendation is to continue CT with suction. Pleurodesis not appropriate in this  situation.  -Patient continues to have significant output through the chest tube despite 4 days of octreotide.  After discussion with GI it was decided to discontinue octreotide.  Although it has been felt that this was all pancreatico pleural fistula there is no imaging evidence of this.    - plan repeat ERCP around 7/21/22   - continue creon  - GJ in place since 6/28, continue NPO  Consider repeat ERCP around 7/21, will follow with imaging and may consider EUS with necrosectomy if minimal improvement   -- Continue creon- given known pancreatitis- would not see reason to check fecal elastase given clinical picture  -- Expect multiple weeks until able to tolerate oral intake- need to see evidence of fistula closure--  -- Chest tube possibly removed today  -- Transfer to TCU ok with GI when able. We will follow closely as outpatient    Moderate malnutrition in the setting of acute illness  GI recommended n.p.o. started on tube feeds to help heal the pancreatico pleural fistula  -Getting tube feeds via the PEG tube  Albumin up to 3.2 on 7/4/22 so IV albumin discontinued   Right sided pigtail catheter chest tube removal done on 7/5/22    As per GI     Consider repeat ERCP around 7/21, will follow with imaging and may consider EUS with necrosectomy if minimal improvement   -- Continue creon- given known pancreatitis- would not see reason to check fecal elastase given clinical picture  -- Expect multiple weeks until able to tolerate oral intake- need to see evidence of fistula closure--  -- Chest tube possibly removed today  -- Transfer to TCU ok with GI when able. We will follow closely as outpatient    Hypernatremia  Free water 200ml q 4hours   - sodiums q12h  - BMP in AM  Sodium is elevated today at 151 so start him on D5W at 75 mill per hour  Sodium improved today to 147 so IV fluids discontinued  Sodium at 150 today.  Restarted D5W at 100 mill per hour on 7/5/22  Sodium improved to 146 today  Increase free water  flushes to 300 mL every 4 hours to help with the hypernatremia  Reduce D5W to 30 mL/h today   monitor sodium levels closely      Acute/subacute pancreatitis with pseudocyst   *Hospitalized 5/8-5/17/22 for pancreatitis with pseudocyst formation. Unclear etiology, EUS unrevealing for etiology, possibly due to medications  *CT on admission with acute pancreatitis with slightly improved inflammatory changes, pseudocysts not significantly changed.  -he had a PEJ tube placed with IR on 6/28, currently tube feeds are at goal, plan is to do ERCP in ~7/21 weeks, patient should be n.p.o. until then.    Recent metabolic encephalopathy   Mild zinc deficiency   *Extensively worked up during admission 6/4-6/10/22 including head CT, MRI brain, EEG, paraneoplastic ab, extensive laboratory work-up mostly unremarkable with exception of mildly low zinc.  *Per sister, patient does have some cognitive impairment at baseline, though is typically oriented to self, family and can carry on an extensive conversation especially regarding topics he enjoys (eg sports). He remains below his baseline.  -MRI brain has been repeated again on 6/27 without any new findings .  - Continue zinc supplementation     Hx of portal vein thrombosis  * Diagnosed during admission 5/2022, improved on subsequent CT and not treated with anticoagulation   * Non-occlusive main portal vein thrombosis, splenic vein thrombosis seen on admission CT  - GI consulted as above, they advised no need to treat PVT previous admission (6/9/2022)  -This could be causing significant portal hypertension and worsening ascites which in turn could be causing worsening pleural effusion.     Urinary tract infection secondary to indwelling jimenez   UA large leuk esterase, >182 WBCs, many bacteria. Urine cx with >100k e coli  - rocephin 1g daily on 6/30/22 switched via G tube to CEftin BID   Urine culture with pansensitive E. coli except for ampicillin  Patient to complete course of  antibiotics tomorrow    Depression  Anxiety  Schizoaffective disorder, bipolar type  - Continue prior to admission sertraline, risperidone     Seizure disorder  PTA on topiramate and Depakote, depakote low, topiramate continued--level is low. MRI shows no changes. EEG no epileptiform discharge, only generalized encephalopathy  - hold depakote given association with pancreatitis  - continue topiramate, continue 100mg in AM, add 50mg in PM (given low level) on 7/1     Carnitine deficiency  - Continue prior to admission levocarnitine      Hypertension  -Blood pressure was running low systolics in the 80s so IV fluid bolus was given and atenolol was discontinued 2 days ago     Fisher's esophagus  - Continue prior to admission PPI     Hypothyroidism  - Continue prior to admission levothyroxine     Seasonal allergies  - Continue prior to admission levocetirizine    Moderate malnutrition in setting of acute illness/injury  Appreciate nutrition    Clinically Significant Risk Factors Present on Admission                        DVT Prophylaxis: Enoxaparin (Lovenox) SQ  Code Status: Full Code     Expected Discharge Date: In the next 1 to 2 days if remains stable and sodium normalized discharge to TCU    Discharge Comments: CT drainage issues, ERCP 6/21 6/25 Ct in place, [place], octreotide gtt, HFNC  6/29- CT still in place  Sodium level is limiting discharge at this point  Discussed with bedside RN and patient today  Discussed with patient's sister at bedside on 7/5/2022    Jinny Carbajal MD   Page [phone](7AM-6PM)      Interval History   Patient seen and examined.  Denies any abdominal pain currently.  No nausea vomiting.  No acute issues since yesterday.  Sodium level improved from 150-146 today.  Tolerating tube feeds well.  No acute issues since yesterday  -Data reviewed today: I reviewed all new labs and imaging results over the last 24 hours. I personally reviewed no images or EKG's today.    Physical Exam   Temp: 98.6   F (37  C) Temp src: Oral BP: 105/67 Pulse: 85   Resp: 16 SpO2: 97 % O2 Device: None (Room air)    Vitals:    07/02/22 0519 07/03/22 0531 07/05/22 0603   Weight: 81.3 kg (179 lb 3.7 oz) 81.5 kg (179 lb 9.6 oz) 74 kg (163 lb 2.3 oz)     Vital Signs with Ranges  Temp:  [98  F (36.7  C)-99.5  F (37.5  C)] 98.6  F (37  C)  Pulse:  [60-85] 85  Resp:  [16-18] 16  BP: ()/(56-67) 105/67  SpO2:  [96 %-97 %] 97 %  I/O last 3 completed shifts:  In: 400 [NG/GT:400]  Out: 800 [Urine:800]    Constitutional: Awake, alert, cooperative, no apparent distress, blind in right eye.  Very dry oropharyngeal mucous membranes noted  Respiratory: decreased breath sounds overall,  chest tube in place  Cardiovascular: Regular rate and rhythm, normal S1 and S2, and no murmur noted  GI: Normal bowel sounds, soft, non-distended, non-tender, GJ tube without surrounding erythema  Skin/Integumen: No rashes, no cyanosis, no edema  Other:     Medications     dextrose       D5W 30 mL/hr at 07/06/22 0847       amylase-lipase-protease  1-2 capsule Per Feeding Tube Q4H     carboxymethylcellulose PF  1 drop Right Eye At Bedtime     cefuroxime  500 mg Oral Q12H [facility] (08/20)     enoxaparin ANTICOAGULANT  40 mg Subcutaneous Q24H     ipratropium  2 spray Both Nostrils TID     levocetirizine  5 mg Oral or NG Tube QPM     levothyroxine  50 mcg Oral or NG Tube Daily     montelukast  10 mg Oral or NG Tube At Bedtime     pantoprazole  40 mg Oral or Feeding Tube BID     prednisoLONE acetate  1 drop Right Eye Daily     protein modular  1 packet Per Feeding Tube TID     risperiDONE  2 mg Oral or NG Tube BID     sertraline  100 mg Oral or NG Tube Daily     sodium bicarbonate  325 mg Per Feeding Tube Q4H     sodium chloride (PF)  3 mL Intracatheter Q8H     topiramate  100 mg Oral or NG Tube QAM     topiramate  50 mg Oral At Bedtime     zinc sulfate  220 mg Oral or NG Tube Daily       Data   Recent Labs   Lab 07/06/22  0623 07/05/22  1254 07/05/22  0630  07/04/22  1814 07/04/22  1227 07/04/22  0550 07/03/22  1754 07/03/22  0631 07/02/22  1212 07/02/22  0609 07/01/22  0928 07/01/22  0518   WBC  --   --   --   --   --   --   --  12.3*  --  10.5  --  7.8   HGB  --   --   --   --   --   --   --  10.5*  --  11.1*  --  11.3*   MCV  --   --   --   --   --   --   --  95  --  94  --  95   PLT  --   --   --   --   --  332  --  313  --  293  --  247   * 152* 150*   < > 150* 150*  150*   < > 147*   < > 151*  151*   < > 149*  149*   POTASSIUM 3.5 3.5  --   --  3.8 3.7  --  3.5   < > 3.4  --  3.4   CHLORIDE 117* 122*  --   --  119* 120*  --  115*  --  118*  --  116*   CO2 23 26  --   --  28 24  --  29  --  28  --  27   BUN 28 28  --   --  30 34*  --  42*  --  45*  --  38*   CR 0.74 0.80  --   --  0.91 0.74  --  0.98  --  0.98  --  0.89   ANIONGAP 6 4  --   --  3 6  --  3  --  5  --  6   NASIR 9.1 9.8  --   --  9.4 9.4  --  9.4  --  9.1  --  9.0   * 134*  --   --  107* 103*   < > 135*  --  114*   < > 121*   ALBUMIN  --   --   --   --  3.2*  --   --  2.3*  --   --   --  2.6*   PROTTOTAL  --   --   --   --  6.5*  --   --  6.0*  --   --   --   --    BILITOTAL  --   --   --   --  0.7  --   --  1.2  --   --   --   --    ALKPHOS  --   --   --   --  108  --   --  118  --   --   --   --    ALT  --   --   --   --  36  --   --  47  --   --   --   --    AST  --   --   --   --  25  --   --  41  --   --   --   --     < > = values in this interval not displayed.       No results found for this or any previous visit (from the past 24 hour(s)).

## 2022-07-06 NOTE — PLAN OF CARE
Goal Outcome Evaluation:    Pt A/Ox2-3, disoriented to place, & inconsistent with answers, VSS on RA, denies pain, chest tube dressing CDI, total cares, Dextrose running at 100 mL, male external catheter in place with adequate UOP, slept the whole shift.

## 2022-07-06 NOTE — PLAN OF CARE
Goal Outcome Evaluation:  Pt A&Ox3, slow-garbled speech which is not new for patient. Patient up A1 gb and walker, ambulated in room and to BR with writer. Patient also spent some time in the recliner today. Incon B&B, external catheter in place. Mepilex to coccyx for protection. TF running per orders with 300cc FWF q4hrs. Sodium trending down, still elevated. PIV with D5 running at 30cc/hr this shift. Writer did final rounds just before 1500 and noted patient had lost his access to PIV to R hand, patient was found with hand behind head watching tv. Patient needs frequent reminders to monitor tubes. Float resource RN called and will come replace PIV. Patient remains NPO. Chest tube dressing site changed, moderate amount of serous drainage noted to old dressing. PRN tylenol given for headache which was effective and PRN Robaxin given for muscle spasms. Discharge pending.

## 2022-07-06 NOTE — PROVIDER NOTIFICATION
Writer spoke to Dr. Carbajal and provided update, following chest tube site dressing change this afternoon patient began experiencing very brief whole body muscle spasms approx 2-3/minute. Neuros intact, no deviation in cognition from patients baseline noted by this writer. Patient does have a known seizure disorder. Pt did report pain at chest tube site.     PRN Robaxin ordered for this.

## 2022-07-06 NOTE — PROGRESS NOTES
Writer paged Dietitian on behalf of hospitalist regarding FWF increase to 300cc q4hrs for hypernatremia. Requesting approval if this is ok from dietitian standpoint. Awaiting response.    Per Dietitian- This adjustment is okay and we will continue to monitor patient's electrolytes

## 2022-07-07 LAB
ANION GAP SERPL CALCULATED.3IONS-SCNC: 4 MMOL/L (ref 3–14)
BUN SERPL-MCNC: 23 MG/DL (ref 7–30)
CALCIUM SERPL-MCNC: 9.2 MG/DL (ref 8.5–10.1)
CHLORIDE BLD-SCNC: 112 MMOL/L (ref 94–109)
CO2 SERPL-SCNC: 25 MMOL/L (ref 20–32)
CREAT SERPL-MCNC: 0.72 MG/DL (ref 0.66–1.25)
ERYTHROCYTE [DISTWIDTH] IN BLOOD BY AUTOMATED COUNT: 14.6 % (ref 10–15)
GFR SERPL CREATININE-BSD FRML MDRD: >90 ML/MIN/1.73M2
GLUCOSE BLD-MCNC: 110 MG/DL (ref 70–99)
GLUCOSE BLDC GLUCOMTR-MCNC: 116 MG/DL (ref 70–99)
GLUCOSE BLDC GLUCOMTR-MCNC: 130 MG/DL (ref 70–99)
HCT VFR BLD AUTO: 31.1 % (ref 40–53)
HGB BLD-MCNC: 10.2 G/DL (ref 13.3–17.7)
MCH RBC QN AUTO: 30.7 PG (ref 26.5–33)
MCHC RBC AUTO-ENTMCNC: 32.8 G/DL (ref 31.5–36.5)
MCV RBC AUTO: 94 FL (ref 78–100)
PLATELET # BLD AUTO: 350 10E3/UL (ref 150–450)
POTASSIUM BLD-SCNC: 3.4 MMOL/L (ref 3.4–5.3)
POTASSIUM BLD-SCNC: 3.9 MMOL/L (ref 3.4–5.3)
RBC # BLD AUTO: 3.32 10E6/UL (ref 4.4–5.9)
SODIUM SERPL-SCNC: 141 MMOL/L (ref 133–144)
WBC # BLD AUTO: 9.8 10E3/UL (ref 4–11)

## 2022-07-07 PROCEDURE — 250N000013 HC RX MED GY IP 250 OP 250 PS 637: Performed by: HOSPITALIST

## 2022-07-07 PROCEDURE — 120N000001 HC R&B MED SURG/OB

## 2022-07-07 PROCEDURE — 80048 BASIC METABOLIC PNL TOTAL CA: CPT | Performed by: INTERNAL MEDICINE

## 2022-07-07 PROCEDURE — 250N000013 HC RX MED GY IP 250 OP 250 PS 637: Performed by: INTERNAL MEDICINE

## 2022-07-07 PROCEDURE — 250N000013 HC RX MED GY IP 250 OP 250 PS 637

## 2022-07-07 PROCEDURE — 250N000011 HC RX IP 250 OP 636: Performed by: HOSPITALIST

## 2022-07-07 PROCEDURE — 36415 COLL VENOUS BLD VENIPUNCTURE: CPT | Performed by: INTERNAL MEDICINE

## 2022-07-07 PROCEDURE — 99233 SBSQ HOSP IP/OBS HIGH 50: CPT | Performed by: INTERNAL MEDICINE

## 2022-07-07 PROCEDURE — 85014 HEMATOCRIT: CPT | Performed by: INTERNAL MEDICINE

## 2022-07-07 PROCEDURE — 258N000003 HC RX IP 258 OP 636: Performed by: INTERNAL MEDICINE

## 2022-07-07 PROCEDURE — 84132 ASSAY OF SERUM POTASSIUM: CPT | Performed by: INTERNAL MEDICINE

## 2022-07-07 PROCEDURE — 999N000128 HC STATISTIC PERIPHERAL IV START W/O US GUIDANCE

## 2022-07-07 RX ORDER — ONDANSETRON 4 MG/1
4 TABLET, ORALLY DISINTEGRATING ORAL EVERY 6 HOURS PRN
Start: 2022-07-07 | End: 2022-09-06

## 2022-07-07 RX ORDER — AMOXICILLIN 250 MG
1 CAPSULE ORAL 2 TIMES DAILY PRN
Start: 2022-07-07 | End: 2022-08-22

## 2022-07-07 RX ORDER — LEVOTHYROXINE SODIUM 50 UG/1
50 TABLET ORAL DAILY
Start: 2022-07-08

## 2022-07-07 RX ORDER — MONTELUKAST SODIUM 10 MG/1
10 TABLET ORAL AT BEDTIME
Start: 2022-07-07 | End: 2022-07-27

## 2022-07-07 RX ORDER — METHOCARBAMOL 500 MG/1
500-1000 TABLET, FILM COATED ORAL 4 TIMES DAILY PRN
Start: 2022-07-07 | End: 2022-07-11

## 2022-07-07 RX ORDER — ACETAMINOPHEN 325 MG/1
650 TABLET ORAL EVERY 6 HOURS PRN
Start: 2022-07-07 | End: 2022-07-11

## 2022-07-07 RX ORDER — LEVOCETIRIZINE DIHYDROCHLORIDE 5 MG/1
5 TABLET, FILM COATED ORAL EVERY EVENING
Start: 2022-07-07 | End: 2022-08-19

## 2022-07-07 RX ORDER — PROCHLORPERAZINE MALEATE 10 MG
10 TABLET ORAL EVERY 6 HOURS PRN
Start: 2022-07-07 | End: 2022-07-27

## 2022-07-07 RX ORDER — TOPIRAMATE 100 MG/1
100 TABLET, FILM COATED ORAL EVERY MORNING
Start: 2022-07-08 | End: 2023-05-03

## 2022-07-07 RX ORDER — POTASSIUM CHLORIDE 20MEQ/15ML
40 LIQUID (ML) ORAL ONCE
Status: COMPLETED | OUTPATIENT
Start: 2022-07-07 | End: 2022-07-07

## 2022-07-07 RX ORDER — RISPERIDONE 2 MG/1
2 TABLET ORAL 2 TIMES DAILY
Start: 2022-07-07 | End: 2022-08-22

## 2022-07-07 RX ORDER — CEFUROXIME AXETIL 500 MG/1
500 TABLET ORAL EVERY 12 HOURS
Qty: 4 TABLET | Refills: 0 | Status: SHIPPED | OUTPATIENT
Start: 2022-07-07 | End: 2022-07-10

## 2022-07-07 RX ORDER — ACETAMINOPHEN 325 MG/10.15ML
650 LIQUID ORAL EVERY 6 HOURS PRN
Start: 2022-07-07 | End: 2022-08-08

## 2022-07-07 RX ORDER — AMINO AC/PROTEIN HYDR/WHEY PRO 10G-100/30
1 LIQUID (ML) ORAL 3 TIMES DAILY
Start: 2022-07-07 | End: 2022-08-19

## 2022-07-07 RX ORDER — ZINC SULFATE 50(220)MG
220 CAPSULE ORAL DAILY
Start: 2022-07-08

## 2022-07-07 RX ORDER — SERTRALINE HYDROCHLORIDE 100 MG/1
100 TABLET, FILM COATED ORAL DAILY
Start: 2022-07-08 | End: 2022-08-19

## 2022-07-07 RX ORDER — POLYETHYLENE GLYCOL 3350 17 G/17G
17 POWDER, FOR SOLUTION ORAL DAILY PRN
Start: 2022-07-07 | End: 2022-07-27

## 2022-07-07 RX ORDER — TOPIRAMATE 50 MG/1
50 TABLET, FILM COATED ORAL AT BEDTIME
Status: ON HOLD
Start: 2022-07-07 | End: 2023-08-12

## 2022-07-07 RX ORDER — CARBOXYMETHYLCELLULOSE SODIUM 5 MG/ML
1 SOLUTION/ DROPS OPHTHALMIC DAILY PRN
Start: 2022-07-07 | End: 2023-05-03

## 2022-07-07 RX ADMIN — ALBUTEROL SULFATE 2 PUFF: 90 AEROSOL, METERED RESPIRATORY (INHALATION) at 16:28

## 2022-07-07 RX ADMIN — DEXTROSE MONOHYDRATE: 50 INJECTION, SOLUTION INTRAVENOUS at 16:55

## 2022-07-07 RX ADMIN — Medication 40 MG: at 20:44

## 2022-07-07 RX ADMIN — LEVOCETIRIZINE DIHYDROCHLORIDE 5 MG: 5 TABLET ORAL at 20:44

## 2022-07-07 RX ADMIN — CARBOXYMETHYLCELLULOSE SODIUM 1 DROP: 10 GEL OPHTHALMIC at 21:27

## 2022-07-07 RX ADMIN — Medication 1 PACKET: at 21:27

## 2022-07-07 RX ADMIN — SODIUM BICARBONATE 325 MG: 325 TABLET ORAL at 05:04

## 2022-07-07 RX ADMIN — LEVOTHYROXINE SODIUM 50 MCG: 50 TABLET ORAL at 09:14

## 2022-07-07 RX ADMIN — PREDNISOLONE ACETATE 1 DROP: 10 SUSPENSION/ DROPS OPHTHALMIC at 09:14

## 2022-07-07 RX ADMIN — Medication 40 MG: at 09:14

## 2022-07-07 RX ADMIN — RISPERIDONE 2 MG: 2 TABLET ORAL at 20:44

## 2022-07-07 RX ADMIN — PANCRELIPASE 2 CAPSULE: 60000; 12000; 38000 CAPSULE, DELAYED RELEASE PELLETS ORAL at 01:09

## 2022-07-07 RX ADMIN — CEFUROXIME AXETIL 500 MG: 500 TABLET ORAL at 07:55

## 2022-07-07 RX ADMIN — TOPIRAMATE 100 MG: 100 TABLET, FILM COATED ORAL at 09:14

## 2022-07-07 RX ADMIN — TOPIRAMATE 50 MG: 50 TABLET ORAL at 21:27

## 2022-07-07 RX ADMIN — SODIUM BICARBONATE 325 MG: 325 TABLET ORAL at 14:00

## 2022-07-07 RX ADMIN — PANCRELIPASE 2 CAPSULE: 60000; 12000; 38000 CAPSULE, DELAYED RELEASE PELLETS ORAL at 09:14

## 2022-07-07 RX ADMIN — PANCRELIPASE 2 CAPSULE: 60000; 12000; 38000 CAPSULE, DELAYED RELEASE PELLETS ORAL at 20:44

## 2022-07-07 RX ADMIN — CEFUROXIME AXETIL 500 MG: 500 TABLET ORAL at 20:44

## 2022-07-07 RX ADMIN — MONTELUKAST 10 MG: 10 TABLET, FILM COATED ORAL at 21:27

## 2022-07-07 RX ADMIN — Medication 1 PACKET: at 16:32

## 2022-07-07 RX ADMIN — RISPERIDONE 2 MG: 2 TABLET ORAL at 09:14

## 2022-07-07 RX ADMIN — PANCRELIPASE 2 CAPSULE: 60000; 12000; 38000 CAPSULE, DELAYED RELEASE PELLETS ORAL at 05:04

## 2022-07-07 RX ADMIN — IPRATROPIUM BROMIDE 2 SPRAY: 42 SPRAY NASAL at 09:14

## 2022-07-07 RX ADMIN — SODIUM BICARBONATE 325 MG: 325 TABLET ORAL at 20:44

## 2022-07-07 RX ADMIN — SERTRALINE HYDROCHLORIDE 100 MG: 100 TABLET ORAL at 09:14

## 2022-07-07 RX ADMIN — SODIUM BICARBONATE 325 MG: 325 TABLET ORAL at 16:24

## 2022-07-07 RX ADMIN — DEXTROSE MONOHYDRATE: 50 INJECTION, SOLUTION INTRAVENOUS at 01:44

## 2022-07-07 RX ADMIN — Medication 1 PACKET: at 09:39

## 2022-07-07 RX ADMIN — SODIUM BICARBONATE 325 MG: 325 TABLET ORAL at 01:09

## 2022-07-07 RX ADMIN — PANCRELIPASE 2 CAPSULE: 60000; 12000; 38000 CAPSULE, DELAYED RELEASE PELLETS ORAL at 14:00

## 2022-07-07 RX ADMIN — ZINC SULFATE 220 MG (50 MG) CAPSULE 220 MG: CAPSULE at 09:14

## 2022-07-07 RX ADMIN — IPRATROPIUM BROMIDE 2 SPRAY: 42 SPRAY NASAL at 21:28

## 2022-07-07 RX ADMIN — POTASSIUM CHLORIDE 40 MEQ: 20 SOLUTION ORAL at 11:54

## 2022-07-07 RX ADMIN — SODIUM BICARBONATE 325 MG: 325 TABLET ORAL at 09:14

## 2022-07-07 RX ADMIN — PANCRELIPASE 2 CAPSULE: 60000; 12000; 38000 CAPSULE, DELAYED RELEASE PELLETS ORAL at 16:25

## 2022-07-07 RX ADMIN — ENOXAPARIN SODIUM 40 MG: 40 INJECTION SUBCUTANEOUS at 16:28

## 2022-07-07 RX ADMIN — IPRATROPIUM BROMIDE 2 SPRAY: 42 SPRAY NASAL at 16:29

## 2022-07-07 ASSESSMENT — ACTIVITIES OF DAILY LIVING (ADL)
ADLS_ACUITY_SCORE: 48
ADLS_ACUITY_SCORE: 48
ADLS_ACUITY_SCORE: 57
ADLS_ACUITY_SCORE: 55
ADLS_ACUITY_SCORE: 57
ADLS_ACUITY_SCORE: 48
ADLS_ACUITY_SCORE: 48
ADLS_ACUITY_SCORE: 50
ADLS_ACUITY_SCORE: 50
ADLS_ACUITY_SCORE: 55
ADLS_ACUITY_SCORE: 55
ADLS_ACUITY_SCORE: 57

## 2022-07-07 NOTE — PROGRESS NOTES
Pt A/Ox3-4, VSS on RA, slow speech, bed changed due to being incontinent, TF & IVF running, chest tube dressing CDI.

## 2022-07-07 NOTE — PROGRESS NOTES
Nursing [id number]: Uneventful evening. No acute changes. Pleasant. NPO per order. On TF per peg tube. Tolerating well. No N/V/D. Na improved from 152 to 146. Still getting FWF of 300 ml q 4-hrs. No fever. AVSS. Latest Vitals: Blood pressure 101/62, pulse 67, temperature 97.9  F (36.6  C), temperature source Oral, resp. rate 16, weight 74 kg (163 lb 2.3 oz), SpO2 96 %.

## 2022-07-07 NOTE — PLAN OF CARE
Pt A&Ox3, slow-garbled speech which is not new for patient. Patient up A1 gb and walker, ambulated in room and to BR with writer. Patient also spent some time in the recliner today. Cont of BM today, incon void. Mepilex to coccyx for protection. TF running per orders with 300cc FWF q4hrs. Sodium WNL today. PIV with D5 running at 30cc/hr this shift. Writer noted around 1430, patient had disconnected PIV and access was lost. Floor RN attempted with no success. Oncoming nurse to follow up. Patient remains NPO. Chest tube dressing site CDI. Discharge orders in place, family requesting referrals sent to different TCU as they do not want patient returning to prior facility. See CC note. Discharge planned for tomorrow.

## 2022-07-07 NOTE — PROGRESS NOTES
Care Management Follow Up    Length of Stay (days): 23    Expected Discharge Date: 07/07/2022     Concerns to be Addressed:       Patient plan of care discussed at interdisciplinary rounds: Yes    Anticipated Discharge Disposition: Transitional Care     Anticipated Discharge Services: None  Anticipated Discharge DME: None    Patient/family educated on Medicare website which has current facility and service quality ratings: yes  Education Provided on the Discharge Plan:    Patient/Family in Agreement with the Plan: yes    Referrals Placed by CM/SW: Post Acute Facilities  Private pay costs discussed: Not applicable    Additional Information:  Discharge orders are entered by MD. Writer called and left VM with admissions at [facility] to confirm that they can accept patient for placement. Writer waiting to hear.     Elizabeth Way, JESUS MANUEL, LGSW   Social Work   [facility]

## 2022-07-07 NOTE — PROGRESS NOTES
"Care Management Follow Up    Length of Stay (days): 23    Expected Discharge Date: 07/07/2022     Concerns to be Addressed:       Patient plan of care discussed at interdisciplinary rounds: Yes    Anticipated Discharge Disposition: Transitional Care     Anticipated Discharge Services: None  Anticipated Discharge DME: None    Patient/family educated on Medicare website which has current facility and service quality ratings: yes  Education Provided on the Discharge Plan:    Patient/Family in Agreement with the Plan: yes    Referrals Placed by CM/SW: Post Acute Facilities  Private pay costs discussed: Not applicable    Additional Information:  Writer was asked by charge RN to go tlk with family who were demanding to talk with someone immediately regarding discharge.   Writer entered pts room and patients sister/guardian announced that nobody has discussed any discharge plans with her and she did not know how all of the sudden the patient is ready to go.  She stated several times that nobody has spoken with her during this hospital stay.  Writer informed her I would have to look back  Into notes to see the documentation of communication with her.  She also stated she did not want Jagdeep to go back to [facility] and she is not in agreement with this placement.  Writer informed her that is where Jagdeep came from and they are able to accept him back so that is where he will be going.  He is ready for discharge today.  Daughter continued to argue with writer about the care he received at [facility].  Very upset that  He was not sent to the hospital 2 days earlier as \"she knew he had a collapsed lung\".    WRiter offered for her to select more TCU's, we will send the referrals and we will keep Jagdeep until tomorrow.  If any of them accept he can go to a different place.  If no acceptance he will go to [facility].  Writer sent 8 referrals to TCU; First choice is  [facility].    Will continue to " follow      Brandi Glasgow RN

## 2022-07-07 NOTE — PROGRESS NOTES
[facility]    Medicine Progress Note - Hospitalist Service    Date of Admission:  6/14/2022    Assessment & Plan            Jagdeep Walker is a 51 year old male who was admitted on 6/14/2022 with shortness of breath and hypoxia with large pleural effusion on outside chest x-ray.      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  Recent echocardiogram 6/4 normal.  Pleural fluid studies consistent with transudate, specimen clotted for cell count, normal glucose, no organisms on gram stain.  Cytology negative x 2  Given lipase and amylase increase, perhaps pleural effusion related to pancreatitis with fistula or leak versus malnutrition related to severe pancreatitis  Chest tube placed on 6/18 and replaced on 6/22 as it appeared clogged  RRT for 6/20 chest pain, resolved with pain medications  6/22: Respiratory status worsened, likely due to worsening pleural effusions from his clogged chest tube.  Chest tube exchanged and has had significant output since with suction   * Culture from the pleural with diptheroids in broth only, c/w contaminant  - weaned off oxygen since 6/29  - ID consulted for the pleural culture results.  Felt contaminant and no need for antibiotics   -Patient is followed by GI, pulmonary, neurology  - CT surgery and IR following and appreciate their recommendations. Chest tube management as per CT surgery. Current recommendation is to  continue CT with suction. Pleurodesis not appropriate in this situation.  -Patient continues to have significant output through the chest tube despite 4 days of octreotide.  After discussion with GI it was decided to discontinue octreotide.  Although it has been felt that this was all pancreatico pleural fistula there is no imaging evidence of this.    - plan repeat ERCP around 7/21/22   - continue creon  - GJ in place since 6/28, continue NPO  Consider repeat ERCP around 7/21, will follow with imaging and may consider EUS with necrosectomy if minimal improvement   -- Continue creon- given known pancreatitis- would not see reason to check fecal elastase given clinical picture  -- Expect multiple weeks until able to tolerate oral intake- need to see evidence of fistula closure--  -- Chest tube possibly removed today  -- Transfer to TCU ok with GI when able. We will follow closely as outpatient    TCU  was able to take patient today, however family declined it and wanted reevaluation for another facility  Care coordination RN is following     Moderate malnutrition in the setting of acute illness  GI recommended n.p.o. started on tube feeds to help heal the pancreatico pleural fistula  -Getting tube feeds via the PEG tube  Albumin up to 3.2 on 7/4/22 so IV albumin discontinued   Right sided pigtail catheter chest tube removal done on 7/5/22     As per GI      Consider repeat ERCP around 7/21, will follow with imaging and may consider EUS with necrosectomy if minimal improvement   -- Continue creon- given known pancreatitis- would not see reason to check fecal elastase given clinical picture  -- Expect multiple weeks until able to tolerate oral intake- need to see evidence of fistula closure--  -- Chest tube possibly removed today  -- Transfer to TCU ok with GI when able. We will follow closely as outpatient     Hypernatremia  Free water 200ml q 4hours   - sodiums q12h  - BMP in AM  Sodium is elevated today at 151 so  start him on D5W at 75 mill per hour  Sodium improved today to 147 so IV fluids discontinued  Sodium at 150 today.  Restarted D5W at 100 mill per hour on 7/5/22  Sodium improved to 146 today  Increase free water flushes to 300 mL every 4 hours to help with the hypernatremia  Reduce D5W to 30 mL/h today   monitor sodium levels closely   Stop sodium supplements     Acute/subacute pancreatitis with pseudocyst   *Hospitalized 5/8-5/17/22 for pancreatitis with pseudocyst formation. Unclear etiology, EUS unrevealing for etiology, possibly due to medications  *CT on admission with acute pancreatitis with slightly improved inflammatory changes, pseudocysts not significantly changed.  -he had a PEJ tube placed with IR on 6/28, currently tube feeds are at goal, plan is to do ERCP in ~7/21 weeks, patient should be n.p.o. until then.     Recent metabolic encephalopathy   Mild zinc deficiency   *Extensively worked up during admission 6/4-6/10/22 including head CT, MRI brain, EEG, paraneoplastic ab, extensive laboratory work-up mostly unremarkable with exception of mildly low zinc.  *Per sister, patient does have some cognitive impairment at baseline, though is typically oriented to self, family and can carry on an extensive conversation especially regarding topics he enjoys (eg sports). He remains below his baseline.  -MRI brain has been repeated again on 6/27 without any new findings .  - Continue zinc supplementation     Hx of portal vein thrombosis  * Diagnosed during admission 5/2022, improved on subsequent CT and not treated with anticoagulation   * Non-occlusive main portal vein thrombosis, splenic vein thrombosis seen on admission CT  - GI consulted as above, they advised no need to treat PVT previous admission (6/9/2022)  -This could be causing significant portal hypertension and worsening ascites which in turn could be causing worsening pleural effusion.     Urinary tract infection secondary to indwelling jimenez   UA  large leuk esterase, >182 WBCs, many bacteria. Urine cx with >100k e coli  - rocephin 1g daily on 6/30/22 switched via G tube to CEftin BID   Urine culture with pansensitive E. coli except for ampicillin  Patient to complete course of antibiotics tomorrow     Depression  Anxiety  Schizoaffective disorder, bipolar type  - Continue prior to admission sertraline, risperidone     Seizure disorder  PTA on topiramate and Depakote, depakote low, topiramate continued--level is low. MRI shows no changes. EEG no epileptiform discharge, only generalized encephalopathy  - hold depakote given association with pancreatitis  - continue topiramate, continue 100mg in AM, add 50mg in PM (given low level) on 7/1     Carnitine deficiency  - Continue prior to admission levocarnitine      Hypertension  -Blood pressure was running low systolics in the 80s so IV fluid bolus was given and atenolol was discontinued 2 days ago     Fisher's esophagus  - Continue prior to admission PPI     Hypothyroidism  - Continue prior to admission levothyroxine     Seasonal allergies  - Continue prior to admission levocetirizine     Moderate malnutrition in setting of acute illness/injury  Appreciate nutrition           Clinically Significant Risk Factors Present on Admission                  DVT Prophylaxis: Enoxaparin (Lovenox) SQ  Code Status: Full Code     Expected Discharge Date: In the next 1 to 2 days if remains stable and sodium normalized discharge to TCU     Discharge Comments: CT drainage issues, ERCP 6/21 6/25 Ct in place, [place], octreotide gtt, HFNC  6/29- CT still in place  Sodium level is limiting discharge at this point  Discussed with bedside RN and patient today  Discussed with patient's sister at bedside on 7/5/2022                    Diet: NPO for Medical/Clinical Reasons Except for: Other; Specify: NPO For oral intake and gastric feedings for 6 hours post insertion Gastrostomy G/GJ tube. May feed through Jejunal or Distal PORT  immediately for GJ tubes ONLY.  Adult Formula Drip Feeding: Continuous Jevity 1.5; Nasojejunal; Goal Rate: 55 mL/hr x 22 hours; mL/hr; Medication - Feeding Tube Flush Frequency: At least 15-30 mL water before and after medication administration and with tube clogging; Amount to ...  Adult Formula Tube Feeding  NPO for Medical/Clinical Reasons    DVT Prophylaxis: Enoxaparin (Lovenox) SQ  S[place]ana Catheter: Not present  Central Lines: None  Cardiac Monitoring: None  Code Status: Full Code      Disposition Plan      Expected Discharge Date: 07/08/2022        Discharge Comments: CT drainage issues, ERCP 6/21 6/25 Ct in place, [place], octreotide gtt, HFNC  6/29- CT still in place  7/1 [facility] at d/c, CT still in.        The patient's care was discussed with the Bedside Nurse.    Shamir Geiger MD  Hospitalist Service  [facility]  Securely message with the Vocera Web Console (learn more here)  Text page via [employer] Paging/Directory         Clinically Significant Risk Factors Present on Admission                      ______________________________________________________________________    Interval History     Patient seen today, states he is doing fine.  Discussed with the nurse.  Had hyponatremia yesterday which improved with free water.      Data reviewed today: I reviewed all medications, new labs and imaging results over the last 24 hours. I personally reviewed no images or EKG's today.    Physical Exam   Vital Signs: Temp: 98.4  F (36.9  C) Temp src: Oral BP: 107/70 Pulse: 104   Resp: 18 SpO2: 98 % O2 Device: None (Room air)    Weight: 163 lbs 2.25 oz  General Appearance: Obese , no distress  Respiratory: Lungs clear  Cardiovascular: Rate controlled  GI: Nontender  Skin: No rash  Extremities: No edema      Data   Recent Labs   Lab 07/07/22  1727 07/07/22  1536 07/07/22  1215 07/07/22  0800 07/06/22  0623 07/05/22  1254 07/04/22  1814 07/04/22  1227 07/04/22  0550 07/03/22  1754  07/03/22  0631 07/02/22  1212 07/02/22  0609   WBC  --   --   --  9.8  --   --   --   --   --   --  12.3*  --  10.5   HGB  --   --   --  10.2*  --   --   --   --   --   --  10.5*  --  11.1*   MCV  --   --   --  94  --   --   --   --   --   --  95  --  94   PLT  --   --   --  350  --   --   --   --  332  --  313  --  293   NA  --   --   --  141 146* 152*   < > 150* 150*  150*   < > 147*   < > 151*  151*   POTASSIUM  --  3.9  --  3.4 3.5 3.5  --  3.8 3.7  --  3.5   < > 3.4   CHLORIDE  --   --   --  112* 117* 122*  --  119* 120*  --  115*  --  118*   CO2  --   --   --  25 23 26  --  28 24  --  29  --  28   BUN  --   --   --  23 28 28  --  30 34*  --  42*  --  45*   CR  --   --   --  0.72 0.74 0.80  --  0.91 0.74  --  0.98  --  0.98   ANIONGAP  --   --   --  4 6 4  --  3 6  --  3  --  5   NASIR  --   --   --  9.2 9.1 9.8  --  9.4 9.4  --  9.4  --  9.1   *  --  130* 110* 128* 134*  --  107* 103*   < > 135*  --  114*   ALBUMIN  --   --   --   --   --   --   --  3.2*  --   --  2.3*  --   --    PROTTOTAL  --   --   --   --   --   --   --  6.5*  --   --  6.0*  --   --    BILITOTAL  --   --   --   --   --   --   --  0.7  --   --  1.2  --   --    ALKPHOS  --   --   --   --   --   --   --  108  --   --  118  --   --    ALT  --   --   --   --   --   --   --  36  --   --  47  --   --    AST  --   --   --   --   --   --   --  25  --   --  41  --   --     < > = values in this interval not displayed.     No results found for this or any previous visit (from the past 24 hour(s)).  Medications     dextrose       D5W 30 mL/hr at 07/07/22 1655       amylase-lipase-protease  1-2 capsule Per Feeding Tube Q4H     carboxymethylcellulose PF  1 drop Right Eye At Bedtime     cefuroxime  500 mg Oral Q12H [facility] (08/20)     enoxaparin ANTICOAGULANT  40 mg Subcutaneous Q24H     ipratropium  2 spray Both Nostrils TID     levocetirizine  5 mg Oral or NG Tube QPM     levothyroxine  50 mcg Oral or NG Tube Daily     montelukast  10 mg Oral or NG  Tube At Bedtime     pantoprazole  40 mg Oral or Feeding Tube BID     prednisoLONE acetate  1 drop Right Eye Daily     protein modular  1 packet Per Feeding Tube TID     risperiDONE  2 mg Oral or NG Tube BID     sertraline  100 mg Oral or NG Tube Daily     sodium bicarbonate  325 mg Per Feeding Tube Q4H     sodium chloride (PF)  3 mL Intracatheter Q8H     topiramate  100 mg Oral or NG Tube QAM     topiramate  50 mg Oral At Bedtime     zinc sulfate  220 mg Oral or NG Tube Daily

## 2022-07-07 NOTE — PROGRESS NOTES
Patient A/O X3, in bed all night, stable, calm and compliant. Tube feeding running and patient tolerating. Vitals within normal range. Had two episodes of bladder incontinence. Bed alarm on, no attempts to self transfer. Will continue to monitor.

## 2022-07-08 ENCOUNTER — APPOINTMENT (OUTPATIENT)
Dept: ULTRASOUND IMAGING | Facility: CLINIC | Age: 52
DRG: 438 | End: 2022-07-08
Attending: INTERNAL MEDICINE
Payer: MEDICARE

## 2022-07-08 ENCOUNTER — APPOINTMENT (OUTPATIENT)
Dept: GENERAL RADIOLOGY | Facility: CLINIC | Age: 52
DRG: 438 | End: 2022-07-08
Attending: INTERNAL MEDICINE
Payer: MEDICARE

## 2022-07-08 LAB
ANION GAP SERPL CALCULATED.3IONS-SCNC: 5 MMOL/L (ref 3–14)
BUN SERPL-MCNC: 23 MG/DL (ref 7–30)
CALCIUM SERPL-MCNC: 9.6 MG/DL (ref 8.5–10.1)
CHLORIDE BLD-SCNC: 113 MMOL/L (ref 94–109)
CO2 SERPL-SCNC: 25 MMOL/L (ref 20–32)
CREAT SERPL-MCNC: 0.73 MG/DL (ref 0.66–1.25)
ERYTHROCYTE [DISTWIDTH] IN BLOOD BY AUTOMATED COUNT: 14.7 % (ref 10–15)
GFR SERPL CREATININE-BSD FRML MDRD: >90 ML/MIN/1.73M2
GLUCOSE BLD-MCNC: 119 MG/DL (ref 70–99)
HCT VFR BLD AUTO: 29.6 % (ref 40–53)
HGB BLD-MCNC: 9.6 G/DL (ref 13.3–17.7)
MCH RBC QN AUTO: 30.4 PG (ref 26.5–33)
MCHC RBC AUTO-ENTMCNC: 32.4 G/DL (ref 31.5–36.5)
MCV RBC AUTO: 94 FL (ref 78–100)
PLATELET # BLD AUTO: 344 10E3/UL (ref 150–450)
POTASSIUM BLD-SCNC: 3.8 MMOL/L (ref 3.4–5.3)
RBC # BLD AUTO: 3.16 10E6/UL (ref 4.4–5.9)
SODIUM SERPL-SCNC: 143 MMOL/L (ref 133–144)
WBC # BLD AUTO: 10.2 10E3/UL (ref 4–11)

## 2022-07-08 PROCEDURE — 120N000001 HC R&B MED SURG/OB

## 2022-07-08 PROCEDURE — 250N000013 HC RX MED GY IP 250 OP 250 PS 637: Performed by: HOSPITALIST

## 2022-07-08 PROCEDURE — 250N000011 HC RX IP 250 OP 636: Performed by: HOSPITALIST

## 2022-07-08 PROCEDURE — 250N000013 HC RX MED GY IP 250 OP 250 PS 637: Performed by: INTERNAL MEDICINE

## 2022-07-08 PROCEDURE — 82310 ASSAY OF CALCIUM: CPT | Performed by: INTERNAL MEDICINE

## 2022-07-08 PROCEDURE — 250N000013 HC RX MED GY IP 250 OP 250 PS 637

## 2022-07-08 PROCEDURE — 36415 COLL VENOUS BLD VENIPUNCTURE: CPT | Performed by: INTERNAL MEDICINE

## 2022-07-08 PROCEDURE — 99233 SBSQ HOSP IP/OBS HIGH 50: CPT | Performed by: INTERNAL MEDICINE

## 2022-07-08 PROCEDURE — 258N000003 HC RX IP 258 OP 636: Performed by: INTERNAL MEDICINE

## 2022-07-08 PROCEDURE — 85027 COMPLETE CBC AUTOMATED: CPT | Performed by: INTERNAL MEDICINE

## 2022-07-08 PROCEDURE — 93971 EXTREMITY STUDY: CPT | Mod: RT

## 2022-07-08 PROCEDURE — 71046 X-RAY EXAM CHEST 2 VIEWS: CPT

## 2022-07-08 RX ADMIN — LEVOCETIRIZINE DIHYDROCHLORIDE 5 MG: 5 TABLET ORAL at 20:06

## 2022-07-08 RX ADMIN — Medication 40 MG: at 09:45

## 2022-07-08 RX ADMIN — Medication 1 PACKET: at 16:57

## 2022-07-08 RX ADMIN — MONTELUKAST 10 MG: 10 TABLET, FILM COATED ORAL at 21:48

## 2022-07-08 RX ADMIN — TOPIRAMATE 50 MG: 50 TABLET ORAL at 21:48

## 2022-07-08 RX ADMIN — CARBOXYMETHYLCELLULOSE SODIUM 1 DROP: 10 GEL OPHTHALMIC at 21:48

## 2022-07-08 RX ADMIN — TOPIRAMATE 100 MG: 100 TABLET, FILM COATED ORAL at 09:41

## 2022-07-08 RX ADMIN — PREDNISOLONE ACETATE 1 DROP: 10 SUSPENSION/ DROPS OPHTHALMIC at 09:52

## 2022-07-08 RX ADMIN — DEXTROSE MONOHYDRATE: 50 INJECTION, SOLUTION INTRAVENOUS at 16:31

## 2022-07-08 RX ADMIN — PANCRELIPASE 1 CAPSULE: 60000; 12000; 38000 CAPSULE, DELAYED RELEASE PELLETS ORAL at 16:57

## 2022-07-08 RX ADMIN — IPRATROPIUM BROMIDE 2 SPRAY: 42 SPRAY NASAL at 21:48

## 2022-07-08 RX ADMIN — RISPERIDONE 2 MG: 2 TABLET ORAL at 19:28

## 2022-07-08 RX ADMIN — Medication 1 PACKET: at 09:50

## 2022-07-08 RX ADMIN — PANCRELIPASE 1 CAPSULE: 60000; 12000; 38000 CAPSULE, DELAYED RELEASE PELLETS ORAL at 20:43

## 2022-07-08 RX ADMIN — ZINC SULFATE 220 MG (50 MG) CAPSULE 220 MG: CAPSULE at 09:41

## 2022-07-08 RX ADMIN — SODIUM BICARBONATE 325 MG: 325 TABLET ORAL at 20:43

## 2022-07-08 RX ADMIN — IPRATROPIUM BROMIDE 2 SPRAY: 42 SPRAY NASAL at 09:52

## 2022-07-08 RX ADMIN — PANCRELIPASE 2 CAPSULE: 60000; 12000; 38000 CAPSULE, DELAYED RELEASE PELLETS ORAL at 13:32

## 2022-07-08 RX ADMIN — IPRATROPIUM BROMIDE 2 SPRAY: 42 SPRAY NASAL at 17:01

## 2022-07-08 RX ADMIN — Medication 40 MG: at 20:44

## 2022-07-08 RX ADMIN — SODIUM BICARBONATE 325 MG: 325 TABLET ORAL at 16:57

## 2022-07-08 RX ADMIN — SODIUM BICARBONATE 325 MG: 325 TABLET ORAL at 01:12

## 2022-07-08 RX ADMIN — PANCRELIPASE 2 CAPSULE: 60000; 12000; 38000 CAPSULE, DELAYED RELEASE PELLETS ORAL at 01:12

## 2022-07-08 RX ADMIN — SODIUM BICARBONATE 325 MG: 325 TABLET ORAL at 05:14

## 2022-07-08 RX ADMIN — SODIUM BICARBONATE 325 MG: 325 TABLET ORAL at 13:31

## 2022-07-08 RX ADMIN — SODIUM BICARBONATE 325 MG: 325 TABLET ORAL at 09:42

## 2022-07-08 RX ADMIN — Medication 1 PACKET: at 21:49

## 2022-07-08 RX ADMIN — PANCRELIPASE 2 CAPSULE: 60000; 12000; 38000 CAPSULE, DELAYED RELEASE PELLETS ORAL at 05:14

## 2022-07-08 RX ADMIN — ENOXAPARIN SODIUM 40 MG: 40 INJECTION SUBCUTANEOUS at 16:57

## 2022-07-08 RX ADMIN — PANCRELIPASE 2 CAPSULE: 60000; 12000; 38000 CAPSULE, DELAYED RELEASE PELLETS ORAL at 09:42

## 2022-07-08 RX ADMIN — LEVOTHYROXINE SODIUM 50 MCG: 50 TABLET ORAL at 09:41

## 2022-07-08 RX ADMIN — SERTRALINE HYDROCHLORIDE 100 MG: 100 TABLET ORAL at 09:41

## 2022-07-08 ASSESSMENT — ACTIVITIES OF DAILY LIVING (ADL)
ADLS_ACUITY_SCORE: 48
ADLS_ACUITY_SCORE: 49
ADLS_ACUITY_SCORE: 48
ADLS_ACUITY_SCORE: 49
ADLS_ACUITY_SCORE: 48
ADLS_ACUITY_SCORE: 48
ADLS_ACUITY_SCORE: 49
ADLS_ACUITY_SCORE: 49
ADLS_ACUITY_SCORE: 48
ADLS_ACUITY_SCORE: 48

## 2022-07-08 NOTE — PROGRESS NOTES
"Care Management Discharge Note    Discharge Date: 07/08/2022  Discharge Disposition: Transitional Care  Discharge Services: None  Discharge DME: None  Discharge Transportation:  TBD  Private pay costs discussed: N/A  PAS Confirmation Code:    Patient/family educated on Medicare website which has current facility and service quality ratings: yes  Education Provided on the Discharge Plan:  CM-RN and SIENNA educated pt daughters 7/7 if no \"new\" accepting TCU will need to return 7/8 to [facility] as planned.  As of 9:15 AM there are no additional accepting referrals out of the 8 sent yesterday.    Persons Notified of Discharge Plans: Hospitalist, who has written discharge orders for today.  Patient/Family in Agreement with the Plan: yes   Handoff Referral Completed: Yes - TCU will receive packet    Additional Information:  Discharge orders are in place. Ride needs to be arranged. If a \"new\" facility accepts prior to pt leaving for [facility] we can redirect orders to that facility; however meanwhile we do need to continue moving forward with a planned discharge to [facility].                          ADDENDUM 1:   Note: [facility] declined to accept to their TCU.  The others TCUs have not yet accepted.  [facility] expecting pt.   Confirmed [facility]UC following normal process of ensuring Medicare Important Notice has been provided to guardian.   Alex KING communicated with pt family and, at their request, with MD--family wanted medical update from MD.     CM-RN was updated that pt family may appeal the discharge - this is pt family's Medicare right.   (the process would be initiated by family; if they do, CM-RN will receive a notification fax from [facility] and follow normal process if received)    An active discharge order remains in place - this would not be removed due to an appeal (only removed if MD determines medical reason to hold for continued IP care).     SIENNA did arrange ride at 1230.  " "Given the above this will be moved out to a later time.      Also re-faxed & called referral to first choice [facility] phone [phone] / fax  [phone]  ([facility] phone/fax does not work and [facility] states [facility] is closed)    ADDENDUM 2:   In normal process I am informed when someone appeals.  Multiple persons believed guardian is appealing.  CM-RN called guardian to ask, she reviews her concerns again but, when asked again, states no she has not appealed.  GELYRN reviewed appeal phone number and also texted it to her as she says she does not have a way to write it down.       Guardian asked GELYRN about the TCU beds.  I reviewed and called as necessary to obtain determination:   1) [facility] ([place]) - declines due to acuity; ([place]) - declines due to \"closed\"  2) [facility] ([place]) - declines in discharge on the double   3) [facility] ([place]) - declines in discharge on the double   4) [facility] ([place]) - declines due to acuity   5) [facility] ([place]) - declines due to \"we are not taking any admissions, anticipated through September\"  6) [facility] ([facility]) - declines, we do not have staff   7) [facility] ([place]) - left message for Jasmin    Reviewed the above responses and rationale with pt guardian.  She acknoweldges no other bed confirmed.  CM-RN engaged guardian by asking if she has done any recovery efforts with [facility] like asking for a Care Conference.  Guardian expresses concerns, \"how do I know they will follow through? He was supposed to, for example, get therapy three times per day but I know he wasn't getting that.\"      CM-RN educated guardian on role of [facility] and provided [facility] phone [phone] / [phone] as a resource.  Provided unit number [phone] for guardian to get ahold of bedside staff this weekend.    Presumably guardian appealed, normally we are notified, I will enter a " separate note on frequently asked questions re: appeal for unit staff.    Breana Belcher RN, BSN, PHN  [facility]  Inpatient Care Management - FLOAT  [facility]: [phone]

## 2022-07-08 NOTE — PROGRESS NOTES
Pt guardian stated earlier she called to appeal discharge.  Did not receive fax yet with case number.  Once received CM staff will alert medical records to send records to [facility] for review.  Determination is usually made the day after documents are submitted.  So long as pt is medically stable for discharge, the discharge order can remain in place.  If pt becomes unstable, the order should be removed.      Commonly Asked Questions    If a patient files for a Medicare appeal are those nights we are waiting for a response  covered by Medicare?  o Yes, Medicare covers the nights of inpatient hospital stay in the appeal process.    If a patient stays overnight in the appeal process and is denied, do those nights count  towards a three night inpatient required stay for a TCU?  o No, the nights in appeal will not count towards a TCU stay.    Can a patient appeal more than once?  o Yes, but the additional appeal and hospital night stay if denied will NOT be covered  by Medicare and will be out of pocket expense to the patient.    What time of day does the patient need to be discharged by if the appeal is denied?  o The patient has until 12:00 noon the day after the [facility] decision has been made. If  the patient receives any inpatient services after noon of that day they will have to  pay for them.    Can patients with Medicare Advantage Plans appeal their discharge?  o Yes, all Medicare Advantage Plans have the right to the Medicare Appeal Process.    Can a family member appeal the discharge for a cognitively impaired patient?  o Yes    Does the patient/patient representative have to let the staff know they have appealed?  o No, at times it is possible for your first notice to come from [facility].    Should I document the appeal in the [facility] Medical record?  o Yes, document that the patient has filed a Medicare appeal for discharge,  information was provided to [facility] and results of the appeal are pending. When  results  are received please document if the appeal was denied or accepted.    Note: Case Status can be looked up at the [employer] website, https://SHIFT.Ioxus/en/case_lookup

## 2022-07-08 NOTE — PROGRESS NOTES
"Care Management Follow Up    Length of Stay (days): 24    Expected Discharge Date: 07/08/2022     Concerns to be Addressed:       Patient plan of care discussed at interdisciplinary rounds: Yes    Anticipated Discharge Disposition: Transitional Care     Anticipated Discharge Services: None  Anticipated Discharge DME: None    Patient/family educated on Medicare website which has current facility and service quality ratings: yes  Education Provided on the Discharge Plan:    Patient/Family in Agreement with the Plan: yes    Referrals Placed by CM/SW: Post Acute Facilities  Private pay costs discussed: Not applicable    Additional Information:  FERNANDO setup a discharge ride back to [facility] for 12:30pm via  w\c. FERNANDO called pt's sister Huong who reports she would like to look for a new TCU. FERNANDO explained that since pt has a bed to go back to, we will need to have pt transfer back. FERNANDO reported they will page the provider to speak with Huong. FERNANDO paged provider around 9:15 am asking them to call Huong regarding discharge. FERNANDO faxed discharge orders to Hayley in admissions at [facility].     Email from Hayley at 10:45 am asking for pt's dietary orders since they cut off and to change the TF orders to read \"Jevity or facility equivalent\". FERNANDO read the email at 11:30 am and paged provider regarding the order change.     FERNANDO spoke with provider for the first time at 11:45 am and they reported they will call Huong. Provider reports he will not discharge pt due to Huong's concerns. Provider inquired if the appeal processed had been started. SW reported they were unaware since this is a task for the [facility]. FERNANDO consulted on this case with SANTANA arora and RNCC.    12:08: Call from provider asking SW to cancel the transport and discharge. FERNANDO cancelled  transport and updated Hayley. FERNANDO did inquire with Hayley if Huong had voiced her concerns at [facility] with their administration staff. Hayley reviewed the pt's file and reported to FERNANDO that no concerns had been " reported to administration staff. Hayley reports they keep track of all concerns from pts and family.     1:26: Informed by Hayley that [facility] cannot hold [place]'s bed, so they will keep pt on their radar.       BARAK Cartwright

## 2022-07-08 NOTE — PROGRESS NOTES
Patient stable all night, vitals within normal limits. Denies pain and discomfort, tube feeding running all and patient tolerating. Continues to have large episodes of bladder incontinence, very compliant with cares. Mepilex over sacrum and and coccyx. No bowel movements this shift. Respirations without effort despite diminished lung sounds in all fields. No new concerns at this time. Will continue to monitor as patient remains a fall risk due to fluctuation in orientation. Bed alarm utilized.

## 2022-07-08 NOTE — PROGRESS NOTES
My first day taking care of this patient: Not seen yet, due to busy service.    I was told at 11:50 AM that patient is leaving at 12:30 and sister has many concerns and does not want patient to go to [facility].     Long discussion with patient's sister who is the guardian: She is very frustrated for lack of communication during last few days.  Offered apologies for her perception of events last few days.  I reviewed the previous progress note and details and plan for patient problems.  She appeared reassured with the update, but wants confirmation from care coordinator before discharge about follow-up appointments with GI.    Still very concerned about discharged to [facility], and does not want patient to go there because of poor care.    Updated  to cancel discharge and discuss with family about the appeal process as appropriate.     Cancel transport.      Patient has been here for prolonged time and has had complicated course.  I feel it unsafe, to approve the discharge within next 25 minutes without addressing patient's guardian concerns.      > 60 minutes were spent today, in discussion with the , care coordinator, > 30-minute discussion with the patient's sister, care coordinator manager.  Discussed with everyone about, goal of safe discharge planning.

## 2022-07-08 NOTE — PROGRESS NOTES
[facility]    Medicine Progress Note - Hospitalist Service    Date of Admission:  6/14/2022    Assessment & Plan        Jagdeep Walker is a 51 year old male who was admitted on 6/14/2022 with shortness of breath and hypoxia with large pleural effusion on outside chest x-ray.      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  Recent echocardiogram 6/4 normal.  Pleural fluid studies consistent with transudate, specimen clotted for cell count, normal glucose, no organisms on gram stain.  Cytology negative x 2  Given lipase and amylase increase, perhaps pleural effusion related to pancreatitis with fistula or leak versus malnutrition related to severe pancreatitis  Chest tube placed on 6/18 and replaced on 6/22   * Culture from the pleural with diptheroids in broth only, c/w contaminant  - weaned off oxygen since 6/29  - ID consulted for the pleural culture results.  Felt contaminant and no need for antibiotics     -Patient was followed by CT surgery and IR and appreciate their recommendations.    - plan repeat ERCP around 7/21/22   - continue creon  - GJ in place since 6/28, continue NPO  Consider repeat ERCP around 7/21, will follow with imaging and may consider EUS with necrosectomy if minimal improvement   -- Expect multiple weeks until able to tolerate oral intake- need to see evidence of fistula closure--  -- Chest tube removed by IR 07/05.    07/08: We  will repeat chest x-ray to evaluate for any reaccumulation  -- Transfer to TCU ok with GI when able. We will follow closely as outpatient     TCU  was able to take patient home 07/07, however family declined it and wanted reevaluation for another facility .  Unclear why appeal process was not started yesterday  Care coordination RN is following  Further details as in separate note.     # 7/8: Rt > Lt leg swelling. Venous USS to r/o DVT    Moderate malnutrition in the setting of acute illness  GI recommended n.p.o. started on tube feeds to help heal the pancreatico pleural fistula  -Getting tube feeds via the PEG tube  Albumin up to 3.2 on 7/4/22 so IV albumin discontinued   Right sided pigtail catheter chest tube removal done on 7/5/22     As per GI      Consider repeat ERCP around 7/21, will follow with imaging and may consider EUS with necrosectomy if minimal improvement   -- Continue creon- given known pancreatitis- would not see reason to check fecal elastase given clinical picture  -- Expect multiple weeks until able to tolerate oral intake- need to see evidence of fistula closure--  -- Transfer to TCU ok with GI when able. We will follow closely as outpatient     Hypernatremia  Free water 200ml q 4hours   monitor sodium levels closely      Acute/subacute pancreatitis with pseudocyst   *Hospitalized 5/8-5/17/22 for pancreatitis with pseudocyst formation. Unclear etiology, EUS unrevealing for etiology, possibly due to medications  *CT on admission with acute pancreatitis with slightly improved inflammatory changes, pseudocysts not significantly changed.  -he had a PEJ tube placed with IR on 6/28, currently tube feeds are at goal, plan is to do ERCP in ~7/21 weeks, patient should be n.p.o. until then.     Recent metabolic encephalopathy   Mild zinc deficiency   *Extensively worked up during admission 6/4-6/10/22 including head CT, MRI brain, EEG, paraneoplastic ab, extensive laboratory work-up mostly unremarkable  with exception of mildly low zinc.  *Per sister, patient does have some cognitive impairment at baseline, though is typically oriented to self, family and can carry on an extensive conversation especially regarding topics he enjoys (eg sports). He remains below his baseline.  -MRI brain has been repeated again on 6/27 without any new findings .  - Continue zinc supplementation     Hx of portal vein thrombosis  * Diagnosed during admission 5/2022, improved on subsequent CT and not treated with anticoagulation   * Non-occlusive main portal vein thrombosis, splenic vein thrombosis seen on admission CT  - GI consulted as above, they advised no need to treat PVT previous admission (6/9/2022)  -This could be causing significant portal hypertension and worsening ascites which in turn could be causing worsening pleural effusion.     Urinary tract infection secondary to indwelling jimenez   UA large leuk esterase, >182 WBCs, many bacteria. Urine cx with >100k e coli  - rocephin 1g daily on 6/30/22 switched via G tube to CEftin BID   Urine culture with pansensitive E. coli except for ampicillin  Patient to complete course of antibiotics tomorrow     Depression  Anxiety  Schizoaffective disorder, bipolar type  - Continue prior to admission sertraline, risperidone     Seizure disorder  PTA on topiramate and Depakote, depakote low, topiramate continued--level is low. MRI shows no changes. EEG no epileptiform discharge, only generalized encephalopathy  - hold depakote given association with pancreatitis  - continue topiramate, continue 100mg in AM, add 50mg in PM (given low level) on 7/1     Carnitine deficiency  - Continue prior to admission levocarnitine      Hypertension  - Stable (atenolol was discontinued due to earlier low SBP )     Fisher's esophagus  - Continue prior to admission PPI     Hypothyroidism  - Continue prior to admission levothyroxine     Seasonal allergies  - Continue prior to admission  "levocetirizine     Moderate malnutrition in setting of acute illness/injury  Appreciate nutrition     Diet: NPO for Medical/Clinical Reasons Except for: Other; Specify: NPO For oral intake and gastric feedings for 6 hours post insertion Gastrostomy G/GJ tube. May feed through Jejunal or Distal PORT immediately for GJ tubes ONLY.  Adult Formula Drip Feeding: Continuous Jevity 1.5; Nasojejunal; Goal Rate: 55 mL/hr x 22 hours; mL/hr; Medication - Feeding Tube Flush Frequency: At least 15-30 mL water before and after medication administration and with tube clogging; Amount to ...  NPO for Medical/Clinical Reasons  Adult Formula Tube Feeding    DVT Prophylaxis: Enoxaparin (Lovenox) SQ  Santana Catheter: Not present  Central Lines: None  Cardiac Monitoring: None  Code Status: Full Code      Disposition Plan      Expected Discharge Date: 07/08/2022,  3:00 PM      Discharge Comments: CT drainage issues, ERCP 6/21 6/25 Ct in place, [place], octreotide gtt, HFNC  6/29- CT still in place  7/1 [facility] at d/c, CT still in.        The patient's care was discussed with the Bedside Nurse, Care Coordinator/, Patient and Patient's Family.    Liang Ambriz MD, MD  Hospitalist Service  [facility]  Securely message with the Vocera Web Console (learn more here)  Text page via [employer] Paging/Directory     \"This dictation was performed with voice recognition software and may contain errors,  omissions and inadvertent word substitution.\"         Clinically Significant Risk Factors Present on Admission                      ______________________________________________________________________    Interval History   He denies any specific new complaints.  Denies any chest pain/shortness of breath  Denies any fever/chills  Does not want to go to [facility]    4 point ROS done and negative unless mentioned    Data reviewed today: I reviewed all medications, new labs and imaging results over " the last 24 hours. I personally reviewed the chest x-ray image(s) showing As below.    Physical Exam   /64 (BP Location: Right arm)   Pulse 81   Temp 98.7  F (37.1  C) (Oral)   Resp 16   Wt 74 kg (163 lb 2.3 oz)   SpO2 94%   BMI 27.15 kg/m    Gen- pleasant   HEENT- NAD, MARCELO  Neck- supple  CVS- I+II+ no m/r/g  RS- CTAB, decreased at base   Abdo- soft, no tenderness . No g/r/r , G tube   Ext- RLE edema      Data    BMPRecent Labs   Lab 07/08/22  0748 07/07/22  1727 07/07/22  1536 07/07/22  1215 07/07/22  0800 07/06/22  0623 07/05/22  1254     --   --   --  141 146* 152*   POTASSIUM 3.8  --  3.9  --  3.4 3.5 3.5   CHLORIDE 113*  --   --   --  112* 117* 122*   NASIR 9.6  --   --   --  9.2 9.1 9.8   CO2 25  --   --   --  25 23 26   BUN 23  --   --   --  23 28 28   CR 0.73  --   --   --  0.72 0.74 0.80   * 116*  --  130* 110* 128* 134*     CBC  Recent Labs   Lab 07/08/22  0748 07/07/22  0800 07/04/22  0550 07/03/22  0631 07/02/22  0609   WBC 10.2 9.8  --  12.3* 10.5   RBC 3.16* 3.32*  --  3.48* 3.71*   HGB 9.6* 10.2*  --  10.5* 11.1*   HCT 29.6* 31.1*  --  33.2* 34.7*   MCV 94 94  --  95 94   MCH 30.4 30.7  --  30.2 29.9   MCHC 32.4 32.8  --  31.6 32.0   RDW 14.7 14.6  --  15.2* 15.4*    350 332 313 293     INRNo lab results found in last 7 days.  LFTs  Recent Labs   Lab 07/04/22  1227 07/03/22  0631   ALKPHOS 108 118   AST 25 41   ALT 36 47   BILITOTAL 0.7 1.2   PROTTOTAL 6.5* 6.0*   ALBUMIN 3.2* 2.3*      PANCNo lab results found in last 7 days.    Recent Results (from the past 24 hour(s))   XR Chest 2 Views    Narrative    CHEST TWO VIEWS 7/8/2022 1:25 PM     HISTORY: Follow up for effusion.    COMPARISON: None.       Impression    IMPRESSION: Elevated right hemidiaphragm. Right pleural catheter seen  previously has been removed. No pneumothorax. Minimal probable  atelectasis at the right base. Some residual right pleural fluid  appears possibly present. Left lung clear. The cardiac  silhouette is  not enlarged. Pulmonary vasculature is unremarkable.    JEANNIE ARREOLA MD         SYSTEM ID:  [id number]

## 2022-07-08 NOTE — PLAN OF CARE
Goal Outcome Evaluation:    Patient A&Ox2,  confused at time, Patient is slow to responding with garbled speech baseline. Denied pain. NPO, TF set at 55 mL/hr, flushing [id number], follow MAR order for Sodium bicarbonate instructions. PIV on RA D5 infusing at 30 mL/hr. Chest Tube removed on the previous shift, dressing site CDI. Mepilex on the coccyx area for protection. Assist of 1 GB/W.  Incontinence of urine. TCU placement in progress.  Will continue to monitor

## 2022-07-09 LAB
ANION GAP SERPL CALCULATED.3IONS-SCNC: 3 MMOL/L (ref 3–14)
BUN SERPL-MCNC: 28 MG/DL (ref 7–30)
CALCIUM SERPL-MCNC: 9.3 MG/DL (ref 8.5–10.1)
CHLORIDE BLD-SCNC: 113 MMOL/L (ref 94–109)
CO2 SERPL-SCNC: 26 MMOL/L (ref 20–32)
CREAT SERPL-MCNC: 0.82 MG/DL (ref 0.66–1.25)
ERYTHROCYTE [DISTWIDTH] IN BLOOD BY AUTOMATED COUNT: 14.8 % (ref 10–15)
GFR SERPL CREATININE-BSD FRML MDRD: >90 ML/MIN/1.73M2
GLUCOSE BLD-MCNC: 90 MG/DL (ref 70–99)
GLUCOSE BLDC GLUCOMTR-MCNC: 107 MG/DL (ref 70–99)
HCT VFR BLD AUTO: 29.6 % (ref 40–53)
HGB BLD-MCNC: 9.5 G/DL (ref 13.3–17.7)
MCH RBC QN AUTO: 29.9 PG (ref 26.5–33)
MCHC RBC AUTO-ENTMCNC: 32.1 G/DL (ref 31.5–36.5)
MCV RBC AUTO: 93 FL (ref 78–100)
PLATELET # BLD AUTO: 331 10E3/UL (ref 150–450)
POTASSIUM BLD-SCNC: 3.7 MMOL/L (ref 3.4–5.3)
RBC # BLD AUTO: 3.18 10E6/UL (ref 4.4–5.9)
SODIUM SERPL-SCNC: 142 MMOL/L (ref 133–144)
WBC # BLD AUTO: 8.8 10E3/UL (ref 4–11)

## 2022-07-09 PROCEDURE — 250N000013 HC RX MED GY IP 250 OP 250 PS 637: Performed by: INTERNAL MEDICINE

## 2022-07-09 PROCEDURE — 250N000013 HC RX MED GY IP 250 OP 250 PS 637: Performed by: HOSPITALIST

## 2022-07-09 PROCEDURE — 250N000013 HC RX MED GY IP 250 OP 250 PS 637

## 2022-07-09 PROCEDURE — 36415 COLL VENOUS BLD VENIPUNCTURE: CPT | Performed by: INTERNAL MEDICINE

## 2022-07-09 PROCEDURE — 120N000001 HC R&B MED SURG/OB

## 2022-07-09 PROCEDURE — 250N000011 HC RX IP 250 OP 636: Performed by: HOSPITALIST

## 2022-07-09 PROCEDURE — 80048 BASIC METABOLIC PNL TOTAL CA: CPT | Performed by: INTERNAL MEDICINE

## 2022-07-09 PROCEDURE — 99233 SBSQ HOSP IP/OBS HIGH 50: CPT | Performed by: INTERNAL MEDICINE

## 2022-07-09 PROCEDURE — 85027 COMPLETE CBC AUTOMATED: CPT | Performed by: INTERNAL MEDICINE

## 2022-07-09 RX ADMIN — SODIUM BICARBONATE 325 MG: 325 TABLET ORAL at 01:41

## 2022-07-09 RX ADMIN — PANCRELIPASE 2 CAPSULE: 60000; 12000; 38000 CAPSULE, DELAYED RELEASE PELLETS ORAL at 20:08

## 2022-07-09 RX ADMIN — Medication 1 PACKET: at 17:08

## 2022-07-09 RX ADMIN — SERTRALINE HYDROCHLORIDE 100 MG: 100 TABLET ORAL at 08:38

## 2022-07-09 RX ADMIN — LEVOCETIRIZINE DIHYDROCHLORIDE 5 MG: 5 TABLET ORAL at 20:07

## 2022-07-09 RX ADMIN — CARBOXYMETHYLCELLULOSE SODIUM 1 DROP: 10 GEL OPHTHALMIC at 22:13

## 2022-07-09 RX ADMIN — RISPERIDONE 2 MG: 2 TABLET ORAL at 20:07

## 2022-07-09 RX ADMIN — PANCRELIPASE 2 CAPSULE: 60000; 12000; 38000 CAPSULE, DELAYED RELEASE PELLETS ORAL at 01:41

## 2022-07-09 RX ADMIN — LEVOTHYROXINE SODIUM 50 MCG: 50 TABLET ORAL at 08:38

## 2022-07-09 RX ADMIN — PANCRELIPASE 2 CAPSULE: 60000; 12000; 38000 CAPSULE, DELAYED RELEASE PELLETS ORAL at 12:54

## 2022-07-09 RX ADMIN — PREDNISOLONE ACETATE 1 DROP: 10 SUSPENSION/ DROPS OPHTHALMIC at 08:42

## 2022-07-09 RX ADMIN — MONTELUKAST 10 MG: 10 TABLET, FILM COATED ORAL at 22:13

## 2022-07-09 RX ADMIN — IPRATROPIUM BROMIDE 2 SPRAY: 42 SPRAY NASAL at 22:12

## 2022-07-09 RX ADMIN — SODIUM BICARBONATE 325 MG: 325 TABLET ORAL at 20:08

## 2022-07-09 RX ADMIN — SODIUM BICARBONATE 325 MG: 325 TABLET ORAL at 17:08

## 2022-07-09 RX ADMIN — PANCRELIPASE 2 CAPSULE: 60000; 12000; 38000 CAPSULE, DELAYED RELEASE PELLETS ORAL at 06:03

## 2022-07-09 RX ADMIN — Medication 40 MG: at 08:42

## 2022-07-09 RX ADMIN — SODIUM BICARBONATE 325 MG: 325 TABLET ORAL at 06:03

## 2022-07-09 RX ADMIN — ACETAMINOPHEN 650 MG: 325 SUSPENSION ORAL at 20:07

## 2022-07-09 RX ADMIN — TOPIRAMATE 50 MG: 50 TABLET ORAL at 22:13

## 2022-07-09 RX ADMIN — PANCRELIPASE 2 CAPSULE: 60000; 12000; 38000 CAPSULE, DELAYED RELEASE PELLETS ORAL at 08:35

## 2022-07-09 RX ADMIN — TOPIRAMATE 100 MG: 100 TABLET, FILM COATED ORAL at 08:34

## 2022-07-09 RX ADMIN — Medication 1 PACKET: at 22:12

## 2022-07-09 RX ADMIN — Medication 1 PACKET: at 08:52

## 2022-07-09 RX ADMIN — IPRATROPIUM BROMIDE 2 SPRAY: 42 SPRAY NASAL at 17:07

## 2022-07-09 RX ADMIN — Medication 40 MG: at 20:11

## 2022-07-09 RX ADMIN — SODIUM BICARBONATE 325 MG: 325 TABLET ORAL at 12:54

## 2022-07-09 RX ADMIN — ZINC SULFATE 220 MG (50 MG) CAPSULE 220 MG: CAPSULE at 08:38

## 2022-07-09 RX ADMIN — RISPERIDONE 2 MG: 2 TABLET ORAL at 08:35

## 2022-07-09 RX ADMIN — ENOXAPARIN SODIUM 40 MG: 40 INJECTION SUBCUTANEOUS at 17:07

## 2022-07-09 RX ADMIN — PANCRELIPASE 2 CAPSULE: 60000; 12000; 38000 CAPSULE, DELAYED RELEASE PELLETS ORAL at 17:08

## 2022-07-09 RX ADMIN — SODIUM BICARBONATE 325 MG: 325 TABLET ORAL at 08:35

## 2022-07-09 RX ADMIN — IPRATROPIUM BROMIDE 2 SPRAY: 42 SPRAY NASAL at 08:42

## 2022-07-09 ASSESSMENT — ACTIVITIES OF DAILY LIVING (ADL)
ADLS_ACUITY_SCORE: 53
ADLS_ACUITY_SCORE: 50
ADLS_ACUITY_SCORE: 53
ADLS_ACUITY_SCORE: 53
ADLS_ACUITY_SCORE: 49
ADLS_ACUITY_SCORE: 50
ADLS_ACUITY_SCORE: 49

## 2022-07-09 NOTE — PLAN OF CARE
Pt A&OX4.Developmental delay with slight confusion.  VSS ex soft BP on RA. Not oob this shift. Incontinent for bladder, did not have bowel movent. Turn/repo.  NPO, TF @ 55mL/hr. Has PIV D5 @ 30mL/hr.  K protocal good at 3.7. Rt mid and lower lungs dim.

## 2022-07-09 NOTE — PROGRESS NOTES
[facility]    Medicine Progress Note - Hospitalist Service    Date of Admission:  6/14/2022    Assessment & Plan        Jagdeep Walker is a 51 year old male who was admitted on 6/14/2022 with shortness of breath and hypoxia with large pleural effusion on outside chest x-ray.      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  Recent echocardiogram 6/4 normal.  Pleural fluid studies consistent with transudate, specimen clotted for cell count, normal glucose, no organisms on gram stain.  Cytology negative x 2  Given lipase and amylase increase, perhaps pleural effusion related to pancreatitis with fistula or leak versus malnutrition related to severe pancreatitis  Chest tube placed on 6/18 and replaced on 6/22   * Culture from the pleural with diptheroids in broth only, c/w contaminant  - weaned off oxygen since 6/29  - ID consulted for the pleural culture results.  Felt contaminant and no need for antibiotics     -Patient was followed by CT surgery and IR and appreciate their recommendations.    - plan repeat ERCP around 7/21/22   - continue creon  - GJ in place since 6/28, continue NPO  Consider repeat ERCP around 7/21, will follow with imaging and may consider EUS with necrosectomy if minimal improvement   -- Expect multiple weeks until able to tolerate oral intake- need to see evidence of fistula closure--  -- Chest tube removed by IR 07/05.    07/08:  repeat chest x-ray to evaluate for any re-accumulation: stable    TCU  was able to take patient home 07/07, however family declined it and wanted re-evaluation for another facility.  Care coordination RN is following     07/09: I discussed with the guardian: Although she is filing for appeal, but considering patient to go back to [facility] in the interim (as has no other place available). Her  is coming this afternoon and will discuss further with .    # 7/8: Rt > Lt leg swelling. Venous USS to r/o DVT- neg    Moderate malnutrition in the setting of acute illness  GI recommended n.p.o. started on tube feeds to help heal the pancreatico pleural fistula  -Getting tube feeds via the PEG tube  Albumin up to 3.2 on 7/4/22 so IV albumin discontinued   Right sided pigtail catheter chest tube removal done on 7/5/22     As per GI      Consider repeat ERCP around 7/21, will follow with imaging and may consider EUS with necrosectomy if minimal improvement   -- Continue creon- given known pancreatitis- would not see reason to check fecal elastase given clinical picture  -- Expect multiple weeks until able to tolerate oral intake- need to see evidence of fistula closure--  -- Transfer to TCU ok with GI when able. We will follow closely as outpatient     Hypernatremia  Free water 200ml q 4hours   monitor sodium levels closely      Acute/subacute pancreatitis with pseudocyst   *Hospitalized 5/8-5/17/22 for pancreatitis with pseudocyst formation. Unclear etiology, EUS unrevealing for etiology, possibly due to medications  *CT on admission with acute pancreatitis with slightly improved inflammatory changes, pseudocysts not significantly changed.  -he had a PEJ tube placed with IR on 6/28, currently tube feeds are at goal, plan is to do ERCP in ~7/21 weeks, patient should be n.p.o. until then.     Recent metabolic encephalopathy   Mild zinc deficiency   *Extensively worked up during admission 6/4-6/10/22 including head  CT, MRI brain, EEG, paraneoplastic ab, extensive laboratory work-up mostly unremarkable with exception of mildly low zinc.  *Per sister, patient does have some cognitive impairment at baseline, though is typically oriented to self, family and can carry on an extensive conversation especially regarding topics he enjoys (eg sports). He remains below his baseline.  -MRI brain has been repeated again on 6/27 without any new findings .  - Continue zinc supplementation     Hx of portal vein thrombosis  * Diagnosed during admission 5/2022, improved on subsequent CT and not treated with anticoagulation   * Non-occlusive main portal vein thrombosis, splenic vein thrombosis seen on admission CT  - GI consulted as above, they advised no need to treat PVT previous admission (6/9/2022)  -This could be causing significant portal hypertension and worsening ascites which in turn could be causing worsening pleural effusion.     Urinary tract infection secondary to indwelling jimenez   UA large leuk esterase, >182 WBCs, many bacteria. Urine cx with >100k e coli  - rocephin 1g daily on 6/30/22 switched via G tube to CEftin BID   Urine culture with pansensitive E. coli except for ampicillin  Patient to complete course of antibiotics tomorrow     Depression  Anxiety  Schizoaffective disorder, bipolar type  - Continue prior to admission sertraline, risperidone     Seizure disorder  PTA on topiramate and Depakote, depakote low, topiramate continued--level is low. MRI shows no changes. EEG no epileptiform discharge, only generalized encephalopathy  - hold depakote given association with pancreatitis  - continue topiramate, continue 100mg in AM, add 50mg in PM (given low level) on 7/1     Carnitine deficiency  - Continue prior to admission levocarnitine      Hypertension  - Stable (atenolol was discontinued due to earlier low SBP )     Fisher's esophagus  - Continue prior to admission PPI     Hypothyroidism  - Continue prior to admission  "levothyroxine     Seasonal allergies  - Continue prior to admission levocetirizine     Moderate malnutrition in setting of acute illness/injury  Appreciate nutrition     Diet: NPO for Medical/Clinical Reasons Except for: Other; Specify: NPO For oral intake and gastric feedings for 6 hours post insertion Gastrostomy G/GJ tube. May feed through Jejunal or Distal PORT immediately for GJ tubes ONLY.  Adult Formula Drip Feeding: Continuous Jevity 1.5; Nasojejunal; Goal Rate: 55 mL/hr x 22 hours; mL/hr; Medication - Feeding Tube Flush Frequency: At least 15-30 mL water before and after medication administration and with tube clogging; Amount to ...  NPO for Medical/Clinical Reasons  Adult Formula Tube Feeding    DVT Prophylaxis: Enoxaparin (Lovenox) SQ  S[place]ana Catheter: Not present  Central Lines: None  Cardiac Monitoring: None  Code Status: Full Code      Disposition Plan      Expected Discharge Date: 07/09/2022,  3:00 PM      Discharge Comments: appeal in process        The patient's care was discussed with the Bedside Nurse, Care Coordinator/, Patient and Patient's Family.    Liang Ambriz MD, MD  Hospitalist Service  [facility]  Securely message with the Vocera Web Console (learn more here)  Text page via Meshify Paging/Directory     \"This dictation was performed with voice recognition software and may contain errors,  omissions and inadvertent word substitution.\"     Clinically Significant Risk Factors Present on Admission                  _________________________________________________________________  Interval History   He denies any specific new complaints.  Denies any chest pain/shortness of breath  Denies any fever/chills    4 point ROS done and negative unless mentioned    Data reviewed today: I reviewed all medications, new labs and imaging results over the last 24 hours. I personally reviewed the chest x-ray image(s) showing As below.    Physical Exam   BP 96/61 (BP " Location: Left arm)   Pulse 75   Temp 98.2  F (36.8  C) (Oral)   Resp 18   Wt 74 kg (163 lb 2.3 oz)   SpO2 96%   BMI 27.15 kg/m    Gen- pleasant   HEENT- NAD, MARCELO  Neck- supple  CVS- I+II+ no m/r/g  RS- CTAB, decreased at base   Abdo- soft, no tenderness . No g/r/r , G tube   Ext- RLE edema      Data    BMP  Recent Labs   Lab 07/09/22  0953 07/09/22  0214 07/08/22  0748 07/07/22  1727 07/07/22  1536 07/07/22  1215 07/07/22  0800 07/06/22  0623     --  143  --   --   --  141 146*   POTASSIUM 3.7  --  3.8  --  3.9  --  3.4 3.5   CHLORIDE 113*  --  113*  --   --   --  112* 117*   NASIR 9.3  --  9.6  --   --   --  9.2 9.1   CO2 26  --  25  --   --   --  25 23   BUN 28  --  23  --   --   --  23 28   CR 0.82  --  0.73  --   --   --  0.72 0.74   GLC 90 107* 119* 116*  --    < > 110* 128*    < > = values in this interval not displayed.     CBC  Recent Labs   Lab 07/09/22  0953 07/08/22  0748 07/07/22  0800 07/04/22  0550 07/03/22  0631   WBC 8.8 10.2 9.8  --  12.3*   RBC 3.18* 3.16* 3.32*  --  3.48*   HGB 9.5* 9.6* 10.2*  --  10.5*   HCT 29.6* 29.6* 31.1*  --  33.2*   MCV 93 94 94  --  95   MCH 29.9 30.4 30.7  --  30.2   MCHC 32.1 32.4 32.8  --  31.6   RDW 14.8 14.7 14.6  --  15.2*    344 350 332 313     INRNo lab results found in last 7 days.  LFTs  Recent Labs   Lab 07/04/22  1227 07/03/22  0631   ALKPHOS 108 118   AST 25 41   ALT 36 47   BILITOTAL 0.7 1.2   PROTTOTAL 6.5* 6.0*   ALBUMIN 3.2* 2.3*      PANCNo lab results found in last 7 days.    Recent Results (from the past 24 hour(s))   XR Chest 2 Views    Narrative    CHEST TWO VIEWS 7/8/2022 1:25 PM     HISTORY: Follow up for effusion.    COMPARISON: None.       Impression    IMPRESSION: Elevated right hemidiaphragm. Right pleural catheter seen  previously has been removed. No pneumothorax. Minimal probable  atelectasis at the right base. Some residual right pleural fluid  appears possibly present. Left lung clear. The cardiac silhouette is  not  enlarged. Pulmonary vasculature is unremarkable.    JEANNIE ARREOLA MD         SYSTEM ID:  [id number]   US Lower Extremity Venous Duplex Right    Narrative    US LOWER EXTREMITY VENOUS DUPLEX RIGHT 7/8/2022 4:14 PM    CLINICAL HISTORY/INDICATION: Right leg swelling. r/o DVT    COMPARISON: 6/4/2022    TECHNIQUE:   Grayscale, color-flow, and spectral waveform analysis were performed  of the deep veins of the right lower extremity    FINDINGS:   The right common femoral vein, femoral vein and popliteal vein  demonstrate normal compressibility, spectral waveform, color flow and  augmentation.    The right posterior tibial vein, peroneal vein, and greater saphenous  vein are compressible.    The contralateral left common femoral vein demonstrates normal  compressibility, spectral waveform, color flow and augmentation.      Impression    IMPRESSION:   No evidence of deep venous thrombosis in the right lower extremity.    SHERRI ROCKWELL DO         SYSTEM ID:  [id number]

## 2022-07-09 NOTE — PLAN OF CARE
Goal Outcome Evaluation:    Patient A&Ox4 forgetful at times. VSS. Denied pain.Gabrble speech and slow. Dressing on the right site of the chest with dried drainage. NPO with TF set 55 mL/hr flushing 300 ml Q4hr. PIV D5 infusing at 30 mL/hr. Slept most of this shift. Assist of 1GB/W. Incontinence of urine. mepilex in place coccyx for protection. Pending for TCU placement. Will continue to monitor.

## 2022-07-09 NOTE — PROGRESS NOTES
Care Management Follow Up    Length of Stay (days): 25    Expected Discharge Date: 07/09/2022     Concerns to be Addressed:       Patient plan of care discussed at interdisciplinary rounds: Yes    Anticipated Discharge Disposition: Transitional Care     Anticipated Discharge Services: None  Anticipated Discharge DME: None    Patient/family educated on Medicare website which has current facility and service quality ratings: yes  Education Provided on the Discharge Plan:    Patient/Family in Agreement with the Plan: yes    Referrals Placed by CM/SW: Post Acute Facilities  Private pay costs discussed: Not applicable    Additional Information:  Writer went in to speak to family regarding appeal as writer was notified that Guardian might appeal.Patient's Brother in Law, Epifanio? Told writer the case number of [id number]. Writer looked up case which started it was started but there is no name on it. Writer went over Detailed Notice of discharge and sent what was done to [email or web address]. Unsure if all of this is needed as patient is discharging to TCU tomorrow.      Olga Louise RN

## 2022-07-09 NOTE — PROGRESS NOTES
Care Management Follow Up    Length of Stay (days): 25    Expected Discharge Date: 07/09/2022     Concerns to be Addressed:     Discharge planning  Patient plan of care discussed at interdisciplinary rounds: Yes    Anticipated Discharge Disposition: Transitional Care     Anticipated Discharge Services: Therapy  Anticipated Discharge DME: None    Patient/family educated on Medicare website which has current facility and service quality ratings: yes  Education Provided on the Discharge Plan:  yes  Patient/Family in Agreement with the Plan: yes    Referrals Placed by CM/SW: Post Acute Facilities, transportation  Private pay costs discussed: Not applicable    Additional Information:  Spoke with patient's brother-in-law, Huong's , regarding discharge plans.  They are in agreement to have patient return to [facility] on discharge with the understanding that the  at [facility] will continue working with Huong and continue to look for a new facility for patient.  Encouraged Huong to speak with the DON and/or the  on Monday regarding her concerns about the care.  Patient's brother-in-law states that he can transport patient today, but it would have to be within the next two hours.  Explained that I need to contact [facility] to confirm the discharge plan.  Call placed and message left for [facility] with the update with no return call.  Updated patient's brother-in-law that we will be unable to discharge patient today as there is no one in admissions at [facility] therefore we will have to do the discharge tomorrow.  He is asking for transport to be arranged tomorrow.  Patient has MA therefore the transport should be covered under patient's insurance.  Call placed to [facility] to arrange for wheelchair transport at 11:00 tomorrow.  Call placed to update patient's guardian Huong as to this above information and she is in agreement.  Call placed and message left for [facility] to update them as to the time of the  transport.    Will continue to follow.      JESUS MANUEL Hemphill, [facility]    [phone]  [facility]

## 2022-07-09 NOTE — PROGRESS NOTES
Pt A&OX4. VSS on RA. Not oob this shift. Incontinent for bladder, did not have bowel. Turn/repo. On NPO, TF @ 55mL/hr. Has PIV D5 @ 30mL/hr.

## 2022-07-10 VITALS
HEART RATE: 81 BPM | WEIGHT: 163.14 LBS | DIASTOLIC BLOOD PRESSURE: 59 MMHG | OXYGEN SATURATION: 96 % | RESPIRATION RATE: 18 BRPM | SYSTOLIC BLOOD PRESSURE: 104 MMHG | TEMPERATURE: 98.4 F | BODY MASS INDEX: 27.15 KG/M2

## 2022-07-10 LAB
ANION GAP SERPL CALCULATED.3IONS-SCNC: 10 MMOL/L (ref 3–14)
BUN SERPL-MCNC: 28 MG/DL (ref 7–30)
CALCIUM SERPL-MCNC: 9.1 MG/DL (ref 8.5–10.1)
CHLORIDE BLD-SCNC: 111 MMOL/L (ref 94–109)
CO2 SERPL-SCNC: 23 MMOL/L (ref 20–32)
CREAT SERPL-MCNC: 0.78 MG/DL (ref 0.66–1.25)
GFR SERPL CREATININE-BSD FRML MDRD: >90 ML/MIN/1.73M2
GLUCOSE BLD-MCNC: 90 MG/DL (ref 70–99)
PLATELET # BLD AUTO: 312 10E3/UL (ref 150–450)
POTASSIUM BLD-SCNC: 3.9 MMOL/L (ref 3.4–5.3)
SODIUM SERPL-SCNC: 144 MMOL/L (ref 133–144)

## 2022-07-10 PROCEDURE — 258N000003 HC RX IP 258 OP 636: Performed by: INTERNAL MEDICINE

## 2022-07-10 PROCEDURE — 85049 AUTOMATED PLATELET COUNT: CPT | Performed by: HOSPITALIST

## 2022-07-10 PROCEDURE — 250N000013 HC RX MED GY IP 250 OP 250 PS 637: Performed by: HOSPITALIST

## 2022-07-10 PROCEDURE — 250N000013 HC RX MED GY IP 250 OP 250 PS 637

## 2022-07-10 PROCEDURE — 36415 COLL VENOUS BLD VENIPUNCTURE: CPT | Performed by: INTERNAL MEDICINE

## 2022-07-10 PROCEDURE — 250N000013 HC RX MED GY IP 250 OP 250 PS 637: Performed by: INTERNAL MEDICINE

## 2022-07-10 PROCEDURE — 99239 HOSP IP/OBS DSCHRG MGMT >30: CPT | Performed by: INTERNAL MEDICINE

## 2022-07-10 PROCEDURE — 80048 BASIC METABOLIC PNL TOTAL CA: CPT | Performed by: INTERNAL MEDICINE

## 2022-07-10 RX ADMIN — DEXTROSE MONOHYDRATE: 50 INJECTION, SOLUTION INTRAVENOUS at 00:36

## 2022-07-10 RX ADMIN — PANCRELIPASE 2 CAPSULE: 60000; 12000; 38000 CAPSULE, DELAYED RELEASE PELLETS ORAL at 05:52

## 2022-07-10 RX ADMIN — SODIUM BICARBONATE 325 MG: 325 TABLET ORAL at 00:40

## 2022-07-10 RX ADMIN — RISPERIDONE 2 MG: 2 TABLET ORAL at 09:46

## 2022-07-10 RX ADMIN — PANCRELIPASE 2 CAPSULE: 60000; 12000; 38000 CAPSULE, DELAYED RELEASE PELLETS ORAL at 09:41

## 2022-07-10 RX ADMIN — PANCRELIPASE 2 CAPSULE: 60000; 12000; 38000 CAPSULE, DELAYED RELEASE PELLETS ORAL at 00:40

## 2022-07-10 RX ADMIN — TOPIRAMATE 100 MG: 100 TABLET, FILM COATED ORAL at 09:45

## 2022-07-10 RX ADMIN — SERTRALINE HYDROCHLORIDE 100 MG: 100 TABLET ORAL at 09:45

## 2022-07-10 RX ADMIN — Medication 1 PACKET: at 09:56

## 2022-07-10 RX ADMIN — SODIUM BICARBONATE 325 MG: 325 TABLET ORAL at 09:43

## 2022-07-10 RX ADMIN — LEVOTHYROXINE SODIUM 50 MCG: 50 TABLET ORAL at 09:46

## 2022-07-10 RX ADMIN — PREDNISOLONE ACETATE 1 DROP: 10 SUSPENSION/ DROPS OPHTHALMIC at 09:53

## 2022-07-10 RX ADMIN — ZINC SULFATE 220 MG (50 MG) CAPSULE 220 MG: CAPSULE at 09:45

## 2022-07-10 RX ADMIN — SODIUM BICARBONATE 325 MG: 325 TABLET ORAL at 05:52

## 2022-07-10 RX ADMIN — IPRATROPIUM BROMIDE 2 SPRAY: 42 SPRAY NASAL at 09:46

## 2022-07-10 ASSESSMENT — ACTIVITIES OF DAILY LIVING (ADL)
ADLS_ACUITY_SCORE: 50

## 2022-07-10 NOTE — PROGRESS NOTES
Pt A&Ox4. VSS on RA. Not OOb this shift. Incontinent for both bowel and bladder. Denies pain. On TF @55. PIV with D5 @ 30. CMS intact. Awaiting tcu placement   English

## 2022-07-10 NOTE — PROGRESS NOTES
Care Management Discharge Note    Discharge Date: 07/10/2022       Discharge Disposition: Transitional Care    Discharge Services: Other (see comment) (Therapy)    Discharge DME: None    Discharge Transportation:  [facility] wheelchair transport at 11:00    Private pay costs discussed: Not applicable    PAS Confirmation Code:  N/A  Patient/family educated on Medicare website which has current facility and service quality ratings: yes    Education Provided on the Discharge Plan:  yes  Persons Notified of Discharge Plans: patient and message left for guardian (sister Huong)  Patient/Family in Agreement with the Plan: yes    Handoff Referral Completed: No    Additional Information:  Received discharge orders for patient.  Bed available at [facility] for today.  [facility] wheelchair transport arranged for 11:00 today.  Call placed and message left for patient's sister and guardian Huong to confirm the discharge with her (spoke with her yesterday to update her).  Call placed to [facility] and faxed the discharge orders.  Patient does not need a PAS as he was admitted from there.      JESUS MANUEL Hemphill, [facility]    [phone]  [facility]

## 2022-07-10 NOTE — PLAN OF CARE
Physical Therapy Discharge Summary    Reason for therapy discharge:    Discharged to transitional care facility.    Progress towards therapy goal(s). See goals on Care Plan in [facility] electronic health record for goal details.  Goals partially met.  Barriers to achieving goals:   discharge from facility.    Therapy recommendation(s):    Continued therapy is recommended.  Rationale/Recommendations:  Pt is struggling at baseline (Anabel no AD with falls, RW recommended) - lives in group home with stairs. To go home safely, he will need consistent walker use and 24hr hands on Ax1 for all mobility, pending stairs for home accessibility.   Pt would benefit from rehab stay before return home for safer mobility, decrease falls and risk of injury for more success with community living.  PT Brief overview of current status: STS with FWW and CGA. Gait with FWW and CGA, WC follow due to fatigue

## 2022-07-10 NOTE — PLAN OF CARE
Pt A&Ox4; forgetful at times. VSS on Rm Air. Denies pain. Denies SOB. LS sounds clear, diminished. Incontinent of B/B. Up w/ asstx2. Drsg CDI to old chest tube site. Peg tube running at 55 mL/hr w/ intermittent flushes. PIV to R wrist. Received orders for patient to discharge to TCU. Peg tube stopped; pt sent w/ feeding supplies. PIV removed from R wrist; no concerns noted. Discharge education provided to patient; patient verbalized understanding. Patient belongings w/ patient. Currently awaiting [facility] to arrive; patient will be going to [facility] via w/c transport.

## 2022-07-10 NOTE — PROGRESS NOTES
Shift Summary [id number]    Admitting Diagnosis: Pancreatic pseudocyst, Pleural effusion   Idiopathic acute pancreatitis, unspecified complication status    Vitals: VSS ex febrile 99.9 tylenol given recheck after an hr oral 99.8, axillary 98.9. 2230 recheck 98.9 orally  Pain: deneid pain  A&Ox4, forgeful at times  Voiding: incontinence   Mobility: Assist of 1 GB/W  Tele: none  CMS: Intact  Lung Sounds: Dismissed LS  GI: Incontinence,  Dressing: Right site DCI, GJ DCI    Orders Placed This Encounter      NPO for Medical/Clinical Reasons Except for: Other; Specify: NPO For oral intake and gastric feedings for 6 hours post insertion Gastrostomy G/GJ tube. May feed through Jejunal or Distal PORT immediately for GJ tubes ONLY.      NPO for Medical/Clinical Reasons     Plan: TF set 55 mL/hr flushing at 300 mL Q4hrs. PIV D5 infusing 30 mL/hr. Pending for TCU placement. Will continue to monitor

## 2022-07-10 NOTE — DISCHARGE SUMMARY
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  Acute hypoxic respiratory failure  S/p thoracentesis 6/14 with 1L out  S/p thoracentesis 6/15 with 2.2L out  S/p thoracentesis on 6/18 with 0.5 L out     Moderate malnutrition in the setting of acute illness  Hypernatremia    Acute/subacute pancreatitis with pseudocyst     Recent metabolic encephalopathy   Mild zinc deficiency     Hx of portal vein thrombosis    Urinary tract infection secondary to indwelling jimenez     Depression  Anxiety  Schizoaffective disorder, bipolar type    Seizure disorder    Carnitine deficiency    Hypertension    Fisher's esophagus    Hypothyroidism    Seasonal allergies    Moderate malnutrition in setting of acute illness/injury    Follow-ups Needed After Discharge   Follow-up Appointments     Follow Up and recommended labs and tests      Follow up with Nursing home physician.  No follow up labs or test are   needed.  Follow up with primary care provider in 1 weeks.  No follow up labs or   test are needed.           Unresulted Labs Ordered in the Past 30 Days of this Admission     No orders found from 5/15/2022 to 6/15/2022.        Discharge Disposition   Discharged to rehabilitation facility  Condition at discharge: Stable      Hospital Course        Jagdeep Walker is a 51 year old male who was admitted on 6/14/2022 with shortness of breath and hypoxia with large pleural effusion on outside chest x-ray.      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  Recent echocardiogram 6/4 normal.  Pleural fluid studies consistent with transudate, specimen clotted for cell count, normal glucose, no organisms on gram stain.  Cytology negative x 2  Given lipase and amylase increase, perhaps pleural effusion related to pancreatitis with fistula or leak versus malnutrition related to severe pancreatitis  Chest tube placed on 6/18 and replaced on 6/22   * Culture from the pleural with diptheroids in broth only, c/w contaminant  - weaned off oxygen since 6/29  - ID consulted for the pleural culture results.  Felt contaminant and no need for antibiotics      -Patient was followed by CT surgery and IR and appreciate their recommendations.    - plan repeat ERCP around 7/21/22   - continue creon  - GJ in place since 6/28, continue NPO  Consider repeat ERCP around 7/21, will follow with imaging and may consider EUS with necrosectomy if minimal improvement   -- Expect multiple weeks until able to tolerate oral intake- need to see evidence of fistula closure--  -- Chest tube removed by IR 07/05.    07/08: repeat chest x-ray to evaluate for any re-accumulation: stable     TCU  was able to take patient home 07/07, however family declined it and wanted re-evaluation for another facility.  Care coordination RN is following     07/09: I discussed with the guardian: Although she is filing for appeal, but considering patient to go back to [facility] in the interim (as has no other place available). Her  is coming this afternoon and will discuss further with .     # 7/8: Rt > Lt leg swelling. Venous USS to r/o DVT- neg     Moderate malnutrition in the setting  of acute illness  GI recommended n.p.o. started on tube feeds to help heal the pancreatico pleural fistula  -Getting tube feeds via the PEG tube  Albumin up to 3.2 on 7/4/22 so IV albumin discontinued   Right sided pigtail catheter chest tube removal done on 7/5/22     As per GI      Consider repeat ERCP around 7/21, will follow with imaging and may consider EUS with necrosectomy if minimal improvement   -- Continue creon- given known pancreatitis- would not see reason to check fecal elastase given clinical picture  -- Expect multiple weeks until able to tolerate oral intake- need to see evidence of fistula closure--  -- Transfer to TCU ok with GI when able. We will follow closely as outpatient     Hypernatremia  Free water 200ml q 4hours   monitor sodium levels closely      Acute/subacute pancreatitis with pseudocyst   *Hospitalized 5/8-5/17/22 for pancreatitis with pseudocyst formation. Unclear etiology, EUS unrevealing for etiology, possibly due to medications  *CT on admission with acute pancreatitis with slightly improved inflammatory changes, pseudocysts not significantly changed.  -he had a PEJ tube placed with IR on 6/28, currently tube feeds are at goal, plan is to do ERCP in ~7/21 weeks, patient should be n.p.o. until then.     Recent metabolic encephalopathy   Mild zinc deficiency   *Extensively worked up during admission 6/4-6/10/22 including head CT, MRI brain, EEG, paraneoplastic ab, extensive laboratory work-up mostly unremarkable with exception of mildly low zinc.  *Per sister, patient does have some cognitive impairment at baseline, though is typically oriented to self, family and can carry on an extensive conversation especially regarding topics he enjoys (eg sports). He remains below his baseline.  -MRI brain has been repeated again on 6/27 without any new findings .  - Continue zinc supplementation     Hx of portal vein thrombosis  * Diagnosed during admission 5/2022, improved on subsequent CT  and not treated with anticoagulation   * Non-occlusive main portal vein thrombosis, splenic vein thrombosis seen on admission CT  - GI consulted as above, they advised no need to treat PVT previous admission (6/9/2022)  -This could be causing significant portal hypertension and worsening ascites which in turn could be causing worsening pleural effusion.     Urinary tract infection secondary to indwelling jimenez   UA large leuk esterase, >182 WBCs, many bacteria. Urine cx with >100k e coli  - rocephin 1g daily on 6/30/22 switched via G tube to CEftin BID   Urine culture with pansensitive E. coli except for ampicillin  Patient to complete course of antibiotics tomorrow     Depression  Anxiety  Schizoaffective disorder, bipolar type  - Continue prior to admission sertraline, risperidone     Seizure disorder  PTA on topiramate and Depakote, depakote low, topiramate continued--level is low. MRI shows no changes. EEG no epileptiform discharge, only generalized encephalopathy  - hold depakote given association with pancreatitis  - continued topiramate, continue 100mg in AM, add 50mg in PM (given low level) on 7/1     Carnitine deficiency  - Continue prior to admission levocarnitine      Hypertension  - Stable (atenolol was discontinued due to earlier low SBP )     Fisher's esophagus  - Continue prior to admission PPI     Hypothyroidism  - Continue prior to admission levothyroxine     Seasonal allergies  - Continue prior to admission levocetirizine     Moderate malnutrition in setting of acute illness/injury  Appreciate nutrition  Consultations This Hospital Stay   PULMONARY IP CONSULT  GASTROENTEROLOGY IP CONSULT  NUTRITION SERVICES ADULT IP CONSULT  CARE MANAGEMENT / SOCIAL WORK IP CONSULT  NUTRITION SERVICES ADULT IP CONSULT  PHARMACY IP CONSULT  THORACIC SURGERY IP CONSULT  INFECTIOUS DISEASES IP CONSULT  NEUROLOGY IP CONSULT  NUTRITION SERVICES ADULT IP CONSULT  SURGERY GENERAL IP CONSULT  SURGERY GENERAL IP  CONSULT  PHYSICAL THERAPY ADULT IP CONSULT  OCCUPATIONAL THERAPY ADULT IP CONSULT  PHYSICAL THERAPY ADULT IP CONSULT  PHYSICAL THERAPY ADULT IP CONSULT  OCCUPATIONAL THERAPY ADULT IP CONSULT  VASCULAR ACCESS ADULT IP CONSULT    Code Status   Full Code    Time Spent on this Encounter   I, Liang Ambriz MD, MD, personally saw the patient today and spent greater than 30 minutes discharging this patient.       Liang Ambriz MD, MD  [facility]  [address] [facility] [address]  Phone: [phone]  Fax: [phone]  ______________________________________________________________________    Physical Exam   Vital Signs: Temp: 98.4  F (36.9  C) Temp src: Axillary BP: 104/59 Pulse: 81   Resp: 18 SpO2: 96 % O2 Device: None (Room air)    Weight: 163 lbs 2.25 oz  Gen- pleasant   HEENT- NAD, MARCELO  Neck- supple  CVS- I+II+ no m/r/g  RS- CTAB, decreased at base   Abdo- soft, no tenderness . No g/r/r , G tube   Ext- RLE edema       Primary Care Physician   Joel Werner    Discharge Orders      General info for SNF    Length of Stay Estimate: Short Term Care: Estimated # of Days 31-90  Condition at Discharge: Stable  Level of care:skilled   Rehabilitation Potential: Good  Admission H&P remains valid and up-to-date: Yes  Recent Chemotherapy: N/A  Use Nursing Home Standing Orders: Yes     Mantoux instructions    Give two-step Mantoux (PPD) Per Facility Policy Yes     Reason for your hospital stay    Pleural effusion,, UTI     Follow Up and recommended labs and tests    Follow up with Nursing home physician.  No follow up labs or test are needed.  Follow up with primary care provider in 1 weeks.  No follow up labs or test are needed.     Activity - Up with nursing assistance     Physical Therapy Adult Consult    Evaluate and treat as clinically indicated.    Reason:  weakness     Occupational Therapy Adult Consult    Evaluate and treat as clinically indicated.    Reason:  cognitive  impairment     Adult Formula Tube Feeding    Follow this regimen upon discharge: Orders Placed This Encounter (JEVITY OR FACILITY EQUIVALENT)      Adult Formula Drip Feeding: Continuous Jevity 1.5; Nasojejunal; Goal Rate: 55 mL/hr x 22 hours; mL/hr; Medication - Feeding Tube Flush Frequency: At least 15-30 mL water before and after medication administration and with tube clogging; Amount to ...      NPO for Medical/Clinical Reasons Except for: Other; Specify: NPO For oral intake and gastric     Fall precautions     Seizure precautions     NPO for Medical/Clinical Reasons       Significant Results and Procedures   Results for orders placed or performed during the hospital encounter of 06/14/22   CT Chest/Abdomen/Pelvis w Contrast    Narrative    EXAM: CT CHEST/ABDOMEN/PELVIS W CONTRAST  LOCATION: [facility]  DATE/TIME: 6/14/2022 11:26 PM    INDICATION: Unknown primary, initial workup  COMPARISON: MRCP 06/06/2022 and CT abdomen and pelvis 05/08/2022  TECHNIQUE: CT scan of the chest, abdomen, and pelvis was performed following injection of IV contrast. Multiplanar reformats were obtained. Dose reduction techniques were used.   CONTRAST: 95 mL Isovue 370    FINDINGS:   LUNGS AND PLEURA: Very large right pleural effusion has significantly increased in size and now results in near complete opacification of the right hemithorax with compressive atelectasis of the right upper and lower lobes. Smaller left pleural effusion   has also increased in size with subsegmental atelectasis left lower lobe.    MEDIASTINUM/AXILLAE: Narrowing involving the right and left mainstem bronchi. No enlarged mediastinal or hilar lymph nodes.    CORONARY ARTERY CALCIFICATION: None.    HEPATOBILIARY: Ascites adjacent to the liver is slightly smaller. Moderate distention of the gallbladder.    PANCREAS: 3.0 x 2.9 cm pancreatic head cyst and 2.8 x 2.6 cm pancreatic tail cysts are unchanged. Inflammatory changes involving  the head, body, and tail of pancreas have slightly improved.    SPLEEN: Mild splenic enlargement measuring 14 cm.    ADRENAL GLANDS: Normal.    KIDNEYS/BLADDER: Bilateral renal cysts measuring up to 3.4 cm in size. No hydronephrosis. Moderate distention of the bladder.    BOWEL: Wall thickening involving the lesser curvature the stomach has progressed and there is now a well-defined bilobed fluid pocket along the proximal posterior gastric wall measuring 3.8 x 3.1 cm on image 165 of series 3.  Slight wall thickening   involving the cecum as well as the transverse colon. No evidence for bowel obstruction. Scattered colonic diverticula.    LYMPH NODES: Normal.    VASCULATURE: The main portal vein is quite small and contains some nonocclusive thrombus, with cavernous transformation. The splenic vein is thrombosed. Gastric varices. Numerous collateral vessels in the lower pelvis adjacent to the rectal wall.    PELVIC ORGANS: Moderate amount of free pelvic fluid is not significantly changed    MUSCULOSKELETAL: Extensive edematous changes in the subcutaneous tissues. Hypertrophic changes thoracic spine.      Impression    IMPRESSION:  1.  Acute pancreatitis with slightly improved inflammatory changes. Pseudocysts within the pancreatic head and tail not significantly changed.  2.  Gastric wall thickening has progressed consistent with secondary gastritis with new pseudocyst along the proximal posterior gastric wall.  3.  Ascites adjacent to the liver has decreased with a similar amount of free pelvic fluid.  4.  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  5.  Wall thickening involving the cecum and transverse colon likely secondarily involved.  6.  Very large right pleural effusion has significantly increased and now results in near complete opacification of the right hemithorax. Smaller left pleural effusion and left basilar atelectasis.   US Thoracentesis    Narrative    [place]  RADIOLOGY  Location: [facility]  DATE: 6/14/2022    PROCEDURE: IMAGING GUIDED RIGHT THORACENTESIS    INDICATION: Shortness of breath. Large right-sided pleural effusion..    CONSENT: The risks, benefits and alternatives of an imaging guided  thoracentesis were discussed with the patient and patient's sister in  detail. All questions were answered. Informed consent was given to  proceed with the procedure.    COMPLICATIONS: No immediate complications.    PROCEDURE:    A limited ultrasound was performed for localization purposes.    Using sterile technique 10 mL of Xylocaine was infused into the local  soft tissues. Under direct ultrasound guidance a 5 Turkmen catheter was  inserted into the pleural effusion.    A total of 1000 mL of clear yellow pleural fluid was removed and sent  to the lab if diagnostic analysis was requested.    FINDINGS:  The initial ultrasound shows a pleural effusion.    Images obtained during catheter placement show the access needle with  tip in the pleural fluid.      Impression    IMPRESSION:    Successful ultrasound-guided right thoracentesis.    CHARLES HURLEY MD         SYSTEM ID:  [id number]   US Thoracentesis    Narrative    ULTRASOUND GUIDED THORACENTESIS  6/15/2022 4:00 PM     HISTORY: Right-sided effusion, okay to take >1L per pulmonology.    FINDINGS: Ultrasound was used to evaluate for the presence and best  approach for drainage of a pleural effusion. Written and oral informed  consent was obtained. A pause for the cause procedure to verify the  correct patient and correct procedure. The skin overlying the right  chest posteriorly was prepped and draped in the usual sterile fashion.  The subcutaneous tissues were anesthetized with 10 mL 1% lidocaine. A  catheter was advanced into the pleural space and 2200 mL of  diana  colored fluid was drained. Patient was monitored by nurse under my  direct supervision throughout the exam. Ultrasound images were  permanently  stored.  There were no immediate complications. Patient  left the ultrasound suite in satisfactory condition.      Impression    IMPRESSION: Technically successful thoracentesis without immediate  complications.    JEANNIE ARREOLA MD         SYSTEM ID:  [id number]   XR Chest Port 1 View    Narrative    XR CHEST PORT 1 VIEW   6/15/2022 2:29 PM     HISTORY: worsening dyspnea, hypotension, bradycardia, known large  effusion.    COMPARISON: CT chest, abdomen and pelvis 6/14/2022.      Impression    IMPRESSION: Large right pleural effusion again noted with complete  opacification of the right hemithorax. Slight leftward mediastinal  shift which could indicate element of tension, although it is not  significantly changed from recent CT. Small left pleural effusion.  Left lung is otherwise clear. No acute bony abnormality.    FLACO ABRAHAM MD         SYSTEM ID:  [id number]   XR Chest Port 1 View    Narrative    EXAM: XR CHEST PORT 1 VIEW  LOCATION: [facility]  DATE/TIME: 6/15/2022 5:40 PM    INDICATION: Post thoracentesis eval of fluid level, midline shift  COMPARISON: 06/15/2022      Impression    IMPRESSION:     Lungs are hypoexpanded.     Improved but persistent small versus small-moderate right pleural effusion post thoracentesis. Significantly improved expansion of the right lung, though moderate basilar predominant volume loss / atelectasis persists. No obvious pneumothorax.    Hypoexpanded left lung with mild basilar opacities suggesting atelectasis (and/or small left pleural effusion).     The patient is mildly rotated, otherwise no substantial cardiomediastinal shift.       US Thoracentesis    Narrative    ULTRASOUND GUIDED THORACENTESIS  6/16/2022 1:59 PM     HISTORY: Pleural effusion    FINDINGS: Ultrasound was used to evaluate for the presence and best  approach for drainage of a pleural effusion. Written and oral informed  consent was obtained. A pause for the cause procedure to verify  the  correct patient and correct procedure. The skin overlying the right  chest posteriorly was prepped and draped in the usual sterile fashion.  The subcutaneous tissues were anesthetized with 10 mL 1% lidocaine. A  catheter was advanced into the pleural space and 1350 mL of  diana-colored fluid was drained. Drainage was stopped at the request  of the patient. Patient was monitored by nurse under my direct  supervision throughout the exam. Ultrasound images were permanently  stored.  There were no immediate complications. Patient left the  ultrasound suite in satisfactory condition.      Impression    IMPRESSION: Technically successful thoracentesis without immediate  complications.    JEANNIE ARREOLA MD         SYSTEM ID:  [id number]   XR Chest 2 Views    Narrative    EXAM: XR CHEST 2 VW  LOCATION: [facility]  DATE/TIME: 6/16/2022 6:02 PM    INDICATION: Pleural effusion. s p multiple recent thoras. ? any residual pleural fluid  COMPARISON: 6/15/2022      Impression    IMPRESSION: Significant decrease volume of right pleural effusion with previous exam demonstrating complete opacification of right hemithorax. There is now aeration of upper lobe but persistent opacity at right base consistent with moderate residual   right pleural effusion. There is also small residual left pleural effusion. Heart size normal   US Thoracentesis    Narrative    EXAM:  1. RIGHT THORACENTESIS  2. ULTRASOUND GUIDANCE  LOCATION: [facility]  DATE/TIME: 6/17/2022 12:21 PM    INDICATION: Pleural effusion.    PROCEDURE: Informed consent obtained. Time out performed. The chest  was prepped and draped in a sterile fashion. 10 mL of 1% lidocaine was  infused into local soft tissues. A 5 Amharic catheter system was  introduced into the pleural effusion under ultrasound guidance.    0.5 liters of clear fluid were removed and sent to lab if requested.    Patient tolerated procedure well.    Ultrasound images have been  permanently captured for documentation.      Impression    IMPRESSION:  Status post ultrasound-guided right thoracentesis.    CASEY JAQUEZ MD         SYSTEM ID:  [id number]   CT Chest w/o Contrast    Narrative    CT CHEST WITHOUT CONTRAST 6/17/2022 1:00 PM     HISTORY: Pneumonia, effusion or abscess suspected, x-ray done.    TECHNIQUE: CT scan of the chest was performed without IV contrast.  Multiplanar reformats were obtained. Dose reduction techniques were  used.  CONTRAST: None.  COMPARISON: 6/14/2022    FINDINGS:     LUNGS AND PLEURA: Large right pleural effusion, only minimally  decreased from previous despite multiple thoracenteses. A small left  pleural effusion is stable. Compressive atelectasis in the right lower  lobe and lesser extent in the right middle and upper lobes. Minimal  compressive atelectasis left lower lobe. Ossified granulomas in the  right lower lobe. Lungs are otherwise clear.    MEDIASTINUM/AXILLAE: No lymphadenopathy. No visible coronary artery  calcification.    UPPER ABDOMEN: Changes of pancreatitis have not appreciably changed  from recent abdominal CT.      Impression    IMPRESSION:  1.  Persistent large right pleural effusion despite interval  thoracenteses. Small left pleural effusion.  2.  Partially visualized changes of pancreatitis have not appreciably  changed from prior abdominal CT.    CASEY JAQUEZ MD         SYSTEM ID:  [id number]   XR Abdomen Port 1 View    Narrative    XR ABDOMEN PORT 1 VIEWS 6/17/2022 2:22 PM    HISTORY: Verify small bowel feeding tube bedside placement    COMPARISON: 6/14/2022      Impression    IMPRESSION: Feeding tube in good position with tip in the distal  duodenum. Bowel gas pattern is nonobstructed.    CASEY JAQUEZ MD         SYSTEM ID:  [id number]   IR Chest Tube Place Non Tunneled Right    Narrative    51-year-old patient with history of recurrent right pleural effusion,  request made for chest tube placement given 3 drainages the past  several  days. Patient does have underlying pancreatitis.    TECHNIQUE: Informed consent was obtained with patient's guardian.  Patient was brought to interventional radiology Department and placed  sitting in an upright position. Skin overlying the posterior lateral  right hemithorax was prepped and draped in standard sterile fashion.  1% lidocaine was used for local anesthesia for a total of 10 mL. With  continuous ultrasound guidance, a 12 Turkmen pigtail drainage catheter  was advanced into the effusion. Sample of fluid was obtained and sent  to the lab for evaluation. Tube was then connected to a Pleurovac  device device, waterseal, and gravity drainage. The tube was sutured  in position. Patient tolerated the procedure well. Patient had 1 L  drain into the Pleurovac while in IR. Tube was then clamped to prevent  excessive drainage as this has been uncomfortable and every drainage  instance prior. No sedation. No fluoroscopy.      Impression    IMPRESSION: Successful placement of 12 Turkmen right pigtail drainage  catheter. Sample of fluid sent to the lab for evaluation. We will  obtain chest x-ray tonight and in the morning. Will place to gravity  drainage for the time being as patient has developed significant pain  during previous drainage episodes.    FLACO MARTINEZ MD         SYSTEM ID:  [id number]   XR Chest Port 1 View    Narrative    EXAM: XR CHEST PORT 1 VIEW  LOCATION: [facility]  DATE/TIME: 6/18/2022 7:45 PM    INDICATION: Recent R chest tube, RRT  COMPARISON: CT chest 6/17/2022 and chest x-ray 6/16/2022      Impression    IMPRESSION: Heart size and pulmonary vascularity normal. Small right pleural effusion has decreased in size, now with a pigtail chest tube located at the right base. No pneumothorax. Subsegmental atelectasis right lower lung field. A feeding tube enters   the stomach with the tip projected off the bottom of film. There is a linear foreign body projected over the  central mediastinum, not present on yesterday's CT chest exam and of indeterminate location.   XR Chest Port 1 View    Narrative    EXAM: XR CHEST PORT 1 VIEW  LOCATION: [facility]  DATE/TIME: 6/19/2022 9:59 AM    INDICATION: Right pleural effusion and chest tube.  COMPARISON: 6/18/2022      Impression    IMPRESSION: Similar right basilar pigtail drain and enteric tube. The lungs are hypoinflated. Probable trace effusions and associated atelectasis persists. Heart size is stable. No pneumothorax.   XR Chest Port 1 View    Narrative    CHEST ONE VIEW  6/20/2022 11:12 AM     HISTORY: RRT    COMPARISON: June 19, 2022      Impression    IMPRESSION: Low lung volumes. Similar minimal atelectasis and/or  infiltrate at both lung bases. Enteric tube tip below the margin of  study.    JEANNIE ARREOLA MD         SYSTEM ID:  [id number]   XR Chest Port 1 View    Narrative    CHEST PORTABLE ONE VIEW  6/21/2022 8:52 AM     HISTORY: Chest tube placement.    COMPARISON: June 20, 2020      Impression    IMPRESSION: Heart size is normal. Esophagogastric tube courses below  the level of the stomach, though outside field-of-view. Presumed  elevation of right hemidiaphragm. Right pigtail catheter is excluded  from field-of-view, as was identified on previous exam, though not  identified on today's exam. Scattered bibasilar opacities again  identified and unchanged. No pneumothorax.    FLACO MARTINEZ MD         SYSTEM ID:  [id number]   XR ERCP    Narrative    ERCP  6/21/2022 5:35 PM     HISTORY: ERCP    COMPARISON: None.      Impression    IMPRESSION: Four spot fluoroscopic images obtained during ERCP.  Contrast appears to be within the pancreatic duct, though limited  visualization. There may be a stent by completion. Total fluoroscopic  time is 0.8 minutes.    FLACO MARTINEZ MD         SYSTEM ID:  [id number]   XR Chest Port 1 View    Narrative    EXAM: XR CHEST PORT 1 VIEW  LOCATION: [facility]  DATE/TIME: 6/22/2022 5:50 AM    INDICATION: chest tube placement  COMPARISON: 06/21/2022      Impression    IMPRESSION: Interval removal of feeding tube. Heart size is normal. There is persistent left basilar atelectasis with interval decrease in left pleural effusion. Large right pleural effusion has developed in the interval. Small bore right-sided chest   tube is noted over the lung base. No pneumothorax.   IR Chest Tube Repo/Repl Non Tunneled Right    Narrative    INTERVENTIONAL RADIOLOGY CHEST TUBE REPOSITION/REPLACE NON TUNNELED  RIGHT  6/22/2022 4:12 PM     HISTORY:  The patient has a large right pleural effusion that is not  draining with an existing 12 Indian right nephrostomy tube.    COMPARISON: Chest x-ray dated 6/22/2022.    PROCEDURE: After obtaining informed consent, the patient was placed in  a supine position on the fluoroscopy table. The skin entry site and  external portions of existing right-sided chest tube were prepped and  draped in the usual sterile manner. Contrast was injected through the  tube. This showed it to be in the right pleural space; however, the  tube was plugged. Therefore, it was removed over a stiff Glidewire and  a new 14 Indian pigtail was passed into the right pleural space. There  was immediate drainage of yellow serous fluid upon connecting the tube  to the Pleur-evac suction apparatus. The catheter was sutured to the  patient's skin. A fluoroscopic image was saved to document catheter  position.    I determined this patient to be an appropriate candidate for the  planned sedation and procedure and reassessed the patient immediately  prior to sedation and procedure. Moderate intravenous conscious  sedation was supervised by me. The patient was independently monitored  by a registered nurse assigned to the Department of radiology using  automated blood pressure, EKG and pulse oximetry. The patient  tolerated the procedure well. There were no immediate  postprocedure  complications. The patient's vital signs were monitored by radiology  nursing staff under my supervision and remained stable throughout the  study. Radiation dose for this scan was reduced using automated  exposure control, adjustment of the mA and/or kV according to patient  size, or iterative reconstruction technique.    MEDICATIONS: Versed 2 mg, fentanyl 50 mcg    Sedation time: 15 minutes    Fluoroscopy time: 0.6 minutes    Total fluoroscopy dose: 27.29 mGy Air Kerma    Contrast: 20 mL Isovue      Impression    IMPRESSION: Left-sided chest tube replaced and upsized as above. The  catheter will be hooked to Pleur-evac suction at -20 cm H2O.     DEE COOMBS MD         SYSTEM ID:  [id number]   XR Abdomen Port 1 View    Narrative    ABDOMEN ONE VIEW PORTABLE  6/22/2022 1:14 PM     HISTORY: Confirm feeding tube placement.     COMPARISON: June 17, 2022       Impression    IMPRESSION: Feeding tube tip in the distal duodenum.    JEANNIE ARREOLA MD         SYSTEM ID:  [id number]   XR Chest Port 1 View    Narrative    EXAM: XR CHEST PORT 1 VIEW  LOCATION: [facility]  DATE/TIME: 6/22/2022 4:54 PM    INDICATION: SOB  COMPARISON: 06/22/2022 at 5:31 AM      Impression    IMPRESSION: Pleural catheter in place on the right, improved right effusion and associated compressive atelectasis and/or infiltrate. Minimal left lower lobe atelectasis and/or infiltrate. No pneumothorax.   XR Abdomen Port 1 View    Narrative    EXAM: XR ABDOMEN PORT 1 VIEWS  LOCATION: [facility]  DATE/TIME: 6/22/2022 5:43 PM    INDICATION: Abdominal pain, recent ERCP, feeding tube placed today  COMPARISON: None.      Impression    IMPRESSION: Weighted tip feeding tube in good position with its tip in the region the duodenal/jejunal junction. Pancreatic stent in place. Degenerative changes in the spine.    XR Chest Port 1 View    Narrative    EXAM: XR CHEST PORT 1 VIEW  LOCATION: [facility]  DATE/TIME: 6/23/2022 5:38 AM    INDICATION: chest tube placement  COMPARISON: 06/22/2022      Impression    IMPRESSION: Prior seen opacification of the mid/lower right hemithorax has resolved. There is been a slight change in positioning of the right pigtail catheter with no pneumothorax identified. Otherwise the exam is stable with again seen slight   infiltrate/atelectasis in the left lung base and slight linear atelectasis seen in the perihilar regions bilaterally. Enteric tube is below the diaphragm. Advanced hypertrophic changes are seen of the spine.   XR Chest Port 1 View    Narrative    EXAM: XR CHEST PORT 1 VIEW  LOCATION: [facility]  DATE/TIME: 6/24/2022 5:41 AM    INDICATION: chest tube placement  COMPARISON: 06/23/2022      Impression    IMPRESSION: Feeding tube courses in the upper abdomen, its tip not included. Right-sided pigtail catheter terminates of the right costophrenic angle, not significantly changed. Lung volumes have diminished with increasing bibasilar atelectasis, left   greater than right. No significant pneumothorax. No other significant interval change.   XR Chest Port 1 View    Narrative    EXAM: XR CHEST PORTABLE 1 VIEW  LOCATION: [facility]  DATE/TIME: 06/25/2022, 4:40 AM    INDICATION: Chest tube placement.  COMPARISON: 06/24/2022.      Impression    IMPRESSION: Enteric tube tip below the diaphragm. Smallbore chest tube over the right lower chest. No pneumothorax. Shallow inspiration. Right lung is clear. Minimal hazy infiltrate versus atelectasis left lung base. Normal heart size and pulmonary   vascularity. Hypertrophic changes thoracic spine.     XR Chest Port 1 View    Narrative    EXAM: XR CHEST PORT 1 VIEW  LOCATION: [facility]  DATE/TIME: 6/26/2022 5:35 AM    INDICATION: chest tube placement  COMPARISON: 6/25/2022.    FINDINGS: Pigtail catheter again projects over the  right lung base. Nasoenteric feeding tube passes in the stomach. There is no pneumothorax. Right hemidiaphragm is elevated. Right pleural effusion appears increased in interval. The left lung is clear.      Impression    IMPRESSION: No pneumothorax.   MR Abdomen MRCP w/o & w Contrast    Narrative    EXAM: MR ABDOMEN MRCP W/O AND W CONTRAST  LOCATION: [facility]  DATE/TIME: 6/26/2022 9:54 PM    INDICATION: Pancreatic pleural fistula.  COMPARISON: None.  TECHNIQUE: Routine MR liver/pancreas protocol including axial and coronal MRCP sequences. 2D and 3D reconstruction performed by MR technologist including MIP reconstruction and slab cholangiograms. If performed with contrast, additional dynamic T1 post   IV contrast images.  CONTRAST: 8 mL Gadavist.    FINDINGS:     MRCP: There is loss of detail given patient motion. No obvious filling defects seen within the gallbladder or bile ducts. The common bile duct is normal in caliber measuring approximately 6 mm in greatest radial dimension. There is no dilatation of the   pancreatic duct at the mid body through tail regions.    LIVER: Normal.    PANCREAS: In the head of the pancreas there is a heterogenous cystic mass measuring 4.5 cm x 4.1 cm x 4.0 cm with somewhat thick peripheral wall enhancement and this appears to communicate via a fistulous tract anteriorly extending into the duodenum.   This is nonspecific, but could represent sequelae from prior pancreatitis (pseudocyst) or possibly changes of neoplasm. In the tail of the pancreas there is a 3.6 cm x 4.2 cm x 3.1 cm thin peripheral enhancing cystic mass. This is nonspecific, and could   represent changes of neoplasm or sequelae from pancreatitis such as pseudocyst formation. The pancreatic duct appears to extend posterior to the pancreatic tail mass with no obvious communication. There is a suggestion of communication of the pancreatic   duct to the pancreatic head cystic mass although  detail is somewhat limited especially on the MRCP images given motion. I do not appreciate any obvious fistulous tracking seen from the pancreatic tail cystic mass.    ADDITIONAL FINDINGS: There are numerous cysts seen in both kidneys to include findings of a proteinaceous cyst in the left kidney. There is a mild left pleural effusion with mild bibasilar atelectasis.      Impression    IMPRESSION:  1.  Two peripheral enhancing cystic masses in the pancreas, the one in the tail appears to have a fistulous tract extending into the adjacent duodenum. The one in the tail shows no obvious fistulous tracking, however detail is limited given motion on the   MRCP imaging.    2.  The bile ducts are normal in caliber.    3.  The visualized pancreatic duct is normal in caliber.    4.  I believe the pancreatic duct does extend into the pancreatic head cystic mass, but not the pancreatic tail cystic mass.    5.  Mild bibasilar atelectasis with a mild left pleural effusion.   XR Chest Port 1 View    Narrative    EXAM: XR CHEST PORT 1 VIEW  LOCATION: [facility]  DATE/TIME: 6/27/2022 6:00 AM    INDICATION: Chest tube placement.  COMPARISON: 06/26/2022.      Impression    IMPRESSION: There is new partial loss of the left hemidiaphragm medially consistent with infiltrate/atelectasis left lower lobe. Otherwise the chest is stable with again seen pigtail catheter projected over the inferior aspect of the right hemithorax   laterally with no pneumothorax. Enteric tube is seen with tip below the left hemidiaphragm.                MR Brain w/o Contrast    Narrative    EXAM: MR BRAIN W/O CONTRAST  LOCATION: [facility]  DATE/TIME: 6/27/2022 9:43 PM    INDICATION: dysarthria  COMPARISON: 5/8/2022  TECHNIQUE: Routine multiplanar multisequence head MRI without intravenous contrast.    FINDINGS:  INTRACRANIAL CONTENTS: No acute or subacute infarct. No mass, acute hemorrhage, or extra-axial  fluid collections. Chronic lacunar infarcts in the bilateral cerebellar hemisphere. Mild to moderate generalized cerebral atrophy. No hydrocephalus. Normal   position of the cerebellar tonsils.     SELLA: No abnormality accounting for technique.    OSSEOUS STRUCTURES/SOFT TISSUES: Normal marrow signal. The major intracranial vascular flow voids are maintained.     ORBITS: No abnormality accounting for technique.     SINUSES/MASTOIDS: No paranasal sinus mucosal disease. Scattered fluid/membrane thickening in the mastoid air cells bilaterally.       Impression    IMPRESSION:  1.  No acute findings.  2.  Prominent atrophy for age.  3.  Small chronic lacunar infarcts in the cerebellum.   XR Chest Port 1 View    Narrative    EXAM: XR CHEST PORT 1 VIEW  LOCATION: [facility]  DATE/TIME: 6/28/2022 5:53 AM    INDICATION: chest tube placement  COMPARISON: None.      Impression    IMPRESSION: The right pigtail catheter is unchanged in position. Mild atelectasis of the right lung base is again noted. No pneumothorax is seen on the current exam. The enteric tube is again seen in unchanged appearance from prior exam.   IR Gastro Jejunostomy Tube Placement    Narrative    G-J TUBE PLACEMENT 6/28/2022 3:11 PM    HISTORY: Unable to take adequate oral nutrition. Patient requires a GJ  tube for nutritional purposes. Patient has a gastric outlet  obstruction and therefore jejunal tube required for feedings.    DESCRIPTION OF PROCEDURE: After obtaining informed consent, the  patient was placed in a supine position on the fluoroscopy table. The  liver edge was marked. A nasogastric tube had been placed into the  stomach. The stomach was inflated with air.     Two T-TAC suture anchors were used to enter the stomach. A small skin  incision was made in between the T-TAC entry sites. An 18-gauge needle  was used to enter the stomach through the incision. A wire was passed  and curled in the stomach. The wire was  then maneuvered into the  proximal jejunum using a RIGOBERTO one catheter. A stiff wire was placed. A  tract for a GJ tube was dilated. An 18 [place] Islander peel-away sheath was  placed. Through the peel-away sheath, a 16 [place] Islander G-J tube was placed.  The balloon was inflated with a mixture of 1 mL contrast and 9 mL  saline. Balloon was pulled back so that it was against the anterior  stomach wall. Contrast was injected to document adequate positioning.  A fluoroscopic image was saved to document tube position.     The patient was independently monitored by a registered nurse assigned  to the Department of radiology using automated blood pressure, EKG and  pulse oximetry. The patient tolerated the procedure well. There were  no immediate postprocedure complications. The patient's vital signs  were monitored by radiology nursing staff under my supervision and  remained stable throughout the study. Radiation dose for this scan was  reduced using automated exposure control, adjustment of the mA and/or  kV according to patient size, or iterative reconstruction technique.    MEDICATIONS: None.    SEDATION TIME: None    FLUOROSCOPY TIME: 9.1 minutes    FLUOROSCOPIC DOSE: 209 mGy Air Kerma    NUMBER OF FLUOROSCOPIC IMAGES: 3    CONTRAST: 45 cc Omnipaque      Impression    IMPRESSION: GJ tube placed as above.    DEE COOMBS MD         SYSTEM ID:  [id number]   XR Chest Port 1 View    Narrative    EXAM: XR CHEST PORT 1 VIEW  LOCATION: [facility]  DATE/TIME: 6/30/2022 5:40 AM    INDICATION: pleural effusion  COMPARISON: 6/28/2022.    FINDINGS: Pigtail drainage catheter projects over the right lung base laterally. No pneumothorax. The heart size is normal. The right hemidiaphragm is elevated and there is bibasilar atelectasis. The lungs are otherwise clear. Degenerative disease in the   spine.      Impression    IMPRESSION: No pneumothorax.   XR Chest Port 1 View    Narrative    EXAM: CHEST SINGLE VIEW  PORTABLE  LOCATION: [facility]  DATE/TIME: 7/1/2022 5:50 AM    INDICATION: Pleural effusion.  COMPARISON: 06/30/2022 at 0543 hours.      Impression    IMPRESSION:   1. No significant interval change since the recent comparison study.  2. A right pleural drainage catheter projects over the lateral aspect of the lower right hemithorax. No visualized pneumothorax.                XR Chest Port 1 View    Narrative    EXAM: XR CHEST PORT 1 VIEW  LOCATION: [facility]  DATE/TIME: 7/2/2022 8:06 AM    INDICATION: pleural effusion  COMPARISON: 07/01/2022 at 0558 hours       Impression    IMPRESSION:  No change from the prior study. A pigtail catheter is over the right lateral costophrenic sulcus. No pneumothorax or pleural fluid appreciated. The right hemidiaphragm is mildly elevated. No airspace disease or edema. Normal size of the   heart. Degenerative changes are seen in the spine.    XR Chest Port 1 View    Narrative    EXAM: XR CHEST PORT 1 VIEW  LOCATION: [facility]  DATE/TIME: 7/3/2022 4:55 AM    INDICATION: pleural effusion  COMPARISON: 07/02/2022.      Impression    IMPRESSION: Right lateral basal pigtail pleural drainage catheter unchanged in position. No significant pleural fluid. Mild elevation of the right hemidiaphragm and right basilar atelectasis. Left lung clear. Normal heart size and pulmonary vascularity.   No pneumothorax.   XR Chest Port 1 View    Narrative    EXAM: XR CHEST PORT 1 VIEW  LOCATION: [facility]  DATE/TIME: 7/4/2022 5:22 AM    INDICATION: Pleural effusion.  COMPARISON: 07/03/2022      Impression    IMPRESSION:     Redemonstrated right pigtail pleural drainage catheter. No right pleural effusion or pneumothorax.    No focal airspace disease. No left pleural effusion or pneumothorax.    The cardiomediastinal silhouette is unremarkable.   XR Chest Port 1 View    Narrative    EXAM: XR CHEST PORT  1 VIEW  LOCATION: [facility]  DATE/TIME: 7/5/2022 5:15 AM    INDICATION: pleural effusion  COMPARISON: None.      Impression    IMPRESSION: Unchanged small right pleural effusion with indwelling pigtail drainage catheter. No pneumothorax. No appreciable airspace consolidation.    Cardiomediastinal silhouette is normal.   XR Chest 2 Views    Narrative    CHEST TWO VIEWS 7/8/2022 1:25 PM     HISTORY: Follow up for effusion.    COMPARISON: None.       Impression    IMPRESSION: Elevated right hemidiaphragm. Right pleural catheter seen  previously has been removed. No pneumothorax. Minimal probable  atelectasis at the right base. Some residual right pleural fluid  appears possibly present. Left lung clear. The cardiac silhouette is  not enlarged. Pulmonary vasculature is unremarkable.    JEANNIE ARREOLA MD         SYSTEM ID:  [id number]   US Lower Extremity Venous Duplex Right    Narrative    US LOWER EXTREMITY VENOUS DUPLEX RIGHT 7/8/2022 4:14 PM    CLINICAL HISTORY/INDICATION: Right leg swelling. r/o DVT    COMPARISON: 6/4/2022    TECHNIQUE:   Grayscale, color-flow, and spectral waveform analysis were performed  of the deep veins of the right lower extremity    FINDINGS:   The right common femoral vein, femoral vein and popliteal vein  demonstrate normal compressibility, spectral waveform, color flow and  augmentation.    The right posterior tibial vein, peroneal vein, and greater saphenous  vein are compressible.    The contralateral left common femoral vein demonstrates normal  compressibility, spectral waveform, color flow and augmentation.      Impression    IMPRESSION:   No evidence of deep venous thrombosis in the right lower extremity.    SHERRI ROCKWELL DO         SYSTEM ID:  [id number]       Discharge Medications   Discharge Medication List as of 7/10/2022 10:06 AM      START taking these medications    Details   acetaminophen (TYLENOL) 325 MG/10.15ML solution 20.3 mLs (650 mg) by Per G  Tube route every 6 hours as needed for mild pain or fever, No Print Out      amylase-lipase-protease (CREON 12) 62036-12683-92453 units CPEP 1-2 capsules by Per Feeding Tube route every 4 hours, No Print Out      hypromellose-dextran (ARTIFICAL TEARS) 0.1-0.3 % ophthalmic solution Place 2 drops into both eyes daily as needed for dry eyes, No Print Out      melatonin 1 MG TABS tablet 1 tablet (1 mg) by Oral or Feeding Tube route nightly as needed for sleep, No Print Out      methocarbamol (ROBAXIN) 500 MG tablet Take 1-2 tablets (500-1,000 mg) by mouth 4 times daily as needed for muscle spasms, No Print Out      ondansetron (ZOFRAN ODT) 4 MG ODT tab Take 1 tablet (4 mg) by mouth every 6 hours as needed for nausea or vomiting, No Print Out      pantoprazole (PROTONIX) 2 mg/mL SUSP suspension 20 mLs (40 mg) by Oral or Feeding Tube route 2 times daily, No Print Out      polyethylene glycol (MIRALAX) 17 g packet 17 g by Oral or Feeding Tube route daily as needed for constipation, No Print Out      prochlorperazine (COMPAZINE) 10 MG tablet Take 1 tablet (10 mg) by mouth every 6 hours as needed for vomiting, No Print Out      protein modular (PROSOURCE TF) LIQD 1 packet by Per Feeding Tube route 3 times daily, No Print Out      senna-docusate (SENOKOT-S/PERICOLACE) 8.6-50 MG tablet 1 tablet by Oral or Feeding Tube route 2 times daily as needed for constipation, No Print Out      sodium chloride, PF, 0.9% PF flush 3 mLs by Intracatheter route every 8 hours, No Print Out      zinc sulfate (ZINCATE) 220 (50 Zn) MG capsule 1 capsule (220 mg) by Oral or NG Tube route daily, No Print Out      cefuroxime (CEFTIN) 500 MG tablet Take 1 tablet (500 mg) by mouth every 12 hours for 4 doses, Disp-4 tablet, R-0, E-Prescribe         CONTINUE these medications which have CHANGED    Details   acetaminophen (TYLENOL) 325 MG tablet Take 2 tablets (650 mg) by mouth every 6 hours as needed for mild pain or other (and adjunct with moderate or  severe pain or per patient request), No Print Out      carboxymethylcellulose PF (REFRESH PLUS) 0.5 % ophthalmic solution Place 1 drop into both eyes daily as needed for dry eyes, No Print Out      levocetirizine (XYZAL) 5 MG tablet 1 tablet (5 mg) by Oral or NG Tube route every evening, No Print Out      levothyroxine (SYNTHROID/LEVOTHROID) 50 MCG tablet 1 tablet (50 mcg) by Oral or NG Tube route daily, No Print Out      montelukast (SINGULAIR) 10 MG tablet 1 tablet (10 mg) by Oral or NG Tube route At Bedtime, No Print Out      risperiDONE (RISPERDAL) 2 MG tablet 1 tablet (2 mg) by Oral or NG Tube route 2 times daily, No Print Out      sertraline (ZOLOFT) 100 MG tablet 1 tablet (100 mg) by Oral or NG Tube route daily, No Print Out      !! topiramate (TOPAMAX) 100 MG tablet 1 tablet (100 mg) by Oral or NG Tube route every morning, No Print Out      !! topiramate (TOPAMAX) 50 MG tablet Take 1 tablet (50 mg) by mouth At Bedtime, No Print Out       !! - Potential duplicate medications found. Please discuss with provider.      STOP taking these medications       albuterol (PROAIR HFA/PROVENTIL HFA/VENTOLIN HFA) 108 (90 Base) MCG/ACT inhaler Comments:   Reason for Stopping:         amylase-lipase-protease (CREON 36) 645176-77049-313405 units CPEP Comments:   Reason for Stopping:         atenolol (TENORMIN) 25 MG tablet Comments:   Reason for Stopping:         calcium carbonate 600 mg-vitamin D 400 units (CALTRATE) 600-400 MG-UNIT per tablet Comments:   Reason for Stopping:         calcium polycarbophil (FIBERCON) 625 MG tablet Comments:   Reason for Stopping:         carboxymethylcellulose PF (REFRESH LIQUIGEL) 1 % ophthalmic gel Comments:   Reason for Stopping:         chlorhexidine (PERIDEX) 0.12 % solution Comments:   Reason for Stopping:         ciclopirox (LOPROX) 0.77 % cream Comments:   Reason for Stopping:         clonazePAM (KLONOPIN) 0.125 MG TBDP ODT tab Comments:   Reason for Stopping:         dicyclomine  (BENTYL) 20 MG tablet Comments:   Reason for Stopping:         divalproex sodium delayed-release (DEPAKOTE) 250 MG DR tablet Comments:   Reason for Stopping:         divalproex sodium delayed-release (DEPAKOTE) 500 MG DR tablet Comments:   Reason for Stopping:         docusate sodium (COLACE) 100 MG capsule Comments:   Reason for Stopping:         hydrocortisone (CORTAID) 1 % external cream Comments:   Reason for Stopping:         ipratropium (ATROVENT) 0.06 % nasal spray Comments:   Reason for Stopping:         ketoconazole (NIZORAL) 2 % external cream Comments:   Reason for Stopping:         levOCARNitine (CARNITOR) 330 MG tablet Comments:   Reason for Stopping:         loperamide (IMODIUM) 2 MG capsule Comments:   Reason for Stopping:         LORazepam (ATIVAN) 2 MG tablet Comments:   Reason for Stopping:         multivitamin, therapeutic (THERA-VIT) TABS tablet Comments:   Reason for Stopping:         omeprazole 20 MG tablet Comments:   Reason for Stopping:         polyethylene glycol-propylene glycol (SYSTANE ULTRA) 0.4-0.3 % SOLN ophthalmic solution Comments:   Reason for Stopping:         prednisoLONE acetate (PRED FORTE) 1 % ophthalmic suspension Comments:   Reason for Stopping:         Vitamin D (Cholecalciferol) 50 MCG (2000 UT) CAPS Comments:   Reason for Stopping:             Allergies   No Known Allergies

## 2022-07-11 ENCOUNTER — PATIENT OUTREACH (OUTPATIENT)
Dept: CARE COORDINATION | Facility: CLINIC | Age: 52
End: 2022-07-11

## 2022-07-11 ENCOUNTER — TRANSITIONAL CARE UNIT VISIT (OUTPATIENT)
Dept: GERIATRICS | Facility: CLINIC | Age: 52
End: 2022-07-11
Payer: MEDICARE

## 2022-07-11 VITALS
DIASTOLIC BLOOD PRESSURE: 56 MMHG | OXYGEN SATURATION: 90 % | BODY MASS INDEX: 28.92 KG/M2 | WEIGHT: 173.6 LBS | HEART RATE: 80 BPM | HEIGHT: 65 IN | RESPIRATION RATE: 18 BRPM | SYSTOLIC BLOOD PRESSURE: 92 MMHG | TEMPERATURE: 98.1 F

## 2022-07-11 DIAGNOSIS — M62.81 GENERALIZED MUSCLE WEAKNESS: ICD-10-CM

## 2022-07-11 DIAGNOSIS — F20.89 OTHER SCHIZOPHRENIA (H): ICD-10-CM

## 2022-07-11 DIAGNOSIS — K85.90 PANCREATITIS, UNSPECIFIED PANCREATITIS TYPE: ICD-10-CM

## 2022-07-11 DIAGNOSIS — I10 HYPERTENSION, UNSPECIFIED TYPE: ICD-10-CM

## 2022-07-11 DIAGNOSIS — G47.33 OSA (OBSTRUCTIVE SLEEP APNEA): ICD-10-CM

## 2022-07-11 DIAGNOSIS — Z71.89 OTHER SPECIFIED COUNSELING: ICD-10-CM

## 2022-07-11 DIAGNOSIS — R41.82 ALTERED MENTAL STATUS, UNSPECIFIED ALTERED MENTAL STATUS TYPE: Primary | ICD-10-CM

## 2022-07-11 DIAGNOSIS — J45.909 UNCOMPLICATED ASTHMA, UNSPECIFIED ASTHMA SEVERITY, UNSPECIFIED WHETHER PERSISTENT: ICD-10-CM

## 2022-07-11 DIAGNOSIS — G40.909 SEIZURE DISORDER (H): ICD-10-CM

## 2022-07-11 PROCEDURE — 99310 SBSQ NF CARE HIGH MDM 45: CPT | Performed by: NURSE PRACTITIONER

## 2022-07-11 RX ORDER — METHOCARBAMOL 500 MG/1
500 TABLET, FILM COATED ORAL 4 TIMES DAILY PRN
COMMUNITY
End: 2022-07-27

## 2022-07-11 NOTE — PROGRESS NOTES
[facility] [place]    Background: Care Coordination referral placed from [facility] discharge report for reason of patient meeting criteria for a TCM outreach call by [facility] team.    Assessment: Upon chart review, CCRC Team member will cancel/close the referral for TCM outreach due to reason below:    Patient is not established within [facility]. Upon chart review, CCRC Team member noted patient discharged to TCU    Plan: Care Coordination referral for TCM outreach canceled.      Eun Hernández MA  [facility], [facility]

## 2022-07-11 NOTE — LETTER
7/11/2022        RE: Jagdeep Walker  [address]  [place] [address]        [place] [facility]  PRIMARY CARE PROVIDER AND CLINIC:  Joel Werner MD, [address] / [facility] [place]*  Chief Complaint   Patient presents with     [facility]     [place] [facility] Record Number:  [id number]  [id number]    Jagdeep Walker  is a 51 year old  (1970), admitted to the above facility from  [facility]. Hospital stay 6/14/22 through 7/10/22..  Admitted to this facility for  rehab, medical management and nursing care.    HPI:    HPI information obtained from: facility chart records, facility staff and patient report.   Brief Summary of Hospital Course:   Updates on Status Since Skilled nursing Admission:     Patient Jagdeep Walker is a 51 yr old male admitted to [facility] for rehabilitation s/p hospitalization [facility] 6/14-7/10/22 for shortness of breath and large right pleural effusion     s/p hospitalization [facility] 6/4-6/10/22 for metabolic encephalopathy of unclear etiology, falls, and weakness  S/p hospitalization 5/8-5/17/22 for acute pancreatitis with pseudocysts, portal vein thrombus    PMHx seizure disorder, schizoaffective and bipolar disorder, JACLYN, asthma, chronic anemia, Fisher's esophagus, GERD, HTN, hypothyroidism  Lives in group home     Follow-up    Neurology Clinic 2 wks  gastrointestinal specialist Dr. Clark for pancreatic follow up        TODAY  Reports minimal gastrointestinal upset, occasional nausea  Tolerating tube feeding  Reports regular elimination  Denies chest pain or shortness of breath   Denies pain  Reports walks with walker  Goal to return to group home   Wishes to be FULL code       CODE STATUS/ADVANCE DIRECTIVES DISCUSSION:   CPR/Full code   Patient's living condition: lives in  a group home  ALLERGIES: Patient has no known allergies.  PAST MEDICAL HISTORY:  has a past medical history of Anxiety, Fisher esophagus, Benign essential hypertension, Bipolar disorder (H), Depression, Obesity, Pancreatitis, Portal vein thrombosis, Schizoaffective disorder, bipolar type (H), Seizure disorder (H), and Thyroid disease.  PAST SURGICAL HISTORY:   has a past surgical history that includes Endoscopic ultrasound upper gastrointestinal tract (GI) (N/A, 5/12/2022); Esophagoscopy, gastroscopy, duodenoscopy (EGD), combined (N/A, 5/12/2022); IR Chest Tube Place Non Tunneled Right (6/18/2022); Endoscopic retrograde cholangiopancreatogram complex (N/A, 6/21/2022); IR Chest Tube Repo/Repl Non Tunneled Right (6/22/2022); and IR Gastro Jejunostomy Tube Placement (6/28/2022).  FAMILY HISTORY: family history includes Cerebrovascular Disease (age of onset: 76) in his mother; Prostate Cancer in his father.  SOCIAL HISTORY:   reports that he has never smoked. He has never used smokeless tobacco. He reports previous alcohol use. He reports that he does not use drugs.    Post Discharge Medication Reconciliation Status: discharge medications reconciled and changed, per note/orders    Current Outpatient Medications   Medication Sig Dispense Refill     acetaminophen (TYLENOL) 325 MG/10.15ML solution 20.3 mLs (650 mg) by Per G Tube route every 6 hours as needed for mild pain or fever       amylase-lipase-protease (CREON 12) 65095-13542-95534 units CPEP 1 capsule by Per G Tube route every 4 hours       carboxymethylcellulose PF (REFRESH PLUS) 0.5 % ophthalmic solution Place 1 drop into both eyes daily as needed for dry eyes       hypromellose-dextran (ARTIFICAL TEARS) 0.1-0.3 % ophthalmic solution Place 2 drops into both eyes daily as needed for dry eyes       levocetirizine (XYZAL) 5 MG tablet 1 tablet (5 mg) by Oral or NG Tube route every evening       levothyroxine (SYNTHROID/LEVOTHROID) 50 MCG tablet 1 tablet (50 mcg) by  "Oral or NG Tube route daily       melatonin 1 MG TABS tablet 1 tablet (1 mg) by Oral or Feeding Tube route nightly as needed for sleep       methocarbamol (ROBAXIN) 500 MG tablet 500 mg by Per G Tube route 4 times daily as needed for muscle spasms       montelukast (SINGULAIR) 10 MG tablet 1 tablet (10 mg) by Oral or NG Tube route At Bedtime       ondansetron (ZOFRAN ODT) 4 MG ODT tab Take 1 tablet (4 mg) by mouth every 6 hours as needed for nausea or vomiting       pantoprazole (PROTONIX) 2 mg/mL SUSP suspension 20 mLs (40 mg) by Oral or Feeding Tube route 2 times daily       polyethylene glycol (MIRALAX) 17 g packet 17 g by Oral or Feeding Tube route daily as needed for constipation       prochlorperazine (COMPAZINE) 10 MG tablet Take 1 tablet (10 mg) by mouth every 6 hours as needed for vomiting       protein modular (PROSOURCE TF) LIQD 1 packet by Per Feeding Tube route 3 times daily       risperiDONE (RISPERDAL) 2 MG tablet 1 tablet (2 mg) by Oral or NG Tube route 2 times daily       senna-docusate (SENOKOT-S/PERICOLACE) 8.6-50 MG tablet 1 tablet by Oral or Feeding Tube route 2 times daily as needed for constipation       sertraline (ZOLOFT) 100 MG tablet 1 tablet (100 mg) by Oral or NG Tube route daily       sodium chloride, PF, 0.9% PF flush 3 mLs by Intracatheter route every 8 hours       topiramate (TOPAMAX) 100 MG tablet 1 tablet (100 mg) by Oral or NG Tube route every morning       topiramate (TOPAMAX) 50 MG tablet Take 1 tablet (50 mg) by mouth At Bedtime       zinc sulfate (ZINCATE) 220 (50 Zn) MG capsule 1 capsule (220 mg) by Oral or NG Tube route daily         ROS:  10 point ROS of systems including Constitutional, Eyes, Respiratory, Cardiovascular, Gastroenterology, Genitourinary, Integumentary, Musculoskeletal, Psychiatric were all negative except for pertinent positives noted in my HPI.    Vitals:  BP 92/56   Pulse 80   Temp 98.1  F (36.7  C)   Resp 18   Ht 1.651 m (5' 5\")   Wt 78.7 kg (173 lb " 9.6 oz)   SpO2 90%   BMI 28.89 kg/m    Exam:  GENERAL APPEARANCE:  Alert, in no distress, appears healthy  ENT:  Mouth and posterior oropharynx normal, moist mucous membranes  EYES:  EOM, conjunctivae, lids, pupils and irises normal  NECK:  No adenopathy,masses or thyromegaly  RESP:  respiratory effort and palpation of chest normal, lungs clear to auscultation , no respiratory distress  CV:  Palpation and auscultation of heart done , regular rate and rhythm, no murmur, rub, or gallop  ABDOMEN:  normal bowel sounds, soft, nontender, no hepatosplenomegaly or other masses, lying in bed  M/S:   lying in bed  SKIN:  Inspection of skin and subcutaneous tissue PEG tube in place  NEURO:   Cranial nerves 2-12 are normal tested and grossly at patient's baseline  PSYCH:  oriented X 3    Lab/Diagnostic data:  Labs done in SNF are in [place] EPIC. Please refer to them using MD.Voice/Care Everywhere.    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  Recent echocardiogram 6/4 normal.  Pleural fluid studies consistent with transudate, specimen clotted for cell count, normal glucose, no organisms on gram stain.  Cytology negative x 2  Given lipase and amylase increase, perhaps pleural effusion related to pancreatitis with fistula or leak versus malnutrition related to severe pancreatitis  Chest tube placed on 6/18 and replaced on 6/22   * Culture from the pleural  with diptheroids in broth only, c/w contaminant  - weaned off oxygen since 6/29  - ID consulted for the pleural culture results.  Felt contaminant and no need for antibiotics      -Patient was followed by CT surgery and IR and appreciate their recommendations.    - plan repeat ERCP around 7/21/22   - continue creon  - GJ in place since 6/28, continue NPO  Consider repeat ERCP around 7/21, will follow with imaging and may consider EUS with necrosectomy if minimal improvement   -- Expect multiple weeks until able to tolerate oral intake- need to see evidence of fistula closure--  -- Chest tube removed by IR 07/05.    07/08: repeat chest x-ray to evaluate for any re-accumulation: stable    Vitals stable at TCU on room air and denies shortness of breath      Asthma  JACLYN  Denies shortness of breath  Does state he sleeps with raised HOB   Continue Singulair  Continue as needed albuterol inhalers   Continue Bipap at bedtime per hospital orders, sister Huong report patient uses CPAP. She will try and get to the facility  Until CPAP arrive, order for oxygen 2L nasal cannula continuous at night      Rt > Lt leg swelling  Venous USS to r/o DVT- neg     Moderate malnutrition in the setting of acute illness  Per hospital note    GI recommended n.p.o. started on tube feeds to help heal the pancreatico pleural fistula  -Getting tube feeds via the PEG tube  Albumin up to 3.2 on 7/4/22 so IV albumin discontinued   Right sided pigtail catheter chest tube removal done on 7/5/22     As per GI      Consider repeat ERCP around 7/21, will follow with imaging and may consider EUS with necrosectomy if minimal improvement   -- Continue creon- given known pancreatitis- would not see reason to check fecal elastase given clinical picture  -- Expect multiple weeks until able to tolerate oral intake- need to see evidence of fistula closure--    Follow up gastrointestinal as ordered   Tolerating tube feedings  Has nausea at times, has as needed  Compazine and Zofran     Hypernatremia  Free water 200ml q 4hours   monitor sodium levels closely      Acute/subacute pancreatitis with pseudocyst   Per hospital note  *Hospitalized 5/8-5/17/22 for pancreatitis with pseudocyst formation. Unclear etiology, EUS unrevealing for etiology, possibly due to medications  *CT on admission with acute pancreatitis with slightly improved inflammatory changes, pseudocysts not significantly changed.  -he had a PEJ tube placed with IR on 6/28, currently tube feeds are at goal, plan is to do ERCP in ~7/21 weeks, patient should be n.p.o. until then.  Continue Creon     Recent metabolic encephalopathy   Mild zinc deficiency   *Extensively worked up during admission 6/4-6/10/22 including head CT, MRI brain, EEG, paraneoplastic ab, extensive laboratory work-up mostly unremarkable with exception of mildly low zinc.  *Per sister, patient does have some cognitive impairment at baseline, though is typically oriented to self, family and can carry on an extensive conversation especially regarding topics he enjoys (eg sports). He remains below his baseline.  -MRI brain has been repeated again on 6/27 without any new findings .  - Continue zinc supplementation     Hx of portal vein thrombosis  Per hospital note:   * Diagnosed during admission 5/2022, improved on subsequent CT and not treated with anticoagulation   * Non-occlusive main portal vein thrombosis, splenic vein thrombosis seen on admission CT  - GI consulted as above, they advised no need to treat PVT previous admission (6/9/2022)  -This could be causing significant portal hypertension and worsening ascites which in turn could be causing worsening pleural effusion.     Urinary tract infection secondary to indwelling jimenez   UA large leuk esterase, >182 WBCs, many bacteria. Urine cx with >100k e coli  - rocephin 1g daily on 6/30/22 switched via G tube to Ceftin BID and completed course  Urine culture with pansensitive E. coli except  for ampicillin  Denies signs and symptoms urinary tract infection  Monitor      Depression  Anxiety  Schizoaffective disorder, bipolar type  Mood stable, no report hearing voices  Continue sertraline, risperidone  Follow up psychiatrist as advised, Dr. Bree Merino       Seizure disorder  Unclear last seizure  No signs and symptoms seizure while at TCU  Continue topiramate and Depakote, depakote low, topiramate continued--level is low. MRI shows no changes. EEG no epileptiform discharge, only generalized encephalopathy  Per hospital note:   - hold depakote given association with pancreatitis  - continued topiramate, continue 100mg in AM, add 50mg in PM (given low level) on 7/1     Carnitine deficiency  Continue levocarnitine      Hypertension  blood pressure remains low, with SBP in 90s today, tolerating; atenolol stopped in hospital  Monitor      Fisher's esophagus  GERD  No report gastrointestinal upset  Continue Protonix     Hypothyroidism  TSH   Date Value Ref Range Status   06/04/2022 3.42 0.40 - 4.00 mU/L Final     Continue current dose of levothyroxine     Seasonal allergies  Denies congestion  Continue levocetirizine and singulair, consider change to as needed      Moderate malnutrition in setting of acute illness/injury  Appreciate nutrition    Deconditioned  Comment: d/t recent hospitalization & comorbidity, expect delay rehabilitation d/t age & comorbidity  Plan: PT/OT eval & treat, monitor  Continue tylenol and muscle relaxant for pain management   Resident lives in group home; no stairs to enter.He has assist with meals, meds. Was previously ind with dressing, showers. He has walker, sc, gb. Guardian in place (his sister, Huong). Goal is to return to group home when able.           Total time spent with patient visit at the skilled nursing facility was 39 min including patient visit and review of past records. Greater than 50% of total time spent with counseling and coordinating care due to discussion on  pain management, pancreatitis management, tube feeding in management, functional goals, discharge planning and goals of care.     Electronically signed by:  DILLON Saini CNP                           Sincerely,        DILLON Saini CNP

## 2022-07-11 NOTE — PLAN OF CARE
Occupational Therapy Discharge Summary    Reason for therapy discharge:    Discharged to transitional care facility.    Progress towards therapy goal(s). See goals on Care Plan in [facility] electronic health record for goal details.  Goals partially met.  Barriers to achieving goals:   discharge from facility.    Therapy recommendation(s):    Continued therapy is recommended.  Rationale/Recommendations:  PT. remains below baseline level of function. requires ongoing rehab to meet goals for returbn to  previous living situation..

## 2022-07-11 NOTE — PROGRESS NOTES
[place] GERIATRIC SERVICES  PRIMARY CARE PROVIDER AND CLINIC:  Joel Werner MD, [address] / [facility]*  Chief Complaint   Patient presents with     [facility] Medical Record Number:  [id number]  Place of Service where encounter took place:  [facility] ([facility]) [[id number]]    Jagdeep Walker  is a 51 year old  (1970), admitted to the above facility from  [facility]. Hospital stay 6/14/22 through 7/10/22..  Admitted to this facility for  rehab, medical management and nursing care.    HPI:    HPI information obtained from: facility chart records, facility staff and patient report.   Brief Summary of Hospital Course:   Updates on Status Since Skilled nursing Admission:     Patient Jagdeep Walker is a 51 yr old male admitted to [facility] for rehabilitation s/p hospitalization [facility] 6/14-7/10/22 for shortness of breath and large right pleural effusion     s/p hospitalization [facility] 6/4-6/10/22 for metabolic encephalopathy of unclear etiology, falls, and weakness  S/p hospitalization 5/8-5/17/22 for acute pancreatitis with pseudocysts, portal vein thrombus    PMHx seizure disorder, schizoaffective and bipolar disorder, JACLYN, asthma, chronic anemia, Fisher's esophagus, GERD, HTN, hypothyroidism  Lives in group home     Follow-up    Neurology Clinic 2 wks  gastrointestinal specialist Dr. Clark for pancreatic follow up        TODAY  Reports minimal gastrointestinal upset, occasional nausea  Tolerating tube feeding  Reports regular elimination  Denies chest pain or shortness of breath   Denies pain  Reports walks with walker  Goal to return to group home   Wishes to be FULL code       CODE STATUS/ADVANCE DIRECTIVES DISCUSSION:   CPR/Full code   Patient's living condition: lives in a group home  ALLERGIES: Patient has no known allergies.  PAST MEDICAL HISTORY:  has a past  medical history of Anxiety, Fisher esophagus, Benign essential hypertension, Bipolar disorder (H), Depression, Obesity, Pancreatitis, Portal vein thrombosis, Schizoaffective disorder, bipolar type (H), Seizure disorder (H), and Thyroid disease.  PAST SURGICAL HISTORY:   has a past surgical history that includes Endoscopic ultrasound upper gastrointestinal tract (GI) (N/A, 5/12/2022); Esophagoscopy, gastroscopy, duodenoscopy (EGD), combined (N/A, 5/12/2022); IR Chest Tube Place Non Tunneled Right (6/18/2022); Endoscopic retrograde cholangiopancreatogram complex (N/A, 6/21/2022); IR Chest Tube Repo/Repl Non Tunneled Right (6/22/2022); and IR Gastro Jejunostomy Tube Placement (6/28/2022).  FAMILY HISTORY: family history includes Cerebrovascular Disease (age of onset: 76) in his mother; Prostate Cancer in his father.  SOCIAL HISTORY:   reports that he has never smoked. He has never used smokeless tobacco. He reports previous alcohol use. He reports that he does not use drugs.    Post Discharge Medication Reconciliation Status: discharge medications reconciled and changed, per note/orders    Current Outpatient Medications   Medication Sig Dispense Refill     acetaminophen (TYLENOL) 325 MG/10.15ML solution 20.3 mLs (650 mg) by Per G Tube route every 6 hours as needed for mild pain or fever       amylase-lipase-protease (CREON 12) 10309-00261-67851 units CPEP 1 capsule by Per G Tube route every 4 hours       carboxymethylcellulose PF (REFRESH PLUS) 0.5 % ophthalmic solution Place 1 drop into both eyes daily as needed for dry eyes       hypromellose-dextran (ARTIFICAL TEARS) 0.1-0.3 % ophthalmic solution Place 2 drops into both eyes daily as needed for dry eyes       levocetirizine (XYZAL) 5 MG tablet 1 tablet (5 mg) by Oral or NG Tube route every evening       levothyroxine (SYNTHROID/LEVOTHROID) 50 MCG tablet 1 tablet (50 mcg) by Oral or NG Tube route daily       melatonin 1 MG TABS tablet 1 tablet (1 mg) by Oral or  "Feeding Tube route nightly as needed for sleep       methocarbamol (ROBAXIN) 500 MG tablet 500 mg by Per G Tube route 4 times daily as needed for muscle spasms       montelukast (SINGULAIR) 10 MG tablet 1 tablet (10 mg) by Oral or NG Tube route At Bedtime       ondansetron (ZOFRAN ODT) 4 MG ODT tab Take 1 tablet (4 mg) by mouth every 6 hours as needed for nausea or vomiting       pantoprazole (PROTONIX) 2 mg/mL SUSP suspension 20 mLs (40 mg) by Oral or Feeding Tube route 2 times daily       polyethylene glycol (MIRALAX) 17 g packet 17 g by Oral or Feeding Tube route daily as needed for constipation       prochlorperazine (COMPAZINE) 10 MG tablet Take 1 tablet (10 mg) by mouth every 6 hours as needed for vomiting       protein modular (PROSOURCE TF) LIQD 1 packet by Per Feeding Tube route 3 times daily       risperiDONE (RISPERDAL) 2 MG tablet 1 tablet (2 mg) by Oral or NG Tube route 2 times daily       senna-docusate (SENOKOT-S/PERICOLACE) 8.6-50 MG tablet 1 tablet by Oral or Feeding Tube route 2 times daily as needed for constipation       sertraline (ZOLOFT) 100 MG tablet 1 tablet (100 mg) by Oral or NG Tube route daily       sodium chloride, PF, 0.9% PF flush 3 mLs by Intracatheter route every 8 hours       topiramate (TOPAMAX) 100 MG tablet 1 tablet (100 mg) by Oral or NG Tube route every morning       topiramate (TOPAMAX) 50 MG tablet Take 1 tablet (50 mg) by mouth At Bedtime       zinc sulfate (ZINCATE) 220 (50 Zn) MG capsule 1 capsule (220 mg) by Oral or NG Tube route daily         ROS:  10 point ROS of systems including Constitutional, Eyes, Respiratory, Cardiovascular, Gastroenterology, Genitourinary, Integumentary, Musculoskeletal, Psychiatric were all negative except for pertinent positives noted in my HPI.    Vitals:  BP 92/56   Pulse 80   Temp 98.1  F (36.7  C)   Resp 18   Ht 1.651 m (5' 5\")   Wt 78.7 kg (173 lb 9.6 oz)   SpO2 90%   BMI 28.89 kg/m    Exam:  GENERAL APPEARANCE:  Alert, in no " distress, appears healthy  ENT:  Mouth and posterior oropharynx normal, moist mucous membranes  EYES:  EOM, conjunctivae, lids, pupils and irises normal  NECK:  No adenopathy,masses or thyromegaly  RESP:  respiratory effort and palpation of chest normal, lungs clear to auscultation , no respiratory distress  CV:  Palpation and auscultation of heart done , regular rate and rhythm, no murmur, rub, or gallop  ABDOMEN:  normal bowel sounds, soft, nontender, no hepatosplenomegaly or other masses, lying in bed  M/S:   lying in bed  SKIN:  Inspection of skin and subcutaneous tissue PEG tube in place  NEURO:   Cranial nerves 2-12 are normal tested and grossly at patient's baseline  PSYCH:  oriented X 3    Lab/Diagnostic data:  Labs done in SNF are in [place] EPIC. Please refer to them using [employer] Everywhere.    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  Recent echocardiogram 6/4 normal.  Pleural fluid studies consistent with transudate, specimen clotted for cell count, normal glucose, no organisms on gram stain.  Cytology negative x 2  Given lipase and amylase increase, perhaps pleural effusion related to pancreatitis with fistula or leak versus malnutrition related to severe pancreatitis  Chest tube placed on 6/18 and replaced on 6/22   * Culture from the pleural with diptheroids in broth only, c/w contaminant  - weaned off oxygen since 6/29  -  ID consulted for the pleural culture results.  Felt contaminant and no need for antibiotics      -Patient was followed by CT surgery and IR and appreciate their recommendations.    - plan repeat ERCP around 7/21/22   - continue creon  - GJ in place since 6/28, continue NPO  Consider repeat ERCP around 7/21, will follow with imaging and may consider EUS with necrosectomy if minimal improvement   -- Expect multiple weeks until able to tolerate oral intake- need to see evidence of fistula closure--  -- Chest tube removed by IR 07/05.    07/08: repeat chest x-ray to evaluate for any re-accumulation: stable    Vitals stable at TCU on room air and denies shortness of breath      Asthma  JACLYN  Denies shortness of breath  Does state he sleeps with raised HOB   Continue Singulair  Continue as needed albuterol inhalers   Continue Bipap at bedtime per hospital orders, sister Huong report patient uses CPAP. She will try and get to the facility  Until CPAP arrive, order for oxygen 2L nasal cannula continuous at night      Rt > Lt leg swelling  Venous USS to r/o DVT- neg     Moderate malnutrition in the setting of acute illness  Per hospital note    GI recommended n.p.o. started on tube feeds to help heal the pancreatico pleural fistula  -Getting tube feeds via the PEG tube  Albumin up to 3.2 on 7/4/22 so IV albumin discontinued   Right sided pigtail catheter chest tube removal done on 7/5/22     As per GI      Consider repeat ERCP around 7/21, will follow with imaging and may consider EUS with necrosectomy if minimal improvement   -- Continue creon- given known pancreatitis- would not see reason to check fecal elastase given clinical picture  -- Expect multiple weeks until able to tolerate oral intake- need to see evidence of fistula closure--    Follow up gastrointestinal as ordered   Tolerating tube feedings  Has nausea at times, has as needed Compazine and Zofran     Hypernatremia  Free water 200ml q 4hours   monitor sodium  levels closely      Acute/subacute pancreatitis with pseudocyst   Per hospital note  *Hospitalized 5/8-5/17/22 for pancreatitis with pseudocyst formation. Unclear etiology, EUS unrevealing for etiology, possibly due to medications  *CT on admission with acute pancreatitis with slightly improved inflammatory changes, pseudocysts not significantly changed.  -he had a PEJ tube placed with IR on 6/28, currently tube feeds are at goal, plan is to do ERCP in ~7/21 weeks, patient should be n.p.o. until then.  Continue Creon     Recent metabolic encephalopathy   Mild zinc deficiency   *Extensively worked up during admission 6/4-6/10/22 including head CT, MRI brain, EEG, paraneoplastic ab, extensive laboratory work-up mostly unremarkable with exception of mildly low zinc.  *Per sister, patient does have some cognitive impairment at baseline, though is typically oriented to self, family and can carry on an extensive conversation especially regarding topics he enjoys (eg sports). He remains below his baseline.  -MRI brain has been repeated again on 6/27 without any new findings .  - Continue zinc supplementation     Hx of portal vein thrombosis  Per hospital note:   * Diagnosed during admission 5/2022, improved on subsequent CT and not treated with anticoagulation   * Non-occlusive main portal vein thrombosis, splenic vein thrombosis seen on admission CT  - GI consulted as above, they advised no need to treat PVT previous admission (6/9/2022)  -This could be causing significant portal hypertension and worsening ascites which in turn could be causing worsening pleural effusion.     Urinary tract infection secondary to indwelling jimenez   UA large leuk esterase, >182 WBCs, many bacteria. Urine cx with >100k e coli  - rocephin 1g daily on 6/30/22 switched via G tube to Ceftin BID and completed course  Urine culture with pansensitive E. coli except for ampicillin  Denies signs and symptoms urinary tract infection  Monitor       Depression  Anxiety  Schizoaffective disorder, bipolar type  Mood stable, no report hearing voices  Continue sertraline, risperidone  Follow up psychiatrist as advised, Dr. Bree Merino       Seizure disorder  Unclear last seizure  No signs and symptoms seizure while at TCU  Continue topiramate and Depakote, depakote low, topiramate continued--level is low. MRI shows no changes. EEG no epileptiform discharge, only generalized encephalopathy  Per hospital note:   - hold depakote given association with pancreatitis  - continued topiramate, continue 100mg in AM, add 50mg in PM (given low level) on 7/1     Carnitine deficiency  Continue levocarnitine      Hypertension  blood pressure remains low, with SBP in 90s today, tolerating; atenolol stopped in hospital  Monitor      Fisher's esophagus  GERD  No report gastrointestinal upset  Continue Protonix     Hypothyroidism  TSH   Date Value Ref Range Status   06/04/2022 3.42 0.40 - 4.00 mU/L Final     Continue current dose of levothyroxine     Seasonal allergies  Denies congestion  Continue levocetirizine and singulair, consider change to as needed      Moderate malnutrition in setting of acute illness/injury  Appreciate nutrition    Deconditioned  Comment: d/t recent hospitalization & comorbidity, expect delay rehabilitation d/t age & comorbidity  Plan: PT/OT eval & treat, monitor  Continue tylenol and muscle relaxant for pain management   Resident lives in group home; no stairs to enter.He has assist with meals, meds. Was previously ind with dressing, showers. He has walker, sc, gb. Guardian in place (his sister, Huong). Goal is to return to group home when able.           Total time spent with patient visit at the skilled nursing facility was 39 min including patient visit and review of past records. Greater than 50% of total time spent with counseling and coordinating care due to discussion on pain management, pancreatitis management, tube feeding in management,  functional goals, discharge planning and goals of care.     Electronically signed by:  DILLON Saini CNP

## 2022-07-12 ENCOUNTER — LAB REQUISITION (OUTPATIENT)
Dept: LAB | Facility: CLINIC | Age: 52
End: 2022-07-12
Payer: MEDICARE

## 2022-07-12 DIAGNOSIS — Z11.1 ENCOUNTER FOR SCREENING FOR RESPIRATORY TUBERCULOSIS: ICD-10-CM

## 2022-07-12 DIAGNOSIS — K85.90 ACUTE PANCREATITIS WITHOUT NECROSIS OR INFECTION, UNSPECIFIED: ICD-10-CM

## 2022-07-13 LAB
ANION GAP SERPL CALCULATED.3IONS-SCNC: 5 MMOL/L (ref 3–14)
BUN SERPL-MCNC: 35 MG/DL (ref 7–30)
CALCIUM SERPL-MCNC: 9.6 MG/DL (ref 8.5–10.1)
CHLORIDE BLD-SCNC: 121 MMOL/L (ref 94–109)
CO2 SERPL-SCNC: 21 MMOL/L (ref 20–32)
CREAT SERPL-MCNC: 0.91 MG/DL (ref 0.66–1.25)
ERYTHROCYTE [DISTWIDTH] IN BLOOD BY AUTOMATED COUNT: 14.5 % (ref 10–15)
GFR SERPL CREATININE-BSD FRML MDRD: >90 ML/MIN/1.73M2
GLUCOSE BLD-MCNC: 132 MG/DL (ref 70–99)
HCT VFR BLD AUTO: 29.4 % (ref 40–53)
HGB BLD-MCNC: 9.4 G/DL (ref 13.3–17.7)
MCH RBC QN AUTO: 30.1 PG (ref 26.5–33)
MCHC RBC AUTO-ENTMCNC: 32 G/DL (ref 31.5–36.5)
MCV RBC AUTO: 94 FL (ref 78–100)
PLATELET # BLD AUTO: 281 10E3/UL (ref 150–450)
POTASSIUM BLD-SCNC: 4 MMOL/L (ref 3.4–5.3)
RBC # BLD AUTO: 3.12 10E6/UL (ref 4.4–5.9)
SODIUM SERPL-SCNC: 147 MMOL/L (ref 133–144)
WBC # BLD AUTO: 7.4 10E3/UL (ref 4–11)

## 2022-07-13 PROCEDURE — 85027 COMPLETE CBC AUTOMATED: CPT | Performed by: NURSE PRACTITIONER

## 2022-07-13 PROCEDURE — 36415 COLL VENOUS BLD VENIPUNCTURE: CPT | Performed by: NURSE PRACTITIONER

## 2022-07-13 PROCEDURE — P9604 ONE-WAY ALLOW PRORATED TRIP: HCPCS | Performed by: NURSE PRACTITIONER

## 2022-07-13 PROCEDURE — 80048 BASIC METABOLIC PNL TOTAL CA: CPT | Performed by: NURSE PRACTITIONER

## 2022-07-13 PROCEDURE — 86481 TB AG RESPONSE T-CELL SUSP: CPT | Performed by: NURSE PRACTITIONER

## 2022-07-14 LAB
GAMMA INTERFERON BACKGROUND BLD IA-ACNC: 0 IU/ML
M TB IFN-G BLD-IMP: NEGATIVE
M TB IFN-G CD4+ BCKGRND COR BLD-ACNC: 1.7 IU/ML
MITOGEN IGNF BCKGRD COR BLD-ACNC: 0 IU/ML
MITOGEN IGNF BCKGRD COR BLD-ACNC: 0 IU/ML
QUANTIFERON MITOGEN: 1.7 IU/ML
QUANTIFERON NIL TUBE: 0 IU/ML
QUANTIFERON TB1 TUBE: 0 IU/ML
QUANTIFERON TB2 TUBE: 0

## 2022-07-15 ENCOUNTER — TRANSITIONAL CARE UNIT VISIT (OUTPATIENT)
Dept: GERIATRICS | Facility: CLINIC | Age: 52
End: 2022-07-15
Payer: MEDICARE

## 2022-07-15 VITALS
TEMPERATURE: 98.3 F | SYSTOLIC BLOOD PRESSURE: 100 MMHG | HEART RATE: 97 BPM | WEIGHT: 167.2 LBS | RESPIRATION RATE: 14 BRPM | BODY MASS INDEX: 27.86 KG/M2 | DIASTOLIC BLOOD PRESSURE: 76 MMHG | OXYGEN SATURATION: 97 % | HEIGHT: 65 IN

## 2022-07-15 DIAGNOSIS — M62.81 GENERALIZED MUSCLE WEAKNESS: ICD-10-CM

## 2022-07-15 DIAGNOSIS — F20.89 OTHER SCHIZOPHRENIA (H): ICD-10-CM

## 2022-07-15 DIAGNOSIS — K85.90 PANCREATITIS, UNSPECIFIED PANCREATITIS TYPE: Primary | ICD-10-CM

## 2022-07-15 PROCEDURE — 99309 SBSQ NF CARE MODERATE MDM 30: CPT | Performed by: NURSE PRACTITIONER

## 2022-07-15 NOTE — PROGRESS NOTES
[place] GERIATRIC SERVICES  [place] Medical Record Number:  [id number]  Place of Service where encounter took place:  [facility] [place] (TCU) [[id number]]  Chief Complaint   Patient presents with     Nursing Home Acute       HPI:    Jagdeep Walker  is a 51 year old (1970), who is being seen today for an episodic care visit.  HPI information obtained from: facility chart records, facility staff and patient report. Today's concern is:    Patient Jagdeep Walker is a 51 yr old male admitted to [facility] for rehabilitation s/p [facility] 6/14-7/10/22 for shortness of breath and large right pleural effusion     s/p hospitalization [facility] 6/4-6/10/22 for metabolic encephalopathy of unclear etiology, falls, and weakness  S/p hospitalization 5/8-5/17/22 for acute pancreatitis with pseudocysts, portal vein thrombus    PMHx seizure disorder, schizoaffective and bipolar disorder, cognitive impairment, JACLYN, asthma, chronic anemia, Fisher's esophagus, GERD, HTN, hypothyroidism  Lives in group home     Follow-up    Neurology Clinic 2 wks  gastrointestinal specialist Dr. Clark for pancreatic follow up         Pancreatitis, unspecified pancreatitis type  Generalized muscle weakness  Other schizophrenia (H)     Mood stable, cognitive impairment noted     Transfers SBA-CGA   Bed mobility supervision   Ambulating up to 400 feet CGA   Grooming set up   UB dressing set up   LB dressing Min assist   Toileting CGA   NPO, Tube feeding   Discharge plan and any ID barriers to discharge: Resident previously living in group home setting. Barriers to discharge at this time: Tube feeding, weakness. Therapy plans to continue.   Participating in therapies, states he walks at home    Denies gastrointestinal upset today or shortness of breath     Na and chloride elevated this week and dietary updated and adjusted water flushes, repeat labs next week  Updated sister Huong  today on status, Health Unit Coordinator set up gastrointestinal specialist appointment     Mouth appear dry     Past Medical and Surgical History reviewed in Epic today.    MEDICATIONS:    Current Outpatient Medications   Medication Sig Dispense Refill     acetaminophen (TYLENOL) 325 MG/10.15ML solution 20.3 mLs (650 mg) by Per G Tube route every 6 hours as needed for mild pain or fever       amylase-lipase-protease (CREON 12) 26785-67338-41809 units CPEP 2 capsules by Per G Tube route While tube feeding running,  Patient receives 3 times while tube feeding running for 14 hours       carboxymethylcellulose PF (REFRESH PLUS) 0.5 % ophthalmic solution Place 1 drop into both eyes daily as needed for dry eyes       hypromellose-dextran (ARTIFICAL TEARS) 0.1-0.3 % ophthalmic solution Place 2 drops into both eyes daily as needed for dry eyes       levocetirizine (XYZAL) 5 MG tablet 1 tablet (5 mg) by Oral or NG Tube route every evening       levothyroxine (SYNTHROID/LEVOTHROID) 50 MCG tablet 1 tablet (50 mcg) by Oral or NG Tube route daily       melatonin 1 MG TABS tablet 1 tablet (1 mg) by Oral or Feeding Tube route nightly as needed for sleep       methocarbamol (ROBAXIN) 500 MG tablet 500 mg by Per G Tube route 4 times daily as needed for muscle spasms       montelukast (SINGULAIR) 10 MG tablet 1 tablet (10 mg) by Oral or NG Tube route At Bedtime       ondansetron (ZOFRAN ODT) 4 MG ODT tab Take 1 tablet (4 mg) by mouth every 6 hours as needed for nausea or vomiting       pantoprazole (PROTONIX) 2 mg/mL SUSP suspension 20 mLs (40 mg) by Oral or Feeding Tube route 2 times daily       polyethylene glycol (MIRALAX) 17 g packet 17 g by Oral or Feeding Tube route daily as needed for constipation       prochlorperazine (COMPAZINE) 10 MG tablet Take 1 tablet (10 mg) by mouth every 6 hours as needed for vomiting       protein modular (PROSOURCE TF) LIQD 1 packet by Per Feeding Tube route 3 times daily       risperiDONE  "(RISPERDAL) 2 MG tablet 1 tablet (2 mg) by Oral or NG Tube route 2 times daily       senna-docusate (SENOKOT-S/PERICOLACE) 8.6-50 MG tablet 1 tablet by Oral or Feeding Tube route 2 times daily as needed for constipation       sertraline (ZOLOFT) 100 MG tablet 1 tablet (100 mg) by Oral or NG Tube route daily       sodium chloride, PF, 0.9% PF flush 3 mLs by Intracatheter route every 8 hours       topiramate (TOPAMAX) 100 MG tablet 1 tablet (100 mg) by Oral or NG Tube route every morning       topiramate (TOPAMAX) 50 MG tablet Take 1 tablet (50 mg) by mouth At Bedtime       zinc sulfate (ZINCATE) 220 (50 Zn) MG capsule 1 capsule (220 mg) by Oral or NG Tube route daily           REVIEW OF SYSTEMS:  4 point ROS including Respiratory, CV, GI and , other than that noted in the HPI,  is negative    Objective:  /76   Pulse 97   Temp 98.3  F (36.8  C)   Resp 14   Ht 1.651 m (5' 5\")   Wt 75.8 kg (167 lb 3.2 oz)   SpO2 97%   BMI 27.82 kg/m    Exam:  GENERAL APPEARANCE:  Alert, in no distress  RESP:  respiratory effort and palpation of chest normal, lungs clear to auscultation , no respiratory distress  CV:  Palpation and auscultation of heart done , regular rate and rhythm, no murmur, rub, or gallop  ABDOMEN:  normal bowel sounds, soft, nontender, no hepatosplenomegaly or other masses  M/S:   lying in bed  SKIN:  Inspection of skin and subcutaneous tissue g-tube intact   PSYCH:  memory impaired , affect and mood normal    Labs:   Labs done in SNF are in [place] EPIC. Please refer to them using EPIC/Care Everywhere.   Last Comprehensive Metabolic Panel:  Sodium   Date Value Ref Range Status   07/13/2022 147 (H) 133 - 144 mmol/L Final     Potassium   Date Value Ref Range Status   07/13/2022 4.0 3.4 - 5.3 mmol/L Final     Chloride   Date Value Ref Range Status   07/13/2022 121 (H) 94 - 109 mmol/L Final     Carbon Dioxide (CO2)   Date Value Ref Range Status   07/13/2022 21 20 - 32 mmol/L Final     Anion Gap   Date " Value Ref Range Status   07/13/2022 5 3 - 14 mmol/L Final     Glucose   Date Value Ref Range Status   07/13/2022 132 (H) 70 - 99 mg/dL Final     Urea Nitrogen   Date Value Ref Range Status   07/13/2022 35 (H) 7 - 30 mg/dL Final     Creatinine   Date Value Ref Range Status   07/13/2022 0.91 0.66 - 1.25 mg/dL Final     GFR Estimate   Date Value Ref Range Status   07/13/2022 >90 >60 mL/min/1.73m2 Final     Comment:     Effective December 21, 2021 eGFRcr in adults is calculated using the 2021 CKD-EPI creatinine equation which includes age and gender (Jakob et al., NEJ, DOI: 10.1056/ESBBve7180015)     Calcium   Date Value Ref Range Status   07/13/2022 9.6 8.5 - 10.1 mg/dL Final     Bilirubin Total   Date Value Ref Range Status   07/04/2022 0.7 0.2 - 1.3 mg/dL Final     Alkaline Phosphatase   Date Value Ref Range Status   07/04/2022 108 40 - 150 U/L Final     ALT   Date Value Ref Range Status   07/04/2022 36 0 - 70 U/L Final     AST   Date Value Ref Range Status   07/04/2022 25 0 - 45 U/L Final         Lab Results   Component Value Date    WBC 7.4 07/13/2022     Lab Results   Component Value Date    RBC 3.12 07/13/2022     Lab Results   Component Value Date    HGB 9.4 07/13/2022     Lab Results   Component Value Date    HCT 29.4 07/13/2022     No components found for: MCT  Lab Results   Component Value Date    MCV 94 07/13/2022     Lab Results   Component Value Date    MCH 30.1 07/13/2022     Lab Results   Component Value Date    MCHC 32.0 07/13/2022     Lab Results   Component Value Date    RDW 14.5 07/13/2022     Lab Results   Component Value Date     07/13/2022         ASSESSMENT/PLAN:     Pancreatitis, unspecified pancreatitis type  Generalized muscle weakness  Other schizophrenia (H)     Mood stable, cognitive impairment noted   Continue current mood medications    Participating in therapies, states he walks at home   Transfers SBA-CGA   Bed mobility supervision   Ambulating up to 400 feet CGA   Grooming set  up   UB dressing set up   LB dressing Min assist   Toileting CGA   NPO, Tube feeding   Discharge plan and any ID barriers to discharge: Resident previously living in group home setting. Barriers to discharge at this time: Tube feeding, weakness. Therapy plans to continue.     Denies gastrointestinal upset today or shortness of breath   Vitals stable  Continue current Creon order 2 tabs 3 times during tube feeding 14 hours    Na and chloride elevated this week and dietary updated and adjusted water flushes, repeat labs next week  Tolerating tube feedings  Weekly labs on Wednesday    Updated sister Huong today on status  Health Unit Coordinator set up gastrointestinal specialist appointment     Mouth appear dry, will add artifical saliva   Remind staff to do good oral cares      Appointment 7/26 130pm BUSTER Arroyo: [facility] [address] [facility], [address] [phone]      Electronically signed by:  DILLON Saini CNP

## 2022-07-18 ENCOUNTER — TRANSITIONAL CARE UNIT VISIT (OUTPATIENT)
Dept: GERIATRICS | Facility: CLINIC | Age: 52
End: 2022-07-18
Payer: MEDICARE

## 2022-07-18 VITALS
RESPIRATION RATE: 18 BRPM | SYSTOLIC BLOOD PRESSURE: 116 MMHG | WEIGHT: 165 LBS | BODY MASS INDEX: 27.49 KG/M2 | TEMPERATURE: 97.5 F | HEART RATE: 72 BPM | HEIGHT: 65 IN | DIASTOLIC BLOOD PRESSURE: 72 MMHG | OXYGEN SATURATION: 93 %

## 2022-07-18 DIAGNOSIS — J90 PLEURAL EFFUSION: Primary | ICD-10-CM

## 2022-07-18 DIAGNOSIS — D64.9 ANEMIA, UNSPECIFIED TYPE: ICD-10-CM

## 2022-07-18 DIAGNOSIS — E87.0 HYPERNATREMIA: ICD-10-CM

## 2022-07-18 DIAGNOSIS — K85.90 PANCREATITIS, UNSPECIFIED PANCREATITIS TYPE: ICD-10-CM

## 2022-07-18 PROCEDURE — 99304 1ST NF CARE SF/LOW MDM 25: CPT | Performed by: INTERNAL MEDICINE

## 2022-07-18 NOTE — PROGRESS NOTES
[facility] G[facility]ATR[facility]    PRIMARY CARE PROVIDER AND CLINIC:  Joel Werner MD, [address] / [facility]  Chief Complaint   Patient presents with     Hospital F/U      [place] Medical Record Number:  [id number]  Place of Service where encounter took place:  [facility] ([facility])     Jagdeep Walker  is a 51 year old  (1970), re-admitted to the above facility from  [facility]. Hospital stay 6/14/22 - 7/10/22. .       Circumstances of hospital readmission reviewed  Pt is known to me from recent TCU stay  Hospital course was reviewed      Patient Jagdeep Walker is a 51 yr old male admitted to [facility] for rehabilitation s/p hospitalization [facility] 6/14-7/10/22 for shortness of breath and large right pleural effusion in the setting of acute pancreatitis of unclear etiology     Recent  hospitalization [facility] 6/4-6/10/22 for metabolic encephalopathy of unclear etiology, falls, and weakness  And  hospitalization 5/8-5/17/22 for acute pancreatitis with pseudocysts, portal vein thrombus     PMHx seizure disorder, schizoaffective and bipolar disorder, JACLYN, asthma, chronic anemia, Fisher's esophagus, GERD, HTN, hypothyroidism      [facility] at [facility] had revealed a large R pleural effusion. Patient underwent a R thoracentesis shortly after admission.  CT chest/abdomen/pelvis post thoracentesis with persistent large right pleural effusion with near complete opacification of the right hemithorax, smaller left pleural effusion and left basilar atelectasis.  Recent echocardiogram 6/4 normal.  Pleural fluid studies consistent with transudate, specimen clotted for cell count, normal glucose, no organisms on gram stain.  Cytology negative x 2  Pleural effusion related to pancreatitis with fistula or leak versus malnutrition related to severe pancreatitis vs hypoalbuminemia.   Albumin was 2.4  on admission, dropped  to 1.8, improved to 3.2 with IV albumin  Chest tube placed on 6/18 and replaced on 6/22, removed by IR on 7/5/22  * Culture from the pleural with diptheroids in broth only, c/w contaminant  - weaned off oxygen since 6/29  - ID consulted for the pleural culture results.  Felt contaminant and no need for antibiotics   ERCP planned for later this month  Plan per GI to maximize nutritional status per GJ tube. Pt is NPO  Last CXR 7/8/2022 (film reviewed by me, small residual pleural effusion)  Course complicated by hypernatremia and E coli UTI     Pt states he feels fine, denies chest pain, cough, SOB, abd pain,diarrhea, fevers or chills. Is ambulating with CGA       CODE STATUS/ADVANCE DIRECTIVES DISCUSSION:  Prior  CPR/Full code   ALLERGIES: No Known Allergies   PAST MEDICAL HISTORY:   Past Medical History:   Diagnosis Date     Anxiety      Fisher esophagus      Benign essential hypertension      Bipolar disorder (H)      Depression      Obesity      Pancreatitis      Portal vein thrombosis      Schizoaffective disorder, bipolar type (H)      Seizure disorder (H)      Thyroid disease       PAST SURGICAL HISTORY:   has a past surgical history that includes Endoscopic ultrasound upper gastrointestinal tract (GI) (N/A, 5/12/2022); Esophagoscopy, gastroscopy, duodenoscopy (EGD), combined (N/A, 5/12/2022); IR Chest Tube Place Non Tunneled Right (6/18/2022); Endoscopic retrograde cholangiopancreatogram complex (N/A, 6/21/2022); IR Chest Tube Repo/Repl Non Tunneled Right (6/22/2022); and IR Gastro Jejunostomy Tube Placement (6/28/2022).  FAMILY HISTORY: family history includes Cerebrovascular Disease (age of onset: 76) in his mother; Prostate Cancer in his father.  SOCIAL HISTORY:   reports that he has never smoked. He has never used smokeless tobacco. He reports previous alcohol use. He reports that he does not use drugs.  Patient's living condition: lives in a group home        Current Outpatient Medications   Medication  Sig     acetaminophen (TYLENOL) 325 MG/10.15ML solution 20.3 mLs (650 mg) by Per G Tube route every 6 hours as needed for mild pain or fever     amylase-lipase-protease (CREON 12) 49443-14764-69516 units CPEP 2 capsules by Per G Tube route While tube feeding running,  Patient receives 3 times while tube feeding running for 14 hours     carboxymethylcellulose PF (REFRESH PLUS) 0.5 % ophthalmic solution Place 1 drop into both eyes daily as needed for dry eyes     hypromellose-dextran (ARTIFICAL TEARS) 0.1-0.3 % ophthalmic solution Place 2 drops into both eyes daily as needed for dry eyes     levocetirizine (XYZAL) 5 MG tablet 1 tablet (5 mg) by Oral or NG Tube route every evening     levothyroxine (SYNTHROID/LEVOTHROID) 50 MCG tablet 1 tablet (50 mcg) by Oral or NG Tube route daily     melatonin 1 MG TABS tablet 1 tablet (1 mg) by Oral or Feeding Tube route nightly as needed for sleep     methocarbamol (ROBAXIN) 500 MG tablet 500 mg by Per G Tube route 4 times daily as needed for muscle spasms     montelukast (SINGULAIR) 10 MG tablet 1 tablet (10 mg) by Oral or NG Tube route At Bedtime     ondansetron (ZOFRAN ODT) 4 MG ODT tab Take 1 tablet (4 mg) by mouth every 6 hours as needed for nausea or vomiting     pantoprazole (PROTONIX) 2 mg/mL SUSP suspension 20 mLs (40 mg) by Oral or Feeding Tube route 2 times daily     polyethylene glycol (MIRALAX) 17 g packet 17 g by Oral or Feeding Tube route daily as needed for constipation     prochlorperazine (COMPAZINE) 10 MG tablet Take 1 tablet (10 mg) by mouth every 6 hours as needed for vomiting     protein modular (PROSOURCE TF) LIQD 1 packet by Per Feeding Tube route 3 times daily     risperiDONE (RISPERDAL) 2 MG tablet 1 tablet (2 mg) by Oral or NG Tube route 2 times daily     senna-docusate (SENOKOT-S/PERICOLACE) 8.6-50 MG tablet 1 tablet by Oral or Feeding Tube route 2 times daily as needed for constipation     sertraline (ZOLOFT) 100 MG tablet 1 tablet (100 mg) by Oral or  "NG Tube route daily     sodium chloride, PF, 0.9% PF flush 3 mLs by Intracatheter route every 8 hours     topiramate (TOPAMAX) 100 MG tablet 1 tablet (100 mg) by Oral or NG Tube route every morning     topiramate (TOPAMAX) 50 MG tablet Take 1 tablet (50 mg) by mouth At Bedtime     zinc sulfate (ZINCATE) 220 (50 Zn) MG capsule 1 capsule (220 mg) by Oral or NG Tube route daily     No current facility-administered medications for this visit.       ROS:  Neg as noted above      Vitals:  /72   Pulse 72   Temp 97.5  F (36.4  C)   Resp 18   Ht 1.651 m (5' 5\")   Wt 74.8 kg (165 lb)   SpO2 93%   BMI 27.46 kg/m       Exam:  Alert, pleasant, in NAD , participating in therapy  Oral mucosa appears moist  Lungs decreased BS R base (1/3 way up)  RR 12, unlabored  CV rrr  Abd soft, non-distended, non-tender  1 + ankle edema B  Neuro: alert, pleasant, oriented to person, place, circumstances              Lab/Diagnostic data:    Most Recent 3 CBC's:Recent Labs   Lab Test 07/13/22  0558 07/10/22  0622 07/09/22  0953 07/08/22  0748   WBC 7.4  --  8.8 10.2   HGB 9.4*  --  9.5* 9.6*   MCV 94  --  93 94    312 331 344     Most Recent 3 BMP's:Recent Labs   Lab Test 07/13/22  0558 07/10/22  0622 07/09/22  0953   * 144 142   POTASSIUM 4.0 3.9 3.7   CHLORIDE 121* 111* 113*   CO2 21 23 26   BUN 35* 28 28   CR 0.91 0.78 0.82   ANIONGAP 5 10 3   NASIR 9.6 9.1 9.3   * 90 90     Most Recent 2 LFT's:Recent Labs   Lab Test 07/04/22  1227 07/03/22  0631   AST 25 41   ALT 36 47   ALKPHOS 108 118   BILITOTAL 0.7 1.2           ASSESSMENT/PLAN:    Right pleural effusion in the setting of acute pancreatitis.  Differential includes transudate related to pancreatitis versus low albumin.  Status post thoracentesis with chest tube placement.  Patient appears comfortable, has no respiratory symptoms, does have decreased breath sounds right base.  History of rapid decompensation of respiratory status during last TCU stay  Plan: " Closely monitor respiratory status.  Consider surveillance repeat chest x-ray in the next few days.  Low threshold to repeat chest x-ray if patient is symptomatic with shortness of breath or change in vitals.  Monitor nutritional status, hopefully with improvement in albumin, pleural effusions will not be as problematic.    Acute pancreatitis, with pseudocyst, etiology unclear.  Unchanged pseudocyst per CT scan abdomen 6/14/2022  No abdominal symptoms.  Patient remains n.p.o.  Plan: Continue tube feedings, monitor nutritional status, liver function tests.  Continue pancreatic enzymes repeat ERCP this month    Hypernatremia, secondary to n.p.o. status, fixed intake per feeding tube.  Nutrition is involved and has been adjusting fluid flushes.  Plan: Repeat BMP this week.    Anemia, mild, likely secondary to acute illness  Plan: Monitor CBC    Schizoaffective sorter, bipolar affective type with history of encephalopathy in the setting of acute illness  Mental status appears stable  Plan: Monitor mental status, continue sertraline, risperidone.    Seizure disorder, stable on Topamax    Carnitine deficiency, on levocarnitine      Deconditioning secondary to acute and chronic illness  Plan: Continue therapies.  Anticipate discharge back to group home when medical status has been stabilized.      Kush Hahn MD

## 2022-07-18 NOTE — LETTER
7/18/2022        RE: Jagdeep Walker  [address]  [place] [address]        [facility] GERIATRICS    PRIMARY CARE PROVIDER AND CLINIC:  Joel Werner MD, [address] / [facility]  Chief Complaint   Patient presents with     Hospital F/U      [place] Medical Record Number:  [id number]  Place of Service where encounter took place:  [facility] (TCU)     Jagdeep Walker  is a 51 year old  (1970), re-admitted to the above facility from  [facility]. Hospital stay 6/14/22 - 7/10/22. .       Circumstances of hospital readmission reviewed  Pt is known to me from recent TCU stay  Hospital course was reviewed      Patient Jagdeep Walker is a 51 yr old male admitted to [facility] for rehabilitation s/p hospitalization [facility] 6/14-7/10/22 for shortness of breath and large right pleural effusion in the setting of acute pancreatitis of unclear etiology     Recent  hospitalization [facility] 6/4-6/10/22 for metabolic encephalopathy of unclear etiology, falls, and weakness  And  hospitalization 5/8-5/17/22 for acute pancreatitis with pseudocysts, portal vein thrombus     PMHx seizure disorder, schizoaffective and bipolar disorder, JACLYN, asthma, chronic anemia, Fisher's esophagus, GERD, HTN, hypothyroidism      CXR at TCU had revealed a large R pleural effusion. Patient underwent a R thoracentesis shortly after admission.  CT chest/abdomen/pelvis post thoracentesis with persistent large right pleural effusion with near complete opacification of the right hemithorax, smaller left pleural effusion and left basilar atelectasis.  Recent echocardiogram 6/4 normal.  Pleural fluid studies consistent with transudate, specimen clotted for cell count, normal glucose, no organisms on gram stain.  Cytology negative x 2  Pleural effusion related to pancreatitis with fistula or leak versus  malnutrition related to severe pancreatitis vs hypoalbuminemia.   Albumin was 2.4  on admission, dropped to 1.8, improved to 3.2 with IV albumin  Chest tube placed on 6/18 and replaced on 6/22, removed by IR on 7/5/22  * Culture from the pleural with diptheroids in broth only, c/w contaminant  - weaned off oxygen since 6/29  - ID consulted for the pleural culture results.  Felt contaminant and no need for antibiotics   ERCP planned for later this month  Plan per GI to maximize nutritional status per GJ tube. Pt is NPO  Last CXR 7/8/2022 (film reviewed by me, small residual pleural effusion)  Course complicated by hypernatremia and E coli UTI     Pt states he feels fine, denies chest pain, cough, SOB, abd pain,diarrhea, fevers or chills. Is ambulating with CGA       CODE STATUS/ADVANCE DIRECTIVES DISCUSSION:  Prior  CPR/Full code   ALLERGIES: No Known Allergies   PAST MEDICAL HISTORY:   Past Medical History:   Diagnosis Date     Anxiety      Fisher esophagus      Benign essential hypertension      Bipolar disorder (H)      Depression      Obesity      Pancreatitis      Portal vein thrombosis      Schizoaffective disorder, bipolar type (H)      Seizure disorder (H)      Thyroid disease       PAST SURGICAL HISTORY:   has a past surgical history that includes Endoscopic ultrasound upper gastrointestinal tract (GI) (N/A, 5/12/2022); Esophagoscopy, gastroscopy, duodenoscopy (EGD), combined (N/A, 5/12/2022); IR Chest Tube Place Non Tunneled Right (6/18/2022); Endoscopic retrograde cholangiopancreatogram complex (N/A, 6/21/2022); IR Chest Tube Repo/Repl Non Tunneled Right (6/22/2022); and IR Gastro Jejunostomy Tube Placement (6/28/2022).  FAMILY HISTORY: family history includes Cerebrovascular Disease (age of onset: 76) in his mother; Prostate Cancer in his father.  SOCIAL HISTORY:   reports that he has never smoked. He has never used smokeless tobacco. He reports previous alcohol use. He reports that he does not use  drugs.  Patient's living condition: lives in a group home        Current Outpatient Medications   Medication Sig     acetaminophen (TYLENOL) 325 MG/10.15ML solution 20.3 mLs (650 mg) by Per G Tube route every 6 hours as needed for mild pain or fever     amylase-lipase-protease (CREON 12) 95109-59897-74235 units CPEP 2 capsules by Per G Tube route While tube feeding running,  Patient receives 3 times while tube feeding running for 14 hours     carboxymethylcellulose PF (REFRESH PLUS) 0.5 % ophthalmic solution Place 1 drop into both eyes daily as needed for dry eyes     hypromellose-dextran (ARTIFICAL TEARS) 0.1-0.3 % ophthalmic solution Place 2 drops into both eyes daily as needed for dry eyes     levocetirizine (XYZAL) 5 MG tablet 1 tablet (5 mg) by Oral or NG Tube route every evening     levothyroxine (SYNTHROID/LEVOTHROID) 50 MCG tablet 1 tablet (50 mcg) by Oral or NG Tube route daily     melatonin 1 MG TABS tablet 1 tablet (1 mg) by Oral or Feeding Tube route nightly as needed for sleep     methocarbamol (ROBAXIN) 500 MG tablet 500 mg by Per G Tube route 4 times daily as needed for muscle spasms     montelukast (SINGULAIR) 10 MG tablet 1 tablet (10 mg) by Oral or NG Tube route At Bedtime     ondansetron (ZOFRAN ODT) 4 MG ODT tab Take 1 tablet (4 mg) by mouth every 6 hours as needed for nausea or vomiting     pantoprazole (PROTONIX) 2 mg/mL SUSP suspension 20 mLs (40 mg) by Oral or Feeding Tube route 2 times daily     polyethylene glycol (MIRALAX) 17 g packet 17 g by Oral or Feeding Tube route daily as needed for constipation     prochlorperazine (COMPAZINE) 10 MG tablet Take 1 tablet (10 mg) by mouth every 6 hours as needed for vomiting     protein modular (PROSOURCE TF) LIQD 1 packet by Per Feeding Tube route 3 times daily     risperiDONE (RISPERDAL) 2 MG tablet 1 tablet (2 mg) by Oral or NG Tube route 2 times daily     senna-docusate (SENOKOT-S/PERICOLACE) 8.6-50 MG tablet 1 tablet by Oral or Feeding Tube route  "2 times daily as needed for constipation     sertraline (ZOLOFT) 100 MG tablet 1 tablet (100 mg) by Oral or NG Tube route daily     sodium chloride, PF, 0.9% PF flush 3 mLs by Intracatheter route every 8 hours     topiramate (TOPAMAX) 100 MG tablet 1 tablet (100 mg) by Oral or NG Tube route every morning     topiramate (TOPAMAX) 50 MG tablet Take 1 tablet (50 mg) by mouth At Bedtime     zinc sulfate (ZINCATE) 220 (50 Zn) MG capsule 1 capsule (220 mg) by Oral or NG Tube route daily     No current facility-administered medications for this visit.       ROS:  Neg as noted above      Vitals:  /72   Pulse 72   Temp 97.5  F (36.4  C)   Resp 18   Ht 1.651 m (5' 5\")   Wt 74.8 kg (165 lb)   SpO2 93%   BMI 27.46 kg/m       Exam:  Alert, pleasant, in NAD , participating in therapy  Oral mucosa appears moist  Lungs decreased BS R base (1/3 way up)  RR 12, unlabored  CV rrr  Abd soft, non-distended, non-tender  1 + ankle edema B  Neuro: alert, pleasant, oriented to person, place, circumstances              Lab/Diagnostic data:    Most Recent 3 CBC's:Recent Labs   Lab Test 07/13/22  0558 07/10/22  0622 07/09/22  0953 07/08/22  0748   WBC 7.4  --  8.8 10.2   HGB 9.4*  --  9.5* 9.6*   MCV 94  --  93 94    312 331 344     Most Recent 3 BMP's:Recent Labs   Lab Test 07/13/22  0558 07/10/22  0622 07/09/22  0953   * 144 142   POTASSIUM 4.0 3.9 3.7   CHLORIDE 121* 111* 113*   CO2 21 23 26   BUN 35* 28 28   CR 0.91 0.78 0.82   ANIONGAP 5 10 3   NASIR 9.6 9.1 9.3   * 90 90     Most Recent 2 LFT's:Recent Labs   Lab Test 07/04/22  1227 07/03/22  0631   AST 25 41   ALT 36 47   ALKPHOS 108 118   BILITOTAL 0.7 1.2           ASSESSMENT/PLAN:    Right pleural effusion in the setting of acute pancreatitis.  Differential includes transudate related to pancreatitis versus low albumin.  Status post thoracentesis with chest tube placement.  Patient appears comfortable, has no respiratory symptoms, does have decreased " breath sounds right base.  History of rapid decompensation of respiratory status during last TCU stay  Plan: Closely monitor respiratory status.  Consider surveillance repeat chest x-ray in the next few days.  Low threshold to repeat chest x-ray if patient is symptomatic with shortness of breath or change in vitals.  Monitor nutritional status, hopefully with improvement in albumin, pleural effusions will not be as problematic.    Acute pancreatitis, with pseudocyst, etiology unclear.  Unchanged pseudocyst per CT scan abdomen 6/14/2022  No abdominal symptoms.  Patient remains n.p.o.  Plan: Continue tube feedings, monitor nutritional status, liver function tests.  Continue pancreatic enzymes repeat ERCP this month    Hypernatremia, secondary to n.p.o. status, fixed intake per feeding tube.  Nutrition is involved and has been adjusting fluid flushes.  Plan: Repeat BMP this week.    Anemia, mild, likely secondary to acute illness  Plan: Monitor CBC    Schizoaffective sorter, bipolar affective type with history of encephalopathy in the setting of acute illness  Mental status appears stable  Plan: Monitor mental status, continue sertraline, risperidone.    Seizure disorder, stable on Topamax    Carnitine deficiency, on levocarnitine      Deconditioning secondary to acute and chronic illness  Plan: Continue therapies.  Anticipate discharge back to group home when medical status has been stabilized.      Kush Hahn MD                      Sincerely,        Kush Hahn MD

## 2022-07-19 ENCOUNTER — LAB REQUISITION (OUTPATIENT)
Dept: LAB | Facility: CLINIC | Age: 52
End: 2022-07-19
Payer: MEDICARE

## 2022-07-19 ENCOUNTER — HOSPITAL ENCOUNTER (EMERGENCY)
Facility: CLINIC | Age: 52
Discharge: HOME | End: 2022-07-20
Attending: EMERGENCY MEDICINE | Admitting: EMERGENCY MEDICINE
Payer: MEDICARE

## 2022-07-19 DIAGNOSIS — K86.1 OTHER CHRONIC PANCREATITIS (H): ICD-10-CM

## 2022-07-19 DIAGNOSIS — K94.23 GASTROSTOMY TUBE DYSFUNCTION (H): ICD-10-CM

## 2022-07-19 PROCEDURE — 99283 EMERGENCY DEPT VISIT LOW MDM: CPT

## 2022-07-19 ASSESSMENT — ENCOUNTER SYMPTOMS
VOMITING: 0
ABDOMINAL PAIN: 0

## 2022-07-20 ENCOUNTER — LAB REQUISITION (OUTPATIENT)
Dept: LAB | Facility: CLINIC | Age: 52
End: 2022-07-20
Payer: MEDICARE

## 2022-07-20 VITALS
BODY MASS INDEX: 26.84 KG/M2 | DIASTOLIC BLOOD PRESSURE: 83 MMHG | TEMPERATURE: 97.3 F | HEIGHT: 66 IN | SYSTOLIC BLOOD PRESSURE: 140 MMHG | OXYGEN SATURATION: 97 % | RESPIRATION RATE: 20 BRPM | HEART RATE: 70 BPM | WEIGHT: 167 LBS

## 2022-07-20 DIAGNOSIS — K86.1 OTHER CHRONIC PANCREATITIS (H): ICD-10-CM

## 2022-07-20 NOTE — ED TRIAGE NOTES
Pt. brought in by ambulance from [facility]. Reports obstruction of G-tube.      Triage Assessment     Row Name 07/19/22 3052       Triage Assessment (Adult)    Airway WDL WDL       Respiratory WDL    Respiratory WDL WDL       Skin Circulation/Temperature WDL    Skin Circulation/Temperature WDL WDL       Cardiac WDL    Cardiac WDL WDL       Peripheral/Neurovascular WDL    Peripheral Neurovascular WDL WDL       Cognitive/Neuro/Behavioral WDL    Cognitive/Neuro/Behavioral WDL WDL

## 2022-07-20 NOTE — DISCHARGE INSTRUCTIONS
As we discussed, your G-tube was clogged, but we were able to unclog it and seems to be working well now.  Please come back to the ER immediately with any other concerns, or if you have any issues with using it or being fed in the future.  Do follow with your regular doctor the next 7 days as a routine precaution.  Come back with any other concerns.

## 2022-07-20 NOTE — ED PROVIDER NOTES
History   Chief Complaint:  Gtube Problem       The history is provided by the patient and the nursing home. The history is limited by a developmental delay.      Jagdeep Walker is a 51 year old male with history of intellectual disability and hypertension who presents with G tube problem.  The patient reports that his g tube stopped working today. He denies any abdominal pain or any other pain. The patient's care facility denies any vomiting. Per the care facility the GJ tube was working this morning at 0600 then when they went to use it tonight at 2000 it was not working. The patient does not tolerate anything PO and receives all nutrition and medication via GJ tube.       Review of Systems   Unable to perform ROS: Other (developmental disabilty)   Gastrointestinal: Negative for abdominal pain and vomiting.       Allergies:  The patient has no known allergies.     Medications:  Albuterol   Atenolol  Chlorhexidine  Clonazepam  Dicyclomine  Levocetirizine  Levothyroxine  Loperamide  Lorazepam  Montelukast  Omeprazole  Prednisolone acetate  Risperidone  Sertraline  Topiramate  Ondansetron  Risperidone  Clonazepam    Past Medical History:     Obesity  Seizures  Depression  Intellectual disability  CTS  Fisher's esophagus  GERD  Hypothyroidism  Bipolar  Hypertension  Neuroleptic malignant syndrome  Keratoconus  JACLYN  Schizoaffective disorder  Pancreatitis   Pleural effusion     Past Surgical History:    Thyroidectomy  Corneal transplant  Cataract removal  IR chest tube placement   IR GJ tube placement 6/28     Family History:    Father- prostate cancer   Mother- CVD     Social History:  The patient presents to the ED alone via EMS.   Living Situation: lives in care facility   PCP: Joel Werner MD    Physical Exam     Patient Vitals for the past 24 hrs:   BP Temp Temp src Pulse Resp SpO2 Height Weight   07/19/22 2248 -- -- -- -- -- 97 % -- --   07/19/22 2243 117/76 97.3  F (36.3  C) Temporal 73 16 -- 1.67 m  "(5' 5.75\") 75.8 kg (167 lb)       Physical Exam  Vitals: reviewed by me  General: Pt seen on hospital jorge dalal, cooperative, and alert to conversation  Eyes: Tracking well, clear conjunctiva BL  ENT: MMM, midline trachea.   Lungs: No tachypnea, no accessory muscle use. No respiratory distress.   CV: Rate as above  Abd: Soft, non tender, no guarding, no rebound. Non distended  Area around G-tube is nontender and the skin appears to be intact  MSK: no joint effusion.  No evidence of trauma  Skin: No rash  Neuro: Clear speech and no facial droop.  Psych: Not RIS, no e/o AH/VH      Emergency Department Course     Procedures    Emergency Department Course:     Reviewed:  I reviewed nursing notes, vitals and past medical history    Assessments:  2312 I obtained history and examined the patient as noted above.     Disposition:  The patient was discharged to home.     Impression & Plan     Medical Decision Making:  This is a pleasant 52-year-old male presents the emergency room with a G-tube that seems to be clogged.  We were able to unclog it with a clog zapper, and it is now flushing very well.  He has no tenderness in the surrounding area, no evidence of trauma, normal vital signs here.  I do think he stable for discharge home, and of course he should come back if there are any other issues once he gets home but the tube seems to be flushing fine and working very well here.    Diagnosis:    ICD-10-CM    1. Gastrostomy tube dysfunction (H)  K94.23          Scribe Disclosure:  I, Rosemarie Beck, am serving as a scribe at 10:44 PM on 7/19/2022 to document services personally performed by James Wetzel MD based on my observations and the provider's statements to me.            James Wetzel MD  07/20/22 5159    "

## 2022-07-20 NOTE — ED NOTES
Bed: ED15  Expected date: 7/19/22  Expected time: 10:12 PM  Means of arrival:   Comments:  Dillon 983 51M G-Tube problem  ETA [id number]

## 2022-07-20 NOTE — ED NOTES
G-tube flushed easily with 30cc. J-tube unable to be flushed. 6mL of Clot Zapper placed in J-tube.

## 2022-07-20 NOTE — ED NOTES
Called [facility] and gave report to nurse.  Waiting for H/E to transport pt back to facility.    Lissett Jack RN,.......................................... 7/20/2022   2:12 AM

## 2022-07-21 ENCOUNTER — DOCUMENTATION ONLY (OUTPATIENT)
Dept: GERIATRICS | Facility: CLINIC | Age: 52
End: 2022-07-21

## 2022-07-21 LAB
ALBUMIN SERPL-MCNC: 2.6 G/DL (ref 3.4–5)
ALP SERPL-CCNC: 103 U/L (ref 40–150)
ALT SERPL W P-5'-P-CCNC: 32 U/L (ref 0–70)
ANION GAP SERPL CALCULATED.3IONS-SCNC: 6 MMOL/L (ref 3–14)
AST SERPL W P-5'-P-CCNC: 31 U/L (ref 0–45)
BILIRUB SERPL-MCNC: 0.5 MG/DL (ref 0.2–1.3)
BUN SERPL-MCNC: 23 MG/DL (ref 7–30)
CALCIUM SERPL-MCNC: 9.2 MG/DL (ref 8.5–10.1)
CHLORIDE BLD-SCNC: 114 MMOL/L (ref 94–109)
CO2 SERPL-SCNC: 26 MMOL/L (ref 20–32)
CREAT SERPL-MCNC: 0.99 MG/DL (ref 0.66–1.25)
ERYTHROCYTE [DISTWIDTH] IN BLOOD BY AUTOMATED COUNT: 15.6 % (ref 10–15)
GFR SERPL CREATININE-BSD FRML MDRD: >90 ML/MIN/1.73M2
GLUCOSE BLD-MCNC: 82 MG/DL (ref 70–99)
HCT VFR BLD AUTO: 30.7 % (ref 40–53)
HGB BLD-MCNC: 9.6 G/DL (ref 13.3–17.7)
MAGNESIUM SERPL-MCNC: 2.7 MG/DL (ref 1.6–2.3)
MCH RBC QN AUTO: 30.2 PG (ref 26.5–33)
MCHC RBC AUTO-ENTMCNC: 31.3 G/DL (ref 31.5–36.5)
MCV RBC AUTO: 97 FL (ref 78–100)
PHOSPHATE SERPL-MCNC: 3.3 MG/DL (ref 2.5–4.5)
PLATELET # BLD AUTO: 244 10E3/UL (ref 150–450)
POTASSIUM BLD-SCNC: 3.3 MMOL/L (ref 3.4–5.3)
PROT SERPL-MCNC: 6.6 G/DL (ref 6.8–8.8)
RBC # BLD AUTO: 3.18 10E6/UL (ref 4.4–5.9)
SODIUM SERPL-SCNC: 146 MMOL/L (ref 133–144)
WBC # BLD AUTO: 7.3 10E3/UL (ref 4–11)

## 2022-07-21 PROCEDURE — 82310 ASSAY OF CALCIUM: CPT | Performed by: NURSE PRACTITIONER

## 2022-07-21 PROCEDURE — 85018 HEMOGLOBIN: CPT | Performed by: NURSE PRACTITIONER

## 2022-07-21 PROCEDURE — 83735 ASSAY OF MAGNESIUM: CPT | Performed by: NURSE PRACTITIONER

## 2022-07-21 PROCEDURE — 84100 ASSAY OF PHOSPHORUS: CPT | Performed by: NURSE PRACTITIONER

## 2022-07-21 PROCEDURE — P9604 ONE-WAY ALLOW PRORATED TRIP: HCPCS | Performed by: NURSE PRACTITIONER

## 2022-07-21 PROCEDURE — 36415 COLL VENOUS BLD VENIPUNCTURE: CPT | Performed by: NURSE PRACTITIONER

## 2022-07-21 NOTE — PROGRESS NOTES
[facility]   2022     Name: Jagdeep Walker   : 1970     Background:   today, dietician increased water flushes to 300cc five times/day. K+ 3.3         Orders:    Give additional potassium chloride 20meq today dx hypokalemia     Recheck BMP on 22 dx hypernatremia          Electronically signed by   DILLON Ward CNP on 2022 at 4:31 PM

## 2022-07-22 ENCOUNTER — HOSPITAL ENCOUNTER (EMERGENCY)
Facility: CLINIC | Age: 52
Discharge: HOME | End: 2022-07-22
Attending: EMERGENCY MEDICINE | Admitting: EMERGENCY MEDICINE
Payer: MEDICARE

## 2022-07-22 ENCOUNTER — APPOINTMENT (OUTPATIENT)
Dept: INTERVENTIONAL RADIOLOGY/VASCULAR | Facility: CLINIC | Age: 52
End: 2022-07-22
Attending: EMERGENCY MEDICINE
Payer: MEDICARE

## 2022-07-22 VITALS
DIASTOLIC BLOOD PRESSURE: 79 MMHG | HEART RATE: 82 BPM | SYSTOLIC BLOOD PRESSURE: 112 MMHG | OXYGEN SATURATION: 97 % | TEMPERATURE: 98.4 F | RESPIRATION RATE: 12 BRPM

## 2022-07-22 DIAGNOSIS — K94.23 GASTROSTOMY TUBE DYSFUNCTION (H): ICD-10-CM

## 2022-07-22 PROCEDURE — C1769 GUIDE WIRE: HCPCS

## 2022-07-22 PROCEDURE — 49452 REPLACE G-J TUBE PERC: CPT

## 2022-07-22 PROCEDURE — 99283 EMERGENCY DEPT VISIT LOW MDM: CPT | Mod: 25

## 2022-07-22 ASSESSMENT — ENCOUNTER SYMPTOMS
ABDOMINAL PAIN: 0
VOMITING: 0

## 2022-07-22 NOTE — IR NOTE
Interventional Radiology Intra-procedural Nursing Note    Patient Name: Jagdeep Walker  Medical Record Number: [id number]  Today's Date: July 22, 2022    Procedure: Gastrojejunostomy tube exchange   Start time: 13:58  End time: 14:03  Report provided to: ED RN  Patient depart time and location: 14:07 to ER7    Note: Patient entered Interventional Radiology Suite number 2 via cart. Patient awake, alert and orientated. Assisted onto procedural table in supine position. Prepped and draped.  Dr. Moya in room. Time out and procedure started.    Procedure well tolerated by patient without complications. Procedure end with debrief by Dr. Moya.  Gauze dressing applied to LUQ interventional procedure access site.

## 2022-07-22 NOTE — DISCHARGE INSTRUCTIONS
Please return to the emergency department as needed for new or worsening symptoms including severe and uncontrollable pain, GJ tube not working, vomiting unable to keep anything down, any other concerning symptoms.

## 2022-07-22 NOTE — ED PROVIDER NOTES
Sign Out Note     Pt accepted in sign out from: Dr. Uribe    Briefly pt presented to the ED for: clogged GJ tube     Plan at time of sign out:  No resolution of clogged with clog zapper or flushing of J tube.  G-tube obstruction was resolved.  Patient currently awaiting IR procedure for replacement of the GJ tube.     Care of patient during my shift:   12:19 PM  Placement of tube and IR given busy schedule.  Patient is tolerating p.o. fluids here in the ER.  Anticipate taking him to IR 1 PM.  3:19 PM  GJ tube replaced in IR.  Patient awaiting transfer back to facility.     Plan for patient at this time: Care of patient will be signed out to the oncoming physician, MD Darshana Farr Tracy Lynn, MD  07/22/22 [id number]

## 2022-07-22 NOTE — ED PROVIDER NOTES
History   Chief Complaint:  Gtube Problem       The history is provided by the patient.      Jagdeep Walker is a 52 year old male with history of pancreatitis, GERD and hypertension who presents with a Gtube clog. Patient receives continuous feeds through his tube. Notes a sickness on his birthday [birth year]), but he feels better now. No chest pain, abdominal pain or vomiting. Does not smoke or drink.     Review of Systems   Cardiovascular: Negative for chest pain.   Gastrointestinal: Negative for abdominal pain and vomiting.   All other systems reviewed and are negative.    Allergies:  No Known Allergies    Medications:  levocetirizine (XYZAL) 5 MG tablet  levothyroxine (SYNTHROID/LEVOTHROID) 50 MCG tablet  methocarbamol (ROBAXIN) 500 MG tablet  montelukast (SINGULAIR) 10 MG tablet  ondansetron (ZOFRAN ODT) 4 MG ODT tab  polyethylene glycol (MIRALAX) 17 g packet  prochlorperazine (COMPAZINE) 10 MG tablet  protein modular (PROSOURCE TF) LIQD  risperiDONE (RISPERDAL) 2 MG tablet  senna-docusate (SENOKOT-S/PERICOLACE) 8.6-50 MG tablet  sertraline (ZOLOFT) 100 MG tablet  topiramate (TOPAMAX) 100 MG tablet  topiramate (TOPAMAX) 50 MG tablet    Past Medical History:     Portal vein thrombosis   Pancreatitis   GERD  Elevated BMI   Depression  Schizoaffective disorder   Mental deficiency  Seizure  Hypertension   Hypothyroidism   Carpal tunnel     Past Surgical History:    Endoscopic retrograde  Endoscopic ultrasound upper GI  Esophagogastroduodenoscopy   IR chest tube placement   IR chest tube reposition  IR gastro jejunostomy      Family History:    Mother - Cerebrovascular disease  Father - Prostate cancer    Social History:  The patient presents to the ED alone.     Physical Exam     Patient Vitals for the past 24 hrs:   BP Temp Temp src Pulse Resp SpO2   07/22/22 0530 -- -- -- -- -- 97 %   07/22/22 0515 -- -- -- -- -- 98 %   07/22/22 0133 110/57 98.4  F (36.9  C) Oral 92 12 98 %       Physical Exam  Constitutional:  Well developed, nontox appearance  Head: Atraumatic.   Mouth/Throat: Oropharynx is clear and moist.   Neck:  no stridor  Eyes: no scleral icterus  Cardiovascular: RRR, 2+ bilat radial pulses  Pulmonary/Chest: nml resp effort  Abdominal: GJ tube in place, ND, soft, NT, no rebound or guarding   Ext: Warm, well perfused, no edema  Neurological: A&O, symmetric facies  Skin: Skin is warm and dry.   Psychiatric: Behavior is normal. Thought content normal.   Nursing note and vitals reviewed.    Emergency Department Course   ECG  ECG results from 06/14/22   EKG 12-lead, tracing only     Value    Systolic Blood Pressure     Diastolic Blood Pressure     Ventricular Rate 50    Atrial Rate 50    WI Interval 134    QRS Duration 108        QTc 402    P Axis 30    R AXIS 46    T Axis 48    Interpretation ECG      Sinus bradycardia  Otherwise normal ECG  When compared with ECG of 08-MAY-2022 09:57,  Vent. rate has decreased BY  27 BPM  QT has shortened  Confirmed by Glenn Parks ([id number]),  James Valle ([id number]) on 6/17/2022 7:36:54 AM     EKG 12-lead, tracing only     Value    Systolic Blood Pressure     Diastolic Blood Pressure     Ventricular Rate 86    Atrial Rate 86    WI Interval 128    QRS Duration 100        QTc 459    P Axis 22    R AXIS 6    T Axis 17    Interpretation ECG      Sinus rhythm  Nonspecific ST abnormality  Abnormal ECG  When compared with ECG of 15-NIKA-2022 14:05,  Vent. rate has increased BY  36 BPM    Confirmed by MD DOROTHY, IRENE ([id number]),  Teresa Alcantara ([id number]) on 6/22/2022 4:03:18 PM         Imaging:  IR Gastro Jejuno Tube Unplug w Fluoro    (Results Pending)   IR Gastro Jejunostomy Tube Change    (Results Pending)     Report per radiology    Laboratory:  Labs Ordered and Resulted from Time of ED Arrival to Time of ED Departure - No data to display     Emergency Department Course:         Reviewed:  I reviewed nursing notes, vitals, past medical history and Care  Everywhere    Assessments/Consults:  ED Course as of 22 0617      0145 I obtained history and examined the patient.      Disposition:  Care of the patient was transferred to my colleague Dr. Gilliland.     Impression & Plan     Medical Decision Makin year old male presenting w/ clogged G J tube     Nursing staff took multiple attempts at unclogging GJ tube but were unsuccesful.  IR consult placed an the patient was signed out in stable condition..  Pt counseled on all disposition and diagnosis thus far.  They are understanding and agreeable to plan. Patient signed out in stable condition.         Diagnosis:    ICD-10-CM    1. Gastrostomy tube dysfunction (H)  K94.23        Scribe Disclosure:  I, JEAN WADDELL, am serving as a scribe at 1:38 AM on 2022 to document services personally performed by Prasanth Uribe MD based on my observations and the provider's statements to me.       Prasanth Uribe MD  22 0618

## 2022-07-22 NOTE — PROGRESS NOTES
Patient is on IR schedule today Friday 7/22/22 for a GJ tube exchange. The GJ tube is clogged. Time of procedure will most likely be early afternoon.       -Orders for NPO have been entered.   -Phone consent was obtained from guardian and sister Huong after explaining the procedure, risks and benefits and consent is in IR.     Please contact the IR department at [phone] for procedural related questions.     Thanks, Pamela [facility] Interventional Radiology CNP ([phone]) (phone [phone])

## 2022-07-24 ENCOUNTER — LAB REQUISITION (OUTPATIENT)
Dept: LAB | Facility: CLINIC | Age: 52
End: 2022-07-24
Payer: MEDICARE

## 2022-07-24 DIAGNOSIS — E87.6 HYPOKALEMIA: ICD-10-CM

## 2022-07-25 LAB
ANION GAP SERPL CALCULATED.3IONS-SCNC: 6 MMOL/L (ref 3–14)
BUN SERPL-MCNC: 23 MG/DL (ref 7–30)
CALCIUM SERPL-MCNC: 9.8 MG/DL (ref 8.5–10.1)
CHLORIDE BLD-SCNC: 118 MMOL/L (ref 94–109)
CO2 SERPL-SCNC: 25 MMOL/L (ref 20–32)
CREAT SERPL-MCNC: 0.81 MG/DL (ref 0.66–1.25)
GFR SERPL CREATININE-BSD FRML MDRD: >90 ML/MIN/1.73M2
GLUCOSE BLD-MCNC: 112 MG/DL (ref 70–99)
POTASSIUM BLD-SCNC: 3.9 MMOL/L (ref 3.4–5.3)
SODIUM SERPL-SCNC: 149 MMOL/L (ref 133–144)

## 2022-07-25 PROCEDURE — 36415 COLL VENOUS BLD VENIPUNCTURE: CPT | Performed by: NURSE PRACTITIONER

## 2022-07-25 PROCEDURE — 82310 ASSAY OF CALCIUM: CPT | Performed by: NURSE PRACTITIONER

## 2022-07-26 ENCOUNTER — LAB REQUISITION (OUTPATIENT)
Dept: LAB | Facility: CLINIC | Age: 52
End: 2022-07-26
Payer: MEDICARE

## 2022-07-26 DIAGNOSIS — K86.1 OTHER CHRONIC PANCREATITIS (H): ICD-10-CM

## 2022-07-26 NOTE — PROGRESS NOTES
[facility]  [place] Medical Record Number:  [id number]  Place of Service where encounter took place:  [facility] [place] (TCU) [[id number]]  Chief Complaint   Patient presents with     RECHECK       HPI:    Jagdeep Walker  is a 52 year old (1970), who is being seen today for an episodic care visit.  HPI information obtained from: facility chart records, facility staff and patient report. Today's concern is:    Patient Jagdeep Walker is a 51 yr old male admitted to [facility] for rehabilitation s/p [facility] 6/14-7/10/22 for shortness of breath and large right pleural effusion     s/p hospitalization [facility] 6/4-6/10/22 for metabolic encephalopathy of unclear etiology, falls, and weakness  S/p hospitalization 5/8-5/17/22 for acute pancreatitis with pseudocysts, portal vein thrombus    PMHx seizure disorder, schizoaffective and bipolar disorder, JACLYN, asthma, chronic anemia, Fisher's esophagus, GERD, HTN, hypothyroidism  Lives in group home     Follow-up    Neurology Clinic 2 wks  gastrointestinal specialist Dr. Clark for pancreatic follow up           Pancreatitis, unspecified pancreatitis type  Pleural effusion  Hypernatremia  Generalized muscle weakness     Denies gastrointestinal upset; remains NPO and tolerating tube feeding  Reports regular stools, staff marking regular stools  ERCP scheduled or tomorrow     today, dietary updated and plans to increase flushes, follow up weekly labs    lung sounds clear, vitals stable room air  Chest xray unremarkable today    Progressing in therapies  Per therapies    Transfers SBA-Supervision   Bed mobility supervision   Ambulating up to 400' SBA   UB dressing s/u   LB dressing CGA   Toileting CGA   Skilled nursing tube feeding/medications/water flushes  Discharge plan and any ID barriers to discharge: Resident lives in group home; no stairs to enter. He has assist with meals, meds.  Was previously ind with dressing, showers. He has walker, sc, gb. Guardian in place (his sister, Huong). Goal is to return to group home when off tube feeding. Group home unable to accommodate tube feeding or water flushes. Last day of OT 7/28.      Past Medical and Surgical History reviewed in Epic today.    MEDICATIONS:    Current Outpatient Medications   Medication Sig Dispense Refill     acetaminophen (TYLENOL) 325 MG/10.15ML solution 20.3 mLs (650 mg) by Per G Tube route every 6 hours as needed for mild pain or fever       amylase-lipase-protease (CREON 12) 84800-[address]93573 units CPEP 2 capsules by Per G Tube route While tube feeding running,  Patient receives 3 times while tube feeding running for 14 hours       carboxymethylcellulose PF (REFRESH PLUS) 0.5 % ophthalmic solution Place 1 drop into both eyes daily as needed for dry eyes       hypromellose-dextran (ARTIFICAL TEARS) 0.1-0.3 % ophthalmic solution Place 2 drops into both eyes daily as needed for dry eyes       levocetirizine (XYZAL) 5 MG tablet 1 tablet (5 mg) by Oral or NG Tube route every evening       levothyroxine (SYNTHROID/LEVOTHROID) 50 MCG tablet 1 tablet (50 mcg) by Oral or NG Tube route daily       ondansetron (ZOFRAN ODT) 4 MG ODT tab Take 1 tablet (4 mg) by mouth every 6 hours as needed for nausea or vomiting       pantoprazole (PROTONIX) 2 mg/mL SUSP suspension 20 mLs (40 mg) by Oral or Feeding Tube route 2 times daily       protein modular (PROSOURCE TF) LIQD 1 packet by Per Feeding Tube route 3 times daily       risperiDONE (RISPERDAL) 2 MG tablet 1 tablet (2 mg) by Oral or NG Tube route 2 times daily       senna-docusate (SENOKOT-S/PERICOLACE) 8.6-50 MG tablet 1 tablet by Oral or Feeding Tube route 2 times daily as needed for constipation       sertraline (ZOLOFT) 100 MG tablet 1 tablet (100 mg) by Oral or NG Tube route daily       sodium chloride, PF, 0.9% PF flush 3 mLs by Intracatheter route every 8 hours       topiramate  "(TOPAMAX) 100 MG tablet 1 tablet (100 mg) by Oral or NG Tube route every morning       topiramate (TOPAMAX) 50 MG tablet Take 1 tablet (50 mg) by mouth At Bedtime       zinc sulfate (ZINCATE) 220 (50 Zn) MG capsule 1 capsule (220 mg) by Oral or NG Tube route daily           REVIEW OF SYSTEMS:  4 point ROS including Respiratory, CV, GI and , other than that noted in the HPI,  is negative    Objective:  /68   Pulse 75   Temp 97.3  F (36.3  C)   Resp 18   Ht 1.651 m (5' 5\")   Wt 79 kg (174 lb 3.2 oz)   SpO2 93%   BMI 28.99 kg/m    Exam:  GENERAL APPEARANCE:  Alert, in no distress  RESP:  respiratory effort and palpation of chest normal, lungs clear to auscultation , no respiratory distress  CV:  Palpation and auscultation of heart done , regular rate and rhythm, no murmur, rub, or gallop  ABDOMEN:  normal bowel sounds, soft, nontender, no hepatosplenomegaly or other masses  M/S:   lying in bed  SKIN:  Inspection of skin and subcutaneous tissue G-tube intact, tube insertion site unremarkable  NEURO:   Cranial nerves 2-12 are normal tested and grossly at patient's baseline  PSYCH:  oriented X 3    Labs:   Labs done in SNF are in [place] EPIC. Please refer to them using [facility] Everywhere.    ASSESSMENT/PLAN:     Pancreatitis, unspecified pancreatitis type  Pleural effusion  Hypernatremia  Generalized muscle weakness     Denies gastrointestinal upset; remains NPO and tolerating tube feeding  Recently complications with Creon administration and tube cloggings and g-tube replaced   Reports regular stools, staff marking regular stools  Continue PPI and as needed Zofran (not using) and Creon  ERCP scheduled or tomorrow    Per consult with nursing:   preop orders per gastrointestinal :   Must arrive by 10am. Scheduled appt for 12 noon.  NPO after midnight (including tube feed).  Will need to add a COVID-19 test. They need a pic of the result.  They need a list of meds given in the AM.  They need a copy of his " POLST.    Not need to hold any medications   Will stop Robaxin, Melatonin, miralax and Compazine; not using  Discontinue singulair; due to polypharmacy while taking Xyzal and can increase psychiatric issues     today, dietary updated and plans to increase flushes, follow up weekly labs  Per dietary consult  will keep the 300 mL before and after feeds, but will increase from 300 mL 5x/day to 360 mL 5x/day. New total daily water amount (not including how much he gets w/ meds) is 3363 mL/day.      lung sounds clear, vitals stable room air  Chest xray unremarkable today    Progressing in therapies  Per therapies    Transfers SBA-Supervision   Bed mobility supervision   Ambulating up to 400' SBA   UB dressing s/u   LB dressing CGA   Toileting CGA   Skilled nursing tube feeding/medications/water flushes  Discharge plan and any ID barriers to discharge: Resident lives in group home; no stairs to enter. He has assist with meals, meds. Was previously ind with dressing, showers. He has walker, sc, gb. Guardian in place (his sister, Huong). Goal is to return to group home when off tube feeding. Group home unable to accommodate tube feeding or water flushes. Last day of OT 7/28.      Total time spent with patient visit at the skilled nursing facility was 35 min including patient visit and review of past records. Greater than 50% of total time spent with counseling and coordinating care due to discussion on G-tube management, pain management and consult with nursing, therapies and dietary as noted above.    Electronically signed by:  DILLON Saini CNP

## 2022-07-26 NOTE — H&P (VIEW-ONLY)
[facility]  [place] Medical Record Number:  [id number]  Place of Service where encounter took place:  [facility] [place] (TCU) [[id number]]  Chief Complaint   Patient presents with     RECHECK       HPI:    Jagdeep Walker  is a 52 year old (1970), who is being seen today for an episodic care visit.  HPI information obtained from: facility chart records, facility staff and patient report. Today's concern is:    Patient Jagdeep Walker is a 51 yr old male admitted to [facility] for rehabilitation s/p [facility] 6/14-7/10/22 for shortness of breath and large right pleural effusion     s[facility] 6/4-6/10/22 for metabolic encephalopathy of unclear etiology, falls, and weakness  S/p hospitalization 5/8-5/17/22 for acute pancreatitis with pseudocysts, portal vein thrombus    PMHx seizure disorder, schizoaffective and bipolar disorder, JACLYN, asthma, chronic anemia, Fisher's esophagus, GERD, HTN, hypothyroidism  Lives in group home     Follow-up    Neurology Clinic 2 wks  gastrointestinal specialist Dr. Clark for pancreatic follow up           Pancreatitis, unspecified pancreatitis type  Pleural effusion  Hypernatremia  Generalized muscle weakness     Denies gastrointestinal upset; remains NPO and tolerating tube feeding  Reports regular stools, staff marking regular stools  ERCP scheduled or tomorrow     today, dietary updated and plans to increase flushes, follow up weekly labs    lung sounds clear, vitals stable room air  Chest xray unremarkable today    Progressing in therapies  Per therapies    Transfers SBA-Supervision   Bed mobility supervision   Ambulating up to 400' SBA   UB dressing s/u   LB dressing CGA   Toileting CGA   Skilled nursing tube feeding/medications/water flushes  Discharge plan and any ID barriers to discharge: Resident lives in group home; no stairs to enter. He has assist with meals, meds.  Was previously ind with dressing, showers. He has walker, sc, gb. Guardian in place (his sister, Huong). Goal is to return to group home when off tube feeding. Group home unable to accommodate tube feeding or water flushes. Last day of OT 7/28.      Past Medical and Surgical History reviewed in Epic today.    MEDICATIONS:    Current Outpatient Medications   Medication Sig Dispense Refill     acetaminophen (TYLENOL) 325 MG/10.15ML solution 20.3 mLs (650 mg) by Per G Tube route every 6 hours as needed for mild pain or fever       amylase-lipase-protease (CREON 12) [id number] units CPEP 2 capsules by Per G Tube route While tube feeding running,  Patient receives 3 times while tube feeding running for 14 hours       carboxymethylcellulose PF (REFRESH PLUS) 0.5 % ophthalmic solution Place 1 drop into both eyes daily as needed for dry eyes       hypromellose-dextran (ARTIFICAL TEARS) 0.1-0.3 % ophthalmic solution Place 2 drops into both eyes daily as needed for dry eyes       levocetirizine (XYZAL) 5 MG tablet 1 tablet (5 mg) by Oral or NG Tube route every evening       levothyroxine (SYNTHROID/LEVOTHROID) 50 MCG tablet 1 tablet (50 mcg) by Oral or NG Tube route daily       ondansetron (ZOFRAN ODT) 4 MG ODT tab Take 1 tablet (4 mg) by mouth every 6 hours as needed for nausea or vomiting       pantoprazole (PROTONIX) 2 mg/mL SUSP suspension 20 mLs (40 mg) by Oral or Feeding Tube route 2 times daily       protein modular (PROSOURCE TF) LIQD 1 packet by Per Feeding Tube route 3 times daily       risperiDONE (RISPERDAL) 2 MG tablet 1 tablet (2 mg) by Oral or NG Tube route 2 times daily       senna-docusate (SENOKOT-S/PERICOLACE) 8.6-50 MG tablet 1 tablet by Oral or Feeding Tube route 2 times daily as needed for constipation       sertraline (ZOLOFT) 100 MG tablet 1 tablet (100 mg) by Oral or NG Tube route daily       sodium chloride, PF, 0.9% PF flush 3 mLs by Intracatheter route every 8 hours       topiramate  "(TOPAMAX) 100 MG tablet 1 tablet (100 mg) by Oral or NG Tube route every morning       topiramate (TOPAMAX) 50 MG tablet Take 1 tablet (50 mg) by mouth At Bedtime       zinc sulfate (ZINCATE) 220 (50 Zn) MG capsule 1 capsule (220 mg) by Oral or NG Tube route daily           REVIEW OF SYSTEMS:  4 point ROS including Respiratory, CV, GI and , other than that noted in the HPI,  is negative    Objective:  /68   Pulse 75   Temp 97.3  F (36.3  C)   Resp 18   Ht 1.651 m (5' 5\")   Wt 79 kg (174 lb 3.2 oz)   SpO2 93%   BMI 28.99 kg/m    Exam:  GENERAL APPEARANCE:  Alert, in no distress  RESP:  respiratory effort and palpation of chest normal, lungs clear to auscultation , no respiratory distress  CV:  Palpation and auscultation of heart done , regular rate and rhythm, no murmur, rub, or gallop  ABDOMEN:  normal bowel sounds, soft, nontender, no hepatosplenomegaly or other masses  M/S:   lying in bed  SKIN:  Inspection of skin and subcutaneous tissue G-tube intact, tube insertion site unremarkable  NEURO:   Cranial nerves 2-12 are normal tested and grossly at patient's baseline  PSYCH:  oriented X 3    Labs:   Labs done in SNF are in [place] EPIC. Please refer to them using [employer] Everywhere.    ASSESSMENT/PLAN:     Pancreatitis, unspecified pancreatitis type  Pleural effusion  Hypernatremia  Generalized muscle weakness     Denies gastrointestinal upset; remains NPO and tolerating tube feeding  Recently complications with Creon administration and tube cloggings and g-tube replaced   Reports regular stools, staff marking regular stools  Continue PPI and as needed Zofran (not using) and Creon  ERCP scheduled or tomorrow    Per consult with nursing:   preop orders per gastrointestinal :   Must arrive by 10am. Scheduled appt for 12 noon.  NPO after midnight (including tube feed).  Will need to add a COVID-19 test. They need a pic of the result.  They need a list of meds given in the AM.  They need a copy of his " POLST.    Not need to hold any medications   Will stop Robaxin, Melatonin, miralax and Compazine; not using  Discontinue singulair; due to polypharmacy while taking Xyzal and can increase psychiatric issues     today, dietary updated and plans to increase flushes, follow up weekly labs  Per dietary consult  will keep the 300 mL before and after feeds, but will increase from 300 mL 5x/day to 360 mL 5x/day. New total daily water amount (not including how much he gets w/ meds) is 3363 mL/day.      lung sounds clear, vitals stable room air  Chest xray unremarkable today    Progressing in therapies  Per therapies    Transfers SBA-Supervision   Bed mobility supervision   Ambulating up to 400' SBA   UB dressing s/u   LB dressing CGA   Toileting CGA   Skilled nursing tube feeding/medications/water flushes  Discharge plan and any ID barriers to discharge: Resident lives in group home; no stairs to enter. He has assist with meals, meds. Was previously ind with dressing, showers. He has walker, sc, gb. Guardian in place (his sister, Huong). Goal is to return to group home when off tube feeding. Group home unable to accommodate tube feeding or water flushes. Last day of OT 7/28.      Total time spent with patient visit at the skilled nursing facility was 35 min including patient visit and review of past records. Greater than 50% of total time spent with counseling and coordinating care due to discussion on G-tube management, pain management and consult with nursing, therapies and dietary as noted above.    Electronically signed by:  DILLON Saini CNP

## 2022-07-27 ENCOUNTER — TRANSITIONAL CARE UNIT VISIT (OUTPATIENT)
Dept: GERIATRICS | Facility: CLINIC | Age: 52
End: 2022-07-27
Payer: MEDICARE

## 2022-07-27 VITALS
SYSTOLIC BLOOD PRESSURE: 101 MMHG | HEIGHT: 65 IN | BODY MASS INDEX: 29.02 KG/M2 | DIASTOLIC BLOOD PRESSURE: 68 MMHG | HEART RATE: 75 BPM | TEMPERATURE: 97.3 F | WEIGHT: 174.2 LBS | OXYGEN SATURATION: 93 % | RESPIRATION RATE: 18 BRPM

## 2022-07-27 DIAGNOSIS — E87.0 HYPERNATREMIA: ICD-10-CM

## 2022-07-27 DIAGNOSIS — J90 PLEURAL EFFUSION: ICD-10-CM

## 2022-07-27 DIAGNOSIS — K85.90 PANCREATITIS, UNSPECIFIED PANCREATITIS TYPE: Primary | ICD-10-CM

## 2022-07-27 DIAGNOSIS — M62.81 GENERALIZED MUSCLE WEAKNESS: ICD-10-CM

## 2022-07-27 LAB
ALBUMIN SERPL-MCNC: 2.9 G/DL (ref 3.4–5)
ALP SERPL-CCNC: 108 U/L (ref 40–150)
ALT SERPL W P-5'-P-CCNC: 26 U/L (ref 0–70)
ANION GAP SERPL CALCULATED.3IONS-SCNC: 6 MMOL/L (ref 3–14)
AST SERPL W P-5'-P-CCNC: 25 U/L (ref 0–45)
BILIRUB SERPL-MCNC: 0.5 MG/DL (ref 0.2–1.3)
BUN SERPL-MCNC: 25 MG/DL (ref 7–30)
CALCIUM SERPL-MCNC: 9.5 MG/DL (ref 8.5–10.1)
CHLORIDE BLD-SCNC: 118 MMOL/L (ref 94–109)
CO2 SERPL-SCNC: 24 MMOL/L (ref 20–32)
CREAT SERPL-MCNC: 0.84 MG/DL (ref 0.66–1.25)
ERYTHROCYTE [DISTWIDTH] IN BLOOD BY AUTOMATED COUNT: 16.1 % (ref 10–15)
GFR SERPL CREATININE-BSD FRML MDRD: >90 ML/MIN/1.73M2
GLUCOSE BLD-MCNC: 100 MG/DL (ref 70–99)
HCT VFR BLD AUTO: 34.8 % (ref 40–53)
HGB BLD-MCNC: 11 G/DL (ref 13.3–17.7)
MAGNESIUM SERPL-MCNC: 2.7 MG/DL (ref 1.6–2.3)
MCH RBC QN AUTO: 29.7 PG (ref 26.5–33)
MCHC RBC AUTO-ENTMCNC: 31.6 G/DL (ref 31.5–36.5)
MCV RBC AUTO: 94 FL (ref 78–100)
PHOSPHATE SERPL-MCNC: 3.1 MG/DL (ref 2.5–4.5)
PLATELET # BLD AUTO: 249 10E3/UL (ref 150–450)
POTASSIUM BLD-SCNC: 3.7 MMOL/L (ref 3.4–5.3)
PROT SERPL-MCNC: 7.3 G/DL (ref 6.8–8.8)
RBC # BLD AUTO: 3.7 10E6/UL (ref 4.4–5.9)
SODIUM SERPL-SCNC: 148 MMOL/L (ref 133–144)
WBC # BLD AUTO: 6.9 10E3/UL (ref 4–11)

## 2022-07-27 PROCEDURE — 84100 ASSAY OF PHOSPHORUS: CPT | Performed by: NURSE PRACTITIONER

## 2022-07-27 PROCEDURE — 80053 COMPREHEN METABOLIC PANEL: CPT | Performed by: NURSE PRACTITIONER

## 2022-07-27 PROCEDURE — 36415 COLL VENOUS BLD VENIPUNCTURE: CPT | Performed by: NURSE PRACTITIONER

## 2022-07-27 PROCEDURE — P9604 ONE-WAY ALLOW PRORATED TRIP: HCPCS | Performed by: NURSE PRACTITIONER

## 2022-07-27 PROCEDURE — 99310 SBSQ NF CARE HIGH MDM 45: CPT | Performed by: NURSE PRACTITIONER

## 2022-07-27 PROCEDURE — 83735 ASSAY OF MAGNESIUM: CPT | Performed by: NURSE PRACTITIONER

## 2022-07-27 PROCEDURE — 85027 COMPLETE CBC AUTOMATED: CPT | Performed by: NURSE PRACTITIONER

## 2022-07-27 NOTE — LETTER
7/27/2022        RE: Jagdeep Walker  [address]  [facility] [address]        [facility]  [place] Medical Record Number:  [id number]  Place of Service where encounter took place:  [facility] [place] ([place]) [[id number]]  Chief Complaint   Patient presents with     RECHECK       HPI:    Jagdeep Walker  is a 52 year old (1970), who is being seen today for an episodic care visit.  HPI information obtained from: facility chart records, facility staff and patient report. Today's concern is:    Patient Jagdeep Walker is a 51 yr old male admitted to [facility] for rehabilitation [facility]22 for shortness of breath and large right pleural effusion     [facility]6/10/22 for metabolic encephalopathy of unclear etiology, falls, and weakness  S/p hospitalization 5/8-5/17/22 for acute pancreatitis with pseudocysts, portal vein thrombus    PMHx seizure disorder, schizoaffective and bipolar disorder, JACLYN, asthma, chronic anemia, Fisher's esophagus, GERD, HTN, hypothyroidism  Lives in group home     Follow-up    Neurology Clinic 2 wks  gastrointestinal specialist Dr. Clark for pancreatic follow up           Pancreatitis, unspecified pancreatitis type  Pleural effusion  Hypernatremia  Generalized muscle weakness     Denies gastrointestinal upset; remains NPO and tolerating tube feeding  Reports regular stools, staff marking regular stools  ERCP scheduled or tomorrow     today, dietary updated and plans to increase flushes, follow up weekly labs    lung sounds clear, vitals stable room air  Chest xray unremarkable today    Progressing in therapies  Per therapies    Transfers SBA-Supervision   Bed mobility supervision   Ambulating up to 400' SBA   UB dressing s/u   LB dressing CGA   Toileting CGA   Skilled nursing tube feeding/medications/water flushes  Discharge plan and any ID  barriers to discharge: Resident lives in group home; no stairs to enter. He has assist with meals, meds. Was previously ind with dressing, showers. He has walker, sc, gb. Guardian in place (his sister, Huong). Goal is to return to group home when off tube feeding. Group home unable to accommodate tube feeding or water flushes. Last day of OT 7/28.      Past Medical and Surgical History reviewed in Epic today.    MEDICATIONS:    Current Outpatient Medications   Medication Sig Dispense Refill     acetaminophen (TYLENOL) 325 MG/10.15ML solution 20.3 mLs (650 mg) by Per G Tube route every 6 hours as needed for mild pain or fever       amylase-lipase-protease (CREON 12) 00256-41073-55283 units CPEP 2 capsules by Per G Tube route While tube feeding running,  Patient receives 3 times while tube feeding running for 14 hours       carboxymethylcellulose PF (REFRESH PLUS) 0.5 % ophthalmic solution Place 1 drop into both eyes daily as needed for dry eyes       hypromellose-dextran (ARTIFICAL TEARS) 0.1-0.3 % ophthalmic solution Place 2 drops into both eyes daily as needed for dry eyes       levocetirizine (XYZAL) 5 MG tablet 1 tablet (5 mg) by Oral or NG Tube route every evening       levothyroxine (SYNTHROID/LEVOTHROID) 50 MCG tablet 1 tablet (50 mcg) by Oral or NG Tube route daily       ondansetron (ZOFRAN ODT) 4 MG ODT tab Take 1 tablet (4 mg) by mouth every 6 hours as needed for nausea or vomiting       pantoprazole (PROTONIX) 2 mg/mL SUSP suspension 20 mLs (40 mg) by Oral or Feeding Tube route 2 times daily       protein modular (PROSOURCE TF) LIQD 1 packet by Per Feeding Tube route 3 times daily       risperiDONE (RISPERDAL) 2 MG tablet 1 tablet (2 mg) by Oral or NG Tube route 2 times daily       senna-docusate (SENOKOT-S/PERICOLACE) 8.6-50 MG tablet 1 tablet by Oral or Feeding Tube route 2 times daily as needed for constipation       sertraline (ZOLOFT) 100 MG tablet 1 tablet (100 mg) by Oral or NG Tube route daily    "    sodium chloride, PF, 0.9% PF flush 3 mLs by Intracatheter route every 8 hours       topiramate (TOPAMAX) 100 MG tablet 1 tablet (100 mg) by Oral or NG Tube route every morning       topiramate (TOPAMAX) 50 MG tablet Take 1 tablet (50 mg) by mouth At Bedtime       zinc sulfate (ZINCATE) 220 (50 Zn) MG capsule 1 capsule (220 mg) by Oral or NG Tube route daily           REVIEW OF SYSTEMS:  4 point ROS including Respiratory, CV, GI and , other than that noted in the HPI,  is negative    Objective:  /68   Pulse 75   Temp 97.3  F (36.3  C)   Resp 18   Ht 1.651 m (5' 5\")   Wt 79 kg (174 lb 3.2 oz)   SpO2 93%   BMI 28.99 kg/m    Exam:  GENERAL APPEARANCE:  Alert, in no distress  RESP:  respiratory effort and palpation of chest normal, lungs clear to auscultation , no respiratory distress  CV:  Palpation and auscultation of heart done , regular rate and rhythm, no murmur, rub, or gallop  ABDOMEN:  normal bowel sounds, soft, nontender, no hepatosplenomegaly or other masses  M/S:   lying in bed  SKIN:  Inspection of skin and subcutaneous tissue G-tube intact, tube insertion site unremarkable  NEURO:   Cranial nerves 2-12 are normal tested and grossly at patient's baseline  PSYCH:  oriented X 3    Labs:   Labs done in SNF are in [place] EPIC. Please refer to them using EPIC/Care Everywhere.    ASSESSMENT/PLAN:     Pancreatitis, unspecified pancreatitis type  Pleural effusion  Hypernatremia  Generalized muscle weakness     Denies gastrointestinal upset; remains NPO and tolerating tube feeding  Recently complications with Creon administration and tube cloggings and g-tube replaced   Reports regular stools, staff marking regular stools  Continue PPI and as needed Zofran (not using) and Creon  ERCP scheduled or tomorrow    Per consult with nursing:   preop orders per gastrointestinal :   Must arrive by 10am. Scheduled appt for 12 noon.  NPO after midnight (including tube feed).  Will need to add a COVID-19 test. " They need a pic of the result.  They need a list of meds given in the AM.  They need a copy of his POLST.    Not need to hold any medications   Will stop Robaxin, Melatonin, miralax and Compazine; not using  Discontinue singulair; due to polypharmacy while taking Xyzal and can increase psychiatric issues     today, dietary updated and plans to increase flushes, follow up weekly labs  Per dietary consult  will keep the 300 mL before and after feeds, but will increase from 300 mL 5x/day to 360 mL 5x/day. New total daily water amount (not including how much he gets w/ meds) is 3363 mL/day.      lung sounds clear, vitals stable room air  Chest xray unremarkable today    Progressing in therapies  Per therapies    Transfers SBA-Supervision   Bed mobility supervision   Ambulating up to 400' SBA   UB dressing s/u   LB dressing CGA   Toileting CGA   Skilled nursing tube feeding/medications/water flushes  Discharge plan and any ID barriers to discharge: Resident lives in group home; no stairs to enter. He has assist with meals, meds. Was previously ind with dressing, showers. He has walker, sc, gb. Guardian in place (his sister, Huong). Goal is to return to group home when off tube feeding. Group home unable to accommodate tube feeding or water flushes. Last day of OT 7/28.      Total time spent with patient visit at the skilled nursing facility was 35 min including patient visit and review of past records. Greater than 50% of total time spent with counseling and coordinating care due to discussion on G-tube management, pain management and consult with nursing, therapies and dietary as noted above.    Electronically signed by:  DILLON Saini CNP                         Sincerely,        DILLON Saini CNP

## 2022-07-28 ENCOUNTER — HOSPITAL ENCOUNTER (OUTPATIENT)
Facility: CLINIC | Age: 52
Discharge: OTHER INSTITUTION | End: 2022-07-28
Attending: INTERNAL MEDICINE | Admitting: INTERNAL MEDICINE
Payer: MEDICARE

## 2022-07-28 ENCOUNTER — APPOINTMENT (OUTPATIENT)
Dept: GENERAL RADIOLOGY | Facility: CLINIC | Age: 52
End: 2022-07-28
Attending: INTERNAL MEDICINE
Payer: MEDICARE

## 2022-07-28 ENCOUNTER — ANESTHESIA (OUTPATIENT)
Dept: SURGERY | Facility: CLINIC | Age: 52
End: 2022-07-28
Payer: MEDICARE

## 2022-07-28 ENCOUNTER — ANESTHESIA EVENT (OUTPATIENT)
Dept: SURGERY | Facility: CLINIC | Age: 52
End: 2022-07-28
Payer: MEDICARE

## 2022-07-28 VITALS
SYSTOLIC BLOOD PRESSURE: 145 MMHG | TEMPERATURE: 97.8 F | WEIGHT: 173.9 LBS | HEIGHT: 66 IN | HEART RATE: 115 BPM | BODY MASS INDEX: 27.95 KG/M2 | DIASTOLIC BLOOD PRESSURE: 66 MMHG | RESPIRATION RATE: 16 BRPM | OXYGEN SATURATION: 96 %

## 2022-07-28 LAB — ERCP: NORMAL

## 2022-07-28 PROCEDURE — C1877 STENT, NON-COAT/COV W/O DEL: HCPCS | Performed by: INTERNAL MEDICINE

## 2022-07-28 PROCEDURE — 272N000001 HC OR GENERAL SUPPLY STERILE: Performed by: INTERNAL MEDICINE

## 2022-07-28 PROCEDURE — 360N000076 HC SURGERY LEVEL 3, PER MIN: Performed by: INTERNAL MEDICINE

## 2022-07-28 PROCEDURE — 999N000141 HC STATISTIC PRE-PROCEDURE NURSING ASSESSMENT: Performed by: INTERNAL MEDICINE

## 2022-07-28 PROCEDURE — 370N000017 HC ANESTHESIA TECHNICAL FEE, PER MIN: Performed by: INTERNAL MEDICINE

## 2022-07-28 PROCEDURE — 710N000010 HC RECOVERY PHASE 1, LEVEL 2, PER MIN: Performed by: INTERNAL MEDICINE

## 2022-07-28 PROCEDURE — 250N000009 HC RX 250: Performed by: ANESTHESIOLOGY

## 2022-07-28 PROCEDURE — 250N000025 HC SEVOFLURANE, PER MIN: Performed by: INTERNAL MEDICINE

## 2022-07-28 PROCEDURE — 258N000003 HC RX IP 258 OP 636: Performed by: ANESTHESIOLOGY

## 2022-07-28 PROCEDURE — 999N000179 XR SURGERY CARM FLUORO LESS THAN 5 MIN W STILLS

## 2022-07-28 PROCEDURE — 250N000011 HC RX IP 250 OP 636: Performed by: ANESTHESIOLOGY

## 2022-07-28 PROCEDURE — C1769 GUIDE WIRE: HCPCS | Performed by: INTERNAL MEDICINE

## 2022-07-28 DEVICE — STENT GEENEN PANCREA SOF-FLEX 5FRX5CM G49669: Type: IMPLANTABLE DEVICE | Site: PANCREATIC DUCT | Status: FUNCTIONAL

## 2022-07-28 RX ORDER — SODIUM BICARBONATE 650 MG/1
650 TABLET ORAL 3 TIMES DAILY
COMMUNITY
End: 2022-08-08

## 2022-07-28 RX ORDER — SODIUM CHLORIDE, SODIUM LACTATE, POTASSIUM CHLORIDE, CALCIUM CHLORIDE 600; 310; 30; 20 MG/100ML; MG/100ML; MG/100ML; MG/100ML
INJECTION, SOLUTION INTRAVENOUS CONTINUOUS
Status: CANCELLED | OUTPATIENT
Start: 2022-07-28

## 2022-07-28 RX ORDER — NALOXONE HYDROCHLORIDE 0.4 MG/ML
0.4 INJECTION, SOLUTION INTRAMUSCULAR; INTRAVENOUS; SUBCUTANEOUS
Status: DISCONTINUED | OUTPATIENT
Start: 2022-07-28 | End: 2022-07-28 | Stop reason: HOSPADM

## 2022-07-28 RX ORDER — ONDANSETRON 4 MG/1
4 TABLET, ORALLY DISINTEGRATING ORAL EVERY 6 HOURS PRN
Status: DISCONTINUED | OUTPATIENT
Start: 2022-07-28 | End: 2022-07-28 | Stop reason: HOSPADM

## 2022-07-28 RX ORDER — MEPERIDINE HYDROCHLORIDE 25 MG/ML
12.5 INJECTION INTRAMUSCULAR; INTRAVENOUS; SUBCUTANEOUS
Status: CANCELLED | OUTPATIENT
Start: 2022-07-28

## 2022-07-28 RX ORDER — OXYCODONE HYDROCHLORIDE 5 MG/1
5 TABLET ORAL EVERY 4 HOURS PRN
Status: CANCELLED | OUTPATIENT
Start: 2022-07-28

## 2022-07-28 RX ORDER — FENTANYL CITRATE 0.05 MG/ML
50 INJECTION, SOLUTION INTRAMUSCULAR; INTRAVENOUS EVERY 5 MIN PRN
Status: DISCONTINUED | OUTPATIENT
Start: 2022-07-28 | End: 2022-07-28 | Stop reason: HOSPADM

## 2022-07-28 RX ORDER — SODIUM CHLORIDE, SODIUM LACTATE, POTASSIUM CHLORIDE, CALCIUM CHLORIDE 600; 310; 30; 20 MG/100ML; MG/100ML; MG/100ML; MG/100ML
INJECTION, SOLUTION INTRAVENOUS CONTINUOUS
Status: DISCONTINUED | OUTPATIENT
Start: 2022-07-28 | End: 2022-07-28 | Stop reason: HOSPADM

## 2022-07-28 RX ORDER — NALOXONE HYDROCHLORIDE 0.4 MG/ML
0.2 INJECTION, SOLUTION INTRAMUSCULAR; INTRAVENOUS; SUBCUTANEOUS
Status: DISCONTINUED | OUTPATIENT
Start: 2022-07-28 | End: 2022-07-28 | Stop reason: HOSPADM

## 2022-07-28 RX ORDER — FENTANYL CITRATE 50 UG/ML
INJECTION, SOLUTION INTRAMUSCULAR; INTRAVENOUS PRN
Status: DISCONTINUED | OUTPATIENT
Start: 2022-07-28 | End: 2022-07-28

## 2022-07-28 RX ORDER — ONDANSETRON 2 MG/ML
4 INJECTION INTRAMUSCULAR; INTRAVENOUS EVERY 30 MIN PRN
Status: CANCELLED | OUTPATIENT
Start: 2022-07-28

## 2022-07-28 RX ORDER — LIDOCAINE HYDROCHLORIDE 20 MG/ML
INJECTION, SOLUTION INFILTRATION; PERINEURAL PRN
Status: DISCONTINUED | OUTPATIENT
Start: 2022-07-28 | End: 2022-07-28

## 2022-07-28 RX ORDER — ONDANSETRON 2 MG/ML
INJECTION INTRAMUSCULAR; INTRAVENOUS PRN
Status: DISCONTINUED | OUTPATIENT
Start: 2022-07-28 | End: 2022-07-28

## 2022-07-28 RX ORDER — MAGNESIUM SULFATE HEPTAHYDRATE 40 MG/ML
2 INJECTION, SOLUTION INTRAVENOUS ONCE
Status: DISCONTINUED | OUTPATIENT
Start: 2022-07-28 | End: 2022-07-28 | Stop reason: HOSPADM

## 2022-07-28 RX ORDER — ONDANSETRON 2 MG/ML
4 INJECTION INTRAMUSCULAR; INTRAVENOUS EVERY 6 HOURS PRN
Status: DISCONTINUED | OUTPATIENT
Start: 2022-07-28 | End: 2022-07-28 | Stop reason: HOSPADM

## 2022-07-28 RX ORDER — SODIUM CHLORIDE, SODIUM LACTATE, POTASSIUM CHLORIDE, CALCIUM CHLORIDE 600; 310; 30; 20 MG/100ML; MG/100ML; MG/100ML; MG/100ML
INJECTION, SOLUTION INTRAVENOUS CONTINUOUS PRN
Status: DISCONTINUED | OUTPATIENT
Start: 2022-07-28 | End: 2022-07-28

## 2022-07-28 RX ORDER — LABETALOL HYDROCHLORIDE 5 MG/ML
10 INJECTION, SOLUTION INTRAVENOUS
Status: DISCONTINUED | OUTPATIENT
Start: 2022-07-28 | End: 2022-07-28 | Stop reason: HOSPADM

## 2022-07-28 RX ORDER — FENTANYL CITRATE 0.05 MG/ML
25 INJECTION, SOLUTION INTRAMUSCULAR; INTRAVENOUS
Status: CANCELLED | OUTPATIENT
Start: 2022-07-28

## 2022-07-28 RX ORDER — FLUMAZENIL 0.1 MG/ML
0.2 INJECTION, SOLUTION INTRAVENOUS
Status: DISCONTINUED | OUTPATIENT
Start: 2022-07-28 | End: 2022-07-28 | Stop reason: HOSPADM

## 2022-07-28 RX ORDER — ONDANSETRON 4 MG/1
4 TABLET, ORALLY DISINTEGRATING ORAL EVERY 30 MIN PRN
Status: CANCELLED | OUTPATIENT
Start: 2022-07-28

## 2022-07-28 RX ORDER — HYDROMORPHONE HCL IN WATER/PF 6 MG/30 ML
0.4 PATIENT CONTROLLED ANALGESIA SYRINGE INTRAVENOUS EVERY 5 MIN PRN
Status: DISCONTINUED | OUTPATIENT
Start: 2022-07-28 | End: 2022-07-28 | Stop reason: HOSPADM

## 2022-07-28 RX ORDER — PROPOFOL 10 MG/ML
INJECTION, EMULSION INTRAVENOUS PRN
Status: DISCONTINUED | OUTPATIENT
Start: 2022-07-28 | End: 2022-07-28

## 2022-07-28 RX ADMIN — PROPOFOL 150 MG: 10 INJECTION, EMULSION INTRAVENOUS at 12:34

## 2022-07-28 RX ADMIN — LIDOCAINE HYDROCHLORIDE 100 MG: 20 INJECTION, SOLUTION INFILTRATION; PERINEURAL at 12:34

## 2022-07-28 RX ADMIN — PHENYLEPHRINE HYDROCHLORIDE 100 MCG: 10 INJECTION INTRAVENOUS at 12:54

## 2022-07-28 RX ADMIN — PHENYLEPHRINE HYDROCHLORIDE 50 MCG: 10 INJECTION INTRAVENOUS at 12:49

## 2022-07-28 RX ADMIN — ONDANSETRON 4 MG: 2 INJECTION INTRAMUSCULAR; INTRAVENOUS at 12:40

## 2022-07-28 RX ADMIN — ROCURONIUM BROMIDE 30 MG: 50 INJECTION, SOLUTION INTRAVENOUS at 12:35

## 2022-07-28 RX ADMIN — FENTANYL CITRATE 50 MCG: 50 INJECTION, SOLUTION INTRAMUSCULAR; INTRAVENOUS at 12:34

## 2022-07-28 RX ADMIN — SUGAMMADEX 200 MG: 100 INJECTION, SOLUTION INTRAVENOUS at 13:14

## 2022-07-28 RX ADMIN — PHENYLEPHRINE HYDROCHLORIDE 100 MCG: 10 INJECTION INTRAVENOUS at 12:52

## 2022-07-28 RX ADMIN — SODIUM CHLORIDE, POTASSIUM CHLORIDE, SODIUM LACTATE AND CALCIUM CHLORIDE: 600; 310; 30; 20 INJECTION, SOLUTION INTRAVENOUS at 12:26

## 2022-07-28 ASSESSMENT — ENCOUNTER SYMPTOMS: SEIZURES: 1

## 2022-07-28 ASSESSMENT — LIFESTYLE VARIABLES: TOBACCO_USE: 0

## 2022-07-28 NOTE — ANESTHESIA PROCEDURE NOTES
Airway       Patient location during procedure: OR       Procedure Start/Stop Times: 7/28/2022 12:37 PM  Staff -        CRNA: Jannet Collier APRN CRNA       Performed By: CRNA  Consent for Airway        Urgency: elective  Indications and Patient Condition       Indications for airway management: merlin-procedural       Induction type:intravenous       Mask difficulty assessment: 1 - vent by mask    Final Airway Details       Final airway type: endotracheal airway       Successful airway: ETT - single  Endotracheal Airway Details        ETT size (mm): 8.0       Cuffed: yes       Successful intubation technique: video laryngoscopy       VL Blade Size: Cai 4       Grade View of Cords: 1       Adjucts: stylet       Position: Right       Measured from: gums/teeth       Secured at (cm): 23       Bite block used: None    Post intubation assessment        Placement verified by: capnometry, equal breath sounds and chest rise        Number of attempts at approach: 1       Number of other approaches attempted: 0       Secured with: silk tape       Ease of procedure: easy       Dentition: Intact and Unchanged    Medication(s) Administered   Medication Administration Time: 7/28/2022 12:37 PM       Patient is a 68y old  Male who presents with a chief complaint of Respiratory failure (16 Jul 2020 13:22)      Patient seen and examined at bedside. no acute event overnight. pt has no complaints. tolerating TC since 8:30am and tolerating speaking valve    ALLERGIES:  No Known Allergies    MEDICATIONS  (STANDING):  albuterol/ipratropium for Nebulization 3 milliLiter(s) Nebulizer every 6 hours  ascorbic acid 500 milliGRAM(s) Oral daily  aspirin  chewable 81 milliGRAM(s) Oral daily  atorvastatin 80 milliGRAM(s) Oral at bedtime  chlorhexidine 0.12% Liquid 15 milliLiter(s) Oral Mucosa two times a day  chlorhexidine 4% Liquid 1 Application(s) Topical <User Schedule>  cyanocobalamin 1000 MICROGram(s) Oral daily  dextrose 50% Injectable 12.5 Gram(s) IV Push once  dextrose 50% Injectable 25 Gram(s) IV Push once  dextrose 50% Injectable 25 Gram(s) IV Push once  enoxaparin Injectable 40 milliGRAM(s) SubCutaneous daily  ergocalciferol Drops 20227 Unit(s) Enteral Tube <User Schedule>  ferrous    sulfate Liquid 300 milliGRAM(s) Enteral Tube daily  folic acid 1 milliGRAM(s) Oral daily  guaiFENesin   Syrup  (Sugar-Free) 200 milliGRAM(s) Oral every 6 hours  insulin glargine Injectable (LANTUS) 26 Unit(s) SubCutaneous at bedtime  insulin lispro (HumaLOG) corrective regimen sliding scale   SubCutaneous every 6 hours  levothyroxine 100 MICROGram(s) Oral daily  lidocaine   Patch 1 Patch Transdermal daily  melatonin 3 milliGRAM(s) Oral at bedtime  multivitamin 1 Tablet(s) Oral daily  nystatin    Suspension 635469 Unit(s) Oral three times a day  pantoprazole   Suspension 40 milliGRAM(s) Enteral Tube daily  QUEtiapine 25 milliGRAM(s) Oral at bedtime  tamsulosin 0.4 milliGRAM(s) Oral at bedtime    MEDICATIONS  (PRN):  acetaminophen    Suspension .. 650 milliGRAM(s) Enteral Tube every 6 hours PRN Temp greater or equal to 38C (100.4F), Mild Pain (1 - 3)  dextrose 40% Gel 15 Gram(s) Oral once PRN Blood Glucose LESS THAN 70 milliGRAM(s)/deciliter  glucagon  Injectable 1 milliGRAM(s) IntraMuscular once PRN Glucose LESS THAN 70 milligrams/deciliter  simethicone 80 milliGRAM(s) Chew two times a day PRN Gas    Vital Signs Last 24 Hrs  T(F): 97.8 (16 Jul 2020 08:00), Max: 98.2 (16 Jul 2020 00:00)  HR: 86 (16 Jul 2020 16:01) (64 - 92)  BP: 104/55 (16 Jul 2020 08:00) (96/57 - 135/72)  RR: 30 (16 Jul 2020 08:00) (18 - 30)  SpO2: 98% (16 Jul 2020 16:01) (98% - 100%)  I&O's Summary    15 Jul 2020 07:01  -  16 Jul 2020 07:00  --------------------------------------------------------  IN: 3460 mL / OUT: 1775 mL / NET: 1685 mL      PHYSICAL EXAM:  General: NAD, awake alert and following commands  ENT: MMM  Neck: Supple, No JVD + trach  Lungs: Clear to auscultation bilaterally  Cardio: RRR, S1/S2, No murmurs  Abdomen: Soft, Nontender, Nondistended; Bowel sounds present  Extremities: No calf tenderness, No pitting edema. non pitting edematous right thigh   neruo: RUE 2/5 right hand  3/5. RLE 4+/5.     LABS:                        9.2    7.97  )-----------( 163      ( 16 Jul 2020 05:30 )             28.7     07-16    135  |  98  |  47  ----------------------------<  128  4.5   |  34  |  1.02    Ca    9.2      16 Jul 2020 05:30      eGFR if Non African American: 74 mL/min/1.73M2 (07-16-20 @ 05:30)  eGFR if African American: 86 mL/min/1.73M2 (07-16-20 @ 05:30)        ABG - ( 15 Jul 2020 15:20 )  pH, Arterial: 7.42  pH, Blood: x     /  pCO2: 53    /  pO2: 88    / HCO3: x     / Base Excess: x     /  SaO2: 97          POCT Blood Glucose.: 152 mg/dL (16 Jul 2020 12:19)  POCT Blood Glucose.: 137 mg/dL (16 Jul 2020 05:25)  POCT Blood Glucose.: 140 mg/dL (16 Jul 2020 00:00)  POCT Blood Glucose.: 118 mg/dL (15 Jul 2020 21:00)  POCT Blood Glucose.: 143 mg/dL (15 Jul 2020 17:50)      RADIOLOGY & ADDITIONAL TESTS:    < from: Xray Chest 1 View- PORTABLE-Routine (07.14.20 @ 05:55) >  FINDINGS: Sinceprior evaluation, no significant change.  Midline tracheostomy. Heart size cannot be quantitated. Bilateral lung parenchymal infiltrates are unchanged. No pleural effusion. No pneumothorax. No mediastinal shift. No acute osseous fractures.    IMPRESSION: No significant interval change.      < end of copied text >      Care Discussed with Consultants/Other Providers: pulmonary, neurology

## 2022-07-28 NOTE — ANESTHESIA POSTPROCEDURE EVALUATION
Patient: Jagdeep Walker    Procedure: Procedure(s):  ENDOSCOPIC RETROGRADE CHOLANGIOPANCREATOGRAPHY, Pancreatic duct stent exchange       Anesthesia Type:  General    Note:  Disposition: Outpatient   Postop Pain Control: Uneventful            Sign Out: Well controlled pain   PONV: No   Neuro/Psych: Uneventful            Sign Out: Acceptable/Baseline neuro status   Airway/Respiratory: Uneventful            Sign Out: Acceptable/Baseline resp. status   CV/Hemodynamics: Uneventful            Sign Out: Acceptable CV status; No obvious hypovolemia; No obvious fluid overload   Other NRE: NONE   DID A NON-ROUTINE EVENT OCCUR? No           Last vitals:  Vitals Value Taken Time   /88 07/28/22 1330   Temp 36.6  C (97.8  F) 07/28/22 1323   Pulse 96 07/28/22 1346   Resp 9 07/28/22 1346   SpO2 93 % 07/28/22 1346   Vitals shown include unvalidated device data.    Electronically Signed By: Danni Beth MD  July 28, 2022  1:47 PM

## 2022-07-28 NOTE — OR NURSING
Patient brought a picture of home covid antigen test on phone as directed.  I personally saw this result and it was //time stamped .  Result was NEGATIVE.

## 2022-07-28 NOTE — PROGRESS NOTES
PTA medications completed by Medication Scribe day of surgery     Medication history sources: H&P and MAR ([facility]/ [facility] [phone])  In the past week, patient estimated taking medication this percent of the time: Greater than 90%  Adherence assessment: N/A Not Observed    Significant changes made to the medication list:  None      Additional medication history information:   Patient was advised to bring: Refresh Plus and artificial tears Eye drops and a copy of the MAR with last doses of medications.    Medication reconciliation completed by provider prior to medication history? No    Time spent in this activity: 75 minutes    The information provided in this note is only as accurate as the sources available at the time of update(s)      Prior to Admission medications    Medication Sig Last Dose Taking? Auth Provider Long Term End Date   acetaminophen (TYLENOL) 325 MG/10.15ML solution 20.3 mLs (650 mg) by Per G Tube route every 6 hours as needed for mild pain or fever  Patient taking differently: 650 mg by Per G Tube route every 6 hours as needed for mild pain or fever (20.3 ml = 650mg) 7/28/2022 at AM Yes Shamir Geiger MD     amylase-lipase-protease (CREON 12) [id number] units CPEP 2 capsules by Per G Tube route 3 times daily (with meals) 7/28/2022 at AM Yes Reported, Patient     carboxymethylcellulose PF (REFRESH PLUS) 0.5 % ophthalmic solution Place 1 drop into both eyes daily as needed for dry eyes 7/28/2022 at AM Yes Shamir Geiger MD     hypromellose-dextran (ARTIFICAL TEARS) 0.1-0.3 % ophthalmic solution Place 2 drops into both eyes daily as needed for dry eyes 7/28/2022 at AM Yes Shamir Geiger MD     levocetirizine (XYZAL) 5 MG tablet 1 tablet (5 mg) by Oral or NG Tube route every evening  Patient taking differently: 5 mg by Oral or NG Tube route daily 7/28/2022 at AM Yes Shamir Geiger MD     levothyroxine (SYNTHROID/LEVOTHROID) 50 MCG tablet 1  tablet (50 mcg) by Oral or NG Tube route daily 7/28/2022 at AM Yes Shamir Geiger MD Yes    ondansetron (ZOFRAN ODT) 4 MG ODT tab Take 1 tablet (4 mg) by mouth every 6 hours as needed for nausea or vomiting  Patient taking differently: Place 4 mg under the tongue every 6 hours as needed for nausea or vomiting 7/28/2022 at AM Yes Shamir Geiger MD     pantoprazole (PROTONIX) 2 mg/mL SUSP suspension 20 mLs (40 mg) by Oral or Feeding Tube route 2 times daily  Patient taking differently: 40 mg by Oral or Feeding Tube route 2 times daily (20 ml = 40mg) 7/28/2022 at AM Yes Shamir Geiger MD     protein modular (PROSOURCE TF) LIQD 1 packet by Per Feeding Tube route 3 times daily 7/27/2022 at Hold for surgery Yes Shamir Geiger MD     risperiDONE (RISPERDAL) 2 MG tablet 1 tablet (2 mg) by Oral or NG Tube route 2 times daily  Patient taking differently: 2 mg by Per Feeding Tube route 2 times daily 7/28/2022 at AM Yes Shamir Geiger MD Yes    senna-docusate (SENOKOT-S/PERICOLACE) 8.6-50 MG tablet 1 tablet by Oral or Feeding Tube route 2 times daily as needed for constipation  Patient taking differently: 1 tablet by Per Feeding Tube route 2 times daily as needed for constipation 7/28/2022 at AM Yes Shamir Geiger MD     sertraline (ZOLOFT) 100 MG tablet 1 tablet (100 mg) by Oral or NG Tube route daily  Patient taking differently: 100 mg by Per Feeding Tube route daily 7/28/2022 at AM Yes Shamir Geiger MD Yes    sodium bicarbonate 650 MG tablet 650 mg by Per Feeding Tube route 3 times daily Crush and mix with 10 cc's warm water and give with Creon Capsules. 7/28/2022 at AM Yes Reported, Patient     topiramate (TOPAMAX) 100 MG tablet 1 tablet (100 mg) by Oral or NG Tube route every morning 7/28/2022 at AM Yes Shamir Geiger MD Yes    topiramate (TOPAMAX) 50 MG tablet Take 1 tablet (50 mg) by mouth At Bedtime 7/27/2022 at HS Yes Shamir Geiger MD Yes    zinc sulfate (ZINCATE) 220 (50  Zn) MG capsule 1 capsule (220 mg) by Oral or NG Tube route daily 7/28/2022 at AM Yes Shamir Geiger MD     sodium chloride, PF, 0.9% PF flush 3 mLs by Intracatheter route every 8 hours   Shamir Geiger MD     albuterol (PROAIR HFA/PROVENTIL HFA/VENTOLIN HFA) 108 (90 Base) MCG/ACT inhaler Inhale 2 puffs into the lungs every 6 hours as needed for shortness of breath / dyspnea or wheezing   Unknown, Entered By History Yes 7/7/22   atenolol (TENORMIN) 25 MG tablet Take 25 mg by mouth daily   Sarika Glass MD Yes 7/7/22   clonazePAM (KLONOPIN) 0.125 MG TBDP ODT tab Take 1 tablet (0.125 mg) by mouth daily as needed for anxiety   Bob Beasley, DO Yes 7/7/22   divalproex sodium delayed-release (DEPAKOTE) 250 MG DR tablet Take 1 tablet (250 mg) by mouth At Bedtime   Bob Beasley, DO Yes 7/7/22   levOCARNitine (CARNITOR) 330 MG tablet Take by mouth 3 times daily   Unknown, Entered By History Yes 7/7/22

## 2022-07-28 NOTE — PROGRESS NOTES
Pt dressed, up in w/c.    Patient is adequate for discharge to home from Phase 2. Ox4 and AVSS. Tolerating PO, denies nausea. Pain well controlled. Discharge instructions completed with sister via phone and sent printed copy for facility per request.  All belongings and pt own w/c discharged with him.

## 2022-07-28 NOTE — ANESTHESIA CARE TRANSFER NOTE
Patient: Jagdeep Walker    Procedure: Procedure(s):  ENDOSCOPIC RETROGRADE CHOLANGIOPANCREATOGRAPHY, Pancreatic duct stent exchange       Diagnosis: Chronic pancreatitis (H) [K86.1]  Diagnosis Additional Information: No value filed.    Anesthesia Type:   General     Note:    Oropharynx: oropharynx clear of all foreign objects and spontaneously breathing  Level of Consciousness: drowsy  Oxygen Supplementation: face mask  Level of Supplemental Oxygen (L/min / FiO2): 10  Independent Airway: airway patency satisfactory and stable  Dentition: dentition unchanged  Vital Signs Stable: post-procedure vital signs reviewed and stable  Report to RN Given: handoff report given  Patient transferred to: Phase II    Handoff Report: Identifed the Patient, Identified the Reponsible Provider, Reviewed the pertinent medical history, Discussed the surgical course, Reviewed Intra-OP anesthesia mangement and issues during anesthesia, Set expectations for post-procedure period and Allowed opportunity for questions and acknowledgement of understanding      Vitals:  Vitals Value Taken Time   /86 07/28/22 1323   Temp     Pulse 98 07/28/22 1325   Resp 11 07/28/22 1325   SpO2 98 % 07/28/22 1325   Vitals shown include unvalidated device data.    Electronically Signed By: DILLON Webster CRNA  July 28, 2022  1:26 PM

## 2022-07-28 NOTE — DISCHARGE INSTRUCTIONS
Same Day Surgery Discharge Instructions for  Sedation and General Anesthesia     It's not unusual to feel dizzy, light-headed or faint for up to 24 hours after surgery or while taking pain medication.  If you have these symptoms: sit for a few minutes before standing and have someone assist you when you get up to walk or use the bathroom.    You should rest and relax for the next 24 hours. We recommend you make arrangements to have an adult stay with you for at least 24 hours after your discharge.  Avoid hazardous and strenuous activity.    DO NOT DRIVE any vehicle or operate mechanical equipment for 24 hours following the end of your surgery.  Even though you may feel normal, your reactions may be affected by the medication you have received.    Do not drink alcoholic beverages for 24 hours following surgery.     Slowly progress to your regular diet as you feel able. It's not unusual to feel nauseated and/or vomit after receiving anesthesia.  If you develop these symptoms, drink clear liquids (apple juice, ginger ale, broth, 7-up, etc. ) until you feel better.  If your nausea and vomiting persists for 24 hours, please notify your surgeon.      All narcotic pain medications, along with inactivity and anesthesia, can cause constipation. Drinking plenty of liquids and increasing fiber intake will help.    For any questions of a medical nature, call your surgeon.    Do not make important decisions for 24 hours.    If you had general anesthesia, you may have a sore throat for a couple of days related to the breathing tube used during surgery.  You may use Cepacol lozenges to help with this discomfort.  If it worsens or if you develop a fever, contact your surgeon.     If you feel your pain is not well managed with the pain medications prescribed by your surgeon, please contact your surgeon's office to let them know so they can address your concerns.     Reasons to contact your surgeon:    Signs of possible  infection: Check your incision daily for redness, swelling, warmth, red streaks or foul drainage.   Elevated temperature.  Pain not controlled with pain medication and/or rest.   Uncontrolled nausea or vomiting.  Any questions or concerns.       **If you have questions or concerns about your procedure, call   Dr. Clark at [phone].**

## 2022-07-28 NOTE — ANESTHESIA PREPROCEDURE EVALUATION
Anesthesia Pre-Procedure Evaluation    Patient: Jagdeep Walker   MRN: [id number] : 1970        Procedure : Procedure(s):  ENDOSCOPIC RETROGRADE CHOLANGIOPANCREATOGRAPHY          Past Medical History:   Diagnosis Date     Anxiety      Fisher esophagus      Benign essential hypertension      Bipolar disorder (H)      Depression      Obesity      Pancreatitis      Portal vein thrombosis      Schizoaffective disorder, bipolar type (H)      Seizure disorder (H)      Thyroid disease       Past Surgical History:   Procedure Laterality Date     ENDOSCOPIC RETROGRADE CHOLANGIOPANCREATOGRAM COMPLEX N/A 2022    Procedure: ENDOSCOPIC RETROGRADE CHOLANGIOPANCREATOGRAPHY, COMPLEX;  Surgeon: Massimo Clark MD;  Location:  OR     ENDOSCOPIC ULTRASOUND UPPER GASTROINTESTINAL TRACT (GI) N/A 2022    Procedure: ENDOSCOPIC ULTRASOUND, ESOPHAGOSCOPY / UPPER GASTROINTESTINAL TRACT (GI);  Surgeon: Massimo Clark MD;  Location:  GI     ESOPHAGOSCOPY, GASTROSCOPY, DUODENOSCOPY (EGD), COMBINED N/A 2022    Procedure: ESOPHAGOGASTRODUODENOSCOPY, WITH BIOPSY;  Surgeon: Massimo Clark MD;  Location:  GI     IR CHEST TUBE PLACEMENT NON-TUNNELED RIGHT  2022     IR CHEST TUBE REPOSITION/REPLACEMENT NON-TUNNELED RIGHT  2022     IR GASTRO JEJUNOSTOMY TUBE CHANGE  2022     IR GASTRO JEJUNOSTOMY TUBE PLACEMENT  2022      No Known Allergies   Social History     Tobacco Use     Smoking status: Never Smoker     Smokeless tobacco: Never Used   Substance Use Topics     Alcohol use: Not Currently     Comment: Occasional      Wt Readings from Last 1 Encounters:   22 79 kg (174 lb 3.2 oz)        Anesthesia Evaluation   Pt has had prior anesthetic.     No history of anesthetic complications       ROS/MED HX  ENT/Pulmonary: Comment: Recent pleural effusion, drained   (-) tobacco use and sleep apnea   Neurologic: Comment: Recent admission for encephalopathy, RESOLVED. Cognitive  impairment. Lives in a group home    H/O neuroleptic malignant syndrome    (+) seizures,     Cardiovascular:     (+) hypertension-----    METS/Exercise Tolerance:     Hematologic:       Musculoskeletal:       GI/Hepatic: Comment: Pancreatitis, portal vein thrombosis, g tube in place    Fisher's esophagus    (+) GERD, liver disease,     Renal/Genitourinary:  - neg Renal ROS     Endo:     (+) thyroid problem, hypothyroidism,     Psychiatric/Substance Use:     (+) psychiatric history anxiety, bipolar, depression and schizophrenia     Infectious Disease:       Malignancy:       Other:            Physical Exam    Airway        Mallampati: III   TM distance: > 3 FB   Neck ROM: full   Mouth opening: > 3 cm    Respiratory Devices and Support         Dental       (+) chipped      Cardiovascular   cardiovascular exam normal          Pulmonary   pulmonary exam normal                OUTSIDE LABS:  CBC:   Lab Results   Component Value Date    WBC 6.9 07/27/2022    WBC 7.3 07/21/2022    HGB 11.0 (L) 07/27/2022    HGB 9.6 (L) 07/21/2022    HCT 34.8 (L) 07/27/2022    HCT 30.7 (L) 07/21/2022     07/27/2022     07/21/2022     BMP:   Lab Results   Component Value Date     (H) 07/27/2022     (H) 07/25/2022    POTASSIUM 3.7 07/27/2022    POTASSIUM 3.9 07/25/2022    CHLORIDE 118 (H) 07/27/2022    CHLORIDE 118 (H) 07/25/2022    CO2 24 07/27/2022    CO2 25 07/25/2022    BUN 25 07/27/2022    BUN 23 07/25/2022    CR 0.84 07/27/2022    CR 0.81 07/25/2022     (H) 07/27/2022     (H) 07/25/2022     COAGS:   Lab Results   Component Value Date    PTT 31 06/05/2022    INR 1.74 (H) 06/18/2022     POC: No results found for: BGM, HCG, HCGS  HEPATIC:   Lab Results   Component Value Date    ALBUMIN 2.9 (L) 07/27/2022    PROTTOTAL 7.3 07/27/2022    ALT 26 07/27/2022    AST 25 07/27/2022    ALKPHOS 108 07/27/2022    BILITOTAL 0.5 07/27/2022    YUDITH 25 06/22/2022     OTHER:   Lab Results   Component Value Date     PH 7.35 06/22/2022    LACT 1.6 06/25/2022    NASIR 9.5 07/27/2022    PHOS 3.1 07/27/2022    MAG 2.7 (H) 07/27/2022    LIPASE 152 06/27/2022    AMYLASE 142 (H) 06/15/2022    TSH 3.42 06/04/2022    .0 (H) 06/29/2022       Anesthesia Plan    ASA Status:  3   NPO Status:  NPO Appropriate    Anesthesia Type: General.     - Airway: ETT   Induction: Intravenous, Propofol.   Maintenance: Balanced.        Consents    Anesthesia Plan(s) and associated risks, benefits, and realistic alternatives discussed. Questions answered and patient/representative(s) expressed understanding.    - Discussed:     - Discussed with:  Patient         Postoperative Care    Pain management: IV analgesics.   PONV prophylaxis: Ondansetron (or other 5HT-3), Background Propofol Infusion     Comments:                Danni Beth MD

## 2022-07-28 NOTE — INTERVAL H&P NOTE
"I have reviewed the surgical (or preoperative) H&P that is linked to this encounter, and examined the patient. There are no significant changes    Clinical Conditions Present on Arrival:  Clinically Significant Risk Factors Present on Admission           # Hypernatremia: Na = 148 mmol/L (Ref range: 133 - 144 mmol/L) on admission, will monitor as appropriate     # Hypoalbuminemia: Albumin = 2.9 g/dL (Ref range: 3.4 - 5.0 g/dL) on admission, will monitor as appropriate     # Overweight: Estimated body mass index is 28.99 kg/m  as calculated from the following:    Height as of 7/25/22: 1.651 m (5' 5\").    Weight as of 7/25/22: 79 kg (174 lb 3.2 oz).       "

## 2022-07-31 ENCOUNTER — LAB REQUISITION (OUTPATIENT)
Dept: LAB | Facility: CLINIC | Age: 52
End: 2022-07-31
Payer: MEDICARE

## 2022-07-31 DIAGNOSIS — E87.0 HYPEROSMOLALITY AND HYPERNATREMIA: ICD-10-CM

## 2022-08-01 ENCOUNTER — TRANSITIONAL CARE UNIT VISIT (OUTPATIENT)
Dept: GERIATRICS | Facility: CLINIC | Age: 52
End: 2022-08-01
Payer: MEDICARE

## 2022-08-01 VITALS
TEMPERATURE: 97.3 F | BODY MASS INDEX: 29.69 KG/M2 | WEIGHT: 178.2 LBS | HEIGHT: 65 IN | HEART RATE: 89 BPM | SYSTOLIC BLOOD PRESSURE: 110 MMHG | OXYGEN SATURATION: 93 % | DIASTOLIC BLOOD PRESSURE: 70 MMHG | RESPIRATION RATE: 18 BRPM

## 2022-08-01 DIAGNOSIS — E87.0 HYPERNATREMIA: ICD-10-CM

## 2022-08-01 DIAGNOSIS — K85.90 PANCREATITIS, UNSPECIFIED PANCREATITIS TYPE: Primary | ICD-10-CM

## 2022-08-01 DIAGNOSIS — M62.81 GENERALIZED MUSCLE WEAKNESS: ICD-10-CM

## 2022-08-01 LAB
ANION GAP SERPL CALCULATED.3IONS-SCNC: 5 MMOL/L (ref 3–14)
BUN SERPL-MCNC: 17 MG/DL (ref 7–30)
CALCIUM SERPL-MCNC: 9.8 MG/DL (ref 8.5–10.1)
CHLORIDE BLD-SCNC: 113 MMOL/L (ref 94–109)
CO2 SERPL-SCNC: 25 MMOL/L (ref 20–32)
CREAT SERPL-MCNC: 0.78 MG/DL (ref 0.66–1.25)
ERYTHROCYTE [DISTWIDTH] IN BLOOD BY AUTOMATED COUNT: 15.9 % (ref 10–15)
GFR SERPL CREATININE-BSD FRML MDRD: >90 ML/MIN/1.73M2
GLUCOSE BLD-MCNC: 98 MG/DL (ref 70–99)
HCT VFR BLD AUTO: 36.8 % (ref 40–53)
HGB BLD-MCNC: 11.6 G/DL (ref 13.3–17.7)
MCH RBC QN AUTO: 30 PG (ref 26.5–33)
MCHC RBC AUTO-ENTMCNC: 31.5 G/DL (ref 31.5–36.5)
MCV RBC AUTO: 95 FL (ref 78–100)
PLATELET # BLD AUTO: 225 10E3/UL (ref 150–450)
POTASSIUM BLD-SCNC: 3.8 MMOL/L (ref 3.4–5.3)
RBC # BLD AUTO: 3.87 10E6/UL (ref 4.4–5.9)
SODIUM SERPL-SCNC: 143 MMOL/L (ref 133–144)
WBC # BLD AUTO: 6.4 10E3/UL (ref 4–11)

## 2022-08-01 PROCEDURE — 36415 COLL VENOUS BLD VENIPUNCTURE: CPT | Performed by: NURSE PRACTITIONER

## 2022-08-01 PROCEDURE — 99309 SBSQ NF CARE MODERATE MDM 30: CPT | Performed by: NURSE PRACTITIONER

## 2022-08-01 PROCEDURE — P9604 ONE-WAY ALLOW PRORATED TRIP: HCPCS | Performed by: NURSE PRACTITIONER

## 2022-08-01 PROCEDURE — 80048 BASIC METABOLIC PNL TOTAL CA: CPT | Performed by: NURSE PRACTITIONER

## 2022-08-01 PROCEDURE — 85027 COMPLETE CBC AUTOMATED: CPT | Performed by: NURSE PRACTITIONER

## 2022-08-01 NOTE — PROGRESS NOTES
[facility]  [place] Medical Record Number:  [id number]  Place of Service where encounter took place:  [facility] ([facility]) [[id number]]  Chief Complaint   Patient presents with     RECHECK       HPI:    Jagdeep Walker  is a 52 year old (1970), who is being seen today for an episodic care visit.  HPI information obtained from: facility chart records, facility staff and patient report. Today's concern is:    Patient Jagdeep Walker is a 51 yr old male admitted to [facility] for rehabilitation s/p hospitalization [facility] 6/14-7/10/22 for shortness of breath and large right pleural effusion     s/p hospitalization [facility] 6/4-6/10/22 for metabolic encephalopathy of unclear etiology, falls, and weakness  S/p hospitalization 5/8-5/17/22 for acute pancreatitis with pseudocysts, portal vein thrombus    PMHx seizure disorder, schizoaffective and bipolar disorder, JACLYN, asthma, chronic anemia, Fisher's esophagus, GERD, HTN, hypothyroidism  Lives in group home     Follow-up    Neurology Clinic 2 wks  gastrointestinal specialist Dr. Clark for pancreatic follow up         Pancreatitis, unspecified pancreatitis type  Hypernatremia  Generalized muscle weakness     Tolerating soft food today  NA within normal limits   Dietary plans to adjust tube feeding later this week if continues to eat well  Patient walking independently in room, no gastrointestinal complaints this AM  Vitals stable room air, no shortness of breath     Past Medical and Surgical History reviewed in Epic today.    MEDICATIONS:    Current Outpatient Medications   Medication Sig Dispense Refill     acetaminophen (TYLENOL) 325 MG/10.15ML solution 20.3 mLs (650 mg) by Per G Tube route every 6 hours as needed for mild pain or fever (Patient taking differently: 650 mg by Per G Tube route every 6 hours as needed for mild pain or fever (20.3 ml = 650mg))       amylase-lipase-protease (CREON 12)  [id number] units CPEP 2 capsules by Per G Tube route 3 times daily (with meals) And  2 tabs 3 times daily while tube feeding going in evening/noc       carboxymethylcellulose PF (REFRESH PLUS) 0.5 % ophthalmic solution Place 1 drop into both eyes daily as needed for dry eyes       hypromellose-dextran (ARTIFICAL TEARS) 0.1-0.3 % ophthalmic solution Place 2 drops into both eyes daily as needed for dry eyes       levocetirizine (XYZAL) 5 MG tablet 1 tablet (5 mg) by Oral or NG Tube route every evening (Patient taking differently: 5 mg by Oral or NG Tube route daily)       levothyroxine (SYNTHROID/LEVOTHROID) 50 MCG tablet 1 tablet (50 mcg) by Oral or NG Tube route daily       ondansetron (ZOFRAN ODT) 4 MG ODT tab Take 1 tablet (4 mg) by mouth every 6 hours as needed for nausea or vomiting (Patient taking differently: Place 4 mg under the tongue every 6 hours as needed for nausea or vomiting)       pantoprazole (PROTONIX) 2 mg/mL SUSP suspension 20 mLs (40 mg) by Oral or Feeding Tube route 2 times daily (Patient taking differently: 40 mg by Oral or Feeding Tube route 2 times daily (20 ml = 40mg))       protein modular (PROSOURCE TF) LIQD 1 packet by Per Feeding Tube route 3 times daily       risperiDONE (RISPERDAL) 2 MG tablet 1 tablet (2 mg) by Oral or NG Tube route 2 times daily (Patient taking differently: 2 mg by Per Feeding Tube route 2 times daily)       senna-docusate (SENOKOT-S/PERICOLACE) 8.6-50 MG tablet 1 tablet by Oral or Feeding Tube route 2 times daily as needed for constipation (Patient taking differently: 1 tablet by Per Feeding Tube route 2 times daily as needed for constipation)       sertraline (ZOLOFT) 100 MG tablet 1 tablet (100 mg) by Oral or NG Tube route daily (Patient taking differently: 100 mg by Per Feeding Tube route daily)       sodium bicarbonate 650 MG tablet 650 mg by Per Feeding Tube route 3 times daily Crush and mix with 10 cc's warm water and give with Creon Capsules.        "sodium chloride, PF, 0.9% PF flush 3 mLs by Intracatheter route every 8 hours       topiramate (TOPAMAX) 100 MG tablet 1 tablet (100 mg) by Oral or NG Tube route every morning       topiramate (TOPAMAX) 50 MG tablet Take 1 tablet (50 mg) by mouth At Bedtime       zinc sulfate (ZINCATE) 220 (50 Zn) MG capsule 1 capsule (220 mg) by Oral or NG Tube route daily           REVIEW OF SYSTEMS:  4 point ROS including Respiratory, CV, GI and , other than that noted in the HPI,  is negative    Objective:  /70   Pulse 89   Temp 97.3  F (36.3  C)   Resp 18   Ht 1.651 m (5' 5\")   Wt 80.8 kg (178 lb 3.2 oz)   SpO2 93%   BMI 29.65 kg/m    Exam:  GENERAL APPEARANCE:  Alert, in no distress, appears healthy  RESP:  respiratory effort and palpation of chest normal, lungs clear to auscultation , no respiratory distress  CV:  Palpation and auscultation of heart done , regular rate and rhythm, no murmur, rub, or gallop  ABDOMEN:  normal bowel sounds, soft, nontender, no hepatosplenomegaly or other masses  M/S:   Gait and station normal  SKIN:  Inspection of skin and subcutaneous tissue baseline  PSYCH:  oriented X 3    Labs:   Labs done in SNF are in [place] EPIC. Please refer to them using [facility] Everywhere.   Last Comprehensive Metabolic Panel:  Sodium   Date Value Ref Range Status   08/01/2022 143 133 - 144 mmol/L Final     Potassium   Date Value Ref Range Status   08/01/2022 3.8 3.4 - 5.3 mmol/L Final     Chloride   Date Value Ref Range Status   08/01/2022 113 (H) 94 - 109 mmol/L Final     Carbon Dioxide (CO2)   Date Value Ref Range Status   08/01/2022 25 20 - 32 mmol/L Final     Anion Gap   Date Value Ref Range Status   08/01/2022 5 3 - 14 mmol/L Final     Glucose   Date Value Ref Range Status   08/01/2022 98 70 - 99 mg/dL Final     Urea Nitrogen   Date Value Ref Range Status   08/01/2022 17 7 - 30 mg/dL Final     Creatinine   Date Value Ref Range Status   08/01/2022 0.78 0.66 - 1.25 mg/dL Final     GFR Estimate "   Date Value Ref Range Status   08/01/2022 >90 >60 mL/min/1.73m2 Final     Comment:     Effective December 21, 2021 eGFRcr in adults is calculated using the 2021 CKD-EPI creatinine equation which includes age and gender (Jakob et al., NEJ, DOI: 10.1056/GWSTmy4223068)     Calcium   Date Value Ref Range Status   08/01/2022 9.8 8.5 - 10.1 mg/dL Final     Lab Results   Component Value Date    WBC 6.4 08/01/2022     Lab Results   Component Value Date    RBC 3.87 08/01/2022     Lab Results   Component Value Date    HGB 11.6 08/01/2022     Lab Results   Component Value Date    HCT 36.8 08/01/2022     No components found for: MCT  Lab Results   Component Value Date    MCV 95 08/01/2022     Lab Results   Component Value Date    MCH 30.0 08/01/2022     Lab Results   Component Value Date    MCHC 31.5 08/01/2022     Lab Results   Component Value Date    RDW 15.9 08/01/2022     Lab Results   Component Value Date     08/01/2022         ASSESSMENT/PLAN:     Pancreatitis, unspecified pancreatitis type  Hypernatremia  Generalized muscle weakness     Tolerating soft food today  NA within normal limits, will repeat labs Wednesday  Dietary plans to adjust tube feeding later this week if continues to eat well  Patient walking independently in room, no gastrointestinal complaints this AM  Will add Creon 2 tabs 3xday with meals, continue current order with tube feeds; consulted pharmacist regarding  Follow up gastrointestinal specialist 1 week as ordered, clarification out regarding if patient can medications orally  Vitals stable room air, no shortness of breath   Continue therapies at this time  Monitor       Electronically signed by:  DILLON Saini CNP

## 2022-08-01 NOTE — LETTER
8/1/2022        RE: Jagdeep Walker  [address]  [place] [address]        [facility]  [place] Medical Record Number:  [id number]  Place of Service where encounter took place:  No question data found.  Chief Complaint   Patient presents with     RECHECK       HPI:    Jagdeep Walker  is a 52 year old (1970), who is being seen today for an episodic care visit.  HPI information obtained from: {FGS HPI:292945}. Today's concern is:  {FGS DX:072193}    Past Medical and Surgical History reviewed in Epic today.    MEDICATIONS:  {Providers Please double check the med list (in the plan section >> meds & orders tab) and Discontinue any of the meds flagged by the TC to be discontinued}  Current Outpatient Medications   Medication Sig Dispense Refill     acetaminophen (TYLENOL) 325 MG/10.15ML solution 20.3 mLs (650 mg) by Per G Tube route every 6 hours as needed for mild pain or fever (Patient taking differently: 650 mg by Per G Tube route every 6 hours as needed for mild pain or fever (20.3 ml = 650mg))       amylase-lipase-protease (CREON 12) [id number] units CPEP 2 capsules by Per G Tube route 3 times daily (with meals)       carboxymethylcellulose PF (REFRESH PLUS) 0.5 % ophthalmic solution Place 1 drop into both eyes daily as needed for dry eyes       hypromellose-dextran (ARTIFICAL TEARS) 0.1-0.3 % ophthalmic solution Place 2 drops into both eyes daily as needed for dry eyes       levocetirizine (XYZAL) 5 MG tablet 1 tablet (5 mg) by Oral or NG Tube route every evening (Patient taking differently: 5 mg by Oral or NG Tube route daily)       levothyroxine (SYNTHROID/LEVOTHROID) 50 MCG tablet 1 tablet (50 mcg) by Oral or NG Tube route daily       ondansetron (ZOFRAN ODT) 4 MG ODT tab Take 1 tablet (4 mg) by mouth every 6 hours as needed for nausea or vomiting (Patient taking differently: Place 4 mg under the tongue every 6 hours as needed for nausea or vomiting)        "pantoprazole (PROTONIX) 2 mg/mL SUSP suspension 20 mLs (40 mg) by Oral or Feeding Tube route 2 times daily (Patient taking differently: 40 mg by Oral or Feeding Tube route 2 times daily (20 ml = 40mg))       protein modular (PROSOURCE TF) LIQD 1 packet by Per Feeding Tube route 3 times daily       risperiDONE (RISPERDAL) 2 MG tablet 1 tablet (2 mg) by Oral or NG Tube route 2 times daily (Patient taking differently: 2 mg by Per Feeding Tube route 2 times daily)       senna-docusate (SENOKOT-S/PERICOLACE) 8.6-50 MG tablet 1 tablet by Oral or Feeding Tube route 2 times daily as needed for constipation (Patient taking differently: 1 tablet by Per Feeding Tube route 2 times daily as needed for constipation)       sertraline (ZOLOFT) 100 MG tablet 1 tablet (100 mg) by Oral or NG Tube route daily (Patient taking differently: 100 mg by Per Feeding Tube route daily)       sodium bicarbonate 650 MG tablet 650 mg by Per Feeding Tube route 3 times daily Crush and mix with 10 cc's warm water and give with Creon Capsules.       sodium chloride, PF, 0.9% PF flush 3 mLs by Intracatheter route every 8 hours       topiramate (TOPAMAX) 100 MG tablet 1 tablet (100 mg) by Oral or NG Tube route every morning       topiramate (TOPAMAX) 50 MG tablet Take 1 tablet (50 mg) by mouth At Bedtime       zinc sulfate (ZINCATE) 220 (50 Zn) MG capsule 1 capsule (220 mg) by Oral or NG Tube route daily       ***    REVIEW OF SYSTEMS:  {ROS FGS:614246}    Objective:  /70   Pulse 89   Temp 97.3  F (36.3  C)   Resp 18   Ht 1.651 m (5' 5\")   Wt 80.8 kg (178 lb 3.2 oz)   SpO2 93%   BMI 29.65 kg/m    Exam:  {Nursing home physical exam :112229}    Labs:   {fgslab:065815}    ASSESSMENT/PLAN:  {FGS DX:254549}    {fgsorders:955087}  ***    {fgstime1:481700}  Electronically signed by:  Susi Chowdhury MA ***  {Providers Please double check the med list (in the plan section >> meds & orders tab) and Discontinue any of the meds flagged by the TC to " be discontinued}            Sincerely,        DILLON Saini CNP

## 2022-08-03 ENCOUNTER — LAB REQUISITION (OUTPATIENT)
Dept: LAB | Facility: CLINIC | Age: 52
End: 2022-08-03
Payer: MEDICARE

## 2022-08-03 DIAGNOSIS — K86.1 OTHER CHRONIC PANCREATITIS (H): ICD-10-CM

## 2022-08-04 LAB
ALBUMIN SERPL-MCNC: 3 G/DL (ref 3.4–5)
ALP SERPL-CCNC: 99 U/L (ref 40–150)
ALT SERPL W P-5'-P-CCNC: 28 U/L (ref 0–70)
ANION GAP SERPL CALCULATED.3IONS-SCNC: 8 MMOL/L (ref 3–14)
AST SERPL W P-5'-P-CCNC: 23 U/L (ref 0–45)
BILIRUB SERPL-MCNC: 0.5 MG/DL (ref 0.2–1.3)
BUN SERPL-MCNC: 14 MG/DL (ref 7–30)
CALCIUM SERPL-MCNC: 9.5 MG/DL (ref 8.5–10.1)
CHLORIDE BLD-SCNC: 108 MMOL/L (ref 94–109)
CO2 SERPL-SCNC: 22 MMOL/L (ref 20–32)
CREAT SERPL-MCNC: 0.71 MG/DL (ref 0.66–1.25)
ERYTHROCYTE [DISTWIDTH] IN BLOOD BY AUTOMATED COUNT: 16 % (ref 10–15)
GFR SERPL CREATININE-BSD FRML MDRD: >90 ML/MIN/1.73M2
GLUCOSE BLD-MCNC: 137 MG/DL (ref 70–99)
HCT VFR BLD AUTO: 34.7 % (ref 40–53)
HGB BLD-MCNC: 10.9 G/DL (ref 13.3–17.7)
MAGNESIUM SERPL-MCNC: 2.2 MG/DL (ref 1.6–2.3)
MCH RBC QN AUTO: 29.8 PG (ref 26.5–33)
MCHC RBC AUTO-ENTMCNC: 31.4 G/DL (ref 31.5–36.5)
MCV RBC AUTO: 95 FL (ref 78–100)
PHOSPHATE SERPL-MCNC: 3.2 MG/DL (ref 2.5–4.5)
PLATELET # BLD AUTO: 192 10E3/UL (ref 150–450)
POTASSIUM BLD-SCNC: 3.4 MMOL/L (ref 3.4–5.3)
PROT SERPL-MCNC: 7.1 G/DL (ref 6.8–8.8)
RBC # BLD AUTO: 3.66 10E6/UL (ref 4.4–5.9)
SODIUM SERPL-SCNC: 138 MMOL/L (ref 133–144)
WBC # BLD AUTO: 6.1 10E3/UL (ref 4–11)

## 2022-08-04 PROCEDURE — 84100 ASSAY OF PHOSPHORUS: CPT | Performed by: NURSE PRACTITIONER

## 2022-08-04 PROCEDURE — 36415 COLL VENOUS BLD VENIPUNCTURE: CPT | Performed by: NURSE PRACTITIONER

## 2022-08-04 PROCEDURE — 85027 COMPLETE CBC AUTOMATED: CPT | Performed by: NURSE PRACTITIONER

## 2022-08-04 PROCEDURE — 83735 ASSAY OF MAGNESIUM: CPT | Performed by: NURSE PRACTITIONER

## 2022-08-04 PROCEDURE — 80053 COMPREHEN METABOLIC PANEL: CPT | Performed by: NURSE PRACTITIONER

## 2022-08-08 ENCOUNTER — TRANSITIONAL CARE UNIT VISIT (OUTPATIENT)
Dept: GERIATRICS | Facility: CLINIC | Age: 52
End: 2022-08-08
Payer: MEDICARE

## 2022-08-08 VITALS
HEART RATE: 89 BPM | BODY MASS INDEX: 30.71 KG/M2 | HEIGHT: 65 IN | RESPIRATION RATE: 18 BRPM | OXYGEN SATURATION: 96 % | SYSTOLIC BLOOD PRESSURE: 109 MMHG | WEIGHT: 184.3 LBS | TEMPERATURE: 97.5 F | DIASTOLIC BLOOD PRESSURE: 68 MMHG

## 2022-08-08 DIAGNOSIS — M62.81 GENERALIZED MUSCLE WEAKNESS: ICD-10-CM

## 2022-08-08 DIAGNOSIS — J90 PLEURAL EFFUSION: ICD-10-CM

## 2022-08-08 DIAGNOSIS — K85.90 PANCREATITIS, UNSPECIFIED PANCREATITIS TYPE: Primary | ICD-10-CM

## 2022-08-08 PROCEDURE — 99309 SBSQ NF CARE MODERATE MDM 30: CPT | Performed by: NURSE PRACTITIONER

## 2022-08-08 RX ORDER — ACETAMINOPHEN 325 MG/1
650 TABLET ORAL EVERY 6 HOURS PRN
COMMUNITY
End: 2022-08-19

## 2022-08-08 NOTE — PROGRESS NOTES
[place] [facility]  [facility] Record Number:  [id number]  Place of Service where encounter took place:  [facility] [place] (TCU) [[id number]]  Chief Complaint   Patient presents with     Nursing Home Acute       HPI:    Jagdeep Walker  is a 52 year old (1970), who is being seen today for an episodic care visit.  HPI information obtained from: facility chart records, facility staff and patient report. Today's concern is:    Patient Jagdeep Walker is a 51 yr old male admitted to [facility] for rehabilitation s/p hospitalization [facility] 6/14-7/10/22 for shortness of breath and large right pleural effusion     s/p hospitalization [facility] 6/4-6/10/22 for metabolic encephalopathy of unclear etiology, falls, and weakness  S/p hospitalization 5/8-5/17/22 for acute pancreatitis with pseudocysts, portal vein thrombus    PMHx seizure disorder, schizoaffective and bipolar disorder, JACLYN, asthma, chronic anemia, Fisher's esophagus, GERD, HTN, hypothyroidism  Lives in group home     Follow-up    Neurology Clinic 2 wks  gastrointestinal specialist Dr. Clark for pancreatic follow up         Pancreatitis, unspecified pancreatitis type  Pleural effusion  Generalized muscle weakness     Denies gastrointestinal upset  Tolerating diet and continues with tube feeding and this is being titrated per dietary to meet nutritional needs with goal to wean off     Recently seen by gastrointestinal specialist  8/4/22 for follow up for idiopathic pancreatitis with pancreatic fistula     Plan for MRI pancrease 1 month and follow up, check to see if fistula closed before recommend removal of feeding tube  Order for low fat diet, examples given to patient at GI. Okay to slowly start solid low fat foods. Can stop tube feeding when tolerate solids    Labs and vitals stable  Denies shortness of breath   Participating in therapies  Goal to return to group home      Past  Medical and Surgical History reviewed in Epic today.    MEDICATIONS:    Current Outpatient Medications   Medication Sig Dispense Refill     acetaminophen (TYLENOL) 325 MG tablet Take 650 mg by mouth every 6 hours as needed for mild pain Solution formula       amylase-lipase-protease (CREON 12) 05741-60733-74917 units CPEP 2 capsules by Per G Tube route 3 times daily (with meals) And  2 tabs 3 times daily while tube feeding going in evening/noc       carboxymethylcellulose PF (REFRESH PLUS) 0.5 % ophthalmic solution Place 1 drop into both eyes daily as needed for dry eyes       hypromellose-dextran (ARTIFICAL TEARS) 0.1-0.3 % ophthalmic solution Place 2 drops into both eyes daily as needed for dry eyes       levocetirizine (XYZAL) 5 MG tablet 1 tablet (5 mg) by Oral or NG Tube route every evening (Patient taking differently: 5 mg by Oral or NG Tube route daily)       levothyroxine (SYNTHROID/LEVOTHROID) 50 MCG tablet 1 tablet (50 mcg) by Oral or NG Tube route daily       ondansetron (ZOFRAN ODT) 4 MG ODT tab Take 1 tablet (4 mg) by mouth every 6 hours as needed for nausea or vomiting (Patient taking differently: Place 4 mg under the tongue every 6 hours as needed for nausea or vomiting)       pantoprazole (PROTONIX) 2 mg/mL SUSP suspension 20 mLs (40 mg) by Oral or Feeding Tube route 2 times daily (Patient taking differently: 40 mg by Oral or Feeding Tube route 2 times daily (20 ml = 40mg))       protein modular (PROSOURCE TF) LIQD 1 packet by Per Feeding Tube route 3 times daily       risperiDONE (RISPERDAL) 2 MG tablet 1 tablet (2 mg) by Oral or NG Tube route 2 times daily (Patient taking differently: 2 mg by Per Feeding Tube route 2 times daily)       senna-docusate (SENOKOT-S/PERICOLACE) 8.6-50 MG tablet 1 tablet by Oral or Feeding Tube route 2 times daily as needed for constipation (Patient taking differently: 1 tablet by Per Feeding Tube route 2 times daily as needed for constipation)       sertraline (ZOLOFT)  "100 MG tablet 1 tablet (100 mg) by Oral or NG Tube route daily (Patient taking differently: 100 mg by Per Feeding Tube route daily)       sodium chloride, PF, 0.9% PF flush 3 mLs by Intracatheter route every 8 hours       topiramate (TOPAMAX) 100 MG tablet 1 tablet (100 mg) by Oral or NG Tube route every morning       topiramate (TOPAMAX) 50 MG tablet Take 1 tablet (50 mg) by mouth At Bedtime       zinc sulfate (ZINCATE) 220 (50 Zn) MG capsule 1 capsule (220 mg) by Oral or NG Tube route daily           REVIEW OF SYSTEMS:  4 point ROS including Respiratory, CV, GI and , other than that noted in the HPI,  is negative    Objective:  /68   Pulse 89   Temp 97.5  F (36.4  C)   Resp 18   Ht 1.651 m (5' 5\")   Wt 83.6 kg (184 lb 4.8 oz)   SpO2 96%   BMI 30.67 kg/m    Exam:  GENERAL APPEARANCE:  Alert, in no distress  RESP:  respiratory effort and palpation of chest normal, lungs clear to auscultation , no respiratory distress  CV:  Palpation and auscultation of heart done , regular rate and rhythm, no murmur, rub, or gallop  ABDOMEN:  normal bowel sounds, soft, nontender, no hepatosplenomegaly or other masses  M/S:   sitting in bed  SKIN:  Inspection of skin and subcutaneous tissue tube feeding intact to abdomen  PSYCH:  oriented X 3    Labs:   Labs done in SNF are in [place] EPIC. Please refer to them using EPIC/Care Everywhere.     Last Comprehensive Metabolic Panel:  Sodium   Date Value Ref Range Status   08/04/2022 138 133 - 144 mmol/L Final     Potassium   Date Value Ref Range Status   08/04/2022 3.4 3.4 - 5.3 mmol/L Final     Chloride   Date Value Ref Range Status   08/04/2022 108 94 - 109 mmol/L Final     Carbon Dioxide (CO2)   Date Value Ref Range Status   08/04/2022 22 20 - 32 mmol/L Final     Anion Gap   Date Value Ref Range Status   08/04/2022 8 3 - 14 mmol/L Final     Glucose   Date Value Ref Range Status   08/04/2022 137 (H) 70 - 99 mg/dL Final     Urea Nitrogen   Date Value Ref Range Status "   08/04/2022 14 7 - 30 mg/dL Final     Creatinine   Date Value Ref Range Status   08/04/2022 0.71 0.66 - 1.25 mg/dL Final     GFR Estimate   Date Value Ref Range Status   08/04/2022 >90 >60 mL/min/1.73m2 Final     Comment:     Effective December 21, 2021 eGFRcr in adults is calculated using the 2021 CKD-EPI creatinine equation which includes age and gender (Jakob et al., [place], DOI: 10.[facility]6/JOYKro2474915)     Calcium   Date Value Ref Range Status   08/04/2022 9.5 8.5 - 10.1 mg/dL Final     Bilirubin Total   Date Value Ref Range Status   08/04/2022 0.5 0.2 - 1.3 mg/dL Final     Alkaline Phosphatase   Date Value Ref Range Status   08/04/2022 99 40 - 150 U/L Final     ALT   Date Value Ref Range Status   08/04/2022 28 0 - 70 U/L Final     AST   Date Value Ref Range Status   08/04/2022 23 0 - 45 U/L Final         Lab Results   Component Value Date    WBC 6.1 08/04/2022     Lab Results   Component Value Date    RBC 3.66 08/04/2022     Lab Results   Component Value Date    HGB 10.9 08/04/2022     Lab Results   Component Value Date    HCT 34.7 08/04/2022     No components found for: MCT  Lab Results   Component Value Date    MCV 95 08/04/2022     Lab Results   Component Value Date    MCH 29.8 08/04/2022     Lab Results   Component Value Date    MCHC 31.4 08/04/2022     Lab Results   Component Value Date    RDW 16.0 08/04/2022     Lab Results   Component Value Date     08/04/2022         ASSESSMENT/PLAN:     Pancreatitis, unspecified pancreatitis type  Pleural effusion  Generalized muscle weakness    Denies gastrointestinal upset  Tolerating diet and continues with tube feeding and this is being titrated per dietary to meet nutritional needs with goal to wean off   Continue PPI, and as needed Zofran    Recently seen by gastrointestinal specialist  8/4/22 for follow up for idiopathic pancreatitis with pancreatic fistula     Plan for MRI pancrease 1 month and follow up, check to see if fistula closed before  recommend removal of feeding tube  Order for low fat diet, examples given to patient at GI. Okay to slowly start solid low fat foods. Can stop tube feeding when tolerate solids  Followed by dietary and speech therapy  speech therapy has ordered swallow eval as well to assess for any dysphagia     Continue weekly labs    Sodium bicarb stopped, gastrointestinal specialist agreeable to plan     Labs and vitals stable  Denies shortness of breath   Participating in therapies  Goal to return to group home    Per therapies report  Transfer: sba-sup   Bed Mo: sup   Walking up to 400 feet sba   OT   Grooming: s/u   LB drsg: cga   UB drsg: s/u   Toileting: cga   ADLs: cga   Discharge plan and any ID barriers to discharge: Resident lives in group home; no stairs to enter. He has assist with meals, meds. Was previously ind with dressing, showers. He has walker, sc, gb. Guardian in place (his sister, Huong). Goal is to return to group home when off tube feeding.      Electronically signed by:  DILLON Saini CNP

## 2022-08-10 ENCOUNTER — LAB REQUISITION (OUTPATIENT)
Dept: LAB | Facility: CLINIC | Age: 52
End: 2022-08-10
Payer: MEDICARE

## 2022-08-10 DIAGNOSIS — K86.1 OTHER CHRONIC PANCREATITIS (H): ICD-10-CM

## 2022-08-11 LAB
ALBUMIN SERPL-MCNC: 2.8 G/DL (ref 3.4–5)
ALP SERPL-CCNC: 98 U/L (ref 40–150)
ALT SERPL W P-5'-P-CCNC: 24 U/L (ref 0–70)
ANION GAP SERPL CALCULATED.3IONS-SCNC: 6 MMOL/L (ref 3–14)
AST SERPL W P-5'-P-CCNC: 27 U/L (ref 0–45)
BILIRUB SERPL-MCNC: 0.5 MG/DL (ref 0.2–1.3)
BUN SERPL-MCNC: 12 MG/DL (ref 7–30)
CALCIUM SERPL-MCNC: 10.1 MG/DL (ref 8.5–10.1)
CHLORIDE BLD-SCNC: 112 MMOL/L (ref 94–109)
CO2 SERPL-SCNC: 26 MMOL/L (ref 20–32)
CREAT SERPL-MCNC: 0.78 MG/DL (ref 0.66–1.25)
ERYTHROCYTE [DISTWIDTH] IN BLOOD BY AUTOMATED COUNT: 15.7 % (ref 10–15)
GFR SERPL CREATININE-BSD FRML MDRD: >90 ML/MIN/1.73M2
GLUCOSE BLD-MCNC: 96 MG/DL (ref 70–99)
HCT VFR BLD AUTO: 37.8 % (ref 40–53)
HGB BLD-MCNC: 11.6 G/DL (ref 13.3–17.7)
MAGNESIUM SERPL-MCNC: 2.4 MG/DL (ref 1.6–2.3)
MCH RBC QN AUTO: 29.7 PG (ref 26.5–33)
MCHC RBC AUTO-ENTMCNC: 30.7 G/DL (ref 31.5–36.5)
MCV RBC AUTO: 97 FL (ref 78–100)
PHOSPHATE SERPL-MCNC: 3.5 MG/DL (ref 2.5–4.5)
PLATELET # BLD AUTO: 223 10E3/UL (ref 150–450)
POTASSIUM BLD-SCNC: 3.3 MMOL/L (ref 3.4–5.3)
PROT SERPL-MCNC: 7.1 G/DL (ref 6.8–8.8)
RBC # BLD AUTO: 3.9 10E6/UL (ref 4.4–5.9)
SODIUM SERPL-SCNC: 144 MMOL/L (ref 133–144)
WBC # BLD AUTO: 7 10E3/UL (ref 4–11)

## 2022-08-11 PROCEDURE — 84100 ASSAY OF PHOSPHORUS: CPT | Performed by: NURSE PRACTITIONER

## 2022-08-11 PROCEDURE — 80053 COMPREHEN METABOLIC PANEL: CPT | Performed by: NURSE PRACTITIONER

## 2022-08-11 PROCEDURE — 83735 ASSAY OF MAGNESIUM: CPT | Performed by: NURSE PRACTITIONER

## 2022-08-11 PROCEDURE — 36415 COLL VENOUS BLD VENIPUNCTURE: CPT | Performed by: NURSE PRACTITIONER

## 2022-08-11 PROCEDURE — 85027 COMPLETE CBC AUTOMATED: CPT | Performed by: NURSE PRACTITIONER

## 2022-08-11 PROCEDURE — P9604 ONE-WAY ALLOW PRORATED TRIP: HCPCS | Performed by: NURSE PRACTITIONER

## 2022-08-16 ENCOUNTER — LAB REQUISITION (OUTPATIENT)
Dept: LAB | Facility: CLINIC | Age: 52
End: 2022-08-16
Payer: MEDICARE

## 2022-08-16 VITALS
RESPIRATION RATE: 18 BRPM | BODY MASS INDEX: 30.75 KG/M2 | WEIGHT: 184.6 LBS | TEMPERATURE: 97.7 F | HEIGHT: 65 IN | SYSTOLIC BLOOD PRESSURE: 121 MMHG | DIASTOLIC BLOOD PRESSURE: 77 MMHG | HEART RATE: 99 BPM | OXYGEN SATURATION: 98 %

## 2022-08-16 DIAGNOSIS — K86.1 OTHER CHRONIC PANCREATITIS (H): ICD-10-CM

## 2022-08-16 NOTE — PROGRESS NOTES
[place] [facility]  [facility] Record Number:  [id number]  Place of Service where encounter took place:  [facility] [place] (TCU) [[id number]]  Chief Complaint   Patient presents with     Nursing Home Acute       HPI:    Jagdeep Walker  is a 52 year old (1970), who is being seen today for an episodic care visit.  HPI information obtained from: facility chart records, facility staff and patient report. Today's concern is:    Patient Jagdeep Walker is a 51 yr old male admitted to [facility] 6/14-7/10/22 for shortness of breath and large right pleural effusion     s/p [facility] 6/4-6/10/22 for metabolic encephalopathy of unclear etiology, falls, and weakness  S/p hospitalization 5/8-5/17/22 for acute pancreatitis with pseudocysts, portal vein thrombus    PMHx seizure disorder, schizoaffective and bipolar disorder, JACLYN, asthma, chronic anemia, Fisher's esophagus, GERD, HTN, hypothyroidism  Lives in group home     Follow-up    Neurology Clinic 2 wks  gastrointestinal specialist Dr. Clark for pancreatic follow up       Pancreatitis, unspecified pancreatitis type  Generalized muscle weakness  Hypernatremia  Other schizophrenia (H)  Bursitis of left elbow, unspecified bursa     Had episode of emesis early this morning before breakfast  Did c/o diarrhea, however, then asked for Miralax this morning  Patient takes himself to bathroom so unclear if having diarrhea. Does have order to check stool for c-diff, pending  Had been eating well at meals, until past few days per staff. Patient not reliable historian  Patient does drink lots of fluid  Weights trend up, however, does not appear fluid up  Labs unremarkable today  Mood stable and patient walking independently in halls.   States he feels fine with only c/o nausea this morning. Had reported right upper quad pain to nurse, however, states  better this morning    Consulted with dietary. Plan to stop tube feeding and monitor intake and gastrointestinal signs and symptoms   gastrointestinal office updated, plan to schedule earlier appointment if family agreeable. Family working on schedule to make it to appointment    Consulted  regarding concern potential fluid retention per family due to weight trend up.  No clinical presentation of fluid retention. Plan to monitor at this time     Lump and wound reported left elbow  Nurse report patient often lean on left side when up in chair  Patient denies event that injured elbow        Past Medical and Surgical History reviewed in Epic today.    MEDICATIONS:    Current Outpatient Medications   Medication Sig Dispense Refill     acetaminophen (TYLENOL) 325 MG tablet Take 650 mg by mouth every 6 hours as needed for mild pain Solution formula       amylase-lipase-protease (CREON 12) [id number] units CPEP 2 capsules by Per G Tube route 3 times daily (with meals) And  2 tabs 3 times daily while tube feeding going in evening/noc       carboxymethylcellulose PF (REFRESH PLUS) 0.5 % ophthalmic solution Place 1 drop into both eyes daily as needed for dry eyes       hypromellose-dextran (ARTIFICAL TEARS) 0.1-0.3 % ophthalmic solution Place 2 drops into both eyes daily as needed for dry eyes       levocetirizine (XYZAL) 5 MG tablet 1 tablet (5 mg) by Oral or NG Tube route every evening (Patient taking differently: 5 mg by Oral or NG Tube route daily)       levothyroxine (SYNTHROID/LEVOTHROID) 50 MCG tablet 1 tablet (50 mcg) by Oral or NG Tube route daily       ondansetron (ZOFRAN ODT) 4 MG ODT tab Take 1 tablet (4 mg) by mouth every 6 hours as needed for nausea or vomiting (Patient taking differently: Place 4 mg under the tongue every 6 hours as needed for nausea or vomiting)       pantoprazole (PROTONIX) 2 mg/mL SUSP suspension 20 mLs (40 mg) by Oral or Feeding Tube route 2 times daily (Patient taking  "differently: 40 mg by Oral or Feeding Tube route 2 times daily (20 ml = 40mg))       protein modular (PROSOURCE TF) LIQD 1 packet by Per Feeding Tube route 3 times daily       risperiDONE (RISPERDAL) 2 MG tablet 1 tablet (2 mg) by Oral or NG Tube route 2 times daily (Patient taking differently: 2 mg by Per Feeding Tube route 2 times daily)       senna-docusate (SENOKOT-S/PERICOLACE) 8.6-50 MG tablet 1 tablet by Oral or Feeding Tube route 2 times daily as needed for constipation (Patient taking differently: 1 tablet by Per Feeding Tube route 2 times daily as needed for constipation)       sertraline (ZOLOFT) 100 MG tablet 1 tablet (100 mg) by Oral or NG Tube route daily (Patient taking differently: 100 mg by Per Feeding Tube route daily)       sodium chloride, PF, 0.9% PF flush 3 mLs by Intracatheter route every 8 hours       topiramate (TOPAMAX) 100 MG tablet 1 tablet (100 mg) by Oral or NG Tube route every morning       topiramate (TOPAMAX) 50 MG tablet Take 1 tablet (50 mg) by mouth At Bedtime       zinc sulfate (ZINCATE) 220 (50 Zn) MG capsule 1 capsule (220 mg) by Oral or NG Tube route daily           REVIEW OF SYSTEMS:  4 point ROS including Respiratory, CV, GI and , other than that noted in the HPI,  is negative    Objective:  /77   Pulse 99   Temp 97.7  F (36.5  C)   Resp 18   Ht 1.651 m (5' 5\")   Wt 83.7 kg (184 lb 9.6 oz)   SpO2 98%   BMI 30.72 kg/m     Wt Readings from Last 2 Encounters:   08/16/22 83.7 kg (184 lb 9.6 oz)   08/08/22 83.6 kg (184 lb 4.8 oz)   up ~10lb in month, however, had been closer to 210lb in June    Exam:  GENERAL APPEARANCE:  Alert, in no distress, appears healthy  RESP:  respiratory effort and palpation of chest normal, lungs clear to auscultation, no respiratory distress  CV:  Palpation and auscultation of heart done , regular rate and rhythm, no murmur, rub, or gallop  ABDOMEN:  normal bowel sounds, soft, nontender, no hepatosplenomegaly or other masses, g-tube " intact, no signs and symptoms infection at insertion site, non-tender on palpitation abdomen   M/S:   Gait and station normal  SKIN:  Inspection of skin and subcutaneous tissue trace edema bilateral lower extremities; lump left elbow, non-tender, no redness, with small open wound left elbow  PSYCH:  oriented X 3, impaired memory     Labs:   Labs done in SNF are in [place] EPIC. Please refer to them using EPIC/Care Everywhere.   Last Comprehensive Metabolic Panel:  Sodium   Date Value Ref Range Status   08/17/2022 143 133 - 144 mmol/L Final     Potassium   Date Value Ref Range Status   08/17/2022 3.7 3.4 - 5.3 mmol/L Final     Chloride   Date Value Ref Range Status   08/17/2022 115 (H) 94 - 109 mmol/L Final     Carbon Dioxide (CO2)   Date Value Ref Range Status   08/17/2022 24 20 - 32 mmol/L Final     Anion Gap   Date Value Ref Range Status   08/17/2022 4 3 - 14 mmol/L Final     Glucose   Date Value Ref Range Status   08/17/2022 97 70 - 99 mg/dL Final     Urea Nitrogen   Date Value Ref Range Status   08/17/2022 13 7 - 30 mg/dL Final     Creatinine   Date Value Ref Range Status   08/17/2022 0.72 0.66 - 1.25 mg/dL Final     GFR Estimate   Date Value Ref Range Status   08/17/2022 >90 >60 mL/min/1.73m2 Final     Comment:     Effective December 21, 2021 eGFRcr in adults is calculated using the 2021 CKD-EPI creatinine equation which includes age and gender (Jakob et al., NEJ, DOI: 10.1056/VUMDok1756034)     Calcium   Date Value Ref Range Status   08/17/2022 9.5 8.5 - 10.1 mg/dL Final     Bilirubin Total   Date Value Ref Range Status   08/17/2022 0.4 0.2 - 1.3 mg/dL Final     Alkaline Phosphatase   Date Value Ref Range Status   08/17/2022 133 40 - 150 U/L Final     ALT   Date Value Ref Range Status   08/17/2022 26 0 - 70 U/L Final     AST   Date Value Ref Range Status   08/17/2022 23 0 - 45 U/L Final         Lab Results   Component Value Date    WBC 6.5 08/17/2022     Lab Results   Component Value Date    RBC 3.84  08/17/2022     Lab Results   Component Value Date    HGB 11.3 08/17/2022     Lab Results   Component Value Date    HCT 35.7 08/17/2022     No components found for: MCT  Lab Results   Component Value Date    MCV 93 08/17/2022     Lab Results   Component Value Date    MCH 29.4 08/17/2022     Lab Results   Component Value Date    MCHC 31.7 08/17/2022     Lab Results   Component Value Date    RDW 14.9 08/17/2022     Lab Results   Component Value Date     08/17/2022         ASSESSMENT/PLAN:     Pancreatitis, unspecified pancreatitis type  Generalized muscle weakness  Hypernatremia  Other schizophrenia (H)  Bursitis of left elbow, unspecified bursa     Had episode of emesis early this morning before breakfast  Plan to stop gastrointestinal tube feedings and scheduled Creon at night  Continue Creon with meals during the day  Monitor intake and gastrointestinal signs and symptoms  Continue PPI, Zofran and Creon   Follow up gastrointestinal specialist   Dietary following  speech therapy following and graduate diet as appropriate  Current diet: Heart Healthy diet, Mechanical (DD2) texture, Regular consistency  Vitals stable, no signs and symptoms infection    Did c/o diarrhea, however, then asked for Miralax this morning  Patient takes himself to bathroom so unclear if having diarrhea. Does have order to check stool for c-diff, pending  Hold miralax for diarrhea  Tube feedings being held    Had been eating well at meals, until past few days per staff. Patient not reliable historian  Patient does drink lots of fluid  Patient reminded to eat between fluids so not to fill stomach with non-nutritional fluids   See above (holding tube feedings, continue zofran, follow up gastrointestinal specialist)  followed by dietary and speech therapy   monitor intake, gastrointestinal upset, vitals and weights    Weights trend up, however, does not appear fluid up  lung sounds clear   Labs unremarkable today  Monitor for clinical signs  fluid intake  Has follow up gastrointestinal specialist and appointment moved up if family able to work in their schedule    Mood stable and patient walking independently in halls.   Continue current mood medications    States he feels fine with only c/o nausea this morning. Had reported right upper quad pain to nurse, however, states better this morning  Monitor . Known pancreatitis with follow up gastrointestinal, labs noted and unremarkable today    Consulted with dietary. Plan to stop tube feeding and monitor intake and gastrointestinal signs and symptoms   gastrointestinal office updated, plan to schedule earlier appointment if family agreeable. Family working on schedule to make it to appointment    Consulted  regarding concern potential fluid retention per family due to weight trend up.  No clinical presentation of fluid retention. Plan to monitor at this time     Lump and wound reported left elbow  Nurse report patient often lean on left side when up in chair  Appear to be bursitis, not appear inflamed.   Small wound left elbow, continue with nursing dressing as ordered      Consulted with sister Laura today regarding patient status and plan of care and treatment plan. She is agreeable    Appointments in Next Year    Sep 07, 2022  1:30 PM  (Arrive by 1:00 PM)  MR ABDOMEN MRCP W/O & W CONTRAST with [id number]  [facility] ([facility] [phone]        Total time spent with patient visit at the skilled nursing facility was 35 min including patient visit, review of past records and consult with sister. Greater than 50% of total time spent with counseling and coordinating care due to discussion on tube feeding management, nausea management, pancreatitis management, and consult with sister, , and dietary as noted above.      Electronically signed by:  DILLON Saini CNP

## 2022-08-17 ENCOUNTER — TRANSITIONAL CARE UNIT VISIT (OUTPATIENT)
Dept: GERIATRICS | Facility: CLINIC | Age: 52
End: 2022-08-17
Payer: MEDICARE

## 2022-08-17 ENCOUNTER — LAB REQUISITION (OUTPATIENT)
Dept: LAB | Facility: CLINIC | Age: 52
End: 2022-08-17
Payer: MEDICARE

## 2022-08-17 DIAGNOSIS — K85.90 PANCREATITIS, UNSPECIFIED PANCREATITIS TYPE: Primary | ICD-10-CM

## 2022-08-17 DIAGNOSIS — K86.3 PSEUDOCYST OF PANCREAS: ICD-10-CM

## 2022-08-17 DIAGNOSIS — F20.89 OTHER SCHIZOPHRENIA (H): ICD-10-CM

## 2022-08-17 DIAGNOSIS — M62.81 GENERALIZED MUSCLE WEAKNESS: ICD-10-CM

## 2022-08-17 DIAGNOSIS — E87.0 HYPERNATREMIA: ICD-10-CM

## 2022-08-17 DIAGNOSIS — M70.32 BURSITIS OF LEFT ELBOW, UNSPECIFIED BURSA: ICD-10-CM

## 2022-08-17 LAB
ALBUMIN SERPL-MCNC: 2.8 G/DL (ref 3.4–5)
ALP SERPL-CCNC: 133 U/L (ref 40–150)
ALT SERPL W P-5'-P-CCNC: 26 U/L (ref 0–70)
ANION GAP SERPL CALCULATED.3IONS-SCNC: 4 MMOL/L (ref 3–14)
AST SERPL W P-5'-P-CCNC: 23 U/L (ref 0–45)
BILIRUB SERPL-MCNC: 0.4 MG/DL (ref 0.2–1.3)
BUN SERPL-MCNC: 13 MG/DL (ref 7–30)
CALCIUM SERPL-MCNC: 9.5 MG/DL (ref 8.5–10.1)
CHLORIDE BLD-SCNC: 115 MMOL/L (ref 94–109)
CO2 SERPL-SCNC: 24 MMOL/L (ref 20–32)
CREAT SERPL-MCNC: 0.72 MG/DL (ref 0.66–1.25)
ERYTHROCYTE [DISTWIDTH] IN BLOOD BY AUTOMATED COUNT: 14.9 % (ref 10–15)
GFR SERPL CREATININE-BSD FRML MDRD: >90 ML/MIN/1.73M2
GLUCOSE BLD-MCNC: 97 MG/DL (ref 70–99)
HCT VFR BLD AUTO: 35.7 % (ref 40–53)
HGB BLD-MCNC: 11.3 G/DL (ref 13.3–17.7)
MAGNESIUM SERPL-MCNC: 2.4 MG/DL (ref 1.6–2.3)
MCH RBC QN AUTO: 29.4 PG (ref 26.5–33)
MCHC RBC AUTO-ENTMCNC: 31.7 G/DL (ref 31.5–36.5)
MCV RBC AUTO: 93 FL (ref 78–100)
PHOSPHATE SERPL-MCNC: 3 MG/DL (ref 2.5–4.5)
PLATELET # BLD AUTO: 267 10E3/UL (ref 150–450)
POTASSIUM BLD-SCNC: 3.7 MMOL/L (ref 3.4–5.3)
PROT SERPL-MCNC: 7.1 G/DL (ref 6.8–8.8)
RBC # BLD AUTO: 3.84 10E6/UL (ref 4.4–5.9)
SODIUM SERPL-SCNC: 143 MMOL/L (ref 133–144)
WBC # BLD AUTO: 6.5 10E3/UL (ref 4–11)

## 2022-08-17 PROCEDURE — 80053 COMPREHEN METABOLIC PANEL: CPT | Performed by: NURSE PRACTITIONER

## 2022-08-17 PROCEDURE — P9604 ONE-WAY ALLOW PRORATED TRIP: HCPCS | Performed by: NURSE PRACTITIONER

## 2022-08-17 PROCEDURE — 99310 SBSQ NF CARE HIGH MDM 45: CPT | Performed by: NURSE PRACTITIONER

## 2022-08-17 PROCEDURE — 36415 COLL VENOUS BLD VENIPUNCTURE: CPT | Performed by: NURSE PRACTITIONER

## 2022-08-17 PROCEDURE — 83735 ASSAY OF MAGNESIUM: CPT | Performed by: NURSE PRACTITIONER

## 2022-08-17 PROCEDURE — 85027 COMPLETE CBC AUTOMATED: CPT | Performed by: NURSE PRACTITIONER

## 2022-08-17 PROCEDURE — 84100 ASSAY OF PHOSPHORUS: CPT | Performed by: NURSE PRACTITIONER

## 2022-08-19 ENCOUNTER — TRANSITIONAL CARE UNIT VISIT (OUTPATIENT)
Dept: GERIATRICS | Facility: CLINIC | Age: 52
End: 2022-08-19
Payer: MEDICARE

## 2022-08-19 VITALS
HEART RATE: 74 BPM | DIASTOLIC BLOOD PRESSURE: 82 MMHG | TEMPERATURE: 97.9 F | HEIGHT: 65 IN | RESPIRATION RATE: 18 BRPM | WEIGHT: 183.4 LBS | OXYGEN SATURATION: 93 % | BODY MASS INDEX: 30.56 KG/M2 | SYSTOLIC BLOOD PRESSURE: 128 MMHG

## 2022-08-19 DIAGNOSIS — R19.7 DIARRHEA, UNSPECIFIED TYPE: ICD-10-CM

## 2022-08-19 DIAGNOSIS — K85.90 PANCREATITIS, UNSPECIFIED PANCREATITIS TYPE: Primary | ICD-10-CM

## 2022-08-19 DIAGNOSIS — R11.0 NAUSEA: ICD-10-CM

## 2022-08-19 PROCEDURE — 99309 SBSQ NF CARE MODERATE MDM 30: CPT | Performed by: NURSE PRACTITIONER

## 2022-08-19 RX ORDER — PANTOPRAZOLE SODIUM 40 MG/1
40 TABLET, DELAYED RELEASE ORAL DAILY
COMMUNITY

## 2022-08-19 RX ORDER — ACETAMINOPHEN 325 MG/1
650 TABLET ORAL EVERY 6 HOURS PRN
COMMUNITY
End: 2023-05-03

## 2022-08-19 RX ORDER — SERTRALINE HYDROCHLORIDE 100 MG/1
100 TABLET, FILM COATED ORAL DAILY
Status: ON HOLD | COMMUNITY
End: 2023-08-12

## 2022-08-19 NOTE — PROGRESS NOTES
[facility]  [place] Medical Record Number:  [id number]  Place of Service where encounter took place:  [facility] - [place] (TCU) [[id number]]  Chief Complaint   Patient presents with     Nursing Home Acute       HPI:    Jagdeep Walker  is a 52 year old (1970), who is being seen today for an episodic care visit.  HPI information obtained from: facility chart records, facility staff and patient report. Today's concern is:    Patient Jagdeep Walker is a 51 yr old male admitted to [facility] for rehabilitation s/p hospitalization [facility] 6/14-7/10/22 for shortness of breath and large right pleural effusion     s/p hospitalization [facility] 6/4-6/10/22 for metabolic encephalopathy of unclear etiology, falls, and weakness  S/p hospitalization 5/8-5/17/22 for acute pancreatitis with pseudocysts, portal vein thrombus    PMHx seizure disorder, schizoaffective and bipolar disorder, JACLYN, asthma, chronic anemia, Fisher's esophagus, GERD, HTN, hypothyroidism  Lives in group home     Follow-up    Neurology Clinic 2 wks  gastrointestinal specialist Dr. Clark for pancreatic follow up         Pancreatitis, unspecified pancreatitis type  Nausea  Diarrhea, unspecified type     Patient states eating well off tube feeding  Denies nausea and reports diarrhea resolved  activities of daily living per usual  No acute concerns  Does state last stool slightly hard. He takes Miralax scheduled.   Staff marking variable intake from % meals    Past Medical and Surgical History reviewed in Epic today.    MEDICATIONS:    Current Outpatient Medications   Medication Sig Dispense Refill     acetaminophen (TYLENOL) 325 MG tablet Take 650 mg by mouth every 6 hours as needed for mild pain       Levocetirizine Dihydrochloride (XYZAL ALLERGY 24HR PO) Take 5 mg by mouth daily       pantoprazole (PROTONIX) 40 MG EC tablet Take 40 mg by mouth 2 times daily       sertraline (ZOLOFT)  "100 MG tablet Take 100 mg by mouth daily       amylase-lipase-protease (CREON 12) 00454-67615-10938 units CPEP Take 2 capsules by mouth 3 times daily (with meals)       carboxymethylcellulose PF (REFRESH PLUS) 0.5 % ophthalmic solution Place 1 drop into both eyes daily as needed for dry eyes       hypromellose-dextran (ARTIFICAL TEARS) 0.1-0.3 % ophthalmic solution Place 2 drops into both eyes daily as needed for dry eyes       levothyroxine (SYNTHROID/LEVOTHROID) 50 MCG tablet 1 tablet (50 mcg) by Oral or NG Tube route daily       ondansetron (ZOFRAN ODT) 4 MG ODT tab Take 1 tablet (4 mg) by mouth every 6 hours as needed for nausea or vomiting (Patient taking differently: Place 4 mg under the tongue every 6 hours as needed for nausea or vomiting)       risperiDONE (RISPERDAL) 2 MG tablet 1 tablet (2 mg) by Oral or NG Tube route 2 times daily (Patient taking differently: 2 mg by Per Feeding Tube route 2 times daily)       senna-docusate (SENOKOT-S/PERICOLACE) 8.6-50 MG tablet 1 tablet by Oral or Feeding Tube route 2 times daily as needed for constipation (Patient taking differently: 1 tablet by Per Feeding Tube route 2 times daily as needed for constipation)       sodium chloride, PF, 0.9% PF flush 3 mLs by Intracatheter route every 8 hours       topiramate (TOPAMAX) 100 MG tablet 1 tablet (100 mg) by Oral or NG Tube route every morning       topiramate (TOPAMAX) 50 MG tablet Take 1 tablet (50 mg) by mouth At Bedtime       zinc sulfate (ZINCATE) 220 (50 Zn) MG capsule 1 capsule (220 mg) by Oral or NG Tube route daily           REVIEW OF SYSTEMS:  4 point ROS including Respiratory, CV, GI and , other than that noted in the HPI,  is negative    Objective:  /82   Pulse 74   Temp 97.9  F (36.6  C)   Resp 18   Ht 1.651 m (5' 5\")   Wt 83.2 kg (183 lb 6.4 oz)   SpO2 93%   BMI 30.52 kg/m    Exam:  GENERAL APPEARANCE:  Alert, in no distress  RESP:  respiratory effort and palpation of chest normal, lungs clear " to auscultation , no respiratory distress  CV:  Palpation and auscultation of heart done , regular rate and rhythm, no murmur, rub, or gallop  ABDOMEN:  normal bowel sounds, soft, nontender, no hepatosplenomegaly or other masses  M/S:   Gait and station normal  SKIN:  Inspection of skin and subcutaneous tissue baseline  PSYCH:  oriented X 3    Labs:   Labs done in SNF are in [place] EPIC. Please refer to them using EPIC/Care Everywhere.     Last Comprehensive Metabolic Panel:  Sodium   Date Value Ref Range Status   08/17/2022 143 133 - 144 mmol/L Final     Potassium   Date Value Ref Range Status   08/17/2022 3.7 3.4 - 5.3 mmol/L Final     Chloride   Date Value Ref Range Status   08/17/2022 115 (H) 94 - 109 mmol/L Final     Carbon Dioxide (CO2)   Date Value Ref Range Status   08/17/2022 24 20 - 32 mmol/L Final     Anion Gap   Date Value Ref Range Status   08/17/2022 4 3 - 14 mmol/L Final     Glucose   Date Value Ref Range Status   08/17/2022 97 70 - 99 mg/dL Final     Urea Nitrogen   Date Value Ref Range Status   08/17/2022 13 7 - 30 mg/dL Final     Creatinine   Date Value Ref Range Status   08/17/2022 0.72 0.66 - 1.25 mg/dL Final     GFR Estimate   Date Value Ref Range Status   08/17/2022 >90 >60 mL/min/1.73m2 Final     Comment:     Effective December 21, 2021 eGFRcr in adults is calculated using the 2021 CKD-EPI creatinine equation which includes age and gender (Jakob et al., NEJ, DOI: 10.1056/OETTmw1821218)     Calcium   Date Value Ref Range Status   08/17/2022 9.5 8.5 - 10.1 mg/dL Final       Lab Results   Component Value Date    WBC 6.5 08/17/2022     Lab Results   Component Value Date    RBC 3.84 08/17/2022     Lab Results   Component Value Date    HGB 11.3 08/17/2022     Lab Results   Component Value Date    HCT 35.7 08/17/2022     No components found for: MCT  Lab Results   Component Value Date    MCV 93 08/17/2022     Lab Results   Component Value Date    MCH 29.4 08/17/2022     Lab Results   Component  Value Date    MCHC 31.7 08/17/2022     Lab Results   Component Value Date    RDW 14.9 08/17/2022     Lab Results   Component Value Date     08/17/2022         ASSESSMENT/PLAN:     Pancreatitis, unspecified pancreatitis type  Nausea  Diarrhea, unspecified type     Patient states eating well off tube feeding  Denies nausea and reports diarrhea resolved  activities of daily living per usual  No acute concerns  Does state last stool slightly hard. He takes Miralax scheduled. He is reminded if he wants as needed stool softener he can receive this  Staff marking variable intake from % meals  Continue to monitor gastrointestinal symptoms and intake over the weekend and follow up next week  Dietary to follow up next week as well    Electronically signed by:  DILLON Saini CNP

## 2022-08-22 ENCOUNTER — TRANSITIONAL CARE UNIT VISIT (OUTPATIENT)
Dept: GERIATRICS | Facility: CLINIC | Age: 52
End: 2022-08-22
Payer: MEDICARE

## 2022-08-22 VITALS
OXYGEN SATURATION: 97 % | BODY MASS INDEX: 30.52 KG/M2 | HEIGHT: 65 IN | DIASTOLIC BLOOD PRESSURE: 74 MMHG | WEIGHT: 183.2 LBS | SYSTOLIC BLOOD PRESSURE: 137 MMHG | HEART RATE: 74 BPM | TEMPERATURE: 98.1 F | RESPIRATION RATE: 16 BRPM

## 2022-08-22 DIAGNOSIS — M62.81 GENERALIZED MUSCLE WEAKNESS: ICD-10-CM

## 2022-08-22 DIAGNOSIS — K85.90 PANCREATITIS, UNSPECIFIED PANCREATITIS TYPE: Primary | ICD-10-CM

## 2022-08-22 DIAGNOSIS — L03.119 CELLULITIS OF UPPER EXTREMITY, UNSPECIFIED LATERALITY: ICD-10-CM

## 2022-08-22 DIAGNOSIS — R19.7 DIARRHEA, UNSPECIFIED TYPE: ICD-10-CM

## 2022-08-22 PROCEDURE — 99309 SBSQ NF CARE MODERATE MDM 30: CPT | Performed by: NURSE PRACTITIONER

## 2022-08-22 RX ORDER — CEPHALEXIN 500 MG/1
500 CAPSULE ORAL 2 TIMES DAILY
COMMUNITY
Start: 2022-08-22 | End: 2022-08-29

## 2022-08-22 RX ORDER — AMOXICILLIN 250 MG
1 CAPSULE ORAL 2 TIMES DAILY PRN
COMMUNITY
End: 2023-05-03

## 2022-08-22 RX ORDER — RISPERIDONE 2 MG/1
TABLET ORAL
Status: ON HOLD | COMMUNITY
End: 2023-08-12

## 2022-08-22 NOTE — PROGRESS NOTES
[facility]  [place] Medical Record Number:  [id number]  Place of Service where encounter took place:  [facility] - [place] (TCU) [[id number]]  Chief Complaint   Patient presents with     Nursing Home Acute       HPI:    Jagdeep Walker  is a 52 year old (1970), who is being seen today for an episodic care visit.  HPI information obtained from: facility chart records, facility staff and patient report. Today's concern is:      Patient Jagdeep Walker is a 51 yr old male admitted to [facility] for rehabilitation [facility] 6/14-7/10/22 for shortness of breath and large right pleural effusion     s/p hospitalization [facility] 6/4-6/10/22 for metabolic encephalopathy of unclear etiology, falls, and weakness  S/p hospitalization 5/8-5/17/22 for acute pancreatitis with pseudocysts, portal vein thrombus    PMHx seizure disorder, schizoaffective and bipolar disorder, JACLYN, asthma, chronic anemia, Fisher's esophagus, GERD, HTN, hypothyroidism  Lives in group home     Follow-up    Neurology Clinic 2 wks  gastrointestinal specialist Dr. Clark for pancreatic follow up         Pancreatitis, unspecified pancreatitis type  Diarrhea, unspecified type  Generalized muscle weakness  Cellulitis of upper extremity, unspecified laterality     Denies gastrointestinal upset  Reports no diarrhea or constipation  Denies nausea  Reports eating meals %meals     No c/o pain left elbow    Past Medical and Surgical History reviewed in Epic today.    MEDICATIONS:    Current Outpatient Medications   Medication Sig Dispense Refill     acetaminophen (TYLENOL) 325 MG tablet Take 650 mg by mouth every 6 hours as needed for mild pain       amylase-lipase-protease (CREON 12) [id number] units CPEP Take 2 capsules by mouth 3 times daily (with meals)       carboxymethylcellulose PF (REFRESH PLUS) 0.5 % ophthalmic solution Place 1 drop into both eyes daily as  "needed for dry eyes       cephALEXin (KEFLEX) 500 MG capsule Take 500 mg by mouth 2 times daily       hypromellose-dextran (ARTIFICAL TEARS) 0.1-0.3 % ophthalmic solution Place 2 drops into both eyes daily as needed for dry eyes       Levocetirizine Dihydrochloride (XYZAL ALLERGY 24HR PO) Take 5 mg by mouth daily       levothyroxine (SYNTHROID/LEVOTHROID) 50 MCG tablet 1 tablet (50 mcg) by Oral or NG Tube route daily       pantoprazole (PROTONIX) 40 MG EC tablet Take 40 mg by mouth 2 times daily       risperiDONE (RISPERDAL) 2 MG tablet Take 2 mg by mouth 2 times daily       senna-docusate (SENOKOT-S/PERICOLACE) 8.6-50 MG tablet Take 1 tablet by mouth 2 times daily as needed for constipation       sertraline (ZOLOFT) 100 MG tablet Take 100 mg by mouth daily       sodium chloride, PF, 0.9% PF flush 3 mLs by Intracatheter route every 8 hours       topiramate (TOPAMAX) 100 MG tablet 1 tablet (100 mg) by Oral or NG Tube route every morning       topiramate (TOPAMAX) 50 MG tablet Take 1 tablet (50 mg) by mouth At Bedtime       zinc sulfate (ZINCATE) 220 (50 Zn) MG capsule 1 capsule (220 mg) by Oral or NG Tube route daily       ondansetron (ZOFRAN ODT) 4 MG ODT tab Take 1 tablet (4 mg) by mouth every 6 hours as needed for nausea or vomiting (Patient taking differently: Place 4 mg under the tongue every 6 hours as needed for nausea or vomiting)           REVIEW OF SYSTEMS:  4 point ROS including Respiratory, CV, GI and , other than that noted in the HPI,  is negative    Objective:  /74   Pulse 74   Temp 98.1  F (36.7  C)   Resp 16   Ht 1.651 m (5' 5\")   Wt 83.1 kg (183 lb 3.2 oz)   SpO2 97%   BMI 30.49 kg/m     Wt Readings from Last 2 Encounters:   08/22/22 83.1 kg (183 lb 3.2 oz)   08/19/22 83.2 kg (183 lb 6.4 oz)       Exam:  GENERAL APPEARANCE:  Alert, in no distress  RESP:  respiratory effort and palpation of chest normal, lungs clear to auscultation, no respiratory distress  CV:  Palpation and " auscultation of heart done , regular rate and rhythm, no murmur, rub, or gallop  ABDOMEN:  normal bowel sounds, soft, nontender, no hepatosplenomegaly or other masses  M/S:   Gait and station normal  SKIN:  Inspection of skin and subcutaneous tissue baseline, redness left elbow with small open area   NEURO:   Cranial nerves 2-12 are normal tested and grossly at patient's baseline  PSYCH:  oriented X 3    Labs:   Labs done in SNF are in [place] EPIC. Please refer to them using [employer]/Care Everywhere.     Last Comprehensive Metabolic Panel:  Sodium   Date Value Ref Range Status   08/17/2022 143 133 - 144 mmol/L Final     Potassium   Date Value Ref Range Status   08/17/2022 3.7 3.4 - 5.3 mmol/L Final     Chloride   Date Value Ref Range Status   08/17/2022 115 (H) 94 - 109 mmol/L Final     Carbon Dioxide (CO2)   Date Value Ref Range Status   08/17/2022 24 20 - 32 mmol/L Final     Anion Gap   Date Value Ref Range Status   08/17/2022 4 3 - 14 mmol/L Final     Glucose   Date Value Ref Range Status   08/17/2022 97 70 - 99 mg/dL Final     Urea Nitrogen   Date Value Ref Range Status   08/17/2022 13 7 - 30 mg/dL Final     Creatinine   Date Value Ref Range Status   08/17/2022 0.72 0.66 - 1.25 mg/dL Final     GFR Estimate   Date Value Ref Range Status   08/17/2022 >90 >60 mL/min/1.73m2 Final     Comment:     Effective December 21, 2021 eGFRcr in adults is calculated using the 2021 CKD-EPI creatinine equation which includes age and gender (Jakob et al., NEJ, DOI: 10.1056/DBBAdt9276041)     Calcium   Date Value Ref Range Status   08/17/2022 9.5 8.5 - 10.1 mg/dL Final       Lab Results   Component Value Date    WBC 6.5 08/17/2022     Lab Results   Component Value Date    RBC 3.84 08/17/2022     Lab Results   Component Value Date    HGB 11.3 08/17/2022     Lab Results   Component Value Date    HCT 35.7 08/17/2022     No components found for: MCT  Lab Results   Component Value Date    MCV 93 08/17/2022     Lab Results   Component  Value Date    MCH 29.4 08/17/2022     Lab Results   Component Value Date    MCHC 31.7 08/17/2022     Lab Results   Component Value Date    RDW 14.9 08/17/2022     Lab Results   Component Value Date     08/17/2022         ASSESSMENT/PLAN:     Pancreatitis, unspecified pancreatitis type  Diarrhea, unspecified type  Generalized muscle weakness  Cellulitis of upper extremity, unspecified laterality    Denies gastrointestinal upset  Reports no diarrhea,constipation or nausea  Reports eating meals %meals   Continue Creon   Monitor   Staff continue with water flushes with G-tube  Remains off tube feeding  Has follow up gastrointestinal specialist this week    No c/o pain left elbow  Potential cellulitis left elbow  Order Keflex course  Continue daily dressing left elbow  Monitor       Appointments in Next Year    Aug 25, 2022  9:30 AM  (Arrive by 9:00 AM)  MR ABDOMEN MRCP W/O & W CONTRAST with [id number]  [facility] ([facility] ) [phone]              Electronically signed by:  DILLON Saini CNP

## 2022-08-23 ENCOUNTER — LAB REQUISITION (OUTPATIENT)
Dept: LAB | Facility: CLINIC | Age: 52
End: 2022-08-23
Payer: MEDICARE

## 2022-08-23 DIAGNOSIS — K86.1 OTHER CHRONIC PANCREATITIS (H): ICD-10-CM

## 2022-08-24 ENCOUNTER — HOSPITAL ENCOUNTER (OUTPATIENT)
Dept: MRI IMAGING | Facility: CLINIC | Age: 52
Discharge: HOME | End: 2022-08-24
Attending: PHYSICIAN ASSISTANT | Admitting: PHYSICIAN ASSISTANT
Payer: MEDICARE

## 2022-08-24 DIAGNOSIS — K85.00 IDIOPATHIC ACUTE PANCREATITIS: ICD-10-CM

## 2022-08-24 PROCEDURE — 255N000002 HC RX 255 OP 636: Performed by: PHYSICIAN ASSISTANT

## 2022-08-24 PROCEDURE — 74183 MRI ABD W/O CNTR FLWD CNTR: CPT

## 2022-08-24 PROCEDURE — A9585 GADOBUTROL INJECTION: HCPCS | Performed by: PHYSICIAN ASSISTANT

## 2022-08-24 RX ORDER — GADOBUTROL 604.72 MG/ML
8 INJECTION INTRAVENOUS ONCE
Status: COMPLETED | OUTPATIENT
Start: 2022-08-24 | End: 2022-08-24

## 2022-08-24 RX ADMIN — GADOBUTROL 8 ML: 604.72 INJECTION INTRAVENOUS at 10:08

## 2022-08-25 LAB
ALBUMIN SERPL-MCNC: 2.9 G/DL (ref 3.4–5)
ALP SERPL-CCNC: 85 U/L (ref 40–150)
ALT SERPL W P-5'-P-CCNC: 30 U/L (ref 0–70)
ANION GAP SERPL CALCULATED.3IONS-SCNC: 6 MMOL/L (ref 3–14)
AST SERPL W P-5'-P-CCNC: 43 U/L (ref 0–45)
BILIRUB SERPL-MCNC: 0.9 MG/DL (ref 0.2–1.3)
BUN SERPL-MCNC: 12 MG/DL (ref 7–30)
CALCIUM SERPL-MCNC: 9.5 MG/DL (ref 8.5–10.1)
CHLORIDE BLD-SCNC: 111 MMOL/L (ref 94–109)
CO2 SERPL-SCNC: 24 MMOL/L (ref 20–32)
CREAT SERPL-MCNC: 0.91 MG/DL (ref 0.66–1.25)
ERYTHROCYTE [DISTWIDTH] IN BLOOD BY AUTOMATED COUNT: 14.4 % (ref 10–15)
GFR SERPL CREATININE-BSD FRML MDRD: >90 ML/MIN/1.73M2
GLUCOSE BLD-MCNC: 86 MG/DL (ref 70–99)
HCT VFR BLD AUTO: 36.4 % (ref 40–53)
HGB BLD-MCNC: 11.4 G/DL (ref 13.3–17.7)
MAGNESIUM SERPL-MCNC: 2.1 MG/DL (ref 1.6–2.3)
MCH RBC QN AUTO: 29.2 PG (ref 26.5–33)
MCHC RBC AUTO-ENTMCNC: 31.3 G/DL (ref 31.5–36.5)
MCV RBC AUTO: 93 FL (ref 78–100)
PHOSPHATE SERPL-MCNC: 2.9 MG/DL (ref 2.5–4.5)
PLATELET # BLD AUTO: 235 10E3/UL (ref 150–450)
POTASSIUM BLD-SCNC: 3.2 MMOL/L (ref 3.4–5.3)
PROT SERPL-MCNC: 7.1 G/DL (ref 6.8–8.8)
RBC # BLD AUTO: 3.9 10E6/UL (ref 4.4–5.9)
SODIUM SERPL-SCNC: 141 MMOL/L (ref 133–144)
WBC # BLD AUTO: 6.4 10E3/UL (ref 4–11)

## 2022-08-25 PROCEDURE — 83735 ASSAY OF MAGNESIUM: CPT | Performed by: NURSE PRACTITIONER

## 2022-08-25 PROCEDURE — 85027 COMPLETE CBC AUTOMATED: CPT | Performed by: NURSE PRACTITIONER

## 2022-08-25 PROCEDURE — P9604 ONE-WAY ALLOW PRORATED TRIP: HCPCS | Performed by: NURSE PRACTITIONER

## 2022-08-25 PROCEDURE — 84100 ASSAY OF PHOSPHORUS: CPT | Performed by: NURSE PRACTITIONER

## 2022-08-25 PROCEDURE — 80053 COMPREHEN METABOLIC PANEL: CPT | Performed by: NURSE PRACTITIONER

## 2022-08-25 PROCEDURE — 36415 COLL VENOUS BLD VENIPUNCTURE: CPT | Performed by: NURSE PRACTITIONER

## 2022-08-26 ENCOUNTER — TRANSITIONAL CARE UNIT VISIT (OUTPATIENT)
Dept: GERIATRICS | Facility: CLINIC | Age: 52
End: 2022-08-26
Payer: MEDICARE

## 2022-08-26 VITALS
RESPIRATION RATE: 18 BRPM | TEMPERATURE: 97.8 F | BODY MASS INDEX: 30.69 KG/M2 | OXYGEN SATURATION: 97 % | HEART RATE: 75 BPM | DIASTOLIC BLOOD PRESSURE: 66 MMHG | WEIGHT: 184.2 LBS | SYSTOLIC BLOOD PRESSURE: 98 MMHG | HEIGHT: 65 IN

## 2022-08-26 DIAGNOSIS — K85.90 PANCREATITIS, UNSPECIFIED PANCREATITIS TYPE: Primary | ICD-10-CM

## 2022-08-26 DIAGNOSIS — M62.81 GENERALIZED MUSCLE WEAKNESS: ICD-10-CM

## 2022-08-26 DIAGNOSIS — L03.119 CELLULITIS OF UPPER EXTREMITY, UNSPECIFIED LATERALITY: ICD-10-CM

## 2022-08-26 DIAGNOSIS — R11.0 NAUSEA: ICD-10-CM

## 2022-08-26 PROCEDURE — 99309 SBSQ NF CARE MODERATE MDM 30: CPT | Performed by: NURSE PRACTITIONER

## 2022-08-26 NOTE — PROGRESS NOTES
[facility]  [place] Medical Record Number:  [id number]  Place of Service where encounter took place:  [facility] - [place] (TCU) [[id number]]  Chief Complaint   Patient presents with     Nursing Home Acute       HPI:    Jagdeep Walker  is a 52 year old (1970), who is being seen today for an episodic care visit.  HPI information obtained from: facility chart records, facility staff and patient report. Today's concern is:    Patient Jagdeep Walker is a 51 yr old male admitted to [facility] for rehabilitation s/[facility] 6/14-7/10/22 for shortness of breath and large right pleural effusion     s/p hospitalization [facility] 6/4-6/10/22 for metabolic encephalopathy of unclear etiology, falls, and weakness  S/p hospitalization 5/8-5/17/22 for acute pancreatitis with pseudocysts, portal vein thrombus    PMHx seizure disorder, schizoaffective and bipolar disorder, JACLYN, asthma, chronic anemia, Fisher's esophagus, GERD, HTN, hypothyroidism  Lives in group home     Follow-up    Neurology Clinic 2 wks  gastrointestinal specialist Dr. Clark for pancreatic follow up         Pancreatitis, unspecified pancreatitis type  Generalized muscle weakness  Cellulitis of upper extremity, unspecified laterality  Nausea     Tolerating Keflex for cellulitis left elbow, denies pain. No redness or pain today  Seen by gastrointestinal specialist this week and had follow up MRI. Improved status pancreatitis  No nausea past 2 days and eating well. Plans to have G-tube removed per gastrointestinal specialist  Getting self dressed and toileting independently. States he feels good    Past Medical and Surgical History reviewed in Epic today.    MEDICATIONS:    Current Outpatient Medications   Medication Sig Dispense Refill     acetaminophen (TYLENOL) 325 MG tablet Take 650 mg by mouth every 6 hours as needed for mild pain       amylase-lipase-protease (CREON 12)  "[id number] units CPEP Take 2 capsules by mouth 3 times daily (with meals)       carboxymethylcellulose PF (REFRESH PLUS) 0.5 % ophthalmic solution Place 1 drop into both eyes daily as needed for dry eyes       cephALEXin (KEFLEX) 500 MG capsule Take 500 mg by mouth 2 times daily       hypromellose-dextran (ARTIFICAL TEARS) 0.1-0.3 % ophthalmic solution Place 2 drops into both eyes daily as needed for dry eyes       Levocetirizine Dihydrochloride (XYZAL ALLERGY 24HR PO) Take 5 mg by mouth daily       levothyroxine (SYNTHROID/LEVOTHROID) 50 MCG tablet 1 tablet (50 mcg) by Oral or NG Tube route daily       ondansetron (ZOFRAN ODT) 4 MG ODT tab Take 1 tablet (4 mg) by mouth every 6 hours as needed for nausea or vomiting (Patient taking differently: Place 4 mg under the tongue every 6 hours as needed for nausea or vomiting)       pantoprazole (PROTONIX) 40 MG EC tablet Take 40 mg by mouth 2 times daily       risperiDONE (RISPERDAL) 2 MG tablet Take 2 mg by mouth 2 times daily       senna-docusate (SENOKOT-S/PERICOLACE) 8.6-50 MG tablet Take 1 tablet by mouth 2 times daily as needed for constipation       sertraline (ZOLOFT) 100 MG tablet Take 100 mg by mouth daily       sodium chloride, PF, 0.9% PF flush 3 mLs by Intracatheter route every 8 hours       topiramate (TOPAMAX) 100 MG tablet 1 tablet (100 mg) by Oral or NG Tube route every morning       topiramate (TOPAMAX) 50 MG tablet Take 1 tablet (50 mg) by mouth At Bedtime       zinc sulfate (ZINCATE) 220 (50 Zn) MG capsule 1 capsule (220 mg) by Oral or NG Tube route daily           REVIEW OF SYSTEMS:  4 point ROS including Respiratory, CV, GI and , other than that noted in the HPI,  is negative    Objective:  BP 98/66   Pulse 75   Temp 97.8  F (36.6  C)   Resp 18   Ht 1.651 m (5' 5\")   Wt 83.6 kg (184 lb 3.2 oz)   SpO2 97%   BMI 30.65 kg/m    Exam:  GENERAL APPEARANCE:  Alert, in no distress, appears healthy  RESP:  respiratory effort and palpation of " chest normal, lungs clear to auscultation , no respiratory distress  CV:  Palpation and auscultation of heart done , regular rate and rhythm, no murmur, rub, or gallop  ABDOMEN:  normal bowel sounds, soft, nontender, no hepatosplenomegaly or other masses  M/S:   Gait and station normal  SKIN:  Inspection of skin and subcutaneous tissue no redness left elbw, smal open area noted  NEURO:   Cranial nerves 2-12 are normal tested and grossly at patient's baseline  PSYCH:  oriented X 3    Labs:   Labs done in SNF are in [place] EPIC. Please refer to them using [employer]/Care Everywhere.   Last Comprehensive Metabolic Panel:  Sodium   Date Value Ref Range Status   08/25/2022 141 133 - 144 mmol/L Final     Potassium   Date Value Ref Range Status   08/25/2022 3.2 (L) 3.4 - 5.3 mmol/L Final     Chloride   Date Value Ref Range Status   08/25/2022 111 (H) 94 - 109 mmol/L Final     Carbon Dioxide (CO2)   Date Value Ref Range Status   08/25/2022 24 20 - 32 mmol/L Final     Anion Gap   Date Value Ref Range Status   08/25/2022 6 3 - 14 mmol/L Final     Glucose   Date Value Ref Range Status   08/25/2022 86 70 - 99 mg/dL Final     Urea Nitrogen   Date Value Ref Range Status   08/25/2022 12 7 - 30 mg/dL Final     Creatinine   Date Value Ref Range Status   08/25/2022 0.91 0.66 - 1.25 mg/dL Final     GFR Estimate   Date Value Ref Range Status   08/25/2022 >90 >60 mL/min/1.73m2 Final     Comment:     Effective December 21, 2021 eGFRcr in adults is calculated using the 2021 CKD-EPI creatinine equation which includes age and gender (Jakob et al., NEJM, DOI: 10.1056/ZSERzl6295980)     Calcium   Date Value Ref Range Status   08/25/2022 9.5 8.5 - 10.1 mg/dL Final     Lab Results   Component Value Date    WBC 6.4 08/25/2022     Lab Results   Component Value Date    RBC 3.90 08/25/2022     Lab Results   Component Value Date    HGB 11.4 08/25/2022     Lab Results   Component Value Date    HCT 36.4 08/25/2022     No components found for: MCT  Lab  Results   Component Value Date    MCV 93 08/25/2022     Lab Results   Component Value Date    MCH 29.2 08/25/2022     Lab Results   Component Value Date    MCHC 31.3 08/25/2022     Lab Results   Component Value Date    RDW 14.4 08/25/2022     Lab Results   Component Value Date     08/25/2022         ASSESSMENT/PLAN:     Pancreatitis, unspecified pancreatitis type  Generalized muscle weakness  Cellulitis of upper extremity, unspecified laterality  Nausea     Tolerating Keflex for cellulitis left elbow, denies pain. No redness or pain today. Plan to complete course antibiotic of 8/29/22    Seen by gastrointestinal specialist this week and had follow up MRI. Improved status pancreatitis    No nausea past 2 days and eating well. Plans to have G-tube removed per gastrointestinal specialist. Health Unit Coordinator will set up appointment.     Getting self dressed and toileting independently. States he feels good  Continue therapies   involved in safe plan home           Electronically signed by:  DILLON Saini CNP

## 2022-08-30 ENCOUNTER — LAB REQUISITION (OUTPATIENT)
Dept: LAB | Facility: CLINIC | Age: 52
End: 2022-08-30
Payer: MEDICARE

## 2022-08-30 DIAGNOSIS — K86.1 OTHER CHRONIC PANCREATITIS (H): ICD-10-CM

## 2022-08-31 ENCOUNTER — DOCUMENTATION ONLY (OUTPATIENT)
Dept: GERIATRICS | Facility: CLINIC | Age: 52
End: 2022-08-31

## 2022-08-31 ENCOUNTER — LAB REQUISITION (OUTPATIENT)
Dept: LAB | Facility: CLINIC | Age: 52
End: 2022-08-31
Payer: MEDICARE

## 2022-08-31 DIAGNOSIS — E87.1 HYPO-OSMOLALITY AND HYPONATREMIA: ICD-10-CM

## 2022-08-31 LAB
ALBUMIN SERPL-MCNC: 2.9 G/DL (ref 3.4–5)
ALP SERPL-CCNC: 74 U/L (ref 40–150)
ALT SERPL W P-5'-P-CCNC: 29 U/L (ref 0–70)
ANION GAP SERPL CALCULATED.3IONS-SCNC: 7 MMOL/L (ref 3–14)
AST SERPL W P-5'-P-CCNC: 22 U/L (ref 0–45)
BILIRUB SERPL-MCNC: 0.5 MG/DL (ref 0.2–1.3)
BUN SERPL-MCNC: 15 MG/DL (ref 7–30)
CALCIUM SERPL-MCNC: 9.9 MG/DL (ref 8.5–10.1)
CHLORIDE BLD-SCNC: 120 MMOL/L (ref 94–109)
CO2 SERPL-SCNC: 22 MMOL/L (ref 20–32)
CREAT SERPL-MCNC: 0.76 MG/DL (ref 0.66–1.25)
ERYTHROCYTE [DISTWIDTH] IN BLOOD BY AUTOMATED COUNT: 14.4 % (ref 10–15)
GFR SERPL CREATININE-BSD FRML MDRD: >90 ML/MIN/1.73M2
GLUCOSE BLD-MCNC: 75 MG/DL (ref 70–99)
HCT VFR BLD AUTO: 36.1 % (ref 40–53)
HGB BLD-MCNC: 11.4 G/DL (ref 13.3–17.7)
MAGNESIUM SERPL-MCNC: 2.2 MG/DL (ref 1.6–2.3)
MCH RBC QN AUTO: 28.4 PG (ref 26.5–33)
MCHC RBC AUTO-ENTMCNC: 31.6 G/DL (ref 31.5–36.5)
MCV RBC AUTO: 90 FL (ref 78–100)
PHOSPHATE SERPL-MCNC: 3.3 MG/DL (ref 2.5–4.5)
PLATELET # BLD AUTO: 206 10E3/UL (ref 150–450)
POTASSIUM BLD-SCNC: 3.6 MMOL/L (ref 3.4–5.3)
PROT SERPL-MCNC: 6.8 G/DL (ref 6.8–8.8)
RBC # BLD AUTO: 4.01 10E6/UL (ref 4.4–5.9)
SODIUM SERPL-SCNC: 149 MMOL/L (ref 133–144)
WBC # BLD AUTO: 5.1 10E3/UL (ref 4–11)

## 2022-08-31 PROCEDURE — 82435 ASSAY OF BLOOD CHLORIDE: CPT | Performed by: NURSE PRACTITIONER

## 2022-08-31 PROCEDURE — 85027 COMPLETE CBC AUTOMATED: CPT | Performed by: NURSE PRACTITIONER

## 2022-08-31 PROCEDURE — 36415 COLL VENOUS BLD VENIPUNCTURE: CPT | Performed by: NURSE PRACTITIONER

## 2022-08-31 PROCEDURE — 83735 ASSAY OF MAGNESIUM: CPT | Performed by: NURSE PRACTITIONER

## 2022-08-31 PROCEDURE — 84100 ASSAY OF PHOSPHORUS: CPT | Performed by: NURSE PRACTITIONER

## 2022-08-31 NOTE — PROGRESS NOTES
"     [facility]   2022     Name: Jagdeep Walker   : 1970     Background:  Today's .     Dietician reports \"He ate 100% of breakfast this am (2 pancakes, cereal w/ milk, ? coffee, ground ham vs scott), otherwise typically eats 50-75% of meals. He typically orders 4 beverages w/ each meal (coffee w/ creamer and sugar, chocolate milk, 2% milk, and a juice). He typically drinks a can of pop before breakfast\"  Also reports patient's G tube was removed yesterday      Orders:  - Requesting SBAR from RN staff  - Discontinue remaining G tube orders  - Recheck BMP on  &  dx hypernatremia  - Monitor fluid intake per shift         Electronically signed by DILLON Ward CNP, APRN CNP on 2022 at 11:28 AM    "

## 2022-09-01 LAB
ANION GAP SERPL CALCULATED.3IONS-SCNC: 6 MMOL/L (ref 3–14)
BUN SERPL-MCNC: 14 MG/DL (ref 7–30)
CALCIUM SERPL-MCNC: 10.1 MG/DL (ref 8.5–10.1)
CHLORIDE BLD-SCNC: 112 MMOL/L (ref 94–109)
CO2 SERPL-SCNC: 23 MMOL/L (ref 20–32)
CREAT SERPL-MCNC: 0.84 MG/DL (ref 0.66–1.25)
GFR SERPL CREATININE-BSD FRML MDRD: >90 ML/MIN/1.73M2
GLUCOSE BLD-MCNC: 110 MG/DL (ref 70–99)
POTASSIUM BLD-SCNC: 3.5 MMOL/L (ref 3.4–5.3)
SODIUM SERPL-SCNC: 141 MMOL/L (ref 133–144)

## 2022-09-01 PROCEDURE — 82310 ASSAY OF CALCIUM: CPT | Performed by: NURSE PRACTITIONER

## 2022-09-01 PROCEDURE — 36415 COLL VENOUS BLD VENIPUNCTURE: CPT | Performed by: NURSE PRACTITIONER

## 2022-09-01 PROCEDURE — 82374 ASSAY BLOOD CARBON DIOXIDE: CPT | Performed by: NURSE PRACTITIONER

## 2022-09-02 ENCOUNTER — DISCHARGE SUMMARY NURSING HOME (OUTPATIENT)
Dept: GERIATRICS | Facility: CLINIC | Age: 52
End: 2022-09-02
Payer: MEDICARE

## 2022-09-02 ENCOUNTER — LAB REQUISITION (OUTPATIENT)
Dept: LAB | Facility: CLINIC | Age: 52
End: 2022-09-02
Payer: MEDICARE

## 2022-09-02 VITALS
WEIGHT: 184.6 LBS | OXYGEN SATURATION: 96 % | HEART RATE: 78 BPM | HEIGHT: 65 IN | RESPIRATION RATE: 18 BRPM | DIASTOLIC BLOOD PRESSURE: 93 MMHG | TEMPERATURE: 98.3 F | BODY MASS INDEX: 30.75 KG/M2 | SYSTOLIC BLOOD PRESSURE: 143 MMHG

## 2022-09-02 DIAGNOSIS — D64.9 ANEMIA, UNSPECIFIED TYPE: ICD-10-CM

## 2022-09-02 DIAGNOSIS — E87.1 HYPO-OSMOLALITY AND HYPONATREMIA: ICD-10-CM

## 2022-09-02 DIAGNOSIS — E87.0 HYPERNATREMIA: ICD-10-CM

## 2022-09-02 DIAGNOSIS — K86.3 PANCREATIC PSEUDOCYST: ICD-10-CM

## 2022-09-02 DIAGNOSIS — I10 HYPERTENSION, UNSPECIFIED TYPE: ICD-10-CM

## 2022-09-02 DIAGNOSIS — I81 PORTAL VEIN THROMBOSIS: ICD-10-CM

## 2022-09-02 DIAGNOSIS — G40.909 SEIZURE DISORDER (H): ICD-10-CM

## 2022-09-02 DIAGNOSIS — J90 RECURRENT RIGHT PLEURAL EFFUSION: Primary | ICD-10-CM

## 2022-09-02 DIAGNOSIS — F31.9 BIPOLAR AFFECTIVE DISORDER, REMISSION STATUS UNSPECIFIED (H): ICD-10-CM

## 2022-09-02 DIAGNOSIS — E03.9 HYPOTHYROIDISM, UNSPECIFIED TYPE: ICD-10-CM

## 2022-09-02 PROCEDURE — 99316 NF DSCHRG MGMT 30 MIN+: CPT | Performed by: NURSE PRACTITIONER

## 2022-09-02 RX ORDER — ONDANSETRON 4 MG/1
4 TABLET, ORALLY DISINTEGRATING ORAL EVERY 6 HOURS PRN
COMMUNITY
End: 2023-05-03

## 2022-09-02 RX ORDER — SALIVA STIMULANT COMB. NO.3
2 SPRAY, NON-AEROSOL (ML) MUCOUS MEMBRANE
COMMUNITY
End: 2023-05-03

## 2022-09-02 NOTE — LETTER
9/2/2022        RE: Jagdeep Walker  [address]  [place] [address]        No notes on file      Sincerely,        DILLON Ward CNP

## 2022-09-02 NOTE — PROGRESS NOTES
[place] [facility] DISCHARGE SUMMARY    PATIENT'S NAME: Jagdeep Walker  YOB: 1970  MEDICAL RECORD NUMBER:  [id number]  Place of Service where encounter took place:  [facility] [place] ([facility]) [[id number]]    PRIMARY CARE PROVIDER AND CLINIC RESPONSIBLE AFTER TRANSFER:   Joel Werner MD, [address] / [facility]   Non-G Provider     Transferring providers: DILLON Ward CNP; Kush Hahn MD  Recent Hospitalization/ED:  [facility] stay 6/4/22 to 6/10/22.  Date of SNF Admission: Nely 10, 2022  Date of SNF (anticipated) Discharge: September 07, 2022  Discharged to: previous group home  Cognitive Scores: Not tested  Physical Function:   Transfer: sba-sup   BedMo: sup   Walking up to 400 feet sba-sup   OT   UB drsg: s/u   LB drsg: cga   Toileting: cga   ADLs: cga  DME: Walker    CODE STATUS/ADVANCE DIRECTIVES DISCUSSION:  Full Code   ALLERGIES: Patient has no known allergies.      DISCHARGE DIAGNOSIS/NURSING FACILITY COURSE:     PMH: seizure disorder, schizoaffective and bipolar disorder, JACLYN, asthma, chronic anemia, Fisher's esophagus, GERD, HTN, hypothyroidism    Hospitalization at [facility] 6/4-6/10 due to altered mental status. CT 6/4/22 showing moderate ascites, 2 stable pseudocysts but likely 1 new pseudocyst, improved pancreatic inflammation, liver nodularity concerning for cirrhosis. GI consulted, Abdominal US showed findings concerning for cirrhosis as well as a small amount of ascites. Noted to have worsening abdominal distention, s/p diagnostic and therapeutic paracentesis on 6/7/22 and 1.8 liters of fluid removed. Labs not consistent with SBP. Symptoms improved following paracentesis, but not resolved.    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 S/p thoracentesis 6/14 (removed 1L), 6/15 (removed 2.2L), 6/18  (removed 0.5L). GJ placed on 6/28, started TF. Plan to repeat ERCP.     ERCP, pancreatic duct stent exchange on 7/28 with Dr. Clark.        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 ERCP with pancreatic duct stent exchange on 7/28 with Dr. Clark. O2 sats stable on RA. Currently taking creon. Patient denies shortness of breath or abdominal pain. Repeat CXR 7/8 stable for re-accumulation of right pleural effusion.  - Patient to follow-up with Dr. Clark/GI as directed    Hypernatremia  Noted to have hypernatremia following GJ tube removal last week. Repeat NA WNL. Admits to improved appetite.     Portal vein thrombosis  New diagnosis in 05/2022, portal vein thrombosis improved on repeat CT. Not treated with anticoagulation. GI consulted as above, they advised no need to treat PVT previous admission (6/9/2022)    Seizure disorder (H)  MRI shows no changes. EEG no epileptiform discharge, only generalized encephalopathy. PTA depakote held due to association with pancreatitis. Currently taking topiramate.   -Patient to follow-up with Neurologist as directed    Hypertension, unspecified type  PTA atenolol dc'd due to hypotension.    Anemia, unspecified type  Hemoglobin   Date Value Ref Range Status   08/31/2022 11.4 (L) 13.3 - 17.7 g/dL Final     Hypothyroidism, unspecified type  Currently taking levothyroxine.  TSH   Date Value Ref Range Status   06/04/2022 3.42 0.40 - 4.00 mU/L Final       Bipolar affective disorder, remission status unspecified (H)  Currently taking sertraline, risperidone. Patient to follow-up with Psychiatrist as directed.         Past Medical History:  has a past medical history of Anxiety, Fisher esophagus, Benign essential hypertension, Bipolar disorder (H), Depression, Obesity, Pancreatitis, Portal vein thrombosis, Schizoaffective disorder, bipolar type (H),  Seizure disorder (H), and Thyroid disease.    Discharge Medications:    Current Outpatient Medications   Medication Sig Dispense Refill     acetaminophen (TYLENOL) 325 MG tablet Take 650 mg by mouth every 6 hours as needed for mild pain       amylase-lipase-protease (CREON 12) 25223-80180-09205 units CPEP Take 2 capsules by mouth 3 times daily (with meals)       artificial saliva (BIOTENE MT) SOLN solution Swish and spit 2 sprays in mouth every hour as needed for dry mouth       carboxymethylcellulose PF (REFRESH PLUS) 0.5 % ophthalmic solution Place 1 drop into both eyes daily as needed for dry eyes       hypromellose-dextran (ARTIFICAL TEARS) 0.1-0.3 % ophthalmic solution Place 2 drops into both eyes daily as needed for dry eyes       Levocetirizine Dihydrochloride (XYZAL ALLERGY 24HR PO) Take 5 mg by mouth daily       levothyroxine (SYNTHROID/LEVOTHROID) 50 MCG tablet 1 tablet (50 mcg) by Oral or NG Tube route daily       ondansetron (ZOFRAN ODT) 4 MG ODT tab Take 4 mg by mouth every 6 hours as needed for nausea       pantoprazole (PROTONIX) 40 MG EC tablet Take 40 mg by mouth 2 times daily       risperiDONE (RISPERDAL) 2 MG tablet Take 2 mg by mouth 2 times daily       senna-docusate (SENOKOT-S/PERICOLACE) 8.6-50 MG tablet Take 1 tablet by mouth 2 times daily as needed for constipation       sertraline (ZOLOFT) 100 MG tablet Take 100 mg by mouth daily       sodium chloride, PF, 0.9% PF flush 3 mLs by Intracatheter route every 8 hours       topiramate (TOPAMAX) 100 MG tablet 1 tablet (100 mg) by Oral or NG Tube route every morning       topiramate (TOPAMAX) 50 MG tablet Take 1 tablet (50 mg) by mouth At Bedtime       zinc sulfate (ZINCATE) 220 (50 Zn) MG capsule 1 capsule (220 mg) by Oral or NG Tube route daily         Medication Changes/Rationale:     N/A    Controlled medications sent with patient:   not applicable/none          ROS:   10 point ROS of systems including Constitutional, Eyes, Respiratory,  "Cardiovascular, Gastroenterology, Genitourinary, Integumentary, Musculoskeletal, Psychiatric were all negative except for pertinent positives noted in my HPI.    Physical Exam:   Vitals: BP (!) 143/93   Pulse 78   Temp 98.3  F (36.8  C)   Resp 18   Ht 1.651 m (5' 5\")   Wt 83.7 kg (184 lb 9.6 oz)   SpO2 96%   BMI 30.72 kg/m    BMI= Body mass index is 30.72 kg/m .  GENERAL APPEARANCE:  Alert, in no distress, appears healthy, oriented, cooperative  ENT:  Mouth and posterior oropharynx normal, moist mucous membranes, normal hearing acuity  EYES:  EOM, conjunctivae, lids, pupils and irises normal, PERRL  NECK:  No adenopathy,masses or thyromegaly  RESP:  no respiratory distress  CV:  no edema  M/S:   Gait and station abnormal, sitting in chair. Active ROM to all extremities.  SKIN:  Inspection of skin and subcutaneous tissue baseline, Palpation of skin and subcutaneous tissue baseline  NEURO:   Cranial nerves 2-12 are normal tested and grossly at patient's baseline, Examination of sensation by touch normal  PSYCH:  Oriented x 3, affect and mood normal       SNF labs: Recent labs in [facility] reviewed by me today.  and   Most Recent 3 CBC's:  Recent Labs   Lab Test 08/31/22  0620 08/25/22  1030 08/17/22  0625   WBC 5.1 6.4 6.5   HGB 11.4* 11.4* 11.3*   MCV 90 93 93    235 267     Most Recent 3 BMP's:  Recent Labs   Lab Test 09/01/22  0820 08/31/22  0620 08/25/22  1030    149* 141   POTASSIUM 3.5 3.6 3.2*   CHLORIDE 112* 120* 111*   CO2 23 22 24   BUN 14 15 12   CR 0.84 0.76 0.91   ANIONGAP 6 7 6   NASIR 10.1 9.9 9.5   * 75 86         DISCHARGE PLAN:    Follow up labs: [facility] 9/6    Medical Follow Up:      Follow up with primary care provider in 1-2 weeks    Follow up with specialist: GI, Neurologist       Discharge Services: Home Care:  Occupational Therapy, Physical Therapy, Speech Therapy , Registered Nurse, Home Health Aide and From:  [facility]      Discharge Instructions Verbalized to Patient " at Discharge:     Notify PCP if you have a fever greater than 100.5 degrees.           TOTAL DISCHARGE TIME:   Greater than 30 minutes     Electronically signed by:  DILLON Ward CNP

## 2022-09-06 ENCOUNTER — TRANSCRIBE ORDERS (OUTPATIENT)
Dept: GERIATRICS | Facility: CLINIC | Age: 52
End: 2022-09-06

## 2022-09-06 DIAGNOSIS — R13.10 DYSPHAGIA: Primary | ICD-10-CM

## 2022-09-06 NOTE — PROGRESS NOTES
[place] [facility] Discharge Orders    Name: Jagdeep Walker  : 1970  Planned Discharge Date: 22  Discharged to: previous group home    MEDICAL FOLLOW UP  Follow up with PCP in 1-2 weeks   Follow up with Specialists: GI as directed      FUTURE LABS: No labs orders/due      ORDER CHANGES:  N/A    DISCHARGE MEDICATIONS:  The patient s pharmacy is authorized to dispense a 30-day supply of medications. Refill requests should be directed to the primary provider, Joel Werner   No narcotics are prescribed at time of discharge.     Current Outpatient Medications   Medication Sig Dispense Refill     acetaminophen (TYLENOL) 325 MG tablet Take 650 mg by mouth every 6 hours as needed for mild pain       amylase-lipase-protease (CREON 12) [id number] units CPEP Take 2 capsules by mouth 3 times daily (with meals)       artificial saliva (BIOTENE MT) SOLN solution Swish and spit 2 sprays in mouth every hour as needed for dry mouth       carboxymethylcellulose PF (REFRESH PLUS) 0.5 % ophthalmic solution Place 1 drop into both eyes daily as needed for dry eyes       hypromellose-dextran (ARTIFICAL TEARS) 0.1-0.3 % ophthalmic solution Place 2 drops into both eyes daily as needed for dry eyes       Levocetirizine Dihydrochloride (XYZAL ALLERGY 24HR PO) Take 5 mg by mouth daily       levothyroxine (SYNTHROID/LEVOTHROID) 50 MCG tablet 1 tablet (50 mcg) by Oral or NG Tube route daily       ondansetron (ZOFRAN ODT) 4 MG ODT tab Take 4 mg by mouth every 6 hours as needed for nausea       pantoprazole (PROTONIX) 40 MG EC tablet Take 40 mg by mouth 2 times daily       risperiDONE (RISPERDAL) 2 MG tablet Take 2 mg by mouth 2 times daily       senna-docusate (SENOKOT-S/PERICOLACE) 8.6-50 MG tablet Take 1 tablet by mouth 2 times daily as needed for constipation       sertraline (ZOLOFT) 100 MG tablet Take 100 mg by mouth daily       sodium chloride, PF, 0.9% PF flush 3 mLs by Intracatheter route every 8 hours        topiramate (TOPAMAX) 100 MG tablet 1 tablet (100 mg) by Oral or NG Tube route every morning       topiramate (TOPAMAX) 50 MG tablet Take 1 tablet (50 mg) by mouth At Bedtime       zinc sulfate (ZINCATE) 220 (50 Zn) MG capsule 1 capsule (220 mg) by Oral or NG Tube route daily         SERVICES:  Home Care:  Occupational Therapy, Physical Therapy, Registered Nurse and Home Health Aide    ADDITIONAL INSTRUCTIONS:  Notify PCP if you have a fever greater than 100.5 degrees.       DILLON Ward CNP  This document was electronically signed on September 6, 2022

## 2022-09-07 ENCOUNTER — TRANSITIONAL CARE UNIT VISIT (OUTPATIENT)
Dept: GERIATRICS | Facility: CLINIC | Age: 52
End: 2022-09-07
Payer: MEDICARE

## 2022-09-07 VITALS
DIASTOLIC BLOOD PRESSURE: 68 MMHG | TEMPERATURE: 98.1 F | HEIGHT: 65 IN | HEART RATE: 77 BPM | BODY MASS INDEX: 30.81 KG/M2 | WEIGHT: 184.9 LBS | SYSTOLIC BLOOD PRESSURE: 124 MMHG | RESPIRATION RATE: 18 BRPM | OXYGEN SATURATION: 98 %

## 2022-09-07 DIAGNOSIS — S51.002D OPEN WOUND OF LEFT ELBOW, SUBSEQUENT ENCOUNTER: ICD-10-CM

## 2022-09-07 DIAGNOSIS — M70.22 OLECRANON BURSITIS OF LEFT ELBOW: Primary | ICD-10-CM

## 2022-09-07 PROBLEM — R56.9 SEIZURES, GENERALIZED CONVULSIVE (H): Status: ACTIVE | Noted: 2018-03-09

## 2022-09-07 PROBLEM — E66.01 MORBID EXOGENOUS OBESITY (H): Status: ACTIVE | Noted: 2018-03-09

## 2022-09-07 PROCEDURE — 99308 SBSQ NF CARE LOW MDM 20: CPT | Performed by: NURSE PRACTITIONER

## 2022-09-07 NOTE — LETTER
9/7/2022        RE: Jagdeep Walker  [address]  [place] [address]        [place] [facility]  [place] Medical Record Number:  [id number]  Place of Service where encounter took place:  [facility] [place] ([facility]) [[id number]]  Chief Complaint   Patient presents with     RECHECK       HPI:    Jagdeep Walker  is a 52 year old (1970), who is being seen today for an episodic care visit.  HPI information obtained from: {FGS HPI:431245}. Today's concern is:  {FGS DX:189708}    Past Medical and Surgical History reviewed in Epic today.    MEDICATIONS:  {Providers Please double check the med list (in the plan section >> meds & orders tab) and Discontinue any of the meds flagged by the TC to be discontinued}  Current Outpatient Medications   Medication Sig Dispense Refill     acetaminophen (TYLENOL) 325 MG tablet Take 650 mg by mouth every 6 hours as needed for mild pain       amylase-lipase-protease (CREON 12) [id number] units CPEP Take 2 capsules by mouth 3 times daily (with meals)       artificial saliva (BIOTENE MT) SOLN solution Swish and spit 2 sprays in mouth every hour as needed for dry mouth       carboxymethylcellulose PF (REFRESH PLUS) 0.5 % ophthalmic solution Place 1 drop into both eyes daily as needed for dry eyes       hypromellose-dextran (ARTIFICAL TEARS) 0.1-0.3 % ophthalmic solution Place 2 drops into both eyes daily as needed for dry eyes       Levocetirizine Dihydrochloride (XYZAL ALLERGY 24HR PO) Take 5 mg by mouth daily       levothyroxine (SYNTHROID/LEVOTHROID) 50 MCG tablet 1 tablet (50 mcg) by Oral or NG Tube route daily       ondansetron (ZOFRAN ODT) 4 MG ODT tab Take 4 mg by mouth every 6 hours as needed for nausea       pantoprazole (PROTONIX) 40 MG EC tablet Take 40 mg by mouth 2 times daily       risperiDONE (RISPERDAL) 2 MG tablet Take 2 mg by mouth 2 times daily       senna-docusate (SENOKOT-S/PERICOLACE) 8.6-50 MG tablet Take 1 tablet  by mouth 2 times daily as needed for constipation       sertraline (ZOLOFT) 100 MG tablet Take 100 mg by mouth daily       sodium chloride, PF, 0.9% PF flush 3 mLs by Intracatheter route every 8 hours       topiramate (TOPAMAX) 100 MG tablet 1 tablet (100 mg) by Oral or NG Tube route every morning       topiramate (TOPAMAX) 50 MG tablet Take 1 tablet (50 mg) by mouth At Bedtime       zinc sulfate (ZINCATE) 220 (50 Zn) MG capsule 1 capsule (220 mg) by Oral or NG Tube route daily       ***    REVIEW OF SYSTEMS:  {ROS FGS:000436}    Objective:  There were no vitals taken for this visit.  Exam:  {[facility] physical exam :005505}    Labs:   {fgslab:949231}    ASSESSMENT/PLAN:  {FGS DX:587831}    {fgsorders:667237}  ***    {fgstime1:452436}  Electronically signed by:  Susi Chowdhury MA ***  {Providers Please double check the med list (in the plan section >> meds & orders tab) and Discontinue any of the meds flagged by the TC to be discontinued}            Sincerely,        DILLON Saini CNP

## 2022-09-07 NOTE — PROGRESS NOTES
[place] [facility]  [place] Medical Record Number:  [id number]  Place of Service where encounter took place:  [facility] [place] (TCU) [[id number]]  Chief Complaint   Patient presents with     RECHECK       HPI:    Jagdeep Walker  is a 52 year old (1970), who is being seen today for an episodic care visit.  HPI information obtained from: facility chart records, facility staff and patient report. Today's concern is:      Patient Jagdeep Walker is a 51 yr old male admitted to [facility] for rehabilitation [facility]10[facility] for shortness of breath and large right pleural effusion     s/p hospitalization [facility] 6/4-6/10/22 for metabolic encephalopathy of unclear etiology, falls, and weakness  S/p hospitalization 5/8-5/17/22 for acute pancreatitis with pseudocysts, portal vein thrombus    PMHx seizure disorder, schizoaffective and bipolar disorder, JACLYN, asthma, chronic anemia, Fisher's esophagus, GERD, HTN, hypothyroidism  Lives in group home     Follow-up    Neurology Clinic 2 wks  gastrointestinal specialist Dr. Clark for pancreatic follow up         Olecranon bursitis of left elbow  Open wound of left elbow, subsequent encounter     Nurse report red elbow yesterday and drainage from wound  At visit patient denies pain or concern    Past Medical and Surgical History reviewed in Epic today.    MEDICATIONS:    Current Outpatient Medications   Medication Sig Dispense Refill     acetaminophen (TYLENOL) 325 MG tablet Take 650 mg by mouth every 6 hours as needed for mild pain       amylase-lipase-protease (CREON 12) 89324-42861-25731 units CPEP Take 2 capsules by mouth 3 times daily (with meals)       artificial saliva (BIOTENE MT) SOLN solution Swish and spit 2 sprays in mouth every hour as needed for dry mouth       carboxymethylcellulose PF (REFRESH PLUS) 0.5 % ophthalmic solution Place 1 drop into both eyes daily as needed for dry  "eyes       hypromellose-dextran (ARTIFICAL TEARS) 0.1-0.3 % ophthalmic solution Place 2 drops into both eyes daily as needed for dry eyes       Levocetirizine Dihydrochloride (XYZAL ALLERGY 24HR PO) Take 5 mg by mouth daily       levothyroxine (SYNTHROID/LEVOTHROID) 50 MCG tablet 1 tablet (50 mcg) by Oral or NG Tube route daily       ondansetron (ZOFRAN ODT) 4 MG ODT tab Take 4 mg by mouth every 6 hours as needed for nausea       pantoprazole (PROTONIX) 40 MG EC tablet Take 40 mg by mouth 2 times daily       risperiDONE (RISPERDAL) 2 MG tablet Take 2 mg by mouth 2 times daily       senna-docusate (SENOKOT-S/PERICOLACE) 8.6-50 MG tablet Take 1 tablet by mouth 2 times daily as needed for constipation       sertraline (ZOLOFT) 100 MG tablet Take 100 mg by mouth daily       sodium chloride, PF, 0.9% PF flush 3 mLs by Intracatheter route every 8 hours       topiramate (TOPAMAX) 100 MG tablet 1 tablet (100 mg) by Oral or NG Tube route every morning       topiramate (TOPAMAX) 50 MG tablet Take 1 tablet (50 mg) by mouth At Bedtime       zinc sulfate (ZINCATE) 220 (50 Zn) MG capsule 1 capsule (220 mg) by Oral or NG Tube route daily           REVIEW OF SYSTEMS:  4 point ROS including Respiratory, CV, GI and , other than that noted in the HPI,  is negative    Objective:  /68   Pulse 77   Temp 98.1  F (36.7  C)   Resp 18   Ht 1.651 m (5' 5\")   Wt 83.9 kg (184 lb 14.4 oz)   SpO2 98%   BMI 30.77 kg/m    Exam:  GENERAL APPEARANCE:  Alert, in no distress  RESP:  respiratory effort and palpation of chest normal, lungs clear to auscultation, no respiratory distress  CV:  Palpation and auscultation of heart done, regular rate and rhythm, no murmur, rub, or gallop  ABDOMEN:  normal bowel sounds, soft, nontender, no hepatosplenomegaly or other masses  M/S:   Gait and station normal  SKIN:  Inspection of skin and subcutaneous tissue baseline, left elbow bursitis noted, not appear red, small wound noted  NEURO:   Cranial " nerves 2-12 are normal tested and grossly at patient's baseline  PSYCH:  oriented X 3    Labs:   Labs done in SNF are in [facility] EPIC. Please refer to them using EPIC/Care Everywhere.    ASSESSMENT/PLAN:     Olecranon bursitis of left elbow  Open wound of left elbow, subsequent encounter     Nurse report red elbow yesterday and drainage from wound  At visit patient denies pain or concern  On exam not appear infected, no redness or drainage  Wound open to air, updated nurse to apply dressing and change daily until healed  Has known bursitis   Monitor     Patient plans to discharge home today       Electronically signed by:  DILLON Saini CNP

## 2022-09-21 ENCOUNTER — NURSE TRIAGE (OUTPATIENT)
Dept: NURSING | Facility: CLINIC | Age: 52
End: 2022-09-21

## 2022-09-21 ENCOUNTER — OFFICE VISIT (OUTPATIENT)
Dept: URGENT CARE | Facility: URGENT CARE | Age: 52
End: 2022-09-21
Payer: MEDICARE

## 2022-09-21 VITALS
OXYGEN SATURATION: 100 % | SYSTOLIC BLOOD PRESSURE: 138 MMHG | DIASTOLIC BLOOD PRESSURE: 85 MMHG | TEMPERATURE: 97.4 F | HEART RATE: 77 BPM

## 2022-09-21 DIAGNOSIS — H10.31 ACUTE BACTERIAL CONJUNCTIVITIS OF RIGHT EYE: Primary | ICD-10-CM

## 2022-09-21 DIAGNOSIS — H10.31 ACUTE BACTERIAL CONJUNCTIVITIS OF RIGHT EYE: ICD-10-CM

## 2022-09-21 PROCEDURE — 99213 OFFICE O/P EST LOW 20 MIN: CPT | Performed by: INTERNAL MEDICINE

## 2022-09-21 RX ORDER — POLYMYXIN B SULFATE AND TRIMETHOPRIM 1; 10000 MG/ML; [USP'U]/ML
1-2 SOLUTION OPHTHALMIC EVERY 4 HOURS
Qty: 10 ML | Refills: 0 | Status: SHIPPED | OUTPATIENT
Start: 2022-09-21 | End: 2023-05-03

## 2022-09-21 NOTE — PROGRESS NOTES
Assessment & Plan     Acute bacterial conjunctivitis of right eye  - trimethoprim-polymyxin b (POLYTRIM) 59808-4.1 UNIT/ML-% ophthalmic solution; Place 1-2 drops into the right eye every 4 hours    Abhijit Torres MD  [facility] CHILO Gannon is a 52 year old, presenting for the following health issues:  Urgent Care (Right eye pain x 2 days)      HPI   Two days of right eye swelling and crusting.  Has been having some allergy symptoms with sneezing and runny nose.  No sore throat or fevers.     Review of Systems   Constitutional, HEENT, cardiovascular, pulmonary, gi and gu systems are negative, except as otherwise noted.      Objective    /85 (BP Location: Right arm, Patient Position: Sitting, Cuff Size: Adult Regular)   Pulse 77   Temp 97.4  F (36.3  C) (Tympanic)   SpO2 100%   There is no height or weight on file to calculate BMI.  Physical Exam   GENERAL APPEARANCE: alert and no distress  RIGHT EYE: lower leg is swollen with clear drainage and conjunctival injection  HENT: ear canals and TM's normal and nose and mouth without ulcers or lesions  NECK: no adenopathy, no asymmetry, masses, or scars and thyroid normal to palpation

## 2022-09-22 NOTE — TELEPHONE ENCOUNTER
Triage Call:    Harry Shah's pharmacist, calling to report she hasn't received a prescription for Polytrim from Urgent Care.  While preparing to resend prescription, pharmacist reports she is able to view it now.    Oralia Stratton RN  09/21/22 7:07 PM  [facility] Nurse Advisor      Reason for Disposition    Pharmacy calling with prescription question and triager answers question    Additional Information    Negative: [1] Intentional drug overdose AND [2] suicidal thoughts or ideas    Negative: Drug overdose and triager unable to answer question    Negative: Caller requesting a renewal or refill of a medicine patient is currently taking    Negative: Caller requesting information unrelated to medicine    Negative: Caller requesting information about COVID-19 Vaccine    Negative: Caller requesting information about Emergency Contraception    Negative: Caller requesting information about Combined Birth Control Pills    Negative: Caller requesting information about Progestin Birth Control Pills    Negative: Caller requesting information about Post-Op pain or medicines    Negative: Caller requesting a prescription antibiotic (such as Penicillin) for Strep throat and has a positive culture result    Negative: Caller requesting a prescription anti-viral med (such as Tamiflu) and has influenza (flu) symptoms    Negative: Immunization reaction suspected    Negative: Rash while taking a medicine or within 3 days of stopping it    Negative: [1] Asthma and [2] having symptoms of asthma (cough, wheezing, etc.)    Negative: [1] Symptom of illness (e.g., headache, abdominal pain, earache, vomiting) AND [2] more than mild    Negative: Breastfeeding questions about mother's medicines and diet    Negative: MORE THAN A DOUBLE DOSE of a prescription or over-the-counter (OTC) drug    Negative: [1] DOUBLE DOSE (an extra dose or lesser amount) of prescription drug AND [2] any symptoms (e.g., dizziness, nausea, pain,  sleepiness)    Negative: [1] DOUBLE DOSE (an extra dose or lesser amount) of over-the-counter (OTC) drug AND [2] any symptoms (e.g., dizziness, nausea, pain, sleepiness)    Negative: Took another person's prescription drug    Negative: [1] DOUBLE DOSE (an extra dose or lesser amount) of prescription drug AND [2] NO symptoms (Exception: a double dose of antibiotics)    Negative: Diabetes drug error or overdose (e.g., took wrong type of insulin or took extra dose)    Negative: [1] Prescription not at pharmacy AND [2] was prescribed by PCP recently (Exception: triager has access to EMR and prescription is recorded there. Go to Home Care and confirm for pharmacy.)    Negative: [1] Pharmacy calling with prescription question AND [2] triager unable to answer question    Negative: [1] Caller has URGENT medicine question about med that PCP or specialist prescribed AND [2] triager unable to answer question    Negative: Medicine patch causing local rash or itching    Negative: [1] Caller has medicine question about med NOT prescribed by PCP AND [2] triager unable to answer question (e.g., compatibility with other med, storage)    Negative: Prescription request for new medicine (not a refill)    Negative: [1] Caller has NON-URGENT medicine question about med that PCP prescribed AND [2] triager unable to answer question    Negative: Caller wants to use a complementary or alternative medicine    Negative: [1] Prescription prescribed recently is not at pharmacy AND [2] triager has access to patient's EMR AND [3] prescription is recorded in the EMR    Negative: [1] DOUBLE DOSE (an extra dose or lesser amount) of over-the-counter (OTC) drug AND [2] NO symptoms    Negative: [1] DOUBLE DOSE (an extra dose or lesser amount) of antibiotic drug AND [2] NO symptoms    Negative: Caller requesting information about medicine use with breastfeeding; neither adult nor infant is ill, triager answers question    Negative: Caller requesting  information about medicine during pregnancy; adult is not ill AND triager answers question    Negative: Caller has medicine question, adult has minor symptoms, caller declines triage, AND triager answers question    Negative: Caller has medicine question only, adult not sick, AND triager answers question    Protocols used: MEDICATION QUESTION CALL-A-AH

## 2022-10-03 ENCOUNTER — HOSPITAL ENCOUNTER (EMERGENCY)
Facility: CLINIC | Age: 52
Discharge: HOME | End: 2022-10-03
Payer: MEDICARE

## 2022-10-03 ENCOUNTER — OFFICE VISIT (OUTPATIENT)
Dept: URGENT CARE | Facility: URGENT CARE | Age: 52
End: 2022-10-03
Payer: MEDICARE

## 2022-10-03 VITALS
SYSTOLIC BLOOD PRESSURE: 112 MMHG | OXYGEN SATURATION: 96 % | DIASTOLIC BLOOD PRESSURE: 76 MMHG | WEIGHT: 185 LBS | HEIGHT: 64 IN | BODY MASS INDEX: 31.58 KG/M2 | HEART RATE: 80 BPM | RESPIRATION RATE: 16 BRPM | TEMPERATURE: 98.7 F

## 2022-10-03 VITALS
DIASTOLIC BLOOD PRESSURE: 99 MMHG | TEMPERATURE: 97.9 F | RESPIRATION RATE: 18 BRPM | SYSTOLIC BLOOD PRESSURE: 158 MMHG | HEART RATE: 98 BPM | OXYGEN SATURATION: 100 %

## 2022-10-03 DIAGNOSIS — H21.89 CILIARY FLUSH: Primary | ICD-10-CM

## 2022-10-03 DIAGNOSIS — H57.89 EYE SWELLING, RIGHT: ICD-10-CM

## 2022-10-03 DIAGNOSIS — H26.9 CATARACT OF RIGHT EYE, UNSPECIFIED CATARACT TYPE: ICD-10-CM

## 2022-10-03 PROBLEM — E44.0 MODERATE PROTEIN-CALORIE MALNUTRITION (H): Status: ACTIVE | Noted: 2022-07-10

## 2022-10-03 PROBLEM — R48.8 OTHER SYMBOLIC DYSFUNCTIONS: Status: ACTIVE | Noted: 2022-06-10

## 2022-10-03 PROBLEM — J96.01 ACUTE RESPIRATORY FAILURE WITH HYPOXIA (H): Status: ACTIVE | Noted: 2022-07-10

## 2022-10-03 PROBLEM — R68.2 DRY MOUTH, UNSPECIFIED: Status: ACTIVE | Noted: 2022-06-10

## 2022-10-03 PROBLEM — E55.9 VITAMIN D DEFICIENCY, UNSPECIFIED: Status: ACTIVE | Noted: 2022-06-10

## 2022-10-03 PROBLEM — G93.41 METABOLIC ENCEPHALOPATHY: Status: ACTIVE | Noted: 2022-06-10

## 2022-10-03 PROBLEM — K21.9 GASTRO-ESOPHAGEAL REFLUX DISEASE WITHOUT ESOPHAGITIS: Status: ACTIVE | Noted: 2022-06-10

## 2022-10-03 PROBLEM — R60.9 EDEMA, UNSPECIFIED: Status: ACTIVE | Noted: 2022-06-10

## 2022-10-03 PROBLEM — F25.9 SCHIZOAFFECTIVE DISORDER, UNSPECIFIED (H): Status: ACTIVE | Noted: 2022-06-10

## 2022-10-03 PROBLEM — J45.909 UNSPECIFIED ASTHMA, UNCOMPLICATED: Status: ACTIVE | Noted: 2022-06-10

## 2022-10-03 PROBLEM — H04.123 DRY EYE SYNDROME OF BILATERAL LACRIMAL GLANDS: Status: ACTIVE | Noted: 2022-06-10

## 2022-10-03 PROBLEM — E71.42 CARNITINE DEFICIENCY DUE TO INBORN ERRORS OF METABOLISM (H): Status: ACTIVE | Noted: 2022-06-10

## 2022-10-03 PROBLEM — F33.9 MAJOR DEPRESSIVE DISORDER, RECURRENT, UNSPECIFIED (H): Status: ACTIVE | Noted: 2022-06-10

## 2022-10-03 PROBLEM — E87.0 HYPEROSMOLALITY AND HYPERNATREMIA: Status: ACTIVE | Noted: 2022-07-10

## 2022-10-03 PROBLEM — E60: Status: ACTIVE | Noted: 2022-07-10

## 2022-10-03 PROBLEM — N39.0 URINARY TRACT INFECTION, SITE NOT SPECIFIED: Status: ACTIVE | Noted: 2022-07-10

## 2022-10-03 PROBLEM — F41.9 ANXIETY DISORDER, UNSPECIFIED: Status: ACTIVE | Noted: 2022-07-10

## 2022-10-03 PROBLEM — D64.9 ANEMIA, UNSPECIFIED: Status: ACTIVE | Noted: 2022-06-10

## 2022-10-03 PROBLEM — K58.1 IRRITABLE BOWEL SYNDROME WITH CONSTIPATION: Status: ACTIVE | Noted: 2022-06-10

## 2022-10-03 PROBLEM — D69.6 THROMBOCYTOPENIA, UNSPECIFIED (H): Status: ACTIVE | Noted: 2022-06-10

## 2022-10-03 PROCEDURE — 99214 OFFICE O/P EST MOD 30 MIN: CPT | Performed by: NURSE PRACTITIONER

## 2022-10-03 NOTE — PROGRESS NOTES
Chief Complaint   Patient presents with     Eye Problem     Possible pink eye in right eye x 7 days -- WAS seen  in [facility] in [place] last week for eye issue -- NOW still having redness, pain, discharge     Forms     Form to fill out      SUBJECTIVE:  Jagdeep Walker is a 52 year old male presenting with severe right eye redness, swelling of eyeball, bilateral yellow goop, worsening for a week despite polytrim drops. History of cataract surgery with poor sensation and vision at baseline. No fevers. Eye clinic in [place], but caretaker says they cannot get in same day.    Past Medical History:   Diagnosis Date     Anxiety      Fisher esophagus      Benign essential hypertension      Bipolar disorder (H)      Depression      Obesity      Pancreatitis      Portal vein thrombosis      Schizoaffective disorder, bipolar type (H)      Seizure disorder (H)      Thyroid disease      amylase-lipase-protease (CREON 12) 71357-51263-14419 units CPEP, Take 2 capsules by mouth 3 times daily (with meals)  artificial saliva (BIOTENE MT) SOLN solution, Swish and spit 2 sprays in mouth every hour as needed for dry mouth  carboxymethylcellulose PF (REFRESH PLUS) 0.5 % ophthalmic solution, Place 1 drop into both eyes daily as needed for dry eyes  hypromellose-dextran (ARTIFICAL TEARS) 0.1-0.3 % ophthalmic solution, Place 2 drops into both eyes daily as needed for dry eyes  Levocetirizine Dihydrochloride (XYZAL ALLERGY 24HR PO), Take 5 mg by mouth daily  levothyroxine (SYNTHROID/LEVOTHROID) 50 MCG tablet, 1 tablet (50 mcg) by Oral or NG Tube route daily  ondansetron (ZOFRAN ODT) 4 MG ODT tab, Take 4 mg by mouth every 6 hours as needed for nausea  pantoprazole (PROTONIX) 40 MG EC tablet, Take 40 mg by mouth 2 times daily  risperiDONE (RISPERDAL) 2 MG tablet, Take 2 mg by mouth 2 times daily  senna-docusate (SENOKOT-S/PERICOLACE) 8.6-50 MG tablet, Take 1 tablet by mouth 2 times daily as needed for constipation  sertraline (ZOLOFT) 100  "MG tablet, Take 100 mg by mouth daily  sodium chloride, PF, 0.9% PF flush, 3 mLs by Intracatheter route every 8 hours  topiramate (TOPAMAX) 100 MG tablet, 1 tablet (100 mg) by Oral or NG Tube route every morning  topiramate (TOPAMAX) 50 MG tablet, Take 1 tablet (50 mg) by mouth At Bedtime  trimethoprim-polymyxin b (POLYTRIM) 19887-6.1 UNIT/ML-% ophthalmic solution, Place 1-2 drops into the right eye every 4 hours  zinc sulfate (ZINCATE) 220 (50 Zn) MG capsule, 1 capsule (220 mg) by Oral or NG Tube route daily  acetaminophen (TYLENOL) 325 MG tablet, Take 650 mg by mouth every 6 hours as needed for mild pain (Patient not taking: Reported on 10/3/2022)  [DISCONTINUED] albuterol (PROAIR HFA/PROVENTIL HFA/VENTOLIN HFA) 108 (90 Base) MCG/ACT inhaler, Inhale 2 puffs into the lungs every 6 hours as needed for shortness of breath / dyspnea or wheezing  [DISCONTINUED] atenolol (TENORMIN) 25 MG tablet, Take 25 mg by mouth daily  [DISCONTINUED] clonazePAM (KLONOPIN) 0.125 MG TBDP ODT tab, Take 1 tablet (0.125 mg) by mouth daily as needed for anxiety  [DISCONTINUED] divalproex sodium delayed-release (DEPAKOTE) 250 MG DR tablet, Take 1 tablet (250 mg) by mouth At Bedtime  [DISCONTINUED] levOCARNitine (CARNITOR) 330 MG tablet, Take by mouth 3 times daily    No current facility-administered medications on file prior to visit.    Social History     Tobacco Use     Smoking status: Never Smoker     Smokeless tobacco: Never Used   Substance Use Topics     Alcohol use: Not Currently     Comment: Occasional     No Known Allergies    Review of Systems   All systems negative except for those listed above in HPI.    OBJECTIVE:   /76 (BP Location: Right arm, Patient Position: Chair, Cuff Size: Adult Regular)   Pulse 80   Temp 98.7  F (37.1  C) (Oral)   Resp 16   Ht 1.626 m (5' 4\")   Wt 83.9 kg (185 lb)   SpO2 96%   BMI 31.76 kg/m       Physical Exam  Vitals reviewed.   Constitutional:       General: He is not in acute " distress.     Appearance: Normal appearance. He is not ill-appearing, toxic-appearing or diaphoretic.   HENT:      Head: Normocephalic and atraumatic.      Nose: No congestion or rhinorrhea.   Eyes:      General:         Right eye: Discharge present.         Left eye: Discharge present.     Comments: Right pupil and iris edema, ciliary flush bright red throughout entire conjunctiva. Yellow discharge bilaterally.   Cardiovascular:      Rate and Rhythm: Normal rate.   Pulmonary:      Effort: Pulmonary effort is normal.   Skin:     General: Skin is warm and dry.   Neurological:      General: No focal deficit present.      Mental Status: He is alert and oriented to person, place, and time.   Psychiatric:         Mood and Affect: Mood normal.         Behavior: Behavior normal.       ASSESSMENT:    ICD-10-CM    1. Ciliary flush  H21.89    2. Eye swelling, right  H57.89    3. Cataract of right eye, unspecified cataract type  H26.9      PLAN:     Triaged to ER for higher level of care for severe worsening right eye infection  Concern for iritis, episcleritis  He has ciliary flush, iris and pupil outpouching  History of cataract surgery, no pain and poor vision at baseline  Yellow goop and crust, failed polytrim drops  Urgent care is limited without slit lamp exam or pressure testing  Will go with caretaker now by car to ER    Follow up with primary care provider with any problems, questions or concerns or if symptoms worsen or fail to improve. Patient agreed to plan and verbalized understanding.    Karla Pichardo, [facility] [place]

## 2022-10-03 NOTE — PATIENT INSTRUCTIONS
Triaged to ER for higher level of care for severe worsening right eye infection  He has ciliary flush, iris and pupil outpouching  History of cataract surgery, no pain and poor vision at baseline  Yellow goop and crust, failed polytrim drops  Urgent care is limited without slit lamp exam or pressure testing  Will go with caretaker now by car

## 2023-05-02 ENCOUNTER — HOSPITAL ENCOUNTER (INPATIENT)
Facility: CLINIC | Age: 53
LOS: 95 days | Discharge: NURSING, REHAB OR LONG-TERM CARE | DRG: 885 | End: 2023-08-14
Attending: EMERGENCY MEDICINE | Admitting: PSYCHIATRY & NEUROLOGY
Payer: MEDICARE

## 2023-05-02 ENCOUNTER — TELEPHONE (OUTPATIENT)
Dept: BEHAVIORAL HEALTH | Facility: CLINIC | Age: 53
End: 2023-05-02

## 2023-05-02 DIAGNOSIS — G89.29 OTHER CHRONIC BACK PAIN: ICD-10-CM

## 2023-05-02 DIAGNOSIS — K58.1 IRRITABLE BOWEL SYNDROME WITH CONSTIPATION: Primary | ICD-10-CM

## 2023-05-02 DIAGNOSIS — F79 UNSPECIFIED INTELLECTUAL DISABILITIES: ICD-10-CM

## 2023-05-02 DIAGNOSIS — I81 PORTAL VEIN THROMBOSIS: ICD-10-CM

## 2023-05-02 DIAGNOSIS — F25.0 SCHIZOAFFECTIVE DISORDER, BIPOLAR TYPE (H): ICD-10-CM

## 2023-05-02 DIAGNOSIS — F79 INTELLECTUAL DISABILITY: ICD-10-CM

## 2023-05-02 DIAGNOSIS — H04.123 DRY EYE SYNDROME OF BILATERAL LACRIMAL GLANDS: ICD-10-CM

## 2023-05-02 DIAGNOSIS — M54.89 OTHER CHRONIC BACK PAIN: ICD-10-CM

## 2023-05-02 DIAGNOSIS — I10 HYPERTENSION, UNSPECIFIED TYPE: ICD-10-CM

## 2023-05-02 DIAGNOSIS — Z11.52 ENCOUNTER FOR SCREENING LABORATORY TESTING FOR SEVERE ACUTE RESPIRATORY SYNDROME CORONAVIRUS 2 (SARS-COV-2): ICD-10-CM

## 2023-05-02 LAB — SARS-COV-2 RNA RESP QL NAA+PROBE: NEGATIVE

## 2023-05-02 PROCEDURE — 99285 EMERGENCY DEPT VISIT HI MDM: CPT | Performed by: EMERGENCY MEDICINE

## 2023-05-02 PROCEDURE — 90791 PSYCH DIAGNOSTIC EVALUATION: CPT

## 2023-05-02 PROCEDURE — 99285 EMERGENCY DEPT VISIT HI MDM: CPT | Mod: 25 | Performed by: EMERGENCY MEDICINE

## 2023-05-02 PROCEDURE — C9803 HOPD COVID-19 SPEC COLLECT: HCPCS | Performed by: EMERGENCY MEDICINE

## 2023-05-02 PROCEDURE — U0003 INFECTIOUS AGENT DETECTION BY NUCLEIC ACID (DNA OR RNA); SEVERE ACUTE RESPIRATORY SYNDROME CORONAVIRUS 2 (SARS-COV-2) (CORONAVIRUS DISEASE [COVID-19]), AMPLIFIED PROBE TECHNIQUE, MAKING USE OF HIGH THROUGHPUT TECHNOLOGIES AS DESCRIBED BY CMS-2020-01-R: HCPCS | Performed by: EMERGENCY MEDICINE

## 2023-05-02 RX ORDER — TRAZODONE HYDROCHLORIDE 50 MG/1
150 TABLET, FILM COATED ORAL AT BEDTIME
Status: DISCONTINUED | OUTPATIENT
Start: 2023-05-02 | End: 2023-05-11

## 2023-05-02 RX ORDER — RISPERIDONE 2 MG/1
2 TABLET ORAL 2 TIMES DAILY
Status: DISCONTINUED | OUTPATIENT
Start: 2023-05-02 | End: 2023-05-24

## 2023-05-02 RX ORDER — ZINC SULFATE 50(220)MG
220 CAPSULE ORAL DAILY
Status: DISCONTINUED | OUTPATIENT
Start: 2023-05-03 | End: 2023-08-14 | Stop reason: HOSPADM

## 2023-05-02 RX ORDER — TOPIRAMATE 100 MG/1
100 TABLET, FILM COATED ORAL AT BEDTIME
Status: DISCONTINUED | OUTPATIENT
Start: 2023-05-02 | End: 2023-06-08

## 2023-05-02 RX ORDER — RISPERIDONE 0.5 MG/1
0.5 TABLET, ORALLY DISINTEGRATING ORAL 2 TIMES DAILY
Status: DISCONTINUED | OUTPATIENT
Start: 2023-05-02 | End: 2023-05-30

## 2023-05-02 RX ORDER — IPRATROPIUM BROMIDE 42 UG/1
2 SPRAY, METERED NASAL 3 TIMES DAILY PRN
Status: DISCONTINUED | OUTPATIENT
Start: 2023-05-02 | End: 2023-08-14 | Stop reason: HOSPADM

## 2023-05-02 RX ORDER — LEVOTHYROXINE SODIUM 50 UG/1
50 TABLET ORAL
Status: DISCONTINUED | OUTPATIENT
Start: 2023-05-03 | End: 2023-08-14 | Stop reason: HOSPADM

## 2023-05-02 RX ORDER — PREDNISOLONE ACETATE 10 MG/ML
1 SUSPENSION/ DROPS OPHTHALMIC DAILY
Status: DISCONTINUED | OUTPATIENT
Start: 2023-05-03 | End: 2023-08-14 | Stop reason: HOSPADM

## 2023-05-02 RX ORDER — SALIVA STIMULANT COMB. NO.3
2 SPRAY, NON-AEROSOL (ML) MUCOUS MEMBRANE
Status: DISCONTINUED | OUTPATIENT
Start: 2023-05-02 | End: 2023-08-14 | Stop reason: HOSPADM

## 2023-05-02 RX ORDER — SERTRALINE HYDROCHLORIDE 100 MG/1
100 TABLET, FILM COATED ORAL DAILY
Status: DISCONTINUED | OUTPATIENT
Start: 2023-05-03 | End: 2023-05-15

## 2023-05-02 RX ORDER — DOCUSATE SODIUM 100 MG/1
100 CAPSULE, LIQUID FILLED ORAL DAILY
Status: DISCONTINUED | OUTPATIENT
Start: 2023-05-03 | End: 2023-08-14 | Stop reason: HOSPADM

## 2023-05-02 RX ORDER — CHLORHEXIDINE GLUCONATE ORAL RINSE 1.2 MG/ML
15 SOLUTION DENTAL DAILY
Status: DISCONTINUED | OUTPATIENT
Start: 2023-05-03 | End: 2023-08-14 | Stop reason: HOSPADM

## 2023-05-02 RX ORDER — PANTOPRAZOLE SODIUM 40 MG/1
40 TABLET, DELAYED RELEASE ORAL
Status: DISCONTINUED | OUTPATIENT
Start: 2023-05-03 | End: 2023-08-14 | Stop reason: HOSPADM

## 2023-05-02 RX ORDER — CALCIUM POLYCARBOPHIL 625 MG 625 MG/1
625 TABLET ORAL 2 TIMES DAILY
Status: DISCONTINUED | OUTPATIENT
Start: 2023-05-02 | End: 2023-08-14 | Stop reason: HOSPADM

## 2023-05-02 RX ORDER — MONTELUKAST SODIUM 10 MG/1
10 TABLET ORAL AT BEDTIME
Status: DISCONTINUED | OUTPATIENT
Start: 2023-05-02 | End: 2023-08-14 | Stop reason: HOSPADM

## 2023-05-02 ASSESSMENT — COLUMBIA-SUICIDE SEVERITY RATING SCALE - C-SSRS
TOTAL  NUMBER OF INTERRUPTED ATTEMPTS LIFETIME: NO
6. HAVE YOU EVER DONE ANYTHING, STARTED TO DO ANYTHING, OR PREPARED TO DO ANYTHING TO END YOUR LIFE?: NO
2. HAVE YOU ACTUALLY HAD ANY THOUGHTS OF KILLING YOURSELF?: NO
TOTAL  NUMBER OF ABORTED OR SELF INTERRUPTED ATTEMPTS LIFETIME: NO
1. HAVE YOU WISHED YOU WERE DEAD OR WISHED YOU COULD GO TO SLEEP AND NOT WAKE UP?: NO
ATTEMPT LIFETIME: NO

## 2023-05-02 ASSESSMENT — ACTIVITIES OF DAILY LIVING (ADL)
ADLS_ACUITY_SCORE: 35
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
## 2023-05-02 NOTE — ED NOTES
Bed: URE-E  Expected date: 5/2/23  Expected time: 4:27 PM  Means of arrival:   Comments:  Dillon 541: 51 y/o, Dayana EDWARDS from a
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
## 2023-05-03 ENCOUNTER — TELEPHONE (OUTPATIENT)
Dept: BEHAVIORAL HEALTH | Facility: CLINIC | Age: 53
End: 2023-05-03

## 2023-05-03 PROCEDURE — 250N000009 HC RX 250: Performed by: EMERGENCY MEDICINE

## 2023-05-03 PROCEDURE — 250N000013 HC RX MED GY IP 250 OP 250 PS 637: Performed by: EMERGENCY MEDICINE

## 2023-05-03 PROCEDURE — 250N000013 HC RX MED GY IP 250 OP 250 PS 637: Performed by: FAMILY MEDICINE

## 2023-05-03 RX ORDER — MULTIVITAMIN,THERAPEUTIC
1 TABLET ORAL DAILY
COMMUNITY

## 2023-05-03 RX ORDER — BENZOCAINE/MENTHOL 6 MG-10 MG
LOZENGE MUCOUS MEMBRANE 2 TIMES DAILY PRN
COMMUNITY
End: 2023-12-21

## 2023-05-03 RX ORDER — CHLORHEXIDINE GLUCONATE ORAL RINSE 1.2 MG/ML
SOLUTION DENTAL
COMMUNITY

## 2023-05-03 RX ORDER — TRAZODONE HYDROCHLORIDE 150 MG/1
150 TABLET ORAL AT BEDTIME
Status: ON HOLD | COMMUNITY
End: 2023-08-12

## 2023-05-03 RX ORDER — MINERAL OIL, PETROLATUM 425; 573 MG/G; MG/G
OINTMENT OPHTHALMIC
COMMUNITY

## 2023-05-03 RX ORDER — SENNOSIDES A AND B 8.6 MG/1
1 TABLET, FILM COATED ORAL 2 TIMES DAILY PRN
COMMUNITY
End: 2023-12-21

## 2023-05-03 RX ORDER — ONDANSETRON 4 MG/1
4-8 TABLET, ORALLY DISINTEGRATING ORAL EVERY 8 HOURS PRN
Status: ON HOLD | COMMUNITY
End: 2023-08-12

## 2023-05-03 RX ORDER — PREDNISOLONE ACETATE 10 MG/ML
SUSPENSION/ DROPS OPHTHALMIC
COMMUNITY
End: 2023-12-21

## 2023-05-03 RX ORDER — ALBUTEROL SULFATE 90 UG/1
2 AEROSOL, METERED RESPIRATORY (INHALATION) EVERY 6 HOURS PRN
COMMUNITY
End: 2023-12-21

## 2023-05-03 RX ORDER — LOPERAMIDE HCL 2 MG
2 CAPSULE ORAL 4 TIMES DAILY PRN
COMMUNITY
End: 2023-12-21

## 2023-05-03 RX ORDER — CLONAZEPAM 0.12 MG/1
0.12 TABLET, ORALLY DISINTEGRATING ORAL DAILY PRN
Status: ON HOLD | COMMUNITY
End: 2023-08-12

## 2023-05-03 RX ORDER — CALCIUM CARBONATE/VITAMIN D3 600 MG-10
1 TABLET ORAL DAILY
COMMUNITY

## 2023-05-03 RX ORDER — OLANZAPINE 10 MG/1
10 TABLET, ORALLY DISINTEGRATING ORAL AT BEDTIME
Status: DISCONTINUED | OUTPATIENT
Start: 2023-05-04 | End: 2023-05-15

## 2023-05-03 RX ORDER — CALCIUM POLYCARBOPHIL 625 MG 625 MG/1
2 TABLET ORAL 2 TIMES DAILY
Status: ON HOLD | COMMUNITY
End: 2023-08-12

## 2023-05-03 RX ORDER — IPRATROPIUM BROMIDE 42 UG/1
2 SPRAY, METERED NASAL 3 TIMES DAILY
COMMUNITY

## 2023-05-03 RX ORDER — CARBOXYMETHYLCELLULOSE SODIUM 5 MG/ML
1 SOLUTION/ DROPS OPHTHALMIC DAILY PRN
COMMUNITY
End: 2023-12-21

## 2023-05-03 RX ORDER — HYDROXYZINE HYDROCHLORIDE 25 MG/1
25 TABLET, FILM COATED ORAL ONCE
Status: COMPLETED | OUTPATIENT
Start: 2023-05-03 | End: 2023-05-03

## 2023-05-03 RX ORDER — MONTELUKAST SODIUM 10 MG/1
10 TABLET ORAL AT BEDTIME
COMMUNITY

## 2023-05-03 RX ORDER — CICLOPIROX 80 MG/ML
SOLUTION TOPICAL
Status: ON HOLD | COMMUNITY
End: 2023-08-12

## 2023-05-03 RX ORDER — OLANZAPINE 10 MG/1
10 TABLET, ORALLY DISINTEGRATING ORAL ONCE
Status: COMPLETED | OUTPATIENT
Start: 2023-05-03 | End: 2023-05-03

## 2023-05-03 RX ORDER — DOCUSATE SODIUM 100 MG/1
100 CAPSULE, LIQUID FILLED ORAL DAILY
COMMUNITY

## 2023-05-03 RX ORDER — RISPERIDONE 0.5 MG/1
TABLET ORAL
Status: ON HOLD | COMMUNITY
End: 2023-08-12

## 2023-05-03 RX ORDER — LORAZEPAM 2 MG/1
TABLET ORAL
Status: ON HOLD | COMMUNITY
End: 2023-08-12

## 2023-05-03 RX ADMIN — WHITE PETROLATUM 57.7 %-MINERAL OIL 31.9 % EYE OINTMENT: at 18:44

## 2023-05-03 RX ADMIN — TOPIRAMATE 100 MG: 100 TABLET, FILM COATED ORAL at 00:11

## 2023-05-03 RX ADMIN — MONTELUKAST 10 MG: 10 TABLET, FILM COATED ORAL at 00:10

## 2023-05-03 RX ADMIN — CALCIUM POLYCARBOPHIL 625 MG: 625 TABLET, FILM COATED ORAL at 00:10

## 2023-05-03 RX ADMIN — PREDNISOLONE ACETATE 1 DROP: 10 SUSPENSION/ DROPS OPHTHALMIC at 09:35

## 2023-05-03 RX ADMIN — Medication 2 SPRAY: at 00:11

## 2023-05-03 RX ADMIN — RISPERIDONE 0.5 MG: 0.5 TABLET, ORALLY DISINTEGRATING ORAL at 08:34

## 2023-05-03 RX ADMIN — CHLORHEXIDINE GLUCONATE 0.12% ORAL RINSE 15 ML: 1.2 LIQUID ORAL at 08:34

## 2023-05-03 RX ADMIN — DOCUSATE SODIUM 100 MG: 100 CAPSULE, LIQUID FILLED ORAL at 08:34

## 2023-05-03 RX ADMIN — ZINC SULFATE 220 MG (50 MG) CAPSULE 220 MG: CAPSULE at 08:34

## 2023-05-03 RX ADMIN — HYDROXYZINE HYDROCHLORIDE 25 MG: 25 TABLET, FILM COATED ORAL at 11:10

## 2023-05-03 RX ADMIN — RISPERIDONE 2 MG: 2 TABLET ORAL at 18:44

## 2023-05-03 RX ADMIN — RISPERIDONE 0.5 MG: 0.5 TABLET, ORALLY DISINTEGRATING ORAL at 18:44

## 2023-05-03 RX ADMIN — RISPERIDONE 0.5 MG: 0.5 TABLET, ORALLY DISINTEGRATING ORAL at 00:10

## 2023-05-03 RX ADMIN — TRAZODONE HYDROCHLORIDE 150 MG: 100 TABLET ORAL at 21:08

## 2023-05-03 RX ADMIN — MONTELUKAST 10 MG: 10 TABLET, FILM COATED ORAL at 18:44

## 2023-05-03 RX ADMIN — WHITE PETROLATUM 57.7 %-MINERAL OIL 31.9 % EYE OINTMENT: at 00:14

## 2023-05-03 RX ADMIN — OLANZAPINE 10 MG: 10 TABLET, ORALLY DISINTEGRATING ORAL at 11:10

## 2023-05-03 RX ADMIN — LEVOTHYROXINE SODIUM 50 MCG: 25 TABLET ORAL at 08:35

## 2023-05-03 RX ADMIN — OLANZAPINE 10 MG: 10 TABLET, ORALLY DISINTEGRATING ORAL at 19:21

## 2023-05-03 RX ADMIN — SERTRALINE HYDROCHLORIDE 100 MG: 100 TABLET ORAL at 08:33

## 2023-05-03 RX ADMIN — TRAZODONE HYDROCHLORIDE 150 MG: 100 TABLET ORAL at 00:10

## 2023-05-03 RX ADMIN — CALCIUM POLYCARBOPHIL 625 MG: 625 TABLET, FILM COATED ORAL at 18:45

## 2023-05-03 RX ADMIN — CALCIUM POLYCARBOPHIL 625 MG: 625 TABLET, FILM COATED ORAL at 08:35

## 2023-05-03 RX ADMIN — TOPIRAMATE 100 MG: 100 TABLET, FILM COATED ORAL at 18:14

## 2023-05-03 RX ADMIN — RISPERIDONE 2 MG: 2 TABLET ORAL at 08:34

## 2023-05-03 RX ADMIN — RISPERIDONE 2 MG: 2 TABLET ORAL at 00:10

## 2023-05-03 RX ADMIN — PANTOPRAZOLE SODIUM 40 MG: 40 TABLET, DELAYED RELEASE ORAL at 08:34

## 2023-05-03 ASSESSMENT — ACTIVITIES OF DAILY LIVING (ADL)
ADLS_ACUITY_SCORE: 35

## 2023-05-03 NOTE — ED NOTES
Report given to Katia MARTINEZ. Understands patient is upset about admission, but is redirectable. Patient was given snacks and soda prior to bed, declining a change of clothes or personal hygiene items to freshen up for the night. He wants the lights on in the room. He is still needing to give a urine sample, but has not been willing to do at this time as he doesn't want to stay.
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
## 2023-05-03 NOTE — PLAN OF CARE
Jagdeep Walker  May 2, 2023  Plan of Care Hand-off Note     Patient Care Path: Inpatient Mental Health    Plan for Care:     It is the recommendation of this clinician that pt admit to IP MH for safety and stabilization. Pt displays the following risk factors that support IP admission: Pt delusional, disorganized, tangential and agitated. Pt experiencing AH, non-command in nature. Pt is unable to engage in safety planning to mitigate risk level in a non-secure setting. Lower levels of care have not been successful in mitigating risk. Due to this IP is the least restrictive option of care for pt. Pt should remain in IP until deemed safe to return to the community and engage in OP MH supports. Pt will be able to resume work with established providers upon discharge.    Critical Safety Issues: aggressive behavior, delusions, AH    Overview:  This patient is a child/adolescent: No    This patient has additional special visitor precautions: No    Legal Status: Under legal guardianship: Guardianship paperwork is in [employer] ACP tab.     Contacts:     Huong Keenan (sister, legal guardian) [phone]    Sadaf () [phone]    Psychiatry Consult:  Psychiatry Consult not requested because consulted with MD, Dr. Yumiko Reyes, agreed it may be better to wait for IP MH due to recent changes already by outpatient provider    Updated RN, Attending Provider and Guardian regarding plan of care.    ALTAGRACIA Nesbitt

## 2023-05-03 NOTE — PROGRESS NOTES
Triage & Transition Services, Extended Care     Jagdeep CHIRINOS Ricardosusiesadaf  May 3, 2023    Jagdeep is followed related to Placement delay: boarding for IP MH admission. Please see initial [facility] Crisis Assessment completed for complete assessment information. Medical record is reviewed. While patient is in the ED, care team is working towards Learn and Demonstrate at [employer] Focused on Crisis Stabilization.    There are not significant status changes.     11:19 AM Writer called to speak with patient; however he was recently medicated and was not able to meet with writer.  12:55 PM Writer spoke to patients Sister/Guardian Huong for 15 minutes. Huong reports patient began to have some odd behavior starting in March; however reported relative stability over the past 30 years. Huong states some of the things patient is stating recently seem to be indicative of their fathers life. She reports that he continues to believe their parents are alive despite their mom passing away 12 years ago and their father passing away 6 years ago. Huong continues to be in support of patient going inpatient for stabilization.  2:55 PM writer spoke to [facility] staff who report that patient is now sleeping.    Plan:  Inpatient Mental Health: Due to ongoing disorientation and psychosis.  Additionally patient continues to display some intrusive  behaviors while waiting placement.    Plan for Care reviewed with Assigned Medical Provider? Yes. Provider, Dr. Crain, response: Acknowledges    Extended Care will follow and meet with patient/family/care team as able or requested.     Kofi Lujan  [facility], Extended Care   [phone]

## 2023-05-03 NOTE — PROGRESS NOTES
"The patient ambulated independently to the [facility] with the ED staff. Denied pain and SI. /90 and Pulse 85 The patient is cooperative with staff. Compliant with medication. The patient appears to be hallucinating and has delusions. He is laughing loudly and talking inappropriately to himself in the milieu. Obviously responding to internal stimuli. \"You said you've been with all these women, but how many of these women are you \"F\" up?\" Using the bathroom two times.Patient received Olanzapine 10 mg and Vistaril 25 mg at 11:00AM. Will continue to monitor pt.  "

## 2023-05-03 NOTE — ED NOTES
Patient very upset that he has to be admitted, stating he is going to leave the ED and walk the streets. Patient was redirected to a room that opened up, and was given food/decaf coffee and is currently watching tv. He was given warm blankets and states to feel better now. Difficult time understanding why is he being admitted, but has been given emotional reassurance.

## 2023-05-03 NOTE — PROGRESS NOTES
Triage & Transition Services, Extended Care    Client Name: Jagdeep Walker    Date: May 3, 2023  Service Type:  Group Therapy    pt did not participate in group     ALTAGRACIA Leyva

## 2023-05-03 NOTE — ED NOTES
"Pt alert,  noted pt had an an episode of urinary incontinence, \"I didn't get up in time.\" Hospital pants provided. Pt able to change into hosptial pants independtly. pt personal belonging bagged and labeled( his belonging kept in pt room.   "
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
## 2023-05-03 NOTE — ED NOTES
IP MH Referral Acuity Rating Score (RARS)    LMHP complete at referral to IP MH, with DEC; and, daily while awaiting IP MH placement. Call score to PPS.  CRITERIA SCORING   New 70 HH and Involuntary for IP MH (not adolescent) 0/1   Boarding over 24 hours 1/1   Vulnerable adult at least 55+ with multiple co morbidities; or, Patient age 11 or under 1/1   Suicide ideation without relief of precipitating factors 0/1   Current plan for suicide 0/1   Current plan for homicide 0/1   Imminent risk or actual attempt to seriously harm another without relief of factors precipitating the attempt 0/1   Severe dysfunction in daily living (ex: complete neglect for self care, extreme disruption in vegetative function, extreme deterioration in social interactions) 1/1   Recent (last 2 weeks) or current physical aggression in the ED 0/1   Restraints or seclusion episode in ED 0/1   Verbal aggression, agitation, yelling, etc., while in the ED 0/1   Active psychosis with psychomotor agitation or catatonia 0/1   Need for constant or near constant redirection (from leaving, from others, etc).  0/1   Intrusive or disruptive behaviors 1/1   TOTAL Acuity Total Score: 4

## 2023-05-03 NOTE — ED NOTES
IP MH Referral Acuity Rating Score (RARS)    LMHP complete at referral to IP MH, with DEC; and, daily while awaiting IP MH placement. Call score to PPS.  CRITERIA SCORING   New 70 HH and Involuntary for IP MH (not adolescent) 0/1   Boarding over 24 hours 0/1   Vulnerable adult at least 55+ with multiple co morbidities; or, Patient age 11 or under 1/1   Suicide ideation without relief of precipitating factors 0/1   Current plan for suicide 0/1   Current plan for homicide 0/1   Imminent risk or actual attempt to seriously harm another without relief of factors precipitating the attempt 0/1   Severe dysfunction in daily living (ex: complete neglect for self care, extreme disruption in vegetative function, extreme deterioration in social interactions) 1/1   Recent (last 2 weeks) or current physical aggression in the ED 0/1   Restraints or seclusion episode in ED 0/1   Verbal aggression, agitation, yelling, etc., while in the ED 0/1   Active psychosis with psychomotor agitation or catatonia 0/1   Need for constant or near constant redirection (from leaving, from others, etc).  0/1   Intrusive or disruptive behaviors 0/1   TOTAL Acuity Total Score: 2

## 2023-05-03 NOTE — PHARMACY-ADMISSION MEDICATION HISTORY
Admission Medication History Completed by Pharmacy    See [facility] Admission Navigator for allergy information, preferred outpatient pharmacy, prior to admission medications and immunization status.     Medication history sources:  medication list sent by patient's ; lu     Pertinent changes made to PTA medication list:  Added: All PTA medications were added to list   Deleted: N/A  Changed: N/A    Additional medication history information:  - No additional OTC medications/supplements were listed in patient's medication list provided by group home.     Per [place] :   Clonazepam 0.125 mg ODT #20 tablets for 30 day supply - picked up from pharmacy from 12/12/2022  Lorazepam 2 mg #10 tablets for 10 days - written on 6/10/22, never picked up from pharmacy     Prior to Admission medications    Medication Sig Last Dose Taking? Auth Provider Long Term End Date   albuterol (PROAIR HFA/PROVENTIL HFA/VENTOLIN HFA) 108 (90 Base) MCG/ACT inhaler Inhale 2 puffs into the lungs every 6 hours as needed for shortness of breath, wheezing or cough  Yes Unknown, Entered By History Yes    calcium carbonate-vitamin D (CALTRATE) 600-10 MG-MCG per tablet Take 1 tablet by mouth daily  Yes Unknown, Entered By History     calcium polycarbophil (FIBERCON) 625 MG tablet Take 2 tablets by mouth 2 times daily  Yes Unknown, Entered By History     carboxymethylcellulose PF (REFRESH PLUS) 0.5 % ophthalmic solution Place 1 drop into both eyes daily as needed for dry eyes  Yes Unknown, Entered By History     chlorhexidine 0.12 % solution Rinse or brush teeth and gums with 15 mL for 60 seconds every morning *nothing by mouth for 30 minutes following*  Yes Unknown, Entered By History     ciclopirox (PENLAC) 8 % external solution Apply topically to affected areas at bedtime as needed  Yes Unknown, Entered By History     clonazePAM (KLONOPIN) 0.125 MG TBDP ODT tab Take 0.125 mg by mouth daily as needed for anxiety or agitation  Yes  Unknown, Entered By History Yes    docusate sodium (COLACE) 100 MG capsule Take 100 mg by mouth daily  Yes Unknown, Entered By History     hydrocortisone (CORTAID) 1 % external cream Apply topically 2 times daily as needed for rash or other (eczema)  Yes Unknown, Entered By History     ipratropium (ATROVENT) 0.06 % nasal spray Spray 2 sprays into both nostrils 3 times daily At 8 AM, 4 PM, and 8 PM  Yes Unknown, Entered By History     levothyroxine (SYNTHROID/LEVOTHROID) 50 MCG tablet 1 tablet (50 mcg) by Oral or NG Tube route daily  Yes Shamir Geiger MD Yes    loperamide (IMODIUM) 2 MG capsule Take 2 mg by mouth 4 times daily as needed for diarrhea  Yes Unknown, Entered By History     LORazepam (ATIVAN) 2 MG tablet Take 2 mg buccally as needed for seizure activity as directed per protocol  Yes Unknown, Entered By History     montelukast (SINGULAIR) 10 MG tablet Take 10 mg by mouth At Bedtime  Yes Unknown, Entered By History No    multivitamin, therapeutic (THERA-VIT) TABS tablet Take 1 tablet by mouth daily  Yes Unknown, Entered By History     ondansetron (ZOFRAN ODT) 4 MG ODT tab Take 4-8 mg by mouth every 8 hours as needed for nausea or vomiting  Yes Unknown, Entered By History     polyethylene glycol-propylene glycol (SYSTANE ULTRA) 0.4-0.3 % SOLN ophthalmic solution Place 1-2 drops into both eyes daily as needed for dry eyes  Yes Unknown, Entered By History     prednisoLONE acetate (PRED FORTE) 1 % ophthalmic suspension Instill one drop once per day into the right eye for glaucoma  Yes Unknown, Entered By History     risperiDONE (RISPERDAL) 0.5 MG tablet Take 1 tablet (0.5 mg) by mouth twice daily, along with 2 mg tablet for total daily dose of 2.5 mg twice daily  Yes Unknown, Entered By History Yes    risperiDONE (RISPERDAL) 2 MG tablet Take 1 tablet (2 mg) by mouth twice daily, along with 0.5 mg tablet for total daily dose of 2.5 mg twice daily  Yes Reported, Patient Yes    senna (SENOKOT) 8.6 MG tablet  Take 1 tablet by mouth 2 times daily as needed for constipation  Yes Unknown, Entered By History     sertraline (ZOLOFT) 100 MG tablet Take 100 mg by mouth daily  Yes Reported, Patient No    topiramate (TOPAMAX) 50 MG tablet Take 1 tablet (50 mg) by mouth At Bedtime  Yes Shamir Geiger MD Yes    traZODone (DESYREL) 150 MG tablet Take 150 mg by mouth At Bedtime  Yes Unknown, Entered By History     White Petrolatum-Mineral Oil (REFRESH P.M.) OINT Apply a small amount into lower right eyelid at bedtime  Yes Unknown, Entered By History     zinc sulfate (ZINCATE) 220 (50 Zn) MG capsule 1 capsule (220 mg) by Oral or NG Tube route daily  Yes Shamir Geiger MD     pantoprazole (PROTONIX) 40 MG EC tablet Take 40 mg by mouth daily  Yes Reported, Patient            Date completed: 05/03/23    Medication history completed by:   Yesika Crocker, PharmD  *[id number]

## 2023-05-03 NOTE — TELEPHONE ENCOUNTER
No appropriate beds are currently available within the  system. Bed search update (metro + 1 HR) @ 12:48AM:        [facility]: @ cap per website  [facility]: @ cap per website  [facility]: @ cap per website  [facility]: @ cap per website  [facility]: @ cap per website  [facility]: @ cap per website  [facility]: @ cap per website  [facility]: @ cap per website  [facility]: @ cap per website  [facility]: Posting 2 beds. Colleen @ 00:11 reports they are at capacity  [facility]: @ cap per website  [facility]: Posting 1 bed. Colleen @ 00:11 reports they are at capacity    Pt remains on work list until appropriate placement is available

## 2023-05-04 ENCOUNTER — TELEPHONE (OUTPATIENT)
Dept: BEHAVIORAL HEALTH | Facility: CLINIC | Age: 53
End: 2023-05-04
Payer: MEDICARE

## 2023-05-04 PROCEDURE — 250N000013 HC RX MED GY IP 250 OP 250 PS 637: Performed by: FAMILY MEDICINE

## 2023-05-04 PROCEDURE — 250N000013 HC RX MED GY IP 250 OP 250 PS 637: Performed by: EMERGENCY MEDICINE

## 2023-05-04 RX ORDER — OLANZAPINE 10 MG/1
10 TABLET, ORALLY DISINTEGRATING ORAL ONCE
Status: COMPLETED | OUTPATIENT
Start: 2023-05-04 | End: 2023-05-04

## 2023-05-04 RX ADMIN — RISPERIDONE 2 MG: 2 TABLET ORAL at 20:41

## 2023-05-04 RX ADMIN — RISPERIDONE 0.5 MG: 0.5 TABLET, ORALLY DISINTEGRATING ORAL at 20:40

## 2023-05-04 RX ADMIN — ZINC SULFATE 220 MG (50 MG) CAPSULE 220 MG: CAPSULE at 08:30

## 2023-05-04 RX ADMIN — RISPERIDONE 2 MG: 2 TABLET ORAL at 08:28

## 2023-05-04 RX ADMIN — LEVOTHYROXINE SODIUM 50 MCG: 25 TABLET ORAL at 08:29

## 2023-05-04 RX ADMIN — DOCUSATE SODIUM 100 MG: 100 CAPSULE, LIQUID FILLED ORAL at 08:29

## 2023-05-04 RX ADMIN — CHLORHEXIDINE GLUCONATE 0.12% ORAL RINSE 15 ML: 1.2 LIQUID ORAL at 08:27

## 2023-05-04 RX ADMIN — RISPERIDONE 0.5 MG: 0.5 TABLET, ORALLY DISINTEGRATING ORAL at 08:30

## 2023-05-04 RX ADMIN — OLANZAPINE 10 MG: 10 TABLET, ORALLY DISINTEGRATING ORAL at 21:56

## 2023-05-04 RX ADMIN — SERTRALINE HYDROCHLORIDE 100 MG: 100 TABLET ORAL at 08:32

## 2023-05-04 RX ADMIN — TOPIRAMATE 100 MG: 100 TABLET, FILM COATED ORAL at 21:56

## 2023-05-04 RX ADMIN — CALCIUM POLYCARBOPHIL 625 MG: 625 TABLET, FILM COATED ORAL at 08:28

## 2023-05-04 RX ADMIN — TRAZODONE HYDROCHLORIDE 150 MG: 100 TABLET ORAL at 21:56

## 2023-05-04 RX ADMIN — PREDNISOLONE ACETATE 1 DROP: 10 SUSPENSION/ DROPS OPHTHALMIC at 08:32

## 2023-05-04 RX ADMIN — OLANZAPINE 10 MG: 10 TABLET, ORALLY DISINTEGRATING ORAL at 03:41

## 2023-05-04 RX ADMIN — PANTOPRAZOLE SODIUM 40 MG: 40 TABLET, DELAYED RELEASE ORAL at 08:38

## 2023-05-04 ASSESSMENT — ACTIVITIES OF DAILY LIVING (ADL)
ADLS_ACUITY_SCORE: 35

## 2023-05-04 NOTE — ED NOTES
"Pt is talking and laughing loudly in his room. Pt states, \"they're all awake\" when informed this writer was going to close his door again because there are patients still sleeping.   "

## 2023-05-04 NOTE — ED NOTES
"Pt is entering other patients rooms and approaching pts and family asking to go home. Pt states \"I'm not staying in the hospital.\" Pt is laughing and talking loudly when no one is near him, appearing to be responding to internal stimuli. Pt also yelled out loud in milieu inappropriate sexual comments to someone not there. PRN medication given.   "

## 2023-05-04 NOTE — PROGRESS NOTES
Triage & Transition Services, Extended Care     Jagdeep Silvapatricia  May 4, 2023    Jagdeep is followed related to Long wait time for admission: since 5/2. Please see initial DEC Crisis Assessment completed for complete assessment information. Medical record is reviewed. While patient is in the ED, care team is working towards Learn and Demonstrate at [employer] [employer] Skill Focused on Crisis Stabilization. Additional notes include:    Writer checked in with pt at 3:05pm. Pt woke to writer's voice, had food on a tray sitting on the end of his bed. Writer asked how pt was and pt expressed he needed to go to the bathroom, got up, and ambulated to bathroom. Writer waited for 5 minutes and notified pt's nurse of pt location before stepping away.    There are not significant status changes.     Plan:  Inpatient Mental Health: There are no significant changes in pt status. Pt remains on wait list for IP MH admission for safety and stabilization of acute MH symptoms.     Plan for Care reviewed with Assigned Medical Provider? No. Provider, NA, response: NA. No significant changes    Extended Care will follow and meet with patient/family/care team as able or requested.     Joshua Johnson, ALTAGRACIA  [facility], Extended Care   [phone]

## 2023-05-04 NOTE — ED NOTES
"Pt noted to be talking in the restroom out loud. Pt denied hearing voices and hallucinations when he walked out. Pt noted to be wearing his soiled shorts over his clean scrubs. Pt states, \"no they're ok\" when this writer suggested he take the shorts off to be washed. Pt continued to decline to remove the shorts despite this writer stating it was not healthy for his skin to remain wet from the urine.   "

## 2023-05-04 NOTE — TELEPHONE ENCOUNTER
R:  No appropriate beds within [place].  Bed Search update within [place] + 1HR 10:30PM    [facility]: @ cap per website  [facility]: @ cap per website  [facility]: @ cap per website  [facility]: @ cap per website  [facility]: @ cap per website  [facility]: @ cap per website- Per John, no beds  [facility]: @ cap per website  [facility]:  @ cap per website- Per Kimi, no beds  [facility]: 4 LOW ACUITY beds posted  Mixed unit with children.  Pt not appropriate for mixed unit.  [facility]: @ cap per website    Pt remains on PPS work list awaiting appropriate placement.

## 2023-05-04 NOTE — ED NOTES
The patient is discharged to ED and walked with his 1:1 staff to ED. The patient was placed on 1:1 staff observation due to being verbally sexually inappropriate in the milieu and going to another patient's room.

## 2023-05-04 NOTE — ED NOTES
Report received from previous shift. Assumed care of patient sleeping with no s/s of acute distress. Will continue to monitor.

## 2023-05-04 NOTE — ED NOTES
"Patient needed lots of redirection as he was wanting to go into other patients rooms. He has been cooperative. He denies SI, HI. He reports he \"just wants to go home\". He took his scheduled trazodone and is now sleeping.    "

## 2023-05-04 NOTE — ED NOTES
"Pt is becoming more agitated. Pt is insisting it is the afternoon and not early morning despite staff showing him the time. Pt states he wants to leave and go \"home home home.\" Pt states, \"Sadaf is here and you know that. You already talked to her. She is hear to take me home and I am tired of you. I am tired of you.\" Pt repeatedly states \"Sadaf is here to pick me up.\" Pt is yelling in his room and posturing towards this writer.   "

## 2023-05-04 NOTE — ED NOTES
Pt is in his room removing the scrub pants and placed the his wet shorts back on. Pt then requested to use the rest room.

## 2023-05-04 NOTE — TELEPHONE ENCOUNTER
R: The patient is currently in the [place] ED awaiting placement. Patient's guardian to sign patient in to metro only    Intake afternoon Bed Search (Done at 12:23 PM) Facilities that have not updated their [place] MH access site in the last 12 hours have been contacted for bed availability.     [facility] is posting 0 beds.   [facility] is posting 0 beds.  [facility] is posting 0 beds.   is posting 0 beds.  [facility] is posting 0 beds.  [facility] is posting 0 beds.  [facility] is posting 0 beds.  [facility] is posting 0 beds. Low acuity.    [facility] at capacity. The patient remains on the waitlist. Intake continues to identify appropriate bed placement.

## 2023-05-04 NOTE — ED NOTES
"Pt has been awake the majority of the shift. Pt seems to have his days and nights turned around. He was stating it was evening and not early morning. Pt remains delusional stating staff have talked to his sister, brother-in-law and group home staff and that these persons are here individually to pick him up. Pt needs constant redirection to not go into other patients' rooms, bother patients sleeping in the milieu, turn on the lights in the lounge \"to help the blind see\", and not yell and sing in his room with the door open. Pt was incontinent of urine once although refused not to wear his soiled shorts. Pt is tolerating oral intake as has requested numerous juices and water. Pt has bouts of crying although he states, \"more out of ghada\". Pt has been verbally hostile and agitated because he believes staff are lying to him. Pt has not performed any SIB. Will continue to monitor.   "

## 2023-05-04 NOTE — TELEPHONE ENCOUNTER
[facility] Access Inpatient Bed Call Log 5/4/2023 12:48 AM      Intake has called facilities that have not updated their bed status within the last 12 hours.       Adults:      [place] + 1 hour         [facility] is posting 0 beds.      [facility] is posting 0 beds. [phone]      [facility] is posting 0 beds. [phone]     [facility] is posting 0 beds. [phone] - 12:50AM Per Polly, they are capped.    [facility] is posting 0 beds. [phone]     [facility] is posting 0 bed. [phone]      [facility] is posting 0 beds. [phone]     [facility] is posting 0 beds. [phone]     [facility] is posting 0 beds. [phone]     [facility] is posting 0 beds. [phone]       [facility] is posting 4 beds. Mixed unit 12+. Low acuity only.  [phone]     [facility] is posting 0 beds. No aggression.  [phone]     [facility] is posting 0 beds. [phone]      [facility] is posting 2 beds. [phone]      [facility] is posting 1 beds. [phone]      [facility] is posting 0 beds. Low acuity. [phone]     [facility] is posting 0 beds. 72 hr hold preferred. [phone]     [facility] is posting 1 beds.  [phone]        [facility] is posting 2 bed. Voluntary only- no holds/commitments, No Hx of aggression, violence, or assault. No sexual offenders. No 72 hr holds. [phone]     [facility] is posting 7 beds. (Must have the cognitive ability to do programming. No aggressive or violent behavior or recent HX in the last 2 yrs. MH must be primary.) [phone]    [facility] is posting 2 beds. Low acuity only. Violence and aggression capped. [phone]      [facility] is posting 1 bed. Low acuity, Neg Covid. [phone]- 12:17am Per Ruth they are capped.      [facility] is posting 0 beds. Covid neg. Vol only. Combined  adolescent and adult unit. No aggressive or violent behavior. No registered sex offenders. [phone]     [facility] [place] posting 0 beds. Negative covid.      [facility] is posting 6 beds. Call for details. [phone]      [facility] is posting 3 beds. [phone]       Pt remains on work list pending appropriate bed availability.

## 2023-05-04 NOTE — PROGRESS NOTES
IP MH Referral Acuity Rating Score (RARS)    LMHP complete at referral to IP MH, with DEC; and, daily while awaiting IP MH placement. Call score to PPS.  CRITERIA SCORING   New 72 HH and Involuntary for IP MH (not adolescent) 0/1   Boarding over 24 hours 1/1   Vulnerable adult at least 55+ with multiple co morbidities; or, Patient age 11 or under 0/1   Suicide ideation without relief of precipitating factors 0/1   Current plan for suicide 0/1   Current plan for homicide 0/1   Imminent risk or actual attempt to seriously harm another without relief of factors precipitating the attempt 0/1   Severe dysfunction in daily living (ex: complete neglect for self care, extreme disruption in vegetative function, extreme deterioration in social interactions) 1/1   Recent (last 2 weeks) or current physical aggression in the ED 0/1   Restraints or seclusion episode in ED 0/1   Verbal aggression, agitation, yelling, etc., while in the ED 0/1   Active psychosis with psychomotor agitation or catatonia 0/1   Need for constant or near constant redirection (from leaving, from others, etc).  1/1   Intrusive or disruptive behaviors 1/1   TOTAL Acuity Total Score: 4

## 2023-05-04 NOTE — ED NOTES
"Pt is currently stating \"my sister is here to pick me up. You need to go find her. Sadaf left and she said Huong can pick me up.\"   "

## 2023-05-04 NOTE — ED NOTES
"Pt is awake and alert standing in his room. Pt is telling the staff he wants \"to turn all the lights on the help the blind.\" Pt informed the patients are asleep and we can't turn on the lights in the lounge although he can turn his on. At this time it was noted the patient had been incontinent of urine. Pt was escorted to the restroom and provided with clean scrub pants to change into. The patient stated he does not use any incontinent underwear. Pt's linen changed and mattress cleaned.    "

## 2023-05-05 ENCOUNTER — TELEPHONE (OUTPATIENT)
Dept: BEHAVIORAL HEALTH | Facility: CLINIC | Age: 53
End: 2023-05-05
Payer: MEDICARE

## 2023-05-05 LAB
AMPHETAMINES UR QL SCN: NORMAL
BARBITURATES UR QL SCN: NORMAL
BENZODIAZ UR QL SCN: NORMAL
BZE UR QL SCN: NORMAL
CANNABINOIDS UR QL SCN: NORMAL
OPIATES UR QL SCN: NORMAL

## 2023-05-05 PROCEDURE — 250N000009 HC RX 250: Performed by: EMERGENCY MEDICINE

## 2023-05-05 PROCEDURE — 80307 DRUG TEST PRSMV CHEM ANLYZR: CPT | Performed by: EMERGENCY MEDICINE

## 2023-05-05 PROCEDURE — 250N000013 HC RX MED GY IP 250 OP 250 PS 637: Performed by: FAMILY MEDICINE

## 2023-05-05 PROCEDURE — 250N000013 HC RX MED GY IP 250 OP 250 PS 637: Performed by: EMERGENCY MEDICINE

## 2023-05-05 RX ADMIN — CALCIUM POLYCARBOPHIL 625 MG: 625 TABLET, FILM COATED ORAL at 21:50

## 2023-05-05 RX ADMIN — RISPERIDONE 2 MG: 2 TABLET ORAL at 20:18

## 2023-05-05 RX ADMIN — DOCUSATE SODIUM 100 MG: 100 CAPSULE, LIQUID FILLED ORAL at 08:29

## 2023-05-05 RX ADMIN — CALCIUM POLYCARBOPHIL 625 MG: 625 TABLET, FILM COATED ORAL at 00:18

## 2023-05-05 RX ADMIN — ZINC SULFATE 220 MG (50 MG) CAPSULE 220 MG: CAPSULE at 08:31

## 2023-05-05 RX ADMIN — TRAZODONE HYDROCHLORIDE 150 MG: 100 TABLET ORAL at 22:50

## 2023-05-05 RX ADMIN — PREDNISOLONE ACETATE 1 DROP: 10 SUSPENSION/ DROPS OPHTHALMIC at 08:38

## 2023-05-05 RX ADMIN — WHITE PETROLATUM 57.7 %-MINERAL OIL 31.9 % EYE OINTMENT: at 00:18

## 2023-05-05 RX ADMIN — SERTRALINE HYDROCHLORIDE 100 MG: 100 TABLET ORAL at 08:29

## 2023-05-05 RX ADMIN — OLANZAPINE 10 MG: 10 TABLET, ORALLY DISINTEGRATING ORAL at 22:50

## 2023-05-05 RX ADMIN — RISPERIDONE 0.5 MG: 0.5 TABLET, ORALLY DISINTEGRATING ORAL at 20:19

## 2023-05-05 RX ADMIN — MONTELUKAST 10 MG: 10 TABLET, FILM COATED ORAL at 22:50

## 2023-05-05 RX ADMIN — CALCIUM POLYCARBOPHIL 625 MG: 625 TABLET, FILM COATED ORAL at 08:34

## 2023-05-05 RX ADMIN — RISPERIDONE 2 MG: 2 TABLET ORAL at 08:28

## 2023-05-05 RX ADMIN — MONTELUKAST 10 MG: 10 TABLET, FILM COATED ORAL at 00:18

## 2023-05-05 RX ADMIN — WHITE PETROLATUM 57.7 %-MINERAL OIL 31.9 % EYE OINTMENT: at 22:54

## 2023-05-05 RX ADMIN — TOPIRAMATE 100 MG: 100 TABLET, FILM COATED ORAL at 20:19

## 2023-05-05 RX ADMIN — PANTOPRAZOLE SODIUM 40 MG: 40 TABLET, DELAYED RELEASE ORAL at 08:29

## 2023-05-05 RX ADMIN — RISPERIDONE 0.5 MG: 0.5 TABLET, ORALLY DISINTEGRATING ORAL at 08:29

## 2023-05-05 ASSESSMENT — ACTIVITIES OF DAILY LIVING (ADL)
ADLS_ACUITY_SCORE: 35

## 2023-05-05 NOTE — ED NOTES
Urine sample still needed pt aware but per tech patient wet the bed. Linens and bed changed. Pt given clean scrubs to change into.
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
## 2023-05-05 NOTE — TELEPHONE ENCOUNTER
[facility] Access Inpatient Bed Call Log 5/5/2023 1:42 AM      Intake has called facilities that have not updated their bed status within the last 12 hours.       Adults:     [place] + 1 Hour        [facility] is posting 0 beds.      [facility] is posting 0 beds. [phone]      [facility] is posting 0 beds. [phone]     [facility] is posting 0 beds. [phone]     [facility] is posting 0 beds. [phone]     [facility] is posting 0 bed. [phone]      [facility] is posting 0 beds. [phone]     [facility] is posting 0 beds. [phone]     [facility] is posting 0 beds. [phone]     [facility] is posting 0 beds. [phone]       [facility] is posting 2 beds. Mixed unit 12+. Low acuity only.  [phone]     [facility] is posting 0 beds. No aggression.  [phone]     [facility] is posting 0 beds. [phone]      [facility] is posting 1 beds. [phone]      [facility] is posting 3 beds. [phone]      [facility] is posting 0 beds. Low acuity. [phone]     [facility] is posting 0 beds. 72 hr hold preferred. [phone]     [place] is posting 3 beds.  [phone]        [facility] is posting 3 bed. Voluntary only- no holds/commitments, No Hx of aggression, violence, or assault. No sexual offenders. No 72 hr holds. [phone]     [facility] is posting 7 beds. (Must have the cognitive ability to do programming. No aggressive or violent behavior or recent HX in the last 2 yrs.  must be primary.) [phone]    [facility] is posting 0 beds. Low acuity only. Violence and aggression capped. [phone]      [facility] is posting 1 bed. Low acuity, Neg Covid. [phone]- 12:17am Per Ruth they are capped.      [facility] is posting 0 beds. Covid neg. Vol only. Combined adolescent and adult unit. No aggressive or  violent behavior. No registered sex offenders. [phone]     [place] [facility] [place] posting 0 beds. Negative covid.      [facility] is posting 6 beds. Call for details. [phone]      [facility] is posting 3 beds. [phone]       Pt remains on work list pending appropriate bed availability.

## 2023-05-05 NOTE — ED NOTES
IP MH Referral Acuity Rating Score (RARS)    LMHP complete at referral to IP MH, with DEC; and, daily while awaiting IP MH placement. Call score to PPS.  CRITERIA SCORING   New 72 HH and Involuntary for IP MH (not adolescent) 0/1   Boarding over 24 hours 1/1   Vulnerable adult at least 55+ with multiple co morbidities; or, Patient age 11 or under 0/1   Suicide ideation without relief of precipitating factors 0/1   Current plan for suicide 0/1   Current plan for homicide 0/1   Imminent risk or actual attempt to seriously harm another without relief of factors precipitating the attempt 0/1   Severe dysfunction in daily living (ex: complete neglect for self care, extreme disruption in vegetative function, extreme deterioration in social interactions) 1/1   Recent (last 2 weeks) or current physical aggression in the ED 1/1   Restraints or seclusion episode in ED 0/1   Verbal aggression, agitation, yelling, etc., while in the ED 1/1   Active psychosis with psychomotor agitation or catatonia 0/1   Need for constant or near constant redirection (from leaving, from others, etc).  1/1   Intrusive or disruptive behaviors 1/1   TOTAL Acuity Total Score: 6

## 2023-05-06 ENCOUNTER — TELEPHONE (OUTPATIENT)
Dept: BEHAVIORAL HEALTH | Facility: CLINIC | Age: 53
End: 2023-05-06
Payer: MEDICARE

## 2023-05-06 PROCEDURE — 250N000013 HC RX MED GY IP 250 OP 250 PS 637: Performed by: EMERGENCY MEDICINE

## 2023-05-06 PROCEDURE — 250N000013 HC RX MED GY IP 250 OP 250 PS 637: Performed by: FAMILY MEDICINE

## 2023-05-06 RX ADMIN — CALCIUM POLYCARBOPHIL 625 MG: 625 TABLET, FILM COATED ORAL at 21:19

## 2023-05-06 RX ADMIN — MONTELUKAST 10 MG: 10 TABLET, FILM COATED ORAL at 21:19

## 2023-05-06 RX ADMIN — TOPIRAMATE 100 MG: 100 TABLET, FILM COATED ORAL at 21:20

## 2023-05-06 RX ADMIN — DOCUSATE SODIUM 100 MG: 100 CAPSULE, LIQUID FILLED ORAL at 07:44

## 2023-05-06 RX ADMIN — OLANZAPINE 10 MG: 10 TABLET, ORALLY DISINTEGRATING ORAL at 21:18

## 2023-05-06 RX ADMIN — ZINC SULFATE 220 MG (50 MG) CAPSULE 220 MG: CAPSULE at 07:45

## 2023-05-06 RX ADMIN — PANTOPRAZOLE SODIUM 40 MG: 40 TABLET, DELAYED RELEASE ORAL at 07:44

## 2023-05-06 RX ADMIN — TRAZODONE HYDROCHLORIDE 150 MG: 100 TABLET ORAL at 21:19

## 2023-05-06 RX ADMIN — RISPERIDONE 2 MG: 2 TABLET ORAL at 21:19

## 2023-05-06 RX ADMIN — RISPERIDONE 0.5 MG: 0.5 TABLET, ORALLY DISINTEGRATING ORAL at 21:20

## 2023-05-06 RX ADMIN — CHLORHEXIDINE GLUCONATE 0.12% ORAL RINSE 15 ML: 1.2 LIQUID ORAL at 07:45

## 2023-05-06 RX ADMIN — LEVOTHYROXINE SODIUM 50 MCG: 25 TABLET ORAL at 07:45

## 2023-05-06 RX ADMIN — PREDNISOLONE ACETATE 1 DROP: 10 SUSPENSION/ DROPS OPHTHALMIC at 07:47

## 2023-05-06 RX ADMIN — RISPERIDONE 2 MG: 2 TABLET ORAL at 07:44

## 2023-05-06 RX ADMIN — CALCIUM POLYCARBOPHIL 625 MG: 625 TABLET, FILM COATED ORAL at 07:44

## 2023-05-06 RX ADMIN — WHITE PETROLATUM 57.7 %-MINERAL OIL 31.9 % EYE OINTMENT: at 21:19

## 2023-05-06 RX ADMIN — SERTRALINE HYDROCHLORIDE 100 MG: 100 TABLET ORAL at 07:44

## 2023-05-06 ASSESSMENT — ACTIVITIES OF DAILY LIVING (ADL)
ADLS_ACUITY_SCORE: 35

## 2023-05-06 NOTE — TELEPHONE ENCOUNTER
R:  No beds within [place].  Bed Search initiated in metro + 1 hour. (Guardian ok within 1 hour)-    [facility] is posting 0 beds.      [facility] is posting 0 [phone]  per call at 7:16 am to Araseli, they are at cap     [facility] is posting 0 beds. [phone]     [facility] is posting 0 beds. [phone] per call at 7:17 am to Jacqueline, the charge nurse will call back to intake after her meeting to inform of bed availability     [facility] is posting 0 beds. [phone]      [facility] is posting 0 beds. [phone]     [facility] is posting 0 beds. [phone]     [facility] is posting 0 beds. [phone]      [facility] is posting 0 beds [phone]     [facility] has 3 beds.  But due to pts intrusive behaviors - [facility] would beed a higer acuity bed.      Pt remains on work list pending appropriate bed availability.

## 2023-05-06 NOTE — ED NOTES
Tooth brush and tooth paste provided. Pt compliant with brushing teeth. Bath wipes and new scrubs offered- pt refused.

## 2023-05-06 NOTE — ED PROVIDER NOTES
Emergency Department Patient Sign-out       Brief HPI:  This is a 52 year old male signed out to me by Dr. Marc.  See initial ED Provider note for details of the presentation.          Patient is medically cleared for admission to a Behavioral Health unit.      The patient is not on a hold.      The patient has not required medication for agitation.    Awaiting Transfer to Mental Health Facility        Significant Events prior to my assuming care: DEC Assessment with recommendation for IP MH admit      Exam:   Patient Vitals for the past 24 hrs:   BP Temp Temp src Pulse Resp SpO2   05/06/23 0738 (!) 141/89 98  F (36.7  C) Oral 104 18 97 %   05/06/23 0033 (!) 144/63 97.9  F (36.6  C) -- 85 18 98 %   05/05/23 1644 (!) 153/90 98.5  F (36.9  C) Oral 72 18 99 %           ED RESULTS:   No results found for this visit on 05/02/23 (from the past 24 hour(s)).    ED MEDICATIONS:   Medications   chlorhexidine (PERIDEX) 0.12 % solution 15 mL (15 mLs Swish & Spit $Given 5/6/23 0745)   prednisoLONE acetate (PRED FORTE) 1 % ophthalmic susp 1 drop (1 drop Right Eye $Given 5/6/23 0747)   artificial tears ophthalmic ointment ( Right Eye $Given 5/5/23 1094)   topiramate (TOPAMAX) tablet 100 mg (100 mg Oral $Given 5/5/23 2019)   zinc sulfate (ZINCATE) capsule 220 mg (220 mg Oral $Given 5/6/23 0745)   calcium polycarbophil (FIBERCON) tablet 625 mg (625 mg Oral $Given 5/6/23 0744)   pantoprazole (PROTONIX) EC tablet 40 mg (40 mg Oral $Given 5/6/23 0744)   ipratropium (ATROVENT) 0.06 % spray 2 spray (has no administration in time range)   sertraline (ZOLOFT) tablet 100 mg (100 mg Oral $Given 5/6/23 0744)   levothyroxine (SYNTHROID/LEVOTHROID) tablet 50 mcg (50 mcg Oral or NG Tube $Given 5/6/23 0745)   docusate sodium (COLACE) capsule 100 mg (100 mg Oral $Given 5/6/23 0744)   montelukast (SINGULAIR) tablet 10 mg (10 mg Oral $Given 5/5/23 4204)   risperiDONE (risperDAL) tablet 2 mg (2 mg Oral $Given 5/6/23 0744)   risperiDONE  (risperDAL M-TABS) ODT tab 0.5 mg (0.5 mg Oral Not Given 5/6/23 0701)   traZODone (DESYREL) tablet 150 mg (150 mg Oral $Given 5/5/23 2250)   hypromellose-dextran (ARTIFICAL TEARS) 0.1-0.3 % ophthalmic solution 2 drop (has no administration in time range)   artificial saliva (BIOTENE MT) solution 2 spray (2 sprays Swish & Spit $Given 5/3/23 0011)   OLANZapine zydis (zyPREXA) ODT tab 10 mg (10 mg Oral $Given 5/5/23 2250)   OLANZapine zydis (zyPREXA) ODT tab 10 mg (10 mg Oral $Given 5/3/23 1110)   hydrOXYzine (ATARAX) tablet 25 mg (25 mg Oral $Given 5/3/23 1110)   OLANZapine zydis (zyPREXA) ODT tab 10 mg (10 mg Oral $Given 5/3/23 1921)   OLANZapine zydis (zyPREXA) ODT tab 10 mg (10 mg Oral $Given 5/4/23 0341)         Impression:    ICD-10-CM    1. Schizoaffective disorder, bipolar type (H)  F25.0       2. Intellectual disability  F79           Plan:    Pending placement.      Patient was reassessed today.  He reports to me that he would like to leave to go to a family reunion.  He says his family does not think that he will remember their names, but he thinks he well.  He denies suicidal or homicidal ideation.  He is pacing throughout the emergency department and has eaten well today.  There were no issues during my shift, patient will be signed out to the oncoming physician.  We are still awaiting mental health placement.      MD Paz Weber, Jaspal Salas MD  05/06/23 7713

## 2023-05-06 NOTE — TELEPHONE ENCOUNTER
R: No appropriate beds within [place].  Bed search update 10:30PM    [facility]: @ cap per website  [facility]: @ cap per website  [facility]: @ cap per website  [facility]: @ cap per website  [facility]: @ cap per website  [facility]: @ cap per website  [facility]: @ cap per website  [facility]:  @ cap per website  [facility]: 3 LOW ACUITY beds posted.  Mixed unit with children.  Not appropriate  [facility]: @ cap per website  [facility]: @ cap per website  [facility]: @ cap per website  [facility]:  1 LOW ACUITY bed posted.  Not appropriate  [facility]: 5 VERY LOW ACUITY beds.  Mood disorder.  Not appropriate  [facility]: 3 LOW ACUITY beds posted.  Not appropriate   [facility]: 4 LOW ACUITY beds posted.  Not appropriate  [facility]: 6 LOW ACUITY beds available.  Voluntary pts only.  Not appropriate  [facility]: 7 LOW ACUITY beds posted.  Not appropriate  [facility]: @ cap per website  [facility]: 2 LOW ACUITY beds posted.  Not appropriate  [facility]: 1 bed available.  Voluntary pts only.  Mixed unit with children.   Not appropriate  [facility]: 10 beds posted  Out of state.    [facility]: 4 beds available.   Mixed unit with children.  Not appropriate      Pt remains on PPS work list awaiting appropriate placement.

## 2023-05-06 NOTE — TELEPHONE ENCOUNTER
No appropriate beds are currently available within the  system. Bed search update (metro + 1 HR) @ 2AM:        [facility]: @ cap per website  [facility]: @ cap per website  [facility]: @ cap per website  [facility]: @ cap per website  [facility]: @ cap per website  [facility]: @ cap per website  [facility]: @ cap per website  [facility]: @ cap per website  [facility]: Posting 3 beds. Mixed unit/Low acuity only. Pt not appropriate d/t hx of aggression and inappropriate sexual comments  [facility]: @ cap per website  [facility]: @ cap per website  [facility]: @ cap per website    Pt remains on work list until appropriate placement is available

## 2023-05-07 ENCOUNTER — TELEPHONE (OUTPATIENT)
Dept: BEHAVIORAL HEALTH | Facility: CLINIC | Age: 53
End: 2023-05-07
Payer: MEDICARE

## 2023-05-07 PROCEDURE — 250N000013 HC RX MED GY IP 250 OP 250 PS 637: Performed by: FAMILY MEDICINE

## 2023-05-07 PROCEDURE — 250N000013 HC RX MED GY IP 250 OP 250 PS 637: Performed by: EMERGENCY MEDICINE

## 2023-05-07 RX ADMIN — CALCIUM POLYCARBOPHIL 625 MG: 625 TABLET, FILM COATED ORAL at 22:07

## 2023-05-07 RX ADMIN — TRAZODONE HYDROCHLORIDE 150 MG: 100 TABLET ORAL at 23:35

## 2023-05-07 RX ADMIN — RISPERIDONE 0.5 MG: 0.5 TABLET, ORALLY DISINTEGRATING ORAL at 22:07

## 2023-05-07 RX ADMIN — CHLORHEXIDINE GLUCONATE 0.12% ORAL RINSE 15 ML: 1.2 LIQUID ORAL at 08:10

## 2023-05-07 RX ADMIN — RISPERIDONE 0.5 MG: 0.5 TABLET, ORALLY DISINTEGRATING ORAL at 08:09

## 2023-05-07 RX ADMIN — RISPERIDONE 2 MG: 2 TABLET ORAL at 22:07

## 2023-05-07 RX ADMIN — RISPERIDONE 2 MG: 2 TABLET ORAL at 08:09

## 2023-05-07 RX ADMIN — SERTRALINE HYDROCHLORIDE 100 MG: 100 TABLET ORAL at 08:09

## 2023-05-07 RX ADMIN — PREDNISOLONE ACETATE 1 DROP: 10 SUSPENSION/ DROPS OPHTHALMIC at 08:10

## 2023-05-07 RX ADMIN — DOCUSATE SODIUM 100 MG: 100 CAPSULE, LIQUID FILLED ORAL at 08:09

## 2023-05-07 RX ADMIN — PANTOPRAZOLE SODIUM 40 MG: 40 TABLET, DELAYED RELEASE ORAL at 08:09

## 2023-05-07 RX ADMIN — ZINC SULFATE 220 MG (50 MG) CAPSULE 220 MG: CAPSULE at 08:09

## 2023-05-07 RX ADMIN — MONTELUKAST 10 MG: 10 TABLET, FILM COATED ORAL at 22:07

## 2023-05-07 RX ADMIN — CALCIUM POLYCARBOPHIL 625 MG: 625 TABLET, FILM COATED ORAL at 08:09

## 2023-05-07 RX ADMIN — WHITE PETROLATUM 57.7 %-MINERAL OIL 31.9 % EYE OINTMENT: at 22:08

## 2023-05-07 RX ADMIN — LEVOTHYROXINE SODIUM 50 MCG: 25 TABLET ORAL at 08:09

## 2023-05-07 RX ADMIN — OLANZAPINE 10 MG: 10 TABLET, ORALLY DISINTEGRATING ORAL at 22:08

## 2023-05-07 RX ADMIN — TOPIRAMATE 100 MG: 100 TABLET, FILM COATED ORAL at 22:07

## 2023-05-07 ASSESSMENT — ACTIVITIES OF DAILY LIVING (ADL)
ADLS_ACUITY_SCORE: 35

## 2023-05-07 NOTE — TELEPHONE ENCOUNTER
No appropriate beds are currently available within the  system. Bed search update (metro + 1 HR) @ 12:56AM:         [facility]: @ cap per website  [facility]: @ cap per website  [facility]: @ cap per website. Per Josie @ 00:57 they are full tonight   [facility]: @ cap per website  [facility]: @ cap per website  [facility]: @ cap per website  [facility]: @ cap per website  [facility]: @ cap per website  [facility]: Posting 2 beds. Mixed unit/Low acuity only. Pt not appropriate d/t hx of aggression and inappropriate sexual comments  [facility]: @ cap per website  [facility]: @ cap per website  [facility]: @ cap per website     Pt remains on work list until appropriate placement is available

## 2023-05-07 NOTE — ED NOTES
[facility] ED Mental Health Handoff Note:       Brief HPI:  This is a 52 year old male signed out to me by Dr. Marc.  See initial ED Provider note for full details of the presentation. Interval history is pertinent for schizoaffective bipolar type, intellectual disability.    Home meds reviewed and ordered/administered: Yes    Medically stable for inpatient mental health admission: Yes.    Evaluated by mental health: Yes. The recommendation is for inpatient mental health treatment. Bed search in process    Safety concerns: At the time I received sign out, there were no safety concerns.    Hold Status:  Active Orders   Legal    Health Officer Authority to Detain (THIERRY)     Frequency: Effective Now     Start Date/Time: 05/02/23 2047      Number of Occurrences: Until Specified     Order Comments: Vulnerable adult being admitted.              Exam:   Patient Vitals for the past 24 hrs:   BP Temp Temp src Pulse Resp SpO2   05/07/23 0806 (!) 162/101 98.1  F (36.7  C) -- 91 16 100 %   05/06/23 2245 133/64 98.7  F (37.1  C) Oral 102 18 98 %           ED Course:    Medications   chlorhexidine (PERIDEX) 0.12 % solution 15 mL (15 mLs Swish & Spit $Given 5/7/23 0810)   prednisoLONE acetate (PRED FORTE) 1 % ophthalmic susp 1 drop (1 drop Right Eye $Given 5/7/23 0810)   artificial tears ophthalmic ointment ( Right Eye $Given 5/6/23 2119)   topiramate (TOPAMAX) tablet 100 mg (100 mg Oral $Given 5/6/23 2120)   zinc sulfate (ZINCATE) capsule 220 mg (220 mg Oral $Given 5/7/23 0809)   calcium polycarbophil (FIBERCON) tablet 625 mg (625 mg Oral $Given 5/7/23 0809)   pantoprazole (PROTONIX) EC tablet 40 mg (40 mg Oral $Given 5/7/23 0809)   ipratropium (ATROVENT) 0.06 % spray 2 spray (has no administration in time range)   sertraline (ZOLOFT) tablet 100 mg (100 mg Oral $Given 5/7/23 0809)   levothyroxine (SYNTHROID/LEVOTHROID) tablet 50 mcg (50 mcg Oral or NG Tube $Given 5/7/23 0809)   docusate sodium (COLACE) capsule 100 mg (100  mg Oral $Given 5/7/23 0809)   montelukast (SINGULAIR) tablet 10 mg (10 mg Oral $Given 5/6/23 2119)   risperiDONE (risperDAL) tablet 2 mg (2 mg Oral $Given 5/7/23 0809)   risperiDONE (risperDAL M-TABS) ODT tab 0.5 mg (0.5 mg Oral $Given 5/7/23 0809)   traZODone (DESYREL) tablet 150 mg (150 mg Oral $Given 5/6/23 2119)   hypromellose-dextran (ARTIFICAL TEARS) 0.1-0.3 % ophthalmic solution 2 drop (has no administration in time range)   artificial saliva (BIOTENE MT) solution 2 spray (2 sprays Swish & Spit $Given 5/3/23 0011)   OLANZapine zydis (zyPREXA) ODT tab 10 mg (10 mg Oral $Given 5/6/23 2118)   OLANZapine zydis (zyPREXA) ODT tab 10 mg (10 mg Oral $Given 5/3/23 1110)   hydrOXYzine (ATARAX) tablet 25 mg (25 mg Oral $Given 5/3/23 1110)   OLANZapine zydis (zyPREXA) ODT tab 10 mg (10 mg Oral $Given 5/3/23 1921)   OLANZapine zydis (zyPREXA) ODT tab 10 mg (10 mg Oral $Given 5/4/23 0341)            There were no significant events during my shift.    Patient was signed out to the oncoming provider,        Impression:    ICD-10-CM    1. Schizoaffective disorder, bipolar type (H)  F25.0       2. Intellectual disability  F79           Plan:    1. Awaiting inpatient mental health admission/transfer.      RESULTS:   No results found for this visit on 05/02/23 (from the past 24 hour(s)).          Naun Euceda MD, Naun Devries MD  05/07/23 4676

## 2023-05-07 NOTE — TELEPHONE ENCOUNTER
R:  No beds within [place]  Bed Search initiated @ 8:30am and 1:30pm    [facility] is posting 0 beds.      [facility] is posting 0 [phone]  per call at 7:11 am to Jeremiah, they are at cap but we can call back later; per call at 1:55 pm to Yola, they are at cap     [facility] is posting 0 beds. [phone]     [facility] is posting 0 beds. [phone] per call at 7:14 am to Jacqueline, she will ask her charge RN to call us back. Stephania called at 11:20 am saying they have very low acuity beds only, no psychosis, no post SA, no medical complications     [facility] is posting 0 beds. [phone]      [facility] is posting 0 beds. [phone]      [facility] is posting 0 beds [phone]

## 2023-05-07 NOTE — ED NOTES
Pt's sister, legal guardian called for update on pt. Update was given. Pt's sister, Huong, states the medication trazodone is a new medication for pt and it has seemed to be ineffective for pt with helping him sleep. Per, sister, pt has not been sleeping for the past few months. Writer will pass on to oncoming nurses to try and keep track of pt's sleeping habits and numbers of hours asleep.

## 2023-05-07 NOTE — PROGRESS NOTES
"  Triage & Transition Services, Extended Care     Care Coordination Progress Note    Patient: Jagdeep goes by \"Jagdeep,\" uses he/him pronouns  Date of Service: May 7, 2023  Site of Service: [facility] ED    Individuals Present: Jagdeep & Amelia Robledo    Session start: 1:34P  Session end: 1:41P  Session duration in minutes: 7min  Patient was seen in-person.     Jagdeep is being followed by Extended Care. Please see full DEC Assessment done by Jayda Purdy on 05/02/2023 for further detail.     Details of Therapeutic Interaction:   Pt was pleasant and agreed to meet with writer. Writer introduced self and role. Pt rambled about various topics while pressing multiple buttons on the remote. Pt stated that he did nothing wrong prior to ED visit and that he was creating birds which he brought to life. Pt stated that his family members are aware of his ability to bring these creatures to life.     Therapeutic Goals Addressed During Session:   Build Rapport, Exploration of perspective, Provided active listening    Plan:  Recommended by [facility] for Inpatient Mental Health: Per chart review pt appears to be functioning below his baseline. He appears delusional, disorganized, tangential, agitated, hyper sexual, and intrusive. Pt should remain on the inpt work list for stabilization and med mgmt.     Amelia Robledo 5/7/2023 3:15 PM  Extended Care Coordinator- [phone]            "

## 2023-05-07 NOTE — ED NOTES
IP MH Referral Acuity Rating Score (RARS)    LMHP complete at referral to IP MH, with DEC; and, daily while awaiting IP MH placement. Call score to PPS.  CRITERIA SCORING   New 70 HH and Involuntary for IP MH (not adolescent) 0/1   Boarding over 24 hours 1/1   Vulnerable adult at least 55+ with multiple co morbidities; or, Patient age 11 or under 0/1   Suicide ideation without relief of precipitating factors 0/1   Current plan for suicide 0/1   Current plan for homicide 0/1   Imminent risk or actual attempt to seriously harm another without relief of factors precipitating the attempt 0/1   Severe dysfunction in daily living (ex: complete neglect for self care, extreme disruption in vegetative function, extreme deterioration in social interactions) 1/1   Recent (last 2 weeks) or current physical aggression in the ED 1/1   Restraints or seclusion episode in ED 0/1   Verbal aggression, agitation, yelling, etc., while in the ED 1/1   Active psychosis with psychomotor agitation or catatonia 1/1   Need for constant or near constant redirection (from leaving, from others, etc).  1/1   Intrusive or disruptive behaviors 1/1   TOTAL Acuity Total Score: 7

## 2023-05-07 NOTE — TELEPHONE ENCOUNTER
R: No appropriate beds within [place].  Bed search update 8:00PM    [facility]: @ cap per website- Per [facility], at capacity  [facility]: @ cap per website.  Per Maura, at capacity.  [facility]: @ cap per website-Per [place], at capacity  [facility]: @ cap per website.  Per [facility], at capacity.  [facility]: @ cap per website  [facility]: @ cap per website.  Per Maura, at capacity.  [facility]: @ cap per website  [facility]:  @ cap per website.  Per Maura, at capacity.  [facility]: 2 LOW ACUITY beds posted.  Mixed unit with children.  Not appropriate  [facility]: @ cap per website  [facility]: @ cap per website  [facility]: 2 beds posted.  Per Maura, at capacity.  [facility]:  1 LOW ACUITY bed posted.  Not appropriate  [facility]: 2 VERY LOW ACUITY beds.  Mood disorder.  Per Intake, not appropriate  [facility]: 3 LOW ACUITY beds posted.  Per Intake, not appropriate   [facility]: 4 LOW ACUITY beds posted.  Per Intake, not appropriate  [facility]: 7 LOW ACUITY beds available.  Voluntary pts only.  Not appropriate  [facility]: 7 LOW ACUITY beds posted.  Not appropriate  [facility]: @ cap per website  [facility]: 1 LOW ACUITY bed posted.  Not appropriate  [facility]: 1 bed available.  Voluntary pts only.  Mixed unit with children.   Not appropriate  [facility]: 10 beds posted  Out of state.    [facility]: 1 beds available.   Mixed unit with children.  Not appropriate      Pt remains on PPS work list awaiting appropriate placement.

## 2023-05-08 ENCOUNTER — TELEPHONE (OUTPATIENT)
Dept: BEHAVIORAL HEALTH | Facility: CLINIC | Age: 53
End: 2023-05-08
Payer: MEDICARE

## 2023-05-08 PROCEDURE — 250N000013 HC RX MED GY IP 250 OP 250 PS 637: Performed by: EMERGENCY MEDICINE

## 2023-05-08 PROCEDURE — 250N000009 HC RX 250: Performed by: EMERGENCY MEDICINE

## 2023-05-08 PROCEDURE — 250N000013 HC RX MED GY IP 250 OP 250 PS 637: Performed by: FAMILY MEDICINE

## 2023-05-08 RX ORDER — OLANZAPINE 10 MG/1
10 TABLET, ORALLY DISINTEGRATING ORAL ONCE
Status: COMPLETED | OUTPATIENT
Start: 2023-05-08 | End: 2023-05-08

## 2023-05-08 RX ADMIN — MONTELUKAST 10 MG: 10 TABLET, FILM COATED ORAL at 21:18

## 2023-05-08 RX ADMIN — DOCUSATE SODIUM 100 MG: 100 CAPSULE, LIQUID FILLED ORAL at 07:48

## 2023-05-08 RX ADMIN — RISPERIDONE 0.5 MG: 0.5 TABLET, ORALLY DISINTEGRATING ORAL at 21:18

## 2023-05-08 RX ADMIN — CHLORHEXIDINE GLUCONATE 0.12% ORAL RINSE 15 ML: 1.2 LIQUID ORAL at 07:53

## 2023-05-08 RX ADMIN — RISPERIDONE 0.5 MG: 0.5 TABLET, ORALLY DISINTEGRATING ORAL at 07:46

## 2023-05-08 RX ADMIN — TOPIRAMATE 100 MG: 100 TABLET, FILM COATED ORAL at 21:19

## 2023-05-08 RX ADMIN — TRAZODONE HYDROCHLORIDE 150 MG: 100 TABLET ORAL at 21:18

## 2023-05-08 RX ADMIN — OLANZAPINE 10 MG: 10 TABLET, ORALLY DISINTEGRATING ORAL at 18:04

## 2023-05-08 RX ADMIN — OLANZAPINE 10 MG: 10 TABLET, ORALLY DISINTEGRATING ORAL at 21:19

## 2023-05-08 RX ADMIN — LEVOTHYROXINE SODIUM 50 MCG: 25 TABLET ORAL at 07:48

## 2023-05-08 RX ADMIN — ZINC SULFATE 220 MG (50 MG) CAPSULE 220 MG: CAPSULE at 07:47

## 2023-05-08 RX ADMIN — CALCIUM POLYCARBOPHIL 625 MG: 625 TABLET, FILM COATED ORAL at 21:18

## 2023-05-08 RX ADMIN — SERTRALINE HYDROCHLORIDE 100 MG: 100 TABLET ORAL at 07:47

## 2023-05-08 RX ADMIN — PREDNISOLONE ACETATE 1 DROP: 10 SUSPENSION/ DROPS OPHTHALMIC at 09:21

## 2023-05-08 RX ADMIN — RISPERIDONE 2 MG: 2 TABLET ORAL at 21:19

## 2023-05-08 RX ADMIN — WHITE PETROLATUM 57.7 %-MINERAL OIL 31.9 % EYE OINTMENT: at 21:19

## 2023-05-08 RX ADMIN — CALCIUM POLYCARBOPHIL 625 MG: 625 TABLET, FILM COATED ORAL at 07:53

## 2023-05-08 RX ADMIN — PANTOPRAZOLE SODIUM 40 MG: 40 TABLET, DELAYED RELEASE ORAL at 07:46

## 2023-05-08 RX ADMIN — RISPERIDONE 2 MG: 2 TABLET ORAL at 07:53

## 2023-05-08 ASSESSMENT — ACTIVITIES OF DAILY LIVING (ADL)
ADLS_ACUITY_SCORE: 35

## 2023-05-08 NOTE — TELEPHONE ENCOUNTER
R:  No appropriate beds within [place].  Bed Search update within [place] 9:00PM    [facility]: @ cap per website-Per [facility], at capacity  [facility]: @ cap per website.  Per Monica, at capacity.  [facility]: @ cap per website-Per Richy, at capacity  [facility]: @ cap per website.  Per [facility], at capacity.  [facility]: @ cap per website  [facility]: @ cap per website.  Per [facility], at capacity.  [facility]: @ cap per website  [facility]: @ cap per website.  Per [facility], at capacity.  [facility]: @ cap per website.   Mixed unit with children.         Pt remains on PPS work list awaiting appropriate placement.

## 2023-05-08 NOTE — PROGRESS NOTES
"Triage & Transition Services, Extended Care     Therapy Progress Note    Patient: Jagdeep goes by \"Jagdeep,\" uses he/him pronouns  Date of Service: May 8, 2023  Site of Service: [facility]  Patient was seen in-person.     Presenting problem:   Jagdeep is followed related to Long wait time for admission: since 5/2/23. Please see initial DEC/[facility] Crisis Assessment completed by Jayda Purdy on 5/2/23 for complete assessment information. Notable concerns include delusions.     Individuals Present: Jagdeep & ALTAGRACIA Leyva    Session start: 9:32am  Session end: 9:48am  Session duration in minutes: 16 min  Session number: 1  Anticipated number of sessions or this episode of care: 1-3  CPT utilized: 48799 - Psychotherapy (with patient) - 30 (16-37*) min    Current Presentation:   Writer met with pt in pt's room in the ED. Writer walked into pt's room and pt asked, \"are you Teena's friend?\" Writer responded, no, but acknowledge it is possible writer looks like someone pt knows.    Pt was sitting in chair, had just finished eating breakfast. Pt reports he ate breakfast and slept great last night. Pt reports he is sick of being here at the hospital, and its \"bout time to go home.\" Pt reports he lives in [place] by himself. Pt also reports he is  and his spouse lives with him. Pt reports \"all of them\" live in the home. When asked who \"all of them\" are, pt reports \"[employer].\" Pt reports Park the drummer and everyone else lives there.     Writer inquired how things are going between pt and his sister, who is his guardian. Pt reports he has a good relationship with Huong, his sister. Pt reports \"she knows I'm , she knows what's going on.\" Pt reports that Huong and Femi (Huong's ) are going to pick pt up and take him home today.     At end of conversation with writer, pt says, \"you must be Teena.\" Writer tells pt her name. Pt repeats writer's name multiple times, then proceeds to ask if writer " "is \"Marli's kid,\" then lists other names to ask if writer knows them. Writer acknowledges that writer may looks like someone pt knows, but assures pt that writer and pt have only met in the emergency room.      Writer spoke with Huong, pt's sister and guardian. Huong reports that she is not picking pt up, and that pt continues to experience delusions. Huong reports that the other day over the phone pt told her that his brother, Abhijit was at the hospital and taking pt home, which was not true. Huong confirms that pt does not have a significant other and continues to be out of touch with reality. Huong reports at baseline, pt is \"with-it,\" and does not experience delusions. Huong continues to consent for IP MH admission.     Writer provides pt with update that Huong is not picking pt up today and that he is still on list for IP MH admission. Pt expresses that he wants to go home and he does not want to stay here another night. Pt was redirectable and expressed desire to call his sister. Pt was redirected by hospital staff to wait for a while before contacting his sister.     Mental Status Exam:   Appearance: dressed in hospital scrubs  Attitude: cooperative  Eye Contact: poor   Mood: good  Affect: mood congruent  Speech: increased speech latency  Psychomotor Behavior: no evidence of tardive dyskinesia, dystonia, or tics  Thought Process:  disorganized and evidence of thought blocking present  Associations: loosening of associations present  Thought Content: delusions  Insight: limited  Judgement: limited  Oriented to: time, person, and place  Attention Span and Concentration: intact  Recent and Remote Memory: limited    Diagnosis:   1          Schizoaffective disorder, Bipolar type, F25.0 - Primary, By History                                        2          Intellectual disability (intellectual developmental disorder), Mild, F70 - By History    Therapeutic Intervention(s):   Provided active listening, unconditional " positive regard, and validation. Engaged in guided discovery, explored patient's perspectives and helped expand them through socratic dialogue.    Treatment Objective(s) Addressed:   The focus of this session was on rapport building, assessing safety and exploring obstacles to safety in the community.     Case Management:   NA     General Recommendations:   Continue to monitor for harm. Consider: Be firm but gentle when redirecting and Listen in a neutral, non-judgmental way. Offer reassurance    Plan:   Inpatient Mental Health: Pt continues to experience delusions and has limited insight. Collateral reports that at baseline, pt does not experience delusions. Guardian continues to be agreeable to IP MH admission.  Writer continues to recommend IP MH admission for safety and stabilization.     Pt is voluntary per guardian.     Plan for Care reviewed with Assigned Medical Provider? Yes. Provider, Dr Gustafson, response: agreement     ALTAGRACIA Leyva   Licensed Mental Health Professional (LMHP), [facility]  [phone]

## 2023-05-08 NOTE — TELEPHONE ENCOUNTER
R: The patient is currently in the [place] ED awaiting placement. Patient's guardian will sign patient in voluntarily for metro + one hour drive.     Intake afternoon Bed Search (Done at 2:06 PM) Facilities that have not updated their [place] MH access site in the last 12 hours have been contacted for bed availability.   [facility] is posting 0 beds.   [facility] is posting 0 beds.  [facility] is posting 0 beds.  [facility] is posting 0 beds.  [facility] is posting 0 beds.  [facility] is posting 0 beds.  [facility] is posting 0 beds.  [facility] is posting 0 beds. Low acuity.  [facility] is posting 0 beds. Mixed unit 12+. Low acuity only.     [facility] at capacity. The patient remains on the worklist. Intake continues to identify appropriate inpatient psychiatry placement.

## 2023-05-08 NOTE — ED PROVIDER NOTES
[facility] ED Mental Health Handoff Note: 05/08/23       Brief HPI:  Patient was signed out to me by previous physician see initial ED Provider note for full details of the presentation.   Home meds reviewed and ordered/administered: Yes    Medically stable for inpatient mental health admission: Yes.    Evaluated by mental health: Yes. The recommendation is for inpatient mental health treatment. Bed search in process    Safety concerns: At the time I received sign out, there were no safety concerns.      Emergency department course:  Patient was signed out to me by previous physician.  Currently awaiting transfer or admission for mental health.  There were no issues during my shift.  Patient care will be signed out to the oncoming physician.   patient was reassessed by DEC   Patient told various untrue statements including his sister en route to pick him up, has a wife.  Patient's sister is his guardian.  She is in agreement with the admission.  Patient has continued delusions.    Janay LABOY, Ashly Ball, am serving as a trained medical scribe to document services personally performed by  MD based on the provider's statements to me on May 8, 2023.  This document has been checked and approved by the attending provider.    MD NARDA, was physically present and have reviewed and verified the accuracy of this note documented by Ashly Ball medical scribe.       Elsa Harley MD  05/08/23 6527

## 2023-05-08 NOTE — ED NOTES
Pt has been calm throughout this writer's shift. Pt comes out of the room frequently but has re-directable.

## 2023-05-08 NOTE — TELEPHONE ENCOUNTER
No appropriate beds are currently available within the  system. Bed search update @ 12AM:        [facility]: @ cap per website  [facility]: @ cap per website  [facility]: @ cap per website. Per Stephania @ 00:03 they are full tonight but possibly will have discharges on their low acuity unit tomorrow  [facility]: @ cap per website  [facility]: @ cap per website  [facility]: @ cap per website  [facility]: @ cap per website  [facility]: @ cap per website  [facility]: @ cap per website. Mixed unit/Low acuity only.  [facility]: @ cap per website  [facility]: @ cap per website  [facility]: Posting 2 beds. Per Colleen @ 00:06 they are at cap and won t have an update until 10AM    Pt remains on work list until appropriate placement is available

## 2023-05-08 NOTE — ED NOTES
Pt  took trazodone @2330  slept from 0000 to 0130 .continually come out of room and able to redirect

## 2023-05-09 ENCOUNTER — TELEPHONE (OUTPATIENT)
Dept: BEHAVIORAL HEALTH | Facility: CLINIC | Age: 53
End: 2023-05-09
Payer: MEDICARE

## 2023-05-09 PROCEDURE — 250N000013 HC RX MED GY IP 250 OP 250 PS 637: Performed by: FAMILY MEDICINE

## 2023-05-09 PROCEDURE — 250N000009 HC RX 250: Performed by: EMERGENCY MEDICINE

## 2023-05-09 PROCEDURE — 250N000013 HC RX MED GY IP 250 OP 250 PS 637: Performed by: EMERGENCY MEDICINE

## 2023-05-09 RX ORDER — OLANZAPINE 10 MG/1
10 TABLET, ORALLY DISINTEGRATING ORAL ONCE
Status: COMPLETED | OUTPATIENT
Start: 2023-05-09 | End: 2023-05-09

## 2023-05-09 RX ORDER — LORAZEPAM 1 MG/1
2 TABLET ORAL ONCE
Status: COMPLETED | OUTPATIENT
Start: 2023-05-09 | End: 2023-05-09

## 2023-05-09 RX ADMIN — DOCUSATE SODIUM 100 MG: 100 CAPSULE, LIQUID FILLED ORAL at 07:45

## 2023-05-09 RX ADMIN — ZINC SULFATE 220 MG (50 MG) CAPSULE 220 MG: CAPSULE at 07:44

## 2023-05-09 RX ADMIN — TOPIRAMATE 100 MG: 100 TABLET, FILM COATED ORAL at 23:00

## 2023-05-09 RX ADMIN — LEVOTHYROXINE SODIUM 50 MCG: 25 TABLET ORAL at 07:45

## 2023-05-09 RX ADMIN — RISPERIDONE 2 MG: 2 TABLET ORAL at 07:44

## 2023-05-09 RX ADMIN — CHLORHEXIDINE GLUCONATE 0.12% ORAL RINSE 15 ML: 1.2 LIQUID ORAL at 07:48

## 2023-05-09 RX ADMIN — WHITE PETROLATUM 57.7 %-MINERAL OIL 31.9 % EYE OINTMENT: at 23:02

## 2023-05-09 RX ADMIN — MONTELUKAST 10 MG: 10 TABLET, FILM COATED ORAL at 23:00

## 2023-05-09 RX ADMIN — PANTOPRAZOLE SODIUM 40 MG: 40 TABLET, DELAYED RELEASE ORAL at 07:44

## 2023-05-09 RX ADMIN — OLANZAPINE 10 MG: 10 TABLET, ORALLY DISINTEGRATING ORAL at 09:17

## 2023-05-09 RX ADMIN — PREDNISOLONE ACETATE 1 DROP: 10 SUSPENSION/ DROPS OPHTHALMIC at 07:48

## 2023-05-09 RX ADMIN — RISPERIDONE 2 MG: 2 TABLET ORAL at 23:00

## 2023-05-09 RX ADMIN — OLANZAPINE 10 MG: 10 TABLET, ORALLY DISINTEGRATING ORAL at 23:01

## 2023-05-09 RX ADMIN — RISPERIDONE 0.5 MG: 0.5 TABLET, ORALLY DISINTEGRATING ORAL at 07:44

## 2023-05-09 RX ADMIN — LORAZEPAM 2 MG: 1 TABLET ORAL at 16:10

## 2023-05-09 RX ADMIN — CALCIUM POLYCARBOPHIL 625 MG: 625 TABLET, FILM COATED ORAL at 22:59

## 2023-05-09 RX ADMIN — TRAZODONE HYDROCHLORIDE 150 MG: 100 TABLET ORAL at 23:00

## 2023-05-09 RX ADMIN — RISPERIDONE 0.5 MG: 0.5 TABLET, ORALLY DISINTEGRATING ORAL at 23:00

## 2023-05-09 RX ADMIN — CALCIUM POLYCARBOPHIL 625 MG: 625 TABLET, FILM COATED ORAL at 07:44

## 2023-05-09 RX ADMIN — SERTRALINE HYDROCHLORIDE 100 MG: 100 TABLET ORAL at 07:44

## 2023-05-09 ASSESSMENT — ACTIVITIES OF DAILY LIVING (ADL)
ADLS_ACUITY_SCORE: 35

## 2023-05-09 NOTE — TELEPHONE ENCOUNTER
0053 Bed Search Update:    [facility]: at capacity    [facility]: at capacity per website     [facility]: at capacity per website     [facility]: at capacity per website.     [facility]: at capacity per website     [facility]: at capacity per website     [facility]: at capacity per website     [facility]: at capacity per website     [facility]: at capacity per website     [facility]: at capacity per website.     Remains on wait list.

## 2023-05-09 NOTE — ED PROVIDER NOTES
[facility] ED Mental Health Handoff Note:       Brief HPI:  This is a 52 year old male signed out to me by Dr. Hirsch.  See initial ED Provider note for full details of the presentation.  52-year-old male with history of schizoaffective disorder who presented several days ago with increasing delusions and aggression.  Patient had DEC evaluation with recommendation for inpatient psychiatric care for stabilization.  Patient excepted on voluntary basis.    Home meds reviewed and ordered/administered: Yes    Medically stable for inpatient mental health admission: Yes.    Evaluated by mental health: Yes. The recommendation is for inpatient mental health treatment. Bed search in process    Safety concerns: At the time I received sign out, there were no safety concerns.    Hold Status:  Active Orders   N/A     Exam:   Patient Vitals for the past 24 hrs:   BP Temp Temp src Pulse Resp SpO2   05/08/23 2115 (!) 155/94 98  F (36.7  C) -- 75 16 98 %   05/08/23 0824 (!) 166/102 98.3  F (36.8  C) Oral 79 16 98 %     ED Course:    Medications   chlorhexidine (PERIDEX) 0.12 % solution 15 mL (15 mLs Swish & Spit $Given 5/8/23 0753)   prednisoLONE acetate (PRED FORTE) 1 % ophthalmic susp 1 drop (1 drop Right Eye $Given 5/8/23 0921)   artificial tears ophthalmic ointment ( Right Eye $Given 5/8/23 2119)   topiramate (TOPAMAX) tablet 100 mg (100 mg Oral $Given 5/8/23 2119)   zinc sulfate (ZINCATE) capsule 220 mg (220 mg Oral $Given 5/8/23 0747)   calcium polycarbophil (FIBERCON) tablet 625 mg (625 mg Oral $Given 5/8/23 2118)   pantoprazole (PROTONIX) EC tablet 40 mg (40 mg Oral $Given 5/8/23 0746)   ipratropium (ATROVENT) 0.06 % spray 2 spray (has no administration in time range)   sertraline (ZOLOFT) tablet 100 mg (100 mg Oral $Given 5/8/23 0747)   levothyroxine (SYNTHROID/LEVOTHROID) tablet 50 mcg (50 mcg Oral or NG Tube $Given 5/8/23 0748)   docusate sodium (COLACE) capsule 100 mg (100 mg Oral $Given 5/8/23 8198)    montelukast (SINGULAIR) tablet 10 mg (10 mg Oral $Given 5/8/23 2118)   risperiDONE (risperDAL) tablet 2 mg (2 mg Oral $Given 5/8/23 2119)   risperiDONE (risperDAL M-TABS) ODT tab 0.5 mg (0.5 mg Oral $Given 5/8/23 2118)   traZODone (DESYREL) tablet 150 mg (150 mg Oral $Given 5/8/23 2118)   hypromellose-dextran (ARTIFICAL TEARS) 0.1-0.3 % ophthalmic solution 2 drop (has no administration in time range)   artificial saliva (BIOTENE MT) solution 2 spray (2 sprays Swish & Spit $Given 5/3/23 0011)   OLANZapine zydis (zyPREXA) ODT tab 10 mg (10 mg Oral $Given 5/8/23 2119)   melatonin tablet 5 mg (has no administration in time range)   OLANZapine zydis (zyPREXA) ODT tab 10 mg (10 mg Oral $Given 5/3/23 1110)   hydrOXYzine (ATARAX) tablet 25 mg (25 mg Oral $Given 5/3/23 1110)   OLANZapine zydis (zyPREXA) ODT tab 10 mg (10 mg Oral $Given 5/3/23 1921)   OLANZapine zydis (zyPREXA) ODT tab 10 mg (10 mg Oral $Given 5/4/23 0341)   OLANZapine zydis (zyPREXA) ODT tab 10 mg (0 mg Oral Hold 5/8/23 2111)            There were no significant events during my shift.    Patient was signed out to the oncoming provider      Impression:    ICD-10-CM    1. Schizoaffective disorder, bipolar type (H)  F25.0       2. Intellectual disability  F79           Plan:    1. Awaiting inpatient mental health admission/transfer.      RESULTS:   No results found for this visit on 05/02/23 (from the past 24 hour(s)).          MD Tyra Cruz, Kelby Rivera MD  05/11/23 9311

## 2023-05-09 NOTE — PROGRESS NOTES
"  Triage & Transition Services, Extended Care     Care Coordination Progress Note    Patient: Jagdeep goes by \"Jagdeep,\" uses he/him pronouns  Date of Service: May 9, 2023  Site of Service: [facility] ED    Individuals Present: Jagdeep & Amelia Robledo    Session start: 12:00P  Session end: 12:06P  Session duration in minutes: 6min  Patient was seen in-person.     Jagdeep is being followed by Extended Care. Please see full DEC Assessment done by Jayda Purdy on 05/02/2023 for further detail.     Details of Therapeutic Interaction:   Pt was setting up a game to play with staff when writer entered the room. Pt was pleasant and shared his morning with writer. Pt shared frustrations and desire to go home to watch TV and \"relax\". Pt reported that he was waiting for his brother to pick pt up. Pt stated on two separate occasions during this visit that writer was \"Sulma's kid\" and therefore writer should be well aware in regards to \"family gatherings\" and discussion around which designated family member would be taking him home. Pt later picked up the box of colored pencils on his table stating that it was his tickets to the [employer].    Therapeutic Goals Addressed During Session:   Exploration of perspective, Provided active listening    Plan:  Recommended by [facility] for Inpatient Mental Health: Pt continues to experience delusions and has limited insight. Collateral reports that at baseline, pt does not experience delusions. Guardian continues to be agreeable to IP MH admission.  Writer continues to recommend IP MH admission for safety and stabilization.     Amelia Robledo 5/9/2023 1:02 PM  Extended Care Coordinator- [phone]            "

## 2023-05-09 NOTE — ED NOTES
Pt agitated wanting to leave but easily redirected. BP high during this. Dr. Mary informed and PRN ativan ordered and given.

## 2023-05-09 NOTE — TELEPHONE ENCOUNTER
R:  [place]  Access Inpatient Bed Call Log 5/8/2023 7:30PM     Adults:       [facility] is at capacity.     [facility] is posting 0 beds.       [facility] is posting 0 beds. Negative covid.        [facility] is posting 0 beds.     [facility] are posting 0 beds.        [facility] has 0 beds. Extensive wait List for committed patients.       [facility] is posting 0 bed.        [facility] is posting 0 bed -LOW acuity ONLY. Mixed unit 12+. Negative covid-   [phone]    [facility] has 0 bed. No aggression.  Negative Covid.       [facility] is posting 0 beds.  Negative covid.        [facility] 4 beds Low acuity only.  Negative covid. -   [phone]       Pt remains on waitlist pending appropriate bed availability.

## 2023-05-09 NOTE — ED NOTES
"Pt became agitated, stating he was leaving. Told writer to \"shut up and not talk anymore\" repeatedly. Pt made delusional statements that his parents informed him he could return to their home as well as \"Peter\" in now his guardian, not Huong.\" Pt stated not to try and stop him and began attempting to leave. Writer and sitter walked Pt back to room, provided distraction and PRN Zyprexa 10mg.   "

## 2023-05-09 NOTE — TELEPHONE ENCOUNTER
[facility] Access Inpatient Bed Call Log 5/9/2023 @9:00 AM:       Intake has called facilities that have not updated their bed status within the last 12 hours.        Adults:         [facility] is posting 0 beds.      [facility] is posting 0 [phone]       [facility] is posting 0 beds. [phone]     [facility] is posting 0 beds. [phone]     [facility] is posting 0 beds. [phone]      [facility] is posting 0 beds. [phone]     [facility] is posting 0 beds. [phone]     [facility] is posting 0 beds. [phone]     [facility] is posting 0 beds [phone]     [facility] is posting 0 beds. [phone]     [facility] is posting  0 bed. Mixed unit 12+. Low acuity only.  [phone]      [facility] is positing 0 beds. No aggression.  [phone]     [facility] is posting 0 beds. [phone]       [facility] is posting 1 bed Low acuity only. [phone] Per website @3:09am     [facility] is posting 1 bed [phone] Per website 7:03am     [facility] is posting 1 beds very low acuity, mood d/o only [phone].      [facility] is posting 2 bed. 72 hr hold preferred. [phone]       [facility] is posting 1 beds. [phone] Per website @11:22pm     [facility] is posting 5 beds. Vol only, No Hx of aggression, violence, or assault. No sexual offenders. No 72 hr holds. [phone] Per website @7:45am        Pt remains on work list pending appropriate bed availability.

## 2023-05-10 ENCOUNTER — TELEPHONE (OUTPATIENT)
Dept: BEHAVIORAL HEALTH | Facility: CLINIC | Age: 53
End: 2023-05-10
Payer: MEDICARE

## 2023-05-10 PROCEDURE — 250N000013 HC RX MED GY IP 250 OP 250 PS 637: Performed by: FAMILY MEDICINE

## 2023-05-10 PROCEDURE — 250N000013 HC RX MED GY IP 250 OP 250 PS 637: Performed by: EMERGENCY MEDICINE

## 2023-05-10 RX ADMIN — TRAZODONE HYDROCHLORIDE 150 MG: 100 TABLET ORAL at 21:19

## 2023-05-10 RX ADMIN — LEVOTHYROXINE SODIUM 50 MCG: 25 TABLET ORAL at 08:07

## 2023-05-10 RX ADMIN — PREDNISOLONE ACETATE 1 DROP: 10 SUSPENSION/ DROPS OPHTHALMIC at 08:07

## 2023-05-10 RX ADMIN — ZINC SULFATE 220 MG (50 MG) CAPSULE 220 MG: CAPSULE at 08:07

## 2023-05-10 RX ADMIN — OLANZAPINE 10 MG: 10 TABLET, ORALLY DISINTEGRATING ORAL at 21:19

## 2023-05-10 RX ADMIN — CALCIUM POLYCARBOPHIL 625 MG: 625 TABLET, FILM COATED ORAL at 08:06

## 2023-05-10 RX ADMIN — RISPERIDONE 0.5 MG: 0.5 TABLET, ORALLY DISINTEGRATING ORAL at 08:06

## 2023-05-10 RX ADMIN — CHLORHEXIDINE GLUCONATE 0.12% ORAL RINSE 15 ML: 1.2 LIQUID ORAL at 08:07

## 2023-05-10 RX ADMIN — RISPERIDONE 2 MG: 2 TABLET ORAL at 08:07

## 2023-05-10 RX ADMIN — RISPERIDONE 0.5 MG: 0.5 TABLET, ORALLY DISINTEGRATING ORAL at 20:29

## 2023-05-10 RX ADMIN — Medication 2 SPRAY: at 08:06

## 2023-05-10 RX ADMIN — PANTOPRAZOLE SODIUM 40 MG: 40 TABLET, DELAYED RELEASE ORAL at 08:06

## 2023-05-10 RX ADMIN — DOCUSATE SODIUM 100 MG: 100 CAPSULE, LIQUID FILLED ORAL at 08:07

## 2023-05-10 RX ADMIN — WHITE PETROLATUM 57.7 %-MINERAL OIL 31.9 % EYE OINTMENT: at 21:17

## 2023-05-10 RX ADMIN — RISPERIDONE 2 MG: 2 TABLET ORAL at 20:29

## 2023-05-10 RX ADMIN — TOPIRAMATE 100 MG: 100 TABLET, FILM COATED ORAL at 20:26

## 2023-05-10 RX ADMIN — CALCIUM POLYCARBOPHIL 625 MG: 625 TABLET, FILM COATED ORAL at 20:27

## 2023-05-10 RX ADMIN — SERTRALINE HYDROCHLORIDE 100 MG: 100 TABLET ORAL at 08:06

## 2023-05-10 RX ADMIN — MONTELUKAST 10 MG: 10 TABLET, FILM COATED ORAL at 21:19

## 2023-05-10 ASSESSMENT — ACTIVITIES OF DAILY LIVING (ADL)
ADLS_ACUITY_SCORE: 35

## 2023-05-10 NOTE — TELEPHONE ENCOUNTER
R:  Intake paged Dr Griffin @ 4:39pm to present to unit 10  Dr Griffin called Intake back @ 4:43pm.  Holding until tomorrow to review further r/t another acute admission recently accepted.      Pt top remain on adult worklist

## 2023-05-10 NOTE — ED NOTES
Huong Keenan (guardian) called to notify staff that she is going to be out of town for two weeks starting today at 3pm. She is requesting phone calls be directed to the patient's other sister Laura Carpenter [phone].

## 2023-05-10 NOTE — PROGRESS NOTES
Triage & Transition Services, Extended Care     Jagdeep CHIRINOS Denise  May 10, 2023    Jagdeep is followed related to long wait time for admission. Please see initial DEC Crisis Assessment completed for complete assessment information. Medical record is reviewed. While patient is in the ED, care team is working towards Learn and Demonstrate at [facility] One Skill Focused on Crisis Stabilization.     Patient was engaged in conversation. He continues to be appear delusional and takes about how Abhijit is at the hospital and bringing him home. Abhijit is in reference to his brother who is not at the hospital and this statements has been said before per chart review. He states that he is not in the ED and does not accept writer reminding him he is in the ED. He remains fixated on going home. He was able to redirected with help of his one to one to listening to music on his TV.    Plan:  Inpatient Mental Health: Pt continues to experience delusions and has limited insight. Collateral reports that at baseline, pt does not experience delusions. Guardian continues to be agreeable to IP MH admission.  Writer continues to recommend IP MH admission for safety and stabilization.      Pt is voluntary per guardian.     Per note by MICHELLE Arias dates 05/10: Huong Keenan (guardian) called to notify staff that she is going to be out of town for two weeks starting today at 3pm. She is requesting phone calls be directed to the patient's other sister Laura Carpenter [phone].     Plan for Care reviewed with Assigned Medical Provider? Yes. Provider, Dr. Vega, response: agreeable.    Extended Care will follow and meet with patient/family/care team as able or requested.     Corinne Romitti, [facility], Extended Care   [phone]

## 2023-05-10 NOTE — ED NOTES
Pt easily redirected on day shift. Pt fixated on leaving and given PRN medications x2 today. Pt did take 2 naps today.

## 2023-05-10 NOTE — TELEPHONE ENCOUNTER
R:  No beds within [facility].  Bed Search initiated @ 7:30am & 2:00pm.    Pt guardian will sign pt in - and agrees to [facility] placement and within an hour:   No beds identified.   Intake called the following:     [facility] is posting 0 beds.  Negative covid.    [phone]    [facility]:  ([facility]):   [phone]. Negative covid.  [phone]    [facility] is posting 0 beds. 72HH preferred., @ Capacity  [phone]    [facility] is posting 0 beds.   [phone]    [facility] ([facility] and Jason iVllanueva) [phone] Jason Villanueva requires LOW Acuity only    [facility] Neg Covid *LOW ACUITY*  [phone]      [facility] is posting 0 beds. Mixed unit 12+. Low acuity only. Negative covid. [phone]    [facility]  ([facility]) is positing 0 beds. No aggression.  Negative covid. [phone]    [facility] is posting 0 beds.  Negative covid. [phone] [id number]

## 2023-05-10 NOTE — TELEPHONE ENCOUNTER
R: The patient is currently in the [place] ED awaiting placement. Patient's guardian to sign patient in voluntary for [place] plus one hour drive    Intake evening Bed Search (Done at 7:51 PM) Facilities that have not updated their [place] MH access site in the last 12 hours have been contacted for bed availability.     [facility] is posting 0 beds.   [facility] is posting 0 beds.  [facility] is posting 0 beds.  [facility] is posting 0 beds.  [facility] is posting 0 beds.  [facility] is posting 0 beds.  [facility] is posting 0 beds.  [facility] is posting 0 beds. Low acuity.  [facility] is posting 2 beds. Mixed unit 12+. Low acuity only. Patient is not appropriate for facility due to aggression history.     [facility] at capacity. The patient remains on the worklist. Intake continues to identify appropriate inpatient psychiatry placement.

## 2023-05-10 NOTE — ED PROVIDER NOTES
[facility] ED Mental Health Handoff Note:       Brief HPI:  This is a 52 year old male signed out to me by Dr. Aguilar.  See initial ED Provider note for full details of the presentation. Interval history is pertinent for no new events.    Home meds reviewed and ordered/administered: Yes    Medically stable for inpatient mental health admission: Yes.    Evaluated by mental health: Yes. The recommendation is for inpatient mental health treatment. Bed search in process    Safety concerns: At the time I received sign out, there were no safety concerns.    Hold Status:  Active Orders   N/A            Exam:   Patient Vitals for the past 24 hrs:   BP Temp Pulse Resp SpO2   05/09/23 1557 (!) 182/109 -- 89 18 99 %   05/09/23 0730 (!) 135/90 98.1  F (36.7  C) 90 18 99 %           ED Course:    Medications   chlorhexidine (PERIDEX) 0.12 % solution 15 mL (15 mLs Swish & Spit $Given 5/9/23 0748)   prednisoLONE acetate (PRED FORTE) 1 % ophthalmic susp 1 drop (1 drop Right Eye $Given 5/9/23 0748)   artificial tears ophthalmic ointment ( Right Eye $Given 5/9/23 2302)   topiramate (TOPAMAX) tablet 100 mg (100 mg Oral $Given 5/9/23 2300)   zinc sulfate (ZINCATE) capsule 220 mg (220 mg Oral $Given 5/9/23 0744)   calcium polycarbophil (FIBERCON) tablet 625 mg (625 mg Oral $Given 5/9/23 2259)   pantoprazole (PROTONIX) EC tablet 40 mg (40 mg Oral $Given 5/9/23 0744)   ipratropium (ATROVENT) 0.06 % spray 2 spray (has no administration in time range)   sertraline (ZOLOFT) tablet 100 mg (100 mg Oral $Given 5/9/23 0744)   levothyroxine (SYNTHROID/LEVOTHROID) tablet 50 mcg (50 mcg Oral or NG Tube $Given 5/9/23 0745)   docusate sodium (COLACE) capsule 100 mg (100 mg Oral $Given 5/9/23 0745)   montelukast (SINGULAIR) tablet 10 mg (10 mg Oral $Given 5/9/23 2300)   risperiDONE (risperDAL) tablet 2 mg (2 mg Oral $Given 5/9/23 2300)   risperiDONE (risperDAL M-TABS) ODT tab 0.5 mg (0.5 mg Oral $Given 5/9/23 2300)   traZODone (DESYREL) tablet  150 mg (150 mg Oral $Given 5/9/23 2300)   hypromellose-dextran (ARTIFICAL TEARS) 0.1-0.3 % ophthalmic solution 2 drop (has no administration in time range)   artificial saliva (BIOTENE MT) solution 2 spray (2 sprays Swish & Spit $Given 5/3/23 0011)   OLANZapine zydis (zyPREXA) ODT tab 10 mg (10 mg Oral $Given 5/9/23 2301)   melatonin tablet 5 mg (has no administration in time range)   OLANZapine zydis (zyPREXA) ODT tab 10 mg (10 mg Oral $Given 5/3/23 1110)   hydrOXYzine (ATARAX) tablet 25 mg (25 mg Oral $Given 5/3/23 1110)   OLANZapine zydis (zyPREXA) ODT tab 10 mg (10 mg Oral $Given 5/3/23 1921)   OLANZapine zydis (zyPREXA) ODT tab 10 mg (10 mg Oral $Given 5/4/23 0341)   OLANZapine zydis (zyPREXA) ODT tab 10 mg (0 mg Oral Hold 5/8/23 2111)   OLANZapine zydis (zyPREXA) ODT tab 10 mg (10 mg Oral $Given 5/9/23 0917)   LORazepam (ATIVAN) tablet 2 mg (2 mg Oral $Given 5/9/23 1610)            There were no significant events during my shift.    Patient was signed out to the oncoming provider      Impression:    ICD-10-CM    1. Schizoaffective disorder, bipolar type (H)  F25.0       2. Intellectual disability  F79           Plan:    1. Awaiting inpatient mental health admission/transfer.      RESULTS:   No results found for this visit on 05/02/23 (from the past 24 hour(s)).          MD Gary Gaston David, MD  05/10/23 7763

## 2023-05-11 ENCOUNTER — TELEPHONE (OUTPATIENT)
Dept: BEHAVIORAL HEALTH | Facility: CLINIC | Age: 53
End: 2023-05-11
Payer: MEDICARE

## 2023-05-11 PROBLEM — F25.0 SCHIZOAFFECTIVE DISORDER, BIPOLAR TYPE (H): Status: ACTIVE | Noted: 2023-05-11

## 2023-05-11 PROCEDURE — 250N000009 HC RX 250: Performed by: EMERGENCY MEDICINE

## 2023-05-11 PROCEDURE — 250N000013 HC RX MED GY IP 250 OP 250 PS 637: Performed by: EMERGENCY MEDICINE

## 2023-05-11 PROCEDURE — 124N000002 HC R&B MH UMMC

## 2023-05-11 PROCEDURE — 99223 1ST HOSP IP/OBS HIGH 75: CPT | Mod: AI | Performed by: PSYCHIATRY & NEUROLOGY

## 2023-05-11 PROCEDURE — 250N000013 HC RX MED GY IP 250 OP 250 PS 637: Performed by: FAMILY MEDICINE

## 2023-05-11 RX ORDER — HYDROXYZINE HYDROCHLORIDE 25 MG/1
25 TABLET, FILM COATED ORAL EVERY 4 HOURS PRN
Status: DISCONTINUED | OUTPATIENT
Start: 2023-05-11 | End: 2023-08-14 | Stop reason: HOSPADM

## 2023-05-11 RX ORDER — AMOXICILLIN 250 MG
1 CAPSULE ORAL 2 TIMES DAILY PRN
Status: DISCONTINUED | OUTPATIENT
Start: 2023-05-11 | End: 2023-08-14 | Stop reason: HOSPADM

## 2023-05-11 RX ORDER — MAGNESIUM HYDROXIDE/ALUMINUM HYDROXICE/SIMETHICONE 120; 1200; 1200 MG/30ML; MG/30ML; MG/30ML
30 SUSPENSION ORAL EVERY 4 HOURS PRN
Status: DISCONTINUED | OUTPATIENT
Start: 2023-05-11 | End: 2023-08-14 | Stop reason: HOSPADM

## 2023-05-11 RX ORDER — OLANZAPINE 10 MG/2ML
10 INJECTION, POWDER, FOR SOLUTION INTRAMUSCULAR 3 TIMES DAILY PRN
Status: DISCONTINUED | OUTPATIENT
Start: 2023-05-11 | End: 2023-08-14 | Stop reason: HOSPADM

## 2023-05-11 RX ORDER — TRAZODONE HYDROCHLORIDE 50 MG/1
50 TABLET, FILM COATED ORAL
Status: DISCONTINUED | OUTPATIENT
Start: 2023-05-11 | End: 2023-05-11

## 2023-05-11 RX ORDER — ACETAMINOPHEN 325 MG/1
650 TABLET ORAL EVERY 4 HOURS PRN
Status: DISCONTINUED | OUTPATIENT
Start: 2023-05-11 | End: 2023-08-14 | Stop reason: HOSPADM

## 2023-05-11 RX ORDER — OLANZAPINE 10 MG/1
10 TABLET ORAL 3 TIMES DAILY PRN
Status: DISCONTINUED | OUTPATIENT
Start: 2023-05-11 | End: 2023-08-14 | Stop reason: HOSPADM

## 2023-05-11 RX ADMIN — SERTRALINE HYDROCHLORIDE 100 MG: 100 TABLET ORAL at 09:07

## 2023-05-11 RX ADMIN — PANTOPRAZOLE SODIUM 40 MG: 40 TABLET, DELAYED RELEASE ORAL at 09:06

## 2023-05-11 RX ADMIN — RISPERIDONE 0.5 MG: 0.5 TABLET, ORALLY DISINTEGRATING ORAL at 19:27

## 2023-05-11 RX ADMIN — DOCUSATE SODIUM 100 MG: 100 CAPSULE, LIQUID FILLED ORAL at 09:05

## 2023-05-11 RX ADMIN — RISPERIDONE 2 MG: 2 TABLET ORAL at 19:27

## 2023-05-11 RX ADMIN — CALCIUM POLYCARBOPHIL 625 MG: 625 TABLET, FILM COATED ORAL at 09:05

## 2023-05-11 RX ADMIN — CHLORHEXIDINE GLUCONATE 0.12% ORAL RINSE 15 ML: 1.2 LIQUID ORAL at 09:09

## 2023-05-11 RX ADMIN — MONTELUKAST 10 MG: 10 TABLET, FILM COATED ORAL at 21:51

## 2023-05-11 RX ADMIN — ZINC SULFATE 220 MG (50 MG) CAPSULE 220 MG: CAPSULE at 09:06

## 2023-05-11 RX ADMIN — LEVOTHYROXINE SODIUM 50 MCG: 25 TABLET ORAL at 09:06

## 2023-05-11 RX ADMIN — WHITE PETROLATUM 57.7 %-MINERAL OIL 31.9 % EYE OINTMENT: at 23:44

## 2023-05-11 RX ADMIN — TOPIRAMATE 100 MG: 100 TABLET, FILM COATED ORAL at 19:27

## 2023-05-11 RX ADMIN — RISPERIDONE 0.5 MG: 0.5 TABLET, ORALLY DISINTEGRATING ORAL at 09:06

## 2023-05-11 RX ADMIN — PREDNISOLONE ACETATE 1 DROP: 10 SUSPENSION/ DROPS OPHTHALMIC at 09:09

## 2023-05-11 RX ADMIN — CALCIUM POLYCARBOPHIL 625 MG: 625 TABLET, FILM COATED ORAL at 20:36

## 2023-05-11 RX ADMIN — RISPERIDONE 2 MG: 2 TABLET ORAL at 09:06

## 2023-05-11 RX ADMIN — OLANZAPINE 10 MG: 10 TABLET, ORALLY DISINTEGRATING ORAL at 21:50

## 2023-05-11 ASSESSMENT — ACTIVITIES OF DAILY LIVING (ADL)
ADLS_ACUITY_SCORE: 45
ADLS_ACUITY_SCORE: 35
ADLS_ACUITY_SCORE: 31
ADLS_ACUITY_SCORE: 35
ADLS_ACUITY_SCORE: 31
ADLS_ACUITY_SCORE: 35
ADLS_ACUITY_SCORE: 35
ADLS_ACUITY_SCORE: 31
ADLS_ACUITY_SCORE: 35
ADLS_ACUITY_SCORE: 31
ADLS_ACUITY_SCORE: 35
ADLS_ACUITY_SCORE: 31

## 2023-05-11 ASSESSMENT — LIFESTYLE VARIABLES
AUDIT-C TOTAL SCORE: 0
SKIP TO QUESTIONS 9-10: 1

## 2023-05-11 NOTE — PLAN OF CARE
Initial Psychosocial Assessment    I have reviewed the chart, met with the patient, and developed Care Plan.  Information for assessment was obtained from: pt and chart review    Presenting Problem:  This patient is a 52 year old  male with history of schizoaffective disorder who presented to ED with agitation and delusions in context of insomnia and recent medication.     History of Mental Health and Chemical Dependency:  Pt has a hx of schizoaffective disorder -bipolar type, anxiety and mild intellectual disability. Hx of MI commitment through [facility] in 1992, currently closed. Pt currently under legal guardianship of his sister, Huong Keenan. She reports pt was last admitted IP  approximately 30 years ago for psychotic symptoms at [place].    No chemical abuse reported.  No hx of CD treatment.     Family Description (Constellation, Family Psychiatric History):  Born in [place] premature and mother had pre-eclampsia with likely anoxic brain injury. Developmentally delayed. Onset of aggressive behaviors at age [age]. Both of his parents have passed away. Has delusions that his parents are still alive and they he is  with children. These things are not true. Close with all siblings, speaks with them often. All siblings live in [place].    Significant Life Events (Illness, Abuse, Trauma, Death):  No hx of relevant trauma hx reported.     Living Situation:  Pt currently lives at a group home and he has lived at this same group home since 1993. His staff care for him a great deal and he has been mostly comfortable and stable there until very recently.    Educational Background:  Graduated HS    Occupational History:  Reports pt has not worked for years. 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     Ethnic/Cultural Issues:  None reported.      Spiritual Orientation:  Pt reports strong alejandrina in God and Orthodoxy.     Service History:  None reported    Social Functioning (organization, interests):  Emotional support from siblings and group home staff/residents.     Current Treatment Providers are:  Primary Care Provider: Joel Werner MD - [facility], last visit 3/28/23  [address] [place] [address]    [phone] (Work)    [phone] (Fax)       Psychiatrist: Tank Sauer DO - [facility], last office visit 4/25/23, last telephone visit 5/1/23  [address]    [place], [place] [address] [phone] (Work)    [phone] (Fax)       : Yes. Name unknown at time of assessment     [facility] Care Coordinator: Vero [phone]    Social Service Assessment/Plan:  Patient has been admitted for psychiatric stabilization due to symptoms of agitation and delusions. Patient will have psychiatric assessment and medication management by the psychiatrist. Medications will be reviewed and adjusted per MD as indicated. The treatment team will continue to assess and stabilize the patient's mental health symptoms with the use of medications and therapeutic programming. Hospital staff will provide a safe environment and a therapeutic milieu. Staff will continue to assess patient as needed. Patient will be encouraged to participate in unit groups and activities. Patient will receive individual and group support on the unit.  CTC will do individual inpatient treatment planning and after care planning. CTC will discuss options for increasing community supports with the patient. CTC will coordinate with outpatient providers and will place referrals to ensure appropriate follow up care is in place.

## 2023-05-11 NOTE — PLAN OF CARE
.A             Admission:  I am responsible for any personal items that are not sent to the safe or pharmacy.  [facility] is not responsible for loss, theft or damage of any property in my possession.  Vikings cap  Black tennis shoes  Watch  Brown shorts  Budweiser shorts  Black dress socks  2 pair gray socks   Blue sweat shirt  Blue T-shirt  Black boxers   05/11/23 1305   Patient Belongings   Did you bring any home meds/supplements to the hospital?  No   Patient Belongings locker   Patient Belongings Put in Hospital Secure Location (Security or Locker, etc.) clothing;shoes;watch   Belongings Search Yes   Clothing Search Yes   Second Staff Ketan AGUILAR           Signature:  _________________________________ Date: _______  Time: _____                                              Staff Signature:  ____________________________ Date: ________  Time: _____      2nd Staff person, if patient is unable/unwilling to sign:    Signature: ________________________________ Date: ________  Time: _____     Discharge:  [facility] has returned all of my personal belongings:    Signature: _________________________________ Date: ________  Time: _____                                          Staff Signature:  ____________________________ Date: ________  Time: _____
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
## 2023-05-11 NOTE — PROGRESS NOTES
"  Triage & Transition Services, Extended Care     Care Coordination Progress Note    Patient: Jagdeep goes by \"Jagdeep,\" uses he/him pronouns  Date of Service: May 11, 2023  Site of Service: [facility] ED    Individuals Present: Jagdeep & Amelia Robledo    Session start: 12:00P  Session end: 12:07P  Session duration in minutes: 7min  Patient was seen in-person.     Jagdeep is being followed by Extended Care. Please see full DEC Assessment done by Jayda Purdy on 05/02/2023 for further detail.     Details of Therapeutic Interaction:   Pt was pleasant and engaged. Pt shared his morning with writer. Pt and writer discussed about healthy eating.    At ~11:50A,  called [facility] and requested an update re: patient disposition. Writer explained to  that without a release of information on file, an update cannot be provided at this time. [facility] notified.    Therapeutic Goals Addressed During Session:   Exploration of perspective, Provided active listening    Plan:  Recommended by [facility] for Inpatient Mental Health: Pt continues to experience delusions and has limited insight. Collateral reports that at baseline, pt does not experience delusions. Guardian continues to be agreeable to IP MH admission.  Writer continues to recommend IP MH admission for safety and stabilization.      Pt is voluntary per guardian.      Per note by MICHELLE Arias dates 05/10: Huong Keenan (guardian) called to notify staff that she is going to be out of town for two weeks starting today at 3pm. She is requesting phone calls be directed to the patient's other sister Laura Carpenter [phone].     Amelia Robledo 5/11/2023 12:28 PM  Extended Care Coordinator- [phone]            "

## 2023-05-11 NOTE — TELEPHONE ENCOUNTER
[facility] Access Inpatient Bed Call Log 5/11/2023 12:45 AM      Intake has called facilities that have not updated their bed status within the last 12 hours.     Adults:         [facility] is posting 0 beds.      [facility] is posting 0 beds. [phone]      [facility] is posting 0 beds. [phone]     [facility] is posting 0 beds. [phone] - 12:46AM No Answer.      [facility] is posting 0 beds. [phone]     [facility] is posting 0 bed. [phone]      [facility] is posting 0 beds. [phone]     [facility] is posting 0 beds. [phone]     [facility] is posting 0 beds. [phone]     [facility] is posting 0 beds. [phone]       [facility] is posting 0 beds. Mixed unit 12+. Low acuity only.  [phone]     [facility] is posting 0 beds. No aggression.  [phone]     [facility] is posting 0 beds. [phone]      [facility] is posting 2 beds. [phone]      [facility] is posting 1 bed. [phone]      [facility] is posting 4 beds. Low acuity. [phone]     [facility] is posting 2 beds. 72 hr hold preferred. [phone] - 12:47am They have a cut off at 10pm for review. Call back after 7:30am.     [facility] is posting 4 bed.  [phone]        [facility] is posting 5 bed. Voluntary only- no holds/commitments, No Hx of aggression, violence, or assault. No sexual offenders. No 72 hr holds. [phone]     [facility] is posting 8 beds. (Must have the cognitive ability to do programming. No aggressive or violent behavior or recent HX in the last 2 yrs.  must be primary.) [phone]    [facility] is posting 0 beds. Low acuity only. Violence and aggression capped. [phone]      [facility] is posting 1 bed. Low acuity, Neg Covid. [phone]- 12:49am Per Franchesca, they are capped.       [facility] is posting 0  beds. Covid neg. Vol only. Combined adolescent and adult unit. No aggressive or violent behavior. No registered sex offenders. [phone] - 12:49 Per Lluvia, they are open to review.     [facility] posting 0 beds. Negative covid.      [facility] is posting 14 beds. Call for details. [phone]      [facility] is posting 4 beds. [phone]       Pt remains on work list pending appropriate bed availability.

## 2023-05-11 NOTE — PLAN OF CARE
"Admission Note:    Situation:   Patient is a 52 year old male admitted from [facility] ED due to increased psychosis at his group home.    Background:   Patient has a history of byrd esophagus, bipolar, depression, schizoaffective, and seizure history, and intellectual disability.    Writer got history from his guardian Huong and the  [employer]. Patient has been living at his group home for the past 30 years with no behavioral episodes. Recently there was a medication change. He started on Zyprexa for sleep but then it was discontinued and changed to Trazodone. After this medication change the  stated he started to become restless, wasn't sleeping, would be awake at night eating large amounts of food or would rearrange and reorganize items in the group home during the night. She stated that he has been making statements that his parents are still alive, that he has a girlfriend, and that he is leaving soon.    Assessment:   When asking patient what brought him he stated \"I don't remember, it's kind of crazy, why am I here.\". Patient presented as elated, laughing loudly, smiling, and made delusional as well as inappropriate comments. Patient laughed and smiled when saying that \"I'm the boss. I'm the boss\". He pointed to a staff member and called her \"Park, the head mandujano.\". Patient said \"I'll give her a big spanking on her ass.\" And then laughed loudly. He continued to state that he was going to leave \"Fucking home, yay! Go home so I'm going home at 0630 in the morning.\"    Recommendation:   Patient has a legal guardian. Status is voluntary. Labs have been taken.  "

## 2023-05-11 NOTE — ED NOTES
[facility] ED Mental Health Handoff Note:       Brief HPI:  This is a 52 year old male signed out to me by Dr. Aguilar.  See initial ED Provider note for full details of the presentation.  Patient has history of schizoaffective disorder, bipolar, intellectual disability, delusional, disorganized thought.  He has been acting out at group home and has been having active delusions which are worsening despite medication changes by his outpatient psychiatrist. Admission for stabilization.  Has legal guardian to sign him in.     Home meds reviewed and ordered/administered: Yes    Medically stable for inpatient mental health admission: Yes.    Evaluated by mental health: Yes. The recommendation is for inpatient mental health treatment. Bed search in process    Safety concerns: At the time I received sign out, there were no safety concerns.    Hold Status:  Active Orders   N/A           Exam:   Patient Vitals for the past 24 hrs:   BP Pulse Resp SpO2   05/11/23 0008 113/55 76 18 95 %           ED Course:    Medications   chlorhexidine (PERIDEX) 0.12 % solution 15 mL (15 mLs Swish & Spit $Given 5/10/23 0807)   prednisoLONE acetate (PRED FORTE) 1 % ophthalmic susp 1 drop (1 drop Right Eye $Given 5/10/23 0807)   artificial tears ophthalmic ointment ( Right Eye $Given 5/10/23 2117)   topiramate (TOPAMAX) tablet 100 mg (100 mg Oral $Given 5/10/23 2026)   zinc sulfate (ZINCATE) capsule 220 mg (220 mg Oral $Given 5/10/23 0807)   calcium polycarbophil (FIBERCON) tablet 625 mg (625 mg Oral $Given 5/10/23 2027)   pantoprazole (PROTONIX) EC tablet 40 mg (40 mg Oral $Given 5/10/23 0806)   ipratropium (ATROVENT) 0.06 % spray 2 spray (has no administration in time range)   sertraline (ZOLOFT) tablet 100 mg (100 mg Oral $Given 5/10/23 0806)   levothyroxine (SYNTHROID/LEVOTHROID) tablet 50 mcg (50 mcg Oral or NG Tube $Given 5/10/23 0807)   docusate sodium (COLACE) capsule 100 mg (100 mg Oral $Given 5/10/23 0807)   montelukast  (SINGULAIR) tablet 10 mg (10 mg Oral $Given 5/10/23 2119)   risperiDONE (risperDAL) tablet 2 mg (2 mg Oral $Given 5/10/23 2029)   risperiDONE (risperDAL M-TABS) ODT tab 0.5 mg (0.5 mg Oral $Given 5/10/23 2029)   traZODone (DESYREL) tablet 150 mg (150 mg Oral $Given 5/10/23 2119)   hypromellose-dextran (ARTIFICAL TEARS) 0.1-0.3 % ophthalmic solution 2 drop (has no administration in time range)   artificial saliva (BIOTENE MT) solution 2 spray (2 sprays Swish & Spit $Given 5/10/23 0806)   OLANZapine zydis (zyPREXA) ODT tab 10 mg (10 mg Oral $Given 5/10/23 2119)   melatonin tablet 5 mg (has no administration in time range)   OLANZapine zydis (zyPREXA) ODT tab 10 mg (10 mg Oral $Given 5/3/23 1110)   hydrOXYzine (ATARAX) tablet 25 mg (25 mg Oral $Given 5/3/23 1110)   OLANZapine zydis (zyPREXA) ODT tab 10 mg (10 mg Oral $Given 5/3/23 1921)   OLANZapine zydis (zyPREXA) ODT tab 10 mg (10 mg Oral $Given 5/4/23 0341)   OLANZapine zydis (zyPREXA) ODT tab 10 mg (0 mg Oral Hold 5/8/23 2111)   OLANZapine zydis (zyPREXA) ODT tab 10 mg (10 mg Oral $Given 5/9/23 0917)   LORazepam (ATIVAN) tablet 2 mg (2 mg Oral $Given 5/9/23 1610)            There were no significant events during my shift.    Patient was signed out to the oncoming provider, Dr. Mendieta      Impression:    ICD-10-CM    1. Schizoaffective disorder, bipolar type (H)  F25.0       2. Intellectual disability  F79           Plan:    1. Awaiting inpatient mental health admission/transfer.      RESULTS:   No results found for this visit on 05/02/23 (from the past 24 hour(s)).          MD Susan Taylor Gregory Townley, MD  05/15/23 6472

## 2023-05-11 NOTE — TELEPHONE ENCOUNTER
R:  Intake paged Dr Celeste for review for unit 12 @ 9:26am    Intake called [facility] ED for pt update r/t disposition.  ED RN States he has a 1:1 due to elopement risk and trying to leave the ED.   RN reports pt is redirectable, no codes.   Aggression more at group home.   Pt legal guardian is sister Huong Keenan:  (sister, legal guardian) [phone]     Dr Celeste called intake back after TEAMS meeting @ 10:16am and accepted pt for unit 12  Pt placed in queue and 12 Charge called with disposition @ 10:18.  Charge in meeting and will call back  Unit 12 charge called intake back @ 10:58a and received dispo on pt.  Reports they will call ed for report.     [facility] ED Updated with placement @ 10:59am

## 2023-05-12 LAB
ALBUMIN SERPL BCG-MCNC: 4.4 G/DL (ref 3.5–5.2)
ALBUMIN UR-MCNC: NEGATIVE MG/DL
ALP SERPL-CCNC: 145 U/L (ref 40–129)
ALT SERPL W P-5'-P-CCNC: 44 U/L (ref 10–50)
ANION GAP SERPL CALCULATED.3IONS-SCNC: 11 MMOL/L (ref 7–15)
APPEARANCE UR: CLEAR
AST SERPL W P-5'-P-CCNC: 36 U/L (ref 10–50)
BASOPHILS # BLD AUTO: 0.1 10E3/UL (ref 0–0.2)
BASOPHILS NFR BLD AUTO: 1 %
BILIRUB SERPL-MCNC: 0.5 MG/DL
BILIRUB UR QL STRIP: NEGATIVE
BUN SERPL-MCNC: 23.2 MG/DL (ref 6–20)
CALCIUM SERPL-MCNC: 10.7 MG/DL (ref 8.6–10)
CHLORIDE SERPL-SCNC: 111 MMOL/L (ref 98–107)
CHOLEST SERPL-MCNC: 132 MG/DL
COLOR UR AUTO: NORMAL
CREAT SERPL-MCNC: 0.97 MG/DL (ref 0.67–1.17)
DEPRECATED HCO3 PLAS-SCNC: 24 MMOL/L (ref 22–29)
EOSINOPHIL # BLD AUTO: 0.2 10E3/UL (ref 0–0.7)
EOSINOPHIL NFR BLD AUTO: 3 %
ERYTHROCYTE [DISTWIDTH] IN BLOOD BY AUTOMATED COUNT: 14.9 % (ref 10–15)
FOLATE SERPL-MCNC: 15.7 NG/ML (ref 4.6–34.8)
GFR SERPL CREATININE-BSD FRML MDRD: >90 ML/MIN/1.73M2
GLUCOSE SERPL-MCNC: 85 MG/DL (ref 70–99)
GLUCOSE UR STRIP-MCNC: NEGATIVE MG/DL
HBA1C MFR BLD: 5.3 %
HCT VFR BLD AUTO: 40.7 % (ref 40–53)
HDLC SERPL-MCNC: 43 MG/DL
HGB BLD-MCNC: 13.5 G/DL (ref 13.3–17.7)
HGB UR QL STRIP: NEGATIVE
IMM GRANULOCYTES # BLD: 0 10E3/UL
IMM GRANULOCYTES NFR BLD: 0 %
KETONES UR STRIP-MCNC: NEGATIVE MG/DL
LDLC SERPL CALC-MCNC: 68 MG/DL
LEUKOCYTE ESTERASE UR QL STRIP: NEGATIVE
LYMPHOCYTES # BLD AUTO: 2 10E3/UL (ref 0.8–5.3)
LYMPHOCYTES NFR BLD AUTO: 31 %
MCH RBC QN AUTO: 28.1 PG (ref 26.5–33)
MCHC RBC AUTO-ENTMCNC: 33.2 G/DL (ref 31.5–36.5)
MCV RBC AUTO: 85 FL (ref 78–100)
MONOCYTES # BLD AUTO: 0.5 10E3/UL (ref 0–1.3)
MONOCYTES NFR BLD AUTO: 8 %
NEUTROPHILS # BLD AUTO: 3.6 10E3/UL (ref 1.6–8.3)
NEUTROPHILS NFR BLD AUTO: 57 %
NITRATE UR QL: NEGATIVE
NONHDLC SERPL-MCNC: 89 MG/DL
NRBC # BLD AUTO: 0 10E3/UL
NRBC BLD AUTO-RTO: 0 /100
PH UR STRIP: 6 [PH] (ref 5–7)
PLATELET # BLD AUTO: 161 10E3/UL (ref 150–450)
POTASSIUM SERPL-SCNC: 4.1 MMOL/L (ref 3.4–5.3)
PROT SERPL-MCNC: 7.5 G/DL (ref 6.4–8.3)
RBC # BLD AUTO: 4.81 10E6/UL (ref 4.4–5.9)
SODIUM SERPL-SCNC: 146 MMOL/L (ref 136–145)
SP GR UR STRIP: 1.01 (ref 1–1.03)
TRIGL SERPL-MCNC: 105 MG/DL
TSH SERPL DL<=0.005 MIU/L-ACNC: 2.05 UIU/ML (ref 0.3–4.2)
UROBILINOGEN UR STRIP-MCNC: NORMAL MG/DL
VIT B12 SERPL-MCNC: 362 PG/ML (ref 232–1245)
WBC # BLD AUTO: 6.3 10E3/UL (ref 4–11)

## 2023-05-12 PROCEDURE — 80053 COMPREHEN METABOLIC PANEL: CPT | Performed by: PSYCHIATRY & NEUROLOGY

## 2023-05-12 PROCEDURE — 84443 ASSAY THYROID STIM HORMONE: CPT | Performed by: PSYCHIATRY & NEUROLOGY

## 2023-05-12 PROCEDURE — 250N000009 HC RX 250: Performed by: EMERGENCY MEDICINE

## 2023-05-12 PROCEDURE — 82746 ASSAY OF FOLIC ACID SERUM: CPT | Performed by: PSYCHIATRY & NEUROLOGY

## 2023-05-12 PROCEDURE — 250N000013 HC RX MED GY IP 250 OP 250 PS 637: Performed by: EMERGENCY MEDICINE

## 2023-05-12 PROCEDURE — 80061 LIPID PANEL: CPT | Performed by: PSYCHIATRY & NEUROLOGY

## 2023-05-12 PROCEDURE — 124N000002 HC R&B MH UMMC

## 2023-05-12 PROCEDURE — 83036 HEMOGLOBIN GLYCOSYLATED A1C: CPT | Performed by: PSYCHIATRY & NEUROLOGY

## 2023-05-12 PROCEDURE — 36415 COLL VENOUS BLD VENIPUNCTURE: CPT | Performed by: PSYCHIATRY & NEUROLOGY

## 2023-05-12 PROCEDURE — 250N000013 HC RX MED GY IP 250 OP 250 PS 637: Performed by: FAMILY MEDICINE

## 2023-05-12 PROCEDURE — 99232 SBSQ HOSP IP/OBS MODERATE 35: CPT | Performed by: PSYCHIATRY & NEUROLOGY

## 2023-05-12 PROCEDURE — 82607 VITAMIN B-12: CPT | Performed by: PSYCHIATRY & NEUROLOGY

## 2023-05-12 PROCEDURE — 81003 URINALYSIS AUTO W/O SCOPE: CPT | Performed by: PSYCHIATRY & NEUROLOGY

## 2023-05-12 PROCEDURE — 85025 COMPLETE CBC W/AUTO DIFF WBC: CPT | Performed by: PSYCHIATRY & NEUROLOGY

## 2023-05-12 RX ADMIN — RISPERIDONE 2 MG: 2 TABLET ORAL at 19:05

## 2023-05-12 RX ADMIN — TOPIRAMATE 100 MG: 100 TABLET, FILM COATED ORAL at 19:05

## 2023-05-12 RX ADMIN — OLANZAPINE 10 MG: 10 TABLET, ORALLY DISINTEGRATING ORAL at 19:08

## 2023-05-12 RX ADMIN — PANTOPRAZOLE SODIUM 40 MG: 40 TABLET, DELAYED RELEASE ORAL at 08:42

## 2023-05-12 RX ADMIN — WHITE PETROLATUM 57.7 %-MINERAL OIL 31.9 % EYE OINTMENT: at 19:10

## 2023-05-12 RX ADMIN — MONTELUKAST 10 MG: 10 TABLET, FILM COATED ORAL at 19:08

## 2023-05-12 RX ADMIN — PREDNISOLONE ACETATE 1 DROP: 10 SUSPENSION/ DROPS OPHTHALMIC at 10:28

## 2023-05-12 RX ADMIN — CHLORHEXIDINE GLUCONATE 0.12% ORAL RINSE 15 ML: 1.2 LIQUID ORAL at 08:42

## 2023-05-12 RX ADMIN — DOCUSATE SODIUM 100 MG: 100 CAPSULE, LIQUID FILLED ORAL at 08:42

## 2023-05-12 RX ADMIN — SERTRALINE HYDROCHLORIDE 100 MG: 100 TABLET ORAL at 08:42

## 2023-05-12 RX ADMIN — CALCIUM POLYCARBOPHIL 625 MG: 625 TABLET, FILM COATED ORAL at 19:05

## 2023-05-12 RX ADMIN — RISPERIDONE 0.5 MG: 0.5 TABLET, ORALLY DISINTEGRATING ORAL at 08:42

## 2023-05-12 RX ADMIN — RISPERIDONE 2 MG: 2 TABLET ORAL at 08:42

## 2023-05-12 RX ADMIN — LEVOTHYROXINE SODIUM 50 MCG: 25 TABLET ORAL at 08:42

## 2023-05-12 RX ADMIN — CALCIUM POLYCARBOPHIL 625 MG: 625 TABLET, FILM COATED ORAL at 10:34

## 2023-05-12 RX ADMIN — RISPERIDONE 0.5 MG: 0.5 TABLET, ORALLY DISINTEGRATING ORAL at 19:04

## 2023-05-12 RX ADMIN — ZINC SULFATE 220 MG (50 MG) CAPSULE 220 MG: CAPSULE at 08:42

## 2023-05-12 ASSESSMENT — ACTIVITIES OF DAILY LIVING (ADL)
ADLS_ACUITY_SCORE: 31
HYGIENE/GROOMING: INDEPENDENT
ADLS_ACUITY_SCORE: 31
ORAL_HYGIENE: INDEPENDENT
ADLS_ACUITY_SCORE: 31
DRESS: INDEPENDENT
LAUNDRY: UNABLE TO COMPLETE
ADLS_ACUITY_SCORE: 31
ADLS_ACUITY_SCORE: 31
HYGIENE/GROOMING: INDEPENDENT
ADLS_ACUITY_SCORE: 31
ADLS_ACUITY_SCORE: 31
LAUNDRY: UNABLE TO COMPLETE
DRESS: INDEPENDENT
ADLS_ACUITY_SCORE: 31
ORAL_HYGIENE: INDEPENDENT
ADLS_ACUITY_SCORE: 31
ADLS_ACUITY_SCORE: 31

## 2023-05-12 NOTE — PLAN OF CARE
"  Problem: Plan of Care - These are the overarching goals to be used throughout the patient stay.    Goal: Absence of Hospital-Acquired Illness or Injury  Note: Patient reports no ELIOT     Problem: Adult Behavioral Health Plan of Care  Goal: Absence of New-Onset Illness or Injury  Note: Patient reports no new-onset illness or injury   Goal Outcome Evaluation:       Patient appears older than stated age (52) presents with a flat blunted affect with labile mood, walks slowly and cautiously in the hallway, appears confused with concrete thinking.  Preoccupied with going home today, thinking family member Huong or Abhijit would be here to take him home. Patient doesn't see any reason why he should be here more-so he had done nothing wrong. Patient presents with rambling speech. Patient quarried why it should take Dr. Celeste up till tomorrow to be here and not now so he can be discharged home. Patient's behavior mimics that of sundowning, patient argues that he has been here for too long, does not belief that he came here today. Patient states he has a wife and family that he has to be with them at home. Sometimes he talks about his father wanting to see him. Patient denies being sick when informed that Dr. Celeste and his family want him to get better before discharging home. At a time patient pointed to Roseanna and referred to her as Huong \"my sister is here to take me home.\" Patient uses a headphone all shift, most of the time talking to himself (appears to be responding to IS). Patient was cooperative with cares, took all his medications, ate well and drank adequately. Patient rated anxiety as \"high\"(10/10), depression as 0/10, denies SI/HI, A/VH or SIB. Although patient expressed frustration that he had to be here tonight, he manifests no behavioral disturbances, whenever he uses an \"f\" words and he's been corrected, he takes to correction and seems to apologize. Patient reports no adverse effect to medications, no " tremors, athetoid, dyskinesia, dystonia, akathisia or any form of abnormal involuntary movement noticed. Will continue to monitor.             Avinash Grewal, DNP, RN
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
## 2023-05-12 NOTE — PLAN OF CARE
"Nursing Assessment    Recent Vitals: B/P: 142/84, T: 97.6, P: 90, R: 16     Sleep:  Hours of sleep at night: 2.25    General Shift Summary  Patient has been present in the milieu for entire shift. He frequently approaches staff or the medication window to ask when he is discharging. Patient has made grandiose and delusional statements. Patient said \"Name any mandujano that is dead\", when naming a few he stated \"Nope, I brought them back. Nope, I brought him back too\" \"Everyone has been brought back by me.\". Patient stated that he was the boss who \"ran this place\" or that \"I'm the director, I can discharge myself\". Patient denied any anxiety, depression, SI/HI/AVH/SIB. He presents with an bright affect. Mood has been very elated. Speech is loud and very loud when laughing. Hygiene is fair. He is eating well. Patient was cooperative with urinalysis, results were normal.    Patient approached [facility] window and asked \"Have you seen Abhijit?\" when asking who Peter was he stated \"He's my brother. He's here, he's here. I don't know where. He's going to pick me up.\".    He has been medication cooperative. No voiced side effects. Denies pain.    Plan is to continue to monitor and stabilize patient.    Anika Yañez RN MSN  "

## 2023-05-12 NOTE — PLAN OF CARE
Problem: Plan of Care - These are the overarching goals to be used throughout the patient stay.    Goal: Absence of Hospital-Acquired Illness or Injury  5/12/2023 0138 by Avinash Grewal RN  Outcome: Progressing  Note: Patient reports no ELIOT  5/11/2023 2130 by Avinash Grewal RN  Note: Patient reports no ELIOT     Problem: Adult Behavioral Health Plan of Care  Goal: Absence of New-Onset Illness or Injury  5/12/2023 0138 by Avinash Grewal RN  Outcome: Progressing  Note: Patient reports no new-onset illness or injury  5/11/2023 2130 by Avinash Grewal RN  Note: Patient reports no new-onset illness or injury   Goal Outcome Evaluation:       Patient was up most of the night, achieves 2.25 hours of sleep, patient is very disorganized, confused and disoriented. Patient was up to use the bathroom and in the process dribbles urine along the hallway, he was cleaned up and made comfortable. Patient was using the exercise bike early this morning. Patient is preoccupied with wanting to go home, states he slept 18 hours this night despite sleeping only 2.25 hours. Patient continues to mention the names of his mother Elsa and father Niels then called a PA with [employer] his brother and a funmi stating that the patient was born in 1970 and the funmi (PA) was born in 1969 that he seniors him with one year. Will continue to monitor.               Avinash Grewal DNP, RN

## 2023-05-13 PROCEDURE — 124N000002 HC R&B MH UMMC

## 2023-05-13 PROCEDURE — 99232 SBSQ HOSP IP/OBS MODERATE 35: CPT | Performed by: PHYSICIAN ASSISTANT

## 2023-05-13 PROCEDURE — 250N000013 HC RX MED GY IP 250 OP 250 PS 637: Performed by: FAMILY MEDICINE

## 2023-05-13 PROCEDURE — 250N000013 HC RX MED GY IP 250 OP 250 PS 637: Performed by: PHYSICIAN ASSISTANT

## 2023-05-13 PROCEDURE — 250N000013 HC RX MED GY IP 250 OP 250 PS 637: Performed by: EMERGENCY MEDICINE

## 2023-05-13 RX ORDER — HYDRALAZINE HYDROCHLORIDE 10 MG/1
10 TABLET, FILM COATED ORAL
Status: DISCONTINUED | OUTPATIENT
Start: 2023-05-13 | End: 2023-08-14 | Stop reason: HOSPADM

## 2023-05-13 RX ORDER — AMLODIPINE BESYLATE 5 MG/1
5 TABLET ORAL DAILY
Status: DISCONTINUED | OUTPATIENT
Start: 2023-05-13 | End: 2023-05-15

## 2023-05-13 RX ADMIN — RISPERIDONE 0.5 MG: 0.5 TABLET, ORALLY DISINTEGRATING ORAL at 19:36

## 2023-05-13 RX ADMIN — PREDNISOLONE ACETATE 1 DROP: 10 SUSPENSION/ DROPS OPHTHALMIC at 09:16

## 2023-05-13 RX ADMIN — MONTELUKAST 10 MG: 10 TABLET, FILM COATED ORAL at 19:36

## 2023-05-13 RX ADMIN — OLANZAPINE 10 MG: 10 TABLET, ORALLY DISINTEGRATING ORAL at 19:36

## 2023-05-13 RX ADMIN — DOCUSATE SODIUM 100 MG: 100 CAPSULE, LIQUID FILLED ORAL at 08:50

## 2023-05-13 RX ADMIN — RISPERIDONE 0.5 MG: 0.5 TABLET, ORALLY DISINTEGRATING ORAL at 08:52

## 2023-05-13 RX ADMIN — AMLODIPINE BESYLATE 5 MG: 5 TABLET ORAL at 16:24

## 2023-05-13 RX ADMIN — LEVOTHYROXINE SODIUM 50 MCG: 25 TABLET ORAL at 08:51

## 2023-05-13 RX ADMIN — CHLORHEXIDINE GLUCONATE 0.12% ORAL RINSE 15 ML: 1.2 LIQUID ORAL at 08:52

## 2023-05-13 RX ADMIN — RISPERIDONE 2 MG: 2 TABLET ORAL at 19:36

## 2023-05-13 RX ADMIN — SERTRALINE HYDROCHLORIDE 100 MG: 100 TABLET ORAL at 08:51

## 2023-05-13 RX ADMIN — CALCIUM POLYCARBOPHIL 625 MG: 625 TABLET, FILM COATED ORAL at 19:36

## 2023-05-13 RX ADMIN — ZINC SULFATE 220 MG (50 MG) CAPSULE 220 MG: CAPSULE at 08:50

## 2023-05-13 RX ADMIN — PANTOPRAZOLE SODIUM 40 MG: 40 TABLET, DELAYED RELEASE ORAL at 08:51

## 2023-05-13 RX ADMIN — CALCIUM POLYCARBOPHIL 625 MG: 625 TABLET, FILM COATED ORAL at 08:51

## 2023-05-13 RX ADMIN — RISPERIDONE 2 MG: 2 TABLET ORAL at 08:52

## 2023-05-13 RX ADMIN — TOPIRAMATE 100 MG: 100 TABLET, FILM COATED ORAL at 19:36

## 2023-05-13 RX ADMIN — WHITE PETROLATUM 57.7 %-MINERAL OIL 31.9 % EYE OINTMENT: at 19:41

## 2023-05-13 ASSESSMENT — ACTIVITIES OF DAILY LIVING (ADL)
HYGIENE/GROOMING: INDEPENDENT
ADLS_ACUITY_SCORE: 31
HYGIENE/GROOMING: INDEPENDENT
ADLS_ACUITY_SCORE: 31
LAUNDRY: UNABLE TO COMPLETE
ADLS_ACUITY_SCORE: 31
ADLS_ACUITY_SCORE: 31
DRESS: SCRUBS (BEHAVIORAL HEALTH)
ADLS_ACUITY_SCORE: 31
ADLS_ACUITY_SCORE: 31
LAUNDRY: WITH SUPERVISION
ADLS_ACUITY_SCORE: 31
ORAL_HYGIENE: INDEPENDENT
DRESS: SCRUBS (BEHAVIORAL HEALTH)
ORAL_HYGIENE: INDEPENDENT

## 2023-05-13 NOTE — PLAN OF CARE
"  Problem: Adult Behavioral Health Plan of Care  Goal: Plan of Care Review  Outcome: Progressing  Flowsheets (Taken 5/12/2023 2000)  Patient Agreement with Plan of Care: (\"I want to discharge home.\") agrees with comment (describe)   Goal Outcome Evaluation:    Plan of Care Reviewed With: patient      Pt was visible in the lounge for most of the shift. Pt was disoriented x 3. Reorientation provided. Pt had elevated BPs. Cross cover expressed no concerns as pt did not have any associated symptoms. Pt made a few delusional statements. Pt stated \" I want to discharge home. I live in a condo with my wife and kids.\" Pt denies all mental health symptoms as well as physical pain. Pt was pleasant, cooperative and in good behavioral control. Med compliant. He ate dinner and changed into clean scrubs.   Plan: Status 15s; Build trust with pt. Continue to build on strengths. Encourage compliance and healthy coping.     "

## 2023-05-13 NOTE — PLAN OF CARE
Problem: Adult Behavioral Health Plan of Care  Goal: Adheres to Safety Considerations for Self and Others  Outcome: Progressing  Note: Patient adheres to safety consideration for self and others  Goal: Absence of New-Onset Illness or Injury  Note: Patient reports no new-onset illness or injury     Problem: Adult Behavioral Health Plan of Care  Goal: Absence of New-Onset Illness or Injury  Note: Patient reports no new-onset illness or injury   Goal Outcome Evaluation:        Patient slept on/off, but more awake than he slept.  presents no complaints and therefore receives no prn medications. Safety checks completed successfully with patient cooperation. Patient demonstrates no untoward behaviors, no paranoia, outbursts, physical or verbal aggression targeted at staff or peers. Patient made no attempt to elope. Patient passed a lot of gas while in front of staff in the [place], felt embarrassed and attempted to stop it with his hands.  Overall, patient achieves 3 hours of good quality sleep.       Avinash Grewal DNP, RN

## 2023-05-13 NOTE — PROGRESS NOTES
Brief Medicine Note:     Medicine consulted for HTN.     Will see pt in AM 5/14 after discussion with nursing and per chart review.     HTN  History of HTN, No PTA medications. Last PCP visit on 3/28/23 with normal readings. Had been on atenolol 25 mg daily but in 2022. Appears to be trending in 130s-190s while sitting over the past two days. Although has been intermittantly high since arrival. Asymptomatic and calm. Unsure why orthostatics are being completed with this pt, but noted drop of ~20 pts, which would not meet dx of orthostatic hypotension in HTN pt.   - PRN hydralazine 10 mg for SBP >180, DBP >110   - Always recheck BP if high or low and correlate with clinical situation  - Treat pain, anxiety, and any withdrawal s/s if present  - Trend  - Notify provider if SBP >170 or DBP >110 after careful reassessment, treatment of pain/anxiety/withdrawal/home PTA med adminstration    Bee Echeverria, CNP, APRN  Internal Medicine [facility] Hospitalist  [facility]  [phone]  Securely message with the Vocera Web Console   Text page via [email or web address] Paging/Directory   Text page via [employer]

## 2023-05-13 NOTE — PLAN OF CARE
"Occupational Therapy Group     05/13/23 1202   Group Therapy Session   Group Attendance attended group session   Time Session Began 1115   Time Session Ended 1200   Total Time (minutes) 20   Total # Attendees 4   Group Type psychoeducation;life skill   Group Topic Covered coping skills/lifestyle management;self-care activities;relationship   Group Session Detail General Health and Coping Skills: group discussion and education on self-esteem/self-compassion   Patient Participation Detail Pt participated in a group discussion on self-esteem.  Pt was given a list of positive character traits and to share two traits with the group; pt was preoccupied with circling several, and demonstrated difficulty engaging with the discussion and moving away from finding traits to [place]. Pt was able to be redirected briefly, but went back to the sheet. Pt announced that they were \"done\" and thanked the writer for group and left (no charge).       "

## 2023-05-13 NOTE — CONSULTS
[facility]  Consult Note - Hospitalist Service  Date of Admission:  5/2/2023  Consult Requested by: Danitza Garcia MD  Reason for Consult: HTN     Assessment & Plan   Jagdeep Walker is a 52 year old male admitted on 5/2/2023 for agitation and delusions to inpatient psychiatry, and treated inpatient for schizoaffective disorder, bipolar type. He has history otherwise significant for unspecified intellectual disabilities, GERD, hypothyroidism.    Hypertensive readings  Internal medicine consulted on 5/13 for hypertension.  Patient's blood pressures have been elevated in the last week, readings mostly 140s to 160s systolic, with some outliers at 113 systolic and 191 systolic.  Patient denies any history of hypertension, and no history of hypertension per chart.  Per staff, patient has been calm and cooperative.  Patient denies any recent anxiety or emotional distress, denies any acute discomfort recently.  He denies any recent headaches, chest pain, abdominal pain, shortness of breath.  - given consistently elevated readings, possible that patient has developed hypertension. Start patient on 5 mg amlodipine daily. IM will monitor Bps peripherally.   - PRN hydralazine 10 mg started by my colleague  - recommend f/u OP once discharged for continued monitoring and management of BPs    Medicine service will continue to follow peripherally for monitoring of Bps     The patient's care was discussed with the patient and nurse.    Clinically Significant Risk Factors         # Hypernatremia: Highest Na = 146 mmol/L in last 2 days, will monitor as appropriate   # Hypercalcemia: Highest Ca = 10.7 mg/dL in last 2 days, will monitor as appropriate        # Hypertension: Noted on problem list                 Nunu Wooten PA-C  Hospitalist Service  Securely message with Vocera (more info)  Text page via [facility] Paging/Directory    ______________________________________________________________________    Chief Complaint   None per patient    History is obtained from the patient    History of Present Illness   Jagdeep Walker is a 52 year old male who is seen on the inpatient psychiatry unit for medical consultation for hypertension. Patient denies any history of hypertension, and no history of hypertension per chart.  Per staff, patient has been calm and cooperative.  Patient denies any recent anxiety or emotional distress, denies any acute discomfort recently.  He denies any recent headaches, chest pain, abdominal pain, shortness of breath.      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     Involuntary commitment     Hx     Obesity      JACLYN on CPAP      Pancreatitis      Portal vein thrombosis      Schizoaffective disorder, bipolar type (H)      Seizure disorder (H)      Thyroid disease        Past Surgical History   Past Surgical History:   Procedure Laterality Date     ENDOSCOPIC RETROGRADE CHOLANGIOPANCREATOGRAM N/A 7/28/2022    Procedure: ENDOSCOPIC RETROGRADE CHOLANGIOPANCREATOGRAPHY, Pancreatic duct stent exchange;  Surgeon: Massimo Clark MD;  Location:  OR     ENDOSCOPIC RETROGRADE CHOLANGIOPANCREATOGRAM COMPLEX N/A 6/21/2022    Procedure: ENDOSCOPIC RETROGRADE CHOLANGIOPANCREATOGRAPHY, COMPLEX;  Surgeon: Massimo Clark MD;  Location:  OR     ENDOSCOPIC ULTRASOUND UPPER GASTROINTESTINAL TRACT (GI) N/A 5/12/2022    Procedure: ENDOSCOPIC ULTRASOUND, ESOPHAGOSCOPY / UPPER GASTROINTESTINAL TRACT (GI);  Surgeon: Massimo Clark MD;  Location:  GI     ESOPHAGOSCOPY, GASTROSCOPY, DUODENOSCOPY (EGD), COMBINED N/A 5/12/2022    Procedure: ESOPHAGOGASTRODUODENOSCOPY, WITH BIOPSY;  Surgeon: Massimo Clark MD;   Location: [facility] GI     IR CHEST TUBE PLACEMENT NON-TUNNELED RIGHT  6/18/2022     IR CHEST TUBE REPOSITION NON TUNNELED RIGHT  6/22/2022     IR GASTRO JEJUNOSTOMY TUBE CHANGE  7/22/2022     IR GASTRO JEJUNOSTOMY TUBE PLACEMENT  6/28/2022       Medications   Medications Prior to Admission   Medication Sig Dispense Refill Last Dose     albuterol (PROAIR HFA/PROVENTIL HFA/VENTOLIN HFA) 108 (90 Base) MCG/ACT inhaler Inhale 2 puffs into the lungs every 6 hours as needed for shortness of breath, wheezing or cough        calcium carbonate-vitamin D (CALTRATE) 600-10 MG-MCG per tablet Take 1 tablet by mouth daily        calcium polycarbophil (FIBERCON) 625 MG tablet Take 2 tablets by mouth 2 times daily        carboxymethylcellulose PF (REFRESH PLUS) 0.5 % ophthalmic solution Place 1 drop into both eyes daily as needed for dry eyes        chlorhexidine 0.12 % solution Rinse or brush teeth and gums with 15 mL for 60 seconds every morning *nothing by mouth for 30 minutes following*        ciclopirox (PENLAC) 8 % external solution Apply topically to affected areas at bedtime as needed        clonazePAM (KLONOPIN) 0.125 MG TBDP ODT tab Take 0.125 mg by mouth daily as needed for anxiety or agitation        docusate sodium (COLACE) 100 MG capsule Take 100 mg by mouth daily        hydrocortisone (CORTAID) 1 % external cream Apply topically 2 times daily as needed for rash or other (eczema)        ipratropium (ATROVENT) 0.06 % nasal spray Spray 2 sprays into both nostrils 3 times daily At 8 AM, 4 PM, and 8 PM        levothyroxine (SYNTHROID/LEVOTHROID) 50 MCG tablet 1 tablet (50 mcg) by Oral or NG Tube route daily        loperamide (IMODIUM) 2 MG capsule Take 2 mg by mouth 4 times daily as needed for diarrhea        LORazepam (ATIVAN) 2 MG tablet Take 2 mg buccally as needed for seizure activity as directed per protocol        montelukast (SINGULAIR) 10 MG tablet Take 10 mg by mouth At Bedtime        multivitamin, therapeutic  (THERA-VIT) TABS tablet Take 1 tablet by mouth daily        ondansetron (ZOFRAN ODT) 4 MG ODT tab Take 4-8 mg by mouth every 8 hours as needed for nausea or vomiting        polyethylene glycol-propylene glycol (SYSTANE ULTRA) 0.4-0.3 % SOLN ophthalmic solution Place 1-2 drops into both eyes daily as needed for dry eyes        prednisoLONE acetate (PRED FORTE) 1 % ophthalmic suspension Instill one drop once per day into the right eye for glaucoma        risperiDONE (RISPERDAL) 0.5 MG tablet Take 1 tablet (0.5 mg) by mouth twice daily, along with 2 mg tablet for total daily dose of 2.5 mg twice daily        risperiDONE (RISPERDAL) 2 MG tablet Take 1 tablet (2 mg) by mouth twice daily, along with 0.5 mg tablet for total daily dose of 2.5 mg twice daily        senna (SENOKOT) 8.6 MG tablet Take 1 tablet by mouth 2 times daily as needed for constipation        sertraline (ZOLOFT) 100 MG tablet Take 100 mg by mouth daily        topiramate (TOPAMAX) 50 MG tablet Take 1 tablet (50 mg) by mouth At Bedtime        traZODone (DESYREL) 150 MG tablet Take 150 mg by mouth At Bedtime        White Petrolatum-Mineral Oil (REFRESH P.M.) OINT Apply a small amount into lower right eyelid at bedtime        zinc sulfate (ZINCATE) 220 (50 Zn) MG capsule 1 capsule (220 mg) by Oral or NG Tube route daily        pantoprazole (PROTONIX) 40 MG EC tablet Take 40 mg by mouth daily           Review of Systems    The 10 point Review of Systems is negative other than noted in the HPI.     Physical Exam   Vital Signs: Temp: 97.7  F (36.5  C) Temp src: Oral BP: (!) 156/98 Pulse: 91     SpO2: 97 % O2 Device: None (Room air)    Weight: 198 lbs 4.8 oz    GENERAL: Middle-aged obese male sitting comfortably in chair. NAD.   NEURO / PSYCH: No focal deficits. Moves all extremities.  Alert, converses appropriately, affect variable.  HEENT: Anicteric sclera. PERRL. Mucous membranes moist.  Right eye with cataract/other abnormality to pupil area  CV: RRR. S1,  S2. No murmurs appreciated.  2+ left radial pulse.  RESPIRATORY: Effort normal. Lungs CTAB with no wheezing, rales, rhonchi.   GI: Abdomen soft and non distended with bowel sounds rare. No tenderness, rebound, guarding.   MSK: no gross deformities  EXTREMITIES: nonedematous  SKIN: No jaundice. No rashes or lesions to exposed areas.       Medical Decision Making       30 MINUTES SPENT BY ME on the date of service doing chart review, history, exam, documentation & further activities per the note.      Data   Recent Labs   Lab 05/12/23  0815   WBC 6.3   HGB 13.5   MCV 85      *   POTASSIUM 4.1   CHLORIDE 111*   CO2 24   BUN 23.2*   CR 0.97   ANIONGAP 11   NASIR 10.7*   GLC 85   ALBUMIN 4.4   PROTTOTAL 7.5   BILITOTAL 0.5   ALKPHOS 145*   ALT 44   AST 36

## 2023-05-14 PROCEDURE — 250N000013 HC RX MED GY IP 250 OP 250 PS 637: Performed by: EMERGENCY MEDICINE

## 2023-05-14 PROCEDURE — 250N000013 HC RX MED GY IP 250 OP 250 PS 637: Performed by: PHYSICIAN ASSISTANT

## 2023-05-14 PROCEDURE — 250N000013 HC RX MED GY IP 250 OP 250 PS 637: Performed by: FAMILY MEDICINE

## 2023-05-14 PROCEDURE — 250N000013 HC RX MED GY IP 250 OP 250 PS 637: Performed by: PSYCHIATRY & NEUROLOGY

## 2023-05-14 PROCEDURE — 124N000002 HC R&B MH UMMC

## 2023-05-14 RX ORDER — LISINOPRIL 5 MG/1
5 TABLET ORAL DAILY
Status: DISCONTINUED | OUTPATIENT
Start: 2023-05-14 | End: 2023-05-15

## 2023-05-14 RX ADMIN — OLANZAPINE 10 MG: 10 TABLET, FILM COATED ORAL at 01:50

## 2023-05-14 RX ADMIN — TOPIRAMATE 100 MG: 100 TABLET, FILM COATED ORAL at 20:13

## 2023-05-14 RX ADMIN — PREDNISOLONE ACETATE 1 DROP: 10 SUSPENSION/ DROPS OPHTHALMIC at 08:35

## 2023-05-14 RX ADMIN — CALCIUM POLYCARBOPHIL 625 MG: 625 TABLET, FILM COATED ORAL at 07:59

## 2023-05-14 RX ADMIN — LEVOTHYROXINE SODIUM 50 MCG: 25 TABLET ORAL at 08:00

## 2023-05-14 RX ADMIN — RISPERIDONE 2 MG: 2 TABLET ORAL at 20:13

## 2023-05-14 RX ADMIN — MONTELUKAST 10 MG: 10 TABLET, FILM COATED ORAL at 20:13

## 2023-05-14 RX ADMIN — CALCIUM POLYCARBOPHIL 625 MG: 625 TABLET, FILM COATED ORAL at 20:13

## 2023-05-14 RX ADMIN — PANTOPRAZOLE SODIUM 40 MG: 40 TABLET, DELAYED RELEASE ORAL at 08:00

## 2023-05-14 RX ADMIN — ZINC SULFATE 220 MG (50 MG) CAPSULE 220 MG: CAPSULE at 08:00

## 2023-05-14 RX ADMIN — AMLODIPINE BESYLATE 5 MG: 5 TABLET ORAL at 07:59

## 2023-05-14 RX ADMIN — RISPERIDONE 0.5 MG: 0.5 TABLET, ORALLY DISINTEGRATING ORAL at 20:13

## 2023-05-14 RX ADMIN — DOCUSATE SODIUM 100 MG: 100 CAPSULE, LIQUID FILLED ORAL at 08:00

## 2023-05-14 RX ADMIN — SERTRALINE HYDROCHLORIDE 100 MG: 100 TABLET ORAL at 08:00

## 2023-05-14 RX ADMIN — RISPERIDONE 0.5 MG: 0.5 TABLET, ORALLY DISINTEGRATING ORAL at 08:00

## 2023-05-14 RX ADMIN — Medication 5 MG: at 01:51

## 2023-05-14 RX ADMIN — LISINOPRIL 5 MG: 5 TABLET ORAL at 14:14

## 2023-05-14 RX ADMIN — OLANZAPINE 10 MG: 10 TABLET, ORALLY DISINTEGRATING ORAL at 20:13

## 2023-05-14 RX ADMIN — CHLORHEXIDINE GLUCONATE 0.12% ORAL RINSE 15 ML: 1.2 LIQUID ORAL at 08:02

## 2023-05-14 RX ADMIN — RISPERIDONE 2 MG: 2 TABLET ORAL at 08:00

## 2023-05-14 RX ADMIN — WHITE PETROLATUM 57.7 %-MINERAL OIL 31.9 % EYE OINTMENT: at 20:14

## 2023-05-14 ASSESSMENT — ACTIVITIES OF DAILY LIVING (ADL)
HYGIENE/GROOMING: INDEPENDENT
ADLS_ACUITY_SCORE: 31
ORAL_HYGIENE: INDEPENDENT
ADLS_ACUITY_SCORE: 31
LAUNDRY: UNABLE TO COMPLETE
DRESS: SCRUBS (BEHAVIORAL HEALTH)
ORAL_HYGIENE: INDEPENDENT
DRESS: SCRUBS (BEHAVIORAL HEALTH)
ADLS_ACUITY_SCORE: 31
HYGIENE/GROOMING: INDEPENDENT
ADLS_ACUITY_SCORE: 31
LAUNDRY: UNABLE TO COMPLETE
ADLS_ACUITY_SCORE: 31

## 2023-05-14 NOTE — PLAN OF CARE
Problem: Adult Behavioral Health Plan of Care  Goal: Adheres to Safety Considerations for Self and Others  Outcome: Progressing   Goal Outcome Evaluation:    Patient spent most of this evening in the milieu watching tv, and listening to headphone/singing. Patient is alert, calm and oriented, with a bright affect. At the start of this shift patient told writer that he is ready to be discharged home to his brother elisha, and that his brother is on his way coming to pick him up. Patient was redirect to talk with his doctor about discharge come Monday and he agreed. He did not complain of dizziness, agitation or confusion this shift. Patient took his scheduled medication without any issues. He is a bit disorganized but can be redirected.      He was seen by internal medicine in regards to his high blood pressure, and he was started on Amlodipine 5 mg daily.

## 2023-05-14 NOTE — PROGRESS NOTES
Brief medicine note    Patient's blood pressures continue to be elevated.  150s to 170s systolic.  This is despite initiating amlodipine 5 mg daily yesterday.  I will add 5 mg lisinopril daily with holding parameters.  Medicine service will continue to follow blood pressures peripherally.      Nunu Wooten PA-C  [facility] Internal Medicine [place]  5/14/2023 1:29 PM

## 2023-05-14 NOTE — PLAN OF CARE
Problem: Adult Behavioral Health Plan of Care  Goal: Adheres to Safety Considerations for Self and Others  Note: Patient adheres to safety consideration for self and others  Goal: Absence of New-Onset Illness or Injury  Outcome: Progressing  Note: Patient reports no new-onset illness or injury     Problem: Adult Behavioral Health Plan of Care  Goal: Absence of New-Onset Illness or Injury  Outcome: Progressing  Note: Patient reports no new-onset illness or injury   Goal Outcome Evaluation:       Patient was up most of the night despite receiving Melatonin for sleep and Zyprexa to calm down early in the shift (0151). Patient continues to wear a head phone vacillated between his room and the hallway. Not sure if patient was responding to IS or reacting to the music from his headphone as he was observed talking while walking in the hallway or while in his room. Patient showed no signs or symptoms of substance withdrawal, no tremulousness, no athetoid (tongue) movement, no diaphoresis or sweating from the forehead or palm of hands, no throwing up or trace of emesis, no reported diarrhea, patient asked to thrust forward both hands while standing and sitting, no shakiness. No apparent side effects from medications; no involuntary motor movement, dystonia, akathisia or Tardive dyskinesia. Vital signs shows high B/P (currently on hypertensive regiment) but does not meet parameter to receive prn Hydralazine. VS: B/P 154/101 HR 84 02 sat 98% RA (Sitting) and   B/P 149/77, HR 82 (Standing). Patient manifests no symptoms, able to follow direction. Will continue to monitor.               Avinsah Grewal, DNP, RN

## 2023-05-14 NOTE — PLAN OF CARE
Problem: Plan of Care - These are the overarching goals to be used throughout the patient stay.    Goal: Readiness for Transition of Care  Outcome: Progressing   Goal Outcome Evaluation:    Patient was out in the [facility] area alert and talking to staff members. He is oriented, calm and cooperative with a full range affect. He was compliant with vitals checks and medication administration. He was present and social with peer in the milieu, listening to music and singing. Patient ate lunch and breakfast with no issues. Patient was encouraged to take a shower this shift but declined. Patient contracted for safety, and denied all psych symptoms of, SI, HI, A/V hallucinations, anxiety and depression. Patient did not complain of pain and no prn was given. Patient did not have behavioral issues this shift.      Patient has a new medication lisinopril ordered to manage his hypertension.

## 2023-05-15 PROCEDURE — 99233 SBSQ HOSP IP/OBS HIGH 50: CPT | Performed by: PSYCHIATRY & NEUROLOGY

## 2023-05-15 PROCEDURE — 93005 ELECTROCARDIOGRAM TRACING: CPT

## 2023-05-15 PROCEDURE — 250N000013 HC RX MED GY IP 250 OP 250 PS 637: Performed by: PSYCHIATRY & NEUROLOGY

## 2023-05-15 PROCEDURE — 250N000013 HC RX MED GY IP 250 OP 250 PS 637: Performed by: PHYSICIAN ASSISTANT

## 2023-05-15 PROCEDURE — 124N000002 HC R&B MH UMMC

## 2023-05-15 PROCEDURE — 93010 ELECTROCARDIOGRAM REPORT: CPT | Performed by: INTERNAL MEDICINE

## 2023-05-15 PROCEDURE — 250N000013 HC RX MED GY IP 250 OP 250 PS 637: Performed by: EMERGENCY MEDICINE

## 2023-05-15 RX ORDER — AMLODIPINE BESYLATE 10 MG/1
10 TABLET ORAL DAILY
Status: DISCONTINUED | OUTPATIENT
Start: 2023-05-16 | End: 2023-05-18

## 2023-05-15 RX ORDER — OLANZAPINE 15 MG/1
15 TABLET, ORALLY DISINTEGRATING ORAL AT BEDTIME
Status: DISCONTINUED | OUTPATIENT
Start: 2023-05-15 | End: 2023-05-24

## 2023-05-15 RX ADMIN — CALCIUM POLYCARBOPHIL 625 MG: 625 TABLET, FILM COATED ORAL at 08:44

## 2023-05-15 RX ADMIN — LISINOPRIL 5 MG: 5 TABLET ORAL at 08:54

## 2023-05-15 RX ADMIN — CALCIUM POLYCARBOPHIL 625 MG: 625 TABLET, FILM COATED ORAL at 20:04

## 2023-05-15 RX ADMIN — PANTOPRAZOLE SODIUM 40 MG: 40 TABLET, DELAYED RELEASE ORAL at 07:36

## 2023-05-15 RX ADMIN — DOCUSATE SODIUM 100 MG: 100 CAPSULE, LIQUID FILLED ORAL at 08:43

## 2023-05-15 RX ADMIN — ZINC SULFATE 220 MG (50 MG) CAPSULE 220 MG: CAPSULE at 08:43

## 2023-05-15 RX ADMIN — OLANZAPINE 15 MG: 15 TABLET, ORALLY DISINTEGRATING ORAL at 20:05

## 2023-05-15 RX ADMIN — RISPERIDONE 0.5 MG: 0.5 TABLET, ORALLY DISINTEGRATING ORAL at 20:05

## 2023-05-15 RX ADMIN — RISPERIDONE 2 MG: 2 TABLET ORAL at 20:05

## 2023-05-15 RX ADMIN — SERTRALINE HYDROCHLORIDE 100 MG: 100 TABLET ORAL at 08:44

## 2023-05-15 RX ADMIN — CHLORHEXIDINE GLUCONATE 0.12% ORAL RINSE 15 ML: 1.2 LIQUID ORAL at 08:44

## 2023-05-15 RX ADMIN — PREDNISOLONE ACETATE 1 DROP: 10 SUSPENSION/ DROPS OPHTHALMIC at 08:45

## 2023-05-15 RX ADMIN — AMLODIPINE BESYLATE 5 MG: 5 TABLET ORAL at 08:54

## 2023-05-15 RX ADMIN — WHITE PETROLATUM 57.7 %-MINERAL OIL 31.9 % EYE OINTMENT: at 21:14

## 2023-05-15 RX ADMIN — RISPERIDONE 0.5 MG: 0.5 TABLET, ORALLY DISINTEGRATING ORAL at 08:44

## 2023-05-15 RX ADMIN — MONTELUKAST 10 MG: 10 TABLET, FILM COATED ORAL at 20:05

## 2023-05-15 RX ADMIN — LEVOTHYROXINE SODIUM 50 MCG: 25 TABLET ORAL at 07:35

## 2023-05-15 RX ADMIN — TOPIRAMATE 100 MG: 100 TABLET, FILM COATED ORAL at 19:20

## 2023-05-15 RX ADMIN — RISPERIDONE 2 MG: 2 TABLET ORAL at 08:44

## 2023-05-15 ASSESSMENT — ACTIVITIES OF DAILY LIVING (ADL)
ORAL_HYGIENE: INDEPENDENT
ADLS_ACUITY_SCORE: 31
DRESS: INDEPENDENT
HYGIENE/GROOMING: INDEPENDENT
DRESS: SCRUBS (BEHAVIORAL HEALTH);INDEPENDENT
ADLS_ACUITY_SCORE: 32
ADLS_ACUITY_SCORE: 31
ADLS_ACUITY_SCORE: 32
ADLS_ACUITY_SCORE: 31
ADLS_ACUITY_SCORE: 31
ORAL_HYGIENE: INDEPENDENT
ADLS_ACUITY_SCORE: 31
HYGIENE/GROOMING: INDEPENDENT
ADLS_ACUITY_SCORE: 31
ADLS_ACUITY_SCORE: 31
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
## 2023-05-15 NOTE — PLAN OF CARE
Problem: Adult Behavioral Health Plan of Care  Goal: Adheres to Safety Considerations for Self and Others  Outcome: Progressing  Note: Patient adheres to safety consideration to self and others  Intervention: Develop and Maintain Individualized Safety Plan  Note: Patient does not indulge in anything that could compromise safety  Goal: Absence of New-Onset Illness or Injury  Outcome: Progressing  Note: Patient neither reports nor manifests new-onset illness or injury     Problem: Adult Behavioral Health Plan of Care  Goal: Adheres to Safety Considerations for Self and Others  Intervention: Develop and Maintain Individualized Safety Plan  Note: Patient does not indulge in anything that could compromise safety     Problem: Adult Behavioral Health Plan of Care  Goal: Absence of New-Onset Illness or Injury  Outcome: Progressing  Note: Patient neither reports nor manifests new-onset illness or injury   Goal Outcome Evaluation:       Patient sleeps on/off, achieves about 4.5 hours of sleep with no complaints of pain, anxiety or other discomfort, patient utilizes a headphone to listen to music whenever awake. Patient manifests no aggressive behavior toward staff or peers. No symptoms of withdrawal or respiratory distress. Will continue to monitor.            Avinash Grewal DNP, RN.

## 2023-05-15 NOTE — PLAN OF CARE
Goal Outcome Evaluation:  Problem: Plan of Care - These are the overarching goals to be used throughout the patient stay.    Goal: Optimal Comfort and Wellbeing  Outcome: Progressing  Patient had an uneventful shift this evening. [facility] and talking to staff members. He is oriented to person, calm and cooperative with a full range affect. He was compliant with vitals checks and medication administration. [facility] and social with writer and others. He was seen by writer listening to music and singing. He was encourage to take a shower and he took one after dinner. Patient thinks his brother will be picking him up tonight. Patient contracted for safety, and denied all psych symptoms of, SI, HI, A/V hallucinations, anxiety and depression. Patient did not complain of pain and no prn was given. Patient is in his room sleeping. It was a good shift.

## 2023-05-15 NOTE — PLAN OF CARE
05/15/23 1836   Group Therapy Session   Group Attendance refused to attend group session   Time Session Began 1615   Time Session Ended 1715   Total Time (minutes) 0   Total # Attendees 6   Group Type psychotherapeutic   Group Session Detail Group members were given 2 worksheets to complete/discuss questions on identifying one thing they did not have control over including coping skills

## 2023-05-15 NOTE — PLAN OF CARE
Problem: Adult Behavioral Health Plan of Care  Goal: Optimized Coping Skills in Response to Life Stressors  Outcome: Progressing   Goal Outcome Evaluation:         Patient appears to be in a happy mood, singing, listening to music on the headphone, dancing and entertaining peers. Patient presents with a full range affect, mood is calm. Patient endorsed anxiety, denied depression, SI, HI, SIB, hallucination. Patient denied pain. Patient reports feeling constipated but later in the shift reports to have had a bowel movement but has continued to fart the entire shift. Patient was medication compliant.

## 2023-05-15 NOTE — PLAN OF CARE
Problem: Plan of Care - These are the overarching goals to be used throughout the patient stay.    Goal: Optimal Comfort and Wellbeing  Outcome: Progressing     Problem: Adult Behavioral Health Plan of Care  Goal: Plan of Care Review  Outcome: Progressing  Flowsheets (Taken 5/15/2023 7115)  Patient Agreement with Plan of Care: agrees   Goal Outcome Evaluation:    Plan of Care Reviewed With: patient    Jagdeep has been noticeable all shift. He spent his time in the lounge and hallway. Pt was social with peers, and he paced the [place] with headphones on while singing. Pt's mood was bright, and he denied SI, AVHs, pain, and all other Psych SxS except for endorsing anxiety. He was medication compliant and reported no adverse reactions from them. He had an EKG completed. The Doctor made some changes to his Zyprexa. He was awake all shift and showed no inappropriate behavior.

## 2023-05-15 NOTE — PROGRESS NOTES
Instead of titrating two bp meds will increase the amlodipine to 10 mg for now. Hold lisinopril for now. Medicine will continue to follow

## 2023-05-16 LAB
ATRIAL RATE - MUSE: 75 BPM
DIASTOLIC BLOOD PRESSURE - MUSE: NORMAL MMHG
FOLATE SERPL-MCNC: 13.3 NG/ML (ref 4.6–34.8)
INTERPRETATION ECG - MUSE: NORMAL
P AXIS - MUSE: 47 DEGREES
PR INTERVAL - MUSE: 130 MS
QRS DURATION - MUSE: 134 MS
QT - MUSE: 422 MS
QTC - MUSE: 471 MS
R AXIS - MUSE: 5 DEGREES
SODIUM SERPL-SCNC: 143 MMOL/L (ref 136–145)
SYSTOLIC BLOOD PRESSURE - MUSE: NORMAL MMHG
T AXIS - MUSE: 44 DEGREES
VENTRICULAR RATE- MUSE: 75 BPM
VIT B12 SERPL-MCNC: 346 PG/ML (ref 232–1245)

## 2023-05-16 PROCEDURE — 250N000013 HC RX MED GY IP 250 OP 250 PS 637: Performed by: PSYCHIATRY & NEUROLOGY

## 2023-05-16 PROCEDURE — 84295 ASSAY OF SERUM SODIUM: CPT | Performed by: PSYCHIATRY & NEUROLOGY

## 2023-05-16 PROCEDURE — 124N000002 HC R&B MH UMMC

## 2023-05-16 PROCEDURE — 250N000013 HC RX MED GY IP 250 OP 250 PS 637: Performed by: EMERGENCY MEDICINE

## 2023-05-16 PROCEDURE — 36415 COLL VENOUS BLD VENIPUNCTURE: CPT | Performed by: PSYCHIATRY & NEUROLOGY

## 2023-05-16 PROCEDURE — 82607 VITAMIN B-12: CPT | Performed by: PSYCHIATRY & NEUROLOGY

## 2023-05-16 PROCEDURE — 250N000013 HC RX MED GY IP 250 OP 250 PS 637: Performed by: PHYSICIAN ASSISTANT

## 2023-05-16 PROCEDURE — 82746 ASSAY OF FOLIC ACID SERUM: CPT | Performed by: PSYCHIATRY & NEUROLOGY

## 2023-05-16 RX ADMIN — WHITE PETROLATUM 57.7 %-MINERAL OIL 31.9 % EYE OINTMENT: at 19:54

## 2023-05-16 RX ADMIN — TOPIRAMATE 100 MG: 100 TABLET, FILM COATED ORAL at 18:52

## 2023-05-16 RX ADMIN — PREDNISOLONE ACETATE 1 DROP: 10 SUSPENSION/ DROPS OPHTHALMIC at 08:58

## 2023-05-16 RX ADMIN — CALCIUM POLYCARBOPHIL 625 MG: 625 TABLET, FILM COATED ORAL at 08:17

## 2023-05-16 RX ADMIN — RISPERIDONE 0.5 MG: 0.5 TABLET, ORALLY DISINTEGRATING ORAL at 08:17

## 2023-05-16 RX ADMIN — MONTELUKAST 10 MG: 10 TABLET, FILM COATED ORAL at 19:53

## 2023-05-16 RX ADMIN — RISPERIDONE 2 MG: 2 TABLET ORAL at 08:18

## 2023-05-16 RX ADMIN — RISPERIDONE 0.5 MG: 0.5 TABLET, ORALLY DISINTEGRATING ORAL at 19:53

## 2023-05-16 RX ADMIN — RISPERIDONE 2 MG: 2 TABLET ORAL at 19:53

## 2023-05-16 RX ADMIN — DOCUSATE SODIUM 100 MG: 100 CAPSULE, LIQUID FILLED ORAL at 08:18

## 2023-05-16 RX ADMIN — LEVOTHYROXINE SODIUM 50 MCG: 25 TABLET ORAL at 08:17

## 2023-05-16 RX ADMIN — ZINC SULFATE 220 MG (50 MG) CAPSULE 220 MG: CAPSULE at 08:17

## 2023-05-16 RX ADMIN — OLANZAPINE 15 MG: 15 TABLET, ORALLY DISINTEGRATING ORAL at 19:53

## 2023-05-16 RX ADMIN — CALCIUM POLYCARBOPHIL 625 MG: 625 TABLET, FILM COATED ORAL at 19:53

## 2023-05-16 RX ADMIN — PANTOPRAZOLE SODIUM 40 MG: 40 TABLET, DELAYED RELEASE ORAL at 08:17

## 2023-05-16 RX ADMIN — CHLORHEXIDINE GLUCONATE 0.12% ORAL RINSE 15 ML: 1.2 LIQUID ORAL at 08:18

## 2023-05-16 RX ADMIN — AMLODIPINE BESYLATE 10 MG: 10 TABLET ORAL at 08:17

## 2023-05-16 ASSESSMENT — ACTIVITIES OF DAILY LIVING (ADL)
ADLS_ACUITY_SCORE: 32
DRESS: INDEPENDENT
ADLS_ACUITY_SCORE: 32
ORAL_HYGIENE: INDEPENDENT
HYGIENE/GROOMING: INDEPENDENT
ADLS_ACUITY_SCORE: 32
ADLS_ACUITY_SCORE: 32

## 2023-05-16 NOTE — PLAN OF CARE
05/15/23 2107   Group Therapy Session   Group Attendance other (see comments)     Attempted to meet with pt, offer 1:1 activity time. Pt declined interest in doing anything, was enjoying watching and talking with staff.  Talked with pt a bit, casual conversation - tangential, though friendly and talkative the whole time.

## 2023-05-16 NOTE — PLAN OF CARE
"Goal Outcome Evaluation:  Problem: Plan of Care - These are the overarching goals to be used throughout the patient stay.    Goal: Optimal Comfort and Wellbeing  Outcome: Progressing  Patient was out in the lounge area at the start of this shift. He is alert and oriented to person, with a bright/ positive affect. He was calm and cooperative with medication administration and vitals checks. He spent most of the shift pacing and listening to music, but not social with peers. He did not attend group and he did not take a shower this shift. He ate breakfast and lunch without any issues. Patient did not complain of pain and no prn medication were given. He denied having anxiety, depression and psych symptoms.  His blood pressure is within normal limits this morning/shift.     Vital signs:  Temp: 97.1  F (36.2  C) Temp src: Oral BP: 119/73 Pulse: 85   Resp: 18 SpO2: 100 % O2 Device: None (Room air)     Weight: 89.9 kg (198 lb 4.8 oz)  Estimated body mass index is 34.04 kg/m  as calculated from the following:    Height as of 10/3/22: 1.626 m (5' 4\").    Weight as of this encounter: 89.9 kg (198 lb 4.8 oz).            "

## 2023-05-16 NOTE — PLAN OF CARE
Problem: Adult Behavioral Health Plan of Care  Goal: Absence of New-Onset Illness or Injury  Outcome: Progressing  Note: Patient reports no new-onset illness or injury     Problem: Suicidal Behavior  Goal: Suicidal Behavior is Absent or Managed  Outcome: Progressing  Note: Patient manifests no suicidal behavior   Goal Outcome Evaluation:       Patient had uneventful night, slept on/off  through the night with no complaints or  new concerns. Patient was cooperative with cares. Safety checks successfully completed, all precautions in place. Patient manifests no untoward behaviors, endorses no SI/HI, A/VH or SIB. Overall, no abnormal involuntary movement noted. Patient achieved 4.75 hours of sleep. Will continue to monitor and follow plan of care.          Avinash Grewal DNP, RN

## 2023-05-16 NOTE — PLAN OF CARE
05/16/23 1454   Individualization/Patient Specific Goals   Patient Personal Strengths family/social support;medication/treatment adherence;stable living environment   Patient Vulnerabilities other (see comments);limited ability to read/write;limited social skills;lacks insight into illness   Interprofessional Rounds   Participants CTC;nursing;psychiatrist   Behavioral Team Discussion   Participants Dr. Sharmin Celeste; Doctor Dayna (resident); Susi SHARMA; Lurdes MARTINEZ   Progress Initial assessment   Anticipated length of stay 7-10   Continued Stay Criteria/Rationale Requires stabilization to reduce risk of imminent harm to himself and others   Medical/Physical no acute issues.   Precautions see below   Plan Psychiatric Assessment. Medication Management. Therapeutic Mileu. Individual and group support.   Rationale for change in precautions or plan no change   Safety Plan see below   Anticipated Discharge Disposition group home   PRECAUTIONS AND SAFETY    Behavioral Orders   Procedures     Code 1 - Restrict to Unit     Elopement precautions     Routine Programming     As clinically indicated     Status 15     Every 15 minutes.       Safety  Safety WDL: WDL  [place] [facility]  Observed Behavior: pacing, sitting (dancing)  Observed Behavior (Comment): Dancing, entertaaining peers  Safety Measures: safety rounds completed, suicide check-in completed, environmental rounds completed  Diversional Activity: music  Suicidality: Status 15, Minimal furniture in room, Minimal personal belongings in room, Unpredictable frequency of checking on patient  Elopement Interventions: status 15

## 2023-05-17 PROCEDURE — 250N000013 HC RX MED GY IP 250 OP 250 PS 637: Performed by: EMERGENCY MEDICINE

## 2023-05-17 PROCEDURE — 250N000013 HC RX MED GY IP 250 OP 250 PS 637: Performed by: PSYCHIATRY & NEUROLOGY

## 2023-05-17 PROCEDURE — 99231 SBSQ HOSP IP/OBS SF/LOW 25: CPT | Performed by: PSYCHIATRY & NEUROLOGY

## 2023-05-17 PROCEDURE — 124N000002 HC R&B MH UMMC

## 2023-05-17 PROCEDURE — 250N000013 HC RX MED GY IP 250 OP 250 PS 637: Performed by: PHYSICIAN ASSISTANT

## 2023-05-17 RX ADMIN — HYDROXYZINE HYDROCHLORIDE 25 MG: 25 TABLET, FILM COATED ORAL at 02:39

## 2023-05-17 RX ADMIN — RISPERIDONE 0.5 MG: 0.5 TABLET, ORALLY DISINTEGRATING ORAL at 19:10

## 2023-05-17 RX ADMIN — ZINC SULFATE 220 MG (50 MG) CAPSULE 220 MG: CAPSULE at 08:10

## 2023-05-17 RX ADMIN — RISPERIDONE 2 MG: 2 TABLET ORAL at 19:09

## 2023-05-17 RX ADMIN — RISPERIDONE 0.5 MG: 0.5 TABLET, ORALLY DISINTEGRATING ORAL at 08:09

## 2023-05-17 RX ADMIN — MONTELUKAST 10 MG: 10 TABLET, FILM COATED ORAL at 19:09

## 2023-05-17 RX ADMIN — RISPERIDONE 2 MG: 2 TABLET ORAL at 08:10

## 2023-05-17 RX ADMIN — TOPIRAMATE 100 MG: 100 TABLET, FILM COATED ORAL at 18:33

## 2023-05-17 RX ADMIN — AMLODIPINE BESYLATE 10 MG: 10 TABLET ORAL at 08:09

## 2023-05-17 RX ADMIN — Medication 5 MG: at 02:39

## 2023-05-17 RX ADMIN — CALCIUM POLYCARBOPHIL 625 MG: 625 TABLET, FILM COATED ORAL at 19:09

## 2023-05-17 RX ADMIN — OLANZAPINE 15 MG: 15 TABLET, ORALLY DISINTEGRATING ORAL at 19:09

## 2023-05-17 RX ADMIN — CHLORHEXIDINE GLUCONATE 0.12% ORAL RINSE 15 ML: 1.2 LIQUID ORAL at 08:10

## 2023-05-17 RX ADMIN — LEVOTHYROXINE SODIUM 50 MCG: 25 TABLET ORAL at 08:09

## 2023-05-17 RX ADMIN — DOCUSATE SODIUM 100 MG: 100 CAPSULE, LIQUID FILLED ORAL at 08:10

## 2023-05-17 RX ADMIN — PANTOPRAZOLE SODIUM 40 MG: 40 TABLET, DELAYED RELEASE ORAL at 08:09

## 2023-05-17 RX ADMIN — PREDNISOLONE ACETATE 1 DROP: 10 SUSPENSION/ DROPS OPHTHALMIC at 08:18

## 2023-05-17 RX ADMIN — CALCIUM POLYCARBOPHIL 625 MG: 625 TABLET, FILM COATED ORAL at 08:10

## 2023-05-17 ASSESSMENT — ACTIVITIES OF DAILY LIVING (ADL)
ADLS_ACUITY_SCORE: 32
DRESS: INDEPENDENT
ADLS_ACUITY_SCORE: 32
ADLS_ACUITY_SCORE: 32
ORAL_HYGIENE: INDEPENDENT
ADLS_ACUITY_SCORE: 32
ADLS_ACUITY_SCORE: 32
HYGIENE/GROOMING: INDEPENDENT
ADLS_ACUITY_SCORE: 32
DRESS: SCRUBS (BEHAVIORAL HEALTH);INDEPENDENT
LAUNDRY: UNABLE TO COMPLETE
ADLS_ACUITY_SCORE: 32
ADLS_ACUITY_SCORE: 32
ORAL_HYGIENE: INDEPENDENT
ADLS_ACUITY_SCORE: 32
HYGIENE/GROOMING: INDEPENDENT

## 2023-05-17 NOTE — PLAN OF CARE
"Goal Outcome Evaluation:    Plan of Care Reviewed With: patient      Problem: Adult Behavioral Health Plan of Care  Goal: Plan of Care Review  Outcome: Progressing  Flowsheets (Taken 5/17/2023 1640)  Patient Agreement with Plan of Care: agrees   Pt presents with an animated affect, cheerful mood, excited that he is going home today, even if there is no discharge order for him, pt was difficult to redirect, so writer let him be. Pt was pacing a round pod 2 with head phones on and singing loudly to himself. Pt had rambling speech, disorganized thought process. Pt denied pain, all mental health symptoms and contracted for safety. Pt did not attend evening group, not social or engaged with staff or peers, pt seems to be in his\"own world\". Pt was compliant with all scheduled medications, no PRNs requested or needed. No medication side effects noted or reported. Pt retired to bed a round 0920. Pt had statements about wanting to leave but did not attempt to elope, believed he was discharging and was been picked by the brother. Continues on elopement precautions.               "

## 2023-05-17 NOTE — PLAN OF CARE
Goal Outcome Evaluation:         Problem: Sleep Disturbance  Goal: Adequate Sleep/Rest  Outcome: Not Progressing         Patient appeared to be asleep for 2.25  hours during safety checks this shift. Patient was awake most of the shift. Encouraged patient to sleep, but patient would get agitated when staff encouraged sleep. Patient paced the halls. Head phones and some snacks given. Patient said was irritated with his brother and could not sleep. PRN Melatonin and Hydroxyzine administered, with minimal affect. However, patient sleep after taking the medication and calmed down. Otherwise, no other complaints or concerns noted.
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
## 2023-05-17 NOTE — PLAN OF CARE
"Problem: Adult Behavioral Health Plan of Care  Goal: Optimized Coping Skills in Response to Life Stressors  Outcome: Progressing   Goal Outcome Evaluation:    Patient is alert and visible in the lounge area listening to music and singing loudly. He was calm and compliant with vitals check and medication administration. Patient has a bright/full range affect and social with writer and other staff members. Patient took a shower and while in the shower room he had a BM. He told writer that he feels bad and that it was an accident. He attend groups and ate 100% of his breakfast and lunch. He did not complain of pain and no prn given. He made some delusional statements like \"my brother Pet is here, he is here to take me home with him. I am ready, so lets go to the other lounge so we can leave.\" Patient contracted for safety, and denied all psych symptoms of, SI, HI, A/V hallucinations, anxiety and depression. Vital are stable.     Vital signs:  Temp: 98  F (36.7  C) Temp src: Oral BP: 109/62 Pulse: 78   Resp: 16 SpO2: 96 % O2 Device: None (Room air)     Weight: 89.9 kg (198 lb 4.8 oz)  Estimated body mass index is 34.04 kg/m  as calculated from the following:    Height as of 10/3/22: 1.626 m (5' 4\").    Weight as of this encounter: 89.9 kg (198 lb 4.8 oz).         "

## 2023-05-17 NOTE — PLAN OF CARE
Problem: Adult Behavioral Health Plan of Care  Goal: Plan of Care Review  Outcome: Progressing  Flowsheets (Taken 5/16/2023 0033)  Patient Agreement with Plan of Care: agrees   Goal Outcome Evaluation:    Plan of Care Reviewed With: patient       Pt was excitable and animated at the start of the shift. He sang loud with the headphones on as he paced the hallway. He was noticeable until bedtime. He watched TV and entertained himself but did not attend the evening group activity or socialize with his peers. Instead, he spent his time in the lounge and returned to his room occasionally. Pt denied SI, HI, AVHs, pain, and all other Psych SxS. He was tangential, and he rambled during the routine check-in. In addition, he repeatedly said that he wanted to go home because his brother Fredrick was at his place and waiting for him. He referred to a female RN as his sister Huong, who said it was ok for him to leave today. Finally, he was distracted by the music on his headphones. Pt was medication compliant and reported no adverse reactions from them.

## 2023-05-18 ENCOUNTER — APPOINTMENT (OUTPATIENT)
Dept: ULTRASOUND IMAGING | Facility: CLINIC | Age: 53
DRG: 885 | End: 2023-05-18
Attending: PHYSICIAN ASSISTANT
Payer: MEDICARE

## 2023-05-18 LAB — RADIOLOGIST FLAGS: ABNORMAL

## 2023-05-18 PROCEDURE — 99232 SBSQ HOSP IP/OBS MODERATE 35: CPT | Performed by: PSYCHIATRY & NEUROLOGY

## 2023-05-18 PROCEDURE — 250N000013 HC RX MED GY IP 250 OP 250 PS 637: Performed by: PSYCHIATRY & NEUROLOGY

## 2023-05-18 PROCEDURE — 250N000013 HC RX MED GY IP 250 OP 250 PS 637: Performed by: EMERGENCY MEDICINE

## 2023-05-18 PROCEDURE — 250N000011 HC RX IP 250 OP 636: Performed by: NURSE PRACTITIONER

## 2023-05-18 PROCEDURE — 93970 EXTREMITY STUDY: CPT

## 2023-05-18 PROCEDURE — 99233 SBSQ HOSP IP/OBS HIGH 50: CPT | Performed by: PHYSICIAN ASSISTANT

## 2023-05-18 PROCEDURE — 250N000013 HC RX MED GY IP 250 OP 250 PS 637: Performed by: PHYSICIAN ASSISTANT

## 2023-05-18 PROCEDURE — 124N000002 HC R&B MH UMMC

## 2023-05-18 PROCEDURE — 93970 EXTREMITY STUDY: CPT | Mod: 26 | Performed by: RADIOLOGY

## 2023-05-18 RX ORDER — LISINOPRIL 2.5 MG/1
2.5 TABLET ORAL DAILY
Status: DISCONTINUED | OUTPATIENT
Start: 2023-05-19 | End: 2023-05-19

## 2023-05-18 RX ORDER — ENOXAPARIN SODIUM 100 MG/ML
1 INJECTION SUBCUTANEOUS EVERY 12 HOURS
Status: DISCONTINUED | OUTPATIENT
Start: 2023-05-18 | End: 2023-05-20

## 2023-05-18 RX ADMIN — RISPERIDONE 2 MG: 2 TABLET ORAL at 08:06

## 2023-05-18 RX ADMIN — PANTOPRAZOLE SODIUM 40 MG: 40 TABLET, DELAYED RELEASE ORAL at 08:07

## 2023-05-18 RX ADMIN — CALCIUM POLYCARBOPHIL 625 MG: 625 TABLET, FILM COATED ORAL at 19:20

## 2023-05-18 RX ADMIN — CHLORHEXIDINE GLUCONATE 0.12% ORAL RINSE 15 ML: 1.2 LIQUID ORAL at 08:07

## 2023-05-18 RX ADMIN — RISPERIDONE 0.5 MG: 0.5 TABLET, ORALLY DISINTEGRATING ORAL at 08:07

## 2023-05-18 RX ADMIN — LEVOTHYROXINE SODIUM 50 MCG: 25 TABLET ORAL at 08:07

## 2023-05-18 RX ADMIN — ZINC SULFATE 220 MG (50 MG) CAPSULE 220 MG: CAPSULE at 08:07

## 2023-05-18 RX ADMIN — Medication 5 MG: at 03:26

## 2023-05-18 RX ADMIN — WHITE PETROLATUM 57.7 %-MINERAL OIL 31.9 % EYE OINTMENT: at 19:32

## 2023-05-18 RX ADMIN — OLANZAPINE 15 MG: 15 TABLET, ORALLY DISINTEGRATING ORAL at 19:20

## 2023-05-18 RX ADMIN — DOCUSATE SODIUM 100 MG: 100 CAPSULE, LIQUID FILLED ORAL at 08:06

## 2023-05-18 RX ADMIN — PREDNISOLONE ACETATE 1 DROP: 10 SUSPENSION/ DROPS OPHTHALMIC at 08:32

## 2023-05-18 RX ADMIN — TOPIRAMATE 100 MG: 100 TABLET, FILM COATED ORAL at 19:20

## 2023-05-18 RX ADMIN — RISPERIDONE 0.5 MG: 0.5 TABLET, ORALLY DISINTEGRATING ORAL at 19:20

## 2023-05-18 RX ADMIN — CALCIUM POLYCARBOPHIL 625 MG: 625 TABLET, FILM COATED ORAL at 08:06

## 2023-05-18 RX ADMIN — ENOXAPARIN SODIUM 90 MG: 100 INJECTION SUBCUTANEOUS at 19:39

## 2023-05-18 RX ADMIN — HYDROXYZINE HYDROCHLORIDE 25 MG: 25 TABLET, FILM COATED ORAL at 03:26

## 2023-05-18 RX ADMIN — RISPERIDONE 2 MG: 2 TABLET ORAL at 19:20

## 2023-05-18 RX ADMIN — MONTELUKAST 10 MG: 10 TABLET, FILM COATED ORAL at 19:20

## 2023-05-18 RX ADMIN — AMLODIPINE BESYLATE 10 MG: 10 TABLET ORAL at 08:07

## 2023-05-18 ASSESSMENT — ACTIVITIES OF DAILY LIVING (ADL)
ADLS_ACUITY_SCORE: 32
ADLS_ACUITY_SCORE: 32
ADLS_ACUITY_SCORE: 42
ORAL_HYGIENE: INDEPENDENT;PROMPTS
ADLS_ACUITY_SCORE: 42
ADLS_ACUITY_SCORE: 32
LAUNDRY: UNABLE TO COMPLETE
LAUNDRY: UNABLE TO COMPLETE
ADLS_ACUITY_SCORE: 42
ADLS_ACUITY_SCORE: 32
HYGIENE/GROOMING: INDEPENDENT
ADLS_ACUITY_SCORE: 32
ADLS_ACUITY_SCORE: 32
DRESS: SCRUBS (BEHAVIORAL HEALTH)
DRESS: INDEPENDENT
ADLS_ACUITY_SCORE: 42
ADLS_ACUITY_SCORE: 32
ADLS_ACUITY_SCORE: 32
ORAL_HYGIENE: INDEPENDENT
HYGIENE/GROOMING: INDEPENDENT;HANDWASHING;PROMPTS

## 2023-05-18 NOTE — PLAN OF CARE
"Goal Outcome Evaluation:  Problem: Plan of Care - These are the overarching goals to be used throughout the patient stay.    Goal: Optimal Comfort and Wellbeing  Outcome: Progressing  [facility], listening to music and singing loudly. He is oriented to person, and has a bight affect. He is calm and cooperative with vitals and medication administration. While patient was changing his socks, writer noticed patient's feet are swollen especially the right foot, with grade 3 pitting edema. Writer encouraged patient to relax and elevate his legs but patient was not compliant with writers recommendation. Doctor was notified, and they told writer to put an internal medicine consult, which writer did. Medicine was later paged and they came and assessed patient. Patient has labs scheduled for tomorrow along with imaging. Patient's amlodipine was discontinued and lisinopril 2.5mg was ordered. Patient did not complain of pain and no prn given. He ate 100% of breakfast and lunch. vitals are within normal limits. Patient did not take a shower this shift and he did not attend group. He denied all psych symptoms of SI/SIB/ hallucination, anxiety, depression, and he contracted for safety.      Vital signs:  Temp: 97.4  F (36.3  C) Temp src: Oral BP: 108/72 Pulse: 87   Resp: 18 SpO2: 98 % O2 Device: None (Room air)     Weight: 89.9 kg (198 lb 4.8 oz)  Estimated body mass index is 34.04 kg/m  as calculated from the following:    Height as of 10/3/22: 1.626 m (5' 4\").    Weight as of this encounter: 89.9 kg (198 lb 4.8 oz).          "

## 2023-05-18 NOTE — PROVIDER NOTIFICATION
05/18/23 1740   Lying Orthostatic BP   Lying Orthostatic /73   Lying Orthostatic Pulse 89 bpm   Sitting Orthostatic BP   Sitting Orthostatic /84   Sitting Orthostatic Pulse 101 bpm   Oxygen Therapy   SpO2 97 %   O2 Device None (Room air)       Pt was resting in his bed with legs elevated. Pt encouraged to continue resting and keep off his feet as much as he can. He was agreeable to this. He endorsed some mild aching in his back and hip, which he reports his baseline for him. Denied having any needs at this time.

## 2023-05-18 NOTE — PROGRESS NOTES
Incontinent x1 (urine) this shift and soon after, Melatonin and Vistaril given for sleep and agitation (0325).  Moderately quiet [facility] singing with headphones on.  Is redirectable for a short while when asked nicely to quietly sing.  Seems to forget, and then gets louder.  Pleasant and cooperative, however.    Appeared to sleep a total of 3.75 hours this night shift.  The above prns given but no snacks given or requested.  Went to sleep in his room a while after the pacing and singing in the hallway.  Denies any pain,

## 2023-05-18 NOTE — PROGRESS NOTES
Brief Medicine Note   18 May 2023       US bilateral lower extremities completed to evaluate for clot.  Found to have occlusive thrombus in R posterior tibial veins throughout right calf.  No DVT in L lower extremity.      Labs reviewed, Cr wnl and Hgb stable.  Will start Lovenox 1mg/kg Q12H.       Char Cortez, CNP, APRN  Internal Medicine [facility]  Pager [phone]

## 2023-05-18 NOTE — PROGRESS NOTES
Brief medicine update note. BP trend appears to be improved on 10 mg of amlodipine (starting 5/16). Would recommend to continue the current regimen. If BP is consistently > 150 systolic or 100 diastolic despite medication please reach back out to medicine. If BP is < 90 systolic, or 60 diastolic please reach out to medicine. Medicine will sign off at this time.

## 2023-05-18 NOTE — PROGRESS NOTES
Received a phone call from Dr Caballero at 1722 about pt's critical ultrasound lab result. He stated result finding was that pt.had right lower extremity DVT. Writer paged via [facility] and notified the internal medicine attending PA, Dr.David Carl.  Provider started pt.on  anticoagulant treatment.     Pt.encouraged to stay in bed and elevate his right leg. Pt.cooperative and endorsed understanding. Pillows provided. Vitals signs checked /84   Pulse 101   Temp 97.4  F (36.3  C) (Oral)   Resp 16   Wt 89.9 kg (198 lb 4.8 oz)   SpO2 97%   BMI 34.04 kg/m  . Will continue to monitor.

## 2023-05-19 ENCOUNTER — APPOINTMENT (OUTPATIENT)
Dept: CARDIOLOGY | Facility: CLINIC | Age: 53
DRG: 885 | End: 2023-05-19
Attending: PHYSICIAN ASSISTANT
Payer: MEDICARE

## 2023-05-19 ENCOUNTER — TELEPHONE (OUTPATIENT)
Dept: BEHAVIORAL HEALTH | Facility: CLINIC | Age: 53
End: 2023-05-19
Payer: MEDICARE

## 2023-05-19 LAB
ALBUMIN SERPL BCG-MCNC: 4.1 G/DL (ref 3.5–5.2)
ALP SERPL-CCNC: 139 U/L (ref 40–129)
ALT SERPL W P-5'-P-CCNC: 50 U/L (ref 10–50)
ANION GAP SERPL CALCULATED.3IONS-SCNC: 10 MMOL/L (ref 7–15)
AST SERPL W P-5'-P-CCNC: 54 U/L (ref 10–50)
BILIRUB SERPL-MCNC: 0.4 MG/DL
BUN SERPL-MCNC: 25.6 MG/DL (ref 6–20)
CALCIUM SERPL-MCNC: 9.8 MG/DL (ref 8.6–10)
CHLORIDE SERPL-SCNC: 111 MMOL/L (ref 98–107)
CREAT SERPL-MCNC: 0.88 MG/DL (ref 0.67–1.17)
DEPRECATED HCO3 PLAS-SCNC: 23 MMOL/L (ref 22–29)
ERYTHROCYTE [DISTWIDTH] IN BLOOD BY AUTOMATED COUNT: 14.7 % (ref 10–15)
GFR SERPL CREATININE-BSD FRML MDRD: >90 ML/MIN/1.73M2
GLUCOSE SERPL-MCNC: 115 MG/DL (ref 70–99)
HCT VFR BLD AUTO: 39.2 % (ref 40–53)
HGB BLD-MCNC: 12.9 G/DL (ref 13.3–17.7)
INR PPP: 1.06 (ref 0.85–1.15)
LVEF ECHO: NORMAL
MCH RBC QN AUTO: 27.8 PG (ref 26.5–33)
MCHC RBC AUTO-ENTMCNC: 32.9 G/DL (ref 31.5–36.5)
MCV RBC AUTO: 85 FL (ref 78–100)
NT-PROBNP SERPL-MCNC: 116 PG/ML (ref 0–900)
PLATELET # BLD AUTO: 143 10E3/UL (ref 150–450)
POTASSIUM SERPL-SCNC: 3.9 MMOL/L (ref 3.4–5.3)
PROT SERPL-MCNC: 6.8 G/DL (ref 6.4–8.3)
RBC # BLD AUTO: 4.64 10E6/UL (ref 4.4–5.9)
SODIUM SERPL-SCNC: 144 MMOL/L (ref 136–145)
WBC # BLD AUTO: 5.6 10E3/UL (ref 4–11)

## 2023-05-19 PROCEDURE — 250N000013 HC RX MED GY IP 250 OP 250 PS 637: Performed by: EMERGENCY MEDICINE

## 2023-05-19 PROCEDURE — 83880 ASSAY OF NATRIURETIC PEPTIDE: CPT | Performed by: PHYSICIAN ASSISTANT

## 2023-05-19 PROCEDURE — 250N000013 HC RX MED GY IP 250 OP 250 PS 637: Performed by: PSYCHIATRY & NEUROLOGY

## 2023-05-19 PROCEDURE — 99232 SBSQ HOSP IP/OBS MODERATE 35: CPT | Performed by: PSYCHIATRY & NEUROLOGY

## 2023-05-19 PROCEDURE — 250N000011 HC RX IP 250 OP 636: Performed by: NURSE PRACTITIONER

## 2023-05-19 PROCEDURE — 250N000013 HC RX MED GY IP 250 OP 250 PS 637: Performed by: PHYSICIAN ASSISTANT

## 2023-05-19 PROCEDURE — 85610 PROTHROMBIN TIME: CPT | Performed by: PHYSICIAN ASSISTANT

## 2023-05-19 PROCEDURE — 36415 COLL VENOUS BLD VENIPUNCTURE: CPT | Performed by: PHYSICIAN ASSISTANT

## 2023-05-19 PROCEDURE — 124N000002 HC R&B MH UMMC

## 2023-05-19 PROCEDURE — 93306 TTE W/DOPPLER COMPLETE: CPT | Mod: 26 | Performed by: INTERNAL MEDICINE

## 2023-05-19 PROCEDURE — 93306 TTE W/DOPPLER COMPLETE: CPT

## 2023-05-19 PROCEDURE — 85027 COMPLETE CBC AUTOMATED: CPT | Performed by: PHYSICIAN ASSISTANT

## 2023-05-19 PROCEDURE — 99233 SBSQ HOSP IP/OBS HIGH 50: CPT | Performed by: PHYSICIAN ASSISTANT

## 2023-05-19 PROCEDURE — 80053 COMPREHEN METABOLIC PANEL: CPT | Performed by: PHYSICIAN ASSISTANT

## 2023-05-19 RX ORDER — LISINOPRIL 5 MG/1
5 TABLET ORAL DAILY
Status: DISCONTINUED | OUTPATIENT
Start: 2023-05-19 | End: 2023-08-14 | Stop reason: HOSPADM

## 2023-05-19 RX ADMIN — TOPIRAMATE 100 MG: 100 TABLET, FILM COATED ORAL at 19:41

## 2023-05-19 RX ADMIN — OLANZAPINE 15 MG: 15 TABLET, ORALLY DISINTEGRATING ORAL at 19:40

## 2023-05-19 RX ADMIN — RISPERIDONE 0.5 MG: 0.5 TABLET, ORALLY DISINTEGRATING ORAL at 08:27

## 2023-05-19 RX ADMIN — LISINOPRIL 5 MG: 5 TABLET ORAL at 08:30

## 2023-05-19 RX ADMIN — MONTELUKAST 10 MG: 10 TABLET, FILM COATED ORAL at 19:41

## 2023-05-19 RX ADMIN — PREDNISOLONE ACETATE 1 DROP: 10 SUSPENSION/ DROPS OPHTHALMIC at 08:26

## 2023-05-19 RX ADMIN — RISPERIDONE 2 MG: 2 TABLET ORAL at 08:26

## 2023-05-19 RX ADMIN — CALCIUM POLYCARBOPHIL 625 MG: 625 TABLET, FILM COATED ORAL at 08:26

## 2023-05-19 RX ADMIN — PANTOPRAZOLE SODIUM 40 MG: 40 TABLET, DELAYED RELEASE ORAL at 07:01

## 2023-05-19 RX ADMIN — LEVOTHYROXINE SODIUM 50 MCG: 25 TABLET ORAL at 07:01

## 2023-05-19 RX ADMIN — ENOXAPARIN SODIUM 90 MG: 100 INJECTION SUBCUTANEOUS at 08:29

## 2023-05-19 RX ADMIN — DOCUSATE SODIUM 100 MG: 100 CAPSULE, LIQUID FILLED ORAL at 08:26

## 2023-05-19 RX ADMIN — RISPERIDONE 0.5 MG: 0.5 TABLET, ORALLY DISINTEGRATING ORAL at 19:41

## 2023-05-19 RX ADMIN — CALCIUM POLYCARBOPHIL 625 MG: 625 TABLET, FILM COATED ORAL at 19:40

## 2023-05-19 RX ADMIN — ZINC SULFATE 220 MG (50 MG) CAPSULE 220 MG: CAPSULE at 08:26

## 2023-05-19 RX ADMIN — CHLORHEXIDINE GLUCONATE 0.12% ORAL RINSE 15 ML: 1.2 LIQUID ORAL at 08:26

## 2023-05-19 RX ADMIN — ENOXAPARIN SODIUM 90 MG: 100 INJECTION SUBCUTANEOUS at 19:39

## 2023-05-19 RX ADMIN — Medication 5 MG: at 22:43

## 2023-05-19 RX ADMIN — ACETAMINOPHEN 325MG 650 MG: 325 TABLET ORAL at 18:45

## 2023-05-19 RX ADMIN — WHITE PETROLATUM 57.7 %-MINERAL OIL 31.9 % EYE OINTMENT: at 19:45

## 2023-05-19 RX ADMIN — RISPERIDONE 2 MG: 2 TABLET ORAL at 19:41

## 2023-05-19 ASSESSMENT — ACTIVITIES OF DAILY LIVING (ADL)
HYGIENE/GROOMING: INDEPENDENT
DRESS: SCRUBS (BEHAVIORAL HEALTH)
ADLS_ACUITY_SCORE: 32
LAUNDRY: UNABLE TO COMPLETE
ORAL_HYGIENE: INDEPENDENT
ADLS_ACUITY_SCORE: 32
HYGIENE/GROOMING: INDEPENDENT
ADLS_ACUITY_SCORE: 32
ADLS_ACUITY_SCORE: 32
ORAL_HYGIENE: INDEPENDENT
ADLS_ACUITY_SCORE: 32
ADLS_ACUITY_SCORE: 32
HYGIENE/GROOMING: INDEPENDENT;HANDWASHING
DRESS: SCRUBS (BEHAVIORAL HEALTH)
ADLS_ACUITY_SCORE: 32
DRESS: SCRUBS (BEHAVIORAL HEALTH);INDEPENDENT
ORAL_HYGIENE: INDEPENDENT
ADLS_ACUITY_SCORE: 32
LAUNDRY: UNABLE TO COMPLETE
ADLS_ACUITY_SCORE: 32
ADLS_ACUITY_SCORE: 32

## 2023-05-19 NOTE — CONSULTS
Consulted by Jaspal Carl to run a test claim for DOACs.    Patient has primary pharmacy benefits through [employer] Part D and secondary pharmacy benefits through [employer] (pre-paid medical assistance plan). Per insurance, the following are covered and preferred under the patient's plans:       Xarelto tabs - $0    Eliquis tabs - $0    Dabigatran caps - $0    Pradaxa caps - $0     The following are not covered:    Negritoaysa      Please feel free to contact me with any other test claims, prior authorizations, or insurance questions regarding outpatient medications.     Thanks!      Geeta Armendariz [facility] Discharge Pharm[facility] Liaison  Pronouns: She/Her/Hers    [facility]  [address] [place] [address]  [address] [place] [address] [address] [place] [address]   [email or web address]  [email or web address]   Phone: [phone]  Pager: [phone]  Fax: [phone]

## 2023-05-19 NOTE — PLAN OF CARE
Problem: Suicidal Behavior  Goal: Suicidal Behavior is Absent or Managed  Outcome: Progressing     Problem: Adult Behavioral Health Plan of Care  Goal: Adheres to Safety Considerations for Self and Others  Outcome: Progressing    Patient was observed pacing while listening to headphones earlier in the shift. Patient noted to sing loudly and dance along with the music at times. Minimal social interactions noted. He has been pleasant and calm on approach. Patient is confused and disorganized, and can demonstrate poor boundaries at times, following staff and attempting to walk into non patient areas. Patient is redirectable. No agitation or aggression noted.  He denies all mental health symptoms. PRN tylenol given for right foot pain. Patient ate dinner in the common area, and has been sitting quietly in the lounge watching movies since. Appears somewhat preoccupied, but continues to deny symptoms or concerns. Patient was compliant with all hs medications. He denies any questions or medication side effects. Patient continues on elopement precautions. No behavior noted.     Patient aware that he will be returning  to station 12, and was cooperative with transfer.     BP (!) 146/84   Pulse 99   Temp 98.7  F (37.1  C) (Oral)   Resp 17   Wt 89.9 kg (198 lb 4.8 oz)   SpO2 98%   BMI 34.04 kg/m

## 2023-05-19 NOTE — PLAN OF CARE
Problem: Suicidal Behavior  Goal: Suicidal Behavior is Absent or Managed  Outcome: Progressing     Patient is being transferred to station [address] [facility]. Patient was being picked up  by two hospital escort/acuity staff and escorted off the unit.       All patient's belongings from room, locked bin and security were sent with patient..  Pt denies any current medical issues at this time.12:40

## 2023-05-19 NOTE — CARE PLAN
"INITIAL OCCUPATIONAL THERAPY NOTE     05/19/23 1219   Group Therapy Session   Group Attendance attended group session   Time Session Began 1115   Time Session Ended 1145   Total Time (minutes) 10 - no charge   Total # Attendees 3   Group Type life skill;other (see comments)  (OT)   Group Topic Covered balanced lifestyle;coping skills/lifestyle management;other (see comments)  (Sensorimotor)   Group Session Detail OT - Coping: Group focus was learning and practice of use of a variety of sensorimotor techniques for calming and self regulation. Emphasis was on activation of the proprioceptive and vestibular senses.    Patient Response/Contribution cooperative with task;verbalizations were off topic   Patient Participation Detail Pt came to group with invite. He presented with blunt affect and generally calm manner. During introduction of names, he began to ramble about his name, preferring \"Jose\" and then talked about his brother Abhijit and his nickname and then began to talk about his singing and family and something about this leading to his swollen feet and leg and possible embolism. He did stop with moderate amount of limit and redirection to the topic and beginning of practice. He was able to briefly make an attempt at the first movement and then was doing some of his own and then stopped and just sat. He abruptly noted he was done and taking a break. He was cooperative and noted he would sit back in the lounge and left the group. Did recall to take his cup of coffee he had brought in and put on counter next to where he was sitting.      Continue to encourage attendance and assess.  "

## 2023-05-19 NOTE — DISCHARGE INSTRUCTIONS
Behavioral Discharge Planning and Instructions    Summary: You were admitted on 5/2/2023  due to Delusional Thoughts and Agressive Behaviors.  You were treated by Dr. Watkins and Dr. Dumas and discharged on 08/14/2023 from [facility] to [facility] Home    Main Diagnosis: Schizoaffective disorder, bipolar type    Health Care Follow-up:   You have a legal guardian who must approve your care. Your guardian is Huong Keenan and their phone number is [phone].    Psychiatry Appointment Date/Time: Tuesday September 5th, 2023 @ 1:15 PM In person    Tank Sauer DO    [facility]    [address] [address]    Phone: [phone]    Primary Care/Hospital Follow Up: Tuesday September 5th at 9:30am in-person  Joel Werner MD  [facility]  [address] [place] [address]    Phone: [phone]  Fax: [phone]    You are scheduled to start services with [facility] on 8/21/23. Please call [phone] with any questions regarding services.    Attend all scheduled appointments with your outpatient providers. Call at least 24 hours in advance if you need to reschedule an appointment to ensure continued access to your outpatient providers.     Major Treatments, Procedures and Findings:  You were provided with: a psychiatric assessment, assessed for medical stability, medication evaluation and/or management, group therapy, milieu management, and medical interventions    Symptoms to Report: feeling more aggressive, increased confusion, losing more sleep, mood getting worse, or thoughts of suicide    Early warning signs can include: increased depression or anxiety sleep disturbances increased thoughts or behaviors of suicide or self-harm  increased unusual thinking, such as paranoia or hearing voices    Safety and Wellness:  Take all medicines as directed.  Make no changes unless your  doctor suggests them.      Follow treatment recommendations.  Refrain from alcohol and non-prescribed drugs.  Ask your support system to help you reduce your access to items that could harm yourself or others. If there is a concern for safety, call 911.    Resources:   Crisis Intervention: 366.623.4523 or 359-062-8600 (TTY: 954.672.7232).  Call anytime for help.  [employer] [place] [employer] Mental Illness (www.[place].ted.org): 574.893.8795 or 745-638-1108.  Mental Health Association of [place] (www.mentalhealth.org): 628.296.2624 or 041-370-2987  [facility] Crisis (COPE) Response - Adult [phone]    General Medication Instructions:   See your medication sheet(s) for instructions.   Take all medicines as directed.  Make no changes unless your doctor suggests them.   Go to all your doctor visits.  Be sure to have all your required lab tests. This way, your medicines can be refilled on time.  Do not use any drugs not prescribed by your doctor.  Avoid alcohol.    Advance Directives:   Scanned document on file with [facility]? No scanned doc  Is document scanned? Pt states no documents  Honoring Choices Your Rights Handout: Informed and given  Was more information offered? Pt declined    The Treatment team has appreciated the opportunity to work with you. If you have any questions or concerns about your recent admission, you can contact the unit which can receive your call 24 hours a day, 7 days a week. They will be able to get in touch with a Provider if needed. The unit number is [phone] .

## 2023-05-19 NOTE — PLAN OF CARE
Problem: Plan of Care - These are the overarching goals to be used throughout the patient stay.    Goal: Optimal Comfort and Wellbeing  Outcome: Progressing   Goal Outcome Evaluation:       Pt.continues to safely pace the unit. He was out of the unit briefly for ultrasound imaging. He adheres intermittently to treatment plan of elevating his swollen right foot. No safety or behavioral concerns reported or observed this shift. He took his scheduled medications. Denied SI/SIB/hallucinations.     Writer gave patient education materials on Lovenox. Will continue to monitor.

## 2023-05-19 NOTE — TELEPHONE ENCOUNTER
R: Patient cleared and ready for behavioral bed placement: Yes    9:19pm Intake received a call from Dr. Celeste reporting an incident that happened on st 12 that requires 5 immediate/prioritized transfers off the unit, this pt being one of them.     9:40pm Intake called st 30 and requested the CRN review the pt for shared M bed.     9:54pm Intake received a call from CRN on 30 reporting this pt is not roommate appropriate. However, there is a P available that the pt could admit to.     10:00pm Intake messaged Dr. Celeste regarding the P on 30. Dr. Celeste and Dr. Avila both agree to this pt admitting into the P room that's available on st 30. The other pt would then stay in queue for another P bed to become available.     10:09pm Intake called [facility] for transfer orders.     10:57pm Intake received a call from Dr. Olmstead with [facility] accepting this pt for transfer on st 30/[facility].      11:22pm Intake received a call from CRN on 12 who will provide nurse report to st 30 CRN.
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
## 2023-05-19 NOTE — TELEPHONE ENCOUNTER
Dr Avila called Intake @ 1:11pm -  After reviewing pt and pt needs along with another case,  This pt will transfer to unit 12 aftr a discharge pt leaves unit 12 on evening shift.       Pt placed in queue for a transfer -  Which will occur on PMS -  Specific time will follow.

## 2023-05-19 NOTE — PLAN OF CARE
Problem: Adult Behavioral Health Plan of Care  Goal: Adheres to Safety Considerations for Self and Others  Outcome: Progressing  Intervention: Develop and Maintain Individualized Safety Plan  Recent Flowsheet Documentation  Taken 5/19/2023 1028 by Tiffanie Pleitez RN  Safety Measures:    safety rounds completed    suicide check-in completed   Goal Outcome Evaluation:    Plan of Care Reviewed With: patient          Patient was visible in the milieu, listening to music on the headphone, singing and having a good time, pacing the hallway. Patient was observed socializing with a few peers and staff, patient can be intrusive, as was observed following a staff all around the unit. Patient presents with a full range affect, mood is calm, patient is confused and disorganized. Patient mixed black pepper, yogurt, and was about adding a ranch dressing into their coffee before he was approached by the staff RN. A fresh coffee was given to patient but he was reluctant to drink the fresh coffee. Staff will need to pay closer attention to patient during meal times. 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 Patient endorsed anxiety, denied all other mental health symptoms. Patient took scheduled medications without concerns, continues to be pleasant and cooperative with cares. Patient has a right leg occlusive thrombosis, and we have an order to elevate right leg for atleast 4 times daily for 20 minutes each time. Will continue to follow the plan of care.

## 2023-05-19 NOTE — PLAN OF CARE
Assessment/Intervention/Current Symtoms and Care Coordination  Current Symptoms include the following: disorganization and patient is irritable    This pt transferred here from [facility] last night and I am being told that the pt will now transfer back to [facility] today.  Therefore I have not called the guardian at this time.      Discharge Plan or Goal  Pending stabilization & development of a safe discharge plan.  Considerations include: Return to group home      Barriers to Discharge  Patient requires further psychiatric stabilization due to current symptomology      Referral Status  care plan meds to be reviewed      Legal Status  [facility] [employer] have verified legal guardian(s) as: Huong Keenan @ [phone]

## 2023-05-19 NOTE — PROGRESS NOTES
Pt is transferred from to station 30 at 0040. Pt was given brief orientation to unit and room. Noted bilateral edema to lower extremities. Pt encouraged to stay in bed and elevate legs on a pillow. Pt denies pain. Pt's guardian(Huong Conley) was called and informed of pt's transfer. She said she is on vacation in [place] and will arrive on Thursday. Pt up to the bathroom x 4, and incontinent of urine. Pt assisted with incontinent cares. Pt was observed wandering off to another room and redirected without an incident. Pt slept 4.25 hours with respiration even and non-labored.

## 2023-05-19 NOTE — PROGRESS NOTES
"[facility], [place]   Psychiatric Progress Note        Interim History:   The patient's care was discussed with the treatment team during the daily team meeting and/or staff's chart notes were reviewed.  Staff report patient was transferred at night from Station 12 to [facility] 30. He appeared to be confused and was reported to wander into someone else's room, needed to be redirected. Was incontinent with urine at night. Overall, slept for about 4.5 hours. Was seen today by IM due to recently diagnosed thromb of RLE, started on a blood thinner. For more details, please, see Internal Medicine note. Appreciate medical team's input. Per staff report patient appeared to be confused and disorganized, see RN's note below:      \"Patient was observed socializing with a few peers and staff, patient can be intrusive, as was observed following a staff all around the unit. Patient presents with a full range affect, mood is calm, patient is confused and disorganized. Patient mixed black pepper, yogurt, and was about adding a ranch dressing into their coffee before he was approached by the staff RN. A fresh coffee was given to patient but he was reluctant to drink the fresh coffee.\"    Met with patient: he was waiting to see me and willingly walked into conference room. He was wearing headphones, took them off during our visit and initially was pretty pleasant. Speech was slurred and at times difficult to understand. He quickly grew irritated when I started asking him about his housing situation and reasons for his admission. Said that he came not from a group home but from a condominium and would like to return there to live with his wife. When I told him that per electronic records he came from a group home and doesn't have wife he quickly grew angry and agitated. Asked me why I was asking him again and again and repeating \"the same f...g questions\". He was not receptive at all when I told him that I " just met him and wanted to get to know him. He stood up and left conference room. Shortly after our meeting I was told by RN that in light of needing single room for another more acute patient, Jagdeep will be sent back to Station 12. Lab work from 5/18 and 5/19 was reviewed.         Medications:       artificial tears   Right Eye At Bedtime     calcium polycarbophil  625 mg Oral BID     chlorhexidine  15 mL Swish & Spit Daily     docusate sodium  100 mg Oral Daily     enoxaparin ANTICOAGULANT  1 mg/kg Subcutaneous Q12H     levothyroxine  50 mcg Oral or NG Tube QAM AC     lisinopril  5 mg Oral Daily     montelukast  10 mg Oral At Bedtime     OLANZapine zydis  15 mg Oral At Bedtime     pantoprazole  40 mg Oral QAM AC     prednisoLONE acetate  1 drop Right Eye Daily     risperiDONE  0.5 mg Oral BID     risperiDONE  2 mg Oral BID     topiramate  100 mg Oral At Bedtime     zinc sulfate  220 mg Oral Daily          Allergies:   No Known Allergies       Labs:     Recent Results (from the past 24 hour(s))   US Lower Extremity Venous Duplex Bilateral    Collection Time: 05/18/23  4:55 PM   Result Value Ref Range    Radiologist flags Right lower extremity DVT (Urgent)    Comprehensive metabolic panel    Collection Time: 05/19/23  9:19 AM   Result Value Ref Range    Sodium 144 136 - 145 mmol/L    Potassium 3.9 3.4 - 5.3 mmol/L    Chloride 111 (H) 98 - 107 mmol/L    Carbon Dioxide (CO2) 23 22 - 29 mmol/L    Anion Gap 10 7 - 15 mmol/L    Urea Nitrogen 25.6 (H) 6.0 - 20.0 mg/dL    Creatinine 0.88 0.67 - 1.17 mg/dL    Calcium 9.8 8.6 - 10.0 mg/dL    Glucose 115 (H) 70 - 99 mg/dL    Alkaline Phosphatase 139 (H) 40 - 129 U/L    AST 54 (H) 10 - 50 U/L    ALT 50 10 - 50 U/L    Protein Total 6.8 6.4 - 8.3 g/dL    Albumin 4.1 3.5 - 5.2 g/dL    Bilirubin Total 0.4 <=1.2 mg/dL    GFR Estimate >90 >60 mL/min/1.73m2   CBC with platelets    Collection Time: 05/19/23  9:19 AM   Result Value Ref Range    WBC Count 5.6 4.0 - 11.0 10e3/uL    RBC  Count 4.64 4.40 - 5.90 10e6/uL    Hemoglobin 12.9 (L) 13.3 - 17.7 g/dL    Hematocrit 39.2 (L) 40.0 - 53.0 %    MCV 85 78 - 100 fL    MCH 27.8 26.5 - 33.0 pg    MCHC 32.9 31.5 - 36.5 g/dL    RDW 14.7 10.0 - 15.0 %    Platelet Count 143 (L) 150 - 450 10e3/uL   Nt probnp inpatient    Collection Time: 05/19/23  9:19 AM   Result Value Ref Range    N terminal Pro BNP Inpatient 116 0 - 900 pg/mL   INR    Collection Time: 05/19/23  9:19 AM   Result Value Ref Range    INR 1.06 0.85 - 1.15          Psychiatric Examination:     /88 (BP Location: Right arm, Patient Position: Sitting, Cuff Size: Adult Regular)   Pulse 90   Temp 97.4  F (36.3  C) (Oral)   Resp 17   Wt 89.9 kg (198 lb 4.8 oz)   SpO2 100%   BMI 34.04 kg/m    Weight is 198 lbs 4.8 oz  Body mass index is 34.04 kg/m .    Orthostatic Vitals       Most Recent      Lying Orthostatic /73 05/18 1740    Lying Orthostatic Pulse (bpm) 89 05/18 1740    Sitting Orthostatic /88 05/19 0827    Sitting Orthostatic Pulse (bpm) 90 05/19 0827    Standing Orthostatic /95 05/19 0827    Standing Orthostatic Pulse (bpm) 90 05/19 0827          Appearance: awake, alert, dressed in hospital scrubs and appeared as age stated  Attitude:  uncooperative  Eye Contact:  intense, right eye blindness  Mood:  angry  Affect:  intensity is heightened and labile  Speech:  dysarthria and fast  Psychomotor Behavior:  physical agitation  Throught Process:  illogical and tangental  Associations:  loosening of associations present  Thought Content:  no evidence of suicidal ideation or homicidal ideation and delusions are present  Insight:  limited  Judgement:  poor  Oriented to:  time, person, and place  Attention Span and Concentration:  poor  Recent and Remote Memory:  poor    Clinical Global Impressions  First: 7/4 5/19/2023      Most recent:            Precautions:     Behavioral Orders   Procedures     Code 1 - Restrict to Unit     Code 2     Elopement precautions      Routine Programming     As clinically indicated     Status 15     Every 15 minutes.          DIagnoses:     Schizoaffective disorder bipolar type  Intellectual disability unspecified  Rule out neurocognitive disorder  Mild hypernatremia corrected  Mild hypercalcemia         Plan:     Patient presents as still very delusional, easily agitated, needing MH stabilization. He is currently on combination of 15 mg of Zyprexa Zydis and total of 5 mg of Risperdal daily. No medication changes today 5/19/2023. Will as per discussion above transfer to Station 12 when bed becomes available. For now will continue to provide support and structure, provide redirections and reality checks. No roommate order due to psychosis and agitation.    Total time spent was 39 minutes. Over 50% of times was spent counseling and coordination of care regarding coping skills, medication and discharge planning.

## 2023-05-19 NOTE — PROGRESS NOTES
[facility]    Medicine Progress Note - Hospitalist Service    Date of Admission:  5/2/2023  Jagdeep Walker is a 52 year old male admitted on 5/2/2023 for agitation and delusions to inpatient psychiatry, and treated inpatient for schizoaffective disorder, bipolar type. He has history otherwise significant for unspecified intellectual disabilities, GERD, hypothyroidism.    Interval History   Patient states that he is doing ok. He does not recall meeting me yesterday. He does not seem to recall any issues with his legs. We discussed that he has a blood clot in his right leg and will need to be on blood thinning medication. Patient is in agreement with this. He is not experiencing any pain in the leg today.     Assessment & Plan    # Acute right posterior tibial vein dvt (unprovoked)  - diagnosed on ultrasound 5/18  - low concern for PE despite mild tachycardia at times there is no hypoxia or chest pain  Plan  - therapeutic lovenox for now, anticipate transition to DOAC in upcoming days, run insurance scripts  - KEEP LEG ELEVATED AT LEAST 3-4 TIMES PER DAY FOR 20 MINUTES OR MUCH POSSILE    # Bilateral lower extremity edema   - Right leg swelling now thought to be largely related to clot, though there is some edema in the left leg as well, given this started after amlodipine initiation we have stopped the amlodipine  Plan  - obtain echo and bnp    # Hypertension  - lisinopril 5 mg daily    Will continue to follow    Disposition Plan     Expected Discharge Date: 05/23/2023                    SOCORRO HenaoC  Hospitalist Service  [facility]  Securely message with [employer] (more info)  Text page via [employer] Paging/Directory     Clinically Significant Risk Factors                  # Hypertension: Noted on problem list                 ______________________________________________________________________    Exam    Physical  Exam    Vital Signs: Temp: 97.4  F (36.3  C) Temp src: Oral BP: 136/88 Pulse: 90     Resp: 17 SpO2: 100 % O2 Device: None (Room air)    Weight: 198 lbs 4.8 oz    General Appearance: Alert and oriented x3  HEENT: Anicteric sclera, MMM   Respiratory: Breathing comfortably on room air, lungs CTAB without wheezing or crackles   Cardiovascular: RRR, S1, S2. No murmur noted  GI: Abdomen soft, non-tender with active bowel sounds. No guarding or rebound  Lymph/Hematologic: No peripheral edema, distal pulses palpable   Skin: No rash or jaundice   Musculoskeletal: 2+ pitting edema bilateral lower extremities R>L, intact distal pulses no sensorimotor deficits   Neurologic: No focal deficits, CN II-XII grossly intact    Data reviewed today     I reviewed all medications, new labs and imaging results over the last 24 hours. Please see discussion of these results in the A/P.    Data   Recent Labs   Lab 05/19/23  0919 05/16/23  0836   WBC 5.6  --    HGB 12.9*  --    MCV 85  --    *  --    INR 1.06  --     143   POTASSIUM 3.9  --    CHLORIDE 111*  --    CO2 23  --    BUN 25.6*  --    CR 0.88  --    ANIONGAP 10  --    NASIR 9.8  --    *  --    ALBUMIN 4.1  --    PROTTOTAL 6.8  --    BILITOTAL 0.4  --    ALKPHOS 139*  --    ALT 50  --    AST 54*  --        Recent Results (from the past 24 hour(s))   US Lower Extremity Venous Duplex Bilateral   Result Value    Radiologist flags Right lower extremity DVT (Urgent)    Narrative    EXAMINATION: DOPPLER VENOUS ULTRASOUND OF BILATERAL LOWER EXTREMITIES,  5/18/2023 4:55 PM     COMPARISON: 7/8/2022 and 6/4/2022    HISTORY: Edema    TECHNIQUE:  Gray-scale evaluation with compression, spectral flow and  color Doppler assessment of the deep venous system of both legs from  groin to knee, and then at the ankles.    FINDINGS:  Right: the common femoral, femoral, and popliteal veins demonstrate  normal compressibility and blood flow. The posterior tibial veins  demonstrate  echogenic thrombus in the lumen and absent flow in the  proximal calf and are occluded in the distal calf as well. Peroneal  vein not visualized. Soft tissue edema about the right ankle.    Left: the common femoral, femoral, popliteal and posterior tibial  veins demonstrate normal compressibility and blood flow. Peroneal vein  not visualized.    In the right groin there to prominent but nonenlarged lymph nodes with  normal morphology. Prominent lymph node in the left groin as well with  normal morphology.      Impression    IMPRESSION:  1.  Occlusive thrombus in the right posterior tibial veins throughout  the right calf.  2.   No deep venous embolus is in the left lower extremity.  3.  The peroneal veins bilaterally were not visualized.    [Urgent Result: Right lower extremity DVT]    Finding was identified on 5/18/2023 5:20 PM.     Dr. Carl  was contacted by Dr. Caballero at 5/18/2023 5:24 PM  and verbalized understanding of the urgent finding.     I have personally reviewed the examination and initial interpretation  and I agree with the findings.    DANK WATKINS MD         SYSTEM ID:  [id number]       Billing    Medical Decision Making       60 MINUTES SPENT BY ME on the date of service doing chart review, history, exam, documentation & further activities per the note.

## 2023-05-20 LAB — LMWH PPP CHRO-ACNC: 0.96 IU/ML

## 2023-05-20 PROCEDURE — 250N000011 HC RX IP 250 OP 636: Performed by: NURSE PRACTITIONER

## 2023-05-20 PROCEDURE — 124N000002 HC R&B MH UMMC

## 2023-05-20 PROCEDURE — 250N000009 HC RX 250: Performed by: EMERGENCY MEDICINE

## 2023-05-20 PROCEDURE — 250N000013 HC RX MED GY IP 250 OP 250 PS 637: Performed by: PHYSICIAN ASSISTANT

## 2023-05-20 PROCEDURE — 85520 HEPARIN ASSAY: CPT | Performed by: PSYCHIATRY & NEUROLOGY

## 2023-05-20 PROCEDURE — 36415 COLL VENOUS BLD VENIPUNCTURE: CPT | Performed by: PSYCHIATRY & NEUROLOGY

## 2023-05-20 PROCEDURE — 250N000013 HC RX MED GY IP 250 OP 250 PS 637: Performed by: PSYCHIATRY & NEUROLOGY

## 2023-05-20 PROCEDURE — 250N000013 HC RX MED GY IP 250 OP 250 PS 637: Performed by: EMERGENCY MEDICINE

## 2023-05-20 RX ADMIN — CALCIUM POLYCARBOPHIL 625 MG: 625 TABLET, FILM COATED ORAL at 20:10

## 2023-05-20 RX ADMIN — MONTELUKAST 10 MG: 10 TABLET, FILM COATED ORAL at 20:10

## 2023-05-20 RX ADMIN — RISPERIDONE 0.5 MG: 0.5 TABLET, ORALLY DISINTEGRATING ORAL at 20:10

## 2023-05-20 RX ADMIN — PANTOPRAZOLE SODIUM 40 MG: 40 TABLET, DELAYED RELEASE ORAL at 08:03

## 2023-05-20 RX ADMIN — LEVOTHYROXINE SODIUM 50 MCG: 25 TABLET ORAL at 08:03

## 2023-05-20 RX ADMIN — PREDNISOLONE ACETATE 1 DROP: 10 SUSPENSION/ DROPS OPHTHALMIC at 13:31

## 2023-05-20 RX ADMIN — DOCUSATE SODIUM 100 MG: 100 CAPSULE, LIQUID FILLED ORAL at 08:38

## 2023-05-20 RX ADMIN — WHITE PETROLATUM 57.7 %-MINERAL OIL 31.9 % EYE OINTMENT: at 20:11

## 2023-05-20 RX ADMIN — HYDROXYZINE HYDROCHLORIDE 25 MG: 25 TABLET, FILM COATED ORAL at 01:15

## 2023-05-20 RX ADMIN — OLANZAPINE 15 MG: 15 TABLET, ORALLY DISINTEGRATING ORAL at 20:10

## 2023-05-20 RX ADMIN — ENOXAPARIN SODIUM 90 MG: 100 INJECTION SUBCUTANEOUS at 08:38

## 2023-05-20 RX ADMIN — CALCIUM POLYCARBOPHIL 625 MG: 625 TABLET, FILM COATED ORAL at 08:38

## 2023-05-20 RX ADMIN — RISPERIDONE 2 MG: 2 TABLET ORAL at 08:38

## 2023-05-20 RX ADMIN — CHLORHEXIDINE GLUCONATE 0.12% ORAL RINSE 15 ML: 1.2 LIQUID ORAL at 08:38

## 2023-05-20 RX ADMIN — RISPERIDONE 2 MG: 2 TABLET ORAL at 20:10

## 2023-05-20 RX ADMIN — TOPIRAMATE 100 MG: 100 TABLET, FILM COATED ORAL at 18:27

## 2023-05-20 RX ADMIN — APIXABAN 10 MG: 5 TABLET, FILM COATED ORAL at 20:10

## 2023-05-20 RX ADMIN — ZINC SULFATE 220 MG (50 MG) CAPSULE 220 MG: CAPSULE at 08:38

## 2023-05-20 RX ADMIN — LISINOPRIL 5 MG: 5 TABLET ORAL at 08:38

## 2023-05-20 RX ADMIN — RISPERIDONE 0.5 MG: 0.5 TABLET, ORALLY DISINTEGRATING ORAL at 08:38

## 2023-05-20 ASSESSMENT — ACTIVITIES OF DAILY LIVING (ADL)
HYGIENE/GROOMING: INDEPENDENT
ADLS_ACUITY_SCORE: 32
ADLS_ACUITY_SCORE: 32
ORAL_HYGIENE: INDEPENDENT
ADLS_ACUITY_SCORE: 32
ADLS_ACUITY_SCORE: 32
LAUNDRY: UNABLE TO COMPLETE
ADLS_ACUITY_SCORE: 32
DRESS: INDEPENDENT
ADLS_ACUITY_SCORE: 32
ORAL_HYGIENE: INDEPENDENT
LAUNDRY: UNABLE TO COMPLETE
ADLS_ACUITY_SCORE: 32
HYGIENE/GROOMING: INDEPENDENT
ADLS_ACUITY_SCORE: 32
ADLS_ACUITY_SCORE: 32
DRESS: INDEPENDENT
ADLS_ACUITY_SCORE: 32

## 2023-05-20 NOTE — PROGRESS NOTES
Patient is being followed by medicine for acute DVT in the right lower extremity. Labs and vitals were reviewed. Today we can transition from subcutaneous lovenox to eliquis. This is fully covered by insurance. This starts with a loading dose for 7 days, followed by maintenance dose. Please call medicine for any signs of bleeding. Tomorrow I will reassess the patient and write a full consult note.

## 2023-05-20 NOTE — PLAN OF CARE
Goal Outcome Evaluation:      Pt pacing up and down the cortez with headphones on he sings loudly with them on and needs constant reminders not to sing so long.  He occasionally dances in the cortez too.  He is social with staff.  He was asked several times to try and get some sleep.  To lay on his bed and elevate his legs.  He refuses.  Pt denies pain and states he is not tired.  Hydroxyzine administered @ 0115 for anxiety. He was a wake all shift.  He may have slept for 30 minutes.  He lied on his bed.for 30 minutes.

## 2023-05-20 NOTE — PLAN OF CARE
"Nursing Assessment    Recent Vitals: B/P: 120/76, T: 97.8, P: 76, R: 16     Sleep:  Hours of sleep at night: 0.5    General Shift Summary  Patient has been present in the milieu for the entire shift, pacing/shuffling with his headphones on. Speech is occasionally loud as he sings with his headphones on. Other times patient will be mumbling songs lyrics to himself. Occasionally patient will make sexually inappropriate comments such \"And this is how you make love with a women\" with his arms in an embrace. Patient then discussed his dance moves, how to \"get ladies\", and what superman, Spiderman, and batman would do when dancing. He has been redirectable when becoming overly loud or made sexual comments.    Patient denied AVH, although he appears distracted when talking to him and has nonsensical responses. Concentration is poor. He is not oriented to situation. Hygiene was very poor in the morning, patient showered after lunch. He is eating well. Incite and judgement are absent.    Patient received less sleep compared to prior nights when he has usually received between 3.5 and 4.5 hours.    Anika Yañez RN MSN  "

## 2023-05-20 NOTE — PLAN OF CARE
Problem: Adult Behavioral Health Plan of Care  Goal: Plan of Care Review  Outcome: Progressing  Flowsheets (Taken 5/19/2023 2049)  Patient Agreement with Plan of Care: agrees   Goal Outcome Evaluation:    Plan of Care Reviewed With: patient        Pt is a transfer from station 30. He arrived at 20:30 pm via wheelchair accompanied by two staff. He is alert and oriented, able to make needs known. Pt is pleasant with a flat and blunted affect. Mood is calm and cooperative. He denied pain, SI/SIB/HI/AVH, and contracted for safety. Up and about with a head phone while listening to music. No abnormal behavior noted. Will continue with same plan of care.

## 2023-05-21 PROCEDURE — 124N000002 HC R&B MH UMMC

## 2023-05-21 PROCEDURE — 250N000013 HC RX MED GY IP 250 OP 250 PS 637: Performed by: PHYSICIAN ASSISTANT

## 2023-05-21 PROCEDURE — 250N000013 HC RX MED GY IP 250 OP 250 PS 637: Performed by: EMERGENCY MEDICINE

## 2023-05-21 PROCEDURE — 250N000013 HC RX MED GY IP 250 OP 250 PS 637: Performed by: PSYCHIATRY & NEUROLOGY

## 2023-05-21 RX ADMIN — CALCIUM POLYCARBOPHIL 625 MG: 625 TABLET, FILM COATED ORAL at 21:04

## 2023-05-21 RX ADMIN — PREDNISOLONE ACETATE 1 DROP: 10 SUSPENSION/ DROPS OPHTHALMIC at 08:23

## 2023-05-21 RX ADMIN — ZINC SULFATE 220 MG (50 MG) CAPSULE 220 MG: CAPSULE at 08:23

## 2023-05-21 RX ADMIN — DOCUSATE SODIUM 100 MG: 100 CAPSULE, LIQUID FILLED ORAL at 08:23

## 2023-05-21 RX ADMIN — OLANZAPINE 15 MG: 15 TABLET, ORALLY DISINTEGRATING ORAL at 21:04

## 2023-05-21 RX ADMIN — LISINOPRIL 5 MG: 5 TABLET ORAL at 08:23

## 2023-05-21 RX ADMIN — MONTELUKAST 10 MG: 10 TABLET, FILM COATED ORAL at 21:04

## 2023-05-21 RX ADMIN — APIXABAN 10 MG: 5 TABLET, FILM COATED ORAL at 08:23

## 2023-05-21 RX ADMIN — RISPERIDONE 2 MG: 2 TABLET ORAL at 21:03

## 2023-05-21 RX ADMIN — APIXABAN 10 MG: 5 TABLET, FILM COATED ORAL at 21:04

## 2023-05-21 RX ADMIN — WHITE PETROLATUM 57.7 %-MINERAL OIL 31.9 % EYE OINTMENT: at 21:05

## 2023-05-21 RX ADMIN — TOPIRAMATE 100 MG: 100 TABLET, FILM COATED ORAL at 21:03

## 2023-05-21 RX ADMIN — RISPERIDONE 0.5 MG: 0.5 TABLET, ORALLY DISINTEGRATING ORAL at 08:23

## 2023-05-21 RX ADMIN — CHLORHEXIDINE GLUCONATE 0.12% ORAL RINSE 15 ML: 1.2 LIQUID ORAL at 08:22

## 2023-05-21 RX ADMIN — CALCIUM POLYCARBOPHIL 625 MG: 625 TABLET, FILM COATED ORAL at 08:23

## 2023-05-21 RX ADMIN — RISPERIDONE 0.5 MG: 0.5 TABLET, ORALLY DISINTEGRATING ORAL at 21:04

## 2023-05-21 RX ADMIN — PANTOPRAZOLE SODIUM 40 MG: 40 TABLET, DELAYED RELEASE ORAL at 06:58

## 2023-05-21 RX ADMIN — LEVOTHYROXINE SODIUM 50 MCG: 25 TABLET ORAL at 06:58

## 2023-05-21 RX ADMIN — RISPERIDONE 2 MG: 2 TABLET ORAL at 08:23

## 2023-05-21 ASSESSMENT — ACTIVITIES OF DAILY LIVING (ADL)
DRESS: INDEPENDENT
HYGIENE/GROOMING: INDEPENDENT
ADLS_ACUITY_SCORE: 32
LAUNDRY: UNABLE TO COMPLETE
HYGIENE/GROOMING: INDEPENDENT
ADLS_ACUITY_SCORE: 32
ORAL_HYGIENE: INDEPENDENT
ADLS_ACUITY_SCORE: 32
LAUNDRY: UNABLE TO COMPLETE
ADLS_ACUITY_SCORE: 32
ORAL_HYGIENE: INDEPENDENT
ADLS_ACUITY_SCORE: 32
ADLS_ACUITY_SCORE: 32
DRESS: INDEPENDENT

## 2023-05-21 NOTE — PLAN OF CARE
"Nursing Assessment    Recent Vitals: B/P: 136/64, T: 98, P: 96, R: 16     Sleep:  Hours of sleep at night: 5.5    General Shift Summary  Patient has been present in the milieu, social with select peers and staff, has a bright affect, mood is elevated. He is frequently seen slowly pacing/dancing in the cortez, mumbling or singing with headphones on. Occasionally patient has needed to be redirected when becoming too close to other patients or when signing too loudly. Hygiene is poor. He is eating well. Concentration is poor and patient easily becomes distracted. He is forgetful.    In the morning, as writer was attempting to pass medications and talk with Jose, another patient continued to interrupt. The other patient became tense, started to curs as writer, and called writer names. Jose stated \"No, that's not true. He's just being mean. Ignore him.\". The other patient continued to interrupt. Jose stated \"Just ignore him. He's just upset because his family won't visit him.\" Jose then started to become tearful and stated \"I miss my family. I haven't seen them\". Patient was provided reassurance and then quickly changed the topic to music.    Patient has been medication cooperative. Denies pain.    Anika Yañez, RN MSN  "

## 2023-05-21 NOTE — PROGRESS NOTES
Medicine chart check. Labs vitals reviewed     # Acute right lower extremity DVT   - Transitioned to Eliquis   - Etiology of DVT remains unclear, anticipate lifelong anticoagulation for presumed unprovoked DVT. Further workup to assess potentially underlying triggers as outpatient     # Hypertension   - started on lisinopril 5 mg daily   - recheck BMP on Friday    Medicine will follow up lab results

## 2023-05-21 NOTE — PLAN OF CARE
Problem: Adult Behavioral Health Plan of Care  Goal: Plan of Care Review  5/20/2023 2138 by Vinod Ghosh, RN  Outcome: Progressing  5/20/2023 2133 by Vinod Ghosh, RN  Outcome: Progressing  Flowsheets (Taken 5/20/2023 1700)  Patient Agreement with Plan of Care: agrees   Goal Outcome Evaluation:    Plan of Care Reviewed With: patient        Pt denies pain, anxiety, depression, SI/SIB/HI/AVH, and contracts for safety. Up and visible on the unit all shift pacing the cortez way occasionally with the head phone on and  listening to music. He is observed interacting with a select peer and watching TV. Pt is pleasant with a flat affect. Mood is calm and cooperative. Judgment and insight not appropriate to situation. Speech is made up of rambling words which are sometimes difficult to understand. Pt is medication compliant. No abnormal behavior noted. No medication adverse effects noted. No concerns noted or verbalized. Will continue with same plan of care.

## 2023-05-21 NOTE — PLAN OF CARE
Problem: Sleep Disturbance  Goal: Adequate Sleep/Rest  Outcome: Not Progressing   Goal Outcome Evaluation:       Patient was observed out on the unit with a headphone and he requested for food. Patient slept for 5.5 hours. No concerns or issues noted during this shift.

## 2023-05-22 LAB — PLATELET # BLD AUTO: 176 10E3/UL (ref 150–450)

## 2023-05-22 PROCEDURE — 36415 COLL VENOUS BLD VENIPUNCTURE: CPT | Performed by: PSYCHIATRY & NEUROLOGY

## 2023-05-22 PROCEDURE — 93010 ELECTROCARDIOGRAM REPORT: CPT | Performed by: INTERNAL MEDICINE

## 2023-05-22 PROCEDURE — 250N000013 HC RX MED GY IP 250 OP 250 PS 637: Performed by: EMERGENCY MEDICINE

## 2023-05-22 PROCEDURE — 250N000013 HC RX MED GY IP 250 OP 250 PS 637: Performed by: PHYSICIAN ASSISTANT

## 2023-05-22 PROCEDURE — 124N000002 HC R&B MH UMMC

## 2023-05-22 PROCEDURE — 93005 ELECTROCARDIOGRAM TRACING: CPT

## 2023-05-22 PROCEDURE — 99232 SBSQ HOSP IP/OBS MODERATE 35: CPT | Performed by: PSYCHIATRY & NEUROLOGY

## 2023-05-22 PROCEDURE — 85049 AUTOMATED PLATELET COUNT: CPT | Performed by: PSYCHIATRY & NEUROLOGY

## 2023-05-22 PROCEDURE — 250N000013 HC RX MED GY IP 250 OP 250 PS 637: Performed by: PSYCHIATRY & NEUROLOGY

## 2023-05-22 RX ORDER — ATROPINE SULFATE 10 MG/ML
2 SOLUTION/ DROPS OPHTHALMIC 3 TIMES DAILY PRN
Status: DISCONTINUED | OUTPATIENT
Start: 2023-05-22 | End: 2023-08-14 | Stop reason: HOSPADM

## 2023-05-22 RX ADMIN — DOCUSATE SODIUM 100 MG: 100 CAPSULE, LIQUID FILLED ORAL at 08:09

## 2023-05-22 RX ADMIN — OLANZAPINE 15 MG: 15 TABLET, ORALLY DISINTEGRATING ORAL at 19:32

## 2023-05-22 RX ADMIN — PANTOPRAZOLE SODIUM 40 MG: 40 TABLET, DELAYED RELEASE ORAL at 08:08

## 2023-05-22 RX ADMIN — RISPERIDONE 0.5 MG: 0.5 TABLET, ORALLY DISINTEGRATING ORAL at 08:09

## 2023-05-22 RX ADMIN — CALCIUM POLYCARBOPHIL 625 MG: 625 TABLET, FILM COATED ORAL at 08:09

## 2023-05-22 RX ADMIN — MONTELUKAST 10 MG: 10 TABLET, FILM COATED ORAL at 19:32

## 2023-05-22 RX ADMIN — PREDNISOLONE ACETATE 1 DROP: 10 SUSPENSION/ DROPS OPHTHALMIC at 08:16

## 2023-05-22 RX ADMIN — RISPERIDONE 2 MG: 2 TABLET ORAL at 08:09

## 2023-05-22 RX ADMIN — RISPERIDONE 0.5 MG: 0.5 TABLET, ORALLY DISINTEGRATING ORAL at 19:32

## 2023-05-22 RX ADMIN — RISPERIDONE 2 MG: 2 TABLET ORAL at 19:32

## 2023-05-22 RX ADMIN — LISINOPRIL 5 MG: 5 TABLET ORAL at 08:08

## 2023-05-22 RX ADMIN — CHLORHEXIDINE GLUCONATE 0.12% ORAL RINSE 15 ML: 1.2 LIQUID ORAL at 08:10

## 2023-05-22 RX ADMIN — CALCIUM POLYCARBOPHIL 625 MG: 625 TABLET, FILM COATED ORAL at 19:32

## 2023-05-22 RX ADMIN — LEVOTHYROXINE SODIUM 50 MCG: 25 TABLET ORAL at 08:08

## 2023-05-22 RX ADMIN — WHITE PETROLATUM 57.7 %-MINERAL OIL 31.9 % EYE OINTMENT: at 21:22

## 2023-05-22 RX ADMIN — APIXABAN 10 MG: 5 TABLET, FILM COATED ORAL at 08:09

## 2023-05-22 RX ADMIN — ZINC SULFATE 220 MG (50 MG) CAPSULE 220 MG: CAPSULE at 08:08

## 2023-05-22 RX ADMIN — TOPIRAMATE 100 MG: 100 TABLET, FILM COATED ORAL at 19:32

## 2023-05-22 RX ADMIN — APIXABAN 10 MG: 5 TABLET, FILM COATED ORAL at 19:32

## 2023-05-22 ASSESSMENT — ACTIVITIES OF DAILY LIVING (ADL)
DRESS: SCRUBS (BEHAVIORAL HEALTH)
ADLS_ACUITY_SCORE: 32
ORAL_HYGIENE: INDEPENDENT
DRESS: SCRUBS (BEHAVIORAL HEALTH);INDEPENDENT
ADLS_ACUITY_SCORE: 42
ADLS_ACUITY_SCORE: 32
HYGIENE/GROOMING: SHOWER;INDEPENDENT;PROMPTS
ADLS_ACUITY_SCORE: 32
ADLS_ACUITY_SCORE: 42
ADLS_ACUITY_SCORE: 42
ADLS_ACUITY_SCORE: 32
ORAL_HYGIENE: INDEPENDENT
LAUNDRY: UNABLE TO COMPLETE
ADLS_ACUITY_SCORE: 42
HYGIENE/GROOMING: INDEPENDENT
ADLS_ACUITY_SCORE: 42

## 2023-05-22 NOTE — PLAN OF CARE
"Assessment/Intervention/Current Symtoms and Care Coordination:  Met with team, discussed pt progress, symptomology, and response to treatment. Pt is cooperative with care and taking medication as prescribed. Discussed the discharge plan and any potential impediments to discharge. RN discussed that pt is presenting as bright, pleasant calm and cooperative however pt continues to present as delusional and grandiose. Provider shared that after a conversation with pt's guardian that pt doesn't appear to be at his base line. Discussed that group home is reccomending IRTS level of care however the team discussed that pt would be vulnerable for an IRTS. Provider discussed ordering a CPT assessment. CM discussed will follow up with group home staff about having them come to visit pt again.    CM called  pt's  Sadaf ([phone]) however was unable to leave a voicemail will attempt again at a later time. To discuss pt's care and her coming to visit and assess pt again this week.     CM checked in with pt to discuss that the team is working on coordinating with his group [facility]. Pt verbalized that \"it's about time they get it together\" and made other comment's that were nonsensicle. Informed pt that CM will contiue to update him and solidfy his plans after hospitalization and work with [facility].      Discharge Plan or Goal:  To continue to stabilize pt's mental health status. Tentative plan for pt to return back to group home     Barriers to Discharge:  Pt continues to present as symptomatic (delusional and grandiose and labile mood). Ongoing medication stabilization and continued coordination with group [facility].      Referral Status:  Will continue to assess pt's referrals as patient stabilizes and ongoing coordination with group home.     Legal Status:  Huong Keenan is pt's legal Guardian       Contacts:  Shlomo Borja (Guardian) [phone]   Sadaf ([phone])    "   Upcoming Meetings and Dates/Important Information and next steps:  Provider is putting in a CPT order to assess pt's basic functions and ability to care for himself on his own (ADL etc). CM will call  tomorrow to coordinate and set up a time for them to come out assess pt and solidfy that pt can return back to their group [facility].

## 2023-05-22 NOTE — PLAN OF CARE
Problem: Sleep Disturbance  Goal: Adequate Sleep/Rest  Outcome: Not Progressing   Goal Outcome Evaluation:    Patient appeared to sleep 4.5 hours this night shift.  No prns or snacks given or requested.  No concerns were reported or noted.

## 2023-05-22 NOTE — PLAN OF CARE
"Goal Outcome Evaluation:  Problem: Plan of Care - These are the overarching goals to be used throughout the patient stay.    Goal: Optimal Comfort and Wellbeing  Outcome: Progressing    Patient is alert and oriented x3 with a full range affect, and normal speech that gets loud as he talks. Patient is calm and cooperative with vitals check and medication administration. Patient was present in the milieu pacing in the hallway listening to music, but not social with peer. Patient is preoccupied with discharge, and keeps asking \"when am I leaving, the doctor told me am leaving today.\" He did not attend group but he was able to take a shower. Patient's right lower leg is still swollen (edema), but patient did not complain of discomfort and he continued to pace despite writer's encouragement to elevate his legs. He was compliant with lab draw and EKG. Patient ate breakfast and lunch without issues. Patient contracted for safety, and denied all psych symptoms of, SI, HI, A/V hallucinations, anxiety and depression. Patient did not complain of pain and no prn was given.    Vital signs:  Temp: 97.7  F (36.5  C) Temp src: Oral BP: 110/80 Pulse: 99   Resp: 16 SpO2: 98 % O2 Device: None (Room air)     Weight: 89.9 kg (198 lb 4.8 oz)  Estimated body mass index is 34.04 kg/m  as calculated from the following:    Height as of 10/3/22: 1.626 m (5' 4\").    Weight as of this encounter: 89.9 kg (198 lb 4.8 oz).        "

## 2023-05-22 NOTE — PLAN OF CARE
"Nursing Assessment    Recent Vitals: B/P: 131/81, T: 97.8, P: 90, R: 16     General Shift Summary  Patient has been present in the milieu. He is seen slowly pacing with headphones on and singing or mumbling to himself. He presents with a bright affect and is social with staff. Speech is normal to loud. Patient is cooperative with redirection when he becomes too loud. Hygiene is fair, patient is showering but frequently present with food on clothing. Excessive drooling was noted and provider was paged for Atropine drops.    Patient brought grocery ads to writer a few times during the shift and told writer \"choose your favorites\". Later in the evening he approached writer and said \"Are we going now or later?\" When asking what he was referring to he said \"The grocery store\" \"We're going now?\". Patient was told that we wouldn't be going today. Patient said \"Okay, I'll pack my stuff for when we do.\". He walked away giggling.    He is medication cooperative. Denies pain.    Anika Yañez, RN MSN  "
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
## 2023-05-23 PROCEDURE — 250N000013 HC RX MED GY IP 250 OP 250 PS 637: Performed by: EMERGENCY MEDICINE

## 2023-05-23 PROCEDURE — 250N000013 HC RX MED GY IP 250 OP 250 PS 637: Performed by: PSYCHIATRY & NEUROLOGY

## 2023-05-23 PROCEDURE — 124N000002 HC R&B MH UMMC

## 2023-05-23 PROCEDURE — 250N000013 HC RX MED GY IP 250 OP 250 PS 637: Performed by: PHYSICIAN ASSISTANT

## 2023-05-23 RX ADMIN — PANTOPRAZOLE SODIUM 40 MG: 40 TABLET, DELAYED RELEASE ORAL at 07:54

## 2023-05-23 RX ADMIN — APIXABAN 10 MG: 5 TABLET, FILM COATED ORAL at 19:15

## 2023-05-23 RX ADMIN — OLANZAPINE 15 MG: 15 TABLET, ORALLY DISINTEGRATING ORAL at 19:14

## 2023-05-23 RX ADMIN — ACETAMINOPHEN 325MG 650 MG: 325 TABLET ORAL at 09:25

## 2023-05-23 RX ADMIN — RISPERIDONE 2 MG: 2 TABLET ORAL at 19:15

## 2023-05-23 RX ADMIN — RISPERIDONE 0.5 MG: 0.5 TABLET, ORALLY DISINTEGRATING ORAL at 19:14

## 2023-05-23 RX ADMIN — OLANZAPINE 10 MG: 10 TABLET, FILM COATED ORAL at 12:42

## 2023-05-23 RX ADMIN — PREDNISOLONE ACETATE 1 DROP: 10 SUSPENSION/ DROPS OPHTHALMIC at 08:21

## 2023-05-23 RX ADMIN — LISINOPRIL 5 MG: 5 TABLET ORAL at 08:18

## 2023-05-23 RX ADMIN — DOCUSATE SODIUM 100 MG: 100 CAPSULE, LIQUID FILLED ORAL at 08:17

## 2023-05-23 RX ADMIN — RISPERIDONE 2 MG: 2 TABLET ORAL at 08:17

## 2023-05-23 RX ADMIN — CALCIUM POLYCARBOPHIL 625 MG: 625 TABLET, FILM COATED ORAL at 08:17

## 2023-05-23 RX ADMIN — LEVOTHYROXINE SODIUM 50 MCG: 25 TABLET ORAL at 07:54

## 2023-05-23 RX ADMIN — MONTELUKAST 10 MG: 10 TABLET, FILM COATED ORAL at 19:14

## 2023-05-23 RX ADMIN — ZINC SULFATE 220 MG (50 MG) CAPSULE 220 MG: CAPSULE at 08:18

## 2023-05-23 RX ADMIN — APIXABAN 10 MG: 5 TABLET, FILM COATED ORAL at 08:17

## 2023-05-23 RX ADMIN — WHITE PETROLATUM 57.7 %-MINERAL OIL 31.9 % EYE OINTMENT: at 19:31

## 2023-05-23 RX ADMIN — TOPIRAMATE 100 MG: 100 TABLET, FILM COATED ORAL at 19:14

## 2023-05-23 RX ADMIN — RISPERIDONE 0.5 MG: 0.5 TABLET, ORALLY DISINTEGRATING ORAL at 08:17

## 2023-05-23 RX ADMIN — CHLORHEXIDINE GLUCONATE 0.12% ORAL RINSE 15 ML: 1.2 LIQUID ORAL at 08:18

## 2023-05-23 RX ADMIN — CALCIUM POLYCARBOPHIL 625 MG: 625 TABLET, FILM COATED ORAL at 19:14

## 2023-05-23 ASSESSMENT — ACTIVITIES OF DAILY LIVING (ADL)
ADLS_ACUITY_SCORE: 31
HYGIENE/GROOMING: INDEPENDENT
ADLS_ACUITY_SCORE: 31
ADLS_ACUITY_SCORE: 32
ADLS_ACUITY_SCORE: 31
HYGIENE/GROOMING: INDEPENDENT
ORAL_HYGIENE: INDEPENDENT
ADLS_ACUITY_SCORE: 32
ADLS_ACUITY_SCORE: 32
ADLS_ACUITY_SCORE: 31
DRESS: SCRUBS (BEHAVIORAL HEALTH)
ADLS_ACUITY_SCORE: 32
ORAL_HYGIENE: INDEPENDENT
DRESS: SCRUBS (BEHAVIORAL HEALTH);INDEPENDENT
ADLS_ACUITY_SCORE: 31
ADLS_ACUITY_SCORE: 32

## 2023-05-23 NOTE — PLAN OF CARE
"  Problem: Adult Behavioral Health Plan of Care  Goal: Adheres to Safety Considerations for Self and Others  Outcome: Progressing   Goal Outcome Evaluation:       Patient was visible in the milieu. Upon approach, the patient reports pain to the right leg PRN Tylenol was administered with effect, per patient's report. Patient was also observed twitching and reported to the staff RN that \"it is because I am hungry\". Patient presents as delusional, making some paranoid statement that were nonsensical, occasionally tearful reports \"I am emotional because I care about other people\" he then became tangential. Labile affect, mood was sad and depressed. Patient denied all mental health symptoms. Took a shower this shift. No twitching was observed after breakfast, administration of AM scheduled meds, and after shower. Patient has elevated his leg this shift and has remained in that position while watching TV in the lounge. Eating and drinking adequately, medication compliant. As the shift progressed, ashley was observed pacing the hallway, no longer appeared sad and depressed. Patient was observed signing and dancing while listening to music on the headphone. PA on the unit reports to RN that patient requested for a cup of coffee and , when handed to the patient, he will drop it-stating that it happened twice. The PA thinks that the patient dropping the cup of coffee twice was not intentional. Upon assessment of the patient, patient reports feeling tired, denied pain, VSS was WDL, see MAR. Dr. Guadalupe was notified and we will continue to monitor patient for continued weakness and notify Dr. Griffin of any changes. Patient was later agitated due to a comment from another patient, patient was using the profanity and curse words however was easily redirectable PRN Zyprexa 10mg was given for agitation. Patient continued to make paranoid statement while believing that a staff owes him money yet calls the staff by the name " "\"Jagdeep\". Patient was heard saying \"Tell jagdeep that he owes me a lot of money and it should be kept in the secret safe\". Patient took a second shower this shift because he poured a cup of coffee on his head and all over his body. As the day went on, Jagdeep became increasingly paranoid and psychotic.                 "

## 2023-05-23 NOTE — PLAN OF CARE
"  Problem: Adult Behavioral Health Plan of Care  Goal: Adheres to Safety Considerations for Self and Others  Outcome: Progressing  Denies all psych symptoms. Pt rambles in a loosely connected, difficult to understand manner. Perseverates on discharge, frustrated that he is in the hospital. Patient often in milieu, watching TV, playing a card game with a staff member, and listening to music which he often dances and sings to. Pt drools.   Pt was reported to drop his coffee twice during the previous shift. Assessed patient  strength bilaterally, which were found to be strong. When asked why he dropped his coffee, pt reported that another patient on the unit, KW, was \"terrorizing\" him. When asked what was meant by this, it was difficult to understand what the pt was saying, but appeared to be something about that KW wanted to leave and that he also wanted to leave, and that he should leave before KW.   Later in the night, pt got upset again about being in the hospital. He was verbally aggressive to staff, saying things like \"I want to fucking go! I am leaving tonight, goddamn it!\".  Additionally, pt brought up KW again, stating that he was being annoying and trying to get attention from him. Pt then strongly slapped his sandals against his chair a few times in apparent frustration about KW. Staff observation is that KW is acting inappropriately towards the pt in a manner that is or approximates bullying behavior. Pt's room was switched from pod 2 to pod 1 to alleviate this concern.   Pt states the his right leg is less swollen than it was. Pt compliant with elevating his leg, is often seen walking around the unit, and is taking Apixaban, anticoagulant. No signs of PE. Continue to monitor and encourage elevating right leg.   "

## 2023-05-23 NOTE — CARE PLAN
"Occupational Therapy Group Note:       05/23/23 1648   Group Therapy Session   Group Attendance attended group session   Time Session Began 1115   Time Session Ended 1200   Total Time (minutes) 5 (no charge)   Total # Attendees 4   Group Type recreation   Group Topic Covered leisure exploration/use of leisure time;structured socialization   Group Session Detail OT Leisure Group: Skip-Isaias   Patient Response/Contribution listened actively;refused to participate   Patient Participation Detail [place] [facility] [place] the beginning of group. Patient declined to participate in group, but voiced desire to \"just watch.\" Patient attempted to compare this game to other games he was familiar with to gain a better understanding. Patient appeared distracted/restless as he was in/out of group area multiple times. Pleasant in brief interactions.        "

## 2023-05-23 NOTE — PLAN OF CARE
NOC Shift Report     Pt in bed at beginning of shift, breathing quiet and unlabored. Pt had difficulty stay asleep during the shift. Pt slept 3.5 hours.      No pt complaints or concerns at this time.      No PRNs given. Will continue to monitor.

## 2023-05-23 NOTE — PLAN OF CARE
Assessment/Intervention/Current Symtoms and Care Coordination:  -Reviewed pt's chart  -Called pt's group home ( Supervisor Sadaf ([phone]) to request that they come to visit the patient again versus recommending IRTS.  -Spoke with Sadaf and discussed that the plan is for the pt is to return to the group home when he is stabilized. Requested to know if it will be possible for Sadaf or the group home staff to visit the patient to observe his improvement, as part of the care coordination to prepare for his return to the home, when he stabilizes. Sadaf reported that she will be coming to visit the patient tomorrow. Awaiting time confirmation.   - Returned call to Lilo (medicare coordinator from [facility]: [phone]), and requested her to call back for update on pt's plan.          Discharge Plan or Goal:  To continue to stabilize pt's mental health status. Tentative plan for pt to return back to group home     Barriers to Discharge:  Pt continues to present as symptomatic (delusional mood). Pt is receiving ongoing stabilization and medication adjustment. Continue care coordination with long term to ascertain his acceptability for a return to the home.     Referral Status:  Will continue to coordinate with pt's group home.     Legal Status:  Huong Keenan is pt's legal Guardian      Contacts:  Shlomo Huong (Guardian) [phone]   Sadaf ([phone])      Upcoming Meetings and Dates/Important Information and next steps:  Called . She agreed to come to the unit tomorrow to meet with the patient. She requested this writer to send her an email with the address of the building, which the writer did but have not received a response. The email to which the address was sent is:  [email or web address]

## 2023-05-24 ENCOUNTER — TELEPHONE (OUTPATIENT)
Dept: BEHAVIORAL HEALTH | Facility: CLINIC | Age: 53
End: 2023-05-24
Payer: MEDICARE

## 2023-05-24 LAB
ATRIAL RATE - MUSE: 89 BPM
DIASTOLIC BLOOD PRESSURE - MUSE: NORMAL MMHG
INTERPRETATION ECG - MUSE: NORMAL
P AXIS - MUSE: 42 DEGREES
PR INTERVAL - MUSE: 128 MS
QRS DURATION - MUSE: 134 MS
QT - MUSE: 400 MS
QTC - MUSE: 486 MS
R AXIS - MUSE: 7 DEGREES
SYSTOLIC BLOOD PRESSURE - MUSE: NORMAL MMHG
T AXIS - MUSE: 48 DEGREES
VENTRICULAR RATE- MUSE: 89 BPM

## 2023-05-24 PROCEDURE — 99232 SBSQ HOSP IP/OBS MODERATE 35: CPT | Performed by: PSYCHIATRY & NEUROLOGY

## 2023-05-24 PROCEDURE — 124N000003 HC R&B MH SENIOR/ADOLESCENT

## 2023-05-24 PROCEDURE — 99222 1ST HOSP IP/OBS MODERATE 55: CPT | Performed by: STUDENT IN AN ORGANIZED HEALTH CARE EDUCATION/TRAINING PROGRAM

## 2023-05-24 PROCEDURE — 250N000013 HC RX MED GY IP 250 OP 250 PS 637: Performed by: PSYCHIATRY & NEUROLOGY

## 2023-05-24 PROCEDURE — 250N000013 HC RX MED GY IP 250 OP 250 PS 637: Performed by: PHYSICIAN ASSISTANT

## 2023-05-24 PROCEDURE — 90853 GROUP PSYCHOTHERAPY: CPT

## 2023-05-24 PROCEDURE — 250N000013 HC RX MED GY IP 250 OP 250 PS 637: Performed by: EMERGENCY MEDICINE

## 2023-05-24 RX ORDER — RISPERIDONE 0.5 MG/1
1 TABLET ORAL 2 TIMES DAILY
Status: DISCONTINUED | OUTPATIENT
Start: 2023-05-24 | End: 2023-06-02

## 2023-05-24 RX ORDER — OLANZAPINE 10 MG/1
20 TABLET, ORALLY DISINTEGRATING ORAL AT BEDTIME
Status: DISCONTINUED | OUTPATIENT
Start: 2023-05-24 | End: 2023-05-30

## 2023-05-24 RX ADMIN — PREDNISOLONE ACETATE 1 DROP: 10 SUSPENSION/ DROPS OPHTHALMIC at 08:46

## 2023-05-24 RX ADMIN — APIXABAN 10 MG: 5 TABLET, FILM COATED ORAL at 20:18

## 2023-05-24 RX ADMIN — LISINOPRIL 5 MG: 5 TABLET ORAL at 07:59

## 2023-05-24 RX ADMIN — ZINC SULFATE 220 MG (50 MG) CAPSULE 220 MG: CAPSULE at 08:00

## 2023-05-24 RX ADMIN — ACETAMINOPHEN 325MG 650 MG: 325 TABLET ORAL at 11:12

## 2023-05-24 RX ADMIN — RISPERIDONE 0.5 MG: 0.5 TABLET, ORALLY DISINTEGRATING ORAL at 20:18

## 2023-05-24 RX ADMIN — APIXABAN 10 MG: 5 TABLET, FILM COATED ORAL at 07:59

## 2023-05-24 RX ADMIN — TOPIRAMATE 100 MG: 100 TABLET, FILM COATED ORAL at 20:19

## 2023-05-24 RX ADMIN — DOCUSATE SODIUM 100 MG: 100 CAPSULE, LIQUID FILLED ORAL at 08:00

## 2023-05-24 RX ADMIN — CALCIUM POLYCARBOPHIL 625 MG: 625 TABLET, FILM COATED ORAL at 20:18

## 2023-05-24 RX ADMIN — RISPERIDONE 0.5 MG: 0.5 TABLET, ORALLY DISINTEGRATING ORAL at 08:00

## 2023-05-24 RX ADMIN — CALCIUM POLYCARBOPHIL 625 MG: 625 TABLET, FILM COATED ORAL at 07:59

## 2023-05-24 RX ADMIN — MONTELUKAST 10 MG: 10 TABLET, FILM COATED ORAL at 20:21

## 2023-05-24 RX ADMIN — Medication 10 MG: at 20:19

## 2023-05-24 RX ADMIN — OLANZAPINE 20 MG: 10 TABLET, ORALLY DISINTEGRATING ORAL at 20:18

## 2023-05-24 RX ADMIN — CHLORHEXIDINE GLUCONATE 0.12% ORAL RINSE 15 ML: 1.2 LIQUID ORAL at 07:59

## 2023-05-24 RX ADMIN — RISPERIDONE 2 MG: 2 TABLET ORAL at 07:59

## 2023-05-24 RX ADMIN — LEVOTHYROXINE SODIUM 50 MCG: 25 TABLET ORAL at 07:59

## 2023-05-24 RX ADMIN — RISPERIDONE 1 MG: 0.5 TABLET ORAL at 20:18

## 2023-05-24 RX ADMIN — PANTOPRAZOLE SODIUM 40 MG: 40 TABLET, DELAYED RELEASE ORAL at 07:59

## 2023-05-24 ASSESSMENT — ACTIVITIES OF DAILY LIVING (ADL)
ADLS_ACUITY_SCORE: 31
ADLS_ACUITY_SCORE: 31
ORAL_HYGIENE: INDEPENDENT
LAUNDRY: UNABLE TO COMPLETE
ADLS_ACUITY_SCORE: 31
HYGIENE/GROOMING: INDEPENDENT
ADLS_ACUITY_SCORE: 31
HYGIENE/GROOMING: INDEPENDENT
ADLS_ACUITY_SCORE: 31
DRESS: SCRUBS (BEHAVIORAL HEALTH);INDEPENDENT
ADLS_ACUITY_SCORE: 31
ADLS_ACUITY_SCORE: 31
ORAL_HYGIENE: INDEPENDENT
ADLS_ACUITY_SCORE: 31

## 2023-05-24 NOTE — PROGRESS NOTES
Pt transferred from station 12 to Station 3B west vis wheelchair being wheeled to the unit with two staff  Along with his personal belonging from his previous unit . Pt quite on arrival with slurred speech difficult to understand  . He is oriented to self and time but disoriented to place and date . Pt admits he is comfortable being transferred to a new unit . Pt denies all mental health SX stating he is excited to be here .  He continuous on SIO intrusiveness poor boundaries labile mood . .

## 2023-05-24 NOTE — PLAN OF CARE
Pt awake at start of shift.     Breathing quiet and unlabored when sleeping.     Pt had no c/o pain or discomfort during the HS.     1 x episode of incontinence during the HS. Changed scrub bottoms and bed sheet.     Appears to have slept 6.25 hours.     Goal Outcome Evaluation:  Problem: Sleep Disturbance  Goal: Adequate Sleep/Rest  Outcome: Progressing

## 2023-05-24 NOTE — CARE PLAN
05/24/23 1457   Group Therapy Session   Group Attendance attended group session   Time Session Began 1300   Time Session Ended 1400   Total Time (minutes) 20  (no charge)   Total # Attendees 6   Group Type expressive therapy;life skill;task skill   Group Topic Covered cognitive activities;self-care activities;problem-solving;coping skills/lifestyle management;structured socialization   Group Session Detail Occupational therapy group was facilitated in order to introduce, educate, and encourage patient's to utilize (as appropriate) therapeutic or expressive journaling as a positive coping mechanism. Education was provided on definition of topic and benefits of expressive journaling. Benefits associated with journaling include: management of mental health symptoms, encourages self-confidence, helps to achieve goals, inspires creativity, boosts memory, and enhances critical thinking skills.   Patient Response/Contribution cooperative with task;disorganized;verbalizations were off topic   Patient Participation Detail pt arrived to group with SIO present. pt used word associations, not always appropriate, to remember peer and staff names. pt was difficult to understand at times due to slurred speech. pt demonstrated poor boundaries. pt participated in initial conversation about journaling. pt was able to list over 5 things to be grateful for, pt shared he could write down things to be grateful for all day, but he does not know how to spell many words. pt was provided with encouragement from staff. pt left group abruptly and did not return

## 2023-05-24 NOTE — CONSULTS
"[facility]  Neurology Consultation    Patient Name:  Jagdeep Walker  MRN:  [id number]    :  1970  Date of Service:  May 24, 2023  Primary care provider:  Joel Werner      Neurology consultation service was asked to see Jagdeep Walker by Dr. Celeste to evaluate for suspicion of worsening capacities including incontinence and delusions.    Chief Complaint: Aggressive behavior and delusions    History of Present Illness:   Jagdeep Walker is a 52 year old male with history of schizoaffective disorder of the bipolar type, intellectual disability, pancreatitis, portal vein thrombosis, who presents with agitation and aggressive behavior.  He lives in a group home where he has been since , and he has never had behavioral problems in the past.  He has been awake, not sleeping and possibly manic since .  Patient has a guardian, and his guardian as well as group home staff were in favor of psychiatric admission for stabilization.    Since his admission, based on my review on the notes he has not been aggressive, and he has been sleeping overnight.    On chart review, there are few mentions of patient being aggressive.  This is alluded to in a psychiatric note from 2021 with the recommendation for more intensive medical therapy \"if his aggression returns.\"    ROS  A comprehensive ROS was performed and pertinent findings were included in HPI.     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     Involuntary commitment     Hx     Obesity      JACLYN on CPAP      Pancreatitis      Portal vein thrombosis      Schizoaffective disorder, bipolar type (H)      Seizure disorder (H)      Thyroid disease      Past Surgical History:   Procedure Laterality Date     ENDOSCOPIC RETROGRADE " CHOLANGIOPANCREATOGRAM N/A 7/28/2022    Procedure: ENDOSCOPIC RETROGRADE CHOLANGIOPANCREATOGRAPHY, Pancreatic duct stent exchange;  Surgeon: Massimo Clark MD;  Location:  OR     ENDOSCOPIC RETROGRADE CHOLANGIOPANCREATOGRAM COMPLEX N/A 6/21/2022    Procedure: ENDOSCOPIC RETROGRADE CHOLANGIOPANCREATOGRAPHY, COMPLEX;  Surgeon: Massimo Clark MD;  Location:  OR     ENDOSCOPIC ULTRASOUND UPPER GASTROINTESTINAL TRACT (GI) N/A 5/12/2022    Procedure: ENDOSCOPIC ULTRASOUND, ESOPHAGOSCOPY / UPPER GASTROINTESTINAL TRACT (GI);  Surgeon: Massimo Clark MD;  Location:  GI     ESOPHAGOSCOPY, GASTROSCOPY, DUODENOSCOPY (EGD), COMBINED N/A 5/12/2022    Procedure: ESOPHAGOGASTRODUODENOSCOPY, WITH BIOPSY;  Surgeon: Massimo Clark MD;  Location:  GI     IR CHEST TUBE PLACEMENT NON-TUNNELED RIGHT  6/18/2022     IR CHEST TUBE REPOSITION NON TUNNELED RIGHT  6/22/2022     IR GASTRO JEJUNOSTOMY TUBE CHANGE  7/22/2022     IR GASTRO JEJUNOSTOMY TUBE PLACEMENT  6/28/2022       Medications   I have personally reviewed the patient's medication list.     Allergies  I have personally reviewed the patient's allergy list.     Social History  Social History     Socioeconomic History     Marital status: Single     Spouse name: Not on file     Number of children: Not on file     Years of education: Not on file     Highest education level: Not on file   Occupational History     Not on file   Tobacco Use     Smoking status: Never     Smokeless tobacco: Never   Vaping Use     Vaping status: Never Used   Substance and Sexual Activity     Alcohol use: Not Currently     Comment: Occasional     Drug use: Never     Sexual activity: Not Currently   Other Topics Concern     Not on file   Social History Narrative     Not on file     Social Determinants of Health     Financial Resource Strain: Not on file   Food Insecurity: Not on file   Transportation Needs: Not on file   Physical Activity: Not on file   Stress: Not on  file   Social Connections: Not on file   Intimate Partner Violence: Not on file   Housing Stability: Not on file       Family History    Family History   Problem Relation Age of Onset     Cerebrovascular Disease Mother 76     Prostate Cancer Father          Physical Examination   Vitals: /78   Pulse 95   Temp 98.6  F (37  C) (Oral)   Resp 20   Wt 89.9 kg (198 lb 4.8 oz)   SpO2 99%   BMI 34.04 kg/m    General: Walking throughout the milieu in the psychiatry hatfield, no apparent distress  Head: NC/AT  Eyes: no icterus, op pink and moist  Cardiac: RRR. Extremities warm, no edema.   Respiratory: non-labored on RA  GI: S/NT/ND  Skin: No rash or lesion on exposed skin  Neuro:  Mental status: Awake, alert, attentive, oriented to self, time, place.  He describes that he is eager to go home.  He is also frustrated and declines to answer further questions  Cranial nerves: Right eye is opacified, gaze is disconjugate.  facial sensation intact, face symmetric, shoulder shrug strong, tongue/uvula midline.  Dysarthria is present  Motor: Increased tone/rigidity throughout.  Right arm more notable than left, with limited range of movement.  No abnormal movements. 5/5 strength bilaterally in upper extremities proximally and distally.  Patient is walking without any difficulty, though is not consenting to examination of the lower extremities.  Reflexes: Normo-reflexic and symmetric biceps, brachioradialis.  Patient did not allow reflex testing of the lower extremities.  Sensory: Normal response to light touch of the upper extremities.  Coordination: FNF without ataxia or dysmetria. Rapid alternating movements intact.   Gait: Patient is independent in his gait, slightly wide-based, does not use any assistive devices.    Investigations   I have personally reviewed pertinent labs, tests, and radiological imaging. Discussion of notable findings is included under Impression.     Long-term EEG 05/2017 was normal    Was patient  transferred from outside hospital?   No    Impression  Mr. Walker is a 52-year-old man with a longstanding history of intellectual disability secondary to  trauma with a relatively new onset of behavioral agitation/aggression after staying awake for a period of weeks.  Considering that the patient appears, per chart review, to be less aggressive and closer to his baseline since he has had an improvement in sleeping/diurnal cycling since his admission, it is possible that the aggression was secondary to psychosis from either mariano or sleep deprivation.  To further assess whether or not this is true, I will have to have further communication with the patient's group home staff (who were scheduled to observe the patient today) as well as with the patient's admitting psychiatric team.      There are currently no red flags based on the patient's examination or chart review to suggest a new neurologic diagnosis to explain agitation.  His level of function/cognitive status, and tendency towards pacing frequently throughout the hatfield seem to be within the patient's realm of behavior.     Recommendations  -DVT treatment per medicine team  - Question hypercoagulable work-up per medicine  - Per chart review, patient appears to be less agitated/aggressive since admission  - Please contact neurology  to ensure that patient is in fact less aggressive since receiving adequate sleep.     Thank you for involving Neurology in the care of Jagdeep Walker.  Please do not hesitate to call with questions.     Maurizio Hairston MD    I spent 55 minutes on this consultation including chart review, examination of patient, coordination of care with patient's current psychiatrist, and document preparation.

## 2023-05-24 NOTE — PLAN OF CARE
Assessment/Intervention/Current Symtoms and Care Coordination:  -Reviewed pt's chart  -Pt transferred from station 12 to [address] [place] [address] via wheelchair.       Discharge Plan or Goal:  To continue to stabilize pt's mental health status. Tentative plan for pt to return back to group home     Barriers to Discharge:  Pt continues to present as symptomatic (delusional mood). Pt is receiving ongoing stabilization and medication adjustment. Continue care coordination with skilled nursing to ascertain his acceptability for a return to the home.     Referral Status:  Will continue to coordinate with pt's group home.     Legal Status:  Huong Keenan is pt's legal Guardian      Contacts:  Shlomo Cottrellne (Guardian) [phone]   Sadaf ([phone])      Upcoming Meetings and Dates/Important Information and next steps:  [facility] needs to follow up on the below message.  Called . She agreed to come to the unit tomorrow to meet with the patient. She requested this writer to send her an email with the address of the building, which the writer did but have not received a response. The email to which the address was sent is:  [email or web address]

## 2023-05-24 NOTE — TELEPHONE ENCOUNTER
"R:  Dr Celeste called Intake @ 11:59am and reports pt needs to transfer off unit 12 to andother unit due to closing a POD on 12    Intake asked Roula to talk 1:1 w/ Devika for Doc to Doc.  Completed @ 12\"02pm  Pt placed in units queue and charge called with transfer info @ 12:04pm.  Charge reports room changes on unit.   Report to be accepted for nurse to nurse @ 12:45pm    Intake messaged Dr Avila re: unit room changes which ma    Unit 12 Charge notified @ 12:17pm  "

## 2023-05-24 NOTE — PLAN OF CARE
Goal Outcome Evaluation:    Plan of Care Reviewed With: patient      Problem: Adult Behavioral Health Plan of Care  Goal: Plan of Care Review  Outcome: Progressing  Flowsheets (Taken 5/24/2023 1000)  Patient Agreement with Plan of Care: agrees  Pt was up on the unit, paced the halls with his headphones on. He was brief with a flat affect. He mumbles in his speech, and sometimes it is hard to understand everything he is saying. Pt was encouraged to shower, at first he went into the shower and shortly came out as he was. He was instructed to actually shower, which he did. Pt appears fearful of another pt who he feels that is threatening/terrorizing him. Active listening and assurance were provided. Pt denied SI, HI and he contracted for safety. He was later medicated with tylenol for c/o backache with relief stated. He was mediation compliant and he denied side effects.   Pt is being transferred to a different station 3 B. Report was given to the accepting nurse. Pt was escorted to the new unit. All personal belongings were sent with patient to the new unit.
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
## 2023-05-25 PROCEDURE — 124N000003 HC R&B MH SENIOR/ADOLESCENT

## 2023-05-25 PROCEDURE — 99232 SBSQ HOSP IP/OBS MODERATE 35: CPT | Performed by: PSYCHIATRY & NEUROLOGY

## 2023-05-25 PROCEDURE — 99231 SBSQ HOSP IP/OBS SF/LOW 25: CPT | Performed by: PHYSICIAN ASSISTANT

## 2023-05-25 PROCEDURE — G0177 OPPS/PHP; TRAIN & EDUC SERV: HCPCS

## 2023-05-25 PROCEDURE — 250N000013 HC RX MED GY IP 250 OP 250 PS 637: Performed by: PHYSICIAN ASSISTANT

## 2023-05-25 PROCEDURE — 250N000013 HC RX MED GY IP 250 OP 250 PS 637: Performed by: PSYCHIATRY & NEUROLOGY

## 2023-05-25 PROCEDURE — 250N000013 HC RX MED GY IP 250 OP 250 PS 637: Performed by: EMERGENCY MEDICINE

## 2023-05-25 RX ADMIN — Medication 10 MG: at 20:36

## 2023-05-25 RX ADMIN — LISINOPRIL 5 MG: 5 TABLET ORAL at 08:13

## 2023-05-25 RX ADMIN — CHLORHEXIDINE GLUCONATE 0.12% ORAL RINSE 15 ML: 1.2 LIQUID ORAL at 08:12

## 2023-05-25 RX ADMIN — LEVOTHYROXINE SODIUM 50 MCG: 25 TABLET ORAL at 08:12

## 2023-05-25 RX ADMIN — CALCIUM POLYCARBOPHIL 625 MG: 625 TABLET, FILM COATED ORAL at 19:16

## 2023-05-25 RX ADMIN — APIXABAN 10 MG: 5 TABLET, FILM COATED ORAL at 08:12

## 2023-05-25 RX ADMIN — MONTELUKAST 10 MG: 10 TABLET, FILM COATED ORAL at 19:16

## 2023-05-25 RX ADMIN — PREDNISOLONE ACETATE 1 DROP: 10 SUSPENSION/ DROPS OPHTHALMIC at 08:14

## 2023-05-25 RX ADMIN — ZINC SULFATE 220 MG (50 MG) CAPSULE 220 MG: CAPSULE at 08:13

## 2023-05-25 RX ADMIN — TOPIRAMATE 100 MG: 100 TABLET, FILM COATED ORAL at 19:16

## 2023-05-25 RX ADMIN — RISPERIDONE 0.5 MG: 0.5 TABLET, ORALLY DISINTEGRATING ORAL at 08:12

## 2023-05-25 RX ADMIN — RISPERIDONE 0.5 MG: 0.5 TABLET, ORALLY DISINTEGRATING ORAL at 19:16

## 2023-05-25 RX ADMIN — RISPERIDONE 1 MG: 0.5 TABLET ORAL at 19:16

## 2023-05-25 RX ADMIN — RISPERIDONE 1 MG: 0.5 TABLET ORAL at 08:12

## 2023-05-25 RX ADMIN — CALCIUM POLYCARBOPHIL 625 MG: 625 TABLET, FILM COATED ORAL at 08:13

## 2023-05-25 RX ADMIN — DOCUSATE SODIUM 100 MG: 100 CAPSULE, LIQUID FILLED ORAL at 08:13

## 2023-05-25 RX ADMIN — OLANZAPINE 20 MG: 10 TABLET, ORALLY DISINTEGRATING ORAL at 19:16

## 2023-05-25 RX ADMIN — APIXABAN 10 MG: 5 TABLET, FILM COATED ORAL at 19:16

## 2023-05-25 RX ADMIN — PANTOPRAZOLE SODIUM 40 MG: 40 TABLET, DELAYED RELEASE ORAL at 08:13

## 2023-05-25 ASSESSMENT — ACTIVITIES OF DAILY LIVING (ADL)
LAUNDRY: UNABLE TO COMPLETE
ADLS_ACUITY_SCORE: 31
ORAL_HYGIENE: INDEPENDENT
ADLS_ACUITY_SCORE: 31
HYGIENE/GROOMING: INDEPENDENT
DRESS: SCRUBS (BEHAVIORAL HEALTH)
ADLS_ACUITY_SCORE: 31

## 2023-05-25 NOTE — CARE PLAN
05/25/23 1517   Group Therapy Session   Group Attendance attended group session   Time Session Began 1015   Time Session Ended 1115   Total Time (minutes) 55   Total # Attendees 5   Group Type expressive therapy;task skill;psychotherapeutic   Group Topic Covered cognitive activities;balanced lifestyle   Group Session Detail [facility] group to facilitate coping skill exploration, use of cognitive skills and problem solving, creative expression, clinical observation and facilitation of social, cognitive, and kinesthetic performance skills.    Patient Response/Contribution cooperative with task   Patient Participation Detail Needed 1:1 assistance, redirection, task set up to follow through on 1 step directions. Was pleasant. Conversation at times was pressured, tangential, and out of context. Sat the duration of attendance and worked as long as staff was present near him and encouraging him. Noticed some details on his work and took time to perfect and correct them.

## 2023-05-25 NOTE — CARE PLAN
05/25/23 1453   Group Therapy Session   Group Attendance attended group session   Time Session Began 1300   Time Session Ended 1345   Total Time (minutes) 10  (no charge)   Total # Attendees 7   Group Type psychotherapeutic   Group Topic Covered cognitive therapy techniques;cognitive activities;structured socialization;balanced lifestyle   Group Session Detail Stayed the few minutes noted. He spoke up with some answers related to topic then also many that were tangential and also unrelated. Put his head on the table and snored (pretended)?. Left with his staff escort to leave and lie down.   Patient Response/Contribution did not share thoughts verbally   Patient Participation Detail see note

## 2023-05-25 NOTE — CARE PLAN
05/25/23 1500   General Information   Special Considerations completed on 5-   Clinical Impression   Affect Excessive, manic;Appropriate to situation   Orientation Needs further assessment   Appearance and ADLs General cleanliness observed in most areas   Attention to Internal Stimuli No observed signs   Interaction Skills Interacts appropriately with staff;Other (see comments)  (directly by therapists or 1:1 staff working directly with him)   Ability to Communicate Needs Independent;Other (see comments)  (indirect)   Verbal Content Tangential;Hyperverbal;Pressured;[place]   Ability to Maintain Boundaries Maintains appropriate physical boundaries;Maintains appropriate verbal boundaries   Participation Participates with frequent encouragement   Concentration Concentrates 10-20 minutes   Ability to Concentrate With structure;With refocus;Easily distracted   Follows and Comprehends Directions Independently follows 1 step verbal directions;Needs direction simplified   Memory Needs further assessment   Organization Independently organizes simple tasks;Needs occasional assistance ;Other (see comments);Needs 1:1 staff assistance  (with task set up)   Decision Making Independent   Planning and Problem Solving Needs assistance;Unable to plan/problem solve   Ability to Apply and Learn Concepts Comprehends concepts, but needs assist to apply;Needs further assessment   Frustrations / Stress Tolerance Utilizes coping skills with assistance   Level of Insight No insight   Self Esteem Accepts positive feedback   Social Supports Needs further assessment   General Observation/Plan   General Observations/Plan See Comments   BEH OT Care Plan Goals   OT Care Plan cognition     See other OT note also. Needs 1:1 assistance for follow through on less complex task directions. Makes attempts in problem solving. Short attention span. Seems to appreciate encouragement and assistance offered.     Plan:  Encourage attendance. Offer  opportunity for success oriented, more structured task work, grade complexity as needed, provide the opportunity to direct concentration with those highly structured tasks. Offer reassurance.. Provide structured assistance as difficulties are noted according to cognitive needs with the plan to decrease frustrations. Assess and document.

## 2023-05-25 NOTE — PLAN OF CARE
Assessment/Intervention/Current Symtoms and Care Coordination:  -Reviewed pt's chart  -Meet with team to discuss pt care. Team reported that pt sleep for 1 hour last night and 15 minutes this morning. Pt refused to take his medication last night but took his medication this morning. Pt is irritable and difficult to redirect.     - Sadaf and shared that the group is willing to take him back once he stable.      Discharge Plan or Goal:  To continue to stabilize pt's mental health status. Tentative plan for pt to return back to group home     Barriers to Discharge:  Pt continues to present as symptomatic (delusional mood). Pt is receiving ongoing stabilization and medication adjustment. Continue care coordination with long-term to ascertain his acceptability for a return to the home.     Referral Status:  Will continue to coordinate with pt's group home.     Legal Status:  Huong Shlomo is pt's legal Guardian      Contacts:  Shlomo Borja (Guardian) [phone]   Sadaf ([phone])      Upcoming Meetings and Dates/Important Information and next steps:    None at this time.

## 2023-05-25 NOTE — PROGRESS NOTES
"PSYCHIATRY  PROGRESS NOTE     DATE OF SERVICE   05/25/2023  H&P and progress notes have been reviewed.     CHIEF COMPLAINT   \" I am doing okay.\"       SUBJECTIVE   Nursing reports:  Patient continues to be on SIO due to intrusiveness, poor boundaries and delusional thoughts. He was asleep at the start of the shift. Woke up @ 0030, came out to the lounge and sat down. Pacing around, with headphone on,  becoming anxious and hyperverbal. Talking nonsense,  jumping from one topic to another. Active listening done and patient continued to mumble to himself.  Hydroxyzine 25 mg. PRN offered to the patient but he refused the medication and got agitated.  He said, \"I don't need it.\" He started asking for breakfast and was told that breakfast will not be available not until 8am. He got upset a little bit, but became quiet a few seconds after. He attempted to get glasses of iced water from the ice machine and spilled some of it on the floor and when attention was called, he got agitated again , stared at the SIO staff and made a threatening remark, saying, \" You want me to burst now?\" This staff attempted to give the Hydroxyzine the second time, but again, he refused.      Patient went back to his room after almost an hour and started playing with the call light, thinking that it was the switch for the TV. He kept on demanding for a TV and questioning why we don't have TV's here in this unit. Patient is unable to understand despite of the explanations given to him. [place] [facility] [place] the next two hours and had Vanilla pudding and cheese sticks for snacks. Finally went back to sleep @ 0315, but woke up again @0415. He remained awake the rest of the shift, pacing along the hallway and around the lounge, singing while listening to rock music on the headphone and dancing a few steps along the hallway. Continued to mumble some words and singing the following hour. Patient could be disruptive in this unit with " the way he behaves and acts. Had another set of snacks at past 5am, consisting of milk, cereals and Nutri Grain bars. Watched TV  As soon as the TV was turned on @ 0600 till the end of the shift.      He has a No Roommate order due to intrusiveness and poor boundaries.      Slept for a total of 4.5 hours. (1 hour only on night shift)    Patient has been reviewed with the  earlier today.  Assessment/Intervention/Current Symtoms and Care Coordination:  -Reviewed pt's chart  -Meet with team to discuss pt care. Team reported that pt sleep for 1 hour last night and 15 minutes this morning. Pt refused to take his medication last night but took his medication this morning. Pt is irritable and difficult to redirect.      - Sadaf and shared that the group is willing to take him back once he stable.           OBJECTIVE   Patient was seen and evaluated in the day area by himself, this was a face-to-face evaluation.  Patient is a new transfer from station 12.  He arrived yesterday in the afternoon to this unit.  Patient has a guardian that is his sister.  Sister is traveling from [place] and will be arriving to [place] today or tomorrow.  Patient was initially admitted to station 12 due to increased aggression and psychosis.  Before admission there has been no recent changes on patient's medications.  The only stressor is that the patient had a new peer that was admitted to the group home.  While here in the hospital the patient has failed several medication trials.  It seems that the patient will has been developing side effects to the Risperdal and a decision was made to switch the patient to Zyprexa.  There has been a consideration to try to use lithium but the patient's guardian would like to have a discussion about this medication before we started the patient on the lithium.    During my assessment today the patient presented as calm, he was falling asleep and had a slurred speech.   According to nursing reports patient only slept 1 hour last night but the patient keeps telling me that he slept 8 hours.  Patient stated that he is doing well but does not want to go to his bedroom to take a nap.  Patient at this time is denying all psychiatric symptoms but it is unclear how much the patient can understand given that he is falling asleep.  Patient stated that he is feeling better in this unit and he likes the staff other than that the patient did not answer any other questions appropriately.    Neurology consult has been reviewed.       MEDICATIONS   Medications:  Scheduled Meds:    apixaban ANTICOAGULANT  10 mg Oral BID    Followed by     [START ON 5/27/2023] apixaban ANTICOAGULANT  5 mg Oral BID     artificial tears   Right Eye At Bedtime     calcium polycarbophil  625 mg Oral BID     chlorhexidine  15 mL Swish & Spit Daily     docusate sodium  100 mg Oral Daily     levothyroxine  50 mcg Oral or NG Tube QAM AC     lisinopril  5 mg Oral Daily     melatonin  10 mg Oral At Bedtime     montelukast  10 mg Oral At Bedtime     OLANZapine zydis  20 mg Oral At Bedtime     pantoprazole  40 mg Oral QAM AC     prednisoLONE acetate  1 drop Right Eye Daily     risperiDONE  0.5 mg Oral BID     risperiDONE  1 mg Oral BID     topiramate  100 mg Oral At Bedtime     zinc sulfate  220 mg Oral Daily     Continuous Infusions:  PRN Meds:.acetaminophen, alum & mag hydroxide-simethicone, artificial saliva, atropine, hydrALAZINE, hydrOXYzine, hypromellose-dextran, ipratropium, OLANZapine **OR** OLANZapine, senna-docusate    Medication adherence issues: MS Med Adherence Y/N: Yes, Hospitalization  Medication side effects: MEDICATION SIDE EFFECTS: no side effects reported  Benefit: Yes / No: Yes       ROS   A comprehensive review of systems was negative.       MENTAL STATUS EXAM   Vitals: BP (!) 140/89 (BP Location: Right arm)   Pulse 92   Temp 97.3  F (36.3  C) (Temporal)   Resp 20   Wt 95.1 kg (209 lb 10.5 oz)   SpO2  "99%   BMI 35.99 kg/m      Appearance:  No apparent distress  Mood:  {Mood: \"I am doing fine\"  Affect: appropriate  was congruent to speech  Suicidal Ideation: PRESENT / ABSENT: absent   Homicidal Ideation: PRESENT / ABSENT: absent   Thought process: [place]   Thought content: significant for preoccupations and obsessions .   Fund of Knowledge: Below average  Attention/Concentration: Poor  Language ability:  Intact, speech is slurred  Memory:  Immediate recall impaired, Short-term memory impaired and Long-term memory intact  Insight:  limited.  Judgement: limited  Orientation: Person and situation only  Psychomotor Behavior: slowed    Muscle Strength and Tone: MuscleStrength: Normal  Gait and Station: Normal       LABS   personally reviewed.   Recent Labs   Lab 05/22/23  0817 05/19/23  0919   WBC  --  5.6   HGB  --  12.9*   MCV  --  85    143*   INR  --  1.06   NA  --  144   POTASSIUM  --  3.9   CHLORIDE  --  111*   CO2  --  23   BUN  --  25.6*   CR  --  0.88   ANIONGAP  --  10   NASIR  --  9.8   GLC  --  115*   ALBUMIN  --  4.1   PROTTOTAL  --  6.8   BILITOTAL  --  0.4   ALKPHOS  --  139*   ALT  --  50   AST  --  54*     No results found for: PHENYTOIN, PHENOBARB, VALPROATE, CBMZ       DIAGNOSIS   Principal Problem:    Schizoaffective disorder, bipolar type (H)    Active Problem List:  Patient Active Problem List   Diagnosis     Closed head injury, initial encounter     Altered mental status, unspecified altered mental status type     Generalized weakness     Portal vein thrombosis     Pleural effusion     Generalized muscle weakness     Falls frequently     Other ascites     Acute pancreatitis, unspecified complication status, unspecified pancreatitis type     Pancreatic pseudocyst     Idiopathic acute pancreatitis, unspecified complication status     Allergic rhinitis     Fisher's esophagus     CTS (carpal tunnel syndrome)     Elevated liver enzymes     HTN (hypertension)     Hypothyroidism     Keratoconus "     Major depressive disorder, recurrent episode, mild (H)     Intellectual disability     Morbid exogenous obesity (H)     Neuroleptic malignant syndrome     Obstructive sleep apnea syndrome     Bipolar affective disorder (H)     Seizure disorder (H)     Seizures, generalized convulsive (H)     Acute respiratory failure with hypoxia (H)     Anemia, unspecified     Anxiety disorder, unspecified     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)     Dietary zinc deficiency     Dry eye syndrome of bilateral lacrimal glands     Dry mouth, unspecified     Edema, unspecified     Gastro-esophageal reflux disease without esophagitis     Hyperosmolality and hypernatremia     Irritable bowel syndrome with constipation     Major depressive disorder, recurrent, unspecified (H)     Metabolic encephalopathy     Moderate protein-calorie malnutrition (H)     Other symbolic dysfunctions     Schizoaffective disorder, unspecified (H)     Thrombocytopenia, unspecified (H)     Unspecified asthma, uncomplicated     Urinary tract infection, site not specified     Vitamin D deficiency, unspecified     Schizoaffective disorder, bipolar type (H)          PLAN   1. Ongoing education given regarding diagnostic and treatment options with risks, benefits and alternatives and adequate verbalization of understanding.  2.  Medications       Melatonin 10 mg at bedtime       Zyprexa 20 mg at bedtime       Risperdal taper in place       Topamax 100 mg at bedtime    3.  Medical team following as needed  4.   coordinating a safe discharge plan    Risk Assessment: [facility] RISK ASSESSMENT: Patient able to contract for safety    Coordination of Care:   Treatment Plan reviewed and physician signed, Care discussed with Care/Treatment Team Members, Chart reviewed and Patient seen      Re-Certification I certify that the inpatient psychiatric facility services furnished since the previous certification were, and continue to be,  medically necessary for, either, treatment which could reasonably be expected to improve the patient s condition or diagnostic study and that the hospital records indicate that the services furnished were, either, intensive treatment services, admission and related services necessary for diagnostic study, or equivalent services.     I certify that the patient continues to need, on a daily basis, active treatment furnished directly by or requiring the supervision of inpatient psychiatric facility personnel.   I estimate 14 days of hospitalization is necessary for proper treatment of the patient. My plans for post-hospital care for this patient are  group home     Alejandra Watkins MD    -     05/25/2023  -     2:16 PM    Total time  35 minutes with > 50%spent on coordination of cares and psycho-education.    This note was created with help of Dragon dictation system. Grammatical / typing errors are not intentional.    Alejandra Watkins MD

## 2023-05-25 NOTE — PLAN OF CARE
"  Problem: Adult Behavioral Health Plan of Care  Goal: Adheres to Safety Considerations for Self and Others  Outcome: Adequate for Care Transition   Goal Outcome Evaluation:  Pt slept for about 15 minute after not sleeping over night . writer thought pt should sleep  since he did not sleep well over night , but pt got up and out of his room stating he has slept enough . Pt continue on his 1:1 for intrusiveness and poor boundaries . Pt up and about the unit wearing a headphones dancing and singing around the unit . Pt is bright on approached denies all mental health Sx . Pt when asked how was his sleep he shouted out \" I feel Good, never had a better sleep\" referring to his power nap this morning . Pt is medication complaint and ate well for both meals . Pt was ordered Gui stocking for bilateral edema . Will continue POC .                           "
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
## 2023-05-25 NOTE — PLAN OF CARE
"  Problem: Sleep Disturbance  Goal: Adequate Sleep/Rest  Outcome: Not Progressing   Goal Outcome Evaluation:    Patient continues to be on SIO due to intrusiveness, poor boundaries and delusional thoughts. He was asleep at the start of the shift. Woke up @ 0030, came out to the lounge and sat down. Pacing around, with headphone on,  becoming anxious and hyperverbal. Talking nonsense,  jumping from one topic to another. Active listening done and patient continued to mumble to himself.  Hydroxyzine 25 mg. PRN offered to the patient but he refused the medication and got agitated.  He said, \"I don't need it.\" He started asking for breakfast and was told that breakfast will not be available not until 8am. He got upset a little bit, but became quiet a few seconds after. He attempted to get glasses of iced water from the ice machine and spilled some of it on the floor and when attention was called, he got agitated again , stared at the SIO staff and made a threatening remark, saying, \" You want me to burst now?\" This staff attempted to give the Hydroxyzine the second time, but again, he refused.     Patient went back to his room after almost an hour and started playing with the call light, thinking that it was the switch for the TV. He kept on demanding for a TV and questioning why we don't have TV's here in this unit. Patient is unable to understand despite of the explanations given to him. He came back to the [facility] [place], stayed seated the next two hours and had Vanilla pudding and cheese sticks for snacks. Finally went back to sleep @ 0315, but woke up again @0415. He remained awake the rest of the shift, pacing along the hallway and around the lounge, singing while listening to rock music on the headphone and dancing a few steps along the hallway. Continued to mumble some words and singing the following hour. Patient could be disruptive in this unit with the way he behaves and acts. Had another set of snacks at past 5am, " consisting of milk, cereals and Nutri Grain bars. Watched TV  As soon as the TV was turned on @ 0600 till the end of the shift.     He has a No Roommate order due to intrusiveness and poor boundaries.     Slept for a total of 4.5 hours. (1 hour only on night shift)

## 2023-05-25 NOTE — PROGRESS NOTES
Brief Medicine Note    Medicine service was paged regarding persistent lower extremity swelling.  Patient was diagnosed with a right lower extremity DVT on ultrasound last week.  He was started on treatment dose Eliquis, to complete course of 10 mg twice daily on 5/27.  He has continued to take this medication.  He complains of continued lower extremity swelling bilaterally with associated pain and discomfort.  Extensive work-up was obtained last week for his bilateral lower extremity edema, his renal function appears unimpaired, echocardiogram obtained on 5/18 without evidence of heart failure. Patient denies development of new sx, no dyspnea or chest pain. At this time suspect swelling due to venous insufficiency.     Exam: 2+ pitting edema to BLE    Plan:   - start compression stockings.     Medicine service will continue to follow peripherally, will follow repeat BMP tomorrow.    Nunu Wooten PA-C  [facility] [place]  5/25/2023 2:21 PM

## 2023-05-25 NOTE — PLAN OF CARE
"Goal Outcome Evaluation:    Plan of Care Reviewed With: patient      Patient has been present in the milieu. He denies SI and SIB. He denies depression and anxiety. His affect is full range. His mood is calm and pleasant. He states his mood today is \"happy go edmond\". He feels his medications are helping. His concentration is \"great\". He denies pain. His sleep is \"good\". His appetite is good. He attended and participated in groups. His is alert and oriented x4. He did state his age was 57 (not 52) but then laughed. He took a shower this morning. His right leg/foot/ankle has 3+ pitting edema. He has DVT and on Eliquis. He is encouraged to elevate lower extremities and does comply. He is medication compliant. He has no concerns. He is social with peers and staff. He has no roommate order for Intrusiveness, poor boundaries. He remains on SIO for   Severe intrusiveness, poor boundaries, delusional thinking that precipitates labile mood.                "

## 2023-05-26 LAB
ANION GAP SERPL CALCULATED.3IONS-SCNC: 9 MMOL/L (ref 7–15)
BUN SERPL-MCNC: 23.5 MG/DL (ref 6–20)
CALCIUM SERPL-MCNC: 10 MG/DL (ref 8.6–10)
CHLORIDE SERPL-SCNC: 109 MMOL/L (ref 98–107)
CREAT SERPL-MCNC: 0.97 MG/DL (ref 0.67–1.17)
DEPRECATED HCO3 PLAS-SCNC: 24 MMOL/L (ref 22–29)
GFR SERPL CREATININE-BSD FRML MDRD: >90 ML/MIN/1.73M2
GLUCOSE SERPL-MCNC: 93 MG/DL (ref 70–99)
POTASSIUM SERPL-SCNC: 4.2 MMOL/L (ref 3.4–5.3)
SODIUM SERPL-SCNC: 142 MMOL/L (ref 136–145)

## 2023-05-26 PROCEDURE — 250N000013 HC RX MED GY IP 250 OP 250 PS 637: Performed by: PSYCHIATRY & NEUROLOGY

## 2023-05-26 PROCEDURE — H2032 ACTIVITY THERAPY, PER 15 MIN: HCPCS

## 2023-05-26 PROCEDURE — 124N000003 HC R&B MH SENIOR/ADOLESCENT

## 2023-05-26 PROCEDURE — 36415 COLL VENOUS BLD VENIPUNCTURE: CPT | Performed by: PHYSICIAN ASSISTANT

## 2023-05-26 PROCEDURE — G0177 OPPS/PHP; TRAIN & EDUC SERV: HCPCS

## 2023-05-26 PROCEDURE — 99232 SBSQ HOSP IP/OBS MODERATE 35: CPT | Performed by: PSYCHIATRY & NEUROLOGY

## 2023-05-26 PROCEDURE — 250N000013 HC RX MED GY IP 250 OP 250 PS 637: Performed by: PHYSICIAN ASSISTANT

## 2023-05-26 PROCEDURE — 250N000013 HC RX MED GY IP 250 OP 250 PS 637: Performed by: EMERGENCY MEDICINE

## 2023-05-26 PROCEDURE — 82310 ASSAY OF CALCIUM: CPT | Performed by: PHYSICIAN ASSISTANT

## 2023-05-26 RX ADMIN — RISPERIDONE 0.5 MG: 0.5 TABLET, ORALLY DISINTEGRATING ORAL at 20:18

## 2023-05-26 RX ADMIN — CALCIUM POLYCARBOPHIL 625 MG: 625 TABLET, FILM COATED ORAL at 08:07

## 2023-05-26 RX ADMIN — RISPERIDONE 1 MG: 0.5 TABLET ORAL at 08:06

## 2023-05-26 RX ADMIN — OLANZAPINE 20 MG: 10 TABLET, ORALLY DISINTEGRATING ORAL at 20:18

## 2023-05-26 RX ADMIN — LEVOTHYROXINE SODIUM 50 MCG: 25 TABLET ORAL at 08:07

## 2023-05-26 RX ADMIN — Medication 10 MG: at 20:18

## 2023-05-26 RX ADMIN — MONTELUKAST 10 MG: 10 TABLET, FILM COATED ORAL at 20:18

## 2023-05-26 RX ADMIN — CHLORHEXIDINE GLUCONATE 0.12% ORAL RINSE 15 ML: 1.2 LIQUID ORAL at 08:07

## 2023-05-26 RX ADMIN — PREDNISOLONE ACETATE 1 DROP: 10 SUSPENSION/ DROPS OPHTHALMIC at 08:22

## 2023-05-26 RX ADMIN — TOPIRAMATE 100 MG: 100 TABLET, FILM COATED ORAL at 20:18

## 2023-05-26 RX ADMIN — APIXABAN 10 MG: 5 TABLET, FILM COATED ORAL at 08:06

## 2023-05-26 RX ADMIN — RISPERIDONE 1 MG: 0.5 TABLET ORAL at 20:18

## 2023-05-26 RX ADMIN — DOCUSATE SODIUM 100 MG: 100 CAPSULE, LIQUID FILLED ORAL at 08:17

## 2023-05-26 RX ADMIN — PANTOPRAZOLE SODIUM 40 MG: 40 TABLET, DELAYED RELEASE ORAL at 08:07

## 2023-05-26 RX ADMIN — RISPERIDONE 0.5 MG: 0.5 TABLET, ORALLY DISINTEGRATING ORAL at 08:06

## 2023-05-26 RX ADMIN — LISINOPRIL 5 MG: 5 TABLET ORAL at 08:07

## 2023-05-26 RX ADMIN — CALCIUM POLYCARBOPHIL 625 MG: 625 TABLET, FILM COATED ORAL at 20:18

## 2023-05-26 RX ADMIN — ZINC SULFATE 220 MG (50 MG) CAPSULE 220 MG: CAPSULE at 08:06

## 2023-05-26 RX ADMIN — APIXABAN 10 MG: 5 TABLET, FILM COATED ORAL at 20:18

## 2023-05-26 ASSESSMENT — ACTIVITIES OF DAILY LIVING (ADL)
LAUNDRY: UNABLE TO COMPLETE
ADLS_ACUITY_SCORE: 31
HYGIENE/GROOMING: INDEPENDENT
ADLS_ACUITY_SCORE: 31
ADLS_ACUITY_SCORE: 31
DRESS: SCRUBS (BEHAVIORAL HEALTH)
ADLS_ACUITY_SCORE: 31
HYGIENE/GROOMING: INDEPENDENT
DRESS: SCRUBS (BEHAVIORAL HEALTH);INDEPENDENT
ORAL_HYGIENE: INDEPENDENT
ADLS_ACUITY_SCORE: 31
ADLS_ACUITY_SCORE: 31
ORAL_HYGIENE: INDEPENDENT
ADLS_ACUITY_SCORE: 31
ADLS_ACUITY_SCORE: 31

## 2023-05-26 NOTE — PLAN OF CARE
"  Problem: Sleep Disturbance  Goal: Adequate Sleep/Rest  Outcome: Progressing   Goal Outcome Evaluation:    Patient remains on SIO due to severe intrusiveness.  He was asleep at the start of the shift. Woke up in the middle of the shift, got up, voided and went back to sleep a few minutes after.     Voided twice and had one medium size bowel movement this shift. Woke up @ 0545 in a good mood and said, \" I am ready to go out to the dining room as I had enough sleep.I feel well rested.\" He watched TV and paced around the lounge at times. No anxiety, agitation and outbursts noted this shift.     He has a No Roommate order due to  intrusiveness and poor boundaries.     Slept for a total of 7 hours.     He has a scheduled BMP today.                      "

## 2023-05-26 NOTE — PROGRESS NOTES
Brief medicine note    Please refer to previous medicine notes for more details.  Medicine service following up on patient's BMP, obtained after initiation of lisinopril in the last week.  BMP shows no significant changes, electrolytes and creatinine are within normal limits.    Patient also had been seen for lower extremity swelling, assessed to have a right lower extremity DVT.  Was started on Eliquis, will complete initial treatment dosing 10 mg twice daily tomorrow.  Patient should continue with maintenance dose for at least 3 months, recommend follow-up outpatient for continued management upon discharge.  Patient has had continued bilateral lower extremity swelling with pain, previous work-up negative for renal failure, heart failure.  Felt at this time to be venous insufficiency.  Compression stockings ordered yesterday.    Medicine service will sign off at this time.  Please consult or contact us with any new medical questions or concerns.      Nunu Wooten PA-C  [facility] [place]  5/26/2023 10:28 AM

## 2023-05-26 NOTE — PLAN OF CARE
Problem: Plan of Care - These are the overarching goals to be used throughout the patient stay.    Goal: Plan of Care Review  Description: The Plan of Care Review/Shift note should be completed every shift.  The Outcome Evaluation is a brief statement about your assessment that the patient is improving, declining, or no change.  This information will be displayed automatically on your shift note.  Outcome: Progressing   Goal Outcome Evaluation:    Plan of Care Reviewed With: patient                 Patient alert and oriented times three. Has a bright affect and excitable at times. Patient complaint with medications administrations and cares. Patient had 100% of his breakfast and about 800 mL at breakfast and has a great appetite. Ate about 95% of his lunch and had at least 300 ml of fluids at lunch.   Patient engaged with staffs and engaged in groups and unit activities.   Edema assessed and patient has bilateral edema in both legs and feet (see flowsheet). Vitals WNL this shift. No behaviors noted and patient has been cooperative this shift. Gui stockings measurement completed and XL stockings placed on patient this shift.  Provider informed about patient's lab result during teams meeting.   This writer advocated during teams meeting for patient to remains on this unit to ensure his safety as compared to him being transferred back to station 12.      Patient remains on SIO for intrusiveness, poor boundaries and delusional thinking precipitating labile mood. He slept at least once this shift and elevated his legs about three times 15 mins first attempt, at least 20 minutes next and at least 15 minutes the third time. Patient attended groups.

## 2023-05-26 NOTE — PLAN OF CARE
"  Problem: Adult Behavioral Health Plan of Care  Goal: Plan of Care Review  Outcome: Not Progressing   Goal Outcome Evaluation:    Patient was watching TV at past 6am, and then he requested to have the channel changed as he wanted to watch [employer]. The staff do not know which channel it was and he asked for the remote  control as he said he knows which channel it was as he watched it yesterday. When staff tried to get back the remote control, he refused to give it back. He was getting agitated, and said \" This is mine, you cannot take it away from me.\" However, staff was able to get it back by telling him that it will be cleaned and he was given a [employer] bar and this distracted his attention.                         "

## 2023-05-26 NOTE — CARE PLAN
05/26/23 1250   Group Therapy Session   Group Attendance attended group session   Time Session Began 1015   Time Session Ended 1200   Total Time (minutes) 60   Total # Attendees 7   Group Type expressive therapy;psychotherapeutic;task skill   Group Topic Covered coping skills/lifestyle management;problem-solving;cognitive activities;balanced lifestyle;structured socialization   Group Session Detail [facility] group to facilitate coping skill exploration, use of cognitive skills and problem solving, creative expression, clinical observation and facilitation of social, cognitive, and kinesthetic performance skills.    Patient Response/Contribution cooperative with task   Patient Participation Detail With directions provided and task set up he followed through on a 1 step familiar task. He needed support and encouragement to stay in group the full session to accomplish work. Stated appreciation of music. Easily distracted.

## 2023-05-26 NOTE — PLAN OF CARE
"Goal Outcome Evaluation:    Plan of Care Reviewed With: patient        Pt presents with a tense affect, labile mood.  During check-in,  Pt noted to have poor concentration, easily distracted and shifts from topic of discussion, thoughts are disorganized. Pt denies all MH symptoms including SI/SIB/HI/AVH. Pt was up ad katina, pacing the [address] ways at times, other times watching TV in the milieu. Pt had an outburst, yelling, stating he wants to be discharge this evening \"or I will tear down this place\". Staffs were able to de-escalate and Pt became calm after taking scheduled medications including Zyprexa and Risperdal. Pt is eating and drinking adequately, medication compliant with the exception of Refresh tear ointment. Hygiene is poor, Pt declines shower.   Pt had asymptomatic elevated /84, P 102. Unable to recheck d/t Pt sleeping. Will continue to monitor.   Pt noted to have mouth secretion and some drooling. Pt declined Atropine PRN.   Pt continue on SIO 5 ft for severe intrusiveness.              "

## 2023-05-26 NOTE — PLAN OF CARE
Assessment/Intervention/Current Symtoms and Care Coordination:  -Reviewed pt's chart  -Meet with team to discuss pt care. Team reported that pt sleep for 7 hours. Pt continues to be on SIO due to severe intrusiveness.          Discharge Plan or Goal:  To continue to stabilize pt's mental health status. Tentative plan for pt to return back to group home     Barriers to Discharge:  Pt continues to present as symptomatic (delusional mood). Pt is receiving ongoing stabilization and medication adjustment. Continue care coordination with [facility] to ascertain his acceptability for a return to the home.     Referral Status:  Will continue to coordinate with pt's group home.     Legal Status:  Huong Shlomo is pt's legal Guardian      Contacts:  Shlomo Borja (Guardian) [phone]   Sadaf ([phone])      Upcoming Meetings and Dates/Important Information and next steps:    None at this time.

## 2023-05-26 NOTE — PROGRESS NOTES
"PSYCHIATRY  PROGRESS NOTE     DATE OF SERVICE   05/26/2023     CHIEF COMPLAINT   \" I want to leave the hospital today.\"       SUBJECTIVE   Nursing reports:  Patient alert and oriented times three. Has a bright affect and excitable at times. Patient complaint with medications administrations and cares. Patient had 100% of his breakfast and about 800 mL at breakfast and has a great appetite. Ate about 95% of his lunch and had at least 300 ml of fluids at lunch.   Patient engaged with staffs and engaged in groups and unit activities.   Edema assessed and patient has bilateral edema in both legs and feet (see flowsheet). Vitals WNL this shift. No behaviors noted and patient has been cooperative this shift. Gui stockings measurement completed and XL stockings placed on patient this shift.  Provider informed about patient's lab result during teams meeting.   This writer advocated during teams meeting for patient to remains on this unit to ensure his safety as compared to him being transferred back to station 12.       Patient remains on SIO for intrusiveness, poor boundaries and delusional thinking precipitating labile mood. He slept at least once this shift and elevated his legs about three times 15 mins first attempt, at least 20 minutes next and at least 15 minutes the third time. Patient attended groups.     Patient has been reviewed with the  earlier today.  Assessment/Intervention/Current Symtoms and Care Coordination:  -Reviewed pt's chart  -Meet with team to discuss pt care. Team reported that pt sleep for 7 hours. Pt continues to be on SIO due to severe intrusiveness.      Discharge Plan or Goal:  To continue to stabilize pt's mental health status. Tentative plan for pt to return back to group home     OBJECTIVE   Patient was seen and evaluated in the day area with one-to-one present during the assessment, this was a face-to-face evaluation.  Patient reported that he is doing well and said that he is " "ready to leave the hospital today.  Patient stated that he is not going to harm anybody or himself.  Patient stated that he does not have any side effects from the medications.  Patient tells me that his parents are alive, he is  and has many children.  He also keeps calling this writer \"Kylee\".  When I redirected the patient and informed him that my name is Ilya Melvin the patient said \"you are lying you are a Kylee\".  Patient started to become agitated demanding to leave the hospital.  He called the medical student Abhijit and said that he was his brother.  Patient said that his brother is here now and his brother is going to be taking him home.  Patient then started to demand for the  to come to talk to him.  Patient was easily redirected when the lunch arrived and he immediately stopped these behaviors.    During my evaluation the patient presented as elated, labile and disorganized.  For now I will not be making any adjustments on the medications as the patient was recently started on a higher dose of Zyprexa.  We will continue with the same treatment over the weekend.       MEDICATIONS   Medications:  Scheduled Meds:    apixaban ANTICOAGULANT  10 mg Oral BID    Followed by     [START ON 5/27/2023] apixaban ANTICOAGULANT  5 mg Oral BID     artificial tears   Right Eye At Bedtime     calcium polycarbophil  625 mg Oral BID     chlorhexidine  15 mL Swish & Spit Daily     docusate sodium  100 mg Oral Daily     levothyroxine  50 mcg Oral or NG Tube QAM AC     lisinopril  5 mg Oral Daily     melatonin  10 mg Oral At Bedtime     montelukast  10 mg Oral At Bedtime     OLANZapine zydis  20 mg Oral At Bedtime     pantoprazole  40 mg Oral QAM AC     prednisoLONE acetate  1 drop Right Eye Daily     risperiDONE  0.5 mg Oral BID     risperiDONE  1 mg Oral BID     topiramate  100 mg Oral At Bedtime     zinc sulfate  220 mg Oral Daily     Continuous Infusions:  PRN Meds:.acetaminophen, alum & mag " "hydroxide-simethicone, artificial saliva, atropine, hydrALAZINE, hydrOXYzine, hypromellose-dextran, ipratropium, OLANZapine **OR** OLANZapine, senna-docusate    Medication adherence issues: MS Med Adherence Y/N: Yes, Hospitalization  Medication side effects: MEDICATION SIDE EFFECTS: no side effects reported  Benefit: Yes / No: Yes       ROS   A comprehensive review of systems was negative.       MENTAL STATUS EXAM   Vitals: /81   Pulse 92   Temp 97.8  F (36.6  C)   Resp 16   Wt 95.1 kg (209 lb 10.5 oz)   SpO2 96%   BMI 35.99 kg/m      Appearance:  No apparent distress  Mood: \"I am doing good\"  Affect: Labile and inappropriate  was congruent to speech  Suicidal Ideation: PRESENT / ABSENT: absent   Homicidal Ideation: PRESENT / ABSENT: absent   Thought process: [place], disorganized with loose of association  Thought content: significant for preoccupations and obsessions .  Patient presents as very delusional.  Fund of Knowledge: Below average  Attention/Concentration: Poor  Language ability:  Intact, speech is more clear than yesterday  Memory:  Immediate recall impaired, Short-term memory impaired and Long-term memory intact  Insight:  limited.  Judgement: limited  Orientation: Person and situation only  Psychomotor Behavior: slowed    Muscle Strength and Tone: MuscleStrength: Normal  Gait and Station: Normal       LABS   personally reviewed.   Recent Labs   Lab 05/26/23  0708 05/22/23  0817   PLT  --  176     --    POTASSIUM 4.2  --    CHLORIDE 109*  --    CO2 24  --    BUN 23.5*  --    CR 0.97  --    ANIONGAP 9  --    NASIR 10.0  --    GLC 93  --      No results found for: PHENYTOIN, PHENOBARB, VALPROATE, CBMZ       DIAGNOSIS   Principal Problem:    Schizoaffective disorder, bipolar type (H)    Active Problem List:  Patient Active Problem List   Diagnosis     Closed head injury, initial encounter     Altered mental status, unspecified altered mental status type     Generalized weakness     Portal " vein thrombosis     Pleural effusion     Generalized muscle weakness     Falls frequently     Other ascites     Acute pancreatitis, unspecified complication status, unspecified pancreatitis type     Pancreatic pseudocyst     Idiopathic acute pancreatitis, unspecified complication status     Allergic rhinitis     Fisher's esophagus     CTS (carpal tunnel syndrome)     Elevated liver enzymes     HTN (hypertension)     Hypothyroidism     Keratoconus     Major depressive disorder, recurrent episode, mild (H)     Intellectual disability     Morbid exogenous obesity (H)     Neuroleptic malignant syndrome     Obstructive sleep apnea syndrome     Bipolar affective disorder (H)     Seizure disorder (H)     Seizures, generalized convulsive (H)     Acute respiratory failure with hypoxia (H)     Anemia, unspecified     Anxiety disorder, unspecified     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)     Dietary zinc deficiency     Dry eye syndrome of bilateral lacrimal glands     Dry mouth, unspecified     Edema, unspecified     Gastro-esophageal reflux disease without esophagitis     Hyperosmolality and hypernatremia     Irritable bowel syndrome with constipation     Major depressive disorder, recurrent, unspecified (H)     Metabolic encephalopathy     Moderate protein-calorie malnutrition (H)     Other symbolic dysfunctions     Schizoaffective disorder, unspecified (H)     Thrombocytopenia, unspecified (H)     Unspecified asthma, uncomplicated     Urinary tract infection, site not specified     Vitamin D deficiency, unspecified     Schizoaffective disorder, bipolar type (H)          PLAN   1. Ongoing education given regarding diagnostic and treatment options with risks, benefits and alternatives and adequate verbalization of understanding.  2.  Medications       Melatonin 10 mg at bedtime       Zyprexa 20 mg at bedtime       We will continue the patient on the same dose of Risperdal.  At this time I have not  noticed any side effects from the Risperdal.       Topamax 100 mg at bedtime    3.  Medical team following as needed  4.   coordinating a safe discharge plan    Risk Assessment: [facility] RISK ASSESSMENT: Patient able to contract for safety    Coordination of Care:   Treatment Plan reviewed and physician signed, Care discussed with Care/Treatment Team Members, Chart reviewed and Patient seen      Re-Certification I certify that the inpatient psychiatric facility services furnished since the previous certification were, and continue to be, medically necessary for, either, treatment which could reasonably be expected to improve the patient s condition or diagnostic study and that the hospital records indicate that the services furnished were, either, intensive treatment services, admission and related services necessary for diagnostic study, or equivalent services.     I certify that the patient continues to need, on a daily basis, active treatment furnished directly by or requiring the supervision of inpatient psychiatric facility personnel.   I estimate 14 days of hospitalization is necessary for proper treatment of the patient. My plans for post-hospital care for this patient are  group home     Alejandra Watkins MD    -     05/26/2023  -     3:13 PM    Total time  35 minutes with > 50%spent on coordination of cares and psycho-education.    This note was created with help of Dragon dictation system. Grammatical / typing errors are not intentional.    Alejandra Watkins MD

## 2023-05-27 PROCEDURE — 250N000009 HC RX 250: Performed by: PSYCHIATRY & NEUROLOGY

## 2023-05-27 PROCEDURE — 250N000013 HC RX MED GY IP 250 OP 250 PS 637: Performed by: EMERGENCY MEDICINE

## 2023-05-27 PROCEDURE — 250N000013 HC RX MED GY IP 250 OP 250 PS 637: Performed by: PSYCHIATRY & NEUROLOGY

## 2023-05-27 PROCEDURE — 124N000003 HC R&B MH SENIOR/ADOLESCENT

## 2023-05-27 PROCEDURE — 250N000013 HC RX MED GY IP 250 OP 250 PS 637: Performed by: PHYSICIAN ASSISTANT

## 2023-05-27 RX ADMIN — LEVOTHYROXINE SODIUM 50 MCG: 25 TABLET ORAL at 07:16

## 2023-05-27 RX ADMIN — RISPERIDONE 0.5 MG: 0.5 TABLET, ORALLY DISINTEGRATING ORAL at 20:42

## 2023-05-27 RX ADMIN — LISINOPRIL 5 MG: 5 TABLET ORAL at 08:26

## 2023-05-27 RX ADMIN — CALCIUM POLYCARBOPHIL 625 MG: 625 TABLET, FILM COATED ORAL at 20:42

## 2023-05-27 RX ADMIN — ATROPINE SULFATE 2 DROP: 10 SOLUTION/ DROPS OPHTHALMIC at 20:49

## 2023-05-27 RX ADMIN — RISPERIDONE 1 MG: 0.5 TABLET ORAL at 08:26

## 2023-05-27 RX ADMIN — ZINC SULFATE 220 MG (50 MG) CAPSULE 220 MG: CAPSULE at 08:26

## 2023-05-27 RX ADMIN — CHLORHEXIDINE GLUCONATE 0.12% ORAL RINSE 15 ML: 1.2 LIQUID ORAL at 08:26

## 2023-05-27 RX ADMIN — PREDNISOLONE ACETATE 1 DROP: 10 SUSPENSION/ DROPS OPHTHALMIC at 08:27

## 2023-05-27 RX ADMIN — MONTELUKAST 10 MG: 10 TABLET, FILM COATED ORAL at 20:42

## 2023-05-27 RX ADMIN — RISPERIDONE 1 MG: 0.5 TABLET ORAL at 20:44

## 2023-05-27 RX ADMIN — DOCUSATE SODIUM 100 MG: 100 CAPSULE, LIQUID FILLED ORAL at 08:26

## 2023-05-27 RX ADMIN — ATROPINE SULFATE 2 DROP: 10 SOLUTION/ DROPS OPHTHALMIC at 12:56

## 2023-05-27 RX ADMIN — Medication 10 MG: at 20:40

## 2023-05-27 RX ADMIN — TOPIRAMATE 100 MG: 100 TABLET, FILM COATED ORAL at 20:42

## 2023-05-27 RX ADMIN — WHITE PETROLATUM 57.7 %-MINERAL OIL 31.9 % EYE OINTMENT: at 20:48

## 2023-05-27 RX ADMIN — APIXABAN 5 MG: 5 TABLET, FILM COATED ORAL at 20:40

## 2023-05-27 RX ADMIN — PANTOPRAZOLE SODIUM 40 MG: 40 TABLET, DELAYED RELEASE ORAL at 07:16

## 2023-05-27 RX ADMIN — APIXABAN 10 MG: 5 TABLET, FILM COATED ORAL at 08:26

## 2023-05-27 RX ADMIN — CALCIUM POLYCARBOPHIL 625 MG: 625 TABLET, FILM COATED ORAL at 08:26

## 2023-05-27 RX ADMIN — RISPERIDONE 0.5 MG: 0.5 TABLET, ORALLY DISINTEGRATING ORAL at 08:26

## 2023-05-27 RX ADMIN — OLANZAPINE 20 MG: 10 TABLET, ORALLY DISINTEGRATING ORAL at 20:42

## 2023-05-27 ASSESSMENT — ACTIVITIES OF DAILY LIVING (ADL)
ADLS_ACUITY_SCORE: 51
ADLS_ACUITY_SCORE: 51
ADLS_ACUITY_SCORE: 31
HYGIENE/GROOMING: INDEPENDENT;PROMPTS
ADLS_ACUITY_SCORE: 31
DRESS: INDEPENDENT
DRESS: INDEPENDENT;PROMPTS
HYGIENE/GROOMING: INDEPENDENT
LAUNDRY: UNABLE TO COMPLETE
ORAL_HYGIENE: INDEPENDENT
ADLS_ACUITY_SCORE: 51
ADLS_ACUITY_SCORE: 31
ORAL_HYGIENE: INDEPENDENT;PROMPTS
ADLS_ACUITY_SCORE: 31

## 2023-05-27 NOTE — PLAN OF CARE
Problem: Psychotic Signs/Symptoms  Goal: Improved Behavioral Control (Psychotic Signs/Symptoms)  Outcome: Progressing   Goal Outcome Evaluation:    Plan of Care Reviewed With: patient          Nursing Assessment    Schizoaffective disorder, bipolar type (H) [F25.0]  Intellectual disability [F79]    Admit Date: 5/2/2023    Length of Stay: 16    Patient evaluation continues. Assessed mood,anxiety,thoughts and behavior. Patient is slowly  progressing towards goals. Pt has been cooperative and directable. Patient is encouraged to participate in groups and assisted to develop healthy coping skills.  Patient denies auditory or visual hallucinations. BP (!) 154/82 (Patient Position: Sitting)   Pulse 97   Temp 97.2  F (36.2  C) (Temporal)   Resp 18   Wt 95.1 kg (209 lb 10.5 oz)   SpO2 99%   BMI 35.99 kg/m      Mood: good    Patient reports depression unable to report and reports anxiety yes    Affect:flat and blunted unless spoken to    Sleep: 4 hours last night    Appetite: good    SI: denies    HI: denies    SIB: denies      Medication Compliance yes    Group participation: no    ADL's: independent with prompts    Fall risk interventions: proper foot wear, orthostatic B/P    Rakan Score Interventions: none    Discharge planning in process    Refer to daily team meeting notes for individualized plan of care. Nursing will continue to assess.    *Scale is 1-10 and 10 is the worst.

## 2023-05-27 NOTE — PROGRESS NOTES
05/26/23 2000   Group Therapy Session   Time Session Began 1910   Time Session Ended 1955   Total Time (minutes) 30   Total # Attendees 5   Group Type expressive therapy   Group Topic Covered cognitive activities;structured socialization   Group Session Detail 1970's Larry Leos   Patient Response/Contribution cooperative with task   Patient Participation Detail Participated in Music Therapy intervention of Larry Leos today.  Goals of session were focusing, memory recall, positive distraction, emotional containment and social cohesion. Entered group late, but folded into group process right away.  This intervention (Music corie) is ideal for his functioning level.  He was well-engaged throughout his time in session, not wanting session to end.  Showed long-term memory with recognition of various musical artists in the game.

## 2023-05-27 NOTE — PLAN OF CARE
Problem: Adult Behavioral Health Plan of Care  Goal: Plan of Care Review  Outcome: Progressing  Flowsheets (Taken 5/26/2023 1947)  Patient Agreement with Plan of Care: agrees   Goal Outcome Evaluation:    Plan of Care Reviewed With: patient        Pt was up ad katina, mood presented as animated. Pt had headphone most of the shift, listening to music and at times singing along out loud.Thoughts are disorganized and tangential, perseverating about discharge. Pt denies all MH symptoms including SI/SIB/HI/AVH. He is eating and drinking adequately, hygiene is adequate, pt showered.   Pt has sukumar stockings for BLE (left leg and foot 1+,  Right leg and foot 2+, right ankle 3+) Pt denies pain. Pt was elevating legs with prompting and encouragement  Pt is on SIO 5 ft for severe intrusiveness poor boundaries and labile mood.   Pt was pleasant and cooperative for the most part this shift, but at times irritable when staff provides redirections.    BP (!) 144/65 (BP Location: Right arm, Patient Position: Sitting)   Pulse 92   Temp 98.1  F (36.7  C) (Temporal)   Resp 16   Wt 95.1 kg (209 lb 10.5 oz)   SpO2 98%   BMI 35.99 kg/m

## 2023-05-28 PROCEDURE — 124N000003 HC R&B MH SENIOR/ADOLESCENT

## 2023-05-28 PROCEDURE — 250N000013 HC RX MED GY IP 250 OP 250 PS 637: Performed by: PHYSICIAN ASSISTANT

## 2023-05-28 PROCEDURE — 250N000013 HC RX MED GY IP 250 OP 250 PS 637: Performed by: EMERGENCY MEDICINE

## 2023-05-28 PROCEDURE — 250N000013 HC RX MED GY IP 250 OP 250 PS 637: Performed by: PSYCHIATRY & NEUROLOGY

## 2023-05-28 RX ADMIN — CALCIUM POLYCARBOPHIL 625 MG: 625 TABLET, FILM COATED ORAL at 08:07

## 2023-05-28 RX ADMIN — CHLORHEXIDINE GLUCONATE 0.12% ORAL RINSE 15 ML: 1.2 LIQUID ORAL at 08:08

## 2023-05-28 RX ADMIN — RISPERIDONE 0.5 MG: 0.5 TABLET, ORALLY DISINTEGRATING ORAL at 20:23

## 2023-05-28 RX ADMIN — RISPERIDONE 1 MG: 0.5 TABLET ORAL at 20:23

## 2023-05-28 RX ADMIN — WHITE PETROLATUM 57.7 %-MINERAL OIL 31.9 % EYE OINTMENT: at 20:23

## 2023-05-28 RX ADMIN — RISPERIDONE 1 MG: 0.5 TABLET ORAL at 08:08

## 2023-05-28 RX ADMIN — LEVOTHYROXINE SODIUM 50 MCG: 25 TABLET ORAL at 08:06

## 2023-05-28 RX ADMIN — APIXABAN 5 MG: 5 TABLET, FILM COATED ORAL at 08:07

## 2023-05-28 RX ADMIN — RISPERIDONE 0.5 MG: 0.5 TABLET, ORALLY DISINTEGRATING ORAL at 08:08

## 2023-05-28 RX ADMIN — ZINC SULFATE 220 MG (50 MG) CAPSULE 220 MG: CAPSULE at 08:08

## 2023-05-28 RX ADMIN — DOCUSATE SODIUM 100 MG: 100 CAPSULE, LIQUID FILLED ORAL at 08:07

## 2023-05-28 RX ADMIN — TOPIRAMATE 100 MG: 100 TABLET, FILM COATED ORAL at 20:23

## 2023-05-28 RX ADMIN — LISINOPRIL 5 MG: 5 TABLET ORAL at 08:06

## 2023-05-28 RX ADMIN — PANTOPRAZOLE SODIUM 40 MG: 40 TABLET, DELAYED RELEASE ORAL at 08:06

## 2023-05-28 RX ADMIN — OLANZAPINE 20 MG: 10 TABLET, ORALLY DISINTEGRATING ORAL at 20:23

## 2023-05-28 RX ADMIN — APIXABAN 5 MG: 5 TABLET, FILM COATED ORAL at 20:23

## 2023-05-28 RX ADMIN — MONTELUKAST 10 MG: 10 TABLET, FILM COATED ORAL at 20:23

## 2023-05-28 RX ADMIN — Medication 10 MG: at 20:23

## 2023-05-28 RX ADMIN — CALCIUM POLYCARBOPHIL 625 MG: 625 TABLET, FILM COATED ORAL at 20:23

## 2023-05-28 ASSESSMENT — ACTIVITIES OF DAILY LIVING (ADL)
LAUNDRY: UNABLE TO COMPLETE
DRESS: INDEPENDENT
ADLS_ACUITY_SCORE: 31
HYGIENE/GROOMING: INDEPENDENT
ADLS_ACUITY_SCORE: 31
ORAL_HYGIENE: INDEPENDENT
ADLS_ACUITY_SCORE: 31

## 2023-05-28 NOTE — PLAN OF CARE
Problem: Adult Behavioral Health Plan of Care  Goal: Plan of Care Review  Outcome: Progressing  Flowsheets (Taken 5/27/2023 1948)  Patient Agreement with Plan of Care: agrees   Goal Outcome Evaluation:    Plan of Care Reviewed With: patient          Pt was hyperactive and animated for the most part, but appeared calm for brief periods, watching TV with peers. Pt continues to be impulsive, and has poor boundaries at times, but less irritable when redirected this shift. Pt has poor insight to situation, denies MH symptoms. Pt is at times hyper verbal, thoughts are disorganized. No delusional statements made this shift. Pt continues to sing along out loud with head phone and at times needed redirection to lower voice.   Pt appears unkempt, agreed for scrub change and states he will take a shower tomorrow.   Pt is eating and drinking adequately, medication compliant.  PRN Atropine given for increased mouth secretion.   Gui stockings off at HS. Pt continues to have BLE pitting edema. (left leg and foot 1+,  Right leg and foot 2+, right ankle 3+) Pt denies pain. Pt elevating legs with prompting and encouragement.    /77 (BP Location: Right arm, Patient Position: Sitting)   Pulse 85   Temp 98.5  F (36.9  C) (Temporal)   Resp 16   Wt 95.1 kg (209 lb 10.5 oz)   SpO2 97%   BMI 35.99 kg/m

## 2023-05-28 NOTE — PLAN OF CARE
Problem: Psychotic Signs/Symptoms  Goal: Improved Sleep (Psychotic Signs/Symptoms)  Outcome: Progressing   Goal Outcome Evaluation:    Patient continues to be on SIO due to intrusiveness,delusions, and poor boundaries. He has a No Roommate order due to intrusiveness and poor boundaries.     He was asleep at the start of the shift and woke up @ 0115. He came out the lounge and walked around the lounge singing with his headphone on. After half an hour, he went back to his room and slept with lights on per request. He refused to have his lower extremities elevated with the use of a wedge.Patient slept on and off the rest of the shift.        Voided five times in large amounts of urine during the shift.    Slept for a total of  4.5 hours. No verbal outbursts  and he was calm and redirectable most of the shift.

## 2023-05-28 NOTE — PLAN OF CARE
Goal Outcome Evaluation:    Plan of Care Reviewed With: patient        Pt was sad and upset early this am due to the fact that  he wanted to go home. Explained to pt that it was Sunday and that the team is working hard to help him get back home. Pt was teary eyed and crying, pt irritated with staff on 1:1. Pt was redirected and ate breakfast. Am medications given. Pt has no roommate order due to labile mood, poor boundaries and impulsiveness.

## 2023-05-28 NOTE — PLAN OF CARE
"  Problem: Psychotic Signs/Symptoms  Goal: Improved Mood Symptoms  Intervention: Optimize Emotion and Mood  Recent Flowsheet Documentation  Taken 5/28/2023 1210 by Priscila Teixeira, RN  Diversional Activity: music   Goal Outcome Evaluation:    Plan of Care Reviewed With: patient        Nursing Assessment    Schizoaffective disorder, bipolar type (H) [F25.0]  Intellectual disability [F79]    Admit Date: 5/2/2023    Length of Stay: 17    Patient evaluation continues. Assessed mood,anxiety,thoughts and behavior. Patient is slowly progressing towards goals. Pt was upset this am and was crying, irritable and asking 1:1 staff to get away from him. Pt was upset because he wanted to go home. Pt was given am medications with good results in showing much better mood regulation. Pt's guardian was here to visit and was updated on pt's progress. Guardian felt that pt was still \"manic\" and delusional. Pt talked about his wives that were in an apartment waiting for him. Pt was easily agitated by conversation between this writer and the guardian. Patient is encouraged to participate in groups and assisted to develop healthy coping skills.  Patient denies auditory or visual hallucinations but is having delusional thinking.  /79 (BP Location: Right arm)   Pulse 76   Temp 97.4  F (36.3  C) (Temporal)   Resp 18   Wt 93.1 kg (205 lb 4 oz)   SpO2 97%   BMI 35.23 kg/m      Pt continues to have pitting edema bilaterally 2-3+, legs elevated when able    Mood: labile    Patient reports depression none and reports anxiety none    Affect:flat and blunted but brightens upon approach.    Sleep: 4 1/2 hours last night    Appetite: good 100%    SI: denies    HI: denies    SIB: denies      Medication Compliance yes    Group participation: none    ADL's: independent    Fall risk interventions: proper foot wear, orthostatic B/P    Rakan Score Interventions:none    Discharge planning in process    Refer to daily team meeting notes for " individualized plan of care. Nursing will continue to assess.    *Scale is 1-10 and 10 is the worst.                   Discharged

## 2023-05-29 PROCEDURE — 250N000013 HC RX MED GY IP 250 OP 250 PS 637: Performed by: EMERGENCY MEDICINE

## 2023-05-29 PROCEDURE — G0177 OPPS/PHP; TRAIN & EDUC SERV: HCPCS

## 2023-05-29 PROCEDURE — 250N000013 HC RX MED GY IP 250 OP 250 PS 637: Performed by: PHYSICIAN ASSISTANT

## 2023-05-29 PROCEDURE — 99231 SBSQ HOSP IP/OBS SF/LOW 25: CPT | Performed by: PSYCHIATRY & NEUROLOGY

## 2023-05-29 PROCEDURE — 250N000013 HC RX MED GY IP 250 OP 250 PS 637: Performed by: PSYCHIATRY & NEUROLOGY

## 2023-05-29 PROCEDURE — 124N000003 HC R&B MH SENIOR/ADOLESCENT

## 2023-05-29 RX ADMIN — RISPERIDONE 1 MG: 0.5 TABLET ORAL at 08:42

## 2023-05-29 RX ADMIN — CHLORHEXIDINE GLUCONATE 0.12% ORAL RINSE 15 ML: 1.2 LIQUID ORAL at 08:42

## 2023-05-29 RX ADMIN — ZINC SULFATE 220 MG (50 MG) CAPSULE 220 MG: CAPSULE at 08:42

## 2023-05-29 RX ADMIN — RISPERIDONE 0.5 MG: 0.5 TABLET, ORALLY DISINTEGRATING ORAL at 20:22

## 2023-05-29 RX ADMIN — ACETAMINOPHEN 325MG 650 MG: 325 TABLET ORAL at 08:44

## 2023-05-29 RX ADMIN — MONTELUKAST 10 MG: 10 TABLET, FILM COATED ORAL at 20:22

## 2023-05-29 RX ADMIN — APIXABAN 5 MG: 5 TABLET, FILM COATED ORAL at 20:22

## 2023-05-29 RX ADMIN — ACETAMINOPHEN 325MG 650 MG: 325 TABLET ORAL at 14:35

## 2023-05-29 RX ADMIN — TOPIRAMATE 100 MG: 100 TABLET, FILM COATED ORAL at 20:22

## 2023-05-29 RX ADMIN — LISINOPRIL 5 MG: 5 TABLET ORAL at 08:42

## 2023-05-29 RX ADMIN — CALCIUM POLYCARBOPHIL 625 MG: 625 TABLET, FILM COATED ORAL at 20:22

## 2023-05-29 RX ADMIN — PANTOPRAZOLE SODIUM 40 MG: 40 TABLET, DELAYED RELEASE ORAL at 08:42

## 2023-05-29 RX ADMIN — CALCIUM POLYCARBOPHIL 625 MG: 625 TABLET, FILM COATED ORAL at 08:42

## 2023-05-29 RX ADMIN — OLANZAPINE 20 MG: 10 TABLET, ORALLY DISINTEGRATING ORAL at 20:22

## 2023-05-29 RX ADMIN — RISPERIDONE 1 MG: 0.5 TABLET ORAL at 20:22

## 2023-05-29 RX ADMIN — PREDNISOLONE ACETATE 1 DROP: 10 SUSPENSION/ DROPS OPHTHALMIC at 08:43

## 2023-05-29 RX ADMIN — LEVOTHYROXINE SODIUM 50 MCG: 25 TABLET ORAL at 08:42

## 2023-05-29 RX ADMIN — HYDROXYZINE HYDROCHLORIDE 25 MG: 25 TABLET, FILM COATED ORAL at 14:35

## 2023-05-29 RX ADMIN — WHITE PETROLATUM 57.7 %-MINERAL OIL 31.9 % EYE OINTMENT: at 20:22

## 2023-05-29 RX ADMIN — DOCUSATE SODIUM 100 MG: 100 CAPSULE, LIQUID FILLED ORAL at 08:42

## 2023-05-29 RX ADMIN — Medication 10 MG: at 20:22

## 2023-05-29 RX ADMIN — APIXABAN 5 MG: 5 TABLET, FILM COATED ORAL at 08:42

## 2023-05-29 RX ADMIN — RISPERIDONE 0.5 MG: 0.5 TABLET, ORALLY DISINTEGRATING ORAL at 08:42

## 2023-05-29 ASSESSMENT — ACTIVITIES OF DAILY LIVING (ADL)
ADLS_ACUITY_SCORE: 31
ADLS_ACUITY_SCORE: 31
ORAL_HYGIENE: INDEPENDENT
ADLS_ACUITY_SCORE: 31
DRESS: SCRUBS (BEHAVIORAL HEALTH);INDEPENDENT
ADLS_ACUITY_SCORE: 31
LAUNDRY: UNABLE TO COMPLETE
ADLS_ACUITY_SCORE: 31
HYGIENE/GROOMING: INDEPENDENT

## 2023-05-29 NOTE — PLAN OF CARE
Problem: Adult Behavioral Health Plan of Care  Goal: Plan of Care Review  Outcome: Progressing  Flowsheets (Taken 5/28/2023 1925)  Patient Agreement with Plan of Care: agrees with comment (describe)   Goal Outcome Evaluation:    Plan of Care Reviewed With: patientPlan of Care Reviewed With: patient       Pt presents with labile affect/mood this shift. Pt was occasionally calm pleasant and cooperative and other times irritable, making demands. Pt denied all MH symptoms including SI/SIB/HI/AVH. Pt was focused on his dinner this evening, stating he did not get what he ordered. Staff was able to confirm that Pt in fact had ordered and was sent pizza. Pt named food items that were not on the menu, stating they were what he had ordered. Snacks were offered after dinner.   BLE edema. Pt encouraged to elevate legs.      /78 (BP Location: Right arm, Patient Position: Sitting, Cuff Size: Adult Regular)   Pulse 81   Temp 97  F (36.1  C) (Temporal)   Resp 18   Wt 93.1 kg (205 lb 4 oz)   SpO2 97%   BMI 35.23 kg/m

## 2023-05-29 NOTE — PLAN OF CARE
Problem: Psychotic Signs/Symptoms  Goal: Improved Behavioral Control (Psychotic Signs/Symptoms)  Outcome: Progressing   Goal Outcome Evaluation:    Plan of Care Reviewed With: patient          Nursing Assessment    Schizoaffective disorder, bipolar type (H) [F25.0]  Intellectual disability [F79]    Admit Date: 5/2/2023    Length of Stay: 18    Patient evaluation continues. Assessed mood,anxiety,thoughts and behavior. Patient is slowly progressing towards goals. Patient is encouraged to participate in groups and assisted to develop healthy coping skills.  Patient denies auditory or visual hallucinations. /78 (BP Location: Right arm, Patient Position: Sitting, Cuff Size: Adult Regular)   Pulse 81   Temp 97.6  F (36.4  C) (Temporal)   Resp 16   Wt 93.1 kg (205 lb 4 oz)   SpO2 99%   BMI 35.23 kg/m      Mood: good    Patient reports depression none and reports anxiety none    Affect: bright    Sleep: 7 1/2 hours last night    Appetite: good    SI: denies    HI: denies    SIB: denies      Medication Compliance yes, pt requested and received prn Tylenol 650 mg's for back pain at 0900 and 1445 with some relief.    Group participation: yes    ADL's: independent with prompts    Fall risk interventions: proper foot wear, orthostatic B/P    Rakan Score Interventions: none    Discharge planning in process    Pt has no roommate order for poor boundaries, intrusiveness and labile mood    Refer to daily team meeting notes for individualized plan of care. Nursing will continue to assess.    *Scale is 1-10 and 10 is the worst.

## 2023-05-29 NOTE — CARE PLAN
"   05/29/23 1238   Group Therapy Session   Group Attendance attended group session   Time Session Began 1015   Time Session Ended 1110   Total Time (minutes) 55   Total # Attendees 4   Group Type expressive therapy;task skill;psychotherapeutic   Group Topic Covered problem-solving;cognitive activities;balanced lifestyle;structured socialization   Group Session Detail Occupational Therapy Clinic group to facilitate coping skill exploration, use of cognitive skills and problem solving, creative expression, clinical observation and facilitation of social, cognitive, and kinesthetic performance skills.    Patient Response/Contribution cooperative with task   Patient Participation Detail With much encouragement, he worked on a structured 2 step task. Needed cues and assistance to follow through and continue working. He explained a lengthy story about being in the  and having to come home to take care of babies for awhile until the \"women took care of them\". Easily redirected.       "

## 2023-05-29 NOTE — PROGRESS NOTES
"PSYCHIATRY  PROGRESS NOTE     DATE OF SERVICE   05/29/2023     CHIEF COMPLAINT   \" I am having a good time.\"       SUBJECTIVE   Nursing reports:   Pt presents with labile affect/mood this shift. Pt was occasionally calm pleasant and cooperative and other times irritable, making demands. Pt denied all MH symptoms including SI/SIB/HI/AVH. Pt was focused on his dinner this evening, stating he did not get what he ordered. Staff was able to confirm that Pt in fact had ordered and was sent pizza. Pt named food items that were not on the menu, stating they were what he had ordered. Snacks were offered after dinner.   BLE edema. Pt encouraged to elevate legs.       /78 (BP Location: Right arm, Patient Position: Sitting, Cuff Size: Adult Regular)   Pulse 81   Temp 97  F (36.1  C) (Temporal)   Resp 18   Wt 93.1 kg (205 lb 4 oz)   SpO2 97%   BMI 35.23 kg/m        Patient has been reviewed with the  earlier today.  We are still in the process of stabilizing the patient.  The plan will be for the patient to return to his group home once he is stable.     OBJECTIVE   Patient was seen and evaluated in the day area with one-to-one present during the assessment, this was a face-to-face evaluation.  Today the patient presented as much more calm and cooperative as compared to when I saw him on Friday.  Patient stated that he is having a good time today and he has been able to attend groups.  Patient said that he was able to sleep well last night.  Patient also asked for this writer to increase the dose of his bedtime medications because he wants to sleep \"all day and all night\".  I informed the patient that I will need to assess the situation as it seems that at this time he is starting to do well with these medications.  Patient verbalized poor understanding of this.    During my interaction the patient is denying having any thoughts of harming himself or harming others.  Patient did not made any delusional " "statements or call this person Brittany (the name of his imaginary wife).  He seemed to be redirectable and engaged with his one-to-one.       MEDICATIONS   Medications:  Scheduled Meds:    apixaban ANTICOAGULANT  5 mg Oral BID     artificial tears   Right Eye At Bedtime     calcium polycarbophil  625 mg Oral BID     chlorhexidine  15 mL Swish & Spit Daily     docusate sodium  100 mg Oral Daily     levothyroxine  50 mcg Oral or NG Tube QAM AC     lisinopril  5 mg Oral Daily     melatonin  10 mg Oral At Bedtime     montelukast  10 mg Oral At Bedtime     OLANZapine zydis  20 mg Oral At Bedtime     pantoprazole  40 mg Oral QAM AC     prednisoLONE acetate  1 drop Right Eye Daily     risperiDONE  0.5 mg Oral BID     risperiDONE  1 mg Oral BID     topiramate  100 mg Oral At Bedtime     zinc sulfate  220 mg Oral Daily     Continuous Infusions:  PRN Meds:.acetaminophen, alum & mag hydroxide-simethicone, artificial saliva, atropine, hydrALAZINE, hydrOXYzine, hypromellose-dextran, ipratropium, OLANZapine **OR** OLANZapine, senna-docusate    Medication adherence issues: MS Med Adherence Y/N: Yes, Hospitalization  Medication side effects: MEDICATION SIDE EFFECTS: no side effects reported  Benefit: Yes / No: Yes       ROS   A comprehensive review of systems was negative.       MENTAL STATUS EXAM   Vitals: /78 (BP Location: Right arm, Patient Position: Sitting, Cuff Size: Adult Regular)   Pulse 81   Temp 97.6  F (36.4  C) (Temporal)   Resp 16   Wt 93.1 kg (205 lb 4 oz)   SpO2 99%   BMI 35.23 kg/m      Appearance:  No apparent distress  Mood: \"I am having a good time\"  Affect: Brighter and engaged  was congruent to speech  Suicidal Ideation: PRESENT / ABSENT: absent   Homicidal Ideation: PRESENT / ABSENT: absent   Thought process: [place], disorganized with loose of association  Thought content: significant for preoccupations and obsessions No delusions elicited during my assessment.  We will need to continue to " monitor this.  Fund of Knowledge: Below average  Attention/Concentration: Poor  Language ability:  Intact, speech is more clear than yesterday  Memory:  Immediate recall impaired, Short-term memory impaired and Long-term memory intact  Insight:  limited.  Judgement: limited  Orientation: Person and situation only  Psychomotor Behavior: slowed    Muscle Strength and Tone: MuscleStrength: Normal  Gait and Station: Normal       LABS   personally reviewed.   Recent Labs   Lab 05/26/23  0708      POTASSIUM 4.2   CHLORIDE 109*   CO2 24   BUN 23.5*   CR 0.97   ANIONGAP 9   NASIR 10.0   GLC 93     No results found for: PHENYTOIN, PHENOBARB, VALPROATE, CBMZ       DIAGNOSIS   Principal Problem:    Schizoaffective disorder, bipolar type (H)    Active Problem List:  Patient Active Problem List   Diagnosis     Closed head injury, initial encounter     Altered mental status, unspecified altered mental status type     Generalized weakness     Portal vein thrombosis     Pleural effusion     Generalized muscle weakness     Falls frequently     Other ascites     Acute pancreatitis, unspecified complication status, unspecified pancreatitis type     Pancreatic pseudocyst     Idiopathic acute pancreatitis, unspecified complication status     Allergic rhinitis     Fisher's esophagus     CTS (carpal tunnel syndrome)     Elevated liver enzymes     HTN (hypertension)     Hypothyroidism     Keratoconus     Major depressive disorder, recurrent episode, mild (H)     Intellectual disability     Morbid exogenous obesity (H)     Neuroleptic malignant syndrome     Obstructive sleep apnea syndrome     Bipolar affective disorder (H)     Seizure disorder (H)     Seizures, generalized convulsive (H)     Acute respiratory failure with hypoxia (H)     Anemia, unspecified     Anxiety disorder, unspecified     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)     Dietary zinc deficiency     Dry eye syndrome of bilateral lacrimal glands      Dry mouth, unspecified     Edema, unspecified     Gastro-esophageal reflux disease without esophagitis     Hyperosmolality and hypernatremia     Irritable bowel syndrome with constipation     Major depressive disorder, recurrent, unspecified (H)     Metabolic encephalopathy     Moderate protein-calorie malnutrition (H)     Other symbolic dysfunctions     Schizoaffective disorder, unspecified (H)     Thrombocytopenia, unspecified (H)     Unspecified asthma, uncomplicated     Urinary tract infection, site not specified     Vitamin D deficiency, unspecified     Schizoaffective disorder, bipolar type (H)          PLAN   1. Ongoing education given regarding diagnostic and treatment options with risks, benefits and alternatives and adequate verbalization of understanding.  2.  Medications       Melatonin 10 mg at bedtime       Zyprexa 20 mg at bedtime       We will continue the patient on the same dose of Risperdal.  At this time I have not noticed any side effects from the Risperdal.       Topamax 100 mg at bedtime    3.  Medical team following as needed  4.   coordinating a safe discharge plan    Risk Assessment: [facility] RISK ASSESSMENT: Patient able to contract for safety    Coordination of Care:   Treatment Plan reviewed and physician signed, Care discussed with Care/Treatment Team Members, Chart reviewed and Patient seen      Re-Certification I certify that the inpatient psychiatric facility services furnished since the previous certification were, and continue to be, medically necessary for, either, treatment which could reasonably be expected to improve the patient s condition or diagnostic study and that the hospital records indicate that the services furnished were, either, intensive treatment services, admission and related services necessary for diagnostic study, or equivalent services.     I certify that the patient continues to need, on a daily basis, active treatment furnished directly by or  requiring the supervision of inpatient psychiatric facility personnel.   I estimate 14 days of hospitalization is necessary for proper treatment of the patient. My plans for post-hospital care for this patient are  group home     Alejandra Watkins MD    -     05/29/2023  -     12:12 PM    Total time  25 minutes with > 50%spent on coordination of cares and psycho-education.    This note was created with help of Dragon dictation system. Grammatical / typing errors are not intentional.    Alejandra Watkins MD

## 2023-05-29 NOTE — CARE PLAN
"   05/29/23 1431   Group Therapy Session   Group Attendance attended group session   Time Session Began 1300   Time Session Ended 1400   Total Time (minutes) 10  (no charge)   Total # Attendees 3   Group Type psychotherapeutic   Group Topic Covered problem-solving;cognitive activities;structured socialization;balanced lifestyle   Group Session Detail  Activity using visuospatial concepts in problem solving.    Patient Response/Contribution cooperative with task;listened actively   Patient Participation Detail With assistance and encouragement, participated in this unfamiliar activity. On a couple occasions, identified correct solutions after some practice and cues. Left group, to '' et a glass of water\" though did not return to group. Was cooperative in taking his headset with music playing off while participating.        "

## 2023-05-29 NOTE — CARE PLAN
05/29/23 1149   Individualization/Patient Specific Goals   Patient Personal Strengths family/social support;medication/treatment adherence;stable living environment   Patient Vulnerabilities limited ability to read/write;limited social skills;lacks insight into illness   Anxieties, Fears or Concerns None   Individualized Care Needs Medication and stabilization   Interprofessional Rounds   Summary Patient was admitted due to concerns for his safety.   Participants CTC;psychiatrist;nursing   Behavioral Team Discussion   Participants Dr. Yeni ALVA, [facility]   Progress Stabilization is ongoing   Anticipated length of stay 7 Days.   Continued Stay Criteria/Rationale Requires stabilization to reduce risk of imminent harm to himself and others.   Precautions See below   Plan Psychiatric assesemnt. Medication manangement. Therapeutic [place]. Individual and group support.   Rationale for change in precautions or plan no change   Safety Plan CTC will do individual inpatient treatment planning and after care planning. CTC will discuss options for increasing community supports with the patient. CTC will coordinate with outpatient providers and will place referrals to ensure appointments are in place.   Anticipated Discharge Disposition group home     PRECAUTIONS AND SAFETY    Behavioral Orders   Procedures    Code 1 - Restrict to Unit    Code 2    Elopement precautions    Routine Programming     As clinically indicated    Sexual precautions    Status 15     Every 15 minutes.    Status Individual Observation     Patient SIO status reviewed with team/RN.  Please also refer to RN/team documentation for add'l detail.    -SIO staff to monitor following which have contributed to patient being on SIO:  Severe intrusiveness, poor boundaries, delusional thinking that precipitates labile mood  -Possible interventions SIO staff could use to support patient's treatment progress:  Redirect patient, offer activities to distract  -When  following observed, team will review discontinuation of SIO:  Patient is not intrusive, able to follow unit guidelines, able self regulate behavior appropriately     Order Specific Question:   CONTINUOUS 24 hours / day     Answer:   Other     Order Specific Question:   Specify distance     Answer:   5 Feet: staff may stay outside room, but continue to visualize patient     Order Specific Question:   Indications for SIO     Answer:   Severe intrusiveness       Safety  Safety WDL: WDL  Patient Location: hallway  Observed Behavior: pacing  Observed Behavior (Comment): watching tv, pacing, listtening to music on headphones, engaged with staffs  Safety Measures: environmental rounds completed, safety rounds completed, suicide check-in completed  Diversional Activity: music  Suicidality: SIO (Status Individual Observation)  (NOTE - order will specify distance  Seizure precautions: clutter free environment  Assault: status 15, private room, status continuous sight, behavioral scrubs (radha)  Elopement Assessment: Statements about wanting to leave  Elopement Interventions: status continuous sight  Sexual: status continuous sight

## 2023-05-29 NOTE — PLAN OF CARE
Problem: Psychotic Signs/Symptoms  Goal: Improved Sleep (Psychotic Signs/Symptoms)  Outcome: Progressing   Goal Outcome Evaluation:    Patient remains on SIO due to intrusiveness, delusions and poor boundaries. He has a No Roommate order due to impulsiveness and poor boundaries.    Voided two times during the shift. Walked to the bathroom with unsteady gait.     Slept for a total of 7.5 hours. No intrusiveness and verbal outbursts noted the whole shift.

## 2023-05-30 PROCEDURE — 250N000013 HC RX MED GY IP 250 OP 250 PS 637: Performed by: PHYSICIAN ASSISTANT

## 2023-05-30 PROCEDURE — H2032 ACTIVITY THERAPY, PER 15 MIN: HCPCS

## 2023-05-30 PROCEDURE — 124N000003 HC R&B MH SENIOR/ADOLESCENT

## 2023-05-30 PROCEDURE — 250N000013 HC RX MED GY IP 250 OP 250 PS 637: Performed by: EMERGENCY MEDICINE

## 2023-05-30 PROCEDURE — 250N000013 HC RX MED GY IP 250 OP 250 PS 637: Performed by: PSYCHIATRY & NEUROLOGY

## 2023-05-30 PROCEDURE — G0177 OPPS/PHP; TRAIN & EDUC SERV: HCPCS

## 2023-05-30 PROCEDURE — 99231 SBSQ HOSP IP/OBS SF/LOW 25: CPT | Mod: 95 | Performed by: PSYCHIATRY & NEUROLOGY

## 2023-05-30 RX ADMIN — CALCIUM POLYCARBOPHIL 625 MG: 625 TABLET, FILM COATED ORAL at 08:31

## 2023-05-30 RX ADMIN — RISPERIDONE 0.5 MG: 0.5 TABLET, ORALLY DISINTEGRATING ORAL at 08:27

## 2023-05-30 RX ADMIN — LEVOTHYROXINE SODIUM 50 MCG: 25 TABLET ORAL at 06:36

## 2023-05-30 RX ADMIN — CHLORHEXIDINE GLUCONATE 0.12% ORAL RINSE 15 ML: 1.2 LIQUID ORAL at 08:27

## 2023-05-30 RX ADMIN — OLANZAPINE 10 MG: 10 TABLET, FILM COATED ORAL at 06:36

## 2023-05-30 RX ADMIN — DOCUSATE SODIUM 100 MG: 100 CAPSULE, LIQUID FILLED ORAL at 08:27

## 2023-05-30 RX ADMIN — MONTELUKAST 10 MG: 10 TABLET, FILM COATED ORAL at 19:05

## 2023-05-30 RX ADMIN — CALCIUM POLYCARBOPHIL 625 MG: 625 TABLET, FILM COATED ORAL at 19:05

## 2023-05-30 RX ADMIN — OLANZAPINE 25 MG: 10 TABLET, ORALLY DISINTEGRATING ORAL at 19:05

## 2023-05-30 RX ADMIN — APIXABAN 5 MG: 5 TABLET, FILM COATED ORAL at 08:27

## 2023-05-30 RX ADMIN — RISPERIDONE 1 MG: 0.5 TABLET ORAL at 20:31

## 2023-05-30 RX ADMIN — RISPERIDONE 1 MG: 0.5 TABLET ORAL at 08:27

## 2023-05-30 RX ADMIN — ZINC SULFATE 220 MG (50 MG) CAPSULE 220 MG: CAPSULE at 08:27

## 2023-05-30 RX ADMIN — Medication 10 MG: at 19:05

## 2023-05-30 RX ADMIN — APIXABAN 5 MG: 5 TABLET, FILM COATED ORAL at 19:05

## 2023-05-30 RX ADMIN — TOPIRAMATE 100 MG: 100 TABLET, FILM COATED ORAL at 19:05

## 2023-05-30 RX ADMIN — PREDNISOLONE ACETATE 1 DROP: 10 SUSPENSION/ DROPS OPHTHALMIC at 08:28

## 2023-05-30 RX ADMIN — PANTOPRAZOLE SODIUM 40 MG: 40 TABLET, DELAYED RELEASE ORAL at 06:36

## 2023-05-30 RX ADMIN — LISINOPRIL 5 MG: 5 TABLET ORAL at 08:27

## 2023-05-30 ASSESSMENT — ACTIVITIES OF DAILY LIVING (ADL)
LAUNDRY: UNABLE TO COMPLETE
ADLS_ACUITY_SCORE: 31
DRESS: INDEPENDENT
ADLS_ACUITY_SCORE: 31
LAUNDRY: UNABLE TO COMPLETE
ADLS_ACUITY_SCORE: 31
ORAL_HYGIENE: INDEPENDENT
DRESS: INDEPENDENT;SCRUBS (BEHAVIORAL HEALTH)
ADLS_ACUITY_SCORE: 31
ORAL_HYGIENE: INDEPENDENT
ADLS_ACUITY_SCORE: 31
HYGIENE/GROOMING: INDEPENDENT
HYGIENE/GROOMING: INDEPENDENT
ADLS_ACUITY_SCORE: 31

## 2023-05-30 NOTE — PROGRESS NOTES
"Pt  Was intermittently out inn the lounge. Pt continues to be impulsive, intrussive and  affect/mood is labile this shift. Pt was making grandiose statements \"I am the boss here, I have money than everyone of you. I am the smartest in my family\". Staff redirected Pt to lower voice as other Pt were sleeping. Pt stated \"it's afternoon, you have reset the clock, this time is wrong\", when staff re-oriented Pt to time.   Pt was able to sleep a total of about 6 hours.  0600: Pt was focused on wanting to go outside \"for some fresh air\". Pt states \"if I don't go out in the next few minutes, I will die\". Pt reports he had \"a lung operation some years ago and my Doctor told me, If I don't stay outside I will die\". Staff re-assured Pt the provider will be notified of his request. Pt further escalated and began trying doors to exit. PRN 10 mg of Zyprexa administered around 0636.   0715: Pt is calm at this time.   Pt is on SIO 5 ft d/t severe intrusiveness, impulsiveness, labile mood.   "

## 2023-05-30 NOTE — PLAN OF CARE
Problem: Adult Behavioral Health Plan of Care  Goal: Develops/Participates in Therapeutic [place] to Support Successful Transition  Outcome: Progressing   Goal Outcome Evaluation:  Affect is blunted, mood is labile, tend to be more elated. Movements are slowed, but restless. He remains unpredictable. Speech varies between clear to garbled. His appearance is disheveled, but he changes his clothes and brushes his teeth.  OT x 2, Dance Movement, afternoon group. He enjoys listening to music and singing. He makes bizarre beverage concoctions to drink.     Orthostatic drop sitting /87, standing /75  Refuses to sit and elevate his RLE with DVT.   Refuses to wear his MICHAEL hose    SIO renewed for Severe intrusiveness, poor boundaries, delusional thinking that precipitates labile mood    No roommate: Intrusiveness, poor boundaries

## 2023-05-30 NOTE — CARE PLAN
05/30/23 1210   Group Therapy Session   Group Attendance attended group session   Time Session Began 1015   Time Session Ended 1115   Total Time (minutes) 60   Total # Attendees 4   Group Type expressive therapy;task skill;psychotherapeutic   Group Topic Covered coping skills/lifestyle management;problem-solving;cognitive activities;balanced lifestyle;structured socialization   Group Session Detail Occupational Therapy Clinic group to facilitate coping skill exploration, use of cognitive skills and problem solving, creative expression, clinical observation and facilitation of social, cognitive, and kinesthetic performance skills.    Patient Response/Contribution cooperative with task;listened actively   Patient Participation Detail After work task was set up and assistance provided with details and directions, he followed through with familiar steps. He did not consistently ask for help with perhaps not seeing errors or the need for assistance on that step. He tends to seem to appreciate help also with familiar work tasks, when encouraged to follow through with familiar work, does proceed. Pleasant on approach..

## 2023-05-30 NOTE — CARE PLAN
05/30/23 1539   Group Therapy Session   Group Attendance attended group session   Time Session Began 1305   Time Session Ended 1410   Total Time (minutes) 45   Total # Attendees 3   Group Type psychotherapeutic;psychoeducation   Group Topic Covered coping skills/lifestyle management;relapse prevention;cognitive therapy techniques;structured socialization;balanced lifestyle   Group Session Detail  Attended OT group focused on the Process of Recovery, identifying healthy perspectives and ways in managing one's positive growth.   Patient Response/Contribution cooperative with task;listened actively;discussed personal experience with topic;expressed understanding of topic   Patient Participation Detail Offered more comments in context and with less (occasional) tangential information. Pleasant and seemed less distracted. Offered direct eye contact and asked to speak up with answers or additions on the question being discussed.

## 2023-05-30 NOTE — PROGRESS NOTES
"PSYCHIATRY  PROGRESS NOTE     DATE OF SERVICE   05/30/2023   The patient is a 52 year old male who is being evaluated via a video billable telemedicine visit. The patient/guardian has consented to being seen via telemedicine. The provider was in front of a computer in a home office. The patient was on the inpatient unit at [facility].    Start time: 11:25 AM  Stop time: 11:34 AM    The patient/guardian has been notified of the following:     This telemedicine visit is conducted live between you and your clinician. We have found that certain health care needs can be provided without the need for a physical exam. This service lets us provide the care you need with a telemedicine conversation.           CHIEF COMPLAINT   \" I am feeling good.\"       SUBJECTIVE   Nursing reports:  Affect is blunted, mood is labile, tend to be more elated. Movements are slowed, but restless. He remains unpredictable. Speech varies between clear to garbled. His appearance is disheveled, but he changes his clothes and brushes his teeth.  OT x 2, Dance Movement, afternoon group. He enjoys listening to music and singing. He makes bizarre beverage concoctions to drink.      Orthostatic drop sitting /87, standing /75  Refuses to sit and elevate his RLE with DVT.   Refuses to wear his MICHAEL hose     SIO renewed for Severe intrusiveness, poor boundaries, delusional thinking that precipitates labile mood     No roommate: Intrusiveness, poor boundaries      Patient has been reviewed with the  earlier today.  We are still in the process of stabilizing the patient.  The plan will be for the patient to return to his group home once he is stable.     OBJECTIVE   Patient was seen and evaluated in the consult room with one-to-one present during the assessment, this was done with the use of telehealth.  During my interaction with the patient he presented as calm, pleasant and cooperative.  He remains delusional as he thinks that he is " , his parents are alive and he has multiple children.  Patient was also asking to go home but today he did not presented as demanding or combative as he did on prior visits.  I had an opportunity to review with the patient his medications and informed him that we are going to continue with the plan that was started in station 12 [address]. and decreased the dose of Risperdal.  In order to be able to help him with sleep I am going to be increasing the dose of Zyprexa at bedtime.  At the time of the assessment the patient verbalized good understanding and he was in agreement with this.  I discussed with the patient that I will be communicating with his guardian (sister) in order to let her know about the medication changes.    I called patient's sister twice, she was not available and I was able to leave a message.       MEDICATIONS   Medications:  Scheduled Meds:    apixaban ANTICOAGULANT  5 mg Oral BID     artificial tears   Right Eye At Bedtime     calcium polycarbophil  625 mg Oral BID     chlorhexidine  15 mL Swish & Spit Daily     docusate sodium  100 mg Oral Daily     levothyroxine  50 mcg Oral or NG Tube QAM AC     lisinopril  5 mg Oral Daily     melatonin  10 mg Oral At Bedtime     montelukast  10 mg Oral At Bedtime     OLANZapine zydis  20 mg Oral At Bedtime     pantoprazole  40 mg Oral QAM AC     prednisoLONE acetate  1 drop Right Eye Daily     risperiDONE  0.5 mg Oral BID     risperiDONE  1 mg Oral BID     topiramate  100 mg Oral At Bedtime     zinc sulfate  220 mg Oral Daily     Continuous Infusions:  PRN Meds:.acetaminophen, alum & mag hydroxide-simethicone, artificial saliva, atropine, hydrALAZINE, hydrOXYzine, hypromellose-dextran, ipratropium, OLANZapine **OR** OLANZapine, senna-docusate    Medication adherence issues: MS Med Adherence Y/N: Yes, Hospitalization  Medication side effects: MEDICATION SIDE EFFECTS: no side effects reported  Benefit: Yes / No: Yes       ROS   A comprehensive review of  "systems was negative.       MENTAL STATUS EXAM   Vitals: BP (!) 148/87   Pulse 89   Temp 97.3  F (36.3  C) (Temporal)   Resp 16   Wt 93.3 kg (205 lb 11 oz)   SpO2 99%   BMI 35.31 kg/m      Appearance:  No apparent distress  Mood: \"I am doing good\"  Affect: Brighter and engaged  was congruent to speech  Suicidal Ideation: PRESENT / ABSENT: absent   Homicidal Ideation: PRESENT / ABSENT: absent   Thought process: Rhine, disorganized with loose of association, he seemed to be better than yesterday.  Thought content: significant for preoccupations and obsessions Remains delusional.  Fund of Knowledge: Below average  Attention/Concentration: Poor  Language ability:  Intact, speech is clear, normal in rate, rhythm and volume  Memory:  Immediate recall impaired, Short-term memory impaired and Long-term memory intact  Insight:  limited.  Judgement: limited  Orientation: Person and situation only  Psychomotor Behavior: slowed    Muscle Strength and Tone: MuscleStrength: Normal  Gait and Station: Not evaluated       LABS   personally reviewed.   Recent Labs   Lab 05/26/23  0708      POTASSIUM 4.2   CHLORIDE 109*   CO2 24   BUN 23.5*   CR 0.97   ANIONGAP 9   NASIR 10.0   GLC 93     No results found for: PHENYTOIN, PHENOBARB, VALPROATE, CBMZ       DIAGNOSIS   Principal Problem:    Schizoaffective disorder, bipolar type (H)    Active Problem List:  Patient Active Problem List   Diagnosis     Closed head injury, initial encounter     Altered mental status, unspecified altered mental status type     Generalized weakness     Portal vein thrombosis     Pleural effusion     Generalized muscle weakness     Falls frequently     Other ascites     Acute pancreatitis, unspecified complication status, unspecified pancreatitis type     Pancreatic pseudocyst     Idiopathic acute pancreatitis, unspecified complication status     Allergic rhinitis     Fisher's esophagus     CTS (carpal tunnel syndrome)     Elevated liver enzymes "     HTN (hypertension)     Hypothyroidism     Keratoconus     Major depressive disorder, recurrent episode, mild (H)     Intellectual disability     Morbid exogenous obesity (H)     Neuroleptic malignant syndrome     Obstructive sleep apnea syndrome     Bipolar affective disorder (H)     Seizure disorder (H)     Seizures, generalized convulsive (H)     Acute respiratory failure with hypoxia (H)     Anemia, unspecified     Anxiety disorder, unspecified     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)     Dietary zinc deficiency     Dry eye syndrome of bilateral lacrimal glands     Dry mouth, unspecified     Edema, unspecified     Gastro-esophageal reflux disease without esophagitis     Hyperosmolality and hypernatremia     Irritable bowel syndrome with constipation     Major depressive disorder, recurrent, unspecified (H)     Metabolic encephalopathy     Moderate protein-calorie malnutrition (H)     Other symbolic dysfunctions     Schizoaffective disorder, unspecified (H)     Thrombocytopenia, unspecified (H)     Unspecified asthma, uncomplicated     Urinary tract infection, site not specified     Vitamin D deficiency, unspecified     Schizoaffective disorder, bipolar type (H)          PLAN   1. Ongoing education given regarding diagnostic and treatment options with risks, benefits and alternatives and adequate verbalization of understanding.  2.  Medications       Melatonin 10 mg at bedtime       Increase Zyprexa 25 mg at bedtime       Discontinue the Risperdal disintegrating tablet and continue with Risperdal 1 mg 2 times a day.  We will continue decreasing the dose of Risperdal as the patient seemed to be responding better to the Zyprexa.       Topamax 100 mg at bedtime    3.  Medical team following as needed  4.   coordinating a safe discharge plan    Risk Assessment: [facility] RISK ASSESSMENT: Patient able to contract for safety    Coordination of Care:   Treatment Plan reviewed and  physician signed, Care discussed with Care/Treatment Team Members, Chart reviewed and Patient seen      Re-Certification I certify that the inpatient psychiatric facility services furnished since the previous certification were, and continue to be, medically necessary for, either, treatment which could reasonably be expected to improve the patient s condition or diagnostic study and that the hospital records indicate that the services furnished were, either, intensive treatment services, admission and related services necessary for diagnostic study, or equivalent services.     I certify that the patient continues to need, on a daily basis, active treatment furnished directly by or requiring the supervision of inpatient psychiatric facility personnel.   I estimate 14 days of hospitalization is necessary for proper treatment of the patient. My plans for post-hospital care for this patient are  group home     Alejandra Watkins MD    -     05/30/2023  -     11:26 AM    Total time  25 minutes with > 50%spent on coordination of cares and psycho-education.    This note was created with help of Dragon dictation system. Grammatical / typing errors are not intentional.    Alejandra Watkins MD

## 2023-05-30 NOTE — PLAN OF CARE
Problem: Plan of Care - These are the overarching goals to be used throughout the patient stay.    Goal: Plan of Care Review  Outcome: Progressing  Flowsheets (Taken 5/29/2023 2054)  Plan of Care Reviewed With: patient  Overall Patient Progress: improving    Patient was on SIO 5 feet for severe intrusiveness. He has a no roommate order due to poor boundaries and intrusiveness.  Patient was visible in the milieu this shift, interacting with staff but not with peers. Labile mood. He was also having grandiose statements. Stated he was the smartest in his family and has a lot of money. He ate 100% at supper with adequate fluids. Pt was not compliant with wearing his MICHAEL stockings. He denied pain nor discomfort. He denied having mental health problems. Stated his mood was good. He said that he doesn't need to hear those mental health questions again. He was compliant with taking all of his medications. No reported side effects of meds. Will continue to monitor and assess pt.

## 2023-05-30 NOTE — PLAN OF CARE
Assessment/Intervention/Current Symtoms and Care Coordination:  -Reviewed pt's chart  -Meet with team to discuss pt care.Team reported that pt continues to be impulsive, intrusive and affect/mood is labile. Pt continues to be on SIO.      Discharge Plan or Goal:  To continue to stabilize pt's mental health status. Tentative plan for pt to return back to group home     Barriers to Discharge:  Pt continues to present as symptomatic (delusional mood). Pt is receiving ongoing stabilization and medication adjustment. Continue care coordination with long-term to ascertain his acceptability for a return to the home.     Referral Status:  Will continue to coordinate with pt's group home.     Legal Status:  Huong Shlomo is pt's legal Guardian      Contacts:  Shlomo Borja (Guardian) [phone]   [facility] ([phone])      Upcoming Meetings and Dates/Important Information and next steps:    None at this time.

## 2023-05-30 NOTE — PROGRESS NOTES
"Pt was originally in the group room, but then left to use the bathroom and when he returned, he was enthusiastically engaged in both choosing music and creative self-expression during dance/movement therapy (D/MT) session that explored qualities of movement as they relate to mood and cognitive organization.      Pt was able to express some qualities of \"lightness\" along with opposing strength.  He explained this as \"draining the anger from his brain.\"  He further concluded after all the anger was drained, he used arm movements for what he described as \"love.\"  Shuffling his feet and even hopping were the more engaged movements of lightness.  He also responded well to co-created patterns of movement with a peer and this therapist that supported increased organization.        05/30/23 1115   Expressive Therapy   Therapy Type dance/movement   Minutes of Treatment 30       "

## 2023-05-31 PROCEDURE — 250N000013 HC RX MED GY IP 250 OP 250 PS 637: Performed by: EMERGENCY MEDICINE

## 2023-05-31 PROCEDURE — 99232 SBSQ HOSP IP/OBS MODERATE 35: CPT | Performed by: PSYCHIATRY & NEUROLOGY

## 2023-05-31 PROCEDURE — 124N000003 HC R&B MH SENIOR/ADOLESCENT

## 2023-05-31 PROCEDURE — G0177 OPPS/PHP; TRAIN & EDUC SERV: HCPCS

## 2023-05-31 PROCEDURE — 250N000013 HC RX MED GY IP 250 OP 250 PS 637: Performed by: PSYCHIATRY & NEUROLOGY

## 2023-05-31 PROCEDURE — 250N000013 HC RX MED GY IP 250 OP 250 PS 637: Performed by: PHYSICIAN ASSISTANT

## 2023-05-31 RX ORDER — OLANZAPINE 5 MG/1
5 TABLET ORAL DAILY
Status: DISCONTINUED | OUTPATIENT
Start: 2023-06-01 | End: 2023-06-05

## 2023-05-31 RX ADMIN — LISINOPRIL 5 MG: 5 TABLET ORAL at 07:50

## 2023-05-31 RX ADMIN — RISPERIDONE 1 MG: 0.5 TABLET ORAL at 07:50

## 2023-05-31 RX ADMIN — DOCUSATE SODIUM 100 MG: 100 CAPSULE, LIQUID FILLED ORAL at 07:50

## 2023-05-31 RX ADMIN — APIXABAN 5 MG: 5 TABLET, FILM COATED ORAL at 07:50

## 2023-05-31 RX ADMIN — MONTELUKAST 10 MG: 10 TABLET, FILM COATED ORAL at 19:06

## 2023-05-31 RX ADMIN — APIXABAN 5 MG: 5 TABLET, FILM COATED ORAL at 19:07

## 2023-05-31 RX ADMIN — ZINC SULFATE 220 MG (50 MG) CAPSULE 220 MG: CAPSULE at 07:50

## 2023-05-31 RX ADMIN — Medication 10 MG: at 20:38

## 2023-05-31 RX ADMIN — CALCIUM POLYCARBOPHIL 625 MG: 625 TABLET, FILM COATED ORAL at 07:50

## 2023-05-31 RX ADMIN — TOPIRAMATE 100 MG: 100 TABLET, FILM COATED ORAL at 19:06

## 2023-05-31 RX ADMIN — CALCIUM POLYCARBOPHIL 625 MG: 625 TABLET, FILM COATED ORAL at 19:06

## 2023-05-31 RX ADMIN — PREDNISOLONE ACETATE 1 DROP: 10 SUSPENSION/ DROPS OPHTHALMIC at 07:51

## 2023-05-31 RX ADMIN — ATROPINE SULFATE 2 DROP: 10 SOLUTION/ DROPS OPHTHALMIC at 14:17

## 2023-05-31 RX ADMIN — PANTOPRAZOLE SODIUM 40 MG: 40 TABLET, DELAYED RELEASE ORAL at 07:50

## 2023-05-31 RX ADMIN — RISPERIDONE 1 MG: 0.5 TABLET ORAL at 19:06

## 2023-05-31 RX ADMIN — LEVOTHYROXINE SODIUM 50 MCG: 25 TABLET ORAL at 07:50

## 2023-05-31 RX ADMIN — OLANZAPINE 25 MG: 10 TABLET, ORALLY DISINTEGRATING ORAL at 20:40

## 2023-05-31 RX ADMIN — CHLORHEXIDINE GLUCONATE 0.12% ORAL RINSE 15 ML: 1.2 LIQUID ORAL at 07:50

## 2023-05-31 RX ADMIN — WHITE PETROLATUM 57.7 %-MINERAL OIL 31.9 % EYE OINTMENT: at 19:08

## 2023-05-31 ASSESSMENT — ACTIVITIES OF DAILY LIVING (ADL)
ADLS_ACUITY_SCORE: 31
ADLS_ACUITY_SCORE: 31
LAUNDRY: UNABLE TO COMPLETE
ORAL_HYGIENE: INDEPENDENT
ORAL_HYGIENE: INDEPENDENT
ADLS_ACUITY_SCORE: 31
ADLS_ACUITY_SCORE: 31
HYGIENE/GROOMING: INDEPENDENT
DRESS: INDEPENDENT
ADLS_ACUITY_SCORE: 31
HYGIENE/GROOMING: INDEPENDENT
ADLS_ACUITY_SCORE: 31
DRESS: INDEPENDENT
ADLS_ACUITY_SCORE: 31
LAUNDRY: UNABLE TO COMPLETE

## 2023-05-31 NOTE — PLAN OF CARE
Problem: Sleep Disturbance  Goal: Adequate Sleep/Rest  Outcome: Progressing   Goal Outcome Evaluation:    Patient continues to be on SIO due to severe intrusiveness.He has a No Roommate order due to intrusiveness and having poor boundaries.    Voided twice during the shift.      Slept for a total of 9.5 hours.No anxiety and other behavioral issues noted.

## 2023-05-31 NOTE — PLAN OF CARE
Problem: Adult Behavioral Health Plan of Care  Goal: Plan of Care Review  Flowsheets  Taken 5/30/2023 2226  Plan of Care Reviewed With: patient  Overall Patient Progress: no change  Patient Agreement with Plan of Care: agrees with comment (describe)  Taken 5/30/2023 2200  Patient Agreement with Plan of Care: agrees    Patient was irritable, agitated off and on at early part of shift. Loud voice, yelling at times. Frequently seeking out staff. Repetitive of wanting to go outside to breathe some fresh air. He stated that he's going to be discharged this shift, seeking out for his doctor. Staff had been redirecting him. HS medications were given early around 1900 due to his agitation and irritability. He calmed down after giving his medications. He denied all mental health symptoms. He denied pain and no reported side effects of meds. Pt remains on SIO 5 feet for severe intrusiveness. No roommate due to intrusiveness and poor boundaries.

## 2023-05-31 NOTE — CARE PLAN
05/31/23 1431   Group Therapy Session   Group Attendance attended group session   Time Session Began 1015   Time Session Ended 1200   Total Time (minutes) 60   Total # Attendees 4   Group Type expressive therapy;recreation;task skill   Group Topic Covered coping skills/lifestyle management;problem-solving;leisure exploration/use of leisure time;cognitive activities   Group Session Detail [facility] group to facilitate coping skill exploration, use of cognitive skills and problem solving, creative expression, clinical observation and facilitation of social, cognitive, and kinesthetic performance skills.   Patient Response/Contribution cooperative with task   Patient Participation Detail pt arrived to group with SIO present. pt was pleasant on approach. pt chose to engage in familiar task with encouragement from staff. pt was provided with set up of materials and brief reminders of various instructions to make pt more successful. pt left group on a few occasions but did return each time. pt listened to music on his headphones throughout group and was receptive to lowering the volume as needed.

## 2023-05-31 NOTE — CARE PLAN
05/31/23 1442   Group Therapy Session   Group Attendance attended group session   Time Session Began 1300   Time Session Ended 1400   Total Time (minutes) 45   Total # Attendees 4   Group Type expressive therapy;recreation;task skill   Group Topic Covered coping skills/lifestyle management;problem-solving;cognitive activities;structured socialization   Group Session Detail OT Wellness Group-Gratitude and Gratitude collages for memory recall, self-awareness, attention, following directions, creativity, insight, healthy distraction, social engagement, symptom management, and problem solving.   Patient Participation Detail pt arrived to group with SIO present. pt was agreeable to turn down volume on headphones to participate in group discussion. pt declined to write down things to be grateful for, pt was however willing to dictate list to therapist. pt quickly came up with 20 things to be grateful for. pt demonstrated understanding of instructions and repeated them back to therapist for collaging portion of group. pt then completed collage in a different way. pt requested assistance as needed with supply management. pt was pleasant during interactions with staff. pt left group once project was completed to his satisfaction

## 2023-05-31 NOTE — PLAN OF CARE
Problem: Suicidal Behavior  Goal: Suicidal Behavior is Absent or Managed  Outcome: Progressing     Problem: Psychotic Signs/Symptoms  Goal: Improved Behavioral Control (Psychotic Signs/Symptoms)  Outcome: Progressing     Problem: Disruptive Behavior  Goal: Enhanced Social, Occupational or Functional Skills (Disruptive Behavior)  Intervention: Promote Social, Occupational and Functional Ability    Patient in room at the beginning  of the shift. Continues on SIO 5 Ft, up and visible on the unit most part of the shift, with headphones on, patient loves to dance while listening to music, sings at times and can be loud. Minimal redirection , patient's gait is steady but shuffles when walking most times. Compliant with assessment questions, speech is rambled and grabbled, denies SI/SIB/HI/Hallucinations, denies physical pain or discomfort. Verbally contracts for safety, safety checks and precautions in place, patient reports good appetite, and eating well, ate 100% of dinner.Patient hydrates well, medication compliant with no stated or observed side effects. No outburst or abnormal behaviors noted this shift.This writer will continue to monitor and offer therapeutic support as needed for the rest of the shift per plan of care.    BP (!) 141/76 (BP Location: Left arm, Patient Position: Sitting, Cuff Size: Adult Regular)   Pulse 96   Temp 98.6  F (37  C) (Oral)   Resp 16   Wt 93.3 kg (205 lb 11 oz)   SpO2 98%   BMI 35.31 kg/m

## 2023-05-31 NOTE — PROGRESS NOTES
"PSYCHIATRY  PROGRESS NOTE     DATE OF SERVICE   05/31/2023      CHIEF COMPLAINT   \" I am feeling good.\"       SUBJECTIVE   Nursing reports:  Going outside and going home is typically a trigger for agitation, but he accepted minimal redirection this am. He knows he needs to continue to take his medication and go to groups.He likes Dance [employer], because he won. He knew all the song names. Also, he knows he needs to work on controlling his outbursts, such as yesterday evening.      This am, Jose denies having a poor mood. He denies thoughts of wanting to hurt himself or others. He is often making funny comments and laughing. Speech is garbled, but no drooling observed. Gait is shuffling, but steady. He is restless and does not sit very long, moves around the lounge a lot if he does not have a task he is interested in. He can work on a task for a prolonged period if interested. Social with staff, but not peers. Attended OT group x 1      Jose has a history of becoming agitated and potentially overstimulate by the commotion at change of shift. See new Care Plan order for him to watch appropriate YouTube videos with a snack in Resiliency/ Interview Room to avoid being in lounge during change of shift     Denies pain  LBM 5.30.2023  Last shower 5.30.2023  B/P: 157/82, T: 97.7, P: 92, R: 16     No Roommate Intrusiveness, poor boundaries     SIO renewed for Severe intrusiveness, poor boundaries, delusional thinking that precipitates labile mood      Addendum: 1415 PRN atropine drops for drooling per pt request.      Plan of Care Reviewed With: patient        Patient has been reviewed with the  earlier today.    Assessment/Intervention/Current Symtoms and Care Coordination:  -Reviewed pt's chart  -Meet with team to discuss pt care.Team reported that pt continues to be on SIO due to intrusiveness. Pt sleep for 9.5 hours last night. Pt enjoys going to groups.      OBJECTIVE   Patient was seen and evaluated in the " day area with one-to-one present during the assessment, this was a face to face evaluatrion. During my assessment patient presented as calm, pleasant and cooperative. Today he was calling this writer Kylee but he was accepting redirection. Patient stated that he is feeling great and denied having any side effects from the medication. He remains delusional thinking that he is , has children and his parents are alive. Patient was updated about the medication changes and he is in agreement with this.    I had an opportunity to speak with his sister and guardian Huong. She reported that the patient continues to be manic and this is not his baseline. She is not opposed to try Lithium again. She tells me that the last time the patient tried Lithium he develop polydipsia, reason why this was discontinued. I review with the sister that for nos we will continue maximizing the Zyprexa and if this doesn't work we can reintroduce the Lithium. Sister verbalized good understanding and is in agreement with the plan.        MEDICATIONS   Medications:  Scheduled Meds:    apixaban ANTICOAGULANT  5 mg Oral BID     artificial tears   Right Eye At Bedtime     calcium polycarbophil  625 mg Oral BID     chlorhexidine  15 mL Swish & Spit Daily     docusate sodium  100 mg Oral Daily     levothyroxine  50 mcg Oral or NG Tube QAM AC     lisinopril  5 mg Oral Daily     melatonin  10 mg Oral At Bedtime     montelukast  10 mg Oral At Bedtime     [START ON 6/1/2023] OLANZapine  5 mg Oral Daily     OLANZapine zydis  25 mg Oral At Bedtime     pantoprazole  40 mg Oral QAM AC     prednisoLONE acetate  1 drop Right Eye Daily     risperiDONE  1 mg Oral BID     topiramate  100 mg Oral At Bedtime     zinc sulfate  220 mg Oral Daily     Continuous Infusions:  PRN Meds:.acetaminophen, alum & mag hydroxide-simethicone, artificial saliva, atropine, hydrALAZINE, hydrOXYzine, hypromellose-dextran, ipratropium, OLANZapine **OR** OLANZapine,  "senna-docusate    Medication adherence issues: MS Med Adherence Y/N: Yes, Hospitalization  Medication side effects: MEDICATION SIDE EFFECTS: no side effects reported  Benefit: Yes / No: Yes       ROS   A comprehensive review of systems was negative.       MENTAL STATUS EXAM   Vitals: BP (!) 157/82   Pulse 92   Temp 97.7  F (36.5  C) (Temporal)   Resp 16   Wt 93.3 kg (205 lb 11 oz)   SpO2 96%   BMI 35.31 kg/m      Same as last visit   Appearance:  No apparent distress  Mood: \"I am doing good\"  Affect: Brighter and engaged  was congruent to speech  Suicidal Ideation: PRESENT / ABSENT: absent   Homicidal Ideation: PRESENT / ABSENT: absent   Thought process: Carefree, disorganized with loose of association, he seemed to be better than yesterday.  Thought content: significant for preoccupations and obsessions Remains delusional.  Fund of Knowledge: Below average  Attention/Concentration: Poor  Language ability:  Intact, speech is clear, normal in rate, rhythm and volume  Memory:  Immediate recall impaired, Short-term memory impaired and Long-term memory intact  Insight:  limited.  Judgement: limited  Orientation: Person and situation only  Psychomotor Behavior: slowed    Muscle Strength and Tone: MuscleStrength: Normal  Gait and Station: Not evaluated       LABS   personally reviewed.   Recent Labs   Lab 05/26/23  0708      POTASSIUM 4.2   CHLORIDE 109*   CO2 24   BUN 23.5*   CR 0.97   ANIONGAP 9   NASIR 10.0   GLC 93     No results found for: PHENYTOIN, PHENOBARB, VALPROATE, CBMZ       DIAGNOSIS   Principal Problem:    Schizoaffective disorder, bipolar type (H)    Active Problem List:  Patient Active Problem List   Diagnosis     Closed head injury, initial encounter     Altered mental status, unspecified altered mental status type     Generalized weakness     Portal vein thrombosis     Pleural effusion     Generalized muscle weakness     Falls frequently     Other ascites     Acute pancreatitis, unspecified " complication status, unspecified pancreatitis type     Pancreatic pseudocyst     Idiopathic acute pancreatitis, unspecified complication status     Allergic rhinitis     Fisher's esophagus     CTS (carpal tunnel syndrome)     Elevated liver enzymes     HTN (hypertension)     Hypothyroidism     Keratoconus     Major depressive disorder, recurrent episode, mild (H)     Intellectual disability     Morbid exogenous obesity (H)     Neuroleptic malignant syndrome     Obstructive sleep apnea syndrome     Bipolar affective disorder (H)     Seizure disorder (H)     Seizures, generalized convulsive (H)     Acute respiratory failure with hypoxia (H)     Anemia, unspecified     Anxiety disorder, unspecified     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)     Dietary zinc deficiency     Dry eye syndrome of bilateral lacrimal glands     Dry mouth, unspecified     Edema, unspecified     Gastro-esophageal reflux disease without esophagitis     Hyperosmolality and hypernatremia     Irritable bowel syndrome with constipation     Major depressive disorder, recurrent, unspecified (H)     Metabolic encephalopathy     Moderate protein-calorie malnutrition (H)     Other symbolic dysfunctions     Schizoaffective disorder, unspecified (H)     Thrombocytopenia, unspecified (H)     Unspecified asthma, uncomplicated     Urinary tract infection, site not specified     Vitamin D deficiency, unspecified     Schizoaffective disorder, bipolar type (H)          PLAN   1. Ongoing education given regarding diagnostic and treatment options with risks, benefits and alternatives and adequate verbalization of understanding.  2.  Medications       Melatonin 10 mg at bedtime       Increase Zyprexa 5 mg daily and 25 mg at bedtime       Risperdal 1 mg 2 times a day.  We will continue decreasing the dose of Risperdal as the patient seemed to be responding better to the Zyprexa.       Topamax 100 mg at bedtime    3.  Medical team following as  needed  4.   coordinating a safe discharge plan    Risk Assessment: [facility] RISK ASSESSMENT: Patient able to contract for safety    Coordination of Care:   Treatment Plan reviewed and physician signed, Care discussed with Care/Treatment Team Members, Chart reviewed and Patient seen      Re-Certification I certify that the inpatient psychiatric facility services furnished since the previous certification were, and continue to be, medically necessary for, either, treatment which could reasonably be expected to improve the patient s condition or diagnostic study and that the hospital records indicate that the services furnished were, either, intensive treatment services, admission and related services necessary for diagnostic study, or equivalent services.     I certify that the patient continues to need, on a daily basis, active treatment furnished directly by or requiring the supervision of inpatient psychiatric facility personnel.   I estimate 14 days of hospitalization is necessary for proper treatment of the patient. My plans for post-hospital care for this patient are  group home     Alejandra Watkins MD    -     05/31/2023  -     2:25 PM    Total time  35 minutes with > 50%spent on coordination of cares and psycho-education.    This note was created with help of Dragon dictation system. Grammatical / typing errors are not intentional.    Alejandra Watkins MD

## 2023-05-31 NOTE — PLAN OF CARE
Assessment/Intervention/Current Symtoms and Care Coordination:  -Reviewed pt's chart  -Meet with team to discuss pt care.Team reported that pt continues to be on SIO due to intrusiveness. Pt sleep for 9.5 hours last night. Pt enjoys going to groups.     Discharge Plan or Goal:  To continue to stabilize pt's mental health status. Tentative plan for pt to return back to group home     Barriers to Discharge:  Pt continues to present as symptomatic (delusional mood). Pt is receiving ongoing stabilization and medication adjustment. Continue care coordination with [facility] to ascertain his acceptability for a return to the home.     Referral Status:  Will continue to coordinate with pt's group home.     Legal Status:  Huong Shlomo is pt's legal Guardian      Contacts:  Shlomo Borja (Guardian) [phone]   Sadaf ([phone])      Upcoming Meetings and Dates/Important Information and next steps:    None at this time.         no

## 2023-05-31 NOTE — PLAN OF CARE
Problem: Disruptive Behavior  Goal: Improved Mood Symptoms (Disruptive Behavior)  Outcome: Progressing   Goal Outcome Evaluation:  Going outside and going home is typically a trigger for agitation, but he accepted minimal redirection this am. He knows he needs to continue to take his medication and go to groups.He likes Dance [employer], because he won. He knew all the song names. Also, he knows he needs to work on controlling his outbursts, such as yesterday evening.     This am, Jose denies having a poor mood. He denies thoughts of wanting to hurt himself or others. He is often making funny comments and laughing. Speech is garbled, but no drooling observed. Gait is shuffling, but steady. He is restless and does not sit very long, moves around the lounge a lot if he does not have a task he is interested in. He can work on a task for a prolonged period if interested. Social with staff, but not peers. Attended OT group x 1     Jose has a history of becoming agitated and potentially overstimulate by the commotion at change of shift. See new Care Plan order for him to watch appropriate YouTube videos with a snack in Resiliency/ Interview Room to avoid being in lounge during change of shift    Denies pain  LBM 5.30.2023  Last shower 5.30.2023  B/P: 157/82, T: 97.7, P: 92, R: 16    No Roommate Intrusiveness, poor boundaries    SIO renewed for Severe intrusiveness, poor boundaries, delusional thinking that precipitates labile mood     Addendum: 1415 PRN atropine drops for drooling per pt request.     Plan of Care Reviewed With: patient

## 2023-06-01 PROCEDURE — 124N000003 HC R&B MH SENIOR/ADOLESCENT

## 2023-06-01 PROCEDURE — 250N000013 HC RX MED GY IP 250 OP 250 PS 637: Performed by: PSYCHIATRY & NEUROLOGY

## 2023-06-01 PROCEDURE — 250N000013 HC RX MED GY IP 250 OP 250 PS 637: Performed by: PHYSICIAN ASSISTANT

## 2023-06-01 PROCEDURE — 99231 SBSQ HOSP IP/OBS SF/LOW 25: CPT | Mod: 95 | Performed by: PSYCHIATRY & NEUROLOGY

## 2023-06-01 PROCEDURE — G0177 OPPS/PHP; TRAIN & EDUC SERV: HCPCS

## 2023-06-01 PROCEDURE — H2032 ACTIVITY THERAPY, PER 15 MIN: HCPCS

## 2023-06-01 PROCEDURE — 250N000013 HC RX MED GY IP 250 OP 250 PS 637: Performed by: EMERGENCY MEDICINE

## 2023-06-01 RX ADMIN — HYDROXYZINE HYDROCHLORIDE 25 MG: 25 TABLET, FILM COATED ORAL at 01:32

## 2023-06-01 RX ADMIN — ZINC SULFATE 220 MG (50 MG) CAPSULE 220 MG: CAPSULE at 07:57

## 2023-06-01 RX ADMIN — LEVOTHYROXINE SODIUM 50 MCG: 25 TABLET ORAL at 06:47

## 2023-06-01 RX ADMIN — LISINOPRIL 5 MG: 5 TABLET ORAL at 07:57

## 2023-06-01 RX ADMIN — Medication 10 MG: at 20:27

## 2023-06-01 RX ADMIN — TOPIRAMATE 100 MG: 100 TABLET, FILM COATED ORAL at 19:13

## 2023-06-01 RX ADMIN — DOCUSATE SODIUM 100 MG: 100 CAPSULE, LIQUID FILLED ORAL at 07:57

## 2023-06-01 RX ADMIN — WHITE PETROLATUM 57.7 %-MINERAL OIL 31.9 % EYE OINTMENT: at 19:14

## 2023-06-01 RX ADMIN — RISPERIDONE 1 MG: 0.5 TABLET ORAL at 07:57

## 2023-06-01 RX ADMIN — OLANZAPINE 25 MG: 10 TABLET, ORALLY DISINTEGRATING ORAL at 20:27

## 2023-06-01 RX ADMIN — RISPERIDONE 1 MG: 0.5 TABLET ORAL at 19:13

## 2023-06-01 RX ADMIN — CHLORHEXIDINE GLUCONATE 0.12% ORAL RINSE 15 ML: 1.2 LIQUID ORAL at 07:56

## 2023-06-01 RX ADMIN — CALCIUM POLYCARBOPHIL 625 MG: 625 TABLET, FILM COATED ORAL at 07:57

## 2023-06-01 RX ADMIN — HYDROXYZINE HYDROCHLORIDE 25 MG: 25 TABLET, FILM COATED ORAL at 18:00

## 2023-06-01 RX ADMIN — PANTOPRAZOLE SODIUM 40 MG: 40 TABLET, DELAYED RELEASE ORAL at 06:47

## 2023-06-01 RX ADMIN — APIXABAN 5 MG: 5 TABLET, FILM COATED ORAL at 19:13

## 2023-06-01 RX ADMIN — CALCIUM POLYCARBOPHIL 625 MG: 625 TABLET, FILM COATED ORAL at 19:13

## 2023-06-01 RX ADMIN — PREDNISOLONE ACETATE 1 DROP: 10 SUSPENSION/ DROPS OPHTHALMIC at 07:57

## 2023-06-01 RX ADMIN — MONTELUKAST 10 MG: 10 TABLET, FILM COATED ORAL at 19:13

## 2023-06-01 RX ADMIN — OLANZAPINE 10 MG: 10 TABLET, FILM COATED ORAL at 17:02

## 2023-06-01 RX ADMIN — OLANZAPINE 5 MG: 5 TABLET, FILM COATED ORAL at 07:57

## 2023-06-01 ASSESSMENT — ACTIVITIES OF DAILY LIVING (ADL)
ADLS_ACUITY_SCORE: 31
LAUNDRY: UNABLE TO COMPLETE
ADLS_ACUITY_SCORE: 31
LAUNDRY: UNABLE TO COMPLETE
ADLS_ACUITY_SCORE: 31
DRESS: INDEPENDENT
ADLS_ACUITY_SCORE: 31
HYGIENE/GROOMING: INDEPENDENT
ORAL_HYGIENE: INDEPENDENT
ORAL_HYGIENE: INDEPENDENT
ADLS_ACUITY_SCORE: 31
HYGIENE/GROOMING: INDEPENDENT
ADLS_ACUITY_SCORE: 31
DRESS: INDEPENDENT

## 2023-06-01 NOTE — PROGRESS NOTES
"   05/31/23 2000   Group Therapy Session   Group Attendance attended group session   Time Session Began 1910   Time Session Ended 1950   Total Time (minutes) 40   Total # Attendees 2   Group Type psychotherapeutic   Group Topic Covered coping skills/lifestyle management;cognitive activities   Group Session Detail Goal setting   Patient Response/Contribution cooperative with task;confused;discussed personal experience with topic;disorganized;listened actively;verbalizations were off topic     Mood reported as fantastic / terrific. Still confused saying he has thousands of brothers and sisters and probalby \"more are being made now.\" He also thinks psych associate is his niece. He has some grandiose thoughts that appear such as he is a [employer]  and mandujano. When he speaks about music he is most organized and connect the best with peers. He was less interruptive and it appears to slowly be improving.  "

## 2023-06-01 NOTE — PLAN OF CARE
Assessment/Intervention/Current Symtoms and Care Coordination:  -Reviewed pt's chart  -Meet with team to discuss pt care.Team reported that pt continues to be on SIO due to intrusiveness. Pt slept 6.25 hours last night. Pt goes to groups and continues to asking to home.       Discharge Plan or Goal:  To continue to stabilize pt's mental health status. Tentative plan for pt to return back to group home     Barriers to Discharge:  Pt continues to present as symptomatic (delusional mood). Pt is receiving ongoing stabilization and medication adjustment. Continue care coordination with retirement to ascertain his acceptability for a return to the home.     Referral Status:  Will continue to coordinate with pt's group home.     Legal Status:  Huong Shlomo is pt's legal Guardian      Contacts:  hSlomo Borja (Guardian) [phone]   Sadaf ([phone])      Upcoming Meetings and Dates/Important Information and next steps:    None at this time.

## 2023-06-01 NOTE — PLAN OF CARE
"  Problem: Disruptive Behavior  Goal: Improved Impulse and Aggression Control (Disruptive Behavior)  Outcome: Progressing   Goal Outcome Evaluation:    He plans on having a \"dance party\". His favorite group, [employer] is today. He enjoys listening to headphones, singing, and dancing. He attends all groups. His affect is bright and mood is elated. He is social with staff. No inappropriate comments observed.   He has good hygiene and changes his clothes when soiled and always brushes his teeth. Speech is garbled. Eye contact is good. He is cooperative, medication compliant, polite and respectful.     B/P: 138/76, T: 98.6, P: 85, R: 18    No Roommate Intrusiveness, poor boundaries     SIO renewed for Severe intrusiveness, poor boundaries, delusional thinking that precipitates labile mood                            "

## 2023-06-01 NOTE — PLAN OF CARE
Problem: Suicidal Behavior  Goal: Suicidal Behavior is Absent or Managed  Outcome: Progressing     Problem: Psychotic Signs/Symptoms  Goal: Improved Behavioral Control (Psychotic Signs/Symptoms)  Outcome: Progressing     Problem: Disruptive Behavior  Goal: Enhanced Social, Occupational or Functional Skills (Disruptive Behavior)  Intervention: Promote Social, Occupational and Functional Ability       Patient up and visible in the hallway and milieu at the beginning of the shift. Patients enjoys listening to music on headphone, sings and can be loud at times, takes minimal redirection. Patient was getting irritable, and continues to be loud, medicated patient with prn Zyprexa with effects.  Patient verbally contract for safety, continue on 1:1 for elopement precautions. Patient denies SI/SIB/hallucinations, denies depression and anxiety, denies physical pain or discomfort. Reports good appetite. Ate 100% of dinner, hydrating well. Patient is able to make needs known. compliant with all schedule medications. PRN hydroxyzine given . No other known concerns at this time. This writer will continue to monitor and offer therapeutic support as needed for the rest of the shift.    /87 (BP Location: Right arm)   Pulse 83   Temp 97.7  F (36.5  C) (Oral)   Resp 18   Wt 98.3 kg (216 lb 11.4 oz)   SpO2 99%   BMI 37.20 kg/m

## 2023-06-01 NOTE — CARE PLAN
Occupational Therapy Group Note:     06/01/23 1500   Group Therapy Session   Group Attendance attended group session   Time Session Began 1315   Time Session Ended 1445   Total Time (minutes) 60   Total # Attendees 4   Group Type life skill   Group Topic Covered balanced lifestyle;structured socialization;leisure exploration/use of leisure time   Group Session Detail self-reflection group game   Patient Response/Contribution cooperative with task;discussed personal experience with topic;verbalizations were off topic   Patient Participation Detail Pt actively participated in a structured occupational therapy group involving a self-reflecting, group leisure task. Pt appeared comfortable sharing positive past experiences and personal information with peers, and was respectful in listening and responding to peers. Initially needed assistance for the simple 2 step task, though became more independent with repetition. Responses to prompts were concrete, and somewhat off-topic at times, though occasionally appropriate to topic. He reflected on which questions were easy versus more challenging for him to answer. Pleasant in interactions.

## 2023-06-01 NOTE — PLAN OF CARE
"  Problem: Sleep Disturbance  Goal: Adequate Sleep/Rest  Outcome: Progressing   Goal Outcome Evaluation:       Patient was asleep soundly at the start of the shift. He woke up at around 1:30 a.m and requested some midnight snacks. He ate 1 yogurt and 1 nutrigrain bar. He was using a headphone and tried to linger around the lounge. Hydroxyzine 25 mgs given. Patient went back to his room. Few minutes later, he fell asleep again. He is on SIO r/t severe intrusiveness; delusional thinking and poor boundaries. He has \"No Roommate\" order r/t the same reason as above. He slept a total of 6.25 hours. He woke early and watched TV.                 "

## 2023-06-01 NOTE — CARE PLAN
Occupational Therapy Group Note:     06/01/23 1100   Group Therapy Session   Group Attendance attended group session   Time Session Began 1015   Time Session Ended 1115   Total Time (minutes) 50   Total # Attendees 5   Group Type expressive therapy   Group Topic Covered coping skills/lifestyle management   Group Session Detail OT clinic   Patient Response/Contribution cooperative with task   Patient Participation Detail Pt actively participated in occupational therapy clinic to facilitate coping skill exploration, creative expression within personally meaningful activities, and clinical observation of social, cognitive, and kinesthetic performance skills. Pt response: Moderate assistance needed to initiate, gather materials, sequence, and adjust to workspace demands as needed. Demonstrated good attention to task, limited planning, and fair attention to detail for selected creative expression task. Able to ask for assistance with prompting, and occasionally socialized with peers and staff. Appeared to enjoy guessing the singer/band of each song playing. Calm and pleasant in interactions.

## 2023-06-01 NOTE — PROGRESS NOTES
"PSYCHIATRY  PROGRESS NOTE     DATE OF SERVICE   06/01/2023   The patient is a 52 year old male who is being evaluated via a video billable telemedicine visit. The patient/guardian has consented to being seen via telemedicine. The provider was in front of a computer in a home office. The patient was on the inpatient unit at [facility].    Start time: 10:40 AM  Stop time: 10:50 AM    The patient/guardian has been notified of the following:     This telemedicine visit is conducted live between you and your clinician. We have found that certain health care needs can be provided without the need for a physical exam. This service lets us provide the care you need with a telemedicine conversation.           CHIEF COMPLAINT   \" I am feeling good.\"       SUBJECTIVE   Nursing reports:  He plans on having a \"dance party\". His favorite group, [employer] is today. He enjoys listening to headphones, singing, and dancing. He attends all groups. His affect is bright and mood is elated. He is social with staff. No inappropriate comments observed.   He has good hygiene and changes his clothes when soiled and always brushes his teeth. Speech is garbled. Eye contact is good. He is cooperative, medication compliant, polite and respectful.      B/P: 138/76, T: 98.6, P: 85, R: 18     No Roommate Intrusiveness, poor boundaries     SIO renewed for Severe intrusiveness, poor boundaries, delusional thinking that precipitates labile mood       Patient has been reviewed with the  earlier today.    Assessment/Intervention/Current Symtoms and Care Coordination:  -Reviewed pt's chart  -Meet with team to discuss pt care.Team reported that pt continues to be on SIO due to intrusiveness. Pt sleep for 9.5 hours last night. Pt enjoys going to groups.      OBJECTIVE   Patient was seen and evaluated in the consult room with one-to-one present during the assessment, this was done with the use of telehealth.  Patient reported that today he is " feeling great and at this time denies having any problems with the medications.  I have discussed with the patient that the plan for now it will be to continue to decrease the Risperdal slowly and at the same time increase the Zyprexa.  Patient stated that he is okay with what ever we give him here in the hospital.  At the end of my assessment the patient started to demand to go home but he was easily redirected back to the day area.  He did not got agitated or angry at this writer as he has done on prior occasions.  Patient remains delusional thinking that his parents are alive, he is  and has multiple children.       MEDICATIONS   Medications:  Scheduled Meds:    apixaban ANTICOAGULANT  5 mg Oral BID     artificial tears   Right Eye At Bedtime     calcium polycarbophil  625 mg Oral BID     chlorhexidine  15 mL Swish & Spit Daily     docusate sodium  100 mg Oral Daily     levothyroxine  50 mcg Oral or NG Tube QAM AC     lisinopril  5 mg Oral Daily     melatonin  10 mg Oral At Bedtime     montelukast  10 mg Oral At Bedtime     OLANZapine  5 mg Oral Daily     OLANZapine zydis  25 mg Oral At Bedtime     pantoprazole  40 mg Oral QAM AC     prednisoLONE acetate  1 drop Right Eye Daily     risperiDONE  1 mg Oral BID     topiramate  100 mg Oral At Bedtime     zinc sulfate  220 mg Oral Daily     Continuous Infusions:  PRN Meds:.acetaminophen, alum & mag hydroxide-simethicone, artificial saliva, atropine, hydrALAZINE, hydrOXYzine, hypromellose-dextran, ipratropium, OLANZapine **OR** OLANZapine, senna-docusate    Medication adherence issues: MS Med Adherence Y/N: Yes, Hospitalization  Medication side effects: MEDICATION SIDE EFFECTS: no side effects reported  Benefit: Yes / No: Yes       ROS   A comprehensive review of systems was negative.       MENTAL STATUS EXAM   Vitals: /76   Pulse 85   Temp 98.6  F (37  C) (Temporal)   Resp 18   Wt 98.3 kg (216 lb 11.4 oz)   SpO2 99%   BMI 37.20 kg/m   "      Appearance:  No apparent distress  Mood: \"I am great\"  Affect: Brighter and engaged  was congruent to speech  Suicidal Ideation: PRESENT / ABSENT: absent   Homicidal Ideation: PRESENT / ABSENT: absent   Thought process: [place], disorganized with loose of association, he seemed to be slowly improving.  Thought content: significant for preoccupations and obsessions Remains delusional.  Fund of Knowledge: Below average  Attention/Concentration: Poor  Language ability:  Intact, speech is clear, normal in rate, rhythm and volume  Memory:  Immediate recall impaired, Short-term memory impaired and Long-term memory intact  Insight:  limited.  Judgement: limited  Orientation: Person and situation only  Psychomotor Behavior: slowed    Muscle Strength and Tone: MuscleStrength: Normal  Gait and Station: Not evaluated       LABS   personally reviewed.   Recent Labs   Lab 05/26/23  0708      POTASSIUM 4.2   CHLORIDE 109*   CO2 24   BUN 23.5*   CR 0.97   ANIONGAP 9   NASIR 10.0   GLC 93     No results found for: PHENYTOIN, PHENOBARB, VALPROATE, CBMZ       DIAGNOSIS   Principal Problem:    Schizoaffective disorder, bipolar type (H)    Active Problem List:  Patient Active Problem List   Diagnosis     Closed head injury, initial encounter     Altered mental status, unspecified altered mental status type     Generalized weakness     Portal vein thrombosis     Pleural effusion     Generalized muscle weakness     Falls frequently     Other ascites     Acute pancreatitis, unspecified complication status, unspecified pancreatitis type     Pancreatic pseudocyst     Idiopathic acute pancreatitis, unspecified complication status     Allergic rhinitis     Fisher's esophagus     CTS (carpal tunnel syndrome)     Elevated liver enzymes     HTN (hypertension)     Hypothyroidism     Keratoconus     Major depressive disorder, recurrent episode, mild (H)     Intellectual disability     Morbid exogenous obesity (H)     Neuroleptic " malignant syndrome     Obstructive sleep apnea syndrome     Bipolar affective disorder (H)     Seizure disorder (H)     Seizures, generalized convulsive (H)     Acute respiratory failure with hypoxia (H)     Anemia, unspecified     Anxiety disorder, unspecified     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)     Dietary zinc deficiency     Dry eye syndrome of bilateral lacrimal glands     Dry mouth, unspecified     Edema, unspecified     Gastro-esophageal reflux disease without esophagitis     Hyperosmolality and hypernatremia     Irritable bowel syndrome with constipation     Major depressive disorder, recurrent, unspecified (H)     Metabolic encephalopathy     Moderate protein-calorie malnutrition (H)     Other symbolic dysfunctions     Schizoaffective disorder, unspecified (H)     Thrombocytopenia, unspecified (H)     Unspecified asthma, uncomplicated     Urinary tract infection, site not specified     Vitamin D deficiency, unspecified     Schizoaffective disorder, bipolar type (H)          PLAN   1. Ongoing education given regarding diagnostic and treatment options with risks, benefits and alternatives and adequate verbalization of understanding.  2.  Medications       Melatonin 10 mg at bedtime       Zyprexa 5 mg daily and 25 mg at bedtime       Risperdal 1 mg 2 times a day.  We will continue decreasing the dose of Risperdal as the patient seemed to be responding better to the Zyprexa.       Topamax 100 mg at bedtime    3.  Medical team following as needed  4.   coordinating a safe discharge plan    Risk Assessment: [facility] RISK ASSESSMENT: Patient able to contract for safety    Coordination of Care:   Treatment Plan reviewed and physician signed, Care discussed with Care/Treatment Team Members, Chart reviewed and Patient seen      Re-Certification I certify that the inpatient psychiatric facility services furnished since the previous certification were, and continue to be,  medically necessary for, either, treatment which could reasonably be expected to improve the patient s condition or diagnostic study and that the hospital records indicate that the services furnished were, either, intensive treatment services, admission and related services necessary for diagnostic study, or equivalent services.     I certify that the patient continues to need, on a daily basis, active treatment furnished directly by or requiring the supervision of inpatient psychiatric facility personnel.   I estimate 14 days of hospitalization is necessary for proper treatment of the patient. My plans for post-hospital care for this patient are  group home     Alejandra Watkins MD    -     06/01/2023  -     10:37 AM    Total time  25 minutes with > 50%spent on coordination of cares and psycho-education.    This note was created with help of Dragon dictation system. Grammatical / typing errors are not intentional.    Alejandra Watkins MD

## 2023-06-02 PROCEDURE — G0177 OPPS/PHP; TRAIN & EDUC SERV: HCPCS

## 2023-06-02 PROCEDURE — 99232 SBSQ HOSP IP/OBS MODERATE 35: CPT | Performed by: PSYCHIATRY & NEUROLOGY

## 2023-06-02 PROCEDURE — 250N000013 HC RX MED GY IP 250 OP 250 PS 637: Performed by: PSYCHIATRY & NEUROLOGY

## 2023-06-02 PROCEDURE — 250N000013 HC RX MED GY IP 250 OP 250 PS 637: Performed by: EMERGENCY MEDICINE

## 2023-06-02 PROCEDURE — 124N000003 HC R&B MH SENIOR/ADOLESCENT

## 2023-06-02 PROCEDURE — 250N000013 HC RX MED GY IP 250 OP 250 PS 637: Performed by: PHYSICIAN ASSISTANT

## 2023-06-02 RX ORDER — RISPERIDONE 0.5 MG/1
0.5 TABLET ORAL 2 TIMES DAILY
Status: DISCONTINUED | OUTPATIENT
Start: 2023-06-02 | End: 2023-06-05

## 2023-06-02 RX ADMIN — PREDNISOLONE ACETATE 1 DROP: 10 SUSPENSION/ DROPS OPHTHALMIC at 07:50

## 2023-06-02 RX ADMIN — CHLORHEXIDINE GLUCONATE 0.12% ORAL RINSE 15 ML: 1.2 LIQUID ORAL at 07:49

## 2023-06-02 RX ADMIN — CALCIUM POLYCARBOPHIL 625 MG: 625 TABLET, FILM COATED ORAL at 20:19

## 2023-06-02 RX ADMIN — LEVOTHYROXINE SODIUM 50 MCG: 25 TABLET ORAL at 06:39

## 2023-06-02 RX ADMIN — LISINOPRIL 5 MG: 5 TABLET ORAL at 07:48

## 2023-06-02 RX ADMIN — OLANZAPINE 25 MG: 10 TABLET, ORALLY DISINTEGRATING ORAL at 20:19

## 2023-06-02 RX ADMIN — HYDROXYZINE HYDROCHLORIDE 25 MG: 25 TABLET, FILM COATED ORAL at 04:12

## 2023-06-02 RX ADMIN — CALCIUM POLYCARBOPHIL 625 MG: 625 TABLET, FILM COATED ORAL at 07:49

## 2023-06-02 RX ADMIN — ZINC SULFATE 220 MG (50 MG) CAPSULE 220 MG: CAPSULE at 07:49

## 2023-06-02 RX ADMIN — RISPERIDONE 1 MG: 0.5 TABLET ORAL at 07:49

## 2023-06-02 RX ADMIN — Medication 10 MG: at 20:21

## 2023-06-02 RX ADMIN — MONTELUKAST 10 MG: 10 TABLET, FILM COATED ORAL at 20:20

## 2023-06-02 RX ADMIN — PANTOPRAZOLE SODIUM 40 MG: 40 TABLET, DELAYED RELEASE ORAL at 06:39

## 2023-06-02 RX ADMIN — OLANZAPINE 5 MG: 5 TABLET, FILM COATED ORAL at 07:49

## 2023-06-02 RX ADMIN — APIXABAN 5 MG: 5 TABLET, FILM COATED ORAL at 20:19

## 2023-06-02 RX ADMIN — TOPIRAMATE 100 MG: 100 TABLET, FILM COATED ORAL at 20:21

## 2023-06-02 RX ADMIN — DOCUSATE SODIUM 100 MG: 100 CAPSULE, LIQUID FILLED ORAL at 07:49

## 2023-06-02 RX ADMIN — RISPERIDONE 0.5 MG: 0.5 TABLET ORAL at 20:21

## 2023-06-02 ASSESSMENT — ACTIVITIES OF DAILY LIVING (ADL)
DRESS: SCRUBS (BEHAVIORAL HEALTH);INDEPENDENT
ADLS_ACUITY_SCORE: 31
LAUNDRY: UNABLE TO COMPLETE
ORAL_HYGIENE: INDEPENDENT
HYGIENE/GROOMING: INDEPENDENT
ADLS_ACUITY_SCORE: 31
ADLS_ACUITY_SCORE: 31
HYGIENE/GROOMING: INDEPENDENT
DRESS: SCRUBS (BEHAVIORAL HEALTH);INDEPENDENT
ADLS_ACUITY_SCORE: 31
LAUNDRY: UNABLE TO COMPLETE
ORAL_HYGIENE: INDEPENDENT

## 2023-06-02 NOTE — PLAN OF CARE
Assessment/Intervention/Current Symtoms and Care Coordination:  -Reviewed pt's chart  -Meet with team to discuss pt care.Team reported that pt continues to be on SIO. Pt slept for 6 hours. Pt walked in the hallway with headphones on and was humming softly.    Discharge Plan or Goal:  To continue to stabilize pt's mental health status. Tentative plan for pt to return back to group home     Barriers to Discharge:  Pt continues to present as symptomatic (delusional mood). Pt is receiving ongoing stabilization and medication adjustment. Continue care coordination with [facility] to ascertain his acceptability for a return to the home.     Referral Status:  Will continue to coordinate with pt's group home.     Legal Status:  Huongxena Keenan is pt's legal Guardian      Contacts:  Shlomo Borja (Guardian) [phone]   [facility] ([phone])      Upcoming Meetings and Dates/Important Information and next steps:    None at this time.

## 2023-06-02 NOTE — PLAN OF CARE
"  Problem: Sleep Disturbance  Goal: Adequate Sleep/Rest  Outcome: Progressing   Goal Outcome Evaluation:    Plan of Care Reviewed With: patient      Patient is visible in the milieu majority of the shift walking around the milieu with a headphone on. He was observed humming but in a low tone. He is redirected whenever his voice is becoming loud and he follows instructions/directions. He was quiet while in the milieu. He naps for few minutes and walk around again. His appetite is excellent. He ate 100% both breakfast and lunch with snacks too. He attended one group activity this morning. Patient said that he feels \"great and I want to go home. Everybody wants to go home, right?\", then he laughed.  He denied wishing himself to be dead. Denied hearing voices. He is med compliant.  He remains on SIO and an order of no roommate r/t severe intrusiveness; poor boundaries and labile mood.     Her sister Katerina, the legal guardian gave a consent for LILIANA to Vero AGUILAR, the [facility] Care Coordinator who wants to get an update of the patient. Vero AGUILAR said that she can be contacted for a care conference or any discharge planning for the patient. Her Tel # is [phone] and her Fax # [phone].                 "

## 2023-06-02 NOTE — PROGRESS NOTES
"PSYCHIATRY  PROGRESS NOTE     DATE OF SERVICE   06/02/2023        CHIEF COMPLAINT   \" I am feeling great.\"       SUBJECTIVE   Nursing reports:  Patient is visible in the milieu majority of the shift walking around the milieu with a headphone on. He was observed humming but in a low tone. He is redirected whenever his voice is becoming loud and he follows instructions/directions. He was quiet while in the milieu. He naps for few minutes and walk around again. His appetite is excellent. He ate 100% both breakfast and lunch with snacks too. He attended one group activity this morning. Patient said that he feels \"great and I want to go home. Everybody wants to go home, right?\", then he laughed.  He denied wishing himself to be dead. Denied hearing voices. He is med compliant.  He remains on SIO and an order of no roommate r/t severe intrusiveness; poor boundaries and labile mood.      Her sister Katerina, the legal guardian gave a consent for LILIANA to Vero AGUILAR, the [facility] Care Coordinator who wants to get an update of the patient. Vero AGUILAR said that she can be contacted for a care conference or any discharge planning for the patient. Her Tel # is [phone] and her Fax # [phone].     Patient has been reviewed with the  earlier today.    Assessment/Intervention/Current Symtoms and Care Coordination:  -Reviewed pt's chart  -Meet with team to discuss pt care.Team reported that pt continues to be on SIO. Pt slept for 6 hours. Pt walked in the hallway with headphones on and was humming softly.        OBJECTIVE   Patient was seen and evaluated in the day area with one-to-one present during the assessment, this was a face-to-face evaluation.  During my interaction with the patient he continues to be delusional but will only verbalize his delusions if he is asked directly.  He was pleasant, calm and cooperative.  Patient has been pacing less today and he seemed to be less manic.  When talking about his medications " "patient verbalized that he only wants to make sure his sister is aware of the medication changes.  I informed the patient that his sister is aware as we spoke earlier during the week.  Today the patient did not demanded to leave the hospital.  He did call this writer Brittany but was easily redirectable.       MEDICATIONS   Medications:  Scheduled Meds:    apixaban ANTICOAGULANT  5 mg Oral BID     artificial tears   Right Eye At Bedtime     calcium polycarbophil  625 mg Oral BID     chlorhexidine  15 mL Swish & Spit Daily     docusate sodium  100 mg Oral Daily     levothyroxine  50 mcg Oral or NG Tube QAM AC     lisinopril  5 mg Oral Daily     melatonin  10 mg Oral At Bedtime     montelukast  10 mg Oral At Bedtime     OLANZapine  5 mg Oral Daily     OLANZapine zydis  25 mg Oral At Bedtime     pantoprazole  40 mg Oral QAM AC     prednisoLONE acetate  1 drop Right Eye Daily     risperiDONE  0.5 mg Oral BID     topiramate  100 mg Oral At Bedtime     zinc sulfate  220 mg Oral Daily     Continuous Infusions:  PRN Meds:.acetaminophen, alum & mag hydroxide-simethicone, artificial saliva, atropine, hydrALAZINE, hydrOXYzine, hypromellose-dextran, ipratropium, OLANZapine **OR** OLANZapine, senna-docusate    Medication adherence issues: MS Med Adherence Y/N: Yes, Hospitalization  Medication side effects: MEDICATION SIDE EFFECTS: no side effects reported  Benefit: Yes / No: Yes       ROS   A comprehensive review of systems was negative.       MENTAL STATUS EXAM   Vitals: /89   Pulse 98   Temp 97  F (36.1  C) (Oral)   Resp 16   Wt 98.3 kg (216 lb 11.4 oz)   SpO2 99%   BMI 37.20 kg/m        Appearance:  No apparent distress  Mood: \"I am great\"  Affect: Brighter, calm and engaged  was congruent to speech  Suicidal Ideation: PRESENT / ABSENT: absent   Homicidal Ideation: PRESENT / ABSENT: absent   Thought process: [place], more linear and goal directed  Thought content: significant for preoccupations and obsessions " Remains delusional but these seem to be less intense.  Fund of Knowledge: Below average  Attention/Concentration: Poor  Language ability:  Intact, speech is clear, normal in rate, rhythm and volume  Memory:  Immediate recall impaired, Short-term memory impaired and Long-term memory intact  Insight: Improving  Judgement: Fair  Orientation: Person and situation only  Psychomotor Behavior: slowed    Muscle Strength and Tone: MuscleStrength: Normal  Gait and Station: Not evaluated       LABS   personally reviewed.   No lab results found in last 7 days.  No results found for: PHENYTOIN, PHENOBARB, VALPROATE, CBMZ       DIAGNOSIS   Principal Problem:    Schizoaffective disorder, bipolar type (H)    Active Problem List:  Patient Active Problem List   Diagnosis     Closed head injury, initial encounter     Altered mental status, unspecified altered mental status type     Generalized weakness     Portal vein thrombosis     Pleural effusion     Generalized muscle weakness     Falls frequently     Other ascites     Acute pancreatitis, unspecified complication status, unspecified pancreatitis type     Pancreatic pseudocyst     Idiopathic acute pancreatitis, unspecified complication status     Allergic rhinitis     Fisher's esophagus     CTS (carpal tunnel syndrome)     Elevated liver enzymes     HTN (hypertension)     Hypothyroidism     Keratoconus     Major depressive disorder, recurrent episode, mild (H)     Intellectual disability     Morbid exogenous obesity (H)     Neuroleptic malignant syndrome     Obstructive sleep apnea syndrome     Bipolar affective disorder (H)     Seizure disorder (H)     Seizures, generalized convulsive (H)     Acute respiratory failure with hypoxia (H)     Anemia, unspecified     Anxiety disorder, unspecified     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)     Dietary zinc deficiency     Dry eye syndrome of bilateral lacrimal glands     Dry mouth, unspecified     Edema, unspecified      Gastro-esophageal reflux disease without esophagitis     Hyperosmolality and hypernatremia     Irritable bowel syndrome with constipation     Major depressive disorder, recurrent, unspecified (H)     Metabolic encephalopathy     Moderate protein-calorie malnutrition (H)     Other symbolic dysfunctions     Schizoaffective disorder, unspecified (H)     Thrombocytopenia, unspecified (H)     Unspecified asthma, uncomplicated     Urinary tract infection, site not specified     Vitamin D deficiency, unspecified     Schizoaffective disorder, bipolar type (H)          PLAN   1. Ongoing education given regarding diagnostic and treatment options with risks, benefits and alternatives and adequate verbalization of understanding.  2.  Medications       Melatonin 10 mg at bedtime       Zyprexa 5 mg daily and 25 mg at bedtime       Decrease Risperdal 0.5 mg 2 times a day.  We will continue decreasing the dose of Risperdal as the patient seemed to be responding better to the Zyprexa.       Topamax 100 mg at bedtime    3.  Medical team following as needed  4.   coordinating a safe discharge plan    Risk Assessment: [facility] RISK ASSESSMENT: Patient able to contract for safety    Coordination of Care:   Treatment Plan reviewed and physician signed, Care discussed with Care/Treatment Team Members, Chart reviewed and Patient seen      Re-Certification I certify that the inpatient psychiatric facility services furnished since the previous certification were, and continue to be, medically necessary for, either, treatment which could reasonably be expected to improve the patient s condition or diagnostic study and that the hospital records indicate that the services furnished were, either, intensive treatment services, admission and related services necessary for diagnostic study, or equivalent services.     I certify that the patient continues to need, on a daily basis, active treatment furnished directly by or requiring  the supervision of inpatient psychiatric facility personnel.   I estimate 14 days of hospitalization is necessary for proper treatment of the patient. My plans for post-hospital care for this patient are  group home     Alejandra Watikns MD    -     06/02/2023  -     3:21 PM    Total time  25 minutes with > 50%spent on coordination of cares and psycho-education.    This note was created with help of Dragon dictation system. Grammatical / typing errors are not intentional.    Alejandra Watkins MD

## 2023-06-02 NOTE — PLAN OF CARE
"  Problem: Sleep Disturbance  Goal: Adequate Sleep/Rest  Outcome: Progressing   Goal Outcome Evaluation:       Patient slept on and off the whole night. He walked in the hallway with a headphone on and was humming softly. [facility] He watched TV for a short time. Patient denied pain. He remains on SIO r/t poor boundaries; severe intrusiveness and labile mood. He has \"No Roommate order\" for the same reason. He slept for a total of 6 hours the whole shift.                 "

## 2023-06-02 NOTE — CARE PLAN
06/02/23 1422   Group Therapy Session   Group Attendance attended group session   Time Session Began 1015   Time Session Ended 1115   Total Time (minutes) 45   Total # Attendees 6   Group Type expressive therapy;recreation;task skill   Group Topic Covered coping skills/lifestyle management;problem-solving;leisure exploration/use of leisure time;cognitive activities   Group Session Detail [facility] group to facilitate coping skill exploration, use of cognitive skills and problem solving, creative expression, clinical observation and facilitation of social, cognitive, and kinesthetic performance skills.   Patient Response/Contribution cooperative with task   Patient Participation Detail pt arrived to group with SIO. pt required cues and encouragement for participation and gathering supplies. pt is appropriate during interactions with staff and peers. pt hummed to self while listening to headphones for duration of group. pt cleaned up own supplies with direction from staff. pt more concrete in responses.

## 2023-06-03 PROCEDURE — 250N000013 HC RX MED GY IP 250 OP 250 PS 637: Performed by: PSYCHIATRY & NEUROLOGY

## 2023-06-03 PROCEDURE — 250N000013 HC RX MED GY IP 250 OP 250 PS 637: Performed by: EMERGENCY MEDICINE

## 2023-06-03 PROCEDURE — 124N000003 HC R&B MH SENIOR/ADOLESCENT

## 2023-06-03 PROCEDURE — 250N000013 HC RX MED GY IP 250 OP 250 PS 637: Performed by: PHYSICIAN ASSISTANT

## 2023-06-03 RX ADMIN — LEVOTHYROXINE SODIUM 50 MCG: 25 TABLET ORAL at 06:43

## 2023-06-03 RX ADMIN — PREDNISOLONE ACETATE 1 DROP: 10 SUSPENSION/ DROPS OPHTHALMIC at 08:13

## 2023-06-03 RX ADMIN — CALCIUM POLYCARBOPHIL 625 MG: 625 TABLET, FILM COATED ORAL at 20:05

## 2023-06-03 RX ADMIN — RISPERIDONE 0.5 MG: 0.5 TABLET ORAL at 20:05

## 2023-06-03 RX ADMIN — HYDROXYZINE HYDROCHLORIDE 25 MG: 25 TABLET, FILM COATED ORAL at 18:10

## 2023-06-03 RX ADMIN — OLANZAPINE 25 MG: 10 TABLET, ORALLY DISINTEGRATING ORAL at 20:05

## 2023-06-03 RX ADMIN — OLANZAPINE 5 MG: 5 TABLET, FILM COATED ORAL at 08:12

## 2023-06-03 RX ADMIN — LISINOPRIL 5 MG: 5 TABLET ORAL at 08:10

## 2023-06-03 RX ADMIN — Medication 10 MG: at 20:05

## 2023-06-03 RX ADMIN — CHLORHEXIDINE GLUCONATE 0.12% ORAL RINSE 15 ML: 1.2 LIQUID ORAL at 08:10

## 2023-06-03 RX ADMIN — MONTELUKAST 10 MG: 10 TABLET, FILM COATED ORAL at 20:05

## 2023-06-03 RX ADMIN — APIXABAN 5 MG: 5 TABLET, FILM COATED ORAL at 08:10

## 2023-06-03 RX ADMIN — ZINC SULFATE 220 MG (50 MG) CAPSULE 220 MG: CAPSULE at 08:10

## 2023-06-03 RX ADMIN — DOCUSATE SODIUM 100 MG: 100 CAPSULE, LIQUID FILLED ORAL at 08:10

## 2023-06-03 RX ADMIN — APIXABAN 5 MG: 5 TABLET, FILM COATED ORAL at 20:05

## 2023-06-03 RX ADMIN — TOPIRAMATE 100 MG: 100 TABLET, FILM COATED ORAL at 20:05

## 2023-06-03 RX ADMIN — RISPERIDONE 0.5 MG: 0.5 TABLET ORAL at 08:10

## 2023-06-03 RX ADMIN — CALCIUM POLYCARBOPHIL 625 MG: 625 TABLET, FILM COATED ORAL at 08:09

## 2023-06-03 RX ADMIN — WHITE PETROLATUM 57.7 %-MINERAL OIL 31.9 % EYE OINTMENT: at 20:05

## 2023-06-03 RX ADMIN — PANTOPRAZOLE SODIUM 40 MG: 40 TABLET, DELAYED RELEASE ORAL at 06:43

## 2023-06-03 ASSESSMENT — ACTIVITIES OF DAILY LIVING (ADL)
ADLS_ACUITY_SCORE: 31
DRESS: SCRUBS (BEHAVIORAL HEALTH);INDEPENDENT
ADLS_ACUITY_SCORE: 31
HYGIENE/GROOMING: INDEPENDENT
ADLS_ACUITY_SCORE: 31
ORAL_HYGIENE: INDEPENDENT
ADLS_ACUITY_SCORE: 31
ORAL_HYGIENE: INDEPENDENT
LAUNDRY: UNABLE TO COMPLETE
ADLS_ACUITY_SCORE: 31
DRESS: SCRUBS (BEHAVIORAL HEALTH)
HYGIENE/GROOMING: INDEPENDENT

## 2023-06-03 NOTE — PLAN OF CARE
Problem: Sleep Disturbance  Goal: Adequate Sleep/Rest  Outcome: Progressing   Goal Outcome Evaluation:    Plan of Care Reviewed With: patient        Patient is sleeping soundly at the start of the shift with a headphone on. His compression stocking was removed. Patient has no roommate order and on SIO r/t poor boundaries; severe intrusiveness and labile mood.  Patient slept for a total of 6.75 hours.the whole shift.

## 2023-06-03 NOTE — PROGRESS NOTES
Per SIO staff, patient was walking down the [address], stumbled and started to fall to the ground but was able to catch himself with his hands and sat on the ground. He did not hit his head, no injuries sustained. Patient states he was just very tired. Provider notified. No new orders.

## 2023-06-03 NOTE — PLAN OF CARE
"Goal Outcome Evaluation:    Plan of Care Reviewed With: patient    Patient has been present in the milieu. He has been listening to headphones and pacing the halls. His speech is grandiose and rambling with a flight of ideas. He is disorganized and makes illogical statements. He states his group home turn into [address] and he knows this because he has been there several times while staying here. He states he was  at the age of 3 to Kylee and they have 1082 children and supports them by working at [employer]. He states he started working there at 3 as well. He denies SI and SIB. He denies AH and VH. He denies depression and anxiety. He states his mood is \"terrific\" and his concentration is \"perfect\". He denies pain. He endorses feeling safe and hopeful. His sleep is \"terrific\". His appetite is excellent. He attended one group today. He took a shower. He is independent with cares. He remains on SIO for Severe intrusiveness , poor boundaries,delusional thinking that precipitates labile mood                 "

## 2023-06-03 NOTE — PROVIDER NOTIFICATION
Patient has history of DVT. He is on 5mg of Eliquis BID. Patient has only gotten 5mg on 6/1 and 6/2. Called medicine to update them of situation. Per medicine only give 5mg at 2000 due to patient being therapeutic. Contacted pharmacy and they fixed time of medication from 1900 and 2000 to 0800 and 2000.

## 2023-06-03 NOTE — PLAN OF CARE
"  Plan of Care Reviewed With: patient      Problem: Psychotic Signs/Symptoms  Goal: Improved Behavioral Control (Psychotic Signs/Symptoms)  Outcome: Progressing  Goal: Improved Mood Symptoms  Outcome: Progressing     Problem: Disruptive Behavior  Goal: Improved Impulse and Aggression Control (Disruptive Behavior)  Outcome: Progressing  Goal: Improved Mood Symptoms (Disruptive Behavior)  Outcome: Progressing   Goal Outcome Evaluation:    Patient denies MH concerns, remains disoriented to situation and time. Reports living at a hotel. Mood is labile, at times elated, singing and laughing other times telling staff to \"leave me alone, I know what Im doing!\". Patient visible in milieu most of shift. Spends time walking the halls listening to music and singing. Mood has been relatively calm this shift, patient has been able to follow instructions most of the time and can be redirected.  Patient attempted to put plastic down sink in lounge, appears to be plastic packaging for forks. SIO staff attempted to stop patient, patient began to escalate, writer intervened as patient was not able to get plastic down the drain d/t drain cover. Patient was able to calm down, writer informed patient of the need to follow instructions for staff, patient agreed and reported \"I am calm\", no other behavioral event observed this shift.  /79 (BP Location: Left arm, Patient Position: Sitting)   Pulse 99   Temp 97.1  F (36.2  C) (Temporal)   Resp 16   Wt 98.3 kg (216 lb 11.4 oz)   SpO2 95%   BMI 37.20 kg/m      "

## 2023-06-04 PROCEDURE — 250N000013 HC RX MED GY IP 250 OP 250 PS 637: Performed by: PHYSICIAN ASSISTANT

## 2023-06-04 PROCEDURE — 124N000003 HC R&B MH SENIOR/ADOLESCENT

## 2023-06-04 PROCEDURE — 250N000013 HC RX MED GY IP 250 OP 250 PS 637: Performed by: EMERGENCY MEDICINE

## 2023-06-04 PROCEDURE — 250N000013 HC RX MED GY IP 250 OP 250 PS 637: Performed by: PSYCHIATRY & NEUROLOGY

## 2023-06-04 PROCEDURE — G0177 OPPS/PHP; TRAIN & EDUC SERV: HCPCS

## 2023-06-04 RX ADMIN — Medication 10 MG: at 20:37

## 2023-06-04 RX ADMIN — OLANZAPINE 5 MG: 5 TABLET, FILM COATED ORAL at 08:11

## 2023-06-04 RX ADMIN — OLANZAPINE 25 MG: 10 TABLET, ORALLY DISINTEGRATING ORAL at 20:36

## 2023-06-04 RX ADMIN — DOCUSATE SODIUM 100 MG: 100 CAPSULE, LIQUID FILLED ORAL at 08:11

## 2023-06-04 RX ADMIN — APIXABAN 5 MG: 5 TABLET, FILM COATED ORAL at 20:36

## 2023-06-04 RX ADMIN — PANTOPRAZOLE SODIUM 40 MG: 40 TABLET, DELAYED RELEASE ORAL at 06:32

## 2023-06-04 RX ADMIN — MONTELUKAST 10 MG: 10 TABLET, FILM COATED ORAL at 20:36

## 2023-06-04 RX ADMIN — ZINC SULFATE 220 MG (50 MG) CAPSULE 220 MG: CAPSULE at 08:11

## 2023-06-04 RX ADMIN — RISPERIDONE 0.5 MG: 0.5 TABLET ORAL at 08:11

## 2023-06-04 RX ADMIN — PREDNISOLONE ACETATE 1 DROP: 10 SUSPENSION/ DROPS OPHTHALMIC at 08:12

## 2023-06-04 RX ADMIN — CALCIUM POLYCARBOPHIL 625 MG: 625 TABLET, FILM COATED ORAL at 08:11

## 2023-06-04 RX ADMIN — CHLORHEXIDINE GLUCONATE 0.12% ORAL RINSE 15 ML: 1.2 LIQUID ORAL at 08:11

## 2023-06-04 RX ADMIN — RISPERIDONE 0.5 MG: 0.5 TABLET ORAL at 20:36

## 2023-06-04 RX ADMIN — TOPIRAMATE 100 MG: 100 TABLET, FILM COATED ORAL at 20:36

## 2023-06-04 RX ADMIN — LISINOPRIL 5 MG: 5 TABLET ORAL at 08:11

## 2023-06-04 RX ADMIN — LEVOTHYROXINE SODIUM 50 MCG: 25 TABLET ORAL at 06:32

## 2023-06-04 RX ADMIN — CALCIUM POLYCARBOPHIL 625 MG: 625 TABLET, FILM COATED ORAL at 20:36

## 2023-06-04 RX ADMIN — APIXABAN 5 MG: 5 TABLET, FILM COATED ORAL at 08:11

## 2023-06-04 ASSESSMENT — ACTIVITIES OF DAILY LIVING (ADL)
ADLS_ACUITY_SCORE: 31
ORAL_HYGIENE: INDEPENDENT
ADLS_ACUITY_SCORE: 31
HYGIENE/GROOMING: INDEPENDENT
HYGIENE/GROOMING: INDEPENDENT
ADLS_ACUITY_SCORE: 31
LAUNDRY: UNABLE TO COMPLETE
ADLS_ACUITY_SCORE: 31
DRESS: SCRUBS (BEHAVIORAL HEALTH)
DRESS: SCRUBS (BEHAVIORAL HEALTH);INDEPENDENT
ADLS_ACUITY_SCORE: 31
ADLS_ACUITY_SCORE: 31
ORAL_HYGIENE: INDEPENDENT

## 2023-06-04 NOTE — CARE PLAN
Occupational Therapy Group Note:     06/04/23 1640   Group Therapy Session   Group Attendance attended group session   Time Session Began 1015   Time Session Ended 1115   Total Time (minutes) 50   Total # Attendees 7   Group Type recreation   Group Topic Covered leisure exploration/use of leisure time;structured socialization   Group Session Detail OT Leisure Group: Milena Morrell   Patient Response/Contribution cooperative with task;disorganized;listened actively   Patient Participation Detail Patient engaged in a leisure activity with a visuospatial and cognitive component in order to promote: problem solving skills, improve attention, emphasize new learning, exercise cognitive skills, and foster leisure and relaxation. Patient reported one way in which he practices cognitive wellness is being around family. Patient appeared willing and motivated to engage in unfamiliar game this date. Patient listened actively to instructions of game. Patient required continuous verbal cueing throughout activity. Understanding of game parameters and object did not seem to improve as game continued. Patient was perseverating on winning a game card at one point in which he did not earn within the game parameters demonstrating patient's lack of understanding of game rules/object. Patient was not receptive to writer's explanation of why he did not win a game card during this specific round of play. Patient was able to be redirected throughout activity.

## 2023-06-04 NOTE — PLAN OF CARE
"  Problem: Suicidal Behavior  Goal: Suicidal Behavior is Absent or Managed  Outcome: Progressing     Problem: Sleep Disturbance  Goal: Adequate Sleep/Rest  Outcome: Progressing     Problem: Psychotic Signs/Symptoms  Goal: Improved Behavioral Control (Psychotic Signs/Symptoms)  Outcome: Progressing  Goal: Improved Mood Symptoms  Outcome: Progressing   Goal Outcome Evaluation:    Plan of Care Reviewed With: patient      Patient is pleasant, appears energized. Walking the halls listening to music, singing. Non compliant with elevating feet, d/t edema. Gui hose applied. Patient is medication compliant, although refuses unscheduled medications stating \"I don't need that\". Patient is redirectable and easily distracted. Thoughts are disorganized, speech is rambling. Patient has poor boundaries, often entering personal space. Disoriented to time and situation. Denies MH concerns. Appetite is excellent, continue with POC.      "

## 2023-06-04 NOTE — PLAN OF CARE
"  Problem: Psychotic Signs/Symptoms  Goal: Improved Behavioral Control (Psychotic Signs/Symptoms)  Outcome: Progressing     Problem: Psychotic Signs/Symptoms  Goal: Improved Mood Symptoms  Outcome: Progressing  Intervention: Optimize Emotion and Mood  Recent Flowsheet Documentation  Taken 6/3/2023 2104 by Prabha Boone RN  Diversional Activity: (using a headphone) music   Goal Outcome Evaluation:    Plan of Care Reviewed With: patient      Patient was active in the milieu pacing [facility] on. He is social with select peers. He watched TV with his peers too. His mood remains labile. He was loud at one point. He was redirected but then he said that \"I wanna go home. I am fed up staying here. I wanna go home\". Patient paced again in the hallway. Was offered non-pharmacological interventions other than the headphone but he declined. He took Hydroxyzine 25 mgs given as prn for anxiety. He also took all his bedtime meds. Half an hour later, patient went to bed and sleep soundly. Patient remains disorganized and delusional. He is alert and oriented x 1. He is able to follow instructions.     Patient's legs and feet remains edematous + 2. Compression stocking in place but was removed at HS. Patient is on SIO and with no roommate order r/t poor boundaries; severe intrusiveness and labile mood.                  "

## 2023-06-04 NOTE — PLAN OF CARE
Problem: Sleep Disturbance  Goal: Adequate Sleep/Rest  Outcome: Progressing   Goal Outcome Evaluation:    Plan of Care Reviewed With: patient        Patient was asleep at the start of the shift. He slept for a total of 6.75 hours. No complaints of pain in ay part of his body. He remains on SIO r/t poor boundaries; labile mood and severe intrusiveness. No complaints of pain.  He also has an order of no roommate r/t same of the above reason.

## 2023-06-05 PROCEDURE — H2032 ACTIVITY THERAPY, PER 15 MIN: HCPCS

## 2023-06-05 PROCEDURE — 250N000013 HC RX MED GY IP 250 OP 250 PS 637: Performed by: EMERGENCY MEDICINE

## 2023-06-05 PROCEDURE — 250N000013 HC RX MED GY IP 250 OP 250 PS 637: Performed by: PHYSICIAN ASSISTANT

## 2023-06-05 PROCEDURE — 250N000013 HC RX MED GY IP 250 OP 250 PS 637: Performed by: PSYCHIATRY & NEUROLOGY

## 2023-06-05 PROCEDURE — 99231 SBSQ HOSP IP/OBS SF/LOW 25: CPT | Performed by: PSYCHIATRY & NEUROLOGY

## 2023-06-05 PROCEDURE — G0177 OPPS/PHP; TRAIN & EDUC SERV: HCPCS

## 2023-06-05 PROCEDURE — 124N000003 HC R&B MH SENIOR/ADOLESCENT

## 2023-06-05 RX ORDER — OLANZAPINE 10 MG/1
10 TABLET ORAL DAILY
Status: DISCONTINUED | OUTPATIENT
Start: 2023-06-06 | End: 2023-06-21

## 2023-06-05 RX ADMIN — OLANZAPINE 5 MG: 5 TABLET, FILM COATED ORAL at 07:56

## 2023-06-05 RX ADMIN — CHLORHEXIDINE GLUCONATE 0.12% ORAL RINSE 15 ML: 1.2 LIQUID ORAL at 07:57

## 2023-06-05 RX ADMIN — OLANZAPINE 25 MG: 10 TABLET, ORALLY DISINTEGRATING ORAL at 19:43

## 2023-06-05 RX ADMIN — LISINOPRIL 5 MG: 5 TABLET ORAL at 08:03

## 2023-06-05 RX ADMIN — PANTOPRAZOLE SODIUM 40 MG: 40 TABLET, DELAYED RELEASE ORAL at 06:38

## 2023-06-05 RX ADMIN — RISPERIDONE 0.5 MG: 0.5 TABLET ORAL at 07:56

## 2023-06-05 RX ADMIN — WHITE PETROLATUM 57.7 %-MINERAL OIL 31.9 % EYE OINTMENT: at 19:43

## 2023-06-05 RX ADMIN — APIXABAN 5 MG: 5 TABLET, FILM COATED ORAL at 07:56

## 2023-06-05 RX ADMIN — TOPIRAMATE 100 MG: 100 TABLET, FILM COATED ORAL at 19:43

## 2023-06-05 RX ADMIN — Medication 10 MG: at 20:07

## 2023-06-05 RX ADMIN — CALCIUM POLYCARBOPHIL 625 MG: 625 TABLET, FILM COATED ORAL at 07:56

## 2023-06-05 RX ADMIN — ZINC SULFATE 220 MG (50 MG) CAPSULE 220 MG: CAPSULE at 07:56

## 2023-06-05 RX ADMIN — DOCUSATE SODIUM 100 MG: 100 CAPSULE, LIQUID FILLED ORAL at 07:56

## 2023-06-05 RX ADMIN — APIXABAN 5 MG: 5 TABLET, FILM COATED ORAL at 19:43

## 2023-06-05 RX ADMIN — OLANZAPINE 10 MG: 10 TABLET, FILM COATED ORAL at 10:05

## 2023-06-05 RX ADMIN — LEVOTHYROXINE SODIUM 50 MCG: 25 TABLET ORAL at 06:38

## 2023-06-05 RX ADMIN — CALCIUM POLYCARBOPHIL 625 MG: 625 TABLET, FILM COATED ORAL at 19:43

## 2023-06-05 RX ADMIN — MONTELUKAST 10 MG: 10 TABLET, FILM COATED ORAL at 19:43

## 2023-06-05 RX ADMIN — PREDNISOLONE ACETATE 1 DROP: 10 SUSPENSION/ DROPS OPHTHALMIC at 08:05

## 2023-06-05 ASSESSMENT — ACTIVITIES OF DAILY LIVING (ADL)
ADLS_ACUITY_SCORE: 31
HYGIENE/GROOMING: INDEPENDENT
ADLS_ACUITY_SCORE: 31
ORAL_HYGIENE: INDEPENDENT
ADLS_ACUITY_SCORE: 31
DRESS: SCRUBS (BEHAVIORAL HEALTH)
ADLS_ACUITY_SCORE: 31
LAUNDRY: UNABLE TO COMPLETE
ADLS_ACUITY_SCORE: 31

## 2023-06-05 NOTE — CARE PLAN
06/05/23 0953   Individualization/Patient Specific Goals   Patient Personal Strengths family/social support;medication/treatment adherence;stable living environment   Patient Vulnerabilities limited ability to read/write;limited social skills;lacks insight into illness   Anxieties, Fears or Concerns none   Individualized Care Needs Medication and stabilization   Interprofessional Rounds   Participants CTC;psychiatrist;nursing   Behavioral Team Discussion   Participants Dr. Yeni ALVA, [facility]   Progress Stabilization is ongoing   Anticipated length of stay 7 Days.   Continued Stay Criteria/Rationale Requires stabilization to reduce risk of imminent harm to himself and others.   Medical/Physical See H&P   Precautions See below   Plan Psychiatric assesemnt. Medication manangement. Therapeutic Mileu. Individual and group support.   Rationale for change in precautions or plan no change   Safety Plan CTC will do individual inpatient treatment planning and after care planning. CTC will discuss options for increasing community supports with the patient. CTC will coordinate with outpatient providers and will place referrals to ensure appointments are in place.   Anticipated Discharge Disposition group home     PRECAUTIONS AND SAFETY    Behavioral Orders   Procedures    Code 1 - Restrict to Unit    Code 2    Elopement precautions    Routine Programming     As clinically indicated    Sexual precautions    Status 15     Every 15 minutes.    Status Individual Observation     Patient SIO status reviewed with team/RN.  Please also refer to RN/team documentation for add'l detail.    -SIO staff to monitor following which have contributed to patient being on SIO:  Severe intrusiveness , poor boundaries,delusional thinking that precipitates labile mood  -Possible interventions SIO staff could use to support patient's treatment progress:  Redirect  patient,  offer activities  to distract   -When following observed, team will  review discontinuation of SIO:  Pt  is not intrusive , able to follow unit guidelines, able to self regulate behavior appropriately     Order Specific Question:   CONTINUOUS 24 hours / day     Answer:   5 feet     Order Specific Question:   Indications for SIO     Answer:   Self-injury risk       Safety  Safety WDL: WDL  Patient Location: patient room, own, lounge  Observed Behavior: calm, sitting  Observed Behavior (Comment): calm; sleeping  Safety Measures: environmental rounds completed, safety rounds completed, suicide check-in completed, clinical history reviewed, safety plan reviewed, self-directed behavior promoted  Diversional Activity: music (using a headphone)  Suicidality: Status 15, SIO (Status Individual Observation)  (NOTE - order will specify distance  Seizure precautions: clutter free environment  Assault: status continuous sight  Elopement Assessment: Statements about wanting to leave  Elopement Interventions: status 15, status continuous sight  Sexual: status 15, private room, status continuous sight

## 2023-06-05 NOTE — PLAN OF CARE
Assessment/Intervention/Current Symtoms and Care Coordination:  -Reviewed pt's chart  -Meet with team to discuss pt care.Team reported that pt continues to be on SIO. Pt slept 5.25 hours.       Discharge Plan or Goal:  To continue to stabilize pt's mental health status. Tentative plan for pt to return back to group home     Barriers to Discharge:  Pt continues to present as symptomatic (delusional mood). Pt is receiving ongoing stabilization and medication adjustment. Continue care coordination with [facility] to ascertain his acceptability for a return to the home.     Referral Status:  Will continue to coordinate with pt's group home.     Legal Status:  Huongxena Keenan is pt's legal Guardian      Contacts:  Shlomo Borja (Guardian) [phone]   Sadaf ([phone])      Upcoming Meetings and Dates/Important Information and next steps:    None at this time.

## 2023-06-05 NOTE — CARE PLAN
06/05/23 1509   Group Therapy Session   Group Attendance attended group session   Time Session Began 1015   Time Session Ended 1115   Total Time (minutes) 45   Total # Attendees 6   Group Type expressive therapy;recreation;task skill   Group Topic Covered coping skills/lifestyle management;problem-solving;cognitive activities;leisure exploration/use of leisure time   Group Session Detail [facility] group to facilitate coping skill exploration, use of cognitive skills and problem solving, creative expression, clinical observation and facilitation of social, cognitive, and kinesthetic performance skills.   Patient Response/Contribution cooperative with task   Patient Participation Detail pt arrived to group with SIO present. pt chose to engage in familiar creative task. pt was polite and appropriate during interactions with therapist. pt was able to follow therapist's instructions throughout activity.pt worked in a messy fashion. pt shared he was tired and wanted to rest. pt was directed to clean up supplies prior to leaving group space-pt complied.

## 2023-06-05 NOTE — PROGRESS NOTES
"PSYCHIATRY  PROGRESS NOTE     DATE OF SERVICE   06/05/2023        CHIEF COMPLAINT   \" I am doing awesome.\"       SUBJECTIVE   Nursing reports:  Patient paced up and down in the hallway with headphones on. Was restless, appeared agitated and was getting loud around 1000 am, prn olanzapine was given with good result. Presented with anxious mood. Affect was flat and blunted. Patient was medication compliant. Denies SI/SIB/hallucinations. States his appetite is excellent. Sleep is fantastic. Denied pain. Thought process is disorganized. Speech is rambling. Compression stockings on. Remains on SIO for self injury risk. BP (!) 128/91   Pulse 87   Temp 97.3  F (36.3  C)   Resp 18   Wt 91.7 kg (202 lb 2.6 oz)   SpO2 100%   BMI 34.70 kg/m       Patient has been reviewed with the  earlier today.    Assessment/Intervention/Current Symtoms and Care Coordination:  -Reviewed pt's chart  -Meet with team to discuss pt care.Team reported that pt continues to be on SIO. Pt slept 5.25 hours.      OBJECTIVE   Patient was seen and evaluated in the day area with one-to-one present during the assessment, this was a face-to-face evaluation.  At the moment of the assessment the patient presented as calm and pleasant.  He continues to call this writer During but he has been redirectable.  Patient remains delusional but overall he has been less agitated and aggressive than on prior visits.  He continues to need a one-to-one for redirection.  Patient is aware that I am going to be taking him off the Risperdal and increasing the dose of Zyprexa.  Currently the patient denies having any side effects from the medications.  Patient continues to denied having any thoughts of harming himself or harming others and has been able to contract for safety.       MEDICATIONS   Medications:  Scheduled Meds:    apixaban ANTICOAGULANT  5 mg Oral BID     artificial tears   Right Eye At Bedtime     calcium polycarbophil  625 mg Oral BID     " "chlorhexidine  15 mL Swish & Spit Daily     docusate sodium  100 mg Oral Daily     levothyroxine  50 mcg Oral or NG Tube QAM AC     lisinopril  5 mg Oral Daily     melatonin  10 mg Oral At Bedtime     montelukast  10 mg Oral At Bedtime     [START ON 6/6/2023] OLANZapine  10 mg Oral Daily     OLANZapine zydis  25 mg Oral At Bedtime     pantoprazole  40 mg Oral QAM AC     prednisoLONE acetate  1 drop Right Eye Daily     topiramate  100 mg Oral At Bedtime     zinc sulfate  220 mg Oral Daily     Continuous Infusions:  PRN Meds:.acetaminophen, alum & mag hydroxide-simethicone, artificial saliva, atropine, hydrALAZINE, hydrOXYzine, hypromellose-dextran, ipratropium, OLANZapine **OR** OLANZapine, senna-docusate    Medication adherence issues: MS Med Adherence Y/N: Yes, Hospitalization  Medication side effects: MEDICATION SIDE EFFECTS: no side effects reported  Benefit: Yes / No: Yes       ROS   A comprehensive review of systems was negative.       MENTAL STATUS EXAM   Vitals: BP (!) 128/91   Pulse 87   Temp 97.3  F (36.3  C)   Resp 18   Wt 91.7 kg (202 lb 2.6 oz)   SpO2 100%   BMI 34.70 kg/m        Appearance:  No apparent distress  Mood: \"I am doing awesome\"  Affect: Calm and very directable  was congruent to speech  Suicidal Ideation: PRESENT / ABSENT: absent   Homicidal Ideation: PRESENT / ABSENT: absent   Thought process: Mcintosh, more linear and goal directed  Thought content: significant for preoccupations and obsessions Remains delusional but these seem to be less intense every day.  Fund of Knowledge: Below average  Attention/Concentration: Poor  Language ability:  Intact, speech is clear, normal in rate, rhythm and volume  Memory:  Immediate recall impaired, Short-term memory impaired and Long-term memory intact  Insight: Fair  Judgement: Fair  Orientation: Person and situation only  Psychomotor Behavior: slowed    Muscle Strength and Tone: MuscleStrength: Normal  Gait and Station: Stable on his feet   "     LABS   personally reviewed.   No lab results found in last 7 days.  No results found for: PHENYTOIN, PHENOBARB, VALPROATE, CBMZ       DIAGNOSIS   Principal Problem:    Schizoaffective disorder, bipolar type (H)    Active Problem List:  Patient Active Problem List   Diagnosis     Closed head injury, initial encounter     Altered mental status, unspecified altered mental status type     Generalized weakness     Portal vein thrombosis     Pleural effusion     Generalized muscle weakness     Falls frequently     Other ascites     Acute pancreatitis, unspecified complication status, unspecified pancreatitis type     Pancreatic pseudocyst     Idiopathic acute pancreatitis, unspecified complication status     Allergic rhinitis     Fisher's esophagus     CTS (carpal tunnel syndrome)     Elevated liver enzymes     HTN (hypertension)     Hypothyroidism     Keratoconus     Major depressive disorder, recurrent episode, mild (H)     Intellectual disability     Morbid exogenous obesity (H)     Neuroleptic malignant syndrome     Obstructive sleep apnea syndrome     Bipolar affective disorder (H)     Seizure disorder (H)     Seizures, generalized convulsive (H)     Acute respiratory failure with hypoxia (H)     Anemia, unspecified     Anxiety disorder, unspecified     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)     Dietary zinc deficiency     Dry eye syndrome of bilateral lacrimal glands     Dry mouth, unspecified     Edema, unspecified     Gastro-esophageal reflux disease without esophagitis     Hyperosmolality and hypernatremia     Irritable bowel syndrome with constipation     Major depressive disorder, recurrent, unspecified (H)     Metabolic encephalopathy     Moderate protein-calorie malnutrition (H)     Other symbolic dysfunctions     Schizoaffective disorder, unspecified (H)     Thrombocytopenia, unspecified (H)     Unspecified asthma, uncomplicated     Urinary tract infection, site not specified      Vitamin D deficiency, unspecified     Schizoaffective disorder, bipolar type (H)          PLAN   1. Ongoing education given regarding diagnostic and treatment options with risks, benefits and alternatives and adequate verbalization of understanding.  2.  Medications       Melatonin 10 mg at bedtime       Increase Zyprexa to 10 mg daily and 25 mg at bedtime       Discontinue Risperdal            Topamax 100 mg at bedtime    3.  Medical team following as needed  4.   coordinating a safe discharge plan    Risk Assessment: [facility] RISK ASSESSMENT: Patient able to contract for safety    Coordination of Care:   Treatment Plan reviewed and physician signed, Care discussed with Care/Treatment Team Members, Chart reviewed and Patient seen      Re-Certification I certify that the inpatient psychiatric facility services furnished since the previous certification were, and continue to be, medically necessary for, either, treatment which could reasonably be expected to improve the patient s condition or diagnostic study and that the hospital records indicate that the services furnished were, either, intensive treatment services, admission and related services necessary for diagnostic study, or equivalent services.     I certify that the patient continues to need, on a daily basis, active treatment furnished directly by or requiring the supervision of inpatient psychiatric facility personnel.   I estimate 14 days of hospitalization is necessary for proper treatment of the patient. My plans for post-hospital care for this patient are  group home     Alejandra Watkins MD    -     06/05/2023  -     3:11 PM    Total time  25 minutes with > 50%spent on coordination of cares and psycho-education.    This note was created with help of Dragon dictation system. Grammatical / typing errors are not intentional.    Alejandra Watkins MD

## 2023-06-05 NOTE — PLAN OF CARE
"Goal Outcome Evaluation:    Plan of Care Reviewed With: patient      Patient has been pacing the halls listening to music on his headphones all shift. He still continues to have poor boundaries and has difficulty sitting for very long. He denies SI and SIB. He denies AH and VH. He denies depression and anxiety. His speech is rambling and garbled at times. He makes grandiose statements then laughs hysterically. He states his concentration is \"fantastic\". His mood is \"fantastic, terrific and everything nice\". He denies pain. He endorses feeling safe and hopeful. His coping skills are listening to music, singing and dancing. His sleep is \"great\" and his appetite is \"excellent\". He attended groups. He is social with peers and staff. He took a shower today. He is independent with ADL's. His only concern is \"I want to go home so I can enjoy my life\". He still believes his groups home was turned into a [address] and he has been  to [place] since the age of 3 with 1082 children. He is medication compliant. He remains on SIO for Severe intrusiveness , poor boundaries,delusional thinking that precipitates labile mood.                 "

## 2023-06-05 NOTE — PLAN OF CARE
Patient paced up and down in the hallway with headphones on. Was restless, appeared agitated and was getting loud around 1000 am, prn olanzapine was given with good result. Presented with anxious mood. Affect was flat and blunted. Patient was medication compliant. Denies SI/SIB/hallucinations. States his appetite is excellent. Sleep is fantastic. Denied pain. Thought process is disorganized. Speech is rambling. Compression stockings on. Remains on SIO for self injury risk. BP (!) 128/91   Pulse 87   Temp 97.3  F (36.3  C)   Resp 18   Wt 91.7 kg (202 lb 2.6 oz)   SpO2 100%   BMI 34.70 kg/m

## 2023-06-05 NOTE — PLAN OF CARE
Problem: Sleep Disturbance  Goal: Adequate Sleep/Rest  Outcome: Progressing   Goal Outcome Evaluation:       Patient is asleep at the start of the shift. He is snoring loud. Nothing unusual noted. He is on SIO and with no roommate order r/t severe intrusiveness; labile mood and poor boundaries. Patient slept a total of 5.25 hours the whole night. He woke up early almost 0500. He sat infront of the TV watching some programs while drinking lemonade with a bar of nutrigrain. Patient has a headphone on and many times redirected because he was humming loud. Patient was redirectable. He went to the bathroom and voided 4x the whole shift.

## 2023-06-05 NOTE — PLAN OF CARE
Problem: Suicidal Behavior  Goal: Suicidal Behavior is Absent or Managed  Outcome: Progressing    Problem: Psychotic Signs/Symptoms  Goal: Improved Behavioral Control (Psychotic Signs/Symptoms)  Outcome: Progressing       Problem: Fall Injury Risk  Goal: Absence of Fall and Fall-Related Injury  Outcome: Progressing    Patient up and visible on the unit at the beginning of the shift, pacing the [address] with headphones  listening to music, patient was a little loud at the beginning of the shift but easily redirected.  Sat in the longue with 1:1 watching TV and drinking some coffee, patient is pleasant, has full range affect, mood is calm, endorsed depression, denies anxiety,SI/SIB/hallucinations, patient can be loud at times as patient sings while listening to music on his headphones. Denies physical pain or discomfort. Verbally contracts for safety, safety checks and precautions in place. Medication compliant with no stated or observed side effects. Patient is eating and drinking adequately, ate 100% of meal at dinner and  hydrating well, Apart from being loud at times, no other behavioral concerns noted. Able to make needs known. This writer will continue to monitor and offer therapeutic support as needed for the rest of the shift per plan of care.    /61 (BP Location: Right arm, Patient Position: Sitting)   Pulse 83   Temp 97.4  F (36.3  C) (Temporal)   Resp 18   Wt 91.7 kg (202 lb 2.6 oz)   SpO2 97%   BMI 34.70 kg/m

## 2023-06-06 PROCEDURE — H2032 ACTIVITY THERAPY, PER 15 MIN: HCPCS

## 2023-06-06 PROCEDURE — 250N000013 HC RX MED GY IP 250 OP 250 PS 637: Performed by: PSYCHIATRY & NEUROLOGY

## 2023-06-06 PROCEDURE — 250N000013 HC RX MED GY IP 250 OP 250 PS 637: Performed by: PHYSICIAN ASSISTANT

## 2023-06-06 PROCEDURE — 124N000003 HC R&B MH SENIOR/ADOLESCENT

## 2023-06-06 PROCEDURE — 99231 SBSQ HOSP IP/OBS SF/LOW 25: CPT | Performed by: PSYCHIATRY & NEUROLOGY

## 2023-06-06 PROCEDURE — 250N000013 HC RX MED GY IP 250 OP 250 PS 637: Performed by: EMERGENCY MEDICINE

## 2023-06-06 RX ADMIN — CHLORHEXIDINE GLUCONATE 0.12% ORAL RINSE 15 ML: 1.2 LIQUID ORAL at 07:43

## 2023-06-06 RX ADMIN — PANTOPRAZOLE SODIUM 40 MG: 40 TABLET, DELAYED RELEASE ORAL at 06:38

## 2023-06-06 RX ADMIN — ZINC SULFATE 220 MG (50 MG) CAPSULE 220 MG: CAPSULE at 07:44

## 2023-06-06 RX ADMIN — CALCIUM POLYCARBOPHIL 625 MG: 625 TABLET, FILM COATED ORAL at 07:44

## 2023-06-06 RX ADMIN — CALCIUM POLYCARBOPHIL 625 MG: 625 TABLET, FILM COATED ORAL at 20:06

## 2023-06-06 RX ADMIN — OLANZAPINE 10 MG: 10 TABLET, FILM COATED ORAL at 07:44

## 2023-06-06 RX ADMIN — OLANZAPINE 25 MG: 10 TABLET, ORALLY DISINTEGRATING ORAL at 20:09

## 2023-06-06 RX ADMIN — WHITE PETROLATUM 57.7 %-MINERAL OIL 31.9 % EYE OINTMENT: at 20:12

## 2023-06-06 RX ADMIN — MONTELUKAST 10 MG: 10 TABLET, FILM COATED ORAL at 20:06

## 2023-06-06 RX ADMIN — LISINOPRIL 5 MG: 5 TABLET ORAL at 07:44

## 2023-06-06 RX ADMIN — DOCUSATE SODIUM 100 MG: 100 CAPSULE, LIQUID FILLED ORAL at 07:44

## 2023-06-06 RX ADMIN — APIXABAN 5 MG: 5 TABLET, FILM COATED ORAL at 20:06

## 2023-06-06 RX ADMIN — TOPIRAMATE 100 MG: 100 TABLET, FILM COATED ORAL at 20:06

## 2023-06-06 RX ADMIN — Medication 10 MG: at 20:06

## 2023-06-06 RX ADMIN — APIXABAN 5 MG: 5 TABLET, FILM COATED ORAL at 07:44

## 2023-06-06 RX ADMIN — LEVOTHYROXINE SODIUM 50 MCG: 25 TABLET ORAL at 06:38

## 2023-06-06 RX ADMIN — HYDROXYZINE HYDROCHLORIDE 25 MG: 25 TABLET, FILM COATED ORAL at 04:24

## 2023-06-06 RX ADMIN — OLANZAPINE 10 MG: 10 TABLET, FILM COATED ORAL at 16:32

## 2023-06-06 RX ADMIN — PREDNISOLONE ACETATE 1 DROP: 10 SUSPENSION/ DROPS OPHTHALMIC at 07:44

## 2023-06-06 ASSESSMENT — ACTIVITIES OF DAILY LIVING (ADL)
ADLS_ACUITY_SCORE: 41
ADLS_ACUITY_SCORE: 31
DRESS: SCRUBS (BEHAVIORAL HEALTH)
ORAL_HYGIENE: PROMPTS;INDEPENDENT
HYGIENE/GROOMING: PROMPTS
ORAL_HYGIENE: PROMPTS
ADLS_ACUITY_SCORE: 31
ADLS_ACUITY_SCORE: 31
ADLS_ACUITY_SCORE: 41
ADLS_ACUITY_SCORE: 41
ADLS_ACUITY_SCORE: 31
ADLS_ACUITY_SCORE: 41
HYGIENE/GROOMING: PROMPTS;INDEPENDENT
ADLS_ACUITY_SCORE: 31
DRESS: SCRUBS (BEHAVIORAL HEALTH)
ADLS_ACUITY_SCORE: 31

## 2023-06-06 NOTE — PROGRESS NOTES
"PSYCHIATRY  PROGRESS NOTE     DATE OF SERVICE   06/06/2023        CHIEF COMPLAINT   \" I am doing good.\"       SUBJECTIVE   Nursing reports:  Pt has been active in the milieu, paced the cortez listening to the music on his headphone and signing along with it, Persistent and Demanding  to get the TV remote , \" It's mine\"and got upset when it was not given to him ,unable to understand explanations and reasoning but was easily redirected.  Attended and participated in the Dance movement and General Health group, but was easily distracted and walked out of the groups. Constantly pulled the toilet call light every time he entered the toilet.  Pt denied felling depressed or anxious, denied SI/SIB or any forms of hallucinations.  Appetite good and vital signs stable. BLE still edematous but non pitting, TEDS applied,Denied pain  and discomforts, medication complaint and no prns administered.  Remained on SIO and no roommate order for severe intrusiveness . Poor boundaries and labile mood        Patient has been reviewed with the  earlier today.    Assessment/Intervention/Current Symtoms and Care Coordination:  -Reviewed pt's chart  -Meet with team to discuss pt care.Team reported that pt continues to be on SIO and no roommate due to poor boundaries, labile mood and severe intrusiveness.. Pt slept total of 6.5 hours. .      OBJECTIVE   Patient was seen and evaluated in the day area with one-to-one present during the assessment, this was a face-to-face evaluation.  At the moment of the assessment the patient presented as calm, pleasant and cooperative.  Today he was not demanding to go home and seemed to be easily redirectable.  It seems that the patient has been able to tolerate well the medication changes and I have not observed the patient being overly sedated, unsteady or complaining of feeling dizzy.  I will give the patient another 24 hours and then tomorrow decide if he can be off the one-to-one.  If he is " "able to tolerate being on his own then I will be discussing with the sister the possibility of him going back to the group home.       MEDICATIONS   Medications:  Scheduled Meds:    apixaban ANTICOAGULANT  5 mg Oral BID     artificial tears   Right Eye At Bedtime     calcium polycarbophil  625 mg Oral BID     chlorhexidine  15 mL Swish & Spit Daily     docusate sodium  100 mg Oral Daily     levothyroxine  50 mcg Oral or NG Tube QAM AC     lisinopril  5 mg Oral Daily     melatonin  10 mg Oral At Bedtime     montelukast  10 mg Oral At Bedtime     OLANZapine  10 mg Oral Daily     OLANZapine zydis  25 mg Oral At Bedtime     pantoprazole  40 mg Oral QAM AC     prednisoLONE acetate  1 drop Right Eye Daily     topiramate  100 mg Oral At Bedtime     zinc sulfate  220 mg Oral Daily     Continuous Infusions:  PRN Meds:.acetaminophen, alum & mag hydroxide-simethicone, artificial saliva, atropine, hydrALAZINE, hydrOXYzine, hypromellose-dextran, ipratropium, OLANZapine **OR** OLANZapine, senna-docusate    Medication adherence issues: MS Med Adherence Y/N: Yes, Hospitalization  Medication side effects: MEDICATION SIDE EFFECTS: no side effects reported  Benefit: Yes / No: Yes       ROS   A comprehensive review of systems was negative.       MENTAL STATUS EXAM   Vitals: /81 (BP Location: Right arm)   Pulse 82   Temp 97.8  F (36.6  C) (Temporal)   Resp 18   Wt 92.6 kg (204 lb 2.3 oz)   SpO2 97%   BMI 35.04 kg/m        Appearance:  No apparent distress  Mood: \"I am doing good\"  Affect: Calm and very directable  was congruent to speech  Suicidal Ideation: PRESENT / ABSENT: absent   Homicidal Ideation: PRESENT / ABSENT: absent     Thought process: [place], more linear and goal directed  Thought content: No delusions elicited today.  Fund of Knowledge: Below average  Attention/Concentration: Poor  Language ability:  Intact, speech is clear, normal in rate, rhythm and volume  Memory:  Immediate recall impaired, Short-term " memory impaired and Long-term memory intact  Insight: Fair  Judgement: Fair  Orientation: Person and situation only  Psychomotor Behavior: slowed    Muscle Strength and Tone: MuscleStrength: Normal  Gait and Station: Stable on his feet       LABS   personally reviewed.   No lab results found in last 7 days.  No results found for: PHENYTOIN, PHENOBARB, VALPROATE, CBMZ       DIAGNOSIS   Principal Problem:    Schizoaffective disorder, bipolar type (H)    Active Problem List:  Patient Active Problem List   Diagnosis     Closed head injury, initial encounter     Altered mental status, unspecified altered mental status type     Generalized weakness     Portal vein thrombosis     Pleural effusion     Generalized muscle weakness     Falls frequently     Other ascites     Acute pancreatitis, unspecified complication status, unspecified pancreatitis type     Pancreatic pseudocyst     Idiopathic acute pancreatitis, unspecified complication status     Allergic rhinitis     Fisher's esophagus     CTS (carpal tunnel syndrome)     Elevated liver enzymes     HTN (hypertension)     Hypothyroidism     Keratoconus     Major depressive disorder, recurrent episode, mild (H)     Intellectual disability     Morbid exogenous obesity (H)     Neuroleptic malignant syndrome     Obstructive sleep apnea syndrome     Bipolar affective disorder (H)     Seizure disorder (H)     Seizures, generalized convulsive (H)     Acute respiratory failure with hypoxia (H)     Anemia, unspecified     Anxiety disorder, unspecified     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)     Dietary zinc deficiency     Dry eye syndrome of bilateral lacrimal glands     Dry mouth, unspecified     Edema, unspecified     Gastro-esophageal reflux disease without esophagitis     Hyperosmolality and hypernatremia     Irritable bowel syndrome with constipation     Major depressive disorder, recurrent, unspecified (H)     Metabolic encephalopathy     Moderate  protein-calorie malnutrition (H)     Other symbolic dysfunctions     Schizoaffective disorder, unspecified (H)     Thrombocytopenia, unspecified (H)     Unspecified asthma, uncomplicated     Urinary tract infection, site not specified     Vitamin D deficiency, unspecified     Schizoaffective disorder, bipolar type (H)          PLAN   1. Ongoing education given regarding diagnostic and treatment options with risks, benefits and alternatives and adequate verbalization of understanding.  2.  Medications       Melatonin 10 mg at bedtime       Zyprexa to 10 mg daily and 25 mg at bedtime          Topamax 100 mg at bedtime    3.  Medical team following as needed  4.   coordinating a safe discharge plan    Risk Assessment: [facility] RISK ASSESSMENT: Patient able to contract for safety    Coordination of Care:   Treatment Plan reviewed and physician signed, Care discussed with Care/Treatment Team Members, Chart reviewed and Patient seen      Re-Certification I certify that the inpatient psychiatric facility services furnished since the previous certification were, and continue to be, medically necessary for, either, treatment which could reasonably be expected to improve the patient s condition or diagnostic study and that the hospital records indicate that the services furnished were, either, intensive treatment services, admission and related services necessary for diagnostic study, or equivalent services.     I certify that the patient continues to need, on a daily basis, active treatment furnished directly by or requiring the supervision of inpatient psychiatric facility personnel.   I estimate 14 days of hospitalization is necessary for proper treatment of the patient. My plans for post-hospital care for this patient are  group home     Alejandra Watkins MD    -     06/06/2023  -     2:49 PM    Total time  25 minutes with > 50%spent on coordination of cares and psycho-education.    This note was created  with help of Dragon dictation system. Grammatical / typing errors are not intentional.    Alejandra Watkins MD

## 2023-06-06 NOTE — PLAN OF CARE
"  Problem: Psychotic Signs/Symptoms  Goal: Improved Behavioral Control (Psychotic Signs/Symptoms)  Outcome: Progressing     Problem: Psychotic Signs/Symptoms  Goal: Increased Participation and Engagement (Psychotic Signs/Symptoms)  Outcome: Progressing  Intervention: Facilitate Participation and Engagement  Recent Flowsheet Documentation  Taken 6/6/2023 1131 by Lotus Montenegro RN  Diversional Activity: music   Goal Outcome Evaluation:         Pt has been active in the milieu, paced the cortez listening to the music on his headphone and signing along with it, Persistent and Demanding  to get the TV remote , \" It's mine\"and got upset when it was not given to him ,unable to understand explanations and reasoning but was easily redirected.  Attended and participated in the Dance movement and General Health group, but was easily distracted and walked out of the groups. Constantly pulled the toilet call light every time he entered the toilet.  Pt denied felling depressed or anxious, denied SI/SIB or any forms of hallucinations.Mood was generally happy, laughing at times, only got irritable when redirected.  Appetite good and vital signs stable. BLE still edematous but non pitting, TEDS applied,Denied pain  and discomforts, medication complaint and no prns administered.  Remained on SIO and no roommate order for severe intrusiveness . Poor boundaries and labile mood               "

## 2023-06-06 NOTE — PLAN OF CARE
Assessment/Intervention/Current Symtoms and Care Coordination:  -Reviewed pt's chart  -Meet with team to discuss pt care.Team reported that pt continues to be on SIO and no roommate due to poor boundaries, labile mood and severe intrusiveness.. Pt slept total of 6.5 hours. .       Discharge Plan or Goal:  To continue to stabilize pt's mental health status. Tentative plan for pt to return back to group home     Barriers to Discharge:  Pt continues to present as symptomatic (delusional mood). Pt is receiving ongoing stabilization and medication adjustment. Continue care coordination with [facility] to ascertain his acceptability for a return to the home.     Referral Status:  Will continue to coordinate with pt's group home.     Legal Status:  Huong Shlomo is pt's legal Guardian      Contacts:  Shlomo Borja (Guardian) [phone]   Sadaf ([phone])      Upcoming Meetings and Dates/Important Information and next steps:    None at this time.

## 2023-06-06 NOTE — PROGRESS NOTES
"Pt arrived slightly late and disorganized.  For example, wearing his shirt on backwards and sitting with his back to the group, facing a TV that wasn't on and stating he expects to \"watch the music awards.\"  By redirecting pt toward the music-focus of the dance/movement therapy (D/MT) session, pt could join some of the group processes, though struggled using interactive movements appropriately with a prop.  Pt was unable to follow the movement of others in synchronous movements, but could initiate his own creative expressive movement. When peers joined his movements, however, he often stopped abruptly, doing a completely different type of motion.  For example, he through a ball immediately back to this therapist, laid his head down on the table, and appeared to \"sleep.\"  He enjoyed an image-based movement from a previous session of a self-hug and repeated this throughout the session while allowing peers to join this. He remained throughout the balance of the session.       06/06/23 1130   Expressive Therapy   Therapy Type dance/movement   Minutes of Treatment 45       "

## 2023-06-06 NOTE — CARE PLAN
"Occupational Therapy     06/06/23 1500   Group Therapy Session   Group Attendance attended group session   Time Session Began 1015   Time Session Ended 1115   Total Time (minutes) 10   Total # Attendees 5   Group Type task skill;psychoeducation   Group Topic Covered coping skills/lifestyle management;cognitive activities;leisure exploration/use of leisure time;problem-solving;structured socialization   Group Session Detail OT: Education on healthy activity engagement with creative hands-on endeavor or cognitive activity (OT clinic) to increase concentration, focus, attention to task/detail, decision making, problem solving, frustration tolerance, task follow through, coping with stress, healthy leisure engagement, creative expression, and social engagement   Patient Response/Contribution confused;disorganized;verbalizations were off topic   Patient Participation Detail Pt arrived late to group and immediately sat away from group and was able to recall he had started a \"pig\" project; pt unable to recall the location of the pending projects or where to located needed supplies. Once pt received his project he appeared confused as he stated \"I need to add a rock band to this. You know, a rock band.\" Pt unable to provide coherent information about what he meant by a rock band and what supplies he needed to complete the rock band. Therapist provided pt with two options of star stickers to add to his project. Pt able to decide on a single design and applied several stickers in a quick and semi-messy manner; pt did not ensure that all stickers were fully attached to his project. Pt intermittently appeared drowsy as he was observed to close hi eyes and drop his head when he wasn't engaging in a goal-directed activity. Pt abruptly left group area and did not clean-up proejct. SIO present for duration.       "

## 2023-06-06 NOTE — PLAN OF CARE
"  Problem: Psychotic Signs/Symptoms  Goal: Improved Behavioral Control (Psychotic Signs/Symptoms)  Note: Patient became loud, intrusive and demanding. He said to the staff at .\" I want potato chips now!\" Patient was poorly redirectable. He was walked back to his room and given 10 mg Zyprexa at 1632 hours. Patient took the medication without issue. Will continue to monitor for safety and behaviors.                           "

## 2023-06-06 NOTE — CARE PLAN
Occupational Therapy Group Note:     06/06/23 1622   Group Therapy Session   Group Attendance attended group session   Time Session Began 1300   Time Session Ended 1415   Total Time (minutes) 20 (no charge)   Total # Attendees 5   Group Type task skill   Group Topic Covered cognitive activities;structured socialization   Group Session Detail Cognitive wellness discussion and activity group   Patient Response/Contribution confused;disorganized;unable to sequence the task   Patient Participation Detail The focus of this therapy group was to actively engage the patient in a variety of stimulating cognitive based activities to promote overall cognitive wellness in order for patient to better perform and engage in meaningful and ordinary everyday tasks. Patient was disorganized and difficult to direct attention to cognitive task. Patient was noted to be laughing throughout group which was not appropriate to the situation/task. Patient appeared disorganized in thought. Patient appears to lack attention to task; easily distractible. Patient's participation in cognitive task was poor; unable to therapeutically engage and follow-through with tasks despite 1:1 cueing. Patient left group early without return. SIO present for full duration of group.

## 2023-06-07 PROCEDURE — 250N000013 HC RX MED GY IP 250 OP 250 PS 637: Performed by: PSYCHIATRY & NEUROLOGY

## 2023-06-07 PROCEDURE — 250N000013 HC RX MED GY IP 250 OP 250 PS 637: Performed by: PHYSICIAN ASSISTANT

## 2023-06-07 PROCEDURE — 124N000003 HC R&B MH SENIOR/ADOLESCENT

## 2023-06-07 PROCEDURE — 250N000013 HC RX MED GY IP 250 OP 250 PS 637: Performed by: EMERGENCY MEDICINE

## 2023-06-07 PROCEDURE — 99232 SBSQ HOSP IP/OBS MODERATE 35: CPT | Performed by: PSYCHIATRY & NEUROLOGY

## 2023-06-07 RX ADMIN — DOCUSATE SODIUM 100 MG: 100 CAPSULE, LIQUID FILLED ORAL at 08:33

## 2023-06-07 RX ADMIN — PANTOPRAZOLE SODIUM 40 MG: 40 TABLET, DELAYED RELEASE ORAL at 06:39

## 2023-06-07 RX ADMIN — OLANZAPINE 10 MG: 10 TABLET, FILM COATED ORAL at 17:36

## 2023-06-07 RX ADMIN — HYDROXYZINE HYDROCHLORIDE 25 MG: 25 TABLET, FILM COATED ORAL at 17:36

## 2023-06-07 RX ADMIN — APIXABAN 5 MG: 5 TABLET, FILM COATED ORAL at 20:18

## 2023-06-07 RX ADMIN — APIXABAN 5 MG: 5 TABLET, FILM COATED ORAL at 08:33

## 2023-06-07 RX ADMIN — WHITE PETROLATUM 57.7 %-MINERAL OIL 31.9 % EYE OINTMENT: at 20:24

## 2023-06-07 RX ADMIN — LEVOTHYROXINE SODIUM 50 MCG: 25 TABLET ORAL at 06:39

## 2023-06-07 RX ADMIN — TOPIRAMATE 100 MG: 100 TABLET, FILM COATED ORAL at 20:18

## 2023-06-07 RX ADMIN — OLANZAPINE 10 MG: 10 TABLET, FILM COATED ORAL at 08:33

## 2023-06-07 RX ADMIN — OLANZAPINE 25 MG: 10 TABLET, ORALLY DISINTEGRATING ORAL at 20:18

## 2023-06-07 RX ADMIN — Medication 10 MG: at 20:18

## 2023-06-07 RX ADMIN — ZINC SULFATE 220 MG (50 MG) CAPSULE 220 MG: CAPSULE at 08:34

## 2023-06-07 RX ADMIN — PREDNISOLONE ACETATE 1 DROP: 10 SUSPENSION/ DROPS OPHTHALMIC at 08:34

## 2023-06-07 RX ADMIN — CHLORHEXIDINE GLUCONATE 0.12% ORAL RINSE 15 ML: 1.2 LIQUID ORAL at 08:33

## 2023-06-07 RX ADMIN — LISINOPRIL 5 MG: 5 TABLET ORAL at 08:33

## 2023-06-07 RX ADMIN — MONTELUKAST 10 MG: 10 TABLET, FILM COATED ORAL at 20:18

## 2023-06-07 RX ADMIN — CALCIUM POLYCARBOPHIL 625 MG: 625 TABLET, FILM COATED ORAL at 08:33

## 2023-06-07 RX ADMIN — CALCIUM POLYCARBOPHIL 625 MG: 625 TABLET, FILM COATED ORAL at 20:18

## 2023-06-07 ASSESSMENT — ACTIVITIES OF DAILY LIVING (ADL)
ADLS_ACUITY_SCORE: 51
HYGIENE/GROOMING: PROMPTS
ORAL_HYGIENE: PROMPTS
ADLS_ACUITY_SCORE: 41
DRESS: SCRUBS (BEHAVIORAL HEALTH)
ADLS_ACUITY_SCORE: 41
ADLS_ACUITY_SCORE: 51
ADLS_ACUITY_SCORE: 41
HYGIENE/GROOMING: PROMPTS
ADLS_ACUITY_SCORE: 41
ADLS_ACUITY_SCORE: 51
DRESS: SCRUBS (BEHAVIORAL HEALTH);PROMPTS
ORAL_HYGIENE: PROMPTS

## 2023-06-07 NOTE — CARE PLAN
06/07/23 1301   Group Therapy Session   Group Attendance attended group session   Time Session Began 1015   Time Session Ended 1115   Total Time (minutes) 45   Total # Attendees 6   Group Type expressive therapy;task skill;recreation   Group Topic Covered coping skills/lifestyle management;problem-solving;cognitive activities;leisure exploration/use of leisure time   Group Session Detail OT: Education on healthy activity engagement with creative hands-on endeavor or cognitive activity (OT clinic) to increase concentration, focus, attention to task/detail, decision making, problem solving, frustration tolerance, task follow through, coping with stress, healthy leisure engagement, creative expression, and social engagement   Patient Response/Contribution confused;cooperative with task   Patient Participation Detail pt polite during interactions with staff. pt required several redirections and repetition for gathering own supplies, task steps, and clean up of supplies. pt was able to follow simple short commands from therapist.

## 2023-06-07 NOTE — PROGRESS NOTES
SIO 1:1 observation status discontinued per Dr. Charles.   Acuity staff ordered for pt to be closely monitored.   Patient and staff informed. Pt encouraged to maintain appropriate behaviors and seek staff for needs and he was agreeable.

## 2023-06-07 NOTE — PLAN OF CARE
"Jagdeep has been active on the unit this shift. He has been visible on the unit, eating meals in the dining area. Attending groups.   He is calm and cooperative on approach. Complied with scheduled medications without incident.   He describes his mood as \"excellent\". He denies all other subjective mental health concerns and symptoms including anxiety, depression, SI/HI/SIB. He denies psychotic sx, although continues to demonstrate some disorganized and delusional thinking.     When in his room, he continues to push his Nurse call light button incessantly. He states, \"this is how I order the food, the good food.\" He was instructed that the call button system was for urgent needs but he continues to press it.     No episodes of aggressive behavior reported this shift. He has been less intrusive, more redirectable as needed. He responds well to firm boundaries and limit setting.     Pt denies pain concerns. VSS. /72 (Patient Position: Sitting)   Pulse 85   Temp 97.3  F (36.3  C) (Temporal)   Resp 16   Wt 92.6 kg (204 lb 2.3 oz)   SpO2 98%   BMI 35.04 kg/m        Problem: Plan of Care - These are the overarching goals to be used throughout the patient stay.    Goal: Plan of Care Review  Description: The Plan of Care Review/Shift note should be completed every shift.  The Outcome Evaluation is a brief statement about your assessment that the patient is improving, declining, or no change.  This information will be displayed automatically on your shift note.  Outcome: Progressing     Problem: Disruptive Behavior  Goal: Improved Mood Symptoms (Disruptive Behavior)  Outcome: Progressing   Goal Outcome Evaluation:                        "

## 2023-06-07 NOTE — PLAN OF CARE
Problem: Psychotic Signs/Symptoms  Goal: Improved Mood Symptoms  Note: Zyprexa appeared to work well for the patient. He is less intrusive at this time. He is not singing out loud and is fairly redirectable. He has no pain complaints. He denies feeling depressed or anxious and has not voiced any suicidal ideation or thoughts of SIB. No new behavioral or safety concerns to report this shift. He is in a private room and an SIO patient due to intrusiveness, labile mood and poor boundaries.

## 2023-06-07 NOTE — CARE PLAN
06/07/23 1443   Group Therapy Session   Group Attendance attended group session   Time Session Began 1115   Time Session Ended 1200   Total Time (minutes) 10  (no charge)   Total # Attendees 4   Group Type life skill;recreation;task skill   Group Topic Covered problem-solving;cognitive activities;leisure exploration/use of leisure time;self-care activities   Group Session Detail OT Wellness Group-Movement Deric   Patient Response/Contribution refused to participate;unable to comprehend information   Patient Participation Detail pt arrived late for group. pt was provided with bingo card and instructions on group activity. pt declined participation due to movements being for females. staff reiterated that movement is for everyone, pt continued to decline participation. pt was pulled from group by provider and did not return

## 2023-06-07 NOTE — PROGRESS NOTES
"PSYCHIATRY  PROGRESS NOTE     DATE OF SERVICE   06/07/2023        CHIEF COMPLAINT   \" I am great.\"       SUBJECTIVE   Nursing reports:  Jagdeep has been active on the unit this shift. He has been visible on the unit, eating meals in the dining area. Attending groups.   He is calm and cooperative on approach. Complied with scheduled medications without incident.   He describes his mood as \"excellent\". He denies all other subjective mental health concerns and symptoms including anxiety, depression, SI/HI/SIB. He denies psychotic sx, although continues to demonstrate some disorganized and delusional thinking.      When in his room, he continues to push his Nurse call light button incessantly. He states, \"this is how I order the food, the good food.\" He was instructed that the call button system was for urgent needs but he continues to press it.      No episodes of aggressive behavior reported this shift. He has been less intrusive, more redirectable as needed. He responds well to firm boundaries and limit setting.      Pt denies pain concerns. VSS. /72 (Patient Position: Sitting)   Pulse 85   Temp 97.3  F (36.3  C) (Temporal)   Resp 16   Wt 92.6 kg (204 lb 2.3 oz)   SpO2 98%   BMI 35.04 kg/m      Patient has been reviewed with the  earlier today.  There has been no updates on patient's discharge.     OBJECTIVE   Patient was seen and evaluated in the consult room by himself, this was a face-to-face evaluation.  Patient has been taken off the one-to-one as he has been more redirectable, calm and cooperative.  During my assessment the patient verbalized that he continues to believe that his parents are alive, he is  and has multiple children.  He does not talk about these things until asked directly.  Patient did not mention that he does not like for us to ask him those questions because he is sure that his parents are alive.  When I discussed with the patient the medications patient said " "that he is doing well and denies having any side effects.  I brought up to the patient that the plan will be for him to return to the group home.  Patient mentioned that he would like to go to a hotel [facility] because according to the patient he was living there before coming to the hospital.  He also has the belief that there is a group of people that are \"rock 'n' roll\" that are living in a motel and he will need to return there.  When I discussed with the patient that it would be a good idea for him to return to the group home patient again said that he does not want to talk about going back to the group home, the group home is closed and he is not going back there.       MEDICATIONS   Medications:  Scheduled Meds:    apixaban ANTICOAGULANT  5 mg Oral BID     artificial tears   Right Eye At Bedtime     calcium polycarbophil  625 mg Oral BID     chlorhexidine  15 mL Swish & Spit Daily     docusate sodium  100 mg Oral Daily     levothyroxine  50 mcg Oral or NG Tube QAM AC     lisinopril  5 mg Oral Daily     melatonin  10 mg Oral At Bedtime     montelukast  10 mg Oral At Bedtime     OLANZapine  10 mg Oral Daily     OLANZapine zydis  25 mg Oral At Bedtime     pantoprazole  40 mg Oral QAM AC     prednisoLONE acetate  1 drop Right Eye Daily     topiramate  100 mg Oral At Bedtime     zinc sulfate  220 mg Oral Daily     Continuous Infusions:  PRN Meds:.acetaminophen, alum & mag hydroxide-simethicone, artificial saliva, atropine, hydrALAZINE, hydrOXYzine, hypromellose-dextran, ipratropium, OLANZapine **OR** OLANZapine, senna-docusate    Medication adherence issues: MS Med Adherence Y/N: Yes, Hospitalization  Medication side effects: MEDICATION SIDE EFFECTS: no side effects reported  Benefit: Yes / No: Yes       ROS   A comprehensive review of systems was negative.       MENTAL STATUS EXAM   Vitals: /66   Pulse 93   Temp 97.1  F (36.2  C) (Temporal)   Resp 16   Wt 92.6 kg (204 lb 2.3 oz)   SpO2 " "97%   BMI 35.04 kg/m        Appearance:  No apparent distress  Mood: \"I am doing good\"  Affect: Calm and very directable  was congruent to speech  Suicidal Ideation: PRESENT / ABSENT: absent   Homicidal Ideation: PRESENT / ABSENT: absent     Thought process: [place], more linear and goal directed  Thought content: Remains delusional  Fund of Knowledge: Below average  Attention/Concentration: Poor  Language ability:  Intact, speech is garbled at times  Memory:  Immediate recall impaired, Short-term memory impaired and Long-term memory intact  Insight: Fair  Judgement: Fair  Orientation: Person and situation only  Psychomotor Behavior: slowed    Muscle Strength and Tone: MuscleStrength: Normal  Gait and Station: Stable on his feet       LABS   personally reviewed.   No lab results found in last 7 days.  No results found for: PHENYTOIN, PHENOBARB, VALPROATE, CBMZ       DIAGNOSIS   Principal Problem:    Schizoaffective disorder, bipolar type (H)    Active Problem List:  Patient Active Problem List   Diagnosis     Closed head injury, initial encounter     Altered mental status, unspecified altered mental status type     Generalized weakness     Portal vein thrombosis     Pleural effusion     Generalized muscle weakness     Falls frequently     Other ascites     Acute pancreatitis, unspecified complication status, unspecified pancreatitis type     Pancreatic pseudocyst     Idiopathic acute pancreatitis, unspecified complication status     Allergic rhinitis     Fisher's esophagus     CTS (carpal tunnel syndrome)     Elevated liver enzymes     HTN (hypertension)     Hypothyroidism     Keratoconus     Major depressive disorder, recurrent episode, mild (H)     Intellectual disability     Morbid exogenous obesity (H)     Neuroleptic malignant syndrome     Obstructive sleep apnea syndrome     Bipolar affective disorder (H)     Seizure disorder (H)     Seizures, generalized convulsive (H)     Acute respiratory failure with " hypoxia (H)     Anemia, unspecified     Anxiety disorder, unspecified     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)     Dietary zinc deficiency     Dry eye syndrome of bilateral lacrimal glands     Dry mouth, unspecified     Edema, unspecified     Gastro-esophageal reflux disease without esophagitis     Hyperosmolality and hypernatremia     Irritable bowel syndrome with constipation     Major depressive disorder, recurrent, unspecified (H)     Metabolic encephalopathy     Moderate protein-calorie malnutrition (H)     Other symbolic dysfunctions     Schizoaffective disorder, unspecified (H)     Thrombocytopenia, unspecified (H)     Unspecified asthma, uncomplicated     Urinary tract infection, site not specified     Vitamin D deficiency, unspecified     Schizoaffective disorder, bipolar type (H)          PLAN   1. Ongoing education given regarding diagnostic and treatment options with risks, benefits and alternatives and adequate verbalization of understanding.  2.  Medications       Melatonin 10 mg at bedtime       Zyprexa to 10 mg daily and 25 mg at bedtime          Topamax 100 mg at bedtime    3.  Medical team following as needed  4.   coordinating a safe discharge plan    Risk Assessment: [facility] RISK ASSESSMENT: Patient able to contract for safety    Coordination of Care:   Treatment Plan reviewed and physician signed, Care discussed with Care/Treatment Team Members, Chart reviewed and Patient seen      Re-Certification I certify that the inpatient psychiatric facility services furnished since the previous certification were, and continue to be, medically necessary for, either, treatment which could reasonably be expected to improve the patient s condition or diagnostic study and that the hospital records indicate that the services furnished were, either, intensive treatment services, admission and related services necessary for diagnostic study, or equivalent services.     I  certify that the patient continues to need, on a daily basis, active treatment furnished directly by or requiring the supervision of inpatient psychiatric facility personnel.   I estimate 14 days of hospitalization is necessary for proper treatment of the patient. My plans for post-hospital care for this patient are  group home     Alejandra Watkins MD    -     06/07/2023  -     6:28 PM    Total time  35 minutes with > 50%spent on coordination of cares and psycho-education.    This note was created with help of Dragon dictation system. Grammatical / typing errors are not intentional.    Alejandra Watkins MD

## 2023-06-07 NOTE — PLAN OF CARE
Patient was in the milieu at the beginning of the shift, was sitting in the dining and occassionally pacing in the hallways with his headphones on. Patient was receptive during assessment but was fixated about discharging home. Patient appeared agitated at dinner time, started being loud and restless, prn zyprexa 10mg and atarax 25mg were given with good effect. Patient had visitors and seemed to enjoy the visit. Patient took all his medications and went to bed at 8.30pm, has been sleeping ever since. SIO was discontinued today and placed on acuity staff.  Problem: Adult Behavioral Health Plan of Care  Goal: Plan of Care Review  Recent Flowsheet Documentation  Taken 6/7/2023 1617 by Chau Webber RN  Patient Agreement with Plan of Care: (patient wants to go home) disagrees (describe)     Problem: Adult Behavioral Health Plan of Care  Goal: Absence of New-Onset Illness or Injury  Intervention: Identify and Manage Fall Risk  Recent Flowsheet Documentation  Taken 6/7/2023 1617 by Chau Webber RN  Safety Measures: environmental rounds completed   Goal Outcome Evaluation:    Plan of Care Reviewed With: patient

## 2023-06-07 NOTE — PLAN OF CARE
Problem: Sleep Disturbance  Goal: Adequate Sleep/Rest  Outcome: Progressing   Goal Outcome Evaluation:    Patient remains on SIO due to risk for self-injury related to intrusiveness, poor boundaries, and delusional thoughts that precipitates labile mood.   He has a No Roommate order due to severe intrusiveness and poor boundaries.      Voided two times during the shift. The first time, he was not able to reach the bathroom, he peed on the floor. On the second time, he voided in the bathroom.     Since the time he got up @ 0330, he kept on putting his call light on for no reason at all. His attention was called, but he still continued on doing it. Paced along the hallway at times, sat for a while in the lounge, ate a banana and drank a glass of Crystallite juice.     Slept for a total of 7.5 hours. No angry outbursts during the night.

## 2023-06-07 NOTE — CARE PLAN
06/07/23 1501   Group Therapy Session   Group Attendance attended group session   Time Session Began 1300   Time Session Ended 1400   Total Time (minutes) 15  (no charge)   Total # Attendees 6   Group Type life skill;task skill   Group Topic Covered cognitive activities;balanced lifestyle;structured socialization;problem-solving;coping skills/lifestyle management   Group Session Detail Occupational Therapy General Health group-8 Dimensions of Wellness   Patient Response/Contribution confused;unable to comprehend information   Patient Participation Detail pt arrived late for group. pt was provide with handouts and explanation. pt shared he does not have to write down answers because he knows getting along with family is very important. pt reiterated this a few more times. therapist provided positive feedback which pt accepted. pt then placed head in arms on table. pt was overheard snoring. therapist gently awakened pt and pt was provided with encouragement to take a break if he needed. pt thanked therapist and left room to take a nap

## 2023-06-08 PROCEDURE — 93005 ELECTROCARDIOGRAM TRACING: CPT

## 2023-06-08 PROCEDURE — 250N000013 HC RX MED GY IP 250 OP 250 PS 637: Performed by: EMERGENCY MEDICINE

## 2023-06-08 PROCEDURE — 99232 SBSQ HOSP IP/OBS MODERATE 35: CPT | Performed by: PSYCHIATRY & NEUROLOGY

## 2023-06-08 PROCEDURE — 250N000013 HC RX MED GY IP 250 OP 250 PS 637: Performed by: PSYCHIATRY & NEUROLOGY

## 2023-06-08 PROCEDURE — 124N000003 HC R&B MH SENIOR/ADOLESCENT

## 2023-06-08 PROCEDURE — 250N000013 HC RX MED GY IP 250 OP 250 PS 637: Performed by: PHYSICIAN ASSISTANT

## 2023-06-08 PROCEDURE — 93010 ELECTROCARDIOGRAM REPORT: CPT | Performed by: INTERNAL MEDICINE

## 2023-06-08 PROCEDURE — H2032 ACTIVITY THERAPY, PER 15 MIN: HCPCS

## 2023-06-08 RX ADMIN — CHLORHEXIDINE GLUCONATE 0.12% ORAL RINSE 15 ML: 1.2 LIQUID ORAL at 08:47

## 2023-06-08 RX ADMIN — LISINOPRIL 5 MG: 5 TABLET ORAL at 08:46

## 2023-06-08 RX ADMIN — ZINC SULFATE 220 MG (50 MG) CAPSULE 220 MG: CAPSULE at 08:47

## 2023-06-08 RX ADMIN — OLANZAPINE 10 MG: 10 TABLET, FILM COATED ORAL at 08:47

## 2023-06-08 RX ADMIN — Medication 10 MG: at 20:43

## 2023-06-08 RX ADMIN — CALCIUM POLYCARBOPHIL 625 MG: 625 TABLET, FILM COATED ORAL at 20:43

## 2023-06-08 RX ADMIN — OLANZAPINE 25 MG: 10 TABLET, ORALLY DISINTEGRATING ORAL at 20:43

## 2023-06-08 RX ADMIN — APIXABAN 5 MG: 5 TABLET, FILM COATED ORAL at 20:43

## 2023-06-08 RX ADMIN — PREDNISOLONE ACETATE 1 DROP: 10 SUSPENSION/ DROPS OPHTHALMIC at 08:47

## 2023-06-08 RX ADMIN — DOCUSATE SODIUM 100 MG: 100 CAPSULE, LIQUID FILLED ORAL at 08:47

## 2023-06-08 RX ADMIN — TOPIRAMATE 150 MG: 50 TABLET ORAL at 20:43

## 2023-06-08 RX ADMIN — MONTELUKAST 10 MG: 10 TABLET, FILM COATED ORAL at 20:43

## 2023-06-08 RX ADMIN — APIXABAN 5 MG: 5 TABLET, FILM COATED ORAL at 08:47

## 2023-06-08 RX ADMIN — PANTOPRAZOLE SODIUM 40 MG: 40 TABLET, DELAYED RELEASE ORAL at 06:36

## 2023-06-08 RX ADMIN — LEVOTHYROXINE SODIUM 50 MCG: 25 TABLET ORAL at 06:36

## 2023-06-08 RX ADMIN — CALCIUM POLYCARBOPHIL 625 MG: 625 TABLET, FILM COATED ORAL at 08:47

## 2023-06-08 ASSESSMENT — ACTIVITIES OF DAILY LIVING (ADL)
ADLS_ACUITY_SCORE: 41
HYGIENE/GROOMING: INDEPENDENT;PROMPTS
ADLS_ACUITY_SCORE: 41
LAUNDRY: UNABLE TO COMPLETE
ADLS_ACUITY_SCORE: 41
DRESS: INDEPENDENT
ADLS_ACUITY_SCORE: 31
ADLS_ACUITY_SCORE: 41
ADLS_ACUITY_SCORE: 31
ORAL_HYGIENE: INDEPENDENT
HYGIENE/GROOMING: INDEPENDENT

## 2023-06-08 NOTE — PLAN OF CARE
Problem: Sleep Disturbance  Goal: Adequate Sleep/Rest  Outcome: Not Progressing   Goal Outcome Evaluation:    Patient was asleep at the start of the shift and woke up @ 0030. He called for staff assistance by using his call light. He was found to be having wet pants as he has not reached the bathroom and peed on his pants. There were some urine dribbling spots on the floor. A paddle of urine was also noted on the floor in the bathroom. Patient was assisted in changing his pants and the bathroom floor was cleansed and sanitized. He went back to sleep @ 0100.     Patient was up again @ 0300 and started playing with the call light in his room. No amount of reminding patient not to play with the call light would stop him from doing it. He seemed to be amused with the lights as he plays with the call light. Later he came out to the lounge, had his  headphone changed and started singing while listening to music on his headphone. He also requested for some snacks and he had milk, crackers, Sunflower butter and cheesestick. Sat quietly while eating in the dark corner of the lounge. Remained quiet the rest of the shift. Watched TV @ 0600 onwards.    He has a No Roommate order due to intrusiveness and poor boundaries.     Slept for a total of 7 hours.

## 2023-06-08 NOTE — PLAN OF CARE
Assessment/Intervention/Current Symtoms and Care Coordination:  -Reviewed pt's chart  -Meet with team to discuss pt care.Team reported is no longer on SIO. Pt slept for 7 hours.     Discharge Plan or Goal:  To continue to stabilize pt's mental health status. Tentative plan for pt to return back to group home     Barriers to Discharge:  Pt continues to present as symptomatic (delusional mood). Pt is receiving ongoing stabilization and medication adjustment. Continue care coordination with retirement to ascertain his acceptability for a return to the home.     Referral Status:  Will continue to coordinate with pt's group home.     Legal Status:  Huongxena Keenan is pt's legal Guardian      Contacts:  Shlomo Borja (Guardian) [phone]   Sadaf ([phone])      Upcoming Meetings and Dates/Important Information and next steps:    None at this time.

## 2023-06-08 NOTE — CARE PLAN
06/08/23 1502   Group Therapy Session   Group Attendance attended group session   Time Session Began 1015   Time Session Ended 1100   Total Time (minutes) 15  (no charge)   Total # Attendees 6   Group Type expressive therapy;recreation;task skill   Group Topic Covered coping skills/lifestyle management;problem-solving;leisure exploration/use of leisure time;cognitive activities   Group Session Detail OT: Education on healthy activity engagement with creative hands-on endeavor or cognitive activity (OT clinic) to increase concentration, focus, attention to task/detail, decision making, problem solving, frustration tolerance, task follow through, coping with stress, healthy leisure engagement, creative expression, and social engagement   Patient Response/Contribution cooperative with task   Patient Participation Detail pt arrived late for group. pt continued to wear headphones during group. pt chose to engage in previous, familiar creative task. pt needed reminders that all supplies needed were in his possession. pt worked for a while then stated he needed to leave. pt was instructed to put away own supplies. pt left group space upon completion of clean up

## 2023-06-08 NOTE — CARE PLAN
06/08/23 1519   Group Therapy Session   Group Attendance attended group session   Time Session Began 1300   Time Session Ended 1345   Total Time (minutes) 15  (no charge)   Total # Attendees 6   Group Type expressive therapy;recreation;task skill   Group Topic Covered problem-solving;leisure exploration/use of leisure time;cognitive activities   [facility]ail [facility], turn taking, frustration tolerance, cognition, focus, problem solving, healthy distraction, social engagement, creative outlet, and healthy leisure exploration   Patient Response/Contribution cooperative with task   Patient Participation Detail pt initially declined participation. with max encouragement and some assistance pt was able to actively engage in group activity for a few rounds. pt was not fully engaged in watching peers during their turn. pt left group

## 2023-06-08 NOTE — PROGRESS NOTES
"PSYCHIATRY  PROGRESS NOTE     DATE OF SERVICE   06/08/2023        CHIEF COMPLAINT   \" I am great, good and fantastic.\"       SUBJECTIVE   Nursing reports:  Mental Health Assessment  \"I am great\". Pt denies MH concerns. He does continue to tell staff he is going home, but takes redirection. No talk about going to hotel. No overt delusional statements to writer. He states he likes to go to groups, but not the women's group. He reports getting good sleep and enough food. His appearance is clean. He has good eye contact. He can be social with staff, but tends to sing to music on headphones while walking the hallway. Overall, he is pleasant and cooperative. No aggressive outbursts.      Medications  No PRN required     Physiological Assessment  VS: B/P: 113/73, T: 97.3, P: 85, R: 16  Pain: denies   Gait and Ambulation: slow, steady  Appetite: no change, good appetite  Bowels: no c/o     Follow Up and Recommendations  Acuity for behavioral concerns continues at this time as pt did have outburst last evening.        Patient has been reviewed with the  earlier today.   Assessment/Intervention/Current Symtoms and Care Coordination:  -Reviewed pt's chart  -Meet with team to discuss pt care.Team reported is no longer on SIO. Pt slept for 7 hours.         OBJECTIVE   Patient was seen and evaluated at bedside by himself, this was a face-to-face evaluation.  Patient continues to present as grandiose at times but for the most part he is calm and pleasant.  Patient tells me that today his mood is \"great, good and fantastic\".  He does not want for this writer to ask if his parents are alive because according to the patient he saw them the other day and they are doing well.  Patient continues to tell me that he will be discharging to a [facility] because the group home does not exist anymore.  Patient said that he is going to join all of his friends there and he has been able to this whole time.  He then tells me that " the hotel is a mansion and there is a lot of people leaving their.  He does not want his sister to make any decisions for him.  He is denying having any thoughts of harming himself or harming others and has been able to contract for safety.    Today I was able to communicate with patient's sister and guardian Huong.  She tells me that the patient was stable on Risperdal for 30 years.  Before the use of Risperdal patient took Haldol but she does not remember the reasons why he was switched from Haldol to Risperdal.  At this time she tells me that this is not patient's baseline and he continues to be delusional and getting agitated with family members.  A lot of family members would like to visit and be supportive but they are abstaining from visiting the patient in order to prevent him from getting angry.  The same delusions that the patient has been discussing with me is the same delusions that have been present when the family visits.  I did discuss with the sister that at this time we can increase the Topamax to 150 mg at bedtime.  I will be ordering some blood work and EKG in order to make sure that the patient remains stable.  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  Sister verbalized good understanding and was in agreement with this.    She asked about other medications that we can use in order to help the patient.  I reviewed with the sister that there are many other antipsychotics that we can use in combination with the Zyprexa.  I specifically talked to her about the use of Invega as this medication comes in a monthly injection.  I reviewed with her the reasons for prescribing Invega, benefits, risks and side effects.  If the patient responds well to Invega it may be that we can decrease the dose of Zyprexa or maybe taking him off of Zyprexa completely.  Sister verbalized good understanding and she was in  "agreement with starting this medication.  I did discuss with the sister that I will start the medication once we have the results of the EKG and the blood work.     MEDICATIONS   Medications:  Scheduled Meds:    apixaban ANTICOAGULANT  5 mg Oral BID     artificial tears   Right Eye At Bedtime     calcium polycarbophil  625 mg Oral BID     chlorhexidine  15 mL Swish & Spit Daily     docusate sodium  100 mg Oral Daily     levothyroxine  50 mcg Oral or NG Tube QAM AC     lisinopril  5 mg Oral Daily     melatonin  10 mg Oral At Bedtime     montelukast  10 mg Oral At Bedtime     OLANZapine  10 mg Oral Daily     OLANZapine zydis  25 mg Oral At Bedtime     pantoprazole  40 mg Oral QAM AC     prednisoLONE acetate  1 drop Right Eye Daily     topiramate  150 mg Oral At Bedtime     zinc sulfate  220 mg Oral Daily     Continuous Infusions:  PRN Meds:.acetaminophen, alum & mag hydroxide-simethicone, artificial saliva, atropine, hydrALAZINE, hydrOXYzine, hypromellose-dextran, ipratropium, OLANZapine **OR** OLANZapine, senna-docusate    Medication adherence issues: MS Med Adherence Y/N: Yes, Hospitalization  Medication side effects: MEDICATION SIDE EFFECTS: no side effects reported  Benefit: Yes / No: Yes       ROS   A comprehensive review of systems was negative.       MENTAL STATUS EXAM   Vitals: /73 (Patient Position: Sitting, Cuff Size: Adult Regular)   Pulse 85   Temp 97.3  F (36.3  C) (Temporal)   Resp 16   Wt 91 kg (200 lb 9.9 oz)   SpO2 99%   BMI 34.44 kg/m      Same as last visit  Appearance:  No apparent distress  Mood: \"I am doing good\"  Affect: Calm and very directable  was congruent to speech  Suicidal Ideation: PRESENT / ABSENT: absent   Homicidal Ideation: PRESENT / ABSENT: absent     Thought process: [place], more linear and goal directed  Thought content: Remains delusional  Fund of Knowledge: Below average  Attention/Concentration: Poor  Language ability:  Intact, speech is garbled at times  Memory:  " Immediate recall impaired, Short-term memory impaired and Long-term memory intact  Insight: Fair  Judgement: Fair  Orientation: Person and situation only  Psychomotor Behavior: slowed    Muscle Strength and Tone: MuscleStrength: Normal  Gait and Station: Stable on his feet       LABS   personally reviewed.   No lab results found in last 7 days.  No results found for: PHENYTOIN, PHENOBARB, VALPROATE, CBMZ       DIAGNOSIS   Principal Problem:    Schizoaffective disorder, bipolar type (H)    Active Problem List:  Patient Active Problem List   Diagnosis     Closed head injury, initial encounter     Altered mental status, unspecified altered mental status type     Generalized weakness     Portal vein thrombosis     Pleural effusion     Generalized muscle weakness     Falls frequently     Other ascites     Acute pancreatitis, unspecified complication status, unspecified pancreatitis type     Pancreatic pseudocyst     Idiopathic acute pancreatitis, unspecified complication status     Allergic rhinitis     Fisher's esophagus     CTS (carpal tunnel syndrome)     Elevated liver enzymes     HTN (hypertension)     Hypothyroidism     Keratoconus     Major depressive disorder, recurrent episode, mild (H)     Intellectual disability     Morbid exogenous obesity (H)     Neuroleptic malignant syndrome     Obstructive sleep apnea syndrome     Bipolar affective disorder (H)     Seizure disorder (H)     Seizures, generalized convulsive (H)     Acute respiratory failure with hypoxia (H)     Anemia, unspecified     Anxiety disorder, unspecified     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)     Dietary zinc deficiency     Dry eye syndrome of bilateral lacrimal glands     Dry mouth, unspecified     Edema, unspecified     Gastro-esophageal reflux disease without esophagitis     Hyperosmolality and hypernatremia     Irritable bowel syndrome with constipation     Major depressive disorder, recurrent, unspecified (H)      Metabolic encephalopathy     Moderate protein-calorie malnutrition (H)     Other symbolic dysfunctions     Schizoaffective disorder, unspecified (H)     Thrombocytopenia, unspecified (H)     Unspecified asthma, uncomplicated     Urinary tract infection, site not specified     Vitamin D deficiency, unspecified     Schizoaffective disorder, bipolar type (H)          PLAN   1. Ongoing education given regarding diagnostic and treatment options with risks, benefits and alternatives and adequate verbalization of understanding.  2.  Medications       Melatonin 10 mg at bedtime       Zyprexa to 10 mg daily and 25 mg at bedtime          Increase Topamax 150 mg at bedtime    Blood work and EKG has been ordered.  If everything is within normal limits I will be ordering Invega starting at 1.5 mg daily.    3.  Medical team following as needed  4.   coordinating a safe discharge plan    Risk Assessment: [facility] RISK ASSESSMENT: Patient able to contract for safety    Coordination of Care:   Treatment Plan reviewed and physician signed, Care discussed with Care/Treatment Team Members, Chart reviewed and Patient seen      Re-Certification I certify that the inpatient psychiatric facility services furnished since the previous certification were, and continue to be, medically necessary for, either, treatment which could reasonably be expected to improve the patient s condition or diagnostic study and that the hospital records indicate that the services furnished were, either, intensive treatment services, admission and related services necessary for diagnostic study, or equivalent services.     I certify that the patient continues to need, on a daily basis, active treatment furnished directly by or requiring the supervision of inpatient psychiatric facility personnel.   I estimate 14 days of hospitalization is necessary for proper treatment of the patient. My plans for post-hospital care for this patient are  group  home     Alejandra Watkins MD    -     06/08/2023  -     3:04 PM    Total time  35 minutes with > 50%spent on coordination of cares and psycho-education.    This note was created with help of Dragon dictation system. Grammatical / typing errors are not intentional.    Alejandra Watkins MD

## 2023-06-08 NOTE — PLAN OF CARE
"  Problem: Psychotic Signs/Symptoms  Goal: Increased Participation and Engagement (Psychotic Signs/Symptoms)  Outcome: Progressing   Goal Outcome Evaluation:    Mental Health Assessment  \"I am great\". Pt denies MH concerns. He does continue to tell staff he is going home, but takes redirection. No talk about going to hotel. No overt delusional statements to writer. He states he likes to go to groups, but not the women's group. He reports getting good sleep and enough food. His appearance is clean. He has good eye contact. He can be social with staff, but tends to sing to music on headphones while walking the hallway. Overall, he is pleasant and cooperative. No aggressive outbursts.     Medications  No PRN required    Physiological Assessment  VS: B/P: 113/73, T: 97.3, P: 85, R: 16  Pain: denies   Gait and Ambulation: slow, steady  Appetite: no change, good appetite  Bowels: no c/o    Follow Up and Recommendations  Acuity for behavioral concerns continues at this time as pt did have outburst last evening.                           "

## 2023-06-08 NOTE — PROGRESS NOTES
Pt joined dance/movement therapy (D/MT) with some disorganization, but was able to follow therapist directives using expressive movement for organizing vitalization and exploring a range of affect.  Directional motion provided some focus, but more intense and scattered emotions were also expressed within a safe and structured container.  Pt was able to express his needs and advocate for himself nonverbally,using nonverbal expression as well.  He was less able to follow directives for movement suggestions yet he was not disruptive.       06/08/23 1130   Expressive Therapy   Therapy Type dance/movement   Minutes of Treatment 55

## 2023-06-09 LAB
ALBUMIN SERPL BCG-MCNC: 4.3 G/DL (ref 3.5–5.2)
ALP SERPL-CCNC: 144 U/L (ref 40–129)
ALT SERPL W P-5'-P-CCNC: 50 U/L (ref 10–50)
ANION GAP SERPL CALCULATED.3IONS-SCNC: 10 MMOL/L (ref 7–15)
AST SERPL W P-5'-P-CCNC: 42 U/L (ref 10–50)
ATRIAL RATE - MUSE: 64 BPM
BASOPHILS # BLD AUTO: 0 10E3/UL (ref 0–0.2)
BASOPHILS NFR BLD AUTO: 1 %
BILIRUB SERPL-MCNC: 0.5 MG/DL
BUN SERPL-MCNC: 26.1 MG/DL (ref 6–20)
CALCIUM SERPL-MCNC: 9.8 MG/DL (ref 8.6–10)
CHLORIDE SERPL-SCNC: 110 MMOL/L (ref 98–107)
CREAT SERPL-MCNC: 0.97 MG/DL (ref 0.67–1.17)
DEPRECATED HCO3 PLAS-SCNC: 23 MMOL/L (ref 22–29)
DIASTOLIC BLOOD PRESSURE - MUSE: NORMAL MMHG
EOSINOPHIL # BLD AUTO: 0.2 10E3/UL (ref 0–0.7)
EOSINOPHIL NFR BLD AUTO: 2 %
ERYTHROCYTE [DISTWIDTH] IN BLOOD BY AUTOMATED COUNT: 14 % (ref 10–15)
GFR SERPL CREATININE-BSD FRML MDRD: >90 ML/MIN/1.73M2
GLUCOSE SERPL-MCNC: 93 MG/DL (ref 70–99)
HCT VFR BLD AUTO: 38.4 % (ref 40–53)
HGB BLD-MCNC: 13 G/DL (ref 13.3–17.7)
IMM GRANULOCYTES # BLD: 0 10E3/UL
IMM GRANULOCYTES NFR BLD: 0 %
INTERPRETATION ECG - MUSE: NORMAL
LYMPHOCYTES # BLD AUTO: 1.8 10E3/UL (ref 0.8–5.3)
LYMPHOCYTES NFR BLD AUTO: 22 %
MCH RBC QN AUTO: 28.6 PG (ref 26.5–33)
MCHC RBC AUTO-ENTMCNC: 33.9 G/DL (ref 31.5–36.5)
MCV RBC AUTO: 84 FL (ref 78–100)
MONOCYTES # BLD AUTO: 0.6 10E3/UL (ref 0–1.3)
MONOCYTES NFR BLD AUTO: 7 %
NEUTROPHILS # BLD AUTO: 5.5 10E3/UL (ref 1.6–8.3)
NEUTROPHILS NFR BLD AUTO: 68 %
NRBC # BLD AUTO: 0 10E3/UL
NRBC BLD AUTO-RTO: 0 /100
P AXIS - MUSE: 26 DEGREES
PLATELET # BLD AUTO: 154 10E3/UL (ref 150–450)
POTASSIUM SERPL-SCNC: 4.1 MMOL/L (ref 3.4–5.3)
PR INTERVAL - MUSE: 138 MS
PROT SERPL-MCNC: 7.1 G/DL (ref 6.4–8.3)
QRS DURATION - MUSE: 142 MS
QT - MUSE: 418 MS
QTC - MUSE: 431 MS
R AXIS - MUSE: 16 DEGREES
RBC # BLD AUTO: 4.55 10E6/UL (ref 4.4–5.9)
SODIUM SERPL-SCNC: 143 MMOL/L (ref 136–145)
SYSTOLIC BLOOD PRESSURE - MUSE: NORMAL MMHG
T AXIS - MUSE: 35 DEGREES
VENTRICULAR RATE- MUSE: 64 BPM
WBC # BLD AUTO: 8.1 10E3/UL (ref 4–11)

## 2023-06-09 PROCEDURE — 250N000013 HC RX MED GY IP 250 OP 250 PS 637: Performed by: PHYSICIAN ASSISTANT

## 2023-06-09 PROCEDURE — 36415 COLL VENOUS BLD VENIPUNCTURE: CPT | Performed by: PSYCHIATRY & NEUROLOGY

## 2023-06-09 PROCEDURE — 250N000013 HC RX MED GY IP 250 OP 250 PS 637: Performed by: EMERGENCY MEDICINE

## 2023-06-09 PROCEDURE — 85014 HEMATOCRIT: CPT | Performed by: PSYCHIATRY & NEUROLOGY

## 2023-06-09 PROCEDURE — 250N000013 HC RX MED GY IP 250 OP 250 PS 637: Performed by: PSYCHIATRY & NEUROLOGY

## 2023-06-09 PROCEDURE — 80053 COMPREHEN METABOLIC PANEL: CPT | Performed by: PSYCHIATRY & NEUROLOGY

## 2023-06-09 PROCEDURE — 250N000009 HC RX 250: Performed by: EMERGENCY MEDICINE

## 2023-06-09 PROCEDURE — 124N000003 HC R&B MH SENIOR/ADOLESCENT

## 2023-06-09 PROCEDURE — 99232 SBSQ HOSP IP/OBS MODERATE 35: CPT | Performed by: PSYCHIATRY & NEUROLOGY

## 2023-06-09 PROCEDURE — G0177 OPPS/PHP; TRAIN & EDUC SERV: HCPCS

## 2023-06-09 RX ORDER — PALIPERIDONE 1.5 MG/1
1.5 TABLET, EXTENDED RELEASE ORAL DAILY
Status: DISCONTINUED | OUTPATIENT
Start: 2023-06-09 | End: 2023-06-12

## 2023-06-09 RX ADMIN — APIXABAN 5 MG: 5 TABLET, FILM COATED ORAL at 09:52

## 2023-06-09 RX ADMIN — Medication 10 MG: at 21:00

## 2023-06-09 RX ADMIN — MONTELUKAST 10 MG: 10 TABLET, FILM COATED ORAL at 21:00

## 2023-06-09 RX ADMIN — DOCUSATE SODIUM 100 MG: 100 CAPSULE, LIQUID FILLED ORAL at 09:52

## 2023-06-09 RX ADMIN — PANTOPRAZOLE SODIUM 40 MG: 40 TABLET, DELAYED RELEASE ORAL at 06:32

## 2023-06-09 RX ADMIN — ZINC SULFATE 220 MG (50 MG) CAPSULE 220 MG: CAPSULE at 09:52

## 2023-06-09 RX ADMIN — HYDROXYZINE HYDROCHLORIDE 25 MG: 25 TABLET, FILM COATED ORAL at 03:20

## 2023-06-09 RX ADMIN — CHLORHEXIDINE GLUCONATE 0.12% ORAL RINSE 15 ML: 1.2 LIQUID ORAL at 09:52

## 2023-06-09 RX ADMIN — APIXABAN 5 MG: 5 TABLET, FILM COATED ORAL at 21:00

## 2023-06-09 RX ADMIN — CALCIUM POLYCARBOPHIL 625 MG: 625 TABLET, FILM COATED ORAL at 20:59

## 2023-06-09 RX ADMIN — LEVOTHYROXINE SODIUM 50 MCG: 25 TABLET ORAL at 06:32

## 2023-06-09 RX ADMIN — PALIPERIDONE 1.5 MG: 1.5 TABLET, EXTENDED RELEASE ORAL at 14:11

## 2023-06-09 RX ADMIN — CALCIUM POLYCARBOPHIL 625 MG: 625 TABLET, FILM COATED ORAL at 09:52

## 2023-06-09 RX ADMIN — TOPIRAMATE 150 MG: 50 TABLET ORAL at 21:00

## 2023-06-09 RX ADMIN — OLANZAPINE 25 MG: 10 TABLET, ORALLY DISINTEGRATING ORAL at 20:59

## 2023-06-09 RX ADMIN — OLANZAPINE 10 MG: 10 TABLET, FILM COATED ORAL at 09:52

## 2023-06-09 RX ADMIN — LISINOPRIL 5 MG: 5 TABLET ORAL at 09:52

## 2023-06-09 RX ADMIN — PREDNISOLONE ACETATE 1 DROP: 10 SUSPENSION/ DROPS OPHTHALMIC at 09:52

## 2023-06-09 ASSESSMENT — ACTIVITIES OF DAILY LIVING (ADL)
HYGIENE/GROOMING: INDEPENDENT
ORAL_HYGIENE: INDEPENDENT
ADLS_ACUITY_SCORE: 41
ADLS_ACUITY_SCORE: 31
ADLS_ACUITY_SCORE: 41
ADLS_ACUITY_SCORE: 32
ADLS_ACUITY_SCORE: 31
LAUNDRY: UNABLE TO COMPLETE
ADLS_ACUITY_SCORE: 41
ADLS_ACUITY_SCORE: 41
ADLS_ACUITY_SCORE: 31
DRESS: INDEPENDENT
ADLS_ACUITY_SCORE: 41
ADLS_ACUITY_SCORE: 32

## 2023-06-09 NOTE — CARE PLAN
06/09/23 1335   Group Therapy Session   Group Attendance attended group session   Time Session Began 1015   Time Session Ended 1100   Total Time (minutes) 45   Total # Attendees 5   Group Type recreation;task skill   Group Topic Covered balanced lifestyle;leisure exploration/use of leisure time;cognitive activities   Group Session Detail OT Clinic: open group activities to promote self-expression and leisure exploration while working to demonstrate cognitive skills   Patient Participation Detail Pt participated in open OT group and worked on painting a wooden project. Pt required guidance with exploring projects and assistance with set-up and painting the project. Pt had difficulty spotting places that needed to be painted, as well as stabilizing the project to paint (pt was primarily using one hand to paint, not using the other to stabilize the project or the newspaper underneath from moving). Pt demonstrates difficulty with sequencing tasks, such as washing hands, requiring prompting from writer to end steps (rinsing or scrubbing) and to dry hands. Pt required guidance for steps toward project completion, as well as prompting for clean-up activities.

## 2023-06-09 NOTE — PLAN OF CARE
Problem: Disruptive Behavior  Goal: Improved Mood Symptoms (Disruptive Behavior)  Outcome: Progressing   Goal Outcome Evaluation:  Jagdeep has followed his typical pattern. He spends his time walking hallway sing songs on the earphones. He attends groups. His speech is garbled. His behavior has been less impulsive, except for playing with call light briefly x 2. He is social with staff. No sexual comments. He is unaware of how loud he is and has poor boundaries.     Start on Invega     TEDs    Lab results see abnormal results: hgb improving.     No roommate: Intrusiveness, poor boundaries  Acuity staff per Dr Charles    Plan of Care Reviewed With: patient

## 2023-06-09 NOTE — PLAN OF CARE
Problem: Psychotic Signs/Symptoms  Goal: Increased Participation and Engagement (Psychotic Signs/Symptoms)  Note: Patient has been appropriate this shift. He has been pleasant with staff and peer interactions. He has not been intrusive and is easily redirectable. No outburst or loud singing. He is eating well, voiding without issues and medication compliant. No new behavioral or safety concerns to report this shift. Patient is in a private room due to behaviors.

## 2023-06-09 NOTE — PLAN OF CARE
Problem: Sleep Disturbance  Goal: Adequate Sleep/Rest  Outcome: Not Progressing   Goal Outcome Evaluation:    Patient was asleep at the start of the shift and woke up @ 0130. He kept on pacing back and forth along the hallway while singing and listening to music on his headphone. At times, when he is in his room, he would play with the call light. He almost got agitated when the acuity staff tried to remind him not to play with the call light. He became anxious and verbalized that he did not want anyone to be in his vicinity. Hydroxyzine 25 mg. given @ 0320 PRN for anxiety.There was no change in the patient's level of anxiety.     He has a No Roommate order due to severe intrusiveness and poor boundaries.     Patient slept for a total of 3.75  hours. He is scheduled for a lab draw for test on CBC and CMP.

## 2023-06-09 NOTE — PROGRESS NOTES
"PSYCHIATRY  PROGRESS NOTE     DATE OF SERVICE   06/09/2023        CHIEF COMPLAINT   \" I am doing good.\"       SUBJECTIVE   Nursing reports:  Jagdeep has followed his typical pattern. He spends his time walking hallway sing songs on the earphones. He attends groups. His speech is garbled. His behavior has been less impulsive, except for playing with call light briefly x 2. He is social with staff. No sexual comments. He is unaware of how loud he is and has poor boundaries.      Start on Invega      TEDs     Lab results see abnormal results: hgb improving.      No roommate: Intrusiveness, poor boundaries  Acuity staff per Dr Charles     Plan of Care Reviewed With: patient      Patient has been reviewed with the  earlier today.   Assessment/Intervention/Current Symtoms and Care Coordination:  -Reviewed pt's chart  -Meet with team to discuss pt care.Team reported is no longer on SIO. Pt slept for 3.75 hours.            OBJECTIVE   Patient was seen and evaluated in the day area by himself, this was a face-to-face evaluation.  Patient reported that he is doing good and at this time denies any issues.  Patient was demanding to go home and was continuously repeating that he is brother Peter was going to pick him up today.  Patient did not wanted to talk about staying in the hospital for longer or taking a new medication.  He continues to believe that he is not discharging back to his group home or to a hotel.  I tried to discuss with the patient the use of Invega to what patient responded \"basically stable and I am not crazy, I am not taking his medication\".  At the time the patient that decided in the meeting.    Use of Invega has been discussed with patient's sister that is also patient's guardian.  Blood work seem to be within normal limits and the QTc is shorter.  I will be starting Invega 1.5 minutes daily     MEDICATIONS   Medications:  Scheduled Meds:    apixaban ANTICOAGULANT  5 mg Oral BID     " "artificial tears   Right Eye At Bedtime     calcium polycarbophil  625 mg Oral BID     chlorhexidine  15 mL Swish & Spit Daily     docusate sodium  100 mg Oral Daily     levothyroxine  50 mcg Oral or NG Tube QAM AC     lisinopril  5 mg Oral Daily     melatonin  10 mg Oral At Bedtime     montelukast  10 mg Oral At Bedtime     OLANZapine  10 mg Oral Daily     OLANZapine zydis  25 mg Oral At Bedtime     paliperidone  1.5 mg Oral Daily     pantoprazole  40 mg Oral QAM AC     prednisoLONE acetate  1 drop Right Eye Daily     topiramate  150 mg Oral At Bedtime     zinc sulfate  220 mg Oral Daily     Continuous Infusions:  PRN Meds:.acetaminophen, alum & mag hydroxide-simethicone, artificial saliva, atropine, hydrALAZINE, hydrOXYzine, hypromellose-dextran, ipratropium, OLANZapine **OR** OLANZapine, senna-docusate    Medication adherence issues: MS Med Adherence Y/N: Yes, Hospitalization  Medication side effects: MEDICATION SIDE EFFECTS: no side effects reported  Benefit: Yes / No: Yes       ROS   A comprehensive review of systems was negative.       MENTAL STATUS EXAM   Vitals: /78 (BP Location: Right arm, Patient Position: Sitting, Cuff Size: Adult Regular)   Pulse 85   Temp 97.5  F (36.4  C) (Temporal)   Resp 16   Wt 91 kg (200 lb 9.9 oz)   SpO2 98%   BMI 34.44 kg/m      Appearance:  No apparent distress  Mood: \"I am doing good\"  Affect: irritable was congruent to speech  Suicidal Ideation: PRESENT / ABSENT: absent   Homicidal Ideation: PRESENT / ABSENT: absent     Thought process: [place], more linear and goal directed  Thought content: Remains delusional  Fund of Knowledge: Below average  Attention/Concentration: Poor  Language ability:  Intact, speech is garbled at times  Memory:  Immediate recall impaired, Short-term memory impaired and Long-term memory intact  Insight: Fair  Judgement: Fair  Orientation: Person and situation only  Psychomotor Behavior: slowed    Muscle Strength and Tone: MuscleStrength: " Normal  Gait and Station: Stable on his feet       LABS   personally reviewed.   Recent Labs   Lab 06/09/23  0843   WBC 8.1   HGB 13.0*   MCV 84         POTASSIUM 4.1   CHLORIDE 110*   CO2 23   BUN 26.1*   CR 0.97   ANIONGAP 10   NASIR 9.8   GLC 93   ALBUMIN 4.3   PROTTOTAL 7.1   BILITOTAL 0.5   ALKPHOS 144*   ALT 50   AST 42     No results found for: PHENYTOIN, PHENOBARB, VALPROATE, CBMZ       DIAGNOSIS   Principal Problem:    Schizoaffective disorder, bipolar type (H)    Active Problem List:  Patient Active Problem List   Diagnosis     Closed head injury, initial encounter     Altered mental status, unspecified altered mental status type     Generalized weakness     Portal vein thrombosis     Pleural effusion     Generalized muscle weakness     Falls frequently     Other ascites     Acute pancreatitis, unspecified complication status, unspecified pancreatitis type     Pancreatic pseudocyst     Idiopathic acute pancreatitis, unspecified complication status     Allergic rhinitis     Fisher's esophagus     CTS (carpal tunnel syndrome)     Elevated liver enzymes     HTN (hypertension)     Hypothyroidism     Keratoconus     Major depressive disorder, recurrent episode, mild (H)     Intellectual disability     Morbid exogenous obesity (H)     Neuroleptic malignant syndrome     Obstructive sleep apnea syndrome     Bipolar affective disorder (H)     Seizure disorder (H)     Seizures, generalized convulsive (H)     Acute respiratory failure with hypoxia (H)     Anemia, unspecified     Anxiety disorder, unspecified     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)     Dietary zinc deficiency     Dry eye syndrome of bilateral lacrimal glands     Dry mouth, unspecified     Edema, unspecified     Gastro-esophageal reflux disease without esophagitis     Hyperosmolality and hypernatremia     Irritable bowel syndrome with constipation     Major depressive disorder, recurrent, unspecified (H)      Metabolic encephalopathy     Moderate protein-calorie malnutrition (H)     Other symbolic dysfunctions     Schizoaffective disorder, unspecified (H)     Thrombocytopenia, unspecified (H)     Unspecified asthma, uncomplicated     Urinary tract infection, site not specified     Vitamin D deficiency, unspecified     Schizoaffective disorder, bipolar type (H)          PLAN   1. Ongoing education given regarding diagnostic and treatment options with risks, benefits and alternatives and adequate verbalization of understanding.  2.  Medications       Melatonin 10 mg at bedtime       Zyprexa to 10 mg daily and 25 mg at bedtime          Topamax 150 mg at bedtime       Invega 1.5m daily     3.  Medical team following as needed  4.   coordinating a safe discharge plan    Risk Assessment: [facility] RISK ASSESSMENT: Patient able to contract for safety    Coordination of Care:   Treatment Plan reviewed and physician signed, Care discussed with Care/Treatment Team Members, Chart reviewed and Patient seen      Re-Certification I certify that the inpatient psychiatric facility services furnished since the previous certification were, and continue to be, medically necessary for, either, treatment which could reasonably be expected to improve the patient s condition or diagnostic study and that the hospital records indicate that the services furnished were, either, intensive treatment services, admission and related services necessary for diagnostic study, or equivalent services.     I certify that the patient continues to need, on a daily basis, active treatment furnished directly by or requiring the supervision of inpatient psychiatric facility personnel.   I estimate 14 days of hospitalization is necessary for proper treatment of the patient. My plans for post-hospital care for this patient are  group home     Alejandra Watkins MD    -     06/09/2023  -     2:49 PM    Total time  25 minutes with > 50%spent on  coordination of cares and psycho-education.    This note was created with help of Dragon dictation system. Grammatical / typing errors are not intentional.    Alejandra Watkins MD

## 2023-06-09 NOTE — PLAN OF CARE
Assessment/Intervention/Current Symtoms and Care Coordination:  -Reviewed pt's chart  -Meet with team to discuss pt care.Team reported is no longer on SIO. Pt slept for 3.75 hours.     Discharge Plan or Goal:  To continue to stabilize pt's mental health status. Tentative plan for pt to return back to group home     Barriers to Discharge:  Pt continues to present as symptomatic (delusional mood). Pt is receiving ongoing stabilization and medication adjustment. Continue care coordination with assisted to ascertain his acceptability for a return to the home.     Referral Status:  Will continue to coordinate with pt's group home.     Legal Status:  Huongxena Keenan is pt's legal Guardian      Contacts:  Shlomo Borja (Guardian) [phone]   [facility] ([phone])      Upcoming Meetings and Dates/Important Information and next steps:    None at this time.

## 2023-06-10 PROCEDURE — 250N000013 HC RX MED GY IP 250 OP 250 PS 637: Performed by: EMERGENCY MEDICINE

## 2023-06-10 PROCEDURE — 250N000009 HC RX 250: Performed by: EMERGENCY MEDICINE

## 2023-06-10 PROCEDURE — 250N000013 HC RX MED GY IP 250 OP 250 PS 637: Performed by: PSYCHIATRY & NEUROLOGY

## 2023-06-10 PROCEDURE — 124N000003 HC R&B MH SENIOR/ADOLESCENT

## 2023-06-10 PROCEDURE — 250N000013 HC RX MED GY IP 250 OP 250 PS 637: Performed by: PHYSICIAN ASSISTANT

## 2023-06-10 RX ADMIN — APIXABAN 5 MG: 5 TABLET, FILM COATED ORAL at 19:26

## 2023-06-10 RX ADMIN — CHLORHEXIDINE GLUCONATE 0.12% ORAL RINSE 15 ML: 1.2 LIQUID ORAL at 07:54

## 2023-06-10 RX ADMIN — CALCIUM POLYCARBOPHIL 625 MG: 625 TABLET, FILM COATED ORAL at 07:54

## 2023-06-10 RX ADMIN — PALIPERIDONE 1.5 MG: 1.5 TABLET, EXTENDED RELEASE ORAL at 07:55

## 2023-06-10 RX ADMIN — OLANZAPINE 25 MG: 10 TABLET, ORALLY DISINTEGRATING ORAL at 19:26

## 2023-06-10 RX ADMIN — APIXABAN 5 MG: 5 TABLET, FILM COATED ORAL at 07:54

## 2023-06-10 RX ADMIN — ZINC SULFATE 220 MG (50 MG) CAPSULE 220 MG: CAPSULE at 07:54

## 2023-06-10 RX ADMIN — LISINOPRIL 5 MG: 5 TABLET ORAL at 07:54

## 2023-06-10 RX ADMIN — MONTELUKAST 10 MG: 10 TABLET, FILM COATED ORAL at 19:26

## 2023-06-10 RX ADMIN — CALCIUM POLYCARBOPHIL 625 MG: 625 TABLET, FILM COATED ORAL at 19:26

## 2023-06-10 RX ADMIN — TOPIRAMATE 150 MG: 50 TABLET ORAL at 19:26

## 2023-06-10 RX ADMIN — PREDNISOLONE ACETATE 1 DROP: 10 SUSPENSION/ DROPS OPHTHALMIC at 07:54

## 2023-06-10 RX ADMIN — OLANZAPINE 10 MG: 10 TABLET, FILM COATED ORAL at 07:54

## 2023-06-10 RX ADMIN — WHITE PETROLATUM 57.7 %-MINERAL OIL 31.9 % EYE OINTMENT: at 19:28

## 2023-06-10 RX ADMIN — LEVOTHYROXINE SODIUM 50 MCG: 25 TABLET ORAL at 06:43

## 2023-06-10 RX ADMIN — DOCUSATE SODIUM 100 MG: 100 CAPSULE, LIQUID FILLED ORAL at 07:55

## 2023-06-10 RX ADMIN — Medication 10 MG: at 20:06

## 2023-06-10 RX ADMIN — PANTOPRAZOLE SODIUM 40 MG: 40 TABLET, DELAYED RELEASE ORAL at 06:43

## 2023-06-10 ASSESSMENT — ACTIVITIES OF DAILY LIVING (ADL)
ADLS_ACUITY_SCORE: 32
HYGIENE/GROOMING: INDEPENDENT
LAUNDRY: UNABLE TO COMPLETE
ORAL_HYGIENE: INDEPENDENT
ADLS_ACUITY_SCORE: 32
ADLS_ACUITY_SCORE: 32
DRESS: INDEPENDENT
ADLS_ACUITY_SCORE: 32

## 2023-06-10 NOTE — PLAN OF CARE
Problem: Disruptive Behavior  Goal: Improved Impulse and Aggression Control (Disruptive Behavior)  Outcome: Progressing   Goal Outcome Evaluation:  Mental Health Assessment  Jose requested bag to put his clothes to pack to go home tonight. He was reassured his belongings were in bin. He easily to redirection by a little distraction. Laughing loudly in lounge, frequently using call light. He showered today. He has good hygiene and is conscientious if he has food on him.    Jose has poor sleep. He states he typically stays up at night and sleeps during the day. He has not been sleeping during the day here.     Physiological Assessment  VS:B/P: 120/78, T: 97.2, P: 90, R: 18   Pain:denies   Gait and Ambulation:paces, does not sit long enough to elevate feet   Appetite: adequate   Bowels: no c/o    acuity staff ordered     Plan of Care Reviewed With: patient

## 2023-06-10 NOTE — PLAN OF CARE
Problem: Sleep Disturbance  Goal: Adequate Sleep/Rest  Outcome: Not Progressing   Goal Outcome Evaluation:    Patient was asleep at the start of the shift. Was up @ 12mn, started  pacing to and fro along the hallway and around the lounge. Singing while listening to music on his headphone. Had some midnight snacks consisting of milk, cereals and vanilla pudding. Went back to sleep for a few minutes and was up again @ 0100. Slept on and off till the end of the shift and just continued to pace around and listening to music while awake. No outbursts and agitation noted the whole shift. Did not void during the night.     Requested to have the TV turned on @ 6am and watched the morning news.    He has a No Roommate order due to severe intrusiveness and poor boundaries.     Slept for a total of 4 hours.

## 2023-06-10 NOTE — PLAN OF CARE
"  Problem: Suicidal Behavior  Goal: Suicidal Behavior is Absent or Managed  Outcome: Progressing     Problem: Psychotic Signs/Symptoms  Goal: Improved Behavioral Control (Psychotic Signs/Symptoms)  Outcome: Progressing     Problem: Disruptive Behavior  Goal: Improved Impulse and Aggression Control (Disruptive Behavior)  Outcome: Progressing     Patient in the longue with headphones listening to music, denies all mental health  symptoms, patient states \" I am doing great\" denies pain or discomfort, verbally contracts for safety, safety checks and precautions in place, paces the cort[address] way and can be loud at times when listening to music, but easily redirected. Medication compliant with no stated or observed side effects. Stq949% of dinner, hydrating well, no safety concern noted this shift. This writer will continue to monitor and offer therapeutic support as needed for the rest of the shift.    /88   Pulse 89   Temp 97.4  F (36.3  C) (Temporal)   Resp 16   Wt 91 kg (200 lb 9.9 oz)   SpO2 99%   BMI 34.44 kg/m            "

## 2023-06-10 NOTE — PLAN OF CARE
Problem: Adult Behavioral Health Plan of Care  Goal: Plan of Care Review  Outcome: Progressing     Problem: Adult Behavioral Health Plan of Care  Goal: Plan of Care Review  Outcome: Progressing   Goal Outcome Evaluation:       Patient has been appropriate this shift. He has been pleasant with staff and peers, He has not been intrusive and is easily redirectable. No outburst or loud singing. He is eating well, voiding without issues and medication compliant. No new behavioral or safety concerns to report this shift. Patient is in a private room due to behaviors. Asked about discharge x2 this shift, stating that his brother deedee was waiting outside to take him off, pressed on call light x1 endorsing that he is passing a message to his brother through the call light, remains with a no room mate order. Adequate hydration and intake noted. [facility] staff on standby to monitor pt, minimal behaviors noted this shift.

## 2023-06-11 PROCEDURE — 250N000013 HC RX MED GY IP 250 OP 250 PS 637: Performed by: PHYSICIAN ASSISTANT

## 2023-06-11 PROCEDURE — G0177 OPPS/PHP; TRAIN & EDUC SERV: HCPCS

## 2023-06-11 PROCEDURE — 250N000013 HC RX MED GY IP 250 OP 250 PS 637: Performed by: PSYCHIATRY & NEUROLOGY

## 2023-06-11 PROCEDURE — 124N000003 HC R&B MH SENIOR/ADOLESCENT

## 2023-06-11 PROCEDURE — 90853 GROUP PSYCHOTHERAPY: CPT

## 2023-06-11 PROCEDURE — 250N000013 HC RX MED GY IP 250 OP 250 PS 637: Performed by: EMERGENCY MEDICINE

## 2023-06-11 RX ADMIN — PANTOPRAZOLE SODIUM 40 MG: 40 TABLET, DELAYED RELEASE ORAL at 06:35

## 2023-06-11 RX ADMIN — LISINOPRIL 5 MG: 5 TABLET ORAL at 08:29

## 2023-06-11 RX ADMIN — APIXABAN 5 MG: 5 TABLET, FILM COATED ORAL at 19:02

## 2023-06-11 RX ADMIN — PALIPERIDONE 1.5 MG: 1.5 TABLET, EXTENDED RELEASE ORAL at 08:31

## 2023-06-11 RX ADMIN — DOCUSATE SODIUM 100 MG: 100 CAPSULE, LIQUID FILLED ORAL at 08:32

## 2023-06-11 RX ADMIN — MONTELUKAST 10 MG: 10 TABLET, FILM COATED ORAL at 19:02

## 2023-06-11 RX ADMIN — ZINC SULFATE 220 MG (50 MG) CAPSULE 220 MG: CAPSULE at 08:31

## 2023-06-11 RX ADMIN — LEVOTHYROXINE SODIUM 50 MCG: 25 TABLET ORAL at 06:35

## 2023-06-11 RX ADMIN — TOPIRAMATE 150 MG: 50 TABLET ORAL at 19:02

## 2023-06-11 RX ADMIN — APIXABAN 5 MG: 5 TABLET, FILM COATED ORAL at 08:31

## 2023-06-11 RX ADMIN — OLANZAPINE 25 MG: 10 TABLET, ORALLY DISINTEGRATING ORAL at 19:01

## 2023-06-11 RX ADMIN — OLANZAPINE 10 MG: 10 TABLET, FILM COATED ORAL at 08:31

## 2023-06-11 RX ADMIN — WHITE PETROLATUM 57.7 %-MINERAL OIL 31.9 % EYE OINTMENT: at 19:02

## 2023-06-11 RX ADMIN — Medication 10 MG: at 20:46

## 2023-06-11 RX ADMIN — CALCIUM POLYCARBOPHIL 625 MG: 625 TABLET, FILM COATED ORAL at 08:29

## 2023-06-11 RX ADMIN — CALCIUM POLYCARBOPHIL 625 MG: 625 TABLET, FILM COATED ORAL at 19:02

## 2023-06-11 RX ADMIN — CHLORHEXIDINE GLUCONATE 0.12% ORAL RINSE 15 ML: 1.2 LIQUID ORAL at 08:29

## 2023-06-11 RX ADMIN — PREDNISOLONE ACETATE 1 DROP: 10 SUSPENSION/ DROPS OPHTHALMIC at 08:29

## 2023-06-11 ASSESSMENT — ACTIVITIES OF DAILY LIVING (ADL)
ADLS_ACUITY_SCORE: 32
ORAL_HYGIENE: INDEPENDENT
ADLS_ACUITY_SCORE: 32
LAUNDRY: UNABLE TO COMPLETE
HYGIENE/GROOMING: INDEPENDENT
ADLS_ACUITY_SCORE: 32
ADLS_ACUITY_SCORE: 32
DRESS: INDEPENDENT
ADLS_ACUITY_SCORE: 32
HYGIENE/GROOMING: INDEPENDENT
LAUNDRY: UNABLE TO COMPLETE
ORAL_HYGIENE: INDEPENDENT
ADLS_ACUITY_SCORE: 32
DRESS: INDEPENDENT
ADLS_ACUITY_SCORE: 32
ADLS_ACUITY_SCORE: 32

## 2023-06-11 NOTE — PLAN OF CARE
"  Problem: Disruptive Behavior  Goal: Improved Impulse and Aggression Control (Disruptive Behavior)  Outcome: Progressing   Goal Outcome Evaluation:    Jose continues to be absent of aggressive behavior on day shift. He is less talkative and has been listening to his headphones. He goes to his room from the unge to press call light and ask questions. He is easily redirected. Speech is back to garbled. He was mistakenly calling another pt \"momma\". He did attend OT group and bingo. He is very social with staff and minimally social with select peers.     B/P: 121/59, T: 97.2, P: 92, R: 16                            "

## 2023-06-11 NOTE — CARE PLAN
06/11/23 1537   Group Therapy Session   Group Attendance attended group session   Time Session Began 1015   Time Session Ended 1100   Total Time (minutes) 45   Total # Attendees 7   Group Type community;life skill;psychoeducation   Group Topic Covered coping skills/lifestyle management;relationship;structured socialization;emotions/expression   Group Session Detail Group activity to encourage connection, communication, and self-reflection through discussion topic cards.   Patient Participation Detail Pt participated in a group activity promoting self-reflection and connection using discussion topic cards. Pt initially left the group, announcing they would not be interested in the activity; pt returned to participate and answer the discussion questions. Pt had difficulty understanding how to turn off their headphones; writer assisted. Pt was open to answering questions on the card, and would express that they were hard and occasionally needed time to consider the response. Pt stayed for the remainder of the group.

## 2023-06-11 NOTE — PLAN OF CARE
"  Problem: Psychotic Signs/Symptoms  Goal: Improved Sleep (Psychotic Signs/Symptoms)  Outcome: Progressing   Goal Outcome Evaluation:    Patient has been sleeping comfortably in one position the first five and a half hours of the shift.  Finally woke up @ 0430, came to the [facility] and verbalized that he is wet. His pants and bed linens were wet as he must have voided while he was asleep. Patient was cleaned up and his scrubs and linens were changed. He was so calm, cooperative and in a good mood. Very appreciative of the help extended  to him.     When he came out to the [facility], he was asked what he wanted to do, and he said, \"Coloring\". He spent almost half an hour doing some coloring. Later he requested for the TV to be turned on. A cup of coffee was also provided per patient's request. He voided freely the second time after drinking his coffee.     He has a No Roommate order due to intrusiveness and poor boundaries.     Slept for a total of  6.5  hours. No verbal outbursts and other behaviora;l issues noted this shift.                         "

## 2023-06-11 NOTE — PLAN OF CARE
Problem: Suicidal Behavior  Goal: Suicidal Behavior is Absent or Managed  Outcome: Progressing     Problem: Psychotic Signs/Symptoms  Goal: Improved Behavioral Control (Psychotic Signs/Symptoms)  Outcome: Progressing     Problem: Disruptive Behavior  Goal: Improved Impulse and Aggression Control (Disruptive Behavior)  Outcome: Progressing     Patient in the milieu when received this shift, patient presented with flat affect but brightens up during interactions and assessment, compliant with assessment questions and plan of care. continues to listen to music on headphone, patient can be loud be easily redirected. Patient denies all psych symptoms, denies physical pain or discomfort, verbally contracts for safety, safety checks and precautions in place this shift. Patient is eating and drinking adequately, minimally social and engaged with staff but not with peers, medication compliant with no stated or observed side effects. No behavioral or safety concerns noted. This writer will continue to monitor and offer therapeutic support as needed for the rest of the shift per plan of care. BP @ about 1600 was 154/79, pulse 88 rechecked BP and that was 97/62, pulse 68. No known symptoms.    BP 97/62 (BP Location: Left arm, Patient Position: Sitting, Cuff Size: Adult Regular)   Pulse 68   Temp 97.4  F (36.3  C) (Temporal)   Resp 16   Wt 92.4 kg (203 lb 11.3 oz)   SpO2 99%   BMI 34.97 kg/m

## 2023-06-12 PROCEDURE — 99231 SBSQ HOSP IP/OBS SF/LOW 25: CPT | Performed by: PSYCHIATRY & NEUROLOGY

## 2023-06-12 PROCEDURE — 250N000013 HC RX MED GY IP 250 OP 250 PS 637: Performed by: EMERGENCY MEDICINE

## 2023-06-12 PROCEDURE — 250N000013 HC RX MED GY IP 250 OP 250 PS 637: Performed by: PSYCHIATRY & NEUROLOGY

## 2023-06-12 PROCEDURE — G0177 OPPS/PHP; TRAIN & EDUC SERV: HCPCS

## 2023-06-12 PROCEDURE — 250N000013 HC RX MED GY IP 250 OP 250 PS 637: Performed by: PHYSICIAN ASSISTANT

## 2023-06-12 PROCEDURE — 124N000003 HC R&B MH SENIOR/ADOLESCENT

## 2023-06-12 RX ORDER — PALIPERIDONE 3 MG/1
3 TABLET, EXTENDED RELEASE ORAL DAILY
Status: DISCONTINUED | OUTPATIENT
Start: 2023-06-13 | End: 2023-06-16

## 2023-06-12 RX ADMIN — LISINOPRIL 5 MG: 5 TABLET ORAL at 08:22

## 2023-06-12 RX ADMIN — PALIPERIDONE 1.5 MG: 1.5 TABLET, EXTENDED RELEASE ORAL at 08:22

## 2023-06-12 RX ADMIN — PANTOPRAZOLE SODIUM 40 MG: 40 TABLET, DELAYED RELEASE ORAL at 06:39

## 2023-06-12 RX ADMIN — OLANZAPINE 10 MG: 10 TABLET, FILM COATED ORAL at 08:22

## 2023-06-12 RX ADMIN — MONTELUKAST 10 MG: 10 TABLET, FILM COATED ORAL at 21:38

## 2023-06-12 RX ADMIN — CALCIUM POLYCARBOPHIL 625 MG: 625 TABLET, FILM COATED ORAL at 21:38

## 2023-06-12 RX ADMIN — Medication 10 MG: at 21:38

## 2023-06-12 RX ADMIN — LEVOTHYROXINE SODIUM 50 MCG: 25 TABLET ORAL at 06:38

## 2023-06-12 RX ADMIN — ZINC SULFATE 220 MG (50 MG) CAPSULE 220 MG: CAPSULE at 08:22

## 2023-06-12 RX ADMIN — PREDNISOLONE ACETATE 1 DROP: 10 SUSPENSION/ DROPS OPHTHALMIC at 08:23

## 2023-06-12 RX ADMIN — OLANZAPINE 25 MG: 10 TABLET, ORALLY DISINTEGRATING ORAL at 21:37

## 2023-06-12 RX ADMIN — CHLORHEXIDINE GLUCONATE 0.12% ORAL RINSE 15 ML: 1.2 LIQUID ORAL at 08:22

## 2023-06-12 RX ADMIN — APIXABAN 5 MG: 5 TABLET, FILM COATED ORAL at 21:38

## 2023-06-12 RX ADMIN — TOPIRAMATE 150 MG: 50 TABLET ORAL at 21:37

## 2023-06-12 RX ADMIN — DOCUSATE SODIUM 100 MG: 100 CAPSULE, LIQUID FILLED ORAL at 08:22

## 2023-06-12 RX ADMIN — APIXABAN 5 MG: 5 TABLET, FILM COATED ORAL at 08:22

## 2023-06-12 RX ADMIN — CALCIUM POLYCARBOPHIL 625 MG: 625 TABLET, FILM COATED ORAL at 08:22

## 2023-06-12 ASSESSMENT — ACTIVITIES OF DAILY LIVING (ADL)
HYGIENE/GROOMING: INDEPENDENT
ADLS_ACUITY_SCORE: 32
DRESS: INDEPENDENT
ADLS_ACUITY_SCORE: 32
LAUNDRY: WITH SUPERVISION
ORAL_HYGIENE: INDEPENDENT
ADLS_ACUITY_SCORE: 32

## 2023-06-12 NOTE — PLAN OF CARE
Problem: Psychotic Signs/Symptoms  Goal: Improved Sleep (Psychotic Signs/Symptoms)  Outcome: Progressing   Goal Outcome Evaluation:    Patient was asleep at the start of the shift. Refused to elevate his legs while sleeping in bed. Woke up @ 0345 and started playing with the call light in his room. Came to the [facility] and requested staff to provide him with Peter, Jason and Marli music. He was told that it was not possible to make such a request. He was provided with a headphone and told him that he can listen to music through it, but we cannot choose the singers that he preferred. Coloring materials were offered to him and he sat down but did not start working on it. Instead, he requested for something to eat. He was provided with a banana, vanilla pudding and a glass of water. He started on his coloring but did not finish it. No outbursts this shift.     He has a No Roommate order due to intrusiveness and poor boundaries.     Slept for a total of 6 hours.

## 2023-06-12 NOTE — CARE PLAN
06/12/23 1454   Group Therapy Session   Group Attendance attended group session   Time Session Began 1015   Time Session Ended 1110   Total Time (minutes) 55   Total # Attendees 5   Group Type expressive therapy;task skill;psychotherapeutic   Group Topic Covered coping skills/lifestyle management;problem-solving;cognitive therapy techniques;structured socialization;balanced lifestyle   Group Session Detail [facility] group to facilitate coping skill exploration, use of cognitive skills and problem solving, creative expression, clinical observation and facilitation of social, cognitive, and kinesthetic performance skills.    Patient Response/Contribution cooperative with task;listened actively   Patient Participation Detail Worked on familiar steps on task when prompted with 1 step directions and supervision. He spent a great deal of time cleaning supplies and the work area, stating appreciatin of the ability to take time to clean. Misses details though willing to go back to add.

## 2023-06-12 NOTE — PROGRESS NOTES
"PSYCHIATRY  PROGRESS NOTE     DATE OF SERVICE   06/12/2023        CHIEF COMPLAINT   \" I am doing good.\"       SUBJECTIVE   Nursing reports:  Pt has been pacing the unit with his headset on listening to music and sing out loud at times . Pt ate well for both meals and was medication complaint . Pt denied all mental health SX . Will continue POC      Patient has been reviewed with the  earlier today.   Assessment/Intervention/Current Symtoms and Care Coordination:  -Reviewed pt's chart  -Meet with team to discuss pt care.Team reported is no longer on SIO. Pt slept for 6 hours. Pt denies any mental health symptoms.      OBJECTIVE   Patient was seen and evaluated in the day area by himself, this was a face-to-face evaluation.  Today patient presented as pleasant, calm and cooperative.  Continues to believe that he will be discharging to a hotel but he tells me that his group home is a hotel.  Patient was able to acknowledge that there is a staff that has been helping him recently and he is home that he likes.  The patient said that he would like to return back to his group home as soon as possible.  Today the patient did not mention thinking that his parents were alive, that he had many wives and multiple children.  He has been intermittently listening to music and at times interacting with selective peers.  She denies any side effects of the medications and seemed to be tolerating the Invega.       MEDICATIONS   Medications:  Scheduled Meds:    apixaban ANTICOAGULANT  5 mg Oral BID     artificial tears   Right Eye At Bedtime     calcium polycarbophil  625 mg Oral BID     chlorhexidine  15 mL Swish & Spit Daily     docusate sodium  100 mg Oral Daily     levothyroxine  50 mcg Oral or NG Tube QAM AC     lisinopril  5 mg Oral Daily     melatonin  10 mg Oral At Bedtime     montelukast  10 mg Oral At Bedtime     OLANZapine  10 mg Oral Daily     OLANZapine zydis  25 mg Oral At Bedtime     [START ON 6/13/2023] " "paliperidone  3 mg Oral Daily     pantoprazole  40 mg Oral QAM AC     prednisoLONE acetate  1 drop Right Eye Daily     topiramate  150 mg Oral At Bedtime     zinc sulfate  220 mg Oral Daily     Continuous Infusions:  PRN Meds:.acetaminophen, alum & mag hydroxide-simethicone, artificial saliva, atropine, hydrALAZINE, hydrOXYzine, hypromellose-dextran, ipratropium, OLANZapine **OR** OLANZapine, senna-docusate    Medication adherence issues: MS Med Adherence Y/N: Yes, Hospitalization  Medication side effects: MEDICATION SIDE EFFECTS: no side effects reported  Benefit: Yes / No: Yes       ROS   A comprehensive review of systems was negative.       MENTAL STATUS EXAM   Vitals: /86   Pulse 88   Temp 96.9  F (36.1  C) (Temporal)   Resp 16   Wt 92.4 kg (203 lb 11.3 oz)   SpO2 100%   BMI 34.97 kg/m      Appearance:  No apparent distress  Mood: \"I am doing good\"  Affect: Calm and pleasant was congruent to speech  Suicidal Ideation: PRESENT / ABSENT: absent   Homicidal Ideation: PRESENT / ABSENT: absent     Thought process: [place], more linear and goal directed  Thought content: Remains delusional but these seem to be less intense.  Fund of Knowledge: Below average  Attention/Concentration: Poor  Language ability:  Intact, speech is garbled at times  Memory:  Immediate recall impaired, Short-term memory impaired and Long-term memory intact  Insight: Fair  Judgement: Fair  Orientation: Person and situation only  Psychomotor Behavior: slowed    Muscle Strength and Tone: MuscleStrength: Normal  Gait and Station: Stable on his feet       LABS   personally reviewed.   Recent Labs   Lab 06/09/23  0843   WBC 8.1   HGB 13.0*   MCV 84         POTASSIUM 4.1   CHLORIDE 110*   CO2 23   BUN 26.1*   CR 0.97   ANIONGAP 10   NASIR 9.8   GLC 93   ALBUMIN 4.3   PROTTOTAL 7.1   BILITOTAL 0.5   ALKPHOS 144*   ALT 50   AST 42     No results found for: PHENYTOIN, PHENOBARB, VALPROATE, CBMZ       DIAGNOSIS   Principal " Problem:    Schizoaffective disorder, bipolar type (H)    Active Problem List:  Patient Active Problem List   Diagnosis     Closed head injury, initial encounter     Altered mental status, unspecified altered mental status type     Generalized weakness     Portal vein thrombosis     Pleural effusion     Generalized muscle weakness     Falls frequently     Other ascites     Acute pancreatitis, unspecified complication status, unspecified pancreatitis type     Pancreatic pseudocyst     Idiopathic acute pancreatitis, unspecified complication status     Allergic rhinitis     Fisher's esophagus     CTS (carpal tunnel syndrome)     Elevated liver enzymes     HTN (hypertension)     Hypothyroidism     Keratoconus     Major depressive disorder, recurrent episode, mild (H)     Intellectual disability     Morbid exogenous obesity (H)     Neuroleptic malignant syndrome     Obstructive sleep apnea syndrome     Bipolar affective disorder (H)     Seizure disorder (H)     Seizures, generalized convulsive (H)     Acute respiratory failure with hypoxia (H)     Anemia, unspecified     Anxiety disorder, unspecified     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)     Dietary zinc deficiency     Dry eye syndrome of bilateral lacrimal glands     Dry mouth, unspecified     Edema, unspecified     Gastro-esophageal reflux disease without esophagitis     Hyperosmolality and hypernatremia     Irritable bowel syndrome with constipation     Major depressive disorder, recurrent, unspecified (H)     Metabolic encephalopathy     Moderate protein-calorie malnutrition (H)     Other symbolic dysfunctions     Schizoaffective disorder, unspecified (H)     Thrombocytopenia, unspecified (H)     Unspecified asthma, uncomplicated     Urinary tract infection, site not specified     Vitamin D deficiency, unspecified     Schizoaffective disorder, bipolar type (H)          PLAN   1. Ongoing education given regarding diagnostic and treatment  options with risks, benefits and alternatives and adequate verbalization of understanding.  2.  Medications       Melatonin 10 mg at bedtime       Zyprexa to 10 mg daily and 25 mg at bedtime          Topamax 150 mg at bedtime       Increase Invega 3 mg daily     3.  Medical team following as needed  4.   coordinating a safe discharge plan    Risk Assessment: [facility] RISK ASSESSMENT: Patient able to contract for safety    Coordination of Care:   Treatment Plan reviewed and physician signed, Care discussed with Care/Treatment Team Members, Chart reviewed and Patient seen      Re-Certification I certify that the inpatient psychiatric facility services furnished since the previous certification were, and continue to be, medically necessary for, either, treatment which could reasonably be expected to improve the patient s condition or diagnostic study and that the hospital records indicate that the services furnished were, either, intensive treatment services, admission and related services necessary for diagnostic study, or equivalent services.     I certify that the patient continues to need, on a daily basis, active treatment furnished directly by or requiring the supervision of inpatient psychiatric facility personnel.   I estimate 14 days of hospitalization is necessary for proper treatment of the patient. My plans for post-hospital care for this patient are  group home     Alejandra Watkins MD    -     06/12/2023  -     3:22 PM    Total time  25 minutes with > 50%spent on coordination of cares and psycho-education.    This note was created with help of Dragon dictation system. Grammatical / typing errors are not intentional.    Alejandra Watkins MD

## 2023-06-12 NOTE — PLAN OF CARE
Assessment/Intervention/Current Symtoms and Care Coordination:  -Reviewed pt's chart  -Meet with team to discuss pt care.Team reported is no longer on SIO. Pt slept for 6 hours. Pt denies any mental health symptoms.     Discharge Plan or Goal:  To continue to stabilize pt's mental health status. Tentative plan for pt to return back to group home     Barriers to Discharge:  Pt continues to present as symptomatic (delusional mood). Pt is receiving ongoing stabilization and medication adjustment. Continue care coordination with custodial to ascertain his acceptability for a return to the home.     Referral Status:  Will continue to coordinate with pt's group home.     Legal Status:  Huong Shlomo is pt's legal Guardian      Contacts:  Shlomo Borja (Guardian) [phone]   Sadaf ([phone])      Upcoming Meetings and Dates/Important Information and next steps:    None at this time.

## 2023-06-12 NOTE — PLAN OF CARE
Problem: Plan of Care - These are the overarching goals to be used throughout the patient stay.    Goal: Optimal Comfort and Wellbeing  Outcome: Progressing  Intervention: Provide Person-Centered Care  Recent Flowsheet Documentation  Taken 6/12/2023 0930 by Salina Posey RN  Trust Relationship/Rapport:   emotional support provided   care explained   choices provided   empathic listening provided   questions answered   questions encouraged   reassurance provided   thoughts/feelings acknowledged   Goal Outcome Evaluation:  Pt has been pacing the unit with his headset on listening to music and sing out loud at times . Pt ate well for both meals and was medication complaint . Pt denied all mental health SX . Will continue POC

## 2023-06-13 PROCEDURE — 250N000013 HC RX MED GY IP 250 OP 250 PS 637: Performed by: PSYCHIATRY & NEUROLOGY

## 2023-06-13 PROCEDURE — 124N000003 HC R&B MH SENIOR/ADOLESCENT

## 2023-06-13 PROCEDURE — 99232 SBSQ HOSP IP/OBS MODERATE 35: CPT | Performed by: PSYCHIATRY & NEUROLOGY

## 2023-06-13 PROCEDURE — G0177 OPPS/PHP; TRAIN & EDUC SERV: HCPCS

## 2023-06-13 PROCEDURE — 250N000013 HC RX MED GY IP 250 OP 250 PS 637: Performed by: EMERGENCY MEDICINE

## 2023-06-13 PROCEDURE — 250N000013 HC RX MED GY IP 250 OP 250 PS 637: Performed by: PHYSICIAN ASSISTANT

## 2023-06-13 RX ADMIN — ZINC SULFATE 220 MG (50 MG) CAPSULE 220 MG: CAPSULE at 08:39

## 2023-06-13 RX ADMIN — OLANZAPINE 10 MG: 10 TABLET, FILM COATED ORAL at 08:39

## 2023-06-13 RX ADMIN — CHLORHEXIDINE GLUCONATE 0.12% ORAL RINSE 15 ML: 1.2 LIQUID ORAL at 08:39

## 2023-06-13 RX ADMIN — APIXABAN 5 MG: 5 TABLET, FILM COATED ORAL at 08:39

## 2023-06-13 RX ADMIN — HYDROXYZINE HYDROCHLORIDE 25 MG: 25 TABLET, FILM COATED ORAL at 12:05

## 2023-06-13 RX ADMIN — PANTOPRAZOLE SODIUM 40 MG: 40 TABLET, DELAYED RELEASE ORAL at 06:53

## 2023-06-13 RX ADMIN — OLANZAPINE 25 MG: 10 TABLET, ORALLY DISINTEGRATING ORAL at 20:29

## 2023-06-13 RX ADMIN — CALCIUM POLYCARBOPHIL 625 MG: 625 TABLET, FILM COATED ORAL at 08:39

## 2023-06-13 RX ADMIN — WHITE PETROLATUM 57.7 %-MINERAL OIL 31.9 % EYE OINTMENT: at 20:29

## 2023-06-13 RX ADMIN — MONTELUKAST 10 MG: 10 TABLET, FILM COATED ORAL at 20:30

## 2023-06-13 RX ADMIN — PALIPERIDONE 3 MG: 3 TABLET, EXTENDED RELEASE ORAL at 08:39

## 2023-06-13 RX ADMIN — CALCIUM POLYCARBOPHIL 625 MG: 625 TABLET, FILM COATED ORAL at 20:29

## 2023-06-13 RX ADMIN — PREDNISOLONE ACETATE 1 DROP: 10 SUSPENSION/ DROPS OPHTHALMIC at 08:40

## 2023-06-13 RX ADMIN — TOPIRAMATE 150 MG: 50 TABLET ORAL at 20:29

## 2023-06-13 RX ADMIN — DOCUSATE SODIUM 100 MG: 100 CAPSULE, LIQUID FILLED ORAL at 08:39

## 2023-06-13 RX ADMIN — LISINOPRIL 5 MG: 5 TABLET ORAL at 08:39

## 2023-06-13 RX ADMIN — LEVOTHYROXINE SODIUM 50 MCG: 25 TABLET ORAL at 06:53

## 2023-06-13 RX ADMIN — Medication 10 MG: at 20:29

## 2023-06-13 RX ADMIN — APIXABAN 5 MG: 5 TABLET, FILM COATED ORAL at 20:30

## 2023-06-13 ASSESSMENT — ACTIVITIES OF DAILY LIVING (ADL)
LAUNDRY: WITH SUPERVISION
ADLS_ACUITY_SCORE: 32
HYGIENE/GROOMING: INDEPENDENT
ADLS_ACUITY_SCORE: 32
ADLS_ACUITY_SCORE: 32
ORAL_HYGIENE: INDEPENDENT
ADLS_ACUITY_SCORE: 32
ADLS_ACUITY_SCORE: 32
DRESS: INDEPENDENT;SCRUBS (BEHAVIORAL HEALTH)
LAUNDRY: WITH SUPERVISION
HYGIENE/GROOMING: INDEPENDENT
ADLS_ACUITY_SCORE: 32
DRESS: INDEPENDENT
ADLS_ACUITY_SCORE: 32
ORAL_HYGIENE: INDEPENDENT
ADLS_ACUITY_SCORE: 32

## 2023-06-13 NOTE — PLAN OF CARE
Assessment/Intervention/Current Symtoms and Care Coordination:  -Reviewed pt's chart  -Meet with team to discuss pt care.Team reported is no longer on SIO. Pt slept for 3.5 hours. Pt had no verbal outbursts and agitation.  Pt denies any mental health symptoms. Pt has a no roommate order due to intrusiveness and poor boundaries.     CTC contact the  Sadaf to see if she can came and meet with pt to do an assessment. A voicemail was left for Saadf to return CTCs call.     CTC contact pt sister Huong. Lissett is worry about pt and his delusions and she is worry that group home may not take him yet.     Lissett will like a care conference with Dr. Charles.         Discharge Plan or Goal:  To continue to stabilize pt's mental health status. Tentative plan for pt to return back to group home     Barriers to Discharge:  Pt continues to present as symptomatic (delusional mood). Pt is receiving ongoing stabilization and medication adjustment. Continue care coordination with snf to ascertain his acceptability for a return to the home.     Referral Status:  Will continue to coordinate with pt's group home.     Legal Status:  Huong Keenan is pt's legal Guardian      Contacts:  Shlomo Borja (Guardian) [phone]   Sadaf ([phone])      Upcoming Meetings and Dates/Important Information and next steps:    Care Conference on Monday 6/19/2023 at 10:00 am.

## 2023-06-13 NOTE — PLAN OF CARE
Problem: Adult Behavioral Health Plan of Care  Goal: Optimized Coping Skills in Response to Life Stressors  Outcome: Progressing     Problem: Suicidal Behavior  Goal: Suicidal Behavior is Absent or Managed  Outcome: Progressing   Goal Outcome Evaluation:       Patient had pleasant affect. Mood was calm. Coherent thought process and made request appropriately. Alternated between room and lounge area. Patient socialized minimally with peers. Patient noted dressed neatly and made good eye contact during assessment. Denied pain, anxiety, depression, covid 19 symptoms, SI/HI/SIB/AH/VH, and contracted for safety. Patient took all HS medications except eye ointment. Patient declined the eye because he wanted to try to sleep better. Had good appetite during supper. Will continue to monitor and assist patient.

## 2023-06-13 NOTE — PLAN OF CARE
Problem: Adult Behavioral Health Plan of Care  Goal: Plan of Care Review  Outcome: Progressing  Flowsheets  Taken 6/13/2023 1757  Plan of Care Reviewed With: patient  Overall Patient Progress: improving  Patient Agreement with Plan of Care: agrees  Taken 6/13/2023 1715  Patient Agreement with Plan of Care: agrees    Patient was visible in the milieu this shift. He was pacing with his head phones on. Affect consistent with mood. He attended groups. He was repetitive in saying that he's going home today. Pt was easily redirectable. Pt ate 100% with 480 ml of fluid intake. He was compliant with taking all of his HS medications. Denied mental health symptoms. Pt denied pain and side effects of medications. Will continue to monitor and assess pt.

## 2023-06-13 NOTE — PLAN OF CARE
Problem: Psychotic Signs/Symptoms  Goal: Improved Behavioral Control (Psychotic Signs/Symptoms)  Outcome: Progressing   Goal Outcome Evaluation:    Plan of Care Reviewed With: patient      Patient has been mostly calm, pacing the cortez and attending group activity. He reported being in a good mood, denies anxiety, depression and SI/SIB. Patient became tense and anxious when talking about discharging. He stated he is ready to discharge and needs to go today. Pt was easily redirected to a different topic. Pt was given PRN hydroxyzine once this shift for anxiety. Pt is eating and drinking adequately, is medication complaint and denies any pain or discomfort.

## 2023-06-13 NOTE — CARE PLAN
Occupational Therapy     06/13/23 1252   Group Therapy Session   Group Attendance attended group session   Time Session Began 1115   Time Session Ended 1215   Total Time (minutes) 5   Total # Attendees 5-6   Group Type recreation   Group Topic Covered cognitive activities;coping skills/lifestyle management;balanced lifestyle;leisure exploration/use of leisure time;structured socialization   Group Session Detail OT: Education on 8 Dimensions of Wellness with interactive social activities (Exercise & Name 5) to increase concentration, focus, attention to task/detail, memory recall, coping with stress, health distraction engagement, social wellness, physical wellness, and intellectual wellness   Patient Response/Contribution disorganized;confused;refused to participate; other (comments)  (restess; drowsy; not goal-directed)   Patient Participation Detail Pt arrived late to group and immediately sat amongst peers for presented activity. Pt appeared drowsy as he was observed to close his eyes and lay his head on the table. Pt appeared confused as he continuously asked about the group activity and and did not appear to retain the information provided by therapist. Pt declined to engage in presented activity. Pt left group early and did not return.

## 2023-06-13 NOTE — CARE PLAN
06/13/23 1240   Group Therapy Session   Group Attendance attended group session   Time Session Began 1020   Time Session Ended 1015   Total Time (minutes) 55   Total # Attendees 8   Group Type expressive therapy;task skill;psychotherapeutic   Group Topic Covered coping skills/lifestyle management;problem-solving;cognitive activities;structured socialization;balanced lifestyle   Group Session Detail [facility] group to facilitate coping skill exploration, use of cognitive skills and problem solving, creative expression, clinical observation and facilitation of social, cognitive, and kinesthetic performance skills.    Patient Response/Contribution cooperative with task   Patient Participation Detail Stayed the full session and worked at a constant pace. Asked for assistance when needed with supplies. Seemed to need more redirection on final steps on task when having a difficult time stopping at the end of the session. Productive with his work task of 2 familiar steps.

## 2023-06-13 NOTE — CARE PLAN
06/13/23 1048   Individualization/Patient Specific Goals   Patient Personal Strengths family/social support;medication/treatment adherence;stable living environment   Patient Vulnerabilities limited ability to read/write;limited social skills;lacks insight into illness   Anxieties, Fears or Concerns None at this time.   Individualized Care Needs Medication and Stabilization   Interprofessional Rounds   Summary Patient was admitted due to concerns for his safety.   Participants CTC;psychiatrist;nursing   Behavioral Team Discussion   Participants Dr. Yeni ALVA, [facility]   Progress Stabilization is ongoing   Anticipated length of stay 7 Days.   Continued Stay Criteria/Rationale Requires stabilization to reduce risk of imminent harm to himself and others.   Medical/Physical See H&P   Precautions See below   Plan Psychiatric assesemnt. Medication manangement. Therapeutic Mileu. Individual and group support.   Rationale for change in precautions or plan no change   Safety Plan CTC will do individual inpatient treatment planning and after care planning. CTC will discuss options for increasing community supports with the patient. CTC will coordinate with outpatient providers and will place referrals to ensure appointments are in place.   Anticipated Discharge Disposition group home     PRECAUTIONS AND SAFETY    Behavioral Orders   Procedures    Code 1 - Restrict to Unit    Code 2    Elopement precautions    Routine Programming     As clinically indicated    Sexual precautions    Status 15     Every 15 minutes.       Safety  Safety WDL: WDL  Patient Location: lounge, patient room, own  Observed Behavior: calm, pacing  Observed Behavior (Comment): calm, pacing  Safety Measures: clinical history reviewed, environmental rounds completed, safety plan reviewed, self-directed behavior promoted, suicide check-in completed  Diversional Activity: music  Suicidality: Status 15  Seizure precautions: clutter free  environment  Assault: private room, status 15, status continuous sight  Elopement Assessment: Statements about wanting to leave  Elopement Interventions: status 15, status continuous sight  Sexual: status 15, status continuous sight

## 2023-06-13 NOTE — PROGRESS NOTES
"PSYCHIATRY  PROGRESS NOTE     DATE OF SERVICE   06/13/2023        CHIEF COMPLAINT   \" I am doing good.\"       SUBJECTIVE   Nursing reports:  Patient has been mostly calm, pacing the [place]ez and attending group activity. He reported being in a good mood, denies anxiety, depression and SI/SIB. Patient became tense and anxious when talking about discharging. He stated he is ready to discharge and needs to go today. Pt was easily redirected to a different topic. Pt was given PRN hydroxyzine once this shift for anxiety. Pt is eating and drinking adequately, is medication complaint and denies any pain or discomfort.      Patient has been reviewed with the  earlier today.   Assessment/Intervention/Current Symtoms and Care Coordination:  -Reviewed pt's chart  -Meet with team to discuss pt care.Team reported is no longer on SIO. Pt slept for 3.5 hours. Pt had no verbal outbursts and agitation.  Pt denies any mental health symptoms. Pt has a no roommate order due to intrusiveness and poor boundaries.      CTC contact the  Sadaf to see if she can came and meet with pt to do an assessment. A voicemail was left for Sadaf to return CTCs call.      CTC contact pt sister Huong. Lissett is worry about pt and his delusions and she is worry that group home may not take him yet.      Lissett will like a care conference with Dr. Charles.      OBJECTIVE   Patient was seen and evaluated in the day area by himself, this was a face-to-face evaluation.  Initially the patient presented as calm and was cooperative.  I discussed with patient that we have scheduled a care conference with his sister next Monday at 10 AM.  Patient said that he is not going to attend the care conference because he is leaving today.  Patient said that he did not want to hear about staying in the hospital longer and asked this writer end the conversation.  He was reevaluated later on and he has forgotten about wanting to leave the " "hospital.  Patient was pleasant, smiling and enjoying listening to music.  Patient called this writer Brittany but said that he knows that that is not my real name.       MEDICATIONS   Medications:  Scheduled Meds:    apixaban ANTICOAGULANT  5 mg Oral BID     artificial tears   Right Eye At Bedtime     calcium polycarbophil  625 mg Oral BID     chlorhexidine  15 mL Swish & Spit Daily     docusate sodium  100 mg Oral Daily     levothyroxine  50 mcg Oral or NG Tube QAM AC     lisinopril  5 mg Oral Daily     melatonin  10 mg Oral At Bedtime     montelukast  10 mg Oral At Bedtime     OLANZapine  10 mg Oral Daily     OLANZapine zydis  25 mg Oral At Bedtime     paliperidone  3 mg Oral Daily     pantoprazole  40 mg Oral QAM AC     prednisoLONE acetate  1 drop Right Eye Daily     topiramate  150 mg Oral At Bedtime     zinc sulfate  220 mg Oral Daily     Continuous Infusions:  PRN Meds:.acetaminophen, alum & mag hydroxide-simethicone, artificial saliva, atropine, hydrALAZINE, hydrOXYzine, hypromellose-dextran, ipratropium, OLANZapine **OR** OLANZapine, senna-docusate    Medication adherence issues: MS Med Adherence Y/N: Yes, Hospitalization  Medication side effects: MEDICATION SIDE EFFECTS: no side effects reported  Benefit: Yes / No: Yes       ROS   A comprehensive review of systems was negative.       MENTAL STATUS EXAM   Vitals: /87   Pulse 80   Temp 97.2  F (36.2  C) (Temporal)   Resp 16   Wt 93.2 kg (205 lb 7.5 oz)   SpO2 100%   BMI 35.27 kg/m      Same as last visit  Appearance:  No apparent distress  Mood: \"I am doing good\"  Affect: Calm and pleasant was congruent to speech  Suicidal Ideation: PRESENT / ABSENT: absent   Homicidal Ideation: PRESENT / ABSENT: absent     Thought process: [place], more linear and goal directed  Thought content: Remains delusional but these seem to be less intense.  Fund of Knowledge: Below average  Attention/Concentration: Poor  Language ability:  Intact, speech is garbled " at times  Memory:  Immediate recall impaired, Short-term memory impaired and Long-term memory intact  Insight: Fair  Judgement: Fair  Orientation: Person and situation only  Psychomotor Behavior: slowed    Muscle Strength and Tone: MuscleStrength: Normal  Gait and Station: Stable on his feet       LABS   personally reviewed.   Recent Labs   Lab 06/09/23  0843   WBC 8.1   HGB 13.0*   MCV 84         POTASSIUM 4.1   CHLORIDE 110*   CO2 23   BUN 26.1*   CR 0.97   ANIONGAP 10   NASIR 9.8   GLC 93   ALBUMIN 4.3   PROTTOTAL 7.1   BILITOTAL 0.5   ALKPHOS 144*   ALT 50   AST 42     No results found for: PHENYTOIN, PHENOBARB, VALPROATE, CBMZ       DIAGNOSIS   Principal Problem:    Schizoaffective disorder, bipolar type (H)    Active Problem List:  Patient Active Problem List   Diagnosis     Closed head injury, initial encounter     Altered mental status, unspecified altered mental status type     Generalized weakness     Portal vein thrombosis     Pleural effusion     Generalized muscle weakness     Falls frequently     Other ascites     Acute pancreatitis, unspecified complication status, unspecified pancreatitis type     Pancreatic pseudocyst     Idiopathic acute pancreatitis, unspecified complication status     Allergic rhinitis     Fisher's esophagus     CTS (carpal tunnel syndrome)     Elevated liver enzymes     HTN (hypertension)     Hypothyroidism     Keratoconus     Major depressive disorder, recurrent episode, mild (H)     Intellectual disability     Morbid exogenous obesity (H)     Neuroleptic malignant syndrome     Obstructive sleep apnea syndrome     Bipolar affective disorder (H)     Seizure disorder (H)     Seizures, generalized convulsive (H)     Acute respiratory failure with hypoxia (H)     Anemia, unspecified     Anxiety disorder, unspecified     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)     Dietary zinc deficiency     Dry eye syndrome of bilateral lacrimal glands     Dry mouth,  unspecified     Edema, unspecified     Gastro-esophageal reflux disease without esophagitis     Hyperosmolality and hypernatremia     Irritable bowel syndrome with constipation     Major depressive disorder, recurrent, unspecified (H)     Metabolic encephalopathy     Moderate protein-calorie malnutrition (H)     Other symbolic dysfunctions     Schizoaffective disorder, unspecified (H)     Thrombocytopenia, unspecified (H)     Unspecified asthma, uncomplicated     Urinary tract infection, site not specified     Vitamin D deficiency, unspecified     Schizoaffective disorder, bipolar type (H)          PLAN   1. Ongoing education given regarding diagnostic and treatment options with risks, benefits and alternatives and adequate verbalization of understanding.  2.  Medications       Melatonin 10 mg at bedtime       Zyprexa to 10 mg daily and 25 mg at bedtime          Topamax 150 mg at bedtime       Invega 3 mg daily     3.  Medical team following as needed  4.   coordinating a safe discharge plan    Risk Assessment: [facility] RISK ASSESSMENT: Patient able to contract for safety    Coordination of Care:   Treatment Plan reviewed and physician signed, Care discussed with Care/Treatment Team Members, Chart reviewed and Patient seen      Re-Certification I certify that the inpatient psychiatric facility services furnished since the previous certification were, and continue to be, medically necessary for, either, treatment which could reasonably be expected to improve the patient s condition or diagnostic study and that the hospital records indicate that the services furnished were, either, intensive treatment services, admission and related services necessary for diagnostic study, or equivalent services.     I certify that the patient continues to need, on a daily basis, active treatment furnished directly by or requiring the supervision of inpatient psychiatric facility personnel.   I estimate 14 days of  hospitalization is necessary for proper treatment of the patient. My plans for post-hospital care for this patient are  group home     Alejandra Watkins MD    -     06/13/2023  -     4:02 PM    Total time  25 minutes with > 50%spent on coordination of cares and psycho-education.    This note was created with help of Dragon dictation system. Grammatical / typing errors are not intentional.    Alejandra Watkins MD

## 2023-06-14 PROCEDURE — 99232 SBSQ HOSP IP/OBS MODERATE 35: CPT | Performed by: PSYCHIATRY & NEUROLOGY

## 2023-06-14 PROCEDURE — 250N000013 HC RX MED GY IP 250 OP 250 PS 637: Performed by: PHYSICIAN ASSISTANT

## 2023-06-14 PROCEDURE — 124N000003 HC R&B MH SENIOR/ADOLESCENT

## 2023-06-14 PROCEDURE — 250N000013 HC RX MED GY IP 250 OP 250 PS 637: Performed by: PSYCHIATRY & NEUROLOGY

## 2023-06-14 PROCEDURE — 250N000013 HC RX MED GY IP 250 OP 250 PS 637: Performed by: EMERGENCY MEDICINE

## 2023-06-14 RX ADMIN — PANTOPRAZOLE SODIUM 40 MG: 40 TABLET, DELAYED RELEASE ORAL at 06:57

## 2023-06-14 RX ADMIN — MONTELUKAST 10 MG: 10 TABLET, FILM COATED ORAL at 20:43

## 2023-06-14 RX ADMIN — APIXABAN 5 MG: 5 TABLET, FILM COATED ORAL at 08:12

## 2023-06-14 RX ADMIN — Medication 10 MG: at 20:43

## 2023-06-14 RX ADMIN — APIXABAN 5 MG: 5 TABLET, FILM COATED ORAL at 20:43

## 2023-06-14 RX ADMIN — DOCUSATE SODIUM 100 MG: 100 CAPSULE, LIQUID FILLED ORAL at 08:12

## 2023-06-14 RX ADMIN — LEVOTHYROXINE SODIUM 50 MCG: 25 TABLET ORAL at 06:57

## 2023-06-14 RX ADMIN — CALCIUM POLYCARBOPHIL 625 MG: 625 TABLET, FILM COATED ORAL at 08:12

## 2023-06-14 RX ADMIN — PREDNISOLONE ACETATE 1 DROP: 10 SUSPENSION/ DROPS OPHTHALMIC at 08:13

## 2023-06-14 RX ADMIN — CHLORHEXIDINE GLUCONATE 0.12% ORAL RINSE 15 ML: 1.2 LIQUID ORAL at 08:12

## 2023-06-14 RX ADMIN — CALCIUM POLYCARBOPHIL 625 MG: 625 TABLET, FILM COATED ORAL at 20:43

## 2023-06-14 RX ADMIN — LISINOPRIL 5 MG: 5 TABLET ORAL at 08:20

## 2023-06-14 RX ADMIN — OLANZAPINE 10 MG: 10 TABLET, FILM COATED ORAL at 08:12

## 2023-06-14 RX ADMIN — ZINC SULFATE 220 MG (50 MG) CAPSULE 220 MG: CAPSULE at 08:12

## 2023-06-14 RX ADMIN — PALIPERIDONE 3 MG: 3 TABLET, EXTENDED RELEASE ORAL at 08:12

## 2023-06-14 RX ADMIN — TOPIRAMATE 150 MG: 50 TABLET ORAL at 20:43

## 2023-06-14 RX ADMIN — OLANZAPINE 25 MG: 10 TABLET, ORALLY DISINTEGRATING ORAL at 20:43

## 2023-06-14 ASSESSMENT — ACTIVITIES OF DAILY LIVING (ADL)
ADLS_ACUITY_SCORE: 42
ADLS_ACUITY_SCORE: 32
ADLS_ACUITY_SCORE: 32
ADLS_ACUITY_SCORE: 42
ADLS_ACUITY_SCORE: 32
HYGIENE/GROOMING: INDEPENDENT
ADLS_ACUITY_SCORE: 32
DRESS: INDEPENDENT;SCRUBS (BEHAVIORAL HEALTH)
ADLS_ACUITY_SCORE: 32
ORAL_HYGIENE: INDEPENDENT
LAUNDRY: UNABLE TO COMPLETE
ADLS_ACUITY_SCORE: 32
ADLS_ACUITY_SCORE: 42
ADLS_ACUITY_SCORE: 32
HYGIENE/GROOMING: PROMPTS
ORAL_HYGIENE: PROMPTS
ADLS_ACUITY_SCORE: 32
ADLS_ACUITY_SCORE: 32

## 2023-06-14 NOTE — PLAN OF CARE
Assessment/Intervention/Current Symtoms and Care Coordination:  -Reviewed pt's chart  -Meet with team to discuss pt care.Team reported is no longer on SIO. Pt slept for 6 hours. Pt has a no roommate order due to intrusiveness and poor boundaries.           Discharge Plan or Goal:  To continue to stabilize pt's mental health status. Tentative plan for pt to return back to group home     Barriers to Discharge:  Pt continues to present as symptomatic (delusional mood). Pt is receiving ongoing stabilization and medication adjustment. Continue care coordination with long term to ascertain his acceptability for a return to the home.     Referral Status:  Will continue to coordinate with pt's group home.     Legal Status:  Huong Keenan is pt's legal Guardian      Contacts:  Shlomo Borja (Guardian) [phone]   Sadaf ([phone])      Upcoming Meetings and Dates/Important Information and next steps:    Care Conference on Monday 6/19/2023 at 10:00 am.

## 2023-06-14 NOTE — PLAN OF CARE
Problem: Sleep Disturbance  Goal: Adequate Sleep/Rest  Outcome: Progressing   Goal Outcome Evaluation:       Patient is sound asleep at the start of the shift. He came out to the milieu for after midnight snacks. He had orange juice  and nutrigrain bar. He went back to his room and slept with a headphone on. He has an order of no roommate r/t severe intrusiveness; labile mood and poor boundaries. Patient slept for a total of 6 hours.

## 2023-06-14 NOTE — PLAN OF CARE
"Goal Outcome Evaluation:    Plan of Care Reviewed With: patient      Full range affect, mood appears calm. Pt has poor boundaries at times, easily redirectable. Making needs known appropriately. Pt present in milieu, pacing and listening to music. Pt continues to use his call light to order food and ask to have \"CDs delivered around the world\", pt declines reality reorientation at this time. Pt mediation compliant. Pt denies issues with voiding, pt verbalized he had a BM this AM. Bilateral LE +1 pitting edema, pt encouraged to elevate his legs throughout the day, pt needs reminders, feet dry lotion applied, compression socks put on. Pt eating and taking fluids freely. Pt denies questions/concerns at this time.    No roommate order for intrusiveness and poor boundaries     /72 (BP Location: Right arm, Patient Position: Sitting, Cuff Size: Adult Regular)   Pulse 98   Temp 97.3  F (36.3  C) (Oral)   Resp 17   Wt 93.2 kg (205 lb 7.5 oz)   SpO2 96%   BMI 35.27 kg/m       Plan:  Continue to encourage patient to elevate LE throughout the day  Continue to encourage group participation  Continue to encourage choices and independence  Continue to encourage non-pharmacological coping skills    "

## 2023-06-14 NOTE — PROGRESS NOTES
"PSYCHIATRY  PROGRESS NOTE     DATE OF SERVICE   06/14/2023        CHIEF COMPLAINT   \" I am doing good.\"       SUBJECTIVE   Nursing reports:  Plan of Care Reviewed With: patient       Full range affect, mood appears calm. Pt has poor boundaries at times, easily redirectable. Making needs known appropriately. Pt present in milieu, pacing and listening to music. Pt continues to use his call light to order food and ask to have \"CDs delivered around the world\", pt declines reality reorientation at this time. Pt mediation compliant. Pt denies issues with voiding, pt verbalized he had a BM this AM. Bilateral LE +1 pitting edema, pt encouraged to elevate his legs throughout the day, pt needs reminders, feet dry lotion applied, compression socks put on. Pt eating and taking fluids freely. Pt denies questions/concerns at this time.     No roommate order for intrusiveness and poor boundaries      /72 (BP Location: Right arm, Patient Position: Sitting, Cuff Size: Adult Regular)   Pulse 98   Temp 97.3  F (36.3  C) (Oral)   Resp 17   Wt 93.2 kg (205 lb 7.5 oz)   SpO2 96%   BMI 35.27 kg/m        Plan:  Continue to encourage patient to elevate LE throughout the day  Continue to encourage group participation  Continue to encourage choices and independence  Continue to encourage non-pharmacological coping skills         Patient has been reviewed with the  earlier today.   Assessment/Intervention/Current Symtoms and Care Coordination:  -Reviewed pt's chart  -Meet with team to discuss pt care.Team reported is no longer on SIO. Pt slept for 6 hours. Pt has a no roommate order due to intrusiveness and poor boundaries.         OBJECTIVE   Patient was seen and evaluated in the day area by himself, this was a face-to-face evaluation.  Patient tells me that he does not want for this writer to ask any questions and that he is doing okay.  He also tells me that he is leaving the hospital today.  Patient is bright, " "pleasant and he was appropriate during our interaction.  He denies any side effects from his medications and has been able to contract for safety.       MEDICATIONS   Medications:  Scheduled Meds:    apixaban ANTICOAGULANT  5 mg Oral BID     artificial tears   Right Eye At Bedtime     calcium polycarbophil  625 mg Oral BID     chlorhexidine  15 mL Swish & Spit Daily     docusate sodium  100 mg Oral Daily     levothyroxine  50 mcg Oral or NG Tube QAM AC     lisinopril  5 mg Oral Daily     melatonin  10 mg Oral At Bedtime     montelukast  10 mg Oral At Bedtime     OLANZapine  10 mg Oral Daily     OLANZapine zydis  25 mg Oral At Bedtime     paliperidone  3 mg Oral Daily     pantoprazole  40 mg Oral QAM AC     prednisoLONE acetate  1 drop Right Eye Daily     topiramate  150 mg Oral At Bedtime     zinc sulfate  220 mg Oral Daily     Continuous Infusions:  PRN Meds:.acetaminophen, alum & mag hydroxide-simethicone, artificial saliva, atropine, hydrALAZINE, hydrOXYzine, hypromellose-dextran, ipratropium, OLANZapine **OR** OLANZapine, senna-docusate    Medication adherence issues: MS Med Adherence Y/N: Yes, Hospitalization  Medication side effects: MEDICATION SIDE EFFECTS: no side effects reported  Benefit: Yes / No: Yes       ROS   A comprehensive review of systems was negative.       MENTAL STATUS EXAM   Vitals: /72 (BP Location: Right arm, Patient Position: Sitting, Cuff Size: Adult Regular)   Pulse 98   Temp 97.3  F (36.3  C) (Oral)   Resp 17   Wt 93.2 kg (205 lb 7.5 oz)   SpO2 96%   BMI 35.27 kg/m      Same as last visit  Appearance:  No apparent distress  Mood: \"I am doing good\"  Affect: Calm and pleasant was congruent to speech  Suicidal Ideation: PRESENT / ABSENT: absent   Homicidal Ideation: PRESENT / ABSENT: absent     Thought process: [place], more linear and goal directed  Thought content: Remains delusional but these seem to be less intense.  Fund of Knowledge: Below " average  Attention/Concentration: Poor  Language ability:  Intact, speech is garbled at times  Memory:  Immediate recall impaired, Short-term memory impaired and Long-term memory intact  Insight: Fair  Judgement: Fair  Orientation: Person and situation only  Psychomotor Behavior: slowed    Muscle Strength and Tone: MuscleStrength: Normal  Gait and Station: Stable on his feet       LABS   personally reviewed.   Recent Labs   Lab 06/09/23  0843   WBC 8.1   HGB 13.0*   MCV 84         POTASSIUM 4.1   CHLORIDE 110*   CO2 23   BUN 26.1*   CR 0.97   ANIONGAP 10   NASIR 9.8   GLC 93   ALBUMIN 4.3   PROTTOTAL 7.1   BILITOTAL 0.5   ALKPHOS 144*   ALT 50   AST 42     No results found for: PHENYTOIN, PHENOBARB, VALPROATE, CBMZ       DIAGNOSIS   Principal Problem:    Schizoaffective disorder, bipolar type (H)    Active Problem List:  Patient Active Problem List   Diagnosis     Closed head injury, initial encounter     Altered mental status, unspecified altered mental status type     Generalized weakness     Portal vein thrombosis     Pleural effusion     Generalized muscle weakness     Falls frequently     Other ascites     Acute pancreatitis, unspecified complication status, unspecified pancreatitis type     Pancreatic pseudocyst     Idiopathic acute pancreatitis, unspecified complication status     Allergic rhinitis     Fisher's esophagus     CTS (carpal tunnel syndrome)     Elevated liver enzymes     HTN (hypertension)     Hypothyroidism     Keratoconus     Major depressive disorder, recurrent episode, mild (H)     Intellectual disability     Morbid exogenous obesity (H)     Neuroleptic malignant syndrome     Obstructive sleep apnea syndrome     Bipolar affective disorder (H)     Seizure disorder (H)     Seizures, generalized convulsive (H)     Acute respiratory failure with hypoxia (H)     Anemia, unspecified     Anxiety disorder, unspecified     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)      Dietary zinc deficiency     Dry eye syndrome of bilateral lacrimal glands     Dry mouth, unspecified     Edema, unspecified     Gastro-esophageal reflux disease without esophagitis     Hyperosmolality and hypernatremia     Irritable bowel syndrome with constipation     Major depressive disorder, recurrent, unspecified (H)     Metabolic encephalopathy     Moderate protein-calorie malnutrition (H)     Other symbolic dysfunctions     Schizoaffective disorder, unspecified (H)     Thrombocytopenia, unspecified (H)     Unspecified asthma, uncomplicated     Urinary tract infection, site not specified     Vitamin D deficiency, unspecified     Schizoaffective disorder, bipolar type (H)          PLAN   1. Ongoing education given regarding diagnostic and treatment options with risks, benefits and alternatives and adequate verbalization of understanding.  2.  Medications       Melatonin 10 mg at bedtime       Zyprexa to 10 mg daily and 25 mg at bedtime          Topamax 150 mg at bedtime       Invega 3 mg daily     3.  Medical team following as needed  4.   coordinating a safe discharge plan    Risk Assessment: [facility] RISK ASSESSMENT: Patient able to contract for safety    Coordination of Care:   Treatment Plan reviewed and physician signed, Care discussed with Care/Treatment Team Members, Chart reviewed and Patient seen      Re-Certification I certify that the inpatient psychiatric facility services furnished since the previous certification were, and continue to be, medically necessary for, either, treatment which could reasonably be expected to improve the patient s condition or diagnostic study and that the hospital records indicate that the services furnished were, either, intensive treatment services, admission and related services necessary for diagnostic study, or equivalent services.     I certify that the patient continues to need, on a daily basis, active treatment furnished directly by or requiring the  supervision of inpatient psychiatric facility personnel.   I estimate 14 days of hospitalization is necessary for proper treatment of the patient. My plans for post-hospital care for this patient are  group home     Alejandra Watkins MD    -     06/14/2023  -     4:01 PM    Total time  25 minutes with > 50%spent on coordination of cares and psycho-education.    This note was created with help of Dragon dictation system. Grammatical / typing errors are not intentional.    Alejandra Watkins MD

## 2023-06-14 NOTE — PROGRESS NOTES
06/13/23 2000   Group Therapy Session   Group Attendance attended group session   Time Session Began 1900   Time Session Ended 1950   Total Time (minutes) 15   Total # Attendees 4   Group Type recreation   Group Topic Covered leisure exploration/use of leisure time   Group Session Detail TR leisure group   Patient Response/Contribution cooperative with task   Patient Participation Detail Pt briefly participated in Therapeutic Recreation group with focus on socializing, problem solving, and leisure participation. Engaged and cooperative in group recreational intervention; word puzzles. Pt stated he was not going to attend group and left after entering the group room. Pt later joined group and participated in the activity for approximately x15 minutes before deciding to leave again. This was the longest that pt stayed in a TR group so far. Pt acted appropriately and spoke at an appropriate volume during his time in group.

## 2023-06-14 NOTE — CARE PLAN
06/14/23 1442   Group Therapy Session   Group Attendance attended group session   Time Session Began 1300   Time Session Ended 1400   Total Time (minutes) 15  (no charge)   Total # Attendees 5   Group Type recreation;task skill   Group Topic Covered coping skills/lifestyle management;problem-solving;leisure exploration/use of leisure time;cognitive activities   Group Session Detail OT Cognitive Wellness Group-bananagrams for following directions, cognition, problem solving, symptom management, focus, frustration tolerance, recall, healthy distraction, social engagement, and turn taking   Patient Response/Contribution unable to sequence the task   Patient Participation Detail pt arrived late for group. pt required verbal instructions to remove headphones and turn them off during group. pt was provided with one to one assist with instructions for activity. pt unable to sequence task without assist from staff. pt was using nonsensical words for his activity and declined to remove them from the activity. pt used all his tiles and stated he was done. pt declined further instructions and assistance from staff. pt left group-pt was heard singing in hallway a while later.

## 2023-06-15 PROCEDURE — 250N000013 HC RX MED GY IP 250 OP 250 PS 637: Performed by: PHYSICIAN ASSISTANT

## 2023-06-15 PROCEDURE — 250N000013 HC RX MED GY IP 250 OP 250 PS 637: Performed by: EMERGENCY MEDICINE

## 2023-06-15 PROCEDURE — 124N000003 HC R&B MH SENIOR/ADOLESCENT

## 2023-06-15 PROCEDURE — 250N000013 HC RX MED GY IP 250 OP 250 PS 637: Performed by: PSYCHIATRY & NEUROLOGY

## 2023-06-15 PROCEDURE — H2032 ACTIVITY THERAPY, PER 15 MIN: HCPCS

## 2023-06-15 PROCEDURE — G0177 OPPS/PHP; TRAIN & EDUC SERV: HCPCS

## 2023-06-15 PROCEDURE — 250N000009 HC RX 250: Performed by: EMERGENCY MEDICINE

## 2023-06-15 RX ADMIN — CALCIUM POLYCARBOPHIL 625 MG: 625 TABLET, FILM COATED ORAL at 08:38

## 2023-06-15 RX ADMIN — LEVOTHYROXINE SODIUM 50 MCG: 25 TABLET ORAL at 06:50

## 2023-06-15 RX ADMIN — APIXABAN 5 MG: 5 TABLET, FILM COATED ORAL at 20:39

## 2023-06-15 RX ADMIN — PANTOPRAZOLE SODIUM 40 MG: 40 TABLET, DELAYED RELEASE ORAL at 06:50

## 2023-06-15 RX ADMIN — WHITE PETROLATUM 57.7 %-MINERAL OIL 31.9 % EYE OINTMENT: at 21:42

## 2023-06-15 RX ADMIN — LISINOPRIL 5 MG: 5 TABLET ORAL at 08:39

## 2023-06-15 RX ADMIN — CHLORHEXIDINE GLUCONATE 0.12% ORAL RINSE 15 ML: 1.2 LIQUID ORAL at 08:41

## 2023-06-15 RX ADMIN — OLANZAPINE 25 MG: 10 TABLET, ORALLY DISINTEGRATING ORAL at 20:39

## 2023-06-15 RX ADMIN — CALCIUM POLYCARBOPHIL 625 MG: 625 TABLET, FILM COATED ORAL at 20:39

## 2023-06-15 RX ADMIN — DOCUSATE SODIUM 100 MG: 100 CAPSULE, LIQUID FILLED ORAL at 08:39

## 2023-06-15 RX ADMIN — APIXABAN 5 MG: 5 TABLET, FILM COATED ORAL at 08:38

## 2023-06-15 RX ADMIN — ZINC SULFATE 220 MG (50 MG) CAPSULE 220 MG: CAPSULE at 08:39

## 2023-06-15 RX ADMIN — TOPIRAMATE 150 MG: 50 TABLET ORAL at 20:40

## 2023-06-15 RX ADMIN — PREDNISOLONE ACETATE 1 DROP: 10 SUSPENSION/ DROPS OPHTHALMIC at 08:40

## 2023-06-15 RX ADMIN — Medication 10 MG: at 20:39

## 2023-06-15 RX ADMIN — OLANZAPINE 10 MG: 10 TABLET, FILM COATED ORAL at 08:39

## 2023-06-15 RX ADMIN — MONTELUKAST 10 MG: 10 TABLET, FILM COATED ORAL at 20:39

## 2023-06-15 RX ADMIN — PALIPERIDONE 3 MG: 3 TABLET, EXTENDED RELEASE ORAL at 08:38

## 2023-06-15 ASSESSMENT — ACTIVITIES OF DAILY LIVING (ADL)
ADLS_ACUITY_SCORE: 42
HYGIENE/GROOMING: PROMPTS
ADLS_ACUITY_SCORE: 42
ADLS_ACUITY_SCORE: 52
ORAL_HYGIENE: PROMPTS
ADLS_ACUITY_SCORE: 52
ORAL_HYGIENE: PROMPTS
LAUNDRY: UNABLE TO COMPLETE
ADLS_ACUITY_SCORE: 52
ADLS_ACUITY_SCORE: 42
ADLS_ACUITY_SCORE: 42
ADLS_ACUITY_SCORE: 52
DRESS: INDEPENDENT;PROMPTS
HYGIENE/GROOMING: PROMPTS
LAUNDRY: UNABLE TO COMPLETE
ADLS_ACUITY_SCORE: 42
DRESS: PROMPTS;INDEPENDENT;SCRUBS (BEHAVIORAL HEALTH)
ADLS_ACUITY_SCORE: 52

## 2023-06-15 NOTE — CARE PLAN
06/15/23 1500   Group Therapy Session   Group Attendance attended group session   Time Session Began 1305   Time Session Ended 1350   Total Time (minutes) 45   Total # Attendees 5   Group Type psychotherapeutic   Group Topic Covered coping skills/lifestyle management;problem-solving;cognitive therapy techniques;cognitive activities;structured socialization;balanced lifestyle   Group Session Detail  Activity using creative, concrete and abstract thinking.    Patient Response/Contribution cooperative with task   Patient Participation Detail At his turns, offered answers, most times in context. On a couple occasions, he asked for help with a familiar detail though with encouragement and support, managed the problem solving independently. Some laughing with jokes he offered - affect appeared bright. Seemed interested in taking an active role speaking up with answers at his turns and animated intonations.

## 2023-06-15 NOTE — CARE PLAN
06/15/23 1332   Group Therapy Session   Group Attendance attended group session   Time Session Began 1015   Time Session Ended 1115   Total Time (minutes) 45   Total # Attendees 6   Group Type expressive therapy;recreation;task skill   Group Topic Covered problem-solving;coping skills/lifestyle management;leisure exploration/use of leisure time;cognitive activities   Group Session Detail [facility] group to facilitate coping skill exploration, use of cognitive skills and problem solving, creative expression, clinical observation and facilitation of social, cognitive, and kinesthetic performance skills.   Patient Response/Contribution cooperative with task   Patient Participation Detail pt arrived late for group. pt required max encouragement to attend and participate. pt chose to engage in previous, familiar painting task. pt required set up of supplies. pt continuously asked therapist to show him how to complete project and asked for assistance. pt was provided with positive reinforcement to complete own project. pt required verbal cues for clean up. pt worked in a messy manner for duration.

## 2023-06-15 NOTE — CARE PLAN
06/15/23 1501   Group Therapy Session   Group Attendance attended group session   Time Session Began 1350   Time Session Ended 1040   Total Time (minutes) 25  (no charge)   Total # Attendees 5   Group Type life skill;task skill   Group Topic Covered coping skills/lifestyle management;problem-solving;cognitive activities;balanced lifestyle;structured socialization   Group Session Detail OT Topic Group-Week In Review for social engagement, symptom management, healthy distraction, coping skill building, goal setting, insight development, recall, focus, and following directions   Patient Response/Contribution disorganized   Patient Participation Detail pt required repetition of instructions and encouragement for participation in group activity. pt completed worksheet with one word answers for each question-mainly good, very good, and home which were mostly unrelated to question. pt asked several times if we were done or if he could leave. pt was redirectable. therapist reiterated that attendance in not mandatory and pt could leave if he wanted and that we appreciated his attendance to groups-pt stayed for awhile longer. pt shared he was going home then left group. pt did not return

## 2023-06-15 NOTE — PLAN OF CARE
"  Problem: Sleep Disturbance  Goal: Adequate Sleep/Rest  Outcome: Progressing   Goal Outcome Evaluation:    Patient was asleep at the start of the shift. He woke up @ 0110 and when he was redirected back to his room, he said, \" Am done with sleeping. It's daytime now.\" He paced around the lounge and along the hallway and singing with his headphone on. He has been asking for a cup of coffee twice during the night and was reminded  that coffee will not be available not until 6am. Crystal light juice was requested by patient and request granted.  Went back to sleep @ 0345 but was up again after a few minutes. He continued to pace to and from his room all night.     He has a No Roommate order due to intrusiveness and poor boundaries.     Slept for a total of 4 hours. No intrusiveness and verbal outbursts noted during the night.                         "

## 2023-06-15 NOTE — PROGRESS NOTES
Patient has been seen and evaluated in the day area, this was a face-to-face evaluation.  Patient continues to present calm, pleasant and cooperative.  He continues to verbalize multiple delusions and these seem to be his new baseline.  Care conference with sister has been scheduled for Monday at 10 AM    Alejandra Watkins MD

## 2023-06-15 NOTE — PROGRESS NOTES
Pt reported mood as fantastic. He had his headphones on , very restless, pacing. He said he was going home tonight. He was only able to sustain attention for less than 5 minutes., he left group and did not return.

## 2023-06-15 NOTE — PLAN OF CARE
Assessment/Intervention/Current Symtoms and Care Coordination:  -Reviewed pt's chart  -Meet with team to discuss pt care.Team reported pt slept for 4 hours. Pt has a no roommate order due to intrusiveness and poor boundaries. No intrusiveness and no verbal outburst were note during the night.           Discharge Plan or Goal:  To continue to stabilize pt's mental health status. Tentative plan for pt to return back to group home     Barriers to Discharge:  Pt continues to present as symptomatic (delusional mood). Pt is receiving ongoing stabilization and medication adjustment. Continue care coordination with assisted to ascertain his acceptability for a return to the home.     Referral Status:  Will continue to coordinate with pt's group home.     Legal Status:  Huong Keenan is pt's legal Guardian      Contacts:  Shlomo Borja (Guardian) [phone]   Sadaf ([phone])      Upcoming Meetings and Dates/Important Information and next steps:    Care Conference on Monday 6/19/2023 at 10:00 am.

## 2023-06-15 NOTE — PLAN OF CARE
Problem: Plan of Care - These are the overarching goals to be used throughout the patient stay.    Goal: Absence of Hospital-Acquired Illness or Injury  Intervention: Identify and Manage Fall Risk  Recent Flowsheet Documentation  Taken 6/15/2023 0949 by Cris Diaz RN  Safety Promotion/Fall Prevention:    safety round/check completed    clutter free environment maintained    check orthostatic blood pressure     Problem: Plan of Care - These are the overarching goals to be used throughout the patient stay.    Goal: Absence of Hospital-Acquired Illness or Injury  Intervention: Prevent Skin Injury  Recent Flowsheet Documentation  Taken 6/15/2023 0949 by Cris Diaz RN  Body Position: position changed independently     Problem: Adult Behavioral Health Plan of Care  Goal: Adheres to Safety Considerations for Self and Others  Intervention: Develop and Maintain Individualized Safety Plan  Recent Flowsheet Documentation  Taken 6/15/2023 0949 by Cris Diaz RN  Safety Measures: safety rounds completed     Problem: Suicidal Behavior  Goal: Suicidal Behavior is Absent or Managed  Outcome: Progressing  Flowsheets (Taken 6/15/2023 1003)  Mutually Determined Action Steps (Suicidal Behavior Absent/Managed): sets future-oriented goal     Problem: Sleep Disturbance  Goal: Adequate Sleep/Rest  Outcome: Not Progressing     Problem: Psychotic Signs/Symptoms  Goal: Improved Psychomotor Symptoms (Psychotic Signs/Symptoms)  Intervention: Manage Psychomotor Movement  Recent Flowsheet Documentation  Taken 6/15/2023 0949 by Cris Diaz RN  Patient Performed Hygiene: dressed  Activity (Behavioral Health): up ad katina     Problem: Fall Injury Risk  Goal: Absence of Fall and Fall-Related Injury  Intervention: Identify and Manage Contributors  Recent Flowsheet Documentation  Taken 6/15/2023 0949 by Cris Diaz RN  Medication Review/Management: medications reviewed   Goal Outcome Evaluation:    Plan of Care Reviewed  "With: patient      Pt describes his mood as \"fantastic\". Pt reports that he is going home this afternoon. Pt speech is rambling and tangential. Pt at one point whispered to writer something about one of the psych associates but writer was unable to totally understand what he was saying but it had to do with not wanting the psych associate to have the remote to the TV.   Pt able to state he is the [facility], Thursday and June 15th. Pt did state the year as 2024.  Pt reports that he lives in a hotel in [place] and that he lives by himself. Pt reports that the cooks name is Spenser and that Spenser is now working downstairs here.   Pt unable to state how many brothers or sisters that he has. \"I have too many to count\". Pt stated that he has been  for a long time and his wife's name is Kylee.    Pt denies SI/SIB/wishes to be dead. Pt has not had any aggression or irritability.     Pt has been eating and drinking well. Pt does need prompts with changing his scrubs when he spills on them. Pt did request a new toothpaste this AM.     Pt initially declined to go to group as he stated \"it's a woman's group\".                  "

## 2023-06-15 NOTE — PLAN OF CARE
Problem: Psychotic Signs/Symptoms  Goal: Improved Behavioral Control (Psychotic Signs/Symptoms)  Note: Patient was in a good mood this shift. He was appropriate, quiet and redirectable when needed. He was social and appropriate with select peers. He denied having any pain, anxiety, depression or suicidal ideation. He did not sing to himself in the milieu which is an improvement when compared to previous evening shifts. He took a shower, ate well and has been medication compliant. No new behavioral of safety concerns to report this shift. Patient is in a private room due to intrusive behaviors and poor boundaries.

## 2023-06-16 PROCEDURE — 99231 SBSQ HOSP IP/OBS SF/LOW 25: CPT | Performed by: PSYCHIATRY & NEUROLOGY

## 2023-06-16 PROCEDURE — G0177 OPPS/PHP; TRAIN & EDUC SERV: HCPCS

## 2023-06-16 PROCEDURE — 250N000013 HC RX MED GY IP 250 OP 250 PS 637: Performed by: PHYSICIAN ASSISTANT

## 2023-06-16 PROCEDURE — 124N000003 HC R&B MH SENIOR/ADOLESCENT

## 2023-06-16 PROCEDURE — 250N000013 HC RX MED GY IP 250 OP 250 PS 637: Performed by: PSYCHIATRY & NEUROLOGY

## 2023-06-16 PROCEDURE — 250N000013 HC RX MED GY IP 250 OP 250 PS 637: Performed by: EMERGENCY MEDICINE

## 2023-06-16 RX ORDER — PALIPERIDONE 1.5 MG/1
4.5 TABLET, EXTENDED RELEASE ORAL DAILY
Status: DISCONTINUED | OUTPATIENT
Start: 2023-06-17 | End: 2023-06-19

## 2023-06-16 RX ADMIN — LISINOPRIL 5 MG: 5 TABLET ORAL at 08:12

## 2023-06-16 RX ADMIN — APIXABAN 5 MG: 5 TABLET, FILM COATED ORAL at 20:18

## 2023-06-16 RX ADMIN — PANTOPRAZOLE SODIUM 40 MG: 40 TABLET, DELAYED RELEASE ORAL at 06:35

## 2023-06-16 RX ADMIN — LEVOTHYROXINE SODIUM 50 MCG: 25 TABLET ORAL at 06:35

## 2023-06-16 RX ADMIN — HYDROXYZINE HYDROCHLORIDE 25 MG: 25 TABLET, FILM COATED ORAL at 18:29

## 2023-06-16 RX ADMIN — MONTELUKAST 10 MG: 10 TABLET, FILM COATED ORAL at 20:18

## 2023-06-16 RX ADMIN — TOPIRAMATE 150 MG: 50 TABLET ORAL at 20:18

## 2023-06-16 RX ADMIN — CALCIUM POLYCARBOPHIL 625 MG: 625 TABLET, FILM COATED ORAL at 08:12

## 2023-06-16 RX ADMIN — OLANZAPINE 25 MG: 10 TABLET, ORALLY DISINTEGRATING ORAL at 20:18

## 2023-06-16 RX ADMIN — PREDNISOLONE ACETATE 1 DROP: 10 SUSPENSION/ DROPS OPHTHALMIC at 08:14

## 2023-06-16 RX ADMIN — DOCUSATE SODIUM 100 MG: 100 CAPSULE, LIQUID FILLED ORAL at 08:13

## 2023-06-16 RX ADMIN — WHITE PETROLATUM 57.7 %-MINERAL OIL 31.9 % EYE OINTMENT: at 20:25

## 2023-06-16 RX ADMIN — CHLORHEXIDINE GLUCONATE 0.12% ORAL RINSE 15 ML: 1.2 LIQUID ORAL at 08:13

## 2023-06-16 RX ADMIN — APIXABAN 5 MG: 5 TABLET, FILM COATED ORAL at 08:13

## 2023-06-16 RX ADMIN — ZINC SULFATE 220 MG (50 MG) CAPSULE 220 MG: CAPSULE at 08:12

## 2023-06-16 RX ADMIN — Medication 10 MG: at 20:18

## 2023-06-16 RX ADMIN — OLANZAPINE 10 MG: 10 TABLET, FILM COATED ORAL at 08:12

## 2023-06-16 RX ADMIN — CALCIUM POLYCARBOPHIL 625 MG: 625 TABLET, FILM COATED ORAL at 20:18

## 2023-06-16 RX ADMIN — PALIPERIDONE 3 MG: 3 TABLET, EXTENDED RELEASE ORAL at 08:12

## 2023-06-16 ASSESSMENT — ACTIVITIES OF DAILY LIVING (ADL)
DRESS: PROMPTS;INDEPENDENT;SCRUBS (BEHAVIORAL HEALTH)
LAUNDRY: UNABLE TO COMPLETE
ADLS_ACUITY_SCORE: 52
LAUNDRY: UNABLE TO COMPLETE
HYGIENE/GROOMING: PROMPTS
ORAL_HYGIENE: PROMPTS;INDEPENDENT
ADLS_ACUITY_SCORE: 52
ADLS_ACUITY_SCORE: 52
HYGIENE/GROOMING: PROMPTS;INDEPENDENT
ADLS_ACUITY_SCORE: 52
DRESS: PROMPTS
ADLS_ACUITY_SCORE: 52
ADLS_ACUITY_SCORE: 52
ORAL_HYGIENE: PROMPTS
ADLS_ACUITY_SCORE: 52

## 2023-06-16 NOTE — CARE PLAN
06/16/23 1237   Group Therapy Session   Group Attendance attended group session   Time Session Began 0115   Time Session Ended 1200   Total Time (minutes) 105   Total # Attendees 5   Group Type task skill;expressive therapy;psychotherapeutic   Group Topic Covered coping skills/lifestyle management;problem-solving;cognitive activities;balanced lifestyle;structured socialization   Group Session Detail Occupational Therapy Clinic group to facilitate coping skill exploration, use of cognitive skills and problem solving, creative expression, clinical observation and facilitation of social, cognitive, and kinesthetic performance skills.    Patient Response/Contribution listened actively;cooperative with task   Patient Participation Detail With encouragement, worked the full 2 sessions on a familiar mult step task. Checked in with this author when wondering about the next plan and again, with encouragement, worked to complete step details. Seemed more comfortable than yesterday's session and more easily redirected as needed. Pleasant on approach.

## 2023-06-16 NOTE — PLAN OF CARE
Assessment/Intervention/Current Symtoms and Care Coordination:  -Reviewed pt's chart  -Meet with team to discuss pt care.Team reported pt slept for 2 hours. Pt has a no roommate order due to intrusiveness and poor boundaries. Pt is redirectable and medication compliant.         Discharge Plan or Goal:  To continue to stabilize pt's mental health status. Tentative plan for pt to return back to group home     Barriers to Discharge:  Pt continues to present as symptomatic (delusional mood). Pt is receiving ongoing stabilization and medication adjustment. Continue care coordination with [facility] to ascertain his acceptability for a return to the home.     Referral Status:  Will continue to coordinate with pt's group home.     Legal Status:  Huong Shlomo is pt's legal Guardian      Contacts:  Shlomo Borja (Guardian) [phone]   Sadaf ([phone])      Upcoming Meetings and Dates/Important Information and next steps:    Care Conference on Monday 6/19/2023 at 10:00 am.

## 2023-06-16 NOTE — PLAN OF CARE
"  Problem: Psychotic Signs/Symptoms  Goal: Improved Behavioral Control (Psychotic Signs/Symptoms)  Outcome: Progressing     Problem: Psychotic Signs/Symptoms  Goal: Increased Participation and Engagement (Psychotic Signs/Symptoms)  Outcome: Progressing   Goal Outcome Evaluation:    Plan of Care Reviewed With: patient          7:00-15:30     Patient visible in the milieu most of the shift, walking in the hallway and listening to music. Attended group therapy.   Appetite good 100% of breakfast and lunch. Slept only 2h past night but when questioned he did not voice  replied \"I slept like a baby\".     Appearance:untidy, body odor, stated that will shower after breakfast but didn't follow trough with it.   Attitude: cooperative  Behavior: pacing in the hallway   Eyre Contact: normal  Speech: tangential but redirectable  Orientation: oreinted to person , place, time. Somewhat disoriented about situation   Mood:  Denied anxiety or depression  Affect: Mood Congruent  Thought Process: impaired judgement and insight. Somewhat disorganized.   Suicidal Ideation: denied  Hallucination: denied    Discharge: pending placement         "

## 2023-06-16 NOTE — CARE PLAN
06/16/23 1549   Group Therapy Session   Group Attendance attended group session   Time Session Began 1300   Time Session Ended 1400   Total Time (minutes) 60   Total # Attendees 3   Group Type task skill;psychotherapeutic;expressive therapy   Group Topic Covered coping skills/lifestyle management;problem-solving;cognitive therapy techniques;structured socialization;balanced lifestyle;cognitive activities   Group Session Detail Activity using multi step unfamiliar multi step directions and new learning.   Patient Response/Contribution cooperative with task   Patient Participation Detail Participated with working as a team with this author as he had difficulty learning new steps of the multi step activity. He appeared calm and interested in having this author take the lead and him following in being present. Pleasant. No complaints offered with the request of him taking off his radio headset.

## 2023-06-16 NOTE — CARE PLAN
06/15/23 2100   Group Therapy Session   Group Attendance attended group session   Time Session Began 1115   Time Session Ended 1200   Total Time (minutes) 30   Total # Attendees 5   Group Type expressive therapy   Group Topic Covered emotions/expression   Patient Response/Contribution cooperative with task;disorganized;verbalizations were off topic     Art Therapy directive was to create art in response to a chosen positive affirmation with the theme of self compassion.  Goals of directive: to improve mood and self-esteem, emotional expression, emotional regulation, art media exploration.  Pt needed multiple explanations of drawing directive. Pt was disorganized and restless but able to eventually sit down and focus on drawing. Pt created a hand tracing (a suggestion from another pt) and then wrote a positive affirmation related to personal strength within the hand tracing. Pt was able to focus on coloring in the hand tracing for the remainder of group (20 minutes) independently.  Pts mood was calm.

## 2023-06-16 NOTE — PLAN OF CARE
Problem: Psychotic Signs/Symptoms  Goal: Improved Behavioral Control (Psychotic Signs/Symptoms)  Outcome: Progressing   Goal Outcome Evaluation:    Plan of Care Reviewed With: patient    Patient has flat affect, irritable on approach. Pt stated did not eat, staff got him another tray but kept saying  stubbornly that he's waiting for the tray he ordered, staff tried to redirect pt was hard to direct. 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    Finally patient was able to take his med's after offered a sandwich and went to bed.    Staff will continue to monitor and update changes.  /73 (BP Location: Right arm, Patient Position: Sitting)   Pulse 84   Temp 97.9  F (36.6  C) (Temporal)   Resp 16   Wt 92.1 kg (203 lb 0.7 oz)   SpO2 96%   BMI 34.85 kg/m

## 2023-06-16 NOTE — PLAN OF CARE
Problem: Sleep Disturbance  Goal: Adequate Sleep/Rest  Outcome: Not Progressing   Goal Outcome Evaluation:    Patient was asleep at the start of the shift. Awake @ 0030,  came to the lounge and requested for snacks. He was provided with a ham sandwich. He was redirected back to his room and was reminded that it was past midnight and he needs to sleep. He refused to go to bed. He was quiet, calm and no verbal outbursts noted the whole night. He just spent most of the night, walking back and forth in the hallway, singing with his headphone on and playing with the call button every now and then.     He has a No Roommate order due to intrusiveness and poor boundaries.     Slept for a total of 2  hours.

## 2023-06-16 NOTE — PROGRESS NOTES
"PSYCHIATRY  PROGRESS NOTE     DATE OF SERVICE   06/16/2023        CHIEF COMPLAINT   \" I am great.\"       SUBJECTIVE   Nursing reports:  Patient visible in the milieu most of the shift, walking in the hallway and listening to music. Attended group therapy.   Appetite good 100% of breakfast and lunch. Slept only 2h past night but when questioned he did not voice  replied \"I slept like a baby\".      Appearance:untidy, body odor, stated that will shower after breakfast but didn't follow trough with it.   Attitude: cooperative  Behavior: pacing in the hallway   Eyre Contact: normal  Speech: tangential but redirectable  Orientation: oreinted to person , place, time. Somewhat disoriented about situation   Mood:  Denied anxiety or depression  Affect: Mood Congruent  Thought Process: impaired judgement and insight. Somewhat disorganized.   Suicidal Ideation: denied  Hallucination: denied     Discharge: pending placement    Patient has been reviewed with the  earlier today.   Assessment/Intervention/Current Symtoms and Care Coordination:  -Reviewed pt's chart  -Meet with team to discuss pt care.Team reported pt slept for 2 hours. Pt has a no roommate order due to intrusiveness and poor boundaries. Pt is redirectable and medication compliant.         OBJECTIVE   Patient was seen and evaluated in the day area by himself, this was a face-to-face evaluation.  During this time patient was pleasant, calm and cooperative.  He continues to insist that he is leaving the hospital today but he is easily redirectable.  He has not been aggressive, he is taking his medications and eating all of his meals.  Patient did not sleep well last night and does not seem to be tired today.  He is denying having thoughts of harming self or harming others and has been able to contract for safety.       MEDICATIONS   Medications:  Scheduled Meds:    apixaban ANTICOAGULANT  5 mg Oral BID     artificial tears   Right Eye At Bedtime     " "calcium polycarbophil  625 mg Oral BID     chlorhexidine  15 mL Swish & Spit Daily     docusate sodium  100 mg Oral Daily     levothyroxine  50 mcg Oral or NG Tube QAM AC     lisinopril  5 mg Oral Daily     melatonin  10 mg Oral At Bedtime     montelukast  10 mg Oral At Bedtime     OLANZapine  10 mg Oral Daily     OLANZapine zydis  25 mg Oral At Bedtime     paliperidone  3 mg Oral Daily     pantoprazole  40 mg Oral QAM AC     prednisoLONE acetate  1 drop Right Eye Daily     topiramate  150 mg Oral At Bedtime     zinc sulfate  220 mg Oral Daily     Continuous Infusions:  PRN Meds:.acetaminophen, alum & mag hydroxide-simethicone, artificial saliva, atropine, hydrALAZINE, hydrOXYzine, hypromellose-dextran, ipratropium, OLANZapine **OR** OLANZapine, senna-docusate    Medication adherence issues: MS Med Adherence Y/N: Yes, Hospitalization  Medication side effects: MEDICATION SIDE EFFECTS: no side effects reported  Benefit: Yes / No: Yes       ROS   A comprehensive review of systems was negative.       MENTAL STATUS EXAM   Vitals: BP (!) 146/104 (BP Location: Right arm, Patient Position: Sitting, Cuff Size: Adult Regular)   Pulse 87   Temp 97.3  F (36.3  C) (Temporal)   Resp 16   Wt 92.1 kg (203 lb 0.7 oz)   SpO2 99%   BMI 34.85 kg/m        Appearance:  No apparent distress  Mood: \"I am doing good\"  Affect: Calm and pleasant was congruent to speech  Suicidal Ideation: PRESENT / ABSENT: absent   Homicidal Ideation: PRESENT / ABSENT: absent     Thought process: [place], more linear and goal directed  Thought content: Remains delusional but these seem to be his new baseline.  Fund of Knowledge: Below average  Attention/Concentration: Poor  Language ability:  Intact, speech is garbled at times  Memory:  Immediate recall impaired, Short-term memory impaired and Long-term memory intact  Insight: Fair  Judgement: Fair  Orientation: Person and situation only  Psychomotor Behavior: slowed    Muscle Strength and Tone: " MuscleStrength: Normal  Gait and Station: Stable on his feet       LABS   personally reviewed.   No lab results found in last 7 days.  No results found for: PHENYTOIN, PHENOBARB, VALPROATE, CBMZ       DIAGNOSIS   Principal Problem:    Schizoaffective disorder, bipolar type (H)    Active Problem List:  Patient Active Problem List   Diagnosis     Closed head injury, initial encounter     Altered mental status, unspecified altered mental status type     Generalized weakness     Portal vein thrombosis     Pleural effusion     Generalized muscle weakness     Falls frequently     Other ascites     Acute pancreatitis, unspecified complication status, unspecified pancreatitis type     Pancreatic pseudocyst     Idiopathic acute pancreatitis, unspecified complication status     Allergic rhinitis     Fisher's esophagus     CTS (carpal tunnel syndrome)     Elevated liver enzymes     HTN (hypertension)     Hypothyroidism     Keratoconus     Major depressive disorder, recurrent episode, mild (H)     Intellectual disability     Morbid exogenous obesity (H)     Neuroleptic malignant syndrome     Obstructive sleep apnea syndrome     Bipolar affective disorder (H)     Seizure disorder (H)     Seizures, generalized convulsive (H)     Acute respiratory failure with hypoxia (H)     Anemia, unspecified     Anxiety disorder, unspecified     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)     Dietary zinc deficiency     Dry eye syndrome of bilateral lacrimal glands     Dry mouth, unspecified     Edema, unspecified     Gastro-esophageal reflux disease without esophagitis     Hyperosmolality and hypernatremia     Irritable bowel syndrome with constipation     Major depressive disorder, recurrent, unspecified (H)     Metabolic encephalopathy     Moderate protein-calorie malnutrition (H)     Other symbolic dysfunctions     Schizoaffective disorder, unspecified (H)     Thrombocytopenia, unspecified (H)     Unspecified asthma,  uncomplicated     Urinary tract infection, site not specified     Vitamin D deficiency, unspecified     Schizoaffective disorder, bipolar type (H)          PLAN   1. Ongoing education given regarding diagnostic and treatment options with risks, benefits and alternatives and adequate verbalization of understanding.  2.  Medications       Melatonin 10 mg at bedtime       Zyprexa to 10 mg daily and 25 mg at bedtime          Topamax 150 mg at bedtime       Increase Invega 4.5 mg daily     3.  Medical team following as needed  4.   coordinating a safe discharge plan    Risk Assessment: [facility] RISK ASSESSMENT: Patient able to contract for safety    Coordination of Care:   Treatment Plan reviewed and physician signed, Care discussed with Care/Treatment Team Members, Chart reviewed and Patient seen      Re-Certification I certify that the inpatient psychiatric facility services furnished since the previous certification were, and continue to be, medically necessary for, either, treatment which could reasonably be expected to improve the patient s condition or diagnostic study and that the hospital records indicate that the services furnished were, either, intensive treatment services, admission and related services necessary for diagnostic study, or equivalent services.     I certify that the patient continues to need, on a daily basis, active treatment furnished directly by or requiring the supervision of inpatient psychiatric facility personnel.   I estimate 14 days of hospitalization is necessary for proper treatment of the patient. My plans for post-hospital care for this patient are  group home     Alejandra Watkins MD    -     06/16/2023  -     3:43 PM    Total time  25 minutes with > 50%spent on coordination of cares and psycho-education.    This note was created with help of Dragon dictation system. Grammatical / typing errors are not intentional.    Alejandra Watkins MD

## 2023-06-17 PROCEDURE — H2032 ACTIVITY THERAPY, PER 15 MIN: HCPCS

## 2023-06-17 PROCEDURE — 250N000013 HC RX MED GY IP 250 OP 250 PS 637: Performed by: PHYSICIAN ASSISTANT

## 2023-06-17 PROCEDURE — 124N000003 HC R&B MH SENIOR/ADOLESCENT

## 2023-06-17 PROCEDURE — 250N000013 HC RX MED GY IP 250 OP 250 PS 637: Performed by: PSYCHIATRY & NEUROLOGY

## 2023-06-17 PROCEDURE — 250N000013 HC RX MED GY IP 250 OP 250 PS 637: Performed by: EMERGENCY MEDICINE

## 2023-06-17 RX ADMIN — DOCUSATE SODIUM 100 MG: 100 CAPSULE, LIQUID FILLED ORAL at 08:18

## 2023-06-17 RX ADMIN — ATROPINE SULFATE 2 DROP: 10 SOLUTION/ DROPS OPHTHALMIC at 06:10

## 2023-06-17 RX ADMIN — CALCIUM POLYCARBOPHIL 625 MG: 625 TABLET, FILM COATED ORAL at 08:18

## 2023-06-17 RX ADMIN — WHITE PETROLATUM 57.7 %-MINERAL OIL 31.9 % EYE OINTMENT: at 20:24

## 2023-06-17 RX ADMIN — OLANZAPINE 10 MG: 10 TABLET, FILM COATED ORAL at 08:18

## 2023-06-17 RX ADMIN — PREDNISOLONE ACETATE 1 DROP: 10 SUSPENSION/ DROPS OPHTHALMIC at 08:19

## 2023-06-17 RX ADMIN — CHLORHEXIDINE GLUCONATE 0.12% ORAL RINSE 15 ML: 1.2 LIQUID ORAL at 08:18

## 2023-06-17 RX ADMIN — ZINC SULFATE 220 MG (50 MG) CAPSULE 220 MG: CAPSULE at 08:18

## 2023-06-17 RX ADMIN — OLANZAPINE 25 MG: 10 TABLET, ORALLY DISINTEGRATING ORAL at 20:23

## 2023-06-17 RX ADMIN — Medication 10 MG: at 20:23

## 2023-06-17 RX ADMIN — CALCIUM POLYCARBOPHIL 625 MG: 625 TABLET, FILM COATED ORAL at 20:23

## 2023-06-17 RX ADMIN — MONTELUKAST 10 MG: 10 TABLET, FILM COATED ORAL at 20:23

## 2023-06-17 RX ADMIN — HYDROXYZINE HYDROCHLORIDE 25 MG: 25 TABLET, FILM COATED ORAL at 16:40

## 2023-06-17 RX ADMIN — LEVOTHYROXINE SODIUM 50 MCG: 25 TABLET ORAL at 05:25

## 2023-06-17 RX ADMIN — PANTOPRAZOLE SODIUM 40 MG: 40 TABLET, DELAYED RELEASE ORAL at 05:25

## 2023-06-17 RX ADMIN — APIXABAN 5 MG: 5 TABLET, FILM COATED ORAL at 20:23

## 2023-06-17 RX ADMIN — TOPIRAMATE 150 MG: 50 TABLET ORAL at 20:23

## 2023-06-17 RX ADMIN — PALIPERIDONE 4.5 MG: 1.5 TABLET, EXTENDED RELEASE ORAL at 08:18

## 2023-06-17 RX ADMIN — APIXABAN 5 MG: 5 TABLET, FILM COATED ORAL at 08:18

## 2023-06-17 RX ADMIN — LISINOPRIL 5 MG: 5 TABLET ORAL at 08:18

## 2023-06-17 ASSESSMENT — ACTIVITIES OF DAILY LIVING (ADL)
ADLS_ACUITY_SCORE: 52
HYGIENE/GROOMING: PROMPTS;INDEPENDENT
ADLS_ACUITY_SCORE: 52
ORAL_HYGIENE: PROMPTS;INDEPENDENT
ADLS_ACUITY_SCORE: 52
ADLS_ACUITY_SCORE: 52
DRESS: PROMPTS;INDEPENDENT
ADLS_ACUITY_SCORE: 52
LAUNDRY: UNABLE TO COMPLETE
ADLS_ACUITY_SCORE: 52

## 2023-06-17 NOTE — PLAN OF CARE
"  Problem: Psychotic Signs/Symptoms  Goal: Improved Behavioral Control (Psychotic Signs/Symptoms)  Outcome: Progressing   Goal Outcome Evaluation:           Pt had a good day shift with no significant change in pt's behavior or status.   He appeared disorganized, paced around the unit, in and out of his room. Headphones on. Sometimes singing to himself.     Appetite good. Accepted to take a shower. Went into the shower after setup, changed into clean scrubs and got out without actually taking a shower. \" that's how I take a shower\"  "

## 2023-06-17 NOTE — PLAN OF CARE
"  Problem: Psychotic Signs/Symptoms  Goal: Improved Behavioral Control (Psychotic Signs/Symptoms)  6/17/2023 1428 by Ethan Francis RN  Outcome: Not Progressing    Pt continues to present as disorganized. His behavior is bizarre at times, speech distractible. He is observed singing to himself pacing the halls with headphones on.  Pt was heard singing \" another one bites the dust' laughing and giggling while singing. When asked what was funny, pt said something but his speech was hard to understand.      Pt denied pain, anxiety or depression. No SI.   He was setup for a shower by a PA. Pt accepted, went into the shower room and came out with clean scrubs on. Only changed into clean scrubs but did not shower. \" I'm done taking a shower..... I already took a shower.\"       "

## 2023-06-17 NOTE — PLAN OF CARE
"Problem: Adult Behavioral Health Plan of Care  Goal: Plan of Care Review  Outcome: Progressing  Flowsheets  Taken 6/16/2023 2003  Plan of Care Reviewed With: patient  Overall Patient Progress: no change  Patient Agreement with Plan of Care: agrees  Taken 6/16/2023 1955  Patient Agreement with Plan of Care: agrees    Patient was visible in the milieu this shift. He was calm, pleasant and cooperative. Later was observed laughing while watching a comedy TV show at early part of the shift. His headphone was turned on at the time, too. Excellent appetite noted at supper. Agreed to take a shower this evening but when he just went inside the shower and changed his scrub top. He stepped out of the shower room and said, \"I'm done!\" Writer told him he didn't take a shower and insisted he did. Prompted him then to change his scrub pants and was cooperative. Pt's bedding were changed this shift. He denied mental health symptoms. He said, \"no,\" to all questions asked. Pt verbalized he's going home today. He was easily redirectable. He was compliant with HS medications. Denied pain and side effects of meds. Pt has a no roommate order due to intrusiveness and poor boundaries.     "

## 2023-06-17 NOTE — PLAN OF CARE
"  Problem: Sleep Disturbance  Goal: Adequate Sleep/Rest  Outcome: Progressing   Goal Outcome Evaluation:             Slept uninterrupted for 7 hours  Pt has a no roommate order due to intrusiveness and poor boundaries.Woke up at 0415 and observed sitting naked on bed. Pt was incontinent of urine and toilet floor was also wet with urine. Pt was independent with cleaning himself up.  Calm, listening to the music using the headphone and laughing at times.Able to make needs known.  [place] crying, when approached pt had tears in his eyes but stated \" I'm no crying, I'm laughing\".  Medication compliant.  0610 Atropine sol 2 gtts given Sl for secretions           "

## 2023-06-17 NOTE — PLAN OF CARE
Problem: Adult Behavioral Health Plan of Care  Goal: Plan of Care Review  Flowsheets  Taken 6/17/2023 1658  Plan of Care Reviewed With: patient  Overall Patient Progress: no change  Patient Agreement with Plan of Care: agrees  Taken 6/17/2023 1646  Patient Agreement with Plan of Care: agrees    Patient was visible in the milieu this shift. He was restless, pacing in the hallway with head phones on, singing and laughing out loud. He was in and out of his bedroom, putting his call button on to order his food/snacks. Staff attended to patient's needs. He was given snacks but still playing his call light on. Redirected him by doing his MH assessment. He endorsed mild anxiety and denied being depressed. Pt denied AVH and HI. He felt safe here at the hospital. He was given a PRN Hydroxyzine 25 mg at 1640. He ate 100% at supper with adequate fluid intake. He attended the evening music group therapy. After groups, he went to his bedroom and put his call button on again, asking what's the name of the  of [employer]. Pt was redirected by staff. He ate his HS snacks and was compliant with taking all of his medications. He denied pain and side effects of meds.

## 2023-06-18 PROCEDURE — 250N000013 HC RX MED GY IP 250 OP 250 PS 637: Performed by: PSYCHIATRY & NEUROLOGY

## 2023-06-18 PROCEDURE — 124N000003 HC R&B MH SENIOR/ADOLESCENT

## 2023-06-18 PROCEDURE — 250N000013 HC RX MED GY IP 250 OP 250 PS 637: Performed by: PHYSICIAN ASSISTANT

## 2023-06-18 PROCEDURE — 90853 GROUP PSYCHOTHERAPY: CPT

## 2023-06-18 PROCEDURE — 250N000013 HC RX MED GY IP 250 OP 250 PS 637: Performed by: EMERGENCY MEDICINE

## 2023-06-18 RX ADMIN — Medication 10 MG: at 20:13

## 2023-06-18 RX ADMIN — LEVOTHYROXINE SODIUM 50 MCG: 25 TABLET ORAL at 06:12

## 2023-06-18 RX ADMIN — MONTELUKAST 10 MG: 10 TABLET, FILM COATED ORAL at 20:13

## 2023-06-18 RX ADMIN — APIXABAN 5 MG: 5 TABLET, FILM COATED ORAL at 20:13

## 2023-06-18 RX ADMIN — PANTOPRAZOLE SODIUM 40 MG: 40 TABLET, DELAYED RELEASE ORAL at 06:12

## 2023-06-18 RX ADMIN — WHITE PETROLATUM 57.7 %-MINERAL OIL 31.9 % EYE OINTMENT: at 20:13

## 2023-06-18 RX ADMIN — ZINC SULFATE 220 MG (50 MG) CAPSULE 220 MG: CAPSULE at 08:12

## 2023-06-18 RX ADMIN — CALCIUM POLYCARBOPHIL 625 MG: 625 TABLET, FILM COATED ORAL at 20:13

## 2023-06-18 RX ADMIN — OLANZAPINE 25 MG: 10 TABLET, ORALLY DISINTEGRATING ORAL at 20:13

## 2023-06-18 RX ADMIN — APIXABAN 5 MG: 5 TABLET, FILM COATED ORAL at 08:12

## 2023-06-18 RX ADMIN — OLANZAPINE 10 MG: 10 TABLET, FILM COATED ORAL at 08:12

## 2023-06-18 RX ADMIN — HYDROXYZINE HYDROCHLORIDE 25 MG: 25 TABLET, FILM COATED ORAL at 17:37

## 2023-06-18 RX ADMIN — PALIPERIDONE 4.5 MG: 1.5 TABLET, EXTENDED RELEASE ORAL at 08:12

## 2023-06-18 RX ADMIN — DOCUSATE SODIUM 100 MG: 100 CAPSULE, LIQUID FILLED ORAL at 08:12

## 2023-06-18 RX ADMIN — PREDNISOLONE ACETATE 1 DROP: 10 SUSPENSION/ DROPS OPHTHALMIC at 08:13

## 2023-06-18 RX ADMIN — CALCIUM POLYCARBOPHIL 625 MG: 625 TABLET, FILM COATED ORAL at 08:12

## 2023-06-18 RX ADMIN — LISINOPRIL 5 MG: 5 TABLET ORAL at 08:12

## 2023-06-18 RX ADMIN — CHLORHEXIDINE GLUCONATE 0.12% ORAL RINSE 15 ML: 1.2 LIQUID ORAL at 08:12

## 2023-06-18 RX ADMIN — TOPIRAMATE 150 MG: 50 TABLET ORAL at 20:13

## 2023-06-18 ASSESSMENT — ACTIVITIES OF DAILY LIVING (ADL)
HYGIENE/GROOMING: PROMPTS
ORAL_HYGIENE: PROMPTS
ADLS_ACUITY_SCORE: 52
ADLS_ACUITY_SCORE: 42
ADLS_ACUITY_SCORE: 42
ADLS_ACUITY_SCORE: 52
ADLS_ACUITY_SCORE: 42
ADLS_ACUITY_SCORE: 42
ADLS_ACUITY_SCORE: 52
ADLS_ACUITY_SCORE: 52
DRESS: SCRUBS (BEHAVIORAL HEALTH);INDEPENDENT
ADLS_ACUITY_SCORE: 42
HYGIENE/GROOMING: PROMPTS
DRESS: SCRUBS (BEHAVIORAL HEALTH);INDEPENDENT
ORAL_HYGIENE: PROMPTS
LAUNDRY: UNABLE TO COMPLETE
ADLS_ACUITY_SCORE: 52
ADLS_ACUITY_SCORE: 42
ADLS_ACUITY_SCORE: 42
LAUNDRY: UNABLE TO COMPLETE

## 2023-06-18 NOTE — PLAN OF CARE
"  Problem: Psychotic Signs/Symptoms  Goal: Improved Behavioral Control (Psychotic Signs/Symptoms)  Outcome: Progressing   Goal Outcome Evaluation:    Plan of Care Reviewed With: patient      Patient woke up early in the morning and was already in the lounge with a headphone on at the start of the shift. He is smiling and singing. He seems to be enjoying the music he is listening on his headphone. He is peasant and cooperative. No aggressive behaviors observed. He ate 100% of his breakfast. He took all his morning meds. No side effects reported. Patient said \"I am doing great\" when asked how he is doing. He denied wishing himself to be dead and denied any type of hallucinations. Patient remains delusional. He believes that he is going home \"Today. I am going home today. In 45 minutes\". Patient is not in touch with reality. He still has no insight why he is in the hospital. Patient said \"I did nothing wrong, so I have to get out today\". When asked why he left he group activity which was held few minutes ago, patient said \"They are all women. The therapist is a woman. They are all women so I left. I want a man therapist. Woman first, then man last so I can be safe to leave\". Patient's thoughts remain disorganized. Will continue to monitor patient's behavior and the edema on both feet. VSS. She remains to have no roommate order r/t poor boundaries and severe intrusiveness.                 "

## 2023-06-18 NOTE — PROGRESS NOTES
06/17/23 2000   Group Therapy Session   Time Session Began 1900   Time Session Ended 2000   Total Time (minutes) 38   Total # Attendees 5   Group Type expressive therapy   Group Topic Covered cognitive activities;balanced lifestyle;leisure exploration/use of leisure time   Group Session Detail 1960's Music Bingo   Patient Response/Contribution cooperative with task   Patient Participation Detail Participated in Music Therapy intervention of Music Bingo today.  Goals of session were focusing, memory recall, positive distraction, emotional containment and social cohesion.  Pt response was engaged and cooperative, asking for things to be repeated as needed, and needing some cueing to exit room upon completion of session.  Appearing in good spirits.

## 2023-06-18 NOTE — PLAN OF CARE
Problem: Sleep Disturbance  Goal: Adequate Sleep/Rest  Outcome: Progressing  Intervention: Promote Sleep/Rest  Flowsheets (Taken 6/18/2023 4517)  Sleep/Rest Enhancement:    noise level reduced    regular sleep/rest pattern promoted    snack    room darkened     Patient was incontinent with urine this shift. He was not able to reach the bathroom at the time when he was about to void. He urinated in his pants. Staff gave him new scrub pants to change however he said he was not wet. Staff had to prompt him again to change his wet pants and eventually he agreed that his pants were wet then pt changed himself. Later, he was putting his call button on and off to order food. Pt was redirected but he still kept on putting his call button on once in a while. He was pacing in the [place] with head phones on. He was watching TV and was also given snacks. Pt appeared to have slept 5 hours. Pt denied pain when asked.

## 2023-06-18 NOTE — CARE PLAN
Occupational Therapy Group Note:     06/18/23 1500   Group Therapy Session   Group Attendance attended group session   Time Session Began 1120   Time Session Ended 1210   Total Time (minutes) 30 (no charge)   Total # Attendees 7   Group Type psychoeducation   Group Topic Covered balanced lifestyle;coping skills/lifestyle management;emotions/expression;structured socialization   Group Session Detail OT Topic Group: Bridge to Self-Confidence   Patient Response/Contribution confronted peers appropriately;cooperative with task   Patient Participation Detail Patient attended and actively participated in an OT facilitated life management group. Patient appeared comfortable answering question cards related to problem solving, identifying self-positives/strengths, and feelings expression. This specific activity promotes the ongoing development and refinement of life management skills and self-confidence. Patient appeared tired during today's group; was noted to be lowering head and closing eyes intermittently throughout group. With verbal cueing, patient did answer therapeutic prompts briefly and superficially. Responses were generally on topic. No social interaction with peers. Occasionally, patient would be humming or mumbling which was negatively affecting one peer in group; redirected to lower voice when listening to peers speak. Patient left group early without reason; no return.

## 2023-06-19 PROCEDURE — 124N000003 HC R&B MH SENIOR/ADOLESCENT

## 2023-06-19 PROCEDURE — 250N000013 HC RX MED GY IP 250 OP 250 PS 637: Performed by: PHYSICIAN ASSISTANT

## 2023-06-19 PROCEDURE — 90853 GROUP PSYCHOTHERAPY: CPT

## 2023-06-19 PROCEDURE — 99233 SBSQ HOSP IP/OBS HIGH 50: CPT | Performed by: PSYCHIATRY & NEUROLOGY

## 2023-06-19 PROCEDURE — 250N000013 HC RX MED GY IP 250 OP 250 PS 637: Performed by: EMERGENCY MEDICINE

## 2023-06-19 PROCEDURE — 250N000013 HC RX MED GY IP 250 OP 250 PS 637: Performed by: PSYCHIATRY & NEUROLOGY

## 2023-06-19 RX ORDER — PALIPERIDONE 3 MG/1
6 TABLET, EXTENDED RELEASE ORAL DAILY
Status: DISCONTINUED | OUTPATIENT
Start: 2023-06-20 | End: 2023-07-13

## 2023-06-19 RX ADMIN — Medication 10 MG: at 19:40

## 2023-06-19 RX ADMIN — TOPIRAMATE 150 MG: 50 TABLET ORAL at 19:40

## 2023-06-19 RX ADMIN — PALIPERIDONE 4.5 MG: 1.5 TABLET, EXTENDED RELEASE ORAL at 08:21

## 2023-06-19 RX ADMIN — MONTELUKAST 10 MG: 10 TABLET, FILM COATED ORAL at 19:40

## 2023-06-19 RX ADMIN — APIXABAN 5 MG: 5 TABLET, FILM COATED ORAL at 08:21

## 2023-06-19 RX ADMIN — DOCUSATE SODIUM 100 MG: 100 CAPSULE, LIQUID FILLED ORAL at 08:21

## 2023-06-19 RX ADMIN — PREDNISOLONE ACETATE 1 DROP: 10 SUSPENSION/ DROPS OPHTHALMIC at 08:21

## 2023-06-19 RX ADMIN — LEVOTHYROXINE SODIUM 50 MCG: 25 TABLET ORAL at 06:22

## 2023-06-19 RX ADMIN — OLANZAPINE 25 MG: 10 TABLET, ORALLY DISINTEGRATING ORAL at 19:39

## 2023-06-19 RX ADMIN — LISINOPRIL 5 MG: 5 TABLET ORAL at 08:21

## 2023-06-19 RX ADMIN — ZINC SULFATE 220 MG (50 MG) CAPSULE 220 MG: CAPSULE at 08:21

## 2023-06-19 RX ADMIN — OLANZAPINE 10 MG: 10 TABLET, FILM COATED ORAL at 08:21

## 2023-06-19 RX ADMIN — PANTOPRAZOLE SODIUM 40 MG: 40 TABLET, DELAYED RELEASE ORAL at 06:22

## 2023-06-19 RX ADMIN — APIXABAN 5 MG: 5 TABLET, FILM COATED ORAL at 19:40

## 2023-06-19 RX ADMIN — WHITE PETROLATUM 57.7 %-MINERAL OIL 31.9 % EYE OINTMENT: at 19:41

## 2023-06-19 RX ADMIN — CALCIUM POLYCARBOPHIL 625 MG: 625 TABLET, FILM COATED ORAL at 19:40

## 2023-06-19 RX ADMIN — CALCIUM POLYCARBOPHIL 625 MG: 625 TABLET, FILM COATED ORAL at 08:21

## 2023-06-19 RX ADMIN — CHLORHEXIDINE GLUCONATE 0.12% ORAL RINSE 15 ML: 1.2 LIQUID ORAL at 08:21

## 2023-06-19 ASSESSMENT — ACTIVITIES OF DAILY LIVING (ADL)
LAUNDRY: UNABLE TO COMPLETE
ADLS_ACUITY_SCORE: 42
DRESS: SCRUBS (BEHAVIORAL HEALTH)
ADLS_ACUITY_SCORE: 42
HYGIENE/GROOMING: PROMPTS
ADLS_ACUITY_SCORE: 42
ORAL_HYGIENE: PROMPTS
ADLS_ACUITY_SCORE: 42

## 2023-06-19 NOTE — CARE PLAN
Occupational Therapy     06/19/23 1300   Group Therapy Session   Group Attendance attended group session   Time Session Began 1115   Time Session Ended 1200   Total Time (minutes) 15   Total # Attendees 5-6   Group Type task skill   Group Topic Covered balanced lifestyle;cognitive activities;coping skills/lifestyle management;leisure exploration/use of leisure time;problem-solving;structured socialization   Group Session Detail OT: Education on healthy activity engagement with creative hands-on endeavor or cognitive activity (OT clinic) to increase concentration, focus, attention to task/detail, decision making, problem solving, frustration tolerance, task follow through, coping with stress, healthy leisure engagement, creative expression, and social engagement   Patient Response/Contribution confused;disorganized;left the group on several occasions;refused to comply with staff direction;other (see comments);cooperative with task  (restless; self-talk)   Patient Participation Detail Pt arrived to group and required tot A to gather his bin of familiar creative hands on endeavors from a previous session. Pt unable to tell therapist if two projects in bin were complete or needed additional work/detail. Pt demanded to work on a third project in his bin and refused to follow staff directions to sand his project on the given newspaper (pt crumbled up and threw away three different papers therapist placed on table). Pt appeared restless as he walked around the room multiple times and left group area and returned multiple times; pt unable to stay goal-directed for longer than 2-3 consecutive minutes. Pt was observed to engage in self-talk throughout group. Pt left group without cleaning up supplies and his workspace.

## 2023-06-19 NOTE — PLAN OF CARE
Problem: Adult Behavioral Health Plan of Care  Goal: Plan of Care Review  Flowsheets  Taken 6/18/2023 1951  Plan of Care Reviewed With: patient  Overall Patient Progress: no change  Patient Agreement with Plan of Care: agrees  Taken 6/18/2023 1939  Patient Agreement with Plan of Care: agrees    Patient was visible in the milieu this shift. He was pacing in the hallway with his headphones on. He was also watching TV. He appeared to be delusional. He was insisting that he has coke brought by his mom. He referring to pt in room 362 as his mom. Staff redirected him. He ate 100% with adequate fluid intake. He became more restless after supper. He was given a PRN Hydroxyzine 25 mg at 1737. He attended the group therapy this evening. Compliant with HS meds. He denied mental health symptoms. Denied pain and side effects of meds.

## 2023-06-19 NOTE — PLAN OF CARE
"Patient is in and out of his room, paced in hallway a lot with headphones on. Presented with a flat affect. Mood was calm. Thought process is disorganized. Speech is rambling and tangential. Patient denied all MH symptoms but became irritable when asked MH question, he stated \" I am sick and tired of being asked same questions every single time. It a no, no, no to every question.\"  He denied pain. He declined to attend OT groups stating 'it a bunch of old women in there, I just want to go home.\" He took his medications as ordered, no medication side effects noted. He has his compression stockings on. Patient ate 100% of his meals. He had a BM this morning. Staff will continue to monitor.                         "

## 2023-06-19 NOTE — PLAN OF CARE
Problem: Sleep Disturbance  Goal: Adequate Sleep/Rest  Outcome: Progressing  Intervention: Promote Sleep/Rest  Flowsheets (Taken 6/19/2023 0212)  Sleep/Rest Enhancement:    regular sleep/rest pattern promoted    noise level reduced    Patient was sleeping well this shift. He has a no roommate order due to intrusiveness and poor boundaries. Pt was continent with bladder this shift. He was already awake at 0600. He was watching TV at the lounge. Scheduled Synthroid and Protonix given. Pt slept 7.5 hours. No complaints of pain.

## 2023-06-19 NOTE — PLAN OF CARE
Problem: Suicidal Behavior  Goal: Suicidal Behavior is Absent or Managed  Outcome: Progressing     Problem: Psychotic Signs/Symptoms  Goal: Improved Behavioral Control (Psychotic Signs/Symptoms)  Outcome: Progressing     Problem: Disruptive Behavior  Goal: Improved Impulse and Aggression Control (Disruptive Behavior)  Outcome: Progressing   Goal Outcome Evaluation:    Patient up pacing the hallway when received this shift. Presented with full range affect, denies SI/SIB/HI/hallucinations, verbally contracts for safety, safety checks and precautions in place. Patient is able to make needs known. Eating and drinking adequately. Medication complaint with no observed or reported side effects . Patient does not socialize with peers but attended group this evening, listen to music on headphones most part of the shift. Denies pain or discomfort, no other known concerns at this time, this writer will continue to monitor and offer therapeutic support as needed for the rest of the shift.     /69 (BP Location: Right arm, Patient Position: Sitting, Cuff Size: Adult Regular)   Pulse 94   Temp 98.2  F (36.8  C) (Oral)   Resp 18   Wt 92.3 kg (203 lb 7.8 oz)   SpO2 98%   BMI 34.93 kg/m

## 2023-06-19 NOTE — PROGRESS NOTES
"PSYCHIATRY  PROGRESS NOTE     DATE OF SERVICE   06/19/2023        CHIEF COMPLAINT   \" I am great.\"       SUBJECTIVE   Nursing reports:  Patient is in and out of his room, paced in hallway a lot with headphones on. Presented with a flat affect. Mood was calm. Thought process is disorganized. Speech is rambling and tangential. Patient denied all MH symptoms but became irritable when asked MH question, he stated \" I am sick and tired of being asked same questions every single time. It a no, no, no to every question.\"  He denied pain. He declined to attend OT groups stating 'it a bunch of old women in there, I just want to go home.\" He took his medications as ordered, no medication side effects noted. He has his compression stockings on. Patient ate 100% of his meals. He had a BM this morning. Staff will continue to monitor.     Patient has been reviewed with the  earlier today.   Assessment/Intervention/Current Symtoms and Care Coordination:  -Reviewed pt's chart  -Meet with team to discuss pt care.Team reported pt slept for 7.5 hours. Pt has a no roommate order due to intrusiveness and poor boundaries. Pt is redirectable and medication compliant.      During care conference Dr. Charles provided information about ECT and other medication. Pt's sister is going to talk to the family and contact Dr. Charles on their decision. Pt's sister is concern that pt is upset a the her and the family.         OBJECTIVE   Today we had a care conference with patient's guardian,  and this writer initially present during the meeting.  The purpose of this care conference is to discuss how the patient is doing, medication changes and tentative discharge plans.  I discussed with the sister that the patient for the most part has been calm, pleasant and cooperative.  Patient has continued to verbalize the same delusions but here in a controlled environment he has not been acting on these delusions.  Patient has not " tried to leave the hospital, he is not aggressive and has been compliant with his medications.      Sister verbalized a lot of concerns about the patient being delusional and disorganized as she said this is not patient's baseline.  At his baseline the patient is calm, pleasant and cooperative.  He does not get into arguments with the family.  He is very organized with his schedule, watches the baseball games regularly and is very social.  She tells me that at this time he has been getting very aggressive and belligerent when the family comes to visit.  She is concerned that if the patient returns to the group home he is not going to be able to tolerate being with other people there that known him for years and he is going to get aggressive.  Patient is constantly insisting that he is the oldest, his parents are alive and he can see them, he is  and has multiple children.      I reviewed with the sister the medications that the patient is taking at this time (Zyprexa and Invega).  I informed the sister that if we cannot control patient's behaviors with his medications then we can substitute Zyprexa for Clozaril.  Sister discussed with me that she used to be a pharmacist and she is very aware about Clozaril.  I did reviewed with her the reasons for prescribing the medication, benefits, risk and side effects.  Sister is aware that we are going to be doing blood work every week.  She tells me that she is very hesitant about the patient starting Clozaril but understand where we are coming from.  She also inquire about ECT treatments.  Again I discussed with the sister reasons for initiating ECT treatment, benefits, risk and side effects.  Written information about ECT was provided to the sister.  I informed the sister that at this time she is not going to be able to consent for the ECT treatment and this is something that will need to be authorized by a  through the commitment process.  Sister verbalized  good understanding of all of this and informed me she is going to have a discussion with the family.  I asked her to let me know if the family can get together for a second care conference.    Patient was in and out of the meeting.  He did got agitated by the sister's presence.  He was using profanity, yelling and he seemed more disorganized.  He expressed feeling angry because the sister does not believe him.  He was insisting that he was the oldest and she cannot tell him what to do.  Patient was also mentioning multiple names that did not make sense to me.  Sister informed me that these are names of family members that have  in the last decade.  Patient also insisted that he could see his parents right now. Despite the sister redirecting him appropriately he was escalating.  At that time I asked the patient to go back to his room as meeting with him was not productive.       MEDICATIONS   Medications:  Scheduled Meds:    apixaban ANTICOAGULANT  5 mg Oral BID     artificial tears   Right Eye At Bedtime     calcium polycarbophil  625 mg Oral BID     chlorhexidine  15 mL Swish & Spit Daily     docusate sodium  100 mg Oral Daily     levothyroxine  50 mcg Oral or NG Tube QAM AC     lisinopril  5 mg Oral Daily     melatonin  10 mg Oral At Bedtime     montelukast  10 mg Oral At Bedtime     OLANZapine  10 mg Oral Daily     OLANZapine zydis  25 mg Oral At Bedtime     paliperidone  4.5 mg Oral Daily     pantoprazole  40 mg Oral QAM AC     prednisoLONE acetate  1 drop Right Eye Daily     topiramate  150 mg Oral At Bedtime     zinc sulfate  220 mg Oral Daily     Continuous Infusions:  PRN Meds:.acetaminophen, alum & mag hydroxide-simethicone, artificial saliva, atropine, hydrALAZINE, hydrOXYzine, hypromellose-dextran, ipratropium, OLANZapine **OR** OLANZapine, senna-docusate    Medication adherence issues: MS Med Adherence Y/N: Yes, Hospitalization  Medication side effects: MEDICATION SIDE EFFECTS: no side effects  "reported  Benefit: Yes / No: Yes       ROS   A comprehensive review of systems was negative.       MENTAL STATUS EXAM   Vitals: /85 (BP Location: Right arm, Patient Position: Sitting, Cuff Size: Adult Regular)   Pulse 102   Temp 97.6  F (36.4  C) (Oral)   Resp 16   Wt 92.3 kg (203 lb 7.8 oz)   SpO2 99%   BMI 34.93 kg/m        Appearance:  No apparent distress  Mood: \"I am good\"  Affect: labile  was congruent to speech  Suicidal Ideation: PRESENT / ABSENT: absent   Homicidal Ideation: PRESENT / ABSENT: absent     Thought process: disorganized  Thought content: Remains delusional and having VH  Fund of Knowledge: Below average  Attention/Concentration: Poor  Language ability:  Intact, speech is garbled at times  Memory:  Immediate recall impaired, Short-term memory impaired and Long-term memory intact  Insight: limited   Judgement: poor  Orientation: Person and situation only  Psychomotor Behavior: slowed    Muscle Strength and Tone: MuscleStrength: Normal  Gait and Station: Stable on his feet       LABS   personally reviewed.   No lab results found in last 7 days.  No results found for: PHENYTOIN, PHENOBARB, VALPROATE, CBMZ       DIAGNOSIS   Principal Problem:    Schizoaffective disorder, bipolar type (H)    Active Problem List:  Patient Active Problem List   Diagnosis     Closed head injury, initial encounter     Altered mental status, unspecified altered mental status type     Generalized weakness     Portal vein thrombosis     Pleural effusion     Generalized muscle weakness     Falls frequently     Other ascites     Acute pancreatitis, unspecified complication status, unspecified pancreatitis type     Pancreatic pseudocyst     Idiopathic acute pancreatitis, unspecified complication status     Allergic rhinitis     Fisher's esophagus     CTS (carpal tunnel syndrome)     Elevated liver enzymes     HTN (hypertension)     Hypothyroidism     Keratoconus     Major depressive disorder, recurrent episode, " mild (H)     Intellectual disability     Morbid exogenous obesity (H)     Neuroleptic malignant syndrome     Obstructive sleep apnea syndrome     Bipolar affective disorder (H)     Seizure disorder (H)     Seizures, generalized convulsive (H)     Acute respiratory failure with hypoxia (H)     Anemia, unspecified     Anxiety disorder, unspecified     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)     Dietary zinc deficiency     Dry eye syndrome of bilateral lacrimal glands     Dry mouth, unspecified     Edema, unspecified     Gastro-esophageal reflux disease without esophagitis     Hyperosmolality and hypernatremia     Irritable bowel syndrome with constipation     Major depressive disorder, recurrent, unspecified (H)     Metabolic encephalopathy     Moderate protein-calorie malnutrition (H)     Other symbolic dysfunctions     Schizoaffective disorder, unspecified (H)     Thrombocytopenia, unspecified (H)     Unspecified asthma, uncomplicated     Urinary tract infection, site not specified     Vitamin D deficiency, unspecified     Schizoaffective disorder, bipolar type (H)          PLAN   1. Ongoing education given regarding diagnostic and treatment options with risks, benefits and alternatives and adequate verbalization of understanding.  2.  Medications       Melatonin 10 mg at bedtime       Zyprexa to 10 mg daily and 25 mg at bedtime          Topamax 150 mg at bedtime       Increase Invega 6 mg daily     3.  Medical team following as needed  4.   coordinating a safe discharge plan    Risk Assessment: [facility] RISK ASSESSMENT: Patient able to contract for safety    Coordination of Care:   Treatment Plan reviewed and physician signed, Care discussed with Care/Treatment Team Members, Chart reviewed and Patient seen      Re-Certification I certify that the inpatient psychiatric facility services furnished since the previous certification were, and continue to be, medically necessary for,  either, treatment which could reasonably be expected to improve the patient s condition or diagnostic study and that the hospital records indicate that the services furnished were, either, intensive treatment services, admission and related services necessary for diagnostic study, or equivalent services.     I certify that the patient continues to need, on a daily basis, active treatment furnished directly by or requiring the supervision of inpatient psychiatric facility personnel.   I estimate 14 days of hospitalization is necessary for proper treatment of the patient. My plans for post-hospital care for this patient are  group home     Alejandra Watkins MD    -     06/19/2023  -     3:40 PM    Total time  55 minutes with > 50%spent on coordination of cares and psycho-education.    This note was created with help of Dragon dictation system. Grammatical / typing errors are not intentional.    Alejandra Watkins MD

## 2023-06-19 NOTE — PLAN OF CARE
Assessment/Intervention/Current Symtoms and Care Coordination:  -Reviewed pt's chart  -Meet with team to discuss pt care.Team reported pt slept for 7.5 hours. Pt has a no roommate order due to intrusiveness and poor boundaries. Pt is redirectable and medication compliant.     During care conference Dr. Charles provided information about ECT and other medication. Pt's sister is going to talk to the family and contact Dr. Charles on their decision. Pt's sister is concern that pt is upset a the her and the family.         Discharge Plan or Goal:  To continue to stabilize pt's mental health status. Tentative plan for pt to return back to group home     Barriers to Discharge:  Pt continues to present as symptomatic (delusional mood). Pt is receiving ongoing stabilization and medication adjustment. Continue care coordination with [facility] to ascertain his acceptability for a return to the home.     Referral Status:  Will continue to coordinate with pt's group home.     Legal Status:  Huong Shlomo is pt's legal Guardian      Contacts:  Shlomo Borja (Guardian) [phone]   Sadaf ([phone])      Upcoming Meetings and Dates/Important Information and next steps:

## 2023-06-19 NOTE — PROGRESS NOTES
"   06/18/23 2200   Group Therapy Session   Group Attendance attended group session   Time Session Began 1910   Time Session Ended 2000   Total Time (minutes) 50   Total # Attendees 5   Group Type psychotherapeutic   Group Topic Covered coping skills/lifestyle management;cognitive therapy techniques   Group Session Detail DBT wise mind   Patient Response/Contribution confused;cooperative with task;discussed personal experience with topic;disorganized     Mood- fantastic, hoping to get out of here soon. He took several prompts to turn off headphones at beginning of group, he said writer was \" holman humbug\". He mumbles and is difficult to understand. He may not be comprehending topic but always adds his feelings about music and was able to stay present for all of group. It was observed that he may not be attending to ADLS, scrubs were very dirty.   "

## 2023-06-20 LAB
BASOPHILS # BLD AUTO: 0 10E3/UL (ref 0–0.2)
BASOPHILS NFR BLD AUTO: 1 %
EOSINOPHIL # BLD AUTO: 0 10E3/UL (ref 0–0.7)
EOSINOPHIL NFR BLD AUTO: 1 %
ERYTHROCYTE [DISTWIDTH] IN BLOOD BY AUTOMATED COUNT: 13.6 % (ref 10–15)
HCT VFR BLD AUTO: 38.8 % (ref 40–53)
HGB BLD-MCNC: 13 G/DL (ref 13.3–17.7)
IMM GRANULOCYTES # BLD: 0 10E3/UL
IMM GRANULOCYTES NFR BLD: 0 %
LYMPHOCYTES # BLD AUTO: 2 10E3/UL (ref 0.8–5.3)
LYMPHOCYTES NFR BLD AUTO: 33 %
MCH RBC QN AUTO: 28.4 PG (ref 26.5–33)
MCHC RBC AUTO-ENTMCNC: 33.5 G/DL (ref 31.5–36.5)
MCV RBC AUTO: 85 FL (ref 78–100)
MONOCYTES # BLD AUTO: 0.6 10E3/UL (ref 0–1.3)
MONOCYTES NFR BLD AUTO: 9 %
NEUTROPHILS # BLD AUTO: 3.5 10E3/UL (ref 1.6–8.3)
NEUTROPHILS NFR BLD AUTO: 56 %
NRBC # BLD AUTO: 0 10E3/UL
NRBC BLD AUTO-RTO: 0 /100
PLATELET # BLD AUTO: 166 10E3/UL (ref 150–450)
RBC # BLD AUTO: 4.58 10E6/UL (ref 4.4–5.9)
WBC # BLD AUTO: 6.1 10E3/UL (ref 4–11)

## 2023-06-20 PROCEDURE — 250N000013 HC RX MED GY IP 250 OP 250 PS 637: Performed by: PSYCHIATRY & NEUROLOGY

## 2023-06-20 PROCEDURE — 124N000003 HC R&B MH SENIOR/ADOLESCENT

## 2023-06-20 PROCEDURE — 85025 COMPLETE CBC W/AUTO DIFF WBC: CPT | Performed by: PSYCHIATRY & NEUROLOGY

## 2023-06-20 PROCEDURE — H2032 ACTIVITY THERAPY, PER 15 MIN: HCPCS

## 2023-06-20 PROCEDURE — 250N000013 HC RX MED GY IP 250 OP 250 PS 637: Performed by: PHYSICIAN ASSISTANT

## 2023-06-20 PROCEDURE — 36415 COLL VENOUS BLD VENIPUNCTURE: CPT | Performed by: PSYCHIATRY & NEUROLOGY

## 2023-06-20 PROCEDURE — 99232 SBSQ HOSP IP/OBS MODERATE 35: CPT | Performed by: PSYCHIATRY & NEUROLOGY

## 2023-06-20 PROCEDURE — 250N000013 HC RX MED GY IP 250 OP 250 PS 637: Performed by: EMERGENCY MEDICINE

## 2023-06-20 RX ADMIN — ZINC SULFATE 220 MG (50 MG) CAPSULE 220 MG: CAPSULE at 08:31

## 2023-06-20 RX ADMIN — CALCIUM POLYCARBOPHIL 625 MG: 625 TABLET, FILM COATED ORAL at 08:31

## 2023-06-20 RX ADMIN — TOPIRAMATE 150 MG: 50 TABLET ORAL at 20:16

## 2023-06-20 RX ADMIN — Medication 10 MG: at 20:16

## 2023-06-20 RX ADMIN — MONTELUKAST 10 MG: 10 TABLET, FILM COATED ORAL at 20:16

## 2023-06-20 RX ADMIN — DOCUSATE SODIUM 100 MG: 100 CAPSULE, LIQUID FILLED ORAL at 08:31

## 2023-06-20 RX ADMIN — APIXABAN 5 MG: 5 TABLET, FILM COATED ORAL at 20:16

## 2023-06-20 RX ADMIN — CHLORHEXIDINE GLUCONATE 0.12% ORAL RINSE 15 ML: 1.2 LIQUID ORAL at 08:30

## 2023-06-20 RX ADMIN — OLANZAPINE 10 MG: 10 TABLET, FILM COATED ORAL at 08:31

## 2023-06-20 RX ADMIN — PANTOPRAZOLE SODIUM 40 MG: 40 TABLET, DELAYED RELEASE ORAL at 05:49

## 2023-06-20 RX ADMIN — LEVOTHYROXINE SODIUM 50 MCG: 25 TABLET ORAL at 05:49

## 2023-06-20 RX ADMIN — APIXABAN 5 MG: 5 TABLET, FILM COATED ORAL at 08:31

## 2023-06-20 RX ADMIN — OLANZAPINE 25 MG: 10 TABLET, ORALLY DISINTEGRATING ORAL at 20:15

## 2023-06-20 RX ADMIN — PALIPERIDONE 6 MG: 3 TABLET, EXTENDED RELEASE ORAL at 08:31

## 2023-06-20 RX ADMIN — CALCIUM POLYCARBOPHIL 625 MG: 625 TABLET, FILM COATED ORAL at 20:16

## 2023-06-20 RX ADMIN — HYDROXYZINE HYDROCHLORIDE 25 MG: 25 TABLET, FILM COATED ORAL at 00:07

## 2023-06-20 RX ADMIN — PREDNISOLONE ACETATE 1 DROP: 10 SUSPENSION/ DROPS OPHTHALMIC at 08:32

## 2023-06-20 RX ADMIN — WHITE PETROLATUM 57.7 %-MINERAL OIL 31.9 % EYE OINTMENT: at 20:21

## 2023-06-20 ASSESSMENT — ACTIVITIES OF DAILY LIVING (ADL)
ADLS_ACUITY_SCORE: 42
ORAL_HYGIENE: PROMPTS
ADLS_ACUITY_SCORE: 42
DRESS: SCRUBS (BEHAVIORAL HEALTH)
ADLS_ACUITY_SCORE: 42
HYGIENE/GROOMING: PROMPTS
ADLS_ACUITY_SCORE: 42
HYGIENE/GROOMING: PROMPTS
ADLS_ACUITY_SCORE: 42

## 2023-06-20 NOTE — PLAN OF CARE
Problem: Psychotic Signs/Symptoms  Goal: Improved Sleep (Psychotic Signs/Symptoms)  Outcome: Not Progressing   Goal Outcome Evaluation:         Received in bed asleep, pt has a no roommate order due to intrusiveness and poor boundaries. Woke up at midnight and believed it was noon time, refused redirection to go back to bed, argumentative at times.  0006 Hydroxyzine 25 mg given, not effective  Pt had snacks and constantly asking for the TV remote,coffee and pepsi, pt did not believe when staff told him the current time. Grandiose at times.  Walked the [place] with his headphone on, singing at times and mumbling to himself.  Constantly on the call light and toilet call system.   Redirected to stay in the lounge.  Only slept  For 45 minutes this shift + 2 hours from the previous shift.  Medication compliant.

## 2023-06-20 NOTE — CONSULTS
"   This OT author discussed the idea with Jose, of using a program to help motivate decrease of behaviors of 1. Not ripping out pages from magazines in the lounge,; 2. Attending at least 1 group in the am and one later in the day; 3. No headphone use during the groups: 4. No profanity 5. Not using the call light to sing to the staff over the intercom and earning points and rewards.     He explained \"I'm god and I can do whatever I want\". \"I tear out the pages in the magazines of people that are being looked for and I'm helping pts get home faster\". :I don't want to go to group because there are a lot of ladies there talking.\"    Because of the explanations given of the behaviors, it is the opinion of this author that this pt would not accept and understand at this time, the willingness and reasoning for participating in a Behavior Modification Program. Will consult with his Provider further at a later time if pt is more willing..  "

## 2023-06-20 NOTE — PLAN OF CARE
"  Problem: Adult Behavioral Health Plan of Care  Goal: Develops/Participates in Therapeutic [place] to Support Successful Transition  Outcome: Progressing     Problem: Psychotic Signs/Symptoms  Goal: Improved Mood Symptoms  Outcome: Progressing  Goal: Improved Sleep (Psychotic Signs/Symptoms)  Outcome: Not Progressing   Goal Outcome Evaluation:    Plan of Care Reviewed With: patient          Pt has been visible in the milieu, denied MH issues, replied \" No to all your questions \". Continued to walk around the unit with headphone on. Alert and oriented to place and time, disoriented to situation and date, pt thought he will be discharging today. Lisinopril held, order parameters not met. Compliant with wearing his compression socks, denied pain/discomforts.Pt does not socialize with peers and was encouraged to join the Dance movement Therapy, which he did.  Pleasant, happy mood and able to make needs known,says please and Thank you.  Continued to press on the call button.  Ate 100% of meals    Pt has a no roommate order due to intrusiveness and disorganized behavior  CBC w/ Platelets and diff drawn at 3PM  "

## 2023-06-20 NOTE — PROGRESS NOTES
"PSYCHIATRY  PROGRESS NOTE     DATE OF SERVICE   06/20/2023        CHIEF COMPLAINT   \" I am fine.\"       SUBJECTIVE   Nursing reports:  Received in bed asleep, pt has a no roommate order due to intrusiveness and poor boundaries. Woke up at midnight and believed it was noon time, refused redirection to go back to bed, argumentative at times.  0006 Hydroxyzine 25 mg given, not effective  Pt had snacks and constantly asking for the TV remote,coffee and pepsi, pt did not believe when staff told him the current time. Grandiose at times.  Walked the [place] with his headphone on, singing at times and mumbling to himself.  Constantly on the call light and toilet call system.   Redirected to stay in the lounge.  Only slept  For 45 minutes this shift + 2 hours from the previous shift.  Medication compliant.    Patient has been reviewed with the  earlier today.   Assessment/Intervention/Current Symtoms and Care Coordination:  -Reviewed pt's chart  -Meet with team to discuss pt care.Team reported pt slept for 2 hours the previous shift and 45 minutes this shift. Pt has a no roommate order due to intrusiveness and poor boundaries. Pt is redirectable and medication compliant.         OBJECTIVE   The patient was seen and evaluated in the consult room by himself, this was a face-to-face outpatient.  Patient continues to repeat that he is doing \"fine\" and there is no problem with him.  Patient said that he does not want for this writer to ask any questions.  He is also said that he is upset at his sister because she has no right to be worried about him.  Patient proceeded to say \"she does not need to be concern.  I know what is happening.  I was  at the beginning.  I was upset at my sister because she was yelling at me, she was fed up and she had to be quiet\".  Patient said that his parents are alive and they are present with us in the room.  Patient insisted that he is not lying and is telling the truth.  Patient " then tells me everybody is alive and they are walking around and he can see them.  Patient then said that his sister thinks that he is stupid.  Patient insisted that he is not stupid because he was the firstborn.  He is also upset at the sister because she has her own life, she is  and he wants for her to leave him alone.  The patient repeated that he has been  for a long time.    I did talk to patient about reaping the pages of magazines in the unit.  Patient tells me that he is going to continue to do this because he thinks that by doing this he is putting his family back together.  Patient became agitated and refusing to talk about anything else and he decided to end the meeting.    Today I had a conversation with the patient's sister.  Huong tells me that the family has decided to try the Clozaril first and if this does not work then to proceed with asking for authorization for ECT treatments.     MEDICATIONS   Medications:  Scheduled Meds:    apixaban ANTICOAGULANT  5 mg Oral BID     artificial tears   Right Eye At Bedtime     calcium polycarbophil  625 mg Oral BID     chlorhexidine  15 mL Swish & Spit Daily     docusate sodium  100 mg Oral Daily     levothyroxine  50 mcg Oral or NG Tube QAM AC     lisinopril  5 mg Oral Daily     melatonin  10 mg Oral At Bedtime     montelukast  10 mg Oral At Bedtime     OLANZapine  10 mg Oral Daily     OLANZapine zydis  25 mg Oral At Bedtime     paliperidone  6 mg Oral Daily     pantoprazole  40 mg Oral QAM AC     prednisoLONE acetate  1 drop Right Eye Daily     topiramate  150 mg Oral At Bedtime     zinc sulfate  220 mg Oral Daily     Continuous Infusions:  PRN Meds:.acetaminophen, alum & mag hydroxide-simethicone, artificial saliva, atropine, hydrALAZINE, hydrOXYzine, hypromellose-dextran, ipratropium, OLANZapine **OR** OLANZapine, senna-docusate    Medication adherence issues: MS Med Adherence Y/N: Yes, Hospitalization  Medication side effects: MEDICATION SIDE  "EFFECTS: no side effects reported  Benefit: Yes / No: Yes       ROS   A comprehensive review of systems was negative.       MENTAL STATUS EXAM   Vitals: /69 (BP Location: Right arm, Patient Position: Sitting, Cuff Size: Adult Regular)   Pulse 94   Temp (!) 96.6  F (35.9  C) (Temporal)   Resp 18   Wt 93.6 kg (206 lb 5.6 oz)   SpO2 100%   BMI 35.42 kg/m        Appearance:  No apparent distress  Mood: \"I am fine\"  Affect: labile  was congruent to speech  Suicidal Ideation: PRESENT / ABSENT: absent   Homicidal Ideation: PRESENT / ABSENT: absent     Thought process: disorganized  Thought content: Remains delusional and having VH  Fund of Knowledge: Below average  Attention/Concentration: Poor  Language ability:  Intact, speech is garbled at times  Memory:  Immediate recall impaired, Short-term memory impaired and Long-term memory intact  Insight: limited   Judgement: poor  Orientation: Person and situation only  Psychomotor Behavior: slowed    Muscle Strength and Tone: MuscleStrength: Normal  Gait and Station: Stable on his feet       LABS   personally reviewed.   No lab results found in last 7 days.  No results found for: PHENYTOIN, PHENOBARB, VALPROATE, CBMZ       DIAGNOSIS   Principal Problem:    Schizoaffective disorder, bipolar type (H)    Active Problem List:  Patient Active Problem List   Diagnosis     Closed head injury, initial encounter     Altered mental status, unspecified altered mental status type     Generalized weakness     Portal vein thrombosis     Pleural effusion     Generalized muscle weakness     Falls frequently     Other ascites     Acute pancreatitis, unspecified complication status, unspecified pancreatitis type     Pancreatic pseudocyst     Idiopathic acute pancreatitis, unspecified complication status     Allergic rhinitis     Fisher's esophagus     CTS (carpal tunnel syndrome)     Elevated liver enzymes     HTN (hypertension)     Hypothyroidism     Keratoconus     Major " depressive disorder, recurrent episode, mild (H)     Intellectual disability     Morbid exogenous obesity (H)     Neuroleptic malignant syndrome     Obstructive sleep apnea syndrome     Bipolar affective disorder (H)     Seizure disorder (H)     Seizures, generalized convulsive (H)     Acute respiratory failure with hypoxia (H)     Anemia, unspecified     Anxiety disorder, unspecified     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)     Dietary zinc deficiency     Dry eye syndrome of bilateral lacrimal glands     Dry mouth, unspecified     Edema, unspecified     Gastro-esophageal reflux disease without esophagitis     Hyperosmolality and hypernatremia     Irritable bowel syndrome with constipation     Major depressive disorder, recurrent, unspecified (H)     Metabolic encephalopathy     Moderate protein-calorie malnutrition (H)     Other symbolic dysfunctions     Schizoaffective disorder, unspecified (H)     Thrombocytopenia, unspecified (H)     Unspecified asthma, uncomplicated     Urinary tract infection, site not specified     Vitamin D deficiency, unspecified     Schizoaffective disorder, bipolar type (H)          PLAN   1. Ongoing education given regarding diagnostic and treatment options with risks, benefits and alternatives and adequate verbalization of understanding.  2.  Medications       Melatonin 10 mg at bedtime       Zyprexa to 10 mg daily and 25 mg at bedtime          Topamax 150 mg at bedtime       Invega 6 mg daily     CBC has been ordered, wants to get these results then I will start patient on Clozaril.    3.  Medical team following as needed  4.   coordinating a safe discharge plan    Risk Assessment: [facility] RISK ASSESSMENT: Patient able to contract for safety    Coordination of Care:   Treatment Plan reviewed and physician signed, Care discussed with Care/Treatment Team Members, Chart reviewed and Patient seen      Re-Certification I certify that the inpatient  psychiatric facility services furnished since the previous certification were, and continue to be, medically necessary for, either, treatment which could reasonably be expected to improve the patient s condition or diagnostic study and that the hospital records indicate that the services furnished were, either, intensive treatment services, admission and related services necessary for diagnostic study, or equivalent services.     I certify that the patient continues to need, on a daily basis, active treatment furnished directly by or requiring the supervision of inpatient psychiatric facility personnel.   I estimate 14 days of hospitalization is necessary for proper treatment of the patient. My plans for post-hospital care for this patient are  group home     Alejandra Watkins MD    -     06/20/2023  -     2:31 PM    Total time 45 minutes with > 50%spent on coordination of cares and psycho-education.    This note was created with help of Dragon dictation system. Grammatical / typing errors are not intentional.    Alejandra Watkins MD

## 2023-06-20 NOTE — CARE PLAN
06/20/23 1225   Individualization/Patient Specific Goals   Patient Personal Strengths family/social support;medication/treatment adherence;stable living environment   Patient Vulnerabilities limited ability to read/write;limited social skills;lacks insight into illness   Anxieties, Fears or Concerns None at this time   Individualized Care Needs Medication and Stabilization.   Interprofessional Rounds   Summary Patient was admitted due to concenrs for his safety.   Participants CTC;psychiatrist;nursing   Behavioral Team Discussion   Participants Dr. Yeni ALVA, [facility]   Progress Stabilization is ongoing   Anticipated length of stay 7 Days.   Continued Stay Criteria/Rationale Requires stabilization to reduce risk of imminent harm to himself and others.   Medical/Physical See H&P   Precautions See below   Plan Psychiatric assesemnt. Medication manangement. Therapeutic Mileu. Individual and group support.   Rationale for change in precautions or plan no change   Safety Plan CTC will do individual inpatient treatment planning and after care planning. CTC will discuss options for increasing community supports with the patient. CTC will coordinate with outpatient providers and will place referrals to ensure appointments are in place.   Anticipated Discharge Disposition group home     PRECAUTIONS AND SAFETY    Behavioral Orders   Procedures    Code 1 - Restrict to Unit    Code 2    Elopement precautions    Routine Programming     As clinically indicated    Sexual precautions    Status 15     Every 15 minutes.       Safety  Safety WDL: WDL  Patient Location: patient room, own  Observed Behavior: walking, pacing  Observed Behavior (Comment): sleeping  Safety Measures: environmental rounds completed, safety rounds completed  Diversional Activity: television, music  Suicidality: Status 15  Seizure precautions: clutter free environment  Assault: private room, status 15, minimal furniture in room, behavioral scrubs  (radha)  Elopement Assessment: Statements about wanting to leave  Elopement Interventions: status 15, status continuous sight  Sexual: status 15, status continuous sight, private room

## 2023-06-20 NOTE — PLAN OF CARE
" 06/19/23 2200   Group Therapy Session   Group Attendance This patient attended the group session   Time Session Began 1900   Time Session Ended 2000   Total Time (minutes) 60   Total # Attendees 7   Group Type Psychotherapy   Group Topic Covered Reflective positive thoughts as a mental health coping mechanism.   Group Session Detail Applied the interpersonal process, cognitive-behavioral modalities, and solution-focused brief psychotherapeutic techniques in reviewing ways to:  1. Identify stressful situations related to hospitalization and other factors  2. Discuss how  situations may exacerbate mental health symptoms  3. Resources to activate to mitigate potential and actual effects of symptoms exacerbation. Used positive objects to explore this area. Main object that was used is \"best loved food.\"  At the beginning of the session, each patient identified an area of trigger and an area of gratitude. Reviewed their activities during the day; identified positive areas as well as those needing improvement for positive outcomes.  Writer engaged the patients in processing their feelings and expressions, using the modalities identified above. Each patient identified and practiced ways to activate positive methods in expressing their needs - which ranged from being compliant with treatment recommendations, and having an engaging approach to address frustrations related to food.  Guided the patients in the co-educational process, by having them each identify and report to one another, how a negative conversation could be prevented and how a healthy one could be sustained. Patients reported feeling satisfied for having an opportunity to express their feelings and experiences. Reported willingness to engage with staff and in following through with treatment recommendations.    Patient Response/Contribution This patient reported that this facilitator is \" the musician.\" He insisted on this and stated that he \"wrote all " "the songs  had performed.\" Observed that he was fixed on specific thoughts, in the context of holding onto a word and expanding on it. He was easily redirectible.   Patient Participation Detail Pt participate in the group.     Tone Trujillo, Ph.D., L.I.C.S.W.    "

## 2023-06-20 NOTE — PLAN OF CARE
Assessment/Intervention/Current Symtoms and Care Coordination:  -Reviewed pt's chart  -Meet with team to discuss pt care.Team reported pt slept for 2 hours the previous shift and 45 minutes this shift. Pt has a no roommate order due to intrusiveness and poor boundaries. Pt is redirectable and medication compliant.         Discharge Plan or Goal:  To continue to stabilize pt's mental health status. Tentative plan for pt to return back to group home     Barriers to Discharge:  Pt continues to present as symptomatic (delusional mood). Pt is receiving ongoing stabilization and medication adjustment. Continue care coordination with assisted to ascertain his acceptability for a return to the home.     Referral Status:  Will continue to coordinate with pt's group home.     Legal Status:  Huong Shlomo is pt's legal Guardian      Contacts:  Shlomo Borja (Guardian) [phone]   Sadaf ([phone])      Upcoming Meetings and Dates/Important Information and next steps:

## 2023-06-21 PROCEDURE — 250N000013 HC RX MED GY IP 250 OP 250 PS 637: Performed by: PSYCHIATRY & NEUROLOGY

## 2023-06-21 PROCEDURE — 250N000013 HC RX MED GY IP 250 OP 250 PS 637: Performed by: PHYSICIAN ASSISTANT

## 2023-06-21 PROCEDURE — H2032 ACTIVITY THERAPY, PER 15 MIN: HCPCS

## 2023-06-21 PROCEDURE — 124N000003 HC R&B MH SENIOR/ADOLESCENT

## 2023-06-21 PROCEDURE — G0177 OPPS/PHP; TRAIN & EDUC SERV: HCPCS

## 2023-06-21 PROCEDURE — 250N000013 HC RX MED GY IP 250 OP 250 PS 637: Performed by: EMERGENCY MEDICINE

## 2023-06-21 PROCEDURE — 99232 SBSQ HOSP IP/OBS MODERATE 35: CPT | Performed by: PSYCHIATRY & NEUROLOGY

## 2023-06-21 RX ORDER — CLOZAPINE 25 MG/1
12.5 TABLET ORAL 2 TIMES DAILY
Status: DISCONTINUED | OUTPATIENT
Start: 2023-06-21 | End: 2023-06-23

## 2023-06-21 RX ORDER — OLANZAPINE 10 MG/1
20 TABLET, ORALLY DISINTEGRATING ORAL AT BEDTIME
Status: DISCONTINUED | OUTPATIENT
Start: 2023-06-21 | End: 2023-06-23

## 2023-06-21 RX ADMIN — APIXABAN 5 MG: 5 TABLET, FILM COATED ORAL at 20:11

## 2023-06-21 RX ADMIN — Medication 12.5 MG: at 20:12

## 2023-06-21 RX ADMIN — CHLORHEXIDINE GLUCONATE 0.12% ORAL RINSE 15 ML: 1.2 LIQUID ORAL at 07:55

## 2023-06-21 RX ADMIN — LISINOPRIL 5 MG: 5 TABLET ORAL at 07:55

## 2023-06-21 RX ADMIN — WHITE PETROLATUM 57.7 %-MINERAL OIL 31.9 % EYE OINTMENT: at 20:12

## 2023-06-21 RX ADMIN — Medication 12.5 MG: at 16:06

## 2023-06-21 RX ADMIN — MONTELUKAST 10 MG: 10 TABLET, FILM COATED ORAL at 20:12

## 2023-06-21 RX ADMIN — DOCUSATE SODIUM 100 MG: 100 CAPSULE, LIQUID FILLED ORAL at 07:55

## 2023-06-21 RX ADMIN — ZINC SULFATE 220 MG (50 MG) CAPSULE 220 MG: CAPSULE at 07:55

## 2023-06-21 RX ADMIN — LEVOTHYROXINE SODIUM 50 MCG: 25 TABLET ORAL at 05:42

## 2023-06-21 RX ADMIN — TOPIRAMATE 150 MG: 50 TABLET ORAL at 20:11

## 2023-06-21 RX ADMIN — PANTOPRAZOLE SODIUM 40 MG: 40 TABLET, DELAYED RELEASE ORAL at 05:42

## 2023-06-21 RX ADMIN — OLANZAPINE 20 MG: 10 TABLET, ORALLY DISINTEGRATING ORAL at 20:11

## 2023-06-21 RX ADMIN — PALIPERIDONE 6 MG: 3 TABLET, EXTENDED RELEASE ORAL at 07:55

## 2023-06-21 RX ADMIN — OLANZAPINE 10 MG: 10 TABLET, FILM COATED ORAL at 07:55

## 2023-06-21 RX ADMIN — Medication 10 MG: at 20:11

## 2023-06-21 RX ADMIN — CALCIUM POLYCARBOPHIL 625 MG: 625 TABLET, FILM COATED ORAL at 07:55

## 2023-06-21 RX ADMIN — APIXABAN 5 MG: 5 TABLET, FILM COATED ORAL at 07:55

## 2023-06-21 RX ADMIN — CALCIUM POLYCARBOPHIL 625 MG: 625 TABLET, FILM COATED ORAL at 20:11

## 2023-06-21 ASSESSMENT — ACTIVITIES OF DAILY LIVING (ADL)
ADLS_ACUITY_SCORE: 42
ADLS_ACUITY_SCORE: 32
ORAL_HYGIENE: INDEPENDENT
ADLS_ACUITY_SCORE: 42
ADLS_ACUITY_SCORE: 32
ADLS_ACUITY_SCORE: 42
DRESS: INDEPENDENT
ADLS_ACUITY_SCORE: 42
HYGIENE/GROOMING: INDEPENDENT
LAUNDRY: UNABLE TO COMPLETE
LAUNDRY: WITH SUPERVISION
ADLS_ACUITY_SCORE: 32
ADLS_ACUITY_SCORE: 32
ADLS_ACUITY_SCORE: 42
HYGIENE/GROOMING: INDEPENDENT
ORAL_HYGIENE: INDEPENDENT
ADLS_ACUITY_SCORE: 32
DRESS: SCRUBS (BEHAVIORAL HEALTH);INDEPENDENT

## 2023-06-21 NOTE — PLAN OF CARE
Problem: Psychotic Signs/Symptoms  Goal: Improved Psychomotor Symptoms (Psychotic Signs/Symptoms)  Intervention: Manage Psychomotor Movement  Recent Flowsheet Documentation  Taken 6/20/2023 1900 by Ethan Francis, RN  Activity (Behavioral Health): up ad katina   Goal Outcome Evaluation:         Pt was out in the [facility]. He spent his evening pacing with headphones on as usual.   Pt still disorganized, but cooperative with meds. Declined help with ADLs. Appetite good, ate 100%. Drank adequate amt of fluid. Denied pain, no apparent discomfort.

## 2023-06-21 NOTE — PHARMACY
Pharmacy Clozapine Initial Note    Patient's Name: Jagdeep Walker  Patient's : 1970    Recent ANC Value(s) for last 7 days:  Recent labs: (last 7 days)     23  1502   ANEUTAUTO 3.5       Is the patient enrolled in the cloZAPine REMS program? Yes  Ordering prescriber: Dr. Charles   Is this provider certified in the cloZAPine REMS program? No  A REMS Dispense Authorization was obtained from the clozapine REMS program? Yes  Is the ANC from within the last 7 days within recommended limits? Yes  Does the patient have any signs or symptoms of infection, including fever? No    Plan:  1. Initiate clozapine therapy at 12.5 mg PO BID.  2. A WBC with differential will be ordered at least weekly, next due 2023. ANC values will be entered into the REMS program.    Yesika Crocker, PharmD  [phone]

## 2023-06-21 NOTE — PROGRESS NOTES
06/20/23 2200   Group Therapy Session   Time Session Began 1910   Time Session Ended 2000   Total Time (minutes) 40   Total # Attendees 5   Group Type expressive therapy   Group Topic Covered balanced lifestyle;relaxation techniques;self-care activities   Group Session Detail Evening Relaxation   Patient Response/Contribution cooperative with task   Patient Participation Detail Cooperatively engaged in Evening Music Relaxation group to decrease anxiety and promote sleep.  Began to cry at the start of group with a song that he recognized, but was able to regulate as MT faded out the song, and group helped decide a different song.

## 2023-06-21 NOTE — PLAN OF CARE
Assessment/Intervention/Current Symtoms and Care Coordination:  -Reviewed pt's chart  -Meet with team to discuss pt care.Team reported pt slept for 6.25 + 2 hours from the previous shift. shift. Pt has a no roommate order due to intrusiveness and poor boundaries. Pt is medication compliant and polite to staff.       Discharge Plan or Goal:  To continue to stabilize pt's mental health status. Tentative plan for pt to return back to group home     Barriers to Discharge:  Pt continues to present as symptomatic (delusional mood). Pt is receiving ongoing stabilization and medication adjustment. Continue care coordination with alf to ascertain his acceptability for a return to the home.     Referral Status:  Will continue to coordinate with pt's group home.     Legal Status:  Huong Keenan is pt's legal Guardian      Contacts:  Shlomo Borja (Guardian) [phone]   Sadaf ([phone])      Upcoming Meetings and Dates/Important Information and next steps:

## 2023-06-21 NOTE — PROGRESS NOTES
"PSYCHIATRY  PROGRESS NOTE     DATE OF SERVICE   06/21/2023        CHIEF COMPLAINT   \" I do not want to talk to my sister.\"       SUBJECTIVE   Nursing reports:   Received in the lounge sitting and dozing off, accompanied by staff to his room and fell asleep right away, slept uninterrupted for 6.25 hours + 2 hours from the previous shift.  Woke up in a calm mood, used his headphone and talking to self while walking down the [place].Medication compliant, polite to staff.  Pt has a no roommate order due to intrusiveness and poor boundaries.  No urinary incontinence observed    Patient has been reviewed with the  earlier today.   Assessment/Intervention/Current Symtoms and Care Coordination:  -Reviewed pt's chart  -Meet with team to discuss pt care.Team reported pt slept for 6.25 + 2 hours from the previous shift. shift. Pt has a no roommate order due to intrusiveness and poor boundaries. Pt is medication compliant and polite to staff.         OBJECTIVE   Patient was seen evaluated in the consult room by himself, this was a face-to-face evaluation.  During my assessment initially the patient was calm and cooperative.  He definitely gets irritable and agitated when I started talking about his family.  Patient tells me that he does not want to talk about his sister because she is making decisions for him.  Patient said that he has no problems, the medications is not giving him side effects and he does not want medications.  Patient insisted that he is going home today and that his parents are alive.    I informed the patient that I am going to start making changes on his medications and slowly taking off Zyprexa in order to put him on Clozaril.  I reviewed with the patient that every 3rd day I am going to make an adjustment on the medication.  Patient was perseverating about the days of the week suddenly for no reason.  He then said he decided to end the meeting and was calling this writer Brittany.    Patient " "has been enrolled in the Clozaril rems program.     MEDICATIONS   Medications:  Scheduled Meds:    apixaban ANTICOAGULANT  5 mg Oral BID     artificial tears   Right Eye At Bedtime     calcium polycarbophil  625 mg Oral BID     chlorhexidine  15 mL Swish & Spit Daily     cloZAPine  12.5 mg Oral BID     docusate sodium  100 mg Oral Daily     levothyroxine  50 mcg Oral or NG Tube QAM AC     lisinopril  5 mg Oral Daily     melatonin  10 mg Oral At Bedtime     montelukast  10 mg Oral At Bedtime     [START ON 6/22/2023] OLANZapine  7.5 mg Oral Daily     OLANZapine zydis  20 mg Oral At Bedtime     paliperidone  6 mg Oral Daily     pantoprazole  40 mg Oral QAM AC     prednisoLONE acetate  1 drop Right Eye Daily     topiramate  150 mg Oral At Bedtime     zinc sulfate  220 mg Oral Daily     Continuous Infusions:  PRN Meds:.acetaminophen, alum & mag hydroxide-simethicone, artificial saliva, atropine, hydrALAZINE, hydrOXYzine, hypromellose-dextran, ipratropium, OLANZapine **OR** OLANZapine, senna-docusate    Medication adherence issues: MS Med Adherence Y/N: Yes, Hospitalization  Medication side effects: MEDICATION SIDE EFFECTS: no side effects reported  Benefit: Yes / No: Yes       ROS   A comprehensive review of systems was negative.       MENTAL STATUS EXAM   Vitals: /81 (BP Location: Right arm, Patient Position: Sitting, Cuff Size: Adult Regular)   Pulse 85   Temp 97.3  F (36.3  C) (Temporal)   Resp 16   Wt 93.6 kg (206 lb 5.6 oz)   SpO2 97%   BMI 35.42 kg/m        Appearance:  No apparent distress  Mood: \"I am good\"  Affect: labile  was congruent to speech  Suicidal Ideation: PRESENT / ABSENT: absent   Homicidal Ideation: PRESENT / ABSENT: absent     Thought process: disorganized and perseverative  Thought content: Remains delusional and having VH  Fund of Knowledge: Below average  Attention/Concentration: Poor  Language ability:  Intact, speech is garbled at times  Memory:  Immediate recall impaired, " Short-term memory impaired and Long-term memory intact  Insight: limited   Judgement: poor  Orientation: Person and situation only  Psychomotor Behavior: slowed    Muscle Strength and Tone: MuscleStrength: Normal  Gait and Station: Stable on his feet       LABS   personally reviewed.   Recent Labs   Lab 06/20/23  1502   WBC 6.1   HGB 13.0*   MCV 85        No results found for: PHENYTOIN, PHENOBARB, VALPROATE, CBMZ       DIAGNOSIS   Principal Problem:    Schizoaffective disorder, bipolar type (H)    Active Problem List:  Patient Active Problem List   Diagnosis     Closed head injury, initial encounter     Altered mental status, unspecified altered mental status type     Generalized weakness     Portal vein thrombosis     Pleural effusion     Generalized muscle weakness     Falls frequently     Other ascites     Acute pancreatitis, unspecified complication status, unspecified pancreatitis type     Pancreatic pseudocyst     Idiopathic acute pancreatitis, unspecified complication status     Allergic rhinitis     Fisher's esophagus     CTS (carpal tunnel syndrome)     Elevated liver enzymes     HTN (hypertension)     Hypothyroidism     Keratoconus     Major depressive disorder, recurrent episode, mild (H)     Intellectual disability     Morbid exogenous obesity (H)     Neuroleptic malignant syndrome     Obstructive sleep apnea syndrome     Bipolar affective disorder (H)     Seizure disorder (H)     Seizures, generalized convulsive (H)     Acute respiratory failure with hypoxia (H)     Anemia, unspecified     Anxiety disorder, unspecified     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)     Dietary zinc deficiency     Dry eye syndrome of bilateral lacrimal glands     Dry mouth, unspecified     Edema, unspecified     Gastro-esophageal reflux disease without esophagitis     Hyperosmolality and hypernatremia     Irritable bowel syndrome with constipation     Major depressive disorder, recurrent,  unspecified (H)     Metabolic encephalopathy     Moderate protein-calorie malnutrition (H)     Other symbolic dysfunctions     Schizoaffective disorder, unspecified (H)     Thrombocytopenia, unspecified (H)     Unspecified asthma, uncomplicated     Urinary tract infection, site not specified     Vitamin D deficiency, unspecified     Schizoaffective disorder, bipolar type (H)          PLAN   1. Ongoing education given regarding diagnostic and treatment options with risks, benefits and alternatives and adequate verbalization of understanding.  2.  Medications       Melatonin 10 mg at bedtime       We are going to cross-taper Zyprexa and Clozaril.  Today we are going to start Clozaril at 12.5 mg 2 times a day, at the same time I am decreasing Zyprexa to 7.5 mg daily and 20 mg at bedtime          Topamax 150 mg at bedtime       Invega 6 mg daily     3.  Medical team following as needed  4.   coordinating a safe discharge plan    Risk Assessment: [facility] RISK ASSESSMENT: Patient able to contract for safety    Coordination of Care:   Treatment Plan reviewed and physician signed, Care discussed with Care/Treatment Team Members, Chart reviewed and Patient seen      Re-Certification I certify that the inpatient psychiatric facility services furnished since the previous certification were, and continue to be, medically necessary for, either, treatment which could reasonably be expected to improve the patient s condition or diagnostic study and that the hospital records indicate that the services furnished were, either, intensive treatment services, admission and related services necessary for diagnostic study, or equivalent services.     I certify that the patient continues to need, on a daily basis, active treatment furnished directly by or requiring the supervision of inpatient psychiatric facility personnel.   I estimate 14 days of hospitalization is necessary for proper treatment of the patient. My plans for  post-hospital care for this patient are  group home     Alejandra Watkins MD    -     06/21/2023  -     1:48 PM    Total time 35 minutes with > 50%spent on coordination of cares and psycho-education.    This note was created with help of Dragon dictation system. Grammatical / typing errors are not intentional.    Alejandra Watkins MD

## 2023-06-21 NOTE — CARE PLAN
"Occupational Therapy Group Note:     06/21/23 1444   Group Therapy Session   Group Attendance attended group session   Time Session Began 1300   Time Session Ended 1350   Total Time (minutes) 45   Total # Attendees 6   Group Type recreation   Group Topic Covered leisure exploration/use of leisure time;structured socialization   Group Session Detail OT Leisure Group   Patient Response/Contribution cooperative with task   Patient Participation Detail Patient was an active participant in a discussion based bingo activity in order to promote positive socialization, introspection, and cognitive skills. Patient was, initially, very demanding to writer in group. Patient was making tay and demanding requests (go find Monopoly, play this song, etc). Patient was not receptive to writer's responses and needed direct and tay redirection. However, once patient was actively engaged in group activity, patient was more cooperative. Patient was able to follow along to game prompts and answered therapeutic prompts as directed. Some of patient's responses were borderline inappropriate for group setting; needed redirection. Patient responded to therapeutic prompt, 'what is a helpful way to handle fear' by stating, \"to get the devil out of my brain.\" Patient did not seem to be listening actively to peers; often speaking over peers in which he needed redirection to focus his attention appropriately. Patient left group slightly early, during the closing discussion portion of group.         "

## 2023-06-21 NOTE — PLAN OF CARE
Goal Outcome Evaluation:    Pt spent most of the shift in the milieu pacing the hallways listening to headphones while singing to self and at times watching TV. Pt was observed attending and participating in unit groups this shift. Pt presents with flat blunted affect and calm mood. Upon check in pt denies SI/SIB/HI/AVH as well as anxiety and depression. Pt appears neglecting his hygiene this shift and shower was offered. Pt took morning medications with lots of prompts this shift. Pt reported appetite and sleep as adequate. Pt did not need any PRNs this shift. Staff will continue to monitor and support with current POC.    Plan of Care Reviewed With: patient

## 2023-06-21 NOTE — PLAN OF CARE
Problem: Sleep Disturbance  Goal: Adequate Sleep/Rest  Outcome: Progressing   Goal Outcome Evaluation:    Plan of Care Reviewed With: patient             Received in the lounge sitting and dozing off, accompanied by staff to his room and fell asleep right away, slept uninterrupted for 6.25 hours + 2 hours from the previous shift.  Woke up in a calm mood, used his headphone and talking to self while walking down the [place].Medication compliant, polite to staff.  Pt has a no roommate order due to intrusiveness and poor boundaries.  No urinary incontinence observed

## 2023-06-21 NOTE — CARE PLAN
Occupational Therapy Group Note:     06/21/23 1155   Group Therapy Session   Group Attendance attended group session   Time Session Began 1015   Time Session Ended 1115   Total Time (minutes) 20 (no charge)   Total # Attendees 4   Group Type task skill   Group Topic Covered balanced lifestyle;coping skills/lifestyle management;emotions/expression;leisure exploration/use of leisure time;structured socialization   Group Session Detail OT Clinic Group   Patient Response/Contribution disorganized   Patient Participation Detail Pt actively participated in occupational therapy clinic to facilitate coping skill exploration, creative expression within personally meaningful activities, and clinical observation of social, cognitive, and kinesthetic performance skills. Patient was in/out of group multiple times. Patient did not remain in group for extended periods of time. Patient appears disorganized and restless. Patient become argumentative with OT regarding which artistic projects are his; attempted to claim peer's projects as his own. In addition, slightly argumentative with OT regarding the steps and sequence of current artistic project. Minimal work/attention was put forth into artistic project this date. No social engagement with peers.

## 2023-06-22 PROCEDURE — 250N000013 HC RX MED GY IP 250 OP 250 PS 637: Performed by: PHYSICIAN ASSISTANT

## 2023-06-22 PROCEDURE — 250N000013 HC RX MED GY IP 250 OP 250 PS 637: Performed by: PSYCHIATRY & NEUROLOGY

## 2023-06-22 PROCEDURE — H2032 ACTIVITY THERAPY, PER 15 MIN: HCPCS

## 2023-06-22 PROCEDURE — G0177 OPPS/PHP; TRAIN & EDUC SERV: HCPCS

## 2023-06-22 PROCEDURE — 124N000003 HC R&B MH SENIOR/ADOLESCENT

## 2023-06-22 PROCEDURE — 250N000013 HC RX MED GY IP 250 OP 250 PS 637: Performed by: EMERGENCY MEDICINE

## 2023-06-22 PROCEDURE — 99232 SBSQ HOSP IP/OBS MODERATE 35: CPT | Performed by: PSYCHIATRY & NEUROLOGY

## 2023-06-22 RX ADMIN — Medication 12.5 MG: at 20:26

## 2023-06-22 RX ADMIN — CALCIUM POLYCARBOPHIL 625 MG: 625 TABLET, FILM COATED ORAL at 20:26

## 2023-06-22 RX ADMIN — ZINC SULFATE 220 MG (50 MG) CAPSULE 220 MG: CAPSULE at 07:41

## 2023-06-22 RX ADMIN — MONTELUKAST 10 MG: 10 TABLET, FILM COATED ORAL at 20:27

## 2023-06-22 RX ADMIN — CHLORHEXIDINE GLUCONATE 0.12% ORAL RINSE 15 ML: 1.2 LIQUID ORAL at 07:53

## 2023-06-22 RX ADMIN — OLANZAPINE 7.5 MG: 5 TABLET, FILM COATED ORAL at 07:47

## 2023-06-22 RX ADMIN — CALCIUM POLYCARBOPHIL 625 MG: 625 TABLET, FILM COATED ORAL at 07:41

## 2023-06-22 RX ADMIN — PREDNISOLONE ACETATE 1 DROP: 10 SUSPENSION/ DROPS OPHTHALMIC at 07:51

## 2023-06-22 RX ADMIN — OLANZAPINE 20 MG: 10 TABLET, ORALLY DISINTEGRATING ORAL at 20:26

## 2023-06-22 RX ADMIN — Medication 12.5 MG: at 07:41

## 2023-06-22 RX ADMIN — WHITE PETROLATUM 57.7 %-MINERAL OIL 31.9 % EYE OINTMENT: at 20:44

## 2023-06-22 RX ADMIN — Medication 10 MG: at 20:26

## 2023-06-22 RX ADMIN — APIXABAN 5 MG: 5 TABLET, FILM COATED ORAL at 20:28

## 2023-06-22 RX ADMIN — DOCUSATE SODIUM 100 MG: 100 CAPSULE, LIQUID FILLED ORAL at 07:43

## 2023-06-22 RX ADMIN — PANTOPRAZOLE SODIUM 40 MG: 40 TABLET, DELAYED RELEASE ORAL at 06:20

## 2023-06-22 RX ADMIN — TOPIRAMATE 150 MG: 50 TABLET ORAL at 20:27

## 2023-06-22 RX ADMIN — LISINOPRIL 5 MG: 5 TABLET ORAL at 07:41

## 2023-06-22 RX ADMIN — APIXABAN 5 MG: 5 TABLET, FILM COATED ORAL at 07:41

## 2023-06-22 RX ADMIN — LEVOTHYROXINE SODIUM 50 MCG: 25 TABLET ORAL at 06:20

## 2023-06-22 RX ADMIN — PALIPERIDONE 6 MG: 3 TABLET, EXTENDED RELEASE ORAL at 07:42

## 2023-06-22 RX ADMIN — HYDROXYZINE HYDROCHLORIDE 25 MG: 25 TABLET, FILM COATED ORAL at 00:59

## 2023-06-22 ASSESSMENT — ACTIVITIES OF DAILY LIVING (ADL)
ADLS_ACUITY_SCORE: 32
ADLS_ACUITY_SCORE: 32
HYGIENE/GROOMING: INDEPENDENT
ADLS_ACUITY_SCORE: 32
ADLS_ACUITY_SCORE: 32
LAUNDRY: WITH SUPERVISION
ADLS_ACUITY_SCORE: 32
ORAL_HYGIENE: INDEPENDENT
DRESS: INDEPENDENT

## 2023-06-22 NOTE — PLAN OF CARE
Assessment/Intervention/Current Symtoms and Care Coordination:  -Reviewed pt's chart  -Meet with team to discuss pt care.Team reported pt slept for 3 hours. Pt has a no roommate order due to intrusiveness and poor boundaries. Pt was argumentative with redirections and using inappropriate language.     Discharge Plan or Goal:  To continue to stabilize pt's mental health status. Tentative plan for pt to return back to group home     Barriers to Discharge:  Pt continues to present as symptomatic (delusional mood). Pt is receiving ongoing stabilization and medication adjustment. Continue care coordination with senior care to ascertain his acceptability for a return to the home.     Referral Status:  Will continue to coordinate with pt's group home.     Legal Status:  Huong Keenan is pt's legal Guardian      Contacts:  Shlomo Borja (Guardian) [phone]   Sadaf ([phone])      Upcoming Meetings and Dates/Important Information and next steps:

## 2023-06-22 NOTE — PLAN OF CARE
"  Problem: Sleep Disturbance  Goal: Adequate Sleep/Rest  Outcome: Not Progressing     Problem: Psychotic Signs/Symptoms  Goal: Improved Mood Symptoms  Outcome: Not Progressing   Goal Outcome Evaluation:               Received in bed sleeping, pt has a no roommate order due to poor boundaries and intrusiveness. Woke up at 2345 , pt was using his headphone listening to music and singing along with it at times. Repeatedly pressed the red light on the COW, difficult to redirect, argumentative with redirection , stated \" I am Jose C and I am the head of the crew\". Unable to settle down, believed it was morning.  Offered prn Hydroxyzine 25 , initially declined to take the medication stating \" I do not need to go back to bed\", eventually took it with much encouragement at 0059.  Approached this writer bringing a cup, stated he needed dirt to plant. Pt got upset when he was told that we do not have/give out dirt, told this writer \" you are lying\".   Only slept 2 hours tonight and 1 hr from the previous shift.  Loud and was using inappropriate language, dismissive when redirected and told staff \" You are my son, dont tell me what to say or not say\".    "

## 2023-06-22 NOTE — CARE PLAN
"Occupational Therapy     06/22/23 1600   Group Therapy Session   Group Attendance attended group session   Time Session Began 1315   Time Session Ended 1400   Total Time (minutes) 10   Total # Attendees 6   Group Type psychoeducation   Group Topic Covered balanced lifestyle;emotions/expression;relationship;structured socialization   Group Session Detail OT: Education on the 8 Dimensions of Wellness and interactive social activity (Interview Your Partner) to increase concentration, focus, attention to task/detail, reminiscing, listening to others, sharing about oneself, development of relationships, and social wellness   Patient Response/Contribution verbalizations were off topic;disorganized;closed eyes for most of session;other (see comments)  (self-talk)   Patient Participation Detail During check-in pt provided an inaudible response to the question of who he turns to for support. Pt mumbled a response and then was observed to be talking to himself while peers engaged in check-in process. Pt was observed to drop his head and close his eyes for most of group while peers were talking and when therapist was providing education. Pt abruptly stood up and stated, \"I'm going. I need to leave.\" Pt left group and did not return.        "

## 2023-06-22 NOTE — CARE PLAN
Occupational Therapy     06/22/23 1200   Group Therapy Session   Group Attendance attended group session   Time Session Began 1015   Time Session Ended 1115   Total Time (minutes) 55   Total # Attendees 7   Group Type task skill;psychoeducation   Group Topic Covered balanced lifestyle;coping skills/lifestyle management;leisure exploration/use of leisure time;problem-solving;structured socialization   Group Session Detail OT: Education on healthy activity engagement with creative hands-on endeavor or cognitive activity (OT clinic) to increase concentration, focus, attention to task/detail, decision making, problem solving, frustration tolerance, task follow through, coping with stress, healthy leisure engagement, creative expression, and social engagement   Patient Response/Contribution cooperative with task;disorganized;verbalizations were off topic;other (see comments)  (self-talk; concrete)   Patient Participation Detail Pt arrived late to group and stayed for remainder. Pt required tot A to gather needed supplies as he claimed he didn't know the location of his project bin (it's always located in the same closet). Pt sat among peers to complete creative hands on endeavor but did not engage in social interactions with peers. Pt was observed to engage in self-talk behavior and many of her verbalizations were not related to the task or the conversation around him. Pt applied a single color of paint to his project in a semi-messy manner. Pt required max A to clean up supplies and workspace as he appeared confused by the directions.

## 2023-06-22 NOTE — PROGRESS NOTES
06/21/23 2000   Group Therapy Session   Group Attendance attended group session   Time Session Began 1900   Time Session Ended 1945   Total Time (minutes) 45   Total # Attendees 4   Group Type recreation   Group Topic Covered leisure exploration/use of leisure time   Group Session Detail TR leisure group   Patient Response/Contribution cooperative with task   Patient Participation Detail Pt attended the structured Therapeutic Recreation group, participating in a group activity. Pt participated in group discussion, leisure participation, and social engagement to gain self-esteem, manage behaviors, improve social skills, decrease isolation, and reduce anxiety/depression.   Pt had headphones on and was listening to them with a high volume, where this writer could clearly hear the music. Pt needed a couple prompts to turn down the music and to remove headphones so he was able to hear and participate in the group activity. Pt seemed to have a difficult time understanding the activity, often needing repeated instructions each round. Pt took his turn each round, but did not contribute to the clues for others.

## 2023-06-22 NOTE — PLAN OF CARE
Goal Outcome Evaluation:    Pt spent most of the shift in the milieu pacing the hallways listening to headphones while singing to self. Pt was observed attending and participating in unit groups this shift. Pt presents with flat blunted affect and calm mood. Upon check in pt denies SI/SIB/HI/AVH as well as anxiety and depression. Pt appears neglecting his hygiene this shift and shower was offered. Pt took morning medications with less prompts this shift. Pt reported appetite and sleep as adequate. Pt did not need any PRNs this shift. Staff will continue to monitor and support with current POC.    Plan of Care Reviewed With: patient

## 2023-06-22 NOTE — PROGRESS NOTES
Pt kept his own music headphones playing music around his neck the entire group but also made appropriate music requests that appeared to effect him emotionally during dance/movement therapy (DMT) using therapist-led movement directives to promote organization and energized engagement, however, pt did not join any of these DMT interventions.  Pt did burst into tears at one point and accepted therapist nonverbal comfort.  He was able to catch his breath after a few minutes and then engaged more fully in group processes.       06/22/23 1130   Expressive Therapy   Therapy Type dance/movement   Minutes of Treatment 50

## 2023-06-22 NOTE — PLAN OF CARE
"Goal Outcome Evaluation:    Plan of Care Reviewed With: patient    Patient has been pacing the halls listening to music on his headphones all shift. He denies SI and SIB. He denies AH and VH. He denies depression and anxiety. When asked if he feels his medications are working he answered \"Maybe, I doubt it\". He states his concentration is \"very excellent\". He denies pain. He endorses feeling hopeful. His sleep is \"great\". His appetite is \"excellent\". He attended and participated in group. He states he took a shower today. His speech is rambling and garbled and has a difficult time staying on topic. At times he becomes verbally aggressive when asking staff for treats, water etc. \"I want you to get it now\". When staff attempts to redirect he starts laughing out loud. He is medication compliant. He is not social with peers. He has been compliant with his boundaries. He has a no roommate order for Intrusiveness, poor boundaries.                 "

## 2023-06-23 PROCEDURE — 250N000013 HC RX MED GY IP 250 OP 250 PS 637: Performed by: PHYSICIAN ASSISTANT

## 2023-06-23 PROCEDURE — 250N000013 HC RX MED GY IP 250 OP 250 PS 637: Performed by: PSYCHIATRY & NEUROLOGY

## 2023-06-23 PROCEDURE — 250N000013 HC RX MED GY IP 250 OP 250 PS 637: Performed by: EMERGENCY MEDICINE

## 2023-06-23 PROCEDURE — 124N000003 HC R&B MH SENIOR/ADOLESCENT

## 2023-06-23 PROCEDURE — 99232 SBSQ HOSP IP/OBS MODERATE 35: CPT | Performed by: PSYCHIATRY & NEUROLOGY

## 2023-06-23 PROCEDURE — G0177 OPPS/PHP; TRAIN & EDUC SERV: HCPCS

## 2023-06-23 RX ORDER — OLANZAPINE 5 MG/1
5 TABLET ORAL DAILY
Status: DISCONTINUED | OUTPATIENT
Start: 2023-06-24 | End: 2023-06-29

## 2023-06-23 RX ORDER — CLOZAPINE 25 MG/1
25 TABLET ORAL 2 TIMES DAILY
Status: DISCONTINUED | OUTPATIENT
Start: 2023-06-23 | End: 2023-06-26

## 2023-06-23 RX ADMIN — WHITE PETROLATUM 57.7 %-MINERAL OIL 31.9 % EYE OINTMENT: at 19:09

## 2023-06-23 RX ADMIN — CALCIUM POLYCARBOPHIL 625 MG: 625 TABLET, FILM COATED ORAL at 08:26

## 2023-06-23 RX ADMIN — CHLORHEXIDINE GLUCONATE 0.12% ORAL RINSE 15 ML: 1.2 LIQUID ORAL at 08:20

## 2023-06-23 RX ADMIN — MONTELUKAST 10 MG: 10 TABLET, FILM COATED ORAL at 19:08

## 2023-06-23 RX ADMIN — OLANZAPINE 7.5 MG: 5 TABLET, FILM COATED ORAL at 08:22

## 2023-06-23 RX ADMIN — PREDNISOLONE ACETATE 1 DROP: 10 SUSPENSION/ DROPS OPHTHALMIC at 08:26

## 2023-06-23 RX ADMIN — APIXABAN 5 MG: 5 TABLET, FILM COATED ORAL at 19:08

## 2023-06-23 RX ADMIN — CLOZAPINE 25 MG: 25 TABLET ORAL at 19:08

## 2023-06-23 RX ADMIN — OLANZAPINE 15 MG: 10 TABLET, ORALLY DISINTEGRATING ORAL at 19:08

## 2023-06-23 RX ADMIN — PALIPERIDONE 6 MG: 3 TABLET, EXTENDED RELEASE ORAL at 08:28

## 2023-06-23 RX ADMIN — LISINOPRIL 5 MG: 5 TABLET ORAL at 08:18

## 2023-06-23 RX ADMIN — Medication 10 MG: at 19:08

## 2023-06-23 RX ADMIN — ZINC SULFATE 220 MG (50 MG) CAPSULE 220 MG: CAPSULE at 08:27

## 2023-06-23 RX ADMIN — APIXABAN 5 MG: 5 TABLET, FILM COATED ORAL at 08:18

## 2023-06-23 RX ADMIN — PANTOPRAZOLE SODIUM 40 MG: 40 TABLET, DELAYED RELEASE ORAL at 05:37

## 2023-06-23 RX ADMIN — LEVOTHYROXINE SODIUM 50 MCG: 25 TABLET ORAL at 05:37

## 2023-06-23 RX ADMIN — TOPIRAMATE 150 MG: 50 TABLET ORAL at 19:08

## 2023-06-23 RX ADMIN — CALCIUM POLYCARBOPHIL 625 MG: 625 TABLET, FILM COATED ORAL at 19:08

## 2023-06-23 RX ADMIN — DOCUSATE SODIUM 100 MG: 100 CAPSULE, LIQUID FILLED ORAL at 08:20

## 2023-06-23 RX ADMIN — Medication 12.5 MG: at 08:29

## 2023-06-23 ASSESSMENT — ACTIVITIES OF DAILY LIVING (ADL)
DRESS: INDEPENDENT
ADLS_ACUITY_SCORE: 42
HYGIENE/GROOMING: INDEPENDENT;PROMPTS
ADLS_ACUITY_SCORE: 32
ADLS_ACUITY_SCORE: 32
ADLS_ACUITY_SCORE: 42
ADLS_ACUITY_SCORE: 32
ADLS_ACUITY_SCORE: 42
ADLS_ACUITY_SCORE: 32
ORAL_HYGIENE: INDEPENDENT;PROMPTS
ADLS_ACUITY_SCORE: 32
ADLS_ACUITY_SCORE: 42
ADLS_ACUITY_SCORE: 32

## 2023-06-23 NOTE — PLAN OF CARE
Assessment/Intervention/Current Symtoms and Care Coordination:  -Reviewed pt's chart  -Meet with team to discuss pt care.Team reported pt slept for 7.75 hours. Pt has a no roommate order due to intrusiveness and poor boundaries.     Discharge Plan or Goal:  To continue to stabilize pt's mental health status. Tentative plan for pt to return back to group home     Barriers to Discharge:  Pt continues to present as symptomatic (delusional mood). Pt is receiving ongoing stabilization and medication adjustment. Continue care coordination with [facility] to ascertain his acceptability for a return to the home.     Referral Status:  Will continue to coordinate with pt's group home.     Legal Status:  Huong Shlomo is pt's legal Guardian      Contacts:  Shlomo Borja (Guardian) [phone]   [facility]af ([phone])      Upcoming Meetings and Dates/Important Information and next steps:

## 2023-06-23 NOTE — PROGRESS NOTES
"PSYCHIATRY  PROGRESS NOTE     DATE OF SERVICE   06/23/2023        CHIEF COMPLAINT   \" I am doing good.\"       SUBJECTIVE   Nursing reports:  Patient evaluation continues. Assessed mood,anxiety,thoughts and behavior. Patient is progressing towards goals. Patient is encouraged to participate in groups and assisted to develop healthy coping skills.  Patient denies auditory or visual hallucinations. /78   Pulse 103   Temp (!) 96.7  F (35.9  C) (Oral)   Resp 16   Wt 91.5 kg (201 lb 11.5 oz)   SpO2 98%   BMI 34.63 kg/m       Mood: good     Patient reports depression none and reports anxiety none     Affect:bright     Sleep: good     Appetite: good     SI: denies     HI: denies     SIB: denies      Medication Compliance yes     Group participation: no     ADL's: assisted and prompted     Fall risk interventions: proper foot wear, orthostatic B/P     Rakan Score Interventions: none     Discharge planning in process     Refer to daily team meeting notes for individualized plan of care. Nursing will continue to assess.    Patient has been reviewed with the  earlier today.   Assessment/Intervention/Current Symtoms and Care Coordination:  -Reviewed pt's chart  -Meet with team to discuss pt care.Team reported pt slept for 7.75 hours. Pt has a no roommate order due to intrusiveness and poor boundaries.         OBJECTIVE   Patient was seen evaluated in the consult room by himself, this was a face-to-face evaluation.  The patient presented as more calm and cooperative during the assessment.  Patient was able to have a short conversation about baseball and he was appropriate.  Patient denied having any side effects from the medications and has been able to tolerate the changes well.  I reviewed with the patient that today we are going to be increasing the dose of Clozaril and at the same time decreasing the Zyprexa.  I reminded the patient that every third day we are going to be adjusting the doses of the " medications.  At this time the patient verbalizes good understanding and he was not perseverating about going home.  I also ask if he has been communicating with his sister.  Patient tells me that he does not want to talk to her, she is not the oldest and the sister that is the oldest if Livia Dunn.  When I asked the patient if he remembered my name patient said that I am Dr. Celeste he was able to accept it when I tell him my name is Dr. Charles.  When the patient was exiting the consult room he did call this writer Brittany.         MEDICATIONS   Medications:  Scheduled Meds:    apixaban ANTICOAGULANT  5 mg Oral BID     artificial tears   Right Eye At Bedtime     calcium polycarbophil  625 mg Oral BID     chlorhexidine  15 mL Swish & Spit Daily     cloZAPine  25 mg Oral BID     docusate sodium  100 mg Oral Daily     levothyroxine  50 mcg Oral or NG Tube QAM AC     lisinopril  5 mg Oral Daily     melatonin  10 mg Oral At Bedtime     montelukast  10 mg Oral At Bedtime     [START ON 6/24/2023] OLANZapine  5 mg Oral Daily     OLANZapine zydis  15 mg Oral At Bedtime     paliperidone  6 mg Oral Daily     pantoprazole  40 mg Oral QAM AC     prednisoLONE acetate  1 drop Right Eye Daily     topiramate  150 mg Oral At Bedtime     zinc sulfate  220 mg Oral Daily     Continuous Infusions:  PRN Meds:.acetaminophen, alum & mag hydroxide-simethicone, artificial saliva, atropine, hydrALAZINE, hydrOXYzine, hypromellose-dextran, ipratropium, OLANZapine **OR** OLANZapine, senna-docusate    Medication adherence issues: MS Med Adherence Y/N: Yes, Hospitalization  Medication side effects: MEDICATION SIDE EFFECTS: no side effects reported  Benefit: Yes / No: Yes       ROS   A comprehensive review of systems was negative.       MENTAL STATUS EXAM   Vitals: /78   Pulse 103   Temp (!) 96.7  F (35.9  C) (Oral)   Resp 16   Wt 91.5 kg (201 lb 11.5 oz)   SpO2 98%   BMI 34.63 kg/m        Appearance:  No apparent  "distress  Mood: \"I am good\"  Affect: More calm and pleasant  was congruent to speech  Suicidal Ideation: PRESENT / ABSENT: absent   Homicidal Ideation: PRESENT / ABSENT: absent     Thought process: disorganized but less perseverative on going home  Thought content: Remains delusional but does not seem to be experiencing any hallucinations.  Fund of Knowledge: Below average  Attention/Concentration: Poor  Language ability:  Intact, speech is garbled at times  Memory:  Immediate recall impaired, Short-term memory impaired and Long-term memory intact  Insight: limited   Judgement: poor  Orientation: Person and situation only  Psychomotor Behavior: slowed    Muscle Strength and Tone: MuscleStrength: Normal  Gait and Station: Stable on his feet       LABS   personally reviewed.   Recent Labs   Lab 06/20/23  1502   WBC 6.1   HGB 13.0*   MCV 85        No results found for: PHENYTOIN, PHENOBARB, VALPROATE, CBMZ       DIAGNOSIS   Principal Problem:    Schizoaffective disorder, bipolar type (H)    Active Problem List:  Patient Active Problem List   Diagnosis     Closed head injury, initial encounter     Altered mental status, unspecified altered mental status type     Generalized weakness     Portal vein thrombosis     Pleural effusion     Generalized muscle weakness     Falls frequently     Other ascites     Acute pancreatitis, unspecified complication status, unspecified pancreatitis type     Pancreatic pseudocyst     Idiopathic acute pancreatitis, unspecified complication status     Allergic rhinitis     Fisher's esophagus     CTS (carpal tunnel syndrome)     Elevated liver enzymes     HTN (hypertension)     Hypothyroidism     Keratoconus     Major depressive disorder, recurrent episode, mild (H)     Intellectual disability     Morbid exogenous obesity (H)     Neuroleptic malignant syndrome     Obstructive sleep apnea syndrome     Bipolar affective disorder (H)     Seizure disorder (H)     Seizures, generalized " convulsive (H)     Acute respiratory failure with hypoxia (H)     Anemia, unspecified     Anxiety disorder, unspecified     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)     Dietary zinc deficiency     Dry eye syndrome of bilateral lacrimal glands     Dry mouth, unspecified     Edema, unspecified     Gastro-esophageal reflux disease without esophagitis     Hyperosmolality and hypernatremia     Irritable bowel syndrome with constipation     Major depressive disorder, recurrent, unspecified (H)     Metabolic encephalopathy     Moderate protein-calorie malnutrition (H)     Other symbolic dysfunctions     Schizoaffective disorder, unspecified (H)     Thrombocytopenia, unspecified (H)     Unspecified asthma, uncomplicated     Urinary tract infection, site not specified     Vitamin D deficiency, unspecified     Schizoaffective disorder, bipolar type (H)          PLAN   1. Ongoing education given regarding diagnostic and treatment options with risks, benefits and alternatives and adequate verbalization of understanding.  2.  Medications       Melatonin 10 mg at bedtime       Increase Clozaril at 25 mg 2 times a day and cross taper with Zyprexa to 5 mg daily and 15 mg at bedtime          Topamax 150 mg at bedtime       Invega 6 mg daily     3.  Medical team following as needed  4.   coordinating a safe discharge plan    Risk Assessment: [facility] RISK ASSESSMENT: Patient able to contract for safety    Coordination of Care:   Treatment Plan reviewed and physician signed, Care discussed with Care/Treatment Team Members, Chart reviewed and Patient seen      Re-Certification I certify that the inpatient psychiatric facility services furnished since the previous certification were, and continue to be, medically necessary for, either, treatment which could reasonably be expected to improve the patient s condition or diagnostic study and that the hospital records indicate that the services furnished were,  either, intensive treatment services, admission and related services necessary for diagnostic study, or equivalent services.     I certify that the patient continues to need, on a daily basis, active treatment furnished directly by or requiring the supervision of inpatient psychiatric facility personnel.   I estimate 14 days of hospitalization is necessary for proper treatment of the patient. My plans for post-hospital care for this patient are  group home     Alejandra Watkins MD    -     06/23/2023  -     2:58 PM    Total time 35 minutes with > 50%spent on coordination of cares and psycho-education.    This note was created with help of Dragon dictation system. Grammatical / typing errors are not intentional.    Alejandra Watkins MD

## 2023-06-23 NOTE — PLAN OF CARE
Problem: Psychotic Signs/Symptoms  Goal: Improved Sleep (Psychotic Signs/Symptoms)  Outcome: Progressing   Goal Outcome Evaluation:             Received in bed sleeping, pt has a no roommate order due to intrusiveness and poor boundaries.  Woke up at midnight, walked the [place] for a short time and requested for Jelly rancher candies, fell right back to sleep after 30 minutes without any prn medication.  Pt able to make needs known and independent with his ADL's  Slept for 7.75 hours, woke up in a calm,pleasant mood and polite with staff.  Scrub pants wet with urine, pt was independent with changing.  Stayed in the lounge with his headphone and was laughing at times.

## 2023-06-23 NOTE — CARE PLAN
06/23/23 1311   Group Therapy Session   Group Attendance attended group session   Time Session Began 1000   Time Session Ended 1155   Total Time (minutes) 60   Total # Attendees 7   Group Type expressive therapy;task skill;psychotherapeutic   Group Topic Covered coping skills/lifestyle management;problem-solving;cognitive therapy techniques;cognitive activities;structured socialization;balanced lifestyle   Group Session Detail [facility] group to facilitate coping skill exploration, use of cognitive skills and problem solving, creative expression, clinical observation and facilitation of social, cognitive, and kinesthetic performance skills.    Patient Response/Contribution cooperative with task;disorganized   Patient Participation Detail Worked for a short period of time, wanting to be finished before task steps were complete. With encouragement and assistance with details, as he stated being unable to complete, he accepted assistance and stated appreciation. Seemed more willing and calmer in following through with encouraged work steps. Disorganized appearance with work end product.

## 2023-06-23 NOTE — PLAN OF CARE
Goal Outcome Evaluation:    Problem: Adult Behavioral Health Plan of Care  Goal: Plan of Care Review  Outcome: Progressing     Problem: Suicidal Behavior  Goal: Suicidal Behavior is Absent or Managed  Outcome: Progressing   Patient observed pacing the hallway while listening to music with the head at the start of the shift. Pt denies all psych including SI/SIB, anxiety and depression. Pt denies pain when asked. Pt continues to have difficulties staying on topic, speech is rambling. Appetite is good, eating and drinking adequately. Medication complaint, no PRN given this shift. Pt remains on sexual and elopement precautions, no intrusive behavior noted this shift,

## 2023-06-23 NOTE — PROGRESS NOTES
"PSYCHIATRY  PROGRESS NOTE     DATE OF SERVICE   06/22/2023        CHIEF COMPLAINT   \" I am doing good and I have no problems.\"       SUBJECTIVE   Nursing reports:  Received in bed sleeping, pt has a no roommate order due to poor boundaries and intrusiveness. Woke up at 2345 , pt was using his headphone listening to music and singing along with it at times. Repeatedly pressed the red light on the COW, difficult to redirect, argumentative with redirection , stated \" I am Jose C and I am the head of the crew\". Unable to settle down, believed it was morning.  Offered prn Hydroxyzine 25 , initially declined to take the medication stating \" I do not need to go back to bed\", eventually took it with much encouragement at 0059.  Approached this writer bringing a cup, stated he needed dirt to plant. Pt got upset when he was told that we do not have/give out dirt, told this writer \" you are lying\".   Only slept 2 hours tonight and 1 hr from the previous shift.  Loud and was using inappropriate language, dismissive when redirected and told staff \" You are my son, dont tell me what to say or not say\".    Patient has been reviewed with the  earlier today.   Assessment/Intervention/Current Symtoms and Care Coordination:  -Reviewed pt's chart  -Meet with team to discuss pt care.Team reported pt slept for 3 hours. Pt has a no roommate order due to intrusiveness and poor boundaries. Pt was argumentative with redirections and using inappropriate language.         OBJECTIVE   Patient was seen evaluated in the day area by himself, this was a face-to-face evaluation.  I initiated the meeting by telling the patient that it seemed he had a rough night last night.  Patient tells me that the staff is lying and he did well, patient proceeded to add that he is going home today.  Patient continues to be extremely delusional but this morning he has been redirectable.  He is not able to fully participate in the assessment today as he " "can get agitated.  He has been observed walking around the unit, listening to music and laughing to himself.  Has been compliant with the medications and has not reported any side effects.       MEDICATIONS   Medications:  Scheduled Meds:    apixaban ANTICOAGULANT  5 mg Oral BID     artificial tears   Right Eye At Bedtime     calcium polycarbophil  625 mg Oral BID     chlorhexidine  15 mL Swish & Spit Daily     cloZAPine  12.5 mg Oral BID     docusate sodium  100 mg Oral Daily     levothyroxine  50 mcg Oral or NG Tube QAM AC     lisinopril  5 mg Oral Daily     melatonin  10 mg Oral At Bedtime     montelukast  10 mg Oral At Bedtime     OLANZapine  7.5 mg Oral Daily     OLANZapine zydis  20 mg Oral At Bedtime     paliperidone  6 mg Oral Daily     pantoprazole  40 mg Oral QAM AC     prednisoLONE acetate  1 drop Right Eye Daily     topiramate  150 mg Oral At Bedtime     zinc sulfate  220 mg Oral Daily     Continuous Infusions:  PRN Meds:.acetaminophen, alum & mag hydroxide-simethicone, artificial saliva, atropine, hydrALAZINE, hydrOXYzine, hypromellose-dextran, ipratropium, OLANZapine **OR** OLANZapine, senna-docusate    Medication adherence issues: MS Med Adherence Y/N: Yes, Hospitalization  Medication side effects: MEDICATION SIDE EFFECTS: no side effects reported  Benefit: Yes / No: Yes       ROS   A comprehensive review of systems was negative.       MENTAL STATUS EXAM   Vitals: /76   Pulse 84   Temp 98  F (36.7  C) (Oral)   Resp 16   Wt 91.5 kg (201 lb 11.5 oz)   SpO2 98%   BMI 34.63 kg/m        Appearance:  No apparent distress  Mood: \"I am fine\"  Affect: labile  was congruent to speech  Suicidal Ideation: PRESENT / ABSENT: absent   Homicidal Ideation: PRESENT / ABSENT: absent     Thought process: disorganized and perseverative  Thought content: Remains delusional and having VH  Fund of Knowledge: Below average  Attention/Concentration: Poor  Language ability:  Intact, speech is garbled at " times  Memory:  Immediate recall impaired, Short-term memory impaired and Long-term memory intact  Insight: limited   Judgement: poor  Orientation: Person and situation only  Psychomotor Behavior: slowed    Muscle Strength and Tone: MuscleStrength: Normal  Gait and Station: Stable on his feet       LABS   personally reviewed.   Recent Labs   Lab 06/20/23  1502   WBC 6.1   HGB 13.0*   MCV 85        No results found for: PHENYTOIN, PHENOBARB, VALPROATE, CBMZ       DIAGNOSIS   Principal Problem:    Schizoaffective disorder, bipolar type (H)    Active Problem List:  Patient Active Problem List   Diagnosis     Closed head injury, initial encounter     Altered mental status, unspecified altered mental status type     Generalized weakness     Portal vein thrombosis     Pleural effusion     Generalized muscle weakness     Falls frequently     Other ascites     Acute pancreatitis, unspecified complication status, unspecified pancreatitis type     Pancreatic pseudocyst     Idiopathic acute pancreatitis, unspecified complication status     Allergic rhinitis     Fisher's esophagus     CTS (carpal tunnel syndrome)     Elevated liver enzymes     HTN (hypertension)     Hypothyroidism     Keratoconus     Major depressive disorder, recurrent episode, mild (H)     Intellectual disability     Morbid exogenous obesity (H)     Neuroleptic malignant syndrome     Obstructive sleep apnea syndrome     Bipolar affective disorder (H)     Seizure disorder (H)     Seizures, generalized convulsive (H)     Acute respiratory failure with hypoxia (H)     Anemia, unspecified     Anxiety disorder, unspecified     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)     Dietary zinc deficiency     Dry eye syndrome of bilateral lacrimal glands     Dry mouth, unspecified     Edema, unspecified     Gastro-esophageal reflux disease without esophagitis     Hyperosmolality and hypernatremia     Irritable bowel syndrome with constipation      Major depressive disorder, recurrent, unspecified (H)     Metabolic encephalopathy     Moderate protein-calorie malnutrition (H)     Other symbolic dysfunctions     Schizoaffective disorder, unspecified (H)     Thrombocytopenia, unspecified (H)     Unspecified asthma, uncomplicated     Urinary tract infection, site not specified     Vitamin D deficiency, unspecified     Schizoaffective disorder, bipolar type (H)          PLAN   1. Ongoing education given regarding diagnostic and treatment options with risks, benefits and alternatives and adequate verbalization of understanding.  2.  Medications       Melatonin 10 mg at bedtime       Continue Clozaril at 12.5 mg 2 times a day and cross taper with Zyprexa to 7.5 mg daily and 20 mg at bedtime          Topamax 150 mg at bedtime       Invega 6 mg daily     3.  Medical team following as needed  4.   coordinating a safe discharge plan    Risk Assessment: [facility] RISK ASSESSMENT: Patient able to contract for safety    Coordination of Care:   Treatment Plan reviewed and physician signed, Care discussed with Care/Treatment Team Members, Chart reviewed and Patient seen      Re-Certification I certify that the inpatient psychiatric facility services furnished since the previous certification were, and continue to be, medically necessary for, either, treatment which could reasonably be expected to improve the patient s condition or diagnostic study and that the hospital records indicate that the services furnished were, either, intensive treatment services, admission and related services necessary for diagnostic study, or equivalent services.     I certify that the patient continues to need, on a daily basis, active treatment furnished directly by or requiring the supervision of inpatient psychiatric facility personnel.   I estimate 14 days of hospitalization is necessary for proper treatment of the patient. My plans for post-hospital care for this patient are   group home     Alejandra Watkins MD    -     06/22/2023  -     7:41 PM    Total time 35 minutes with > 50%spent on coordination of cares and psycho-education.    This note was created with help of Dragon dictation system. Grammatical / typing errors are not intentional.    Alejandra Watkins MD

## 2023-06-24 PROCEDURE — 250N000013 HC RX MED GY IP 250 OP 250 PS 637: Performed by: PSYCHIATRY & NEUROLOGY

## 2023-06-24 PROCEDURE — 124N000003 HC R&B MH SENIOR/ADOLESCENT

## 2023-06-24 PROCEDURE — 250N000013 HC RX MED GY IP 250 OP 250 PS 637: Performed by: PHYSICIAN ASSISTANT

## 2023-06-24 PROCEDURE — 250N000013 HC RX MED GY IP 250 OP 250 PS 637: Performed by: EMERGENCY MEDICINE

## 2023-06-24 RX ADMIN — MONTELUKAST 10 MG: 10 TABLET, FILM COATED ORAL at 20:27

## 2023-06-24 RX ADMIN — Medication 10 MG: at 20:27

## 2023-06-24 RX ADMIN — PALIPERIDONE 6 MG: 3 TABLET, EXTENDED RELEASE ORAL at 08:10

## 2023-06-24 RX ADMIN — PREDNISOLONE ACETATE 1 DROP: 10 SUSPENSION/ DROPS OPHTHALMIC at 08:11

## 2023-06-24 RX ADMIN — APIXABAN 5 MG: 5 TABLET, FILM COATED ORAL at 20:27

## 2023-06-24 RX ADMIN — CLOZAPINE 25 MG: 25 TABLET ORAL at 20:28

## 2023-06-24 RX ADMIN — CHLORHEXIDINE GLUCONATE 0.12% ORAL RINSE 15 ML: 1.2 LIQUID ORAL at 08:11

## 2023-06-24 RX ADMIN — LISINOPRIL 5 MG: 5 TABLET ORAL at 08:09

## 2023-06-24 RX ADMIN — CLOZAPINE 25 MG: 25 TABLET ORAL at 08:10

## 2023-06-24 RX ADMIN — PANTOPRAZOLE SODIUM 40 MG: 40 TABLET, DELAYED RELEASE ORAL at 05:12

## 2023-06-24 RX ADMIN — TOPIRAMATE 150 MG: 50 TABLET ORAL at 20:27

## 2023-06-24 RX ADMIN — OLANZAPINE 10 MG: 10 TABLET, FILM COATED ORAL at 05:10

## 2023-06-24 RX ADMIN — CALCIUM POLYCARBOPHIL 625 MG: 625 TABLET, FILM COATED ORAL at 20:27

## 2023-06-24 RX ADMIN — DOCUSATE SODIUM 100 MG: 100 CAPSULE, LIQUID FILLED ORAL at 08:10

## 2023-06-24 RX ADMIN — ZINC SULFATE 220 MG (50 MG) CAPSULE 220 MG: CAPSULE at 08:10

## 2023-06-24 RX ADMIN — LEVOTHYROXINE SODIUM 50 MCG: 25 TABLET ORAL at 05:11

## 2023-06-24 RX ADMIN — APIXABAN 5 MG: 5 TABLET, FILM COATED ORAL at 08:10

## 2023-06-24 RX ADMIN — CALCIUM POLYCARBOPHIL 625 MG: 625 TABLET, FILM COATED ORAL at 08:10

## 2023-06-24 RX ADMIN — OLANZAPINE 15 MG: 10 TABLET, ORALLY DISINTEGRATING ORAL at 20:27

## 2023-06-24 ASSESSMENT — ACTIVITIES OF DAILY LIVING (ADL)
ADLS_ACUITY_SCORE: 42

## 2023-06-24 NOTE — PLAN OF CARE
Goal Outcome Evaluation:                      Nursing Assessment    Schizoaffective disorder, bipolar type (H) [F25.0]  Intellectual disability [F79]    Admit Date: 5/2/2023    Length of Stay: 44    Patient evaluation continues. Assessed mood,anxiety,thoughts and behavior. Patient is progressing towards goals. Patient is encouraged to participate in groups and assisted to develop healthy coping skills.  Patient denies auditory or visual hallucinations. /82   Pulse 94   Temp 98  F (36.7  C) (Oral)   Resp 16   Wt 91.5 kg (201 lb 11.5 oz)   SpO2 100%   BMI 34.63 kg/m      Mood: good    Patient reports depression none and reports anxiety none    Affect: full range    Sleep: 6 hours on night shift    Appetite: good 100 %    SI: denies    HI: denies    SIB: denies      Medication Compliance yes    Group participation:no    ADL's: independent prompts    Fall risk interventions: proper foot wear, orthostatic B/P    Rakan Score Interventions:none    Discharge planning in process    Refer to daily team meeting notes for individualized plan of care. Nursing will continue to assess.    *Scale is 1-10 and 10 is the worst.

## 2023-06-24 NOTE — PLAN OF CARE
Goal Outcome Evaluation:  Problem: Plan of Care - These are the overarching goals to be used throughout the patient stay.    Goal: Plan of Care Review  Description: The Plan of Care Review/Shift note should be completed every shift.  The Outcome Evaluation is a brief statement about your assessment that the patient is improving, declining, or no change.  This information will be displayed automatically on your shift note.  Outcome: Progressing     Problem: Adult Behavioral Health Plan of Care  Goal: Plan of Care Review  Outcome: Progressing     Problem: Psychotic Signs/Symptoms  Goal: Improved Behavioral Control (Psychotic Signs/Symptoms)  Outcome: Progressing     Patient observed walking back and forth the hallway most of the shift. Attended group hosted by one of the Pas. Pt denies SI/SIB,  depression, anxiety and auditory hallucinations. Appetite is good, eating and drinking adequately. Medication complaint, no PRN needed this shift.

## 2023-06-24 NOTE — PLAN OF CARE
"  Problem: Psychotic Signs/Symptoms  Goal: Improved Mood Symptoms  Outcome: Not Progressing  Goal: Improved Sleep (Psychotic Signs/Symptoms)  Outcome: Not Progressing   Goal Outcome Evaluation:             Received sleeping, pt has a no roommate order due to poor boundaries and intrusiveness.  Pt had been awake since 0230, able to make needs known.Sang loudly in the hallway and difficult to redirect, volume on the headphone is high and pt did not allow staff to turn the volume down.  0450 continued to be loud and irritated when staff tried to redirect him, pointed at this writer and stated \" I won't listen to you\".  0510 Olanzapine 10 mg given  constantly asked for food, able to clean out the mess on the table   Wanted coffee and to watch MTV at 0530, pt was told that tV can bed turned on at 0600, persistent and was getting too close to the staff and replied   \" Who cares about the Fucking rules.\"  0630 Walking around  the lounge but calmer  Only slept for 3 hours this shift and 2.75 hours the previous shift.           " 10-May-2023 15:36

## 2023-06-25 PROCEDURE — 250N000013 HC RX MED GY IP 250 OP 250 PS 637: Performed by: PSYCHIATRY & NEUROLOGY

## 2023-06-25 PROCEDURE — 250N000013 HC RX MED GY IP 250 OP 250 PS 637: Performed by: EMERGENCY MEDICINE

## 2023-06-25 PROCEDURE — 124N000003 HC R&B MH SENIOR/ADOLESCENT

## 2023-06-25 PROCEDURE — 250N000013 HC RX MED GY IP 250 OP 250 PS 637: Performed by: PHYSICIAN ASSISTANT

## 2023-06-25 PROCEDURE — G0177 OPPS/PHP; TRAIN & EDUC SERV: HCPCS

## 2023-06-25 RX ADMIN — ZINC SULFATE 220 MG (50 MG) CAPSULE 220 MG: CAPSULE at 08:18

## 2023-06-25 RX ADMIN — OLANZAPINE 5 MG: 5 TABLET, FILM COATED ORAL at 08:18

## 2023-06-25 RX ADMIN — TOPIRAMATE 150 MG: 50 TABLET ORAL at 20:29

## 2023-06-25 RX ADMIN — CLOZAPINE 25 MG: 25 TABLET ORAL at 08:19

## 2023-06-25 RX ADMIN — PALIPERIDONE 6 MG: 3 TABLET, EXTENDED RELEASE ORAL at 08:19

## 2023-06-25 RX ADMIN — CALCIUM POLYCARBOPHIL 625 MG: 625 TABLET, FILM COATED ORAL at 20:29

## 2023-06-25 RX ADMIN — LISINOPRIL 5 MG: 5 TABLET ORAL at 08:18

## 2023-06-25 RX ADMIN — CALCIUM POLYCARBOPHIL 625 MG: 625 TABLET, FILM COATED ORAL at 08:18

## 2023-06-25 RX ADMIN — CLOZAPINE 25 MG: 25 TABLET ORAL at 20:30

## 2023-06-25 RX ADMIN — APIXABAN 5 MG: 5 TABLET, FILM COATED ORAL at 20:30

## 2023-06-25 RX ADMIN — MONTELUKAST 10 MG: 10 TABLET, FILM COATED ORAL at 20:29

## 2023-06-25 RX ADMIN — APIXABAN 5 MG: 5 TABLET, FILM COATED ORAL at 08:18

## 2023-06-25 RX ADMIN — PANTOPRAZOLE SODIUM 40 MG: 40 TABLET, DELAYED RELEASE ORAL at 05:43

## 2023-06-25 RX ADMIN — DOCUSATE SODIUM 100 MG: 100 CAPSULE, LIQUID FILLED ORAL at 08:19

## 2023-06-25 RX ADMIN — Medication 10 MG: at 20:29

## 2023-06-25 RX ADMIN — LEVOTHYROXINE SODIUM 50 MCG: 25 TABLET ORAL at 05:42

## 2023-06-25 RX ADMIN — OLANZAPINE 15 MG: 10 TABLET, ORALLY DISINTEGRATING ORAL at 20:29

## 2023-06-25 RX ADMIN — CHLORHEXIDINE GLUCONATE 0.12% ORAL RINSE 15 ML: 1.2 LIQUID ORAL at 08:18

## 2023-06-25 ASSESSMENT — ACTIVITIES OF DAILY LIVING (ADL)
ADLS_ACUITY_SCORE: 52
ADLS_ACUITY_SCORE: 42
ADLS_ACUITY_SCORE: 52
DRESS: INDEPENDENT;PROMPTS
ADLS_ACUITY_SCORE: 52
HYGIENE/GROOMING: INDEPENDENT;PROMPTS
ADLS_ACUITY_SCORE: 52
ADLS_ACUITY_SCORE: 52
ADLS_ACUITY_SCORE: 42
ADLS_ACUITY_SCORE: 52
ORAL_HYGIENE: INDEPENDENT;PROMPTS

## 2023-06-25 NOTE — PLAN OF CARE
Problem: Adult Behavioral Health Plan of Care  Goal: Optimized Coping Skills in Response to Life Stressors  Outcome: Progressing   Goal Outcome Evaluation:  Patient is calm, cooperative with cares, and is medication compliant. He is observed pacing the hallway listening to music and humming/singing along. Patient attended group hosted by [facility]. He denies all mental health symptoms, but contracts for safety. He ate most of his meal and is seeing drinking plenty of juice and water.   /86 (BP Location: Right arm, Patient Position: Sitting)   Pulse 94   Temp 98  F (36.7  C) (Oral)   Resp 16   Wt 91.5 kg (201 lb 11.5 oz)   SpO2 97%   BMI 34.63 kg/m  .

## 2023-06-25 NOTE — PLAN OF CARE
"  Problem: Plan of Care - These are the overarching goals to be used throughout the patient stay.    Goal: Optimal Comfort and Wellbeing  Outcome: Progressing   Goal Outcome Evaluation:                  Received sleeping, pt has a no roommate order due to poor boundaries and intrusiveness. Woke up once in the middle of the night to toilet but fell right back to sleep.  Slept  well for 7 hours  Calm, able to make needs known and  patient when told to wait for 20 minutes for coffee and TV time. Using the headphone to listen to music and sang along at times, Continued to use the call light in his room and toilet to \" order food\".      "

## 2023-06-25 NOTE — PLAN OF CARE
Goal Outcome Evaluation:    Plan of Care Reviewed With: patient               Problem: Disruptive Behavior  Goal: Improved Impulse and Aggression Control (Disruptive Behavior)  Outcome: Progressing       Nursing Assessment    Schizoaffective disorder, bipolar type (H) [F25.0]  Intellectual disability [F79]    Admit Date: 5/2/2023    Length of Stay: 45    Patient evaluation continues. Assessed mood,anxiety,thoughts and behavior. Patient is  progressing towards goals. Patient is encouraged to participate in groups and assisted to develop healthy coping skills.  Patient denies auditory or visual hallucinations. /80   Pulse 94   Temp 97  F (36.1  C) (Temporal)   Resp 16   Wt 88.2 kg (194 lb 7.1 oz)   SpO2 92%   BMI 33.38 kg/m     Pt was in the lounge standing watching TV, pt started to step backwards and kept going falling backwards landing on his bottom first then unable to catch himself he fell back onto his back and bumped his head on floor. Pt stated he bumped his head. Neuro checks within pt's normal limits. Pupil reactive to light. Hand grasps strong bilaterally. Slight pink jori on back of head with no other symptom noted. Pt assisted up and walking around steady. Will update IM. IM updated and no further recommendation to continue t monitor and assess for any symptoms.  Guardian  notified.Pt denied dizziness or lightheadedness. Pt stated he just fell back because I lost my balance. Tiny pink jori on back of head no longer present.    T 97 orally, B/P 139/87 P 89 resp 16 o2 sat 99% on room air.     Mood: good    Patient reports depression none and reports anxiety none    Affect: full range    Sleep: 8 hours last night    Appetite: good 100%    SI: denies    HI: denies    SIB: denies      Medication Compliance yes    Group participation:yes    ADL's: independent with prompts    Fall risk interventions: proper foot wear, orthostatic B/P    Rakan Score Interventions:none    Discharge planning in  process    Refer to daily team meeting notes for individualized plan of care. Nursing will continue to assess.    *Scale is 1-10 and 10 is the worst.

## 2023-06-25 NOTE — CARE PLAN
"   06/25/23 1518   Group Therapy Session   Group Attendance attended group session   Time Session Began 1015   Time Session Ended 1100   Total Time (minutes) 45   Total # Attendees 7   Group Type life skill;psychoeducation   Group Topic Covered coping skills/lifestyle management;relationship;emotions/expression;self-care activities   Group Session Detail Journaling and Discussions: group activity to encourage connection, communication, and self-reflection through discussion and journal prompts.   Patient Participation Detail Pt participated in a group activity promoting self-reflection and connection through journaling and discussions. Pt shared thoughts and personal experiences with the group; writer had a difficult time understanding the pt's speech (pt would frequently mumble). Pt told peers to \"be quiet\" when peers were sharing their own thoughts. Pt answered a discussion topic question, sharing thoughts and a story about their parents, and began to cry; writer gave pt paper towels (no facial tissues available) and thanked them for sharing; pt said thanks and said that what they said about their parents were true. Pt appeared to be in better spirits afterwards.       "

## 2023-06-26 ENCOUNTER — APPOINTMENT (OUTPATIENT)
Dept: CT IMAGING | Facility: CLINIC | Age: 53
DRG: 885 | End: 2023-06-26
Attending: PSYCHIATRY & NEUROLOGY
Payer: MEDICARE

## 2023-06-26 PROCEDURE — 99232 SBSQ HOSP IP/OBS MODERATE 35: CPT | Performed by: PSYCHIATRY & NEUROLOGY

## 2023-06-26 PROCEDURE — G1010 CDSM STANSON: HCPCS

## 2023-06-26 PROCEDURE — 250N000013 HC RX MED GY IP 250 OP 250 PS 637: Performed by: PSYCHIATRY & NEUROLOGY

## 2023-06-26 PROCEDURE — G0177 OPPS/PHP; TRAIN & EDUC SERV: HCPCS

## 2023-06-26 PROCEDURE — 250N000013 HC RX MED GY IP 250 OP 250 PS 637: Performed by: PHYSICIAN ASSISTANT

## 2023-06-26 PROCEDURE — 250N000013 HC RX MED GY IP 250 OP 250 PS 637: Performed by: EMERGENCY MEDICINE

## 2023-06-26 PROCEDURE — 90853 GROUP PSYCHOTHERAPY: CPT

## 2023-06-26 PROCEDURE — G1010 CDSM STANSON: HCPCS | Performed by: RADIOLOGY

## 2023-06-26 PROCEDURE — 70450 CT HEAD/BRAIN W/O DYE: CPT | Mod: 26 | Performed by: RADIOLOGY

## 2023-06-26 PROCEDURE — 124N000003 HC R&B MH SENIOR/ADOLESCENT

## 2023-06-26 RX ORDER — OLANZAPINE 10 MG/1
10 TABLET, ORALLY DISINTEGRATING ORAL AT BEDTIME
Status: DISCONTINUED | OUTPATIENT
Start: 2023-06-26 | End: 2023-06-29

## 2023-06-26 RX ORDER — CLOZAPINE 25 MG/1
25 TABLET ORAL DAILY
Status: DISCONTINUED | OUTPATIENT
Start: 2023-06-27 | End: 2023-08-02

## 2023-06-26 RX ORDER — CLOZAPINE 50 MG/1
50 TABLET ORAL AT BEDTIME
Status: DISCONTINUED | OUTPATIENT
Start: 2023-06-26 | End: 2023-06-29

## 2023-06-26 RX ADMIN — APIXABAN 5 MG: 5 TABLET, FILM COATED ORAL at 20:02

## 2023-06-26 RX ADMIN — OLANZAPINE 10 MG: 10 TABLET, FILM COATED ORAL at 02:37

## 2023-06-26 RX ADMIN — WHITE PETROLATUM 57.7 %-MINERAL OIL 31.9 % EYE OINTMENT: at 20:04

## 2023-06-26 RX ADMIN — OLANZAPINE 5 MG: 5 TABLET, FILM COATED ORAL at 07:49

## 2023-06-26 RX ADMIN — ZINC SULFATE 220 MG (50 MG) CAPSULE 220 MG: CAPSULE at 07:45

## 2023-06-26 RX ADMIN — CHLORHEXIDINE GLUCONATE 0.12% ORAL RINSE 15 ML: 1.2 LIQUID ORAL at 07:49

## 2023-06-26 RX ADMIN — LEVOTHYROXINE SODIUM 50 MCG: 25 TABLET ORAL at 05:38

## 2023-06-26 RX ADMIN — TOPIRAMATE 150 MG: 50 TABLET ORAL at 20:02

## 2023-06-26 RX ADMIN — MONTELUKAST 10 MG: 10 TABLET, FILM COATED ORAL at 20:02

## 2023-06-26 RX ADMIN — APIXABAN 5 MG: 5 TABLET, FILM COATED ORAL at 07:46

## 2023-06-26 RX ADMIN — LISINOPRIL 5 MG: 5 TABLET ORAL at 07:46

## 2023-06-26 RX ADMIN — CALCIUM POLYCARBOPHIL 625 MG: 625 TABLET, FILM COATED ORAL at 07:45

## 2023-06-26 RX ADMIN — CLOZAPINE 50 MG: 50 TABLET ORAL at 21:19

## 2023-06-26 RX ADMIN — PANTOPRAZOLE SODIUM 40 MG: 40 TABLET, DELAYED RELEASE ORAL at 05:38

## 2023-06-26 RX ADMIN — CALCIUM POLYCARBOPHIL 625 MG: 625 TABLET, FILM COATED ORAL at 20:02

## 2023-06-26 RX ADMIN — PALIPERIDONE 6 MG: 3 TABLET, EXTENDED RELEASE ORAL at 07:46

## 2023-06-26 RX ADMIN — CLOZAPINE 25 MG: 25 TABLET ORAL at 07:49

## 2023-06-26 RX ADMIN — Medication 10 MG: at 20:02

## 2023-06-26 RX ADMIN — OLANZAPINE 10 MG: 10 TABLET, ORALLY DISINTEGRATING ORAL at 20:02

## 2023-06-26 RX ADMIN — DOCUSATE SODIUM 100 MG: 100 CAPSULE, LIQUID FILLED ORAL at 07:49

## 2023-06-26 ASSESSMENT — ACTIVITIES OF DAILY LIVING (ADL)
ADLS_ACUITY_SCORE: 52
ADLS_ACUITY_SCORE: 52
HYGIENE/GROOMING: INDEPENDENT
HYGIENE/GROOMING: PROMPTS
ADLS_ACUITY_SCORE: 52
ADLS_ACUITY_SCORE: 52
ADLS_ACUITY_SCORE: 42
ADLS_ACUITY_SCORE: 52
ADLS_ACUITY_SCORE: 42
ADLS_ACUITY_SCORE: 52
ORAL_HYGIENE: INDEPENDENT;PROMPTS
DRESS: INDEPENDENT;PROMPTS
ADLS_ACUITY_SCORE: 42
ADLS_ACUITY_SCORE: 52

## 2023-06-26 NOTE — PLAN OF CARE
Problem: Psychotic Signs/Symptoms  Goal: Improved Behavioral Control (Psychotic Signs/Symptoms)  Outcome: Progressing   Goal Outcome Evaluation:    Plan of Care Reviewed With: patient                 Nursing Assessment    Schizoaffective disorder, bipolar type (H) [F25.0]  Intellectual disability [F79]    Admit Date: 5/2/2023    Length of Stay: 46    Patient evaluation continues. Assessed mood,anxiety,thoughts and behavior. Patient is  progressing towards goals. Pt did not sleep well last night only 2 hours. Patient is encouraged to participate in groups and assisted to develop healthy coping skills.  Patient responding to auditory hallucinations but no visual hallucinations. Pt continues to be delusional in thought thinking staff are his wife. Pt believes he is going to be picked up for home today. Pt went down for CT of the head, Guardian notified. /76 (BP Location: Right arm, Patient Position: Sitting, Cuff Size: Adult Regular)   Pulse 89   Temp 97.2  F (36.2  C) (Temporal)   Resp 18   Wt 88.2 kg (194 lb 7.1 oz)   SpO2 99%   BMI 33.38 kg/m      Mood: irritable due to being in the hospital    Patient reports depression none and reports anxiety none    Affect: full range    Sleep: poor last night    Appetite: good 100%    SI: denies    HI: denies    SIB: denies      Medication Compliance yes except eye drops    Group participation: yes    ADL's: independent prompted    Fall risk interventions: proper foot wear, orthostatic B/P, monitor for unsteadiness    Rakan Score Interventions:none    Discharge planning in process    Refer to daily team meeting notes for individualized plan of care. Nursing will continue to assess.    *Scale is 1-10 and 10 is the worst.

## 2023-06-26 NOTE — PROGRESS NOTES
"PSYCHIATRY  PROGRESS NOTE     DATE OF SERVICE   06/26/2023        CHIEF COMPLAINT   \" I am doing good.\"       SUBJECTIVE   Nursing reports:  Nursing Assessment     Schizoaffective disorder, bipolar type (H) [F25.0]  Intellectual disability [F79]     Admit Date: 5/2/2023     Length of Stay: 46     Patient evaluation continues. Assessed mood,anxiety,thoughts and behavior. Patient is  progressing towards goals. Pt did not sleep well last night only 2 hours. Patient is encouraged to participate in groups and assisted to develop healthy coping skills.  Patient responding to auditory hallucinations but no visual hallucinations. Pt continues to be delusional in thought thinking staff are his wife. Pt believes he is going to be picked up for home today. Pt went down for CT of the head, Guardian notified. /76 (BP Location: Right arm, Patient Position: Sitting, Cuff Size: Adult Regular)   Pulse 89   Temp 97.2  F (36.2  C) (Temporal)   Resp 18   Wt 88.2 kg (194 lb 7.1 oz)   SpO2 99%   BMI 33.38 kg/m       Mood: irritable due to being in the hospital     Patient reports depression none and reports anxiety none     Affect: full range     Sleep: poor last night     Appetite: good 100%     SI: denies     HI: denies     SIB: denies      Medication Compliance yes except eye drops     Group participation: yes     ADL's: independent prompted     Fall risk interventions: proper foot wear, orthostatic B/P, monitor for unsteadiness     Rakan Score Interventions:none     Discharge planning in process     Refer to daily team meeting notes for individualized plan of care. Nursing will continue to assess.    Patient has been reviewed with the  earlier today.    Patient still in the process of stabilizing the patient and do not have any concrete discharge plans.      OBJECTIVE   Patient was seen evaluated in the day area by himself, this was a face-to-face evaluation.  During my interaction with the patient he " "presented as calm, pleasant and cooperative.  Patient tells me that his parents are no longer in the unit and they moved to the \"hotel\".  In the past patient has referred to the group home as a hotel.  He stated that he visited with his sister yesterday and it went well.  Today he did not became irritable when I ask about his symptoms.  He is denying having any thoughts of harming himself or harming others and has been able to contract for safety.  Continues to believe that his parents are alive but he did not mention being  or having any children.  He recognized this writer and call me by my right name.    It seems that the patient continues to struggle in the evening with his behaviors and symptoms.  Last night he received Zyprexa as needed and he only slept 2 hours.     MEDICATIONS   Medications:  Scheduled Meds:    apixaban ANTICOAGULANT  5 mg Oral BID     artificial tears   Right Eye At Bedtime     calcium polycarbophil  625 mg Oral BID     chlorhexidine  15 mL Swish & Spit Daily     [START ON 6/27/2023] cloZAPine  25 mg Oral Daily     cloZAPine  50 mg Oral At Bedtime     docusate sodium  100 mg Oral Daily     levothyroxine  50 mcg Oral or NG Tube QAM AC     lisinopril  5 mg Oral Daily     melatonin  10 mg Oral At Bedtime     montelukast  10 mg Oral At Bedtime     OLANZapine  5 mg Oral Daily     OLANZapine zydis  10 mg Oral At Bedtime     paliperidone  6 mg Oral Daily     pantoprazole  40 mg Oral QAM AC     prednisoLONE acetate  1 drop Right Eye Daily     topiramate  150 mg Oral At Bedtime     zinc sulfate  220 mg Oral Daily     Continuous Infusions:  PRN Meds:.acetaminophen, alum & mag hydroxide-simethicone, artificial saliva, atropine, hydrALAZINE, hydrOXYzine, hypromellose-dextran, ipratropium, OLANZapine **OR** OLANZapine, senna-docusate    Medication adherence issues: MS Med Adherence Y/N: Yes, Hospitalization  Medication side effects: MEDICATION SIDE EFFECTS: no side effects reported  Benefit: Yes " "/ No: Yes       ROS   A comprehensive review of systems was negative.       MENTAL STATUS EXAM   Vitals: /67 (BP Location: Right arm, Patient Position: Sitting, Cuff Size: Adult Regular)   Pulse 103   Temp 97.2  F (36.2  C) (Temporal)   Resp 18   Wt 88.2 kg (194 lb 7.1 oz)   SpO2 100%   BMI 33.38 kg/m        Appearance:  No apparent distress  Mood: \"I am good\"  Affect: calm and pleasant  was congruent to speech  Suicidal Ideation: PRESENT / ABSENT: absent   Homicidal Ideation: PRESENT / ABSENT: absent     Thought process: disorganized but less perseverative on going home  Thought content: Remains delusional but does not seem to be experiencing any hallucinations.  Delusions seem to be less intense.  Fund of Knowledge: Below average  Attention/Concentration: Poor  Language ability:  Intact, speech is garbled at times  Memory:  Immediate recall impaired, Short-term memory impaired and Long-term memory intact  Insight: limited   Judgement: poor  Orientation: Person and situation only  Psychomotor Behavior: slowed    Muscle Strength and Tone: MuscleStrength: Normal  Gait and Station: Stable on his feet       LABS   personally reviewed.   Recent Labs   Lab 06/20/23  1502   WBC 6.1   HGB 13.0*   MCV 85        No results found for: PHENYTOIN, PHENOBARB, VALPROATE, CBMZ       DIAGNOSIS   Principal Problem:    Schizoaffective disorder, bipolar type (H)    Active Problem List:  Patient Active Problem List   Diagnosis     Closed head injury, initial encounter     Altered mental status, unspecified altered mental status type     Generalized weakness     Portal vein thrombosis     Pleural effusion     Generalized muscle weakness     Falls frequently     Other ascites     Acute pancreatitis, unspecified complication status, unspecified pancreatitis type     Pancreatic pseudocyst     Idiopathic acute pancreatitis, unspecified complication status     Allergic rhinitis     Fisher's esophagus     CTS (carpal " tunnel syndrome)     Elevated liver enzymes     HTN (hypertension)     Hypothyroidism     Keratoconus     Major depressive disorder, recurrent episode, mild (H)     Intellectual disability     Morbid exogenous obesity (H)     Neuroleptic malignant syndrome     Obstructive sleep apnea syndrome     Bipolar affective disorder (H)     Seizure disorder (H)     Seizures, generalized convulsive (H)     Acute respiratory failure with hypoxia (H)     Anemia, unspecified     Anxiety disorder, unspecified     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)     Dietary zinc deficiency     Dry eye syndrome of bilateral lacrimal glands     Dry mouth, unspecified     Edema, unspecified     Gastro-esophageal reflux disease without esophagitis     Hyperosmolality and hypernatremia     Irritable bowel syndrome with constipation     Major depressive disorder, recurrent, unspecified (H)     Metabolic encephalopathy     Moderate protein-calorie malnutrition (H)     Other symbolic dysfunctions     Schizoaffective disorder, unspecified (H)     Thrombocytopenia, unspecified (H)     Unspecified asthma, uncomplicated     Urinary tract infection, site not specified     Vitamin D deficiency, unspecified     Schizoaffective disorder, bipolar type (H)          PLAN   1. Ongoing education given regarding diagnostic and treatment options with risks, benefits and alternatives and adequate verbalization of understanding.  2.  Medications       Melatonin 10 mg at bedtime       Increase Clozaril at 25 mg daily and 50 mg at bedtime and cross taper with Zyprexa to 5 mg daily and 10 mg at bedtime          Topamax 150 mg at bedtime       Invega 6 mg daily     3.  Medical team following as needed  4.   coordinating a safe discharge plan    Patient had a fall last night and he hit his head.  Given that the patient is on an anticoagulant I have ordered a head CT scan.  Head CT scan came back normal.    [facility]  ASSESSMENT: Patient able to contract for safety    Coordination of Care:   Treatment Plan reviewed and physician signed, Care discussed with Care/Treatment Team Members, Chart reviewed and Patient seen      Re-Certification I certify that the inpatient psychiatric facility services furnished since the previous certification were, and continue to be, medically necessary for, either, treatment which could reasonably be expected to improve the patient s condition or diagnostic study and that the hospital records indicate that the services furnished were, either, intensive treatment services, admission and related services necessary for diagnostic study, or equivalent services.     I certify that the patient continues to need, on a daily basis, active treatment furnished directly by or requiring the supervision of inpatient psychiatric facility personnel.   I estimate 14 days of hospitalization is necessary for proper treatment of the patient. My plans for post-hospital care for this patient are  group home     Alejandra Watkins MD    -     06/26/2023  -     4:16 PM    Total time 35 minutes with > 50%spent on coordination of cares and psycho-education.    This note was created with help of Dragon dictation system. Grammatical / typing errors are not intentional.    Alejandra Watkins MD

## 2023-06-26 NOTE — PLAN OF CARE
"  Problem: Disruptive Behavior  Goal: Improved Mood Symptoms (Disruptive Behavior)  Outcome: Not Progressing  Goal: Improved Sleep (Disruptive Behavior)  Outcome: Progressing   Goal Outcome Evaluation:               Received in the lounge at change of shift, pt adamant it was morning and stated he does not need to sleep,stated that the time on the clock is wrong, constantly on his call light mumbling. In and out of his room, paced the [place] with his headphone on and listening to music. Argumentative with staff when asked not to touch the computer,  paramjite \" I am staff so I can use the computer\".   Told PA  \" I know your name is Junaid\", but called him Ashvin.  Irritable  and difficult to redirect  Able to make needs known   Continued to be loud and with poor boundaries, getting too close to staff and peers, called and followed another female peer, called her Mom.  0237 Olanzapine 10 mg given  Denied pain or discomforts from fall, gait steady and tiana check intact  Pt asked for his slippers, was not given to him since he tripped wearing it yesterday  Slept on and off for 1.5 hours this shift and 1 hr from the previous shift  Calmer at 0600  Pt has a no room mate order due to poor boundaries and intrusiveness which were evident this shift.  "

## 2023-06-26 NOTE — CARE PLAN
06/26/23 1357   Group Therapy Session   Group Attendance attended group session   Time Session Began 1115   Time Session Ended 1205   Total Time (minutes) 50   Total # Attendees 4   Group Type task skill;psychotherapeutic;expressive therapy   Group Topic Covered coping skills/lifestyle management;problem-solving;cognitive therapy techniques;cognitive activities;structured socialization;balanced lifestyle   Group Session Detail [facility] group to facilitate coping skill exploration, use of cognitive skills and problem solving, creative expression, clinical observation and facilitation of social, cognitive, and kinesthetic performance skills.    Patient Response/Contribution cooperative with task   Patient Participation Detail With assistance of task set up, directions and encouragement provided and cues, he worked at a more constant pace during this session and stayed engaged for a longer period of time in the group time. Seemed more receptive to encouragement to adding details.

## 2023-06-26 NOTE — PLAN OF CARE
Goal Outcome Evaluation:    Plan of Care Reviewed With: patient               Nursing Assessment    Schizoaffective disorder, bipolar type (H) [F25.0]  Intellectual disability [F79]    Admit Date: 5/2/2023    Length of Stay: 45    Patient evaluation continues. Assessed mood,anxiety,thoughts and behavior. Patient is  progressing towards goals. Patient is encouraged to participate in groups and assisted to develop healthy coping skills.  Patient denies auditory or visual hallucinations but has delusional thinking, waiting for a pizza to arrive that is not coming. Calls different staff wife's name.BP (!) 140/83   Pulse 84   Temp 97.3  F (36.3  C) (Temporal)   Resp 16   Wt 88.2 kg (194 lb 7.1 oz)   SpO2 99%   BMI 33.38 kg/m      Mood: irritable    Patient reports depression none and reports anxiety none    Affect: flat    Sleep: good     Appetite: good 100%    SI: denies    HI: denies    SIB: denies      Medication Compliance yes    Group participation: yes    ADL's: independent with prompts    Fall risk interventions: proper foot wear, orthostatic B/P    Rakan Score Interventions:none    Discharge planning  in process    Refer to daily team meeting notes for individualized plan of care. Nursing will continue to assess.    *Scale is 1-10 and 10 is the worst.

## 2023-06-27 LAB
ALBUMIN SERPL BCG-MCNC: 4.2 G/DL (ref 3.5–5.2)
ALP SERPL-CCNC: 159 U/L (ref 40–129)
ALT SERPL W P-5'-P-CCNC: 57 U/L (ref 0–70)
ANION GAP SERPL CALCULATED.3IONS-SCNC: 10 MMOL/L (ref 7–15)
AST SERPL W P-5'-P-CCNC: 50 U/L (ref 0–45)
BASOPHILS # BLD AUTO: 0 10E3/UL (ref 0–0.2)
BASOPHILS NFR BLD AUTO: 1 %
BILIRUB SERPL-MCNC: 0.4 MG/DL
BUN SERPL-MCNC: 23.3 MG/DL (ref 6–20)
CALCIUM SERPL-MCNC: 9.6 MG/DL (ref 8.6–10)
CHLORIDE SERPL-SCNC: 110 MMOL/L (ref 98–107)
CREAT SERPL-MCNC: 0.86 MG/DL (ref 0.67–1.17)
DEPRECATED HCO3 PLAS-SCNC: 22 MMOL/L (ref 22–29)
EOSINOPHIL # BLD AUTO: 0 10E3/UL (ref 0–0.7)
EOSINOPHIL NFR BLD AUTO: 0 %
ERYTHROCYTE [DISTWIDTH] IN BLOOD BY AUTOMATED COUNT: 13.7 % (ref 10–15)
GFR SERPL CREATININE-BSD FRML MDRD: >90 ML/MIN/1.73M2
GLUCOSE SERPL-MCNC: 150 MG/DL (ref 70–99)
HCT VFR BLD AUTO: 39.8 % (ref 40–53)
HGB BLD-MCNC: 13.4 G/DL (ref 13.3–17.7)
IMM GRANULOCYTES # BLD: 0 10E3/UL
IMM GRANULOCYTES NFR BLD: 0 %
LYMPHOCYTES # BLD AUTO: 1.9 10E3/UL (ref 0.8–5.3)
LYMPHOCYTES NFR BLD AUTO: 29 %
MCH RBC QN AUTO: 27.9 PG (ref 26.5–33)
MCHC RBC AUTO-ENTMCNC: 33.7 G/DL (ref 31.5–36.5)
MCV RBC AUTO: 83 FL (ref 78–100)
MONOCYTES # BLD AUTO: 0.3 10E3/UL (ref 0–1.3)
MONOCYTES NFR BLD AUTO: 5 %
NEUTROPHILS # BLD AUTO: 4.3 10E3/UL (ref 1.6–8.3)
NEUTROPHILS NFR BLD AUTO: 65 %
NRBC # BLD AUTO: 0 10E3/UL
NRBC BLD AUTO-RTO: 0 /100
PLATELET # BLD AUTO: 177 10E3/UL (ref 150–450)
POTASSIUM SERPL-SCNC: 4 MMOL/L (ref 3.4–5.3)
PROT SERPL-MCNC: 7.2 G/DL (ref 6.4–8.3)
RBC # BLD AUTO: 4.8 10E6/UL (ref 4.4–5.9)
SODIUM SERPL-SCNC: 142 MMOL/L (ref 136–145)
WBC # BLD AUTO: 6.6 10E3/UL (ref 4–11)

## 2023-06-27 PROCEDURE — 124N000003 HC R&B MH SENIOR/ADOLESCENT

## 2023-06-27 PROCEDURE — 250N000013 HC RX MED GY IP 250 OP 250 PS 637: Performed by: EMERGENCY MEDICINE

## 2023-06-27 PROCEDURE — 99232 SBSQ HOSP IP/OBS MODERATE 35: CPT | Performed by: PSYCHIATRY & NEUROLOGY

## 2023-06-27 PROCEDURE — 93005 ELECTROCARDIOGRAM TRACING: CPT

## 2023-06-27 PROCEDURE — 250N000013 HC RX MED GY IP 250 OP 250 PS 637: Performed by: PHYSICIAN ASSISTANT

## 2023-06-27 PROCEDURE — 36415 COLL VENOUS BLD VENIPUNCTURE: CPT | Performed by: PSYCHIATRY & NEUROLOGY

## 2023-06-27 PROCEDURE — 80053 COMPREHEN METABOLIC PANEL: CPT | Performed by: PSYCHIATRY & NEUROLOGY

## 2023-06-27 PROCEDURE — 93010 ELECTROCARDIOGRAM REPORT: CPT | Performed by: INTERNAL MEDICINE

## 2023-06-27 PROCEDURE — G0177 OPPS/PHP; TRAIN & EDUC SERV: HCPCS

## 2023-06-27 PROCEDURE — 85025 COMPLETE CBC W/AUTO DIFF WBC: CPT | Performed by: PSYCHIATRY & NEUROLOGY

## 2023-06-27 PROCEDURE — 250N000013 HC RX MED GY IP 250 OP 250 PS 637: Performed by: PSYCHIATRY & NEUROLOGY

## 2023-06-27 RX ADMIN — DOCUSATE SODIUM 100 MG: 100 CAPSULE, LIQUID FILLED ORAL at 08:52

## 2023-06-27 RX ADMIN — CALCIUM POLYCARBOPHIL 625 MG: 625 TABLET, FILM COATED ORAL at 20:09

## 2023-06-27 RX ADMIN — PANTOPRAZOLE SODIUM 40 MG: 40 TABLET, DELAYED RELEASE ORAL at 06:34

## 2023-06-27 RX ADMIN — PREDNISOLONE ACETATE 1 DROP: 10 SUSPENSION/ DROPS OPHTHALMIC at 08:51

## 2023-06-27 RX ADMIN — OLANZAPINE 10 MG: 10 TABLET, ORALLY DISINTEGRATING ORAL at 20:09

## 2023-06-27 RX ADMIN — Medication 10 MG: at 20:08

## 2023-06-27 RX ADMIN — TOPIRAMATE 150 MG: 50 TABLET ORAL at 20:08

## 2023-06-27 RX ADMIN — CLOZAPINE 25 MG: 25 TABLET ORAL at 08:52

## 2023-06-27 RX ADMIN — OLANZAPINE 5 MG: 5 TABLET, FILM COATED ORAL at 08:52

## 2023-06-27 RX ADMIN — LEVOTHYROXINE SODIUM 50 MCG: 25 TABLET ORAL at 06:34

## 2023-06-27 RX ADMIN — CLOZAPINE 50 MG: 50 TABLET ORAL at 20:15

## 2023-06-27 RX ADMIN — MONTELUKAST 10 MG: 10 TABLET, FILM COATED ORAL at 20:09

## 2023-06-27 RX ADMIN — CALCIUM POLYCARBOPHIL 625 MG: 625 TABLET, FILM COATED ORAL at 08:52

## 2023-06-27 RX ADMIN — CHLORHEXIDINE GLUCONATE 0.12% ORAL RINSE 15 ML: 1.2 LIQUID ORAL at 08:51

## 2023-06-27 RX ADMIN — APIXABAN 5 MG: 5 TABLET, FILM COATED ORAL at 20:09

## 2023-06-27 RX ADMIN — LISINOPRIL 5 MG: 5 TABLET ORAL at 08:52

## 2023-06-27 RX ADMIN — ZINC SULFATE 220 MG (50 MG) CAPSULE 220 MG: CAPSULE at 08:52

## 2023-06-27 RX ADMIN — PALIPERIDONE 6 MG: 3 TABLET, EXTENDED RELEASE ORAL at 08:52

## 2023-06-27 RX ADMIN — APIXABAN 5 MG: 5 TABLET, FILM COATED ORAL at 08:52

## 2023-06-27 ASSESSMENT — ACTIVITIES OF DAILY LIVING (ADL)
ADLS_ACUITY_SCORE: 52
ORAL_HYGIENE: INDEPENDENT;PROMPTS
ADLS_ACUITY_SCORE: 52
DRESS: INDEPENDENT;PROMPTS
LAUNDRY: WITH SUPERVISION
ADLS_ACUITY_SCORE: 52
HYGIENE/GROOMING: PROMPTS
ADLS_ACUITY_SCORE: 52

## 2023-06-27 NOTE — PLAN OF CARE
06/26/23       Group Therapy Session   Group Attendance This patient attended the group session   Time Session Began 1900   Time Session Ended 1940   Total Time (minutes) 40   Total # Attendees 6   Group Type Psychotherapy   Group Topic Covered Helpful memories, positive memories and lessons for the future   Group Session Detail During this session, patients reflected on past positive memories as a way to cope with current stressors. Writer facilitated a clarification - stating the purpose of the group, and how patients could benefit from it. During the discussions, each patient identified memories that are shared (beyond family level), such as community events or work that had positive impacts on them. Each patient identified each, that was generation-based to which other patients reacted.   Positive memories shared included how much each was making in terms of money when they were about 10 years old, the kind of job they did, and what they could make from it. Through this exploration, patients were observed to share laughter, and they kept themselves occupied in the discussions - touching many topics including minimum wage, and how things have changed or stayed the same. At each point, writer focused them on how their reflections (as they were having) and noticing the positive effects it was producing among them, could be sustained at other times when they are feeling stressed. Patients discussed how they will attempt to build self-capacity for self-reflections that are positive and intentional. Writer shared a brief literature with patients that pointed to time consistency and how mental health can be sustained through a narrative, dynamic approach as practiced by the patients.  Discussed how patients can practice self-activation of positive cognitive inputs using this technique.   Patient Response/Contribution Patient attended the group. Listened, but did not make contributions.    Patient Participation Detail  Pt left the group at one point. He returned later on.   Tone Trujillo, Ph.D., L.I.C.S.W.

## 2023-06-27 NOTE — CARE PLAN
"Occupational Therapy     06/27/23 1600   Group Therapy Session   Group Attendance attended group session   Time Session Began 1300   Time Session Ended 1400   Total Time (minutes) 15   Total # Attendees 6-8   Group Type psychoeducation   Group Topic Covered balanced lifestyle;coping skills/lifestyle management;emotions/expression;structured socialization   Group Session Detail OT: Education on resiliency and interactive social activity (Character Education--Resilience) to increase concentration, focus, attention to task/detail, coping with stress, symptom management, insight development, and overall wellness   Patient Response/Contribution confused;disorganized;verbalizations were off topic;other (see comments)  (self-talk; internally stimulated; distracted)   Patient Participation Detail Pt reported during check-in that \"my wives\" is something that is going well for him. Pt did not provided coherent or on topic responses to large group discussion and intermittently engaged in self-talk. Pt abruptly stood up and left group without acknowledging therapist's inquiry; pt did not return.        " catheter advanced into LV.

## 2023-06-27 NOTE — PLAN OF CARE
Assessment/Intervention/Current Symtoms and Care Coordination:  Pt discussed in team rounds this AM. Pt is switching to clozaril. Pt accepted labs this AM.    Discharge Plan or Goal:  Plan for pt to return to  when stable     Barriers to Discharge:  Pt continues to present as symptomatic (delusional mood). Pt is receiving ongoing stabilization and medication adjustment. Continue care coordination with custodial to ascertain his acceptability for a return to the home.     Referral Status:  Will continue to coordinate with pt's group home.     Legal Status:  Huong Keenan is pt's legal Guardian      Contacts:  Shlomo Borja (Guardian) [phone]   [facility] ([phone])      Upcoming Meetings and Dates/Important Information and next steps:            [facility] (Discharge Huddle; Vulnerable Patient)

## 2023-06-27 NOTE — PROVIDER NOTIFICATION
06/27/23 1340   Individualization/Patient Specific Goals   Patient Personal Strengths family/social support;medication/treatment adherence;stable living environment   Patient Vulnerabilities limited ability to read/write;limited social skills;lacks insight into illness   Anxieties, Fears or Concerns None at this time   Individualized Care Needs Medication and stabilization   Interprofessional Rounds   Summary Patient admitted for concerns for his safety, pt is going through medication changes   Participants CTC;psychiatrist;nursing   Behavioral Team Discussion   Participants Dr. Charles, Mouna Solorzano OT, Teena Person RN, Maggie Mitchell CTC, Stephany Colmenares CTC   Progress Stabilization is ongoing   Anticipated length of stay 7 Days.   Continued Stay Criteria/Rationale Requires stabilization to reduce risk of imminent harm to himself and others.   Medical/Physical See H&P   Precautions See below   Plan Psychiatric assesemnt. Medication manangement. Therapeutic Mileu. Individual and group support.   Rationale for change in precautions or plan no change   Safety Plan CTC will do individual inpatient treatment planning and after care planning. CTC will discuss options for increasing community supports with the patient. CTC will coordinate with outpatient providers and will place referrals to ensure appointments are in place.   Anticipated Discharge Disposition group home     PRECAUTIONS AND SAFETY    Behavioral Orders   Procedures    Code 1 - Restrict to Unit    Code 2    Code 2     Head ct    Elopement precautions    Routine Programming     As clinically indicated    Sexual precautions    Status 15     Every 15 minutes.       Safety  Safety WDL: WDL  Patient Location: [address], [facility]  Observed Behavior: calm, walking  Observed Behavior (Comment): loud voice  Safety Measures: safety rounds completed  Diversional Activity: music  Suicidality: Status 15, Behavioral scrubs (pajamas)  Seizure precautions: clutter free  environment  Assault: status 15  Elopement Assessment: Statements about wanting to leave  Elopement Interventions: status 15, behavioral scrubs (pajamas)  Sexual: status 15, private room  Additional Documentation:  (Elopement precuations in place.)

## 2023-06-27 NOTE — PLAN OF CARE
"  Problem: Psychotic Signs/Symptoms  Goal: Improved Behavioral Control (Psychotic Signs/Symptoms)  Note:  Patient evening VS. /67 (BP Location: Right arm, Patient Position: Sitting, Cuff Size: Adult Regular)   Pulse 103   Temp 97.2  F (36.2  C) (Temporal)   Resp 18   Wt 88.2 kg (194 lb 7.1 oz)   SpO2 100%   BMI 33.38 kg/m  . Patient had a good evening shift. He was not loud when singing along with his headphones. He was not demanding or intrusive. I noticed only one event when patient needed redirection and he took it well. He denied any depression, anxiety, suicidal ideation or thoughts of SIB. He does get irritable during 1:1 with nurses per staff report, but he said,\" I get tired of the same old questions.\" He is eating well and voiding without any issues. No new behavioral, safety or medical concerns to report this shift. Patient is in a private room due to behaviors.                            "

## 2023-06-27 NOTE — PROGRESS NOTES
"PSYCHIATRY  PROGRESS NOTE     DATE OF SERVICE   06/27/2023        CHIEF COMPLAINT   \" I am doing great.\"       SUBJECTIVE   Nursing reports:  Pt stated that he was feeling \"fantastic\" today when asked about how he was feeling. During lunch time pt was expressing some delusion in regards to his parents, he stated that he needed to look for his parents food tray since they ordered something to eat. Pt continued to explain that his mothers name was \"Modesta\" and his fathers name was \"Junaid\" and that he needed to have their food set up for them. Please refer back to pt's abnormal labs since there was an increased in dosage on Clozaril. Other than that interaction and medication change, Jose was himself with no disruptiveness or intrusiveness.      Plan of Care Reviewed With: patient      Patient has been reviewed with the  earlier today.    Patient still in the process of stabilizing the patient and do not have any concrete discharge plans.      OBJECTIVE   Patient was seen and evaluated in the consult room by himself, this was a face-to-face evaluation.  During my assessment the patient presented as calm, polite and cooperative.  He remains delusional as he thinks his parents are alive, he is  and that he is the oldest child.  Patient has been telling me that he has been having conversations with his parents.  Patient did not got agitated today when I challenged his delusions but he said that he will prefer not to talk about his symptoms.  Patient was able to recognize me as one of his providers although he said that I am Dr. Celeste.  He accepted the correction.  Patient was able to talk a little bit about sports, the TV shows that he likes and how he is doing in the unit.  Patient tells me that he does not want to attend group because he does not get along with the older ladies here.  He prefers to walk around listening to music.    At the end of my assessment the patient stated that he is going to be " leaving today and then he called me Kylee.  He was laughing but at the same time this was less intense that in other occasions.    I called patient's sister to give her an update, she was not available and I left a message    Communicate with patient's sister.  I discussed with the sister and the medication changes and how the patient is doing.  I also reviewed with her the results of the blood work and informed her that we are going to repeat the CMP for Friday.  Sister discussed with me that during their conversation and Sunday the patient seemed to be doing a little bit better.  She is planning to come on Friday and visit with him.     MEDICATIONS   Medications:  Scheduled Meds:    apixaban ANTICOAGULANT  5 mg Oral BID     artificial tears   Right Eye At Bedtime     calcium polycarbophil  625 mg Oral BID     chlorhexidine  15 mL Swish & Spit Daily     cloZAPine  25 mg Oral Daily     cloZAPine  50 mg Oral At Bedtime     docusate sodium  100 mg Oral Daily     levothyroxine  50 mcg Oral or NG Tube QAM AC     lisinopril  5 mg Oral Daily     melatonin  10 mg Oral At Bedtime     montelukast  10 mg Oral At Bedtime     OLANZapine  5 mg Oral Daily     OLANZapine zydis  10 mg Oral At Bedtime     paliperidone  6 mg Oral Daily     pantoprazole  40 mg Oral QAM AC     prednisoLONE acetate  1 drop Right Eye Daily     topiramate  150 mg Oral At Bedtime     zinc sulfate  220 mg Oral Daily     Continuous Infusions:  PRN Meds:.acetaminophen, alum & mag hydroxide-simethicone, artificial saliva, atropine, hydrALAZINE, hydrOXYzine, hypromellose-dextran, ipratropium, OLANZapine **OR** OLANZapine, senna-docusate    Medication adherence issues: MS Med Adherence Y/N: Yes, Hospitalization  Medication side effects: MEDICATION SIDE EFFECTS: no side effects reported  Benefit: Yes / No: Yes       ROS   A comprehensive review of systems was negative.       MENTAL STATUS EXAM   Vitals: BP (P) 126/80 (BP Location: Right arm)   Pulse (P) 88   " Temp (P) 97.7  F (36.5  C) (Oral)   Resp (P) 16   Wt (P) 91.7 kg (202 lb 2.6 oz)   SpO2 (P) 97%   BMI (P) 34.70 kg/m        Appearance:  No apparent distress  Mood: \"I am great\"  Affect: calm and pleasant  was congruent to speech  Suicidal Ideation: PRESENT / ABSENT: absent   Homicidal Ideation: PRESENT / ABSENT: absent     Thought process: disorganized but less perseverative on going home  Thought content: Remains delusional and admitted that he has been experiencing visual and auditory hallucinations.  Fund of Knowledge: Below average  Attention/Concentration: Poor  Language ability:  Intact, speech is garbled at times  Memory:  Immediate recall impaired, Short-term memory impaired and Long-term memory intact  Insight: limited   Judgement: poor  Orientation: Person and situation only  Psychomotor Behavior: slowed    Muscle Strength and Tone: MuscleStrength: Normal  Gait and Station: Stable on his feet       LABS   personally reviewed.   Recent Labs   Lab 06/27/23  0844   WBC 6.6   HGB 13.4   MCV 83         POTASSIUM 4.0   CHLORIDE 110*   CO2 22   BUN 23.3*   CR 0.86   ANIONGAP 10   NASIR 9.6   *   ALBUMIN 4.2   PROTTOTAL 7.2   BILITOTAL 0.4   ALKPHOS 159*   ALT 57   AST 50*     No results found for: PHENYTOIN, PHENOBARB, VALPROATE, CBMZ       DIAGNOSIS   Principal Problem:    Schizoaffective disorder, bipolar type (H)    Active Problem List:  Patient Active Problem List   Diagnosis     Closed head injury, initial encounter     Altered mental status, unspecified altered mental status type     Generalized weakness     Portal vein thrombosis     Pleural effusion     Generalized muscle weakness     Falls frequently     Other ascites     Acute pancreatitis, unspecified complication status, unspecified pancreatitis type     Pancreatic pseudocyst     Idiopathic acute pancreatitis, unspecified complication status     Allergic rhinitis     Fisher's esophagus     CTS (carpal tunnel syndrome)     " Elevated liver enzymes     HTN (hypertension)     Hypothyroidism     Keratoconus     Major depressive disorder, recurrent episode, mild (H)     Intellectual disability     Morbid exogenous obesity (H)     Neuroleptic malignant syndrome     Obstructive sleep apnea syndrome     Bipolar affective disorder (H)     Seizure disorder (H)     Seizures, generalized convulsive (H)     Acute respiratory failure with hypoxia (H)     Anemia, unspecified     Anxiety disorder, unspecified     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)     Dietary zinc deficiency     Dry eye syndrome of bilateral lacrimal glands     Dry mouth, unspecified     Edema, unspecified     Gastro-esophageal reflux disease without esophagitis     Hyperosmolality and hypernatremia     Irritable bowel syndrome with constipation     Major depressive disorder, recurrent, unspecified (H)     Metabolic encephalopathy     Moderate protein-calorie malnutrition (H)     Other symbolic dysfunctions     Schizoaffective disorder, unspecified (H)     Thrombocytopenia, unspecified (H)     Unspecified asthma, uncomplicated     Urinary tract infection, site not specified     Vitamin D deficiency, unspecified     Schizoaffective disorder, bipolar type (H)          PLAN   1. Ongoing education given regarding diagnostic and treatment options with risks, benefits and alternatives and adequate verbalization of understanding.  2.  Medications       Melatonin 10 mg at bedtime       Clozaril at 25 mg daily and 50 mg at bedtime and cross taper with Zyprexa to 5 mg daily and 10 mg at bedtime          Topamax 150 mg at bedtime       Invega 6 mg daily     3.  Medical team following as needed  4.   coordinating a safe discharge plan    CMP to be repeated on Friday    Risk Assessment:  [facility] RISK ASSESSMENT: Patient able to contract for safety    Coordination of Care:   Treatment Plan reviewed and physician signed, Care discussed with Care/Treatment Team  Members, Chart reviewed and Patient seen      Re-Certification I certify that the inpatient psychiatric facility services furnished since the previous certification were, and continue to be, medically necessary for, either, treatment which could reasonably be expected to improve the patient s condition or diagnostic study and that the hospital records indicate that the services furnished were, either, intensive treatment services, admission and related services necessary for diagnostic study, or equivalent services.     I certify that the patient continues to need, on a daily basis, active treatment furnished directly by or requiring the supervision of inpatient psychiatric facility personnel.   I estimate 14 days of hospitalization is necessary for proper treatment of the patient. My plans for post-hospital care for this patient are  group home     Alejandra Watkins MD    -     06/27/2023  -     3:50 PM    Total time 35 minutes with > 50%spent on coordination of cares and psycho-education.    This note was created with help of Dragon dictation system. Grammatical / typing errors are not intentional.    Alejandra Watkins MD

## 2023-06-27 NOTE — PLAN OF CARE
Problem: Sleep Disturbance  Goal: Adequate Sleep/Rest  Outcome: Not Progressing  Intervention: Promote Sleep/Rest  Flowsheets (Taken 6/27/2023 2836)  Sleep/Rest Enhancement:    awakenings minimized    comfort measures    music provided    regular sleep/rest pattern promoted    noise level reduced    relaxation techniques promoted    snack   Patient was sleeping at start of the shift. He was awake at 0130 and started walking in and out of his bedroom. Snacks and head set for music were given. He denied pain when asked. He was turning his call light on intermittently to request Starburst and Jelly ranchers candies. He thought that staff ( RN and PA) were  to each other. Pt called writer, Karla, in spite of correcting him. He took his scheduled Levothyroxine and Protonix without issue. Pt slept 5 hours this shift. No PRN medications given.

## 2023-06-27 NOTE — CARE PLAN
06/27/23 1452   Group Therapy Session   Group Attendance attended group session   Time Session Began 1015   Time Session Ended 1115   Total Time (minutes) 60   Total # Attendees 6   Group Type task skill;psychotherapeutic;expressive therapy   Group Topic Covered coping skills/lifestyle management;problem-solving;structured socialization;cognitive activities;balanced lifestyle   Group Session Detail [facility] group to facilitate coping skill exploration, use of cognitive skills and problem solving, creative expression, clinical observation and facilitation of social, cognitive, and kinesthetic performance skills.    Patient Response/Contribution cooperative with task;listened actively   Patient Participation Detail Worked on a familiar multi step task. Needed direction and cues  as well as help with task set up and choices offered in details. With directions repeated and demonstrated at times, he followed through. Responded in a pleasant tone of voice and follow through.

## 2023-06-27 NOTE — PLAN OF CARE
"  Problem: Psychotic Signs/Symptoms  Goal: Decreased Sensory Symptoms (Psychotic Signs/Symptoms)  Outcome: Progressing   Goal Outcome Evaluation:    Pt stated that he was feeling \"fantastic\" today when asked about how he was feeling. During lunch time pt was expressing some delusion in regards to his parents, he stated that he needed to look for his parents food tray since they ordered something to eat. Pt continued to explain that his mothers name was \"Modesta\" and his fathers name was \"Junaid\" and that he needed to have their food set up for them. Please refer back to pt's abnormal labs since there was an increased in dosage on Clozaril. Other than that interaction and medication change, Jose was himself with no disruptiveness or intrusiveness.     Plan of Care Reviewed With: patient                   "

## 2023-06-28 PROCEDURE — 250N000013 HC RX MED GY IP 250 OP 250 PS 637: Performed by: PHYSICIAN ASSISTANT

## 2023-06-28 PROCEDURE — 250N000013 HC RX MED GY IP 250 OP 250 PS 637: Performed by: PSYCHIATRY & NEUROLOGY

## 2023-06-28 PROCEDURE — 99232 SBSQ HOSP IP/OBS MODERATE 35: CPT | Performed by: PSYCHIATRY & NEUROLOGY

## 2023-06-28 PROCEDURE — 250N000013 HC RX MED GY IP 250 OP 250 PS 637: Performed by: EMERGENCY MEDICINE

## 2023-06-28 PROCEDURE — G0177 OPPS/PHP; TRAIN & EDUC SERV: HCPCS

## 2023-06-28 PROCEDURE — 90853 GROUP PSYCHOTHERAPY: CPT

## 2023-06-28 PROCEDURE — 124N000003 HC R&B MH SENIOR/ADOLESCENT

## 2023-06-28 RX ADMIN — Medication 10 MG: at 20:04

## 2023-06-28 RX ADMIN — ZINC SULFATE 220 MG (50 MG) CAPSULE 220 MG: CAPSULE at 08:07

## 2023-06-28 RX ADMIN — DOCUSATE SODIUM 100 MG: 100 CAPSULE, LIQUID FILLED ORAL at 08:07

## 2023-06-28 RX ADMIN — MONTELUKAST 10 MG: 10 TABLET, FILM COATED ORAL at 20:04

## 2023-06-28 RX ADMIN — APIXABAN 5 MG: 5 TABLET, FILM COATED ORAL at 08:05

## 2023-06-28 RX ADMIN — CLOZAPINE 50 MG: 50 TABLET ORAL at 20:15

## 2023-06-28 RX ADMIN — PALIPERIDONE 6 MG: 3 TABLET, EXTENDED RELEASE ORAL at 08:06

## 2023-06-28 RX ADMIN — LEVOTHYROXINE SODIUM 50 MCG: 25 TABLET ORAL at 06:43

## 2023-06-28 RX ADMIN — CALCIUM POLYCARBOPHIL 625 MG: 625 TABLET, FILM COATED ORAL at 08:05

## 2023-06-28 RX ADMIN — PREDNISOLONE ACETATE 1 DROP: 10 SUSPENSION/ DROPS OPHTHALMIC at 08:05

## 2023-06-28 RX ADMIN — OLANZAPINE 10 MG: 10 TABLET, ORALLY DISINTEGRATING ORAL at 20:04

## 2023-06-28 RX ADMIN — CALCIUM POLYCARBOPHIL 625 MG: 625 TABLET, FILM COATED ORAL at 20:04

## 2023-06-28 RX ADMIN — OLANZAPINE 5 MG: 5 TABLET, FILM COATED ORAL at 08:08

## 2023-06-28 RX ADMIN — TOPIRAMATE 150 MG: 50 TABLET ORAL at 20:03

## 2023-06-28 RX ADMIN — CLOZAPINE 25 MG: 25 TABLET ORAL at 08:07

## 2023-06-28 RX ADMIN — PANTOPRAZOLE SODIUM 40 MG: 40 TABLET, DELAYED RELEASE ORAL at 06:43

## 2023-06-28 RX ADMIN — APIXABAN 5 MG: 5 TABLET, FILM COATED ORAL at 20:04

## 2023-06-28 RX ADMIN — WHITE PETROLATUM 57.7 %-MINERAL OIL 31.9 % EYE OINTMENT: at 20:05

## 2023-06-28 RX ADMIN — CHLORHEXIDINE GLUCONATE 0.12% ORAL RINSE 15 ML: 1.2 LIQUID ORAL at 08:05

## 2023-06-28 RX ADMIN — LISINOPRIL 5 MG: 5 TABLET ORAL at 08:05

## 2023-06-28 RX ADMIN — HYDROXYZINE HYDROCHLORIDE 25 MG: 25 TABLET, FILM COATED ORAL at 00:38

## 2023-06-28 ASSESSMENT — ACTIVITIES OF DAILY LIVING (ADL)
ADLS_ACUITY_SCORE: 32
ADLS_ACUITY_SCORE: 52
DRESS: SCRUBS (BEHAVIORAL HEALTH);INDEPENDENT
ADLS_ACUITY_SCORE: 32
ORAL_HYGIENE: INDEPENDENT
ADLS_ACUITY_SCORE: 52
ADLS_ACUITY_SCORE: 32
ADLS_ACUITY_SCORE: 32
LAUNDRY: UNABLE TO COMPLETE
ADLS_ACUITY_SCORE: 32
ADLS_ACUITY_SCORE: 52
ADLS_ACUITY_SCORE: 52
ADLS_ACUITY_SCORE: 32
HYGIENE/GROOMING: SHOWER;INDEPENDENT
ADLS_ACUITY_SCORE: 32
ADLS_ACUITY_SCORE: 32

## 2023-06-28 NOTE — PROGRESS NOTES
"PSYCHIATRY  PROGRESS NOTE     DATE OF SERVICE   06/28/2023        CHIEF COMPLAINT   \" I am terrific.\"       SUBJECTIVE   Nursing reports:  Pt stated that he was feeling \"terrific\" today. Pt denies pain, anxiety, and feelings of depression. Pt still thinks that he is getting discharged and wants to wear street clothes instead of scrubs because he says he's going home.During the morning med pass, pt called the writer Monica which is one of his baseline delusions. When assessing the patient he stated \"I don't know why I'm here, I didn't do anything wrong.\" No mentions of his parents this shift, pt still has garbled and slurred speech, Dr. Charles said to just ask him to speak clearly per his sister.   Pt was very pleasant and calm with no disruptive behaviors or outbursts.   Jose also had a shower this morning and +1 edema on his right foot, ankle, and leg upon inspection. His calf measured on the right leg was 39.5 cm and 38 cm on the left leg. MICHAEL hose are in place, but will need assistance to put them on correctly.         Plan of Care Reviewed With: patient      Patient has been reviewed with the  earlier today.   Assessment/Intervention/Current Symtoms and Care Coordination:  Pt discussed in team rounds this AM. Pt has been confused, often calling staff the names of his family members. Pt continues to titrate on clozaril.     Writer received call from [facility] care coordinator, Lilo ([phone]) inquiring about pt's progress. Writer updated Lilo on pt's status. Lilo reported she will be connecting again with [facility] next week.     OBJECTIVE   Patient was seen and evaluated in the consult room by himself, this was a face-to-face evaluation.  Patient reported that he is feeling \"terrific\" and he is denying any side effects form the medications. Patient today was perseverating about discontinuing all of his medications as he said that he is not crazy. I informed the patient that we are in the process of " "taking him off Zyprexa but we need to do it slowly in order to prevent any side effects. If he doesn't OK with the Clozaril I will take him off the Invega. Patient was somewhat argumentative but at the end said that he needs to be patient and for us to explain things slowly.  I went over again his treatment plan.  Patient then stated that I needed to make things go faster.  Patient continues to present as delusional but he has been verbalizing delusions less frequently.  He has been more redirectable and calm.     MEDICATIONS   Medications:  Scheduled Meds:    apixaban ANTICOAGULANT  5 mg Oral BID     artificial tears   Right Eye At Bedtime     calcium polycarbophil  625 mg Oral BID     chlorhexidine  15 mL Swish & Spit Daily     cloZAPine  25 mg Oral Daily     cloZAPine  50 mg Oral At Bedtime     docusate sodium  100 mg Oral Daily     levothyroxine  50 mcg Oral or NG Tube QAM AC     lisinopril  5 mg Oral Daily     melatonin  10 mg Oral At Bedtime     montelukast  10 mg Oral At Bedtime     OLANZapine  5 mg Oral Daily     OLANZapine zydis  10 mg Oral At Bedtime     paliperidone  6 mg Oral Daily     pantoprazole  40 mg Oral QAM AC     prednisoLONE acetate  1 drop Right Eye Daily     topiramate  150 mg Oral At Bedtime     zinc sulfate  220 mg Oral Daily     Continuous Infusions:  PRN Meds:.acetaminophen, alum & mag hydroxide-simethicone, artificial saliva, atropine, hydrALAZINE, hydrOXYzine, hypromellose-dextran, ipratropium, OLANZapine **OR** OLANZapine, senna-docusate    Medication adherence issues: MS Med Adherence Y/N: Yes, Hospitalization  Medication side effects: MEDICATION SIDE EFFECTS: no side effects reported  Benefit: Yes / No: Yes       ROS   A comprehensive review of systems was negative.       MENTAL STATUS EXAM   Vitals: /76   Pulse 90   Temp 97  F (36.1  C) (Temporal)   Resp 16   Wt (P) 91.7 kg (202 lb 2.6 oz)   SpO2 99%   BMI (P) 34.70 kg/m        Appearance:  No apparent distress  Mood: \"I " "am terrific\"  Affect: calm and pleasant  was congruent to speech  Suicidal Ideation: PRESENT / ABSENT: absent   Homicidal Ideation: PRESENT / ABSENT: absent     Thought process: More linear and goal directed  Thought content: Remains delusional.  Did not verbalize any hallucinations today  Fund of Knowledge: Below average  Attention/Concentration: Poor  Language ability:  Intact, speech is garbled at times but it can be more clear when you redirect the patient.  Memory:  Immediate recall impaired, Short-term memory impaired and Long-term memory intact  Insight: limited   Judgement: poor  Orientation: Person and situation only  Psychomotor Behavior: slowed    Muscle Strength and Tone: MuscleStrength: Normal  Gait and Station: Stable on his feet       LABS   personally reviewed.   Recent Labs   Lab 06/27/23  0844   WBC 6.6   HGB 13.4   MCV 83         POTASSIUM 4.0   CHLORIDE 110*   CO2 22   BUN 23.3*   CR 0.86   ANIONGAP 10   NASIR 9.6   *   ALBUMIN 4.2   PROTTOTAL 7.2   BILITOTAL 0.4   ALKPHOS 159*   ALT 57   AST 50*     No results found for: PHENYTOIN, PHENOBARB, VALPROATE, CBMZ       DIAGNOSIS   Principal Problem:    Schizoaffective disorder, bipolar type (H)    Active Problem List:  Patient Active Problem List   Diagnosis     Closed head injury, initial encounter     Altered mental status, unspecified altered mental status type     Generalized weakness     Portal vein thrombosis     Pleural effusion     Generalized muscle weakness     Falls frequently     Other ascites     Acute pancreatitis, unspecified complication status, unspecified pancreatitis type     Pancreatic pseudocyst     Idiopathic acute pancreatitis, unspecified complication status     Allergic rhinitis     Fisher's esophagus     CTS (carpal tunnel syndrome)     Elevated liver enzymes     HTN (hypertension)     Hypothyroidism     Keratoconus     Major depressive disorder, recurrent episode, mild (H)     Intellectual disability     " Morbid exogenous obesity (H)     Neuroleptic malignant syndrome     Obstructive sleep apnea syndrome     Bipolar affective disorder (H)     Seizure disorder (H)     Seizures, generalized convulsive (H)     Acute respiratory failure with hypoxia (H)     Anemia, unspecified     Anxiety disorder, unspecified     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)     Dietary zinc deficiency     Dry eye syndrome of bilateral lacrimal glands     Dry mouth, unspecified     Edema, unspecified     Gastro-esophageal reflux disease without esophagitis     Hyperosmolality and hypernatremia     Irritable bowel syndrome with constipation     Major depressive disorder, recurrent, unspecified (H)     Metabolic encephalopathy     Moderate protein-calorie malnutrition (H)     Other symbolic dysfunctions     Schizoaffective disorder, unspecified (H)     Thrombocytopenia, unspecified (H)     Unspecified asthma, uncomplicated     Urinary tract infection, site not specified     Vitamin D deficiency, unspecified     Schizoaffective disorder, bipolar type (H)          PLAN   1. Ongoing education given regarding diagnostic and treatment options with risks, benefits and alternatives and adequate verbalization of understanding.  2.  Medications       Melatonin 10 mg at bedtime       Clozaril at 25 mg daily and 50 mg at bedtime and cross taper with Zyprexa to 5 mg daily and 10 mg at bedtime          Topamax 150 mg at bedtime       Invega 6 mg daily     3.  Medical team following as needed  4.   coordinating a safe discharge plan    CMP to be repeated on Friday    Risk Assessment: [facility] RISK ASSESSMENT: Patient able to contract for safety    Coordination of Care:   Treatment Plan reviewed and physician signed, Care discussed with Care/Treatment Team Members, Chart reviewed and Patient seen      Re-Certification I certify that the inpatient psychiatric facility services furnished since the previous certification were, and  continue to be, medically necessary for, either, treatment which could reasonably be expected to improve the patient s condition or diagnostic study and that the hospital records indicate that the services furnished were, either, intensive treatment services, admission and related services necessary for diagnostic study, or equivalent services.     I certify that the patient continues to need, on a daily basis, active treatment furnished directly by or requiring the supervision of inpatient psychiatric facility personnel.   I estimate 14 days of hospitalization is necessary for proper treatment of the patient. My plans for post-hospital care for this patient are  group home     Alejandra Watkins MD    -     06/28/2023  -     3:25 PM    Total time 35 minutes with > 50%spent on coordination of cares and psycho-education.    This note was created with help of Dragon dictation system. Grammatical / typing errors are not intentional.    Alejandra Watkins MD

## 2023-06-28 NOTE — PLAN OF CARE
"Problem: Psychotic Signs/Symptoms  Goal: Improved Mood Symptoms  Outcome: Progressing    Problem: Disruptive Behavior  Goal: Improved Mood Symptoms (Disruptive Behavior)      Goal Outcome Evaluation:    Pt stated that he was feeling \"terrific\" today. Pt denies pain, anxiety, and feelings of depression. Pt still thinks that he is getting discharged and wants to wear street clothes instead of scrubs because he says he's going home.During the morning med pass, pt called the writer Monica which is one of his baseline delusions. When assessing the patient he stated \"I don't know why I'm here, I didn't do anything wrong.\" No mentions of his parents this shift, pt still has garbled and slurred speech, Dr. Charles said to just ask him to speak clearly per his sister.   Pt was very pleasant and calm with no disruptive behaviors or outbursts.   Jose also had a shower this morning and +1 edema on his right foot, ankle, and leg upon inspection. His calf measured on the right leg was 39.5 cm and 38 cm on the left leg. MICHAEL hose are in place, but will need assistance to put them on correctly.       Plan of Care Reviewed With: patient                   "

## 2023-06-28 NOTE — CARE PLAN
Occupational Therapy     06/28/23 1300   Group Therapy Session   Group Attendance attended group session   Time Session Began 1015   Time Session Ended 1115   Total Time (minutes) 60   Total # Attendees 6   Group Type task skill   Group Topic Covered cognitive activities;coping skills/lifestyle management;leisure exploration/use of leisure time;problem-solving;structured socialization   Group Session Detail OT: Education on healthy activity engagement with creative hands-on endeavor or cognitive activity (OT clinic) to increase concentration, focus, attention to task/detail, decision making, problem solving, frustration tolerance, task follow through, coping with stress, healthy leisure engagement, creative expression, and social engagement   Patient Response/Contribution confused;became angry or agitated;disorganized;cooperative with task;other (see comments)  (Redirectable; self-talk)   Patient Participation Detail Pt sat among peers to complete familiar creative hands on endeavor but did not engage in social interactions with peers. Pt worked in a semi-messy manner to complete project and appeared disorganized (as he struggled to add detail to his project). Pt became semi-agitated when therapist placed boundaries regarding pt's project; pt semi-redirectable. Pt required verbal prompts to clean-up supplies and workspace.

## 2023-06-28 NOTE — PLAN OF CARE
"Problem: Psychotic Signs/Symptoms  Goal: Improved Behavioral Control (Psychotic Signs/Symptoms)  Outcome: Progressing     Problem: Psychotic Signs/Symptoms  Goal: Increased Participation and Engagement (Psychotic Signs/Symptoms)  Outcome: Progressing  Intervention: Facilitate Participation and Engagement  Recent Flowsheet Documentation  Taken 6/27/2023 1900 by Omaira Boone RN  Diversional Activity:    music    television   Goal Outcome Evaluation:     Plan of Care Reviewed With: patient     Patient observed out in the milieu, walking the hallway while listening to music through headphones. He claimed music helps him to be \"pretty chill\". Pt was pleasant and cooperative upon approach. He had minimal interaction with peers. He did attend group this shift. He denies SI/SIB/HI. Denies experiencing any type of hallucinations. Denies having anxiety and depression. Later on the shift, pt was perseverating on leaving tonight and insisted that his provider told him he is going to discharge tonight. Pt was redirected. He was also observed making delusional statements that his father is \"Junaid\". He also placed sugar packets by the med room window and told writer \"this is for you Padmini\". Pt was accepting when corrected. No behavioral outburst noted throughout the shift.     Medical Concerns: pt denies pain.   Appetite: Consumed 100% of share of dinner and took approximately 720 ml of fluids.   Sleep: pt took a nap this shift.   LBM: pt claimed he had a bowel movement today.   ADLs: Independent.   PRNs given: No PRN medications given this shift.     Due medications given. Denies experiencing side effects.    No roommate order for intrusiveness and poor boundaries in place. On sexual and elopement precautions. Needs attended to. No further concerns noted as of this time.                    "

## 2023-06-28 NOTE — PLAN OF CARE
Assessment/Intervention/Current Symtoms and Care Coordination:  Pt discussed in team rounds this AM. Pt has been confused, often calling staff the names of his family members. Pt continues to titrate on clozaril.    Writer received call from [facility] care coordinator, Lilo ([phone]) inquiring about pt's progress. Writer updated Lilo on pt's status. Lilo reported she will be connecting again with [facility] next week.    Discharge Plan or Goal:  Plan for pt to return to  when stable     Barriers to Discharge:  Pt continues to present as symptomatic (delusional mood). Pt is receiving ongoing stabilization and medication adjustment. [facility] to ascertain his acceptability for a return to the home.     Referral Status:  Will continue to coordinate with pt's group home.     Legal Status:  Huong Keenan is pt's legal Guardian      Contacts:  Shlomo Borja (Guardian) [phone]   Sadaf ([phone])      Upcoming Meetings and Dates/Important Information and next steps:

## 2023-06-28 NOTE — PLAN OF CARE
Problem: Sleep Disturbance  Goal: Adequate Sleep/Rest  Intervention: Promote Sleep/Rest  Flowsheets (Taken 6/28/2023 0040)  Sleep/Rest Enhancement:    regular sleep/rest pattern promoted    noise level reduced    medication    music provided    room darkened    snack       Patient was restless at early part of the shift. Repetitive of telling staff that he's going home tonight. Denied pain when asked. PRN Hydroxyzine 25 mg was given at 0038. He used the BR and went to bed. Writer covered him with blanket.Later, he was off and on awake again. Putting his call button on, requesting for jelly ranchers. Pt slept a total of 6 hours. Pt has a no roommate order due to intrusiveness and poor boundaries.

## 2023-06-28 NOTE — CARE PLAN
06/28/23 1522   Group Therapy Session   Group Attendance attended group session   Time Session Began 1315   Time Session Ended 1425   Total Time (minutes) 50   Total # Attendees 5   Group Type task skill;expressive therapy   Group Topic Covered cognitive activities;leisure exploration/use of leisure time;coping skills/lifestyle management;problem-solving   Group Session Detail OT Creative Expressions group-Card Making   Patient Response/Contribution became angry or agitated;cooperative with task;expressed reluctance to alter behaviors   Patient Participation Detail pt arrived late for group. pt demonstrated poor boundaries. pt was asked to either turn off headphones or turn them down, (headphones were on a [employer] channel) pt was somewhat argumentative with therapist-therapist repeated request several times before patient allowed therapist to assist with turning headphones off. pt did not follow therapist's verbal instructions on picking one project at a time-pt chose 4 pictures and kept all 4 even after a repeat of instructions. pt worked quietly and did not socially engage with peers. pt asked for assistance with project completion and supplies.

## 2023-06-29 ENCOUNTER — TELEPHONE (OUTPATIENT)
Dept: BEHAVIORAL HEALTH | Facility: CLINIC | Age: 53
End: 2023-06-29
Payer: MEDICARE

## 2023-06-29 PROCEDURE — G0177 OPPS/PHP; TRAIN & EDUC SERV: HCPCS

## 2023-06-29 PROCEDURE — 250N000013 HC RX MED GY IP 250 OP 250 PS 637: Performed by: PHYSICIAN ASSISTANT

## 2023-06-29 PROCEDURE — 99232 SBSQ HOSP IP/OBS MODERATE 35: CPT | Performed by: PSYCHIATRY & NEUROLOGY

## 2023-06-29 PROCEDURE — 250N000013 HC RX MED GY IP 250 OP 250 PS 637: Performed by: PSYCHIATRY & NEUROLOGY

## 2023-06-29 PROCEDURE — 250N000013 HC RX MED GY IP 250 OP 250 PS 637: Performed by: EMERGENCY MEDICINE

## 2023-06-29 PROCEDURE — H2032 ACTIVITY THERAPY, PER 15 MIN: HCPCS

## 2023-06-29 PROCEDURE — 124N000003 HC R&B MH SENIOR/ADOLESCENT

## 2023-06-29 RX ORDER — OLANZAPINE 5 MG/1
5 TABLET, ORALLY DISINTEGRATING ORAL AT BEDTIME
Status: DISCONTINUED | OUTPATIENT
Start: 2023-06-29 | End: 2023-06-30

## 2023-06-29 RX ADMIN — Medication 10 MG: at 19:51

## 2023-06-29 RX ADMIN — CALCIUM POLYCARBOPHIL 625 MG: 625 TABLET, FILM COATED ORAL at 19:52

## 2023-06-29 RX ADMIN — CLOZAPINE 25 MG: 25 TABLET ORAL at 07:36

## 2023-06-29 RX ADMIN — ZINC SULFATE 220 MG (50 MG) CAPSULE 220 MG: CAPSULE at 07:35

## 2023-06-29 RX ADMIN — OLANZAPINE 5 MG: 5 TABLET, ORALLY DISINTEGRATING ORAL at 19:52

## 2023-06-29 RX ADMIN — LEVOTHYROXINE SODIUM 50 MCG: 25 TABLET ORAL at 07:00

## 2023-06-29 RX ADMIN — CALCIUM POLYCARBOPHIL 625 MG: 625 TABLET, FILM COATED ORAL at 07:35

## 2023-06-29 RX ADMIN — MONTELUKAST 10 MG: 10 TABLET, FILM COATED ORAL at 19:52

## 2023-06-29 RX ADMIN — PREDNISOLONE ACETATE 1 DROP: 10 SUSPENSION/ DROPS OPHTHALMIC at 07:38

## 2023-06-29 RX ADMIN — APIXABAN 5 MG: 5 TABLET, FILM COATED ORAL at 19:52

## 2023-06-29 RX ADMIN — APIXABAN 5 MG: 5 TABLET, FILM COATED ORAL at 07:35

## 2023-06-29 RX ADMIN — OLANZAPINE 5 MG: 5 TABLET, FILM COATED ORAL at 07:35

## 2023-06-29 RX ADMIN — TOPIRAMATE 150 MG: 50 TABLET ORAL at 19:52

## 2023-06-29 RX ADMIN — CHLORHEXIDINE GLUCONATE 0.12% ORAL RINSE 15 ML: 1.2 LIQUID ORAL at 07:35

## 2023-06-29 RX ADMIN — CLOZAPINE 75 MG: 25 TABLET ORAL at 21:14

## 2023-06-29 RX ADMIN — PALIPERIDONE 6 MG: 3 TABLET, EXTENDED RELEASE ORAL at 07:35

## 2023-06-29 RX ADMIN — DOCUSATE SODIUM 100 MG: 100 CAPSULE, LIQUID FILLED ORAL at 07:35

## 2023-06-29 RX ADMIN — PANTOPRAZOLE SODIUM 40 MG: 40 TABLET, DELAYED RELEASE ORAL at 07:00

## 2023-06-29 RX ADMIN — LISINOPRIL 5 MG: 5 TABLET ORAL at 07:36

## 2023-06-29 ASSESSMENT — ACTIVITIES OF DAILY LIVING (ADL)
ADLS_ACUITY_SCORE: 32
HYGIENE/GROOMING: INDEPENDENT;PROMPTS
ADLS_ACUITY_SCORE: 32
ADLS_ACUITY_SCORE: 42
ADLS_ACUITY_SCORE: 32
ADLS_ACUITY_SCORE: 32
DRESS: SCRUBS (BEHAVIORAL HEALTH)
ADLS_ACUITY_SCORE: 42
ORAL_HYGIENE: INDEPENDENT
ADLS_ACUITY_SCORE: 42
ADLS_ACUITY_SCORE: 32
HYGIENE/GROOMING: PROMPTS;INDEPENDENT
ADLS_ACUITY_SCORE: 42

## 2023-06-29 NOTE — PLAN OF CARE
"  Problem: Adult Behavioral Health Plan of Care  Goal: Plan of Care Review  Outcome: No Change     Pt continues to pace around the the unit with headphones on. Singing to himself at times. Delusional thoughts. Required lots of coaxing to take his meds because he believes he does not need to them any more.     \" I'm going home tonight..... ask my sister Huong...... I'm not supposed to take any more pills..... ask Dr Charles.....I can see perfectly.....  I don't need the eye drops. \"  "

## 2023-06-29 NOTE — TELEPHONE ENCOUNTER
----- Message from Rowan Lentz RN sent at 6/29/2023  8:19 AM CDT -----  Regarding: Medicare days up  [employer] is out of medicare days today. We will need secondary auth. Thank you!

## 2023-06-29 NOTE — PLAN OF CARE
Assessment/Intervention/Current Symtoms and Care Coordination:  Pt discussed in team rounds this AM. Pt has been argumentative in group. Pt continues to have issues with wearing his headphones during groups and late into the night. Pt to be educated on limits with headphones. Pt will be taken off Zyprexa and continues to titrate on Clozaril.    Writer put out call to pt's  supervisor, Sadaf ([phone]) to introduce self. Writer attempted to LVM but voicemail box was full.    Writer put out call to pt's guardian and sister, Huong ([phone]) to introduce self. Writer informed Huong that pt has continued delusions about his parents and has been argumentative with staff. Huong reported that she is planning to visit pt tomorrow to see how he is doing but agrees this is not close to his baseline. Writer provided Huong with writer's contact information.    Writer received call from Sadaf. Sadaf confirmed pt can return to  at discharge once pt has stabilized further. Writer provided Sadaf with writer's contact information.    Discharge Plan or Goal:  Plan for pt to return to  when stable     Barriers to Discharge:  Pt continues to present as symptomatic (delusional mood). Pt is receiving ongoing stabilization and medication adjustment. Continue care coordination with [facility] to ascertain his acceptability for a return to the home.     Referral Status:  Will continue to coordinate with pt's group home.     Legal Status:  Huong Keenan is pt's legal Guardian      Contacts:  Shlomo Borja (Guardian) [phone]   Sadaf ([phone])      Upcoming Meetings and Dates/Important Information and next steps:

## 2023-06-29 NOTE — PROGRESS NOTES
"   06/28/23 2200   Group Therapy Session   Group Attendance attended group session   Time Session Began 1910   Time Session Ended 2000   Total Time (minutes) 50   Total # Attendees 7   Group Type psychotherapeutic   Group Topic Covered emotions/expression;structured socialization   Group Session Detail self compassion   Patient Response/Contribution cooperative with task;confused;disorganized;refused to comply with staff direction;unable to interrupt patient;verbalizations were off topic   Patient Participation Detail mood \" want to go home, god, wonderful, terrific. He did easily comply with turning off headphones today. When talking about self compassion he was off topic and had difficulty redirecting. Group was talking about boundaries and saying no and he repeated. \" cant say no to sex or food.\"   Also said mood was fantastic and something about just wanting to go home to his \" wives.\"  "

## 2023-06-29 NOTE — PLAN OF CARE
"  Problem: Psychotic Signs/Symptoms  Goal: Improved Mood Symptoms  Outcome: Progressing     Problem: Disruptive Behavior  Goal: Enhanced Social, Occupational or Functional Skills (Disruptive Behavior)  Outcome: Progressing     Goal Outcome Evaluation:    Pt has been visible in the milieu, continued to use the radio earphone to listen to music, Pt was compliant with oral meds and eye drops.  Observed with soiled scrub pants, pt showered independently.  Reported he has a \" great \" mood. Compliant with wearing his Compression socks but declined assessment, stated his legs/feet are feeling better.  Denied feeling depressed ,anxious or any forms of hallucinations.No thoughts of hurting himself or others.  Appetite good.  Attended  the Group,OT  and Dance Movement but with minimal participation in the Dance Movement group.  Plan is to take head phones from pt from  10:00 PM- 06:00 AM to promote sleeping at night,If pt does not cooperate take head phones away. spoke with pt about this plan but replied   \" I'm going home today \"and refused to listen to explanations.  Overall calm and able to make needs known.  Medication changes done by Dr. Charles  Pt has a no roommate order due to intrusiveness and poor boundaries.  2:45 PM enjoyed the visit of the Therapy dog               "

## 2023-06-29 NOTE — PROGRESS NOTES
Pt was less engaged in group processes than previous in dance/movement therapy (D/MT) sessions with this therapist.  He cried and appeared to sleep during parts of the group and was dissatisfied with the group music vs his own music on the headphones.  He rejected using movement to tell a story, or release emotions, but was able to express/receive support through self-directed motions.  Pt was able to both follow directives for dance movements, however, he preferred his own closed, downward-focused posture.         06/29/23 1130   Expressive Therapy   Therapy Type dance/movement   Minutes of Treatment 50

## 2023-06-29 NOTE — PLAN OF CARE
Problem: Sleep Disturbance  Goal: Adequate Sleep/Rest  Outcome: Not Progressing     Patient up most part of the shift, in and out of room, slept approximately 5 hours, safety checks and precautions in place. Continues to play with the call light in his room. Patient needs constant redirection, no prn given or requested. No report of pain. No safety concerns at this time, writer will continue to monitor and offer therapeutic support as needed for the rest of the shift.

## 2023-06-29 NOTE — CARE PLAN
06/29/23 1514   Group Therapy Session   Group Attendance attended group session   Time Session Began 1300   Time Session Ended 1345   Total Time (minutes) 45   Total # Attendees 5   Group Type psychotherapeutic   Group Topic Covered coping skills/lifestyle management;cognitive therapy techniques;balanced lifestyle;structured socialization   Group Session Detail  Activity using quick, creative, concrete and abstract thinking with some visuospatial problem solving.   Patient Response/Contribution cooperative with task;listened actively   Patient Participation Detail Made attempts in offering answers during the activities. Needed cues and directions repeated. Offered occasional answers in context. Speech continues to seem slurred. Was cooperative in not wearing his headphones when reminded and placed them away from the table.

## 2023-06-29 NOTE — CARE PLAN
06/29/23 1509   Group Therapy Session   Group Attendance attended group session   Time Session Began 1015   Time Session Ended 1115   Total Time (minutes) 45   Total # Attendees 4-6   Group Type expressive therapy;recreation;task skill   Group Topic Covered coping skills/lifestyle management;problem-solving;leisure exploration/use of leisure time;cognitive activities   Group Session Detail Occupational Therapy Clinic group to facilitate coping skill exploration, use of cognitive skills and problem solving, creative expression, clinical observation and facilitation of social, cognitive, and kinesthetic performance skills   Patient Response/Contribution disorganized;verbalizations were off topic   Patient Participation Detail pt required repetition of instructions to turn off headphones during group. pt was somewhat argumentative but was eventually redirectable. pt chose to engage in previous creative endeavor. pt required continuous instructions from staff for set up and steps of project. pt did not engage with peers during group time but did engage with staff related to project instructions and clean up directions.

## 2023-06-29 NOTE — PROGRESS NOTES
"PSYCHIATRY  PROGRESS NOTE     DATE OF SERVICE   2023        CHIEF COMPLAINT   \" I am doing good.\"       SUBJECTIVE   Nursing reports:  Pt continues to pace around the the unit with headphones on. Singing to himself at times. Delusional thoughts. Required lots of coaxing to take his meds because he believes he does not need to them any more.      \" I'm going home tonight..... ask my sister Huong...... I'm not supposed to take any more pills..... ask Dr Charles.....I can see perfectly.....  I don't need the eye drops. \"    Patient has been reviewed with the  earlier today.   Assessment/Intervention/Current Symtoms and Care Coordination:  Pt discussed in team rounds this AM. Pt has been confused, often calling staff the names of his family members. Pt continues to titrate on clozaril.     Writer received call from [facility] care coordinator, Lilo ([phone]) inquiring about pt's progress. Writer updated Lilo on pt's status. Lilo reported she will be connecting again with [facility] next week.     OBJECTIVE   Patient was seen and evaluated in the consult room by himself, this was a face-to-face evaluation.  Patient reported that he is doing well and he is ready to go back to go home.  When I asked the patient where home is he tells me that is in [place] and he lives in a hotel.  Patient proceeded to say that he has been living at the same hotel for years now and he needs to leave today.  According to the patient and his brother Abhijit is in the unit, he spoke with Abhijit that told him he was going to take him home.  I informed the patient that his guardian is Katerina.  Patient denied this and said that his guardian is Abhijit.  Patient continues to insist that he has been talking to his  parents here in the unit.  I asked the patient if he remembers attending the  for his parents.  Patient was able to admit that he did attended the  but somehow they are alive and have been hearing the " unit talking to him.  Patient tells me that he can see his parents the same way that he can see me.  During my conversation the patient was not argumentative and said that he is not lying and he wants for this writer to believe him.     I have discussed with the patient that we are going to ask him for his headphones at 10 PM and they will be returned at 6 AM.  All of this in order to promote for him to sleep at night.  Patient then was insisting on leaving the hospital and was asking for this writer to open the door for him.  I redirected the patient to attend groups which he did with no problems.    Patient has been informed about the medication changes, he verbalizes good understanding and agreed to this.     MEDICATIONS   Medications:  Scheduled Meds:    apixaban ANTICOAGULANT  5 mg Oral BID     artificial tears   Right Eye At Bedtime     calcium polycarbophil  625 mg Oral BID     chlorhexidine  15 mL Swish & Spit Daily     cloZAPine  25 mg Oral Daily     cloZAPine  75 mg Oral At Bedtime     docusate sodium  100 mg Oral Daily     levothyroxine  50 mcg Oral or NG Tube QAM AC     lisinopril  5 mg Oral Daily     melatonin  10 mg Oral At Bedtime     montelukast  10 mg Oral At Bedtime     OLANZapine zydis  5 mg Oral At Bedtime     paliperidone  6 mg Oral Daily     pantoprazole  40 mg Oral QAM AC     prednisoLONE acetate  1 drop Right Eye Daily     topiramate  150 mg Oral At Bedtime     zinc sulfate  220 mg Oral Daily     Continuous Infusions:  PRN Meds:.acetaminophen, alum & mag hydroxide-simethicone, artificial saliva, atropine, hydrALAZINE, hydrOXYzine, hypromellose-dextran, ipratropium, OLANZapine **OR** OLANZapine, senna-docusate    Medication adherence issues: MS Med Adherence Y/N: Yes, Hospitalization  Medication side effects: MEDICATION SIDE EFFECTS: no side effects reported  Benefit: Yes / No: Yes       ROS   A comprehensive review of systems was negative.       MENTAL STATUS EXAM   Vitals: /80   Pulse  "91   Temp 97.8  F (36.6  C)   Resp 18   Wt 91.6 kg (201 lb 15.1 oz)   SpO2 96%   BMI 34.66 kg/m        Appearance:  No apparent distress  Mood: \"I am doing good\"  Affect: calm and pleasant  was congruent to speech  Suicidal Ideation: PRESENT / ABSENT: absent   Homicidal Ideation: PRESENT / ABSENT: absent     Thought process: More linear and goal directed  Thought content: Remains delusional.  Stated that he has been seen and talking to his parents  Fund of Knowledge: Below average  Attention/Concentration: Poor  Language ability:  Intact, speech is garbled at times but it can be more clear when you redirect the patient.  Memory:  Immediate recall impaired, Short-term memory impaired and Long-term memory intact  Insight: limited   Judgement: poor  Orientation: Person and situation only  Psychomotor Behavior: slowed    Muscle Strength and Tone: MuscleStrength: Normal  Gait and Station: Stable on his feet       LABS   personally reviewed.   Recent Labs   Lab 06/27/23  0844   WBC 6.6   HGB 13.4   MCV 83         POTASSIUM 4.0   CHLORIDE 110*   CO2 22   BUN 23.3*   CR 0.86   ANIONGAP 10   NASIR 9.6   *   ALBUMIN 4.2   PROTTOTAL 7.2   BILITOTAL 0.4   ALKPHOS 159*   ALT 57   AST 50*     No results found for: PHENYTOIN, PHENOBARB, VALPROATE, CBMZ       DIAGNOSIS   Principal Problem:    Schizoaffective disorder, bipolar type (H)    Active Problem List:  Patient Active Problem List   Diagnosis     Closed head injury, initial encounter     Altered mental status, unspecified altered mental status type     Generalized weakness     Portal vein thrombosis     Pleural effusion     Generalized muscle weakness     Falls frequently     Other ascites     Acute pancreatitis, unspecified complication status, unspecified pancreatitis type     Pancreatic pseudocyst     Idiopathic acute pancreatitis, unspecified complication status     Allergic rhinitis     Fisher's esophagus     CTS (carpal tunnel syndrome)     " Elevated liver enzymes     HTN (hypertension)     Hypothyroidism     Keratoconus     Major depressive disorder, recurrent episode, mild (H)     Intellectual disability     Morbid exogenous obesity (H)     Neuroleptic malignant syndrome     Obstructive sleep apnea syndrome     Bipolar affective disorder (H)     Seizure disorder (H)     Seizures, generalized convulsive (H)     Acute respiratory failure with hypoxia (H)     Anemia, unspecified     Anxiety disorder, unspecified     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)     Dietary zinc deficiency     Dry eye syndrome of bilateral lacrimal glands     Dry mouth, unspecified     Edema, unspecified     Gastro-esophageal reflux disease without esophagitis     Hyperosmolality and hypernatremia     Irritable bowel syndrome with constipation     Major depressive disorder, recurrent, unspecified (H)     Metabolic encephalopathy     Moderate protein-calorie malnutrition (H)     Other symbolic dysfunctions     Schizoaffective disorder, unspecified (H)     Thrombocytopenia, unspecified (H)     Unspecified asthma, uncomplicated     Urinary tract infection, site not specified     Vitamin D deficiency, unspecified     Schizoaffective disorder, bipolar type (H)          PLAN   1. Ongoing education given regarding diagnostic and treatment options with risks, benefits and alternatives and adequate verbalization of understanding.  2.  Medications       Melatonin 10 mg at bedtime       Increase Clozaril at 25 mg daily and 75 mg at bedtime and cross taper with Zyprexa to 5 mg at bedtime          Topamax 150 mg at bedtime       Invega 6 mg daily     3.  Medical team following as needed  4.   coordinating a safe discharge plan    CMP to be repeated on Friday    Risk Assessment: [facility] RISK ASSESSMENT: Patient able to contract for safety    Coordination of Care:   Treatment Plan reviewed and physician signed, Care discussed with Care/Treatment Team Members,  Chart reviewed and Patient seen      Re-Certification I certify that the inpatient psychiatric facility services furnished since the previous certification were, and continue to be, medically necessary for, either, treatment which could reasonably be expected to improve the patient s condition or diagnostic study and that the hospital records indicate that the services furnished were, either, intensive treatment services, admission and related services necessary for diagnostic study, or equivalent services.     I certify that the patient continues to need, on a daily basis, active treatment furnished directly by or requiring the supervision of inpatient psychiatric facility personnel.   I estimate 14 days of hospitalization is necessary for proper treatment of the patient. My plans for post-hospital care for this patient are  group home     Alejandra Watkins MD    -     06/29/2023  -     1:57 PM    Total time 35 minutes with > 50%spent on coordination of cares and psycho-education.    This note was created with help of Dragon dictation system. Grammatical / typing errors are not intentional.    Alejandra Watkins MD

## 2023-06-30 LAB
ALBUMIN SERPL BCG-MCNC: 4.1 G/DL (ref 3.5–5.2)
ALP SERPL-CCNC: 168 U/L (ref 40–129)
ALT SERPL W P-5'-P-CCNC: 56 U/L (ref 0–70)
ANION GAP SERPL CALCULATED.3IONS-SCNC: 10 MMOL/L (ref 7–15)
AST SERPL W P-5'-P-CCNC: 42 U/L (ref 0–45)
ATRIAL RATE - MUSE: 81 BPM
BILIRUB SERPL-MCNC: 0.4 MG/DL
BUN SERPL-MCNC: 21.8 MG/DL (ref 6–20)
CALCIUM SERPL-MCNC: 10 MG/DL (ref 8.6–10)
CHLORIDE SERPL-SCNC: 112 MMOL/L (ref 98–107)
CREAT SERPL-MCNC: 0.91 MG/DL (ref 0.67–1.17)
DEPRECATED HCO3 PLAS-SCNC: 23 MMOL/L (ref 22–29)
DIASTOLIC BLOOD PRESSURE - MUSE: NORMAL MMHG
GFR SERPL CREATININE-BSD FRML MDRD: >90 ML/MIN/1.73M2
GLUCOSE SERPL-MCNC: 110 MG/DL (ref 70–99)
HOLD SPECIMEN: NORMAL
INTERPRETATION ECG - MUSE: NORMAL
P AXIS - MUSE: 40 DEGREES
POTASSIUM SERPL-SCNC: 3.8 MMOL/L (ref 3.4–5.3)
PR INTERVAL - MUSE: 134 MS
PROT SERPL-MCNC: 7.3 G/DL (ref 6.4–8.3)
QRS DURATION - MUSE: 140 MS
QT - MUSE: 402 MS
QTC - MUSE: 466 MS
R AXIS - MUSE: 19 DEGREES
SODIUM SERPL-SCNC: 145 MMOL/L (ref 136–145)
SYSTOLIC BLOOD PRESSURE - MUSE: NORMAL MMHG
T AXIS - MUSE: 38 DEGREES
VENTRICULAR RATE- MUSE: 81 BPM

## 2023-06-30 PROCEDURE — 80053 COMPREHEN METABOLIC PANEL: CPT | Performed by: PSYCHIATRY & NEUROLOGY

## 2023-06-30 PROCEDURE — 250N000013 HC RX MED GY IP 250 OP 250 PS 637: Performed by: PHYSICIAN ASSISTANT

## 2023-06-30 PROCEDURE — 99232 SBSQ HOSP IP/OBS MODERATE 35: CPT | Performed by: PSYCHIATRY & NEUROLOGY

## 2023-06-30 PROCEDURE — 250N000013 HC RX MED GY IP 250 OP 250 PS 637: Performed by: PSYCHIATRY & NEUROLOGY

## 2023-06-30 PROCEDURE — 250N000013 HC RX MED GY IP 250 OP 250 PS 637: Performed by: EMERGENCY MEDICINE

## 2023-06-30 PROCEDURE — 124N000003 HC R&B MH SENIOR/ADOLESCENT

## 2023-06-30 PROCEDURE — 36415 COLL VENOUS BLD VENIPUNCTURE: CPT | Performed by: PSYCHIATRY & NEUROLOGY

## 2023-06-30 PROCEDURE — 82306 VITAMIN D 25 HYDROXY: CPT | Performed by: PSYCHIATRY & NEUROLOGY

## 2023-06-30 RX ORDER — OLANZAPINE 5 MG/1
5 TABLET, ORALLY DISINTEGRATING ORAL AT BEDTIME
Status: COMPLETED | OUTPATIENT
Start: 2023-06-30 | End: 2023-07-02

## 2023-06-30 RX ADMIN — TOPIRAMATE 150 MG: 50 TABLET ORAL at 19:49

## 2023-06-30 RX ADMIN — Medication 10 MG: at 19:49

## 2023-06-30 RX ADMIN — LISINOPRIL 5 MG: 5 TABLET ORAL at 08:05

## 2023-06-30 RX ADMIN — LEVOTHYROXINE SODIUM 50 MCG: 25 TABLET ORAL at 06:42

## 2023-06-30 RX ADMIN — CLOZAPINE 75 MG: 25 TABLET ORAL at 21:00

## 2023-06-30 RX ADMIN — DOCUSATE SODIUM 100 MG: 100 CAPSULE, LIQUID FILLED ORAL at 08:05

## 2023-06-30 RX ADMIN — WHITE PETROLATUM 57.7 %-MINERAL OIL 31.9 % EYE OINTMENT: at 20:07

## 2023-06-30 RX ADMIN — CALCIUM POLYCARBOPHIL 625 MG: 625 TABLET, FILM COATED ORAL at 08:05

## 2023-06-30 RX ADMIN — CLOZAPINE 25 MG: 25 TABLET ORAL at 08:05

## 2023-06-30 RX ADMIN — PANTOPRAZOLE SODIUM 40 MG: 40 TABLET, DELAYED RELEASE ORAL at 06:42

## 2023-06-30 RX ADMIN — MONTELUKAST 10 MG: 10 TABLET, FILM COATED ORAL at 19:50

## 2023-06-30 RX ADMIN — PREDNISOLONE ACETATE 1 DROP: 10 SUSPENSION/ DROPS OPHTHALMIC at 08:06

## 2023-06-30 RX ADMIN — APIXABAN 5 MG: 5 TABLET, FILM COATED ORAL at 08:05

## 2023-06-30 RX ADMIN — CALCIUM POLYCARBOPHIL 625 MG: 625 TABLET, FILM COATED ORAL at 19:49

## 2023-06-30 RX ADMIN — PALIPERIDONE 6 MG: 3 TABLET, EXTENDED RELEASE ORAL at 08:05

## 2023-06-30 RX ADMIN — OLANZAPINE 5 MG: 5 TABLET, ORALLY DISINTEGRATING ORAL at 19:50

## 2023-06-30 RX ADMIN — CHLORHEXIDINE GLUCONATE 0.12% ORAL RINSE 15 ML: 1.2 LIQUID ORAL at 08:05

## 2023-06-30 RX ADMIN — APIXABAN 5 MG: 5 TABLET, FILM COATED ORAL at 19:49

## 2023-06-30 RX ADMIN — ZINC SULFATE 220 MG (50 MG) CAPSULE 220 MG: CAPSULE at 08:05

## 2023-06-30 ASSESSMENT — ACTIVITIES OF DAILY LIVING (ADL)
HYGIENE/GROOMING: INDEPENDENT
ADLS_ACUITY_SCORE: 42
ADLS_ACUITY_SCORE: 32
ADLS_ACUITY_SCORE: 32
HYGIENE/GROOMING: PROMPTS;INDEPENDENT
ADLS_ACUITY_SCORE: 32
ADLS_ACUITY_SCORE: 42
ORAL_HYGIENE: INDEPENDENT
ADLS_ACUITY_SCORE: 42
LAUNDRY: UNABLE TO COMPLETE
DRESS: SCRUBS (BEHAVIORAL HEALTH)
ADLS_ACUITY_SCORE: 32
ADLS_ACUITY_SCORE: 42

## 2023-06-30 NOTE — CARE PLAN
06/30/23 1346   Group Therapy Session   Group Attendance attended group session   Time Session Began 1115   Time Session Ended 1200   Total Time (minutes) 35  (no charge)   Total # Attendees 5   Group Type expressive therapy;life skill;task skill   Group Topic Covered coping skills/lifestyle management;problem-solving;cognitive activities;structured socialization   Group Session Detail OT [facility]-Gratitude and gratitude collages for social engagement, coping skill building, symptom management, insight development, healthy distraction, following directions, emotional wellness, fostering overall wellbeing   Patient Response/Contribution cooperative with task;discussed personal experience with topic;verbalizations were off topic   Patient Participation Detail pt arrived late for group. pt required verbal cue to remove and turn off headphones-pt agreeable with therapist's assistance on turning them off. pt asked to verbalize things to be grateful for instead of writing them down-therapist complied. pt shared he was grateful for his wives, family, grandkids, and great grandkids. pt would get off topic but was easily redirected. pt was independent with supplies and task following initial set up. pt chose pictures of food for his gratitude collage.

## 2023-06-30 NOTE — PLAN OF CARE
Problem: Plan of Care - These are the overarching goals to be used throughout the patient stay.    Goal: Optimal Comfort and Wellbeing  Outcome: Progressing     Problem: Plan of Care - These are the overarching goals to be used throughout the patient stay.    Goal: Readiness for Transition of Care  Outcome: Progressing   Goal Outcome Evaluation:    Plan of Care Reviewed With: patient          7:00-15:30   Patient visible in the milieu for all meals and group therapy. Appetite is excellent % of his meals. Observed walking in the hallway with headphones on and listening to music. TEDs not found this morning, will communicate with upcoming shift and order new pair if needed.   Sister visited today and the visit went well.     Appearance: took shower last evening but this morning still appears untidy and unclean.   Attitude: cooperative  Behavior: no behavioral outbursts.   Eye Contact: intense   Speech: rambling, slurred,    Orientation: oreinted to person , place and time. Disoriented about situation and reason for current hospitalization   Mood:  admits anxiety   Affect: preeti  Thought Process: mildly disorganized but able to complete ADLs independently and make his needs known.    Suicidal Ideation: Denied   Hallucination: Denied     /84 (BP Location: Left arm, Patient Position: Sitting, Cuff Size: Adult Regular)   Pulse 96   Temp 97.8  F (36.6  C) (Temporal)   Resp 16   Wt 91.6 kg (201 lb 15.1 oz)   SpO2 97%   BMI 34.66 kg/m

## 2023-06-30 NOTE — PROGRESS NOTES
"PSYCHIATRY  PROGRESS NOTE     DATE OF SERVICE   06/30/2023        CHIEF COMPLAINT   \" I am doing good.\"       SUBJECTIVE   Nursing reports:  Received in bed sleeping, pt has a no roommate order due to intrusiveness and poor boundaries. This is the 1st night  for the pt w/o the headphone. Woke up at 0210 and was incontinent of urine, changed independently.  Wanted the headphone at 0230, pt was told it will be given to him at 0600, replied \"ok\".  Quietly walked the cortez and went back to sleep.  Slept on and off but was calm and quiet, Redirected back to his room to sleep x 4 tonight.Pt did not insist on getting his headphones.  Slept for 7.75 hours    Patient has been reviewed with the  earlier today.   Assessment/Intervention/Current Symtoms and Care Coordination:  Pt discussed in team rounds this AM. Pt has been argumentative in group. Pt continues to have issues with wearing his headphones during groups and late into the night. Pt to be educated on limits with headphones. Pt will be taken off Zyprexa and continues to titrate on Clozaril.     Writer put out call to pt's  supervisor, Sadaf ([phone]) to introduce self. Writer attempted to LVM but voicemail box was full.     Writer put out call to pt's guardian and sister, Huong ([phone]) to introduce self. Writer informed Huong that pt has continued delusions about his parents and has been argumentative with staff. Huong reported that she is planning to visit pt tomorrow to see how he is doing but agrees this is not close to his baseline. Writer provided Huong with writer's contact information.     Writer received call from Sadaf. Sadaf confirmed pt can return to  at discharge once pt has stabilized further. Writer provided Sadaf with writer's contact information.     OBJECTIVE   Patient was seen and evaluated in the day area by him self, this was a face to face evaluation. Patient reported that he is doing well and he likes the " medications that he is getting. Patient said that he has continue to communicate with this parents and they have been helping him getting snacks and pop. Patient accepted when I informed him that he was going to be here next week and that we are still making adjustments on his medications. He then tells me that he is not lying and that he can talk to his parents and all the dead people. Patient added that he got  on October 31, 1987.     Patient was later on re-evaluated when his sister came to visit. Patient was arguing with the sister about his parents been alive but he accepted my redirection. Sister discuss with me that she doesn't think the patient having the headphones all the time is a good idea because he starts to listen to the news and misunderstands the information. I informed the sister that we have started to restrict the use of the headphones. Patient then said that he is using the headphones to communicate with all the dead people. He was able to be more calm and pleasant as compared to our last meeting. Patient was able to accept that he is not leaving the hospital today but he continues to insist that Huong is not his guardian and that his brother Abhijit will come to take him out.      MEDICATIONS   Medications:  Scheduled Meds:    apixaban ANTICOAGULANT  5 mg Oral BID     artificial tears   Right Eye At Bedtime     calcium polycarbophil  625 mg Oral BID     chlorhexidine  15 mL Swish & Spit Daily     cloZAPine  25 mg Oral Daily     cloZAPine  75 mg Oral At Bedtime     docusate sodium  100 mg Oral Daily     levothyroxine  50 mcg Oral or NG Tube QAM AC     lisinopril  5 mg Oral Daily     melatonin  10 mg Oral At Bedtime     montelukast  10 mg Oral At Bedtime     OLANZapine zydis  5 mg Oral At Bedtime     paliperidone  6 mg Oral Daily     pantoprazole  40 mg Oral QAM AC     prednisoLONE acetate  1 drop Right Eye Daily     topiramate  150 mg Oral At Bedtime     zinc sulfate  220 mg Oral Daily  "    Continuous Infusions:  PRN Meds:.acetaminophen, alum & mag hydroxide-simethicone, artificial saliva, atropine, hydrALAZINE, hydrOXYzine, hypromellose-dextran, ipratropium, OLANZapine **OR** OLANZapine, senna-docusate    Medication adherence issues: MS Med Adherence Y/N: Yes, Hospitalization  Medication side effects: MEDICATION SIDE EFFECTS: no side effects reported  Benefit: Yes / No: Yes       ROS   A comprehensive review of systems was negative.       MENTAL STATUS EXAM   Vitals: /84 (BP Location: Left arm, Patient Position: Sitting, Cuff Size: Adult Regular)   Pulse 96   Temp 97.8  F (36.6  C) (Temporal)   Resp 16   Wt 91.6 kg (201 lb 15.1 oz)   SpO2 97%   BMI 34.66 kg/m        Appearance:  No apparent distress  Mood: \"I am doing good\"  Affect: calm and pleasant  was congruent to speech  Suicidal Ideation: PRESENT / ABSENT: absent   Homicidal Ideation: PRESENT / ABSENT: absent     Thought process: More linear and goal directed  Thought content: Remains delusional.  Stated that he has been seen and talking to his parents  Fund of Knowledge: Below average  Attention/Concentration: Poor  Language ability:  Intact, speech is garbled at times but it can be more clear when you redirect the patient.  Memory:  Immediate recall impaired, Short-term memory impaired and Long-term memory intact  Insight: limited   Judgement: poor  Orientation: Person and situation only  Psychomotor Behavior: slowed    Muscle Strength and Tone: MuscleStrength: Normal  Gait and Station: Stable on his feet       LABS   personally reviewed.   Recent Labs   Lab 06/30/23  0753 06/27/23  0844   WBC  --  6.6   HGB  --  13.4   MCV  --  83   PLT  --  177    142   POTASSIUM 3.8 4.0   CHLORIDE 112* 110*   CO2 23 22   BUN 21.8* 23.3*   CR 0.91 0.86   ANIONGAP 10 10   NASIR 10.0 9.6   * 150*   ALBUMIN 4.1 4.2   PROTTOTAL 7.3 7.2   BILITOTAL 0.4 0.4   ALKPHOS 168* 159*   ALT 56 57   AST 42 50*     No results found for: PHENYTOIN, " PHENOBARB, VALPROATE, CBMZ       DIAGNOSIS   Principal Problem:    Schizoaffective disorder, bipolar type (H)    Active Problem List:  Patient Active Problem List   Diagnosis     Closed head injury, initial encounter     Altered mental status, unspecified altered mental status type     Generalized weakness     Portal vein thrombosis     Pleural effusion     Generalized muscle weakness     Falls frequently     Other ascites     Acute pancreatitis, unspecified complication status, unspecified pancreatitis type     Pancreatic pseudocyst     Idiopathic acute pancreatitis, unspecified complication status     Allergic rhinitis     Fisher's esophagus     CTS (carpal tunnel syndrome)     Elevated liver enzymes     HTN (hypertension)     Hypothyroidism     Keratoconus     Major depressive disorder, recurrent episode, mild (H)     Intellectual disability     Morbid exogenous obesity (H)     Neuroleptic malignant syndrome     Obstructive sleep apnea syndrome     Bipolar affective disorder (H)     Seizure disorder (H)     Seizures, generalized convulsive (H)     Acute respiratory failure with hypoxia (H)     Anemia, unspecified     Anxiety disorder, unspecified     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)     Dietary zinc deficiency     Dry eye syndrome of bilateral lacrimal glands     Dry mouth, unspecified     Edema, unspecified     Gastro-esophageal reflux disease without esophagitis     Hyperosmolality and hypernatremia     Irritable bowel syndrome with constipation     Major depressive disorder, recurrent, unspecified (H)     Metabolic encephalopathy     Moderate protein-calorie malnutrition (H)     Other symbolic dysfunctions     Schizoaffective disorder, unspecified (H)     Thrombocytopenia, unspecified (H)     Unspecified asthma, uncomplicated     Urinary tract infection, site not specified     Vitamin D deficiency, unspecified     Schizoaffective disorder, bipolar type (H)          PLAN   1. Ongoing  education given regarding diagnostic and treatment options with risks, benefits and alternatives and adequate verbalization of understanding.  2.  Medications       Melatonin 10 mg at bedtime       Clozaril at 25 mg daily and 75 mg at bedtime and cross taper with Zyprexa to 5 mg at bedtime last dose will be on Sunday.        Topamax 150 mg at bedtime       Invega 6 mg daily     3.  Medical team following as needed  4.   coordinating a safe discharge plan    CMP to be repeated on Friday    Risk Assessment: [facility] RISK ASSESSMENT: Patient able to contract for safety    Coordination of Care:   Treatment Plan reviewed and physician signed, Care discussed with Care/Treatment Team Members, Chart reviewed and Patient seen      Re-Certification I certify that the inpatient psychiatric facility services furnished since the previous certification were, and continue to be, medically necessary for, either, treatment which could reasonably be expected to improve the patient s condition or diagnostic study and that the hospital records indicate that the services furnished were, either, intensive treatment services, admission and related services necessary for diagnostic study, or equivalent services.     I certify that the patient continues to need, on a daily basis, active treatment furnished directly by or requiring the supervision of inpatient psychiatric facility personnel.   I estimate 14 days of hospitalization is necessary for proper treatment of the patient. My plans for post-hospital care for this patient are  group home     Alejandra Watkins MD    -     06/30/2023  -     11:45 AM    Total time 35 minutes with > 50%spent on coordination of cares and psycho-education.    This note was created with help of Dragon dictation system. Grammatical / typing errors are not intentional.    Alejandra Watkins MD

## 2023-06-30 NOTE — PLAN OF CARE
Assessment/Intervention/Current Symtoms and Care Coordination:  Pt discussed in team rounds this AM. Pt's sister/guardian will be visiting pt at 11 this morning. Pt has been compliant with limits around headphones. Pt had better sleep last night due to headphone restrictions.    Discharge Plan or Goal:  Plan for pt to return to  when stable     Barriers to Discharge:  Pt continues to present as symptomatic (delusional mood). Pt is receiving ongoing stabilization and medication adjustment. Continue care coordination with longterm to ascertain his acceptability for a return to the home.     Referral Status:  Will continue to coordinate with pt's group home.     Legal Status:  Huong Keenan is pt's legal Guardian      Contacts:  Shlomo Borja (Guardian) [phone]   Sadaf ([phone])      Upcoming Meetings and Dates/Important Information and next steps:

## 2023-06-30 NOTE — PLAN OF CARE
"  Problem: Sleep Disturbance  Goal: Adequate Sleep/Rest  Outcome: Not Progressing     Problem: Psychotic Signs/Symptoms  Goal: Improved Mood Symptoms  Outcome: Progressing   Goal Outcome Evaluation :    Received in bed sleeping, pt has a no roommate order due to intrusiveness and poor boundaries. This is the 1st night  for the pt w/o the headphone. Woke up at 0210 and was incontinent of urine, changed independently.  Wanted the headphone at 0230, pt was told it will be given to him at 0600, replied \"ok\".  Quietly walked the [place]ez and went back to sleep.  Slept on and off but was calm and quiet, Redirected back to his room to sleep x 4 tonight.Pt did not insist on getting his headphones.  Slept for 7.75 hours                 "

## 2023-07-01 PROCEDURE — 250N000013 HC RX MED GY IP 250 OP 250 PS 637: Performed by: PHYSICIAN ASSISTANT

## 2023-07-01 PROCEDURE — 250N000013 HC RX MED GY IP 250 OP 250 PS 637: Performed by: PSYCHIATRY & NEUROLOGY

## 2023-07-01 PROCEDURE — 250N000013 HC RX MED GY IP 250 OP 250 PS 637: Performed by: EMERGENCY MEDICINE

## 2023-07-01 PROCEDURE — 124N000003 HC R&B MH SENIOR/ADOLESCENT

## 2023-07-01 RX ADMIN — PALIPERIDONE 6 MG: 3 TABLET, EXTENDED RELEASE ORAL at 08:42

## 2023-07-01 RX ADMIN — Medication 10 MG: at 19:29

## 2023-07-01 RX ADMIN — CALCIUM POLYCARBOPHIL 625 MG: 625 TABLET, FILM COATED ORAL at 08:42

## 2023-07-01 RX ADMIN — CHLORHEXIDINE GLUCONATE 0.12% ORAL RINSE 15 ML: 1.2 LIQUID ORAL at 08:42

## 2023-07-01 RX ADMIN — CLOZAPINE 25 MG: 25 TABLET ORAL at 08:41

## 2023-07-01 RX ADMIN — LISINOPRIL 5 MG: 5 TABLET ORAL at 08:42

## 2023-07-01 RX ADMIN — DOCUSATE SODIUM 100 MG: 100 CAPSULE, LIQUID FILLED ORAL at 08:42

## 2023-07-01 RX ADMIN — ZINC SULFATE 220 MG (50 MG) CAPSULE 220 MG: CAPSULE at 08:42

## 2023-07-01 RX ADMIN — LEVOTHYROXINE SODIUM 50 MCG: 25 TABLET ORAL at 06:51

## 2023-07-01 RX ADMIN — CLOZAPINE 75 MG: 25 TABLET ORAL at 20:56

## 2023-07-01 RX ADMIN — TOPIRAMATE 150 MG: 50 TABLET ORAL at 19:29

## 2023-07-01 RX ADMIN — PANTOPRAZOLE SODIUM 40 MG: 40 TABLET, DELAYED RELEASE ORAL at 06:51

## 2023-07-01 RX ADMIN — WHITE PETROLATUM 57.7 %-MINERAL OIL 31.9 % EYE OINTMENT: at 19:29

## 2023-07-01 RX ADMIN — MONTELUKAST 10 MG: 10 TABLET, FILM COATED ORAL at 19:29

## 2023-07-01 RX ADMIN — APIXABAN 5 MG: 5 TABLET, FILM COATED ORAL at 19:29

## 2023-07-01 RX ADMIN — CLOZAPINE 75 MG: 25 TABLET ORAL at 21:04

## 2023-07-01 RX ADMIN — APIXABAN 5 MG: 5 TABLET, FILM COATED ORAL at 08:42

## 2023-07-01 RX ADMIN — OLANZAPINE 5 MG: 5 TABLET, ORALLY DISINTEGRATING ORAL at 19:29

## 2023-07-01 RX ADMIN — PREDNISOLONE ACETATE 1 DROP: 10 SUSPENSION/ DROPS OPHTHALMIC at 08:42

## 2023-07-01 RX ADMIN — CALCIUM POLYCARBOPHIL 625 MG: 625 TABLET, FILM COATED ORAL at 19:29

## 2023-07-01 ASSESSMENT — ACTIVITIES OF DAILY LIVING (ADL)
ADLS_ACUITY_SCORE: 42
HYGIENE/GROOMING: PROMPTS;INDEPENDENT
ADLS_ACUITY_SCORE: 42

## 2023-07-01 NOTE — PLAN OF CARE
"  Problem: Psychotic Signs/Symptoms  Goal: Improved Mood Symptoms  Note: Patient VS. /70 (BP Location: Left arm, Patient Position: Sitting, Cuff Size: Adult Large)   Pulse 90   Temp 98  F (36.7  C) (Temporal)   Resp 16   Wt 91.6 kg (201 lb 15.1 oz)   SpO2 94%   BMI 34.66 kg/m  . Patient had a good shift. His affect was bright. His behavior was not intrusive. He needed minimal redirection and was not loud in the milieu. Nurse asked,\" Where are your Gui socks?\" He said,\" I threw them away.\" Right foot edema > left. He had no pain complaints. No delusional statements noted. No new behavioral, medical or safety concerns to report this shift. Head phones removed at . He is in a private room due to behaviors.                           "

## 2023-07-01 NOTE — PLAN OF CARE
Problem: Psychotic Signs/Symptoms  Goal: Improved Behavioral Control (Psychotic Signs/Symptoms)  Outcome: Progressing  Flowsheets (Taken 7/1/2023 1351)  Mutually Determined Action Steps (Improved Behavioral Control): other (see comments)  Note: No behavioral outbursts, easily redirectable    Goal Outcome Evaluation:    Plan of Care Reviewed With: patient          7:00-15:30   Patient visible in the milieu for all meals and group therapy. Appetite is excellent % of his meals. Observed walking in the hallway with headphones on and listening to music. New TEDs ordered from [facility], awaiting delivery.       Appearance: took shower this morning, shortly after breakfast.   Attitude: cooperative, redirectable   Behavior: no behavioral outbursts.   Eye Contact: normal   Speech: rambling, slurred,    Orientation: oreinted to person , place and time. Disoriented about situation and reason for current hospitalization   Mood: denied anxiety, denied depression   Affect: consistent with his mood  Thought Process: mildly disorganized but able to complete ADLs independently and make his needs known.    Suicidal Ideation: Denied   Hallucination: Denied      /79 (BP Location: Left arm, Patient Position: Sitting, Cuff Size: Adult Regular)   Pulse 86   Temp 97  F (36.1  C) (Temporal)   Resp 18   Wt 91.6 kg (201 lb 15.1 oz)   SpO2 99%   BMI 34.66 kg/m       Discharge Plan or Goal:  Plan for pt to return to  when stable

## 2023-07-01 NOTE — PLAN OF CARE
Problem: Sleep Disturbance  Goal: Adequate Sleep/Rest  Outcome: Progressing   Goal Outcome Evaluation:       Patient woke up early. He slept for a total of 6.5 hours only. No complaints of pain. After midnight snacks provided per patient's request. He has order of no roommate r/t poor boundaries and intrusiveness. He walks back and forth in the lounge and to his room. No behavioral issues observed.

## 2023-07-02 PROCEDURE — 124N000003 HC R&B MH SENIOR/ADOLESCENT

## 2023-07-02 PROCEDURE — 250N000013 HC RX MED GY IP 250 OP 250 PS 637: Performed by: PHYSICIAN ASSISTANT

## 2023-07-02 PROCEDURE — 250N000013 HC RX MED GY IP 250 OP 250 PS 637: Performed by: EMERGENCY MEDICINE

## 2023-07-02 PROCEDURE — 250N000013 HC RX MED GY IP 250 OP 250 PS 637: Performed by: PSYCHIATRY & NEUROLOGY

## 2023-07-02 RX ADMIN — PREDNISOLONE ACETATE 1 DROP: 10 SUSPENSION/ DROPS OPHTHALMIC at 07:49

## 2023-07-02 RX ADMIN — CLOZAPINE 25 MG: 25 TABLET ORAL at 07:49

## 2023-07-02 RX ADMIN — CALCIUM POLYCARBOPHIL 625 MG: 625 TABLET, FILM COATED ORAL at 19:19

## 2023-07-02 RX ADMIN — LISINOPRIL 5 MG: 5 TABLET ORAL at 07:49

## 2023-07-02 RX ADMIN — PANTOPRAZOLE SODIUM 40 MG: 40 TABLET, DELAYED RELEASE ORAL at 06:41

## 2023-07-02 RX ADMIN — OLANZAPINE 5 MG: 5 TABLET, ORALLY DISINTEGRATING ORAL at 19:19

## 2023-07-02 RX ADMIN — APIXABAN 5 MG: 5 TABLET, FILM COATED ORAL at 19:19

## 2023-07-02 RX ADMIN — CHLORHEXIDINE GLUCONATE 0.12% ORAL RINSE 15 ML: 1.2 LIQUID ORAL at 07:49

## 2023-07-02 RX ADMIN — CALCIUM POLYCARBOPHIL 625 MG: 625 TABLET, FILM COATED ORAL at 07:49

## 2023-07-02 RX ADMIN — ZINC SULFATE 220 MG (50 MG) CAPSULE 220 MG: CAPSULE at 07:49

## 2023-07-02 RX ADMIN — WHITE PETROLATUM 57.7 %-MINERAL OIL 31.9 % EYE OINTMENT: at 19:19

## 2023-07-02 RX ADMIN — LEVOTHYROXINE SODIUM 50 MCG: 25 TABLET ORAL at 06:41

## 2023-07-02 RX ADMIN — Medication 10 MG: at 19:19

## 2023-07-02 RX ADMIN — TOPIRAMATE 150 MG: 50 TABLET ORAL at 19:19

## 2023-07-02 RX ADMIN — MONTELUKAST 10 MG: 10 TABLET, FILM COATED ORAL at 19:19

## 2023-07-02 RX ADMIN — PALIPERIDONE 6 MG: 3 TABLET, EXTENDED RELEASE ORAL at 07:49

## 2023-07-02 RX ADMIN — DOCUSATE SODIUM 100 MG: 100 CAPSULE, LIQUID FILLED ORAL at 07:49

## 2023-07-02 RX ADMIN — APIXABAN 5 MG: 5 TABLET, FILM COATED ORAL at 07:49

## 2023-07-02 RX ADMIN — CLOZAPINE 75 MG: 25 TABLET ORAL at 21:23

## 2023-07-02 ASSESSMENT — ACTIVITIES OF DAILY LIVING (ADL)
ADLS_ACUITY_SCORE: 42
HYGIENE/GROOMING: PROMPTS;INDEPENDENT
ADLS_ACUITY_SCORE: 42

## 2023-07-02 NOTE — PLAN OF CARE
Problem: Disruptive Behavior  Goal: Improved Sleep (Disruptive Behavior)  Outcome: Not Progressing   Goal Outcome Evaluation:         He slept for a total of 6 hours. Denies pain.  Active order for no roommate r/t poor boundaries and intrusiveness.Patient woke up at 0350 and started walking at the hallway. No behaviors reported or noticed on this shift.

## 2023-07-02 NOTE — PLAN OF CARE
Problem: Plan of Care - These are the overarching goals to be used throughout the patient stay.    Goal: Optimal Comfort and Wellbeing  Outcome: Progressing  Intervention: Provide Person-Centered Care  Recent Flowsheet Documentation  Taken 7/2/2023 0746 by Salome Herrera RN  Trust Relationship/Rapport:    care explained    emotional support provided    reassurance provided    thoughts/feelings acknowledged     Problem: Plan of Care - These are the overarching goals to be used throughout the patient stay.    Goal: Readiness for Transition of Care  Outcome: Progressing   Goal Outcome Evaluation:    Plan of Care Reviewed With: patient          7:00-15:30   Patient active in the milieu. Appetite is excellent % of most meals + frequent snacks. As per usual enjoyed walking in the hallway with headphones on and listening to music.Watched TV in the lounge for some time without disturbing his peers. Concentration for longer periods of time still seems impaired.   TEDs on.        Appearance: unkept   Attitude: calm, cooperative, playful, redirectable   Behavior: no behavioral outbursts.   Eye Contact: normal   Speech: rambling, slurred,    Orientation: oreinted to person , place and time. Disoriented about situation and reason for current hospitalization   Mood: denied anxiety, denied depression   Affect: consistent with his mood  Thought Process: mildly disorganized but able to complete ADLs independently and make his needs known.    Suicidal Ideation: Denied   Hallucination: Denied     /85   Pulse 89   Temp 97.6  F (36.4  C) (Temporal)   Resp 16   Wt 91.6 kg (201 lb 15.1 oz)   SpO2 99%   BMI 34.66 kg/m       Discharge Plan or Goal:  Plan for pt to return to  when stable     Patient sister visited today at 1:15pm. The visit went well. No behavioral outbursts. He was calm and seemed enjoying the visit.

## 2023-07-02 NOTE — PLAN OF CARE
Problem: Psychotic Signs/Symptoms  Goal: Optimal Cognitive Function (Psychotic Signs/Symptoms)  Note: Patient VS. /79 (BP Location: Left arm, Patient Position: Sitting, Cuff Size: Adult Regular)   Pulse 86   Temp 97  F (36.1  C) (Temporal)   Resp 18   Wt 91.6 kg (201 lb 15.1 oz)   SpO2 99%   BMI 34.66 kg/m  . Patient had a good night. He appears to be at his baseline. No yelling or loud singing noted. He has been redirectable but has not needed much redirection this shift. He denied any mental health issues and this nurse does not press him too much as this just makes him irritable. No suicidal ideation or thoughts of SIB noted or voiced by patient. He is eating too many snacks. Will encourage staff to redirect or distract patient away from eating. He is gaining weight. No new behavioral, safety or medical concerns to report this shift. Patient is in a private room due to behaviors.

## 2023-07-03 PROCEDURE — 250N000013 HC RX MED GY IP 250 OP 250 PS 637: Performed by: PHYSICIAN ASSISTANT

## 2023-07-03 PROCEDURE — G0177 OPPS/PHP; TRAIN & EDUC SERV: HCPCS

## 2023-07-03 PROCEDURE — 124N000003 HC R&B MH SENIOR/ADOLESCENT

## 2023-07-03 PROCEDURE — 250N000013 HC RX MED GY IP 250 OP 250 PS 637: Performed by: PSYCHIATRY & NEUROLOGY

## 2023-07-03 PROCEDURE — 250N000013 HC RX MED GY IP 250 OP 250 PS 637: Performed by: EMERGENCY MEDICINE

## 2023-07-03 RX ORDER — LAMOTRIGINE 25 MG/1
25 TABLET ORAL DAILY
Status: DISCONTINUED | OUTPATIENT
Start: 2023-07-03 | End: 2023-07-17

## 2023-07-03 RX ORDER — CLOZAPINE 100 MG/1
100 TABLET ORAL AT BEDTIME
Status: DISCONTINUED | OUTPATIENT
Start: 2023-07-03 | End: 2023-07-05

## 2023-07-03 RX ADMIN — MONTELUKAST 10 MG: 10 TABLET, FILM COATED ORAL at 21:35

## 2023-07-03 RX ADMIN — PANTOPRAZOLE SODIUM 40 MG: 40 TABLET, DELAYED RELEASE ORAL at 06:52

## 2023-07-03 RX ADMIN — HYDROXYZINE HYDROCHLORIDE 25 MG: 25 TABLET, FILM COATED ORAL at 21:36

## 2023-07-03 RX ADMIN — ZINC SULFATE 220 MG (50 MG) CAPSULE 220 MG: CAPSULE at 08:31

## 2023-07-03 RX ADMIN — CLOZAPINE 25 MG: 25 TABLET ORAL at 08:31

## 2023-07-03 RX ADMIN — APIXABAN 5 MG: 5 TABLET, FILM COATED ORAL at 21:35

## 2023-07-03 RX ADMIN — PREDNISOLONE ACETATE 1 DROP: 10 SUSPENSION/ DROPS OPHTHALMIC at 08:31

## 2023-07-03 RX ADMIN — PALIPERIDONE 6 MG: 3 TABLET, EXTENDED RELEASE ORAL at 08:30

## 2023-07-03 RX ADMIN — APIXABAN 5 MG: 5 TABLET, FILM COATED ORAL at 08:31

## 2023-07-03 RX ADMIN — CHLORHEXIDINE GLUCONATE 0.12% ORAL RINSE 15 ML: 1.2 LIQUID ORAL at 08:30

## 2023-07-03 RX ADMIN — CALCIUM POLYCARBOPHIL 625 MG: 625 TABLET, FILM COATED ORAL at 21:35

## 2023-07-03 RX ADMIN — WHITE PETROLATUM 57.7 %-MINERAL OIL 31.9 % EYE OINTMENT: at 21:36

## 2023-07-03 RX ADMIN — LEVOTHYROXINE SODIUM 50 MCG: 25 TABLET ORAL at 06:52

## 2023-07-03 RX ADMIN — DOCUSATE SODIUM 100 MG: 100 CAPSULE, LIQUID FILLED ORAL at 08:31

## 2023-07-03 RX ADMIN — LAMOTRIGINE 25 MG: 25 TABLET ORAL at 14:26

## 2023-07-03 RX ADMIN — LISINOPRIL 5 MG: 5 TABLET ORAL at 08:30

## 2023-07-03 RX ADMIN — CALCIUM POLYCARBOPHIL 625 MG: 625 TABLET, FILM COATED ORAL at 08:30

## 2023-07-03 RX ADMIN — Medication 10 MG: at 21:34

## 2023-07-03 RX ADMIN — TOPIRAMATE 150 MG: 50 TABLET ORAL at 21:34

## 2023-07-03 RX ADMIN — CLOZAPINE 100 MG: 100 TABLET ORAL at 21:35

## 2023-07-03 ASSESSMENT — ACTIVITIES OF DAILY LIVING (ADL)
ADLS_ACUITY_SCORE: 32
ADLS_ACUITY_SCORE: 32
ORAL_HYGIENE: INDEPENDENT
HYGIENE/GROOMING: INDEPENDENT
ADLS_ACUITY_SCORE: 42
ADLS_ACUITY_SCORE: 42
ADLS_ACUITY_SCORE: 32
HYGIENE/GROOMING: INDEPENDENT
ADLS_ACUITY_SCORE: 32
ADLS_ACUITY_SCORE: 32
ORAL_HYGIENE: INDEPENDENT
ADLS_ACUITY_SCORE: 42
DRESS: SCRUBS (BEHAVIORAL HEALTH)
ADLS_ACUITY_SCORE: 32
ADLS_ACUITY_SCORE: 42
ADLS_ACUITY_SCORE: 42
LAUNDRY: UNABLE TO COMPLETE
DRESS: SCRUBS (BEHAVIORAL HEALTH);INDEPENDENT
ADLS_ACUITY_SCORE: 42
LAUNDRY: UNABLE TO COMPLETE

## 2023-07-03 NOTE — CARE PLAN
07/03/23 1438   Group Therapy Session   Group Attendance attended group session   Time Session Began 1000   Time Session Ended 1100   Total Time (minutes) 45   Total # Attendees 7   Group Type expressive therapy;task skill;psychotherapeutic   Group Topic Covered coping skills/lifestyle management;problem-solving;cognitive activities;balanced lifestyle;structured socialization   Group Session Detail [facility] group to facilitate coping skill exploration, use of cognitive skills and problem solving, creative expression, clinical observation and facilitation of social, cognitive, and kinesthetic performance skills.    Patient Response/Contribution cooperative with task;disorganized   Patient Participation Detail Needed assistance with task set up and organization of work area to follow through on his plans. Pleasant on approach though was assertive in limiting the amount of work he was interested in completing on his task. .left a few minutes early stating he would be back though did not return, having left his supplies out and not cleaned up yet.

## 2023-07-03 NOTE — PLAN OF CARE
Problem: Sleep Disturbance  Goal: Adequate Sleep/Rest  Outcome: Progressing   Goal Outcome Evaluation:       Patient is sleeping soundly at the start of the shift. He has an order of no roommate r/t poor boundaries and severe intrusiveness. He slept on and off and kept asking for his headphone. He was redirected and went back to sleep. He slept a total of 7 hours the whole night.

## 2023-07-03 NOTE — PLAN OF CARE
Problem: Disruptive Behavior  Goal: Improved Impulse and Aggression Control (Disruptive Behavior)  Note: Patient VS. /79 (BP Location: Right arm, Patient Position: Sitting, Cuff Size: Adult Regular)   Pulse 95   Temp 98.7  F (37.1  C) (Temporal)   Resp 16   Wt 91.6 kg (201 lb 15.1 oz)   SpO2 96%   BMI 34.66 kg/m  . Patient appears to be at his baseline. He's had 3 good evening shifts over the weekend. No outburst. No loud singing or demanding behavior. Patient needed minimal redirection over the last 3 evenings. He continues to eat too much. Staff asked to redirect patient rather than giving him his request, which is usually food. He's bored. Patient is in a private room due to behaviors. He is medication compliant and his headphones were removed with some complaint.

## 2023-07-03 NOTE — PLAN OF CARE
"Assessment/Intervention/Current Symtoms and Care Coordination:  Pt discussed in team rounds this AM. Per staff pt has been at baseline during the weekend. Pt has not been discussing his parents and had no aggression with sister during visit. Pt has also been appropriate with headphone limits. CTC to follow up with pt's sister/guardian regarding their view of pt's progress.    Writer put out call to Huong ([phone]). Huong reports that during her visit this weekend pt was behaviorally controlled but was perseverative on delusions about his parents. She reports that pt was non-stop discussing his parents and would not redirect to another topic. Huong reports that pt went as far as to demand Huong admit their parents are alive and pt became upset when Huong refused to. Pt also referred to an RN as Huong's daughter and became upset when Huong \"didn't recognize her own daughter.\" Pt also discussed that the music in his headphones tell him that \"all people are missing\" and \"don't stop believing that our parents are alive.\" Huong reports that she had to leave the visit because pt would not redirect to other topics. Huong reports that she does not feel pt is at baseline and worries about him returning to the  in this state since he has delusions about his house mates not actually living in the . Writer relayed plan to follow up with provider regarding this conversation. Huong reports that pt's brother, Jason will be visiting pt today and pt \"acts different with his brothers than his sisters.\" Huong relayed plans to check in with writer to see what Jason's visit with pt is like.    Provider notified of conversation with Huong. Provider to increase Clozaril dose. Per provider, pt endorsed delusions during rounds.    Discharge Plan or Goal:  Plan for pt to return to  when stable     Barriers to Discharge:  Pt continues to present as symptomatic (delusional mood). Pt is receiving ongoing stabilization and medication " adjustment. Continue care coordination with senior care to ascertain his acceptability for a return to the home.     Referral Status:  Will continue to coordinate with pt's group home.     Legal Status:  Huong Keenan is pt's legal Guardian      Contacts:  Shlomo Borja (Guardian) [phone]   Sadaf ([phone])      Upcoming Meetings and Dates/Important Information and next steps:            Adult

## 2023-07-03 NOTE — PLAN OF CARE
Problem: Plan of Care - These are the overarching goals to be used throughout the patient stay.    Goal: Optimal Comfort and Wellbeing  Outcome: Adequate for Care Transition  Intervention: Provide Person-Centered Care  Recent Flowsheet Documentation  Taken 7/3/2023 0808 by Salome Herrera RN  Trust Relationship/Rapport:    care explained    emotional support provided    reassurance provided    thoughts/feelings acknowledged     Problem: Plan of Care - These are the overarching goals to be used throughout the patient stay.    Goal: Readiness for Transition of Care  Outcome: Adequate for Care Transition   Goal Outcome Evaluation:    Plan of Care Reviewed With: patient          7:00-15:30   Patient active in the milieu. Appetite is excellent. Family visited today and the visit went well. TEDs on.        Appearance: unkept   Attitude: calm, cooperative, playful, redirectable   Behavior: no behavioral outbursts.   Eye Contact: normal   Speech: rambling, slurred,    Orientation: oreinted to person , place and time. Disoriented about situation and reason for current hospitalization   Mood: denied anxiety, denied depression   Affect: consistent with his mood  Thought Process: mildly disorganized but able to complete ADLs independently and make his needs known.    Suicidal Ideation: Denied   Hallucination: Denied      Discharge Plan or Goal:  Plan for pt to return to  when stable

## 2023-07-03 NOTE — PROGRESS NOTES
"Patient seen, chart reviewed, care discussed with staff.  Phone call to sister, Huong, Guardian.  Blood pressure 115/79, pulse 85, temperature 98  F (36.7  C), temperature source Temporal, resp. rate 16, weight 91.6 kg (201 lb 15.1 oz), SpO2 98 %.    Sister reported he has been seeing his parents (who are ).  Rambles in speech by report, is directable, not violent, and is sleeping and eating ok.    General appearance: good  Alert.   Affect: tearful  Mood: fair    Speech:  Spontaneous speech decreased.   Eye contact:  low    Psychomotor behavior: normal  Gait: steady   Abnormal movements:  none  Delusions: present, about seeing dead parents, and he called me \"Ashvin\"  Hallucinations:  Present as above  Thoughts: linear, concrete  Associations: intact  Judgement: poor  Insight:  poor  Cognitions: impaired  Memory:  intact in conversation  Not suicidal.    Imp:  Schizoaffective disorder, bipolar type  And:   Patient Active Problem List   Diagnosis     Closed head injury, initial encounter     Altered mental status, unspecified altered mental status type     Generalized weakness     Portal vein thrombosis     Pleural effusion     Generalized muscle weakness     Falls frequently     Other ascites     Acute pancreatitis, unspecified complication status, unspecified pancreatitis type     Pancreatic pseudocyst     Idiopathic acute pancreatitis, unspecified complication status     Allergic rhinitis     Fisher's esophagus     CTS (carpal tunnel syndrome)     Elevated liver enzymes     HTN (hypertension)     Hypothyroidism     Keratoconus     Major depressive disorder, recurrent episode, mild (H)     Intellectual disability     Morbid exogenous obesity (H)     Neuroleptic malignant syndrome     Obstructive sleep apnea syndrome     Bipolar affective disorder (H)     Seizure disorder (H)     Seizures, generalized convulsive (H)     Acute respiratory failure with hypoxia (H)     Anemia, unspecified     Anxiety disorder, " unspecified     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)     Dietary zinc deficiency     Dry eye syndrome of bilateral lacrimal glands     Dry mouth, unspecified     Edema, unspecified     Gastro-esophageal reflux disease without esophagitis     Hyperosmolality and hypernatremia     Irritable bowel syndrome with constipation     Major depressive disorder, recurrent, unspecified (H)     Metabolic encephalopathy     Moderate protein-calorie malnutrition (H)     Other symbolic dysfunctions     Schizoaffective disorder, unspecified (H)     Thrombocytopenia, unspecified (H)     Unspecified asthma, uncomplicated     Urinary tract infection, site not specified     Vitamin D deficiency, unspecified     Schizoaffective disorder, bipolar type (H)     Plan:  Add Lamictal.  SJS warnings discussed with sister.  Current Facility-Administered Medications   Medication     acetaminophen (TYLENOL) tablet 650 mg     alum & mag hydroxide-simethicone (MAALOX) suspension 30 mL     apixaban ANTICOAGULANT (ELIQUIS) tablet 5 mg     artificial saliva (BIOTENE MT) solution 2 spray     artificial tears ophthalmic ointment     atropine 1 % ophthalmic solution 2 drop     calcium polycarbophil (FIBERCON) tablet 625 mg     chlorhexidine (PERIDEX) 0.12 % solution 15 mL     cloZAPine (CLOZARIL) tablet 100 mg     cloZAPine (CLOZARIL) tablet 25 mg     docusate sodium (COLACE) capsule 100 mg     hydrALAZINE (APRESOLINE) tablet 10 mg     hydrOXYzine (ATARAX) tablet 25 mg     hypromellose-dextran (ARTIFICAL TEARS) 0.1-0.3 % ophthalmic solution 2 drop     ipratropium (ATROVENT) 0.06 % spray 2 spray     lamoTRIgine (LaMICtal) tablet 25 mg     levothyroxine (SYNTHROID/LEVOTHROID) tablet 50 mcg     lisinopril (ZESTRIL) tablet 5 mg     melatonin tablet 10 mg     montelukast (SINGULAIR) tablet 10 mg     OLANZapine (zyPREXA) tablet 10 mg    Or     OLANZapine (zyPREXA) injection 10 mg     paliperidone ER (INVEGA) 24 hr tablet 6 mg      pantoprazole (PROTONIX) EC tablet 40 mg     prednisoLONE acetate (PRED FORTE) 1 % ophthalmic susp 1 drop     senna-docusate (SENOKOT-S/PERICOLACE) 8.6-50 MG per tablet 1 tablet     topiramate (TOPAMAX) tablet 150 mg     zinc sulfate (ZINCATE) capsule 220 mg     .resultrcnt[1w

## 2023-07-04 LAB
BASOPHILS # BLD AUTO: 0 10E3/UL (ref 0–0.2)
BASOPHILS NFR BLD AUTO: 0 %
DEPRECATED CALCIDIOL+CALCIFEROL SERPL-MC: 34 UG/L (ref 20–75)
EOSINOPHIL # BLD AUTO: 0.1 10E3/UL (ref 0–0.7)
EOSINOPHIL NFR BLD AUTO: 2 %
ERYTHROCYTE [DISTWIDTH] IN BLOOD BY AUTOMATED COUNT: 13.6 % (ref 10–15)
HCT VFR BLD AUTO: 41.5 % (ref 40–53)
HGB BLD-MCNC: 13.6 G/DL (ref 13.3–17.7)
IMM GRANULOCYTES # BLD: 0 10E3/UL
IMM GRANULOCYTES NFR BLD: 0 %
LYMPHOCYTES # BLD AUTO: 2.3 10E3/UL (ref 0.8–5.3)
LYMPHOCYTES NFR BLD AUTO: 38 %
MCH RBC QN AUTO: 27.5 PG (ref 26.5–33)
MCHC RBC AUTO-ENTMCNC: 32.8 G/DL (ref 31.5–36.5)
MCV RBC AUTO: 84 FL (ref 78–100)
MONOCYTES # BLD AUTO: 0.5 10E3/UL (ref 0–1.3)
MONOCYTES NFR BLD AUTO: 8 %
NEUTROPHILS # BLD AUTO: 3.1 10E3/UL (ref 1.6–8.3)
NEUTROPHILS NFR BLD AUTO: 52 %
NRBC # BLD AUTO: 0 10E3/UL
NRBC BLD AUTO-RTO: 0 /100
PLATELET # BLD AUTO: 173 10E3/UL (ref 150–450)
RBC # BLD AUTO: 4.95 10E6/UL (ref 4.4–5.9)
WBC # BLD AUTO: 6.1 10E3/UL (ref 4–11)

## 2023-07-04 PROCEDURE — 250N000013 HC RX MED GY IP 250 OP 250 PS 637: Performed by: EMERGENCY MEDICINE

## 2023-07-04 PROCEDURE — 36415 COLL VENOUS BLD VENIPUNCTURE: CPT | Performed by: PSYCHIATRY & NEUROLOGY

## 2023-07-04 PROCEDURE — 250N000013 HC RX MED GY IP 250 OP 250 PS 637: Performed by: PSYCHIATRY & NEUROLOGY

## 2023-07-04 PROCEDURE — 250N000013 HC RX MED GY IP 250 OP 250 PS 637: Performed by: PHYSICIAN ASSISTANT

## 2023-07-04 PROCEDURE — H2032 ACTIVITY THERAPY, PER 15 MIN: HCPCS

## 2023-07-04 PROCEDURE — 124N000003 HC R&B MH SENIOR/ADOLESCENT

## 2023-07-04 PROCEDURE — 85025 COMPLETE CBC W/AUTO DIFF WBC: CPT | Performed by: PSYCHIATRY & NEUROLOGY

## 2023-07-04 RX ADMIN — PANTOPRAZOLE SODIUM 40 MG: 40 TABLET, DELAYED RELEASE ORAL at 06:55

## 2023-07-04 RX ADMIN — Medication 10 MG: at 21:50

## 2023-07-04 RX ADMIN — CHLORHEXIDINE GLUCONATE 0.12% ORAL RINSE 15 ML: 1.2 LIQUID ORAL at 08:29

## 2023-07-04 RX ADMIN — APIXABAN 5 MG: 5 TABLET, FILM COATED ORAL at 21:50

## 2023-07-04 RX ADMIN — PALIPERIDONE 6 MG: 3 TABLET, EXTENDED RELEASE ORAL at 08:29

## 2023-07-04 RX ADMIN — APIXABAN 5 MG: 5 TABLET, FILM COATED ORAL at 08:29

## 2023-07-04 RX ADMIN — CLOZAPINE 25 MG: 25 TABLET ORAL at 08:30

## 2023-07-04 RX ADMIN — HYDROXYZINE HYDROCHLORIDE 25 MG: 25 TABLET, FILM COATED ORAL at 21:51

## 2023-07-04 RX ADMIN — TOPIRAMATE 150 MG: 50 TABLET ORAL at 21:50

## 2023-07-04 RX ADMIN — MONTELUKAST 10 MG: 10 TABLET, FILM COATED ORAL at 21:50

## 2023-07-04 RX ADMIN — CLOZAPINE 100 MG: 100 TABLET ORAL at 21:50

## 2023-07-04 RX ADMIN — LEVOTHYROXINE SODIUM 50 MCG: 25 TABLET ORAL at 06:55

## 2023-07-04 RX ADMIN — CALCIUM POLYCARBOPHIL 625 MG: 625 TABLET, FILM COATED ORAL at 08:29

## 2023-07-04 RX ADMIN — PREDNISOLONE ACETATE 1 DROP: 10 SUSPENSION/ DROPS OPHTHALMIC at 08:28

## 2023-07-04 RX ADMIN — LISINOPRIL 5 MG: 5 TABLET ORAL at 08:29

## 2023-07-04 RX ADMIN — DOCUSATE SODIUM 100 MG: 100 CAPSULE, LIQUID FILLED ORAL at 08:29

## 2023-07-04 RX ADMIN — LAMOTRIGINE 25 MG: 25 TABLET ORAL at 08:30

## 2023-07-04 RX ADMIN — ZINC SULFATE 220 MG (50 MG) CAPSULE 220 MG: CAPSULE at 08:29

## 2023-07-04 RX ADMIN — CALCIUM POLYCARBOPHIL 625 MG: 625 TABLET, FILM COATED ORAL at 21:50

## 2023-07-04 ASSESSMENT — ACTIVITIES OF DAILY LIVING (ADL)
ADLS_ACUITY_SCORE: 32
HYGIENE/GROOMING: INDEPENDENT
ADLS_ACUITY_SCORE: 32
ADLS_ACUITY_SCORE: 32
ORAL_HYGIENE: INDEPENDENT
LAUNDRY: UNABLE TO COMPLETE
ADLS_ACUITY_SCORE: 32
DRESS: INDEPENDENT;SCRUBS (BEHAVIORAL HEALTH)
ADLS_ACUITY_SCORE: 32

## 2023-07-04 NOTE — PLAN OF CARE
Problem: Psychotic Signs/Symptoms  Goal: Improved Behavioral Control (Psychotic Signs/Symptoms)  Outcome: Progressing   Goal Outcome Evaluation:    Plan of Care Reviewed With: patient      Patient looks like he is enjoying singing along with his headphone on. He was loud in humming the songs but was redirectable. He paced in the hallway. He is social with both staff and peers. No emotional outburst observed. Patient's speech is rambling and tangential. Thoughts are illogical. He denied SI/SIB/HI nor hallucinations. Denied wishing himself to be dead. No statement of wanting to leave heard this shift. His appetite is excellent. Drinks ample amount of fluids. His /86; P=100. He has an order of no roommate r/t severe intrusiveness and poor boundaries. He took all his bedtime meds without difficulty. His compression stockings removed.

## 2023-07-04 NOTE — PLAN OF CARE
Problem: Sleep Disturbance  Goal: Adequate Sleep/Rest  Outcome: Progressing  Intervention: Promote Sleep/Rest  Recent Flowsheet Documentation  Taken 7/3/2023 1842 by Prabha Boone RN  Sleep/Rest Enhancement:    regular sleep/rest pattern promoted    noise level reduced    medication    music provided    room darkened    snack   Goal Outcome Evaluation:    Plan of Care Reviewed With: patient      Patient is sleeping comfortably. He has an order of no roommate r/t severe intrusiveness and poor boundaries. Nothing unusual noted. No emotional outburst observed. He woke up early around 0500. He slept a total of 7.5 hours the whole night. He had urinary incontinence x 1. He cleaned himself up. Bedsheets soaked with urine. Bedsheets changed to clean ones.

## 2023-07-04 NOTE — PLAN OF CARE
Problem: Psychotic Signs/Symptoms  Goal: Improved Behavioral Control (Psychotic Signs/Symptoms)  Outcome: Progressing   Goal Outcome Evaluation:    Plan of Care Reviewed With: patient          7:00-15:30   Patient appears to be at baseline. He is active in the milieu - walking in the hallway, attending group activities. He is not disruptive and is easily redirectable.   Independent with ADLs. Showered after breakfast. No urinary incontinence during the day.    Appearance: untidy   Attitude: calm, cooperative, playful, redirectable   Behavior: no behavioral outbursts.   Eye Contact: good   Speech: rambling, slurred,    Orientation: oreinted to person , place and time.   Mood: denied anxiety, denied depression   Affect: consistent with his mood  Thought Process: mildly disorganized but able to complete ADLs independently and make his needs known.    Suicidal Ideation: Denied   Hallucination: Denied      /84   Pulse 101   Temp 97.6  F (36.4  C) (Oral)   Resp 18   Wt 92.2 kg (203 lb 4.2 oz)   SpO2 98%   BMI 34.89 kg/m       Discharge Plan or Goal:  Plan for pt to return to  when stable

## 2023-07-04 NOTE — PROGRESS NOTES
"Patient seen, chart reviewed, care discussed with staff.  Call to guardian occurred yesterday    Blood pressure 133/84, pulse 101, temperature 97.6  F (36.4  C), temperature source Oral, resp. rate 18, weight 92.2 kg (203 lb 4.2 oz), SpO2 98 %.    General appearance: good  Alert.   Affect: good  Mood: \"fantastic\"  Speech:  normal.   Eye contact:  good.    Psychomotor behavior: normal  Gait: steady   Abnormal movements:  none  Delusions: reported  Hallucinations:  denied  Thoughts: linear, concrete  Associations: intact  Judgement: poor  Insight: poor  Cognitions: impaired  Memory:  intact in conversation  Orientation: not checked   Not suicidal.    Imp:  Schizoaffective illness, bipolar type, and:  Patient Active Problem List   Diagnosis     Closed head injury, initial encounter     Altered mental status, unspecified altered mental status type     Generalized weakness     Portal vein thrombosis     Pleural effusion     Generalized muscle weakness     Falls frequently     Other ascites     Acute pancreatitis, unspecified complication status, unspecified pancreatitis type     Pancreatic pseudocyst     Idiopathic acute pancreatitis, unspecified complication status     Allergic rhinitis     Fisher's esophagus     CTS (carpal tunnel syndrome)     Elevated liver enzymes     HTN (hypertension)     Hypothyroidism     Keratoconus     Major depressive disorder, recurrent episode, mild (H)     Intellectual disability     Morbid exogenous obesity (H)     Neuroleptic malignant syndrome     Obstructive sleep apnea syndrome     Bipolar affective disorder (H)     Seizure disorder (H)     Seizures, generalized convulsive (H)     Acute respiratory failure with hypoxia (H)     Anemia, unspecified     Anxiety disorder, unspecified     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)     Dietary zinc deficiency     Dry eye syndrome of bilateral lacrimal glands     Dry mouth, unspecified     Edema, unspecified     " Gastro-esophageal reflux disease without esophagitis     Hyperosmolality and hypernatremia     Irritable bowel syndrome with constipation     Major depressive disorder, recurrent, unspecified (H)     Metabolic encephalopathy     Moderate protein-calorie malnutrition (H)     Other symbolic dysfunctions     Schizoaffective disorder, unspecified (H)     Thrombocytopenia, unspecified (H)     Unspecified asthma, uncomplicated     Urinary tract infection, site not specified     Vitamin D deficiency, unspecified     Schizoaffective disorder, bipolar type (H)     Plan:  Same meds, changes were made yesdterday  Current Facility-Administered Medications   Medication     acetaminophen (TYLENOL) tablet 650 mg     alum & mag hydroxide-simethicone (MAALOX) suspension 30 mL     apixaban ANTICOAGULANT (ELIQUIS) tablet 5 mg     artificial saliva (BIOTENE MT) solution 2 spray     artificial tears ophthalmic ointment     atropine 1 % ophthalmic solution 2 drop     calcium polycarbophil (FIBERCON) tablet 625 mg     chlorhexidine (PERIDEX) 0.12 % solution 15 mL     cloZAPine (CLOZARIL) tablet 100 mg     cloZAPine (CLOZARIL) tablet 25 mg     docusate sodium (COLACE) capsule 100 mg     hydrALAZINE (APRESOLINE) tablet 10 mg     hydrOXYzine (ATARAX) tablet 25 mg     hypromellose-dextran (ARTIFICAL TEARS) 0.1-0.3 % ophthalmic solution 2 drop     ipratropium (ATROVENT) 0.06 % spray 2 spray     lamoTRIgine (LaMICtal) tablet 25 mg     levothyroxine (SYNTHROID/LEVOTHROID) tablet 50 mcg     lisinopril (ZESTRIL) tablet 5 mg     melatonin tablet 10 mg     montelukast (SINGULAIR) tablet 10 mg     OLANZapine (zyPREXA) tablet 10 mg    Or     OLANZapine (zyPREXA) injection 10 mg     paliperidone ER (INVEGA) 24 hr tablet 6 mg     pantoprazole (PROTONIX) EC tablet 40 mg     prednisoLONE acetate (PRED FORTE) 1 % ophthalmic susp 1 drop     senna-docusate (SENOKOT-S/PERICOLACE) 8.6-50 MG per tablet 1 tablet     topiramate (TOPAMAX) tablet 150 mg     zinc  sulfate (ZINCATE) capsule 220 mg     Recent Results (from the past 168 hour(s))   Comprehensive metabolic panel    Collection Time: 06/30/23  7:53 AM   Result Value Ref Range    Sodium 145 136 - 145 mmol/L    Potassium 3.8 3.4 - 5.3 mmol/L    Chloride 112 (H) 98 - 107 mmol/L    Carbon Dioxide (CO2) 23 22 - 29 mmol/L    Anion Gap 10 7 - 15 mmol/L    Urea Nitrogen 21.8 (H) 6.0 - 20.0 mg/dL    Creatinine 0.91 0.67 - 1.17 mg/dL    Calcium 10.0 8.6 - 10.0 mg/dL    Glucose 110 (H) 70 - 99 mg/dL    Alkaline Phosphatase 168 (H) 40 - 129 U/L    AST 42 0 - 45 U/L    ALT 56 0 - 70 U/L    Protein Total 7.3 6.4 - 8.3 g/dL    Albumin 4.1 3.5 - 5.2 g/dL    Bilirubin Total 0.4 <=1.2 mg/dL    GFR Estimate >90 >60 mL/min/1.73m2   Extra Purple Top Tube    Collection Time: 06/30/23  7:53 AM   Result Value Ref Range    Hold Specimen [facility]    CBC with platelets and differential    Collection Time: 07/04/23  6:54 AM   Result Value Ref Range    WBC Count 6.1 4.0 - 11.0 10e3/uL    RBC Count 4.95 4.40 - 5.90 10e6/uL    Hemoglobin 13.6 13.3 - 17.7 g/dL    Hematocrit 41.5 40.0 - 53.0 %    MCV 84 78 - 100 fL    MCH 27.5 26.5 - 33.0 pg    MCHC 32.8 31.5 - 36.5 g/dL    RDW 13.6 10.0 - 15.0 %    Platelet Count 173 150 - 450 10e3/uL    % Neutrophils 52 %    % Lymphocytes 38 %    % Monocytes 8 %    % Eosinophils 2 %    % Basophils 0 %    % Immature Granulocytes 0 %    NRBCs per 100 WBC 0 <1 /100    Absolute Neutrophils 3.1 1.6 - 8.3 10e3/uL    Absolute Lymphocytes 2.3 0.8 - 5.3 10e3/uL    Absolute Monocytes 0.5 0.0 - 1.3 10e3/uL    Absolute Eosinophils 0.1 0.0 - 0.7 10e3/uL    Absolute Basophils 0.0 0.0 - 0.2 10e3/uL    Absolute Immature Granulocytes 0.0 <=0.4 10e3/uL    Absolute NRBCs 0.0 10e3/uL

## 2023-07-04 NOTE — PROGRESS NOTES
Pt arrived a bit late but was energized and ready to go.  He enjoyed higher level movements with wiggles and shakes and hard rock music.  He did join in these movements with the group instead of using conflicting music blaring from his headphones.  He didn't even bring headphones into the group room at all today during dance/movement therapy (D/MT) and expressed enjoyment exploring movement based in themes of social gatherings and celebrations for the holiday.  Sensation-based movements were supported by imagery and pt was able to motivate toward uplifted motion and mood with appropriate social engagement.        07/04/23 1115   Expressive Therapy   Therapy Type dance/movement   Minutes of Treatment 45

## 2023-07-05 PROCEDURE — 250N000013 HC RX MED GY IP 250 OP 250 PS 637: Performed by: PHYSICIAN ASSISTANT

## 2023-07-05 PROCEDURE — 250N000013 HC RX MED GY IP 250 OP 250 PS 637: Performed by: EMERGENCY MEDICINE

## 2023-07-05 PROCEDURE — 124N000003 HC R&B MH SENIOR/ADOLESCENT

## 2023-07-05 PROCEDURE — 250N000013 HC RX MED GY IP 250 OP 250 PS 637: Performed by: PSYCHIATRY & NEUROLOGY

## 2023-07-05 RX ORDER — ACETAMINOPHEN 500 MG
500 TABLET ORAL 3 TIMES DAILY
Status: DISCONTINUED | OUTPATIENT
Start: 2023-07-05 | End: 2023-07-12

## 2023-07-05 RX ADMIN — CALCIUM POLYCARBOPHIL 625 MG: 625 TABLET, FILM COATED ORAL at 21:25

## 2023-07-05 RX ADMIN — APIXABAN 5 MG: 5 TABLET, FILM COATED ORAL at 08:09

## 2023-07-05 RX ADMIN — DOCUSATE SODIUM 100 MG: 100 CAPSULE, LIQUID FILLED ORAL at 08:09

## 2023-07-05 RX ADMIN — CALCIUM POLYCARBOPHIL 625 MG: 625 TABLET, FILM COATED ORAL at 08:09

## 2023-07-05 RX ADMIN — ACETAMINOPHEN 500 MG: 500 TABLET ORAL at 08:09

## 2023-07-05 RX ADMIN — HYDROXYZINE HYDROCHLORIDE 25 MG: 25 TABLET, FILM COATED ORAL at 21:25

## 2023-07-05 RX ADMIN — CLOZAPINE 125 MG: 100 TABLET ORAL at 21:25

## 2023-07-05 RX ADMIN — ZINC SULFATE 220 MG (50 MG) CAPSULE 220 MG: CAPSULE at 08:09

## 2023-07-05 RX ADMIN — LISINOPRIL 5 MG: 5 TABLET ORAL at 08:10

## 2023-07-05 RX ADMIN — APIXABAN 5 MG: 5 TABLET, FILM COATED ORAL at 21:26

## 2023-07-05 RX ADMIN — TOPIRAMATE 150 MG: 50 TABLET ORAL at 21:25

## 2023-07-05 RX ADMIN — CLOZAPINE 25 MG: 25 TABLET ORAL at 08:09

## 2023-07-05 RX ADMIN — PANTOPRAZOLE SODIUM 40 MG: 40 TABLET, DELAYED RELEASE ORAL at 06:34

## 2023-07-05 RX ADMIN — WHITE PETROLATUM 57.7 %-MINERAL OIL 31.9 % EYE OINTMENT: at 21:22

## 2023-07-05 RX ADMIN — LAMOTRIGINE 25 MG: 25 TABLET ORAL at 08:09

## 2023-07-05 RX ADMIN — ACETAMINOPHEN 500 MG: 500 TABLET ORAL at 21:26

## 2023-07-05 RX ADMIN — PALIPERIDONE 6 MG: 3 TABLET, EXTENDED RELEASE ORAL at 08:09

## 2023-07-05 RX ADMIN — MONTELUKAST 10 MG: 10 TABLET, FILM COATED ORAL at 21:26

## 2023-07-05 RX ADMIN — LEVOTHYROXINE SODIUM 50 MCG: 25 TABLET ORAL at 06:34

## 2023-07-05 RX ADMIN — ACETAMINOPHEN 500 MG: 500 TABLET ORAL at 14:13

## 2023-07-05 RX ADMIN — CHLORHEXIDINE GLUCONATE 0.12% ORAL RINSE 15 ML: 1.2 LIQUID ORAL at 08:09

## 2023-07-05 RX ADMIN — PREDNISOLONE ACETATE 1 DROP: 10 SUSPENSION/ DROPS OPHTHALMIC at 08:13

## 2023-07-05 RX ADMIN — Medication 10 MG: at 21:26

## 2023-07-05 ASSESSMENT — ACTIVITIES OF DAILY LIVING (ADL)
ADLS_ACUITY_SCORE: 33
LAUNDRY: UNABLE TO COMPLETE
DRESS: SCRUBS (BEHAVIORAL HEALTH);INDEPENDENT
ADLS_ACUITY_SCORE: 33
ADLS_ACUITY_SCORE: 32
ADLS_ACUITY_SCORE: 33
ADLS_ACUITY_SCORE: 32
DRESS: INDEPENDENT;SCRUBS (BEHAVIORAL HEALTH)
ADLS_ACUITY_SCORE: 32
HYGIENE/GROOMING: INDEPENDENT
ORAL_HYGIENE: INDEPENDENT
ADLS_ACUITY_SCORE: 32
ADLS_ACUITY_SCORE: 32
ADLS_ACUITY_SCORE: 33
ADLS_ACUITY_SCORE: 33
ADLS_ACUITY_SCORE: 32
ADLS_ACUITY_SCORE: 33
ORAL_HYGIENE: INDEPENDENT
HYGIENE/GROOMING: INDEPENDENT

## 2023-07-05 NOTE — CARE PLAN
Occupational Therapy     07/05/23 1300   Group Therapy Session   Group Attendance other (see comments)   Time Session Began 1115   Time Session Ended 1215   Total Time (minutes) 10   Total # Attendees 5-6   Group Type recreation   Group Topic Covered balanced lifestyle;cognitive activities;coping skills/lifestyle management;leisure exploration/use of leisure time;structured socialization   Group Session Detail OT: Education on 8 Dimensions of wellness with interactive social activities (Theraband & Rebus Puzzles) to increase concentration, focus, attention to task/detail, memory recall, coping with stress, health distraction engagement, social wellness, physical wellness, and intellectual wellness   Patient Response/Contribution left the group on several occasions;refused to participate;other (see comments)  (restless; disruptive)   Patient Participation Detail Pt entered and left group area multiple times. Each time pt entered the group area he became disruptive as he attempted to push boundaries and not turn off his headphones and place them on the counter (pt has been educated about this boundary and group rule multiple time); pt compliant each time but disruptive group each time he entered and exited group. Pt was never goal-directed during group and refused to engage in presented activity. Pt appeared to engage in attention seeking behavior behavior.

## 2023-07-05 NOTE — PLAN OF CARE
Problem: Sleep Disturbance  Goal: Adequate Sleep/Rest  Outcome: Progressing  Intervention: Promote Sleep/Rest  Recent Flowsheet Documentation  Taken 7/4/2023 1845 by Prabha Boone RN  Sleep/Rest Enhancement:    regular sleep/rest pattern promoted    noise level reduced    medication    music provided    room darkened    snack   Goal Outcome Evaluation:    Plan of Care Reviewed With: patient        Patient is sleeping soundly. He has an order of no roommate r/t severe intrusiveness and poor boundaries. No emotional outburst observed. Patient woke up around 0400. No urinary incontinence observed. He was pacing in the hallway. He slept for a total of 7 hours.

## 2023-07-05 NOTE — PROGRESS NOTES
Patient seen, chart reviewed, care discussed with staff.  Shows low frustration tolerance  Blood pressure 125/82, pulse 101, temperature 97.4  F (36.3  C), temperature source Temporal, resp. rate 16, weight 92.2 kg (203 lb 4.2 oz), SpO2 99 %.  Reports talking to his parents daily (they are )  General appearance: good  Alert.   Affect: good  Mood: good   Speech:  [place], linear.   Eye contact:  good.    Psychomotor behavior: normal  Gait: steady   Abnormal movements:  none  Delusions: present  Hallucinations:  present  Thoughts: concrete, linear  Associations: intact  Judgement: poor  Insight: low  Cognitions: impaired  Not suicidal.    Imp:  Schizoaffective illness, bipolar type, and:  Patient Active Problem List   Diagnosis     Closed head injury, initial encounter     Altered mental status, unspecified altered mental status type     Generalized weakness     Portal vein thrombosis     Pleural effusion     Generalized muscle weakness     Falls frequently     Other ascites     Acute pancreatitis, unspecified complication status, unspecified pancreatitis type     Pancreatic pseudocyst     Idiopathic acute pancreatitis, unspecified complication status     Allergic rhinitis     Fisher's esophagus     CTS (carpal tunnel syndrome)     Elevated liver enzymes     HTN (hypertension)     Hypothyroidism     Keratoconus     Major depressive disorder, recurrent episode, mild (H)     Intellectual disability     Morbid exogenous obesity (H)     Neuroleptic malignant syndrome     Obstructive sleep apnea syndrome     Bipolar affective disorder (H)     Seizure disorder (H)     Seizures, generalized convulsive (H)     Acute respiratory failure with hypoxia (H)     Anemia, unspecified     Anxiety disorder, unspecified     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)     Dietary zinc deficiency     Dry eye syndrome of bilateral lacrimal glands     Dry mouth, unspecified     Edema, unspecified      Gastro-esophageal reflux disease without esophagitis     Hyperosmolality and hypernatremia     Irritable bowel syndrome with constipation     Major depressive disorder, recurrent, unspecified (H)     Metabolic encephalopathy     Moderate protein-calorie malnutrition (H)     Other symbolic dysfunctions     Schizoaffective disorder, unspecified (H)     Thrombocytopenia, unspecified (H)     Unspecified asthma, uncomplicated     Urinary tract infection, site not specified     Vitamin D deficiency, unspecified     Schizoaffective disorder, bipolar type (H)     Plan:  Increase Clozaril  Current Facility-Administered Medications   Medication     acetaminophen (TYLENOL) tablet 500 mg     acetaminophen (TYLENOL) tablet 650 mg     alum & mag hydroxide-simethicone (MAALOX) suspension 30 mL     apixaban ANTICOAGULANT (ELIQUIS) tablet 5 mg     artificial saliva (BIOTENE MT) solution 2 spray     artificial tears ophthalmic ointment     atropine 1 % ophthalmic solution 2 drop     calcium polycarbophil (FIBERCON) tablet 625 mg     chlorhexidine (PERIDEX) 0.12 % solution 15 mL     cloZAPine (CLOZARIL) tablet 100 mg     cloZAPine (CLOZARIL) tablet 25 mg     docusate sodium (COLACE) capsule 100 mg     hydrALAZINE (APRESOLINE) tablet 10 mg     hydrOXYzine (ATARAX) tablet 25 mg     hypromellose-dextran (ARTIFICAL TEARS) 0.1-0.3 % ophthalmic solution 2 drop     ipratropium (ATROVENT) 0.06 % spray 2 spray     lamoTRIgine (LaMICtal) tablet 25 mg     levothyroxine (SYNTHROID/LEVOTHROID) tablet 50 mcg     lisinopril (ZESTRIL) tablet 5 mg     melatonin tablet 10 mg     montelukast (SINGULAIR) tablet 10 mg     OLANZapine (zyPREXA) tablet 10 mg    Or     OLANZapine (zyPREXA) injection 10 mg     paliperidone ER (INVEGA) 24 hr tablet 6 mg     pantoprazole (PROTONIX) EC tablet 40 mg     prednisoLONE acetate (PRED FORTE) 1 % ophthalmic susp 1 drop     senna-docusate (SENOKOT-S/PERICOLACE) 8.6-50 MG per tablet 1 tablet     topiramate (TOPAMAX)  tablet 150 mg     zinc sulfate (ZINCATE) capsule 220 mg     Recent Results (from the past 168 hour(s))   Comprehensive metabolic panel    Collection Time: 06/30/23  7:53 AM   Result Value Ref Range    Sodium 145 136 - 145 mmol/L    Potassium 3.8 3.4 - 5.3 mmol/L    Chloride 112 (H) 98 - 107 mmol/L    Carbon Dioxide (CO2) 23 22 - 29 mmol/L    Anion Gap 10 7 - 15 mmol/L    Urea Nitrogen 21.8 (H) 6.0 - 20.0 mg/dL    Creatinine 0.91 0.67 - 1.17 mg/dL    Calcium 10.0 8.6 - 10.0 mg/dL    Glucose 110 (H) 70 - 99 mg/dL    Alkaline Phosphatase 168 (H) 40 - 129 U/L    AST 42 0 - 45 U/L    ALT 56 0 - 70 U/L    Protein Total 7.3 6.4 - 8.3 g/dL    Albumin 4.1 3.5 - 5.2 g/dL    Bilirubin Total 0.4 <=1.2 mg/dL    GFR Estimate >90 >60 mL/min/1.73m2   Extra Purple Top Tube    Collection Time: 06/30/23  7:53 AM   Result Value Ref Range    Hold Specimen [facility]    Vitamin D Deficiency    Collection Time: 06/30/23  7:53 AM   Result Value Ref Range    Vitamin D, Total (25-Hydroxy) 34 20 - 75 ug/L   CBC with platelets and differential    Collection Time: 07/04/23  6:54 AM   Result Value Ref Range    WBC Count 6.1 4.0 - 11.0 10e3/uL    RBC Count 4.95 4.40 - 5.90 10e6/uL    Hemoglobin 13.6 13.3 - 17.7 g/dL    Hematocrit 41.5 40.0 - 53.0 %    MCV 84 78 - 100 fL    MCH 27.5 26.5 - 33.0 pg    MCHC 32.8 31.5 - 36.5 g/dL    RDW 13.6 10.0 - 15.0 %    Platelet Count 173 150 - 450 10e3/uL    % Neutrophils 52 %    % Lymphocytes 38 %    % Monocytes 8 %    % Eosinophils 2 %    % Basophils 0 %    % Immature Granulocytes 0 %    NRBCs per 100 WBC 0 <1 /100    Absolute Neutrophils 3.1 1.6 - 8.3 10e3/uL    Absolute Lymphocytes 2.3 0.8 - 5.3 10e3/uL    Absolute Monocytes 0.5 0.0 - 1.3 10e3/uL    Absolute Eosinophils 0.1 0.0 - 0.7 10e3/uL    Absolute Basophils 0.0 0.0 - 0.2 10e3/uL    Absolute Immature Granulocytes 0.0 <=0.4 10e3/uL    Absolute NRBCs 0.0 10e3/uL

## 2023-07-05 NOTE — PROVIDER NOTIFICATION
07/05/23 1872   Individualization/Patient Specific Goals   Patient Personal Strengths family/social support;medication/treatment adherence;stable living environment   Patient Vulnerabilities limited ability to read/write;limited social skills;lacks insight into illness   Anxieties, Fears or Concerns None at this time   Individualized Care Needs Medication and stabilization   Interprofessional Rounds   Summary Patient admitted for concerns for his safety, pt is going through medication changes   Participants CTC;psychiatrist;nursing   Behavioral Team Discussion   Participants Dr. Milind Dumas MD, Maggie Mitchell MSW, LGSW, Cris Diaz RN   Progress Stabilization is ongoing   Anticipated length of stay 7 Days.   Continued Stay Criteria/Rationale Requires stabilization to reduce risk of imminent harm to himself and others.   Medical/Physical See H&P   Precautions See below   Plan Psychiatric assesemnt. Medication manangement. Therapeutic Mileu. Individual and group support. Pt to discharge back to  when stable   Rationale for change in precautions or plan no change   Safety Plan CTC will do individual inpatient treatment planning and after care planning. CTC will discuss options for increasing community supports with the patient. CTC will coordinate with outpatient providers and will place referrals to ensure appointments are in place.   Anticipated Discharge Disposition group home     PRECAUTIONS AND SAFETY    Behavioral Orders   Procedures    Code 1 - Restrict to Unit    Code 2    Code 2     Head ct    Elopement precautions    Routine Programming     As clinically indicated    Sexual precautions    Status 15     Every 15 minutes.       Safety  Safety WDL: WDL  Patient Location: patient room, own  Observed Behavior: pacing, calm, walking, sitting  Observed Behavior (Comment): pacing in the hallway with headphone on  Safety Measures: environmental rounds completed, safety rounds completed, suicide check-in  completed  Diversional Activity: music  Suicidality: Status 15  Seizure precautions: clutter free environment  Assault: status 15, private room  Elopement Assessment: Statements about wanting to leave  Elopement Interventions: status 15, room away from unit doors  Sexual: status 15, private room  Additional Documentation:  (Elopement precuations in place.)

## 2023-07-05 NOTE — PLAN OF CARE
Assessment/Intervention/Current Symtoms and Care Coordination:  Pt discussed in team rounds this AM. Pt had periods of aggressive behavior in the evening.    Writer received call from [facility] care coordinator, iLlo ([phone]) inquiring about pt's progress. Writer updated Lilo on pt's status. Lilo reported she will be connecting again with [facility] next week.    Discharge Plan or Goal:  Plan for pt to return to  when stable     Barriers to Discharge:  Pt continues to present as symptomatic (delusional mood). Pt is receiving ongoing stabilization and medication adjustment. Continue care coordination with senior care to ascertain his acceptability for a return to the home.     Referral Status:  Will continue to coordinate with pt's group home.     Legal Status:  Voluntary per guardian     Contacts:  Guardian/Sister - Huong Shlomo   P: [phone]   - Saadf  P: [phone]     Upcoming Meetings and Dates/Important Information and next steps:

## 2023-07-05 NOTE — PROGRESS NOTES
"Patient has been argumentative with staff. He took the remote from another patient and refused to give it back stating \"its mine, I paid for it\". He became very verbally aggressive. Patients had donuts and popcorn for HS snack and Jagdeep stood at the table and kept eating all the snacks. He had already eaten 3 donuts and was going to take another one when he was told he could not because it was for another patient and he yelled, \"You be quiet\". This writer took the donut and patient grabbed it from this writers hand and yelled \"its mine, I bought them, don't tell me what to do\".   "

## 2023-07-05 NOTE — CARE PLAN
07/05/23 1518   Group Therapy Session   Group Attendance attended group session   Time Session Began 1300   Time Session Ended 1400   Total Time (minutes) 25  (no charge)   Total # Attendees 7   Group Type task skill   Group Topic Covered problem-solving;leisure exploration/use of leisure time;cognitive activities   Group Session Detail Occupational therapy group was facilitated this date with leisure and recreation being the focus of group. Education was provided regarding the reasoning for utilizing games as therapeutic modalities and the benefits of engagement. Patient actively engaged in therapeutic game (Claire Slate) in order to: improve social skills, encourage new learning, leisure exploration, exercise cognitive skills, improve concentration, and encourage healthy leisure engagements.   Patient Response/Contribution cooperative with task;verbalizations were off topic   Patient Participation Detail pt arrived late for group. pt did not have headphones with him. pt was provided instructions and set up for group activity. pt was able to follow flow of activity with assistance from staff. pt required repetition of instructions to keep responses appropriate and on topic. pt answered a couple questions inappropriately however pt did not elaborate on his responses and therapist did not verbalize them during group. pt asked about his headphones at end of group.

## 2023-07-05 NOTE — PLAN OF CARE
Problem: Psychotic Signs/Symptoms  Goal: Improved Behavioral Control (Psychotic Signs/Symptoms)  Outcome: Progressing   Goal Outcome Evaluation:    Plan of Care Reviewed With: patient      Patient still the same, pacing in the hallway with a headphone on. He was humming loud but redirectable. Patient is able to follow instructions/directions. However, there are times when he becomes verbally aggressive (Pls read the notes of another RN who witness the behavior of the patient this evening).  He denied hallucinations; thoughts of harming himself. He denied s/s of depression. Patient's mood is labile this evening. His appetite is excellent. He is med compliant. No swallowing difficulty nor cheeking of meds observed. His BP  133/76; P-103.

## 2023-07-05 NOTE — CARE PLAN
Occupational Therapy     07/05/23 1500   Group Therapy Session   Group Attendance attended group session   Time Session Began 1015   Time Session Ended 1115   Total Time (minutes) 25   Total # Attendees 6-7   Group Type task skill   Group Topic Covered cognitive activities;coping skills/lifestyle management;leisure exploration/use of leisure time;problem-solving;structured socialization   Group Session Detail OT: Education on healthy activity engagement and creative hands on endeavor (OT clinic) to increase concentration, focus, attention to task/detail, decision making, problem solving, frustration tolerance, task follow through, coping with stress, healthy leisure engagement, creative expression, and social engagement   Patient Response/Contribution confused;refused to comply with staff direction;other (see comments);disorganized;cooperative with task  (semi-argumentative; decreased awareness of appropriate social behavior)   Patient Participation Detail Pt arrived late to group and stayed for duration. Pt initially became semi-argumentative with therapist when directed to turn off his headphones and place them on the counter; pt eventually complied with staff direction. Pt sat among peers to complete selected creative hands on endeavor but did not engage in social interactions with peers. Pt worked in a messy manner to complete project. Pt demonstrated decreased awareness of appropriate social behavior as he initially refused to clean-up his supplies (pt left dirty supplies on the counter and attempted to leave group area without cleaning up); pt redirectable and responded to boundaries placed by therapist.

## 2023-07-05 NOTE — PLAN OF CARE
Problem: Suicidal Behavior  Goal: Suicidal Behavior is Absent or Managed  Outcome: Met  Flowsheets (Taken 7/5/2023 0849)  Mutually Determined Action Steps (Suicidal Behavior Absent/Managed): sets future-oriented goal     Problem: Sleep Disturbance  Goal: Adequate Sleep/Rest  Outcome: Progressing   Goal Outcome Evaluation:    Pt cooperative with AM medications this AM. Pt has been listening to his headphones and walking in the [place]. Pt denies mental health symptoms.   Pt initiated showering this AM.     Pt oriented to [facility], Wednesday, July, 5th , 2023. Pt continues to deny all mental health symptoms including anxiety/depression/SI/SIB/wishes to be dead. Pt eye contact is inconsistent. Pt's mood has been calm this shift. Pt continues to show poor insight. Pt reports that he lives in a hotel with his twelve wives. Pt has needed to be redirected from the desk as he will come up and touch things such as other patient's menus, the computer etc. Staff has been able to redirect him with other activities such as walking in the [place] with staff or listening to music.  Pt was assisted with putting compression socks on this AM.   Pt denies pain.

## 2023-07-06 PROCEDURE — 250N000013 HC RX MED GY IP 250 OP 250 PS 637: Performed by: PSYCHIATRY & NEUROLOGY

## 2023-07-06 PROCEDURE — 250N000013 HC RX MED GY IP 250 OP 250 PS 637: Performed by: PHYSICIAN ASSISTANT

## 2023-07-06 PROCEDURE — 250N000013 HC RX MED GY IP 250 OP 250 PS 637: Performed by: EMERGENCY MEDICINE

## 2023-07-06 PROCEDURE — G0177 OPPS/PHP; TRAIN & EDUC SERV: HCPCS

## 2023-07-06 PROCEDURE — 124N000003 HC R&B MH SENIOR/ADOLESCENT

## 2023-07-06 PROCEDURE — H2032 ACTIVITY THERAPY, PER 15 MIN: HCPCS

## 2023-07-06 RX ADMIN — CHLORHEXIDINE GLUCONATE 0.12% ORAL RINSE 15 ML: 1.2 LIQUID ORAL at 07:55

## 2023-07-06 RX ADMIN — WHITE PETROLATUM 57.7 %-MINERAL OIL 31.9 % EYE OINTMENT: at 21:53

## 2023-07-06 RX ADMIN — LAMOTRIGINE 25 MG: 25 TABLET ORAL at 07:56

## 2023-07-06 RX ADMIN — LISINOPRIL 5 MG: 5 TABLET ORAL at 07:55

## 2023-07-06 RX ADMIN — MONTELUKAST 10 MG: 10 TABLET, FILM COATED ORAL at 21:56

## 2023-07-06 RX ADMIN — PREDNISOLONE ACETATE 1 DROP: 10 SUSPENSION/ DROPS OPHTHALMIC at 07:56

## 2023-07-06 RX ADMIN — ACETAMINOPHEN 500 MG: 500 TABLET ORAL at 14:23

## 2023-07-06 RX ADMIN — CLOZAPINE 25 MG: 25 TABLET ORAL at 07:55

## 2023-07-06 RX ADMIN — PALIPERIDONE 6 MG: 3 TABLET, EXTENDED RELEASE ORAL at 07:55

## 2023-07-06 RX ADMIN — APIXABAN 5 MG: 5 TABLET, FILM COATED ORAL at 07:55

## 2023-07-06 RX ADMIN — CALCIUM POLYCARBOPHIL 625 MG: 625 TABLET, FILM COATED ORAL at 21:56

## 2023-07-06 RX ADMIN — APIXABAN 5 MG: 5 TABLET, FILM COATED ORAL at 21:56

## 2023-07-06 RX ADMIN — LEVOTHYROXINE SODIUM 50 MCG: 25 TABLET ORAL at 05:19

## 2023-07-06 RX ADMIN — ACETAMINOPHEN 500 MG: 500 TABLET ORAL at 07:55

## 2023-07-06 RX ADMIN — ZINC SULFATE 220 MG (50 MG) CAPSULE 220 MG: CAPSULE at 07:56

## 2023-07-06 RX ADMIN — ACETAMINOPHEN 500 MG: 500 TABLET ORAL at 21:55

## 2023-07-06 RX ADMIN — CLOZAPINE 125 MG: 100 TABLET ORAL at 21:51

## 2023-07-06 RX ADMIN — CALCIUM POLYCARBOPHIL 625 MG: 625 TABLET, FILM COATED ORAL at 07:55

## 2023-07-06 RX ADMIN — TOPIRAMATE 150 MG: 50 TABLET ORAL at 21:55

## 2023-07-06 RX ADMIN — DOCUSATE SODIUM 100 MG: 100 CAPSULE, LIQUID FILLED ORAL at 07:55

## 2023-07-06 RX ADMIN — Medication 10 MG: at 21:56

## 2023-07-06 RX ADMIN — PANTOPRAZOLE SODIUM 40 MG: 40 TABLET, DELAYED RELEASE ORAL at 05:19

## 2023-07-06 ASSESSMENT — ACTIVITIES OF DAILY LIVING (ADL)
ADLS_ACUITY_SCORE: 33
LAUNDRY: UNABLE TO COMPLETE
ADLS_ACUITY_SCORE: 33
ORAL_HYGIENE: INDEPENDENT
ADLS_ACUITY_SCORE: 33
ADLS_ACUITY_SCORE: 33
DRESS: SCRUBS (BEHAVIORAL HEALTH);INDEPENDENT
ADLS_ACUITY_SCORE: 33
ADLS_ACUITY_SCORE: 33
HYGIENE/GROOMING: INDEPENDENT
ADLS_ACUITY_SCORE: 33

## 2023-07-06 NOTE — PROGRESS NOTES
"Pt was jovial and engaged. He expressed a desire to keep his headphones on with loud personal music but easily reached a compromise with this therapist that met group and his personal needs.  He remained in session longer than peers for louder expressions he felt \"gets the frustrations out and relieves a lot of pressure\" for him.  He was then able to return to a more peaceful state to join peers for lunch.  He also remained more engaged  in dance/movement therapy (D/MT) using imagery suggested by song lyrics and therapist to suppor expressive gestures.  For example, the sunrise for lifting and opening, gentle rain for lightness, a \"Mellow May\" for easy delight, and an earthquake for finding rooted strength during a crisis. Pt was also able to engage in D/MT neurological-connection movements.       07/06/23 1130   Expressive Therapy   Therapy Type dance/movement   Minutes of Treatment 50       "

## 2023-07-06 NOTE — CARE PLAN
Occupational Therapy Group Note:     07/06/23 1130   Group Therapy Session   Group Attendance attended group session   Time Session Began 1305   Time Session Ended 1355   Total Time (minutes) 50   Total # Attendees 7   Group Type life skill;recreation   Group Topic Covered balanced lifestyle;structured socialization;leisure exploration/use of leisure time   Group Session Detail self-reflection group game   Patient Response/Contribution cooperative with task   Patient Participation Detail Pt actively participated in a structured occupational therapy group involving a self-reflecting, group leisure task. Readily agreed to turn off and take off his headphones upon arrival to group. Pt appeared comfortable sharing positive past experiences and personal information with peers, and was respectful in listening to peers.  Receptive to assistance from a peer in following 3 step directions for the task. Responses to prompts were tangential at times. Spoke positively about his relationship with his siblings. Affect appeared to brighten at times in the context of reflecting on humorous memories.

## 2023-07-06 NOTE — PLAN OF CARE
Problem: Plan of Care - These are the overarching goals to be used throughout the patient stay.    Goal: Absence of Hospital-Acquired Illness or Injury  Intervention: Prevent Skin Injury  Recent Flowsheet Documentation  Taken 7/6/2023 1017 by Salina Posey RN  Body Position: position changed independently   Goal Outcome Evaluation:  Pt presenting with full range and calm on approached. He ate well for both meals and interacted well with peers and staff . No behavior problem noted . Pt is calm and approachable . Attended groups and was quiet but later stayed behind and enjoy listening to his kind of music . Pt offers no complained of pain or discomfort . Will follow POC

## 2023-07-06 NOTE — PLAN OF CARE
"  Problem: Psychotic Signs/Symptoms  Goal: Improved Behavioral Control (Psychotic Signs/Symptoms)  Outcome: Progressing   Goal Outcome Evaluation:    Plan of Care Reviewed With: patient      Patient is visible majority of the shift with a headphone on. Again, he was loud humming while listening to the music. He was redirectable. He was calm with a full range of affect. He is pleasant and cooperative. He denied SI/SIB/hallucinations of any type. Patient's thoughts remains disorganized and tangential. He made delusional statement that he is going home caused his wives are waiting. He said he has \"a lot of wives wives\" and \"[id number]\". Patient's appetite is excellent. He is med compliant. Patient complained of back pain. He has his scheduled Tylenol at HS. HIs Teds were removed and washed. His feet remain edematous with +2 indentation.  He went to bed early. He has an order of no roommate r/t severe intrusiveness and poor boundaries.                 "

## 2023-07-06 NOTE — PLAN OF CARE
"Assessment/Intervention/Current Symtoms and Care Coordination:  Pt discussed in team rounds this AM. Pt has been awake throughout the night despite not having access to the headphones to promote better sleep.    Writer met with pt bedside to check in. Pt reports that he wanted to talk to writer about going outside with his family. Writer relayed this is not possible because pt is hospitalized. Pt replied \"no I am not, inpatient no longer exists, this is a hotel and I live here.\" Writer attempted to clarify if pt was making a joke due to his length of stay. Pt then spoke about how he needs to go outside because \"all the bands are here and they want me to play for them.\" Pt then discussed how the band members are in \"our\" families and pt listed off his family members. Pt had a bright affect and was joking with writer, but pt was making some delusional statements throughout interaction.    Discharge Plan or Goal:  Plan for pt to return to  when stable     Barriers to Discharge:  Pt continues to present as symptomatic (delusional mood). Pt is receiving ongoing stabilization and medication adjustment. Continue care coordination with [facility] to ascertain his acceptability for a return to the home.     Referral Status:  Will continue to coordinate with pt's group home.     Legal Status:  Voluntary per guardian     Contacts:  Guardian/Sister - Huong Shlomo   P: [phone]   - Sadaf  P: [phone]     Upcoming Meetings and Dates/Important Information and next steps:           "

## 2023-07-06 NOTE — PLAN OF CARE
Problem: Sleep Disturbance  Goal: Adequate Sleep/Rest  7/6/2023 0433 by Lotus Montenegro, RN  Outcome: Not Progressing  7/6/2023 0025 by Lotus Montenegro RN  Outcome: Progressing   Goal Outcome Evaluation:         Slept well from the start of the shift until 0300.Pt has a no roommate order due to intrusiveness and poor boundaries.  Since 0300, pt has been visible in the lounge and asked for coffee and his headphone several times, pt was told it will be given to him at 0600. Calm and quiet when he walked down the [place]. Continued to play with room call light sytem. Encouraged to go back to bed  0455 Came out again and this time told staff that the clock is not set right.  Only slept 5.5 hours  0600 laughing with a peer while watching TV

## 2023-07-07 PROCEDURE — 250N000013 HC RX MED GY IP 250 OP 250 PS 637: Performed by: PHYSICIAN ASSISTANT

## 2023-07-07 PROCEDURE — 124N000003 HC R&B MH SENIOR/ADOLESCENT

## 2023-07-07 PROCEDURE — 250N000013 HC RX MED GY IP 250 OP 250 PS 637: Performed by: PSYCHIATRY & NEUROLOGY

## 2023-07-07 PROCEDURE — 250N000013 HC RX MED GY IP 250 OP 250 PS 637: Performed by: EMERGENCY MEDICINE

## 2023-07-07 PROCEDURE — 250N000009 HC RX 250: Performed by: EMERGENCY MEDICINE

## 2023-07-07 PROCEDURE — G0177 OPPS/PHP; TRAIN & EDUC SERV: HCPCS

## 2023-07-07 RX ADMIN — LISINOPRIL 5 MG: 5 TABLET ORAL at 08:29

## 2023-07-07 RX ADMIN — TOPIRAMATE 150 MG: 50 TABLET ORAL at 20:23

## 2023-07-07 RX ADMIN — PALIPERIDONE 6 MG: 3 TABLET, EXTENDED RELEASE ORAL at 08:29

## 2023-07-07 RX ADMIN — CLOZAPINE 125 MG: 100 TABLET ORAL at 20:23

## 2023-07-07 RX ADMIN — ACETAMINOPHEN 500 MG: 500 TABLET ORAL at 20:24

## 2023-07-07 RX ADMIN — DOCUSATE SODIUM 100 MG: 100 CAPSULE, LIQUID FILLED ORAL at 08:28

## 2023-07-07 RX ADMIN — APIXABAN 5 MG: 5 TABLET, FILM COATED ORAL at 20:23

## 2023-07-07 RX ADMIN — Medication 10 MG: at 20:23

## 2023-07-07 RX ADMIN — ACETAMINOPHEN 500 MG: 500 TABLET ORAL at 13:35

## 2023-07-07 RX ADMIN — CLOZAPINE 25 MG: 25 TABLET ORAL at 08:29

## 2023-07-07 RX ADMIN — CHLORHEXIDINE GLUCONATE 0.12% ORAL RINSE 15 ML: 1.2 LIQUID ORAL at 08:30

## 2023-07-07 RX ADMIN — ZINC SULFATE 220 MG (50 MG) CAPSULE 220 MG: CAPSULE at 08:28

## 2023-07-07 RX ADMIN — WHITE PETROLATUM 57.7 %-MINERAL OIL 31.9 % EYE OINTMENT: at 20:24

## 2023-07-07 RX ADMIN — APIXABAN 5 MG: 5 TABLET, FILM COATED ORAL at 08:28

## 2023-07-07 RX ADMIN — CALCIUM POLYCARBOPHIL 625 MG: 625 TABLET, FILM COATED ORAL at 08:28

## 2023-07-07 RX ADMIN — LAMOTRIGINE 25 MG: 25 TABLET ORAL at 08:29

## 2023-07-07 RX ADMIN — ACETAMINOPHEN 500 MG: 500 TABLET ORAL at 08:28

## 2023-07-07 RX ADMIN — PANTOPRAZOLE SODIUM 40 MG: 40 TABLET, DELAYED RELEASE ORAL at 05:46

## 2023-07-07 RX ADMIN — LEVOTHYROXINE SODIUM 50 MCG: 25 TABLET ORAL at 05:46

## 2023-07-07 RX ADMIN — MONTELUKAST 10 MG: 10 TABLET, FILM COATED ORAL at 20:23

## 2023-07-07 RX ADMIN — PREDNISOLONE ACETATE 1 DROP: 10 SUSPENSION/ DROPS OPHTHALMIC at 08:30

## 2023-07-07 RX ADMIN — CALCIUM POLYCARBOPHIL 625 MG: 625 TABLET, FILM COATED ORAL at 20:23

## 2023-07-07 ASSESSMENT — ACTIVITIES OF DAILY LIVING (ADL)
DRESS: INDEPENDENT
DRESS: INDEPENDENT;SCRUBS (BEHAVIORAL HEALTH)
ADLS_ACUITY_SCORE: 33
ADLS_ACUITY_SCORE: 33
HYGIENE/GROOMING: INDEPENDENT
ADLS_ACUITY_SCORE: 33
ORAL_HYGIENE: INDEPENDENT
ORAL_HYGIENE: INDEPENDENT
ADLS_ACUITY_SCORE: 33
HYGIENE/GROOMING: INDEPENDENT

## 2023-07-07 NOTE — PLAN OF CARE
Problem: Plan of Care - These are the overarching goals to be used throughout the patient stay.    Goal: Absence of Hospital-Acquired Illness or Injury  Intervention: Identify and Manage Fall Risk  Recent Flowsheet Documentation  Taken 7/7/2023 1137 by Cris Diaz RN  Safety Promotion/Fall Prevention:    clutter free environment maintained    nonskid shoes/slippers when out of bed    safety round/check completed     Problem: Plan of Care - These are the overarching goals to be used throughout the patient stay.    Goal: Absence of Hospital-Acquired Illness or Injury  Intervention: Prevent Skin Injury  Recent Flowsheet Documentation  Taken 7/7/2023 1137 by Cris Diaz RN  Body Position: position changed independently     Problem: Psychotic Signs/Symptoms  Goal: Improved Behavioral Control (Psychotic Signs/Symptoms)  Outcome: Progressing  Flowsheets (Taken 7/7/2023 1227)  Mutually Determined Action Steps (Improved Behavioral Control): identifies future-oriented goal     Problem: Psychotic Signs/Symptoms  Goal: Improved Psychomotor Symptoms (Psychotic Signs/Symptoms)  Outcome: Progressing  Flowsheets (Taken 7/7/2023 1312)  Mutually Determined Action Steps (Improved Psychomotor Symptoms):    adheres to medication regimen    exhibits decrease in agitation  Intervention: Manage Psychomotor Movement  Recent Flowsheet Documentation  Taken 7/7/2023 1137 by Cris Diaz RN  Patient Performed Hygiene:    shower    shaved face    dressed  Activity (Behavioral Health):    up ad katina    activity encouraged     Problem: Disruptive Behavior  Goal: Improved Mood Symptoms (Disruptive Behavior)  Outcome: Progressing     Problem: Fall Injury Risk  Goal: Absence of Fall and Fall-Related Injury  Intervention: Promote Injury-Free Environment  Recent Flowsheet Documentation  Taken 7/7/2023 1137 by Cris Diaz RN  Safety Promotion/Fall Prevention:    clutter free environment maintained    nonskid shoes/slippers when  "out of bed    safety round/check completed   Goal Outcome Evaluation:    Plan of Care Reviewed With: patient      Pt has been calm and cooperative. Pt's eye contact has Pt oriented to July 7th, 2023. Pt thought the day of the week was Tuesday. Pt stated that this was the [facility] but then went on to state that this was know a \"mansion\" that he now lived at with his wife. Pt reported that \"Curly\" also lived here but no longer did because he \"did the unmentionable\".   Pt has been walking in the [place] and listening to music on the headphones. Pt has had not verbal outbursts.  Pt has been medication compliant. Pt has denied pain. Pt has been independent with changing his clothing.  Pt has eaten well at both meals and is taking fluids regularly.                  "

## 2023-07-07 NOTE — CARE PLAN
07/07/23 1518   Group Therapy Session   Group Attendance attended group session   Time Session Began 1020   Time Session Ended 1115   Total Time (minutes) 45   Total # Attendees 6   Group Type expressive therapy;task skill;psychotherapeutic   Group Topic Covered coping skills/lifestyle management;problem-solving;cognitive activities;structured socialization;balanced lifestyle   Group Session Detail [facility] group to facilitate coping skill exploration, use of cognitive skills and problem solving, creative expression, clinical observation and facilitation of social, cognitive, and kinesthetic performance skills.    Patient Response/Contribution cooperative with task;listened actively   Patient Participation Detail Seemed more receptive with following directions and redirection with task steps. With encouragement, added extra steps to complete the work. No disagreements, seemed comfortable and willing to follow through. Comments seemed in a relaxed tone of voice.

## 2023-07-07 NOTE — PLAN OF CARE
Assessment/Intervention/Current Symtoms and Care Coordination:  Pt discussed in team rounds this AM. Pt had no outbursts yesterday. Pt continues to wake up in the middle of the night but still receives adequate sleep. Pt to titrate on Clozaril.    Discharge Plan or Goal:  Plan for pt to return to  when stable     Barriers to Discharge:  Pt continues to present as symptomatic (delusional mood). Pt is receiving ongoing stabilization and medication adjustment. [facility] to ascertain his acceptability for a return to the home.     Referral Status:  Will continue to coordinate with pt's group home.     Legal Status:  Voluntary per guardian     Contacts:  Guardian/Sister - Huong Keenan   P: [phone]   - Sadaf  P: [phone]     Upcoming Meetings and Dates/Important Information and next steps:

## 2023-07-07 NOTE — PLAN OF CARE
Problem: Psychotic Signs/Symptoms  Goal: Improved Behavioral Control (Psychotic Signs/Symptoms)  Outcome: Progressing   Goal Outcome Evaluation:    Plan of Care Reviewed With: patient      Patient's mood is calm with a full-range of affect. He is social with staff and select peers. He watches TV with his peers. No emotional outburst observed. His speech is rambling and tangential. His thoughts remains disorganized and illogical. He is delusional. He is alert and oriented x 2. Denied SI/SIB/HI/Hallucinations. Denied pain in any part of his body. He is med compliant. No side effects observed. His appetite is excellent. He takes long naps. He is redirectable. His /88; P-98. He still has pedal edema with +2 indentation. He has no roommate as ordered r/t poor boundaries and severe intrusiveness.

## 2023-07-07 NOTE — PROGRESS NOTES
"Patient seen, chart reviewed, care discussed with staff.  Blood pressure 138/84, pulse 91, temperature 97.9  F (36.6  C), temperature source Temporal, resp. rate 16, weight 93.2 kg (205 lb 7.5 oz), SpO2 99 %.    Still delusional, he noted Dr. Charles was his wife.  Still talks daily to his parents, and can't clarify how long it has been for, \"since the family reunion\".    General appearance: good  Alert.   Affect: good  Mood: good    Speech:  normal.   Eye contact:  good.    Psychomotor behavior: normal  Gait: steady but wide based  Abnormal movements:  none  Delusions: present  Hallucinations:  present  Thoughts: illogical  Associations: loose at times  Judgement: poor  Insight: low  Cognitions: impaired  Memory:  intact in conversation  Orientation: this is a mansion  Not suicidal.    Imp:  Schizoaffective illness, bipolar type, and:      Patient Active Problem List   Diagnosis     Closed head injury, initial encounter     Altered mental status, unspecified altered mental status type     Generalized weakness     Portal vein thrombosis     Pleural effusion     Generalized muscle weakness     Falls frequently     Other ascites     Acute pancreatitis, unspecified complication status, unspecified pancreatitis type     Pancreatic pseudocyst     Idiopathic acute pancreatitis, unspecified complication status     Allergic rhinitis     Fisher's esophagus     CTS (carpal tunnel syndrome)     Elevated liver enzymes     HTN (hypertension)     Hypothyroidism     Keratoconus     Major depressive disorder, recurrent episode, mild (H)     Intellectual disability     Morbid exogenous obesity (H)     Neuroleptic malignant syndrome     Obstructive sleep apnea syndrome     Bipolar affective disorder (H)     Seizure disorder (H)     Seizures, generalized convulsive (H)     Acute respiratory failure with hypoxia (H)     Anemia, unspecified     Anxiety disorder, unspecified     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)     Dietary zinc deficiency     Dry eye syndrome of bilateral lacrimal glands     Dry mouth, unspecified     Edema, unspecified     Gastro-esophageal reflux disease without esophagitis     Hyperosmolality and hypernatremia     Irritable bowel syndrome with constipation     Major depressive disorder, recurrent, unspecified (H)     Metabolic encephalopathy     Moderate protein-calorie malnutrition (H)     Other symbolic dysfunctions     Schizoaffective disorder, unspecified (H)     Thrombocytopenia, unspecified (H)     Unspecified asthma, uncomplicated     Urinary tract infection, site not specified     Vitamin D deficiency, unspecified     Schizoaffective disorder, bipolar type (H)     Clozaril has been at this dose for 2 days, Lamictal for 4 days, same meds for now    Current Facility-Administered Medications   Medication     acetaminophen (TYLENOL) tablet 500 mg     acetaminophen (TYLENOL) tablet 650 mg     alum & mag hydroxide-simethicone (MAALOX) suspension 30 mL     apixaban ANTICOAGULANT (ELIQUIS) tablet 5 mg     artificial saliva (BIOTENE MT) solution 2 spray     artificial tears ophthalmic ointment     atropine 1 % ophthalmic solution 2 drop     calcium polycarbophil (FIBERCON) tablet 625 mg     chlorhexidine (PERIDEX) 0.12 % solution 15 mL     cloZAPine (CLOZARIL) tablet 125 mg     cloZAPine (CLOZARIL) tablet 25 mg     docusate sodium (COLACE) capsule 100 mg     hydrALAZINE (APRESOLINE) tablet 10 mg     hydrOXYzine (ATARAX) tablet 25 mg     hypromellose-dextran (ARTIFICAL TEARS) 0.1-0.3 % ophthalmic solution 2 drop     ipratropium (ATROVENT) 0.06 % spray 2 spray     lamoTRIgine (LaMICtal) tablet 25 mg     levothyroxine (SYNTHROID/LEVOTHROID) tablet 50 mcg     lisinopril (ZESTRIL) tablet 5 mg     melatonin tablet 10 mg     montelukast (SINGULAIR) tablet 10 mg     OLANZapine (zyPREXA) tablet 10 mg    Or     OLANZapine (zyPREXA) injection 10 mg     paliperidone ER (INVEGA) 24 hr tablet 6 mg     pantoprazole  (PROTONIX) EC tablet 40 mg     prednisoLONE acetate (PRED FORTE) 1 % ophthalmic susp 1 drop     senna-docusate (SENOKOT-S/PERICOLACE) 8.6-50 MG per tablet 1 tablet     topiramate (TOPAMAX) tablet 150 mg     zinc sulfate (ZINCATE) capsule 220 mg     Recent Results (from the past 168 hour(s))   CBC with platelets and differential    Collection Time: 07/04/23  6:54 AM   Result Value Ref Range    WBC Count 6.1 4.0 - 11.0 10e3/uL    RBC Count 4.95 4.40 - 5.90 10e6/uL    Hemoglobin 13.6 13.3 - 17.7 g/dL    Hematocrit 41.5 40.0 - 53.0 %    MCV 84 78 - 100 fL    MCH 27.5 26.5 - 33.0 pg    MCHC 32.8 31.5 - 36.5 g/dL    RDW 13.6 10.0 - 15.0 %    Platelet Count 173 150 - 450 10e3/uL    % Neutrophils 52 %    % Lymphocytes 38 %    % Monocytes 8 %    % Eosinophils 2 %    % Basophils 0 %    % Immature Granulocytes 0 %    NRBCs per 100 WBC 0 <1 /100    Absolute Neutrophils 3.1 1.6 - 8.3 10e3/uL    Absolute Lymphocytes 2.3 0.8 - 5.3 10e3/uL    Absolute Monocytes 0.5 0.0 - 1.3 10e3/uL    Absolute Eosinophils 0.1 0.0 - 0.7 10e3/uL    Absolute Basophils 0.0 0.0 - 0.2 10e3/uL    Absolute Immature Granulocytes 0.0 <=0.4 10e3/uL    Absolute NRBCs 0.0 10e3/uL

## 2023-07-07 NOTE — PLAN OF CARE
Problem: Sleep Disturbance  Goal: Adequate Sleep/Rest  Outcome: Progressing   Goal Outcome Evaluation:               Received in bed sleeping, pt has ano roommate order due to poor boundaries and intrusiveness. Slept uninterrupted until 0300, then slept on and off after. Calm, quiet while he walked down the [place] and able to make needs known.  Started playing with the [employer] light system at 0600  Slept for a total of 7.25 hours

## 2023-07-08 PROCEDURE — 250N000013 HC RX MED GY IP 250 OP 250 PS 637: Performed by: PSYCHIATRY & NEUROLOGY

## 2023-07-08 PROCEDURE — 124N000003 HC R&B MH SENIOR/ADOLESCENT

## 2023-07-08 PROCEDURE — 250N000013 HC RX MED GY IP 250 OP 250 PS 637: Performed by: PHYSICIAN ASSISTANT

## 2023-07-08 PROCEDURE — 250N000013 HC RX MED GY IP 250 OP 250 PS 637: Performed by: EMERGENCY MEDICINE

## 2023-07-08 RX ADMIN — LEVOTHYROXINE SODIUM 50 MCG: 25 TABLET ORAL at 05:53

## 2023-07-08 RX ADMIN — CHLORHEXIDINE GLUCONATE 0.12% ORAL RINSE 15 ML: 1.2 LIQUID ORAL at 08:19

## 2023-07-08 RX ADMIN — WHITE PETROLATUM 57.7 %-MINERAL OIL 31.9 % EYE OINTMENT: at 19:37

## 2023-07-08 RX ADMIN — ACETAMINOPHEN 500 MG: 500 TABLET ORAL at 08:17

## 2023-07-08 RX ADMIN — DOCUSATE SODIUM 100 MG: 100 CAPSULE, LIQUID FILLED ORAL at 08:18

## 2023-07-08 RX ADMIN — PALIPERIDONE 6 MG: 3 TABLET, EXTENDED RELEASE ORAL at 08:18

## 2023-07-08 RX ADMIN — ZINC SULFATE 220 MG (50 MG) CAPSULE 220 MG: CAPSULE at 08:18

## 2023-07-08 RX ADMIN — CALCIUM POLYCARBOPHIL 625 MG: 625 TABLET, FILM COATED ORAL at 08:17

## 2023-07-08 RX ADMIN — LISINOPRIL 5 MG: 5 TABLET ORAL at 08:20

## 2023-07-08 RX ADMIN — PANTOPRAZOLE SODIUM 40 MG: 40 TABLET, DELAYED RELEASE ORAL at 05:53

## 2023-07-08 RX ADMIN — TOPIRAMATE 150 MG: 50 TABLET ORAL at 19:37

## 2023-07-08 RX ADMIN — LAMOTRIGINE 25 MG: 25 TABLET ORAL at 08:18

## 2023-07-08 RX ADMIN — APIXABAN 5 MG: 5 TABLET, FILM COATED ORAL at 19:37

## 2023-07-08 RX ADMIN — PREDNISOLONE ACETATE 1 DROP: 10 SUSPENSION/ DROPS OPHTHALMIC at 08:19

## 2023-07-08 RX ADMIN — CALCIUM POLYCARBOPHIL 625 MG: 625 TABLET, FILM COATED ORAL at 19:36

## 2023-07-08 RX ADMIN — APIXABAN 5 MG: 5 TABLET, FILM COATED ORAL at 08:18

## 2023-07-08 RX ADMIN — ACETAMINOPHEN 500 MG: 500 TABLET ORAL at 13:52

## 2023-07-08 RX ADMIN — CLOZAPINE 25 MG: 25 TABLET ORAL at 08:18

## 2023-07-08 RX ADMIN — ACETAMINOPHEN 500 MG: 500 TABLET ORAL at 19:36

## 2023-07-08 RX ADMIN — CLOZAPINE 125 MG: 100 TABLET ORAL at 19:36

## 2023-07-08 RX ADMIN — MONTELUKAST 10 MG: 10 TABLET, FILM COATED ORAL at 19:36

## 2023-07-08 RX ADMIN — Medication 10 MG: at 19:37

## 2023-07-08 ASSESSMENT — ACTIVITIES OF DAILY LIVING (ADL)
ADLS_ACUITY_SCORE: 33
ORAL_HYGIENE: INDEPENDENT
ADLS_ACUITY_SCORE: 33
DRESS: INDEPENDENT;SCRUBS (BEHAVIORAL HEALTH)
ADLS_ACUITY_SCORE: 33
ADLS_ACUITY_SCORE: 33
HYGIENE/GROOMING: INDEPENDENT
ADLS_ACUITY_SCORE: 33

## 2023-07-08 NOTE — PLAN OF CARE
Goal Outcome Evaluation:    Plan of Care Reviewed With: patient          Pt calm, pleasant, polite, cooperative. Good eye contact. Affect blunted. Med-compliant. No behavioral disturbances. Attending and participating in therapeutic groups. Visible in milieu. Eating & drinking adequately. Sleeping adequately. Hygiene good. Continue with current treatment plan and recommendations. Continue to monitor and reassess symptoms. Monitor response to medications. Monitor progress towards treatment goals. Encourage groups and participation.

## 2023-07-08 NOTE — PLAN OF CARE
Problem: Plan of Care - These are the overarching goals to be used throughout the patient stay.    Goal: Absence of Hospital-Acquired Illness or Injury  Intervention: Identify and Manage Fall Risk  Recent Flowsheet Documentation  Taken 7/8/2023 1000 by Cris Diaz RN  Safety Promotion/Fall Prevention:    clutter free environment maintained    nonskid shoes/slippers when out of bed    safety round/check completed     Problem: Plan of Care - These are the overarching goals to be used throughout the patient stay.    Goal: Absence of Hospital-Acquired Illness or Injury  Intervention: Prevent Skin Injury  Recent Flowsheet Documentation  Taken 7/8/2023 1000 by Cris Diaz RN  Body Position: position changed independently     Problem: Adult Behavioral Health Plan of Care  Goal: Adheres to Safety Considerations for Self and Others  Intervention: Develop and Maintain Individualized Safety Plan  Recent Flowsheet Documentation  Taken 7/8/2023 1000 by Cris Diaz RN  Safety Measures:    environmental rounds completed    safety rounds completed    suicide check-in completed     Problem: Psychotic Signs/Symptoms  Goal: Improved Mood Symptoms  Outcome: Progressing  Flowsheets (Taken 7/8/2023 1052)  Mutually Determined Action Steps (Improved Mood Symptoms): engages in physical activity     Problem: Fall Injury Risk  Goal: Absence of Fall and Fall-Related Injury  Intervention: Identify and Manage Contributors  Recent Flowsheet Documentation  Taken 7/8/2023 1000 by Cris Diaz RN  Medication Review/Management: medications reviewed   Goal Outcome Evaluation:    Plan of Care Reviewed With: patient      Pt has been pleasant and cooperative during interactions. Pt denies mental health symptoms (anxiey/depression/SI/SIB/wishes to be dead). Pt has not had any verbal outbursts. Pt's affect is bright during interactions. Pt's eye contact is appropriate. Pt is oriented to to July 8th, 2023. Pt stated the day of the  "week as Friday. Pt speech is rambling and nonsensical at times. When asked where he lived  he stated \"[place]". Pt stated that the [place] is here in [place].   Pt is disheveled at times and needs prompts at times to completed ADL's.  Pt has been walking in the [place] listening to music and sitting in the lounge with peers. Pt has been calm.   Pt is eating well.  Pt denied pain this AM     Pt reported back pain and right leg pain this afternoon prior to receiving 1400 dose of tylenol. Pt also given an ice pack for his back. When writer went to follow up with patient, regarding pain,  he was sleeping soundly in his bed.                      "

## 2023-07-08 NOTE — PLAN OF CARE
"Goal Outcome Evaluation:    Plan of Care Reviewed With: patient          Pt calm, pleasant, polite, cooperative. Good eye contact. Affect blunted. Med-compliant. Singing loudly at times while wearing headphones in lounge, but accepting of redirection to try and keep voice lower. Visible in milieu. Eating & drinking adequately. Sleeping adequately. Hygiene good. No roommate due to intrusiveness and poor boundaries. Pushed paper with dot-to-dot puzzle of dolphin through med-room window. When asked why, he stated \"It's a dolphin. 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 TEDS also removed for the night. Continue with current treatment plan and recommendations. Continue to monitor and reassess symptoms. Monitor response to medications. Monitor progress towards treatment goals. Encourage groups and participation.          "

## 2023-07-08 NOTE — CARE PLAN
07/07/23 2110   Group Therapy Session   Group Attendance refused to attend group session   Time Session Began 1900   Time Session Ended 1945   Total Time (minutes) 0   Total # Attendees 6   Group Type psychotherapeutic   Group Topic Covered coping skills/lifestyle management;emotions/expression   Group Session Detail Group members discussed/completed a gratitude journal listing things they were grateful for and the reasons why.     JESUS MANUEL Hammer, [facility]  Clinical Treatment Coordinator  [facility]

## 2023-07-08 NOTE — PLAN OF CARE
Problem: Sleep Disturbance  Goal: Adequate Sleep/Rest  Outcome: Not Progressing   Goal Outcome Evaluation:             Slept on and off tonight, pt has a no roommate order due to poor boundaries and intrusiveness.  Woke up at 0030, paced the [place] then fell asleep after 15 minutes  Observed wearing a headphone w/ no music.  Woke up again at 0315 and had snacks.  Over all calm, polite and appropriate  Slept for a total of 9 hours (3.5  Previous shift and 5.5 hours noc shift)

## 2023-07-09 PROCEDURE — 250N000013 HC RX MED GY IP 250 OP 250 PS 637: Performed by: PHYSICIAN ASSISTANT

## 2023-07-09 PROCEDURE — 250N000013 HC RX MED GY IP 250 OP 250 PS 637: Performed by: PSYCHIATRY & NEUROLOGY

## 2023-07-09 PROCEDURE — G0177 OPPS/PHP; TRAIN & EDUC SERV: HCPCS

## 2023-07-09 PROCEDURE — 250N000013 HC RX MED GY IP 250 OP 250 PS 637: Performed by: EMERGENCY MEDICINE

## 2023-07-09 PROCEDURE — 124N000003 HC R&B MH SENIOR/ADOLESCENT

## 2023-07-09 RX ADMIN — LEVOTHYROXINE SODIUM 50 MCG: 25 TABLET ORAL at 06:49

## 2023-07-09 RX ADMIN — MONTELUKAST 10 MG: 10 TABLET, FILM COATED ORAL at 19:09

## 2023-07-09 RX ADMIN — ACETAMINOPHEN 500 MG: 500 TABLET ORAL at 19:09

## 2023-07-09 RX ADMIN — ACETAMINOPHEN 500 MG: 500 TABLET ORAL at 08:35

## 2023-07-09 RX ADMIN — CALCIUM POLYCARBOPHIL 625 MG: 625 TABLET, FILM COATED ORAL at 19:10

## 2023-07-09 RX ADMIN — Medication 10 MG: at 19:09

## 2023-07-09 RX ADMIN — PREDNISOLONE ACETATE 1 DROP: 10 SUSPENSION/ DROPS OPHTHALMIC at 08:36

## 2023-07-09 RX ADMIN — LISINOPRIL 5 MG: 5 TABLET ORAL at 08:35

## 2023-07-09 RX ADMIN — TOPIRAMATE 150 MG: 50 TABLET ORAL at 19:10

## 2023-07-09 RX ADMIN — APIXABAN 5 MG: 5 TABLET, FILM COATED ORAL at 19:10

## 2023-07-09 RX ADMIN — CLOZAPINE 25 MG: 25 TABLET ORAL at 08:35

## 2023-07-09 RX ADMIN — PALIPERIDONE 6 MG: 3 TABLET, EXTENDED RELEASE ORAL at 08:35

## 2023-07-09 RX ADMIN — APIXABAN 5 MG: 5 TABLET, FILM COATED ORAL at 08:36

## 2023-07-09 RX ADMIN — ACETAMINOPHEN 500 MG: 500 TABLET ORAL at 13:52

## 2023-07-09 RX ADMIN — ZINC SULFATE 220 MG (50 MG) CAPSULE 220 MG: CAPSULE at 08:35

## 2023-07-09 RX ADMIN — CLOZAPINE 125 MG: 100 TABLET ORAL at 20:05

## 2023-07-09 RX ADMIN — CALCIUM POLYCARBOPHIL 625 MG: 625 TABLET, FILM COATED ORAL at 08:35

## 2023-07-09 RX ADMIN — LAMOTRIGINE 25 MG: 25 TABLET ORAL at 08:35

## 2023-07-09 RX ADMIN — OLANZAPINE 10 MG: 10 TABLET, FILM COATED ORAL at 19:10

## 2023-07-09 RX ADMIN — CHLORHEXIDINE GLUCONATE 0.12% ORAL RINSE 15 ML: 1.2 LIQUID ORAL at 08:36

## 2023-07-09 RX ADMIN — DOCUSATE SODIUM 100 MG: 100 CAPSULE, LIQUID FILLED ORAL at 08:35

## 2023-07-09 RX ADMIN — WHITE PETROLATUM 57.7 %-MINERAL OIL 31.9 % EYE OINTMENT: at 19:11

## 2023-07-09 RX ADMIN — PANTOPRAZOLE SODIUM 40 MG: 40 TABLET, DELAYED RELEASE ORAL at 06:49

## 2023-07-09 ASSESSMENT — ACTIVITIES OF DAILY LIVING (ADL)
HYGIENE/GROOMING: INDEPENDENT
ORAL_HYGIENE: INDEPENDENT
ADLS_ACUITY_SCORE: 33
ORAL_HYGIENE: INDEPENDENT
ADLS_ACUITY_SCORE: 33
DRESS: INDEPENDENT;STREET CLOTHES;SCRUBS (BEHAVIORAL HEALTH)
ADLS_ACUITY_SCORE: 33
DRESS: INDEPENDENT;STREET CLOTHES
ADLS_ACUITY_SCORE: 33
HYGIENE/GROOMING: INDEPENDENT
LAUNDRY: UNABLE TO COMPLETE
ADLS_ACUITY_SCORE: 33

## 2023-07-09 NOTE — PLAN OF CARE
Problem: Psychotic Signs/Symptoms  Goal: Improved Behavioral Control (Psychotic Signs/Symptoms)  Outcome: Progressing     Problem: Disruptive Behavior  Goal: Improved Impulse and Aggression Control (Disruptive Behavior)  Outcome: Progressing     Problem: Disruptive Behavior  Goal: Improved Mood Symptoms (Disruptive Behavior)  Outcome: Progressing    Patient up and visible on the unit most part of the shift. Pacing the hallway with headphones  listening to music, patient denies all psych symptoms, denies pain or discomfort, disorganize at times but easily redirected, eating and drinking adequately, patient is keith for safety, safety checks and precautions in place. Medication compliant with no stated or observed side effects. No behavioral or safety concerns noted, this writer continue to monitor patient and offer therapeutic support as needed for the rest of the shift.    /86 (BP Location: Left arm, Patient Position: Sitting, Cuff Size: Adult Regular)   Pulse 82   Temp 97.7  F (36.5  C) (Oral)   Resp 16   Wt 93.2 kg (205 lb 7.5 oz)   SpO2 100%   BMI 35.27 kg/m

## 2023-07-09 NOTE — CARE PLAN
07/09/23 1452   Group Therapy Session   Group Attendance attended group session   Time Session Began 1015   Time Session Ended 1100   Total Time (minutes) 45   Total # Attendees 6   Group Type life skill;psychoeducation   Group Topic Covered coping skills/lifestyle management;self-care activities   Group Session Detail General Health and Coping Skills: education and group discussion on self-esteem and self-compassion.   Patient Participation Detail Pt participated in a group discussion on self-esteem and self-compassion. Pt appeared social and in good spirits, contributing thoughts and answers to the discussion questions, with answers occasionally going off-topic. Pt appeared tired, with their head resting in their arms on the table, but pt seemed attentive to answer the discussion questions.

## 2023-07-09 NOTE — PLAN OF CARE
Problem: Plan of Care - These are the overarching goals to be used throughout the patient stay.    Goal: Absence of Hospital-Acquired Illness or Injury  Intervention: Identify and Manage Fall Risk  Recent Flowsheet Documentation  Taken 7/9/2023 0943 by Cris Diaz RN  Safety Promotion/Fall Prevention:    clutter free environment maintained    nonskid shoes/slippers when out of bed    safety round/check completed     Problem: Plan of Care - These are the overarching goals to be used throughout the patient stay.    Goal: Absence of Hospital-Acquired Illness or Injury  Intervention: Prevent Skin Injury  Recent Flowsheet Documentation  Taken 7/9/2023 0943 by Cris Diaz RN  Body Position: position changed independently     Problem: Adult Behavioral Health Plan of Care  Goal: Adheres to Safety Considerations for Self and Others  Intervention: Develop and Maintain Individualized Safety Plan  Recent Flowsheet Documentation  Taken 7/9/2023 0943 by Cris Diaz RN  Safety Measures:    environmental rounds completed    safety rounds completed    suicide check-in completed     Problem: Psychotic Signs/Symptoms  Goal: Improved Behavioral Control (Psychotic Signs/Symptoms)  Outcome: Not Progressing  Flowsheets (Taken 7/9/2023 0947)  Mutually Determined Action Steps (Improved Behavioral Control): identifies future-oriented goal     Problem: Psychotic Signs/Symptoms  Goal: Improved Psychomotor Symptoms (Psychotic Signs/Symptoms)  Intervention: Manage Psychomotor Movement  Recent Flowsheet Documentation  Taken 7/9/2023 0943 by Cris Diaz RN  Patient Performed Hygiene:    dressed    linen changed  Activity (Behavioral Health):    up ad katina    activity encouraged     Problem: Disruptive Behavior  Goal: Improved Impulse and Aggression Control (Disruptive Behavior)  Outcome: Progressing     Problem: Disruptive Behavior  Goal: Improved Mood Symptoms (Disruptive Behavior)  Outcome: Progressing   Goal Outcome  "Evaluation:    Plan of Care Reviewed With: patient      Pt complained of back pain at the start of the shift. AM dose of tylenol administered and soft care mattress placed on his bed.   Pt continues to be delusional/grandiose. Pt has been listening to his headphone. Writer approached patient in the lounge. Pt had placed a cup of coffee on the floor. When patient was asked why the coffee was on the floor he stated that \"Alberto Arreola is bringing Ruth\"/Ruth is the therapy dog that was on the unit earlier in the week. Pt cooperative with allowing writer to  the coffee so it would not be spilled.   Pt's affect is flat with inconsistent eye contact.   Pt continues to eat well and has been taking fluids readily.  Pt denies SI/SIB/wishes to be dead/anxiety/depression.     Pt's leg edema seems to be slightly improved this AM. Pt was assisted with placing compression socks this AM.                      "

## 2023-07-09 NOTE — PLAN OF CARE
Problem: Sleep Disturbance  Goal: Adequate Sleep/Rest  Outcome: Progressing   Goal Outcome Evaluation:             Received sleeping, pt has a no roommate order due to poor boundaries and intrusiveness.  Woke up at 0030 and 0300 but was able to fall right back to sleep. Slept for 8 hours  Calm, quiet and able to make needs known. Polite to staff.

## 2023-07-10 PROCEDURE — 99232 SBSQ HOSP IP/OBS MODERATE 35: CPT | Performed by: PSYCHIATRY & NEUROLOGY

## 2023-07-10 PROCEDURE — 250N000013 HC RX MED GY IP 250 OP 250 PS 637: Performed by: PHYSICIAN ASSISTANT

## 2023-07-10 PROCEDURE — 90853 GROUP PSYCHOTHERAPY: CPT

## 2023-07-10 PROCEDURE — 250N000013 HC RX MED GY IP 250 OP 250 PS 637: Performed by: PSYCHIATRY & NEUROLOGY

## 2023-07-10 PROCEDURE — 124N000003 HC R&B MH SENIOR/ADOLESCENT

## 2023-07-10 PROCEDURE — 250N000013 HC RX MED GY IP 250 OP 250 PS 637: Performed by: EMERGENCY MEDICINE

## 2023-07-10 RX ADMIN — CLOZAPINE 150 MG: 100 TABLET ORAL at 20:49

## 2023-07-10 RX ADMIN — ACETAMINOPHEN 500 MG: 500 TABLET ORAL at 08:25

## 2023-07-10 RX ADMIN — APIXABAN 5 MG: 5 TABLET, FILM COATED ORAL at 20:50

## 2023-07-10 RX ADMIN — CHLORHEXIDINE GLUCONATE 0.12% ORAL RINSE 15 ML: 1.2 LIQUID ORAL at 10:05

## 2023-07-10 RX ADMIN — LISINOPRIL 5 MG: 5 TABLET ORAL at 08:26

## 2023-07-10 RX ADMIN — CALCIUM POLYCARBOPHIL 625 MG: 625 TABLET, FILM COATED ORAL at 20:50

## 2023-07-10 RX ADMIN — MONTELUKAST 10 MG: 10 TABLET, FILM COATED ORAL at 20:49

## 2023-07-10 RX ADMIN — TOPIRAMATE 150 MG: 50 TABLET ORAL at 20:49

## 2023-07-10 RX ADMIN — ACETAMINOPHEN 500 MG: 500 TABLET ORAL at 20:49

## 2023-07-10 RX ADMIN — ZINC SULFATE 220 MG (50 MG) CAPSULE 220 MG: CAPSULE at 08:24

## 2023-07-10 RX ADMIN — APIXABAN 5 MG: 5 TABLET, FILM COATED ORAL at 08:26

## 2023-07-10 RX ADMIN — PREDNISOLONE ACETATE 1 DROP: 10 SUSPENSION/ DROPS OPHTHALMIC at 08:24

## 2023-07-10 RX ADMIN — LEVOTHYROXINE SODIUM 50 MCG: 25 TABLET ORAL at 06:36

## 2023-07-10 RX ADMIN — CLOZAPINE 25 MG: 25 TABLET ORAL at 08:25

## 2023-07-10 RX ADMIN — CALCIUM POLYCARBOPHIL 625 MG: 625 TABLET, FILM COATED ORAL at 08:25

## 2023-07-10 RX ADMIN — DOCUSATE SODIUM 100 MG: 100 CAPSULE, LIQUID FILLED ORAL at 08:25

## 2023-07-10 RX ADMIN — LAMOTRIGINE 25 MG: 25 TABLET ORAL at 08:26

## 2023-07-10 RX ADMIN — Medication 10 MG: at 20:50

## 2023-07-10 RX ADMIN — PANTOPRAZOLE SODIUM 40 MG: 40 TABLET, DELAYED RELEASE ORAL at 06:36

## 2023-07-10 RX ADMIN — PALIPERIDONE 6 MG: 3 TABLET, EXTENDED RELEASE ORAL at 08:25

## 2023-07-10 RX ADMIN — ACETAMINOPHEN 500 MG: 500 TABLET ORAL at 14:16

## 2023-07-10 RX ADMIN — WHITE PETROLATUM 57.7 %-MINERAL OIL 31.9 % EYE OINTMENT: at 20:50

## 2023-07-10 ASSESSMENT — ACTIVITIES OF DAILY LIVING (ADL)
ADLS_ACUITY_SCORE: 33
ADLS_ACUITY_SCORE: 33
ADLS_ACUITY_SCORE: 32
ADLS_ACUITY_SCORE: 33
HYGIENE/GROOMING: INDEPENDENT
ORAL_HYGIENE: INDEPENDENT
ADLS_ACUITY_SCORE: 32
ADLS_ACUITY_SCORE: 33
DRESS: INDEPENDENT;SCRUBS (BEHAVIORAL HEALTH)
ADLS_ACUITY_SCORE: 33
ADLS_ACUITY_SCORE: 33
LAUNDRY: UNABLE TO COMPLETE
ADLS_ACUITY_SCORE: 33
ADLS_ACUITY_SCORE: 33
LAUNDRY: UNABLE TO COMPLETE
DRESS: INDEPENDENT;SCRUBS (BEHAVIORAL HEALTH)
ORAL_HYGIENE: INDEPENDENT
ADLS_ACUITY_SCORE: 33
ADLS_ACUITY_SCORE: 33
HYGIENE/GROOMING: INDEPENDENT

## 2023-07-10 NOTE — CARE PLAN
07/10/23 1446   Group Therapy Session   Group Attendance attended group session   Time Session Began 1115   Time Session Ended 1200   Total Time (minutes) 20  (no charge)   Total # Attendees 8   Group Type life skill;task skill   Group Topic Covered coping skills/lifestyle management;problem-solving;leisure exploration/use of leisure time;cognitive activities;balanced lifestyle;structured socialization   Group Session Detail OT General Health Group-Education on 8 dimensions of wellness with focus on healthy coping skills, symptom management, healthy leisure exploration, healthy distraction and interest checklist completion   Patient Response/Contribution disorganized   Patient Participation Detail pt required additional instructions and time for completing interest checklist. pt asked several questions about items on the interest checklist. pt checked off every item on the list as things he would be interested in doing. pt left group early and did not return

## 2023-07-10 NOTE — PLAN OF CARE
Problem: Sleep Disturbance  Goal: Adequate Sleep/Rest  Outcome: Progressing   Goal Outcome Evaluation:                  Received in bed sleeping, pt has a no roommate order due to intrusiveness and poor boundaries. Woke up at 0400 and was incontinent of urine in bed and the toilet floor also had a pool of urine. Independent with clothing change and pt stripped his bed .   Calm and with an appropriate behavior.  Slept for 9 hours

## 2023-07-10 NOTE — PLAN OF CARE
"Goal Outcome Evaluation:    Plan of Care Reviewed With: patient        Jagdeep has been up ad katina, spent >75% of the shift in the milieu. Pt did not attend groups. Pt was interactive with various peers, at times making unusual comments though nothing provocative or inappropriate, ie approaching a pt and saying \"hello mother\"  Jagdeep was med adherent, his food and fluid intake were WNL.  Pt denied feeling depressed or anxious, denied feeling homicidal or having self harm thoughts. Jagdeep said he was not experiencing auditory or visual hallucinations.            "

## 2023-07-10 NOTE — PROGRESS NOTES
"PSYCHIATRY  PROGRESS NOTE     DATE OF SERVICE   07/10/2023        CHIEF COMPLAINT   \" I am doing good.\"       SUBJECTIVE   Nursing reports:  Received in bed sleeping, pt has a no roommate order due to intrusiveness and poor boundaries. Woke up at 0400 and was incontinent of urine in bed and the toilet floor also had a pool of urine. Independent with clothing change and pt stripped his bed .   Calm and with an appropriate behavior.  Slept for 9 hours    Patient has been reviewed with the  earlier today.   Assessment/Intervention/Current Symtoms and Care Coordination:  Pt discussed in team rounds this AM. Pt was calm last evening and slept well. Pt still discusses his delusions about his parents daily. Pt's Zyprexa to be increased due to continued delusions.        OBJECTIVE   Patient was seen and evaluated in the day area by himself, this was a face-to-face evaluation.  Patient presented as calm, pleasant and cooperative.  He has been less argumentative from when I challenged his delusions.  Patient reported that his sister Huong told him that he does not need to take medications.  He also said that Huong is not his older sister and his parents are alive.  Patient admitted that he was able to attend his parent's  and when I tried to ask more questions about that patient just said \"we are not talking about that today\".  Patient said that he has been talking to his parents but he seems to be more redirectable, has been attending groups and is not using the headphones so frequently.  I discussed with the patient that I was going to increase the dose of Clozaril at bedtime and informed the patient that hopefully this will help him sleep better.  Patient asked more questions about the Clozaril and at the end he agreed with the increase.    During my assessment the patient did not demanded to leave the hospital or insisted on his delusions.  He has been observed being calm, pleasant and cooperative. " "    MEDICATIONS   Medications:  Scheduled Meds:    acetaminophen  500 mg Oral TID     apixaban ANTICOAGULANT  5 mg Oral BID     artificial tears   Right Eye At Bedtime     calcium polycarbophil  625 mg Oral BID     chlorhexidine  15 mL Swish & Spit Daily     cloZAPine  150 mg Oral At Bedtime     cloZAPine  25 mg Oral Daily     docusate sodium  100 mg Oral Daily     lamoTRIgine  25 mg Oral Daily     levothyroxine  50 mcg Oral or NG Tube QAM AC     lisinopril  5 mg Oral Daily     melatonin  10 mg Oral At Bedtime     montelukast  10 mg Oral At Bedtime     paliperidone  6 mg Oral Daily     pantoprazole  40 mg Oral QAM AC     prednisoLONE acetate  1 drop Right Eye Daily     topiramate  150 mg Oral At Bedtime     zinc sulfate  220 mg Oral Daily     Continuous Infusions:  PRN Meds:.acetaminophen, alum & mag hydroxide-simethicone, artificial saliva, atropine, hydrALAZINE, hydrOXYzine, hypromellose-dextran, ipratropium, OLANZapine **OR** OLANZapine, senna-docusate    Medication adherence issues: MS Med Adherence Y/N: Yes, Hospitalization  Medication side effects: MEDICATION SIDE EFFECTS: no side effects reported  Benefit: Yes / No: Yes       ROS   A comprehensive review of systems was negative.       MENTAL STATUS EXAM   Vitals: /75 (BP Location: Left arm, Patient Position: Sitting, Cuff Size: Adult Regular)   Pulse 86   Temp 97.6  F (36.4  C) (Oral)   Resp 16   Wt 93.2 kg (205 lb 7.5 oz)   SpO2 100%   BMI 35.27 kg/m        Appearance:  No apparent distress  Mood: \"I am doing good\"  Affect: calm and pleasant  was congruent to speech  Suicidal Ideation: PRESENT / ABSENT: absent   Homicidal Ideation: PRESENT / ABSENT: absent     Thought process: More linear and goal directed  Thought content: Remains delusional.  Stated that he has been seen and talking to his parents  Fund of Knowledge: Below average  Attention/Concentration: Poor  Language ability:  Intact, speech is garbled at times but it can be more clear when " you redirect the patient.  Memory:  Immediate recall impaired, Short-term memory impaired and Long-term memory intact  Insight: limited   Judgement: poor  Orientation: Person and situation only  Psychomotor Behavior: slowed    Muscle Strength and Tone: MuscleStrength: Normal  Gait and Station: Stable on his feet       LABS   personally reviewed.   Recent Labs   Lab 07/04/23  0654   WBC 6.1   HGB 13.6   MCV 84        No results found for: PHENYTOIN, PHENOBARB, VALPROATE, CBMZ       DIAGNOSIS   Principal Problem:    Schizoaffective disorder, bipolar type (H)    Active Problem List:  Patient Active Problem List   Diagnosis     Closed head injury, initial encounter     Altered mental status, unspecified altered mental status type     Generalized weakness     Portal vein thrombosis     Pleural effusion     Generalized muscle weakness     Falls frequently     Other ascites     Acute pancreatitis, unspecified complication status, unspecified pancreatitis type     Pancreatic pseudocyst     Idiopathic acute pancreatitis, unspecified complication status     Allergic rhinitis     Fisher's esophagus     CTS (carpal tunnel syndrome)     Elevated liver enzymes     HTN (hypertension)     Hypothyroidism     Keratoconus     Major depressive disorder, recurrent episode, mild (H)     Intellectual disability     Morbid exogenous obesity (H)     Neuroleptic malignant syndrome     Obstructive sleep apnea syndrome     Bipolar affective disorder (H)     Seizure disorder (H)     Seizures, generalized convulsive (H)     Acute respiratory failure with hypoxia (H)     Anemia, unspecified     Anxiety disorder, unspecified     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)     Dietary zinc deficiency     Dry eye syndrome of bilateral lacrimal glands     Dry mouth, unspecified     Edema, unspecified     Gastro-esophageal reflux disease without esophagitis     Hyperosmolality and hypernatremia     Irritable bowel syndrome with  constipation     Major depressive disorder, recurrent, unspecified (H)     Metabolic encephalopathy     Moderate protein-calorie malnutrition (H)     Other symbolic dysfunctions     Schizoaffective disorder, unspecified (H)     Thrombocytopenia, unspecified (H)     Unspecified asthma, uncomplicated     Urinary tract infection, site not specified     Vitamin D deficiency, unspecified     Schizoaffective disorder, bipolar type (H)          PLAN   1. Ongoing education given regarding diagnostic and treatment options with risks, benefits and alternatives and adequate verbalization of understanding.  2.  Medications       Melatonin 10 mg at bedtime       Increase Clozaril at 25 mg daily and 150 mg at bedtime and cross taper with Zyprexa to 5 mg at bedtime last dose will be on Sunday.        Topamax 150 mg at bedtime       Invega 6 mg daily        Lamictal 25 mg daily    3.  Medical team following as needed  4.   coordinating a safe discharge plan    Risk Assessment: [facility] RISK ASSESSMENT: Patient able to contract for safety    Coordination of Care:   Treatment Plan reviewed and physician signed, Care discussed with Care/Treatment Team Members, Chart reviewed and Patient seen      Re-Certification I certify that the inpatient psychiatric facility services furnished since the previous certification were, and continue to be, medically necessary for, either, treatment which could reasonably be expected to improve the patient s condition or diagnostic study and that the hospital records indicate that the services furnished were, either, intensive treatment services, admission and related services necessary for diagnostic study, or equivalent services.     I certify that the patient continues to need, on a daily basis, active treatment furnished directly by or requiring the supervision of inpatient psychiatric facility personnel.   I estimate 14 days of hospitalization is necessary for proper treatment of the  patient. My plans for post-hospital care for this patient are  group home     Alejandra Watkins MD    -     07/10/2023  -     2:30 PM    Total time 35 minutes with > 50%spent on coordination of cares and psycho-education.    This note was created with help of Dragon dictation system. Grammatical / typing errors are not intentional.    Alejandra Watkins MD

## 2023-07-10 NOTE — CARE PLAN
07/10/23 1218   Group Therapy Session   Group Attendance attended group session   Time Session Began 1015   Time Session Ended 1110   Total Time (minutes) 20   Total # Attendees 8   Group Type expressive therapy;task skill;psychotherapeutic   Group Topic Covered coping skills/lifestyle management;problem-solving;cognitive activities;balanced lifestyle;structured socialization   Group Session Detail [facility] group to facilitate coping skill exploration, use of cognitive skills and problem solving, creative expression, clinical observation and facilitation of social, cognitive, and kinesthetic performance skills.    Patient Response/Contribution cooperative with task;listened actively   Patient Participation Detail With directions provided, and encouragement given, he followed through with demonstrated directions. Appeared comfortable and calm. Was quick to agree to working on details longer than he planned when encouraged. Quick to take headphones off and took time to get organized with equipment.

## 2023-07-10 NOTE — PROGRESS NOTES
Pt came out of his room at 0400 with soiled bedding.  Upon check of his room pt had urinated all over his bathroom floor along with urine in the toilet.  The floor was cleaned and new linens were put on his bed.                                           Pt also was wearing his headphones which were supposed to be off-limits until 6 A.M. so apparently this was not communicated to the evening shift.

## 2023-07-10 NOTE — PLAN OF CARE
Assessment/Intervention/Current Symtoms and Care Coordination:  Pt discussed in team rounds this AM. Pt was calm last evening and slept well. Pt still discusses his delusions about his parents daily. Pt's Zyprexa to be increased due to continued delusions.    Discharge Plan or Goal:  Plan for pt to return to  when stable     Barriers to Discharge:  Pt continues to present as symptomatic (delusional mood). Pt is receiving ongoing stabilization and medication adjustment. Continue care coordination with assisted to ascertain his acceptability for a return to the home.     Referral Status:  Will continue to coordinate with pt's group home.     Legal Status:  Voluntary per guardian     Contacts:  Guardian/Sister - Huong Shlomo   P: [phone]   - Sadaf  P: [phone]     Upcoming Meetings and Dates/Important Information and next steps:

## 2023-07-10 NOTE — CARE PLAN
07/10/23 1511   Group Therapy Session   Group Attendance attended group session   Time Session Began 1305   Time Session Ended 1400   Total Time (minutes) 15  (no charge)   Total # Attendees 6   Group Type life skill;task skill   Group Topic Covered coping skills/lifestyle management;problem-solving;cognitive activities;leisure exploration/use of leisure time   Group Session Detail [facility] for cognitive wellness, focus, following directions, recall, scanning, healthy distraction, symptom management, healthy leisure exploration, and coping skill buiding   Patient Response/Contribution disorganized   Patient Participation Detail pt arrived late for group. pt required redirection and repetition of instructions to participate in cognitive activity. pt worked briefly on one picture-pt then sat quietly until stating he was done and left group space

## 2023-07-11 LAB
BASOPHILS # BLD AUTO: 0 10E3/UL (ref 0–0.2)
BASOPHILS NFR BLD AUTO: 1 %
EOSINOPHIL # BLD AUTO: 0 10E3/UL (ref 0–0.7)
EOSINOPHIL NFR BLD AUTO: 0 %
ERYTHROCYTE [DISTWIDTH] IN BLOOD BY AUTOMATED COUNT: 13.9 % (ref 10–15)
HCT VFR BLD AUTO: 40.1 % (ref 40–53)
HGB BLD-MCNC: 13.5 G/DL (ref 13.3–17.7)
IMM GRANULOCYTES # BLD: 0 10E3/UL
IMM GRANULOCYTES NFR BLD: 0 %
LYMPHOCYTES # BLD AUTO: 2.2 10E3/UL (ref 0.8–5.3)
LYMPHOCYTES NFR BLD AUTO: 37 %
MCH RBC QN AUTO: 27.9 PG (ref 26.5–33)
MCHC RBC AUTO-ENTMCNC: 33.7 G/DL (ref 31.5–36.5)
MCV RBC AUTO: 83 FL (ref 78–100)
MONOCYTES # BLD AUTO: 0.5 10E3/UL (ref 0–1.3)
MONOCYTES NFR BLD AUTO: 8 %
NEUTROPHILS # BLD AUTO: 3.2 10E3/UL (ref 1.6–8.3)
NEUTROPHILS NFR BLD AUTO: 54 %
NRBC # BLD AUTO: 0 10E3/UL
NRBC BLD AUTO-RTO: 0 /100
PLATELET # BLD AUTO: 154 10E3/UL (ref 150–450)
RBC # BLD AUTO: 4.84 10E6/UL (ref 4.4–5.9)
WBC # BLD AUTO: 5.9 10E3/UL (ref 4–11)

## 2023-07-11 PROCEDURE — 250N000013 HC RX MED GY IP 250 OP 250 PS 637: Performed by: PHYSICIAN ASSISTANT

## 2023-07-11 PROCEDURE — G0177 OPPS/PHP; TRAIN & EDUC SERV: HCPCS

## 2023-07-11 PROCEDURE — 250N000013 HC RX MED GY IP 250 OP 250 PS 637: Performed by: PSYCHIATRY & NEUROLOGY

## 2023-07-11 PROCEDURE — 85025 COMPLETE CBC W/AUTO DIFF WBC: CPT | Performed by: PSYCHIATRY & NEUROLOGY

## 2023-07-11 PROCEDURE — H2032 ACTIVITY THERAPY, PER 15 MIN: HCPCS

## 2023-07-11 PROCEDURE — 250N000013 HC RX MED GY IP 250 OP 250 PS 637: Performed by: EMERGENCY MEDICINE

## 2023-07-11 PROCEDURE — 124N000003 HC R&B MH SENIOR/ADOLESCENT

## 2023-07-11 PROCEDURE — 99231 SBSQ HOSP IP/OBS SF/LOW 25: CPT | Performed by: PSYCHIATRY & NEUROLOGY

## 2023-07-11 PROCEDURE — 36415 COLL VENOUS BLD VENIPUNCTURE: CPT | Performed by: PSYCHIATRY & NEUROLOGY

## 2023-07-11 RX ADMIN — LISINOPRIL 5 MG: 5 TABLET ORAL at 08:22

## 2023-07-11 RX ADMIN — PANTOPRAZOLE SODIUM 40 MG: 40 TABLET, DELAYED RELEASE ORAL at 05:54

## 2023-07-11 RX ADMIN — APIXABAN 5 MG: 5 TABLET, FILM COATED ORAL at 20:16

## 2023-07-11 RX ADMIN — ZINC SULFATE 220 MG (50 MG) CAPSULE 220 MG: CAPSULE at 08:23

## 2023-07-11 RX ADMIN — ACETAMINOPHEN 500 MG: 500 TABLET ORAL at 14:26

## 2023-07-11 RX ADMIN — CLOZAPINE 25 MG: 25 TABLET ORAL at 08:22

## 2023-07-11 RX ADMIN — PALIPERIDONE 6 MG: 3 TABLET, EXTENDED RELEASE ORAL at 08:23

## 2023-07-11 RX ADMIN — LAMOTRIGINE 25 MG: 25 TABLET ORAL at 08:22

## 2023-07-11 RX ADMIN — ACETAMINOPHEN 500 MG: 500 TABLET ORAL at 20:16

## 2023-07-11 RX ADMIN — WHITE PETROLATUM 57.7 %-MINERAL OIL 31.9 % EYE OINTMENT: at 20:16

## 2023-07-11 RX ADMIN — CLOZAPINE 150 MG: 100 TABLET ORAL at 20:16

## 2023-07-11 RX ADMIN — CALCIUM POLYCARBOPHIL 625 MG: 625 TABLET, FILM COATED ORAL at 20:16

## 2023-07-11 RX ADMIN — CALCIUM POLYCARBOPHIL 625 MG: 625 TABLET, FILM COATED ORAL at 08:22

## 2023-07-11 RX ADMIN — CHLORHEXIDINE GLUCONATE 0.12% ORAL RINSE 15 ML: 1.2 LIQUID ORAL at 08:22

## 2023-07-11 RX ADMIN — ACETAMINOPHEN 500 MG: 500 TABLET ORAL at 08:23

## 2023-07-11 RX ADMIN — MONTELUKAST 10 MG: 10 TABLET, FILM COATED ORAL at 20:16

## 2023-07-11 RX ADMIN — APIXABAN 5 MG: 5 TABLET, FILM COATED ORAL at 08:23

## 2023-07-11 RX ADMIN — TOPIRAMATE 150 MG: 50 TABLET ORAL at 20:16

## 2023-07-11 RX ADMIN — LEVOTHYROXINE SODIUM 50 MCG: 25 TABLET ORAL at 05:54

## 2023-07-11 RX ADMIN — Medication 10 MG: at 20:16

## 2023-07-11 RX ADMIN — DOCUSATE SODIUM 100 MG: 100 CAPSULE, LIQUID FILLED ORAL at 08:23

## 2023-07-11 RX ADMIN — PREDNISOLONE ACETATE 1 DROP: 10 SUSPENSION/ DROPS OPHTHALMIC at 08:22

## 2023-07-11 ASSESSMENT — ACTIVITIES OF DAILY LIVING (ADL)
HYGIENE/GROOMING: INDEPENDENT
ADLS_ACUITY_SCORE: 32
ADLS_ACUITY_SCORE: 32
LAUNDRY: UNABLE TO COMPLETE
ADLS_ACUITY_SCORE: 32
ORAL_HYGIENE: INDEPENDENT
ADLS_ACUITY_SCORE: 32
HYGIENE/GROOMING: INDEPENDENT
ADLS_ACUITY_SCORE: 32
ADLS_ACUITY_SCORE: 32
ORAL_HYGIENE: INDEPENDENT
ADLS_ACUITY_SCORE: 32
DRESS: SCRUBS (BEHAVIORAL HEALTH);INDEPENDENT
ADLS_ACUITY_SCORE: 32
ADLS_ACUITY_SCORE: 32
DRESS: INDEPENDENT;SCRUBS (BEHAVIORAL HEALTH);STREET CLOTHES

## 2023-07-11 NOTE — PLAN OF CARE
Problem: Adult Behavioral Health Plan of Care  Goal: Plan of Care Review  Recent Flowsheet Documentation  Taken 7/10/2023 2008 by Yee Mesa, RN  Plan of Care Reviewed With: patient  Overall Patient Progress: no change  Taken 7/10/2023 1959 by Yee Mesa, RN  Patient Agreement with Plan of Care: agrees     Patient was present in the milieu, walking in the hallway and lounge with head phones on. At times, pt was observed resting in bed this shift. He participated in the evening group therapy. He denied mental health symptoms. No delusional statements noted by staff. He ate 100% at supper with adequate fluid intake. He was visited by his family member and the visit went well. He was compliant with taking all his medications. No complaints of pain nor side effects of meds. He was calm, pleasant and cooperative. Writer removed his MICHAEL socks, washed and hanged them at the soiled utility room. Removed his head phone from his bedroom and connected to the  at the nurses' station.

## 2023-07-11 NOTE — CARE PLAN
07/11/23 1553   Group Therapy Session   Group Attendance attended group session   Time Session Began 1315   Time Session Ended 1410   Total Time (minutes) 55   Total # Attendees 7   Group Type expressive therapy;psychotherapeutic   Group Topic Covered coping skills/lifestyle management;relapse prevention;cognitive therapy techniques;structured socialization;balanced lifestyle   Group Session Detail Topic of Values, how they interact in one's life and benefit perspectives, choosing ones that are important to them.   Patient Response/Contribution cooperative with task   Patient Participation Detail Quick to participate, spoke up and made comments related to topic. When needing cues that he had limitations on the rules, he seemed quick to redirect and work within the rules of the activity. He also looked to this author to gain insights of what to do, seemed to understand nonverbal cues and followed through. When explaining his decisions on the choices he made, he offered extensive comments that were in context. Slight rambling but in context. Pleasant.

## 2023-07-11 NOTE — PROGRESS NOTES
"   07/10/23 2100   Group Therapy Session   Group Attendance attended group session   Time Session Began 1905   Time Session Ended 1955   Total Time (minutes) 50   Total # Attendees 8   Group Type psychotherapeutic   Group Topic Covered emotions/expression   Group Session Detail Process   Patient Response/Contribution discussed personal experience with topic;disorganized;expressed reluctance to alter behaviors     Mood great, \"Im a happy go edmond erick. \" Said he enjoyed a visit from his sister and his niece. He said they were coming back at midnight. Writer asked him if he knew where he was and he said [place].    He does well if asked musical references for the concept such as which songs demonstrate courage. He needs redirection for sexual comments and it takes him a few redirections or writer stating if he cant stay appropriate, he will have to leave group, and then he is able to redirect.  "

## 2023-07-11 NOTE — PLAN OF CARE
Assessment/Intervention/Current Symtoms and Care Coordination:  Pt discussed in team rounds this AM. Pt continues to voice delusions about this being a hotel. Pt to continue to titrate on Clozaril for delusions.    Writer received call from [facility] Care Coordinator, Lilo ([phone]) inquiring about pt's progress. Writer updated Lilo on current status and discharge planning. Lilo requested that she be added to any future care conferences if there are any.    Discharge Plan or Goal:  Plan for pt to return to  when stable     Barriers to Discharge:  Pt continues to present as symptomatic (delusional mood). Pt is receiving ongoing stabilization and medication adjustment. Continue care coordination with [facility] to ascertain his acceptability for a return to the home.     Referral Status:  Will continue to coordinate with pt's group home.     Legal Status:  Voluntary per guardian     Contacts:  Guardian/Sister - Huong Shlomo   P: [phone]   - Sadaf  P: [phone]     Upcoming Meetings and Dates/Important Information and next steps:

## 2023-07-11 NOTE — PLAN OF CARE
Problem: Adult Behavioral Health Plan of Care  Goal: Plan of Care Review  Outcome: Progressing  Flowsheets  Taken 7/11/2023 1646  Plan of Care Reviewed With: patient  Overall Patient Progress: no change  Patient Agreement with Plan of Care: agrees  Taken 7/11/2023 1634  Patient Agreement with Plan of Care: agrees     Patient was present in the milieu per usual. Affect consistent with mood. Walking in the hallway and lounge with head phones on, watching TV and requesting for snacks. He complained of low back pain at early part of shift however PRN Tylenol was already given by the previous shift. Encouraged him to rest in bed for a while and applied ice pack which he agreed. Pt stated that the ice pack helped a little bit. He attended the community meeting before supper. He ate 100% and adequate fluid intake. Took all his medications without issue. He has been watching TV before going to his bedroom. Staff removed his MICHAEL socks, washed and hang them at the soiled utility room. Removed his head set from his bedroom and connected to a . Pt has a no roommate order due to intrusiveness and poor boundaries. Pt was not intrusive and maintained good boundaries with staff and peers this shift.

## 2023-07-11 NOTE — PROVIDER NOTIFICATION
07/11/23 1129   Individualization/Patient Specific Goals   Patient Personal Strengths family/social support;medication/treatment adherence;stable living environment   Patient Vulnerabilities limited ability to read/write;limited social skills;lacks insight into illness   Anxieties, Fears or Concerns None at this time   Individualized Care Needs Medication and stabilization   Interprofessional Rounds   Summary Pt continues to titrate on Clozaril and stabilize   Participants CTC;nursing;OT;psychiatrist   Behavioral Team Discussion   Participants Dr. Alejandra Watkins MD, Maggie Mitchell MSW, LGSW, Cris Diaz RN, Mouna Solorzano OT   Progress Stabilization is ongoing   Anticipated length of stay 7 Days.   Continued Stay Criteria/Rationale Requires stabilization to reduce risk of imminent harm to himself and others.   Medical/Physical See H&P   Precautions See below   Plan Psychiatric assesemnt. Medication manangement. Therapeutic Mile[place]. Individual and group support. Pt to discharge back to  when stable   Rationale for change in precautions or plan no change   Safety Plan CTC will do individual inpatient treatment planning and after care planning. CTC will discuss options for increasing community supports with the patient. CTC will coordinate with outpatient providers and will place referrals to ensure appointments are in place.   Anticipated Discharge Disposition group home     PRECAUTIONS AND SAFETY    Behavioral Orders   Procedures    Code 1 - Restrict to Unit    Code 2    Code 2     Head ct    Elopement precautions    Routine Programming     As clinically indicated    Sexual precautions    Status 15     Every 15 minutes.       Safety  Safety WDL: WDL  Patient Location[place] [facility] [place]  Observed Behavior: calm, walking, sitting  Observed Behavior (Comment): eating snack  Safety Measures: environmental rounds completed, suicide assessment completed, safety rounds completed  Diversional Activity: music  (food)  Suicidality: Status 15  Seizure precautions: clutter free environment  Assault: status 15, private room  Elopement Assessment: Statements about wanting to leave  Elopement Interventions: status 15, behavioral scrubs (pajamas), signs posted on unit entrance / exit doors, engagement in unit activities  Sexual: status 15, private room  Additional Documentation:  (Elopement precuations in place.)

## 2023-07-11 NOTE — PROGRESS NOTES
"PSYCHIATRY  PROGRESS NOTE     DATE OF SERVICE   07/11/2023        CHIEF COMPLAINT   \" I am doing good.\"       SUBJECTIVE   Nursing reports:  Pt denies depression/anxiety/SI/SIB. \"Everything is a no\". Pt affect brightens during interactions with appropriate eye contact. Pt continues to be delusional. Pt oriented to person, date and time. Pt is aware that he is at \"[facility]". When writer reoriented him to being in a hospital he stated that he changed it to a \"hotel\" and then laughed.   Pt has been eating well including snacks between meals.   Pt reports that he is sleeping well and feels rested.   Pt reports that he continues to have back pain, however soft care mattress has been helpful. Pt received scheduled tylenol this AM.   Pt [place] listening to his headphones and watching TV during free time.    Patient has been reviewed with the  earlier today.   is aware that the patient will be able to return to his group home.        OBJECTIVE   Patient was seen and evaluated in the day area by himself, this was a face-to-face evaluation.  Patient presented as calm, pleasant and cooperative.  His hygiene has improved and the patient seemed to be taking better care of himself.  Patient only talk about his delusions when I directly ask about it.  He was not getting agitated or argumentative with me.  He continues to call this writer Brittany and then laughing about it.  He is denying all psychiatric symptoms and has been able to contract for safety.  Patient is not demanding to go home and has been attending more groups.     MEDICATIONS   Medications:  Scheduled Meds:    acetaminophen  500 mg Oral TID     apixaban ANTICOAGULANT  5 mg Oral BID     artificial tears   Right Eye At Bedtime     calcium polycarbophil  625 mg Oral BID     chlorhexidine  15 mL Swish & Spit Daily     cloZAPine  150 mg Oral At Bedtime     cloZAPine  25 mg Oral Daily     docusate sodium  100 mg Oral " "Daily     lamoTRIgine  25 mg Oral Daily     levothyroxine  50 mcg Oral or NG Tube QAM AC     lisinopril  5 mg Oral Daily     melatonin  10 mg Oral At Bedtime     montelukast  10 mg Oral At Bedtime     paliperidone  6 mg Oral Daily     pantoprazole  40 mg Oral QAM AC     prednisoLONE acetate  1 drop Right Eye Daily     topiramate  150 mg Oral At Bedtime     zinc sulfate  220 mg Oral Daily     Continuous Infusions:  PRN Meds:.acetaminophen, alum & mag hydroxide-simethicone, artificial saliva, atropine, hydrALAZINE, hydrOXYzine, hypromellose-dextran, ipratropium, OLANZapine **OR** OLANZapine, senna-docusate    Medication adherence issues: MS Med Adherence Y/N: Yes, Hospitalization  Medication side effects: MEDICATION SIDE EFFECTS: no side effects reported  Benefit: Yes / No: Yes       ROS   A comprehensive review of systems was negative.       MENTAL STATUS EXAM   Vitals: /80 (BP Location: Right arm, Patient Position: Sitting, Cuff Size: Adult Regular)   Pulse 94   Temp 97.5  F (36.4  C) (Temporal)   Resp 16   Wt 93.4 kg (205 lb 14.6 oz)   SpO2 98%   BMI 35.34 kg/m      Same as last visit  Appearance:  No apparent distress  Mood: \"I am doing good\"  Affect: calm and pleasant  was congruent to speech  Suicidal Ideation: PRESENT / ABSENT: absent   Homicidal Ideation: PRESENT / ABSENT: absent     Thought process: More linear and goal directed  Thought content: Remains delusional.  Stated that he has been seen and talking to his parents  Fund of Knowledge: Below average  Attention/Concentration: Poor  Language ability:  Intact, speech is garbled at times but it can be more clear when you redirect the patient.  Memory:  Immediate recall impaired, Short-term memory impaired and Long-term memory intact  Insight: limited   Judgement: poor  Orientation: Person and situation only  Psychomotor Behavior: slowed    Muscle Strength and Tone: MuscleStrength: Normal  Gait and Station: Stable on his feet       LABS "   personally reviewed.   Recent Labs   Lab 07/11/23  0717   WBC 5.9   HGB 13.5   MCV 83        No results found for: PHENYTOIN, PHENOBARB, VALPROATE, CBMZ       DIAGNOSIS   Principal Problem:    Schizoaffective disorder, bipolar type (H)    Active Problem List:  Patient Active Problem List   Diagnosis     Closed head injury, initial encounter     Altered mental status, unspecified altered mental status type     Generalized weakness     Portal vein thrombosis     Pleural effusion     Generalized muscle weakness     Falls frequently     Other ascites     Acute pancreatitis, unspecified complication status, unspecified pancreatitis type     Pancreatic pseudocyst     Idiopathic acute pancreatitis, unspecified complication status     Allergic rhinitis     Fisher's esophagus     CTS (carpal tunnel syndrome)     Elevated liver enzymes     HTN (hypertension)     Hypothyroidism     Keratoconus     Major depressive disorder, recurrent episode, mild (H)     Intellectual disability     Morbid exogenous obesity (H)     Neuroleptic malignant syndrome     Obstructive sleep apnea syndrome     Bipolar affective disorder (H)     Seizure disorder (H)     Seizures, generalized convulsive (H)     Acute respiratory failure with hypoxia (H)     Anemia, unspecified     Anxiety disorder, unspecified     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)     Dietary zinc deficiency     Dry eye syndrome of bilateral lacrimal glands     Dry mouth, unspecified     Edema, unspecified     Gastro-esophageal reflux disease without esophagitis     Hyperosmolality and hypernatremia     Irritable bowel syndrome with constipation     Major depressive disorder, recurrent, unspecified (H)     Metabolic encephalopathy     Moderate protein-calorie malnutrition (H)     Other symbolic dysfunctions     Schizoaffective disorder, unspecified (H)     Thrombocytopenia, unspecified (H)     Unspecified asthma, uncomplicated     Urinary tract  infection, site not specified     Vitamin D deficiency, unspecified     Schizoaffective disorder, bipolar type (H)          PLAN   1. Ongoing education given regarding diagnostic and treatment options with risks, benefits and alternatives and adequate verbalization of understanding.  2.  Medications       Melatonin 10 mg at bedtime       Clozaril at 25 mg daily and 150 mg at bedtime and cross taper with Zyprexa to 5 mg at bedtime last dose will be on Sunday.        Topamax 150 mg at bedtime       Invega 6 mg daily        Lamictal 25 mg daily    3.  Medical team following as needed  4.   coordinating a safe discharge plan    Risk Assessment: [facility] RISK ASSESSMENT: Patient able to contract for safety    Coordination of Care:   Treatment Plan reviewed and physician signed, Care discussed with Care/Treatment Team Members, Chart reviewed and Patient seen      Re-Certification I certify that the inpatient psychiatric facility services furnished since the previous certification were, and continue to be, medically necessary for, either, treatment which could reasonably be expected to improve the patient s condition or diagnostic study and that the hospital records indicate that the services furnished were, either, intensive treatment services, admission and related services necessary for diagnostic study, or equivalent services.     I certify that the patient continues to need, on a daily basis, active treatment furnished directly by or requiring the supervision of inpatient psychiatric facility personnel.   I estimate 14 days of hospitalization is necessary for proper treatment of the patient. My plans for post-hospital care for this patient are  group home     Alejandra Watkins MD    -     07/11/2023  -     1:34 PM    Total time 25 minutes with > 50%spent on coordination of cares and psycho-education.    This note was created with help of Dragon dictation system. Grammatical / typing errors are not  intentional.    Alejandra Watkins MD

## 2023-07-11 NOTE — PLAN OF CARE
"  Problem: Adult Behavioral Health Plan of Care  Goal: Adheres to Safety Considerations for Self and Others  Intervention: Develop and Maintain Individualized Safety Plan  Recent Flowsheet Documentation  Taken 7/11/2023 1000 by Cris Diaz RN  Safety Measures:    environmental rounds completed    suicide assessment completed    safety rounds completed     Problem: Plan of Care - These are the overarching goals to be used throughout the patient stay.    Goal: Absence of Hospital-Acquired Illness or Injury  Intervention: Prevent Skin Injury  Recent Flowsheet Documentation  Taken 7/11/2023 1000 by Cris Diaz RN  Body Position: position changed independently     Problem: Plan of Care - These are the overarching goals to be used throughout the patient stay.    Goal: Absence of Hospital-Acquired Illness or Injury  Intervention: Identify and Manage Fall Risk  Recent Flowsheet Documentation  Taken 7/11/2023 1000 by Cris Diaz RN  Safety Promotion/Fall Prevention:    safety round/check completed    nonskid shoes/slippers when out of bed    clutter free environment maintained     Problem: Psychotic Signs/Symptoms  Goal: Improved Mood Symptoms  Intervention: Optimize Emotion and Mood  Recent Flowsheet Documentation  Taken 7/11/2023 1000 by Cris Diaz RN  Diversional Activity: (food) music     Problem: Psychotic Signs/Symptoms  Goal: Improved Sleep (Psychotic Signs/Symptoms)  Outcome: Progressing  Flowsheets (Taken 7/11/2023 1011)  Mutually Determined Action Steps (Improved Sleep): sleeps 4-6 hours at night     Problem: Fall Injury Risk  Goal: Absence of Fall and Fall-Related Injury  Intervention: Identify and Manage Contributors  Recent Flowsheet Documentation  Taken 7/11/2023 1000 by Cris Diaz RN  Medication Review/Management: medications reviewed   Goal Outcome Evaluation:    Plan of Care Reviewed With: patient      Pt denies depression/anxiety/SI/SIB. \"Everything is a no\". Pt affect " "brightens during interactions with appropriate eye contact. Pt continues to be delusional. Pt oriented to person, date and time. Pt is aware that he is at \"[facility]". When writer reoriented him to being in a hospital he stated that he changed it to a \"hotel\" and then laughed.   Pt has been eating well including snacks between meals.   Pt reports that he is sleeping well and feels rested.   Pt reports that he continues to have back pain, however soft care mattress has been helpful. Pt received scheduled tylenol this AM.   Pt has [place] listening to his headphones and watching TV during free time.                 "

## 2023-07-11 NOTE — PROGRESS NOTES
"Pt was engaged and respectful in dance/movement therapy (D/MT.)  He initiated shutting off his own personal headset and was patient while the group process remained in \"calming and hopeful\" music and themes.  He remained longer than peers, waiting for stronger expressions of strength and release.  Then his focus remained primarily internal, with a joyful tension and requesting favorite hard rock songs and moving about the group room space.  As therapist joined his movements, he became even more expressive and happily engaged.         07/11/23 1130   Expressive Therapy   Therapy Type dance/movement   Minutes of Treatment 60       "

## 2023-07-11 NOTE — PLAN OF CARE
Problem: Sleep Disturbance  Goal: Adequate Sleep/Rest  Outcome: Progressing   Goal Outcome Evaluation:       Patient is sound asleep at the start of the shift.  No complaints made. Nothing unusual noted. He has a no roommate order r/t poor boundaries and severe intrusiveness. He woke up at 0500. He had dribbling of urine and wet the bathroom floor. Wet floor was cleaned up. Patient educated to call for help whenever necessary. He slept a total of 7 hours the whole night. He stayed in the dining area and drank a glass of Crystallite and tall glass of coffee with nutrigrain bar. He has an order of no roommate r/t severe intrusiveness and poor boundaries.

## 2023-07-12 PROCEDURE — 250N000013 HC RX MED GY IP 250 OP 250 PS 637: Performed by: PHYSICIAN ASSISTANT

## 2023-07-12 PROCEDURE — 124N000003 HC R&B MH SENIOR/ADOLESCENT

## 2023-07-12 PROCEDURE — 250N000013 HC RX MED GY IP 250 OP 250 PS 637: Performed by: PSYCHIATRY & NEUROLOGY

## 2023-07-12 PROCEDURE — 250N000013 HC RX MED GY IP 250 OP 250 PS 637: Performed by: EMERGENCY MEDICINE

## 2023-07-12 PROCEDURE — 99232 SBSQ HOSP IP/OBS MODERATE 35: CPT | Performed by: PSYCHIATRY & NEUROLOGY

## 2023-07-12 RX ORDER — ACETAMINOPHEN 500 MG
1000 TABLET ORAL 3 TIMES DAILY
Status: DISCONTINUED | OUTPATIENT
Start: 2023-07-12 | End: 2023-08-14 | Stop reason: HOSPADM

## 2023-07-12 RX ADMIN — PANTOPRAZOLE SODIUM 40 MG: 40 TABLET, DELAYED RELEASE ORAL at 06:28

## 2023-07-12 RX ADMIN — APIXABAN 5 MG: 5 TABLET, FILM COATED ORAL at 08:09

## 2023-07-12 RX ADMIN — LAMOTRIGINE 25 MG: 25 TABLET ORAL at 08:09

## 2023-07-12 RX ADMIN — CLOZAPINE 25 MG: 25 TABLET ORAL at 08:09

## 2023-07-12 RX ADMIN — DOCUSATE SODIUM 100 MG: 100 CAPSULE, LIQUID FILLED ORAL at 08:09

## 2023-07-12 RX ADMIN — ACETAMINOPHEN 1000 MG: 500 TABLET, FILM COATED ORAL at 20:14

## 2023-07-12 RX ADMIN — APIXABAN 5 MG: 5 TABLET, FILM COATED ORAL at 20:14

## 2023-07-12 RX ADMIN — LEVOTHYROXINE SODIUM 50 MCG: 25 TABLET ORAL at 06:28

## 2023-07-12 RX ADMIN — ZINC SULFATE 220 MG (50 MG) CAPSULE 220 MG: CAPSULE at 08:09

## 2023-07-12 RX ADMIN — PREDNISOLONE ACETATE 1 DROP: 10 SUSPENSION/ DROPS OPHTHALMIC at 08:09

## 2023-07-12 RX ADMIN — ACETAMINOPHEN 500 MG: 500 TABLET ORAL at 13:43

## 2023-07-12 RX ADMIN — WHITE PETROLATUM 57.7 %-MINERAL OIL 31.9 % EYE OINTMENT: at 20:14

## 2023-07-12 RX ADMIN — ACETAMINOPHEN 500 MG: 500 TABLET ORAL at 08:10

## 2023-07-12 RX ADMIN — MONTELUKAST 10 MG: 10 TABLET, FILM COATED ORAL at 20:14

## 2023-07-12 RX ADMIN — CALCIUM POLYCARBOPHIL 625 MG: 625 TABLET, FILM COATED ORAL at 08:10

## 2023-07-12 RX ADMIN — CHLORHEXIDINE GLUCONATE 0.12% ORAL RINSE 15 ML: 1.2 LIQUID ORAL at 08:09

## 2023-07-12 RX ADMIN — CLOZAPINE 150 MG: 100 TABLET ORAL at 20:14

## 2023-07-12 RX ADMIN — CALCIUM POLYCARBOPHIL 625 MG: 625 TABLET, FILM COATED ORAL at 20:13

## 2023-07-12 RX ADMIN — Medication 10 MG: at 20:14

## 2023-07-12 RX ADMIN — PALIPERIDONE 6 MG: 3 TABLET, EXTENDED RELEASE ORAL at 08:09

## 2023-07-12 RX ADMIN — LISINOPRIL 5 MG: 5 TABLET ORAL at 08:09

## 2023-07-12 RX ADMIN — TOPIRAMATE 150 MG: 50 TABLET ORAL at 20:14

## 2023-07-12 ASSESSMENT — ACTIVITIES OF DAILY LIVING (ADL)
ADLS_ACUITY_SCORE: 32
HYGIENE/GROOMING: INDEPENDENT
ADLS_ACUITY_SCORE: 32
ORAL_HYGIENE: INDEPENDENT
ADLS_ACUITY_SCORE: 32
DRESS: INDEPENDENT
ADLS_ACUITY_SCORE: 32
ADLS_ACUITY_SCORE: 32

## 2023-07-12 NOTE — PLAN OF CARE
"Goal Outcome Evaluation:    Plan of Care Reviewed With: patient          Pt calm, pleasant, polite, cooperative. Good eye contact. Affect blunted. Med-compliant. Visible in milieu. Eating & drinking adequately. Sleeping adequately. Hygiene adequate. No roommate due to intrusiveness and poor boundaries. Pushed sections of newspaper through med-room window. When asked why, he stated \"For personal reasons.\" Headphones removed from room at HS. TEDS also removed for the night. Continue with current treatment plan and recommendations. Continue to monitor and reassess symptoms. Monitor response to medications. Monitor progress towards treatment goals. Encourage groups and participation.          "

## 2023-07-12 NOTE — PLAN OF CARE
Assessment/Intervention/Current Symtoms and Care Coordination:  Pt discussed in team rounds this AM. Pt continues to voice delusions about this being a hotel. Pt to continue to titrate on Clozaril for delusions. CTC to set up care conference with guardian to discuss discharge planning.    Writer put out call to pt's guardian/sister, Huong ([phone]) to set up care conference. Huong reports availability on Monday, 7/17/23. Writer relayed plan to confirm that date works with provider.    Writer confirmed care conference date and time with provider.    Writer put out call to [facility] Care Coordinator, Lilo ([phone]) to inform her of care conference. Writer LVM with detailed information.    Discharge Plan or Goal:  Plan for pt to return to  when stable     Barriers to Discharge:  Pt continues to present as symptomatic (delusional mood). Pt is receiving ongoing stabilization and medication adjustment. [facility] with [facility] to ascertain his acceptability for a return to the home.     Referral Status:  Will continue to coordinate with pt's group home.     Legal Status:  Voluntary per guardian     Contacts:  Guardian/Sister - Huong Shlomo   P: [phone]     - Sadaf  P: [phone]     Upcoming Meetings and Dates/Important Information and next steps:  Care conference on 7/17 at 11:00am

## 2023-07-12 NOTE — CARE PLAN
Occupational Therapy     07/12/23 1500   Group Therapy Session   Group Attendance attended group session   Time Session Began 1015   Time Session Ended 1200   Total Time (minutes) 25   Total # Attendees 7-9   Group Type task skill   Group Topic Covered cognitive activities;coping skills/lifestyle management;leisure exploration/use of leisure time;problem-solving;structured socialization   Group Session Detail OT: Education on healthy activity engagement and creative hands on endeavor (OT clinic) to increase concentration, focus, attention to task/detail, decision making, problem solving, frustration tolerance, task follow through, coping with stress, healthy leisure engagement, creative expression, and social engagement   Patient Response/Contribution disorganized;left the group on several occasions;refused to comply with staff direction;other (see comments);closed eyes for most of session  (drowsy; argumentative)   Patient Participation Detail Pt arrived late to group and sat away from peers; pt did not engage in social interactions with peers. After gathering his unfinished projects, pt initially was resistant to therapist's directions on needed steps to complete his projects. Pt became semi-argumentative after therapist gathered needed supplies and explained the steps needed to finish the projects. Pt followed directions after therapist walked away. Pt worked in a quick and messy manner to complete projects and did not seem aware of the mess on the table. Pt left group without cleaning-up supplies or the workspace. Pt returned and left group several more times; choosing not be goal-directed each time. Upon his final return pt had a cup of coffee with sugar and creamers. Pt began to empty the contents of the sugar packets and creamers into his cup in a messy manner; leaving sugar on the table and empty packets on the floor. Pt did not clean-up his area or the floor. Pt was observed to close his eyes and appeared  asleep until therapist advised group had ended.

## 2023-07-12 NOTE — PLAN OF CARE
Problem: Plan of Care - These are the overarching goals to be used throughout the patient stay.    Goal: Optimal Comfort and Wellbeing  Intervention: Monitor Pain and Promote Comfort  Recent Flowsheet Documentation  Taken 7/12/2023 0756 by Cris Diaz RN  Pain Management Interventions: medication (see MAR)     Problem: Psychotic Signs/Symptoms  Goal: Improved Behavioral Control (Psychotic Signs/Symptoms)  Outcome: Not Progressing     Problem: Disruptive Behavior  Goal: Improved Impulse and Aggression Control (Disruptive Behavior)  Outcome: Progressing     Problem: Psychotic Signs/Symptoms  Goal: Improved Mood Symptoms  Intervention: Optimize Emotion and Mood  Recent Flowsheet Documentation  Taken 7/12/2023 0900 by Cris Diaz RN  Diversional Activity: (food) music     Problem: Fall Injury Risk  Goal: Absence of Fall and Fall-Related Injury  Intervention: Promote Injury-Free Environment  Recent Flowsheet Documentation  Taken 7/12/2023 0900 by Cris Diaz RN  Safety Promotion/Fall Prevention:   safety round/check completed   nonskid shoes/slippers when out of bed   clutter free environment maintained   Goal Outcome Evaluation:    Plan of Care Reviewed With: (P) patient      Pt has been pleasant and cooperative during interactions. Pt denies anxiety/depression/SI/SIB. Pt continues to have poor insight into his situation. Pt continues to report that he lives here at the hospital. Pt has been able to be distracted when he begins to go off topic.   Pt has been requesting extra snacks frequently. Pt has been eating well during meals.   Pt's affect is flat but brightens with appropriate eye contact.   Pt has been attending programming.  Pt showered and changed his scrubs this AM.

## 2023-07-12 NOTE — PLAN OF CARE
Problem: Sleep Disturbance  Goal: Adequate Sleep/Rest  Outcome: Progressing   Goal Outcome Evaluation:                  Slept uninterrupted for 9 hours, no urinary incontinence this shift  Pt has a no roommate order due to poor boundaries and intrusiveness.  Calm and with an appropriate behavior

## 2023-07-13 PROCEDURE — 250N000013 HC RX MED GY IP 250 OP 250 PS 637: Performed by: PSYCHIATRY & NEUROLOGY

## 2023-07-13 PROCEDURE — H2032 ACTIVITY THERAPY, PER 15 MIN: HCPCS

## 2023-07-13 PROCEDURE — 99232 SBSQ HOSP IP/OBS MODERATE 35: CPT | Performed by: PSYCHIATRY & NEUROLOGY

## 2023-07-13 PROCEDURE — 124N000003 HC R&B MH SENIOR/ADOLESCENT

## 2023-07-13 PROCEDURE — 250N000013 HC RX MED GY IP 250 OP 250 PS 637: Performed by: EMERGENCY MEDICINE

## 2023-07-13 PROCEDURE — 250N000013 HC RX MED GY IP 250 OP 250 PS 637: Performed by: PHYSICIAN ASSISTANT

## 2023-07-13 PROCEDURE — G0177 OPPS/PHP; TRAIN & EDUC SERV: HCPCS

## 2023-07-13 PROCEDURE — 250N000013 HC RX MED GY IP 250 OP 250 PS 637

## 2023-07-13 RX ORDER — PALIPERIDONE 3 MG/1
9 TABLET, EXTENDED RELEASE ORAL DAILY
Status: DISCONTINUED | OUTPATIENT
Start: 2023-07-14 | End: 2023-07-21

## 2023-07-13 RX ADMIN — MONTELUKAST 10 MG: 10 TABLET, FILM COATED ORAL at 20:32

## 2023-07-13 RX ADMIN — LAMOTRIGINE 25 MG: 25 TABLET ORAL at 07:47

## 2023-07-13 RX ADMIN — ZINC SULFATE 220 MG (50 MG) CAPSULE 220 MG: CAPSULE at 07:47

## 2023-07-13 RX ADMIN — ACETAMINOPHEN 1000 MG: 500 TABLET, FILM COATED ORAL at 07:47

## 2023-07-13 RX ADMIN — TOPIRAMATE 150 MG: 50 TABLET ORAL at 20:31

## 2023-07-13 RX ADMIN — WHITE PETROLATUM 57.7 %-MINERAL OIL 31.9 % EYE OINTMENT: at 20:41

## 2023-07-13 RX ADMIN — ACETAMINOPHEN 1000 MG: 500 TABLET, FILM COATED ORAL at 14:18

## 2023-07-13 RX ADMIN — Medication 10 MG: at 20:32

## 2023-07-13 RX ADMIN — DOCUSATE SODIUM 100 MG: 100 CAPSULE, LIQUID FILLED ORAL at 07:47

## 2023-07-13 RX ADMIN — ACETAMINOPHEN 1000 MG: 500 TABLET, FILM COATED ORAL at 20:34

## 2023-07-13 RX ADMIN — PANTOPRAZOLE SODIUM 40 MG: 40 TABLET, DELAYED RELEASE ORAL at 06:22

## 2023-07-13 RX ADMIN — CLOZAPINE 25 MG: 25 TABLET ORAL at 07:47

## 2023-07-13 RX ADMIN — APIXABAN 5 MG: 5 TABLET, FILM COATED ORAL at 20:32

## 2023-07-13 RX ADMIN — LISINOPRIL 5 MG: 5 TABLET ORAL at 12:50

## 2023-07-13 RX ADMIN — CALCIUM POLYCARBOPHIL 625 MG: 625 TABLET, FILM COATED ORAL at 07:47

## 2023-07-13 RX ADMIN — CHLORHEXIDINE GLUCONATE 0.12% ORAL RINSE 15 ML: 1.2 LIQUID ORAL at 07:47

## 2023-07-13 RX ADMIN — LEVOTHYROXINE SODIUM 50 MCG: 25 TABLET ORAL at 06:22

## 2023-07-13 RX ADMIN — CLOZAPINE 175 MG: 100 TABLET ORAL at 20:32

## 2023-07-13 RX ADMIN — APIXABAN 5 MG: 5 TABLET, FILM COATED ORAL at 07:47

## 2023-07-13 RX ADMIN — PREDNISOLONE ACETATE 1 DROP: 10 SUSPENSION/ DROPS OPHTHALMIC at 07:47

## 2023-07-13 RX ADMIN — CALCIUM POLYCARBOPHIL 625 MG: 625 TABLET, FILM COATED ORAL at 20:32

## 2023-07-13 RX ADMIN — PALIPERIDONE 6 MG: 3 TABLET, EXTENDED RELEASE ORAL at 07:47

## 2023-07-13 ASSESSMENT — ACTIVITIES OF DAILY LIVING (ADL)
ORAL_HYGIENE: INDEPENDENT;PROMPTS
DRESS: INDEPENDENT;SCRUBS (BEHAVIORAL HEALTH);PROMPTS
ADLS_ACUITY_SCORE: 32
ADLS_ACUITY_SCORE: 52
ADLS_ACUITY_SCORE: 32
ADLS_ACUITY_SCORE: 52
ADLS_ACUITY_SCORE: 52
HYGIENE/GROOMING: INDEPENDENT;PROMPTS
ADLS_ACUITY_SCORE: 52
ADLS_ACUITY_SCORE: 52
ADLS_ACUITY_SCORE: 32
ADLS_ACUITY_SCORE: 52
ADLS_ACUITY_SCORE: 52
ADLS_ACUITY_SCORE: 32
ADLS_ACUITY_SCORE: 32

## 2023-07-13 NOTE — PROGRESS NOTES
Pt came late and lingered long, patiently participating in group processes but then preferring his own music and higher energy dance expressions in dance/movement therapy (D/MT.) Pt was engaged and joyful.  He laughed and stood dancing.       07/13/23 1130   Expressive Therapy   Therapy Type dance/movement   Minutes of Treatment 30

## 2023-07-13 NOTE — PROGRESS NOTES
"RatePSYCHIATRY  PROGRESS NOTE     DATE OF SERVICE   07/13/2023        CHIEF COMPLAINT   \" I am great.\"       SUBJECTIVE   Nursing reports:  Pt up and in the lounge early in the shift. Pt denies mental health symptoms including anxiety/depression/SI/SIB/hallucinations. Pt states \"all my answers are No\". Pt describes his mood as \"fine, good, excellent\". Pt has been calm. Pt accepts redirection. Pt continues to be disorganized and has poor insight. Pt stated the date as \"Monday, July 13th,1925\". When asked if it was 1923 or 2023 he stated \"oh it's 2023\" and he was reminded that it is actually Thursday. Pt able to state that he is at [facility] however he then stated that it is now a \"mansion\". Pt reports that he now lives here in [place] and that \"everyone lives here now'.   Pt has been eating well and drinks fluids frequently.   Pt reports adequate sleep and that he feels rested on waking.  Pt continues to verbalize mid-back pain. Pt reports that he has had this for a \"long time'. Scheduled tylenol 1000 mg administered ( increased from 500 to 1000 mg yesterday). Pt reports that this was \"maybe\" more effective with pain last evening. Pt was also offered a warm pack during breakfast. An hour later patient was given a ice pack for continued pain.      AM dose of lisinopril held as his standing blood pressure did not meet parameter/ systolic of 110 ( sitting 124/85 with a pulse of 88 and standing 108/76 with a pulse of 98).     No Roommate order reordered as patient continues to be disorganized.      Pt's right lower leg edema improved this AM compared to yesterday. Pt was assisted with compression socks this AM. Pt encouraged to elevate his legs as he is able.     Patient has been reviewed with the  earlier today.  Care conference has been scheduled for next Monday at 11 AM with patient's sister (also guardian).     OBJECTIVE   Patient was seen and evaluated in the consult room by himself, this " "was a face-to-face evaluation.  Patient today is insisting that he is not at a hospital and this is a hotel, at the same time he said that this is a [facility] facility.  Patient said that he is not going to be discharging back to his group home and he is going to address that she is [address] in [place], [place].  When I asked the patient who lives there patient said that we all live in that property.  When I asked the patient who is \"we\" patient tells me that I should know that.  He was argumentative stating that he does not want for this writer to ask more questions because he is not lying and he is going to go back to live with his parents.  I informed the patient that at this time he continues to be in the hospital as he is believing things that are not true.  Patient was not able to process this information and he continued talking about his delusions.  Given that the conversation was nonproductive I decided to end the meeting.    Despite this writer challenging the patient's delusions he was not angry or aggressive.  He was easily redirected and the patient returned to the OT group.     MEDICATIONS   Medications:  Scheduled Meds:    acetaminophen  1,000 mg Oral TID     apixaban ANTICOAGULANT  5 mg Oral BID     artificial tears   Right Eye At Bedtime     calcium polycarbophil  625 mg Oral BID     chlorhexidine  15 mL Swish & Spit Daily     cloZAPine  175 mg Oral At Bedtime     cloZAPine  25 mg Oral Daily     docusate sodium  100 mg Oral Daily     lamoTRIgine  25 mg Oral Daily     levothyroxine  50 mcg Oral or NG Tube QAM AC     lisinopril  5 mg Oral Daily     melatonin  10 mg Oral At Bedtime     montelukast  10 mg Oral At Bedtime     [START ON 7/14/2023] paliperidone  9 mg Oral Daily     pantoprazole  40 mg Oral QAM AC     prednisoLONE acetate  1 drop Right Eye Daily     topiramate  150 mg Oral At Bedtime     zinc sulfate  220 mg Oral Daily     Continuous Infusions:  PRN Meds:.acetaminophen, alum & mag " "hydroxide-simethicone, artificial saliva, atropine, hydrALAZINE, hydrOXYzine, hypromellose-dextran, ipratropium, OLANZapine **OR** OLANZapine, senna-docusate    Medication adherence issues: MS Med Adherence Y/N: Yes, Hospitalization  Medication side effects: MEDICATION SIDE EFFECTS: no side effects reported  Benefit: Yes / No: Yes       ROS   A comprehensive review of systems was negative.       MENTAL STATUS EXAM   Vitals: /76   Pulse 98   Temp 97.6  F (36.4  C) (Temporal)   Resp 16   Wt 92.8 kg (204 lb 9.4 oz)   SpO2 97%   BMI 35.12 kg/m      Appearance:  No apparent distress  Mood: \"I am doing great\"  Affect: Argumentative  was congruent to speech  Suicidal Ideation: PRESENT / ABSENT: absent   Homicidal Ideation: PRESENT / ABSENT: absent     Thought process: Perseverating  Thought content: Remains delusional, continues to be redirectable  Fund of Knowledge: Below average  Attention/Concentration: Improving  Language ability:  Intact, speech seems to be more clear and the patient has not been drooling.  Memory:  Immediate recall impaired, Short-term memory impaired and Long-term memory intact  Insight: limited   Judgement: poor  Orientation: Person, time only  Psychomotor Behavior: slowed    Muscle Strength and Tone: MuscleStrength: Normal  Gait and Station: Stable on his feet       LABS   personally reviewed.   Recent Labs   Lab 07/11/23  0717   WBC 5.9   HGB 13.5   MCV 83        No results found for: PHENYTOIN, PHENOBARB, VALPROATE, CBMZ       DIAGNOSIS   Principal Problem:    Schizoaffective disorder, bipolar type (H)    Active Problem List:  Patient Active Problem List   Diagnosis     Closed head injury, initial encounter     Altered mental status, unspecified altered mental status type     Generalized weakness     Portal vein thrombosis     Pleural effusion     Generalized muscle weakness     Falls frequently     Other ascites     Acute pancreatitis, unspecified complication status, " unspecified pancreatitis type     Pancreatic pseudocyst     Idiopathic acute pancreatitis, unspecified complication status     Allergic rhinitis     Fisher's esophagus     CTS (carpal tunnel syndrome)     Elevated liver enzymes     HTN (hypertension)     Hypothyroidism     Keratoconus     Major depressive disorder, recurrent episode, mild (H)     Intellectual disability     Morbid exogenous obesity (H)     Neuroleptic malignant syndrome     Obstructive sleep apnea syndrome     Bipolar affective disorder (H)     Seizure disorder (H)     Seizures, generalized convulsive (H)     Acute respiratory failure with hypoxia (H)     Anemia, unspecified     Anxiety disorder, unspecified     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)     Dietary zinc deficiency     Dry eye syndrome of bilateral lacrimal glands     Dry mouth, unspecified     Edema, unspecified     Gastro-esophageal reflux disease without esophagitis     Hyperosmolality and hypernatremia     Irritable bowel syndrome with constipation     Major depressive disorder, recurrent, unspecified (H)     Metabolic encephalopathy     Moderate protein-calorie malnutrition (H)     Other symbolic dysfunctions     Schizoaffective disorder, unspecified (H)     Thrombocytopenia, unspecified (H)     Unspecified asthma, uncomplicated     Urinary tract infection, site not specified     Vitamin D deficiency, unspecified     Schizoaffective disorder, bipolar type (H)          PLAN   1. Ongoing education given regarding diagnostic and treatment options with risks, benefits and alternatives and adequate verbalization of understanding.  2.  Medications       Melatonin 10 mg at bedtime       Increase Clozaril at 25 mg daily and 175 mg at bedtime.  We will continue increasing the dose of the Clozaril slowly.      Topamax 150 mg at bedtime       Increase Invega 9 mg daily        Lamictal 25 mg daily    3.  Medical team following as needed  4.   coordinating a  safe discharge plan    Risk Assessment: [facility] RISK ASSESSMENT: Patient able to contract for safety    Coordination of Care:   Treatment Plan reviewed and physician signed, Care discussed with Care/Treatment Team Members, Chart reviewed and Patient seen      Re-Certification I certify that the inpatient psychiatric facility services furnished since the previous certification were, and continue to be, medically necessary for, either, treatment which could reasonably be expected to improve the patient s condition or diagnostic study and that the hospital records indicate that the services furnished were, either, intensive treatment services, admission and related services necessary for diagnostic study, or equivalent services.     I certify that the patient continues to need, on a daily basis, active treatment furnished directly by or requiring the supervision of inpatient psychiatric facility personnel.   I estimate 14 days of hospitalization is necessary for proper treatment of the patient. My plans for post-hospital care for this patient are  group home     Alejandra Watkins MD    -     07/13/2023  -     12:55 PM    Total time 35 minutes with > 50%spent on coordination of cares and psycho-education.    This note was created with help of Dragon dictation system. Grammatical / typing errors are not intentional.    Alejandra Watkisn MD

## 2023-07-13 NOTE — CARE PLAN
07/13/23 1236   Group Therapy Session   Group Attendance attended group session   Time Session Began 1015   Time Session Ended 1115   Total Time (minutes) 45   Total # Attendees 10   Group Type task skill;psychotherapeutic;expressive therapy   Group Topic Covered coping skills/lifestyle management;problem-solving;cognitive activities;balanced lifestyle;structured socialization;cognitive therapy techniques   Group Session Detail [facility] group to facilitate coping skill exploration, use of cognitive skills and problem solving, creative expression, clinical observation and facilitation of social, cognitive, and kinesthetic performance skills.    Patient Response/Contribution cooperative with task;listened actively   Patient Participation Detail On entrance to group, he greeted others. With encouragement, he worked longer and was attentive to more detail on familiar task steps. He was quick to agree to ideas. Called author by name. Tone of voice seemed more relaxed.

## 2023-07-13 NOTE — PROGRESS NOTES
"RatePSYCHIATRY  PROGRESS NOTE     DATE OF SERVICE   07/12/2023        CHIEF COMPLAINT   \" I am great.\"       SUBJECTIVE   Nursing reports:  Plan of Care Reviewed With: (P) patient       Pt has been pleasant and cooperative during interactions. Pt denies anxiety/depression/SI/SIB. Pt continues to have poor insight into his situation. Pt continues to report that he lives here at the hospital. Pt has been able to be distracted when he begins to go off topic.   Pt has been requesting extra snacks frequently. Pt has been eating well during meals.   Pt's affect is flat but brightens with appropriate eye contact.   Pt has been attending programming.  Pt showered and changed his scrubs this AM.     Patient has been reviewed with the  earlier today.   Assessment/Intervention/Current Symtoms and Care Coordination:  Pt discussed in team rounds this AM. Pt continues to voice delusions about this being a hotel. Pt to continue to titrate on Clozaril for delusions. CTC to set up care conference with guardian to discuss discharge planning.     Writer put out call to pt's guardian/sister, Huong ([phone]) to set up care conference. Huong reports availability on Monday, 7/17/23. Writer relayed plan to confirm that date works with provider.     Writer confirmed care conference date and time with provider.     Writer put out call to [facility] Care Coordinator, Lilo ([phone]) to inform her of care conference. Writer LVM with detailed information.     OBJECTIVE   Patient was seen and evaluated in the day area by himself, this was a face-to-face evaluation.  During my assessment the patient presented as calm, pleasant and cooperative.  Patient is not asking to leave the hospital and said that at this time he is in a hotel.  He is able to acknowledge that this is a [facility] facility.  Patient tells me that he has not seen his parents in the unit because they are living in [place].  Patient insisted that he is not " going to be returning to his group home because he has to go back to his parent's house that is located in [place].  Patient also said that his group home is a mansion that is not accepting more patients.  Patient continues to have the believe that his parents are alive, his sister is not his guardian and that he is .  Although patient has not been argumentative and is able to recognize that he attended his parents .    I had discussed with the patient that we are going to be having a care conference with his sister next Monday in order to discuss tentative discharge plans.  Patient verbalized good understanding but tells me that Huong cannot make decisions for him as she is not the guardian.     MEDICATIONS   Medications:  Scheduled Meds:    acetaminophen  1,000 mg Oral TID     apixaban ANTICOAGULANT  5 mg Oral BID     artificial tears   Right Eye At Bedtime     calcium polycarbophil  625 mg Oral BID     chlorhexidine  15 mL Swish & Spit Daily     cloZAPine  150 mg Oral At Bedtime     cloZAPine  25 mg Oral Daily     docusate sodium  100 mg Oral Daily     lamoTRIgine  25 mg Oral Daily     levothyroxine  50 mcg Oral or NG Tube QAM AC     lisinopril  5 mg Oral Daily     melatonin  10 mg Oral At Bedtime     montelukast  10 mg Oral At Bedtime     paliperidone  6 mg Oral Daily     pantoprazole  40 mg Oral QAM AC     prednisoLONE acetate  1 drop Right Eye Daily     topiramate  150 mg Oral At Bedtime     zinc sulfate  220 mg Oral Daily     Continuous Infusions:  PRN Meds:.acetaminophen, alum & mag hydroxide-simethicone, artificial saliva, atropine, hydrALAZINE, hydrOXYzine, hypromellose-dextran, ipratropium, OLANZapine **OR** OLANZapine, senna-docusate    Medication adherence issues: MS Med Adherence Y/N: Yes, Hospitalization  Medication side effects: MEDICATION SIDE EFFECTS: no side effects reported  Benefit: Yes / No: Yes       ROS   A comprehensive review of systems was negative.       MENTAL STATUS  "EXAM   Vitals: /82 (BP Location: Right arm, Patient Position: Sitting, Cuff Size: Adult Large)   Pulse 95   Temp 97.3  F (36.3  C) (Temporal)   Resp 18   Wt 93.4 kg (205 lb 14.6 oz)   SpO2 100%   BMI 35.34 kg/m      Appearance:  No apparent distress  Mood: \"I am doing great\"  Affect: calm and pleasant  was congruent to speech  Suicidal Ideation: PRESENT / ABSENT: absent   Homicidal Ideation: PRESENT / ABSENT: absent     Thought process: More linear and goal directed  Thought content: Remains delusional, but more redirectable  Fund of Knowledge: Below average  Attention/Concentration: Improving  Language ability:  Intact, speech seems to be more clear and the patient has not been drooling.  Memory:  Immediate recall impaired, Short-term memory impaired and Long-term memory intact  Insight: limited   Judgement: poor  Orientation: Person and situation only  Psychomotor Behavior: slowed    Muscle Strength and Tone: MuscleStrength: Normal  Gait and Station: Stable on his feet       LABS   personally reviewed.   Recent Labs   Lab 07/11/23  0717   WBC 5.9   HGB 13.5   MCV 83        No results found for: PHENYTOIN, PHENOBARB, VALPROATE, CBMZ       DIAGNOSIS   Principal Problem:    Schizoaffective disorder, bipolar type (H)    Active Problem List:  Patient Active Problem List   Diagnosis     Closed head injury, initial encounter     Altered mental status, unspecified altered mental status type     Generalized weakness     Portal vein thrombosis     Pleural effusion     Generalized muscle weakness     Falls frequently     Other ascites     Acute pancreatitis, unspecified complication status, unspecified pancreatitis type     Pancreatic pseudocyst     Idiopathic acute pancreatitis, unspecified complication status     Allergic rhinitis     Fisher's esophagus     CTS (carpal tunnel syndrome)     Elevated liver enzymes     HTN (hypertension)     Hypothyroidism     Keratoconus     Major depressive disorder, " recurrent episode, mild (H)     Intellectual disability     Morbid exogenous obesity (H)     Neuroleptic malignant syndrome     Obstructive sleep apnea syndrome     Bipolar affective disorder (H)     Seizure disorder (H)     Seizures, generalized convulsive (H)     Acute respiratory failure with hypoxia (H)     Anemia, unspecified     Anxiety disorder, unspecified     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)     Dietary zinc deficiency     Dry eye syndrome of bilateral lacrimal glands     Dry mouth, unspecified     Edema, unspecified     Gastro-esophageal reflux disease without esophagitis     Hyperosmolality and hypernatremia     Irritable bowel syndrome with constipation     Major depressive disorder, recurrent, unspecified (H)     Metabolic encephalopathy     Moderate protein-calorie malnutrition (H)     Other symbolic dysfunctions     Schizoaffective disorder, unspecified (H)     Thrombocytopenia, unspecified (H)     Unspecified asthma, uncomplicated     Urinary tract infection, site not specified     Vitamin D deficiency, unspecified     Schizoaffective disorder, bipolar type (H)          PLAN   1. Ongoing education given regarding diagnostic and treatment options with risks, benefits and alternatives and adequate verbalization of understanding.  2.  Medications       Melatonin 10 mg at bedtime       Clozaril at 25 mg daily and 150 mg at bedtime.  We will continue increasing the dose of the Clozaril slowly.      Topamax 150 mg at bedtime       Invega 6 mg daily        Lamictal 25 mg daily    3.  Medical team following as needed  4.   coordinating a safe discharge plan    Risk Assessment: [facility] RISK ASSESSMENT: Patient able to contract for safety    Coordination of Care:   Treatment Plan reviewed and physician signed, Care discussed with Care/Treatment Team Members, Chart reviewed and Patient seen      Re-Certification I certify that the inpatient psychiatric facility services  furnished since the previous certification were, and continue to be, medically necessary for, either, treatment which could reasonably be expected to improve the patient s condition or diagnostic study and that the hospital records indicate that the services furnished were, either, intensive treatment services, admission and related services necessary for diagnostic study, or equivalent services.     I certify that the patient continues to need, on a daily basis, active treatment furnished directly by or requiring the supervision of inpatient psychiatric facility personnel.   I estimate 14 days of hospitalization is necessary for proper treatment of the patient. My plans for post-hospital care for this patient are  group home     Alejandra Watkins MD    -     07/12/2023  -     8:27 PM    Total time 35 minutes with > 50%spent on coordination of cares and psycho-education.    This note was created with help of Dragon dictation system. Grammatical / typing errors are not intentional.    Alejandra Watkins MD

## 2023-07-13 NOTE — CARE PLAN
07/12/23 1933   Group Therapy Session   Group Attendance attended group session   Time Session Began 1615   Time Session Ended 1705   Total Time (minutes) 25   Total # Attendees 5   Group Type psychotherapeutic   Group Topic Covered coping skills/lifestyle management;emotions/expression   Group Session Detail CTC-Group members discuss/completed worksheet on positive character traits including when you feel your best and why, proudest accomplishments, self-affirmation, who inspires me, habits I practice being my best self, what I value most about myself, and who believes in me.   Patient Response/Contribution listened actively;cooperative with task;organized   Patient Participation Detail Patient presented to group was pleasant and engaged expressing he was late because he was taking a nap and nobody woke him up.     JESUS MANUEL Hammer, SW  Clinical Treatment Coordinator  [facility]

## 2023-07-13 NOTE — PLAN OF CARE
Assessment/Intervention/Current Symtoms and Care Coordination:  Pt discussed in team rounds this AM. Pt has been sleeping fine. Pt continues to endorse delusion of the hospital being a hotel. Care conference scheduled for 7/17 at 1100.    Writer spoke with Lilo ([phone]) who informed writer she will come in person if she can. If not, she will call-in.    Discharge Plan or Goal:  Plan for pt to return to  when stable     Barriers to Discharge:  Pt continues to present as symptomatic (delusional mood). Pt is receiving ongoing stabilization and medication adjustment. Continue care coordination with nursing home to ascertain his acceptability for a return to the home.     Referral Status:  Will continue to coordinate with pt's group home.     Legal Status:  Voluntary per guardian     Contacts:  Guardian/Sister - Huong Shlomo   P: [phone]     - Sadaf  P: [phone]     Upcoming Meetings and Dates/Important Information and next steps:  Care conference on 7/17 at 11:00am

## 2023-07-13 NOTE — CARE PLAN
07/13/23 1522   Group Therapy Session   Group Attendance attended group session   Time Session Began 1300   Time Session Ended 1400   Total Time (minutes) 10  (no charge)   Total # Attendees 8   Group Type psychotherapeutic   Group Topic Covered coping skills/lifestyle management;relapse prevention;cognitive therapy techniques;structured socialization;balanced lifestyle   Group Session Detail Topic of stress, negative effects and benefits of stress, ideas on how to manage it and work through it in perspective.   Patient Response/Contribution cooperative with task;listened actively   Patient Participation Detail Came in the last 10 minutes of group. Was quickly involved, offered answers to questions he angel about stress. Added insightful comments about some of the benefits of stress. Seems comfortable, relaxed, interested in being involved with a respectful manner.

## 2023-07-13 NOTE — PLAN OF CARE
Problem: Sleep Disturbance  Goal: Adequate Sleep/Rest  Outcome: Progressing   Goal Outcome Evaluation:    Patient slept approximately 6.5 hours, safety checks and precautions in place, no prn given or requested. No complain of pain, no other known concerns, writer will monitor as needed.

## 2023-07-13 NOTE — PLAN OF CARE
Problem: Plan of Care - These are the overarching goals to be used throughout the patient stay.    Goal: Absence of Hospital-Acquired Illness or Injury  Intervention: Identify and Manage Fall Risk  Recent Flowsheet Documentation  Taken 7/13/2023 1014 by Cris Diaz RN  Safety Promotion/Fall Prevention:    safety round/check completed    nonskid shoes/slippers when out of bed    clutter free environment maintained     Problem: Plan of Care - These are the overarching goals to be used throughout the patient stay.    Goal: Absence of Hospital-Acquired Illness or Injury  Intervention: Prevent Skin Injury  Recent Flowsheet Documentation  Taken 7/13/2023 1014 by Cris Diaz RN  Body Position: position changed independently     Problem: Plan of Care - These are the overarching goals to be used throughout the patient stay.    Goal: Optimal Comfort and Wellbeing  Intervention: Monitor Pain and Promote Comfort  Recent Flowsheet Documentation  Taken 7/13/2023 0945 by Cris Diaz RN  Pain Management Interventions: cold applied  Taken 7/13/2023 0745 by Cris Diaz RN  Pain Management Interventions:    medication (see MAR)    heat applied    food     Problem: Adult Behavioral Health Plan of Care  Goal: Adheres to Safety Considerations for Self and Others  Intervention: Develop and Maintain Individualized Safety Plan  Recent Flowsheet Documentation  Taken 7/13/2023 1014 by Cris Diaz RN  Safety Measures:    environmental rounds completed    suicide assessment completed    safety rounds completed     Problem: Sleep Disturbance  Goal: Adequate Sleep/Rest  Outcome: Progressing     Problem: Psychotic Signs/Symptoms  Goal: Improved Mood Symptoms  Intervention: Optimize Emotion and Mood  Recent Flowsheet Documentation  Taken 7/13/2023 1014 by Cris Diaz RN  Diversional Activity: (food) music   Goal Outcome Evaluation:    Plan of Care Reviewed With: patient      Pt up and in the lounge early in the  "shift. Pt denies mental health symptoms including anxiety/depression/SI/SIB/hallucinations. Pt states \"all my answers are No\". Pt describes his mood as \"fine, good, excellent\". Pt has been calm. Pt accepts redirection. Pt continues to be disorganized and has poor insight. Pt stated the date as \"Monday, July 13th,1925\". When asked if it was 1923 or 2023 he stated \"oh it's 2023\" and he was reminded that it is actually Thursday. Pt able to state that he is at [facility] however he then stated that it is now a \"mansion\". Pt reports that he now lives here in [place] and that \"everyone lives here now'.   Pt has been eating well and drinks fluids frequently.   Pt reports adequate sleep and that he feels rested on waking.  Pt continues to verbalize mid-back pain. Pt reports that he has had this for a \"long time'. Scheduled tylenol 1000 mg administered ( increased from 500 to 1000 mg yesterday). Pt reports that this was \"maybe\" more effective with pain last evening. Pt was also offered a warm pack during breakfast. An hour later patient was given a ice pack for continued pain.     AM dose of lisinopril held as his standing blood pressure did not meet parameter/ systolic of 110 ( sitting 124/85 with a pulse of 88 and standing 108/76 with a pulse of 98).    No Roommate order reordered as patient continues to be disorganized.     Pt's right lower leg edema improved this AM compared to yesterday. Pt was assisted with compression socks this AM. Pt encouraged to elevate his legs as he is able.     Pt more active on the unit after lunch. Pt dancing and singing louder than he has. Pt filled to bowls with water and set them on the window sill. Pt reports that he put them there \"for the trees\". Pt reminded that there were no trees there and he stated \"there will be\". Pt declined to go to the group this afternoon because it was full of \"old ladies\".   Pt agreeable to sit in the recliner in the lounge and elevate his legs. " Ice pack given.     Haseeb/medical PA contact regarding blood pressure medication/lisinipril. Ok'd to give after lunch as he  met parameter   (139/91 with a pulse of 91 sitting and 142/89 with a pulse of 97 standing). Parameter was also decreased from systolic of 110 down to 100.

## 2023-07-14 PROCEDURE — 250N000013 HC RX MED GY IP 250 OP 250 PS 637: Performed by: EMERGENCY MEDICINE

## 2023-07-14 PROCEDURE — 124N000003 HC R&B MH SENIOR/ADOLESCENT

## 2023-07-14 PROCEDURE — 99231 SBSQ HOSP IP/OBS SF/LOW 25: CPT | Performed by: PSYCHIATRY & NEUROLOGY

## 2023-07-14 PROCEDURE — 250N000013 HC RX MED GY IP 250 OP 250 PS 637

## 2023-07-14 PROCEDURE — 250N000013 HC RX MED GY IP 250 OP 250 PS 637: Performed by: PSYCHIATRY & NEUROLOGY

## 2023-07-14 PROCEDURE — 250N000013 HC RX MED GY IP 250 OP 250 PS 637: Performed by: PHYSICIAN ASSISTANT

## 2023-07-14 PROCEDURE — G0177 OPPS/PHP; TRAIN & EDUC SERV: HCPCS

## 2023-07-14 RX ADMIN — ZINC SULFATE 220 MG (50 MG) CAPSULE 220 MG: CAPSULE at 07:59

## 2023-07-14 RX ADMIN — CALCIUM POLYCARBOPHIL 625 MG: 625 TABLET, FILM COATED ORAL at 07:59

## 2023-07-14 RX ADMIN — Medication 10 MG: at 21:01

## 2023-07-14 RX ADMIN — CLOZAPINE 25 MG: 25 TABLET ORAL at 07:59

## 2023-07-14 RX ADMIN — WHITE PETROLATUM 57.7 %-MINERAL OIL 31.9 % EYE OINTMENT: at 21:05

## 2023-07-14 RX ADMIN — LEVOTHYROXINE SODIUM 50 MCG: 25 TABLET ORAL at 05:36

## 2023-07-14 RX ADMIN — PREDNISOLONE ACETATE 1 DROP: 10 SUSPENSION/ DROPS OPHTHALMIC at 08:00

## 2023-07-14 RX ADMIN — APIXABAN 5 MG: 5 TABLET, FILM COATED ORAL at 07:59

## 2023-07-14 RX ADMIN — LAMOTRIGINE 25 MG: 25 TABLET ORAL at 07:59

## 2023-07-14 RX ADMIN — CHLORHEXIDINE GLUCONATE 0.12% ORAL RINSE 15 ML: 1.2 LIQUID ORAL at 07:58

## 2023-07-14 RX ADMIN — PALIPERIDONE 9 MG: 3 TABLET, EXTENDED RELEASE ORAL at 07:58

## 2023-07-14 RX ADMIN — HYDROXYZINE HYDROCHLORIDE 25 MG: 25 TABLET, FILM COATED ORAL at 21:06

## 2023-07-14 RX ADMIN — ACETAMINOPHEN 1000 MG: 500 TABLET, FILM COATED ORAL at 13:51

## 2023-07-14 RX ADMIN — ACETAMINOPHEN 1000 MG: 500 TABLET, FILM COATED ORAL at 21:01

## 2023-07-14 RX ADMIN — MONTELUKAST 10 MG: 10 TABLET, FILM COATED ORAL at 21:02

## 2023-07-14 RX ADMIN — CALCIUM POLYCARBOPHIL 625 MG: 625 TABLET, FILM COATED ORAL at 21:02

## 2023-07-14 RX ADMIN — PANTOPRAZOLE SODIUM 40 MG: 40 TABLET, DELAYED RELEASE ORAL at 05:36

## 2023-07-14 RX ADMIN — CLOZAPINE 175 MG: 100 TABLET ORAL at 21:02

## 2023-07-14 RX ADMIN — LISINOPRIL 5 MG: 5 TABLET ORAL at 07:59

## 2023-07-14 RX ADMIN — TOPIRAMATE 150 MG: 50 TABLET ORAL at 21:02

## 2023-07-14 RX ADMIN — APIXABAN 5 MG: 5 TABLET, FILM COATED ORAL at 21:02

## 2023-07-14 RX ADMIN — ACETAMINOPHEN 1000 MG: 500 TABLET, FILM COATED ORAL at 07:59

## 2023-07-14 ASSESSMENT — ACTIVITIES OF DAILY LIVING (ADL)
HYGIENE/GROOMING: INDEPENDENT
ORAL_HYGIENE: INDEPENDENT;PROMPTS
ADLS_ACUITY_SCORE: 32
ADLS_ACUITY_SCORE: 42
DRESS: SCRUBS (BEHAVIORAL HEALTH);INDEPENDENT
HYGIENE/GROOMING: INDEPENDENT;PROMPTS
ORAL_HYGIENE: INDEPENDENT
ADLS_ACUITY_SCORE: 52
DRESS: SCRUBS (BEHAVIORAL HEALTH);INDEPENDENT
ADLS_ACUITY_SCORE: 42
ADLS_ACUITY_SCORE: 52
ADLS_ACUITY_SCORE: 52
ADLS_ACUITY_SCORE: 32
ADLS_ACUITY_SCORE: 32
LAUNDRY: UNABLE TO COMPLETE
ADLS_ACUITY_SCORE: 42

## 2023-07-14 NOTE — CARE PLAN
07/14/23 1455   Group Therapy Session   Group Attendance attended group session   Time Session Began 1130   Time Session Ended 1220   Total Time (minutes) 50   Total # Attendees 8   Group Type expressive therapy;task skill;psychotherapeutic   Group Topic Covered coping skills/lifestyle management;problem-solving;cognitive activities;structured socialization;balanced lifestyle   Group Session Detail Occupational Therapy Clinic group to facilitate coping skill exploration, use of cognitive skills and problem solving, creative expression, clinical observation and facilitation of social, cognitive, and kinesthetic performance skills.    Patient Response/Contribution cooperative with task;listened actively;organized;discussed personal experience with topic   Patient Participation Detail Pleasant and social with others. Initiated asking for assistance. Followed through on step suggestions and teased and joked in the context of the conversation. Laughed out loud with gentle humor, again, in context.

## 2023-07-14 NOTE — CARE PLAN
Occupational Therapy Group Note:     07/14/23 1607   Group Therapy Session   Group Attendance attended group session   Time Session Began 1315   Time Session Ended 1400   Total Time (minutes) 25 (no charge)   Total # Attendees 8   Group Type recreation   Group Topic Covered leisure exploration/use of leisure time;structured socialization   Group Session Detail OT Leisure Group   Patient Response/Contribution confronted peers appropriately;cooperative with task;listened actively;offered helpful suggestions to peers   Patient Participation Detail Patient engaged in group leisure activity with the focus being: building interpersonal skills, encouraging team collaboration, and promoting overall prosocial engagement and interaction. Patient entered group late. Patient was quickly able to pickup on instructions of game with minimal verbal cueing. Patient offered responses that were appropriate to topic. Cooperative with peers. Patient excused himself early from group to retrieve water; returned to group at end of session. Patient was calm and cooperative during today's group.

## 2023-07-14 NOTE — PLAN OF CARE
Problem: Sleep Disturbance  Goal: Adequate Sleep/Rest  Outcome: Progressing     Patient slept approximately 8 hours, safety checks and precautions in place. No C/O pain, no prn given or requested. Medication compliant. This writer will continue to monitor and offer therapeutic support as needed for the rest of the shift.

## 2023-07-14 NOTE — PLAN OF CARE
Problem: Adult Behavioral Health Plan of Care  Goal: Plan of Care Review  Recent Flowsheet Documentation  Taken 7/13/2023 2001 by Renan Rodriguez RN  Patient Agreement with Plan of Care: agrees     Problem: Psychotic Signs/Symptoms  Goal: Improved Psychomotor Symptoms (Psychotic Signs/Symptoms)  Intervention: Manage Psychomotor Movement  Recent Flowsheet Documentation  Taken 7/13/2023 2001 by Renan Rodriguez RN  Patient Performed Hygiene: dressed  Diversional Activity: music  Activity (Behavioral Health): up ad katina   Goal Outcome Evaluation:    Plan of Care Reviewed With: patient       Patient was present in the milieu socializing with peers and staff. Patient was listening to music using headphones. Patient denies all psych symptoms. Reported back pain rating at 5/10. Scheduled pain medication was administered. Patient had 75% of dinner served with adequate fluid intake. Was medication complaint. Reported having good night sleep for last night. Patient went to bed at around 2000.

## 2023-07-14 NOTE — CARE PLAN
Occupational Therapy Group Note:     07/14/23 1600   Group Therapy Session   Group Attendance attended group session   Time Session Began 1020   Time Session Ended 1105   Total Time (minutes) 5 (no charge)   Total # Attendees 6   Group Type psychoeducation   Group Topic Covered relaxation techniques   Group Session Detail OT: Relaxation Group   Patient Response/Contribution refused to participate   Patient Participation Detail Patient passively participated in a progressive muscle relaxation group in order to promote: positive relaxation and coping skills, encourage insight and self-reflection, promote a positive change, manage behaviors, and improve focus. In addition, patient was guided through proper deep breathing techniques and gentle full body movements/stretches to further promote relaxation. Patient entered group late. Patient was somewhat disruptive to the relaxing environment facilitated this group upon initial entry (headphones, washing hands in sink, walking around room); however, patient was able to settle himself into a chair and listen quietly to writer's directions without intervention needed. Patient did not actively participate in any PMR exercises. Patient excused himself from group without return.

## 2023-07-14 NOTE — PLAN OF CARE
Assessment/Intervention/Current Symtoms and Care Coordination:  Pt discussed in team rounds this AM. Pt has been sleeping fine. Pt continues to endorse delusion of the hospital being a hotel. Pt has bright affect, joking with writer at times. Care conference scheduled for 7/17 at 1100.    Discharge Plan or Goal:  Plan for pt to return to  when stable     Barriers to Discharge:  Pt continues to present as symptomatic (delusional mood). Pt is receiving ongoing stabilization and medication adjustment. Continue care coordination with senior care to ascertain his acceptability for a return to the home.     Referral Status:  Will continue to coordinate with pt's group home.     Legal Status:  Voluntary per guardian     Contacts:  Guardian/Sister - Huong Shlomo   P: [phone]     - Sadaf  P: [phone]     Upcoming Meetings and Dates/Important Information and next steps:  Care conference on 7/17 at 11:00am

## 2023-07-14 NOTE — PROGRESS NOTES
"[facility]  PROGRESS NOTE     DATE OF SERVICE   07/14/2023        CHIEF COMPLAINT   \" I am great.\"       SUBJECTIVE   Nursing reports:  Patient paces the hallways with his head phones. Has multiple request for coffee, soda and crystal light. Rambling speech. Unkept appearance and refused to take a shower but he changed his shirt. No insight or judgement.  Denied having anxiety and depression. Denied having SI/SIB/HI. No visual and auditory hallucinations. Has lower back pain rated it 2/10. Medicated with scheduled tylenol with relief. Contracted for safety. Medication compliant. No prns given. Adequate fluid and food intake.     Patient has been reviewed with the  earlier today.  Care conference has been scheduled for next Monday at 11 AM with patient's sister (also guardian).     OBJECTIVE   Patient was seen and evaluated in the day area by himself, this was a face-to-face evaluation.  Initially the patient presented as pleasant, calm and cooperative.  He addressed this writer by calling me \"Kylee\".  He then admitted that my name is Dr. Charles.  When I discussed with the patient that we are having a meeting with his sister on Monday in order to discuss discharge plans patient said that he will only go to [place] and he is not discharging to his group home.  He also said that he can stay here in the hospital because the hospital is in [place].  When I tried to talk to the patient about the possibility of going to the group home patient replied \"lady do not talk to me about that, go away, I will go to a mansion in [place] only\".  Patient subsequently decided to end the meeting.  During my interaction with the patient he did not got agitated or angry.       MEDICATIONS   Medications:  Scheduled Meds:    acetaminophen  1,000 mg Oral TID     apixaban ANTICOAGULANT  5 mg Oral BID     artificial tears   Right Eye At Bedtime     calcium polycarbophil  625 mg Oral BID     chlorhexidine  15 " "mL Swish & Spit Daily     cloZAPine  175 mg Oral At Bedtime     cloZAPine  25 mg Oral Daily     docusate sodium  100 mg Oral Daily     lamoTRIgine  25 mg Oral Daily     levothyroxine  50 mcg Oral or NG Tube QAM AC     lisinopril  5 mg Oral Daily     melatonin  10 mg Oral At Bedtime     montelukast  10 mg Oral At Bedtime     paliperidone  9 mg Oral Daily     pantoprazole  40 mg Oral QAM AC     prednisoLONE acetate  1 drop Right Eye Daily     topiramate  150 mg Oral At Bedtime     zinc sulfate  220 mg Oral Daily     Continuous Infusions:  PRN Meds:.acetaminophen, alum & mag hydroxide-simethicone, artificial saliva, atropine, hydrALAZINE, hydrOXYzine, hypromellose-dextran, ipratropium, OLANZapine **OR** OLANZapine, senna-docusate    Medication adherence issues: MS Med Adherence Y/N: Yes, Hospitalization  Medication side effects: MEDICATION SIDE EFFECTS: no side effects reported  Benefit: Yes / No: Yes       ROS   A comprehensive review of systems was negative.       MENTAL STATUS EXAM   Vitals: /83 (BP Location: Right arm, Patient Position: Sitting, Cuff Size: Adult Regular)   Pulse 97   Temp 98  F (36.7  C) (Temporal)   Resp 16   Wt 92.8 kg (204 lb 9.4 oz)   SpO2 98%   BMI 35.12 kg/m      Appearance:  No apparent distress  Mood: \"I am doing great\"  Affect: Calm and pleasant  was congruent to speech  Suicidal Ideation: PRESENT / ABSENT: absent   Homicidal Ideation: PRESENT / ABSENT: absent     Thought process: Perseverating  Thought content: Remains delusional, this delusions have been present throughout the entire admission and no medication given seem to help.  Fund of Knowledge: Below average  Attention/Concentration: Improving  Language ability:  Intact, speech seems to be more clear and the patient has not been drooling.  Memory:  Immediate recall impaired, Short-term memory impaired and Long-term memory intact  Insight: limited   Judgement: poor  Orientation: Person, time only  Psychomotor Behavior: " slowed    Muscle Strength and Tone: MuscleStrength: Normal  Gait and Station: Stable on his feet       LABS   personally reviewed.   Recent Labs   Lab 07/11/23  0717   WBC 5.9   HGB 13.5   MCV 83        No results found for: PHENYTOIN, PHENOBARB, VALPROATE, CBMZ       DIAGNOSIS   Principal Problem:    Schizoaffective disorder, bipolar type (H)    Active Problem List:  Patient Active Problem List   Diagnosis     Closed head injury, initial encounter     Altered mental status, unspecified altered mental status type     Generalized weakness     Portal vein thrombosis     Pleural effusion     Generalized muscle weakness     Falls frequently     Other ascites     Acute pancreatitis, unspecified complication status, unspecified pancreatitis type     Pancreatic pseudocyst     Idiopathic acute pancreatitis, unspecified complication status     Allergic rhinitis     Fisher's esophagus     CTS (carpal tunnel syndrome)     Elevated liver enzymes     HTN (hypertension)     Hypothyroidism     Keratoconus     Major depressive disorder, recurrent episode, mild (H)     Intellectual disability     Morbid exogenous obesity (H)     Neuroleptic malignant syndrome     Obstructive sleep apnea syndrome     Bipolar affective disorder (H)     Seizure disorder (H)     Seizures, generalized convulsive (H)     Acute respiratory failure with hypoxia (H)     Anemia, unspecified     Anxiety disorder, unspecified     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)     Dietary zinc deficiency     Dry eye syndrome of bilateral lacrimal glands     Dry mouth, unspecified     Edema, unspecified     Gastro-esophageal reflux disease without esophagitis     Hyperosmolality and hypernatremia     Irritable bowel syndrome with constipation     Major depressive disorder, recurrent, unspecified (H)     Metabolic encephalopathy     Moderate protein-calorie malnutrition (H)     Other symbolic dysfunctions     Schizoaffective disorder,  unspecified (H)     Thrombocytopenia, unspecified (H)     Unspecified asthma, uncomplicated     Urinary tract infection, site not specified     Vitamin D deficiency, unspecified     Schizoaffective disorder, bipolar type (H)          PLAN   1. Ongoing education given regarding diagnostic and treatment options with risks, benefits and alternatives and adequate verbalization of understanding.  2.  Medications       Melatonin 10 mg at bedtime       Clozaril at 25 mg daily and 175 mg at bedtime.  We will continue increasing the dose of the Clozaril slowly.      Topamax 150 mg at bedtime       Invega 9 mg daily        Lamictal 25 mg daily    3.  Medical team following as needed  4.   coordinating a safe discharge plan    Risk Assessment: [facility] RISK ASSESSMENT: Patient able to contract for safety    Coordination of Care:   Treatment Plan reviewed and physician signed, Care discussed with Care/Treatment Team Members, Chart reviewed and Patient seen      Re-Certification I certify that the inpatient psychiatric facility services furnished since the previous certification were, and continue to be, medically necessary for, either, treatment which could reasonably be expected to improve the patient s condition or diagnostic study and that the hospital records indicate that the services furnished were, either, intensive treatment services, admission and related services necessary for diagnostic study, or equivalent services.     I certify that the patient continues to need, on a daily basis, active treatment furnished directly by or requiring the supervision of inpatient psychiatric facility personnel.   I estimate 14 days of hospitalization is necessary for proper treatment of the patient. My plans for post-hospital care for this patient are  group home     Alejandra Watkins MD    -     07/14/2023  -     2:05 PM    Total time 25 minutes with > 50%spent on coordination of cares and psycho-education.    This  note was created with help of Dragon dictation system. Grammatical / typing errors are not intentional.    Alejandra Watkins MD

## 2023-07-14 NOTE — PLAN OF CARE
Problem: Psychotic Signs/Symptoms  Goal: Improved Behavioral Control (Psychotic Signs/Symptoms)  Outcome: Not Progressing     Patient paces the hallways with his head phones. Has multiple request for coffee, soda and crystal light. Rambling speech. Unkept appearance and refused to take a shower but he changed his shirt. No insight or judgement.  Denied having anxiety and depression. Denied having SI/SIB/HI. No visual and auditory hallucinations. Has lower back pain rated it 2/10. Medicated with scheduled tylenol with relief. Contracted for safety. Medication compliant. No prns given. Adequate fluid and food intake.

## 2023-07-15 PROCEDURE — 250N000013 HC RX MED GY IP 250 OP 250 PS 637: Performed by: EMERGENCY MEDICINE

## 2023-07-15 PROCEDURE — 250N000013 HC RX MED GY IP 250 OP 250 PS 637: Performed by: PSYCHIATRY & NEUROLOGY

## 2023-07-15 PROCEDURE — 250N000013 HC RX MED GY IP 250 OP 250 PS 637: Performed by: PHYSICIAN ASSISTANT

## 2023-07-15 PROCEDURE — 250N000013 HC RX MED GY IP 250 OP 250 PS 637

## 2023-07-15 PROCEDURE — 124N000003 HC R&B MH SENIOR/ADOLESCENT

## 2023-07-15 RX ADMIN — TOPIRAMATE 150 MG: 50 TABLET ORAL at 20:12

## 2023-07-15 RX ADMIN — PANTOPRAZOLE SODIUM 40 MG: 40 TABLET, DELAYED RELEASE ORAL at 05:09

## 2023-07-15 RX ADMIN — Medication 10 MG: at 20:12

## 2023-07-15 RX ADMIN — WHITE PETROLATUM 57.7 %-MINERAL OIL 31.9 % EYE OINTMENT: at 20:13

## 2023-07-15 RX ADMIN — CLOZAPINE 175 MG: 100 TABLET ORAL at 20:10

## 2023-07-15 RX ADMIN — LISINOPRIL 5 MG: 5 TABLET ORAL at 07:01

## 2023-07-15 RX ADMIN — APIXABAN 5 MG: 5 TABLET, FILM COATED ORAL at 07:01

## 2023-07-15 RX ADMIN — LEVOTHYROXINE SODIUM 50 MCG: 25 TABLET ORAL at 05:09

## 2023-07-15 RX ADMIN — PREDNISOLONE ACETATE 1 DROP: 10 SUSPENSION/ DROPS OPHTHALMIC at 07:14

## 2023-07-15 RX ADMIN — ACETAMINOPHEN 1000 MG: 500 TABLET, FILM COATED ORAL at 14:07

## 2023-07-15 RX ADMIN — MONTELUKAST 10 MG: 10 TABLET, FILM COATED ORAL at 20:12

## 2023-07-15 RX ADMIN — ACETAMINOPHEN 1000 MG: 500 TABLET, FILM COATED ORAL at 20:11

## 2023-07-15 RX ADMIN — DOCUSATE SODIUM 100 MG: 100 CAPSULE, LIQUID FILLED ORAL at 07:01

## 2023-07-15 RX ADMIN — LAMOTRIGINE 25 MG: 25 TABLET ORAL at 07:01

## 2023-07-15 RX ADMIN — APIXABAN 5 MG: 5 TABLET, FILM COATED ORAL at 20:12

## 2023-07-15 RX ADMIN — ZINC SULFATE 220 MG (50 MG) CAPSULE 220 MG: CAPSULE at 07:01

## 2023-07-15 RX ADMIN — PALIPERIDONE 9 MG: 3 TABLET, EXTENDED RELEASE ORAL at 07:01

## 2023-07-15 RX ADMIN — CLOZAPINE 25 MG: 25 TABLET ORAL at 07:00

## 2023-07-15 RX ADMIN — CHLORHEXIDINE GLUCONATE 0.12% ORAL RINSE 15 ML: 1.2 LIQUID ORAL at 07:01

## 2023-07-15 RX ADMIN — CALCIUM POLYCARBOPHIL 625 MG: 625 TABLET, FILM COATED ORAL at 20:12

## 2023-07-15 RX ADMIN — ACETAMINOPHEN 1000 MG: 500 TABLET, FILM COATED ORAL at 07:00

## 2023-07-15 RX ADMIN — CALCIUM POLYCARBOPHIL 625 MG: 625 TABLET, FILM COATED ORAL at 07:01

## 2023-07-15 ASSESSMENT — ACTIVITIES OF DAILY LIVING (ADL)
DRESS: INDEPENDENT
ADLS_ACUITY_SCORE: 32
ORAL_HYGIENE: INDEPENDENT
DRESS: SCRUBS (BEHAVIORAL HEALTH);INDEPENDENT
LAUNDRY: UNABLE TO COMPLETE
ADLS_ACUITY_SCORE: 32
HYGIENE/GROOMING: INDEPENDENT
ADLS_ACUITY_SCORE: 42
ORAL_HYGIENE: PROMPTS;INDEPENDENT
ADLS_ACUITY_SCORE: 32
ADLS_ACUITY_SCORE: 42
HYGIENE/GROOMING: INDEPENDENT;PROMPTS
ADLS_ACUITY_SCORE: 32
ADLS_ACUITY_SCORE: 42
LAUNDRY: UNABLE TO COMPLETE
ADLS_ACUITY_SCORE: 32

## 2023-07-15 NOTE — PLAN OF CARE
"  Problem: Psychotic Signs/Symptoms  Goal: Improved Behavioral Control (Psychotic Signs/Symptoms)  Outcome: Progressing   Goal Outcome Evaluation:    Plan of Care Reviewed With: patient      Patient's mood is calm and elated. His affect was full-range. He was present in the milieu throughout the shift with headphones on. Patient was just listening to the music during the entire shift. During the 1:1 mental health assessment, patient said that his goal for today is \"to be happy\". His thoughts remain delusional. He said that he started working at the age of 3 and then got . 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 That, Kevin Lilly was one of the members of his \"band family\". That, he has \"a lot of wives\". Patient's thoughts are tangential and irrelevant. He is independent on adl's. He is med compliant. No side effects of meds observed. His appetite is excellent.  His /78; P-98. He has an order of no roommate r/t poor boundaries and severe intrusiveness.                 "

## 2023-07-15 NOTE — PLAN OF CARE
Problem: Plan of Care - These are the overarching goals to be used throughout the patient stay.    Goal: Optimal Comfort and Wellbeing  Intervention: Provide Person-Centered Care  Recent Flowsheet Documentation  Taken 7/15/2023 0565 by Salome Herrera RN  Trust Relationship/Rapport:    care explained    choices provided    questions answered    questions encouraged    thoughts/feelings acknowledged   Goal Outcome Evaluation:    Plan of Care Reviewed With: patient          07:00-15:30    Patient visible in the milieu most of the shift. Came to the dinning room for both breakfast and lunch. Appetite is excellent. Seen enjoying listening to music, watching TV, walking in the hallway and attending group activities.    Patient seems to be at baseline. No behavioral outbursts. No grandiose statements this shift.

## 2023-07-15 NOTE — PLAN OF CARE
Problem: Sleep Disturbance  Goal: Adequate Sleep/Rest  Outcome: Progressing   Goal Outcome Evaluation:       Pt received in bed sleeping. Had a total of 7.25 hours of sleep tonight. Up at 0500 and had something to eat. No roommate r/t intrusiveness and poor boundaries. No concern noted this shift. Medication complaint. He C/O of lower back pain rated at 3/10 and requested for hot pack and was administered. All precautions were maintained. Will continue to monitor per plan of care.

## 2023-07-16 PROCEDURE — 124N000003 HC R&B MH SENIOR/ADOLESCENT

## 2023-07-16 PROCEDURE — 250N000013 HC RX MED GY IP 250 OP 250 PS 637: Performed by: PHYSICIAN ASSISTANT

## 2023-07-16 PROCEDURE — 250N000013 HC RX MED GY IP 250 OP 250 PS 637: Performed by: PSYCHIATRY & NEUROLOGY

## 2023-07-16 PROCEDURE — 250N000013 HC RX MED GY IP 250 OP 250 PS 637: Performed by: EMERGENCY MEDICINE

## 2023-07-16 PROCEDURE — 250N000013 HC RX MED GY IP 250 OP 250 PS 637

## 2023-07-16 PROCEDURE — G0177 OPPS/PHP; TRAIN & EDUC SERV: HCPCS

## 2023-07-16 RX ADMIN — PALIPERIDONE 9 MG: 3 TABLET, EXTENDED RELEASE ORAL at 08:18

## 2023-07-16 RX ADMIN — CLOZAPINE 175 MG: 100 TABLET ORAL at 20:02

## 2023-07-16 RX ADMIN — LAMOTRIGINE 25 MG: 25 TABLET ORAL at 08:18

## 2023-07-16 RX ADMIN — Medication 10 MG: at 20:03

## 2023-07-16 RX ADMIN — DOCUSATE SODIUM 100 MG: 100 CAPSULE, LIQUID FILLED ORAL at 08:18

## 2023-07-16 RX ADMIN — ACETAMINOPHEN 1000 MG: 500 TABLET, FILM COATED ORAL at 14:37

## 2023-07-16 RX ADMIN — APIXABAN 5 MG: 5 TABLET, FILM COATED ORAL at 20:03

## 2023-07-16 RX ADMIN — PANTOPRAZOLE SODIUM 40 MG: 40 TABLET, DELAYED RELEASE ORAL at 05:47

## 2023-07-16 RX ADMIN — WHITE PETROLATUM 57.7 %-MINERAL OIL 31.9 % EYE OINTMENT: at 20:04

## 2023-07-16 RX ADMIN — CHLORHEXIDINE GLUCONATE 0.12% ORAL RINSE 15 ML: 1.2 LIQUID ORAL at 08:18

## 2023-07-16 RX ADMIN — TOPIRAMATE 150 MG: 50 TABLET ORAL at 20:02

## 2023-07-16 RX ADMIN — LISINOPRIL 5 MG: 5 TABLET ORAL at 08:18

## 2023-07-16 RX ADMIN — APIXABAN 5 MG: 5 TABLET, FILM COATED ORAL at 08:18

## 2023-07-16 RX ADMIN — ZINC SULFATE 220 MG (50 MG) CAPSULE 220 MG: CAPSULE at 08:19

## 2023-07-16 RX ADMIN — ACETAMINOPHEN 1000 MG: 500 TABLET, FILM COATED ORAL at 20:03

## 2023-07-16 RX ADMIN — ACETAMINOPHEN 1000 MG: 500 TABLET, FILM COATED ORAL at 08:18

## 2023-07-16 RX ADMIN — CALCIUM POLYCARBOPHIL 625 MG: 625 TABLET, FILM COATED ORAL at 08:18

## 2023-07-16 RX ADMIN — CALCIUM POLYCARBOPHIL 625 MG: 625 TABLET, FILM COATED ORAL at 20:03

## 2023-07-16 RX ADMIN — PREDNISOLONE ACETATE 1 DROP: 10 SUSPENSION/ DROPS OPHTHALMIC at 08:26

## 2023-07-16 RX ADMIN — CLOZAPINE 25 MG: 25 TABLET ORAL at 08:18

## 2023-07-16 RX ADMIN — MONTELUKAST 10 MG: 10 TABLET, FILM COATED ORAL at 20:03

## 2023-07-16 RX ADMIN — LEVOTHYROXINE SODIUM 50 MCG: 25 TABLET ORAL at 05:47

## 2023-07-16 ASSESSMENT — ACTIVITIES OF DAILY LIVING (ADL)
DRESS: SCRUBS (BEHAVIORAL HEALTH)
ADLS_ACUITY_SCORE: 32
ADLS_ACUITY_SCORE: 42
ADLS_ACUITY_SCORE: 32
HYGIENE/GROOMING: INDEPENDENT
DRESS: SCRUBS (BEHAVIORAL HEALTH);INDEPENDENT
ORAL_HYGIENE: PROMPTS
LAUNDRY: UNABLE TO COMPLETE
ADLS_ACUITY_SCORE: 32
ORAL_HYGIENE: PROMPTS
ADLS_ACUITY_SCORE: 32
ADLS_ACUITY_SCORE: 42
HYGIENE/GROOMING: INDEPENDENT
ADLS_ACUITY_SCORE: 42

## 2023-07-16 NOTE — PLAN OF CARE
Problem: Sleep Disturbance  Goal: Adequate Sleep/Rest  Outcome: Progressing   Goal Outcome Evaluation:       Pt in bed sleeping at report. Woke up at 0030 walked to the desk and went back to the room again to sleep. Observed 8.0 hours of sleep tonight. No roommate order r/t intrusiveness, poor boundaries. No complain of pain. Medications compliant. Will continue to monitor per POC.

## 2023-07-16 NOTE — PLAN OF CARE
Problem: Psychotic Signs/Symptoms  Goal: Improved Behavioral Control (Psychotic Signs/Symptoms)  Outcome: Progressing   Goal Outcome Evaluation:    Plan of Care Reviewed With: patient        Patient Is visible in the milieu throughout the shift. He is softly singing with the headphone on. His mood is calm and with full-range of affect. He watched TV while eating breakfast and lunch. He is pleasant and cooperative. Redirectable. No emotional outburst observed. He is med compliant. He denied SI/SIB/Hallucinations. Appetite is excellent. Remains with delusional thoughts. He has a no roommate order r/t intrusiveness and poor boundaries. Pt is redirectable.

## 2023-07-16 NOTE — CARE PLAN
Occupational Therapy Group Note:     07/16/23 1525   Group Therapy Session   Group Attendance attended group session   Time Session Began 1015   Time Session Ended 1105   Total Time (minutes) 20 (no charge)   Total # Attendees 7   Group Type psychoeducation   Group Topic Covered coping skills/lifestyle management   Group Session Detail OT Topic Group: Coping Skills   Patient Response/Contribution confronted peers appropriately;listened actively;verbalizations were off topic   Patient Participation Detail Patient actively engaged in a therapeutic discussion and brainstorming group with the focus being safe and appropriate coping skills to deal with everyday stressors and triggers. Therapeutic group facilitated to address: healthy coping mechanisms, encourage appropriate social skills, encourage positive change and personal insight, manage behaviors, and lifestyle management. Patient appeared somewhat restless; in/out of group multiple times. Patient needed reminders each time he entered group to turn off his headphones; patient calmly followed writer's directives. Patient provided no appropriate verbal contributions to group topic; appeared confused with topic at times. Needed examples and repeat of information to process information being presented. Patient reported interest of going for a walk outside as a potential coping strategy he would like to try in the future. Affect: blunted. Mood: calm, cooperative.

## 2023-07-16 NOTE — PLAN OF CARE
"Goal Outcome Evaluation:    Plan of Care Reviewed With: patient      Patient has been sitting in the lounge watching baseball with peers. His affect is full range. His mood is calm. He denies all MH concerns stating \"I'm just going to say no to every question you ask\". He does endorse feeling hopeful and safe. He denies pain. His speech is rambling and illogical at times. He is disorganized. His sleep and appetite are good. He refused to attend group. He stated he took a shower on the day shift. His gait is steady. He is independent with ADL's. He is medication compliant. He has a no roommate order for Intrusiveness, poor boundaries.                 "

## 2023-07-17 PROCEDURE — 250N000013 HC RX MED GY IP 250 OP 250 PS 637: Performed by: PSYCHIATRY & NEUROLOGY

## 2023-07-17 PROCEDURE — 250N000013 HC RX MED GY IP 250 OP 250 PS 637: Performed by: EMERGENCY MEDICINE

## 2023-07-17 PROCEDURE — H2032 ACTIVITY THERAPY, PER 15 MIN: HCPCS

## 2023-07-17 PROCEDURE — G0177 OPPS/PHP; TRAIN & EDUC SERV: HCPCS

## 2023-07-17 PROCEDURE — 250N000013 HC RX MED GY IP 250 OP 250 PS 637: Performed by: PHYSICIAN ASSISTANT

## 2023-07-17 PROCEDURE — 99233 SBSQ HOSP IP/OBS HIGH 50: CPT | Performed by: PSYCHIATRY & NEUROLOGY

## 2023-07-17 PROCEDURE — 250N000013 HC RX MED GY IP 250 OP 250 PS 637

## 2023-07-17 PROCEDURE — 124N000003 HC R&B MH SENIOR/ADOLESCENT

## 2023-07-17 RX ORDER — LAMOTRIGINE 25 MG/1
50 TABLET ORAL DAILY
Status: DISCONTINUED | OUTPATIENT
Start: 2023-07-18 | End: 2023-07-31

## 2023-07-17 RX ORDER — CLOZAPINE 100 MG/1
200 TABLET ORAL AT BEDTIME
Status: DISCONTINUED | OUTPATIENT
Start: 2023-07-18 | End: 2023-07-20

## 2023-07-17 RX ADMIN — ACETAMINOPHEN 1000 MG: 500 TABLET, FILM COATED ORAL at 08:19

## 2023-07-17 RX ADMIN — CLOZAPINE 175 MG: 100 TABLET ORAL at 21:24

## 2023-07-17 RX ADMIN — APIXABAN 5 MG: 5 TABLET, FILM COATED ORAL at 21:25

## 2023-07-17 RX ADMIN — MONTELUKAST 10 MG: 10 TABLET, FILM COATED ORAL at 21:25

## 2023-07-17 RX ADMIN — CALCIUM POLYCARBOPHIL 625 MG: 625 TABLET, FILM COATED ORAL at 08:16

## 2023-07-17 RX ADMIN — CALCIUM POLYCARBOPHIL 625 MG: 625 TABLET, FILM COATED ORAL at 21:24

## 2023-07-17 RX ADMIN — APIXABAN 5 MG: 5 TABLET, FILM COATED ORAL at 08:16

## 2023-07-17 RX ADMIN — HYDROXYZINE HYDROCHLORIDE 25 MG: 25 TABLET, FILM COATED ORAL at 21:28

## 2023-07-17 RX ADMIN — WHITE PETROLATUM 57.7 %-MINERAL OIL 31.9 % EYE OINTMENT: at 21:33

## 2023-07-17 RX ADMIN — ACETAMINOPHEN 1000 MG: 500 TABLET, FILM COATED ORAL at 21:25

## 2023-07-17 RX ADMIN — CHLORHEXIDINE GLUCONATE 0.12% ORAL RINSE 15 ML: 1.2 LIQUID ORAL at 08:17

## 2023-07-17 RX ADMIN — DOCUSATE SODIUM 100 MG: 100 CAPSULE, LIQUID FILLED ORAL at 08:16

## 2023-07-17 RX ADMIN — PREDNISOLONE ACETATE 1 DROP: 10 SUSPENSION/ DROPS OPHTHALMIC at 08:17

## 2023-07-17 RX ADMIN — ACETAMINOPHEN 1000 MG: 500 TABLET, FILM COATED ORAL at 13:03

## 2023-07-17 RX ADMIN — PANTOPRAZOLE SODIUM 40 MG: 40 TABLET, DELAYED RELEASE ORAL at 05:50

## 2023-07-17 RX ADMIN — LEVOTHYROXINE SODIUM 50 MCG: 25 TABLET ORAL at 05:51

## 2023-07-17 RX ADMIN — PALIPERIDONE 9 MG: 3 TABLET, EXTENDED RELEASE ORAL at 08:16

## 2023-07-17 RX ADMIN — Medication 10 MG: at 21:25

## 2023-07-17 RX ADMIN — ZINC SULFATE 220 MG (50 MG) CAPSULE 220 MG: CAPSULE at 08:16

## 2023-07-17 RX ADMIN — LAMOTRIGINE 25 MG: 25 TABLET ORAL at 08:17

## 2023-07-17 RX ADMIN — CLOZAPINE 25 MG: 25 TABLET ORAL at 08:17

## 2023-07-17 RX ADMIN — LISINOPRIL 5 MG: 5 TABLET ORAL at 08:16

## 2023-07-17 RX ADMIN — TOPIRAMATE 150 MG: 50 TABLET ORAL at 21:25

## 2023-07-17 ASSESSMENT — ACTIVITIES OF DAILY LIVING (ADL)
ADLS_ACUITY_SCORE: 32
LAUNDRY: UNABLE TO COMPLETE
ADLS_ACUITY_SCORE: 32
ADLS_ACUITY_SCORE: 32
HYGIENE/GROOMING: INDEPENDENT
ADLS_ACUITY_SCORE: 32
DRESS: SCRUBS (BEHAVIORAL HEALTH);INDEPENDENT
ADLS_ACUITY_SCORE: 32
DRESS: INDEPENDENT
HYGIENE/GROOMING: INDEPENDENT
ORAL_HYGIENE: INDEPENDENT
ORAL_HYGIENE: INDEPENDENT
LAUNDRY: UNABLE TO COMPLETE
ADLS_ACUITY_SCORE: 32

## 2023-07-17 NOTE — PLAN OF CARE
Assessment/Intervention/Current Symtoms and Care Coordination:  Pt discussed in team rounds this AM. Pt has been sleeping fine. Pt continues to endorse delusion of the hospital being a hotel or mansion. Care conference scheduled for today at 1100.    Jefersonr participated in care conference with Huong, provider, med student, and [facility] care coordinator Lilo. Provider discussed pt's progress and medication changes. Team discussed how pt could benefit from adult day services. CTC to follow up.   and Lilo relayed plans to connect regarding other behavioral services and CADI needs. Provider relayed pt would also benefit from  manager, Sadaf. Huong and CTC to follow up.    Discharge Plan or Goal:  Plan for pt to return to  when stable     Barriers to Discharge:  Pt continues to present as symptomatic (delusional mood). Pt is receiving ongoing stabilization and medication adjustment. Continue care coordination with half-way to ascertain his acceptability for a return to the home.     Referral Status:  Will continue to coordinate with pt's group home.     Legal Status:  Voluntary per guardian     Contacts:  Guardian/Sister - Huong Keenan   P: [phone]     - Sadaf  P: [phone]     Upcoming Meetings and Dates/Important Information and next steps:

## 2023-07-17 NOTE — PROGRESS NOTES
"Medical director note:  Patient seen for consideration of ECT.  According to chart and patient, no ECT history.  Diagnosis is 1.  Schizoaffective illness, bipolar type, mariano, resolving  2.  Developmental disability  3.  Anxiety.    Of current concern is the hallucinations and delusions of communicating with both his parents.  He states he has done it forever, family and group home noted it just before the admission with mariano.  His mother  12 years ago and father  6 years ago according to chart notes.  Earlier in the hospital stay he addressed staff as his parent's given names.    He is improving with the Clozaril.  Delusions do not seem to be behaviorally disruptive.    Recommendation:  I do not believe ECT would be a good permament solution at this time,  If the belief that his parents are alive and communicating with him is going to respond to treatment, I believe the Clozaril will be be effective over more time.    Because the delusions are probably grief related (even if the onset we saw occurred in mariano 6 years after the last loss) they might be a comfort to him and might not respond well to any treatment.  Losing the hallucinations might \"re-grieve\" him.  In my experience 6 years is not much time for major grief.    ECT might be a consideration if delusional related behavior becomes an issue after more time on Clozaril.    "

## 2023-07-17 NOTE — CARE PLAN
"   07/17/23 1630   Group Therapy Session   Group Attendance attended group session   Time Session Began 1315   Time Session Ended 1415   Total Time (minutes) 60   Total # Attendees 5   Group Type community;recreation;task skill   Group Topic Covered cognitive activities;balanced lifestyle;leisure exploration/use of leisure time   Group Session Detail OT Leisure Group: Group activities to exercise cognitive skills while exploring leisure and socializing opportunities. \"Betcha Can't\" is a card game that asks players to name items in a category in a time frame (\"Name 8 potato chip flavors\"); collaborative exploration of team work, problem-solving, tracking, and following new directions.   Patient Participation Detail Pt participated in a group activity playing Betcha Can't. Pt appeared social and upbeat, and engaged during group, expressing encouragement to peers on turns. Pt was able to list items in categories, occasionally asking others for input. Pt was collaborative, contributing answers to assist others during turns. Pt made song requests when writer asked the group for music recommendations to play during the activity; pt requested The Beatles and [employer].        "

## 2023-07-17 NOTE — PLAN OF CARE
Problem: Sleep Disturbance  Goal: Adequate Sleep/Rest  Outcome: Progressing   Goal Outcome Evaluation:         Pt in bed sleeping at report. He woke up at 0400, walked to the desk, had some snacks and drink.  Pt has then been walking between room and the lounge.  Had 6.0 hours of sleep tonight. No roommate order r/t intrusiveness, poor boundaries. No complaints of pain. Medications compliant. Education done. Will continue to monitor per POC

## 2023-07-17 NOTE — PLAN OF CARE
Problem: Psychotic Signs/Symptoms  Goal: Improved Mood Symptoms  Outcome: Progressing     Problem: Disruptive Behavior  Goal: Improved Mood Symptoms (Disruptive Behavior)  Outcome: Progressing   Goal Outcome Evaluation:    Plan of Care Reviewed With: patient      Patient is calm and cooperative, medication compliant. Patient denies MH concerns, continues to have delusions. Patient asked writer to give 2000 snack to patient's wife elle. Patient is visible in milieu, social with peers and attended group. Patient appetite is good, ate 100% of dinner. Denies pain or other concerns.   /76 (BP Location: Left arm)   Pulse 103   Temp 97.3  F (36.3  C) (Temporal)   Resp 18   Wt 94 kg (207 lb 3.7 oz)   SpO2 97%   BMI 35.57 kg/m

## 2023-07-17 NOTE — PLAN OF CARE
"Goal Outcome Evaluation:    Plan of Care Reviewed With: patient      Problem: Psychotic Signs/Symptoms  Goal: Improved Behavioral Control (Psychotic Signs/Symptoms)  Outcome: Progressing     Problem: Psychotic Signs/Symptoms  Goal: Optimal Cognitive Function (Psychotic Signs/Symptoms)  Outcome: Progressing     Patient presented with flat blunted affect. Was calm on approach. Patient gave a clear answer of \"No to everything\" but his speech became rambling after that. He was intermittently visible on the unit. Patient demonstrates good appetite and eats everything on his tray. He was compliant with his medications. Seen pacing while wearing headphones. He denied pain. Patient is on scheduled ES Tylenol. No behavior outburst this shift.                "

## 2023-07-17 NOTE — CARE PLAN
07/17/23 1330   Group Therapy Session   Group Attendance attended group session   Time Session Began 1015   Time Session Ended 1115   Total Time (minutes) 60   Total # Attendees 10   Group Type expressive therapy;task skill;psychotherapeutic   Group Topic Covered coping skills/lifestyle management;problem-solving;cognitive therapy techniques;structured socialization;balanced lifestyle;cognitive activities   Group Session Detail Occupational Therapy Clinic group to facilitate coping skill exploration, use of cognitive skills and problem solving, creative expression, clinical observation and facilitation of social, cognitive, and kinesthetic performance skills.    Patient Response/Contribution cooperative with task;listened actively   Patient Participation Detail Stayed the full session and worked on completing steps set up and structured for him. He used some humor in trying to complete less work though when teased gently with directions, was quick to accept redirection and accomplish his work. He also tried to get the other OT therapist to assist in cleaning of supplies, which he always takes responsibility for. With gentle teasing again from this author, he laughed and teased he could not get away with anything. Was quick to take the lead and accomplishing the tasks of clean up when encouraged without any complaints. Seemed comfortable using humor and seemed comfortable and engaged.

## 2023-07-18 LAB
BASOPHILS # BLD AUTO: 0 10E3/UL (ref 0–0.2)
BASOPHILS NFR BLD AUTO: 0 %
EOSINOPHIL # BLD AUTO: 0 10E3/UL (ref 0–0.7)
EOSINOPHIL NFR BLD AUTO: 0 %
ERYTHROCYTE [DISTWIDTH] IN BLOOD BY AUTOMATED COUNT: 14.3 % (ref 10–15)
HCT VFR BLD AUTO: 38.5 % (ref 40–53)
HGB BLD-MCNC: 12.7 G/DL (ref 13.3–17.7)
IMM GRANULOCYTES # BLD: 0 10E3/UL
IMM GRANULOCYTES NFR BLD: 0 %
LYMPHOCYTES # BLD AUTO: 2.3 10E3/UL (ref 0.8–5.3)
LYMPHOCYTES NFR BLD AUTO: 35 %
MCH RBC QN AUTO: 27.3 PG (ref 26.5–33)
MCHC RBC AUTO-ENTMCNC: 33 G/DL (ref 31.5–36.5)
MCV RBC AUTO: 83 FL (ref 78–100)
MONOCYTES # BLD AUTO: 0.5 10E3/UL (ref 0–1.3)
MONOCYTES NFR BLD AUTO: 8 %
NEUTROPHILS # BLD AUTO: 3.7 10E3/UL (ref 1.6–8.3)
NEUTROPHILS NFR BLD AUTO: 57 %
NRBC # BLD AUTO: 0 10E3/UL
NRBC BLD AUTO-RTO: 0 /100
PLATELET # BLD AUTO: 163 10E3/UL (ref 150–450)
RBC # BLD AUTO: 4.65 10E6/UL (ref 4.4–5.9)
WBC # BLD AUTO: 6.5 10E3/UL (ref 4–11)

## 2023-07-18 PROCEDURE — 250N000013 HC RX MED GY IP 250 OP 250 PS 637: Performed by: PSYCHIATRY & NEUROLOGY

## 2023-07-18 PROCEDURE — 250N000013 HC RX MED GY IP 250 OP 250 PS 637: Performed by: EMERGENCY MEDICINE

## 2023-07-18 PROCEDURE — 36415 COLL VENOUS BLD VENIPUNCTURE: CPT | Performed by: PSYCHIATRY & NEUROLOGY

## 2023-07-18 PROCEDURE — G0177 OPPS/PHP; TRAIN & EDUC SERV: HCPCS

## 2023-07-18 PROCEDURE — H2032 ACTIVITY THERAPY, PER 15 MIN: HCPCS

## 2023-07-18 PROCEDURE — 99232 SBSQ HOSP IP/OBS MODERATE 35: CPT | Performed by: PSYCHIATRY & NEUROLOGY

## 2023-07-18 PROCEDURE — 124N000003 HC R&B MH SENIOR/ADOLESCENT

## 2023-07-18 PROCEDURE — 250N000013 HC RX MED GY IP 250 OP 250 PS 637: Performed by: PHYSICIAN ASSISTANT

## 2023-07-18 PROCEDURE — 250N000013 HC RX MED GY IP 250 OP 250 PS 637

## 2023-07-18 PROCEDURE — 85025 COMPLETE CBC W/AUTO DIFF WBC: CPT | Performed by: PSYCHIATRY & NEUROLOGY

## 2023-07-18 RX ADMIN — LISINOPRIL 5 MG: 5 TABLET ORAL at 07:59

## 2023-07-18 RX ADMIN — PANTOPRAZOLE SODIUM 40 MG: 40 TABLET, DELAYED RELEASE ORAL at 05:12

## 2023-07-18 RX ADMIN — LAMOTRIGINE 50 MG: 25 TABLET ORAL at 07:59

## 2023-07-18 RX ADMIN — DOCUSATE SODIUM 100 MG: 100 CAPSULE, LIQUID FILLED ORAL at 07:59

## 2023-07-18 RX ADMIN — WHITE PETROLATUM 57.7 %-MINERAL OIL 31.9 % EYE OINTMENT: at 20:00

## 2023-07-18 RX ADMIN — APIXABAN 5 MG: 5 TABLET, FILM COATED ORAL at 20:04

## 2023-07-18 RX ADMIN — CALCIUM POLYCARBOPHIL 625 MG: 625 TABLET, FILM COATED ORAL at 20:04

## 2023-07-18 RX ADMIN — ACETAMINOPHEN 1000 MG: 500 TABLET, FILM COATED ORAL at 07:59

## 2023-07-18 RX ADMIN — TOPIRAMATE 150 MG: 50 TABLET ORAL at 20:04

## 2023-07-18 RX ADMIN — ACETAMINOPHEN 1000 MG: 500 TABLET, FILM COATED ORAL at 13:07

## 2023-07-18 RX ADMIN — ZINC SULFATE 220 MG (50 MG) CAPSULE 220 MG: CAPSULE at 07:59

## 2023-07-18 RX ADMIN — CLOZAPINE 25 MG: 25 TABLET ORAL at 07:59

## 2023-07-18 RX ADMIN — CHLORHEXIDINE GLUCONATE 0.12% ORAL RINSE 15 ML: 1.2 LIQUID ORAL at 08:00

## 2023-07-18 RX ADMIN — PREDNISOLONE ACETATE 1 DROP: 10 SUSPENSION/ DROPS OPHTHALMIC at 07:59

## 2023-07-18 RX ADMIN — CLOZAPINE 200 MG: 100 TABLET ORAL at 20:04

## 2023-07-18 RX ADMIN — ACETAMINOPHEN 1000 MG: 500 TABLET, FILM COATED ORAL at 20:04

## 2023-07-18 RX ADMIN — PALIPERIDONE 9 MG: 3 TABLET, EXTENDED RELEASE ORAL at 07:59

## 2023-07-18 RX ADMIN — MONTELUKAST 10 MG: 10 TABLET, FILM COATED ORAL at 20:04

## 2023-07-18 RX ADMIN — LEVOTHYROXINE SODIUM 50 MCG: 25 TABLET ORAL at 05:12

## 2023-07-18 RX ADMIN — CALCIUM POLYCARBOPHIL 625 MG: 625 TABLET, FILM COATED ORAL at 07:59

## 2023-07-18 RX ADMIN — APIXABAN 5 MG: 5 TABLET, FILM COATED ORAL at 07:59

## 2023-07-18 RX ADMIN — Medication 10 MG: at 20:04

## 2023-07-18 ASSESSMENT — ACTIVITIES OF DAILY LIVING (ADL)
DRESS: SCRUBS (BEHAVIORAL HEALTH)
ADLS_ACUITY_SCORE: 32
LAUNDRY: UNABLE TO COMPLETE
HYGIENE/GROOMING: INDEPENDENT
ADLS_ACUITY_SCORE: 32
ORAL_HYGIENE: INDEPENDENT
DRESS: SCRUBS (BEHAVIORAL HEALTH)
HYGIENE/GROOMING: INDEPENDENT
ADLS_ACUITY_SCORE: 32
LAUNDRY: UNABLE TO COMPLETE
ORAL_HYGIENE: INDEPENDENT
ADLS_ACUITY_SCORE: 32

## 2023-07-18 NOTE — CARE PLAN
"Occupational Therapy     07/18/23 1500   Group Therapy Session   Group Attendance attended group session   Time Session Began 1300   Time Session Ended 1400   Total Time (minutes) 60   Total # Attendees 6   Group Type recreation   Group Topic Covered cognitive activities;leisure exploration/use of leisure time;structured socialization   Group Session Detail OT: Education on physical wellness and cognitive wellness with interactive social activities (Charades, Pictionary, & Describe It) to increase concentration, focus, attention to task/detail, memory recall, abstract & concrete thinking, coping with stress, health distraction engagement, social wellness, and cognitive wellness   Patient Response/Contribution cooperative with task;confused;other (see comments)  (Engaged (in task); pleasant)   Patient Participation Detail Pt sat among peers to complete cognitive social activity but did not engage in social interactions with peers. Pt able to correctly identify a few responses but needed intermittent support as the presenter (\"Oh, I can't do that.\"). Pt reported he enjoyed the activity but was unable to verbalize understanding of importance of cognitive wellness in a healthy lifestyle.        "

## 2023-07-18 NOTE — PROGRESS NOTES
"[facility]  PROGRESS NOTE     DATE OF SERVICE   07/17/2023        CHIEF COMPLAINT   \" I am great.\"       SUBJECTIVE   Nursing reports:   Pt in bed sleeping at report. He woke up at 0400, walked to the desk, had some snacks and drink.  Pt has then been walking between room and the lounge.  Had 6.0 hours of sleep tonight. No roommate order r/t intrusiveness, poor boundaries. No complaints of pain. Medications compliant. Education done. Will continue to monitor per POC     Patient has been reviewed with the  earlier today.   Assessment/Intervention/Current Symtoms and Care Coordination:  Pt discussed in team rounds this AM. Pt has been sleeping fine. Pt continues to endorse delusion of the hospital being a hotel or mansion. Care conference scheduled for today at 1100.     Writer participated in care conference with Huong, provider, med student, and [facility] care coordinator Lilo. Provider discussed pt's progress and medication changes. Team discussed how pt could benefit from adult day services. CTC to follow up.   and Lilo relayed plans to connect regarding other behavioral services and CADI needs. Provider relayed pt would also benefit from  manager, Sadaf. Huong and CTC to follow up.     OBJECTIVE   Today we had a care conference with patient's sister,  through the health insurance,  and this writer present during the meeting.  The purpose of the care conference was to discuss tentative discharge plans, medications and the possibility of the patient receiving ECT treatments.  I started the meeting by discussing with the sister the improvements we have seen here in the unit with the use of Clozaril and Invega.  It seems that since we started the Clozaril the patient has been taking care of himself, he is redirectable, there has been no agitation or aggression and the patient has been sleeping better at night.  I informed the sister that we have noticed he continues " to be delusional although less argumentative.  Sister informed me that she has seen the same things on the patient.  One of her biggest concerns is that if the patient is discharged to the group home and becomes aggressive again there is a possibility that he will lose the housing.  I reviewed with the sister that we can still continue to increase the dose of Clozaril as it seems that the patient is not experiencing any side effects from the medications.  Sister verbalized good understanding and she was in agreement with this plan.    When talking about tentative discharge plans the sister stated that she is really interested in the patient returning to his group home as he has been living there for over 30 years.  She admits that the patient needs more structure that the group home cannot provide.  I suggested for the patient to participate in an adult day program.   also suggested the possibility of creating a behavioral plan for the patient when he returns to the group home.  Sister was in agreement with both suggestions.    Given that the patient seemed to show some improvement with the Clozaril I informed the sister that at this time we are not recommending for the patient to have the ECT treatments.  It was discussed that ECT is not completely off the table but at this time we do not think that it would be the best treatment for the patient.    Patient was brought into the meeting and we reviewed with the patient that we are working on him returning to the group home.  Initially the patient was adamant not to return to the group home but he did agreed to meet with the director of the group home.  Patient did state that he is willing to think about the possibility of going back home.  He continues to endorse multiple delusions, thinking that his parents are alive, believing that certain family members have visited him in the unit and thinking that he is .  At this time patient only mentions  "this delusions when he is asked about it.  He is denying having any side effects from the medications, denies any suicidal or homicidal ideations and has been able to contract for safety.       MEDICATIONS   Medications:  Scheduled Meds:    acetaminophen  1,000 mg Oral TID     apixaban ANTICOAGULANT  5 mg Oral BID     artificial tears   Right Eye At Bedtime     calcium polycarbophil  625 mg Oral BID     chlorhexidine  15 mL Swish & Spit Daily     cloZAPine  175 mg Oral At Bedtime     cloZAPine  25 mg Oral Daily     docusate sodium  100 mg Oral Daily     lamoTRIgine  25 mg Oral Daily     levothyroxine  50 mcg Oral or NG Tube QAM AC     lisinopril  5 mg Oral Daily     melatonin  10 mg Oral At Bedtime     montelukast  10 mg Oral At Bedtime     paliperidone  9 mg Oral Daily     pantoprazole  40 mg Oral QAM AC     prednisoLONE acetate  1 drop Right Eye Daily     topiramate  150 mg Oral At Bedtime     zinc sulfate  220 mg Oral Daily     Continuous Infusions:  PRN Meds:.acetaminophen, alum & mag hydroxide-simethicone, artificial saliva, atropine, hydrALAZINE, hydrOXYzine, hypromellose-dextran, ipratropium, OLANZapine **OR** OLANZapine, senna-docusate    Medication adherence issues: MS Med Adherence Y/N: Yes, Hospitalization  Medication side effects: MEDICATION SIDE EFFECTS: no side effects reported  Benefit: Yes / No: Yes       ROS   A comprehensive review of systems was negative.       MENTAL STATUS EXAM   Vitals: /88   Pulse 89   Temp 96.9  F (36.1  C) (Temporal)   Resp 16   Wt 94 kg (207 lb 3.7 oz)   SpO2 98%   BMI 35.57 kg/m      Appearance:  No apparent distress  Mood: \"I am doing great\"  Affect: Calm and bright  was congruent to speech  Suicidal Ideation: PRESENT / ABSENT: absent   Homicidal Ideation: PRESENT / ABSENT: absent     Thought process: [place]  Thought content: Remains delusional  Fund of Knowledge: Below average  Attention/Concentration: Improving  Language ability:  Intact, speech seems to " be more clear and the patient has not been drooling.  Memory:  Immediate recall impaired, Short-term memory impaired and Long-term memory intact  Insight: limited   Judgement: poor  Orientation: Person, time only  Psychomotor Behavior: slowed    Muscle Strength and Tone: MuscleStrength: Normal  Gait and Station: Stable on his feet       LABS   personally reviewed.   Recent Labs   Lab 07/11/23  0717   WBC 5.9   HGB 13.5   MCV 83        No results found for: PHENYTOIN, PHENOBARB, VALPROATE, CBMZ       DIAGNOSIS   Principal Problem:    Schizoaffective disorder, bipolar type (H)    Active Problem List:  Patient Active Problem List   Diagnosis     Closed head injury, initial encounter     Altered mental status, unspecified altered mental status type     Generalized weakness     Portal vein thrombosis     Pleural effusion     Generalized muscle weakness     Falls frequently     Other ascites     Acute pancreatitis, unspecified complication status, unspecified pancreatitis type     Pancreatic pseudocyst     Idiopathic acute pancreatitis, unspecified complication status     Allergic rhinitis     Fisher's esophagus     CTS (carpal tunnel syndrome)     Elevated liver enzymes     HTN (hypertension)     Hypothyroidism     Keratoconus     Major depressive disorder, recurrent episode, mild (H)     Intellectual disability     Morbid exogenous obesity (H)     Neuroleptic malignant syndrome     Obstructive sleep apnea syndrome     Bipolar affective disorder (H)     Seizure disorder (H)     Seizures, generalized convulsive (H)     Acute respiratory failure with hypoxia (H)     Anemia, unspecified     Anxiety disorder, unspecified     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)     Dietary zinc deficiency     Dry eye syndrome of bilateral lacrimal glands     Dry mouth, unspecified     Edema, unspecified     Gastro-esophageal reflux disease without esophagitis     Hyperosmolality and hypernatremia     Irritable  bowel syndrome with constipation     Major depressive disorder, recurrent, unspecified (H)     Metabolic encephalopathy     Moderate protein-calorie malnutrition (H)     Other symbolic dysfunctions     Schizoaffective disorder, unspecified (H)     Thrombocytopenia, unspecified (H)     Unspecified asthma, uncomplicated     Urinary tract infection, site not specified     Vitamin D deficiency, unspecified     Schizoaffective disorder, bipolar type (H)          PLAN   1. Ongoing education given regarding diagnostic and treatment options with risks, benefits and alternatives and adequate verbalization of understanding.  2.  Medications       Melatonin 10 mg at bedtime       Increase Clozaril at 25 mg daily and 200 mg at bedtime.  We will continue increasing the dose of the Clozaril slowly.      Topamax 150 mg at bedtime       Invega 9 mg daily        Increase Lamictal 50 mg daily    3.  Medical team following as needed  4.   coordinating a safe discharge plan    Risk Assessment: [facility] RISK ASSESSMENT: Patient able to contract for safety    Coordination of Care:   Treatment Plan reviewed and physician signed, Care discussed with Care/Treatment Team Members, Chart reviewed and Patient seen      Re-Certification I certify that the inpatient psychiatric facility services furnished since the previous certification were, and continue to be, medically necessary for, either, treatment which could reasonably be expected to improve the patient s condition or diagnostic study and that the hospital records indicate that the services furnished were, either, intensive treatment services, admission and related services necessary for diagnostic study, or equivalent services.     I certify that the patient continues to need, on a daily basis, active treatment furnished directly by or requiring the supervision of inpatient psychiatric facility personnel.   I estimate 14 days of hospitalization is necessary for proper  treatment of the patient. My plans for post-hospital care for this patient are  group home     Alejandra Watkins MD    -     07/17/2023  -     10:00 PM    Total time 60 minutes with > 50%spent on coordination of cares and psycho-education.    This note was created with help of Dragon dictation system. Grammatical / typing errors are not intentional.    Alejandra Watkins MD

## 2023-07-18 NOTE — PLAN OF CARE
Problem: Psychotic Signs/Symptoms  Goal: Improved Mood Symptoms  Outcome: Progressing   Goal Outcome Evaluation:    Problem: Disruptive Behavior  Goal: Improved Mood Symptoms (Disruptive Behavior)  Outcome: Progressing        Received in bed sleeping, pt has a no roommate order due to poor boundaries and intrusiveness. Woke up at 0130 and went to the lounge, when told it was only 0130, pt went back to bed  Slept for 7 hours  Patiently waited for the coffee and his headphone. Sang along with the music. Overall calm and polite

## 2023-07-18 NOTE — PLAN OF CARE
"  Problem: Psychotic Signs/Symptoms  Goal: Improved Behavioral Control (Psychotic Signs/Symptoms)  Outcome: Progressing   Goal Outcome Evaluation:    Plan of Care Reviewed With: patient      Patient is visible in the milieu throughout the shift with headphone on. He is calm with a full-range of affect. No emotional outburst observed. He is social with staff and select peers. He denied SI/SIB nor hallucinations. Denied wishing himself to be dead. 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 That he had \"a lot of money and a lot of wives\". Patient is redirectable.  His appetite is good and sleeping good at night. Med compliant. Denied pain in any part of his body. His feet remains swollen. Encouraged to elevate his feet when sitting and laying down in bed. VSS. He has an order of no roommate r/t intrusiveness and poor boundaries.                  "

## 2023-07-18 NOTE — CARE PLAN
07/18/23 1223   Group Therapy Session   Group Attendance attended group session   Time Session Began 1015   Time Session Ended 1115   Total Time (minutes) 60   Total # Attendees 9   Group Type expressive therapy;task skill;psychotherapeutic   Group Topic Covered coping skills/lifestyle management;problem-solving;cognitive therapy techniques;cognitive activities;balanced lifestyle;structured socialization   Group Session Detail Occupational Therapy Clinic group to facilitate coping skill exploration, use of cognitive skills and problem solving, creative expression, clinical observation and facilitation of social, cognitive, and kinesthetic performance skills.    Patient Response/Contribution cooperative with task;listened actively   Patient Participation Detail Offered assorted ideas of what he wanted to begin with his task work. With frequent support, gentle humor, and encouragement with structured directions, he continued work and followed through to completion of steps. He needed author assisting with 1:1 while he followed through on details. He made decisions on details.

## 2023-07-18 NOTE — PLAN OF CARE
Assessment/Intervention/Current Symtoms and Care Coordination:  Pt discussed in team rounds this AM. Pt has been sleeping fine. Pt continues to endorse delusion of the hospital being a hotel or mansion. Pt is pleasant.    Writer involved in email chain with [facility] care coordinator, Vero, Sadaf, and CM, Bree. Sadaf reports she is looking into setting up a behavioral support plan for pt after discharge. Bree and  are going to collaborate to find an adult day program for pt.    Discharge Plan or Goal:  Plan for pt to return to  when stable     Barriers to Discharge:  Pt continues to present as symptomatic (delusional mood). Pt is receiving ongoing stabilization and medication adjustment. Continue care coordination with skilled nursing to ascertain his acceptability for a return to the home.     Referral Status:  Will continue to coordinate with pt's group home.     Legal Status:  Voluntary per guardian     Contacts:  Guardian/Sister - Huong Shlomo   P: [phone]     - Sadaf  P: [phone]     Upcoming Meetings and Dates/Important Information and next steps:

## 2023-07-18 NOTE — PLAN OF CARE
"  Problem: Psychotic Signs/Symptoms  Goal: Improved Psychomotor Symptoms (Psychotic Signs/Symptoms)  Intervention: Manage Psychomotor Movement  Recent Flowsheet Documentation  Taken 7/18/2023 0843 by Lotus Montenegro RN  Patient Performed Hygiene: dressed   Goal Outcome Evaluation:    Plan of Care Reviewed With: patient          Pt has been visible in the milieu, calm , animated and oriented to place, date and time. Denied all mental health concerns, \" No to all the questions\".  Medication compliant and with a good appetite.  Cooperative with assessment, BLE swollen but non pitting, denied pain/discomforts, TEDs applied.  Attended and participated in OT ,Dance Movement group and general Health group.   Initially refused lab draw at 0730 but agreed at a later time, Web paged [facility] .No new orders made, continue with weekly CBC w/ Platelets & Differential   Latest Reference Range & Units 07/18/23 10:14   Hemoglobin 13.3 - 17.7 g/dL 12.7 (L)   Hematocrit 40.0 - 53.0 % 38.5 (L)   Pt has a no roommate order due to poor boundaries and intrusiveness, non observed this shift.    "

## 2023-07-18 NOTE — PROGRESS NOTES
07/17/23 2000   Group Therapy Session   Group Attendance attended group session   Time Session Began 1900   Time Session Ended 1950   Total Time (minutes) 50   Total # Attendees 8   Group Type recreation   Group Topic Covered leisure exploration/use of leisure time   Group Session Detail TR leisure group   Patient Response/Contribution cooperative with task   Patient Participation Detail Pt participated in Therapeutic Recreation group with focus on socializing, problem solving, and leisure participation. Engaged and cooperative in group recreational intervention; word puzzles. Pt participated throughout entire duration of the group. Pt affect was flat and mood was calm. Pt added to the group discussion during the activity, relating the discussion to each puzzle. Pt talked about what each quote meant to him.

## 2023-07-19 PROCEDURE — 250N000013 HC RX MED GY IP 250 OP 250 PS 637: Performed by: PHYSICIAN ASSISTANT

## 2023-07-19 PROCEDURE — 250N000013 HC RX MED GY IP 250 OP 250 PS 637: Performed by: PSYCHIATRY & NEUROLOGY

## 2023-07-19 PROCEDURE — 124N000003 HC R&B MH SENIOR/ADOLESCENT

## 2023-07-19 PROCEDURE — 250N000013 HC RX MED GY IP 250 OP 250 PS 637

## 2023-07-19 PROCEDURE — 250N000013 HC RX MED GY IP 250 OP 250 PS 637: Performed by: EMERGENCY MEDICINE

## 2023-07-19 PROCEDURE — G0177 OPPS/PHP; TRAIN & EDUC SERV: HCPCS

## 2023-07-19 PROCEDURE — 99231 SBSQ HOSP IP/OBS SF/LOW 25: CPT | Performed by: PSYCHIATRY & NEUROLOGY

## 2023-07-19 RX ADMIN — CLOZAPINE 25 MG: 25 TABLET ORAL at 08:20

## 2023-07-19 RX ADMIN — LAMOTRIGINE 50 MG: 25 TABLET ORAL at 08:20

## 2023-07-19 RX ADMIN — CALCIUM POLYCARBOPHIL 625 MG: 625 TABLET, FILM COATED ORAL at 20:15

## 2023-07-19 RX ADMIN — PANTOPRAZOLE SODIUM 40 MG: 40 TABLET, DELAYED RELEASE ORAL at 05:00

## 2023-07-19 RX ADMIN — APIXABAN 5 MG: 5 TABLET, FILM COATED ORAL at 20:18

## 2023-07-19 RX ADMIN — CLOZAPINE 200 MG: 100 TABLET ORAL at 20:17

## 2023-07-19 RX ADMIN — LEVOTHYROXINE SODIUM 50 MCG: 25 TABLET ORAL at 05:00

## 2023-07-19 RX ADMIN — ACETAMINOPHEN 1000 MG: 500 TABLET, FILM COATED ORAL at 08:19

## 2023-07-19 RX ADMIN — DOCUSATE SODIUM 100 MG: 100 CAPSULE, LIQUID FILLED ORAL at 08:20

## 2023-07-19 RX ADMIN — ACETAMINOPHEN 1000 MG: 500 TABLET, FILM COATED ORAL at 20:17

## 2023-07-19 RX ADMIN — CALCIUM POLYCARBOPHIL 625 MG: 625 TABLET, FILM COATED ORAL at 08:19

## 2023-07-19 RX ADMIN — PREDNISOLONE ACETATE 1 DROP: 10 SUSPENSION/ DROPS OPHTHALMIC at 08:24

## 2023-07-19 RX ADMIN — ZINC SULFATE 220 MG (50 MG) CAPSULE 220 MG: CAPSULE at 08:20

## 2023-07-19 RX ADMIN — MONTELUKAST 10 MG: 10 TABLET, FILM COATED ORAL at 20:17

## 2023-07-19 RX ADMIN — Medication 10 MG: at 20:16

## 2023-07-19 RX ADMIN — LISINOPRIL 5 MG: 5 TABLET ORAL at 08:23

## 2023-07-19 RX ADMIN — TOPIRAMATE 150 MG: 50 TABLET ORAL at 20:17

## 2023-07-19 RX ADMIN — CHLORHEXIDINE GLUCONATE 0.12% ORAL RINSE 15 ML: 1.2 LIQUID ORAL at 08:19

## 2023-07-19 RX ADMIN — WHITE PETROLATUM 57.7 %-MINERAL OIL 31.9 % EYE OINTMENT: at 20:18

## 2023-07-19 RX ADMIN — ACETAMINOPHEN 1000 MG: 500 TABLET, FILM COATED ORAL at 14:38

## 2023-07-19 RX ADMIN — APIXABAN 5 MG: 5 TABLET, FILM COATED ORAL at 08:20

## 2023-07-19 RX ADMIN — PALIPERIDONE 9 MG: 3 TABLET, EXTENDED RELEASE ORAL at 08:19

## 2023-07-19 ASSESSMENT — ACTIVITIES OF DAILY LIVING (ADL)
LAUNDRY: UNABLE TO COMPLETE
DRESS: INDEPENDENT
ADLS_ACUITY_SCORE: 32
ORAL_HYGIENE: PROMPTS;INDEPENDENT
ORAL_HYGIENE: INDEPENDENT
HYGIENE/GROOMING: INDEPENDENT
ADLS_ACUITY_SCORE: 32
LAUNDRY: UNABLE TO COMPLETE
DRESS: SCRUBS (BEHAVIORAL HEALTH);INDEPENDENT
ADLS_ACUITY_SCORE: 32
HYGIENE/GROOMING: INDEPENDENT
ADLS_ACUITY_SCORE: 32

## 2023-07-19 NOTE — PROGRESS NOTES
Pt demonstrated playful qualities while peers were exploring embodied psychotherapeutic themes of tenderness.  He made use of imagination to demonstrate a new level of patience during this portion of the session. He resisted synchronicity of movement qualities with peers and chose other means to reinforce themes of belonging. Pt remained in session with another patient beyond the time of most other patients, when he was able to express himself more freely, but still playfully.      07/18/23 1115   Expressive Therapy   Therapy Type dance/movement   Minutes of Treatment 60

## 2023-07-19 NOTE — PROGRESS NOTES
"[facility]  PROGRESS NOTE     DATE OF SERVICE   07/18/2023        CHIEF COMPLAINT   \" I am great.\"       SUBJECTIVE   Nursing reports:  Pt has been visible in the milieu, calm , animated and oriented to place, date and time. Denied all mental health concerns, \" No to all the questions\".  Medication compliant and with a good appetite.  Cooperative with assessment, BLE swollen but non pitting, denied pain/discomforts, TEDs applied.  Attended and participated in OT ,Dance Movement group and general Health group.   Initially refused lab draw at 0730 but agreed at a later time, Web paged [facility] .No new orders made, continue with weekly CBC w/ Platelets & Differential    Latest Reference Range & Units 07/18/23 10:14   Hemoglobin 13.3 - 17.7 g/dL 12.7 (L)   Hematocrit 40.0 - 53.0 % 38.5 (L)   Pt has a no roommate order due to poor boundaries and intrusiveness, non observed this shift.      Patient has been reviewed with the  earlier today.   Assessment/Intervention/Current Symtoms and Care Coordination:  Pt discussed in team rounds this AM. Pt has been sleeping fine. Pt continues to endorse delusion of the hospital being a hotel or mansion. Pt is pleasant.     Writer involved in email chain with [facility] care coordinator, Vero, , Sadaf, and CMBree. Sadaf reports she is looking into setting up a behavioral support plan for pt after discharge. Bree and  are going to collaborate to find an adult day program for pt.     OBJECTIVE   Patient was seen and the area by himself, this was a face-to-face evaluation.  Patient has been observed attending most groups and he seemed to be engaged.  Denies having any side effects from the medications and he has been compliant.  Again when I tried to talk with the patient about the possibility of returning to the group home patient stated that he will go there.  The patient said that he lives now here in the hospital and there is no reason " "for him to move.  He said that he is in agreement with meeting with the director of the group home but that does not mean that he has agreed to return to the group home.  When asked the patient also said that he has been communicating with his parents and his mother was just in the group room.  Today the patient called me initially by my name but then he addressed this writer by Brittany.  When I ask him who is Brittany patient stated \"I do not know\".  During my interaction with the patient he was calm, pleasant and cooperative.  He continues to be somewhat argumentative when we talk about the delusions but does not get aggressive or agitated.       MEDICATIONS   Medications:  Scheduled Meds:    acetaminophen  1,000 mg Oral TID     apixaban ANTICOAGULANT  5 mg Oral BID     artificial tears   Right Eye At Bedtime     calcium polycarbophil  625 mg Oral BID     chlorhexidine  15 mL Swish & Spit Daily     cloZAPine  200 mg Oral At Bedtime     cloZAPine  25 mg Oral Daily     docusate sodium  100 mg Oral Daily     lamoTRIgine  50 mg Oral Daily     levothyroxine  50 mcg Oral or NG Tube QAM AC     lisinopril  5 mg Oral Daily     melatonin  10 mg Oral At Bedtime     montelukast  10 mg Oral At Bedtime     paliperidone  9 mg Oral Daily     pantoprazole  40 mg Oral QAM AC     prednisoLONE acetate  1 drop Right Eye Daily     topiramate  150 mg Oral At Bedtime     zinc sulfate  220 mg Oral Daily     Continuous Infusions:  PRN Meds:.acetaminophen, alum & mag hydroxide-simethicone, artificial saliva, atropine, hydrALAZINE, hydrOXYzine, hypromellose-dextran, ipratropium, OLANZapine **OR** OLANZapine, senna-docusate    Medication adherence issues: MS Med Adherence Y/N: Yes, Hospitalization  Medication side effects: MEDICATION SIDE EFFECTS: no side effects reported  Benefit: Yes / No: Yes       ROS   A comprehensive review of systems was negative.       MENTAL STATUS EXAM   Vitals: /68   Pulse 78   Temp 98  F (36.7  C) " "(Temporal)   Resp 18   Wt 94.2 kg (207 lb 9.6 oz)   SpO2 98%   BMI 35.63 kg/m      Same as last visit  Appearance:  No apparent distress  Mood: \"I am doing great\"  Affect: Calm and bright  was congruent to speech  Suicidal Ideation: PRESENT / ABSENT: absent   Homicidal Ideation: PRESENT / ABSENT: absent     Thought process: Delight  Thought content: Remains delusional  Fund of Knowledge: Below average  Attention/Concentration: Improving  Language ability:  Intact, speech seems to be more clear and the patient has not been drooling.  Memory:  Immediate recall impaired, Short-term memory impaired and Long-term memory intact  Insight: limited   Judgement: poor  Orientation: Person, time only  Psychomotor Behavior: slowed    Muscle Strength and Tone: MuscleStrength: Normal  Gait and Station: Stable on his feet       LABS   personally reviewed.   Recent Labs   Lab 07/18/23  1014   WBC 6.5   HGB 12.7*   MCV 83        No results found for: PHENYTOIN, PHENOBARB, VALPROATE, CBMZ       DIAGNOSIS   Principal Problem:    Schizoaffective disorder, bipolar type (H)    Active Problem List:  Patient Active Problem List   Diagnosis     Closed head injury, initial encounter     Altered mental status, unspecified altered mental status type     Generalized weakness     Portal vein thrombosis     Pleural effusion     Generalized muscle weakness     Falls frequently     Other ascites     Acute pancreatitis, unspecified complication status, unspecified pancreatitis type     Pancreatic pseudocyst     Idiopathic acute pancreatitis, unspecified complication status     Allergic rhinitis     Fisher's esophagus     CTS (carpal tunnel syndrome)     Elevated liver enzymes     HTN (hypertension)     Hypothyroidism     Keratoconus     Major depressive disorder, recurrent episode, mild (H)     Intellectual disability     Morbid exogenous obesity (H)     Neuroleptic malignant syndrome     Obstructive sleep apnea syndrome     Bipolar " affective disorder (H)     Seizure disorder (H)     Seizures, generalized convulsive (H)     Acute respiratory failure with hypoxia (H)     Anemia, unspecified     Anxiety disorder, unspecified     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)     Dietary zinc deficiency     Dry eye syndrome of bilateral lacrimal glands     Dry mouth, unspecified     Edema, unspecified     Gastro-esophageal reflux disease without esophagitis     Hyperosmolality and hypernatremia     Irritable bowel syndrome with constipation     Major depressive disorder, recurrent, unspecified (H)     Metabolic encephalopathy     Moderate protein-calorie malnutrition (H)     Other symbolic dysfunctions     Schizoaffective disorder, unspecified (H)     Thrombocytopenia, unspecified (H)     Unspecified asthma, uncomplicated     Urinary tract infection, site not specified     Vitamin D deficiency, unspecified     Schizoaffective disorder, bipolar type (H)          PLAN   1. Ongoing education given regarding diagnostic and treatment options with risks, benefits and alternatives and adequate verbalization of understanding.  2.  Medications       Melatonin 10 mg at bedtime       Clozaril at 25 mg daily and 200 mg at bedtime.  We will continue increasing the dose of the Clozaril slowly.      Topamax 150 mg at bedtime       Invega 9 mg daily        Lamictal 50 mg daily    3.  Medical team following as needed  4.   coordinating a safe discharge plan    Risk Assessment: [facility] RISK ASSESSMENT: Patient able to contract for safety    Coordination of Care:   Treatment Plan reviewed and physician signed, Care discussed with Care/Treatment Team Members, Chart reviewed and Patient seen      Re-Certification I certify that the inpatient psychiatric facility services furnished since the previous certification were, and continue to be, medically necessary for, either, treatment which could reasonably be expected to improve the patient s  condition or diagnostic study and that the hospital records indicate that the services furnished were, either, intensive treatment services, admission and related services necessary for diagnostic study, or equivalent services.     I certify that the patient continues to need, on a daily basis, active treatment furnished directly by or requiring the supervision of inpatient psychiatric facility personnel.   I estimate 14 days of hospitalization is necessary for proper treatment of the patient. My plans for post-hospital care for this patient are  group home     Alejandra Watkins MD    -     07/18/2023  -     7:06 PM    Total time 35 minutes with > 50%spent on coordination of cares and psycho-education.    This note was created with help of Dragon dictation system. Grammatical / typing errors are not intentional.    Alejandra Watkins MD

## 2023-07-19 NOTE — CARE PLAN
07/19/23 1441   Group Therapy Session   Group Attendance attended group session   Time Session Began 1015   Time Session Ended 1115   Total Time (minutes) 45   Total # Attendees 6   Group Type expressive therapy;recreation;task skill   Group Topic Covered coping skills/lifestyle management;problem-solving;leisure exploration/use of leisure time;cognitive activities   Group Session Detail OT: Education on healthy activity engagement and creative hands-on endeavor (OT clinic) to increase concentration, focus, attention to task/detail, decision making, problem solving, frustration tolerance, task follow through, coping with stress, healthy leisure engagement, creative expression, and social engagement   Patient Response/Contribution cooperative with task   Patient Participation Detail pt required verbal reminders to remove and turn off headphones for group. pt was eventually compliant. pt engaged in previous creative endeavor. pt work space was messy. pt worked in a slow manner. pt required hands on assistance and instructions for task. pt required set up assistance and verbal cues for task clean up.

## 2023-07-19 NOTE — PROGRESS NOTES
07/18/23 2100   Group Therapy Session   Time Session Began 1900   Time Session Ended 1950   Total Time (minutes) 45   Total # Attendees 5   Group Type expressive therapy   Group Topic Covered balanced lifestyle;relaxation techniques;structured socialization   Group Session Detail Evening Relaxation   Patient Response/Contribution cooperative with task   Patient Participation Detail Engaged in Evening Music Relaxation group to decrease anxiety and promote sleep.  Tended to blurt things out and needed reminders to listen for others in group.  Did require semi-frequent cues and redirection throughout session, but was redirectable and cooperative.

## 2023-07-19 NOTE — PLAN OF CARE
Assessment/Intervention/Current Symtoms and Care Coordination:  Pt discussed in team rounds this AM. Pt has been sleeping fine. Pt continues to endorse delusion of the hospital being a hotel or mansion. Pt is pleasant.    Writer received email from  manager, Sadaf reporting that she will visit pt at 11:30am on 7/24/23.    Writer received email from pt's CMBree reporting that pt is on DD waiver and Bree is looking into how long it stays active while pt is hospitalized. Bree relayed plan to connect with writer when she finds out.    Writer attempted to meet with pt to inform him of visit with Sadaf on Monday. Pt was sleeping. CTC to follow up tomorrow.    Discharge Plan or Goal:  Plan for pt to return to  when stable     Barriers to Discharge:  Pt continues to present as symptomatic (delusional mood). Pt is receiving ongoing stabilization and medication adjustment. Continue care coordination with [facility] to ascertain his acceptability for a return to the home.     Referral Status:  Will continue to coordinate with pt's group home.     Legal Status:  Voluntary per guardian     Contacts:  Guardian/Sister - Huong Shlomo   P: [phone]     - Sadaf  P: [phone]     Upcoming Meetings and Dates/Important Information and next steps:  Visit from  manager on 7/24 at 11:30am

## 2023-07-19 NOTE — CARE PLAN
Occupational Therapy     07/19/23 1400   Group Therapy Session   Group Attendance attended group session   Time Session Began 1115   Time Session Ended 1200   Total Time (minutes) 45   Total # Attendees 6   Group Type recreation   Group Topic Covered balanced lifestyle;coping skills/lifestyle management;leisure exploration/use of leisure time;structured socialization   Group Session Detail OT: Education on physical and social wellness with physical movement (gentle exercise) and interactive social activity (Chat Pack) to increase concentration, attention to task, symptom management, coping with stress, sharing information about self, listening to others, physical wellness, social wellness, and overall well-being   Patient Response/Contribution closed eyes for most of session;confused;disorganized;cooperative with task;refused to participate   Patient Participation Detail Pt sat among peers to complete presented activity but did not engage in social interactions with peers. Pt engaged in approximately 45% of the physical movement activities, despite having the physical capability to do so and receiving verbal support from therapist and peers. Pt answered all questions about himself but many responses appeared disorganized as they were loosely connected or not connected at all to the question.

## 2023-07-19 NOTE — PLAN OF CARE
"Goal Outcome Evaluation:    Full range affect, mood is calm. Pt active in the milieu, social with staff and peers. Pt demonstrates appropriate boundaries, needs redirection at times. Pt making needs known appropriately. 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 Pt eating and taking fluids freely. Pt denies questions/concers at this time.     Patient has a no roommate order for \"Intrusiveness, poor boundaries\"    /83   Pulse 78   Temp 97.3  F (36.3  C) (Temporal)   Resp 16   Wt 94.2 kg (207 lb 9.6 oz)   SpO2 100%   BMI 35.63 kg/m      Plan:  Continue to encourage group participation  Continue to encourage choices and independence  Continue to encourage non-pharmacological coping skills    "

## 2023-07-19 NOTE — PLAN OF CARE
Problem: Psychotic Signs/Symptoms  Goal: Improved Behavioral Control (Psychotic Signs/Symptoms)  Outcome: Progressing   Goal Outcome Evaluation:    Plan of Care Reviewed With: patient        Patient is visible in the milieu shift with headphones listening to music.  He is calm and cooperative with care.  full range of affect noted.  Denies Mental health symptoms and hallucinations.  Compliant with medications. No emotional outburst observed.  He was able to redirectable when delusional about bands and having lots of money.  His appetite is good with 100%., and he sleeps good at night.  Denied pain at this time. His feet remain swollen. Encouraged to elevate his feet when sitting and laying down in bed.  Pt has an order of no roommate r/t intrusiveness and poor boundaries. Contact for safety in the unit. All precautions were maintained. Will monitor.

## 2023-07-19 NOTE — PLAN OF CARE
"  Problem: Psychotic Signs/Symptoms  Goal: Improved Behavioral Control (Psychotic Signs/Symptoms)  Outcome: Progressing       Goal Outcome Evaluation :      Slept on and off tonight and was easily redirected back to his room and fell right back to sleep. Awake by 0430, calm and quiet, asked for coffee but when told it will be served at 0600, replied \" OK\".  Pt has his headphone all night, listened to the music but was quiet  Slept for 7.75 hours  Pt has a no roommate order due to poor boundaries and intrusive behavior               "

## 2023-07-20 ENCOUNTER — APPOINTMENT (OUTPATIENT)
Dept: CT IMAGING | Facility: CLINIC | Age: 53
DRG: 885 | End: 2023-07-20
Attending: STUDENT IN AN ORGANIZED HEALTH CARE EDUCATION/TRAINING PROGRAM
Payer: MEDICARE

## 2023-07-20 PROCEDURE — 250N000013 HC RX MED GY IP 250 OP 250 PS 637

## 2023-07-20 PROCEDURE — G1010 CDSM STANSON: HCPCS

## 2023-07-20 PROCEDURE — 250N000013 HC RX MED GY IP 250 OP 250 PS 637: Performed by: PSYCHIATRY & NEUROLOGY

## 2023-07-20 PROCEDURE — 250N000013 HC RX MED GY IP 250 OP 250 PS 637: Performed by: PHYSICIAN ASSISTANT

## 2023-07-20 PROCEDURE — G0177 OPPS/PHP; TRAIN & EDUC SERV: HCPCS

## 2023-07-20 PROCEDURE — 250N000013 HC RX MED GY IP 250 OP 250 PS 637: Performed by: EMERGENCY MEDICINE

## 2023-07-20 PROCEDURE — 70450 CT HEAD/BRAIN W/O DYE: CPT | Mod: 26 | Performed by: RADIOLOGY

## 2023-07-20 PROCEDURE — G1010 CDSM STANSON: HCPCS | Performed by: RADIOLOGY

## 2023-07-20 PROCEDURE — 124N000003 HC R&B MH SENIOR/ADOLESCENT

## 2023-07-20 PROCEDURE — 99207 PR NO BILLABLE SERVICE THIS VISIT: CPT

## 2023-07-20 PROCEDURE — 99232 SBSQ HOSP IP/OBS MODERATE 35: CPT | Performed by: PSYCHIATRY & NEUROLOGY

## 2023-07-20 RX ADMIN — CLOZAPINE 225 MG: 100 TABLET ORAL at 20:43

## 2023-07-20 RX ADMIN — LEVOTHYROXINE SODIUM 50 MCG: 25 TABLET ORAL at 05:56

## 2023-07-20 RX ADMIN — TOPIRAMATE 150 MG: 50 TABLET ORAL at 20:43

## 2023-07-20 RX ADMIN — PANTOPRAZOLE SODIUM 40 MG: 40 TABLET, DELAYED RELEASE ORAL at 05:56

## 2023-07-20 RX ADMIN — CALCIUM POLYCARBOPHIL 625 MG: 625 TABLET, FILM COATED ORAL at 20:43

## 2023-07-20 RX ADMIN — WHITE PETROLATUM 57.7 %-MINERAL OIL 31.9 % EYE OINTMENT: at 20:45

## 2023-07-20 RX ADMIN — MONTELUKAST 10 MG: 10 TABLET, FILM COATED ORAL at 20:43

## 2023-07-20 RX ADMIN — CHLORHEXIDINE GLUCONATE 0.12% ORAL RINSE 15 ML: 1.2 LIQUID ORAL at 08:28

## 2023-07-20 RX ADMIN — PALIPERIDONE 9 MG: 3 TABLET, EXTENDED RELEASE ORAL at 08:28

## 2023-07-20 RX ADMIN — Medication 10 MG: at 20:43

## 2023-07-20 RX ADMIN — ACETAMINOPHEN 1000 MG: 500 TABLET, FILM COATED ORAL at 13:28

## 2023-07-20 RX ADMIN — ZINC SULFATE 220 MG (50 MG) CAPSULE 220 MG: CAPSULE at 08:29

## 2023-07-20 RX ADMIN — APIXABAN 5 MG: 5 TABLET, FILM COATED ORAL at 08:29

## 2023-07-20 RX ADMIN — CLOZAPINE 25 MG: 25 TABLET ORAL at 08:29

## 2023-07-20 RX ADMIN — ACETAMINOPHEN 325MG 650 MG: 325 TABLET ORAL at 05:56

## 2023-07-20 RX ADMIN — PREDNISOLONE ACETATE 1 DROP: 10 SUSPENSION/ DROPS OPHTHALMIC at 08:30

## 2023-07-20 RX ADMIN — ACETAMINOPHEN 1000 MG: 500 TABLET, FILM COATED ORAL at 20:43

## 2023-07-20 RX ADMIN — LISINOPRIL 5 MG: 5 TABLET ORAL at 08:29

## 2023-07-20 RX ADMIN — LAMOTRIGINE 50 MG: 25 TABLET ORAL at 08:29

## 2023-07-20 RX ADMIN — CALCIUM POLYCARBOPHIL 625 MG: 625 TABLET, FILM COATED ORAL at 08:28

## 2023-07-20 RX ADMIN — APIXABAN 5 MG: 5 TABLET, FILM COATED ORAL at 20:43

## 2023-07-20 RX ADMIN — DOCUSATE SODIUM 100 MG: 100 CAPSULE, LIQUID FILLED ORAL at 08:29

## 2023-07-20 ASSESSMENT — ACTIVITIES OF DAILY LIVING (ADL)
ADLS_ACUITY_SCORE: 32

## 2023-07-20 NOTE — CARE PLAN
07/20/23 1550   Group Therapy Session   Group Attendance attended group session   Time Session Began 1345   Time Session Ended 1440   Total Time (minutes) 25  (no charge)   Total # Attendees 7   Group Topic Covered coping skills/lifestyle management;relapse prevention;cognitive therapy techniques;structured socialization;balanced lifestyle   Group Session Detail Occupational Therapy Clinic group to facilitate coping skill exploration, use of cognitive skills and problem solving, creative expression, clinical observation and facilitation of social, cognitive, and kinesthetic performance skills.    Patient Response/Contribution cooperative with task;listened actively;discussed personal experience with topic   Patient Participation Detail Seemed appreciative of others acknowledging his birthday. Spoke up and initiated asking clarifications and adding comments and his ideas. Left early to meet with staff and did not return to group.

## 2023-07-20 NOTE — CARE PLAN
07/20/23 1201   Group Therapy Session   Group Attendance attended group session   Time Session Began 1015   Time Session Ended 1115   Total Time (minutes) 45   Total # Attendees 6   Group Type expressive therapy;task skill;recreation   Group Topic Covered coping skills/lifestyle management;problem-solving;cognitive activities;leisure exploration/use of leisure time   Group Session Detail Occupational Therapy Clinic group to facilitate coping skill exploration, use of cognitive skills and problem solving, creative expression, clinical observation and facilitation of social, cognitive, and kinesthetic performance skills.   Patient Response/Contribution cooperative with task   Patient Participation Detail pt pushed boundaries to wear headphones during group. pt eventually took off headphones. pt chose to engage in previous creative endeavor. pt asked for assistance with set up of supplies. pt required assistance and verbal cues for steps of project. pt was polite and appropriate with staff. pt workspace was neater than usual. pt required repetition of instructions on proper technique for project.

## 2023-07-20 NOTE — PLAN OF CARE
Assessment/Intervention/Current Symtoms and Care Coordination:  Pt discussed in team rounds this AM. Pt has been sleeping fine. Pt continues to endorse delusion of the hospital being a hotel or mansion. Pt is pleasant. Pt had a fall this morning and has CT scheduled. Pt reports no acute concerns, minor pain. Team is following up.    Writer received email from Bree MALLORY reporting that [employer] has limited availability and are considering clients based on client needs and staffing. Bree reported that she is checking into other programs.     Writer put out call to [employer] ([phone]) and [facility] for [facility] inquiring if there are any current openings.    Writer put out call to [employer] ([phone]) to inquire about current openings. Writer was informed they are at full capacity with a waitlist of 80+ people.    Discharge Plan or Goal:  Plan for pt to return to  when stable     Barriers to Discharge:  Pt continues to present as symptomatic (delusional mood). Pt is receiving ongoing stabilization and medication adjustment. Continue care coordination with alf to ascertain his acceptability for a return to the home.     Referral Status:  Will continue to coordinate with pt's group home.     Legal Status:  Voluntary per guardian     Contacts:  Guardian/Sister - Huongxena Keenan   P: [phone]     - Sadaf  P: [phone]     Upcoming Meetings and Dates/Important Information and next steps:  Visit from  manager on 7/24 at 11:30am

## 2023-07-20 NOTE — PROGRESS NOTES
[facility] Cover note.  Called by nursing around 0615 due to unwitnessed fall with reported contact to the head. Patient reports he tripped over a rocker and landed on his back then rolled to his head.  No loss of consciousness or visual changes.  Patient complaining of back pain and received tylenol with some improvement already.  Chart reviewed and noted patient is also on anticoagulation. At this time, recommend CT head without contrast due to risk for bleeding and head injury. CT ordered.  Day team to follow up and consider further evaluation of back if necessary.    Bethel Cheema MD

## 2023-07-20 NOTE — CARE PLAN
07/20/23 1501   Group Therapy Session   Group Attendance attended group session   Time Session Began 1300   Time Session Ended 1345   Total Time (minutes) 10  (no charge)   Total # Attendees 6-8   Group Type life skill;task skill   Group Topic Covered coping skills/lifestyle management;problem-solving;cognitive activities;structured socialization   Group Session Detail [facility] for cognitive wellness, recall, focus, concentration, following directions, healthy distraction, symptom management, social engagement, and cognition   Patient Response/Contribution confused   Patient Participation Detail pt arrived at end of group. pt was wearing headphones-pt was argumentative with therapist about not wearing headphones in group. pt eventually decided to take off headphones and place on countertop. pt was provided with individual instructions and demonstration. pt actively engaged in cognitive activity for approx 2 minutes-pt found 1 item on his sheet. pt then sat quietly until end of group task.

## 2023-07-20 NOTE — PROGRESS NOTES
Brief Medicine Cross Cover Note:    [facility] Cover reviewing CC list and noted pt had a fall last night. CT head without acute pathologies Spoke with nursing and no acute issues today. HDS. VSS.     Interventions:  - For any further issues page Medicine and have primary team place a Internal Medicine consult    Bee Echeverria, CNP, APRN  Internal Medicine [facility] Hospitalist  [facility]  [phone]  Securely message with the Vocera Web Console   Text page via [employer] Paging/Directory   Text page via [employer]

## 2023-07-20 NOTE — CARE PLAN
07/20/23 0752   Group Therapy Session   Group Attendance attended group session   Time Session Began 1300   Time Session Ended 1400   Total Time (minutes) 45   Total # Attendees 6   Group Type life skill;task skill   Group Topic Covered coping skills/lifestyle management;problem-solving;cognitive activities;structured socialization   Group Session Detail OT Topic Group-Favorites Bingo for coping skill building, social engagement, insight development, focus, turn taking, following directions, healthy distraction, attention to task, and symptom management   Patient Response/Contribution closed eyes for most of session;cooperative with task   Patient Participation Detail pt arrived late for group. pt did not bring headphones to group space. pt required encouragement for participation. pt was able to follow flow of activity with some assistance from staff. pt sat with head in arm on table for most of activity. pt was able to answer questions when called upon however pt was difficult to understand-pt's voice sounded gurgly and pt was observed drooling on table. pt shared responses verbally but did not write responses. pt was able to keep track of his own bingo card. pt was redirectable by staff and peers.

## 2023-07-20 NOTE — PLAN OF CARE
Problem: Plan of Care - These are the overarching goals to be used throughout the patient stay.    Goal: Absence of Hospital-Acquired Illness or Injury  Outcome: Progressing   Goal Outcome Evaluation:  Pt fell this morning but offers no complained of pain or discomfort . He has been ambulating without any C/O pain or discomfort . Pt had a CT SCAN done . Pt ate well and was medication complaint . Denies all  SX

## 2023-07-20 NOTE — PLAN OF CARE
Problem: Psychotic Signs/Symptoms  Goal: Improved Behavioral Control (Psychotic Signs/Symptoms)  Outcome: Progressing    Problem: Fall Injury Risk  Goal: Absence of Fall and Fall-Related Injury  Outcome: Not Progressing   Goal Outcome Evaluation:       Goal Outcome Evaluation:        Slept well tonight for 8.25 hours, easily redirectable and calm. Able to make needs known and appropriate.Woke up  in a pleasant mood , today is his Birthday.Independently changed his scrubs when it was wet with urine.  Pt was in the lounge at 0515, staff heard a thud at 0545, pt was seen lying on the floor with his feet on top of the back rocker of the the blue rocking chair. Needed assist of 2 to get him off the floor. Pt stated he tripped on the rocking chair while trying to pass around it and fell on his back and slightly hit his head on the floor.  No injuries notes, vital signs stable, no loss of consciousness, no redness or bruising noted, Gait remained unchanged. Dr. Cheema notified, with order for CT of the head and the day team to follow-up.  Reported back pain, spinal area, Tylenol 650 mg given at 0556.  Sister Huong Keenan notified of fall

## 2023-07-20 NOTE — PLAN OF CARE
Goal Outcome Evaluation:    Plan of Care Reviewed With: patient      Patient has been present in the lounge.  His affect is full range. His mood is calm. He denies all MH symptoms. He denies pain. He walks the halls listening to music on his headphones. He attended and participated in group. His sleep and appetite are good. He endorses feeling hopeful. He states his medications are helpful. He is social with peers and staff. He is medication compliant.

## 2023-07-20 NOTE — CARE PLAN
Occupational Therapy     07/20/23 1548   Group Therapy Session   Group Attendance attended group session   Time Session Began 1115   Time Session Ended 1200   Total Time (minutes) 45   Total # Attendees 8   Group Type recreation   Group Topic Covered cognitive activities;balanced lifestyle;coping skills/lifestyle management;leisure exploration/use of leisure time;structured socialization   Group Session Detail OT: Education on physical and intellectual wellness with physical movement (gentle exercise) and interactive social activity (Name 5) to increase concentration, attention to task, symptom management, coping with stress, sharing information about self, listening to others, physical wellness, intellectual wellness, social wellness, and overall well-being   Patient Response/Contribution closed eyes for most of session;disorganized;refused to participate   Patient Participation Detail During check-in pt provided an inaudible response regarding what he does to support his physical wellness. Pt refused to engage in any of the physical movements and was unable to provide a coherent response to the cognitive questions. Pt was observed to drool throughout group and appeared unaware of the drool pooling on his arm and on the table; pt kept his eyes closed for the majority of group and placed his head on the table.

## 2023-07-20 NOTE — PROGRESS NOTES
"[facility]  PROGRESS NOTE     DATE OF SERVICE   07/19/2023        CHIEF COMPLAINT   \" I am good.\"       SUBJECTIVE   Nursing reports:  Full range affect, mood is calm. Pt active in the milieu, social with staff and peers. Pt demonstrates appropriate boundaries, needs redirection at times. Pt making needs known appropriately. 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 Pt eating and taking fluids freely. Pt denies questions/concers at this time.      Patient has a no roommate order for \"Intrusiveness, poor boundaries\"     /83   Pulse 78   Temp 97.3  F (36.3  C) (Temporal)   Resp 16   Wt 94.2 kg (207 lb 9.6 oz)   SpO2 100%   BMI 35.63 kg/m       Plan:  Continue to encourage group participation  Continue to encourage choices and independence  Continue to encourage non-pharmacological coping skills       Patient has been reviewed with the  earlier today.   Assessment/Intervention/Current Symtoms and Care Coordination:  Pt discussed in team rounds this AM. Pt has been sleeping fine. Pt continues to endorse delusion of the hospital being a hotel or mansion. Pt is pleasant.     Writer received email from  manager, Sadaf reporting that she will visit pt at 11:30am on 7/24/23.     Writer received email from pt's CMBree reporting that pt is on DD waiver and Bree is looking into how long it stays active while pt is hospitalized. Bree relayed plan to connect with writer when she finds out.     Writer attempted to meet with pt to inform him of visit with Sadaf on Monday. Pt was sleeping. CTC to follow up tomorrow.     OBJECTIVE   Patient was seen and the area by himself, this was a face-to-face evaluation.  I informed the patient that the director of the group home will be visiting with him next week.  Patient reported that he feels this is a good idea but continues to be adamant about not going to the group home.  Continues to endorse same delusions but he does not bring this in any " "conversation and only talks about the delusions when he is asked about.  He has been calm, pleasant and cooperative.  Has been attending most groups and is engaging in the therapy.  He is maintaining good boundaries with the staff and his peers.       MEDICATIONS   Medications:  Scheduled Meds:    acetaminophen  1,000 mg Oral TID     apixaban ANTICOAGULANT  5 mg Oral BID     artificial tears   Right Eye At Bedtime     calcium polycarbophil  625 mg Oral BID     chlorhexidine  15 mL Swish & Spit Daily     cloZAPine  200 mg Oral At Bedtime     cloZAPine  25 mg Oral Daily     docusate sodium  100 mg Oral Daily     lamoTRIgine  50 mg Oral Daily     levothyroxine  50 mcg Oral or NG Tube QAM AC     lisinopril  5 mg Oral Daily     melatonin  10 mg Oral At Bedtime     montelukast  10 mg Oral At Bedtime     paliperidone  9 mg Oral Daily     pantoprazole  40 mg Oral QAM AC     prednisoLONE acetate  1 drop Right Eye Daily     topiramate  150 mg Oral At Bedtime     zinc sulfate  220 mg Oral Daily     Continuous Infusions:  PRN Meds:.acetaminophen, alum & mag hydroxide-simethicone, artificial saliva, atropine, hydrALAZINE, hydrOXYzine, hypromellose-dextran, ipratropium, OLANZapine **OR** OLANZapine, senna-docusate    Medication adherence issues: MS Med Adherence Y/N: Yes, Hospitalization  Medication side effects: MEDICATION SIDE EFFECTS: no side effects reported  Benefit: Yes / No: Yes       ROS   A comprehensive review of systems was negative.       MENTAL STATUS EXAM   Vitals: BP (!) 140/72 (Patient Position: Sitting)   Pulse 100   Temp 97.2  F (36.2  C) (Temporal)   Resp 16   Wt 94.2 kg (207 lb 9.6 oz)   SpO2 99%   BMI 35.63 kg/m      Same as last visit  Appearance:  No apparent distress  Mood: \"I am doing great\"  Affect: Calm and bright  was congruent to speech  Suicidal Ideation: PRESENT / ABSENT: absent   Homicidal Ideation: PRESENT / ABSENT: absent     Thought process: [place]  Thought content: Remains " delusional  Fund of Knowledge: Below average  Attention/Concentration: Improving  Language ability:  Intact, speech seems to be more clear and the patient has not been drooling.  Memory:  Immediate recall impaired, Short-term memory impaired and Long-term memory intact  Insight: limited   Judgement: poor  Orientation: Person, time only  Psychomotor Behavior: slowed    Muscle Strength and Tone: MuscleStrength: Normal  Gait and Station: Stable on his feet       LABS   personally reviewed.   Recent Labs   Lab 07/18/23  1014   WBC 6.5   HGB 12.7*   MCV 83        No results found for: PHENYTOIN, PHENOBARB, VALPROATE, CBMZ       DIAGNOSIS   Principal Problem:    Schizoaffective disorder, bipolar type (H)    Active Problem List:  Patient Active Problem List   Diagnosis     Closed head injury, initial encounter     Altered mental status, unspecified altered mental status type     Generalized weakness     Portal vein thrombosis     Pleural effusion     Generalized muscle weakness     Falls frequently     Other ascites     Acute pancreatitis, unspecified complication status, unspecified pancreatitis type     Pancreatic pseudocyst     Idiopathic acute pancreatitis, unspecified complication status     Allergic rhinitis     Fisher's esophagus     CTS (carpal tunnel syndrome)     Elevated liver enzymes     HTN (hypertension)     Hypothyroidism     Keratoconus     Major depressive disorder, recurrent episode, mild (H)     Intellectual disability     Morbid exogenous obesity (H)     Neuroleptic malignant syndrome     Obstructive sleep apnea syndrome     Bipolar affective disorder (H)     Seizure disorder (H)     Seizures, generalized convulsive (H)     Acute respiratory failure with hypoxia (H)     Anemia, unspecified     Anxiety disorder, unspecified     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)     Dietary zinc deficiency     Dry eye syndrome of bilateral lacrimal glands     Dry mouth, unspecified      Edema, unspecified     Gastro-esophageal reflux disease without esophagitis     Hyperosmolality and hypernatremia     Irritable bowel syndrome with constipation     Major depressive disorder, recurrent, unspecified (H)     Metabolic encephalopathy     Moderate protein-calorie malnutrition (H)     Other symbolic dysfunctions     Schizoaffective disorder, unspecified (H)     Thrombocytopenia, unspecified (H)     Unspecified asthma, uncomplicated     Urinary tract infection, site not specified     Vitamin D deficiency, unspecified     Schizoaffective disorder, bipolar type (H)          PLAN   1. Ongoing education given regarding diagnostic and treatment options with risks, benefits and alternatives and adequate verbalization of understanding.  2.  Medications       Melatonin 10 mg at bedtime       Clozaril at 25 mg daily and 200 mg at bedtime.  We will continue increasing the dose of the Clozaril slowly.      Topamax 150 mg at bedtime       Invega 9 mg daily        Lamictal 50 mg daily    3.  Medical team following as needed  4.   coordinating a safe discharge plan    Risk Assessment: [facility] RISK ASSESSMENT: Patient able to contract for safety    Coordination of Care:   Treatment Plan reviewed and physician signed, Care discussed with Care/Treatment Team Members, Chart reviewed and Patient seen      Re-Certification I certify that the inpatient psychiatric facility services furnished since the previous certification were, and continue to be, medically necessary for, either, treatment which could reasonably be expected to improve the patient s condition or diagnostic study and that the hospital records indicate that the services furnished were, either, intensive treatment services, admission and related services necessary for diagnostic study, or equivalent services.     I certify that the patient continues to need, on a daily basis, active treatment furnished directly by or requiring the supervision of  inpatient psychiatric facility personnel.   I estimate 14 days of hospitalization is necessary for proper treatment of the patient. My plans for post-hospital care for this patient are  group home     Alejandra Watkins MD    -     07/19/2023  -     9:45 PM    Total time 25 minutes with > 50%spent on coordination of cares and psycho-education.    This note was created with help of Dragon dictation system. Grammatical / typing errors are not intentional.    Alejandra Watkins MD

## 2023-07-21 ENCOUNTER — APPOINTMENT (OUTPATIENT)
Dept: CT IMAGING | Facility: CLINIC | Age: 53
DRG: 885 | End: 2023-07-21
Attending: PSYCHIATRY & NEUROLOGY
Payer: MEDICARE

## 2023-07-21 LAB
ALBUMIN UR-MCNC: NEGATIVE MG/DL
APPEARANCE UR: CLEAR
BILIRUB UR QL STRIP: NEGATIVE
COLOR UR AUTO: ABNORMAL
GLUCOSE UR STRIP-MCNC: NEGATIVE MG/DL
HGB UR QL STRIP: NEGATIVE
KETONES UR STRIP-MCNC: NEGATIVE MG/DL
LEUKOCYTE ESTERASE UR QL STRIP: NEGATIVE
NITRATE UR QL: NEGATIVE
PH UR STRIP: 6.5 [PH] (ref 5–7)
RBC URINE: <1 /HPF
SP GR UR STRIP: 1.01 (ref 1–1.03)
SPERM #/AREA URNS HPF: PRESENT /HPF
UROBILINOGEN UR STRIP-MCNC: NORMAL MG/DL
WBC URINE: <1 /HPF

## 2023-07-21 PROCEDURE — 99232 SBSQ HOSP IP/OBS MODERATE 35: CPT | Performed by: STUDENT IN AN ORGANIZED HEALTH CARE EDUCATION/TRAINING PROGRAM

## 2023-07-21 PROCEDURE — 124N000003 HC R&B MH SENIOR/ADOLESCENT

## 2023-07-21 PROCEDURE — G1010 CDSM STANSON: HCPCS | Performed by: RADIOLOGY

## 2023-07-21 PROCEDURE — 250N000013 HC RX MED GY IP 250 OP 250 PS 637: Performed by: STUDENT IN AN ORGANIZED HEALTH CARE EDUCATION/TRAINING PROGRAM

## 2023-07-21 PROCEDURE — G0177 OPPS/PHP; TRAIN & EDUC SERV: HCPCS

## 2023-07-21 PROCEDURE — 99232 SBSQ HOSP IP/OBS MODERATE 35: CPT | Performed by: PSYCHIATRY & NEUROLOGY

## 2023-07-21 PROCEDURE — 250N000013 HC RX MED GY IP 250 OP 250 PS 637: Performed by: PSYCHIATRY & NEUROLOGY

## 2023-07-21 PROCEDURE — 70450 CT HEAD/BRAIN W/O DYE: CPT | Mod: 26 | Performed by: RADIOLOGY

## 2023-07-21 PROCEDURE — 250N000013 HC RX MED GY IP 250 OP 250 PS 637: Performed by: EMERGENCY MEDICINE

## 2023-07-21 PROCEDURE — 81001 URINALYSIS AUTO W/SCOPE: CPT | Performed by: STUDENT IN AN ORGANIZED HEALTH CARE EDUCATION/TRAINING PROGRAM

## 2023-07-21 PROCEDURE — 250N000013 HC RX MED GY IP 250 OP 250 PS 637: Performed by: PHYSICIAN ASSISTANT

## 2023-07-21 PROCEDURE — 250N000013 HC RX MED GY IP 250 OP 250 PS 637

## 2023-07-21 PROCEDURE — G1010 CDSM STANSON: HCPCS

## 2023-07-21 RX ORDER — FUROSEMIDE 20 MG
20 TABLET ORAL DAILY
Status: DISCONTINUED | OUTPATIENT
Start: 2023-07-21 | End: 2023-08-14 | Stop reason: HOSPADM

## 2023-07-21 RX ORDER — PALIPERIDONE 3 MG/1
6 TABLET, EXTENDED RELEASE ORAL DAILY
Status: DISCONTINUED | OUTPATIENT
Start: 2023-07-22 | End: 2023-07-24

## 2023-07-21 RX ADMIN — ZINC SULFATE 220 MG (50 MG) CAPSULE 220 MG: CAPSULE at 08:41

## 2023-07-21 RX ADMIN — TOPIRAMATE 150 MG: 50 TABLET ORAL at 19:01

## 2023-07-21 RX ADMIN — LAMOTRIGINE 50 MG: 25 TABLET ORAL at 08:40

## 2023-07-21 RX ADMIN — DOCUSATE SODIUM 100 MG: 100 CAPSULE, LIQUID FILLED ORAL at 08:41

## 2023-07-21 RX ADMIN — PREDNISOLONE ACETATE 1 DROP: 10 SUSPENSION/ DROPS OPHTHALMIC at 08:41

## 2023-07-21 RX ADMIN — WHITE PETROLATUM 57.7 %-MINERAL OIL 31.9 % EYE OINTMENT: at 19:02

## 2023-07-21 RX ADMIN — PALIPERIDONE 9 MG: 3 TABLET, EXTENDED RELEASE ORAL at 08:40

## 2023-07-21 RX ADMIN — PANTOPRAZOLE SODIUM 40 MG: 40 TABLET, DELAYED RELEASE ORAL at 05:41

## 2023-07-21 RX ADMIN — FUROSEMIDE 20 MG: 20 TABLET ORAL at 14:17

## 2023-07-21 RX ADMIN — APIXABAN 5 MG: 5 TABLET, FILM COATED ORAL at 08:40

## 2023-07-21 RX ADMIN — ACETAMINOPHEN 1000 MG: 500 TABLET, FILM COATED ORAL at 05:41

## 2023-07-21 RX ADMIN — CHLORHEXIDINE GLUCONATE 0.12% ORAL RINSE 15 ML: 1.2 LIQUID ORAL at 08:40

## 2023-07-21 RX ADMIN — APIXABAN 5 MG: 5 TABLET, FILM COATED ORAL at 19:02

## 2023-07-21 RX ADMIN — CLOZAPINE 225 MG: 100 TABLET ORAL at 20:11

## 2023-07-21 RX ADMIN — CALCIUM POLYCARBOPHIL 625 MG: 625 TABLET, FILM COATED ORAL at 08:40

## 2023-07-21 RX ADMIN — ACETAMINOPHEN 1000 MG: 500 TABLET, FILM COATED ORAL at 19:01

## 2023-07-21 RX ADMIN — LISINOPRIL 5 MG: 5 TABLET ORAL at 08:40

## 2023-07-21 RX ADMIN — MONTELUKAST 10 MG: 10 TABLET, FILM COATED ORAL at 19:02

## 2023-07-21 RX ADMIN — LEVOTHYROXINE SODIUM 50 MCG: 25 TABLET ORAL at 05:41

## 2023-07-21 RX ADMIN — CALCIUM POLYCARBOPHIL 625 MG: 625 TABLET, FILM COATED ORAL at 19:01

## 2023-07-21 RX ADMIN — CLOZAPINE 25 MG: 25 TABLET ORAL at 08:41

## 2023-07-21 RX ADMIN — Medication 10 MG: at 19:02

## 2023-07-21 RX ADMIN — ACETAMINOPHEN 1000 MG: 500 TABLET, FILM COATED ORAL at 12:56

## 2023-07-21 ASSESSMENT — ACTIVITIES OF DAILY LIVING (ADL)
ADLS_ACUITY_SCORE: 32
ORAL_HYGIENE: INDEPENDENT
ADLS_ACUITY_SCORE: 32
DRESS: SCRUBS (BEHAVIORAL HEALTH);INDEPENDENT
ADLS_ACUITY_SCORE: 32
LAUNDRY: WITH SUPERVISION
HYGIENE/GROOMING: HANDWASHING;INDEPENDENT

## 2023-07-21 NOTE — PROGRESS NOTES
"[facility]  PROGRESS NOTE     DATE OF SERVICE   07/21/2023        CHIEF COMPLAINT   \" I am doing okay.\"       SUBJECTIVE   Nursing reports:  Pt has been visible in the milieu, redness on his forehead evident from the fall he had early morning. Affect calm and pleasant, attended community meeting.  Remained on SIO for self injury risk due to falling twice in a span of 24 hours, a no roommate order is in place due to intrusiveness and poor boundaries.  Showered with assist of 1, Head CT done,IMPRESSION: No evidence of intracranial hemorrhage or other acutentracranial pathology.  UA within  normal limits  Denies Mental health Symptoms, gait slow , no complaints of weakness and no involuntary jerking movements observed.  Pt was seen and examined by Dr Charles. PT consult ordered. Seen by IM, lab BMP ordered  And to start lasix 20  daily  Continued to endorse back pains, scheduled Tylenol given.  Male staff assisted pt to shave, slight razor burn sustained on his left neck, area cleansed.    Patient has been reviewed with the  earlier today.   Assessment/Intervention/Current Symtoms and Care Coordination:  Pt discussed in team rounds this AM. Pt has been sleeping fine. Pt continues to endorse delusion of the hospital being a hotel or mansion. Pt is pleasant. Pt had another fall this morning in his bathroom. Pt has a bruise but doesn't endorse any pain from the fall. Pt now on SIO for fall risk.     Writer met with pt in the hallway. Pt reports that he fell this morning but is doing fine. Pt and writer discussed pt's birthday yesterday. Pt was excited to see the therapy dog. Writer informed pt of visit from  manager, Sadaf on Monday. Pt reported that Sadaf will be visiting today. Writer reiterated visit for Monday.      Writer received email from Sadaf reporting that her supervisor will be joining her visit with pt.     Jefersonr put out call to [employer] ([phone]) to inquire about " current openings. Writer LÓPEZ for [employer] and requested CB.     OBJECTIVE   Patient was seen and evaluated in the day area, this was a face-to-face evaluation.  Patient had a fall earlier this morning.  Patient stated that he felt his legs were weak and admitted that he hit his forehead.  After he felt the patient stated that for a moment he was not able to walk but feels that he is doing much better right now.  When I asked the patient what else we can do in order for him to be more stable patient stated that he will try to eat more fruits in order to get stronger.    Patient continues to refuse to return to the group home stating that he needs to stay here in the hospital because this is a hotel.  Patient added that he has continued to talk to his parents and he sees them every day.  When I asked the patient what does he talk about with his parents he said that he talks about life, how he is feeling, his worries and that he wants to go out and do stuff.  Patient insisted that he is not going to be discharged into the group home despite me telling him that once he goes there he will be able to go out and do more things.  Patient said that he does not want to talk about it and asked me not to continue to ask him questions.    I had a conversation with the medical team and they will be following up with the patient today.  They are in agreement with the head CT scan order and the PT consults.    I had an opportunity to speak with patient's Sister Huong over the phone.  I informed her that the patient fell early this morning and discussed with her the evaluations done by this writer and the medical team.  I informed Huong that I will be decreasing and discontinuing the medication Invega as this might be contributing to the patient falling.  Sister verbalized good understanding and she was in agreement with this.  She added that she will be traveling outside of the country and she will not return until August 13.  She has  "been asking if we can discharge the patient after August 13.  I discussed with the sister that this is something that we can arrange as I do not think it will be appropriate for us to discharge the patient when she is not here (sister is the guardian and the primary support for the patient).       MEDICATIONS   Medications:  Scheduled Meds:    acetaminophen  1,000 mg Oral TID     apixaban ANTICOAGULANT  5 mg Oral BID     artificial tears   Right Eye At Bedtime     calcium polycarbophil  625 mg Oral BID     chlorhexidine  15 mL Swish & Spit Daily     cloZAPine  225 mg Oral At Bedtime     cloZAPine  25 mg Oral Daily     docusate sodium  100 mg Oral Daily     furosemide  20 mg Oral Daily     lamoTRIgine  50 mg Oral Daily     levothyroxine  50 mcg Oral or NG Tube QAM AC     lisinopril  5 mg Oral Daily     melatonin  10 mg Oral At Bedtime     montelukast  10 mg Oral At Bedtime     [START ON 7/22/2023] paliperidone  6 mg Oral Daily     pantoprazole  40 mg Oral QAM AC     prednisoLONE acetate  1 drop Right Eye Daily     topiramate  150 mg Oral At Bedtime     zinc sulfate  220 mg Oral Daily     Continuous Infusions:  PRN Meds:.acetaminophen, alum & mag hydroxide-simethicone, artificial saliva, atropine, hydrALAZINE, hydrOXYzine, hypromellose-dextran, ipratropium, OLANZapine **OR** OLANZapine, senna-docusate    Medication adherence issues: MS Med Adherence Y/N: Yes, Hospitalization  Medication side effects: MEDICATION SIDE EFFECTS: no side effects reported  Benefit: Yes / No: Yes       ROS   A comprehensive review of systems was negative.       MENTAL STATUS EXAM   Vitals: /88 (BP Location: Right arm, Patient Position: Sitting)   Pulse 88   Temp 97  F (36.1  C) (Temporal)   Resp 16   Wt 94.3 kg (207 lb 14.3 oz)   SpO2 99%   BMI 35.68 kg/m      Appearance:  No apparent distress  Mood: \"I am good\"  Affect: Calm and bright  was congruent to speech  Suicidal Ideation: PRESENT / ABSENT: absent   Homicidal Ideation: " PRESENT / ABSENT: absent     Thought process: [place]  Thought content: Remains delusional but he will only talk about his delusions when we ask about it.  Fund of Knowledge: Below average  Attention/Concentration: Improving  Language ability:  Intact, speech seems to be more clear and the patient has not been drooling.  Memory:  Immediate recall impaired, Short-term memory impaired and Long-term memory intact  Insight: limited   Judgement: poor  Orientation: Person, time only  Psychomotor Behavior: slowed    Muscle Strength and Tone: MuscleStrength: Normal  Gait and Station: Stable on his feet during my assessment       LABS   personally reviewed.   Recent Labs   Lab 07/18/23  1014   WBC 6.5   HGB 12.7*   MCV 83        No results found for: PHENYTOIN, PHENOBARB, VALPROATE, CBMZ       DIAGNOSIS   Principal Problem:    Schizoaffective disorder, bipolar type (H)    Active Problem List:  Patient Active Problem List   Diagnosis     Closed head injury, initial encounter     Altered mental status, unspecified altered mental status type     Generalized weakness     Portal vein thrombosis     Pleural effusion     Generalized muscle weakness     Falls frequently     Other ascites     Acute pancreatitis, unspecified complication status, unspecified pancreatitis type     Pancreatic pseudocyst     Idiopathic acute pancreatitis, unspecified complication status     Allergic rhinitis     Fisher's esophagus     CTS (carpal tunnel syndrome)     Elevated liver enzymes     HTN (hypertension)     Hypothyroidism     Keratoconus     Major depressive disorder, recurrent episode, mild (H)     Intellectual disability     Morbid exogenous obesity (H)     Neuroleptic malignant syndrome     Obstructive sleep apnea syndrome     Bipolar affective disorder (H)     Seizure disorder (H)     Seizures, generalized convulsive (H)     Acute respiratory failure with hypoxia (H)     Anemia, unspecified     Anxiety disorder, unspecified      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)     Dietary zinc deficiency     Dry eye syndrome of bilateral lacrimal glands     Dry mouth, unspecified     Edema, unspecified     Gastro-esophageal reflux disease without esophagitis     Hyperosmolality and hypernatremia     Irritable bowel syndrome with constipation     Major depressive disorder, recurrent, unspecified (H)     Metabolic encephalopathy     Moderate protein-calorie malnutrition (H)     Other symbolic dysfunctions     Schizoaffective disorder, unspecified (H)     Thrombocytopenia, unspecified (H)     Unspecified asthma, uncomplicated     Urinary tract infection, site not specified     Vitamin D deficiency, unspecified     Schizoaffective disorder, bipolar type (H)          PLAN   1. Ongoing education given regarding diagnostic and treatment options with risks, benefits and alternatives and adequate verbalization of understanding.  2.  Medications       Melatonin 10 mg at bedtime       Clozapine at 25 mg daily and 225 mg at bedtime.  We will continue increasing the dose of the Clozaril slowly.      Topamax 150 mg at bedtime       Decrease Invega 6 mg daily        Lamictal 50 mg daily    3.  Medical team following as needed  4.   coordinating a safe discharge plan    Risk Assessment: [facility] RISK ASSESSMENT: Patient able to contract for safety    Coordination of Care:   Treatment Plan reviewed and physician signed, Care discussed with Care/Treatment Team Members, Chart reviewed and Patient seen      Re-Certification I certify that the inpatient psychiatric facility services furnished since the previous certification were, and continue to be, medically necessary for, either, treatment which could reasonably be expected to improve the patient s condition or diagnostic study and that the hospital records indicate that the services furnished were, either, intensive treatment services, admission and related services necessary for  diagnostic study, or equivalent services.     I certify that the patient continues to need, on a daily basis, active treatment furnished directly by or requiring the supervision of inpatient psychiatric facility personnel.   I estimate 14 days of hospitalization is necessary for proper treatment of the patient. My plans for post-hospital care for this patient are  group home     Alejandra Watkins MD    -     07/21/2023  -     3:02 PM    Total time 35 minutes with > 50%spent on coordination of cares and psycho-education.    This note was created with help of Dragon dictation system. Grammatical / typing errors are not intentional.    Alejandra Watkins MD

## 2023-07-21 NOTE — CARE PLAN
07/21/23 1414   Group Therapy Session   Group Attendance attended group session   Time Session Began 1115   Time Session Ended 1200   Total Time (minutes) 45   Total # Attendees 8   Group Type life skill;task skill   Group Topic Covered coping skills/lifestyle management;problem-solving;cognitive activities;structured socialization   Group Session Detail OT Topic Group-Week in Review for social engagement, insight, coping skill building, healthy distraction, symptom management, focus, following directions, problem solving, and goal setting   Patient Response/Contribution disorganized;closed eyes for most of session   Patient Participation Detail pt arrived to group with SIO. pt independently places headphones on countertop. pt worked slowly on week in review form throughout group. pt was observed with head in hands on table for most of group time. pt responded to questions when called upon with repetition and additional time. pt set a weekend goal to walk outside-pt perseverated on not being able to go outside during this hospitalization. pt was difficult to redirect from topic.

## 2023-07-21 NOTE — PROGRESS NOTES
"[facility]  PROGRESS NOTE     DATE OF SERVICE   07/20/2023        CHIEF COMPLAINT   \" I am doing excellent today.\"       SUBJECTIVE   Nursing reports:  Pt fell this morning but offers no complained of pain or discomfort . He has been ambulating without any C/O pain or discomfort . Pt had a CT SCAN done . Pt ate well and was medication complaint . Denies all MH SX        Patient has been reviewed with the  earlier today.   Assessment/Intervention/Current Symtoms and Care Coordination:  Pt discussed in team rounds this AM. Pt has been sleeping fine. Pt continues to endorse delusion of the hospital being a hotel or mansion. Pt is pleasant. Pt had a fall this morning and has CT scheduled. Pt reports no acute concerns, minor pain. Team is following up.     Writer received email from Bree AMLLORY reporting that [employer] has limited availability and are considering clients based on client needs and staffing. Bere reported that she is checking into other programs.      Writer put out call to [employer] ([phone]) and [facility] for [place] inquiring if there are any current openings.     Writer put out call to [employer] ([phone]) to inquire about current openings. Writer was informed they are at full capacity with a waitlist of 80+ people.     OBJECTIVE   Patient was seen and the consult room by himself, this was a face-to-face evaluation.  Patient reported that he is feeling excellent today because it is his birthday.  Patient is looking forward for his family to visit and said that his sister said he she was going to bring him a cake from [employer].  I discussed with the patient that the  informed me that the director of the group home is coming to visit him on Monday at 11:30 AM.  I also informed the patient that I do not want him to make any decisions right now but I want him to think about staying here in the hospital versus going to the group home.  I talked to the " patient about how the hospital works and that this is not a place where he can live permanently.  Patient agreed to meet with the director of the group home but he insists that this is a hotel, not a hospital and he will be able to stay here forever.  I then decided to list all the things that he can do in the group home (watch baseball and football games, spending time with his family at their homes and enjoy the summer activities like the [place] fair).  Patient was actively listening to this part of the conversation and he was thinking about it.  Patient seemed excited about the possibility of attending the [place].  Again I discussed with the patient that he does not need to make any decisions as of today and we just want him to enjoy his birthday.  Patient verbalized good understanding and agreement to this.    Patient had a fall earlier today.  Patient denies feeling dizzy and it seemed that this was a mechanical fall.  He was assessed by the medical team and had a head CT scan done.  The patient denies any other complaints and denies any side effects from the medications.       MEDICATIONS   Medications:  Scheduled Meds:    acetaminophen  1,000 mg Oral TID     apixaban ANTICOAGULANT  5 mg Oral BID     artificial tears   Right Eye At Bedtime     calcium polycarbophil  625 mg Oral BID     chlorhexidine  15 mL Swish & Spit Daily     cloZAPine  225 mg Oral At Bedtime     cloZAPine  25 mg Oral Daily     docusate sodium  100 mg Oral Daily     lamoTRIgine  50 mg Oral Daily     levothyroxine  50 mcg Oral or NG Tube QAM AC     lisinopril  5 mg Oral Daily     melatonin  10 mg Oral At Bedtime     montelukast  10 mg Oral At Bedtime     paliperidone  9 mg Oral Daily     pantoprazole  40 mg Oral QAM AC     prednisoLONE acetate  1 drop Right Eye Daily     topiramate  150 mg Oral At Bedtime     zinc sulfate  220 mg Oral Daily     Continuous Infusions:  PRN Meds:.acetaminophen, alum & mag hydroxide-simethicone, artificial  "saliva, atropine, hydrALAZINE, hydrOXYzine, hypromellose-dextran, ipratropium, OLANZapine **OR** OLANZapine, senna-docusate    Medication adherence issues: MS Med Adherence Y/N: Yes, Hospitalization  Medication side effects: MEDICATION SIDE EFFECTS: no side effects reported  Benefit: Yes / No: Yes       ROS   A comprehensive review of systems was negative.       MENTAL STATUS EXAM   Vitals: /85 (BP Location: Right arm, Patient Position: Sitting, Cuff Size: Adult Regular)   Pulse 97   Temp 97.8  F (36.6  C) (Oral)   Resp 16   Wt 94.3 kg (207 lb 14.3 oz)   SpO2 97%   BMI 35.68 kg/m      Appearance:  No apparent distress  Mood: \"I am doing great\"  Affect: Calm and bright  was congruent to speech  Suicidal Ideation: PRESENT / ABSENT: absent   Homicidal Ideation: PRESENT / ABSENT: absent     Thought process: Letart  Thought content: Remains delusional but he will only talk about his delusions when we ask about it.  Fund of Knowledge: Below average  Attention/Concentration: Improving  Language ability:  Intact, speech seems to be more clear and the patient has not been drooling.  Memory:  Immediate recall impaired, Short-term memory impaired and Long-term memory intact  Insight: limited   Judgement: poor  Orientation: Person, time only  Psychomotor Behavior: slowed    Muscle Strength and Tone: MuscleStrength: Normal  Gait and Station: Stable on his feet       LABS   personally reviewed.   Recent Labs   Lab 07/18/23  1014   WBC 6.5   HGB 12.7*   MCV 83        No results found for: PHENYTOIN, PHENOBARB, VALPROATE, CBMZ       DIAGNOSIS   Principal Problem:    Schizoaffective disorder, bipolar type (H)    Active Problem List:  Patient Active Problem List   Diagnosis     Closed head injury, initial encounter     Altered mental status, unspecified altered mental status type     Generalized weakness     Portal vein thrombosis     Pleural effusion     Generalized muscle weakness     Falls frequently     Other " ascites     Acute pancreatitis, unspecified complication status, unspecified pancreatitis type     Pancreatic pseudocyst     Idiopathic acute pancreatitis, unspecified complication status     Allergic rhinitis     Fisher's esophagus     CTS (carpal tunnel syndrome)     Elevated liver enzymes     HTN (hypertension)     Hypothyroidism     Keratoconus     Major depressive disorder, recurrent episode, mild (H)     Intellectual disability     Morbid exogenous obesity (H)     Neuroleptic malignant syndrome     Obstructive sleep apnea syndrome     Bipolar affective disorder (H)     Seizure disorder (H)     Seizures, generalized convulsive (H)     Acute respiratory failure with hypoxia (H)     Anemia, unspecified     Anxiety disorder, unspecified     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)     Dietary zinc deficiency     Dry eye syndrome of bilateral lacrimal glands     Dry mouth, unspecified     Edema, unspecified     Gastro-esophageal reflux disease without esophagitis     Hyperosmolality and hypernatremia     Irritable bowel syndrome with constipation     Major depressive disorder, recurrent, unspecified (H)     Metabolic encephalopathy     Moderate protein-calorie malnutrition (H)     Other symbolic dysfunctions     Schizoaffective disorder, unspecified (H)     Thrombocytopenia, unspecified (H)     Unspecified asthma, uncomplicated     Urinary tract infection, site not specified     Vitamin D deficiency, unspecified     Schizoaffective disorder, bipolar type (H)          PLAN   1. Ongoing education given regarding diagnostic and treatment options with risks, benefits and alternatives and adequate verbalization of understanding.  2.  Medications       Melatonin 10 mg at bedtime       C increase lozaril at 25 mg daily and 225 mg at bedtime.  We will continue increasing the dose of the Clozaril slowly.      Topamax 150 mg at bedtime       Invega 9 mg daily        Lamictal 50 mg daily    3.  Medical  team following as needed  4.   coordinating a safe discharge plan    Risk Assessment: [facility] RISK ASSESSMENT: Patient able to contract for safety    Coordination of Care:   Treatment Plan reviewed and physician signed, Care discussed with Care/Treatment Team Members, Chart reviewed and Patient seen      Re-Certification I certify that the inpatient psychiatric facility services furnished since the previous certification were, and continue to be, medically necessary for, either, treatment which could reasonably be expected to improve the patient s condition or diagnostic study and that the hospital records indicate that the services furnished were, either, intensive treatment services, admission and related services necessary for diagnostic study, or equivalent services.     I certify that the patient continues to need, on a daily basis, active treatment furnished directly by or requiring the supervision of inpatient psychiatric facility personnel.   I estimate 14 days of hospitalization is necessary for proper treatment of the patient. My plans for post-hospital care for this patient are  group home     Alejandra Watkins MD    -     07/20/2023  -     7:01 PM    Total time 35 minutes with > 50%spent on coordination of cares and psycho-education.    This note was created with help of Dragon dictation system. Grammatical / typing errors are not intentional.    Alejandra Watkins MD

## 2023-07-21 NOTE — PLAN OF CARE
Problem: Adult Behavioral Health Plan of Care  Goal: Optimized Coping Skills in Response to Life Stressors  Outcome: Progressing   Goal Outcome Evaluation:  Pt has been clam and cooperative all shift no behavior problem . Pt had a fall early this morning and has been up and about as usual offering no complained of pain or discomfort .skin assessment done no bruises noted . Pt denies any unusual pain .He  states  he always had back pain and this not new . He is on Tylenol 1000mg as schedule for pain .  He was medication complaint . He denies all MH SX . Will continue POC

## 2023-07-21 NOTE — PLAN OF CARE
Problem: Psychotic Signs/Symptoms  Goal: Improved Psychomotor Symptoms (Psychotic Signs/Symptoms)  Outcome: Progressing   Goal Outcome Evaluation:             Pt has been visible in the milieu, redness on his forehead evident from the fall he had early morning. Affect calm and pleasant, attended community meeting.  Remained on SIO for self injury risk due to falling twice in a span of 24 hours, a no roommate order is in place due to intrusiveness and poor boundaries.  Showered with assist of 1, Head CT done,IMPRESSION: No evidence of intracranial hemorrhage or other acutentracranial pathology.  UA within  normal limits  Denies Mental health Symptoms, gait slow , no complaints of weakness and no involuntary jerking movements observed.  Pt was seen and examined by Dr Charles. PT consult ordered. Seen by IM, lab BMP ordered  And to start lasix 20  daily  Continued to endorse back pains, scheduled Tylenol given.  Male staff assisted pt to shave, slight razor burn sustained on his left neck, area cleansed.  Continued to have BLE edema, non pitting, TEDs applied.

## 2023-07-21 NOTE — CONSULTS
[facility]  Internal Medicine Consult     Jagdeep Walker MRN# [id number]   Age: 53 year old YOB: 1970     Date of Admission: 5/2/2023  Date of Consult: 7/21/2023    Primary Care Provider: Joel Werner    Requesting Service: Behavioral Health - Alejandra Watkins MD  Reason for Consult: General Medical Evaluation      SUBJECTIVE   CC:   Recurrent falls   Assessment and Plan/Recommendations:     Jagdeep Walker is a 52 year old male admitted on 5/2/2023 for agitation and delusions to inpatient psychiatry, and treated inpatient for schizoaffective disorder, bipolar type. He has history otherwise significant for unspecified intellectual disabilities, GERD, hypothyroidism.  Medicine previously consulted for bilateral lower extremity edema and RLE DVT.  Medicine now consulted for evaluation of recurrent falls.    #Recurrent falls  X2 falls over the past 48 hours.  Initial CT head was negative for intracranial pathology after first follow-up.  Patient is on Eliquis for DVT thus repeat imaging is warranted.  Neurologically, patient appears to be at baseline per conversation with nursing.  No gross neurologic deficits on my physical exam.  No coley sign or raccoon eyes to indicate basilar skull fracture.  He does have a couple red marks on his forehead from where he was brought to the ground.  - Check CT head to rule out intracranial bleed  - Physical therapy consult for gait training  - Treat lower extremity edema as below as possibly contributing to gait abnormality leading to falls.    Addendum:   - CT head negative for acute process   - No further workup  - pt to be seen by PT for strength training. Lasix as below    #Occlusive right lower extremity DVT  #Bilateral lower extremity edema  Diagnosed with right lower extremity DVT on 5/18.  Started on treatment with Eliquis which she will remain on for least 3 months.  Additionally, previously seen by medicine for  bilateral lower extremity edema.  On exam today continues to have lower extremity edema, right greater than left, despite compression stockings.  Wonder if lower extremity edema is contributing to described discomfort in his feet/toes and potentially contributing to falls.  - Continue Eliquis to complete 3-month course (August 18th)  -Continue compression stockings  - Start furosemide 20 mg p.o. daily  - Check BMP in 2 days to monitor creatinine and potassium      Gurpreet Stevens PA-C  Internal Medicine [facility] Hospitalist  [facility]  Pager [phone]   July 21, 2023     HPI:   Jagdeep Walker is a 52 year old male admitted on 5/2/2023 for agitation and delusions to inpatient psychiatry, and treated inpatient for schizoaffective disorder, bipolar type. He has history otherwise significant for unspecified intellectual disabilities, GERD, hypothyroidism.  Medicine previously consulted for bilateral lower extremity edema and RLE DVT.  Medicine now consulted for evaluation of recurrent falls.    Patient has had a fall each of the past 2 evenings.  CT head after initial fall was negative for intracranial pathology.  Fall was deemed to be mechanical from patient tripping on an object in his room.    This morning, patient was found down on his room.  On my discussion with patient, he says he remembers the the falls he has had.  He feels like he tripped over his feet 2 days ago.  Last night he reports walking around his room and is not sure if he tripped or not but he knows he did not pass out or lose consciousness.  He does report he hit his forehead on the ground.  He denies headache at this time.  Denies chest pain, shortness of breath, palpitations.  He denies any excessive bleeding.       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     Past Surgical History:      Past Surgical History:   Procedure Laterality Date     ENDOSCOPIC RETROGRADE CHOLANGIOPANCREATOGRAM N/A 7/28/2022    Procedure: ENDOSCOPIC RETROGRADE CHOLANGIOPANCREATOGRAPHY, Pancreatic duct stent exchange;  Surgeon: Massimo Clark MD;  Location:  OR     ENDOSCOPIC RETROGRADE CHOLANGIOPANCREATOGRAM COMPLEX N/A 6/21/2022    Procedure: ENDOSCOPIC RETROGRADE CHOLANGIOPANCREATOGRAPHY, COMPLEX;  Surgeon: Massimo Clark MD;  Location:  OR     ENDOSCOPIC ULTRASOUND UPPER GASTROINTESTINAL TRACT (GI) N/A 5/12/2022    Procedure: ENDOSCOPIC ULTRASOUND, ESOPHAGOSCOPY / UPPER GASTROINTESTINAL TRACT (GI);  Surgeon: Massimo Clark MD;  Location:  GI     ESOPHAGOSCOPY, GASTROSCOPY, DUODENOSCOPY (EGD), COMBINED N/A 5/12/2022    Procedure: ESOPHAGOGASTRODUODENOSCOPY, WITH BIOPSY;  Surgeon: Massimo Clark MD;  Location:  GI     IR CHEST TUBE PLACEMENT NON-TUNNELED RIGHT  6/18/2022     IR CHEST TUBE REPOSITION NON TUNNELED RIGHT  6/22/2022     IR GASTRO JEJUNOSTOMY TUBE CHANGE  7/22/2022     IR GASTRO JEJUNOSTOMY TUBE PLACEMENT  6/28/2022         Social History:     Social History     Tobacco Use     Smoking status: Never     Smokeless tobacco: Never   Vaping Use     Vaping Use: Never used   Substance Use Topics     Alcohol use: Not Currently     Comment: Occasional     Drug use: Never        Family History:     Family History   Problem Relation Age of Onset     Cerebrovascular Disease Mother 76     Prostate Cancer Father          Allergies:     Allergies   Allergen Reactions     Depakote [Valproic Acid] GI Disturbance         Medications:   Reviewed. Please see MAR     Review of Systems:   10 point ROS of  systems including Constitutional, Eyes, Respiratory, Cardiovascular, Gastroenterology, Genitourinary, Integumentary, Muscularskeletal, Psychiatric were all negative except for pertinent positives noted in my HPI.    OBJECTIVE   Physical Exam:   Vitals were reviewed  Blood pressure 136/88, pulse 88, temperature 97  F (36.1  C), temperature source Temporal, resp. rate 16, weight 94.3 kg (207 lb 14.3 oz), SpO2 99 %.  Constitutional: awake, alert, cooperative, in no acute distress. A&Ox3.  Somewhat difficult to understand at times due to mumbling.  Patient overall pleasant.  HENT: Normocephalic. Has 2 small red, non-tender areas on forehead.  No coley sign or raccoon's eyes  Respiratory: No increased work of breathing. CTAB  Cards: RRR, no murmur. Does have 2+ edema on feet, 1+ on shins while wearing compression stockings    Musculoskeletal:  Full range of motion noted. Walks with shuffling gait     Data:        Lab Results   Component Value Date     06/30/2023    Lab Results   Component Value Date    CHLORIDE 112 06/30/2023    CHLORIDE 112 09/01/2022    Lab Results   Component Value Date    BUN 21.8 06/30/2023    BUN 14 09/01/2022      Lab Results   Component Value Date    POTASSIUM 3.8 06/30/2023    POTASSIUM 3.5 09/01/2022    Lab Results   Component Value Date    CO2 23 06/30/2023    CO2 23 09/01/2022    Lab Results   Component Value Date    CR 0.91 06/30/2023        Lab Results   Component Value Date    WBC 6.5 07/18/2023    HGB 12.7 (L) 07/18/2023    HCT 38.5 (L) 07/18/2023    MCV 83 07/18/2023     07/18/2023     Lab Results   Component Value Date    WBC 6.5 07/18/2023

## 2023-07-21 NOTE — PLAN OF CARE
Problem: Psychotic Signs/Symptoms  Goal: Improved Behavioral Control (Psychotic Signs/Symptoms)  Outcome: Progressing     Problem: Disruptive Behavior  Goal: Improved Impulse and Aggression Control (Disruptive Behavior)  Outcome: Progressing     Problem: Fall Injury Risk  Goal: Absence of Fall and Fall-Related Injury  Outcome: Progressing    Patient up pacing the hallway with 1:1 , patient presented with flat and blunted affect, but compliant with assessment questions , denies SI/SIB/hallucinations, complained of back pain medicated patient with schedule tylenol, continues on SIO for self injury risk due to falling twice in the space of 24 hours, patient verbally contract for safety, safety checks and precautions in place, no behavioral or safety concerns noted,  Eating and drinking adequately, no other known concerns at this time, this writer will continue to monitor and offer therapeutic support as needed for the rest of the shift.    /77 (BP Location: Right arm, Patient Position: Sitting, Cuff Size: Adult Regular)   Pulse 101   Temp 97.6  F (36.4  C) (Temporal)   Resp 16   Wt 94.3 kg (207 lb 14.3 oz)   SpO2 99%   BMI 35.68 kg/m

## 2023-07-21 NOTE — CARE PLAN
07/21/23 1229   Group Therapy Session   Group Attendance attended group session   Time Session Began 1015   Time Session Ended 1115   Total Time (minutes) 45   Total # Attendees 8   Group Type expressive therapy;task skill;psychotherapeutic   Group Topic Covered problem-solving;cognitive activities;balanced lifestyle;structured socialization   Group Session Detail [facility] group to facilitate coping skill exploration, use of cognitive skills and problem solving, creative expression, clinical observation and facilitation of social, cognitive, and kinesthetic performance skills.    Patient Response/Contribution cooperative with task;listened actively;disorganized   Patient Participation Detail Needed task set up, assistance with detail directions and then followed through with decisions made. Teased some in context of work and conversation. Waited for assistance without complaints. Appeared comfortable and calm.

## 2023-07-21 NOTE — PLAN OF CARE
Assessment/Intervention/Current Symtoms and Care Coordination:  Pt discussed in team rounds this AM. Pt has been sleeping fine. Pt continues to endorse delusion of the hospital being a hotel or mansion. Pt is pleasant. Pt had another fall this morning in his bathroom. Pt has a bruise but doesn't endorse any pain from the fall. Pt now on SIO for fall risk.    Writer met with pt in the hallway. Pt reports that he fell this morning but is doing fine. Pt and writer discussed pt's birthday yesterday. Pt was excited to see the therapy dog. Writer informed pt of visit from  manager, Sadaf on Monday. Pt reported that Sadaf will be visiting today. Writer reiterated visit for Monday.     Writer received email from Sadaf reporting that her supervisor will be joining her visit with pt.    Jefersonr put out call to [employer] ([phone]) to inquire about current openings. Writer LVM for [facility] and requested CB.    Discharge Plan or Goal:  Pending stabilization and safe disposition plan, pt to return to  with adult day services     Barriers to Discharge:  Pt continues to present as symptomatic (delusional mood). Pt is receiving ongoing stabilization and medication adjustment. Continue care coordination with [facility] to ascertain his acceptability for a return to the home.     Referral Status:  No current referrals - team inquiring about adult day services with openings     Legal Status:  Voluntary per guardian     Contacts:  Guardian/Sister - Huong Shlomo   P: [phone]     - Sadaf  P: [phone]     Upcoming Meetings and Dates/Important Information and next steps:  Visit from  manager on 7/24 at 11:30am  [facility] to put in adult day service referrals

## 2023-07-21 NOTE — PLAN OF CARE
"  Problem: Sleep Disturbance  Goal: Adequate Sleep/Rest  Outcome: Progressing   Goal Outcome Evaluation:               Slept for 6.25 hours tonight  Pt has a no roommate order due to poor boundaries and intrusiveness, non of which were observed tonight.  Pt was awake at 0400, incontinent of urine while on his way to the toilet, pants and floor was wet with urine. Independent with changing, [facility]  and went back to his room when he knew it was only 0430 and fell back to sleep.   0520 pt has an unwitnessed fall, staff heard a paper  crumpling sound of the paper bag and saw the pt lying on the floor, pt stated he was about to get out when his legs felt weak. Redness noted on his forehead \" I might have hit my head\". Pt did not lose consciousness, Needed assist of 2 to get him off the floor, gait unsteady.Involuntaru jerking of the upper body noted.  Vital signs slightly elevated. Cold pack applied to forehead. Transferred to the  and was [place] [facility]daleno notified, to continue to monitor and for the Day team to follow up. SIO order obtained from Dr. Olmstead for self injury risk.Voice message left to sister Katerina.  Mood was calm and pleasant,  Laughed at times.         "

## 2023-07-22 ENCOUNTER — APPOINTMENT (OUTPATIENT)
Dept: PHYSICAL THERAPY | Facility: CLINIC | Age: 53
DRG: 885 | End: 2023-07-22
Attending: PSYCHIATRY & NEUROLOGY
Payer: MEDICARE

## 2023-07-22 PROCEDURE — 124N000003 HC R&B MH SENIOR/ADOLESCENT

## 2023-07-22 PROCEDURE — 250N000013 HC RX MED GY IP 250 OP 250 PS 637

## 2023-07-22 PROCEDURE — 250N000013 HC RX MED GY IP 250 OP 250 PS 637: Performed by: PSYCHIATRY & NEUROLOGY

## 2023-07-22 PROCEDURE — 250N000013 HC RX MED GY IP 250 OP 250 PS 637: Performed by: PHYSICIAN ASSISTANT

## 2023-07-22 PROCEDURE — 97116 GAIT TRAINING THERAPY: CPT | Mod: GP

## 2023-07-22 PROCEDURE — 97161 PT EVAL LOW COMPLEX 20 MIN: CPT | Mod: GP

## 2023-07-22 PROCEDURE — 250N000013 HC RX MED GY IP 250 OP 250 PS 637: Performed by: STUDENT IN AN ORGANIZED HEALTH CARE EDUCATION/TRAINING PROGRAM

## 2023-07-22 PROCEDURE — 250N000013 HC RX MED GY IP 250 OP 250 PS 637: Performed by: EMERGENCY MEDICINE

## 2023-07-22 RX ADMIN — APIXABAN 5 MG: 5 TABLET, FILM COATED ORAL at 08:04

## 2023-07-22 RX ADMIN — CHLORHEXIDINE GLUCONATE 0.12% ORAL RINSE 15 ML: 1.2 LIQUID ORAL at 08:04

## 2023-07-22 RX ADMIN — FUROSEMIDE 20 MG: 20 TABLET ORAL at 08:04

## 2023-07-22 RX ADMIN — CLOZAPINE 225 MG: 100 TABLET ORAL at 21:29

## 2023-07-22 RX ADMIN — ACETAMINOPHEN 1000 MG: 500 TABLET, FILM COATED ORAL at 08:03

## 2023-07-22 RX ADMIN — Medication 10 MG: at 21:31

## 2023-07-22 RX ADMIN — ACETAMINOPHEN 1000 MG: 500 TABLET, FILM COATED ORAL at 21:47

## 2023-07-22 RX ADMIN — TOPIRAMATE 150 MG: 50 TABLET ORAL at 21:30

## 2023-07-22 RX ADMIN — CALCIUM POLYCARBOPHIL 625 MG: 625 TABLET, FILM COATED ORAL at 08:03

## 2023-07-22 RX ADMIN — MONTELUKAST 10 MG: 10 TABLET, FILM COATED ORAL at 21:31

## 2023-07-22 RX ADMIN — CALCIUM POLYCARBOPHIL 625 MG: 625 TABLET, FILM COATED ORAL at 21:29

## 2023-07-22 RX ADMIN — PALIPERIDONE 6 MG: 3 TABLET, EXTENDED RELEASE ORAL at 08:03

## 2023-07-22 RX ADMIN — PANTOPRAZOLE SODIUM 40 MG: 40 TABLET, DELAYED RELEASE ORAL at 05:31

## 2023-07-22 RX ADMIN — DOCUSATE SODIUM 100 MG: 100 CAPSULE, LIQUID FILLED ORAL at 08:03

## 2023-07-22 RX ADMIN — ZINC SULFATE 220 MG (50 MG) CAPSULE 220 MG: CAPSULE at 08:04

## 2023-07-22 RX ADMIN — ACETAMINOPHEN 1000 MG: 500 TABLET, FILM COATED ORAL at 13:54

## 2023-07-22 RX ADMIN — CLOZAPINE 25 MG: 25 TABLET ORAL at 08:04

## 2023-07-22 RX ADMIN — PREDNISOLONE ACETATE 1 DROP: 10 SUSPENSION/ DROPS OPHTHALMIC at 08:09

## 2023-07-22 RX ADMIN — LAMOTRIGINE 50 MG: 25 TABLET ORAL at 08:03

## 2023-07-22 RX ADMIN — LEVOTHYROXINE SODIUM 50 MCG: 25 TABLET ORAL at 05:31

## 2023-07-22 RX ADMIN — LISINOPRIL 5 MG: 5 TABLET ORAL at 08:03

## 2023-07-22 RX ADMIN — APIXABAN 5 MG: 5 TABLET, FILM COATED ORAL at 21:32

## 2023-07-22 ASSESSMENT — ACTIVITIES OF DAILY LIVING (ADL)
ADLS_ACUITY_SCORE: 42
ADLS_ACUITY_SCORE: 42
ADLS_ACUITY_SCORE: 32
ORAL_HYGIENE: PROMPTS
ADLS_ACUITY_SCORE: 32
DRESS: STREET CLOTHES;SCRUBS (BEHAVIORAL HEALTH)
HYGIENE/GROOMING: INDEPENDENT;PROMPTS
LAUNDRY: WITH SUPERVISION
DRESS: STREET CLOTHES;SCRUBS (BEHAVIORAL HEALTH)
ADLS_ACUITY_SCORE: 32
ORAL_HYGIENE: PROMPTS
LAUNDRY: WITH SUPERVISION
HYGIENE/GROOMING: HANDWASHING;INDEPENDENT
ADLS_ACUITY_SCORE: 32
ADLS_ACUITY_SCORE: 42

## 2023-07-22 NOTE — PROGRESS NOTES
07/22/23 7955   Appointment Info   Signing Clinician's Name / Credentials (PT) Stella Roberts DPT   Living Environment   People in Home other (see comments)   Current Living Arrangements group home   Home Accessibility no concerns   Self-Care   Usual Activity Tolerance moderate   Current Activity Tolerance moderate   Regular Exercise No   Equipment Currently Used at Home none   Fall history within last six months yes   Number of times patient has fallen within last six months 2   General Information   Onset of Illness/Injury or Date of Surgery 05/02/23   Referring Physician Alejandra Watkins MD   Pertinent History of Current Problem (include personal factors and/or comorbidities that impact the POC) Jagdeep Walker is a 52 year old male admitted on 5/2/2023 for agitation and delusions to inpatient psychiatry, and treated inpatient for schizoaffective disorder, bipolar type. He is recently with recurrent falls.  He has history otherwise significant for unspecified intellectual disabilities, GERD, hypothyroidism, bilateral lower extremity edema and RLE DVT.   Existing Precautions/Restrictions fall   Cognition   Affect/Mental Status (Cognition) unable/difficult to assess   Follows Commands (Cognition) repetition of directions required   Safety Deficit (Cognition) insight into deficits/self-awareness   Range of Motion (ROM)   Range of Motion ROM is WFL   Strength (Manual Muscle Testing)   Strength (Manual Muscle Testing) strength is WNL;No deficits observed during functional mobility   Bed Mobility   Bed Mobility sit-supine   Sit-Supine [place] (Bed Mobility) verbal cues;supervision   Bed Mobility Limitations cognitive deficits   Impairments Contributing to Impaired Bed Mobility decreased strength   Transfers   Transfers no deficits identified   Comment, (Transfers) Pt did 5x Sit to  35 sec,   Gait/Stairs (Locomotion)   [place] Level (Gait) supervision   Assistive Device (Gait)   (none)    Distance in Feet 150ft   Distance in Feet (Gait) 150ft   Pattern (Gait) step-through   Deviations/Abnormal Patterns (Gait) base of support, wide;festinating/shuffling;gait speed decreased;stride length decreased   Clinical Impression   Criteria for Skilled Therapeutic Intervention Yes, treatment indicated   PT Diagnosis (PT) falls   Influenced by the following impairments decreased balance   Functional limitations due to impairments gait   Clinical Presentation (PT Evaluation Complexity) Stable/Uncomplicated   Clinical Presentation Rationale per medical dx   Clinical Decision Making (Complexity) low complexity   Planned Therapy Interventions (PT) balance training;gait training;strengthening;transfer training   Risk & Benefits of therapy have been explained evaluation/treatment results reviewed;care plan/treatment goals reviewed   PT Total Evaluation Time   PT Eval, Low Complexity Minutes (74678) 5   Plan of Care Review   Plan of Care Reviewed With patient   Physical Therapy Goals   PT Frequency 3x/week   PT Predicted Duration/Target Date for Goal Attainment 08/05/23   PT Goals Gait;PT Goal 1;PT Goal 2   PT: Gait Independent;Greater than 200 feet   PT: Goal 1 Pt will decreased 5x Sit to Stand time to 30 sec.   PT: Goal 2 Pt will decrese TUG time to 14 sec to decrease risk for falls.   Interventions   Interventions Quick Adds Gait Training   Gait Training   Gait Training Minutes (99508) 15   Treatment Detail/Skilled Intervention Pt wastransferred sit to stand independently and could do it without use of UE. He did 5x Sit to Stand Test in 35 Sec with constanst cuing to continue the test. He transferred sit to supine independently. He ambulated 150 ft x2 qwith supoervision and had decreased foot clearance, degreased gait speed and wide MONSERRAT. He ambulated and did head turns R/ L and up/down with no change in gait. He did the TUG in 19.4 sec indicating that he is at risk for falls. He stood for 1 min with feet together  with no LOB and 10ec with eyese cloased with no LOB.   [place] Level (Gait Training) stand-by assist   Physical Assistance Level (Gait Training) supervision   Pattern Analysis (Gait Training) swing-through gait   Gait Analysis Deviations decreased step length   Impairments (Gait Analysis/Training) balance impaired   PT Discharge Planning   PT Plan gait, balance   PT Discharge Recommendation (DC Rec) home   PT Rationale for DC Rec Pt is ambulating with supervision now and he would have that at a group home.   PT Brief overview of current status supervision   Total Session Time   Timed Code Treatment Minutes 15   Total Session Time (sum of timed and untimed services) 20

## 2023-07-22 NOTE — PLAN OF CARE
Problem: Sleep Disturbance  Goal: Adequate Sleep/Rest  Outcome: Not Progressing   Goal Outcome Evaluation:       Slept on and off tonight due to trips to the toilet, unable to reach the toilet on time and got pants wet with urine x 2. Assisted by staff. Compression socks removed and washed. BLE swollen but non pitting.  Remained on SIO for self injury risk and pt has a no roommate order  due to poor boundaries and intrusiveness. Observed to be irritable with his SIO staff. [facility] gait slow but steady  Slept for 8.25 hours  Pt has a large BM

## 2023-07-22 NOTE — PLAN OF CARE
Goal Outcome Evaluation:    Plan of Care Reviewed With: patient              Problem: Disruptive Behavior  Goal: Improved Impulse and Aggression Control (Disruptive Behavior)  Outcome: Progressing   Nursing Assessment    Schizoaffective disorder, bipolar type (H) [F25.0]  Intellectual disability [F79]    Admit Date: 5/2/2023    Length of Stay: 72    Patient evaluation continues. Assessed mood,anxiety,thoughts and behavior. Patient is progressing towards goals. Pt is cooperative and pleasant upon approach and during 1:1 assessment. Patient is encouraged to participate in groups and assisted to develop healthy coping skills.  Patient responding to auditory hallucinations. /78   Pulse 102   Temp 99  F (37.2  C) (Temporal)   Resp 16   Wt 94.3 kg (207 lb 14.3 oz)   SpO2 95%   BMI 35.68 kg/m      Mood: good    Patient reports depression none and reports anxiety none     Affect: full range    Sleep: good 8 hours last night    Appetite: good    SI: denies    HI: denies    SIB: denies      Medication Compliance yes  Compression stockings removed  Group participation:yes bingo    ADL's: independent with prompts    Fall risk interventions: proper foot wear, orthostatic B/P SIO    Rakan Score Interventions: none    Discharge planning in process    Refer to daily team meeting notes for individualized plan of care. Nursing will continue to assess.    *Scale is 1-10 and 10 is the worst.

## 2023-07-22 NOTE — PLAN OF CARE
"    Problem: Adult Behavioral Health Plan of Care  Goal: Absence of New-Onset Illness or Injury  Outcome: Progressing  Intervention: Identify and Manage Fall Risk    Pt has been visible in the milieu, overall calm and pleasant, heard laughing at times, Remained on SIO for self injury risk and a no roommate order for poor boundaries and intrusiveness.  Denied all mental health symptoms stated he felt \" great\".  BLE swelling still noted but with non pitting edema, reported some pain on legs, scheduled tylenol given with relief.  TEDs applied.  Gait steady, paced the cortez wearing his headphone and listenened to music                  "

## 2023-07-23 LAB
ANION GAP SERPL CALCULATED.3IONS-SCNC: 11 MMOL/L (ref 7–15)
BUN SERPL-MCNC: 20.9 MG/DL (ref 6–20)
CALCIUM SERPL-MCNC: 9.5 MG/DL (ref 8.6–10)
CHLORIDE SERPL-SCNC: 109 MMOL/L (ref 98–107)
CREAT SERPL-MCNC: 0.97 MG/DL (ref 0.67–1.17)
DEPRECATED HCO3 PLAS-SCNC: 22 MMOL/L (ref 22–29)
GFR SERPL CREATININE-BSD FRML MDRD: >90 ML/MIN/1.73M2
GLUCOSE SERPL-MCNC: 138 MG/DL (ref 70–99)
HOLD SPECIMEN: NORMAL
POTASSIUM SERPL-SCNC: 3.8 MMOL/L (ref 3.4–5.3)
SODIUM SERPL-SCNC: 142 MMOL/L (ref 136–145)

## 2023-07-23 PROCEDURE — 82310 ASSAY OF CALCIUM: CPT | Performed by: PSYCHIATRY & NEUROLOGY

## 2023-07-23 PROCEDURE — 250N000013 HC RX MED GY IP 250 OP 250 PS 637: Performed by: PSYCHIATRY & NEUROLOGY

## 2023-07-23 PROCEDURE — 250N000013 HC RX MED GY IP 250 OP 250 PS 637: Performed by: STUDENT IN AN ORGANIZED HEALTH CARE EDUCATION/TRAINING PROGRAM

## 2023-07-23 PROCEDURE — 124N000003 HC R&B MH SENIOR/ADOLESCENT

## 2023-07-23 PROCEDURE — 250N000013 HC RX MED GY IP 250 OP 250 PS 637: Performed by: EMERGENCY MEDICINE

## 2023-07-23 PROCEDURE — 99231 SBSQ HOSP IP/OBS SF/LOW 25: CPT

## 2023-07-23 PROCEDURE — 82550 ASSAY OF CK (CPK): CPT | Performed by: PSYCHIATRY & NEUROLOGY

## 2023-07-23 PROCEDURE — 250N000013 HC RX MED GY IP 250 OP 250 PS 637: Performed by: PHYSICIAN ASSISTANT

## 2023-07-23 PROCEDURE — 99207 PR NO BILLABLE SERVICE THIS VISIT: CPT | Performed by: STUDENT IN AN ORGANIZED HEALTH CARE EDUCATION/TRAINING PROGRAM

## 2023-07-23 PROCEDURE — 36415 COLL VENOUS BLD VENIPUNCTURE: CPT | Performed by: PSYCHIATRY & NEUROLOGY

## 2023-07-23 PROCEDURE — 250N000013 HC RX MED GY IP 250 OP 250 PS 637

## 2023-07-23 PROCEDURE — H2032 ACTIVITY THERAPY, PER 15 MIN: HCPCS

## 2023-07-23 PROCEDURE — G0177 OPPS/PHP; TRAIN & EDUC SERV: HCPCS

## 2023-07-23 RX ADMIN — PREDNISOLONE ACETATE 1 DROP: 10 SUSPENSION/ DROPS OPHTHALMIC at 07:55

## 2023-07-23 RX ADMIN — DOCUSATE SODIUM 100 MG: 100 CAPSULE, LIQUID FILLED ORAL at 07:50

## 2023-07-23 RX ADMIN — CLOZAPINE 25 MG: 25 TABLET ORAL at 07:50

## 2023-07-23 RX ADMIN — PANTOPRAZOLE SODIUM 40 MG: 40 TABLET, DELAYED RELEASE ORAL at 05:50

## 2023-07-23 RX ADMIN — LISINOPRIL 5 MG: 5 TABLET ORAL at 07:51

## 2023-07-23 RX ADMIN — FUROSEMIDE 20 MG: 20 TABLET ORAL at 07:50

## 2023-07-23 RX ADMIN — APIXABAN 5 MG: 5 TABLET, FILM COATED ORAL at 07:50

## 2023-07-23 RX ADMIN — Medication 10 MG: at 19:44

## 2023-07-23 RX ADMIN — WHITE PETROLATUM 57.7 %-MINERAL OIL 31.9 % EYE OINTMENT: at 19:46

## 2023-07-23 RX ADMIN — TOPIRAMATE 150 MG: 50 TABLET ORAL at 19:43

## 2023-07-23 RX ADMIN — CHLORHEXIDINE GLUCONATE 0.12% ORAL RINSE 15 ML: 1.2 LIQUID ORAL at 07:50

## 2023-07-23 RX ADMIN — LAMOTRIGINE 50 MG: 25 TABLET ORAL at 07:50

## 2023-07-23 RX ADMIN — CALCIUM POLYCARBOPHIL 625 MG: 625 TABLET, FILM COATED ORAL at 21:09

## 2023-07-23 RX ADMIN — ACETAMINOPHEN 1000 MG: 500 TABLET, FILM COATED ORAL at 19:43

## 2023-07-23 RX ADMIN — ACETAMINOPHEN 1000 MG: 500 TABLET, FILM COATED ORAL at 07:50

## 2023-07-23 RX ADMIN — CALCIUM POLYCARBOPHIL 625 MG: 625 TABLET, FILM COATED ORAL at 07:50

## 2023-07-23 RX ADMIN — MENTHOL 1 PATCH: 205.5 PATCH TOPICAL at 18:28

## 2023-07-23 RX ADMIN — CLOZAPINE 225 MG: 100 TABLET ORAL at 23:02

## 2023-07-23 RX ADMIN — ACETAMINOPHEN 1000 MG: 500 TABLET, FILM COATED ORAL at 13:39

## 2023-07-23 RX ADMIN — ZINC SULFATE 220 MG (50 MG) CAPSULE 220 MG: CAPSULE at 07:50

## 2023-07-23 RX ADMIN — APIXABAN 5 MG: 5 TABLET, FILM COATED ORAL at 19:44

## 2023-07-23 RX ADMIN — PALIPERIDONE 6 MG: 3 TABLET, EXTENDED RELEASE ORAL at 07:50

## 2023-07-23 RX ADMIN — MONTELUKAST 10 MG: 10 TABLET, FILM COATED ORAL at 19:44

## 2023-07-23 RX ADMIN — LEVOTHYROXINE SODIUM 50 MCG: 25 TABLET ORAL at 05:50

## 2023-07-23 ASSESSMENT — ACTIVITIES OF DAILY LIVING (ADL)
ORAL_HYGIENE: INDEPENDENT
DRESS: WITH ASSISTANCE
ADLS_ACUITY_SCORE: 32
LAUNDRY: WITH SUPERVISION
ADLS_ACUITY_SCORE: 42
HYGIENE/GROOMING: INDEPENDENT
ADLS_ACUITY_SCORE: 42
ADLS_ACUITY_SCORE: 42
ORAL_HYGIENE: INDEPENDENT
ADLS_ACUITY_SCORE: 42
ADLS_ACUITY_SCORE: 32
ADLS_ACUITY_SCORE: 42
HYGIENE/GROOMING: INDEPENDENT
ADLS_ACUITY_SCORE: 42
DRESS: SCRUBS (BEHAVIORAL HEALTH);INDEPENDENT
LAUNDRY: UNABLE TO COMPLETE
ADLS_ACUITY_SCORE: 42
ADLS_ACUITY_SCORE: 32

## 2023-07-23 NOTE — PROGRESS NOTES
Brief Medicine Progress Note     Chart checked today.     #Bilateral lower extremity edema  Initially began in setting of DVT but noted to be worsening 7/21, becoming more bothersome for patient so medicine was reconsulted.  Started patient on daily Lasix  - BMP today shows stable renal function and potassium (3.8) after 2 days of oral Lasix  - Continue Lasix 20 mg p.o. daily for now  - Repeat BMP later this week for continued monitoring (ordered for 7/26)  - Continue compression stockings    #Occlusive right lower extremity DVT  Diagnosed with right lower extremity DVT on 5/18.  Started on treatment with Eliquis which she will remain on for least 3 months.   - Continue Eliquis to complete 3-month course (August 18th)    Medicine will continue to follow peripherally.     Gurpreet Stevens PA-C  Internal Medicine [facility] Hospitalist  [facility]  Pager [phone]

## 2023-07-23 NOTE — PLAN OF CARE
Problem: Psychotic Signs/Symptoms  Goal: Improved Behavioral Control (Psychotic Signs/Symptoms)  Outcome: Progressing     Problem: Disruptive Behavior  Goal: Improved Impulse and Aggression Control (Disruptive Behavior)  Outcome: Progressing     Problem: Fall Injury Risk  Goal: Absence of Fall and Fall-Related Injury  Outcome: Progressing  Intervention: Promote Injury-Free Environment  Patient had a fall right before this shift, patient was sitting on the floor on approach and then into the nani chair, upon assessment patient complained of back pain but wasn't due for pain medication at the time. Offered patient ice pack placed on patient;s back,with some relief, patient denies depression/anxiety/SI/SIB/hallucination, continues on 1:1 due to self injury, safety checks and precautions in place. Patient is eating and drinking adequately, able to make needs known. Patient is medication compliant. No behavioral concerns noted. This writer will continue to monitor and offer therapeutic support as needed for the rest of the shift. Patient was suppose to have a CT scan called 3 times and staff said they were very busy, staff said to call tomorrow morning.  patient denies pain at this time, has icyhot patch on and schedule tylenol.    /85   Pulse 91   Temp 97  F (36.1  C)   Resp 16   Wt 95.1 kg (209 lb 10.5 oz)   SpO2 97%   BMI 35.99 kg/m

## 2023-07-23 NOTE — PLAN OF CARE
Goal Outcome Evaluation:    Plan of Care Reviewed With: patient        Patient fell at 1527 in the [facility]. Per staff, patient stood up after playing bingo with other patients on the unit. He hit back of his head against the leg of the chair which is made of hard plastic. Patient c/o pain, his whole back. Redness at the back of his head noted. No hematoma nor bleeding observed on the affected area. Ice pack immediately applied. Patient denied dizziness nor lightheadedness. Denied nausea nor vomiting. He is able to move all his extremities. Patient is responsive to verbal communication. He is alert and oriented x 3. Vital signs taken. T-96.9; R-16; /84; P-92; O2 Sat-96%. Hospitalist on-call PETE Echeverria paged with orders. Patient just had Tylenol 1000 mgs about 2 hours ago.  Patient assisted to stand up and placed in a nani-chair without a tray for comfort.  Ortho BP.          Sittin/85; P-91         Standing- 138/83; P-94  CT Head without contrast ordered by the the Hospitalist. He remains on SIO r/t self-injury risk. To continue to monitor patient for any untoward effects r/t fall.

## 2023-07-23 NOTE — PROVIDER NOTIFICATION
07/23/23 1451   Significant Event   Significant Event Fall       Fall huddle complete    Notified parties per protocol: Legal guardian, Huong Keenan, [facility], BUSTER [facility], [facility], Dr Garcia, Unit Manager, Maria Luisa Corbett

## 2023-07-23 NOTE — PLAN OF CARE
Problem: Sleep Disturbance  Goal: Adequate Sleep/Rest  Outcome: Progressing     Problem: Psychotic Signs/Symptoms  Goal: Improved Behavioral Control (Psychotic Signs/Symptoms)  Outcome: Progressing   Goal Outcome Evaluation :    Received in bed sleeping, pt is on SIO and no roommate order for poor boundaries and intrusiveness  .Assisted to the toilet by staff and fell right back to sleep.  No behavioral issues encountered this shift,slept for 7.5 hours

## 2023-07-23 NOTE — CARE PLAN
07/23/23 1451   Group Therapy Session   Group Attendance attended group session   Time Session Began 1015   Time Session Ended 1100   Total Time (minutes) 45   Total # Attendees 7   Group Type life skill;psychoeducation   Group Topic Covered coping skills/lifestyle management;relationship;self-care activities   Group Session Detail General Health and Coping Skills: education and group discussion on self-care.   Patient Participation Detail Pt participated in a group discussion on self-care; pt was joined by SIO. Pt contributed insight and thoughts on discussion questions, and shared when the group was prompted. Pt appeared engaged in the discussion, and identified positive strategies for self-compassion.

## 2023-07-23 NOTE — PROGRESS NOTES
Brief Medicine Cross Cover Note:    # Fall, witnessed   [employer] Cover paged regarding pt fell after playing bingo with other pt and stood up and hit head against leg of chair.  No LOC per staff. Complained of pain on back and on back of head. No bleeding, but notes some erythema. No dizziness, lightheadedness prior to fall.     Addendum: Pt seen at bedside. Pt reports he slipped and fell back onto his back and did not hit his head. Fall was witnessed by another pt and seen hitting the ground by staff. There is some swelling in the back of head. Mid  upon exam, but no bruising or swelling seen. Pt denies any pre-syncope issues. Query if this could be related to polypharmacy. Neuro/MSK WNL.     Interventions:  - CT head non-contrast given pt appears to be a poor historian/based on clinical assessment/on DOAC  - Consider back imaging if continues to complain of back pain   - Ice packs, icy hot patches   - Orthostatic BP x1  - Medicine will continue to follow/      Bee Echeverria, CNP, APRN  Internal Medicine [facility] Hospitalist  [facility]  [phone]  Securely message with the Vocera Web Console   Text page via [employer] Paging/Directory   Text page via [employer]

## 2023-07-23 NOTE — PLAN OF CARE
Problem: Psychotic Signs/Symptoms  Goal: Improved Behavioral Control (Psychotic Signs/Symptoms)  Outcome: Progressing   Goal Outcome Evaluation:    Plan of Care Reviewed With: patient      Patient remains on SIO r/t self injury risk. He had a recent fall. His gait is steady but shuffles at times. He has a no roommate order r/t intrusiveness and poor boundaries. Patient just the same, walking in the hallway with a headphone on listening and oftentimes humming songs. No urinary incontinence observed nor reported. He denied SI/SIB/HI nor hallucinations. No thoughts of hurting himself. He went to groups and took a nap after. He is med compliant. No side effects observed. His appetite is excellent including snacks. Patient still with pedal edema with +2 indentation. Denied pain on the affected area/s. Compression stockings are on. He was visited two of his family members, Diana and Junaid and it went well. His /88; P-95.

## 2023-07-24 ENCOUNTER — APPOINTMENT (OUTPATIENT)
Dept: MRI IMAGING | Facility: CLINIC | Age: 53
DRG: 885 | End: 2023-07-24
Attending: PSYCHIATRY & NEUROLOGY
Payer: MEDICARE

## 2023-07-24 ENCOUNTER — APPOINTMENT (OUTPATIENT)
Dept: CT IMAGING | Facility: CLINIC | Age: 53
DRG: 885 | End: 2023-07-24
Payer: MEDICARE

## 2023-07-24 ENCOUNTER — APPOINTMENT (OUTPATIENT)
Dept: PHYSICAL THERAPY | Facility: CLINIC | Age: 53
DRG: 885 | End: 2023-07-24
Payer: MEDICARE

## 2023-07-24 LAB
CK SERPL-CCNC: 330 U/L (ref 39–308)
VIT B12 SERPL-MCNC: 317 PG/ML (ref 232–1245)

## 2023-07-24 PROCEDURE — 97116 GAIT TRAINING THERAPY: CPT | Mod: GP

## 2023-07-24 PROCEDURE — 250N000013 HC RX MED GY IP 250 OP 250 PS 637: Performed by: PSYCHIATRY & NEUROLOGY

## 2023-07-24 PROCEDURE — 72125 CT NECK SPINE W/O DYE: CPT | Mod: 26 | Performed by: STUDENT IN AN ORGANIZED HEALTH CARE EDUCATION/TRAINING PROGRAM

## 2023-07-24 PROCEDURE — 80159 DRUG ASSAY CLOZAPINE: CPT | Performed by: PSYCHIATRY & NEUROLOGY

## 2023-07-24 PROCEDURE — 250N000013 HC RX MED GY IP 250 OP 250 PS 637: Performed by: PHYSICIAN ASSISTANT

## 2023-07-24 PROCEDURE — G1010 CDSM STANSON: HCPCS

## 2023-07-24 PROCEDURE — G1010 CDSM STANSON: HCPCS | Performed by: RADIOLOGY

## 2023-07-24 PROCEDURE — 97110 THERAPEUTIC EXERCISES: CPT | Mod: GP

## 2023-07-24 PROCEDURE — 36415 COLL VENOUS BLD VENIPUNCTURE: CPT | Performed by: PSYCHIATRY & NEUROLOGY

## 2023-07-24 PROCEDURE — G1010 CDSM STANSON: HCPCS | Mod: GC | Performed by: STUDENT IN AN ORGANIZED HEALTH CARE EDUCATION/TRAINING PROGRAM

## 2023-07-24 PROCEDURE — 99233 SBSQ HOSP IP/OBS HIGH 50: CPT | Performed by: PSYCHIATRY & NEUROLOGY

## 2023-07-24 PROCEDURE — 99207 PR NO BILLABLE SERVICE THIS VISIT: CPT

## 2023-07-24 PROCEDURE — 124N000003 HC R&B MH SENIOR/ADOLESCENT

## 2023-07-24 PROCEDURE — 70450 CT HEAD/BRAIN W/O DYE: CPT | Mod: 26 | Performed by: RADIOLOGY

## 2023-07-24 PROCEDURE — 250N000013 HC RX MED GY IP 250 OP 250 PS 637

## 2023-07-24 PROCEDURE — 70551 MRI BRAIN STEM W/O DYE: CPT | Mod: 26 | Performed by: RADIOLOGY

## 2023-07-24 PROCEDURE — G1010 CDSM STANSON: HCPCS | Mod: GC | Performed by: RADIOLOGY

## 2023-07-24 PROCEDURE — 250N000013 HC RX MED GY IP 250 OP 250 PS 637: Performed by: STUDENT IN AN ORGANIZED HEALTH CARE EDUCATION/TRAINING PROGRAM

## 2023-07-24 PROCEDURE — 99222 1ST HOSP IP/OBS MODERATE 55: CPT | Mod: GC | Performed by: INTERNAL MEDICINE

## 2023-07-24 PROCEDURE — 82607 VITAMIN B-12: CPT | Performed by: PSYCHIATRY & NEUROLOGY

## 2023-07-24 PROCEDURE — G0177 OPPS/PHP; TRAIN & EDUC SERV: HCPCS

## 2023-07-24 PROCEDURE — 250N000013 HC RX MED GY IP 250 OP 250 PS 637: Performed by: EMERGENCY MEDICINE

## 2023-07-24 RX ORDER — LORAZEPAM 1 MG/1
1 TABLET ORAL
Status: DISCONTINUED | OUTPATIENT
Start: 2023-07-24 | End: 2023-07-25

## 2023-07-24 RX ADMIN — LAMOTRIGINE 50 MG: 25 TABLET ORAL at 08:36

## 2023-07-24 RX ADMIN — DOCUSATE SODIUM 100 MG: 100 CAPSULE, LIQUID FILLED ORAL at 08:36

## 2023-07-24 RX ADMIN — CHLORHEXIDINE GLUCONATE 0.12% ORAL RINSE 15 ML: 1.2 LIQUID ORAL at 08:35

## 2023-07-24 RX ADMIN — CALCIUM POLYCARBOPHIL 625 MG: 625 TABLET, FILM COATED ORAL at 20:04

## 2023-07-24 RX ADMIN — LEVOTHYROXINE SODIUM 50 MCG: 25 TABLET ORAL at 05:28

## 2023-07-24 RX ADMIN — TOPIRAMATE 150 MG: 50 TABLET ORAL at 20:06

## 2023-07-24 RX ADMIN — APIXABAN 5 MG: 5 TABLET, FILM COATED ORAL at 08:36

## 2023-07-24 RX ADMIN — WHITE PETROLATUM 57.7 %-MINERAL OIL 31.9 % EYE OINTMENT: at 20:11

## 2023-07-24 RX ADMIN — PALIPERIDONE 6 MG: 3 TABLET, EXTENDED RELEASE ORAL at 08:35

## 2023-07-24 RX ADMIN — ACETAMINOPHEN 1000 MG: 500 TABLET, FILM COATED ORAL at 20:05

## 2023-07-24 RX ADMIN — FUROSEMIDE 20 MG: 20 TABLET ORAL at 08:36

## 2023-07-24 RX ADMIN — CLOZAPINE 225 MG: 100 TABLET ORAL at 20:04

## 2023-07-24 RX ADMIN — LISINOPRIL 5 MG: 5 TABLET ORAL at 08:36

## 2023-07-24 RX ADMIN — PREDNISOLONE ACETATE 1 DROP: 10 SUSPENSION/ DROPS OPHTHALMIC at 08:37

## 2023-07-24 RX ADMIN — ACETAMINOPHEN 1000 MG: 500 TABLET, FILM COATED ORAL at 08:36

## 2023-07-24 RX ADMIN — APIXABAN 5 MG: 5 TABLET, FILM COATED ORAL at 20:06

## 2023-07-24 RX ADMIN — CLOZAPINE 25 MG: 25 TABLET ORAL at 08:36

## 2023-07-24 RX ADMIN — PANTOPRAZOLE SODIUM 40 MG: 40 TABLET, DELAYED RELEASE ORAL at 05:28

## 2023-07-24 RX ADMIN — Medication 10 MG: at 20:06

## 2023-07-24 RX ADMIN — ZINC SULFATE 220 MG (50 MG) CAPSULE 220 MG: CAPSULE at 08:36

## 2023-07-24 RX ADMIN — ACETAMINOPHEN 1000 MG: 500 TABLET, FILM COATED ORAL at 14:01

## 2023-07-24 RX ADMIN — CALCIUM POLYCARBOPHIL 625 MG: 625 TABLET, FILM COATED ORAL at 08:35

## 2023-07-24 RX ADMIN — MONTELUKAST 10 MG: 10 TABLET, FILM COATED ORAL at 20:05

## 2023-07-24 RX ADMIN — MENTHOL 1 PATCH: 205.5 PATCH TOPICAL at 08:37

## 2023-07-24 ASSESSMENT — ACTIVITIES OF DAILY LIVING (ADL)
ADLS_ACUITY_SCORE: 42
ADLS_ACUITY_SCORE: 52
ADLS_ACUITY_SCORE: 42
ADLS_ACUITY_SCORE: 42
ADLS_ACUITY_SCORE: 52
HYGIENE/GROOMING: INDEPENDENT;PROMPTS
ORAL_HYGIENE: INDEPENDENT
ADLS_ACUITY_SCORE: 42
ADLS_ACUITY_SCORE: 42
LAUNDRY: WITH SUPERVISION
ADLS_ACUITY_SCORE: 42
ADLS_ACUITY_SCORE: 42
DRESS: WITH ASSISTANCE
ADLS_ACUITY_SCORE: 52
ADLS_ACUITY_SCORE: 42
ADLS_ACUITY_SCORE: 42

## 2023-07-24 NOTE — CONSULTS
[facility] Neurology Consultation    Jagdeep Walker MRN# [id number]   Age: 53 year old YOB: 1970     Date of Admission:  5/2/2023    Reason for consult: Falls, imbalance       Requesting physician: Dr Watkins                   Assessment and Plan:   Assessment:  53 year old male with history of schizoaffective disorders, intellectual disability, who was hospitalized on 5/2/2023 for agitation and delusions. Neurology is consulted for imbalance and falls. 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 Invega was recently stopped and patient is now on clozapine. I would expect patient to have improvement in symptoms over the coming weeks. CT head did not show any concerning findings. Given the acute nature of symptoms I would recommend MRI brain to exclude stroke. If MRI brain does not show any concerning findings and walking does not improve over the coming weeks, MRI cervical and thoracic could be considered, but this is likely lower yield. I would also check a CK and B12 level.     Patient has history of chronic lacunar infarctions in the cerebellar. If apixaban is stopped in the future, I would start aspirin 81 mg daily for secondary stroke prevention.     Plan:  - MRI brain without contrast  - Check CK and B12 level  - PT referral    Mainor Moya MD   of Neurology  [facility]            Chief Complaint:   Falls          History of Present Illness:   This patient is a 53 year old male with history of schizoaffective disorders, intellectual disability, who was hospitalized on 5/2/2023 for agitation and delusions. Neurology is consulted for imbalance and falls. Patient has reportedly had worsening of balance since last Thursday (5 days ago). He had a fall 2 days ago and then yesterday. He reports that he is tripping over chairs with these falls. He denies any loss of  consciousness. Per staff at baseline he hasn't been using an assistive devices, but has been using walker and wheelchair more recently. Patient's Invega was recently weaned off and he is now on clozapine for mood disorder. He has also been on Risperdal in the past (on for 30 years per chart review). Recently he was on Zyprexa, but this was stopped. Patient report that sometimes his feet feel asleep, but it is not constant.          Past Medical History:     Past Medical History:   Diagnosis Date    Fisher esophagus     Benign essential hypertension     Blind right eye     failed corneal transplant    Intellectual disability     d/t anoxic brain injury at birth    Involuntary commitment     Hx    Obesity     JACLYN on CPAP     Pancreatitis     Portal vein thrombosis     Schizoaffective disorder, bipolar type (H)     Seizure disorder (H)     Thyroid disease              Past Surgical History:     Past Surgical History:   Procedure Laterality Date    ENDOSCOPIC RETROGRADE CHOLANGIOPANCREATOGRAM N/A 7/28/2022    Procedure: ENDOSCOPIC RETROGRADE CHOLANGIOPANCREATOGRAPHY, Pancreatic duct stent exchange;  Surgeon: Massimo Clark MD;  Location:  OR    ENDOSCOPIC RETROGRADE CHOLANGIOPANCREATOGRAM COMPLEX N/A 6/21/2022    Procedure: ENDOSCOPIC RETROGRADE CHOLANGIOPANCREATOGRAPHY, COMPLEX;  Surgeon: Massimo Clark MD;  Location:  OR    ENDOSCOPIC ULTRASOUND UPPER GASTROINTESTINAL TRACT (GI) N/A 5/12/2022    Procedure: ENDOSCOPIC ULTRASOUND, ESOPHAGOSCOPY / UPPER GASTROINTESTINAL TRACT (GI);  Surgeon: Massimo Clark MD;  Location:  GI    ESOPHAGOSCOPY, GASTROSCOPY, DUODENOSCOPY (EGD), COMBINED N/A 5/12/2022    Procedure: ESOPHAGOGASTRODUODENOSCOPY, WITH BIOPSY;  Surgeon: Massimo Clark MD;  Location:  GI    IR CHEST TUBE PLACEMENT NON-TUNNELED RIGHT  6/18/2022    IR CHEST TUBE REPOSITION NON TUNNELED RIGHT  6/22/2022    IR GASTRO JEJUNOSTOMY TUBE CHANGE  7/22/2022    IR GASTRO JEJUNOSTOMY  TUBE PLACEMENT  6/28/2022             Social History:     Social History     Tobacco Use    Smoking status: Never    Smokeless tobacco: Never   Substance Use Topics    Alcohol use: Not Currently     Comment: Occasional             Family History:     Family History   Problem Relation Age of Onset    Cerebrovascular Disease Mother 76    Prostate Cancer Father            Allergies:     Allergies   Allergen Reactions    Depakote [Valproic Acid] GI Disturbance             Medications:     Current Facility-Administered Medications   Medication    acetaminophen (TYLENOL) tablet 1,000 mg    acetaminophen (TYLENOL) tablet 650 mg    alum & mag hydroxide-simethicone (MAALOX) suspension 30 mL    apixaban ANTICOAGULANT (ELIQUIS) tablet 5 mg    artificial saliva (BIOTENE MT) solution 2 spray    artificial tears ophthalmic ointment    atropine 1 % ophthalmic solution 2 drop    calcium polycarbophil (FIBERCON) tablet 625 mg    chlorhexidine (PERIDEX) 0.12 % solution 15 mL    cloZAPine (CLOZARIL) tablet 225 mg    cloZAPine (CLOZARIL) tablet 25 mg    docusate sodium (COLACE) capsule 100 mg    furosemide (LASIX) tablet 20 mg    hydrALAZINE (APRESOLINE) tablet 10 mg    hydrOXYzine (ATARAX) tablet 25 mg    hypromellose-dextran (ARTIFICAL TEARS) 0.1-0.3 % ophthalmic solution 2 drop    ipratropium (ATROVENT) 0.06 % spray 2 spray    lamoTRIgine (LaMICtal) tablet 50 mg    levothyroxine (SYNTHROID/LEVOTHROID) tablet 50 mcg    lisinopril (ZESTRIL) tablet 5 mg    melatonin tablet 10 mg    menthol (ICY HOT) 5 % patch 1 patch    And    menthol (ICY HOT) Patch in Place    montelukast (SINGULAIR) tablet 10 mg    OLANZapine (zyPREXA) tablet 10 mg    Or    OLANZapine (zyPREXA) injection 10 mg    pantoprazole (PROTONIX) EC tablet 40 mg    prednisoLONE acetate (PRED FORTE) 1 % ophthalmic susp 1 drop    senna-docusate (SENOKOT-S/PERICOLACE) 8.6-50 MG per tablet 1 tablet    topiramate (TOPAMAX) tablet 150 mg    zinc sulfate (ZINCATE) capsule 220 mg              Review of Systems:   As above          Physical Exam:   Vitals were reviewed  Temp: 97  F (36.1  C) Temp src: Temporal BP: 116/81 Pulse: 87   Resp: 16 SpO2: 97 % O2 Device: None (Room air)    General: Seated comfortably in no acute distress.  Neurologic:     Mental Status: Fully alert, attentive and oriented. Fair fund of knowledge. Recent recall is somewhat inconsistent. Language fluent.     Cranial Nerves: Visual fields intact. PERRL. EOMI with normal smooth pursuit. Facial sensation intact/symmetric. Facial movements symmetric. Hearing not formally tested but intact to conversation. Palate elevation symmetric, uvula midline. Mild dysarthria. Shoulder shrug strong bilaterally. Tongue protrusion midline.     Motor: No tremors or other abnormal movements observed. Muscle tone with mild increase intermittently, which may be paratonia related. Mild decrement in rapid finger tapping. Not able to stand without use of arms.       Right Left   Shoulder abduction        5 5   Elbow extension 5 5   Elbow flexion 5 5   Wrist extension         5 5   Finger extension 5 5   ADM 5 5   FDI 5 5   APB 5 5   Hip flexion 5 5   Knee flexion 5 5   Knee extension 5 5   Dorsiflexion 5 5   Plantar flexion 5 5        Deep Tendon Reflexes: Possible slight cross abduction at the knees    Right Left   Biceps 1 1   BRD 1 1   Triceps 1 1   Patellar 2-3 2-3   Achilles 1 1   Plantar equivocal equivocal   Clonus Absent Absent        Sensory: Intact/symmetric to light touch, temperature, and proprioception throughout upper and lower extremities. Vibration is inconsistent, but patient states is intact. Not following commands to test Romberg fully.      Coordination: Finger-nose-finger and heel-shin intact without dysmetria.      Gait: Shuffling gait with use of walker.           Data:   MRI brain without contrast 6/2022  IMPRESSION:  1.  No acute findings.  2.  Prominent atrophy for age.  3.  Small chronic lacunar infarcts in the  cerebellum.    TTE 5/2023  Interpretation Summary  Global and regional left ventricular function is hyperkinetic with an EF of  65-70%.  Global right ventricular function is normal.  Left ventricular diastolic function is normal.  The inferior vena cava was normal in size with preserved respiratory  variability.  No significant valvular abnormalities present.    CT head 7/2023  Impression: No acute intracranial pathology.     EKG 6/2023 - sinus rhythm    LDL 68, A1c 5.3 (5/2023)    Mainor Moya MD

## 2023-07-24 NOTE — CARE PLAN
07/24/23 1617   Group Therapy Session   Group Attendance attended group session   Time Session Began 1315   Time Session Ended 1415   Total Time (minutes) 20  (no charge)   Total # Attendees 6   Group Type psychotherapeutic;psychoeducation   Group Topic Covered coping skills/lifestyle management;relapse prevention;cognitive therapy techniques;structured socialization;balanced lifestyle   Group Session Detail Participated in an activity focused on identifying helpful ideas for calming using the 5 senses, and practicing a grounding technique with this focus.    Patient Response/Contribution discussed personal experience with topic   Patient Participation Detail Accepted offer of assistance by this author with exploring answers to questions being discussed. Was called out early to visit with family visiting on the unit.

## 2023-07-24 NOTE — PLAN OF CARE
Problem: Sleep Disturbance  Goal: Adequate Sleep/Rest  Outcome: Progressing   Goal Outcome Evaluation:               Received in bed sleeping, pt is on SIO and no roommate order for poor boundaries and intrusiveness  . Woke up at 0215 and was calling out for help, pt won't get out of bed . Pt has a no roommate order due to intrusiveness and poor boundaries.  0315  Pt called out for help again to go to the toilet, pt unable to get up on his own, sedated and needed assist of 2. Commode used at bedside.Continent of urine. Pt insisted on getting out of bed and go to the lounge, with eyes closed, he started to walk, angry when redirected back to bed. Gait slow and unsteady, standby assist by staff. Sat in the lounge and fell right back to sleep with his head against the table.  No complaints of pain/discomforts.  Slept for 7.5 hours and upon waking up, pt declined to walk stating he felt weak and wanted to use the WC.  Continent of urine x 2 but with some dribbling on the floor.

## 2023-07-24 NOTE — CARE PLAN
07/24/23 1300   Group Therapy Session   Group Attendance attended group session   Time Session Began 1000   Time Session Ended 1100   Total Time (minutes) 45   Total # Attendees 8   Group Type expressive therapy;task skill;psychotherapeutic   Group Topic Covered coping skills/lifestyle management;problem-solving;cognitive activities;structured socialization;balanced lifestyle   Group Session Detail [facility] group to facilitate coping skill exploration, use of cognitive skills and problem solving, creative expression, clinical observation and facilitation of social, cognitive, and kinesthetic performance skills.    Patient Response/Contribution cooperative with task   Patient Participation Detail Worked at a sporadic basis. Complained about the additional work he had agreed upon before beginning task and that he felt he had accomplished it. We discussed the fatigue level he might be experiencing today. Even with the frustration of being encouraged to work longer, he was pleasant and also teasing in a pleasant manner with this author. Left early for a meeting.

## 2023-07-24 NOTE — PLAN OF CARE
"  Problem: Plan of Care - These are the overarching goals to be used throughout the patient stay.    Goal: Optimal Comfort and Wellbeing  Intervention: Provide Person-Centered Care  Recent Flowsheet Documentation  Taken 7/24/2023 8913 by Salina Posey RN  Trust Relationship/Rapport:   care explained   emotional support provided   empathic listening provided   questions answered   thoughts/feelings acknowledged   Goal Outcome Evaluation:  Pt is bright on approach and when asked how he was doing he stated\" I have not felt any better \" pt  fell last evening and has since been in a wheelchair and SIO . He is unable to ambulate on his own and now is wheel to all destination . Pt is now wearing an incontinent pad  due to history of incontinency . Pt continue to feed himself without any help . Pt ate 100% of his meals . He  has also been complaint with medication . Pt was visited today by Neurology,and PT due to his recent change in mobility. He was also visited by staff of his group for placement . He offered no C/O pain or discomfort  and has been pleasant and corporative . will continue POC .                       "

## 2023-07-24 NOTE — PROGRESS NOTES
Brief Medicine Progress Note:    Fall  Patient fell on 7/23, hitting head on leg of chair. No LOC per staff. Patient complained of back pain and pain to the back of his head. No bleeding, dizziness, or lightheadedness. Denies pre-syncope issues.   - CT C-spine w/o contrast on 7/24 revealed no acute fracture or trauma subluxation.   - neurology consulted by psychiatry to look into further     Adriane Reeder DNP, ACNPC-  Hospitalist Service

## 2023-07-24 NOTE — PLAN OF CARE
Assessment/Intervention/Current Symtoms and Care Coordination:  Pt discussed in team rounds this AM. Pt has been sleeping fine. Pt continues to endorse delusion of the hospital being a hotel or mansion. Pt is pleasant. Pt had another fall this weekend and remains on SIO for fall risk. Pt has unsteady gait and will be followed by neurology for follow up. Pt to have visit with  staff today.    Discharge Plan or Goal:  Pending stabilization and safe disposition plan, pt to return to  with adult day services     Barriers to Discharge:  Pt continues to present as symptomatic (delusional mood). Pt is receiving ongoing stabilization and medication adjustment. Continue care coordination with [facility] to ascertain his acceptability for a return to the home.     Referral Status:  No current referrals - team inquiring about adult day services with openings     Legal Status:  Voluntary per guardian     Contacts:  Guardian/Sister - Huong Shlomo   P: [phone]     - Sadaf  P: [phone]     Upcoming Meetings and Dates/Important Information and next steps:  Visit from  manager on 7/24 at 11:30am  [facility] to put in adult day service referrals

## 2023-07-24 NOTE — CARE PLAN
"   07/23/23 1900   Group Therapy Session   Group Attendance attended group session   Time Session Began 1900   Time Session Ended 1945   Total Time (minutes) 45   Total # Attendees 6   Group Type expressive therapy   Group Topic Covered emotions/expression   Patient Response/Contribution cooperative with task       Art Therapy directive is to create artwork in response to a chosen personal intention/goal for the week. Pts were encouraged to explore using oil pastels as an expressive art medium.  Goals of directive: emotional regulation, emotional expression, identifying personal strengths and goals, mindfulness, assessing pts motivation for change.  Pt was an engaged participant, needed some assistance from staff to begin drawing.  Pt finished artwork and briefly shared with group. Pt chose the personal intention \"alejandrina\" and created artwork expressing this intention.  Pts mood was calm.        "

## 2023-07-24 NOTE — PROGRESS NOTES
"RatePSYCH[facility]RY  PROGRESS NOTE     DATE OF SERVICE   07/24/2023        CHIEF COMPLAINT   \" I am doing okay.\"       SUBJECTIVE   Nursing reports:  Pt is bright on approach and when asked how he was doing he stated\" I have not felt any better \" pt  fell last evening and has since been in a wheelchair and SIO . He is unable to ambulate on his own and now is wheel to all destination . Pt is now wearing an incontinent pad  due to history of incontinency . Pt continue to feed himself without any help . Pt ate 100% of his meals . He  has also been complaint with medication . Pt was visited today by Neurology,and PT due to his recent change in mobility. He was also visited by staff of his group for placement . He offered no C/O pain or discomfort  and has been pleasant and corporative . will continue POC .        Patient has been reviewed with the  earlier today.  Staff from the group home came to visit the patient today.  We will continue to follow-up with them and provide him with updates about the patient.     OBJECTIVE   Today patient was seen and evaluated in the day area with neurologist and one-to-one present during the assessment, this was a face-to-face evaluation.  During the assessment the patient presented as calm, pleasant and cooperative.  Patient was able to follow the instructions of the neurologist for the most part and he cooperated with the assessment.  Patient was able to ambulate with a walker with no issues despite complaining of feeling that his legs are weak.  I have discussed the situation with the neurologist and we are in agreement with discontinuing the Invega.  Physical therapy is also following up with the patient every other day.    Patient continues to present delusional but he has not been agitated, aggressive and he is sleeping through the night.    I was able to communicate with patient's sister and guardian Huong as she came to visit the patient.  I reviewed with her the " events of the weekend and the reasons why we stopped the Invega and decided to consult neurology again.  At the time when I spoke with Huong I did not have the official recommendations from neurology but she was aware that they might recommend an MRI.  Huong informed me that in the past the patient has complained of feeling weak in his legs but every time they have done an assessment everything has been normal.  Huong also inquired about the possibility of the patient going back to the group home despite him continuing to be delusional.  Huong said that she does not think that these delusions are going to go away anytime soon.  I informed Huong that I will prefer to evaluate the patient over the next week to make sure that he is not going to continue to fall at the group home and then make a decision about when he can discharge.  At the end we decided to set up a day for August 14 in order to start talking to the patient about returning to the group home.  This is also the day that Huong will be back to the [facility] as she will be traveling [place] over the next few weeks.     MEDICATIONS   Medications:  Scheduled Meds:   acetaminophen  1,000 mg Oral TID    apixaban ANTICOAGULANT  5 mg Oral BID    artificial tears   Right Eye At Bedtime    calcium polycarbophil  625 mg Oral BID    chlorhexidine  15 mL Swish & Spit Daily    cloZAPine  225 mg Oral At Bedtime    cloZAPine  25 mg Oral Daily    docusate sodium  100 mg Oral Daily    furosemide  20 mg Oral Daily    lamoTRIgine  50 mg Oral Daily    levothyroxine  50 mcg Oral or NG Tube QAM AC    lisinopril  5 mg Oral Daily    melatonin  10 mg Oral At Bedtime    menthol   Transdermal Q8H    montelukast  10 mg Oral At Bedtime    pantoprazole  40 mg Oral QAM AC    prednisoLONE acetate  1 drop Right Eye Daily    topiramate  150 mg Oral At Bedtime    zinc sulfate  220 mg Oral Daily     Continuous Infusions:  PRN Meds:.acetaminophen, alum & mag hydroxide-simethicone,  "artificial saliva, atropine, hydrALAZINE, hydrOXYzine, hypromellose-dextran, ipratropium, LORazepam, menthol **AND** menthol, OLANZapine **OR** OLANZapine, senna-docusate    Medication adherence issues: MS Med Adherence Y/N: Yes, Hospitalization  Medication side effects: MEDICATION SIDE EFFECTS: no side effects reported  Benefit: Yes / No: Yes       ROS   A comprehensive review of systems was negative.       MENTAL STATUS EXAM   Vitals: /81 (Patient Position: Sitting, Cuff Size: Adult Regular)   Pulse 87   Temp 97  F (36.1  C)   Resp 16   Wt 95.1 kg (209 lb 10.5 oz)   SpO2 97%   BMI 35.99 kg/m      Appearance:  No apparent distress  Mood: \"I am good\"  Affect: Calm and bright  was congruent to speech  Suicidal Ideation: PRESENT / ABSENT: absent   Homicidal Ideation: PRESENT / ABSENT: absent     Thought process: [place]  Thought content: Remains delusional but he will only talk about his delusions when we ask about it.  Fund of Knowledge: Below average  Attention/Concentration: At baseline  Language ability:  Intact, speech seemed to be more weak than last week  Memory:  Immediate recall impaired, Short-term memory impaired and Long-term memory intact  Insight: limited   Judgement: Fair  Orientation: Person, time and place.  Continues to believe that he is at a hotel despite saying that he is at [facility]  Psychomotor Behavior: slowed    Muscle Strength and Tone: MuscleStrength: Normal during the assessment with neurology  Gait and Station: Stable with the use of a walker       LABS   personally reviewed.   Recent Labs   Lab 07/23/23  0759 07/18/23  1014   WBC  --  6.5   HGB  --  12.7*   MCV  --  83   PLT  --  163     --    POTASSIUM 3.8  --    CHLORIDE 109*  --    CO2 22  --    BUN 20.9*  --    CR 0.97  --    ANIONGAP 11  --    NASIR 9.5  --    *  --      No results found for: PHENYTOIN, PHENOBARB, VALPROATE, CBMZ       DIAGNOSIS   Principal Problem:    Schizoaffective disorder, " bipolar type (H)    Active Problem List:  Patient Active Problem List   Diagnosis    Closed head injury, initial encounter    Altered mental status, unspecified altered mental status type    Generalized weakness    Portal vein thrombosis    Pleural effusion    Generalized muscle weakness    Falls frequently    Other ascites    Acute pancreatitis, unspecified complication status, unspecified pancreatitis type    Pancreatic pseudocyst    Idiopathic acute pancreatitis, unspecified complication status    Allergic rhinitis    Fisher's esophagus    CTS (carpal tunnel syndrome)    Elevated liver enzymes    HTN (hypertension)    Hypothyroidism    Keratoconus    Major depressive disorder, recurrent episode, mild (H)    Intellectual disability    Morbid exogenous obesity (H)    Neuroleptic malignant syndrome    Obstructive sleep apnea syndrome    Bipolar affective disorder (H)    Seizure disorder (H)    Seizures, generalized convulsive (H)    Acute respiratory failure with hypoxia (H)    Anemia, unspecified    Anxiety disorder, unspecified    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)    Dietary zinc deficiency    Dry eye syndrome of bilateral lacrimal glands    Dry mouth, unspecified    Edema, unspecified    Gastro-esophageal reflux disease without esophagitis    Hyperosmolality and hypernatremia    Irritable bowel syndrome with constipation    Major depressive disorder, recurrent, unspecified (H)    Metabolic encephalopathy    Moderate protein-calorie malnutrition (H)    Other symbolic dysfunctions    Schizoaffective disorder, unspecified (H)    Thrombocytopenia, unspecified (H)    Unspecified asthma, uncomplicated    Urinary tract infection, site not specified    Vitamin D deficiency, unspecified    Schizoaffective disorder, bipolar type (H)          PLAN   1. Ongoing education given regarding diagnostic and treatment options with risks, benefits and alternatives and adequate verbalization of  understanding.  2.  Medications       Melatonin 10 mg at bedtime       Clozapine at 25 mg daily and 225 mg at bedtime.  We will continue increasing the dose of the Clozaril slowly.      Topamax 150 mg at bedtime       Discontinue Invega       Lamictal 50 mg daily    3.  Medical team following as needed  4.   coordinating a safe discharge plan  5.  Neurology consult place, patient was assessed and recommendations have been followed.  MRI has been ordered.  I have also ordered the blood work recommended by neurology.  Please see their notes for more details.    Risk Assessment: [facility] RISK ASSESSMENT: Patient able to contract for safety    Coordination of Care:   Treatment Plan reviewed and physician signed, Care discussed with Care/Treatment Team Members, Chart reviewed and Patient seen      Re-Certification I certify that the inpatient psychiatric facility services furnished since the previous certification were, and continue to be, medically necessary for, either, treatment which could reasonably be expected to improve the patient s condition or diagnostic study and that the hospital records indicate that the services furnished were, either, intensive treatment services, admission and related services necessary for diagnostic study, or equivalent services.     I certify that the patient continues to need, on a daily basis, active treatment furnished directly by or requiring the supervision of inpatient psychiatric facility personnel.   I estimate 14 days of hospitalization is necessary for proper treatment of the patient. My plans for post-hospital care for this patient are  group home     Alejandra Watkins MD    -     07/24/2023  -     2:56 PM    Total time 60 minutes with > 50%spent on coordination of cares and psycho-education.    This note was created with help of Dragon dictation system. Grammatical / typing errors are not intentional.    Alejandra Watkins MD

## 2023-07-25 ENCOUNTER — APPOINTMENT (OUTPATIENT)
Dept: MRI IMAGING | Facility: CLINIC | Age: 53
DRG: 885 | End: 2023-07-25
Attending: PSYCHIATRY & NEUROLOGY
Payer: MEDICARE

## 2023-07-25 LAB
BASOPHILS # BLD AUTO: 0 10E3/UL (ref 0–0.2)
BASOPHILS NFR BLD AUTO: 1 %
EOSINOPHIL # BLD AUTO: 0 10E3/UL (ref 0–0.7)
EOSINOPHIL NFR BLD AUTO: 0 %
ERYTHROCYTE [DISTWIDTH] IN BLOOD BY AUTOMATED COUNT: 14.5 % (ref 10–15)
HCT VFR BLD AUTO: 39.5 % (ref 40–53)
HGB BLD-MCNC: 13.2 G/DL (ref 13.3–17.7)
HOLD SPECIMEN: NORMAL
IMM GRANULOCYTES # BLD: 0 10E3/UL
IMM GRANULOCYTES NFR BLD: 0 %
LYMPHOCYTES # BLD AUTO: 2.2 10E3/UL (ref 0.8–5.3)
LYMPHOCYTES NFR BLD AUTO: 33 %
MCH RBC QN AUTO: 27.5 PG (ref 26.5–33)
MCHC RBC AUTO-ENTMCNC: 33.4 G/DL (ref 31.5–36.5)
MCV RBC AUTO: 82 FL (ref 78–100)
MONOCYTES # BLD AUTO: 0.5 10E3/UL (ref 0–1.3)
MONOCYTES NFR BLD AUTO: 8 %
NEUTROPHILS # BLD AUTO: 4 10E3/UL (ref 1.6–8.3)
NEUTROPHILS NFR BLD AUTO: 58 %
NRBC # BLD AUTO: 0 10E3/UL
NRBC BLD AUTO-RTO: 0 /100
PLATELET # BLD AUTO: 174 10E3/UL (ref 150–450)
RBC # BLD AUTO: 4.8 10E6/UL (ref 4.4–5.9)
WBC # BLD AUTO: 6.8 10E3/UL (ref 4–11)

## 2023-07-25 PROCEDURE — 70552 MRI BRAIN STEM W/DYE: CPT | Mod: MF

## 2023-07-25 PROCEDURE — 85004 AUTOMATED DIFF WBC COUNT: CPT | Performed by: PSYCHIATRY & NEUROLOGY

## 2023-07-25 PROCEDURE — 124N000003 HC R&B MH SENIOR/ADOLESCENT

## 2023-07-25 PROCEDURE — 255N000002 HC RX 255 OP 636: Mod: JZ | Performed by: PSYCHIATRY & NEUROLOGY

## 2023-07-25 PROCEDURE — 250N000013 HC RX MED GY IP 250 OP 250 PS 637: Performed by: EMERGENCY MEDICINE

## 2023-07-25 PROCEDURE — H2032 ACTIVITY THERAPY, PER 15 MIN: HCPCS

## 2023-07-25 PROCEDURE — 250N000013 HC RX MED GY IP 250 OP 250 PS 637: Performed by: PSYCHIATRY & NEUROLOGY

## 2023-07-25 PROCEDURE — 250N000013 HC RX MED GY IP 250 OP 250 PS 637: Performed by: PHYSICIAN ASSISTANT

## 2023-07-25 PROCEDURE — 99233 SBSQ HOSP IP/OBS HIGH 50: CPT | Performed by: PSYCHIATRY & NEUROLOGY

## 2023-07-25 PROCEDURE — 250N000013 HC RX MED GY IP 250 OP 250 PS 637: Performed by: STUDENT IN AN ORGANIZED HEALTH CARE EDUCATION/TRAINING PROGRAM

## 2023-07-25 PROCEDURE — G0177 OPPS/PHP; TRAIN & EDUC SERV: HCPCS

## 2023-07-25 PROCEDURE — G1010 CDSM STANSON: HCPCS

## 2023-07-25 PROCEDURE — A9585 GADOBUTROL INJECTION: HCPCS | Mod: JZ | Performed by: PSYCHIATRY & NEUROLOGY

## 2023-07-25 PROCEDURE — 70552 MRI BRAIN STEM W/DYE: CPT | Mod: 26 | Performed by: RADIOLOGY

## 2023-07-25 PROCEDURE — 36415 COLL VENOUS BLD VENIPUNCTURE: CPT | Performed by: PSYCHIATRY & NEUROLOGY

## 2023-07-25 PROCEDURE — 250N000013 HC RX MED GY IP 250 OP 250 PS 637

## 2023-07-25 PROCEDURE — 36415 COLL VENOUS BLD VENIPUNCTURE: CPT | Performed by: INTERNAL MEDICINE

## 2023-07-25 RX ORDER — LORAZEPAM 1 MG/1
1 TABLET ORAL
Status: DISCONTINUED | OUTPATIENT
Start: 2023-07-25 | End: 2023-08-11

## 2023-07-25 RX ORDER — GADOBUTROL 604.72 MG/ML
9.4 INJECTION INTRAVENOUS ONCE
Status: COMPLETED | OUTPATIENT
Start: 2023-07-25 | End: 2023-07-25

## 2023-07-25 RX ADMIN — ACETAMINOPHEN 1000 MG: 500 TABLET, FILM COATED ORAL at 20:32

## 2023-07-25 RX ADMIN — CLOZAPINE 225 MG: 100 TABLET ORAL at 20:31

## 2023-07-25 RX ADMIN — ZINC SULFATE 220 MG (50 MG) CAPSULE 220 MG: CAPSULE at 08:03

## 2023-07-25 RX ADMIN — DOCUSATE SODIUM 100 MG: 100 CAPSULE, LIQUID FILLED ORAL at 08:04

## 2023-07-25 RX ADMIN — FUROSEMIDE 20 MG: 20 TABLET ORAL at 08:03

## 2023-07-25 RX ADMIN — LAMOTRIGINE 50 MG: 25 TABLET ORAL at 08:03

## 2023-07-25 RX ADMIN — LISINOPRIL 5 MG: 5 TABLET ORAL at 08:04

## 2023-07-25 RX ADMIN — PANTOPRAZOLE SODIUM 40 MG: 40 TABLET, DELAYED RELEASE ORAL at 06:39

## 2023-07-25 RX ADMIN — CLOZAPINE 25 MG: 25 TABLET ORAL at 08:03

## 2023-07-25 RX ADMIN — TOPIRAMATE 150 MG: 50 TABLET ORAL at 20:32

## 2023-07-25 RX ADMIN — LEVOTHYROXINE SODIUM 50 MCG: 25 TABLET ORAL at 06:39

## 2023-07-25 RX ADMIN — WHITE PETROLATUM 57.7 %-MINERAL OIL 31.9 % EYE OINTMENT: at 20:28

## 2023-07-25 RX ADMIN — ACETAMINOPHEN 1000 MG: 500 TABLET, FILM COATED ORAL at 13:41

## 2023-07-25 RX ADMIN — CALCIUM POLYCARBOPHIL 625 MG: 625 TABLET, FILM COATED ORAL at 20:32

## 2023-07-25 RX ADMIN — PREDNISOLONE ACETATE 1 DROP: 10 SUSPENSION/ DROPS OPHTHALMIC at 08:04

## 2023-07-25 RX ADMIN — Medication 10 MG: at 20:32

## 2023-07-25 RX ADMIN — CALCIUM POLYCARBOPHIL 625 MG: 625 TABLET, FILM COATED ORAL at 08:04

## 2023-07-25 RX ADMIN — MONTELUKAST 10 MG: 10 TABLET, FILM COATED ORAL at 20:32

## 2023-07-25 RX ADMIN — ACETAMINOPHEN 1000 MG: 500 TABLET, FILM COATED ORAL at 08:03

## 2023-07-25 RX ADMIN — APIXABAN 5 MG: 5 TABLET, FILM COATED ORAL at 08:03

## 2023-07-25 RX ADMIN — GADOBUTROL 9.4 ML: 604.72 INJECTION INTRAVENOUS at 17:00

## 2023-07-25 RX ADMIN — CHLORHEXIDINE GLUCONATE 0.12% ORAL RINSE 15 ML: 1.2 LIQUID ORAL at 08:03

## 2023-07-25 ASSESSMENT — ACTIVITIES OF DAILY LIVING (ADL)
DRESS: WITH ASSISTANCE
ADLS_ACUITY_SCORE: 52
HYGIENE/GROOMING: WITH ASSISTANCE
ADLS_ACUITY_SCORE: 52
ORAL_HYGIENE: WITH SUPERVISION
ADLS_ACUITY_SCORE: 52

## 2023-07-25 NOTE — PROGRESS NOTES
Brief progress note:     MRI brain imaging was reviewed. There are nonspecific T2 FLAIR changes not appreciated on scan in 6/2022. Balance is reportedly a little improved today. Given sudden worsening psychosis after 30 years of stability and imbalance with MRI brain changes, it would be reasonable to pursue work-up for inflammatory (autoimmune encephalitis) causes. I would recommend the following.     Recommendations  - Repeat MRI brain with contrast  - Routine EEG  - LP with CSF glucose, protein, cytology, cytometry, ENC2, meningitis/encephalitis panel, oligoclonal bands, IGG index  - Serum ENS2    Mainor Moya MD   of Neurology  [facility]

## 2023-07-25 NOTE — PROVIDER NOTIFICATION
07/25/23 1231   Individualization/Patient Specific Goals   Patient Personal Strengths family/social support;medication/treatment adherence;stable living environment   Patient Vulnerabilities limited ability to read/write;limited social skills;lacks insight into illness   Anxieties, Fears or Concerns Pt has had multiple falls over the past week, pt being followed for medical concerns   Individualized Care Needs Medication and stabilization   Interprofessional Rounds   Summary Pt continues to titrate on Clozaril and stabilize psychiatrically and medically   Participants CTC;nursing;OT;psychiatrist   Behavioral Team Discussion   Participants Dr. Alejandra Watkins MD, Maggie Mitchell MSW, [facility], Lotus Montenegro RN, Mouna Solorzano OT   Progress Stabilization is ongoing   Anticipated length of stay 7 Days.   Continued Stay Criteria/Rationale Requires stabilization to reduce risk of imminent harm to himself and others.   Medical/Physical See H&P   Precautions See below   Plan Pt to discharge back to  with adult day services in place   Rationale for change in precautions or plan no change   Safety Plan CTC will do individual inpatient treatment planning and after care planning. CTC will discuss options for increasing community supports with the patient. CTC will coordinate with outpatient providers and will place referrals to ensure appointments are in place.   Anticipated Discharge Disposition group home     PRECAUTIONS AND SAFETY    Behavioral Orders   Procedures    Code 1 - Restrict to Unit    Code 2    Code 2     Head ct    Elopement precautions    Fall precautions    Routine Programming     As clinically indicated    Sexual precautions    Status 15     Every 15 minutes.    Status Individual Observation     Patient SIO status reviewed with team/RN.  Please also refer to RN/team documentation for add'l detail.    -SIO staff to monitor following which have contributed to patient being on SIO:  Pt had 2 falls within 24  hours,body weakness  -Possible interventions SIO staff could use to support patient's treatment progress:   Assist with ambulation if unsteady  -When following observed, team will review discontinuation of SIO:   Steady on his feet     Order Specific Question:   CONTINUOUS 24 hours / day     Answer:   5 feet     Order Specific Question:   Indications for SIO     Answer:   Self-injury risk       Safety  Safety WDL: WDL  Patient Location: lounge, patient room, own  Observed Behavior: walking, calm  Observed Behavior (Comment): sleeping  Safety Measures: environmental rounds completed, safety plan reviewed, safety rounds completed  Diversional Activity: music, television  Suicidality: Status 15, SIO (Status Individual Observation)  (NOTE - order will specify distance, Behavioral scrubs (pajamas), Minimal furniture in room  Seizure precautions: clutter free environment  Assault: status 15  Elopement Assessment: Statements about wanting to leave  Elopement Interventions: status 15, status continuous sight, no shoes, behavioral scrubs (pajamas), signs posted on unit entrance / exit doors  Sexual: status 15, status continuous sight, private room  Additional Documentation:  (Elopement precuations in place.)

## 2023-07-25 NOTE — PLAN OF CARE
Goal Outcome Evaluation:    Plan of Care Reviewed With: patient          Flyer paged to place IV. Pt pulled 1st IV which had been placed. Flyer still on unit and placed second IV. Covered with velcro arm wrap. Pt agreed to leave it in place. He was then escorted to MRI with staff and security. MRI with contrast performed. IV pulled per recommendation of IM. Pt appears to be in good mood. Affect WDL. Eye contact intermittent. Avoids social contact. On SIO of 5 ft for risk of injury to self. Encouraged to remain in wheelchair as much as possible due to multiple recent falls, but he was up using wheeled walker at times. Eliquis on hold in anticipation of spinal tap. Also anticipating EEG, time for both TBD. Pt in medical bed for mobility and comfort. Compression stockings not on when writer went to remove them at HS. Headphones removed at 2200 as ordered. Med-compliant. Cooperative. Continue with current treatment plan and recommendations. Continue to monitor and reassess symptoms. Monitor response to medications. Monitor progress towards treatment goals. Encourage groups and participation.

## 2023-07-25 NOTE — CARE PLAN
07/25/23 1250   Group Therapy Session   Group Attendance attended group session   Time Session Began 1015   Time Session Ended 1115   Total Time (minutes) 60   Total # Attendees 6   Group Type expressive therapy;task skill;psychotherapeutic   Group Topic Covered coping skills/lifestyle management;problem-solving;cognitive activities;balanced lifestyle;structured socialization   Group Session Detail [facility] group to facilitate coping skill exploration, use of cognitive skills and problem solving, creative expression, clinical observation and facilitation of social, cognitive, and kinesthetic performance skills.    Patient Response/Contribution cooperative with task;listened actively   Patient Participation Detail With task set up and assistance with organizing work space and steps, he followed through with directions. Worked at a constant pace, longer and more independently than most other sessions. Pleasant and used some gentle humor with this author. Appeared calm and focused on his work.

## 2023-07-25 NOTE — PROGRESS NOTES
"RatePSYCHIATRY  PROGRESS NOTE     DATE OF SERVICE   07/25/2023        CHIEF COMPLAINT   \" I am doing okay.\"       SUBJECTIVE   Nursing reports:  Pt is now up walking with his walker .  Pt was moved to a room with medical bed . He is bright and denies all MH SX . Pt has an order for Spinal Tap to be done tomorrow and an EGG . Pt has an order to have MRI with contrast to be done this evening . Pt jorgito Solitario helped with answering the questioner for the MRI she also gave consent to have the MRI done . Pt Eliquis is on hold due to Spinal Tap . Will continue POC        Patient has been reviewed with the  earlier today.    Assessment/Intervention/Current Symtoms and Care Coordination:  Pt discussed in team rounds this AM. Pt has been improving since multiple falls but continues to be on SIO with follow up from neurology. Pt continues to struggle with gait and weakness and needs more time for stabilziation. Pt taken off Invega to help improve medical concerns. Tentative discharge for 8/14 after pt's guardian/sister comes back into town pending pt stabilization. Per guardian pt has had episodes of weakness like he is currently experiencing.     OBJECTIVE   Patient was seen and evaluated today in the day area with one-to-one present during the assessment, this was a face-to-face evaluation.  Patient admitted that he is feeling stronger today and agreed not to use the wheelchair.  Patient will be using the walker for now.  Patient verbalized good understanding and he was in agreement with this.  I talked to the patient about my conversation with his sister Huong and the plan for him to return to the group home tentatively on August 14.  Patient said that he is not going anywhere because this is a hotel and he can stay here as long as he wants to.  He refused to listen to any explanation by this writer and continues to insist that he will not be discharging to the group home because of this case is located in " [place] and not in [place].    I sent a message to the neurologist as the MRI was abnormal.  I was able to have a conversation with the neurologist that reviewed the MRI.  It seems that the patient has a lesion that was not present a year ago.  The neurologist recommended for the patient to have a lumbar puncture and an EEG.  Later on the neurologist called me and informed me that he would like for the patient to also have an MRI with contrast.  I was able to reviewed with the neurologist patient's past psychiatric history, medication use and recent symptoms.  Given that the patient is having problems walking and he has been more psychotic it would be reasonable to do all of this workup.  Neurologist also was able to communicate with patient's sister that was in agreement with a lumbar puncture.     MEDICATIONS   Medications:  Scheduled Meds:   acetaminophen  1,000 mg Oral TID    [Held by provider] apixaban ANTICOAGULANT  5 mg Oral BID    artificial tears   Right Eye At Bedtime    calcium polycarbophil  625 mg Oral BID    chlorhexidine  15 mL Swish & Spit Daily    cloZAPine  225 mg Oral At Bedtime    cloZAPine  25 mg Oral Daily    docusate sodium  100 mg Oral Daily    furosemide  20 mg Oral Daily    lamoTRIgine  50 mg Oral Daily    levothyroxine  50 mcg Oral or NG Tube QAM AC    lisinopril  5 mg Oral Daily    melatonin  10 mg Oral At Bedtime    menthol   Transdermal Q8H    montelukast  10 mg Oral At Bedtime    pantoprazole  40 mg Oral QAM AC    prednisoLONE acetate  1 drop Right Eye Daily    topiramate  150 mg Oral At Bedtime    zinc sulfate  220 mg Oral Daily     Continuous Infusions:  PRN Meds:.acetaminophen, alum & mag hydroxide-simethicone, artificial saliva, atropine, hydrALAZINE, hydrOXYzine, hypromellose-dextran, ipratropium, LORazepam, menthol **AND** menthol, OLANZapine **OR** OLANZapine, senna-docusate    Medication adherence issues: MS Med Adherence Y/N: Yes, Hospitalization  Medication side  "effects: MEDICATION SIDE EFFECTS: no side effects reported  Benefit: Yes / No: Yes       ROS   A comprehensive review of systems was negative.       MENTAL STATUS EXAM   Vitals: /76 (BP Location: Left arm)   Pulse 90   Temp 98.1  F (36.7  C) (Oral)   Resp 17   Wt 94.7 kg (208 lb 12.4 oz)   SpO2 97%   BMI 35.84 kg/m      Same as last visit  Appearance:  No apparent distress  Mood: \"I am good\"  Affect: Calm and bright  was congruent to speech  Suicidal Ideation: PRESENT / ABSENT: absent   Homicidal Ideation: PRESENT / ABSENT: absent     Thought process: [place]  Thought content: Remains delusional but he will only talk about his delusions when we ask about it.  Fund of Knowledge: Below average  Attention/Concentration: At baseline  Language ability:  Intact, speech seemed to be more weak than last week  Memory:  Immediate recall impaired, Short-term memory impaired and Long-term memory intact  Insight: limited   Judgement: Fair  Orientation: Person, time and place.  Continues to believe that he is at a hotel despite saying that he is at [facility]  Psychomotor Behavior: slowed    Muscle Strength and Tone: MuscleStrength: Normal during the assessment with neurology  Gait and Station: Stable with the use of a walker       LABS   personally reviewed.   Recent Labs   Lab 07/25/23  0654 07/23/23  0759   WBC 6.8  --    HGB 13.2*  --    MCV 82  --      --    NA  --  142   POTASSIUM  --  3.8   CHLORIDE  --  109*   CO2  --  22   BUN  --  20.9*   CR  --  0.97   ANIONGAP  --  11   NASIR  --  9.5   GLC  --  138*     No results found for: PHENYTOIN, PHENOBARB, VALPROATE, CBMZ       DIAGNOSIS   Principal Problem:    Schizoaffective disorder, bipolar type (H)    Active Problem List:  Patient Active Problem List   Diagnosis    Closed head injury, initial encounter    Altered mental status, unspecified altered mental status type    Generalized weakness    Portal vein thrombosis    Pleural effusion    " Generalized muscle weakness    Falls frequently    Other ascites    Acute pancreatitis, unspecified complication status, unspecified pancreatitis type    Pancreatic pseudocyst    Idiopathic acute pancreatitis, unspecified complication status    Allergic rhinitis    Fisher's esophagus    CTS (carpal tunnel syndrome)    Elevated liver enzymes    HTN (hypertension)    Hypothyroidism    Keratoconus    Major depressive disorder, recurrent episode, mild (H)    Intellectual disability    Morbid exogenous obesity (H)    Neuroleptic malignant syndrome    Obstructive sleep apnea syndrome    Bipolar affective disorder (H)    Seizure disorder (H)    Seizures, generalized convulsive (H)    Acute respiratory failure with hypoxia (H)    Anemia, unspecified    Anxiety disorder, unspecified    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)    Dietary zinc deficiency    Dry eye syndrome of bilateral lacrimal glands    Dry mouth, unspecified    Edema, unspecified    Gastro-esophageal reflux disease without esophagitis    Hyperosmolality and hypernatremia    Irritable bowel syndrome with constipation    Major depressive disorder, recurrent, unspecified (H)    Metabolic encephalopathy    Moderate protein-calorie malnutrition (H)    Other symbolic dysfunctions    Schizoaffective disorder, unspecified (H)    Thrombocytopenia, unspecified (H)    Unspecified asthma, uncomplicated    Urinary tract infection, site not specified    Vitamin D deficiency, unspecified    Schizoaffective disorder, bipolar type (H)          PLAN   1. Ongoing education given regarding diagnostic and treatment options with risks, benefits and alternatives and adequate verbalization of understanding.  2.  Medications       Melatonin 10 mg at bedtime       Clozapine at 25 mg daily and 225 mg at bedtime.  We will continue increasing the dose of the Clozaril slowly.       Topamax 150 mg at bedtime       Lamictal 50 mg daily    3.  Medical team following as  needed  4.   coordinating a safe discharge plan  5.  Neurology consult place, patient was assessed and recommendations have been followed.  MRI has been ordered.  I have also ordered the blood work recommended by neurology.  Please see their notes for more details.    Risk Assessment: [facility] RISK ASSESSMENT: Patient able to contract for safety    Coordination of Care:   Treatment Plan reviewed and physician signed, Care discussed with Care/Treatment Team Members, Chart reviewed and Patient seen      Re-Certification I certify that the inpatient psychiatric facility services furnished since the previous certification were, and continue to be, medically necessary for, either, treatment which could reasonably be expected to improve the patient s condition or diagnostic study and that the hospital records indicate that the services furnished were, either, intensive treatment services, admission and related services necessary for diagnostic study, or equivalent services.     I certify that the patient continues to need, on a daily basis, active treatment furnished directly by or requiring the supervision of inpatient psychiatric facility personnel.   I estimate 14 days of hospitalization is necessary for proper treatment of the patient. My plans for post-hospital care for this patient are  group home     Alejandra Watkins MD    -     07/25/2023  -     6:24 PM    Total time 60 minutes with > 50%spent on coordination of cares and psycho-education.    This note was created with help of Dragon dictation system. Grammatical / typing errors are not intentional.    Alejandra Watkins MD

## 2023-07-25 NOTE — PLAN OF CARE
Assessment/Intervention/Current Symtoms and Care Coordination:  Pt discussed in team rounds this AM. Pt has been improving since multiple falls but continues to be on SIO with follow up from neurology. Pt continues to struggle with gait and weakness and needs more time for stabilziation. Pt taken off Invega to help improve medical concerns. Tentative discharge for 8/14 after pt's guardian/sister comes back into town pending pt stabilization. Per guardian pt has had episodes of weakness like he is currently experiencing.    Discharge Plan or Goal:  Pending stabilization and safe disposition plan, pt to return to  with adult day services on 8/14     Barriers to Discharge:  Pt continues to present as symptomatic (delusional mood). Pt is receiving ongoing stabilization and medication adjustment. Continue care coordination with alf to ascertain his acceptability for a return to the home.     Referral Status:  No current referrals - team inquiring about adult day services with openings     Legal Status:  Voluntary per guardian     Contacts:  Guardian/Sister - Huongxena Keenan   P: [phone]     - Sadaf  P: [phone]     Upcoming Meetings and Dates/Important Information and next steps:  [facility] to collaborate with outpatient team regarding adult day service referrals

## 2023-07-25 NOTE — PLAN OF CARE
Problem: Adult Behavioral Health Plan of Care  Goal: Optimized Coping Skills in Response to Life Stressors  Outcome: Progressing   Goal Outcome Evaluation:  Pt is now up walking with his walker .  Pt was moved to a room with medical bed . He is bright and denies all MH SX . Pt has an order for Spinal Tap to be done tomorrow and an EGG . Pt has an order to have MRI with contrast to be done this evening . Pt jorgito Solitario helped with answering the questioner for the MRI she also gave consent to have the MRI done . Pt Eliquis is on hold due to Spinal Tap . Will continue POC

## 2023-07-25 NOTE — CONSULTS
Interventional Radiology  [facility] Consult Note  07/25/23   1:52 PM    Consult Requested: diagnostic lumbar puncture    Recommendations/Plan:  Patient is on IR schedule 7/26/23 for a image guided diagnostic lumbar puncture.   Labs WNL for procedure.  Orders entered for procedure. No NPO required.  Medications to be held include: none  Consent will be done prior to procedure.     Please contact the IR charge RN at [phone] for estimated time of procedure.     Case and imaging discussed with IR attending, Dr. Betts. Recommendations were reviewed with requesting provider DILLON Aquino, CNP    This is a 53 year old male with past medical history of abnormal MRI findings. Neurology recommends diagnostic lumbar puncture to evaluate abnormal MRI findings.    Diagnostic labs entered by: requesting team      Expected date of discharge:  TBD    Vitals:   /82   Pulse 94   Temp 97.7  F (36.5  C) (Temporal)   Resp 16   Wt 94.7 kg (208 lb 12.4 oz)   SpO2 96%   BMI 35.84 kg/m      Pertinent Labs:   Lab Results   Component Value Date    WBC 6.8 07/25/2023    WBC 6.5 07/18/2023    WBC 5.9 07/11/2023     Lab Results   Component Value Date    HGB 13.2 07/25/2023    HGB 12.7 07/18/2023    HGB 13.5 07/11/2023     Lab Results   Component Value Date     07/25/2023     07/18/2023     07/11/2023     Lab Results   Component Value Date    INR 1.06 05/19/2023    PTT 31 06/05/2022     Lab Results   Component Value Date    POTASSIUM 3.8 07/23/2023    POTASSIUM 3.5 09/01/2022        COVID-19 Antibody Results, Testing for Immunity           No data to display              COVID-19 PCR Results          1/6/2022    13:53 4/28/2022    00:19 5/8/2022    09:34 5/15/2022    14:39 6/4/2022    10:12   COVID-19 PCR Results   COVID-19 Virus by PCR (External Result) Positive           SARS CoV2 PCR  Negative  Negative  Negative  Negative          6/14/2022    19:30 10/14/2022    15:10 5/2/2023     17:14   COVID-19 PCR Results   COVID-19 Virus by PCR (External Result)  Negative        SARS CoV2 PCR Negative   Negative       Details          This result is from an external source.               Rasta Mathew PA-C  Interventional Radiology  Pager: [phone]

## 2023-07-25 NOTE — PLAN OF CARE
Problem: Adult Behavioral Health Plan of Care  Goal: Plan of Care Review  Outcome: Progressing  Flowsheets (Taken 7/24/2023 1835)  Patient Agreement with Plan of Care: agrees     Problem: Disruptive Behavior  Goal: Improved Impulse and Aggression Control (Disruptive Behavior)  Outcome: Progressing     Problem: Plan of Care - These are the overarching goals to be used throughout the patient stay.    Goal: Absence of Hospital-Acquired Illness or Injury  Intervention: Prevent Skin Injury  Recent Flowsheet Documentation  Taken 7/24/2023 1835 by Varsha Sunshine RN  Body Position: position changed independently   Goal Outcome Evaluation:    Plan of Care Reviewed With: patient        /86 (BP Location: Left arm, Patient Position: Sitting, Cuff Size: Adult Regular)   Pulse 94   Temp 97.2  F (36.2  C)   Resp 16   Wt 95.1 kg (209 lb 10.5 oz)   SpO2 100%   BMI 35.99 kg/m       Pt alert and oriented. Able to make needs known. Pt calm and cooperative. Eyes contact intermittent upon approaching. Pt denies mental health symptoms, and hallucinations. Denies depression and anxiety. Declined on pain. He had dinner with 75%. Medication complaint. Remain on SIO 5FT r/t self-injury. Did not attend group, was in the lounge watching TV and socializing with peers.  Contact for safety in the unit. MRI brain done, awaiting result. The Guardian was called for checklist and for consent. All precautions were in place and maintained. Will continue to monitor with POC.

## 2023-07-25 NOTE — PROGRESS NOTES
Pt participated patiently though peer-selected music and dance, then engaged more fully in dance/movement therapy (D/MT) when his music selections were used.  He was using pt-generated nonverbal expressions, as well as synchronous, music-directed dance.  He struggled to follow most therapist-suggested movements and his center of gravity often caused him to lean backwards in an unstable manner.  He was cooperative and proud of his participation in the session.       07/25/23 1115   Expressive Therapy   Therapy Type dance/movement   Minutes of Treatment 45

## 2023-07-25 NOTE — PLAN OF CARE
Problem: Sleep Disturbance  Goal: Adequate Sleep/Rest  Outcome: Progressing  Intervention: Promote Sleep/Rest  Flowsheets (Taken 7/25/2023 0147)  Sleep/Rest Enhancement:   regular sleep/rest pattern promoted   noise level reduced   room darkened    Patient was awake at early part of the shift. He was assisted to the commode by 1 staff. Voided and was requesting to go to the lounge, thinking it was already day time. Re-orient patient of the time however he insisted to go outside his bedroom. Assisted to his wheelchair and was brought to the lounge. He fell asleep while sitting in his wheelchair. Pt was assisted by 2 staff with transfers from wheelchair to bed. He had been sleeping for the rest of the night. Pt slept 7.25 hours. No complaints of pain. No PRN meds given.

## 2023-07-26 ENCOUNTER — APPOINTMENT (OUTPATIENT)
Dept: INTERVENTIONAL RADIOLOGY/VASCULAR | Facility: CLINIC | Age: 53
DRG: 885 | End: 2023-07-26
Attending: PHYSICIAN ASSISTANT
Payer: MEDICARE

## 2023-07-26 LAB
ANION GAP SERPL CALCULATED.3IONS-SCNC: 14 MMOL/L (ref 7–15)
APPEARANCE CSF: CLEAR
BUN SERPL-MCNC: 19.6 MG/DL (ref 6–20)
C GATTII+NEOFOR DNA CSF QL NAA+NON-PROBE: NEGATIVE
CALCIUM SERPL-MCNC: 9.2 MG/DL (ref 8.6–10)
CHLORIDE SERPL-SCNC: 109 MMOL/L (ref 98–107)
CMV DNA CSF QL NAA+NON-PROBE: NEGATIVE
COLOR CSF: COLORLESS
CREAT SERPL-MCNC: 0.87 MG/DL (ref 0.67–1.17)
DEPRECATED HCO3 PLAS-SCNC: 18 MMOL/L (ref 22–29)
E COLI K1 AG CSF QL: NEGATIVE
EV RNA SPEC QL NAA+PROBE: NEGATIVE
GFR SERPL CREATININE-BSD FRML MDRD: >90 ML/MIN/1.73M2
GLUCOSE CSF-MCNC: 75 MG/DL (ref 40–70)
GLUCOSE SERPL-MCNC: 109 MG/DL (ref 70–99)
GP B STREP DNA CSF QL NAA+NON-PROBE: NEGATIVE
HAEM INFLU DNA CSF QL NAA+NON-PROBE: NEGATIVE
HHV6 DNA CSF QL NAA+NON-PROBE: NEGATIVE
HOLD SPECIMEN: NORMAL
HSV1 DNA CSF QL NAA+NON-PROBE: NEGATIVE
HSV1 DNA CSF QL NAA+PROBE: NOT DETECTED
HSV2 DNA CSF QL NAA+NON-PROBE: NEGATIVE
HSV2 DNA CSF QL NAA+PROBE: NOT DETECTED
L MONOCYTOG DNA CSF QL NAA+NON-PROBE: NEGATIVE
N MEN DNA CSF QL NAA+NON-PROBE: NEGATIVE
PARECHOVIRUS A RNA CSF QL NAA+NON-PROBE: NEGATIVE
PATH REPORT.COMMENTS IMP SPEC: NORMAL
PATH REPORT.COMMENTS IMP SPEC: NORMAL
PATH REPORT.FINAL DX SPEC: NORMAL
PATH REPORT.GROSS SPEC: NORMAL
PATH REPORT.MICROSCOPIC SPEC OTHER STN: NORMAL
PATH REPORT.RELEVANT HX SPEC: NORMAL
POTASSIUM SERPL-SCNC: 3.7 MMOL/L (ref 3.4–5.3)
PROT CSF-MCNC: 33.5 MG/DL (ref 15–45)
RBC # CSF MANUAL: 0 /UL (ref 0–2)
S PNEUM DNA CSF QL NAA+NON-PROBE: NEGATIVE
SODIUM SERPL-SCNC: 141 MMOL/L (ref 136–145)
TUBE # CSF: 3
VZV DNA CSF QL NAA+NON-PROBE: NEGATIVE
WBC # CSF MANUAL: 0 /UL (ref 0–5)

## 2023-07-26 PROCEDURE — 250N000013 HC RX MED GY IP 250 OP 250 PS 637: Performed by: PSYCHIATRY & NEUROLOGY

## 2023-07-26 PROCEDURE — 250N000009 HC RX 250: Performed by: PHYSICIAN ASSISTANT

## 2023-07-26 PROCEDURE — 82945 GLUCOSE OTHER FLUID: CPT | Performed by: INTERNAL MEDICINE

## 2023-07-26 PROCEDURE — 250N000013 HC RX MED GY IP 250 OP 250 PS 637

## 2023-07-26 PROCEDURE — 62328 DX LMBR SPI PNXR W/FLUOR/CT: CPT | Performed by: PHYSICIAN ASSISTANT

## 2023-07-26 PROCEDURE — 009U3ZX DRAINAGE OF SPINAL CANAL, PERCUTANEOUS APPROACH, DIAGNOSTIC: ICD-10-PCS | Performed by: PHYSICIAN ASSISTANT

## 2023-07-26 PROCEDURE — 250N000013 HC RX MED GY IP 250 OP 250 PS 637: Performed by: EMERGENCY MEDICINE

## 2023-07-26 PROCEDURE — 84157 ASSAY OF PROTEIN OTHER: CPT | Performed by: INTERNAL MEDICINE

## 2023-07-26 PROCEDURE — 83916 OLIGOCLONAL BANDS: CPT | Performed by: INTERNAL MEDICINE

## 2023-07-26 PROCEDURE — 87529 HSV DNA AMP PROBE: CPT | Performed by: INTERNAL MEDICINE

## 2023-07-26 PROCEDURE — 86255 FLUORESCENT ANTIBODY SCREEN: CPT

## 2023-07-26 PROCEDURE — 87483 CNS DNA AMP PROBE TYPE 12-25: CPT | Performed by: INTERNAL MEDICINE

## 2023-07-26 PROCEDURE — 82947 ASSAY GLUCOSE BLOOD QUANT: CPT | Performed by: PSYCHIATRY & NEUROLOGY

## 2023-07-26 PROCEDURE — 250N000013 HC RX MED GY IP 250 OP 250 PS 637: Performed by: STUDENT IN AN ORGANIZED HEALTH CARE EDUCATION/TRAINING PROGRAM

## 2023-07-26 PROCEDURE — 82374 ASSAY BLOOD CARBON DIOXIDE: CPT | Performed by: PSYCHIATRY & NEUROLOGY

## 2023-07-26 PROCEDURE — 36415 COLL VENOUS BLD VENIPUNCTURE: CPT | Performed by: INTERNAL MEDICINE

## 2023-07-26 PROCEDURE — 86341 ISLET CELL ANTIBODY: CPT | Performed by: INTERNAL MEDICINE

## 2023-07-26 PROCEDURE — 89050 BODY FLUID CELL COUNT: CPT | Performed by: INTERNAL MEDICINE

## 2023-07-26 PROCEDURE — 36415 COLL VENOUS BLD VENIPUNCTURE: CPT

## 2023-07-26 PROCEDURE — 86255 FLUORESCENT ANTIBODY SCREEN: CPT | Performed by: INTERNAL MEDICINE

## 2023-07-26 PROCEDURE — 88108 CYTOPATH CONCENTRATE TECH: CPT | Mod: 26 | Performed by: PATHOLOGY

## 2023-07-26 PROCEDURE — 88184 FLOWCYTOMETRY/ TC 1 MARKER: CPT | Performed by: INTERNAL MEDICINE

## 2023-07-26 PROCEDURE — 62328 DX LMBR SPI PNXR W/FLUOR/CT: CPT

## 2023-07-26 PROCEDURE — 88188 FLOWCYTOMETRY/READ 9-15: CPT | Mod: GC | Performed by: STUDENT IN AN ORGANIZED HEALTH CARE EDUCATION/TRAINING PROGRAM

## 2023-07-26 PROCEDURE — 88108 CYTOPATH CONCENTRATE TECH: CPT | Mod: TC | Performed by: INTERNAL MEDICINE

## 2023-07-26 PROCEDURE — 99207 PR NO BILLABLE SERVICE THIS VISIT: CPT

## 2023-07-26 PROCEDURE — 124N000003 HC R&B MH SENIOR/ADOLESCENT

## 2023-07-26 PROCEDURE — 84132 ASSAY OF SERUM POTASSIUM: CPT | Performed by: PSYCHIATRY & NEUROLOGY

## 2023-07-26 PROCEDURE — 99232 SBSQ HOSP IP/OBS MODERATE 35: CPT | Performed by: PSYCHIATRY & NEUROLOGY

## 2023-07-26 PROCEDURE — 36415 COLL VENOUS BLD VENIPUNCTURE: CPT | Performed by: PSYCHIATRY & NEUROLOGY

## 2023-07-26 RX ADMIN — CLOZAPINE 25 MG: 25 TABLET ORAL at 07:54

## 2023-07-26 RX ADMIN — FUROSEMIDE 20 MG: 20 TABLET ORAL at 08:02

## 2023-07-26 RX ADMIN — APIXABAN 5 MG: 5 TABLET, FILM COATED ORAL at 19:52

## 2023-07-26 RX ADMIN — ACETAMINOPHEN 1000 MG: 500 TABLET, FILM COATED ORAL at 19:52

## 2023-07-26 RX ADMIN — LISINOPRIL 5 MG: 5 TABLET ORAL at 07:54

## 2023-07-26 RX ADMIN — CALCIUM POLYCARBOPHIL 625 MG: 625 TABLET, FILM COATED ORAL at 07:54

## 2023-07-26 RX ADMIN — Medication 10 MG: at 19:52

## 2023-07-26 RX ADMIN — MONTELUKAST 10 MG: 10 TABLET, FILM COATED ORAL at 19:52

## 2023-07-26 RX ADMIN — MENTHOL 1 PATCH: 205.5 PATCH TOPICAL at 07:54

## 2023-07-26 RX ADMIN — ZINC SULFATE 220 MG (50 MG) CAPSULE 220 MG: CAPSULE at 07:54

## 2023-07-26 RX ADMIN — LIDOCAINE HYDROCHLORIDE 3 ML: 10 INJECTION, SOLUTION EPIDURAL; INFILTRATION; INTRACAUDAL; PERINEURAL at 10:35

## 2023-07-26 RX ADMIN — LEVOTHYROXINE SODIUM 50 MCG: 25 TABLET ORAL at 06:23

## 2023-07-26 RX ADMIN — ACETAMINOPHEN 1000 MG: 500 TABLET, FILM COATED ORAL at 07:54

## 2023-07-26 RX ADMIN — DOCUSATE SODIUM 100 MG: 100 CAPSULE, LIQUID FILLED ORAL at 07:54

## 2023-07-26 RX ADMIN — PANTOPRAZOLE SODIUM 40 MG: 40 TABLET, DELAYED RELEASE ORAL at 06:23

## 2023-07-26 RX ADMIN — CALCIUM POLYCARBOPHIL 625 MG: 625 TABLET, FILM COATED ORAL at 19:52

## 2023-07-26 RX ADMIN — CHLORHEXIDINE GLUCONATE 0.12% ORAL RINSE 15 ML: 1.2 LIQUID ORAL at 07:54

## 2023-07-26 RX ADMIN — CLOZAPINE 225 MG: 100 TABLET ORAL at 19:52

## 2023-07-26 RX ADMIN — PREDNISOLONE ACETATE 1 DROP: 10 SUSPENSION/ DROPS OPHTHALMIC at 07:54

## 2023-07-26 RX ADMIN — TOPIRAMATE 150 MG: 50 TABLET ORAL at 19:52

## 2023-07-26 RX ADMIN — WHITE PETROLATUM 57.7 %-MINERAL OIL 31.9 % EYE OINTMENT: at 19:52

## 2023-07-26 RX ADMIN — LAMOTRIGINE 50 MG: 25 TABLET ORAL at 07:54

## 2023-07-26 ASSESSMENT — ACTIVITIES OF DAILY LIVING (ADL)
ADLS_ACUITY_SCORE: 52
ADLS_ACUITY_SCORE: 42
ADLS_ACUITY_SCORE: 52
DRESS: WITH ASSISTANCE
ADLS_ACUITY_SCORE: 52
ADLS_ACUITY_SCORE: 67
DRESS: SCRUBS (BEHAVIORAL HEALTH)
HYGIENE/GROOMING: WITH SUPERVISION
ADLS_ACUITY_SCORE: 52
ADLS_ACUITY_SCORE: 52
LAUNDRY: UNABLE TO COMPLETE
ADLS_ACUITY_SCORE: 42
ADLS_ACUITY_SCORE: 67
ADLS_ACUITY_SCORE: 42
ADLS_ACUITY_SCORE: 67
ORAL_HYGIENE: PROMPTS
ADLS_ACUITY_SCORE: 42
HYGIENE/GROOMING: WITH ASSISTANCE
ORAL_HYGIENE: PROMPTS

## 2023-07-26 NOTE — PLAN OF CARE
Problem: Sleep Disturbance  Goal: Adequate Sleep/Rest  Outcome: Progressing   Goal Outcome Evaluation:       Pt was awaken and sleeping intermittently at the lounge around 8119-6936. Appears to be sleeping 6.5 hours this shift. No PRN given this shift. No breathing difficulties noted.  BMP blood draw this morning.  Continue to hold Eliquis for spinal tap. Will continue to monitor.

## 2023-07-26 NOTE — PLAN OF CARE
"  Problem: Psychotic Signs/Symptoms  Goal: Improved Behavioral Control (Psychotic Signs/Symptoms)  Outcome: Progressing  Goal: Optimal Cognitive Function (Psychotic Signs/Symptoms)  Outcome: Progressing  Goal: Enhanced Social, Occupational or Functional Skills (Psychotic Signs/Symptoms)  Outcome: Not Progressing   Goal Outcome Evaluation:         Received in the lounge having breakfast, appetite good.remained on SIO for self injury risk, pt has a medical bed to help with mobility.  BLE and feet with +1 pitting edema, MICHAEL's applied.  Gait slow and steady with a wheeled walker  Denied MH symptoms, \" no..no..no\" when assessment  MH questions were asked. Oriented to place, off 2 days for the day.  Took a nap after breakfastBMP drawn and repeat draw tomorrow..  1015 Sent to IR for Lumbar puncture via stretcher accompanied by staff.  Came back at 1120, placed flat on bed for 1 hours, denied  headache, backache,nausea/vomiting, no tingling or numbness to his legs, no bleeding at puncture site. Vital sign stable. 97.8-88-16 /81 O2 sats 97 % at room air  IR PA web paged, pt to resume Eliquis in the evening.  Up and about for lunch, ate 50 %, walked in a fast pace refusing to use his WW, refused scheduled Tylenol ,\" No pain\", difficult to redirect to use his walker  Lab  Encephalopathy, Autoimmune Evaluation [place], Serum needs to be redrawn, insufficient amount of blood drawn earlier, DOMINIC Winter notified.  EEG to be done in AM 7/27/23, they will call the unit for the time.    "

## 2023-07-26 NOTE — PLAN OF CARE
Assessment/Intervention/Current Symtoms and Care Coordination:  Pt discussed in team rounds this AM. Pt has been improving since multiple falls but continues to be on SIO with follow up from neurology. Pt has EEG and lumbar puncture scheduled for today. Tentative discharge for 8/14 after pt's guardian/sister comes back into town pending pt stabilization.     Writer put out email to outpatient care team inquiring about updates with waiver and adult day referrals.    Discharge Plan or Goal:  Pending stabilization and safe disposition plan, pt to return to  with adult day services on 8/14     Barriers to Discharge:  Pt continues to present as symptomatic (delusional mood). Pt is receiving ongoing stabilization and medication adjustment. Continue care coordination with [facility] to ascertain his acceptability for a return to the home.     Referral Status:  No current referrals - team inquiring about adult day services with openings     Legal Status:  Voluntary per guardian     Contacts:  Guardian/Sister - Huongxena Keenan   P: [phone]     - Sadaf  P: [phone]     Upcoming Meetings and Dates/Important Information and next steps:  [facility] to collaborate with outpatient team regarding adult day service referrals

## 2023-07-26 NOTE — PROGRESS NOTES
Brief Medicine Progress Note:    Fall  Patient fell on 7/23, hitting head on leg of chair. No LOC per staff. Patient complained of back pain and pain to the back of his head. No bleeding, dizziness, or lightheadedness. Denies pre-syncope issues. Neurology following.   - CT C-spine w/o contrast 7/24: no acute fracture or trauma subluxation  - MRI brain w/ contrast 7/25: no suspicious intracranial enhancement   - Lumbar puncture and EEG ordered per neurology     Adriane Reeder DNP, ACNPC-  Hospitalist Service

## 2023-07-26 NOTE — PROGRESS NOTES
07/25/23 2000   Group Therapy Session   Group Attendance attended group session   Time Session Began 1900   Time Session Ended 1950   Total Time (minutes) 50   Total # Attendees 7   Group Type recreation   Group Topic Covered leisure exploration/use of leisure time   Group Session Detail TR leisure group   Patient Response/Contribution cooperative with task   Patient Participation Detail Pt attended the structured Therapeutic Recreation group, participating in a group activity. Pt participated in group discussion, leisure participation, and social engagement to gain self-esteem, manage behaviors, improve social skills, decrease isolation, and reduce anxiety/depression.   Pt remained focused and engaged throughout group activity.  Pt was sociable and was appropriate with interactions with the others in group. Pt was an active participant, contributing to the clues and descriptions throughout the activity.

## 2023-07-26 NOTE — PROCEDURES
Interventional Radiology Brief Post Procedure Note    Procedure: IR LUMBAR PUNCTURE    Proceduralist: Rasta Mathew PA-C    Assistant: RENA Loyd    Time Out: Prior to the start of the procedure and with procedural staff participation, I verbally confirmed the patient s identity using two indicators, relevant allergies, that the procedure was appropriate and matched the consent or emergent situation, and that the correct equipment/implants were available. Immediately prior to starting the procedure I conducted the Time Out with the procedural staff and re-confirmed the patient s name, procedure, and site/side. (The Joint Commission universal protocol was followed.)  Yes    Medications   Medication Event Details Admin User Admin Time       Sedation: None. Local Anesthestic used    Findings: Completed image guided diagnostic lumbar puncture at L3/L4 with immediate return of clear CSF a total of 16 ml of CSF was collected without difficulty. Patient tolerated the procedure well. No immediate complications.     Estimated Blood Loss: None    Fluoroscopy Time:  0.8 minute(s)    SPECIMENS: Fluid and/or tissue for laboratory analysis    Complications: 1. None     Condition: Stable    Plan: Follow-up per primary team. Bedrest as much as possible for the rest of the day.     Comments: See dictated procedure note for full details.    Rasta Mathew PA-C

## 2023-07-26 NOTE — PROGRESS NOTES
"[facility]  PROGRESS NOTE     DATE OF SERVICE   07/26/2023        CHIEF COMPLAINT   \" I am feeling tired.\"       SUBJECTIVE   Nursing reports:  Pt was awaken and sleeping intermittently at the lounge around 9036-0093. Appears to be sleeping 6.5 hours this shift. No PRN given this shift. No breathing difficulties noted.  BMP blood draw this morning.  Continue to hold Eliquis for spinal tap. Will continue to monitor.     Patient has been reviewed with the  earlier today.  We continued with the plan for the patient to discharge back to his group home tentatively on August 14, 2023.     OBJECTIVE   Patient was seen and evaluated at bedside with one-to-one present during the assessment, this was a face-to-face evaluation.  Patient was seen after he came back from his lumbar puncture.  Patient reported that he is feeling tired and he was falling asleep during the assessment.  I discussed with the patient that he cannot walk for now and we will bring his lunch to his room.  Patient was in agreement with this.    I called the patient's Sister Huong and gave her an update about the results of the MRI, patient having the lumbar puncture and the recommendations from neurology for the patient to have an EEG.  Huong is interested in having a conversation with the neurologist and she is in agreement with the patient having the EEG.  I reminded Huong that the results of the lumbar puncture are not going to be available immediately and it may take a few weeks.  She verbalized good understanding of this.  At this time she is supportive of the treatment plan and denies having any concerns.     MEDICATIONS   Medications:  Scheduled Meds:   acetaminophen  1,000 mg Oral TID    apixaban ANTICOAGULANT  5 mg Oral BID    artificial tears   Right Eye At Bedtime    calcium polycarbophil  625 mg Oral BID    chlorhexidine  15 mL Swish & Spit Daily    cloZAPine  225 mg Oral At Bedtime    cloZAPine  25 mg Oral Daily    docusate " "sodium  100 mg Oral Daily    furosemide  20 mg Oral Daily    lamoTRIgine  50 mg Oral Daily    levothyroxine  50 mcg Oral or NG Tube QAM AC    lisinopril  5 mg Oral Daily    melatonin  10 mg Oral At Bedtime    menthol   Transdermal Q8H    montelukast  10 mg Oral At Bedtime    pantoprazole  40 mg Oral QAM AC    prednisoLONE acetate  1 drop Right Eye Daily    topiramate  150 mg Oral At Bedtime    zinc sulfate  220 mg Oral Daily     Continuous Infusions:  PRN Meds:.acetaminophen, alum & mag hydroxide-simethicone, artificial saliva, atropine, hydrALAZINE, hydrOXYzine, hypromellose-dextran, ipratropium, LORazepam, menthol **AND** menthol, OLANZapine **OR** OLANZapine, senna-docusate    Medication adherence issues: MS Med Adherence Y/N: Yes, Hospitalization  Medication side effects: MEDICATION SIDE EFFECTS: no side effects reported  Benefit: Yes / No: Yes       ROS   A comprehensive review of systems was negative.       MENTAL STATUS EXAM   Vitals: BP (!) 145/79 (BP Location: Right arm, Patient Position: Sitting)   Pulse 95   Temp 97.3  F (36.3  C) (Temporal)   Resp 16   Wt 94.7 kg (208 lb 12.4 oz)   SpO2 97%   BMI 35.84 kg/m      Appearance:  No apparent distress  Mood: \"I am feeling tired  Affect: Sedated was congruent to speech  Suicidal Ideation: PRESENT / ABSENT: absent   Homicidal Ideation: PRESENT / ABSENT: absent     Thought process: [place]  Thought content: Remains delusional but he will only talk about his delusions when we ask about it.  Fund of Knowledge: Below average  Attention/Concentration: Limited  Language ability:  Intact, speech at baseline  Memory:  Immediate recall impaired, Short-term memory impaired and Long-term memory intact  Insight: limited   Judgement: Fair  Orientation: Person, time and place.  Continues to believe that he is at a hotel despite saying that he is at [facility]  Psychomotor Behavior: slowed    Muscle Strength and Tone: MuscleStrength: Normal during the assessment " with neurology  Gait and Station: Not evaluated       LABS   personally reviewed.   Recent Labs   Lab 07/26/23  0906 07/25/23  0654 07/23/23  0759   WBC  --  6.8  --    HGB  --  13.2*  --    MCV  --  82  --    PLT  --  174  --      --  142   POTASSIUM 3.7  --  3.8   CHLORIDE 109*  --  109*   CO2 18*  --  22   BUN 19.6  --  20.9*   CR 0.87  --  0.97   ANIONGAP 14  --  11   NASIR 9.2  --  9.5   *  --  138*     No results found for: PHENYTOIN, PHENOBARB, VALPROATE, CBMZ       DIAGNOSIS   Principal Problem:    Schizoaffective disorder, bipolar type (H)    Active Problem List:  Patient Active Problem List   Diagnosis    Closed head injury, initial encounter    Altered mental status, unspecified altered mental status type    Generalized weakness    Portal vein thrombosis    Pleural effusion    Generalized muscle weakness    Falls frequently    Other ascites    Acute pancreatitis, unspecified complication status, unspecified pancreatitis type    Pancreatic pseudocyst    Idiopathic acute pancreatitis, unspecified complication status    Allergic rhinitis    Fisher's esophagus    CTS (carpal tunnel syndrome)    Elevated liver enzymes    HTN (hypertension)    Hypothyroidism    Keratoconus    Major depressive disorder, recurrent episode, mild (H)    Intellectual disability    Morbid exogenous obesity (H)    Neuroleptic malignant syndrome    Obstructive sleep apnea syndrome    Bipolar affective disorder (H)    Seizure disorder (H)    Seizures, generalized convulsive (H)    Acute respiratory failure with hypoxia (H)    Anemia, unspecified    Anxiety disorder, unspecified    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)    Dietary zinc deficiency    Dry eye syndrome of bilateral lacrimal glands    Dry mouth, unspecified    Edema, unspecified    Gastro-esophageal reflux disease without esophagitis    Hyperosmolality and hypernatremia    Irritable bowel syndrome with constipation    Major depressive  disorder, recurrent, unspecified (H)    Metabolic encephalopathy    Moderate protein-calorie malnutrition (H)    Other symbolic dysfunctions    Schizoaffective disorder, unspecified (H)    Thrombocytopenia, unspecified (H)    Unspecified asthma, uncomplicated    Urinary tract infection, site not specified    Vitamin D deficiency, unspecified    Schizoaffective disorder, bipolar type (H)          PLAN   1. Ongoing education given regarding diagnostic and treatment options with risks, benefits and alternatives and adequate verbalization of understanding.  2.  Medications       Melatonin 10 mg at bedtime       Clozapine at 25 mg daily and 225 mg at bedtime.  We will continue increasing the dose of the Clozaril slowly.       Topamax 150 mg at bedtime       Lamictal 50 mg daily    3.  Medical team following as needed  4.   coordinating a safe discharge plan  5.  Neurology consult place, patient was assessed and recommendations have been followed.  MRI has been ordered.  I have also ordered the blood work recommended by neurology.  Please see their notes for more details.    Risk Assessment: [facility] RISK ASSESSMENT: Patient able to contract for safety    Coordination of Care:   Treatment Plan reviewed and physician signed, Care discussed with Care/Treatment Team Members, Chart reviewed and Patient seen      Re-Certification I certify that the inpatient psychiatric facility services furnished since the previous certification were, and continue to be, medically necessary for, either, treatment which could reasonably be expected to improve the patient s condition or diagnostic study and that the hospital records indicate that the services furnished were, either, intensive treatment services, admission and related services necessary for diagnostic study, or equivalent services.     I certify that the patient continues to need, on a daily basis, active treatment furnished directly by or requiring the supervision  of inpatient psychiatric facility personnel.   I estimate 14 days of hospitalization is necessary for proper treatment of the patient. My plans for post-hospital care for this patient are  group home     Alejandra Watkins MD    -     07/26/2023  -     11:54 AM    Total time 35 minutes with > 50%spent on coordination of cares and psycho-education.    This note was created with help of Dragon dictation system. Grammatical / typing errors are not intentional.    Alejandra Watkins MD

## 2023-07-27 ENCOUNTER — ANCILLARY PROCEDURE (OUTPATIENT)
Dept: NEUROLOGY | Facility: CLINIC | Age: 53
DRG: 885 | End: 2023-07-27
Attending: INTERNAL MEDICINE
Payer: MEDICARE

## 2023-07-27 LAB
ANION GAP SERPL CALCULATED.3IONS-SCNC: 11 MMOL/L (ref 7–15)
BUN SERPL-MCNC: 19.5 MG/DL (ref 6–20)
CALCIUM SERPL-MCNC: 9.5 MG/DL (ref 8.6–10)
CHLORIDE SERPL-SCNC: 110 MMOL/L (ref 98–107)
CLOZAPINE SERPL-MCNC: 423 NG/ML
CLOZAPINE+NOR SERPL-MCNC: 808 NG/ML
CNO SERPL-MCNC: <100 NG/ML
CREAT SERPL-MCNC: 0.95 MG/DL (ref 0.67–1.17)
DEPRECATED HCO3 PLAS-SCNC: 20 MMOL/L (ref 22–29)
GFR SERPL CREATININE-BSD FRML MDRD: >90 ML/MIN/1.73M2
GLUCOSE SERPL-MCNC: 114 MG/DL (ref 70–99)
NORCLOZAPINE SERPL-MCNC: 385 NG/ML
PATH REPORT.COMMENTS IMP SPEC: NORMAL
PATH REPORT.FINAL DX SPEC: NORMAL
PATH REPORT.MICROSCOPIC SPEC OTHER STN: NORMAL
PATH REPORT.RELEVANT HX SPEC: NORMAL
POTASSIUM SERPL-SCNC: 4 MMOL/L (ref 3.4–5.3)
SODIUM SERPL-SCNC: 141 MMOL/L (ref 136–145)

## 2023-07-27 PROCEDURE — 124N000003 HC R&B MH SENIOR/ADOLESCENT

## 2023-07-27 PROCEDURE — 250N000013 HC RX MED GY IP 250 OP 250 PS 637: Performed by: STUDENT IN AN ORGANIZED HEALTH CARE EDUCATION/TRAINING PROGRAM

## 2023-07-27 PROCEDURE — 95816 EEG AWAKE AND DROWSY: CPT | Mod: 26 | Performed by: PSYCHIATRY & NEUROLOGY

## 2023-07-27 PROCEDURE — 250N000013 HC RX MED GY IP 250 OP 250 PS 637

## 2023-07-27 PROCEDURE — 80048 BASIC METABOLIC PNL TOTAL CA: CPT

## 2023-07-27 PROCEDURE — 95816 EEG AWAKE AND DROWSY: CPT

## 2023-07-27 PROCEDURE — 36415 COLL VENOUS BLD VENIPUNCTURE: CPT

## 2023-07-27 PROCEDURE — 250N000013 HC RX MED GY IP 250 OP 250 PS 637: Performed by: PSYCHIATRY & NEUROLOGY

## 2023-07-27 PROCEDURE — G0177 OPPS/PHP; TRAIN & EDUC SERV: HCPCS

## 2023-07-27 PROCEDURE — 250N000013 HC RX MED GY IP 250 OP 250 PS 637: Performed by: EMERGENCY MEDICINE

## 2023-07-27 PROCEDURE — 99232 SBSQ HOSP IP/OBS MODERATE 35: CPT | Performed by: PSYCHIATRY & NEUROLOGY

## 2023-07-27 RX ADMIN — LISINOPRIL 5 MG: 5 TABLET ORAL at 08:52

## 2023-07-27 RX ADMIN — CLOZAPINE 25 MG: 25 TABLET ORAL at 08:52

## 2023-07-27 RX ADMIN — ACETAMINOPHEN 1000 MG: 500 TABLET, FILM COATED ORAL at 14:08

## 2023-07-27 RX ADMIN — ACETAMINOPHEN 1000 MG: 500 TABLET, FILM COATED ORAL at 21:06

## 2023-07-27 RX ADMIN — CHLORHEXIDINE GLUCONATE 0.12% ORAL RINSE 15 ML: 1.2 LIQUID ORAL at 08:52

## 2023-07-27 RX ADMIN — TOPIRAMATE 150 MG: 50 TABLET ORAL at 21:06

## 2023-07-27 RX ADMIN — Medication 10 MG: at 21:07

## 2023-07-27 RX ADMIN — CALCIUM POLYCARBOPHIL 625 MG: 625 TABLET, FILM COATED ORAL at 08:53

## 2023-07-27 RX ADMIN — PREDNISOLONE ACETATE 1 DROP: 10 SUSPENSION/ DROPS OPHTHALMIC at 08:53

## 2023-07-27 RX ADMIN — CLOZAPINE 225 MG: 100 TABLET ORAL at 21:06

## 2023-07-27 RX ADMIN — APIXABAN 5 MG: 5 TABLET, FILM COATED ORAL at 21:06

## 2023-07-27 RX ADMIN — APIXABAN 5 MG: 5 TABLET, FILM COATED ORAL at 08:53

## 2023-07-27 RX ADMIN — MENTHOL 1 PATCH: 205.5 PATCH TOPICAL at 08:53

## 2023-07-27 RX ADMIN — ACETAMINOPHEN 1000 MG: 500 TABLET, FILM COATED ORAL at 08:52

## 2023-07-27 RX ADMIN — DOCUSATE SODIUM 100 MG: 100 CAPSULE, LIQUID FILLED ORAL at 08:53

## 2023-07-27 RX ADMIN — LEVOTHYROXINE SODIUM 50 MCG: 25 TABLET ORAL at 06:30

## 2023-07-27 RX ADMIN — ZINC SULFATE 220 MG (50 MG) CAPSULE 220 MG: CAPSULE at 08:53

## 2023-07-27 RX ADMIN — LAMOTRIGINE 50 MG: 25 TABLET ORAL at 08:53

## 2023-07-27 RX ADMIN — FUROSEMIDE 20 MG: 20 TABLET ORAL at 08:52

## 2023-07-27 RX ADMIN — PANTOPRAZOLE SODIUM 40 MG: 40 TABLET, DELAYED RELEASE ORAL at 06:30

## 2023-07-27 RX ADMIN — MONTELUKAST 10 MG: 10 TABLET, FILM COATED ORAL at 21:07

## 2023-07-27 RX ADMIN — CALCIUM POLYCARBOPHIL 625 MG: 625 TABLET, FILM COATED ORAL at 21:06

## 2023-07-27 ASSESSMENT — ACTIVITIES OF DAILY LIVING (ADL)
ADLS_ACUITY_SCORE: 67

## 2023-07-27 NOTE — PLAN OF CARE
Problem: Sleep Disturbance  Goal: Adequate Sleep/Rest  Outcome: Progressing   Goal Outcome Evaluation:       Pt slept until 0245 and came out to the lounge at 0400. He had one episode of incontinent. He continues to use medication bed. Button for the bed not working. Blood draw done this morning. SIO 5ft for risk of injury to self. Given 0600 meds. No more concerns at this time. Will continue to monitor.

## 2023-07-27 NOTE — PLAN OF CARE
Problem: Psychotic Signs/Symptoms  Goal: Improved Behavioral Control (Psychotic Signs/Symptoms)  Outcome: Progressing  Goal: Improved Mood Symptoms  Outcome: Progressing   Goal Outcome Evaluation :         Pt calm , pleasant and with a bright affect, cooperative with cares and medication compliant. Denies MH symptoms.  Went for EEG at 0930 accompanied by 2 staff  Gait unsteady but utilizing a WW w/ staff  Pt is on SIO for self injury risk and has a no roommate order due to poor boundaries and intrusiveness  Pt continued to believe that he is in a hotel and got upset when Dr. Charles told him he will be discharging mid August and that Dr. Chalres moving out of state.  Attended unit activities.

## 2023-07-27 NOTE — CARE PLAN
07/27/23 1545   Group Therapy Session   Group Attendance attended group session   Time Session Began 1300   Time Session Ended 1430   Total Time (minutes) 90   Total # Attendees 4   Group Type psychotherapeutic   Group Topic Covered coping skills/lifestyle management;relapse prevention;cognitive therapy techniques;structured socialization;balanced lifestyle   Group Session Detail  Activity focused on Affirmations, choosing ones that are pertinent to each person and why and how they are important to utilize.   Patient Response/Contribution cooperative with task;listened actively   Patient Participation Detail With task set up, was quick to make decisions and follow through with assistance and guidance of steps on task. Needs 1:1 with detail follow through. Pleasant when having help when needed and used gentle humor in a delightful manner. Seemed more comfortable and willing to work within guidelines than the morning session.

## 2023-07-27 NOTE — PLAN OF CARE
Assessment/Intervention/Current Symtoms and Care Coordination:  Pt discussed in team rounds this AM. Pt has been improving since multiple falls but continues to be on SIO with follow up from neurology. Tentative discharge for 8/14 after pt's guardian/sister comes back into town pending pt stabilization.     Discharge Plan or Goal:  Pending stabilization and safe disposition plan, pt to return to  with adult day services on 8/14     Barriers to Discharge:  Pt continues to present as symptomatic (delusional mood). Pt is receiving ongoing stabilization and medication adjustment. Continue care coordination with [facility] to ascertain his acceptability for a return to the home.     Referral Status:  No current referrals - team inquiring about adult day services with openings     Legal Status:  Voluntary per guardian     Contacts:  Guardian/Sister - Huong Keenan   P: [phone]     - Sadaf  P: [phone]     Upcoming Meetings and Dates/Important Information and next steps:  CTC to collaborate with outpatient team regarding adult day service referrals

## 2023-07-27 NOTE — CARE PLAN
Occupational Therapy     07/27/23 1200   Group Therapy Session   Group Attendance attended group session   Time Session Began 1015   Time Session Ended 1115   Total Time (minutes) 45   Total # Attendees 6   Group Type task skill   Group Topic Covered cognitive activities;coping skills/lifestyle management;leisure exploration/use of leisure time;problem-solving;structured socialization   Group Session Detail OT: Education on healthy activity engagement and creative hands-on endeavor (OT clinic) to increase concentration, focus, attention to task/detail, decision making, problem solving, frustration tolerance, task follow through, coping with stress, healthy leisure engagement, creative expression, and social engagement   Patient Response/Contribution cooperative with task;disorganized;confused;became angry or agitated;refused to comply with staff direction;other (see comments);left the group on several occasions  (irritable)   Patient Participation Detail Pt arrived late to group and sat among peers to complete familiar creative hands on endeavor; pt did not engage in social interactions with peers. Pt worked in a quick and messy manner to complete his project, paying little to no attention to detail. Pt became irritable and argumentative when therapist directed pt to add additional varnish to his project (he completed less than 50% of the project), so project would be more completely covered; pt refused to comply with directions. Pt eventually became compliant with therapist direction to select ceramic tiles to add to project tomorrow. Pt quickly and randomly selected tiles, not looking at the individual colors or thinking about the planning process. Pt attempted to push boundaries by continuing to wear his radio headphones in group area, despite knowing the rule of placing headphones on the counter during group. Pt pulled from group by provider and returned; pt left and returned two more times. TAINAO present for  duration.

## 2023-07-27 NOTE — CARE PLAN
07/26/23 2137   Group Therapy Session   Group Attendance attended group session   Time Session Began 1900   Time Session Ended 1945   Total Time (minutes) 25   Total # Attendees 3   Group Type psychotherapeutic   Group Topic Covered coping skills/lifestyle management;emotions/expression   Group Session Detail CTC-Group members discussed/completed a CBT worksheet on catastrophizing worrying What could happen vs. What will happen.   Patient Response/Contribution cooperative with task   Patient Participation Detail Patient presented to group was pleasant checked in feeling tired and left group early.

## 2023-07-27 NOTE — PROGRESS NOTES
"[facility]  PROGRESS NOTE     DATE OF SERVICE   07/27/2023        CHIEF COMPLAINT   \" I am doing okay\"       SUBJECTIVE   Nursing reports:  Plan of Care Reviewed With: patient       Pt visible in milieu. Reports being in good mood. Affect blunted. Eye contact appropriate. Avoids social contact. On SIO of 5 ft for risk of injury to self. Compliant with utilizing wheeled walker this shift; gait steady and balanced. No delusional comments. Pt in medical bed for mobility and comfort. Compression stockings removed at HS. Headphones removed at 2200 as ordered. Dressing over lumbar puncture CDI. Med-compliant. Cooperative. Continue with current treatment plan and recommendations. Continue to monitor and reassess symptoms. Monitor response to medications. Monitor progress towards treatment goals. Encourage groups and participation.      Patient has been reviewed with the  earlier today.    Assessment/Intervention/Current Symtoms and Care Coordination:  Pt discussed in team rounds this AM. Pt has been improving since multiple falls but continues to be on SIO with follow up from neurology. Pt has EEG and lumbar puncture scheduled for today. Tentative discharge for 8/14 after pt's guardian/sister comes back into town pending pt stabilization.      Writer put out email to outpatient care team inquiring about updates with waiver and adult day referrals.     OBJECTIVE   Patient was seen and evaluated in the consult room with one-to-one present during the assessment, this was a face-to-face patient.  I tried to reviewed with the patient the reasons why he had a lumbar puncture and the EEG but the patient refused to listen to any explanation.  Patient stated that everything is okay with him and he does not need any other test.  Patient is refusing to acknowledge that this is the hospital and continues to [place].  I did reminded the patient that we are planning for him to leave the hospital " "tentatively August 14.  I also discussed with the patient that I will not be available after August 18 even if he stays in the hospital, meaning that he will be working with a different provider if he stays in the hospital past August 18.  Patient responded by saying \"stop talking like that I do not want you to ask me any questions and I am going back to groups\".  Patient then got up and proceeded to leave the room.         MEDICATIONS   Medications:  Scheduled Meds:   acetaminophen  1,000 mg Oral TID    apixaban ANTICOAGULANT  5 mg Oral BID    artificial tears   Right Eye At Bedtime    calcium polycarbophil  625 mg Oral BID    chlorhexidine  15 mL Swish & Spit Daily    cloZAPine  225 mg Oral At Bedtime    cloZAPine  25 mg Oral Daily    docusate sodium  100 mg Oral Daily    furosemide  20 mg Oral Daily    lamoTRIgine  50 mg Oral Daily    levothyroxine  50 mcg Oral or NG Tube QAM AC    lisinopril  5 mg Oral Daily    melatonin  10 mg Oral At Bedtime    menthol   Transdermal Q8H    montelukast  10 mg Oral At Bedtime    pantoprazole  40 mg Oral QAM AC    prednisoLONE acetate  1 drop Right Eye Daily    topiramate  150 mg Oral At Bedtime    zinc sulfate  220 mg Oral Daily     Continuous Infusions:  PRN Meds:.acetaminophen, alum & mag hydroxide-simethicone, artificial saliva, atropine, hydrALAZINE, hydrOXYzine, hypromellose-dextran, ipratropium, LORazepam, menthol **AND** menthol, OLANZapine **OR** OLANZapine, senna-docusate    Medication adherence issues: MS Med Adherence Y/N: Yes, Hospitalization  Medication side effects: MEDICATION SIDE EFFECTS: no side effects reported  Benefit: Yes / No: Yes       ROS   A comprehensive review of systems was negative.       MENTAL STATUS EXAM   Vitals: /84   Pulse 90   Temp 98  F (36.7  C)   Resp 20   Wt 97.4 kg (214 lb 11.7 oz)   SpO2 98%   BMI 36.86 kg/m      Appearance:  No apparent distress  Mood: \"I am feeling okay\"  Affect: Irritable at times was congruent to " speech  Suicidal Ideation: PRESENT / ABSENT: absent   Homicidal Ideation: PRESENT / ABSENT: absent     Thought process: [place]  Thought content: Remains delusional but he will only talk about his delusions when we ask about it.  Fund of Knowledge: Below average  Attention/Concentration: Limited  Language ability:  Intact, speech at baseline  Memory:  Immediate recall impaired, Short-term memory impaired and Long-term memory intact  Insight: limited   Judgement: Fair  Orientation: Person, time and place.  Continues to believe that he is at a hotel despite saying that he is at [facility]  Psychomotor Behavior: slowed    Muscle Strength and Tone: MuscleStrength: Normal during the assessment with neurology  Gait and Station: Stable with the use of the walker       LABS   personally reviewed.   Recent Labs   Lab 07/27/23  0655 07/26/23  0906 07/25/23  0654 07/23/23  0759   WBC  --   --  6.8  --    HGB  --   --  13.2*  --    MCV  --   --  82  --    PLT  --   --  174  --     141  --  142   POTASSIUM 4.0 3.7  --  3.8   CHLORIDE 110* 109*  --  109*   CO2 20* 18*  --  22   BUN 19.5 19.6  --  20.9*   CR 0.95 0.87  --  0.97   ANIONGAP 11 14  --  11   NASIR 9.5 9.2  --  9.5   * 109*  --  138*     No results found for: PHENYTOIN, PHENOBARB, VALPROATE, CBMZ       DIAGNOSIS   Principal Problem:    Schizoaffective disorder, bipolar type (H)    Active Problem List:  Patient Active Problem List   Diagnosis    Closed head injury, initial encounter    Altered mental status, unspecified altered mental status type    Generalized weakness    Portal vein thrombosis    Pleural effusion    Generalized muscle weakness    Falls frequently    Other ascites    Acute pancreatitis, unspecified complication status, unspecified pancreatitis type    Pancreatic pseudocyst    Idiopathic acute pancreatitis, unspecified complication status    Allergic rhinitis    Fisher's esophagus    CTS (carpal tunnel syndrome)    Elevated liver  enzymes    HTN (hypertension)    Hypothyroidism    Keratoconus    Major depressive disorder, recurrent episode, mild (H)    Intellectual disability    Morbid exogenous obesity (H)    Neuroleptic malignant syndrome    Obstructive sleep apnea syndrome    Bipolar affective disorder (H)    Seizure disorder (H)    Seizures, generalized convulsive (H)    Acute respiratory failure with hypoxia (H)    Anemia, unspecified    Anxiety disorder, unspecified    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)    Dietary zinc deficiency    Dry eye syndrome of bilateral lacrimal glands    Dry mouth, unspecified    Edema, unspecified    Gastro-esophageal reflux disease without esophagitis    Hyperosmolality and hypernatremia    Irritable bowel syndrome with constipation    Major depressive disorder, recurrent, unspecified (H)    Metabolic encephalopathy    Moderate protein-calorie malnutrition (H)    Other symbolic dysfunctions    Schizoaffective disorder, unspecified (H)    Thrombocytopenia, unspecified (H)    Unspecified asthma, uncomplicated    Urinary tract infection, site not specified    Vitamin D deficiency, unspecified    Schizoaffective disorder, bipolar type (H)          PLAN   1. Ongoing education given regarding diagnostic and treatment options with risks, benefits and alternatives and adequate verbalization of understanding.  2.  Medications       Melatonin 10 mg at bedtime       Clozapine at 25 mg daily and 225 mg at bedtime.  We will continue increasing the dose of the Clozaril slowly.       Topamax 150 mg at bedtime       Lamictal 50 mg daily    3.  Medical team following as needed  4.   coordinating a safe discharge plan  5.  I will send a message to the doctor covering from the neurology service in order to continue following up on the results of the lumbar puncture and discussed the MRI results with the family.    Risk Assessment: [facility] RISK ASSESSMENT: Patient able to contract for  safety    Coordination of Care:   Treatment Plan reviewed and physician signed, Care discussed with Care/Treatment Team Members, Chart reviewed and Patient seen      Re-Certification I certify that the inpatient psychiatric facility services furnished since the previous certification were, and continue to be, medically necessary for, either, treatment which could reasonably be expected to improve the patient s condition or diagnostic study and that the hospital records indicate that the services furnished were, either, intensive treatment services, admission and related services necessary for diagnostic study, or equivalent services.     I certify that the patient continues to need, on a daily basis, active treatment furnished directly by or requiring the supervision of inpatient psychiatric facility personnel.   I estimate 14 days of hospitalization is necessary for proper treatment of the patient. My plans for post-hospital care for this patient are  group home     Alejandra Watkins MD    -     07/27/2023  -     12:43 PM    Total time 35 minutes with > 50%spent on coordination of cares and psycho-education.    This note was created with help of Dragon dictation system. Grammatical / typing errors are not intentional.    Alejandra Watkins MD

## 2023-07-27 NOTE — PLAN OF CARE
Goal Outcome Evaluation:    Plan of Care Reviewed With: patient          Pt visible in milieu. Reports being in good mood. Affect blunted. Eye contact appropriate. Avoids social contact. On SIO of 5 ft for risk of injury to self. Compliant with utilizing wheeled walker this shift; gait steady and balanced. No delusional comments. Pt in medical bed for mobility and comfort. Compression stockings removed at HS. Headphones removed at 2200 as ordered. Dressing over lumbar puncture CDI. Med-compliant. Cooperative. Continue with current treatment plan and recommendations. Continue to monitor and reassess symptoms. Monitor response to medications. Monitor progress towards treatment goals. Encourage groups and participation.

## 2023-07-28 LAB
ALB CSF/SERPL: 4.5 RATIO
ALBUMIN CSF-MCNC: 18 MG/DL
ALBUMIN SERPL-MCNC: 3977 MG/DL
IGG CSF-MCNC: 2.2 MG/DL
IGG SERPL-MCNC: 1055 MG/DL
IGG SYNTH RATE SER+CSF CALC-MRATE: <0 MG/D
IGG/ALB CLEAR SER+CSF-RTO: 0.46 RATIO
IGG/ALB CSF: 0.12 RATIO
OLIGOCLONAL BANDS CSF ELPH-IMP: NEGATIVE
OLIGOCLONAL BANDS CSF ELPH-IMP: NORMAL
OLIGOCLONAL BANDS CSF IEF: 1 BANDS

## 2023-07-28 PROCEDURE — 250N000013 HC RX MED GY IP 250 OP 250 PS 637: Performed by: PSYCHIATRY & NEUROLOGY

## 2023-07-28 PROCEDURE — 99232 SBSQ HOSP IP/OBS MODERATE 35: CPT | Performed by: PSYCHIATRY & NEUROLOGY

## 2023-07-28 PROCEDURE — 250N000013 HC RX MED GY IP 250 OP 250 PS 637: Performed by: EMERGENCY MEDICINE

## 2023-07-28 PROCEDURE — 250N000013 HC RX MED GY IP 250 OP 250 PS 637

## 2023-07-28 PROCEDURE — G0177 OPPS/PHP; TRAIN & EDUC SERV: HCPCS

## 2023-07-28 PROCEDURE — 250N000013 HC RX MED GY IP 250 OP 250 PS 637: Performed by: STUDENT IN AN ORGANIZED HEALTH CARE EDUCATION/TRAINING PROGRAM

## 2023-07-28 PROCEDURE — 124N000003 HC R&B MH SENIOR/ADOLESCENT

## 2023-07-28 PROCEDURE — 99207 PR NO BILLABLE SERVICE THIS VISIT: CPT

## 2023-07-28 RX ADMIN — APIXABAN 5 MG: 5 TABLET, FILM COATED ORAL at 20:36

## 2023-07-28 RX ADMIN — DOCUSATE SODIUM 100 MG: 100 CAPSULE, LIQUID FILLED ORAL at 08:27

## 2023-07-28 RX ADMIN — ACETAMINOPHEN 1000 MG: 500 TABLET, FILM COATED ORAL at 20:36

## 2023-07-28 RX ADMIN — APIXABAN 5 MG: 5 TABLET, FILM COATED ORAL at 08:26

## 2023-07-28 RX ADMIN — WHITE PETROLATUM 57.7 %-MINERAL OIL 31.9 % EYE OINTMENT: at 20:38

## 2023-07-28 RX ADMIN — LISINOPRIL 5 MG: 5 TABLET ORAL at 08:26

## 2023-07-28 RX ADMIN — ACETAMINOPHEN 1000 MG: 500 TABLET, FILM COATED ORAL at 08:26

## 2023-07-28 RX ADMIN — CHLORHEXIDINE GLUCONATE 0.12% ORAL RINSE 15 ML: 1.2 LIQUID ORAL at 08:26

## 2023-07-28 RX ADMIN — LAMOTRIGINE 50 MG: 25 TABLET ORAL at 08:25

## 2023-07-28 RX ADMIN — CLOZAPINE 225 MG: 100 TABLET ORAL at 20:37

## 2023-07-28 RX ADMIN — LEVOTHYROXINE SODIUM 50 MCG: 25 TABLET ORAL at 06:51

## 2023-07-28 RX ADMIN — ZINC SULFATE 220 MG (50 MG) CAPSULE 220 MG: CAPSULE at 08:26

## 2023-07-28 RX ADMIN — MONTELUKAST 10 MG: 10 TABLET, FILM COATED ORAL at 20:37

## 2023-07-28 RX ADMIN — PREDNISOLONE ACETATE 1 DROP: 10 SUSPENSION/ DROPS OPHTHALMIC at 08:27

## 2023-07-28 RX ADMIN — TOPIRAMATE 150 MG: 50 TABLET ORAL at 20:37

## 2023-07-28 RX ADMIN — PANTOPRAZOLE SODIUM 40 MG: 40 TABLET, DELAYED RELEASE ORAL at 06:51

## 2023-07-28 RX ADMIN — ACETAMINOPHEN 1000 MG: 500 TABLET, FILM COATED ORAL at 14:31

## 2023-07-28 RX ADMIN — CALCIUM POLYCARBOPHIL 625 MG: 625 TABLET, FILM COATED ORAL at 20:37

## 2023-07-28 RX ADMIN — CLOZAPINE 25 MG: 25 TABLET ORAL at 08:26

## 2023-07-28 RX ADMIN — FUROSEMIDE 20 MG: 20 TABLET ORAL at 08:25

## 2023-07-28 RX ADMIN — CALCIUM POLYCARBOPHIL 625 MG: 625 TABLET, FILM COATED ORAL at 08:26

## 2023-07-28 RX ADMIN — Medication 10 MG: at 20:37

## 2023-07-28 ASSESSMENT — ACTIVITIES OF DAILY LIVING (ADL)
ADLS_ACUITY_SCORE: 67
ADLS_ACUITY_SCORE: 67
LAUNDRY: UNABLE TO COMPLETE
ADLS_ACUITY_SCORE: 67
ADLS_ACUITY_SCORE: 67
HYGIENE/GROOMING: WITH SUPERVISION
LAUNDRY: UNABLE TO COMPLETE
ADLS_ACUITY_SCORE: 67
ORAL_HYGIENE: WITH SUPERVISION;PROMPTS
ADLS_ACUITY_SCORE: 67
ADLS_ACUITY_SCORE: 67
HYGIENE/GROOMING: WITH SUPERVISION
ORAL_HYGIENE: WITH SUPERVISION;PROMPTS
ADLS_ACUITY_SCORE: 67
DRESS: SCRUBS (BEHAVIORAL HEALTH);WITH ASSISTANCE
DRESS: SCRUBS (BEHAVIORAL HEALTH);WITH SUPERVISION
ADLS_ACUITY_SCORE: 65
ADLS_ACUITY_SCORE: 65
ADLS_ACUITY_SCORE: 67
ADLS_ACUITY_SCORE: 67

## 2023-07-28 NOTE — PLAN OF CARE
Problem: Psychotic Signs/Symptoms  Goal: Improved Behavioral Control (Psychotic Signs/Symptoms)  Note: Patient had a good shift. He mostly kept to himself whether in the milieu or in his room with the 1:1. He reported having no pain. He was steady on his feet. He ate well and has adequate hydration. No constipation per patient report. He needed no redirection this shift, was not loud or intrusive with staff or peers. He is in a private room due to behaviors. No new medical, behavioral or safety concerns to report this shift.

## 2023-07-28 NOTE — PROGRESS NOTES
"RatePSYCHIATRY  PROGRESS NOTE     DATE OF SERVICE   07/28/2023        CHIEF COMPLAINT   \" I am doing fantastic\"       SUBJECTIVE   Nursing reports:  Patient was asleep at the start of the shift. Woke up four times during the night and went back to sleep after each time.  Continent of urine four times  too.  Had one medium size bowel movement. Woke up early and opted to stay in the lounge. No agitation, hallucinations  and other behavioral issues noted the whole night.      Patient has been reviewed with the  earlier today.  For now we continue with the plan for the patient to leave the hospital on August 14 and return to his group home.     OBJECTIVE   Patient was seen and evaluated in the consult room with one-to-one present during the assessment, this was a face-to-face patient.  Patient reported that he is feeling fantastic, great and awesome  He is denying having any side effects from the medications.  Patient has been calm and very redirectable.  He has been more stable on his feet and is not using the walker anymore.  Continues to present as delusional as he thinks that this is not a hospital and is a mansion.  Patient said that he cannot discharge because this is his home now.  He tells me that he has continued to talk to his parents and he does not want for this writer to ask any more questions.  I reminded the patient that we are planning for him to leave the hospital in August 14 but the patient say \"quit talking lady I am staying here\".  Other than that the patient has been doing well and no behaviors have been reported.    Patient did not wanted to talk about the evaluation done by neurology.  I informed the patient that if he continues to be stable on his feet I will take him off the one-to-one on Monday.         MEDICATIONS   Medications:  Scheduled Meds:   acetaminophen  1,000 mg Oral TID    apixaban ANTICOAGULANT  5 mg Oral BID    artificial tears   Right Eye At Bedtime    calcium " "polycarbophil  625 mg Oral BID    chlorhexidine  15 mL Swish & Spit Daily    cloZAPine  225 mg Oral At Bedtime    cloZAPine  25 mg Oral Daily    docusate sodium  100 mg Oral Daily    furosemide  20 mg Oral Daily    lamoTRIgine  50 mg Oral Daily    levothyroxine  50 mcg Oral or NG Tube QAM AC    lisinopril  5 mg Oral Daily    melatonin  10 mg Oral At Bedtime    menthol   Transdermal Q8H    montelukast  10 mg Oral At Bedtime    pantoprazole  40 mg Oral QAM AC    prednisoLONE acetate  1 drop Right Eye Daily    topiramate  150 mg Oral At Bedtime    zinc sulfate  220 mg Oral Daily     Continuous Infusions:  PRN Meds:.acetaminophen, alum & mag hydroxide-simethicone, artificial saliva, atropine, hydrALAZINE, hydrOXYzine, hypromellose-dextran, ipratropium, LORazepam, menthol **AND** menthol, OLANZapine **OR** OLANZapine, senna-docusate    Medication adherence issues: MS Med Adherence Y/N: Yes, Hospitalization  Medication side effects: MEDICATION SIDE EFFECTS: no side effects reported  Benefit: Yes / No: Yes       ROS   A comprehensive review of systems was negative.       MENTAL STATUS EXAM   Vitals: /83 (BP Location: Right arm, Patient Position: Sitting, Cuff Size: Adult Regular)   Pulse 97   Temp 98  F (36.7  C)   Resp 18   Wt 97.4 kg (214 lb 11.7 oz)   SpO2 97%   BMI 36.86 kg/m      Appearance:  No apparent distress  Mood: \"I am great and fantastic\"  Affect: Calm was congruent to speech  Suicidal Ideation: PRESENT / ABSENT: absent   Homicidal Ideation: PRESENT / ABSENT: absent     Thought process: [place]  Thought content: Remains delusional but he will only talk about his delusions when we ask about it.  Continues to refuse to talk about a discharge plan because as per patient this is his at home.  Fund of Knowledge: Below average  Attention/Concentration: Limited  Language ability:  Intact, speech at baseline  Memory:  Immediate recall impaired, Short-term memory impaired and Long-term memory " intact  Insight: limited   Judgement: Fair  Orientation: Person, time and place.  Continues to believe that he is at a hotel despite saying that he is at [employer]  Psychomotor Behavior: slowed    Muscle Strength and Tone: MuscleStrength: Normal during the assessment with neurology  Gait and Station: Stable and independent       LABS   personally reviewed.   Recent Labs   Lab 07/27/23  0655 07/26/23  0906 07/25/23  0654 07/23/23  0759   WBC  --   --  6.8  --    HGB  --   --  13.2*  --    MCV  --   --  82  --    PLT  --   --  174  --     141  --  142   POTASSIUM 4.0 3.7  --  3.8   CHLORIDE 110* 109*  --  109*   CO2 20* 18*  --  22   BUN 19.5 19.6  --  20.9*   CR 0.95 0.87  --  0.97   ANIONGAP 11 14  --  11   NASIR 9.5 9.2  --  9.5   * 109*  --  138*     No results found for: PHENYTOIN, PHENOBARB, VALPROATE, CBMZ       DIAGNOSIS   Principal Problem:    Schizoaffective disorder, bipolar type (H)    Active Problem List:  Patient Active Problem List   Diagnosis    Closed head injury, initial encounter    Altered mental status, unspecified altered mental status type    Generalized weakness    Portal vein thrombosis    Pleural effusion    Generalized muscle weakness    Falls frequently    Other ascites    Acute pancreatitis, unspecified complication status, unspecified pancreatitis type    Pancreatic pseudocyst    Idiopathic acute pancreatitis, unspecified complication status    Allergic rhinitis    Fisher's esophagus    CTS (carpal tunnel syndrome)    Elevated liver enzymes    HTN (hypertension)    Hypothyroidism    Keratoconus    Major depressive disorder, recurrent episode, mild (H)    Intellectual disability    Morbid exogenous obesity (H)    Neuroleptic malignant syndrome    Obstructive sleep apnea syndrome    Bipolar affective disorder (H)    Seizure disorder (H)    Seizures, generalized convulsive (H)    Acute respiratory failure with hypoxia (H)    Anemia, unspecified    Anxiety disorder,  unspecified    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)    Dietary zinc deficiency    Dry eye syndrome of bilateral lacrimal glands    Dry mouth, unspecified    Edema, unspecified    Gastro-esophageal reflux disease without esophagitis    Hyperosmolality and hypernatremia    Irritable bowel syndrome with constipation    Major depressive disorder, recurrent, unspecified (H)    Metabolic encephalopathy    Moderate protein-calorie malnutrition (H)    Other symbolic dysfunctions    Schizoaffective disorder, unspecified (H)    Thrombocytopenia, unspecified (H)    Unspecified asthma, uncomplicated    Urinary tract infection, site not specified    Vitamin D deficiency, unspecified    Schizoaffective disorder, bipolar type (H)          PLAN   1. Ongoing education given regarding diagnostic and treatment options with risks, benefits and alternatives and adequate verbalization of understanding.  2.  Medications       Melatonin 10 mg at bedtime       Clozapine at 25 mg daily and 225 mg at bedtime.  We will continue increasing the dose of the Clozaril slowly.       Topamax 150 mg at bedtime       Lamictal 50 mg daily    3.  Medical team following as needed  4.   coordinating a safe discharge plan  5.  I will send a message to the doctor covering from the neurology service in order to continue following up on the results of the lumbar puncture and discussed the MRI results with the family.    Risk Assessment: [facility] RISK ASSESSMENT: Patient able to contract for safety    Coordination of Care:   Treatment Plan reviewed and physician signed, Care discussed with Care/Treatment Team Members, Chart reviewed and Patient seen      Re-Certification I certify that the inpatient psychiatric facility services furnished since the previous certification were, and continue to be, medically necessary for, either, treatment which could reasonably be expected to improve the patient s condition or diagnostic study  and that the hospital records indicate that the services furnished were, either, intensive treatment services, admission and related services necessary for diagnostic study, or equivalent services.     I certify that the patient continues to need, on a daily basis, active treatment furnished directly by or requiring the supervision of inpatient psychiatric facility personnel.   I estimate 14 days of hospitalization is necessary for proper treatment of the patient. My plans for post-hospital care for this patient are  group home     Alejandra Watkins MD    -     07/28/2023  -     1:35 PM    Total time 35 minutes with > 50%spent on coordination of cares and psycho-education.    This note was created with help of Dragon dictation system. Grammatical / typing errors are not intentional.    Alejandra Watkins MD

## 2023-07-28 NOTE — PROGRESS NOTES
"Brief Medicine Progress Note:    Falls  Patient fell on 7/23, hitting head on leg of chair. No LOC per staff. Patient complained of back pain and pain to the back of his head. No bleeding, dizziness, or lightheadedness. Denies pre-syncope issues. Neurology following.   - CT C-spine w/o contrast 7/24: no acute fracture or trauma subluxation  - MRI brain w/ contrast 7/25: no suspicious intracranial enhancement   - Lumbar puncture 7/26: resulted labs unremarkable   - EEG 7/27: \"generalized delta-theta slowing, indicating mild-moderate electrographic encephalopathy.\" No electrographic seizures were seen.     Medicine will sign off at this time. Please reach out with future questions or concerns.     Adriane Reeder DNP, [facility]  Hospitalist Service    "

## 2023-07-28 NOTE — PLAN OF CARE
"  Problem: Plan of Care - These are the overarching goals to be used throughout the patient stay.    Goal: Optimal Comfort and Wellbeing  Intervention: Provide Person-Centered Care  Recent Flowsheet Documentation  Taken 7/28/2023 8312 by Salome Herrera RN  Trust Relationship/Rapport:   care explained   choices provided   emotional support provided   reassurance provided   thoughts/feelings acknowledged   Goal Outcome Evaluation:    Plan of Care Reviewed With: patient      Problem: Adult Behavioral Health Plan of Care  Goal: Optimized Coping Skills in Response to Life Stressors  Outcome: Progressing  Flowsheets (Taken 7/28/2023 1223)  Optimized Coping Skills in Response to Life Stressors: making progress toward outcome       07:00-15:30    Patient visible in the milieu. Came to the dinning room for breakfast and lunch, ate well about % of both meals. Compliant with medications. Compliant during assessment.   Upon assessment patient stated \"I am going to live here. I don't want to be discharged\".   TEDs on.     Appearance: unclean, unkempt  Attitude: calm and cooperative  Behavior: no behavioral outbursts.   Eye Contact: normal  Speech: slurred, rambling   Orientation: oreinted to person , place, and time. Disoriented about situation.   Mood:  denied anxiety or depression  Affect: mostly flat   Thought Process: derealization   Suicidal Ideation: denied  Hallucination: Denied    Remains on SIO /see order for details/     "

## 2023-07-28 NOTE — CARE PLAN
07/28/23 1234   Group Therapy Session   Group Attendance attended group session   Time Session Began 1015   Time Session Ended 1115   Total Time (minutes) 25  (no charge)   Total # Attendees 5   Group Type expressive therapy;task skill;psychotherapeutic   Group Topic Covered coping skills/lifestyle management;problem-solving;cognitive activities;balanced lifestyle;structured socialization   Group Session Detail [facility] group to facilitate coping skill exploration, use of cognitive skills and problem solving, creative expression, clinical observation and facilitation of social, cognitive, and kinesthetic performance skills.    Patient Participation Detail Was less easily redirected during this session to follow through on directions discussed. He requested to complete the minimum work details and stated having already accomplished the agreed upon steps on task which he had not Will discuss with pt on 1:1 prior to group of the willingness to participate with guided structured directions and guidelines of the group. Left early when seeming frustrated?

## 2023-07-28 NOTE — PLAN OF CARE
Assessment/Intervention/Current Symtoms and Care Coordination:  Pt discussed in team rounds this AM. Pt has been improving since multiple falls but continues to be on SIO with follow up from neurology. Tentative discharge for 8/14 after pt's guardian/sister comes back into town pending pt stabilization.     Writer received email from pt's outpatient team that current [facility] referrals have waitlists. Team to look into more options. Writer put out return email with current pt update.    Discharge Plan or Goal:  Pending stabilization and safe disposition plan, pt to return to  with adult day services on 8/14     Barriers to Discharge:  Pt continues to present as symptomatic (delusional mood). Pt is receiving ongoing stabilization and medication adjustment. Continue care coordination with [facility] to ascertain his acceptability for a return to the home.     Referral Status:  No current referrals - team inquiring about adult day services with openings     Legal Status:  Voluntary per guardian     Contacts:  Guardian/Sister - Huong Keenan   P: [phone]     - Sadaf  P: [phone]     Upcoming Meetings and Dates/Important Information and next steps:  [facility] to collaborate with outpatient team regarding adult day service referrals

## 2023-07-28 NOTE — PLAN OF CARE
Problem: Psychotic Signs/Symptoms  Goal: Improved Sleep (Psychotic Signs/Symptoms)  Outcome: Progressing   Goal Outcome Evaluation:    Patient was asleep at the start of the shift. Woke up four times during the night and went back to sleep after each time.  Continent of urine four times  too.  Had one medium size bowel movement. Woke up early and opted to stay in the lounge. No agitation, hallucinations  and other behavioral issues noted the whole night.     Slept for a total of 7.5 hours. Remains on SIO due to risk for self-injury related to history of falls.

## 2023-07-28 NOTE — CARE PLAN
07/28/23 1443   Group Therapy Session   Group Attendance attended group session   Time Session Began 1315   Time Session Ended 1415   Total Time (minutes) 60   Total # Attendees 4   Group Type psychotherapeutic   Group Topic Covered coping skills/lifestyle management;cognitive therapy techniques;balanced lifestyle;structured socialization   Group Session Detail  Activity focused on memories and leisure past times, present times and interests.    Patient Response/Contribution cooperative with task;listened actively   Patient Participation Detail Elaborated on questions with more extensive information. Needed questions to be re explained. Affect appeared bright. He seemed more relaxed and comfortable than in the am group earlier today.

## 2023-07-29 PROCEDURE — H2032 ACTIVITY THERAPY, PER 15 MIN: HCPCS

## 2023-07-29 PROCEDURE — 250N000013 HC RX MED GY IP 250 OP 250 PS 637: Performed by: PSYCHIATRY & NEUROLOGY

## 2023-07-29 PROCEDURE — 250N000013 HC RX MED GY IP 250 OP 250 PS 637

## 2023-07-29 PROCEDURE — 250N000013 HC RX MED GY IP 250 OP 250 PS 637: Performed by: EMERGENCY MEDICINE

## 2023-07-29 PROCEDURE — 250N000013 HC RX MED GY IP 250 OP 250 PS 637: Performed by: STUDENT IN AN ORGANIZED HEALTH CARE EDUCATION/TRAINING PROGRAM

## 2023-07-29 PROCEDURE — 124N000003 HC R&B MH SENIOR/ADOLESCENT

## 2023-07-29 RX ADMIN — LAMOTRIGINE 50 MG: 25 TABLET ORAL at 07:11

## 2023-07-29 RX ADMIN — LISINOPRIL 5 MG: 5 TABLET ORAL at 07:10

## 2023-07-29 RX ADMIN — LEVOTHYROXINE SODIUM 50 MCG: 25 TABLET ORAL at 05:42

## 2023-07-29 RX ADMIN — APIXABAN 5 MG: 5 TABLET, FILM COATED ORAL at 07:10

## 2023-07-29 RX ADMIN — CLOZAPINE 25 MG: 25 TABLET ORAL at 07:11

## 2023-07-29 RX ADMIN — FUROSEMIDE 20 MG: 20 TABLET ORAL at 07:11

## 2023-07-29 RX ADMIN — ACETAMINOPHEN 1000 MG: 500 TABLET, FILM COATED ORAL at 20:15

## 2023-07-29 RX ADMIN — PANTOPRAZOLE SODIUM 40 MG: 40 TABLET, DELAYED RELEASE ORAL at 05:42

## 2023-07-29 RX ADMIN — CALCIUM POLYCARBOPHIL 625 MG: 625 TABLET, FILM COATED ORAL at 20:14

## 2023-07-29 RX ADMIN — PREDNISOLONE ACETATE 1 DROP: 10 SUSPENSION/ DROPS OPHTHALMIC at 08:42

## 2023-07-29 RX ADMIN — CHLORHEXIDINE GLUCONATE 0.12% ORAL RINSE 15 ML: 1.2 LIQUID ORAL at 07:10

## 2023-07-29 RX ADMIN — DOCUSATE SODIUM 100 MG: 100 CAPSULE, LIQUID FILLED ORAL at 07:12

## 2023-07-29 RX ADMIN — APIXABAN 5 MG: 5 TABLET, FILM COATED ORAL at 20:15

## 2023-07-29 RX ADMIN — ACETAMINOPHEN 1000 MG: 500 TABLET, FILM COATED ORAL at 07:11

## 2023-07-29 RX ADMIN — ACETAMINOPHEN 1000 MG: 500 TABLET, FILM COATED ORAL at 14:27

## 2023-07-29 RX ADMIN — Medication 10 MG: at 20:14

## 2023-07-29 RX ADMIN — TOPIRAMATE 150 MG: 50 TABLET ORAL at 20:15

## 2023-07-29 RX ADMIN — CLOZAPINE 225 MG: 100 TABLET ORAL at 20:14

## 2023-07-29 RX ADMIN — MONTELUKAST 10 MG: 10 TABLET, FILM COATED ORAL at 20:15

## 2023-07-29 RX ADMIN — ZINC SULFATE 220 MG (50 MG) CAPSULE 220 MG: CAPSULE at 07:12

## 2023-07-29 RX ADMIN — CALCIUM POLYCARBOPHIL 625 MG: 625 TABLET, FILM COATED ORAL at 07:11

## 2023-07-29 RX ADMIN — WHITE PETROLATUM 57.7 %-MINERAL OIL 31.9 % EYE OINTMENT: at 20:16

## 2023-07-29 ASSESSMENT — ACTIVITIES OF DAILY LIVING (ADL)
HYGIENE/GROOMING: WITH SUPERVISION
ADLS_ACUITY_SCORE: 55
DRESS: SCRUBS (BEHAVIORAL HEALTH)
ADLS_ACUITY_SCORE: 55
ADLS_ACUITY_SCORE: 55
HYGIENE/GROOMING: WITH SUPERVISION
ADLS_ACUITY_SCORE: 65
ADLS_ACUITY_SCORE: 55
ADLS_ACUITY_SCORE: 65
ADLS_ACUITY_SCORE: 55
LAUNDRY: UNABLE TO COMPLETE
ADLS_ACUITY_SCORE: 65
ADLS_ACUITY_SCORE: 55
ADLS_ACUITY_SCORE: 65
ADLS_ACUITY_SCORE: 65
ORAL_HYGIENE: WITH SUPERVISION
ADLS_ACUITY_SCORE: 55

## 2023-07-29 NOTE — PLAN OF CARE
Problem: Psychotic Signs/Symptoms  Goal: Improved Mood Symptoms  Outcome: Progressing   Goal Outcome Evaluation:  Patient alert and oriented with stable vitals. Visible on the unit from start of the  shift till supper time. Patient ate 50% of  supper went back to his room and felt at sleep. Patient woke up around 1930 and complained of feeling hungry, patient was given bedtime snack,  ate 100% and went back to bed. Affect flat mood was calmed, patient denied all psychosis  symptoms. Medication compliant. No complain of pain or any discomfort. Patient had a good shift, no behavior issues or any safety concern.Continues on SIO for safety.

## 2023-07-29 NOTE — PLAN OF CARE
"  Problem: Plan of Care - These are the overarching goals to be used throughout the patient stay.    Goal: Optimal Comfort and Wellbeing  Intervention: Provide Person-Centered Care  Recent Flowsheet Documentation  Taken 7/29/2023 0710 by Salome Herrera RN  Trust Relationship/Rapport:   care explained   choices provided   emotional support provided   reassurance provided   thoughts/feelings acknowledged   Goal Outcome Evaluation:    Plan of Care Reviewed With: patient          07:00-15:30  Patient visible in the milieu most of the shift. Ate breakfast and lunch in the dinning room, appetite excellent about % of his meals. Patient does not engage in conversation with other peers but is able to make needs known appropriately to staff. As usual listened to music and watched TV majority of the shift. Patient stated that he showered this morning.     Remains somewhat confused about situation, denying that he is in a hospital, denying upcoming discharge \"I live here\".     Attitude: calm, cooperative  Behavior: no behavioral outbursts.   Eye Contact: good  Speech: soft, quiet, slurred rambling  Orientation: oreinted to person , place and time, disoriented about situation.   Mood:  denied anxiety or depression  Affect: mostly flat   Suicidal Ideation: denied  Hallucination: denied     /80 (BP Location: Left arm, Patient Position: Sitting, Cuff Size: Adult Large)   Pulse 92   Temp 96.9  F (36.1  C) (Temporal)   Resp 17   Wt 97.4 kg (214 lb 11.7 oz)   SpO2 98%   BMI 36.86 kg/m            "

## 2023-07-29 NOTE — PLAN OF CARE
Problem: Sleep Disturbance  Goal: Adequate Sleep/Rest  Outcome: Progressing     Problem: Psychotic Signs/Symptoms  Goal: Improved Behavioral Control (Psychotic Signs/Symptoms)  Outcome: Progressing   Goal Outcome Evaluation:         Pt received sleeping at report .Slept for a total of 8.50 hours. Pt up at 0030,0230 and 0535 to use bathroom. He came out to the lounge at 0545. He had one episode of incontinent. He continues to use medication bed and on SIO 5ft for risk of injury to self. 0600 am medications administered. No concerns at this time. No PRN given. Will continue to monitor.

## 2023-07-29 NOTE — PLAN OF CARE
"Goal Outcome Evaluation:    Plan of Care Reviewed With: patient      Patient has  been present in the milieu. His affect is full range. His mood is calm. He denies SI and SIB. He denies all MH symptoms. He denies pain. He went to group. He states he took a shower this am. He endorses feeling hopeful. He is disoriented to situation stating \"I'm not in a hospital I never have been in a hospital, this is my hotel that I live in\". His appetite is excellent. He has been sleeping on and off this shift. He is medication compliant. He remains on SIO for falls. His gait is steady and slow. He needs prompts with ADL's with SBA. He uses a medical bed for mobility. He has a no roommate order for intrusiveness, poor boundaries. He offers no complaints.                  "

## 2023-07-30 PROCEDURE — H2032 ACTIVITY THERAPY, PER 15 MIN: HCPCS

## 2023-07-30 PROCEDURE — 250N000013 HC RX MED GY IP 250 OP 250 PS 637: Performed by: PSYCHIATRY & NEUROLOGY

## 2023-07-30 PROCEDURE — 250N000013 HC RX MED GY IP 250 OP 250 PS 637: Performed by: STUDENT IN AN ORGANIZED HEALTH CARE EDUCATION/TRAINING PROGRAM

## 2023-07-30 PROCEDURE — 250N000013 HC RX MED GY IP 250 OP 250 PS 637

## 2023-07-30 PROCEDURE — 124N000003 HC R&B MH SENIOR/ADOLESCENT

## 2023-07-30 PROCEDURE — 250N000013 HC RX MED GY IP 250 OP 250 PS 637: Performed by: EMERGENCY MEDICINE

## 2023-07-30 RX ADMIN — ACETAMINOPHEN 1000 MG: 500 TABLET, FILM COATED ORAL at 14:22

## 2023-07-30 RX ADMIN — CHLORHEXIDINE GLUCONATE 0.12% ORAL RINSE 15 ML: 1.2 LIQUID ORAL at 08:55

## 2023-07-30 RX ADMIN — ZINC SULFATE 220 MG (50 MG) CAPSULE 220 MG: CAPSULE at 08:55

## 2023-07-30 RX ADMIN — APIXABAN 5 MG: 5 TABLET, FILM COATED ORAL at 20:48

## 2023-07-30 RX ADMIN — FUROSEMIDE 20 MG: 20 TABLET ORAL at 08:55

## 2023-07-30 RX ADMIN — TOPIRAMATE 150 MG: 50 TABLET ORAL at 20:48

## 2023-07-30 RX ADMIN — ACETAMINOPHEN 1000 MG: 500 TABLET, FILM COATED ORAL at 08:55

## 2023-07-30 RX ADMIN — CLOZAPINE 225 MG: 100 TABLET ORAL at 20:48

## 2023-07-30 RX ADMIN — LEVOTHYROXINE SODIUM 50 MCG: 25 TABLET ORAL at 06:27

## 2023-07-30 RX ADMIN — CALCIUM POLYCARBOPHIL 625 MG: 625 TABLET, FILM COATED ORAL at 20:48

## 2023-07-30 RX ADMIN — APIXABAN 5 MG: 5 TABLET, FILM COATED ORAL at 08:55

## 2023-07-30 RX ADMIN — WHITE PETROLATUM 57.7 %-MINERAL OIL 31.9 % EYE OINTMENT: at 20:53

## 2023-07-30 RX ADMIN — CLOZAPINE 25 MG: 25 TABLET ORAL at 08:55

## 2023-07-30 RX ADMIN — CALCIUM POLYCARBOPHIL 625 MG: 625 TABLET, FILM COATED ORAL at 08:55

## 2023-07-30 RX ADMIN — LAMOTRIGINE 50 MG: 25 TABLET ORAL at 08:55

## 2023-07-30 RX ADMIN — DOCUSATE SODIUM 100 MG: 100 CAPSULE, LIQUID FILLED ORAL at 08:55

## 2023-07-30 RX ADMIN — MONTELUKAST 10 MG: 10 TABLET, FILM COATED ORAL at 20:48

## 2023-07-30 RX ADMIN — PREDNISOLONE ACETATE 1 DROP: 10 SUSPENSION/ DROPS OPHTHALMIC at 08:56

## 2023-07-30 RX ADMIN — LISINOPRIL 5 MG: 5 TABLET ORAL at 08:55

## 2023-07-30 RX ADMIN — PANTOPRAZOLE SODIUM 40 MG: 40 TABLET, DELAYED RELEASE ORAL at 06:27

## 2023-07-30 RX ADMIN — Medication 10 MG: at 20:47

## 2023-07-30 RX ADMIN — ACETAMINOPHEN 1000 MG: 500 TABLET, FILM COATED ORAL at 20:47

## 2023-07-30 ASSESSMENT — ACTIVITIES OF DAILY LIVING (ADL)
ADLS_ACUITY_SCORE: 55
ADLS_ACUITY_SCORE: 65
LAUNDRY: UNABLE TO COMPLETE
ADLS_ACUITY_SCORE: 55
ADLS_ACUITY_SCORE: 55
ADLS_ACUITY_SCORE: 65
ADLS_ACUITY_SCORE: 65
ORAL_HYGIENE: WITH SUPERVISION
ADLS_ACUITY_SCORE: 65
ADLS_ACUITY_SCORE: 55
ADLS_ACUITY_SCORE: 65
ADLS_ACUITY_SCORE: 55
ADLS_ACUITY_SCORE: 65
DRESS: WITH SUPERVISION
HYGIENE/GROOMING: WITH SUPERVISION
ADLS_ACUITY_SCORE: 55

## 2023-07-30 NOTE — PROGRESS NOTES
07/29/23 2000    Group Therapy Session   Time Session Began 1900   Time Session Ended 1955   Total Time (minutes) 30   Total # Attendees 5-6   Group Type expressive therapy   Group Topic Covered balanced lifestyle;relaxation techniques;self-care activities   Group Session Detail Evening Relaxation   Patient Response/Contribution cooperative with task   Patient Participation Detail Cooperatively engaged in Evening Music Relaxation group to decrease anxiety and promote sleep.  Attentive affect, engaged in session with some redirection, responding well to the music.  Did not want group to end tonight.

## 2023-07-30 NOTE — PLAN OF CARE
Problem: Sleep Disturbance  Goal: Adequate Sleep/Rest  Outcome: Progressing   Goal Outcome Evaluation:       Patient is sound asleep at the start of the shift. He is on SIO r/t self-injury risk. He also has an order of no roommate r/t intrusiveness; poor boundaries and impulsiveness. He is using a medical bed for mobility purposes. Patient had urinary incontinence x 1 and continence x 1. His bedsheets soaked with urine. They were changed to clean ones. He woke up past 0500 a.m. and took some early morning snacks. He slept well the whole shift for 10.5 hours.

## 2023-07-30 NOTE — CARE PLAN
Occupational Therapy Group Note:     07/30/23 1609   Group Therapy Session   Group Attendance attended group session   Time Session Began 1015   Time Session Ended 1100   Total Time (minutes) 40 (no charge)   Total # Attendees 7   Group Type recreation   Group Topic Covered leisure exploration/use of leisure time;structured socialization   Group Session Detail OT Leisure Group: Music and Movement Bingo   Patient Response/Contribution confronted peers appropriately;cooperative with task;listened actively   Patient Participation Detail Patient somewhat actively engaged in a music and movement bingo therapeutic activity. Therapeutic benefits of engagement include: promotion of physical exercise to improve overall physical and emotional wellbeing, relaxation of mind and body, and encouragement to socially engage, reminisce, and interact with others. Patient attended group with SIO. Patient asked multiple times if he could retrieve his headphones for group; education was provided that his headphones would not be needed for this group as the music would be provided; patient was accepting of this information, but needed it repeated. SIO assisted patient with tracking activity and encouraging patient to engage in physical exercises/movements. Patient would intermittently complete movements; but generally, only completed 1-2 repetitions of each exercise. Patient left group early without return.

## 2023-07-30 NOTE — PLAN OF CARE
"  Problem: Plan of Care - These are the overarching goals to be used throughout the patient stay.    Goal: Optimal Comfort and Wellbeing  Intervention: Provide Person-Centered Care  Recent Flowsheet Documentation  Taken 7/30/2023 0800 by Salome Herrera RN  Trust Relationship/Rapport:   care explained   choices provided   emotional support provided   reassurance provided   thoughts/feelings acknowledged   Goal Outcome Evaluation:    Plan of Care Reviewed With: patient          07:00-15:30    Patient visible in the milieu majority of the shift as per usual. He was up and in the dinning room for both breakfast and lunch. Ate well as per usual 50-75%. Today he was more engaged in unit activities, he played games in the dinning room with few other patients and staff member.     Appearance: dressed appropriately for the situation, unclean.   Attitude: calm and cooperative, compliant with medication   Behavior: no behavioral outbursts.   Eye Contact: good  Speech: slurred, rambling   Orientation: oreinted to person , place and time. Possibly disoriented to situation, continues to state that he \"lives here\" and that is not going back to his group home. Refuses to admit that he is in the hospital and gets irritated during conversation on this topic. \"No more questions about that\".   Mood:  denied anxiety or depression  Affect: flat but brightens upon approach  Thought Process: denial, mildly disorganized    Suicidal Ideation: denied  Hallucination: denied    Remains on SIO for safety /see order for details/ .  BP (!) 145/74   Pulse 95   Temp 97.7  F (36.5  C) (Temporal)   Resp 17   Wt 94.4 kg (208 lb 1.8 oz)   SpO2 97%   BMI 35.72 kg/m            "

## 2023-07-31 ENCOUNTER — APPOINTMENT (OUTPATIENT)
Dept: PHYSICAL THERAPY | Facility: CLINIC | Age: 53
DRG: 885 | End: 2023-07-31
Payer: MEDICARE

## 2023-07-31 LAB
AMPAR2 IGG CSF QL CBA IFA: NEGATIVE
AMPAR2 IGG SERPL QL CBA IFA: NEGATIVE
AMPHIPHYSIN AB CSF QL IF: NEGATIVE
AMPHIPHYSIN IGG SER QL IA: NEGATIVE
ANNOTATION COMMENT IMP: NORMAL
ANNOTATION COMMENT IMP: NORMAL
CASPR2 IGG CSF QL CBA IFA: NEGATIVE
CASPR2 IGG SER QL CBA IFA: NEGATIVE
CV2 AB CSF QL IF: NEGATIVE
CV2 AB SERPL QL IF: NEGATIVE
DPPX IGG CSF QL IF: NEGATIVE
DPPX IGG SERPL QL IF: NEGATIVE
GABABR IGG CSF QL CBA IFA: NEGATIVE
GABABR IGG SERPL QL CBA IFA: NEGATIVE
GAD65 AB SER-SCNC: 0 NMOL/L
GAD65 IGG+IGM CSF IA-SCNC: 0 NMOL/L
GFAP ALPHA IGG CSF QL IF: NEGATIVE
GFAP ALPHA IGG SER QL IF: NEGATIVE
GLIAL NUC TYPE 1 AB CSF QL IF: NEGATIVE
GLIAL NUC TYPE 1 AB SER QL IF: NEGATIVE
HU1 AB CSF QL IF: NEGATIVE
HU1 AB SER QL: NEGATIVE
HU2 AB CSF QL IF: NEGATIVE
HU2 AB SER QL IF: NEGATIVE
HU3 AB CSF QL IF: NEGATIVE
HU3 AB SER QL: NEGATIVE
IGLON5 IGG CSF QL IF: NEGATIVE
IGLON5 IGG SER QL IF: NEGATIVE
IMMUNOLOGIST REVIEW: NORMAL
IMMUNOLOGIST REVIEW: NORMAL
LGI1 IGG CSF QL CBA IFA: NEGATIVE
LGI1 IGG SER QL CBA IFA: NEGATIVE
MGLUR1 IGG CSF QL IF: NEGATIVE
MGLUR1 IGG SER QL IF: NEGATIVE
NEUROCHONDRIN AB CSF QL IF: NEGATIVE
NEUROCHONDRIN AB SERPL QL IF: NEGATIVE
NIF IGG CSF QL IF: NEGATIVE
NIF IGG SER QL IF: NEGATIVE
NMDAR1 IGG CSF QL CBA IFA: NEGATIVE
NMDAR1 IGG SER QL CBA IFA: NEGATIVE
PCA-1 AB CSF QL IF: NEGATIVE
PCA-1 AB SER QL IF: NEGATIVE
PCA-2 AB CSF QL IF: NEGATIVE
PCA-2 AB SER QL IF: NEGATIVE
PCA-TR AB CSF QL IF: NEGATIVE
PCA-TR AB SER QL IF: NEGATIVE
SEPTIN-7 IGG CSF QL IF: NEGATIVE
SEPTIN-7 IGG SERPL QL IF: NEGATIVE

## 2023-07-31 PROCEDURE — G0177 OPPS/PHP; TRAIN & EDUC SERV: HCPCS

## 2023-07-31 PROCEDURE — 250N000013 HC RX MED GY IP 250 OP 250 PS 637: Performed by: PSYCHIATRY & NEUROLOGY

## 2023-07-31 PROCEDURE — 97530 THERAPEUTIC ACTIVITIES: CPT | Mod: GP

## 2023-07-31 PROCEDURE — 250N000013 HC RX MED GY IP 250 OP 250 PS 637

## 2023-07-31 PROCEDURE — 250N000013 HC RX MED GY IP 250 OP 250 PS 637: Performed by: EMERGENCY MEDICINE

## 2023-07-31 PROCEDURE — 99231 SBSQ HOSP IP/OBS SF/LOW 25: CPT | Performed by: PSYCHIATRY & NEUROLOGY

## 2023-07-31 PROCEDURE — 250N000013 HC RX MED GY IP 250 OP 250 PS 637: Performed by: STUDENT IN AN ORGANIZED HEALTH CARE EDUCATION/TRAINING PROGRAM

## 2023-07-31 PROCEDURE — 124N000003 HC R&B MH SENIOR/ADOLESCENT

## 2023-07-31 RX ORDER — LAMOTRIGINE 25 MG/1
100 TABLET ORAL DAILY
Status: DISCONTINUED | OUTPATIENT
Start: 2023-08-01 | End: 2023-08-14 | Stop reason: HOSPADM

## 2023-07-31 RX ADMIN — ZINC SULFATE 220 MG (50 MG) CAPSULE 220 MG: CAPSULE at 08:52

## 2023-07-31 RX ADMIN — ACETAMINOPHEN 1000 MG: 500 TABLET, FILM COATED ORAL at 08:52

## 2023-07-31 RX ADMIN — LAMOTRIGINE 50 MG: 25 TABLET ORAL at 08:52

## 2023-07-31 RX ADMIN — APIXABAN 5 MG: 5 TABLET, FILM COATED ORAL at 19:25

## 2023-07-31 RX ADMIN — PREDNISOLONE ACETATE 1 DROP: 10 SUSPENSION/ DROPS OPHTHALMIC at 08:54

## 2023-07-31 RX ADMIN — MENTHOL 1 PATCH: 205.5 PATCH TOPICAL at 16:22

## 2023-07-31 RX ADMIN — Medication 10 MG: at 19:25

## 2023-07-31 RX ADMIN — LEVOTHYROXINE SODIUM 50 MCG: 25 TABLET ORAL at 06:31

## 2023-07-31 RX ADMIN — WHITE PETROLATUM 57.7 %-MINERAL OIL 31.9 % EYE OINTMENT: at 21:05

## 2023-07-31 RX ADMIN — TOPIRAMATE 150 MG: 50 TABLET ORAL at 19:25

## 2023-07-31 RX ADMIN — MONTELUKAST 10 MG: 10 TABLET, FILM COATED ORAL at 19:25

## 2023-07-31 RX ADMIN — CLOZAPINE 25 MG: 25 TABLET ORAL at 08:54

## 2023-07-31 RX ADMIN — CALCIUM POLYCARBOPHIL 625 MG: 625 TABLET, FILM COATED ORAL at 08:52

## 2023-07-31 RX ADMIN — APIXABAN 5 MG: 5 TABLET, FILM COATED ORAL at 08:54

## 2023-07-31 RX ADMIN — ACETAMINOPHEN 1000 MG: 500 TABLET, FILM COATED ORAL at 19:26

## 2023-07-31 RX ADMIN — ACETAMINOPHEN 1000 MG: 500 TABLET, FILM COATED ORAL at 14:38

## 2023-07-31 RX ADMIN — MENTHOL 1 PATCH: 205.5 PATCH TOPICAL at 08:58

## 2023-07-31 RX ADMIN — LISINOPRIL 5 MG: 5 TABLET ORAL at 08:54

## 2023-07-31 RX ADMIN — DOCUSATE SODIUM 100 MG: 100 CAPSULE, LIQUID FILLED ORAL at 08:53

## 2023-07-31 RX ADMIN — FUROSEMIDE 20 MG: 20 TABLET ORAL at 08:53

## 2023-07-31 RX ADMIN — CALCIUM POLYCARBOPHIL 625 MG: 625 TABLET, FILM COATED ORAL at 19:26

## 2023-07-31 RX ADMIN — CLOZAPINE 250 MG: 100 TABLET ORAL at 21:04

## 2023-07-31 RX ADMIN — CHLORHEXIDINE GLUCONATE 0.12% ORAL RINSE 15 ML: 1.2 LIQUID ORAL at 08:56

## 2023-07-31 RX ADMIN — PANTOPRAZOLE SODIUM 40 MG: 40 TABLET, DELAYED RELEASE ORAL at 06:31

## 2023-07-31 ASSESSMENT — ACTIVITIES OF DAILY LIVING (ADL)
ADLS_ACUITY_SCORE: 65
ADLS_ACUITY_SCORE: 54
ADLS_ACUITY_SCORE: 65
ADLS_ACUITY_SCORE: 54
ADLS_ACUITY_SCORE: 65
ADLS_ACUITY_SCORE: 54
ADLS_ACUITY_SCORE: 65
ADLS_ACUITY_SCORE: 54
ADLS_ACUITY_SCORE: 65
ORAL_HYGIENE: PROMPTS
ADLS_ACUITY_SCORE: 65
ADLS_ACUITY_SCORE: 65
LAUNDRY: UNABLE TO COMPLETE
DRESS: INDEPENDENT;SCRUBS (BEHAVIORAL HEALTH);STREET CLOTHES
HYGIENE/GROOMING: INDEPENDENT;WITH ASSISTANCE
ADLS_ACUITY_SCORE: 54

## 2023-07-31 NOTE — PROGRESS NOTES
"RatePSYCHIATRY  PROGRESS NOTE     DATE OF SERVICE   07/31/2023        CHIEF COMPLAINT   \" I am doing great\".       SUBJECTIVE   Nursing reports:  Pt was sleeping when received. Up to the bathroom at 0050 with smear BM and voided x 3. Remains to use medication bed and on SIO 5ft for risk of injury to self. 0600 am medications administered. Slept for a total of 7.75 hours. Will monitor.      Patient has been reviewed with the  earlier today.  For now we continue with the plan for the patient to leave the hospital on August 14 and return to his group home.   informed me that the patient's sister continues to feel hesitant about the patient's symptoms.  She sent an email to the  asking about what we can do differently now in order to decrease the delusions.  Sister at this time is overseas and I am not sure if we are going to be able to communicate with her over the phone or through the email.     OBJECTIVE   Patient was seen and evaluated at bedside with nurse present during the assessment, this was a face-to-face evaluation.  Patient continues to insist that this is a mansion not a hospital.  When I mentioned to the patient that we are planning for him to go back to his group home on August 14 patient says that he does not want to talk about this because he is going to stay here.  As per patient this is his at home and he is not going to change his mind.    Patient has been taken off the one-to-one as his gait has been stable.  Over the weekend patient attended most groups and seemed to have engaged in the therapy.  Patient has been calm, pleasant and redirectable.  There have been no outbursts and denies having any side effects from the medications.       MEDICATIONS   Medications:  Scheduled Meds:   acetaminophen  1,000 mg Oral TID    apixaban ANTICOAGULANT  5 mg Oral BID    artificial tears   Right Eye At Bedtime    calcium polycarbophil  625 mg Oral BID    chlorhexidine  15 " "mL Swish & Spit Daily    cloZAPine  25 mg Oral Daily    cloZAPine  250 mg Oral At Bedtime    docusate sodium  100 mg Oral Daily    furosemide  20 mg Oral Daily    [START ON 8/1/2023] lamoTRIgine  100 mg Oral Daily    levothyroxine  50 mcg Oral or NG Tube QAM AC    lisinopril  5 mg Oral Daily    melatonin  10 mg Oral At Bedtime    menthol   Transdermal Q8H    montelukast  10 mg Oral At Bedtime    pantoprazole  40 mg Oral QAM AC    prednisoLONE acetate  1 drop Right Eye Daily    topiramate  150 mg Oral At Bedtime    zinc sulfate  220 mg Oral Daily     Continuous Infusions:  PRN Meds:.acetaminophen, alum & mag hydroxide-simethicone, artificial saliva, atropine, hydrALAZINE, hydrOXYzine, hypromellose-dextran, ipratropium, LORazepam, menthol **AND** menthol, OLANZapine **OR** OLANZapine, senna-docusate    Medication adherence issues: MS Med Adherence Y/N: Yes, Hospitalization  Medication side effects: MEDICATION SIDE EFFECTS: no side effects reported  Benefit: Yes / No: Yes       ROS   A comprehensive review of systems was negative.       MENTAL STATUS EXAM   Vitals: /66   Pulse 99   Temp 97.4  F (36.3  C)   Resp 18   Wt 94.4 kg (208 lb 1.8 oz)   SpO2 99%   BMI 35.72 kg/m      Appearance:  No apparent distress  Mood: \"I am great\"  Affect: Irritable at times was congruent to speech  Suicidal Ideation: PRESENT / ABSENT: absent   Homicidal Ideation: PRESENT / ABSENT: absent     Thought process: [place]  Thought content: Remains delusional but he will only talk about his delusions when we ask about it.  Continues to refuse to talk about a discharge plan because as per patient this is his at home.  Fund of Knowledge: Below average  Attention/Concentration: Limited  Language ability:  Intact, speech at baseline  Memory:  Immediate recall impaired, Short-term memory impaired and Long-term memory intact  Insight: limited   Judgement: Fair  Orientation: Person, time and place.  Continues to believe that he is at a " [facility] despite saying that he is at [facility]  Psychomotor Behavior: slowed    Muscle Strength and Tone: MuscleStrength: Normal during the assessment with neurology  Gait and Station: Stable and independent       LABS   personally reviewed.   Recent Labs   Lab 07/27/23  0655 07/26/23  0906 07/25/23  0654   WBC  --   --  6.8   HGB  --   --  13.2*   MCV  --   --  82   PLT  --   --  174    141  --    POTASSIUM 4.0 3.7  --    CHLORIDE 110* 109*  --    CO2 20* 18*  --    BUN 19.5 19.6  --    CR 0.95 0.87  --    ANIONGAP 11 14  --    NASIR 9.5 9.2  --    * 109*  --      No results found for: PHENYTOIN, PHENOBARB, VALPROATE, CBMZ       DIAGNOSIS   Principal Problem:    Schizoaffective disorder, bipolar type (H)    Active Problem List:  Patient Active Problem List   Diagnosis    Closed head injury, initial encounter    Altered mental status, unspecified altered mental status type    Generalized weakness    Portal vein thrombosis    Pleural effusion    Generalized muscle weakness    Falls frequently    Other ascites    Acute pancreatitis, unspecified complication status, unspecified pancreatitis type    Pancreatic pseudocyst    Idiopathic acute pancreatitis, unspecified complication status    Allergic rhinitis    Fisher's esophagus    CTS (carpal tunnel syndrome)    Elevated liver enzymes    HTN (hypertension)    Hypothyroidism    Keratoconus    Major depressive disorder, recurrent episode, mild (H)    Intellectual disability    Morbid exogenous obesity (H)    Neuroleptic malignant syndrome    Obstructive sleep apnea syndrome    Bipolar affective disorder (H)    Seizure disorder (H)    Seizures, generalized convulsive (H)    Acute respiratory failure with hypoxia (H)    Anemia, unspecified    Anxiety disorder, unspecified    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)    Dietary zinc deficiency    Dry eye syndrome of bilateral lacrimal glands    Dry mouth, unspecified    Edema, unspecified     Gastro-esophageal reflux disease without esophagitis    Hyperosmolality and hypernatremia    Irritable bowel syndrome with constipation    Major depressive disorder, recurrent, unspecified (H)    Metabolic encephalopathy    Moderate protein-calorie malnutrition (H)    Other symbolic dysfunctions    Schizoaffective disorder, unspecified (H)    Thrombocytopenia, unspecified (H)    Unspecified asthma, uncomplicated    Urinary tract infection, site not specified    Vitamin D deficiency, unspecified    Schizoaffective disorder, bipolar type (H)          PLAN   1. Ongoing education given regarding diagnostic and treatment options with risks, benefits and alternatives and adequate verbalization of understanding.  2.  Medications       Melatonin 10 mg at bedtime       Increase Clozapine at 25 mg daily and 250 mg at bedtime.  We will continue increasing the dose of the Clozaril slowly.       Topamax 150 mg at bedtime       Increase Lamictal 100 mg daily    3.  Medical team following as needed  4.   coordinating a safe discharge plan  5.  I will send a message to the doctor covering from the neurology service in order to continue following up on the results of the lumbar puncture and discussed the MRI results with the family.    Risk Assessment: [facility] RISK ASSESSMENT: Patient able to contract for safety    Coordination of Care:   Treatment Plan reviewed and physician signed, Care discussed with Care/Treatment Team Members, Chart reviewed and Patient seen      Re-Certification I certify that the inpatient psychiatric facility services furnished since the previous certification were, and continue to be, medically necessary for, either, treatment which could reasonably be expected to improve the patient s condition or diagnostic study and that the hospital records indicate that the services furnished were, either, intensive treatment services, admission and related services necessary for diagnostic study, or  equivalent services.     I certify that the patient continues to need, on a daily basis, active treatment furnished directly by or requiring the supervision of inpatient psychiatric facility personnel.   I estimate 14 days of hospitalization is necessary for proper treatment of the patient. My plans for post-hospital care for this patient are  group home     Alejandra Watkins MD    -     07/31/2023  -     1:08 PM    Total time 25 minutes with > 50%spent on coordination of cares and psycho-education.    This note was created with help of Dragon dictation system. Grammatical / typing errors are not intentional.    Alejandra Watkins MD

## 2023-07-31 NOTE — PROGRESS NOTES
07/30/23 2000   Group Therapy Session   Group Attendance attended group session   Time Session Began 1900   Time Session Ended 1950   Total Time (minutes) 50   Total # Attendees 4   Group Type recreation   Group Topic Covered leisure exploration/use of leisure time   Group Session Detail TR leisure group   Patient Response/Contribution cooperative with task   Patient Participation Detail Pt actively participated in a structured Therapeutic Recreation group with a focus on leisure participation, communication skills, and social engagement via a group game. Pt remained mostly focused and engaged throughout full duration of group, but had a few moments where he zones out and needed some prompts.  Pt occasionally asked for some help either from this writer or the other staff in the room. Pt interactions were appropriate throughout group.

## 2023-07-31 NOTE — PLAN OF CARE
Problem: Adult Behavioral Health Plan of Care  Goal: Absence of New-Onset Illness or Injury  Outcome: Progressing  Intervention: Identify and Manage Fall Risk  Recent Flowsheet Documentation  Taken 7/30/2023 1637 by Salina Posey RN  Safety Measures:   environmental rounds completed   safety plan reviewed   safety rounds completed   Goal Outcome Evaluation:    Plan of Care Reviewed With: patient    Pt up and about the unit pacing the units . Pt has been active and interacting well with other peers . Attended groups and participated . He was also put playing cards with other peers. He ate well and was medication complaint with his medication . Pt denied all MH SX . Pt continue on a 1:1 will continue POC

## 2023-07-31 NOTE — PLAN OF CARE
Problem: Sleep Disturbance  Goal: Adequate Sleep/Rest  Outcome: Progressing   Goal Outcome Evaluation:          Pt was sleeping when received. Up to the bathroom at 0050 with smear BM and voided x 3. Remains to use medication bed and on SIO 5ft for risk of injury to self. 0600 am medications administered. Slept for a total of 7.75 hours. Will monitor.

## 2023-07-31 NOTE — CARE PLAN
Occupational Therapy Group Note:     07/31/23 1400   Group Therapy Session   Group Attendance attended group session   Time Session Began 1320   Time Session Ended 1415   Total Time (minutes) 55   Total # Attendees 5   Group Type life skill   Group Topic Covered balanced lifestyle;coping skills/lifestyle management;structured socialization   Group Session Detail values discussion   Patient Response/Contribution cooperative with task;discussed personal experience with topic   Patient Participation Detail Pt actively participated in a structured occupational therapy group with a focus on identifying and prioritizing personal values. Pt contributed to a group discussion that facilitated self-reflection and application of personal values. Pt demonstrated difficulty narrowing down and ranking his most important values, though identified family, love, success, friends, and free time as several values that are important to him. Responses to prompts were difficult to understand at times (pt was mumbling), though generally appropriate to topic. He shared that he has supportive family and friends. He also shared that he isn't able to drive anymore. Calm and cooperative.

## 2023-07-31 NOTE — PLAN OF CARE
Physical Therapy Discharge Summary    Reason for therapy discharge:    Discharged to home.    Progress towards therapy goal(s). See goals on Care Plan in [facility] electronic health record for goal details.  Goals partially met.  Barriers to achieving goals:   pt declining further PT intervention.    Therapy recommendation(s):    No further therapy is recommended. Discharge recommendation: Pt is ambulating with CGA-SBA now and he would have that at a group home. Pt mild falls risk given baseline gait pattern but not interested in assistive devices or intervention, generally appears stable with basic functional ambulation.

## 2023-07-31 NOTE — PLAN OF CARE
"Assessment/Intervention/Current Symtoms and Care Coordination:  Pt discussed in team rounds this AM. Pt has been improving since multiple falls. Tentative discharge for 8/14 after pt's guardian/sister comes back into town pending pt stabilization.     Writer received emails from outpatient team regarding pt's progress. Pt's sister/guardian reports that pt is still delusional about \"living in the hospital.\" Writer reminded outpatient team of guardianship to assist with pt's move back to the  as pt may have new baseline. Writer also reiterated positives of pt moving back to a facility that he is familiar with, working with staff who are familiar with him.    Discharge Plan or Goal:  Pending stabilization and safe disposition plan, pt to return to  with adult day services on 8/14     Barriers to Discharge:  Pt continues to present as symptomatic (delusional mood). Pt is receiving ongoing stabilization and medication adjustment. Continue care coordination with assisted to ascertain his acceptability for a return to the home.     Referral Status:  No current referrals - team inquiring about adult day services with openings     Legal Status:  Voluntary per guardian     Contacts:  Guardian/Sister - Huong Shlomo   P: [phone]     - Sadaf  P: [phone]     Upcoming Meetings and Dates/Important Information and next steps:  CTC to collaborate with outpatient team regarding adult day service referrals         "

## 2023-07-31 NOTE — CARE PLAN
07/31/23 1247   Group Therapy Session   Group Attendance attended group session   Time Session Began 0930   Time Session Ended 1030   Total Time (minutes) 60   Group Type expressive therapy;task skill;psychotherapeutic   Group Topic Covered coping skills/lifestyle management;problem-solving;cognitive therapy techniques;cognitive activities;structured socialization;balanced lifestyle   Group Session Detail Occupational Therapy Clinic group to facilitate coping skill exploration, use of cognitive skills and problem solving, creative expression, clinical observation and facilitation of social, cognitive, and kinesthetic performance skills.    Patient Response/Contribution cooperative with task;disorganized   Patient Participation Detail Complained about the amount of work he needed to do to complete the task work. Asked for assistance with details and in completing some parts of the task work to make it easier for him.  Teased a peer and when asked to stop, and with explanations it was a bit too much, stopped and explained he was just trying to tease. Followed through with directions. Stayed for approx 2-4 minutes for the 2nd group and asked if he could leave and not stay for this group. Left, though returned 2 more times just to stop in and not stay.

## 2023-07-31 NOTE — PLAN OF CARE
Problem: Plan of Care - These are the overarching goals to be used throughout the patient stay.    Goal: Absence of Hospital-Acquired Illness or Injury  Intervention: Identify and Manage Fall Risk  Recent Flowsheet Documentation  Taken 7/31/2023 1230 by Cris Diaz RN  Safety Promotion/Fall Prevention:   nonskid shoes/slippers when out of bed   clutter free environment maintained   check orthostatic blood pressure   mobility aid in reach   safety round/check completed     Problem: Plan of Care - These are the overarching goals to be used throughout the patient stay.    Goal: Absence of Hospital-Acquired Illness or Injury  Intervention: Prevent Skin Injury  Recent Flowsheet Documentation  Taken 7/31/2023 1230 by Cris Diaz RN  Body Position: position changed independently     Problem: Adult Behavioral Health Plan of Care  Goal: Adheres to Safety Considerations for Self and Others  Intervention: Develop and Maintain Individualized Safety Plan  Recent Flowsheet Documentation  Taken 7/31/2023 1230 by Cris Diaz RN  Safety Measures:   environmental rounds completed   safety rounds completed   suicide assessment completed     Problem: Psychotic Signs/Symptoms  Goal: Increased Participation and Engagement (Psychotic Signs/Symptoms)  Intervention: Facilitate Participation and Engagement  Recent Flowsheet Documentation  Taken 7/31/2023 1230 by Cris Diaz RN  Diversional Activity: television     Problem: Psychotic Signs/Symptoms  Goal: Improved Behavioral Control (Psychotic Signs/Symptoms)  Outcome: Progressing  Flowsheets (Taken 7/31/2023 1428)  Mutually Determined Action Steps (Improved Behavioral Control): identifies future-oriented goal     Problem: Disruptive Behavior  Goal: Improved Mood Symptoms (Disruptive Behavior)  Intervention: Optimize Emotion and Mood  Recent Flowsheet Documentation  Taken 7/31/2023 1230 by Cris Diaz RN  Diversional Activity: television   Goal Outcome  "Evaluation:    Plan of Care Reviewed With: patient      Pt has been pleasant and cooperative. Pt denies SI/SIB/wishes to be dead/anxiety/depression. Pt oriented to July, 2023. Pt stated that date as the 30th. Pt was insistent that this was the correct date until date on white board was changed. Pt identified that this was a hospital, however he believes that it is now a \"[facility]\". Pt reports he will be living here from now on. Pt states that he will not be going back to the group home and is going to live in \"[place]\". Pt has not been intrusive and has been steady on his feet. Pt's affect is full range with inconsistent eye contact.  Pt's SIO was discontinued.  Pt has been eating well at meal times and drinks fluids readily and asks for fluids frequently.  Pt does admit to having mid back pain which he has been taking scheduled tylenol for as well as prn icy hot patches. Pt has been ambulating in the [place] listening to music.   Pt has been using a medical bed to aide in mobility due to recent falls and back pain.                  "

## 2023-08-01 LAB
BASOPHILS # BLD AUTO: 0 10E3/UL (ref 0–0.2)
BASOPHILS NFR BLD AUTO: 1 %
EOSINOPHIL # BLD AUTO: 0 10E3/UL (ref 0–0.7)
EOSINOPHIL NFR BLD AUTO: 0 %
ERYTHROCYTE [DISTWIDTH] IN BLOOD BY AUTOMATED COUNT: 14.4 % (ref 10–15)
HCT VFR BLD AUTO: 39.2 % (ref 40–53)
HGB BLD-MCNC: 13.1 G/DL (ref 13.3–17.7)
IMM GRANULOCYTES # BLD: 0 10E3/UL
IMM GRANULOCYTES NFR BLD: 0 %
LYMPHOCYTES # BLD AUTO: 1.7 10E3/UL (ref 0.8–5.3)
LYMPHOCYTES NFR BLD AUTO: 32 %
MCH RBC QN AUTO: 27.9 PG (ref 26.5–33)
MCHC RBC AUTO-ENTMCNC: 33.4 G/DL (ref 31.5–36.5)
MCV RBC AUTO: 84 FL (ref 78–100)
MONOCYTES # BLD AUTO: 0.4 10E3/UL (ref 0–1.3)
MONOCYTES NFR BLD AUTO: 8 %
NEUTROPHILS # BLD AUTO: 3.2 10E3/UL (ref 1.6–8.3)
NEUTROPHILS NFR BLD AUTO: 59 %
NRBC # BLD AUTO: 0 10E3/UL
NRBC BLD AUTO-RTO: 0 /100
PLATELET # BLD AUTO: 182 10E3/UL (ref 150–450)
RBC # BLD AUTO: 4.69 10E6/UL (ref 4.4–5.9)
WBC # BLD AUTO: 5.4 10E3/UL (ref 4–11)

## 2023-08-01 PROCEDURE — 250N000013 HC RX MED GY IP 250 OP 250 PS 637: Performed by: PSYCHIATRY & NEUROLOGY

## 2023-08-01 PROCEDURE — 250N000013 HC RX MED GY IP 250 OP 250 PS 637

## 2023-08-01 PROCEDURE — 36415 COLL VENOUS BLD VENIPUNCTURE: CPT | Performed by: PSYCHIATRY & NEUROLOGY

## 2023-08-01 PROCEDURE — H2032 ACTIVITY THERAPY, PER 15 MIN: HCPCS

## 2023-08-01 PROCEDURE — 99232 SBSQ HOSP IP/OBS MODERATE 35: CPT | Performed by: PSYCHIATRY & NEUROLOGY

## 2023-08-01 PROCEDURE — 124N000003 HC R&B MH SENIOR/ADOLESCENT

## 2023-08-01 PROCEDURE — 250N000013 HC RX MED GY IP 250 OP 250 PS 637: Performed by: STUDENT IN AN ORGANIZED HEALTH CARE EDUCATION/TRAINING PROGRAM

## 2023-08-01 PROCEDURE — G0177 OPPS/PHP; TRAIN & EDUC SERV: HCPCS

## 2023-08-01 PROCEDURE — 85025 COMPLETE CBC W/AUTO DIFF WBC: CPT | Performed by: PSYCHIATRY & NEUROLOGY

## 2023-08-01 PROCEDURE — 250N000013 HC RX MED GY IP 250 OP 250 PS 637: Performed by: EMERGENCY MEDICINE

## 2023-08-01 RX ADMIN — CHLORHEXIDINE GLUCONATE 0.12% ORAL RINSE 15 ML: 1.2 LIQUID ORAL at 08:52

## 2023-08-01 RX ADMIN — APIXABAN 5 MG: 5 TABLET, FILM COATED ORAL at 08:46

## 2023-08-01 RX ADMIN — CALCIUM POLYCARBOPHIL 625 MG: 625 TABLET, FILM COATED ORAL at 20:01

## 2023-08-01 RX ADMIN — PREDNISOLONE ACETATE 1 DROP: 10 SUSPENSION/ DROPS OPHTHALMIC at 08:49

## 2023-08-01 RX ADMIN — MENTHOL 1 PATCH: 205.5 PATCH TOPICAL at 10:13

## 2023-08-01 RX ADMIN — DOCUSATE SODIUM 100 MG: 100 CAPSULE, LIQUID FILLED ORAL at 08:46

## 2023-08-01 RX ADMIN — ACETAMINOPHEN 1000 MG: 500 TABLET, FILM COATED ORAL at 20:02

## 2023-08-01 RX ADMIN — PANTOPRAZOLE SODIUM 40 MG: 40 TABLET, DELAYED RELEASE ORAL at 05:22

## 2023-08-01 RX ADMIN — LISINOPRIL 5 MG: 5 TABLET ORAL at 08:51

## 2023-08-01 RX ADMIN — CLOZAPINE 25 MG: 25 TABLET ORAL at 08:46

## 2023-08-01 RX ADMIN — ACETAMINOPHEN 1000 MG: 500 TABLET, FILM COATED ORAL at 08:46

## 2023-08-01 RX ADMIN — TOPIRAMATE 150 MG: 50 TABLET ORAL at 20:01

## 2023-08-01 RX ADMIN — LAMOTRIGINE 100 MG: 25 TABLET ORAL at 08:47

## 2023-08-01 RX ADMIN — APIXABAN 5 MG: 5 TABLET, FILM COATED ORAL at 20:02

## 2023-08-01 RX ADMIN — CLOZAPINE 250 MG: 100 TABLET ORAL at 20:04

## 2023-08-01 RX ADMIN — LEVOTHYROXINE SODIUM 50 MCG: 25 TABLET ORAL at 05:22

## 2023-08-01 RX ADMIN — CALCIUM POLYCARBOPHIL 625 MG: 625 TABLET, FILM COATED ORAL at 08:45

## 2023-08-01 RX ADMIN — Medication 10 MG: at 20:02

## 2023-08-01 RX ADMIN — WHITE PETROLATUM 57.7 %-MINERAL OIL 31.9 % EYE OINTMENT: at 20:02

## 2023-08-01 RX ADMIN — ZINC SULFATE 220 MG (50 MG) CAPSULE 220 MG: CAPSULE at 08:45

## 2023-08-01 RX ADMIN — FUROSEMIDE 20 MG: 20 TABLET ORAL at 08:46

## 2023-08-01 RX ADMIN — MONTELUKAST 10 MG: 10 TABLET, FILM COATED ORAL at 20:02

## 2023-08-01 RX ADMIN — ACETAMINOPHEN 1000 MG: 500 TABLET, FILM COATED ORAL at 14:36

## 2023-08-01 ASSESSMENT — ACTIVITIES OF DAILY LIVING (ADL)
ADLS_ACUITY_SCORE: 54
ADLS_ACUITY_SCORE: 54
HYGIENE/GROOMING: INDEPENDENT;PROMPTS
LAUNDRY: UNABLE TO COMPLETE
ADLS_ACUITY_SCORE: 54
ORAL_HYGIENE: INDEPENDENT
LAUNDRY: UNABLE TO COMPLETE
ADLS_ACUITY_SCORE: 54
ADLS_ACUITY_SCORE: 64
ADLS_ACUITY_SCORE: 54
HYGIENE/GROOMING: INDEPENDENT;PROMPTS
ADLS_ACUITY_SCORE: 54
ADLS_ACUITY_SCORE: 64
ADLS_ACUITY_SCORE: 54
DRESS: INDEPENDENT;SCRUBS (BEHAVIORAL HEALTH)
ORAL_HYGIENE: INDEPENDENT
ADLS_ACUITY_SCORE: 54

## 2023-08-01 NOTE — CARE PLAN
Occupational Therapy     08/01/23 1100   Group Therapy Session   Group Attendance attended group session   Time Session Began 1015   Time Session Ended 1115   Total Time (minutes) 60   Total # Attendees 6   Group Type task skill   Group Topic Covered cognitive activities;coping skills/lifestyle management;leisure exploration/use of leisure time;problem-solving;structured socialization   Group Session Detail OT: Education on healthy activity engagement and creative hands-on endeavor (OT clinic) to increase concentration, focus, attention to task/detail, decision making, problem solving, frustration tolerance, task follow through, coping with stress, healthy leisure engagement, creative expression, and social engagement   Patient Response/Contribution cooperative with task;disorganized;other (see comments)  (pushed boundaries; possible learned helplessness)   Patient Participation Detail Pt sat among peers to complete familiar creative hands on endeavor and did not engage in social interactions with peers. Pt appeared to work deliberately in a messy manner to complete project, despite him completing project neatly the day before and receiving redirection from therapist; pt ignore redirection. Pt demonstrated possible learned helplessness and attempted to push boundaries as he continually asked therapist to complete steps of project he had the capability to complete independently. Therapist redirected pt to complete steps of task independently with occasional verbal prompts; pt followed redirections. Pt laughed when therapist recognized pt's attempts to push boundaries and have therapist complete his project. Pt cleaned-up his workspace and supplies with prompting.

## 2023-08-01 NOTE — CARE PLAN
"Occupational Therapy     08/01/23 1400   Group Therapy Session   Group Attendance attended group session   Time Session Began 1315   Time Session Ended 1415   Total Time (minutes) 60   Total # Attendees 6   Group Type recreation   Group Topic Covered cognitive activities;coping skills/lifestyle management;leisure exploration/use of leisure time;structured socialization   Group Session Detail OT: Education Cognitive Wellness and interactive social activity ([employer]) to increase concentration, focus, attention to task/detail, task follow through, frustration tolerance, memory recall, healthy leisure engagement, coping with stress, heathy distraction engagement, cognitive wellness, social wellness, and social engagement   Patient Response/Contribution closed eyes for most of session;confused;verbalizations were off topic;other (see comments)  (sleepy)   Patient Participation Detail Pt reported during check-in he enjoys \"listening to music\" as a way to support his cognitive wellness. Pt sat among peers during presentation of novel social activity to support cognitive wellness and placed his head on the table with his eyes closed for greater than 35+ consecutive minutes; pt did not respond to therapist's attempt to rouse multiple times. Pt eventually woke himself up and asked for the directions of the activity. Pt struggled to follow verbal directions for activity and provided responses that were not on topic . Pt was observed to intermittently drool when sitting in a upwards position.        "

## 2023-08-01 NOTE — PROGRESS NOTES
Neurology has been following peripherally as we await the encephalopathy panel from the serum and CSF.  This just resulted late today and is negative for all antibodies tested in the CSF as well as the serum.  There is therefore no evidence to support an autoimmune encephalitis leading to psychiatric symptoms.  The recommendation would be to continue treating this patient's psychiatric manifestations as a primary psychiatric disorder.    Patient also has an MRI brain that showed a nonspecific lesion of the white matter.  This was followed up with a contrast study that did not show any enhancement, which makes it very unlikely that this lesion represents something that was recently inflammatory.      Maurizio Hairston MD

## 2023-08-01 NOTE — PROGRESS NOTES
"RatePSYCHIATRY  PROGRESS NOTE     DATE OF SERVICE   08/01/2023        CHIEF COMPLAINT   \" I am doing great, no questions\".       SUBJECTIVE   Nursing reports:  Pt has been pleasant during interactions. Pt denies mental health issues including depression/anxiety/SI/SIB/wishes to be dead. Pt has been visible in common areas and is social with select peers. Pt attended group this AM. Pt's affect is flat with inconsistent eye contact. Pt has been calm.      Pt has been eating well at meals. Pt's hygiene has been fair. Pt does need prompts/reminders at times to complete ADl's/change clothing etc.      Pt is medication compliant.     Pt continues to use a medical bed to aide in mobility and due to chronic back pain.     Pt continues to voice chronic mid/low back pain. Pt has been using scheduled tylenol and Icy Hot patches for this.     Patient has been reviewed with the  earlier today.   discussed with me that there have been talks about the patient going to a different group home.   will communicate with the sister and informed her that our recommendation is for the patient to return to his group home as he is familiar with this environment.     OBJECTIVE   Patient was seen and evaluated in the day area by himself, this was a face-to-face evaluation.  Patient reported that he is doing great and at this time he is denying all psychiatric symptoms.  Patient remains delusional stating that he can see and talk with the family members that have passed away.  Patient also said that he is not leaving this hospital because this is a mansion and his family is currently living here.  Patient also mentioned that this writer is his wife and I am also not leaving the state.  Patient was not agitated nor he raised his voice when he was having the conversation with me.  He did acknowledge that my name is Dr. Charles but at the end of the meeting he called this writer Brittany (the name of his " "wife).    Patient is denying having any side effects from the medications.  He has been stable on his feet walking independently.  Patient has been attending most groups and he seemed to be engaged in the therapy       MEDICATIONS   Medications:  Scheduled Meds:   acetaminophen  1,000 mg Oral TID    apixaban ANTICOAGULANT  5 mg Oral BID    artificial tears   Right Eye At Bedtime    calcium polycarbophil  625 mg Oral BID    chlorhexidine  15 mL Swish & Spit Daily    cloZAPine  25 mg Oral Daily    cloZAPine  250 mg Oral At Bedtime    docusate sodium  100 mg Oral Daily    furosemide  20 mg Oral Daily    lamoTRIgine  100 mg Oral Daily    levothyroxine  50 mcg Oral or NG Tube QAM AC    lisinopril  5 mg Oral Daily    melatonin  10 mg Oral At Bedtime    menthol   Transdermal Q8H    montelukast  10 mg Oral At Bedtime    pantoprazole  40 mg Oral QAM AC    prednisoLONE acetate  1 drop Right Eye Daily    topiramate  150 mg Oral At Bedtime    zinc sulfate  220 mg Oral Daily     Continuous Infusions:  PRN Meds:.acetaminophen, alum & mag hydroxide-simethicone, artificial saliva, atropine, hydrALAZINE, hydrOXYzine, hypromellose-dextran, ipratropium, LORazepam, menthol **AND** menthol, OLANZapine **OR** OLANZapine, senna-docusate    Medication adherence issues: MS Med Adherence Y/N: Yes, Hospitalization  Medication side effects: MEDICATION SIDE EFFECTS: no side effects reported  Benefit: Yes / No: Yes       ROS   A comprehensive review of systems was negative.       MENTAL STATUS EXAM   Vitals: /74 (BP Location: Left arm, Patient Position: Sitting, Cuff Size: Adult Large)   Pulse 73   Temp 96.9  F (36.1  C) (Temporal)   Resp 18   Wt 94.3 kg (207 lb 14.3 oz)   SpO2 98%   BMI 35.68 kg/m      Appearance:  No apparent distress  Mood: \"I am great\"  Affect: Calm was congruent to speech  Suicidal Ideation: PRESENT / ABSENT: absent   Homicidal Ideation: PRESENT / ABSENT: absent     Thought process: [place]  Thought content: " Remains delusional but he will only talk about his delusions when we ask about it.  Continues to refuse to talk about a discharge plan because as per patient this is his at home.  Fund of Knowledge: Below average  Attention/Concentration: Limited  Language ability:  Intact, speech at baseline  Memory:  Immediate recall impaired, Short-term memory impaired and Long-term memory intact  Insight: limited   Judgement: Fair  Orientation: Person, time and place.  Continues to believe that he is at a hotel despite saying that he is at [facility]  Psychomotor Behavior: slowed    Muscle Strength and Tone: MuscleStrength: Normal during the assessment with neurology  Gait and Station: Stable and independent       LABS   personally reviewed.   Recent Labs   Lab 08/01/23  0827 07/27/23  0655 07/26/23  0906   WBC 5.4  --   --    HGB 13.1*  --   --    MCV 84  --   --      --   --    NA  --  141 141   POTASSIUM  --  4.0 3.7   CHLORIDE  --  110* 109*   CO2  --  20* 18*   BUN  --  19.5 19.6   CR  --  0.95 0.87   ANIONGAP  --  11 14   NASIR  --  9.5 9.2   GLC  --  114* 109*     No results found for: PHENYTOIN, PHENOBARB, VALPROATE, CBMZ       DIAGNOSIS   Principal Problem:    Schizoaffective disorder, bipolar type (H)    Active Problem List:  Patient Active Problem List   Diagnosis    Closed head injury, initial encounter    Altered mental status, unspecified altered mental status type    Generalized weakness    Portal vein thrombosis    Pleural effusion    Generalized muscle weakness    Falls frequently    Other ascites    Acute pancreatitis, unspecified complication status, unspecified pancreatitis type    Pancreatic pseudocyst    Idiopathic acute pancreatitis, unspecified complication status    Allergic rhinitis    Fisher's esophagus    CTS (carpal tunnel syndrome)    Elevated liver enzymes    HTN (hypertension)    Hypothyroidism    Keratoconus    Major depressive disorder, recurrent episode, mild (H)    Intellectual  disability    Morbid exogenous obesity (H)    Neuroleptic malignant syndrome    Obstructive sleep apnea syndrome    Bipolar affective disorder (H)    Seizure disorder (H)    Seizures, generalized convulsive (H)    Acute respiratory failure with hypoxia (H)    Anemia, unspecified    Anxiety disorder, unspecified    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)    Dietary zinc deficiency    Dry eye syndrome of bilateral lacrimal glands    Dry mouth, unspecified    Edema, unspecified    Gastro-esophageal reflux disease without esophagitis    Hyperosmolality and hypernatremia    Irritable bowel syndrome with constipation    Major depressive disorder, recurrent, unspecified (H)    Metabolic encephalopathy    Moderate protein-calorie malnutrition (H)    Other symbolic dysfunctions    Schizoaffective disorder, unspecified (H)    Thrombocytopenia, unspecified (H)    Unspecified asthma, uncomplicated    Urinary tract infection, site not specified    Vitamin D deficiency, unspecified    Schizoaffective disorder, bipolar type (H)          PLAN   1. Ongoing education given regarding diagnostic and treatment options with risks, benefits and alternatives and adequate verbalization of understanding.  2.  Medications       Melatonin 10 mg at bedtime       Clozapine at 25 mg daily and 250 mg at bedtime.  We will continue increasing the dose of the Clozaril slowly.       Topamax 150 mg at bedtime       Lamictal 100 mg daily    3.  Medical team following as needed  4.   coordinating a safe discharge plan  5.  I will send a message to the doctor covering from the neurology service in order to continue following up on the results of the lumbar puncture and discussed the MRI results with the family.    Risk Assessment: [facility] RISK ASSESSMENT: Patient able to contract for safety    Coordination of Care:   Treatment Plan reviewed and physician signed, Care discussed with Care/Treatment Team Members, Chart  reviewed and Patient seen      Re-Certification I certify that the inpatient psychiatric facility services furnished since the previous certification were, and continue to be, medically necessary for, either, treatment which could reasonably be expected to improve the patient s condition or diagnostic study and that the hospital records indicate that the services furnished were, either, intensive treatment services, admission and related services necessary for diagnostic study, or equivalent services.     I certify that the patient continues to need, on a daily basis, active treatment furnished directly by or requiring the supervision of inpatient psychiatric facility personnel.   I estimate 14 days of hospitalization is necessary for proper treatment of the patient. My plans for post-hospital care for this patient are  group home     Alejandra Watkins MD    -     08/01/2023  -     1:51 PM    Total time 35 minutes with > 50%spent on coordination of cares and psycho-education.    This note was created with help of Dragon dictation system. Grammatical / typing errors are not intentional.    Alejandra Watkins MD

## 2023-08-01 NOTE — PLAN OF CARE
Assessment/Intervention/Current Symtoms and Care Coordination:  Pt discussed in team rounds this AM. Pt has been improving since multiple falls. Tentative discharge for 8/14 after pt's guardian/sister comes back into town pending pt stabilization.     Discharge Plan or Goal:  Pending stabilization and safe disposition plan, pt to return to  with adult day services on 8/14     Barriers to Discharge:  Pt continues to present as symptomatic (delusional mood). Pt is receiving ongoing stabilization and medication adjustment. Continue care coordination with [facility] to ascertain his acceptability for a return to the home.     Referral Status:  No current referrals - team inquiring about adult day services with openings     Legal Status:  Voluntary per guardian     Contacts:  Guardian/Sister - Huong Keenan   P: [phone]     - Sadaf  P: [phone]     Upcoming Meetings and Dates/Important Information and next steps:  CTC to collaborate with outpatient team regarding adult day service referrals

## 2023-08-01 NOTE — PLAN OF CARE
Problem: Disruptive Behavior  Goal: Improved Sleep (Disruptive Behavior)  Outcome: Progressing   Goal Outcome Evaluation:    Plan of Care Reviewed With: patient    Pt bright and cheerful on the unit headphone on signing out loud . Pt ate well for meals and was medication complaint   Pt denies all MH SX and interacted well with peers and staff . Pt attended groups and participated . Plans is to discharge to his old facility

## 2023-08-01 NOTE — PLAN OF CARE
Problem: Plan of Care - These are the overarching goals to be used throughout the patient stay.    Goal: Absence of Hospital-Acquired Illness or Injury  Intervention: Identify and Manage Fall Risk  Recent Flowsheet Documentation  Taken 8/1/2023 1231 by Cris Diaz RN  Safety Promotion/Fall Prevention:   nonskid shoes/slippers when out of bed   clutter free environment maintained   check orthostatic blood pressure   mobility aid in reach   safety round/check completed     Problem: Plan of Care - These are the overarching goals to be used throughout the patient stay.    Goal: Absence of Hospital-Acquired Illness or Injury  Intervention: Prevent Skin Injury  Recent Flowsheet Documentation  Taken 8/1/2023 1231 by Cris Diaz RN  Body Position: position changed independently     Problem: Adult Behavioral Health Plan of Care  Goal: Adheres to Safety Considerations for Self and Others  Intervention: Develop and Maintain Individualized Safety Plan  Recent Flowsheet Documentation  Taken 8/1/2023 1231 by Cris Diaz RN  Safety Measures:   environmental rounds completed   safety rounds completed   suicide assessment completed     Problem: Psychotic Signs/Symptoms  Goal: Increased Participation and Engagement (Psychotic Signs/Symptoms)  Intervention: Facilitate Participation and Engagement  Recent Flowsheet Documentation  Taken 8/1/2023 1231 by Cris Diaz RN  Diversional Activity: television     Problem: Psychotic Signs/Symptoms  Goal: Improved Psychomotor Symptoms (Psychotic Signs/Symptoms)  Intervention: Manage Psychomotor Movement  Recent Flowsheet Documentation  Taken 8/1/2023 1231 by Cris Diaz RN  Patient Performed Hygiene: dressed  Diversional Activity: television  Activity (Behavioral Health):   up ad katina   activity encouraged   Goal Outcome Evaluation:    Plan of Care Reviewed With: patient      Pt has been pleasant during interactions. Pt denies mental health issues including  depression/anxiety/SI/SIB/wishes to be dead. Pt has been visible in common areas and is social with select peers. Pt attended group this AM. Pt's affect is flat with inconsistent eye contact. Pt has been calm.     Pt has been eating well at meals. Pt's hygiene has been fair. Pt does need prompts/reminders at times to complete ADl's/change clothing etc.     Pt is medication compliant.    Pt continues to use a medical bed to aide in mobility and due to chronic back pain.    Pt continues to voice chronic mid/low back pain. Pt has been using scheduled tylenol and Icy Hot patches for this.

## 2023-08-01 NOTE — PLAN OF CARE
Problem: Psychotic Signs/Symptoms  Goal: Improved Behavioral Control (Psychotic Signs/Symptoms)  Outcome: Progressing   Goal Outcome Evaluation:                      Received in bed sleeping, pt has a medical bed to  aid with mobility and to help elevate foot of bed due to LE edema. Pt has a no roommate order due to poor boundaries and intrusiveness.Present in the lounge at 0030 and had Orange Juice, slept in the lounge,declined to go back to his room but when he woke up at 0500 he was able to sleep  some more in his bed. Slept for a total of 8 hours.  Overall calm  and polite. Sang along when using the headphone.

## 2023-08-01 NOTE — PROVIDER NOTIFICATION
08/01/23 1019   Individualization/Patient Specific Goals   Patient Personal Strengths family/social support;medication/treatment adherence;stable living environment   Patient Vulnerabilities limited ability to read/write;limited social skills;lacks insight into illness   Anxieties, Fears or Concerns Pt reports anxiety about returning to    Individualized Care Needs Medication and stabilization   Interprofessional Rounds   Summary Pt continues to stabilize until discharge on 8/14 back to    Participants CTC;nursing;OT;psychiatrist   Behavioral Team Discussion   Participants Dr. Alejandra Watkins MD, Maggie Mitchell MSW, LGSW, Cris Diaz RN, Mouna Solorzano OT   Progress Stabilization is ongoing   Anticipated length of stay 13 days   Continued Stay Criteria/Rationale Pt continues to stabilize until discharge on 8/14 back to    Medical/Physical See H&P   Precautions See below   Plan Pt continues to stabilize until discharge on 8/14 back to    Rationale for change in precautions or plan no change   Safety Plan CTC will do individual inpatient treatment planning and after care planning. CTC will discuss options for increasing community supports with the patient. CTC will coordinate with outpatient providers and will place referrals to ensure appointments are in place.   Anticipated Discharge Disposition group home     PRECAUTIONS AND SAFETY    Behavioral Orders   Procedures    Code 1 - Restrict to Unit    Code 2    Code 2     Head ct    Code 2     For EEG    Elopement precautions    Fall precautions    Routine Programming     As clinically indicated    Sexual precautions    Status 15     Every 15 minutes.       Safety  Safety WDL: WDL  Patient Location: dining room  Observed Behavior: sitting  Observed Behavior (Comment): sleeping  Safety Measures: environmental rounds completed, safety plan reviewed, safety rounds completed  Diversional Activity: television  Suicidality: SIO (Status Individual Observation)   (NOTE - order will specify distance, Status 15  Seizure precautions: clutter free environment  Assault: status 15, minimal personal belongings in room, behavioral scrubs (pajamas)  Elopement Assessment: Statements about wanting to leave  Elopement Interventions: status continuous sight  Sexual: status 15, private room, status continuous sight  Additional Documentation:  (Elopement precuations in place.)

## 2023-08-01 NOTE — PROGRESS NOTES
Pt was again impatient through the gentle parts of the session but was able to reach a playful compromise during dance/movement therapy (D/MT) exploring energy levels and the need for external stimulation through body-based sensations and shifting. For example, he laid his head on the table during parts of the session, and stood to dance briskly during others. Creative self-expression provided release and pt became engaged in a balanced and socially-appropriate manner.       08/01/23 1115   Expressive Therapy   Therapy Type dance/movement   Minutes of Treatment 50

## 2023-08-02 PROCEDURE — 124N000003 HC R&B MH SENIOR/ADOLESCENT

## 2023-08-02 PROCEDURE — 99232 SBSQ HOSP IP/OBS MODERATE 35: CPT | Performed by: PSYCHIATRY & NEUROLOGY

## 2023-08-02 PROCEDURE — 250N000013 HC RX MED GY IP 250 OP 250 PS 637: Performed by: PSYCHIATRY & NEUROLOGY

## 2023-08-02 PROCEDURE — 250N000013 HC RX MED GY IP 250 OP 250 PS 637: Performed by: EMERGENCY MEDICINE

## 2023-08-02 PROCEDURE — G0177 OPPS/PHP; TRAIN & EDUC SERV: HCPCS

## 2023-08-02 PROCEDURE — 250N000013 HC RX MED GY IP 250 OP 250 PS 637

## 2023-08-02 PROCEDURE — 250N000013 HC RX MED GY IP 250 OP 250 PS 637: Performed by: STUDENT IN AN ORGANIZED HEALTH CARE EDUCATION/TRAINING PROGRAM

## 2023-08-02 RX ORDER — CLOZAPINE 50 MG/1
50 TABLET ORAL DAILY
Status: DISCONTINUED | OUTPATIENT
Start: 2023-08-03 | End: 2023-08-14 | Stop reason: HOSPADM

## 2023-08-02 RX ADMIN — LAMOTRIGINE 100 MG: 25 TABLET ORAL at 08:26

## 2023-08-02 RX ADMIN — PREDNISOLONE ACETATE 1 DROP: 10 SUSPENSION/ DROPS OPHTHALMIC at 08:28

## 2023-08-02 RX ADMIN — CLOZAPINE 25 MG: 25 TABLET ORAL at 08:26

## 2023-08-02 RX ADMIN — PANTOPRAZOLE SODIUM 40 MG: 40 TABLET, DELAYED RELEASE ORAL at 06:05

## 2023-08-02 RX ADMIN — CALCIUM POLYCARBOPHIL 625 MG: 625 TABLET, FILM COATED ORAL at 21:40

## 2023-08-02 RX ADMIN — CALCIUM POLYCARBOPHIL 625 MG: 625 TABLET, FILM COATED ORAL at 08:26

## 2023-08-02 RX ADMIN — TOPIRAMATE 150 MG: 50 TABLET ORAL at 21:41

## 2023-08-02 RX ADMIN — CHLORHEXIDINE GLUCONATE 0.12% ORAL RINSE 15 ML: 1.2 LIQUID ORAL at 08:27

## 2023-08-02 RX ADMIN — DOCUSATE SODIUM 100 MG: 100 CAPSULE, LIQUID FILLED ORAL at 08:26

## 2023-08-02 RX ADMIN — ACETAMINOPHEN 1000 MG: 500 TABLET, FILM COATED ORAL at 12:45

## 2023-08-02 RX ADMIN — APIXABAN 5 MG: 5 TABLET, FILM COATED ORAL at 21:40

## 2023-08-02 RX ADMIN — FUROSEMIDE 20 MG: 20 TABLET ORAL at 08:27

## 2023-08-02 RX ADMIN — CLOZAPINE 250 MG: 100 TABLET ORAL at 21:41

## 2023-08-02 RX ADMIN — APIXABAN 5 MG: 5 TABLET, FILM COATED ORAL at 08:27

## 2023-08-02 RX ADMIN — LEVOTHYROXINE SODIUM 50 MCG: 25 TABLET ORAL at 06:05

## 2023-08-02 RX ADMIN — LISINOPRIL 5 MG: 5 TABLET ORAL at 08:27

## 2023-08-02 RX ADMIN — MONTELUKAST 10 MG: 10 TABLET, FILM COATED ORAL at 21:41

## 2023-08-02 RX ADMIN — ACETAMINOPHEN 1000 MG: 500 TABLET, FILM COATED ORAL at 21:40

## 2023-08-02 RX ADMIN — ZINC SULFATE 220 MG (50 MG) CAPSULE 220 MG: CAPSULE at 08:26

## 2023-08-02 RX ADMIN — Medication 10 MG: at 21:40

## 2023-08-02 RX ADMIN — ACETAMINOPHEN 1000 MG: 500 TABLET, FILM COATED ORAL at 08:27

## 2023-08-02 ASSESSMENT — ACTIVITIES OF DAILY LIVING (ADL)
ORAL_HYGIENE: INDEPENDENT
ADLS_ACUITY_SCORE: 44
ADLS_ACUITY_SCORE: 44
DRESS: SCRUBS (BEHAVIORAL HEALTH);INDEPENDENT
ADLS_ACUITY_SCORE: 44
ADLS_ACUITY_SCORE: 44
LAUNDRY: UNABLE TO COMPLETE
ADLS_ACUITY_SCORE: 44
ADLS_ACUITY_SCORE: 54
ADLS_ACUITY_SCORE: 44
LAUNDRY: UNABLE TO COMPLETE
ORAL_HYGIENE: INDEPENDENT
ADLS_ACUITY_SCORE: 54
ADLS_ACUITY_SCORE: 54
ADLS_ACUITY_SCORE: 44
HYGIENE/GROOMING: INDEPENDENT
ADLS_ACUITY_SCORE: 54
HYGIENE/GROOMING: INDEPENDENT
ADLS_ACUITY_SCORE: 54
DRESS: SCRUBS (BEHAVIORAL HEALTH);INDEPENDENT

## 2023-08-02 NOTE — PLAN OF CARE
Assessment/Intervention/Current Symtoms and Care Coordination:  Pt discussed in team rounds this AM. Pt has been improving since multiple falls. Tentative discharge for 8/14 after pt's guardian/sister comes back into town pending pt stabilization. Due to pt's continued delusions about being  to the provider, pt to possibly switch to a male provider.    Writer put out call to Genie with [facility] ([phone]) to inquire if pt would be a good fit for their day program. Writer LÓPEZ and requested CB.    Jefersonr received VM from [employer] care coordinator, Monica who is covering for Lilo. Monica requested update ([phone]).    Jefersonr put out call to Monica with update. Jefersonr LÓPEZ if Monica has any further questions.    Discharge Plan or Goal:  Pending stabilization and safe disposition plan, pt to return to  with adult day services on 8/14     Barriers to Discharge:  Pt is receiving ongoing stabilization and medication adjustment. Continue care coordination with [facility] to ascertain his acceptability for a return to the home.     Referral Status:  No current referrals - team inquiring about adult day services with openings     Legal Status:  Voluntary per guardian     Contacts:  Guardian/Sister - Huong Keenan   P: [phone]     - Sadaf  P: [phone]     Upcoming Meetings and Dates/Important Information and next steps:  CTC to collaborate with outpatient team regarding adult day service referrals

## 2023-08-02 NOTE — PLAN OF CARE
"  Problem: Plan of Care - These are the overarching goals to be used throughout the patient stay.    Goal: Plan of Care Review  Description: The Plan of Care Review/Shift note should be completed every shift.  The Outcome Evaluation is a brief statement about your assessment that the patient is improving, declining, or no change.  This information will be displayed automatically on your shift note.  Outcome: Progressing     Problem: Psychotic Signs/Symptoms  Goal: Improved Behavioral Control (Psychotic Signs/Symptoms)  Outcome: Progressing     Problem: Disruptive Behavior  Goal: Improved Impulse and Aggression Control (Disruptive Behavior)  Outcome: Progressing     Problem: Fall Injury Risk  Goal: Absence of Fall and Fall-Related Injury  Outcome: Progressing   Goal Outcome Evaluation:    Plan of Care Reviewed With: patient    BP (!) 131/94 (BP Location: Right arm, Patient Position: Sitting, Cuff Size: Adult Large)   Pulse 93   Temp 98.1  F (36.7  C) (Temporal)   Resp 18   Wt 94.3 kg (207 lb 14.3 oz)   SpO2 97%   BMI 35.68 kg/m       Pt alert and oriented X 3, disoriented to place.Pt said \"I don't what to talk about that\" Able to make needs known. Pt calm and cooperative. Eyes contact intermittent. Pt denies mental health symptoms, and hallucinations. Denies depression and anxiety. Declined on pain. He had 75% dinner. Medication complaint.  Did attend group, and was in the lounge watching TV and socializing with peers.  Medical bed for mobility.Contact for safety in the unit. All precautions were in place and maintained. Will continue to monitor with POC.                     "

## 2023-08-02 NOTE — PLAN OF CARE
Problem: Psychotic Signs/Symptoms  Goal: Improved Sleep (Psychotic Signs/Symptoms)  Outcome: Progressing   Goal Outcome Evaluation:    Patient was awake at 12 past midnight, walked into the lounge and was redirected back to his room. Patient was redirectable and went back to sleep immediately.     He is using a medical bed to help with mobility and to raise his lower extremities. He has a No Roommate order due to poor boundaries and intrusiveness.     Patient was incontinent of urine upon waking up. He was provided with a fresh and clean scrub pants and a fresh pull-up. Patient was calm and cooperative. No agitation and singing noted while he was walking with his headphone on.     Slept for a total of  8 hours.

## 2023-08-02 NOTE — CARE PLAN
"Occupational Therapy     08/02/23 1300   Group Therapy Session   Group Attendance other (see comments)   Time Session Began 1110   Time Session Ended 1200   Total Time (minutes) 5   Total # Attendees 3-4   Group Type recreation   Group Topic Covered balanced lifestyle;cognitive activities;coping skills/lifestyle management;leisure exploration/use of leisure time;structured socialization   Group Session Detail OT: Education on 8 Dimensions of Wellness with interactive social activities (Gentle Exercise & Name 5) to increase concentration, focus, attention to task/detail, memory recall, coping with stress, health distraction engagement, social wellness, physical wellness, and intellectual wellness   Patient Response/Contribution closed eyes for most of session;confused;verbalizations were off topic;refused to participate   Patient Participation Detail Pt arrived late to group and stayed for remainder. Pt sat among peers during presentation of activity and immediately stated \"I'm not doing that\" (regarding the exercise portion of group. Pt placed his head on the table with his eyes closed for greater than 45+ consecutive minutes and fell asleep (pt was snoring and his breathing pattern changed from when sitting up); pt did not respond to therapist's attempt to rouse multiple times. Pt was observed to intermittently drool when sitting in a upwards position. Pt did not engage in any of the physical movement activities or answer a cognitive question. Pt was in group area for majority of group time but was only alert for approximately 5 minutes.         "

## 2023-08-02 NOTE — PROGRESS NOTES
"RatePSYCH[facility]RY  PROGRESS NOTE     DATE OF SERVICE   08/02/2023        CHIEF COMPLAINT   \" I am not going to talk to you about going back home\".       SUBJECTIVE   Nursing reports:  Jose is present in the milieu majority of the shift. He is attending and participating in groups. He is calm with a bright affect. He is pleasant and cooperative. Redirectable. No behavioral outburst observed. He is pacing in the hallway with a headphone on. He is not loud while singing. He is independent on adl's. His gait is steady. He is eating well. Social with staff and select peers. Playing cards with 1 or 2 patients. He is drinking adequate amount of fluids. He has intermittent eye contact. He is med compliant. He still is exhibiting delusional thoughts. He thinks one of the female patients is his \"Mom\" and he calls her \"Mom\". He said during breakfast \"Pls give tray to my Mom\", pointing to the female patient sitting next to him. Patient took a short nap on and off. VSS. His feet are still a little bit swollen with +1-2 indentation. Encouraged to elevated his feet when sitting and laying down in bed. He is using a medical bed for mobility.     Patient has been reviewed with the  earlier today.  We have not had any updates on the discharge plans.     OBJECTIVE   Patient was seen and evaluated in the day area by himself, this was a face-to-face evaluation.  Patient presented as calm and pleasant during the assessment.  Patient was not fully cooperative as he stated that he was not going to talk to this writer if I bring up the topic of him leaving the hospital.  Patient continues to have the firm believe that this is a mansion and he needs to stay in the hospital.  Continued to express the same delusions (parents are alive and family members are living here in the hospital).  He is denying all other psychiatric symptoms, is attending groups and has been pleasant.    I have talked to Dr. Dumas about the situation with the " "patient and his delusions as the patient continues to think that this writer is his wife and that my name is Brittany.  Dr. Dumas will start working with the patient on Monday.       MEDICATIONS   Medications:  Scheduled Meds:   acetaminophen  1,000 mg Oral TID    apixaban ANTICOAGULANT  5 mg Oral BID    artificial tears   Right Eye At Bedtime    calcium polycarbophil  625 mg Oral BID    chlorhexidine  15 mL Swish & Spit Daily    cloZAPine  25 mg Oral Daily    cloZAPine  250 mg Oral At Bedtime    docusate sodium  100 mg Oral Daily    furosemide  20 mg Oral Daily    lamoTRIgine  100 mg Oral Daily    levothyroxine  50 mcg Oral or NG Tube QAM AC    lisinopril  5 mg Oral Daily    melatonin  10 mg Oral At Bedtime    menthol   Transdermal Q8H    montelukast  10 mg Oral At Bedtime    pantoprazole  40 mg Oral QAM AC    prednisoLONE acetate  1 drop Right Eye Daily    topiramate  150 mg Oral At Bedtime    zinc sulfate  220 mg Oral Daily     Continuous Infusions:  PRN Meds:.acetaminophen, alum & mag hydroxide-simethicone, artificial saliva, atropine, hydrALAZINE, hydrOXYzine, hypromellose-dextran, ipratropium, LORazepam, menthol **AND** menthol, OLANZapine **OR** OLANZapine, senna-docusate    Medication adherence issues: MS Med Adherence Y/N: Yes, Hospitalization  Medication side effects: MEDICATION SIDE EFFECTS: no side effects reported  Benefit: Yes / No: Yes       ROS   A comprehensive review of systems was negative.       MENTAL STATUS EXAM   Vitals: /63   Pulse 91   Temp 97.4  F (36.3  C) (Temporal)   Resp 18   Wt 94.3 kg (207 lb 14.3 oz)   SpO2 100%   BMI 35.68 kg/m      Appearance:  No apparent distress  Mood: \"I am good  Affect: Calm and pleasant was congruent to speech  Suicidal Ideation: PRESENT / ABSENT: absent   Homicidal Ideation: PRESENT / ABSENT: absent     Thought process: [place]  Thought content: Remains delusional but he will only talk about his delusions when we ask about it.  Continues to " refuse to talk about a discharge plan because as per patient this is his at home.  Fund of Knowledge: Below average  Attention/Concentration: Limited  Language ability:  Intact, speech at baseline  Memory:  Immediate recall impaired, Short-term memory impaired and Long-term memory intact  Insight: limited   Judgement: Fair  Orientation: Person, time and place.  Continues to believe that he is at a hotel despite saying that he is at [facility]  Psychomotor Behavior: slowed    Muscle Strength and Tone: MuscleStrength: Normal during the assessment with neurology  Gait and Station: Stable and independent       LABS   personally reviewed.   Recent Labs   Lab 08/01/23 0827 07/27/23  0655   WBC 5.4  --    HGB 13.1*  --    MCV 84  --      --    NA  --  141   POTASSIUM  --  4.0   CHLORIDE  --  110*   CO2  --  20*   BUN  --  19.5   CR  --  0.95   ANIONGAP  --  11   NASIR  --  9.5   GLC  --  114*     No results found for: PHENYTOIN, PHENOBARB, VALPROATE, CBMZ       DIAGNOSIS   Principal Problem:    Schizoaffective disorder, bipolar type (H)    Active Problem List:  Patient Active Problem List   Diagnosis    Closed head injury, initial encounter    Altered mental status, unspecified altered mental status type    Generalized weakness    Portal vein thrombosis    Pleural effusion    Generalized muscle weakness    Falls frequently    Other ascites    Acute pancreatitis, unspecified complication status, unspecified pancreatitis type    Pancreatic pseudocyst    Idiopathic acute pancreatitis, unspecified complication status    Allergic rhinitis    Fisher's esophagus    CTS (carpal tunnel syndrome)    Elevated liver enzymes    HTN (hypertension)    Hypothyroidism    Keratoconus    Major depressive disorder, recurrent episode, mild (H)    Intellectual disability    Morbid exogenous obesity (H)    Neuroleptic malignant syndrome    Obstructive sleep apnea syndrome    Bipolar affective disorder (H)    Seizure disorder (H)     Seizures, generalized convulsive (H)    Acute respiratory failure with hypoxia (H)    Anemia, unspecified    Anxiety disorder, unspecified    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)    Dietary zinc deficiency    Dry eye syndrome of bilateral lacrimal glands    Dry mouth, unspecified    Edema, unspecified    Gastro-esophageal reflux disease without esophagitis    Hyperosmolality and hypernatremia    Irritable bowel syndrome with constipation    Major depressive disorder, recurrent, unspecified (H)    Metabolic encephalopathy    Moderate protein-calorie malnutrition (H)    Other symbolic dysfunctions    Schizoaffective disorder, unspecified (H)    Thrombocytopenia, unspecified (H)    Unspecified asthma, uncomplicated    Urinary tract infection, site not specified    Vitamin D deficiency, unspecified    Schizoaffective disorder, bipolar type (H)          PLAN   1. Ongoing education given regarding diagnostic and treatment options with risks, benefits and alternatives and adequate verbalization of understanding.  2.  Medications       Melatonin 10 mg at bedtime       Increase Clozapine at 50 mg daily and 250 mg at bedtime.  We will continue increasing the dose of the Clozaril slowly.       Topamax 150 mg at bedtime       Lamictal 100 mg daily    3.  Medical team following as needed  4.   coordinating a safe discharge plan  5.  I will send a message to the doctor covering from the neurology service in order to continue following up on the results of the lumbar puncture and discussed the MRI results with the family.    Risk Assessment: [facility] RISK ASSESSMENT: Patient able to contract for safety    Coordination of Care:   Treatment Plan reviewed and physician signed, Care discussed with Care/Treatment Team Members, Chart reviewed and Patient seen      Re-Certification I certify that the inpatient psychiatric facility services furnished since the previous certification were, and continue to  be, medically necessary for, either, treatment which could reasonably be expected to improve the patient s condition or diagnostic study and that the hospital records indicate that the services furnished were, either, intensive treatment services, admission and related services necessary for diagnostic study, or equivalent services.     I certify that the patient continues to need, on a daily basis, active treatment furnished directly by or requiring the supervision of inpatient psychiatric facility personnel.   I estimate 14 days of hospitalization is necessary for proper treatment of the patient. My plans for post-hospital care for this patient are  group home     Alejandra Watkins MD    -     08/02/2023  -     3:59 PM    Total time 25 minutes with > 50%spent on coordination of cares and psycho-education.    This note was created with help of Dragon dictation system. Grammatical / typing errors are not intentional.    Alejandra Watkins MD

## 2023-08-02 NOTE — CARE PLAN
Occupational Therapy Group Note:     08/02/23 1448   Group Therapy Session   Group Attendance attended group session   Time Session Began 1300   Time Session Ended 1350   Total Time (minutes) 50   Total # Attendees 3   Group Type recreation   Group Topic Covered leisure exploration/use of leisure time;structured socialization   Group Session Detail OT Leisure Group   Patient Response/Contribution confronted peers appropriately;cooperative with task;listened actively   Patient Participation Detail Patient actively engaged in a occupational therapy group with leisure exploration and engagement being the topic and focus. Leisure exploration offered for increased intrinsic motivation to engage in social situations with peers and exercise cognitive skills. Patient listened actively to instructions of activity and followed along to writer's simplified cueing and recommendations throughout activity. Patient benefited from 1:1 verbal cueing throughout activity. Patient remained appropriate and attentive throughout activity. Minimal social interaction with peers. Behavior was calm and appropriate in group.

## 2023-08-02 NOTE — CARE PLAN
Occupational Therapy Group Note:     08/02/23 1440   Group Therapy Session   Group Attendance attended group session   Time Session Began 1015   Time Session Ended 1110   Total Time (minutes) 45   Total # Attendees 4   Group Type task skill   Group Topic Covered coping skills/lifestyle management;emotions/expression;leisure exploration/use of leisure time;structured socialization;relaxation techniques   Group Session Detail OT Clinic Group   Patient Response/Contribution cooperative with task;confronted peers appropriately   Patient Participation Detail Pt actively participated in occupational therapy clinic to facilitate coping skill exploration, creative expression within personally meaningful activities, and clinical observation of social, cognitive, and kinesthetic performance skills. Patient initiated engagement in an unfamiliar paint by sticker activity this date following writer's encouragement and explanation. Patient listened actively to instructions of activity and seemed to benefit from physical demonstration of how to complete task. Patient exhibited some increased difficulty with fine motor skills/manipulation of stickers throughout task; however, with encouragement, patient did independently engage. Patient was not always atune to details of task and placed stickers incorrectly or outside of designated place. Patient exhibited decent frustration tolerance when unable to manipulate stickers appropriately; asked for assist as needed. Patient did not utilize task modification recommendations for increased ease of task. Patient remained engaged and attentive for majority of session. No social engagement with peers noted. Affect: blunted. Mood: calm, cooperative.

## 2023-08-02 NOTE — PLAN OF CARE
"  Problem: Psychotic Signs/Symptoms  Goal: Improved Behavioral Control (Psychotic Signs/Symptoms)  Outcome: Progressing   Goal Outcome Evaluation:    Plan of Care Reviewed With: patient      Jose is present in the milieu majority of the shift. He is attending and participating in groups. He is calm with a bright affect. He is pleasant and cooperative. Redirectable. No behavioral outburst observed. He is pacing in the hallway with a headphone on. He is not loud while singing. He is independent on adl's. His gait is steady. He is eating well. Social with staff and select peers. Playing cards with 1 or 2 patients. He is drinking adequate amount of fluids. He has intermittent eye contact. He is med compliant. He still is exhibiting delusional thoughts. He thinks one of the female patients is his \"Mom\" and he calls her \"Mom\". He said during breakfast \"Pls give tray to my Mom\", pointing to the female patient sitting next to him. Patient took a short nap on and off. VSS. His feet are still a little bit swollen with +1-2 indentation. Encouraged to elevated his feet when sitting and laying down in bed. He is using a medical bed for mobility.                 "

## 2023-08-03 PROCEDURE — 250N000013 HC RX MED GY IP 250 OP 250 PS 637: Performed by: PSYCHIATRY & NEUROLOGY

## 2023-08-03 PROCEDURE — G0177 OPPS/PHP; TRAIN & EDUC SERV: HCPCS

## 2023-08-03 PROCEDURE — 124N000003 HC R&B MH SENIOR/ADOLESCENT

## 2023-08-03 PROCEDURE — 250N000013 HC RX MED GY IP 250 OP 250 PS 637

## 2023-08-03 PROCEDURE — 250N000013 HC RX MED GY IP 250 OP 250 PS 637: Performed by: EMERGENCY MEDICINE

## 2023-08-03 PROCEDURE — 99232 SBSQ HOSP IP/OBS MODERATE 35: CPT | Performed by: PSYCHIATRY & NEUROLOGY

## 2023-08-03 PROCEDURE — 250N000013 HC RX MED GY IP 250 OP 250 PS 637: Performed by: STUDENT IN AN ORGANIZED HEALTH CARE EDUCATION/TRAINING PROGRAM

## 2023-08-03 RX ADMIN — ACETAMINOPHEN 1000 MG: 500 TABLET, FILM COATED ORAL at 09:27

## 2023-08-03 RX ADMIN — APIXABAN 5 MG: 5 TABLET, FILM COATED ORAL at 09:27

## 2023-08-03 RX ADMIN — DOCUSATE SODIUM 100 MG: 100 CAPSULE, LIQUID FILLED ORAL at 09:26

## 2023-08-03 RX ADMIN — LISINOPRIL 5 MG: 5 TABLET ORAL at 09:26

## 2023-08-03 RX ADMIN — APIXABAN 5 MG: 5 TABLET, FILM COATED ORAL at 19:15

## 2023-08-03 RX ADMIN — CALCIUM POLYCARBOPHIL 625 MG: 625 TABLET, FILM COATED ORAL at 19:14

## 2023-08-03 RX ADMIN — ACETAMINOPHEN 1000 MG: 500 TABLET, FILM COATED ORAL at 19:15

## 2023-08-03 RX ADMIN — CLOZAPINE 50 MG: 50 TABLET ORAL at 09:26

## 2023-08-03 RX ADMIN — WHITE PETROLATUM 57.7 %-MINERAL OIL 31.9 % EYE OINTMENT: at 19:16

## 2023-08-03 RX ADMIN — LAMOTRIGINE 100 MG: 25 TABLET ORAL at 09:27

## 2023-08-03 RX ADMIN — ACETAMINOPHEN 1000 MG: 500 TABLET, FILM COATED ORAL at 14:02

## 2023-08-03 RX ADMIN — MONTELUKAST 10 MG: 10 TABLET, FILM COATED ORAL at 19:14

## 2023-08-03 RX ADMIN — Medication 10 MG: at 19:14

## 2023-08-03 RX ADMIN — CALCIUM POLYCARBOPHIL 625 MG: 625 TABLET, FILM COATED ORAL at 09:27

## 2023-08-03 RX ADMIN — CLOZAPINE 250 MG: 100 TABLET ORAL at 21:18

## 2023-08-03 RX ADMIN — FUROSEMIDE 20 MG: 20 TABLET ORAL at 09:27

## 2023-08-03 RX ADMIN — ZINC SULFATE 220 MG (50 MG) CAPSULE 220 MG: CAPSULE at 09:26

## 2023-08-03 RX ADMIN — CHLORHEXIDINE GLUCONATE 0.12% ORAL RINSE 15 ML: 1.2 LIQUID ORAL at 09:27

## 2023-08-03 RX ADMIN — LEVOTHYROXINE SODIUM 50 MCG: 25 TABLET ORAL at 06:49

## 2023-08-03 RX ADMIN — TOPIRAMATE 150 MG: 50 TABLET ORAL at 19:14

## 2023-08-03 RX ADMIN — PANTOPRAZOLE SODIUM 40 MG: 40 TABLET, DELAYED RELEASE ORAL at 06:49

## 2023-08-03 ASSESSMENT — ACTIVITIES OF DAILY LIVING (ADL)
ADLS_ACUITY_SCORE: 44
LAUNDRY: UNABLE TO COMPLETE
ADLS_ACUITY_SCORE: 44
ADLS_ACUITY_SCORE: 44
HYGIENE/GROOMING: HANDWASHING;INDEPENDENT
ADLS_ACUITY_SCORE: 44
DRESS: SCRUBS (BEHAVIORAL HEALTH);INDEPENDENT;STREET CLOTHES
ORAL_HYGIENE: INDEPENDENT

## 2023-08-03 NOTE — PLAN OF CARE
"Goal Outcome Evaluation:    Pt is calm, pleasant, affect full range. He denies all MH sx, mood is \"terrific.\" He goes to [facility], and went to the [place] with staff, this morning. He identified \"going outside\" as his goal for the day. [place] also, and wears headphones. He is in the DR for meals, eats well. His MICHAEL stockings were put on w nurse assist, this morning.     1445) Pt had a good shift. He was napping in a recliner in the [facility], this afternoon. Easily awakened for 1400 med.                      "

## 2023-08-03 NOTE — PLAN OF CARE
"  Problem: Psychotic Signs/Symptoms  Goal: Improved Behavioral Control (Psychotic Signs/Symptoms)  8/2/2023 2205 by Prabha Boone RN  Outcome: Progressing  8/2/2023 1406 by Prabha Boone RN  Outcome: Progressing     Problem: Disruptive Behavior  Goal: Improved Impulse and Aggression Control (Disruptive Behavior)  Outcome: Progressing   Goal Outcome Evaluation:    Plan of Care Reviewed With: patient        Patient is visible in the milieu majority of the shift. He is calm with a bright affect. Just the same, patient walks in the hallway with a headphone on, singing but he was not loud. No disruptive behavior observed. He still has delusional thoughts that one of the patients is his mother. He addressed her as \"Mom\". Patient is attending and participating in group activities. He is pleasant and cooperative. He takes all his meds. He is independent of adl's. He denies SI/SIB/Hallucinations. His Clozaril was increased today (Pls see MAR). His 25 mgs was increased to 50 mgs. He is med compliant. No side effects of meds reported nor observed. He took naps in between activities. His appetite is good. He still has pedal edema with +2 indentation. He is wearing Teds and was removed at HS. They are washed. VSS. Patient has no roommate order r/t intrusiveness; poor boundaries and impulsiveness. He is using a medical bed for mobility.           "

## 2023-08-03 NOTE — PROGRESS NOTES
"RatePSYCHIATRY  PROGRESS NOTE     DATE OF SERVICE   08/03/2023        CHIEF COMPLAINT   \" I am doing good\".       SUBJECTIVE   Nursing reports:  Pt is calm, pleasant, affect full range. He denies all MH sx, mood is \"terrific. He goes to [facility], and went to the [employer] with staff, this morning. He identified \"going outside\" as his goal for the day. [place], and wears headphones. He is in the DR for meals, eats well. His MICHAEL stockings were put on w nurse assist, this morning.    Patient has been reviewed with the  earlier today.  We have not had any updates on the discharge plans.     OBJECTIVE   Patient was seen and evaluated in the day area by himself, this was a face-to-face evaluation.  During my assessment the patient was pleasant, calm and cooperative.  Patient stated that he felt very good when he was able to go outside for a little bit.  Patient is denying having any side effects from the medications and denies feeling unsteady or dizzy.  He continues to be reluctant about discussing his discharge and said that he is going to stay here in the hospital.  As per patient \"there is no one in the group home for me\", meaning that none of the family members that he sees here in the unit will be present at the group home.  Patient tells me that he does not belong in the group home and he is staying here because this is his home.    I discussed with the patient that next Monday he will be starting to work with Dr. Dumas, patient was not upset about this but he said that he does not need a psychiatrist.       MEDICATIONS   Medications:  Scheduled Meds:   acetaminophen  1,000 mg Oral TID    apixaban ANTICOAGULANT  5 mg Oral BID    artificial tears   Right Eye At Bedtime    calcium polycarbophil  625 mg Oral BID    chlorhexidine  15 mL Swish & Spit Daily    cloZAPine  250 mg Oral At Bedtime    cloZAPine  50 mg Oral Daily    docusate sodium  100 mg Oral Daily    furosemide  20 mg Oral " "Daily    lamoTRIgine  100 mg Oral Daily    levothyroxine  50 mcg Oral or NG Tube QAM AC    lisinopril  5 mg Oral Daily    melatonin  10 mg Oral At Bedtime    menthol   Transdermal Q8H    montelukast  10 mg Oral At Bedtime    pantoprazole  40 mg Oral QAM AC    prednisoLONE acetate  1 drop Right Eye Daily    topiramate  150 mg Oral At Bedtime    zinc sulfate  220 mg Oral Daily     Continuous Infusions:  PRN Meds:.acetaminophen, alum & mag hydroxide-simethicone, artificial saliva, atropine, hydrALAZINE, hydrOXYzine, hypromellose-dextran, ipratropium, LORazepam, menthol **AND** menthol, OLANZapine **OR** OLANZapine, senna-docusate    Medication adherence issues: MS Med Adherence Y/N: Yes, Hospitalization  Medication side effects: MEDICATION SIDE EFFECTS: no side effects reported  Benefit: Yes / No: Yes       ROS   A comprehensive review of systems was negative.       MENTAL STATUS EXAM   Vitals: /80   Pulse 93   Temp 97  F (36.1  C) (Temporal)   Resp 16   Wt 93.1 kg (205 lb 4 oz)   SpO2 96%   BMI 35.23 kg/m      Same as last visit  Appearance:  No apparent distress  Mood: \"I am good\"  Affect: Calm and pleasant was congruent to speech  Suicidal Ideation: PRESENT / ABSENT: absent   Homicidal Ideation: PRESENT / ABSENT: absent     Thought process: [place]  Thought content: Remains delusional but he will only talk about his delusions when we ask about it.  Continues to refuse to talk about a discharge plan because as per patient this is his at home.  Fund of Knowledge: Below average  Attention/Concentration: Limited  Language ability:  Intact, speech at baseline  Memory:  Immediate recall impaired, Short-term memory impaired and Long-term memory intact  Insight: limited   Judgement: Fair  Orientation: Person, time and place.  Continues to believe that he is at a hotel despite saying that he is at [facility]  Psychomotor Behavior: slowed    Muscle Strength and Tone: MuscleStrength: Normal during the " assessment with neurology  Gait and Station: Stable and independent       LABS   personally reviewed.   Recent Labs   Lab 08/01/23  0827   WBC 5.4   HGB 13.1*   MCV 84        No results found for: PHENYTOIN, PHENOBARB, VALPROATE, CBMZ       DIAGNOSIS   Principal Problem:    Schizoaffective disorder, bipolar type (H)    Active Problem List:  Patient Active Problem List   Diagnosis    Closed head injury, initial encounter    Altered mental status, unspecified altered mental status type    Generalized weakness    Portal vein thrombosis    Pleural effusion    Generalized muscle weakness    Falls frequently    Other ascites    Acute pancreatitis, unspecified complication status, unspecified pancreatitis type    Pancreatic pseudocyst    Idiopathic acute pancreatitis, unspecified complication status    Allergic rhinitis    Fisher's esophagus    CTS (carpal tunnel syndrome)    Elevated liver enzymes    HTN (hypertension)    Hypothyroidism    Keratoconus    Major depressive disorder, recurrent episode, mild (H)    Intellectual disability    Morbid exogenous obesity (H)    Neuroleptic malignant syndrome    Obstructive sleep apnea syndrome    Bipolar affective disorder (H)    Seizure disorder (H)    Seizures, generalized convulsive (H)    Acute respiratory failure with hypoxia (H)    Anemia, unspecified    Anxiety disorder, unspecified    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)    Dietary zinc deficiency    Dry eye syndrome of bilateral lacrimal glands    Dry mouth, unspecified    Edema, unspecified    Gastro-esophageal reflux disease without esophagitis    Hyperosmolality and hypernatremia    Irritable bowel syndrome with constipation    Major depressive disorder, recurrent, unspecified (H)    Metabolic encephalopathy    Moderate protein-calorie malnutrition (H)    Other symbolic dysfunctions    Schizoaffective disorder, unspecified (H)    Thrombocytopenia, unspecified (H)    Unspecified asthma,  uncomplicated    Urinary tract infection, site not specified    Vitamin D deficiency, unspecified    Schizoaffective disorder, bipolar type (H)          PLAN   1. Ongoing education given regarding diagnostic and treatment options with risks, benefits and alternatives and adequate verbalization of understanding.  2.  Medications       Melatonin 10 mg at bedtime       IClozapine at 50 mg daily and 250 mg at bedtime.  We will continue increasing the dose of the Clozaril slowly.       Topamax 150 mg at bedtime       Lamictal 100 mg daily    3.  Medical team following as needed  4.   coordinating a safe discharge plan  5.  I will send a message to the doctor covering from the neurology service in order to continue following up on the results of the lumbar puncture and discussed the MRI results with the family.    Risk Assessment: [facility] RISK ASSESSMENT: Patient able to contract for safety    Coordination of Care:   Treatment Plan reviewed and physician signed, Care discussed with Care/Treatment Team Members, Chart reviewed and Patient seen      Re-Certification I certify that the inpatient psychiatric facility services furnished since the previous certification were, and continue to be, medically necessary for, either, treatment which could reasonably be expected to improve the patient s condition or diagnostic study and that the hospital records indicate that the services furnished were, either, intensive treatment services, admission and related services necessary for diagnostic study, or equivalent services.     I certify that the patient continues to need, on a daily basis, active treatment furnished directly by or requiring the supervision of inpatient psychiatric facility personnel.   I estimate 14 days of hospitalization is necessary for proper treatment of the patient. My plans for post-hospital care for this patient are  group home     Alejandra Watkins MD    -     08/03/2023  -     2:29  PM    Total time 25 minutes with > 50%spent on coordination of cares and psycho-education.    This note was created with help of Dragon dictation system. Grammatical / typing errors are not intentional.    Alejandra Watkins MD

## 2023-08-03 NOTE — CARE PLAN
08/03/23 1053   Group Therapy Session   Group Attendance attended group session   Time Session Began 0935   Time Session Ended 1030   Total Time (minutes) 45   Total # Attendees 4   Group Type psychotherapeutic;task skill;expressive therapy   Group Topic Covered coping skills/lifestyle management;problem-solving;cognitive activities;balanced lifestyle;structured socialization   Group Session Detail Occupational Therapy Clinic group to facilitate coping skill exploration, use of cognitive skills and problem solving, creative expression, clinical observation and facilitation of social, cognitive, and kinesthetic performance skills.    Patient Response/Contribution cooperative with task;listened actively   Patient Participation Detail Participated in an activity using visuospatial problem solving and visual scanning. He asked for assistance in a calm and pleasasnt tone of voice and when having difficulty problem solving. Pleasant and cooperative with work on task.

## 2023-08-03 NOTE — PLAN OF CARE
Assessment/Intervention/Current Symtoms and Care Coordination:  Pt discussed in team rounds this AM. Pt has been improving since multiple falls. Tentative discharge for 8/14 after pt's guardian/sister comes back into town pending pt stabilization. Due to pt's continued delusions about being  to the provider, pt to possibly switch to a male provider. Pt had increase in Clozaril.    Discharge Plan or Goal:  Pending stabilization and safe disposition plan, pt to return to  with adult day services on 8/14     Barriers to Discharge:  Pt is receiving ongoing stabilization and medication adjustment. Continue care coordination with [facility] to ascertain his acceptability for a return to the home.     Referral Status:  No current referrals - team inquiring about adult day services with openings     Legal Status:  Voluntary per guardian     Contacts:  Guardian/Sister - Huong Shlomo   P: [phone]     - Sadaf  P: [phone]     Upcoming Meetings and Dates/Important Information and next steps:  [facility] to collaborate with outpatient team regarding adult day service referrals

## 2023-08-03 NOTE — CARE PLAN
Occupational Therapy Group Note:     08/03/23 1608   Group Therapy Session   Group Attendance attended group session   Time Session Began 1400   Time Session Ended 1450   Total Time (minutes) 45   Total # Attendees 4   Group Type task skill   Group Topic Covered coping skills/lifestyle management;emotions/expression;leisure exploration/use of leisure time;structured socialization;relaxation techniques   Group Session Detail OT Activity Group: Watercolor Painting   Patient Response/Contribution confronted peers appropriately;cooperative with task   Patient Participation Detail Patient engaged in a watercolor task activity in order to promote self expression, maximize autonomy within meaningful tasks, encourage productive use of time, and to maximize attention and focus on a therapeutic task. Patient was cooperative when removing headphones from head upon entry to group. Patient was polite, yet assertive, when making musical requests for group. Patient worked appropriately and independently on task once set-up assist was provided. Demonstrated good attention on task for majority of group. Patient worked quickly on project with minimal attention to detail. Patient was receptive and followed-through with writer's recommendation and encouragement to add more detail into project. Patient was able to reach task completion on one template and initiated participation on a second template. No social interaction with peers. Affect: blunted. Mood: calm, cooperative.

## 2023-08-03 NOTE — PLAN OF CARE
Problem: Psychotic Signs/Symptoms  Goal: Improved Sleep (Psychotic Signs/Symptoms)  Outcome: Progressing   Goal Outcome Evaluation:    Patient slept for a total of 9.25 hours. Patient voided in the bathroom twice during the night. No urinary incontinence noted. He is using a medical bed for safety due to unsteadiness and disorganization. He has a No Roommate order due to intrusiveness and poor boundaries. No behavioral issues noted the whole shift.

## 2023-08-04 PROCEDURE — 250N000013 HC RX MED GY IP 250 OP 250 PS 637

## 2023-08-04 PROCEDURE — 124N000003 HC R&B MH SENIOR/ADOLESCENT

## 2023-08-04 PROCEDURE — 250N000013 HC RX MED GY IP 250 OP 250 PS 637: Performed by: PSYCHIATRY & NEUROLOGY

## 2023-08-04 PROCEDURE — G0177 OPPS/PHP; TRAIN & EDUC SERV: HCPCS

## 2023-08-04 PROCEDURE — 250N000013 HC RX MED GY IP 250 OP 250 PS 637: Performed by: EMERGENCY MEDICINE

## 2023-08-04 PROCEDURE — 250N000013 HC RX MED GY IP 250 OP 250 PS 637: Performed by: STUDENT IN AN ORGANIZED HEALTH CARE EDUCATION/TRAINING PROGRAM

## 2023-08-04 PROCEDURE — 99231 SBSQ HOSP IP/OBS SF/LOW 25: CPT | Performed by: PSYCHIATRY & NEUROLOGY

## 2023-08-04 RX ADMIN — LAMOTRIGINE 100 MG: 25 TABLET ORAL at 08:39

## 2023-08-04 RX ADMIN — ZINC SULFATE 220 MG (50 MG) CAPSULE 220 MG: CAPSULE at 08:38

## 2023-08-04 RX ADMIN — MONTELUKAST 10 MG: 10 TABLET, FILM COATED ORAL at 21:33

## 2023-08-04 RX ADMIN — CALCIUM POLYCARBOPHIL 625 MG: 625 TABLET, FILM COATED ORAL at 08:38

## 2023-08-04 RX ADMIN — WHITE PETROLATUM 57.7 %-MINERAL OIL 31.9 % EYE OINTMENT: at 21:47

## 2023-08-04 RX ADMIN — CLOZAPINE 250 MG: 100 TABLET ORAL at 21:32

## 2023-08-04 RX ADMIN — FUROSEMIDE 20 MG: 20 TABLET ORAL at 08:40

## 2023-08-04 RX ADMIN — ACETAMINOPHEN 1000 MG: 500 TABLET, FILM COATED ORAL at 14:34

## 2023-08-04 RX ADMIN — ACETAMINOPHEN 1000 MG: 500 TABLET, FILM COATED ORAL at 08:39

## 2023-08-04 RX ADMIN — CHLORHEXIDINE GLUCONATE 0.12% ORAL RINSE 15 ML: 1.2 LIQUID ORAL at 08:40

## 2023-08-04 RX ADMIN — Medication 10 MG: at 21:32

## 2023-08-04 RX ADMIN — DOCUSATE SODIUM 100 MG: 100 CAPSULE, LIQUID FILLED ORAL at 08:40

## 2023-08-04 RX ADMIN — ACETAMINOPHEN 1000 MG: 500 TABLET, FILM COATED ORAL at 21:33

## 2023-08-04 RX ADMIN — LISINOPRIL 5 MG: 5 TABLET ORAL at 08:38

## 2023-08-04 RX ADMIN — MENTHOL 1 PATCH: 205.5 PATCH TOPICAL at 12:32

## 2023-08-04 RX ADMIN — APIXABAN 5 MG: 5 TABLET, FILM COATED ORAL at 21:33

## 2023-08-04 RX ADMIN — CLOZAPINE 50 MG: 50 TABLET ORAL at 08:38

## 2023-08-04 RX ADMIN — APIXABAN 5 MG: 5 TABLET, FILM COATED ORAL at 08:40

## 2023-08-04 RX ADMIN — PANTOPRAZOLE SODIUM 40 MG: 40 TABLET, DELAYED RELEASE ORAL at 06:47

## 2023-08-04 RX ADMIN — CALCIUM POLYCARBOPHIL 625 MG: 625 TABLET, FILM COATED ORAL at 21:32

## 2023-08-04 RX ADMIN — LEVOTHYROXINE SODIUM 50 MCG: 25 TABLET ORAL at 06:46

## 2023-08-04 RX ADMIN — TOPIRAMATE 150 MG: 50 TABLET ORAL at 21:33

## 2023-08-04 ASSESSMENT — ACTIVITIES OF DAILY LIVING (ADL)
HYGIENE/GROOMING: WITH ASSISTANCE
ADLS_ACUITY_SCORE: 64
ADLS_ACUITY_SCORE: 64
DRESS: WITH ASSISTANCE
ADLS_ACUITY_SCORE: 44
ORAL_HYGIENE: PROMPTS;INDEPENDENT
DRESS: WITH ASSISTANCE
ADLS_ACUITY_SCORE: 44
ADLS_ACUITY_SCORE: 64
ADLS_ACUITY_SCORE: 44
ADLS_ACUITY_SCORE: 64
ADLS_ACUITY_SCORE: 44
HYGIENE/GROOMING: WITH ASSISTANCE
ORAL_HYGIENE: INDEPENDENT;PROMPTS
ADLS_ACUITY_SCORE: 44

## 2023-08-04 NOTE — PROGRESS NOTES
"Rate[facility]  PROGRESS NOTE     DATE OF SERVICE   08/04/2023        CHIEF COMPLAINT   \" I am doing good\".       SUBJECTIVE   Nursing reports:  Patient slept well tonight. Urine drips noted on the floor but patient refused to wake up when asked to get up and have his scrubs changed. Upon waking up just before 5am, he walked into the lounge with wet pants. Urinary incontinence noted and patient was redirected back to his room to have his pants changed. Initially, he refused to go back to his room and denied having wet pants. After some explanations were given, he agreed to have his whole scrubs changed. All bed linens were also changed as they were dirty with some stool smears and dried saliva on his pillow cases.      Slept for a total of 8.5 hours. No psychotic behavior noted the whole shift. He was quiet and calm the whole time.  Requested for a cup of coffee and watched the Care channel on the last hour of the shift.     Patient has been reviewed with the  earlier today.  We have not had any updates on the discharge plans.      OBJECTIVE   Patient was seen and evaluated in the consult room by himself, this was a face-to-face evaluation.  Today the patient presented as much more calm, pleasant and cooperative.  He continues to insist that he is not going to leave the hospital because this is his home now.  He also states that this is a mansion and his entire family lives here.  Despite this writer challenging his delusions he did not became agitated and just said \"do not ask more questions because I know the truth.\".  Patient has continued to take his medications and he is denying having any side effects from this.  He has been walking around the unit steadily listening to music and he has been attending certain groups.  Patient is engaged in the therapy and has had a good relationship with his peers.  He is aware that Dr. Dumas will be working with him next week as soon this writer will not be " "available.       MEDICATIONS   Medications:  Scheduled Meds:   acetaminophen  1,000 mg Oral TID    apixaban ANTICOAGULANT  5 mg Oral BID    artificial tears   Right Eye At Bedtime    calcium polycarbophil  625 mg Oral BID    chlorhexidine  15 mL Swish & Spit Daily    cloZAPine  250 mg Oral At Bedtime    cloZAPine  50 mg Oral Daily    docusate sodium  100 mg Oral Daily    furosemide  20 mg Oral Daily    lamoTRIgine  100 mg Oral Daily    levothyroxine  50 mcg Oral or NG Tube QAM AC    lisinopril  5 mg Oral Daily    melatonin  10 mg Oral At Bedtime    menthol   Transdermal Q8H    montelukast  10 mg Oral At Bedtime    pantoprazole  40 mg Oral QAM AC    prednisoLONE acetate  1 drop Right Eye Daily    topiramate  150 mg Oral At Bedtime    zinc sulfate  220 mg Oral Daily     Continuous Infusions:  PRN Meds:.acetaminophen, alum & mag hydroxide-simethicone, artificial saliva, atropine, hydrALAZINE, hydrOXYzine, hypromellose-dextran, ipratropium, LORazepam, menthol **AND** menthol, OLANZapine **OR** OLANZapine, senna-docusate    Medication adherence issues: MS Med Adherence Y/N: Yes, Hospitalization  Medication side effects: MEDICATION SIDE EFFECTS: no side effects reported  Benefit: Yes / No: Yes       ROS   A comprehensive review of systems was negative.       MENTAL STATUS EXAM   Vitals: /85 (BP Location: Right arm, Patient Position: Supine, Cuff Size: Adult Regular)   Pulse 80   Temp 98  F (36.7  C) (Tympanic)   Resp 16   Wt 93.1 kg (205 lb 4 oz)   SpO2 95%   BMI 35.23 kg/m      Same as last visit  Appearance:  No apparent distress  Mood: \"I am good\"  Affect: Calm and pleasant was congruent to speech  Suicidal Ideation: PRESENT / ABSENT: absent   Homicidal Ideation: PRESENT / ABSENT: absent     Thought process: [place]  Thought content: Remains delusional but he will only talk about his delusions when we ask about it.  Continues to refuse to talk about a discharge plan because as per patient this is his at " home.  Fund of Knowledge: Below average  Attention/Concentration: Limited  Language ability:  Intact, speech at baseline  Memory:  Immediate recall impaired, Short-term memory impaired and Long-term memory intact  Insight: limited   Judgement: Fair  Orientation: Person, time and place.  Continues to believe that he is at a hotel despite saying that he is at [facility]  Psychomotor Behavior: slowed    Muscle Strength and Tone: MuscleStrength: Normal during the assessment with neurology  Gait and Station: Stable and independent       LABS   personally reviewed.   Recent Labs   Lab 08/01/23  0827   WBC 5.4   HGB 13.1*   MCV 84        No results found for: PHENYTOIN, PHENOBARB, VALPROATE, CBMZ       DIAGNOSIS   Principal Problem:    Schizoaffective disorder, bipolar type (H)    Active Problem List:  Patient Active Problem List   Diagnosis    Closed head injury, initial encounter    Altered mental status, unspecified altered mental status type    Generalized weakness    Portal vein thrombosis    Pleural effusion    Generalized muscle weakness    Falls frequently    Other ascites    Acute pancreatitis, unspecified complication status, unspecified pancreatitis type    Pancreatic pseudocyst    Idiopathic acute pancreatitis, unspecified complication status    Allergic rhinitis    Fisher's esophagus    CTS (carpal tunnel syndrome)    Elevated liver enzymes    HTN (hypertension)    Hypothyroidism    Keratoconus    Major depressive disorder, recurrent episode, mild (H)    Intellectual disability    Morbid exogenous obesity (H)    Neuroleptic malignant syndrome    Obstructive sleep apnea syndrome    Bipolar affective disorder (H)    Seizure disorder (H)    Seizures, generalized convulsive (H)    Acute respiratory failure with hypoxia (H)    Anemia, unspecified    Anxiety disorder, unspecified    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)    Dietary zinc deficiency    Dry eye syndrome of bilateral  lacrimal glands    Dry mouth, unspecified    Edema, unspecified    Gastro-esophageal reflux disease without esophagitis    Hyperosmolality and hypernatremia    Irritable bowel syndrome with constipation    Major depressive disorder, recurrent, unspecified (H)    Metabolic encephalopathy    Moderate protein-calorie malnutrition (H)    Other symbolic dysfunctions    Schizoaffective disorder, unspecified (H)    Thrombocytopenia, unspecified (H)    Unspecified asthma, uncomplicated    Urinary tract infection, site not specified    Vitamin D deficiency, unspecified    Schizoaffective disorder, bipolar type (H)          PLAN   1. Ongoing education given regarding diagnostic and treatment options with risks, benefits and alternatives and adequate verbalization of understanding.  2.  Medications       Melatonin 10 mg at bedtime       Clozapine at 50 mg daily and 250 mg at bedtime.  We will continue increasing the dose of the Clozaril slowly.       Topamax 150 mg at bedtime       Lamictal 100 mg daily    3.  Medical team following as needed  4.   coordinating a safe discharge plan  5.  I will send a message to the doctor covering from the neurology service in order to continue following up on the results of the lumbar puncture and discussed the MRI results with the family.    Risk Assessment: [facility] RISK ASSESSMENT: Patient able to contract for safety    Coordination of Care:   Treatment Plan reviewed and physician signed, Care discussed with Care/Treatment Team Members, Chart reviewed and Patient seen      Re-Certification I certify that the inpatient psychiatric facility services furnished since the previous certification were, and continue to be, medically necessary for, either, treatment which could reasonably be expected to improve the patient s condition or diagnostic study and that the hospital records indicate that the services furnished were, either, intensive treatment services, admission and related  services necessary for diagnostic study, or equivalent services.     I certify that the patient continues to need, on a daily basis, active treatment furnished directly by or requiring the supervision of inpatient psychiatric facility personnel.   I estimate 14 days of hospitalization is necessary for proper treatment of the patient. My plans for post-hospital care for this patient are  group home     Alejandra Watkins MD    -     08/04/2023  -     1:14 PM    Total time 25 minutes with > 50%spent on coordination of cares and psycho-education.    This note was created with help of Dragon dictation system. Grammatical / typing errors are not intentional.    Alejandra Watkins MD

## 2023-08-04 NOTE — PLAN OF CARE
Assessment/Intervention/Current Symtoms and Care Coordination:  Pt discussed in team rounds this AM. Pt has been pleasant. Tentative discharge for 8/14 after pt's guardian/sister comes back into town pending pt stabilization. Due to pt's continued delusions about being  to the provider, pt to possibly switch to a male provider. Pt had increase in Clozaril.    Writer spoke with [facility] ([phone]). Writer was informed that Genie will be reaching out on Monday to discuss further.    Writer sent email to Huong ([email or web address]) with information about day program.    Writer spoke with Huong and provided information about day program. Huong inquired if pt needs [facility] assessment prior to discharge. Writer relayed most recent update from Bree MALLORY. Writer relayed plan to connect with Bree regarding this. Huong gave verbal consent for [facility]. Writer placed LILIANA in the chart.    Discharge Plan or Goal:  Pending stabilization and safe disposition plan, pt to return to  with adult day services on 8/14     Barriers to Discharge:  Pt is receiving ongoing stabilization and medication adjustment. Continue care coordination with [facility] to ascertain his acceptability for a return to the home.     Referral Status:  [facility]     Legal Status:  Voluntary per guardian     Contacts:  Guardian/Sister - Huong Keenan   P: [phone]     - Sadaf  P: [phone]     Upcoming Meetings and Dates/Important Information and next steps:  [facility] to collaborate with outpatient team regarding adult day service referrals

## 2023-08-04 NOTE — PLAN OF CARE
Problem: Psychotic Signs/Symptoms  Goal: Improved Sleep (Psychotic Signs/Symptoms)  Outcome: Progressing   Goal Outcome Evaluation:    Patient slept well tonight. Urine drips noted on the floor but patient refused to wake up when asked to get up and have his scrubs changed. Upon waking up just before 5am, he walked into the lounge with wet pants. Urinary incontinence noted and patient was redirected back to his room to have his pants changed. Initially, he refused to go back to his room and denied having wet pants. After some explanations were given, he agreed to have his whole scrubs changed. All bed linens were also changed as they were dirty with some stool smears and dried saliva on his pillow cases.     Slept for a total of 8.5 hours. No psychotic behavior noted the whole shift. He was quiet and calm the whole time.  Requested for a cup of coffee and watched the Care channel on the last hour of the shift.

## 2023-08-04 NOTE — PLAN OF CARE
Problem: Adult Behavioral Health Plan of Care  Goal: Optimized Coping Skills in Response to Life Stressors  Outcome: Progressing   Goal Outcome Evaluation:    Plan of Care Reviewed With: patient        Pt continue to pace  milieu with his headphone in his ear . Pt ate well for supper and is medication complaint. Pt denied all MH Sx . No behavior noted

## 2023-08-04 NOTE — CARE PLAN
08/04/23 1535   Group Therapy Session   Group Attendance attended group session   Time Session Began 1310   Time Session Ended 1415   Total Time (minutes) 55   Total # Attendees 4   Group Type psychotherapeutic   Group Topic Covered cognitive therapy techniques;balanced lifestyle;structured socialization   Group Session Detail Several activities involving creative thinking, figure ground and visual scanning,   Patient Response/Contribution cooperative with task;listened actively   Patient Participation Detail Quick to learn activity steps, participated actively and was social with peers. Joked some in context of conversations. Appeared comfortable and attentive to others.

## 2023-08-04 NOTE — PLAN OF CARE
Goal Outcome Evaluation:    Plan of Care Reviewed With: patient        Problem: Psychotic Signs/Symptoms  Goal: Improved Behavioral Control (Psychotic Signs/Symptoms)  Outcome: Progressing           Nursing Assessment    Schizoaffective disorder, bipolar type (H) [F25.0]  Intellectual disability [F79]    Admit Date: 5/2/2023    Length of Stay: 85    Patient evaluation continues. Assessed mood,anxiety,thoughts and behavior. Patient is progressing towards goals. Patient is encouraged to participate in groups and assisted to develop healthy coping skills.  Patient denies hallucinations. Pt believes that he lives here at the hospital and DR Charles is his wife elle./85 (BP Location: Right arm, Patient Position: Supine, Cuff Size: Adult Regular)   Pulse 80   Temp 98  F (36.7  C) (Tympanic)   Resp 16   Wt 93.1 kg (205 lb 4 oz)   SpO2 95%   BMI 35.23 kg/m      Mood: god     Patient reports depression none and reports anxiety none    Affect:flat blunted    Sleep: good    Appetite: good    SI: denies    HI: denies    SIB: denies      Medication Compliance yes    Group participation:yes    ADL's: assist of 1    Fall risk interventions: proper foot wear, orthostatic B/P    Rakan Score Interventions:none    Discharge planning in process    Refer to daily team meeting notes for individualized plan of care. Nursing will continue to assess.    *Scale is 1-10 and 10 is the worst.

## 2023-08-05 PROCEDURE — 250N000013 HC RX MED GY IP 250 OP 250 PS 637: Performed by: PSYCHIATRY & NEUROLOGY

## 2023-08-05 PROCEDURE — 250N000013 HC RX MED GY IP 250 OP 250 PS 637

## 2023-08-05 PROCEDURE — G0177 OPPS/PHP; TRAIN & EDUC SERV: HCPCS

## 2023-08-05 PROCEDURE — 250N000013 HC RX MED GY IP 250 OP 250 PS 637: Performed by: EMERGENCY MEDICINE

## 2023-08-05 PROCEDURE — 124N000003 HC R&B MH SENIOR/ADOLESCENT

## 2023-08-05 PROCEDURE — 250N000013 HC RX MED GY IP 250 OP 250 PS 637: Performed by: STUDENT IN AN ORGANIZED HEALTH CARE EDUCATION/TRAINING PROGRAM

## 2023-08-05 RX ADMIN — ACETAMINOPHEN 1000 MG: 500 TABLET, FILM COATED ORAL at 20:18

## 2023-08-05 RX ADMIN — Medication 10 MG: at 20:18

## 2023-08-05 RX ADMIN — CALCIUM POLYCARBOPHIL 625 MG: 625 TABLET, FILM COATED ORAL at 20:19

## 2023-08-05 RX ADMIN — WHITE PETROLATUM 57.7 %-MINERAL OIL 31.9 % EYE OINTMENT: at 20:20

## 2023-08-05 RX ADMIN — PANTOPRAZOLE SODIUM 40 MG: 40 TABLET, DELAYED RELEASE ORAL at 05:28

## 2023-08-05 RX ADMIN — ACETAMINOPHEN 1000 MG: 500 TABLET, FILM COATED ORAL at 08:01

## 2023-08-05 RX ADMIN — CLOZAPINE 250 MG: 100 TABLET ORAL at 20:18

## 2023-08-05 RX ADMIN — TOPIRAMATE 150 MG: 50 TABLET ORAL at 20:19

## 2023-08-05 RX ADMIN — FUROSEMIDE 20 MG: 20 TABLET ORAL at 08:02

## 2023-08-05 RX ADMIN — LAMOTRIGINE 100 MG: 25 TABLET ORAL at 08:00

## 2023-08-05 RX ADMIN — DOCUSATE SODIUM 100 MG: 100 CAPSULE, LIQUID FILLED ORAL at 08:01

## 2023-08-05 RX ADMIN — MONTELUKAST 10 MG: 10 TABLET, FILM COATED ORAL at 20:19

## 2023-08-05 RX ADMIN — APIXABAN 5 MG: 5 TABLET, FILM COATED ORAL at 20:19

## 2023-08-05 RX ADMIN — APIXABAN 5 MG: 5 TABLET, FILM COATED ORAL at 08:01

## 2023-08-05 RX ADMIN — CLOZAPINE 50 MG: 50 TABLET ORAL at 08:00

## 2023-08-05 RX ADMIN — MENTHOL 1 PATCH: 205.5 PATCH TOPICAL at 08:02

## 2023-08-05 RX ADMIN — ACETAMINOPHEN 1000 MG: 500 TABLET, FILM COATED ORAL at 14:36

## 2023-08-05 RX ADMIN — ZINC SULFATE 220 MG (50 MG) CAPSULE 220 MG: CAPSULE at 08:01

## 2023-08-05 RX ADMIN — PREDNISOLONE ACETATE 1 DROP: 10 SUSPENSION/ DROPS OPHTHALMIC at 08:02

## 2023-08-05 RX ADMIN — CALCIUM POLYCARBOPHIL 625 MG: 625 TABLET, FILM COATED ORAL at 08:00

## 2023-08-05 RX ADMIN — CHLORHEXIDINE GLUCONATE 0.12% ORAL RINSE 15 ML: 1.2 LIQUID ORAL at 07:59

## 2023-08-05 RX ADMIN — LISINOPRIL 5 MG: 5 TABLET ORAL at 07:59

## 2023-08-05 RX ADMIN — LEVOTHYROXINE SODIUM 50 MCG: 25 TABLET ORAL at 05:27

## 2023-08-05 ASSESSMENT — ACTIVITIES OF DAILY LIVING (ADL)
ADLS_ACUITY_SCORE: 64
ADLS_ACUITY_SCORE: 64
ADLS_ACUITY_SCORE: 54
ADLS_ACUITY_SCORE: 54
ADLS_ACUITY_SCORE: 64
ADLS_ACUITY_SCORE: 54
HYGIENE/GROOMING: INDEPENDENT
ORAL_HYGIENE: INDEPENDENT;PROMPTS
ADLS_ACUITY_SCORE: 64
DRESS: INDEPENDENT;PROMPTS
ADLS_ACUITY_SCORE: 54
ADLS_ACUITY_SCORE: 64
ADLS_ACUITY_SCORE: 54

## 2023-08-05 NOTE — PLAN OF CARE
Problem: Psychotic Signs/Symptoms  Goal: Improved Behavioral Control (Psychotic Signs/Symptoms)  Outcome: Progressing  Flowsheets (Taken 8/5/2023 1326)  Mutually Determined Action Steps (Improved Behavioral Control):   verbalizes gratifying activity   identifies future-oriented goal   Goal Outcome Evaluation:    Plan of Care Reviewed With: patient        Nursing Assessment    Schizoaffective disorder, bipolar type (H) [F25.0]  Intellectual disability [F79]    Admit Date: 5/2/2023    Length of Stay: 86    Patient evaluation continues. Assessed mood,anxiety,thoughts and behavior. Patient is progressing towards goals. Patient is encouraged to participate in groups and assisted to develop healthy coping skills.  Patient denies auditory or visual hallucinations. /83   Pulse 98   Temp 97.9  F (36.6  C) (Temporal)   Resp 16   Wt 93.1 kg (205 lb 4 oz)   SpO2 99%   BMI 35.23 kg/m    Pt has medical bed for mobility  Pt has been calm all shift, pacing slowly in hallway, napped for short period. Mood is good, singing listening to headphones.      Mood: good     Patient reports depression none and reports anxiety none     Affect:flat blunted    Sleep: good    Appetite: good    SI: denies    HI: denies    SIB: denies      Medication Compliance yes    Group participation:yes    ADL's: independent    Fall risk interventions: proper foot wear, orthostatic B/P    Rakan Score Interventions:none    Discharge planning in process    Refer to daily team meeting notes for individualized plan of care. Nursing will continue to assess.    *Scale is 1-10 and 10 is the worst.

## 2023-08-05 NOTE — PLAN OF CARE
Problem: Psychotic Signs/Symptoms  Goal: Improved Sleep (Psychotic Signs/Symptoms)  Outcome: Progressing   Goal Outcome Evaluation:    Patient slept from start of the shift till about 5 am. He is using a medical bed for mobility purposes. He voided twice the next hour, the first time he was continent of urine. On the second time he was continent too, but as he voided on the toilet bowl, small amount of urine dripped on his pants. When asked to have his pants changed, he refused. This writer tried to convince the patient to have his pants changed the second time,  but he refused again. A few minutes after, staff made another attempt in convincing the patient,  and he finally agreed to do so. He took his 6am medications readily and willingly. Kept on walking around the unit with his headphone on and singing at the same time. No complaints of pain made.     Slept for a total of  6.75hours. No behavioral issues noted the whole shift.

## 2023-08-06 PROCEDURE — 124N000003 HC R&B MH SENIOR/ADOLESCENT

## 2023-08-06 PROCEDURE — 250N000013 HC RX MED GY IP 250 OP 250 PS 637: Performed by: PSYCHIATRY & NEUROLOGY

## 2023-08-06 PROCEDURE — 250N000013 HC RX MED GY IP 250 OP 250 PS 637

## 2023-08-06 PROCEDURE — 250N000013 HC RX MED GY IP 250 OP 250 PS 637: Performed by: EMERGENCY MEDICINE

## 2023-08-06 PROCEDURE — 250N000013 HC RX MED GY IP 250 OP 250 PS 637: Performed by: STUDENT IN AN ORGANIZED HEALTH CARE EDUCATION/TRAINING PROGRAM

## 2023-08-06 RX ADMIN — CALCIUM POLYCARBOPHIL 625 MG: 625 TABLET, FILM COATED ORAL at 08:54

## 2023-08-06 RX ADMIN — ACETAMINOPHEN 1000 MG: 500 TABLET, FILM COATED ORAL at 19:48

## 2023-08-06 RX ADMIN — LAMOTRIGINE 100 MG: 25 TABLET ORAL at 08:54

## 2023-08-06 RX ADMIN — Medication 10 MG: at 19:48

## 2023-08-06 RX ADMIN — PANTOPRAZOLE SODIUM 40 MG: 40 TABLET, DELAYED RELEASE ORAL at 06:51

## 2023-08-06 RX ADMIN — FUROSEMIDE 20 MG: 20 TABLET ORAL at 08:53

## 2023-08-06 RX ADMIN — LEVOTHYROXINE SODIUM 50 MCG: 25 TABLET ORAL at 06:51

## 2023-08-06 RX ADMIN — ACETAMINOPHEN 1000 MG: 500 TABLET, FILM COATED ORAL at 08:54

## 2023-08-06 RX ADMIN — CALCIUM POLYCARBOPHIL 625 MG: 625 TABLET, FILM COATED ORAL at 19:48

## 2023-08-06 RX ADMIN — APIXABAN 5 MG: 5 TABLET, FILM COATED ORAL at 08:53

## 2023-08-06 RX ADMIN — LISINOPRIL 5 MG: 5 TABLET ORAL at 08:53

## 2023-08-06 RX ADMIN — MENTHOL 1 PATCH: 205.5 PATCH TOPICAL at 09:06

## 2023-08-06 RX ADMIN — ZINC SULFATE 220 MG (50 MG) CAPSULE 220 MG: CAPSULE at 08:53

## 2023-08-06 RX ADMIN — CHLORHEXIDINE GLUCONATE 0.12% ORAL RINSE 15 ML: 1.2 LIQUID ORAL at 09:06

## 2023-08-06 RX ADMIN — DOCUSATE SODIUM 100 MG: 100 CAPSULE, LIQUID FILLED ORAL at 08:53

## 2023-08-06 RX ADMIN — TOPIRAMATE 150 MG: 50 TABLET ORAL at 19:48

## 2023-08-06 RX ADMIN — CLOZAPINE 50 MG: 50 TABLET ORAL at 08:54

## 2023-08-06 RX ADMIN — ACETAMINOPHEN 1000 MG: 500 TABLET, FILM COATED ORAL at 13:54

## 2023-08-06 RX ADMIN — APIXABAN 5 MG: 5 TABLET, FILM COATED ORAL at 19:48

## 2023-08-06 RX ADMIN — PREDNISOLONE ACETATE 1 DROP: 10 SUSPENSION/ DROPS OPHTHALMIC at 09:05

## 2023-08-06 RX ADMIN — MONTELUKAST 10 MG: 10 TABLET, FILM COATED ORAL at 19:48

## 2023-08-06 RX ADMIN — WHITE PETROLATUM 57.7 %-MINERAL OIL 31.9 % EYE OINTMENT: at 19:48

## 2023-08-06 RX ADMIN — CLOZAPINE 250 MG: 100 TABLET ORAL at 21:30

## 2023-08-06 ASSESSMENT — ACTIVITIES OF DAILY LIVING (ADL)
DRESS: SCRUBS (BEHAVIORAL HEALTH)
ADLS_ACUITY_SCORE: 58
ADLS_ACUITY_SCORE: 54
ORAL_HYGIENE: WITH ASSISTANCE
ADLS_ACUITY_SCORE: 54
ADLS_ACUITY_SCORE: 58
ADLS_ACUITY_SCORE: 54
HYGIENE/GROOMING: WITH ASSISTANCE

## 2023-08-06 NOTE — PLAN OF CARE
"  Problem: Psychotic Signs/Symptoms  Goal: Improved Sleep (Psychotic Signs/Symptoms)  Outcome: Progressing   Goal Outcome Evaluation:    Patient was awake @ 0200, complained of back pain and requested for a pain medication. When this writer went to administer his medication, he was back to sleep. After half an hour, he called for help the second time. When asked if he wanted his pain medication, he said, \"No.\" He just wanted help with getting up from bed, but when he was told that it was only past 2am, he went back to sleep. Patient was noted to be talking in his sleep several times during the night. No agitation noted    He was continent of urine once. No urinary incontinence noted the whole shift.     He is using a medical bed to help with mobility. He has a No Roommate order due to poor boundaries and intrusiveness.     Slept for a total of 9 hours. No behavioral issues noted the whole shift.                         "

## 2023-08-06 NOTE — PLAN OF CARE
Problem: Adult Behavioral Health Plan of Care  Goal: Plan of Care Review  Outcome: Progressing     Problem: Psychotic Signs/Symptoms  Goal: Improved Behavioral Control (Psychotic Signs/Symptoms)  Outcome: Progressing     Problem: Disruptive Behavior  Goal: Improved Impulse and Aggression Control (Disruptive Behavior)  Outcome: Progressing   Goal Outcome Evaluation:    Patient is alert and oriented x 3. Patient's mood has been calm, cooperative with ok affect. Patient attended the group this evening, he seemed sociable with peers, respectful and appropriate. Patient seems to have poor insight to illness, he tried to resist taking his medication, after reproach, he complied. Patient still waiting for placement, he denies SI/SIB/AVH. The patient was medication compliant, no side effects endorsed, meal consumption was adequate. Will continue to monitor.

## 2023-08-06 NOTE — CARE PLAN
"Occupational Therapy     08/05/23 1900   Group Therapy Session   Group Attendance attended group session   Time Session Began 1800   Time Session Ended 1900   Total Time (minutes) 60   Total # Attendees 2   Group Type expressive therapy;task skill   Group Topic Covered cognitive activities;leisure exploration/use of leisure time;coping skills/lifestyle management;structured socialization   Group Session Detail OT: Education on relaxation and creative hands-on endeavor (window clings) for healthy relaxation, coping with stress, opportunity to socialize, concentration, attention to detail, problem solving, frustration tolerance, creative expression, healthy leisure engagement, improving feelings of self-worth, planning and sequencing.   Patient Response/Contribution cooperative with task;closed eyes for most of session;refused to comply with staff direction; concrete; irritable   Patient Participation Detail Pt reported during check-in that \"music\" is a health relaxation activity he uses. Pt initially closed his eyes and placed his head on the table and appeared to be asleep; pt did not respond to therapist's attempts to rouse. After approximately 10+ minutes, pt abruptly lifted his head and asked to engage in the presented activity. Pt worked in a semi-messy and concrete manner to complete project. Pt needed verbal reminders and visual cues to fill-in areas of project. Pt refused to comply with therapist's direction to leave his completed project in the group area in order for it to be placed in his locker. Pt given direction 3-4 times to leave his project in the group area and pt did not comply. Pt left group area and did not return. After group therapist searched pt room and was unable to locate the project. Per charge RN (Bo) the project is safe to have in his room but will report incident in team.          "

## 2023-08-06 NOTE — PLAN OF CARE
Goal Outcome Evaluation:    Plan of Care Reviewed With: patient                 Nursing Assessment    Schizoaffective disorder, bipolar type (H) [F25.0]  Intellectual disability [F79]    Admit Date: 5/2/2023    Length of Stay: 87    Patient evaluation continues. Assessed mood,anxiety,thoughts and behavior. Patient is progressing towards goals. Patient is encouraged to participate in groups and assisted to develop healthy coping skills.  Patient denies auditory or visual hallucinations. Continues to have delusional thoughts in reference to where he is and staffs relationship./70 (Patient Position: Supine)   Pulse 84   Temp 98  F (36.7  C)   Resp 16   Wt 93.6 kg (206 lb 5.6 oz)   SpO2 95%   BMI 35.42 kg/m      Medical bed for mobility    Mood: full range     Patient reports depression none and reports anxiety none    Affect:full range    Sleep: 9 hours     Appetite: good    SI: denies    HI: denies    SIB: denies      Medication Compliance   yes    Group participation:yes    ADL's: assist of 1    Fall risk interventions: proper foot wear, orthostatic B/p,     Rakan Score Interventions:none    Discharge planning in process    Refer to daily team meeting notes for individualized plan of care. Nursing will continue to assess.    *Scale is 1-10 and 10 is the worst.

## 2023-08-07 PROCEDURE — 124N000003 HC R&B MH SENIOR/ADOLESCENT

## 2023-08-07 PROCEDURE — 250N000013 HC RX MED GY IP 250 OP 250 PS 637: Performed by: STUDENT IN AN ORGANIZED HEALTH CARE EDUCATION/TRAINING PROGRAM

## 2023-08-07 PROCEDURE — 250N000013 HC RX MED GY IP 250 OP 250 PS 637

## 2023-08-07 PROCEDURE — G0177 OPPS/PHP; TRAIN & EDUC SERV: HCPCS

## 2023-08-07 PROCEDURE — 250N000013 HC RX MED GY IP 250 OP 250 PS 637: Performed by: PSYCHIATRY & NEUROLOGY

## 2023-08-07 PROCEDURE — 250N000013 HC RX MED GY IP 250 OP 250 PS 637: Performed by: EMERGENCY MEDICINE

## 2023-08-07 RX ADMIN — LISINOPRIL 5 MG: 5 TABLET ORAL at 08:33

## 2023-08-07 RX ADMIN — ZINC SULFATE 220 MG (50 MG) CAPSULE 220 MG: CAPSULE at 08:33

## 2023-08-07 RX ADMIN — PREDNISOLONE ACETATE 1 DROP: 10 SUSPENSION/ DROPS OPHTHALMIC at 08:33

## 2023-08-07 RX ADMIN — CLOZAPINE 250 MG: 100 TABLET ORAL at 20:36

## 2023-08-07 RX ADMIN — APIXABAN 5 MG: 5 TABLET, FILM COATED ORAL at 08:33

## 2023-08-07 RX ADMIN — CALCIUM POLYCARBOPHIL 625 MG: 625 TABLET, FILM COATED ORAL at 08:33

## 2023-08-07 RX ADMIN — LEVOTHYROXINE SODIUM 50 MCG: 25 TABLET ORAL at 05:23

## 2023-08-07 RX ADMIN — ACETAMINOPHEN 1000 MG: 500 TABLET, FILM COATED ORAL at 20:38

## 2023-08-07 RX ADMIN — ACETAMINOPHEN 1000 MG: 500 TABLET, FILM COATED ORAL at 14:26

## 2023-08-07 RX ADMIN — DOCUSATE SODIUM 100 MG: 100 CAPSULE, LIQUID FILLED ORAL at 08:34

## 2023-08-07 RX ADMIN — PANTOPRAZOLE SODIUM 40 MG: 40 TABLET, DELAYED RELEASE ORAL at 05:23

## 2023-08-07 RX ADMIN — CALCIUM POLYCARBOPHIL 625 MG: 625 TABLET, FILM COATED ORAL at 20:38

## 2023-08-07 RX ADMIN — MONTELUKAST 10 MG: 10 TABLET, FILM COATED ORAL at 20:34

## 2023-08-07 RX ADMIN — LAMOTRIGINE 100 MG: 25 TABLET ORAL at 08:33

## 2023-08-07 RX ADMIN — APIXABAN 5 MG: 5 TABLET, FILM COATED ORAL at 20:35

## 2023-08-07 RX ADMIN — CHLORHEXIDINE GLUCONATE 0.12% ORAL RINSE 15 ML: 1.2 LIQUID ORAL at 08:33

## 2023-08-07 RX ADMIN — Medication 10 MG: at 20:35

## 2023-08-07 RX ADMIN — CLOZAPINE 50 MG: 50 TABLET ORAL at 08:33

## 2023-08-07 RX ADMIN — FUROSEMIDE 20 MG: 20 TABLET ORAL at 08:33

## 2023-08-07 RX ADMIN — TOPIRAMATE 150 MG: 50 TABLET ORAL at 20:38

## 2023-08-07 RX ADMIN — ACETAMINOPHEN 1000 MG: 500 TABLET, FILM COATED ORAL at 08:33

## 2023-08-07 ASSESSMENT — ACTIVITIES OF DAILY LIVING (ADL)
ADLS_ACUITY_SCORE: 54
ADLS_ACUITY_SCORE: 54
DRESS: INDEPENDENT
ORAL_HYGIENE: PROMPTS;INDEPENDENT
ADLS_ACUITY_SCORE: 54
HYGIENE/GROOMING: PROMPTS;INDEPENDENT
HYGIENE/GROOMING: PROMPTS;INDEPENDENT
ADLS_ACUITY_SCORE: 54
ORAL_HYGIENE: PROMPTS;INDEPENDENT
DRESS: STREET CLOTHES;INDEPENDENT
ADLS_ACUITY_SCORE: 54
LAUNDRY: UNABLE TO COMPLETE
ADLS_ACUITY_SCORE: 54
ADLS_ACUITY_SCORE: 54

## 2023-08-07 NOTE — PLAN OF CARE
Assessment/Intervention/Current Symtoms and Care Coordination:  Pt discussed in team rounds this AM. Pt has been pleasant. Tentative discharge for 8/14 after pt's guardian/sister comes back into town pending pt stabilization. Due to pt's continued delusions about being  to the provider, pt switched to male provider.     Writer put out email to pt's outpatient care team with update regarding adult day program and inquiring if any action is needed for pt's waiver.    Writer put out call to Oswaldo Alicia ([phone]) to inquire about possible referral. Writer LVM for Genie and requested CB.    Writer received email from Bree MALLORY, reporting that Sean assessment is scheduled for 8/10 at noon. Corie is the [facility] . Sadaf will be joining assessment in-person and Huong via phone.    Writer informed pt of assessment on Thursday. Pt reports that he does not want to return to his . Writer attempted to discuss it further with pt, pt disengaged from conversation.    Writer spoke with Genie from [facility]. Writer answered questions needed for referral. Genie reports they accept DD waiver and have other clients similar to pt in their [place] program. Genie relayed plan to follow up with Bree Borja, and Sadaf regarding needs and next steps.    Writer received emails from pt's outpatient team. Sadaf reports that pt will start ADC services on 8/21 to allow him to settle in to the . Pt to start doing MTuWF until Thursdays have an opening. Bree to check in to confirm that van rides are covered by pt's waiver.    Discharge Plan or Goal:  Pending stabilization and safe disposition plan, pt to return to  with adult day services on 8/14     Barriers to Discharge:  Pt is receiving ongoing stabilization and medication adjustment. Continue care coordination with senior living to ascertain his acceptability for a return to the home.     Referral Status:  Oswaldo Amaya  Program - [facility]  Referral made 8/7     Legal Status:  Voluntary per guardian     Contacts:  Guardian/Sister - Huong Keenan   P: [phone]     - Sadaf  P: [phone]    CM - Bree Olvera  E: [email or web address].     Upcoming Meetings and Dates/Important Information and next steps:  CTC to collaborate with outpatient team regarding adult day service referrals

## 2023-08-07 NOTE — PLAN OF CARE
"  Problem: Psychotic Signs/Symptoms  Goal: Improved Sleep (Psychotic Signs/Symptoms)  Outcome: Progressing   Goal Outcome Evaluation:    Patient was sleeping at the start of the shift. He was still using the headphone and refused to have it taken out when asked. He was yelling for help at about 1am and requested for help to get up from bed. Patient could hardly get up from bed and needed one person assist. He voided in the bathroom, walked out to the lounge and requested for a glass of iced water. After 2-3 minutes, he fell asleep while seated in the lounge and laid his head on the table. He was awakened and redirected back to his room, but patient refused to wake up. Later he was moved to the recliner so he would be more comfortable. He stayed asleep till about 0445. Got up and went back to his room. Although he was not incontinent of urine, he smelled bad. So this writer offered to help him change his scrub pants and he got irritable and said, \" No, I don't need to change, I am not wet.\" Upon reaching his room, he closed the door and warned this writer not to go inside his room. He said, \"Go away, go away, Don't you ever come into my room!\" And he went back to bed, but did not go back to sleep.     He is using a medical bed to help with mobility. He has a No Roommate order due to intrusiveness and having poor boundaries.     Slept for a total of 7.5 hours.                         "

## 2023-08-07 NOTE — PLAN OF CARE
"Jose has been visible on the unit.   He is calm, pleasant and cooperative on approach.   He presents with a blunted affect, brightens when engaged. He describes his mood as \"fantastic\". He denies subjective mental health sx or concerns including anxiety, depression, SI/SIB/HI or psychotic sx. No agitation or behavioral concerns this shift. He paces the lounge and hallway with his headphones on, occasionally singing aloud. He is social with select peers.   He appears to be eating and hydrating well.   He was wearing his MICHAEL stockings at the beginning of the shift.   He took all scheduled medications without incident. No side effects noted.   This afternoon he asked staff to remove his identification wrist band, citing \"my sister said I don't have to take medications anymore.\"     VSS. /85 (Patient Position: Sitting)   Pulse 94   Temp 97.5  F (36.4  C) (Temporal)   Resp 16   Wt 93.6 kg (206 lb 5.6 oz)   SpO2 97%   BMI 35.42 kg/m      Jose reported chronic low back pain. He declined prn Icy Hot patch, but request a hot pack which was given to him throughout the shift.     Problem: Plan of Care - These are the overarching goals to be used throughout the patient stay.    Goal: Plan of Care Review  Description: The Plan of Care Review/Shift note should be completed every shift.  The Outcome Evaluation is a brief statement about your assessment that the patient is improving, declining, or no change.  This information will be displayed automatically on your shift note.  Outcome: Progressing     Problem: Psychotic Signs/Symptoms  Goal: Improved Mood Symptoms  Outcome: Progressing  Intervention: Optimize Emotion and Mood  Recent Flowsheet Documentation  Taken 8/7/2023 1202 by Cristina Foster, RN  Diversional Activity: music   Goal Outcome Evaluation:    Plan of Care Reviewed With: patient                   "

## 2023-08-07 NOTE — CARE PLAN
08/07/23 1256   Group Therapy Session   Group Attendance attended group session   Time Session Began 1015   Time Session Ended 1110   Total Time (minutes) 55   Total # Attendees 6   Group Type expressive therapy;task skill;psychotherapeutic   Group Topic Covered coping skills/lifestyle management;problem-solving;cognitive activities;structured socialization;balanced lifestyle   Group Session Detail Occupational Therapy Clinic group to facilitate coping skill exploration, use of cognitive skills and problem solving, creative expression, clinical observation and facilitation of social, cognitive, and kinesthetic performance skills.    Patient Response/Contribution cooperative with task;listened actively   Patient Participation Detail Worked on a highly structured and familiar task using fine motor coordination and figure ground visual skills. He asked for assistance with more finely detailed steps and stated appreciation for the help. Comments were in a calm tone of voice. Appeared patent when waiting for assistance and continued problem solving skills while waiting. Appeared comfortable and able to focus on task even when challenging.

## 2023-08-07 NOTE — PLAN OF CARE
Goal Outcome Evaluation:    Plan of Care Reviewed With: patient        Problem: Psychotic Signs/Symptoms  Goal: Improved Behavioral Control (Psychotic Signs/Symptoms)  Outcome: Progressing     Problem: Disruptive Behavior  Goal: Improved Impulse and Aggression Control (Disruptive Behavior)  Outcome: Progressing     Problem: Fall Injury Risk  Goal: Absence of Fall and Fall-Related Injury  Outcome: Progressing     Patient has been visible in the milieu most of the shift, was observed seated in the lounge at the start of the shift. Patient walks the hallway to his room and back while listening to his headphones. Patient disoriented to situation and unaware of his mental health condition. Patient appeared jovial and happy mood. Patient eats and drinks okay. Patient took all his medications, no prn given during shift.   Patient went to bed after 8pm and has been laying in bed ever since. Patient was awaken to take his HS medication and continued top stay in bed. Patient did not allow staff to take his headphone, staff to re-approach later.  Blood pressure 118/81, pulse 78, temperature 96.9  F (36.1  C), temperature source Temporal, resp. rate 16, weight 93.6 kg (206 lb 5.6 oz), SpO2 98 %.

## 2023-08-07 NOTE — CARE PLAN
08/07/23 1344   Group Therapy Session   Group Attendance attended group session   Time Session Began 1110   Time Session Ended 1200   Total Time (minutes) 50   Total # Attendees 3   Group Type psychotherapeutic   Group Topic Covered coping skills/lifestyle management;relapse prevention;cognitive therapy techniques   Group Session Detail  Pt participated in an activity focused on Aftercare. Identifying support people, coping strategies, behaviors and red flags, affirmations and daily and weekly routines that are helpful with gaining balance.    Patient Response/Contribution cooperative with task;disorganized   Patient Participation Detail Comments were out of context though on multiple occasions stated he relies on family for support and assistance when needed. At times speech seemed slurred. Seemed willing to repeat comments until understood by others. Offered occasional direct eye contact. Stayed the full session and seemed willing to be present, involved and make efforts to speak up. Appeared comfortable with being present in group.

## 2023-08-07 NOTE — PROGRESS NOTES
Patient seen, chart reviewed, care discussed with staff.  Blood pressure 119/85, pulse 94, temperature 97.5  F (36.4  C), temperature source Temporal, resp. rate 16, weight 93.6 kg (206 lb 5.6 oz), SpO2 97 %.  He does not want to leave this mansion as he lives here.  I note this is an acute care hospital.  Current Facility-Administered Medications   Medication    acetaminophen (TYLENOL) tablet 1,000 mg    acetaminophen (TYLENOL) tablet 650 mg    alum & mag hydroxide-simethicone (MAALOX) suspension 30 mL    apixaban ANTICOAGULANT (ELIQUIS) tablet 5 mg    artificial saliva (BIOTENE MT) solution 2 spray    artificial tears ophthalmic ointment    atropine 1 % ophthalmic solution 2 drop    calcium polycarbophil (FIBERCON) tablet 625 mg    chlorhexidine (PERIDEX) 0.12 % solution 15 mL    cloZAPine (CLOZARIL) tablet 250 mg    cloZAPine (CLOZARIL) tablet 50 mg    docusate sodium (COLACE) capsule 100 mg    furosemide (LASIX) tablet 20 mg    hydrALAZINE (APRESOLINE) tablet 10 mg    hydrOXYzine (ATARAX) tablet 25 mg    hypromellose-dextran (ARTIFICAL TEARS) 0.1-0.3 % ophthalmic solution 2 drop    ipratropium (ATROVENT) 0.06 % spray 2 spray    lamoTRIgine (LaMICtal) tablet 100 mg    levothyroxine (SYNTHROID/LEVOTHROID) tablet 50 mcg    lisinopril (ZESTRIL) tablet 5 mg    LORazepam (ATIVAN) tablet 1 mg    melatonin tablet 10 mg    menthol (ICY HOT) 5 % patch 1 patch    And    menthol (ICY HOT) Patch in Place    montelukast (SINGULAIR) tablet 10 mg    OLANZapine (zyPREXA) tablet 10 mg    Or    OLANZapine (zyPREXA) injection 10 mg    pantoprazole (PROTONIX) EC tablet 40 mg    prednisoLONE acetate (PRED FORTE) 1 % ophthalmic susp 1 drop    senna-docusate (SENOKOT-S/PERICOLACE) 8.6-50 MG per tablet 1 tablet    topiramate (TOPAMAX) tablet 150 mg    zinc sulfate (ZINCATE) capsule 220 mg     General appearance: good  Alert.   Affect: good  Mood: good   Speech:  normal.   Eye contact:  good.    Psychomotor behavior: normal  Gait: steady    Abnormal movements:  none  Delusions:continue  Hallucinations:  denies  Thoughts: concrete, linear  Associations: intact  Judgement: poor  Insight: poor  Cognitions: intact in conversation  Memory:  intact in conversation  Orientation: normal    Not suicidal.  Imp:  Schizoaffective illness  2.  Developmental disability  And:   Patient Active Problem List   Diagnosis    Closed head injury, initial encounter    Altered mental status, unspecified altered mental status type    Generalized weakness    Portal vein thrombosis    Pleural effusion    Generalized muscle weakness    Falls frequently    Other ascites    Acute pancreatitis, unspecified complication status, unspecified pancreatitis type    Pancreatic pseudocyst    Idiopathic acute pancreatitis, unspecified complication status    Allergic rhinitis    Fisher's esophagus    CTS (carpal tunnel syndrome)    Elevated liver enzymes    HTN (hypertension)    Hypothyroidism    Keratoconus    Major depressive disorder, recurrent episode, mild (H)    Intellectual disability    Morbid exogenous obesity (H)    Neuroleptic malignant syndrome    Obstructive sleep apnea syndrome    Bipolar affective disorder (H)    Seizure disorder (H)    Seizures, generalized convulsive (H)    Acute respiratory failure with hypoxia (H)    Anemia, unspecified    Anxiety disorder, unspecified    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)    Dietary zinc deficiency    Dry eye syndrome of bilateral lacrimal glands    Dry mouth, unspecified    Edema, unspecified    Gastro-esophageal reflux disease without esophagitis    Hyperosmolality and hypernatremia    Irritable bowel syndrome with constipation    Major depressive disorder, recurrent, unspecified (H)    Metabolic encephalopathy    Moderate protein-calorie malnutrition (H)    Other symbolic dysfunctions    Schizoaffective disorder, unspecified (H)    Thrombocytopenia, unspecified (H)    Unspecified asthma, uncomplicated     Urinary tract infection, site not specified    Vitamin D deficiency, unspecified    Schizoaffective disorder, bipolar type (H)     Plan:  Same meds, discharge when able  Current Facility-Administered Medications   Medication    acetaminophen (TYLENOL) tablet 1,000 mg    acetaminophen (TYLENOL) tablet 650 mg    alum & mag hydroxide-simethicone (MAALOX) suspension 30 mL    apixaban ANTICOAGULANT (ELIQUIS) tablet 5 mg    artificial saliva (BIOTENE MT) solution 2 spray    artificial tears ophthalmic ointment    atropine 1 % ophthalmic solution 2 drop    calcium polycarbophil (FIBERCON) tablet 625 mg    chlorhexidine (PERIDEX) 0.12 % solution 15 mL    cloZAPine (CLOZARIL) tablet 250 mg    cloZAPine (CLOZARIL) tablet 50 mg    docusate sodium (COLACE) capsule 100 mg    furosemide (LASIX) tablet 20 mg    hydrALAZINE (APRESOLINE) tablet 10 mg    hydrOXYzine (ATARAX) tablet 25 mg    hypromellose-dextran (ARTIFICAL TEARS) 0.1-0.3 % ophthalmic solution 2 drop    ipratropium (ATROVENT) 0.06 % spray 2 spray    lamoTRIgine (LaMICtal) tablet 100 mg    levothyroxine (SYNTHROID/LEVOTHROID) tablet 50 mcg    lisinopril (ZESTRIL) tablet 5 mg    LORazepam (ATIVAN) tablet 1 mg    melatonin tablet 10 mg    menthol (ICY HOT) 5 % patch 1 patch    And    menthol (ICY HOT) Patch in Place    montelukast (SINGULAIR) tablet 10 mg    OLANZapine (zyPREXA) tablet 10 mg    Or    OLANZapine (zyPREXA) injection 10 mg    pantoprazole (PROTONIX) EC tablet 40 mg    prednisoLONE acetate (PRED FORTE) 1 % ophthalmic susp 1 drop    senna-docusate (SENOKOT-S/PERICOLACE) 8.6-50 MG per tablet 1 tablet    topiramate (TOPAMAX) tablet 150 mg    zinc sulfate (ZINCATE) capsule 220 mg     Recent Results (from the past 168 hour(s))   CBC with platelets and differential    Collection Time: 08/01/23  8:27 AM   Result Value Ref Range    WBC Count 5.4 4.0 - 11.0 10e3/uL    RBC Count 4.69 4.40 - 5.90 10e6/uL    Hemoglobin 13.1 (L) 13.3 - 17.7 g/dL    Hematocrit 39.2 (L)  40.0 - 53.0 %    MCV 84 78 - 100 fL    MCH 27.9 26.5 - 33.0 pg    MCHC 33.4 31.5 - 36.5 g/dL    RDW 14.4 10.0 - 15.0 %    Platelet Count 182 150 - 450 10e3/uL    % Neutrophils 59 %    % Lymphocytes 32 %    % Monocytes 8 %    % Eosinophils 0 %    % Basophils 1 %    % Immature Granulocytes 0 %    NRBCs per 100 WBC 0 <1 /100    Absolute Neutrophils 3.2 1.6 - 8.3 10e3/uL    Absolute Lymphocytes 1.7 0.8 - 5.3 10e3/uL    Absolute Monocytes 0.4 0.0 - 1.3 10e3/uL    Absolute Eosinophils 0.0 0.0 - 0.7 10e3/uL    Absolute Basophils 0.0 0.0 - 0.2 10e3/uL    Absolute Immature Granulocytes 0.0 <=0.4 10e3/uL    Absolute NRBCs 0.0 10e3/uL

## 2023-08-08 LAB
BASOPHILS # BLD AUTO: 0 10E3/UL (ref 0–0.2)
BASOPHILS NFR BLD AUTO: 1 %
EOSINOPHIL # BLD AUTO: 0 10E3/UL (ref 0–0.7)
EOSINOPHIL NFR BLD AUTO: 0 %
ERYTHROCYTE [DISTWIDTH] IN BLOOD BY AUTOMATED COUNT: 14.4 % (ref 10–15)
HCT VFR BLD AUTO: 39.3 % (ref 40–53)
HGB BLD-MCNC: 13.1 G/DL (ref 13.3–17.7)
IMM GRANULOCYTES # BLD: 0 10E3/UL
IMM GRANULOCYTES NFR BLD: 0 %
LYMPHOCYTES # BLD AUTO: 1.7 10E3/UL (ref 0.8–5.3)
LYMPHOCYTES NFR BLD AUTO: 33 %
MCH RBC QN AUTO: 26.9 PG (ref 26.5–33)
MCHC RBC AUTO-ENTMCNC: 33.3 G/DL (ref 31.5–36.5)
MCV RBC AUTO: 81 FL (ref 78–100)
MONOCYTES # BLD AUTO: 0.4 10E3/UL (ref 0–1.3)
MONOCYTES NFR BLD AUTO: 8 %
NEUTROPHILS # BLD AUTO: 3.2 10E3/UL (ref 1.6–8.3)
NEUTROPHILS NFR BLD AUTO: 58 %
NRBC # BLD AUTO: 0 10E3/UL
NRBC BLD AUTO-RTO: 0 /100
PLATELET # BLD AUTO: 164 10E3/UL (ref 150–450)
RBC # BLD AUTO: 4.87 10E6/UL (ref 4.4–5.9)
WBC # BLD AUTO: 5.4 10E3/UL (ref 4–11)

## 2023-08-08 PROCEDURE — 250N000013 HC RX MED GY IP 250 OP 250 PS 637

## 2023-08-08 PROCEDURE — 250N000013 HC RX MED GY IP 250 OP 250 PS 637: Performed by: PSYCHIATRY & NEUROLOGY

## 2023-08-08 PROCEDURE — 250N000013 HC RX MED GY IP 250 OP 250 PS 637: Performed by: EMERGENCY MEDICINE

## 2023-08-08 PROCEDURE — 124N000003 HC R&B MH SENIOR/ADOLESCENT

## 2023-08-08 PROCEDURE — 85025 COMPLETE CBC W/AUTO DIFF WBC: CPT | Performed by: PSYCHIATRY & NEUROLOGY

## 2023-08-08 PROCEDURE — 250N000009 HC RX 250: Performed by: EMERGENCY MEDICINE

## 2023-08-08 PROCEDURE — 250N000013 HC RX MED GY IP 250 OP 250 PS 637: Performed by: STUDENT IN AN ORGANIZED HEALTH CARE EDUCATION/TRAINING PROGRAM

## 2023-08-08 PROCEDURE — 36415 COLL VENOUS BLD VENIPUNCTURE: CPT | Performed by: PSYCHIATRY & NEUROLOGY

## 2023-08-08 PROCEDURE — G0177 OPPS/PHP; TRAIN & EDUC SERV: HCPCS

## 2023-08-08 RX ORDER — TOPIRAMATE 100 MG/1
100 TABLET, FILM COATED ORAL 2 TIMES DAILY
Status: DISCONTINUED | OUTPATIENT
Start: 2023-08-08 | End: 2023-08-14 | Stop reason: HOSPADM

## 2023-08-08 RX ADMIN — APIXABAN 5 MG: 5 TABLET, FILM COATED ORAL at 20:07

## 2023-08-08 RX ADMIN — ACETAMINOPHEN 1000 MG: 500 TABLET, FILM COATED ORAL at 20:07

## 2023-08-08 RX ADMIN — LEVOTHYROXINE SODIUM 50 MCG: 25 TABLET ORAL at 06:47

## 2023-08-08 RX ADMIN — CLOZAPINE 50 MG: 50 TABLET ORAL at 08:31

## 2023-08-08 RX ADMIN — TOPIRAMATE 100 MG: 100 TABLET, FILM COATED ORAL at 20:06

## 2023-08-08 RX ADMIN — LAMOTRIGINE 100 MG: 25 TABLET ORAL at 08:31

## 2023-08-08 RX ADMIN — LISINOPRIL 5 MG: 5 TABLET ORAL at 08:31

## 2023-08-08 RX ADMIN — ZINC SULFATE 220 MG (50 MG) CAPSULE 220 MG: CAPSULE at 08:30

## 2023-08-08 RX ADMIN — PANTOPRAZOLE SODIUM 40 MG: 40 TABLET, DELAYED RELEASE ORAL at 06:47

## 2023-08-08 RX ADMIN — APIXABAN 5 MG: 5 TABLET, FILM COATED ORAL at 08:31

## 2023-08-08 RX ADMIN — CALCIUM POLYCARBOPHIL 625 MG: 625 TABLET, FILM COATED ORAL at 08:30

## 2023-08-08 RX ADMIN — WHITE PETROLATUM 57.7 %-MINERAL OIL 31.9 % EYE OINTMENT: at 20:09

## 2023-08-08 RX ADMIN — ACETAMINOPHEN 1000 MG: 500 TABLET, FILM COATED ORAL at 14:23

## 2023-08-08 RX ADMIN — FUROSEMIDE 20 MG: 20 TABLET ORAL at 08:31

## 2023-08-08 RX ADMIN — DOCUSATE SODIUM 100 MG: 100 CAPSULE, LIQUID FILLED ORAL at 08:32

## 2023-08-08 RX ADMIN — MONTELUKAST 10 MG: 10 TABLET, FILM COATED ORAL at 20:07

## 2023-08-08 RX ADMIN — Medication 10 MG: at 20:06

## 2023-08-08 RX ADMIN — PREDNISOLONE ACETATE 1 DROP: 10 SUSPENSION/ DROPS OPHTHALMIC at 08:35

## 2023-08-08 RX ADMIN — ACETAMINOPHEN 1000 MG: 500 TABLET, FILM COATED ORAL at 08:32

## 2023-08-08 RX ADMIN — CHLORHEXIDINE GLUCONATE 0.12% ORAL RINSE 15 ML: 1.2 LIQUID ORAL at 08:30

## 2023-08-08 RX ADMIN — CALCIUM POLYCARBOPHIL 625 MG: 625 TABLET, FILM COATED ORAL at 20:07

## 2023-08-08 RX ADMIN — CLOZAPINE 250 MG: 100 TABLET ORAL at 20:06

## 2023-08-08 ASSESSMENT — ACTIVITIES OF DAILY LIVING (ADL)
ADLS_ACUITY_SCORE: 54
DRESS: INDEPENDENT
ADLS_ACUITY_SCORE: 54
HYGIENE/GROOMING: INDEPENDENT;PROMPTS
ADLS_ACUITY_SCORE: 54
ADLS_ACUITY_SCORE: 54
ORAL_HYGIENE: PROMPTS;INDEPENDENT
ADLS_ACUITY_SCORE: 54
ORAL_HYGIENE: INDEPENDENT;PROMPTS
LAUNDRY: UNABLE TO COMPLETE
ADLS_ACUITY_SCORE: 54
HYGIENE/GROOMING: PROMPTS;INDEPENDENT
LAUNDRY: UNABLE TO COMPLETE
DRESS: INDEPENDENT
ADLS_ACUITY_SCORE: 54
ADLS_ACUITY_SCORE: 54

## 2023-08-08 NOTE — PROVIDER NOTIFICATION
08/08/23 1229   Individualization/Patient Specific Goals   Patient Personal Strengths family/social support;medication/treatment adherence;stable living environment   Patient Vulnerabilities limited ability to read/write;limited social skills;lacks insight into illness   Anxieties, Fears or Concerns Pt reports anxiety about returning to    Individualized Care Needs Medication and stabilization   Interprofessional Rounds   Summary Pt continues to stabilize until discharge on 8/14 back to    Participants CTC;nursing;OT;psychiatrist   Behavioral Team Discussion   Participants Dr. Milind Dumas MD, Maggie Mitchell MSW, [facility], Teena Person RN, Mouna Solorzano OT   Progress Stabilization is ongoing   Anticipated length of stay 6 days   Continued Stay Criteria/Rationale Pt continues to stabilize until discharge on 8/14 back to    Medical/Physical See H&P   Precautions See below   Plan Pt continues to stabilize until discharge on 8/14 back to    Rationale for change in precautions or plan no change   Safety Plan CTC will do individual inpatient treatment planning and after care planning. CTC will discuss options for increasing community supports with the patient. CTC will coordinate with outpatient providers and will place referrals to ensure appointments are in place.   Anticipated Discharge Disposition group home     PRECAUTIONS AND SAFETY    Behavioral Orders   Procedures    Code 3    Fall precautions    Routine Programming     As clinically indicated    Status 15     Every 15 minutes.       Safety  Safety WDL: WDL  Patient Location: patient room, own, unge  Observed Behavior: calm  Observed Behavior (Comment): Resting in bed.  Safety Measures: environmental rounds completed, safety plan reviewed, safety rounds completed, suicide check-in completed, self-directed behavior promoted, clinical history reviewed  Diversional Activity: music  Suicidality: Status 15  Seizure precautions: clutter free  environment  Assault: status 15, minimal personal belongings in room, behavioral scrubs (pajamas)  Elopement Assessment:  (no observed behaviors)  Elopement Interventions: status 15, status continuous sight, no shoes  Sexual: status 15, status continuous sight, private room  Additional Documentation:  (Elopement precuations in place.)

## 2023-08-08 NOTE — PLAN OF CARE
Problem: Psychotic Signs/Symptoms  Goal: Improved Sleep (Psychotic Signs/Symptoms)  Outcome: Progressing   Goal Outcome Evaluation:    Patient was incontinent of urine once during the night and he just changed his scrub pants by himself. He is using a medical bed to help with mobility. No agitation and psychotic behavior noted the whole night. Slept for a total of 8 hours.

## 2023-08-08 NOTE — CARE PLAN
08/08/23 1225   Group Therapy Session   Group Attendance attended group session   Time Session Began 1020   Time Session Ended 1120   Total Time (minutes) 60   Total # Attendees 5   Group Type expressive therapy;task skill;psychotherapeutic   Group Topic Covered coping skills/lifestyle management;problem-solving;cognitive activities;structured socialization;balanced lifestyle   Group Session Detail Occupational Therapy Clinic group to facilitate coping skill exploration, use of cognitive skills and problem solving, creative expression, clinical observation and facilitation of social, cognitive, and kinesthetic performance skills.    Patient Response/Contribution cooperative with task;listened actively   Patient Participation Detail With reassurance, assistance of task set up and parameters of work steps provided, he worked in following through on plans and his decisions. Teasing  and light humor seemed to be helpful in discussions when he wanted to stretch the limits of time and work structure. Pleasant and easily redirectable.

## 2023-08-08 NOTE — PLAN OF CARE
Assessment/Intervention/Current Symtoms and Care Coordination:  Pt discussed in team rounds this AM. Pt has been pleasant. Tentative discharge for 8/14 after pt's guardian/sister comes back into town pending pt stabilization. Due to pt's continued delusions about being  to the provider, pt switched to male provider.     Discharge Plan or Goal:  Pending stabilization and safe disposition plan, pt to return to  with adult day services on 8/14     Barriers to Discharge:  Pt is receiving ongoing stabilization and medication adjustment. Continue care coordination with [facility] to ascertain his acceptability for a return to the [place].     Referral Status:  [facility]  Referral made 8/7     Legal Status:  Voluntary per guardian     Contacts:  Guardian/Sister - Huong Keenan   P: [phone]     - Sadaf  P: [phone]    CM - Bree Olvera  E: [email or web address].     Upcoming Meetings and Dates/Important Information and next steps:  [facility] to collaborate with outpatient team regarding adult day service referrals  Pt has [facility] assessment for DD waiver on 8/10 at noon

## 2023-08-08 NOTE — PLAN OF CARE
Problem: Disruptive Behavior  Goal: Improved Mood Symptoms (Disruptive Behavior)  Flowsheets (Taken 8/8/2023 1313)  Mutually Determined Action Steps (Improved Mood Symptoms):   identifies personal goals   seeks activity to release emotion  Individualized Action Step (Improved Mood Symptoms): prepare for discharge on Thursday   Goal Outcome Evaluation:  Mental Health Assessment: Jose's affect remains flat, mood calm, withdrawn to self, minimal interaction with others. No direct delusional behaviors observed. No irritation or agitated behaviors. Speech remains slurred, some drooling. He follows directions well. Paces hallway listening to head phones, singing is quiet. He is a little disheveled. Mediation compliant. Attended OT    Plan of Care Reviewed With: patient

## 2023-08-08 NOTE — PROGRESS NOTES
Patient seen, chart reviewed, care discussed with staff.  Blood pressure 129/85, pulse 85, temperature 96.9  F (36.1  C), temperature source Temporal, resp. rate 16, weight 92.9 kg (204 lb 12.9 oz), SpO2 95 %.  By report, stable but was med reluctant in response to discharge planning  General appearance: good  Alert.   Affect: good  Mood: good   Speech:  production low.   Eye contact:  good.    Psychomotor behavior: normal  Gait: steady   Abnormal movements:  none  Delusions: continued  Hallucinations:  continued  Thoughts: concrete  Associations: intact  Judgement: poor  Insight: poor  Cognitions: intact in conversation  Memory:  intact in conversation  Orientation: normal    Not suicidal.    Imp:  Schizoaffective illness  2.  Developmental disability  And:   Patient Active Problem List   Diagnosis    Closed head injury, initial encounter    Altered mental status, unspecified altered mental status type    Generalized weakness    Portal vein thrombosis    Pleural effusion    Generalized muscle weakness    Falls frequently    Other ascites    Acute pancreatitis, unspecified complication status, unspecified pancreatitis type    Pancreatic pseudocyst    Idiopathic acute pancreatitis, unspecified complication status    Allergic rhinitis    Fisher's esophagus    CTS (carpal tunnel syndrome)    Elevated liver enzymes    HTN (hypertension)    Hypothyroidism    Keratoconus    Major depressive disorder, recurrent episode, mild (H)    Intellectual disability    Morbid exogenous obesity (H)    Neuroleptic malignant syndrome    Obstructive sleep apnea syndrome    Bipolar affective disorder (H)    Seizure disorder (H)    Seizures, generalized convulsive (H)    Acute respiratory failure with hypoxia (H)    Anemia, unspecified    Anxiety disorder, unspecified    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)    Dietary zinc deficiency    Dry eye syndrome of bilateral lacrimal glands    Dry mouth, unspecified     Edema, unspecified    Gastro-esophageal reflux disease without esophagitis    Hyperosmolality and hypernatremia    Irritable bowel syndrome with constipation    Major depressive disorder, recurrent, unspecified (H)    Metabolic encephalopathy    Moderate protein-calorie malnutrition (H)    Other symbolic dysfunctions    Schizoaffective disorder, unspecified (H)    Thrombocytopenia, unspecified (H)    Unspecified asthma, uncomplicated    Urinary tract infection, site not specified    Vitamin D deficiency, unspecified    Schizoaffective disorder, bipolar type (H)     Plan:  Increase Topamax  2.  DD waiver assessment Thursday 8/10    Current Facility-Administered Medications   Medication    acetaminophen (TYLENOL) tablet 1,000 mg    acetaminophen (TYLENOL) tablet 650 mg    alum & mag hydroxide-simethicone (MAALOX) suspension 30 mL    apixaban ANTICOAGULANT (ELIQUIS) tablet 5 mg    artificial saliva (BIOTENE MT) solution 2 spray    artificial tears ophthalmic ointment    atropine 1 % ophthalmic solution 2 drop    calcium polycarbophil (FIBERCON) tablet 625 mg    chlorhexidine (PERIDEX) 0.12 % solution 15 mL    cloZAPine (CLOZARIL) tablet 250 mg    cloZAPine (CLOZARIL) tablet 50 mg    docusate sodium (COLACE) capsule 100 mg    furosemide (LASIX) tablet 20 mg    hydrALAZINE (APRESOLINE) tablet 10 mg    hydrOXYzine (ATARAX) tablet 25 mg    hypromellose-dextran (ARTIFICAL TEARS) 0.1-0.3 % ophthalmic solution 2 drop    ipratropium (ATROVENT) 0.06 % spray 2 spray    lamoTRIgine (LaMICtal) tablet 100 mg    levothyroxine (SYNTHROID/LEVOTHROID) tablet 50 mcg    lisinopril (ZESTRIL) tablet 5 mg    LORazepam (ATIVAN) tablet 1 mg    melatonin tablet 10 mg    menthol (ICY HOT) 5 % patch 1 patch    And    menthol (ICY HOT) Patch in Place    montelukast (SINGULAIR) tablet 10 mg    OLANZapine (zyPREXA) tablet 10 mg    Or    OLANZapine (zyPREXA) injection 10 mg    pantoprazole (PROTONIX) EC tablet 40 mg    prednisoLONE acetate (PRED  FORTE) 1 % ophthalmic susp 1 drop    senna-docusate (SENOKOT-S/PERICOLACE) 8.6-50 MG per tablet 1 tablet    topiramate (TOPAMAX) tablet 100 mg    zinc sulfate (ZINCATE) capsule 220 mg     Recent Results (from the past 168 hour(s))   CBC with platelets and differential    Collection Time: 08/08/23  7:54 AM   Result Value Ref Range    WBC Count 5.4 4.0 - 11.0 10e3/uL    RBC Count 4.87 4.40 - 5.90 10e6/uL    Hemoglobin 13.1 (L) 13.3 - 17.7 g/dL    Hematocrit 39.3 (L) 40.0 - 53.0 %    MCV 81 78 - 100 fL    MCH 26.9 26.5 - 33.0 pg    MCHC 33.3 31.5 - 36.5 g/dL    RDW 14.4 10.0 - 15.0 %    Platelet Count 164 150 - 450 10e3/uL    % Neutrophils 58 %    % Lymphocytes 33 %    % Monocytes 8 %    % Eosinophils 0 %    % Basophils 1 %    % Immature Granulocytes 0 %    NRBCs per 100 WBC 0 <1 /100    Absolute Neutrophils 3.2 1.6 - 8.3 10e3/uL    Absolute Lymphocytes 1.7 0.8 - 5.3 10e3/uL    Absolute Monocytes 0.4 0.0 - 1.3 10e3/uL    Absolute Eosinophils 0.0 0.0 - 0.7 10e3/uL    Absolute Basophils 0.0 0.0 - 0.2 10e3/uL    Absolute Immature Granulocytes 0.0 <=0.4 10e3/uL    Absolute NRBCs 0.0 10e3/uL

## 2023-08-08 NOTE — PLAN OF CARE
Problem: Adult Behavioral Health Plan of Care  Goal: Plan of Care Review  Outcome: Progressing  Flowsheets (Taken 8/7/2023 1750)  Patient Agreement with Plan of Care: agrees     Problem: Psychotic Signs/Symptoms  Goal: Optimal Cognitive Function (Psychotic Signs/Symptoms)  Intervention: Support and Promote Cognitive Ability  Recent Flowsheet Documentation  Taken 8/7/2023 1750 by Varsha Sunshine, RN  Trust Relationship/Rapport:   care explained   choices provided   reassurance provided   thoughts/feelings acknowledged   Goal Outcome Evaluation:    Plan of Care Reviewed With: patient        /63 (BP Location: Right arm, Patient Position: Sitting, Cuff Size: Adult Large)   Pulse 94   Temp 97.2  F (36.2  C)   Resp 16   Wt 93.6 kg (206 lb 5.6 oz)   SpO2 98%   BMI 35.42 kg/m        Pt alert and oriented. Affect flat and mood calm. Denies SI/SIB, AVH. Denied anxiety and depression. Declined on pain. Visible in the milieu and later went to the room to nap for a while. Came out for dinner, had 80-90% consumed. Uses medical bed to aid mobility. Pt steady with gait. Socialized with peers. At HS pt refused to take his medications, stated  I don t have to take medications no more  Writer had to go with another staff, he later took it with lots of encouragement. Contact for safety in the unit. All precautions were maintained. Continue to monitor per POC.

## 2023-08-09 PROCEDURE — G0177 OPPS/PHP; TRAIN & EDUC SERV: HCPCS

## 2023-08-09 PROCEDURE — 250N000013 HC RX MED GY IP 250 OP 250 PS 637

## 2023-08-09 PROCEDURE — 124N000003 HC R&B MH SENIOR/ADOLESCENT

## 2023-08-09 PROCEDURE — 250N000013 HC RX MED GY IP 250 OP 250 PS 637: Performed by: EMERGENCY MEDICINE

## 2023-08-09 PROCEDURE — 250N000013 HC RX MED GY IP 250 OP 250 PS 637: Performed by: PSYCHIATRY & NEUROLOGY

## 2023-08-09 PROCEDURE — 250N000013 HC RX MED GY IP 250 OP 250 PS 637: Performed by: STUDENT IN AN ORGANIZED HEALTH CARE EDUCATION/TRAINING PROGRAM

## 2023-08-09 RX ORDER — PRAZOSIN HYDROCHLORIDE 1 MG/1
1 CAPSULE ORAL DAILY
Status: DISCONTINUED | OUTPATIENT
Start: 2023-08-10 | End: 2023-08-14 | Stop reason: HOSPADM

## 2023-08-09 RX ADMIN — PREDNISOLONE ACETATE 1 DROP: 10 SUSPENSION/ DROPS OPHTHALMIC at 08:53

## 2023-08-09 RX ADMIN — FUROSEMIDE 20 MG: 20 TABLET ORAL at 08:51

## 2023-08-09 RX ADMIN — LEVOTHYROXINE SODIUM 50 MCG: 25 TABLET ORAL at 07:10

## 2023-08-09 RX ADMIN — TOPIRAMATE 100 MG: 100 TABLET, FILM COATED ORAL at 08:51

## 2023-08-09 RX ADMIN — LAMOTRIGINE 100 MG: 25 TABLET ORAL at 08:51

## 2023-08-09 RX ADMIN — LISINOPRIL 5 MG: 5 TABLET ORAL at 08:52

## 2023-08-09 RX ADMIN — ACETAMINOPHEN 1000 MG: 500 TABLET, FILM COATED ORAL at 08:52

## 2023-08-09 RX ADMIN — CALCIUM POLYCARBOPHIL 625 MG: 625 TABLET, FILM COATED ORAL at 08:52

## 2023-08-09 RX ADMIN — APIXABAN 5 MG: 5 TABLET, FILM COATED ORAL at 19:13

## 2023-08-09 RX ADMIN — WHITE PETROLATUM 57.7 %-MINERAL OIL 31.9 % EYE OINTMENT: at 19:14

## 2023-08-09 RX ADMIN — MONTELUKAST 10 MG: 10 TABLET, FILM COATED ORAL at 19:13

## 2023-08-09 RX ADMIN — ACETAMINOPHEN 1000 MG: 500 TABLET, FILM COATED ORAL at 19:12

## 2023-08-09 RX ADMIN — CLOZAPINE 250 MG: 100 TABLET ORAL at 19:12

## 2023-08-09 RX ADMIN — CALCIUM POLYCARBOPHIL 625 MG: 625 TABLET, FILM COATED ORAL at 19:13

## 2023-08-09 RX ADMIN — APIXABAN 5 MG: 5 TABLET, FILM COATED ORAL at 08:52

## 2023-08-09 RX ADMIN — DOCUSATE SODIUM 100 MG: 100 CAPSULE, LIQUID FILLED ORAL at 08:52

## 2023-08-09 RX ADMIN — Medication 10 MG: at 19:13

## 2023-08-09 RX ADMIN — CHLORHEXIDINE GLUCONATE 0.12% ORAL RINSE 15 ML: 1.2 LIQUID ORAL at 08:51

## 2023-08-09 RX ADMIN — TOPIRAMATE 100 MG: 100 TABLET, FILM COATED ORAL at 19:13

## 2023-08-09 RX ADMIN — CLOZAPINE 50 MG: 50 TABLET ORAL at 08:52

## 2023-08-09 RX ADMIN — ZINC SULFATE 220 MG (50 MG) CAPSULE 220 MG: CAPSULE at 08:52

## 2023-08-09 RX ADMIN — PANTOPRAZOLE SODIUM 40 MG: 40 TABLET, DELAYED RELEASE ORAL at 07:10

## 2023-08-09 ASSESSMENT — ACTIVITIES OF DAILY LIVING (ADL)
LAUNDRY: UNABLE TO COMPLETE
ADLS_ACUITY_SCORE: 54
ADLS_ACUITY_SCORE: 54
LAUNDRY: UNABLE TO COMPLETE
ADLS_ACUITY_SCORE: 54
ADLS_ACUITY_SCORE: 54
ORAL_HYGIENE: INDEPENDENT;PROMPTS
ADLS_ACUITY_SCORE: 54
HYGIENE/GROOMING: INDEPENDENT;PROMPTS
ADLS_ACUITY_SCORE: 54
DRESS: INDEPENDENT
ADLS_ACUITY_SCORE: 54
ORAL_HYGIENE: INDEPENDENT;PROMPTS
DRESS: INDEPENDENT
ADLS_ACUITY_SCORE: 54
ADLS_ACUITY_SCORE: 54
HYGIENE/GROOMING: INDEPENDENT;PROMPTS

## 2023-08-09 NOTE — PLAN OF CARE
Goal Outcome Evaluation:    Plan of Care Reviewed With: patient      Flat affect, mood is calm, pt took medications without issues, pt making needs known appropriately and following boundaries. Pt is wearing compression stockings and elevating his legs when sitting. Gait slow and steady without use of walker. Pt denies questions/concerns at this time.     Patient care order for:  Please provide patient with a medical bed for safety due to unsteadiness and disorganization       /85 (BP Location: Right arm, Patient Position: Sitting, Cuff Size: Adult Large)   Pulse 87   Temp 97  F (36.1  C) (Temporal)   Resp 16   Wt 92.9 kg (204 lb 12.9 oz)   SpO2 98%   BMI 35.16 kg/m       Plan:  Continue to encourage fluids and increased activity  Continue to encourage group participation  Continue to encourage choices and independence  Continue to encourage non-pharmacological coping skills

## 2023-08-09 NOTE — PLAN OF CARE
"  Problem: Psychotic Signs/Symptoms  Goal: Improved Behavioral Control (Psychotic Signs/Symptoms)  Outcome: Progressing     Problem: Disruptive Behavior  Goal: Improved Impulse and Aggression Control (Disruptive Behavior)  Outcome: Progressing     Problem: Fall Injury Risk  Goal: Absence of Fall and Fall-Related Injury  Outcome: Progressing  Intervention: Identify and Manage Contributors  Patient up and visible on the unit most part of the shift. Patient was pleasant, presented with full range affect, mood is calm, patient denies all psych symptoms, denies pain or discomfort. Patient stated \" I am doing fine \"patient is engaged with staff, and select peers, playing cards and watch TV. Patient verbally contracts for safety, safety checks and precaution in place. Patient is eating and drinking adequately. Able to make needs known. No behavioral or safety concerns noted this shift. This writer will continue to monitor and offer therapeutic support as needed for the rest of the shift.    /85 (BP Location: Right arm)   Pulse 97   Temp 97.8  F (36.6  C) (Temporal)   Resp 16   Wt 92.9 kg (204 lb 12.9 oz)   SpO2 97%   BMI 35.16 kg/m      "

## 2023-08-09 NOTE — PLAN OF CARE
Assessment/Intervention/Current Symtoms and Care Coordination:  Pt discussed in team rounds this AM. Pt has been pleasant. Tentative discharge for 8/14 after pt's guardian/sister comes back into town pending pt stabilization. Due to pt's continued delusions about being  to the provider, pt switched to male provider.     Writer received email from Sadaf  supervisor reporting that Joi will be attending in-person for assessment tomorrow, starting between 12:00pm-1:00pm. Sadaf reports that  RN and Huong, pt's guardian/sister will attend via zoom.    Writer put out email to Sadaf and Huong inquiring about discharge time that works for all parties and inquiring about who will provide transportation at discharge. Writer received email back from both reporting anytime works.    Discharge Plan or Goal:  Pending stabilization and safe disposition plan, pt to return to  with adult day services on 8/14     Barriers to Discharge:  Pt is receiving ongoing stabilization and medication adjustment. Continue care coordination with [facility] to ascertain his acceptability for a return to the home.     Referral Status:  [facility]  Pt to start services on 8/21 after discharge     Legal Status:  Voluntary per guardian     Contacts:  Guardian/Sister - Huong Keenan   P: [phone]     - Sadaf  P: [phone]    CM - Bree Olvera  E: [email or web address].     Upcoming Meetings and Dates/Important Information and next steps:  [facility] to collaborate with outpatient team regarding adult day service referrals  Pt has M[employer]hoices assessment for DD waiver on 8/10 between noon and 1:00pm

## 2023-08-09 NOTE — PLAN OF CARE
Problem: Plan of Care - These are the overarching goals to be used throughout the patient stay.    Goal: Plan of Care Review  Description: The Plan of Care Review/Shift note should be completed every shift.  The Outcome Evaluation is a brief statement about your assessment that the patient is improving, declining, or no change.  This information will be displayed automatically on your shift note.  Outcome: Progressing     Problem: Psychotic Signs/Symptoms  Goal: Improved Behavioral Control (Psychotic Signs/Symptoms)  Outcome: Progressing   Goal Outcome Evaluation:    Plan of Care Reviewed With: patient        /84   Pulse 86   Temp 97.7  F (36.5  C) (Oral)   Resp 16   Wt 92.9 kg (204 lb 12.9 oz)   SpO2 98%   BMI 35.16 kg/m       Pt alert and oriented. Able to communicate needs. Visible in the milieu this shift, walking with headphone and singing. Affect is flat, mood calm.  Denied mental health symptoms, and hallucinations. Denied anxiety and depression. Pt rated 5/10 pain to lower back but did not want to take medications. Pt asked the writer to take out his wrist band, because he does not take medications no more. Pt had 100% of dinner, fluids encouraged. Compliant with medications, no side effects reported.  Pt attended group meeting and minimally socialized with peers. He refused a shower tonight. Medical bed to aid mobility. Contact for safety. All precautions were maintained. Continue with plan of care.

## 2023-08-09 NOTE — CARE PLAN
Occupational Therapy     08/09/23 1400   Group Therapy Session   Group Attendance attended group session   Time Session Began 1015   Time Session Ended 1200   Total Time (minutes) 50   Total # Attendees 4-5   Group Type expressive therapy;task skill   Group Topic Covered cognitive activities;coping skills/lifestyle management;leisure exploration/use of leisure time;problem-solving;structured socialization   Group Session Detail OT: Education on healthy activity engagement and creative hands-on endeavor (OT clinic) to increase concentration, focus, attention to task/detail, decision making, problem solving, frustration tolerance, task follow through, coping with stress, healthy leisure engagement, creative expression, and social engagement   Patient Response/Contribution cooperative with task;other (see comments)  (semi-redirectable)       Patient Participation Detail Pt arrived late to group and stayed for duration. Pt sat among peers to complete familiar creative hands on endeavor but did not engage in social interactions with peers; pt engaged in brief social interactions with therapist on approach. Pt worked in a semi-messy and quick manner to complete project and needed occasional verbal reminders to completely fill-in areas. Pt semi-resistant to encouragement from therapist to add more to his project to make it more complete. Therapist graded task down for pt and removed excess visual clutter.

## 2023-08-09 NOTE — PROGRESS NOTES
Patient seen, chart reviewed, care discussed with staff.  Blood pressure 120/85, pulse 87, temperature 97  F (36.1  C), temperature source Temporal, resp. rate 16, weight 92.9 kg (204 lb 12.9 oz), SpO2 98 %.  Continues to be upset and delusional around talk of discharge.    Stable on unit  General appearance: good  Alert.   Affect: good  Mood: good  Speech:  decreased   Eye contact:  good.    Psychomotor behavior: normal  Gait: steady   Abnormal movements:  none  Delusions: present  Hallucinations:  present  Thoughts: linear, concrete  Associations: intact  Judgement: poor  Insight: poor  Cognitions: intact in conversation  Memory:  intact in conversation  Orientation: normal    Not suicidal.    Imp:  Schizoaffective illness  2.  Developmental disability  And:   Patient Active Problem List   Diagnosis    Closed head injury, initial encounter    Altered mental status, unspecified altered mental status type    Generalized weakness    Portal vein thrombosis    Pleural effusion    Generalized muscle weakness    Falls frequently    Other ascites    Acute pancreatitis, unspecified complication status, unspecified pancreatitis type    Pancreatic pseudocyst    Idiopathic acute pancreatitis, unspecified complication status    Allergic rhinitis    Fisher's esophagus    CTS (carpal tunnel syndrome)    Elevated liver enzymes    HTN (hypertension)    Hypothyroidism    Keratoconus    Major depressive disorder, recurrent episode, mild (H)    Intellectual disability    Morbid exogenous obesity (H)    Neuroleptic malignant syndrome    Obstructive sleep apnea syndrome    Bipolar affective disorder (H)    Seizure disorder (H)    Seizures, generalized convulsive (H)    Acute respiratory failure with hypoxia (H)    Anemia, unspecified    Anxiety disorder, unspecified    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)    Dietary zinc deficiency    Dry eye syndrome of bilateral lacrimal glands    Dry mouth, unspecified     Edema, unspecified    Gastro-esophageal reflux disease without esophagitis    Hyperosmolality and hypernatremia    Irritable bowel syndrome with constipation    Major depressive disorder, recurrent, unspecified (H)    Metabolic encephalopathy    Moderate protein-calorie malnutrition (H)    Other symbolic dysfunctions    Schizoaffective disorder, unspecified (H)    Thrombocytopenia, unspecified (H)    Unspecified asthma, uncomplicated    Urinary tract infection, site not specified    Vitamin D deficiency, unspecified    Schizoaffective disorder, bipolar type (H)   Plan:  add Prazosin for anger around discharge planning    Current Facility-Administered Medications   Medication    acetaminophen (TYLENOL) tablet 1,000 mg    acetaminophen (TYLENOL) tablet 650 mg    alum & mag hydroxide-simethicone (MAALOX) suspension 30 mL    apixaban ANTICOAGULANT (ELIQUIS) tablet 5 mg    artificial saliva (BIOTENE MT) solution 2 spray    artificial tears ophthalmic ointment    atropine 1 % ophthalmic solution 2 drop    calcium polycarbophil (FIBERCON) tablet 625 mg    chlorhexidine (PERIDEX) 0.12 % solution 15 mL    cloZAPine (CLOZARIL) tablet 250 mg    cloZAPine (CLOZARIL) tablet 50 mg    docusate sodium (COLACE) capsule 100 mg    furosemide (LASIX) tablet 20 mg    hydrALAZINE (APRESOLINE) tablet 10 mg    hydrOXYzine (ATARAX) tablet 25 mg    hypromellose-dextran (ARTIFICAL TEARS) 0.1-0.3 % ophthalmic solution 2 drop    ipratropium (ATROVENT) 0.06 % spray 2 spray    lamoTRIgine (LaMICtal) tablet 100 mg    levothyroxine (SYNTHROID/LEVOTHROID) tablet 50 mcg    lisinopril (ZESTRIL) tablet 5 mg    LORazepam (ATIVAN) tablet 1 mg    melatonin tablet 10 mg    menthol (ICY HOT) 5 % patch 1 patch    And    menthol (ICY HOT) Patch in Place    montelukast (SINGULAIR) tablet 10 mg    OLANZapine (zyPREXA) tablet 10 mg    Or    OLANZapine (zyPREXA) injection 10 mg    pantoprazole (PROTONIX) EC tablet 40 mg    [START ON 8/10/2023] prazosin  (MINIPRESS) capsule 1 mg    prednisoLONE acetate (PRED FORTE) 1 % ophthalmic susp 1 drop    senna-docusate (SENOKOT-S/PERICOLACE) 8.6-50 MG per tablet 1 tablet    topiramate (TOPAMAX) tablet 100 mg    zinc sulfate (ZINCATE) capsule 220 mg     Recent Results (from the past 168 hour(s))   CBC with platelets and differential    Collection Time: 08/08/23  7:54 AM   Result Value Ref Range    WBC Count 5.4 4.0 - 11.0 10e3/uL    RBC Count 4.87 4.40 - 5.90 10e6/uL    Hemoglobin 13.1 (L) 13.3 - 17.7 g/dL    Hematocrit 39.3 (L) 40.0 - 53.0 %    MCV 81 78 - 100 fL    MCH 26.9 26.5 - 33.0 pg    MCHC 33.3 31.5 - 36.5 g/dL    RDW 14.4 10.0 - 15.0 %    Platelet Count 164 150 - 450 10e3/uL    % Neutrophils 58 %    % Lymphocytes 33 %    % Monocytes 8 %    % Eosinophils 0 %    % Basophils 1 %    % Immature Granulocytes 0 %    NRBCs per 100 WBC 0 <1 /100    Absolute Neutrophils 3.2 1.6 - 8.3 10e3/uL    Absolute Lymphocytes 1.7 0.8 - 5.3 10e3/uL    Absolute Monocytes 0.4 0.0 - 1.3 10e3/uL    Absolute Eosinophils 0.0 0.0 - 0.7 10e3/uL    Absolute Basophils 0.0 0.0 - 0.2 10e3/uL    Absolute Immature Granulocytes 0.0 <=0.4 10e3/uL    Absolute NRBCs 0.0 10e3/uL

## 2023-08-09 NOTE — PLAN OF CARE
Problem: Sleep Disturbance  Goal: Adequate Sleep/Rest  Outcome: Progressing   Goal Outcome Evaluation:       Patient is sleeping soundly. No evidence of disruptive behavior/s observed. He is using a medical bed for mobility. Patient had urinary incontinence x 1. He cleaned himself up. His scrub suits changed to a clean one. He slept for a total of 10 hours the whole night.

## 2023-08-10 PROCEDURE — 250N000013 HC RX MED GY IP 250 OP 250 PS 637: Performed by: PSYCHIATRY & NEUROLOGY

## 2023-08-10 PROCEDURE — 250N000013 HC RX MED GY IP 250 OP 250 PS 637

## 2023-08-10 PROCEDURE — 124N000003 HC R&B MH SENIOR/ADOLESCENT

## 2023-08-10 PROCEDURE — 250N000013 HC RX MED GY IP 250 OP 250 PS 637: Performed by: EMERGENCY MEDICINE

## 2023-08-10 PROCEDURE — 250N000013 HC RX MED GY IP 250 OP 250 PS 637: Performed by: STUDENT IN AN ORGANIZED HEALTH CARE EDUCATION/TRAINING PROGRAM

## 2023-08-10 PROCEDURE — G0177 OPPS/PHP; TRAIN & EDUC SERV: HCPCS

## 2023-08-10 RX ADMIN — MONTELUKAST 10 MG: 10 TABLET, FILM COATED ORAL at 19:46

## 2023-08-10 RX ADMIN — ZINC SULFATE 220 MG (50 MG) CAPSULE 220 MG: CAPSULE at 07:16

## 2023-08-10 RX ADMIN — ACETAMINOPHEN 1000 MG: 500 TABLET, FILM COATED ORAL at 07:16

## 2023-08-10 RX ADMIN — CALCIUM POLYCARBOPHIL 625 MG: 625 TABLET, FILM COATED ORAL at 07:16

## 2023-08-10 RX ADMIN — CLOZAPINE 250 MG: 100 TABLET ORAL at 19:45

## 2023-08-10 RX ADMIN — TOPIRAMATE 100 MG: 100 TABLET, FILM COATED ORAL at 19:46

## 2023-08-10 RX ADMIN — Medication 10 MG: at 19:46

## 2023-08-10 RX ADMIN — PANTOPRAZOLE SODIUM 40 MG: 40 TABLET, DELAYED RELEASE ORAL at 06:31

## 2023-08-10 RX ADMIN — LEVOTHYROXINE SODIUM 50 MCG: 25 TABLET ORAL at 06:31

## 2023-08-10 RX ADMIN — CALCIUM POLYCARBOPHIL 625 MG: 625 TABLET, FILM COATED ORAL at 19:46

## 2023-08-10 RX ADMIN — FUROSEMIDE 20 MG: 20 TABLET ORAL at 07:17

## 2023-08-10 RX ADMIN — APIXABAN 5 MG: 5 TABLET, FILM COATED ORAL at 07:16

## 2023-08-10 RX ADMIN — WHITE PETROLATUM 57.7 %-MINERAL OIL 31.9 % EYE OINTMENT: at 19:47

## 2023-08-10 RX ADMIN — CLOZAPINE 50 MG: 50 TABLET ORAL at 07:16

## 2023-08-10 RX ADMIN — PRAZOSIN HYDROCHLORIDE 1 MG: 1 CAPSULE ORAL at 07:16

## 2023-08-10 RX ADMIN — CHLORHEXIDINE GLUCONATE 0.12% ORAL RINSE 15 ML: 1.2 LIQUID ORAL at 07:15

## 2023-08-10 RX ADMIN — LAMOTRIGINE 100 MG: 25 TABLET ORAL at 07:16

## 2023-08-10 RX ADMIN — TOPIRAMATE 100 MG: 100 TABLET, FILM COATED ORAL at 07:16

## 2023-08-10 RX ADMIN — ACETAMINOPHEN 1000 MG: 500 TABLET, FILM COATED ORAL at 19:46

## 2023-08-10 RX ADMIN — ACETAMINOPHEN 1000 MG: 500 TABLET, FILM COATED ORAL at 14:24

## 2023-08-10 RX ADMIN — LISINOPRIL 5 MG: 5 TABLET ORAL at 07:16

## 2023-08-10 RX ADMIN — APIXABAN 5 MG: 5 TABLET, FILM COATED ORAL at 19:47

## 2023-08-10 RX ADMIN — DOCUSATE SODIUM 100 MG: 100 CAPSULE, LIQUID FILLED ORAL at 07:16

## 2023-08-10 ASSESSMENT — ACTIVITIES OF DAILY LIVING (ADL)
HYGIENE/GROOMING: INDEPENDENT
LAUNDRY: UNABLE TO COMPLETE
ADLS_ACUITY_SCORE: 44
ADLS_ACUITY_SCORE: 44
ADLS_ACUITY_SCORE: 54
ADLS_ACUITY_SCORE: 44
ADLS_ACUITY_SCORE: 54
DRESS: STREET CLOTHES;SCRUBS (BEHAVIORAL HEALTH)
HYGIENE/GROOMING: HANDWASHING;INDEPENDENT
LAUNDRY: WITH SUPERVISION
ADLS_ACUITY_SCORE: 54
DRESS: SCRUBS (BEHAVIORAL HEALTH);INDEPENDENT
ORAL_HYGIENE: INDEPENDENT
ORAL_HYGIENE: INDEPENDENT
ADLS_ACUITY_SCORE: 44
ADLS_ACUITY_SCORE: 54
ADLS_ACUITY_SCORE: 44
ADLS_ACUITY_SCORE: 54
ADLS_ACUITY_SCORE: 44
ADLS_ACUITY_SCORE: 44

## 2023-08-10 NOTE — PLAN OF CARE
"  Problem: Psychotic Signs/Symptoms  Goal: Improved Behavioral Control (Psychotic Signs/Symptoms)  Outcome: Progressing     Problem: Disruptive Behavior  Goal: Improved Impulse and Aggression Control (Disruptive Behavior)  Outcome: Progressing     Problem: Fall Injury Risk  Goal: Absence of Fall and Fall-Related Injury  Outcome: Progressing    Patient up pacing the unit at the beginning of the shift, listening to music, on headphone. Presented with full range affect on approach, patient denies SI/SIB/hallucinations. Denies pain or discomfort, patient nods head when asked about keith for safety, safety checks and precautions in place. Patient didn't eat well at dinner , patient  stated\" I don't like what I ordered.\" Ate about 25%, drinking well. Selectively engaged with staff but not with peers. Patient uses a medical bed to ease ambulation. No delusional statements noted this shift. No behavioral concerns noted. Medication compliant with no stated or observed side effects. . This writer will continue to monitor patient as needed for the rest of the shift per patient's plan of care.    /84 (BP Location: Right arm)   Pulse 96   Temp 98.2  F (36.8  C) (Oral)   Resp 16   Wt 92.9 kg (204 lb 12.9 oz)   SpO2 97%   BMI 35.16 kg/m      "

## 2023-08-10 NOTE — PLAN OF CARE
Assessment/Intervention/Current Symtoms and Care Coordination:  Pt discussed in team rounds this AM. Pt has been pleasant. Tentative discharge for 8/14 after pt's guardian/sister comes back into town pending pt stabilization. Pt has MNChoices assessment at noon today.    Jefersonr put out email to Sadaf and Huong reporting that pt will be discharging at 11:00am on 8/14. Huong will provide transport.    Writer spoke with Genie from [facility] ([phone]) requesting to speak with pt's CM. Writer and Genie confirmed email contact. Writer relayed plan to reach out to Bree to connect her and Genie regarding pt's waiver.    Jefersonr put out email to Bree ([email or web address] requesting she contact Genie regarding pt's waiver.    Jefersonr spoke with  staff and provided copies of MAR for [facility] .    Sadaf and  scheduled call regarding discharge for 10:30 on 8/14.     put out email to Sean Fontenot  ([email or web address] with documentation to help complete their assessment.    Discharge Plan or Goal:  Pt to return to  with adult day services on 8/14     Barriers to Discharge:  Pt is to discharge back to  once his sister/guardian comes back from vacation on 8/14     Referral Status:  [facility]  Pt to start services on 8/21 after discharge     Legal Status:  Voluntary per guardian     Contacts:  Guardian/Sister - Huong Keenan   P: [phone]     - Sadaf  P: [phone]     - Bree Olvera  E: [email or web address].     Upcoming Meetings and Dates/Important Information and next steps:  [facility] to collaborate with outpatient team regarding adult day service referrals  Pt has MNChoices assessment for DD waiver on 8/10 between noon and 1:00pm  Pt to discharge on 8/14 at 11:00am with transportation provided by Huong  [facility] call to [facility] on 8/14 at 10:30am

## 2023-08-10 NOTE — PLAN OF CARE
Problem: Sleep Disturbance  Goal: Adequate Sleep/Rest  Outcome: Progressing   Goal Outcome Evaluation:       Patient was asleep at the start of the shift. He is using a medical bed for mobility. Nothing unusual noted. No urinary incontinence noted. Patient slept for a total of 11.5 hours the whole shift.

## 2023-08-10 NOTE — PLAN OF CARE
"  Problem: Psychotic Signs/Symptoms  Goal: Improved Behavioral Control (Psychotic Signs/Symptoms)  Outcome: Progressing     Problem: Psychotic Signs/Symptoms  Goal: Increased Participation and Engagement (Psychotic Signs/Symptoms)  Outcome: Progressing  Intervention: Facilitate Participation and Engagement  Recent Flowsheet Documentation  Taken 8/10/2023 1052 by Prabha Boone RN  Diversional Activity: music   Goal Outcome Evaluation:    Plan of Care Reviewed With: patient      Patient is visible in the milieu throughout the shift. He attended and actively participated in group activities. His mood is calm with a bright affect. He is pleasant and cooperative. He is less delusional this shift. He thinks that the female patient sitting next to him is his mother and he calls him \"Mom\". Patient is compliant with his medication regimen. No side effects reported nor observed from the meds. Patient denied SI/SIB/HI nor hallucinations. Denied wishing himself to be dead. His appetite is excellent. He is also sleeping good at night time. He has not been seen napping this shift. He was just busy walking in the hallway with a headphone on singing. He was not loud. No behavioral outburst observed. He was visited by his  before noon and it went well.VSS. He is using a medical bed for mobility purposes.                 "

## 2023-08-10 NOTE — CARE PLAN
08/10/23 1448   Group Therapy Session   Group Attendance attended group session   Time Session Began 1300   Time Session Ended 1415   Total Time (minutes) 45   Total # Attendees 3   Group Type life skill;expressive therapy;task skill   Group Topic Covered cognitive activities;leisure exploration/use of leisure time;structured socialization;coping skills/lifestyle management;problem-solving   Group Session Detail OT Topic Group   Patient Response/Contribution cooperative with task   Patient Participation Detail pt arrived to group after scheduled meeting. pt initially pushed back on removing and turning off headphones then pt smiled and complied with therapist's request. pt shared he was grateful for family. pt was provided with supplies and instructions to complete a gratitude collage. pt was provided with additional encouragement and feedback to complete collage project during group time.

## 2023-08-10 NOTE — CARE PLAN
08/10/23 1233   Group Therapy Session   Group Attendance attended group session   Time Session Began 1015   Time Session Ended 1115   Total Time (minutes) 60   Total # Attendees 7   Group Type expressive therapy;task skill;psychotherapeutic   Group Topic Covered coping skills/lifestyle management;problem-solving;cognitive activities;balanced lifestyle;structured socialization   Group Session Detail Occupational Therapy Clinic group to facilitate coping skill exploration, use of cognitive skills and problem solving, creative expression, clinical observation and facilitation of social, cognitive, and kinesthetic performance skills.    Patient Response/Contribution cooperative with task;listened actively   Patient Participation Detail Pleasant. Seemed accepting of assistance when needed with task set up and problem solving on task. Followed through and initiated an additional detail independently. Appeared calm and comfortable. Tone of voice seemed relaxed.

## 2023-08-11 PROCEDURE — 250N000013 HC RX MED GY IP 250 OP 250 PS 637: Performed by: PSYCHIATRY & NEUROLOGY

## 2023-08-11 PROCEDURE — G0177 OPPS/PHP; TRAIN & EDUC SERV: HCPCS

## 2023-08-11 PROCEDURE — 250N000013 HC RX MED GY IP 250 OP 250 PS 637: Performed by: STUDENT IN AN ORGANIZED HEALTH CARE EDUCATION/TRAINING PROGRAM

## 2023-08-11 PROCEDURE — 250N000013 HC RX MED GY IP 250 OP 250 PS 637: Performed by: EMERGENCY MEDICINE

## 2023-08-11 PROCEDURE — 250N000013 HC RX MED GY IP 250 OP 250 PS 637

## 2023-08-11 PROCEDURE — 124N000003 HC R&B MH SENIOR/ADOLESCENT

## 2023-08-11 RX ORDER — CLOZAPINE 100 MG/1
300 TABLET ORAL AT BEDTIME
Status: DISCONTINUED | OUTPATIENT
Start: 2023-08-11 | End: 2023-08-14 | Stop reason: HOSPADM

## 2023-08-11 RX ADMIN — MONTELUKAST 10 MG: 10 TABLET, FILM COATED ORAL at 20:59

## 2023-08-11 RX ADMIN — ACETAMINOPHEN 1000 MG: 500 TABLET, FILM COATED ORAL at 08:10

## 2023-08-11 RX ADMIN — LEVOTHYROXINE SODIUM 50 MCG: 25 TABLET ORAL at 06:45

## 2023-08-11 RX ADMIN — TOPIRAMATE 100 MG: 100 TABLET, FILM COATED ORAL at 08:10

## 2023-08-11 RX ADMIN — ACETAMINOPHEN 1000 MG: 500 TABLET, FILM COATED ORAL at 13:27

## 2023-08-11 RX ADMIN — LISINOPRIL 5 MG: 5 TABLET ORAL at 08:10

## 2023-08-11 RX ADMIN — CALCIUM POLYCARBOPHIL 625 MG: 625 TABLET, FILM COATED ORAL at 08:10

## 2023-08-11 RX ADMIN — TOPIRAMATE 100 MG: 100 TABLET, FILM COATED ORAL at 20:59

## 2023-08-11 RX ADMIN — PREDNISOLONE ACETATE 1 DROP: 10 SUSPENSION/ DROPS OPHTHALMIC at 08:11

## 2023-08-11 RX ADMIN — APIXABAN 5 MG: 5 TABLET, FILM COATED ORAL at 08:10

## 2023-08-11 RX ADMIN — PRAZOSIN HYDROCHLORIDE 1 MG: 1 CAPSULE ORAL at 08:10

## 2023-08-11 RX ADMIN — WHITE PETROLATUM 57.7 %-MINERAL OIL 31.9 % EYE OINTMENT: at 21:01

## 2023-08-11 RX ADMIN — LAMOTRIGINE 100 MG: 25 TABLET ORAL at 08:10

## 2023-08-11 RX ADMIN — Medication 10 MG: at 20:59

## 2023-08-11 RX ADMIN — CHLORHEXIDINE GLUCONATE 0.12% ORAL RINSE 15 ML: 1.2 LIQUID ORAL at 08:11

## 2023-08-11 RX ADMIN — DOCUSATE SODIUM 100 MG: 100 CAPSULE, LIQUID FILLED ORAL at 08:10

## 2023-08-11 RX ADMIN — FUROSEMIDE 20 MG: 20 TABLET ORAL at 08:10

## 2023-08-11 RX ADMIN — ACETAMINOPHEN 1000 MG: 500 TABLET, FILM COATED ORAL at 20:59

## 2023-08-11 RX ADMIN — APIXABAN 5 MG: 5 TABLET, FILM COATED ORAL at 20:59

## 2023-08-11 RX ADMIN — CLOZAPINE 300 MG: 100 TABLET ORAL at 20:59

## 2023-08-11 RX ADMIN — CLOZAPINE 50 MG: 50 TABLET ORAL at 08:10

## 2023-08-11 RX ADMIN — ZINC SULFATE 220 MG (50 MG) CAPSULE 220 MG: CAPSULE at 08:10

## 2023-08-11 RX ADMIN — PANTOPRAZOLE SODIUM 40 MG: 40 TABLET, DELAYED RELEASE ORAL at 06:45

## 2023-08-11 RX ADMIN — CALCIUM POLYCARBOPHIL 625 MG: 625 TABLET, FILM COATED ORAL at 20:59

## 2023-08-11 ASSESSMENT — ACTIVITIES OF DAILY LIVING (ADL)
HYGIENE/GROOMING: INDEPENDENT
LAUNDRY: UNABLE TO COMPLETE
DRESS: INDEPENDENT
ADLS_ACUITY_SCORE: 44
LAUNDRY: UNABLE TO COMPLETE
DRESS: INDEPENDENT
HYGIENE/GROOMING: INDEPENDENT
ADLS_ACUITY_SCORE: 44
ORAL_HYGIENE: INDEPENDENT
ORAL_HYGIENE: INDEPENDENT
ADLS_ACUITY_SCORE: 44
ADLS_ACUITY_SCORE: 44

## 2023-08-11 NOTE — PROGRESS NOTES
Patient seen, chart reviewed, care discussed with staff.  Blood pressure 135/75, pulse 93, temperature 98.2  F (36.8  C), temperature source Oral, resp. rate 18, weight 92.9 kg (204 lb 12.9 oz), SpO2 97 %.  No behavioral problems, but delusional belief that this is a mansion he build and lives in with all of family () persists and appears fixed.  He is distressed at my comment this is a short term hospital and he will need to return to the group home. I think his long stay here  along with grief contributes to this delusion    General appearance: good  Alert.   Affect: fair  Mood: fair    Speech:  normal.   Eye contact:  good.    Psychomotor behavior: normal  Gait: steady   Abnormal movements:  none  Delusions: as above  Hallucinations:  present of  parents  Thoughts: concrete  Associations: intact  Judgement: poor  Insight: poor  Cognitions: intact in conversation  Memory:  intact in conversation  Orientation: normal    Not suicidal.    Imp:  Schizoaffective illness  2.  Developmental disability  And:       Patient Active Problem List   Diagnosis    Closed head injury, initial encounter    Altered mental status, unspecified altered mental status type    Generalized weakness    Portal vein thrombosis    Pleural effusion    Generalized muscle weakness    Falls frequently    Other ascites    Acute pancreatitis, unspecified complication status, unspecified pancreatitis type    Pancreatic pseudocyst    Idiopathic acute pancreatitis, unspecified complication status    Allergic rhinitis    Fisher's esophagus    CTS (carpal tunnel syndrome)    Elevated liver enzymes    HTN (hypertension)    Hypothyroidism    Keratoconus    Major depressive disorder, recurrent episode, mild (H)    Intellectual disability    Morbid exogenous obesity (H)    Neuroleptic malignant syndrome    Obstructive sleep apnea syndrome    Bipolar affective disorder (H)    Seizure disorder (H)    Seizures, generalized convulsive (H)     Acute respiratory failure with hypoxia (H)    Anemia, unspecified    Anxiety disorder, unspecified    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)    Dietary zinc deficiency    Dry eye syndrome of bilateral lacrimal glands    Dry mouth, unspecified    Edema, unspecified    Gastro-esophageal reflux disease without esophagitis    Hyperosmolality and hypernatremia    Irritable bowel syndrome with constipation    Major depressive disorder, recurrent, unspecified (H)    Metabolic encephalopathy    Moderate protein-calorie malnutrition (H)    Other symbolic dysfunctions    Schizoaffective disorder, unspecified (H)    Thrombocytopenia, unspecified (H)    Unspecified asthma, uncomplicated    Urinary tract infection, site not specified    Vitamin D deficiency, unspecified    Schizoaffective disorder, bipolar type (H)     Plan:  Increase Clozaril att HS to 300mg  Current Facility-Administered Medications   Medication    acetaminophen (TYLENOL) tablet 1,000 mg    acetaminophen (TYLENOL) tablet 650 mg    alum & mag hydroxide-simethicone (MAALOX) suspension 30 mL    apixaban ANTICOAGULANT (ELIQUIS) tablet 5 mg    artificial saliva (BIOTENE MT) solution 2 spray    artificial tears ophthalmic ointment    atropine 1 % ophthalmic solution 2 drop    calcium polycarbophil (FIBERCON) tablet 625 mg    chlorhexidine (PERIDEX) 0.12 % solution 15 mL    cloZAPine (CLOZARIL) tablet 300 mg    cloZAPine (CLOZARIL) tablet 50 mg    docusate sodium (COLACE) capsule 100 mg    furosemide (LASIX) tablet 20 mg    hydrALAZINE (APRESOLINE) tablet 10 mg    hydrOXYzine (ATARAX) tablet 25 mg    hypromellose-dextran (ARTIFICAL TEARS) 0.1-0.3 % ophthalmic solution 2 drop    ipratropium (ATROVENT) 0.06 % spray 2 spray    lamoTRIgine (LaMICtal) tablet 100 mg    levothyroxine (SYNTHROID/LEVOTHROID) tablet 50 mcg    lisinopril (ZESTRIL) tablet 5 mg    melatonin tablet 10 mg    menthol (ICY HOT) 5 % patch 1 patch    And    menthol (ICY HOT) Patch  in Place    montelukast (SINGULAIR) tablet 10 mg    OLANZapine (zyPREXA) tablet 10 mg    Or    OLANZapine (zyPREXA) injection 10 mg    pantoprazole (PROTONIX) EC tablet 40 mg    prazosin (MINIPRESS) capsule 1 mg    prednisoLONE acetate (PRED FORTE) 1 % ophthalmic susp 1 drop    senna-docusate (SENOKOT-S/PERICOLACE) 8.6-50 MG per tablet 1 tablet    topiramate (TOPAMAX) tablet 100 mg    zinc sulfate (ZINCATE) capsule 220 mg     Recent Results (from the past 168 hour(s))   CBC with platelets and differential    Collection Time: 08/08/23  7:54 AM   Result Value Ref Range    WBC Count 5.4 4.0 - 11.0 10e3/uL    RBC Count 4.87 4.40 - 5.90 10e6/uL    Hemoglobin 13.1 (L) 13.3 - 17.7 g/dL    Hematocrit 39.3 (L) 40.0 - 53.0 %    MCV 81 78 - 100 fL    MCH 26.9 26.5 - 33.0 pg    MCHC 33.3 31.5 - 36.5 g/dL    RDW 14.4 10.0 - 15.0 %    Platelet Count 164 150 - 450 10e3/uL    % Neutrophils 58 %    % Lymphocytes 33 %    % Monocytes 8 %    % Eosinophils 0 %    % Basophils 1 %    % Immature Granulocytes 0 %    NRBCs per 100 WBC 0 <1 /100    Absolute Neutrophils 3.2 1.6 - 8.3 10e3/uL    Absolute Lymphocytes 1.7 0.8 - 5.3 10e3/uL    Absolute Monocytes 0.4 0.0 - 1.3 10e3/uL    Absolute Eosinophils 0.0 0.0 - 0.7 10e3/uL    Absolute Basophils 0.0 0.0 - 0.2 10e3/uL    Absolute Immature Granulocytes 0.0 <=0.4 10e3/uL    Absolute NRBCs 0.0 10e3/uL       Video-Visit Details    Type of service:  Video Visit    Video Start Time (time video started): 0815    Video End Time (time video stopped): 0830    Originating Location (pt. Location): [facility]    Distant Location (provider location): Provider remote location    Mode of Communication:  Video Conference via [employer]    Physician has received verbal consent for a Video Visit from the patient? Yes      Milind Dumas MD

## 2023-08-11 NOTE — PLAN OF CARE
Coordination of Care NOTE:    Care Coordinator scheduled the following appointments:    Primary Care/Hospital Follow Up: Tuesday September 5th at 9:30am in-person  Joel Wenrer MD  [address] [address]    Phone: [phone]  Fax: [phone]    Care Coordinator updated pt's AVS

## 2023-08-11 NOTE — PLAN OF CARE
"  Problem: Adult Behavioral Health Plan of Care  Goal: Develops/Participates in Therapeutic Baker to Support Successful Transition  Outcome: Progressing  Flowsheets (Taken 8/11/2023 1109)  Develops/Participates in Therapeutic Baker to Support Successful Transition: making progress toward outcome  Intervention: Foster Therapeutic Baker  Recent Flowsheet Documentation  Taken 8/11/2023 4239 by Salome Herrera RN  Trust Relationship/Rapport:   care explained   choices provided   reassurance provided   thoughts/feelings acknowledged   empathic listening provided   questions encouraged   questions answered         07:00-15:30    Patient visible in the milieu majority of the shift. Social with staff and selected peers. Attended group activities.   Patient continues to deny discharge which is scheduled for Monday at 11am. Patient became irritated when the writer questioned him about the discharge, \"I am not going back there. Shhhhush on that. Stop talking about it.\"     Appearance: unclean, untidy   Attitude: cooperative  Behavior: no behavioral outbursts   Eye Contact: good  Speech: slurred at baseline, logical, linear.   Orientation: Oriented x3, disoriented about situation   Mood:  denied anxiety or depression  Affect: flat   Suicidal Ideation: denied  Hallucination: denied    /75   Pulse 93   Temp 98.2  F (36.8  C) (Oral)   Resp 18   Wt 92.9 kg (204 lb 12.9 oz)   SpO2 97%   BMI 35.16 kg/m       TEDs on.          "

## 2023-08-11 NOTE — CARE PLAN
"   08/11/23 1313   Group Therapy Session   Group Attendance attended group session   Time Session Began 1015   Time Session Ended 1115   Total Time (minutes) 45   Total # Attendees 5   Group Type life skill;task skill;recreation   Group Topic Covered cognitive activities;problem-solving;coping skills/lifestyle management;structured socialization   Group Session Detail OT Wellness Group-Wellness Bingo for following directions, physical movement, healthy distraction, coping skill building, turn taking, symptom management, and social engagement   Patient Response/Contribution verbalizations were off topic   Patient Participation Detail pt initially reluctant to participate in group activity. pt able to keep track of own bingo card after firm reinforcement of group rules and activity instructions. pt shared he takes care of his physical wellness by \"partying\" pt was unable to provide a more appropriate response. pt engaged in approximately 25% of gentle, seated movements.       "

## 2023-08-11 NOTE — PLAN OF CARE
Assessment/Intervention/Current Symtoms and Care Coordination:  Pt discussed in team rounds this AM. Pt has been pleasant. Tentative discharge for 8/14 after pt's guardian/sister comes back into town pending pt stabilization. Per provider, prazosin was added and an increase was made to clozaril.    Writer put out email to Sadaf ([email or web address]) to inquire about discharge medications. Sadaf reports that medications can be filled here with refill prescriptions sent to her. Provider informed.    Discharge Plan or Goal:  Pt to return to  with adult day services on 8/14     Barriers to Discharge:  Pt is to discharge back to  once his sister/guardian comes back from vacation on 8/14     Referral Status:  [facility]  Pt to start services on 8/21 after discharge     Legal Status:  Voluntary per guardian     Contacts:  Guardian/Sister - Huong Keenan   P: [phone]     - Sadaf  P: [phone]     - Bree Olvera  E: [email or web address]     Upcoming Meetings and Dates/Important Information and next steps:  [facility] to collaborate with outpatient team regarding adult day service referrals  Pt to discharge on 8/14 at 11:00am with transportation provided by Huong WYNNE call to [facility] on 8/14 at 10:30am

## 2023-08-11 NOTE — PLAN OF CARE
"  Problem: Psychotic Signs/Symptoms  Goal: Improved Behavioral Control (Psychotic Signs/Symptoms)  Outcome: Progressing     Problem: Psychotic Signs/Symptoms  Goal: Increased Participation and Engagement (Psychotic Signs/Symptoms)  Outcome: Progressing   Goal Outcome Evaluation:    Plan of Care Reviewed With: patient          15:00-23:30  Patient alert and oriented x3, denies current situation, denies being in hospital \"this is my home\", denies discharge on Monday \"I am not going. My family is here\".   Visible in the milieu most of the shift, watching TV attending most of the unit activities. Appetite is excellent. Hygiene somewhat neglected. Speech is slurred as per baseline but spontaneous and coherent. Lower legs edema still present +1, TEDs removed at HS.     /85 (BP Location: Right arm, Patient Position: Sitting, Cuff Size: Adult Regular)   Pulse 84   Temp 96.8  F (36  C) (Oral)   Resp 17   Wt 92.9 kg (204 lb 12.9 oz)   SpO2 99%   BMI 35.16 kg/m            "

## 2023-08-11 NOTE — PLAN OF CARE
Problem: Sleep Disturbance  Goal: Adequate Sleep/Rest  Outcome: Progressing  Intervention: Promote Sleep/Rest  Recent Flowsheet Documentation  Taken 8/10/2023 1052 by Prabha Boone RN  Sleep/Rest Enhancement:   regular sleep/rest pattern promoted   noise level reduced   room darkened   Goal Outcome Evaluation:    Plan of Care Reviewed With: patient      Patient is sleeping soundly at the start of the shift. He is using a medical bed for mobility purposes. Nothing unusual noted. Patient slept the whole nights for 11 hours.

## 2023-08-11 NOTE — CARE PLAN
08/11/23 1443   Group Therapy Session   Group Attendance attended group session   Time Session Began 1340   Time Session Ended 1425   Total Time (minutes) 45   Total # Attendees 2   Group Type psychotherapeutic   Group Topic Covered coping skills/lifestyle management;cognitive therapy techniques;balanced lifestyle;structured socialization   Group Session Detail  Activity using concentration, attention, and strategy techniques   Patient Response/Contribution cooperative with task;listened actively   Patient Participation Detail Initiated the activity, making the choice for the group. Offered verbal directions. Needed reminders of some details throughout the session though was pleasant and appeared engaged and interested.

## 2023-08-11 NOTE — PLAN OF CARE
Goal Outcome Evaluation: Psychiatry Existing    Appointment type: Psychiatry   Date/time:  Monday September 5th, 2023 @ 1:15 PM In person    Provider:  Tank Sauer DO  Address:  [place] [address] [place] [address]    Phone: [phone]  Fax:  [phone]     -Genie Cowan  Adult Behavioral Health Care Coordinator

## 2023-08-11 NOTE — CARE PLAN
08/11/23 1238   Group Therapy Session   Group Attendance attended group session   Time Session Began 1015   Time Session Ended 1115   Total Time (minutes) 60   Total # Attendees 6   Group Type expressive therapy;task skill;psychotherapeutic   Group Topic Covered coping skills/lifestyle management;problem-solving;cognitive activities;balanced lifestyle;structured socialization   Group Session Detail Occupational Therapy Clinic group to facilitate coping skill exploration, use of cognitive skills and problem solving, creative expression, clinical observation and facilitation of social, cognitive, and kinesthetic performance skills.    Patient Response/Contribution cooperative with task;listened actively   Patient Participation Detail When task set up was provided and assistance with organizing details, he followed through to completion with familiar steps on task. Tone of voice seemed more relaxed. Seemed willing to follow through with decisions and guidance when encouraged during this session. Needed additional encouragement in cleaning up. Also seems to respond with follow through when firm limits and directions are provided.

## 2023-08-12 PROCEDURE — 250N000013 HC RX MED GY IP 250 OP 250 PS 637: Performed by: STUDENT IN AN ORGANIZED HEALTH CARE EDUCATION/TRAINING PROGRAM

## 2023-08-12 PROCEDURE — 250N000013 HC RX MED GY IP 250 OP 250 PS 637

## 2023-08-12 PROCEDURE — 250N000013 HC RX MED GY IP 250 OP 250 PS 637: Performed by: PSYCHIATRY & NEUROLOGY

## 2023-08-12 PROCEDURE — 250N000013 HC RX MED GY IP 250 OP 250 PS 637: Performed by: EMERGENCY MEDICINE

## 2023-08-12 PROCEDURE — 124N000003 HC R&B MH SENIOR/ADOLESCENT

## 2023-08-12 RX ORDER — ATROPINE SULFATE 10 MG/ML
2 SOLUTION/ DROPS OPHTHALMIC 3 TIMES DAILY PRN
Qty: 15 ML | Refills: 3 | Status: SHIPPED | OUTPATIENT
Start: 2023-08-12

## 2023-08-12 RX ORDER — FUROSEMIDE 20 MG
20 TABLET ORAL DAILY
Qty: 30 TABLET | Refills: 3 | Status: SHIPPED | OUTPATIENT
Start: 2023-08-13 | End: 2023-12-21

## 2023-08-12 RX ORDER — CLOZAPINE 100 MG/1
300 TABLET ORAL AT BEDTIME
Qty: 7 TABLET | Refills: 11 | Status: SHIPPED | OUTPATIENT
Start: 2023-08-12 | End: 2023-08-14

## 2023-08-12 RX ORDER — PHENOL 1.4 %
10 AEROSOL, SPRAY (ML) MUCOUS MEMBRANE AT BEDTIME
Qty: 30 TABLET | Refills: 3 | Status: SHIPPED | OUTPATIENT
Start: 2023-08-12

## 2023-08-12 RX ORDER — ACETAMINOPHEN 500 MG
1000 TABLET ORAL 3 TIMES DAILY
Qty: 180 TABLET | Refills: 3 | Status: SHIPPED | OUTPATIENT
Start: 2023-08-12

## 2023-08-12 RX ORDER — CALCIUM POLYCARBOPHIL 625 MG 625 MG/1
1 TABLET ORAL 2 TIMES DAILY
Qty: 120 TABLET | Refills: 3 | Status: SHIPPED | OUTPATIENT
Start: 2023-08-12

## 2023-08-12 RX ORDER — TOPIRAMATE 100 MG/1
100 TABLET, FILM COATED ORAL 2 TIMES DAILY
Qty: 60 TABLET | Refills: 3 | Status: SHIPPED | OUTPATIENT
Start: 2023-08-12

## 2023-08-12 RX ORDER — LISINOPRIL 5 MG/1
5 TABLET ORAL DAILY
Qty: 30 TABLET | Refills: 3 | Status: ON HOLD | OUTPATIENT
Start: 2023-08-13 | End: 2023-12-22

## 2023-08-12 RX ORDER — CLOZAPINE 50 MG/1
50 TABLET ORAL DAILY
Qty: 7 TABLET | Refills: 11 | Status: SHIPPED | OUTPATIENT
Start: 2023-08-13 | End: 2023-08-14

## 2023-08-12 RX ORDER — HYDROXYZINE HYDROCHLORIDE 25 MG/1
25 TABLET, FILM COATED ORAL EVERY 4 HOURS PRN
Qty: 60 TABLET | Refills: 3 | Status: SHIPPED | OUTPATIENT
Start: 2023-08-12 | End: 2023-12-21

## 2023-08-12 RX ORDER — LAMOTRIGINE 100 MG/1
100 TABLET ORAL DAILY
Qty: 30 TABLET | Refills: 3 | Status: SHIPPED | OUTPATIENT
Start: 2023-08-13

## 2023-08-12 RX ORDER — PRAZOSIN HYDROCHLORIDE 1 MG/1
1 CAPSULE ORAL DAILY
Qty: 30 CAPSULE | Refills: 3 | Status: SHIPPED | OUTPATIENT
Start: 2023-08-13 | End: 2023-12-21

## 2023-08-12 RX ADMIN — CLOZAPINE 300 MG: 100 TABLET ORAL at 20:31

## 2023-08-12 RX ADMIN — LAMOTRIGINE 100 MG: 25 TABLET ORAL at 08:48

## 2023-08-12 RX ADMIN — ACETAMINOPHEN 1000 MG: 500 TABLET, FILM COATED ORAL at 14:26

## 2023-08-12 RX ADMIN — PANTOPRAZOLE SODIUM 40 MG: 40 TABLET, DELAYED RELEASE ORAL at 07:04

## 2023-08-12 RX ADMIN — TOPIRAMATE 100 MG: 100 TABLET, FILM COATED ORAL at 08:48

## 2023-08-12 RX ADMIN — CALCIUM POLYCARBOPHIL 625 MG: 625 TABLET, FILM COATED ORAL at 20:31

## 2023-08-12 RX ADMIN — TOPIRAMATE 100 MG: 100 TABLET, FILM COATED ORAL at 20:31

## 2023-08-12 RX ADMIN — CLOZAPINE 50 MG: 50 TABLET ORAL at 08:47

## 2023-08-12 RX ADMIN — ACETAMINOPHEN 1000 MG: 500 TABLET, FILM COATED ORAL at 20:31

## 2023-08-12 RX ADMIN — FUROSEMIDE 20 MG: 20 TABLET ORAL at 08:47

## 2023-08-12 RX ADMIN — LISINOPRIL 5 MG: 5 TABLET ORAL at 08:47

## 2023-08-12 RX ADMIN — ZINC SULFATE 220 MG (50 MG) CAPSULE 220 MG: CAPSULE at 08:48

## 2023-08-12 RX ADMIN — ACETAMINOPHEN 1000 MG: 500 TABLET, FILM COATED ORAL at 08:48

## 2023-08-12 RX ADMIN — MONTELUKAST 10 MG: 10 TABLET, FILM COATED ORAL at 20:31

## 2023-08-12 RX ADMIN — WHITE PETROLATUM 57.7 %-MINERAL OIL 31.9 % EYE OINTMENT: at 20:37

## 2023-08-12 RX ADMIN — DOCUSATE SODIUM 100 MG: 100 CAPSULE, LIQUID FILLED ORAL at 08:48

## 2023-08-12 RX ADMIN — PREDNISOLONE ACETATE 1 DROP: 10 SUSPENSION/ DROPS OPHTHALMIC at 08:49

## 2023-08-12 RX ADMIN — APIXABAN 5 MG: 5 TABLET, FILM COATED ORAL at 20:31

## 2023-08-12 RX ADMIN — PRAZOSIN HYDROCHLORIDE 1 MG: 1 CAPSULE ORAL at 08:47

## 2023-08-12 RX ADMIN — LEVOTHYROXINE SODIUM 50 MCG: 25 TABLET ORAL at 07:04

## 2023-08-12 RX ADMIN — Medication 10 MG: at 20:31

## 2023-08-12 RX ADMIN — CHLORHEXIDINE GLUCONATE 0.12% ORAL RINSE 15 ML: 1.2 LIQUID ORAL at 08:48

## 2023-08-12 RX ADMIN — APIXABAN 5 MG: 5 TABLET, FILM COATED ORAL at 08:47

## 2023-08-12 RX ADMIN — CALCIUM POLYCARBOPHIL 625 MG: 625 TABLET, FILM COATED ORAL at 08:48

## 2023-08-12 ASSESSMENT — ACTIVITIES OF DAILY LIVING (ADL)
ADLS_ACUITY_SCORE: 44
ADLS_ACUITY_SCORE: 44
HYGIENE/GROOMING: INDEPENDENT
ADLS_ACUITY_SCORE: 44
ADLS_ACUITY_SCORE: 44
DRESS: INDEPENDENT
ADLS_ACUITY_SCORE: 44
ORAL_HYGIENE: INDEPENDENT
ADLS_ACUITY_SCORE: 44

## 2023-08-12 NOTE — PLAN OF CARE
Problem: Psychotic Signs/Symptoms  Goal: Optimal Cognitive Function (Psychotic Signs/Symptoms)  Outcome: Not Progressing  Intervention: Support and Promote Cognitive Ability  Recent Flowsheet Documentation  Taken 8/12/2023 9993 by Yung Chirinos RN  Trust Relationship/Rapport:   care explained   choices provided   emotional support provided   empathic listening provided   questions answered   reassurance provided   questions encouraged   thoughts/feelings acknowledged     Problem: Psychotic Signs/Symptoms  Goal: Improved Mood Symptoms  Outcome: Progressing   Goal Outcome Evaluation:    Plan of Care Reviewed With: patient      Patient is calm and cooperative, medication compliant. Denies MH concerns, continues to report delusional thinking. Patient believes this is not a hospital anymore and they built a mansion in it for their family. Patient is social with select peers and attends groups. Patient has good appetite. Denies pain or other concerns.

## 2023-08-12 NOTE — PLAN OF CARE
Problem: Sleep Disturbance  Goal: Adequate Sleep/Rest  Outcome: Progressing   Goal Outcome Evaluation:       Patient is sound asleep at the start of the shift. He is in a supine position breathing regularly. He is using a medical bed for mobility purposes. No behavioral issues observed this shift. He slept for 8.75 hours the whole night.

## 2023-08-13 PROCEDURE — 250N000013 HC RX MED GY IP 250 OP 250 PS 637: Performed by: EMERGENCY MEDICINE

## 2023-08-13 PROCEDURE — 124N000003 HC R&B MH SENIOR/ADOLESCENT

## 2023-08-13 PROCEDURE — G0177 OPPS/PHP; TRAIN & EDUC SERV: HCPCS

## 2023-08-13 PROCEDURE — 250N000013 HC RX MED GY IP 250 OP 250 PS 637: Performed by: PSYCHIATRY & NEUROLOGY

## 2023-08-13 PROCEDURE — 90853 GROUP PSYCHOTHERAPY: CPT

## 2023-08-13 PROCEDURE — 250N000013 HC RX MED GY IP 250 OP 250 PS 637: Performed by: STUDENT IN AN ORGANIZED HEALTH CARE EDUCATION/TRAINING PROGRAM

## 2023-08-13 PROCEDURE — 250N000013 HC RX MED GY IP 250 OP 250 PS 637

## 2023-08-13 RX ADMIN — LEVOTHYROXINE SODIUM 50 MCG: 25 TABLET ORAL at 06:43

## 2023-08-13 RX ADMIN — TOPIRAMATE 100 MG: 100 TABLET, FILM COATED ORAL at 07:58

## 2023-08-13 RX ADMIN — WHITE PETROLATUM 57.7 %-MINERAL OIL 31.9 % EYE OINTMENT: at 20:12

## 2023-08-13 RX ADMIN — CALCIUM POLYCARBOPHIL 625 MG: 625 TABLET, FILM COATED ORAL at 20:11

## 2023-08-13 RX ADMIN — FUROSEMIDE 20 MG: 20 TABLET ORAL at 07:58

## 2023-08-13 RX ADMIN — ZINC SULFATE 220 MG (50 MG) CAPSULE 220 MG: CAPSULE at 07:58

## 2023-08-13 RX ADMIN — PRAZOSIN HYDROCHLORIDE 1 MG: 1 CAPSULE ORAL at 07:58

## 2023-08-13 RX ADMIN — CALCIUM POLYCARBOPHIL 625 MG: 625 TABLET, FILM COATED ORAL at 07:57

## 2023-08-13 RX ADMIN — APIXABAN 5 MG: 5 TABLET, FILM COATED ORAL at 07:58

## 2023-08-13 RX ADMIN — CLOZAPINE 300 MG: 100 TABLET ORAL at 20:11

## 2023-08-13 RX ADMIN — MONTELUKAST 10 MG: 10 TABLET, FILM COATED ORAL at 20:10

## 2023-08-13 RX ADMIN — Medication 10 MG: at 20:11

## 2023-08-13 RX ADMIN — ACETAMINOPHEN 1000 MG: 500 TABLET, FILM COATED ORAL at 14:00

## 2023-08-13 RX ADMIN — APIXABAN 5 MG: 5 TABLET, FILM COATED ORAL at 20:11

## 2023-08-13 RX ADMIN — CLOZAPINE 50 MG: 50 TABLET ORAL at 07:58

## 2023-08-13 RX ADMIN — PANTOPRAZOLE SODIUM 40 MG: 40 TABLET, DELAYED RELEASE ORAL at 06:43

## 2023-08-13 RX ADMIN — LAMOTRIGINE 100 MG: 25 TABLET ORAL at 07:58

## 2023-08-13 RX ADMIN — TOPIRAMATE 100 MG: 100 TABLET, FILM COATED ORAL at 20:11

## 2023-08-13 RX ADMIN — LISINOPRIL 5 MG: 5 TABLET ORAL at 07:58

## 2023-08-13 RX ADMIN — CHLORHEXIDINE GLUCONATE 0.12% ORAL RINSE 15 ML: 1.2 LIQUID ORAL at 07:59

## 2023-08-13 RX ADMIN — PREDNISOLONE ACETATE 1 DROP: 10 SUSPENSION/ DROPS OPHTHALMIC at 07:58

## 2023-08-13 RX ADMIN — ACETAMINOPHEN 1000 MG: 500 TABLET, FILM COATED ORAL at 20:10

## 2023-08-13 RX ADMIN — ACETAMINOPHEN 1000 MG: 500 TABLET, FILM COATED ORAL at 07:57

## 2023-08-13 RX ADMIN — DOCUSATE SODIUM 100 MG: 100 CAPSULE, LIQUID FILLED ORAL at 07:58

## 2023-08-13 ASSESSMENT — ACTIVITIES OF DAILY LIVING (ADL)
ADLS_ACUITY_SCORE: 44
LAUNDRY: UNABLE TO COMPLETE
ADLS_ACUITY_SCORE: 44
ADLS_ACUITY_SCORE: 44
DRESS: SCRUBS (BEHAVIORAL HEALTH);INDEPENDENT
ORAL_HYGIENE: INDEPENDENT
ADLS_ACUITY_SCORE: 44
HYGIENE/GROOMING: INDEPENDENT
ADLS_ACUITY_SCORE: 44

## 2023-08-13 NOTE — PLAN OF CARE
Problem: Sleep Disturbance  Goal: Adequate Sleep/Rest  Outcome: Progressing   Goal Outcome Evaluation:       Patient is sound asleep at the start of the shift. He is using a medical bed for mobility purposes. He woke up past midnight and took some midnight snacks then went back to sleep. He slept for a total of 8.75 hours the whole shift.

## 2023-08-13 NOTE — CARE PLAN
"Occupational Therapy Group Note:     08/13/23 1613   Group Therapy Session   Group Attendance attended group session   Time Session Began 1015   Time Session Ended 1105   Total Time (minutes) 45   Total # Attendees 3   Group Type recreation   Group Topic Covered leisure exploration/use of leisure time;structured socialization   Group Session Detail OT Leisure Group: Music and Movement Bingo   Patient Response/Contribution cooperative with task;listened actively   Patient Participation Detail Patient actively engaged in a music and movement bingo therapeutic activity. Therapeutic benefits of engagement include: promotion of physical exercise to improve overall physical and emotional wellbeing, relaxation of mind and body, and encouragement to socially engage, reminisce, and interact with others. Patient demonstrated ability to recall multiple song titles throughout activity with written clues. Fair-good focus and attention noted throughout task. Patient required intermittent assist with tracking bingo card. Patient engaged in <5% of physical movements/exercises; needed maximum encouragement and direction. Patient required one verbal cue to address peers by preferred names; as he called a peer \"mom\" once in group. Patient appeared frustrated with the fact that he did not win the game; needed gentle cueing and reassurance of accuracy of his bingo card before he accepted this fact.       "

## 2023-08-13 NOTE — PLAN OF CARE
Problem: Adult Behavioral Health Plan of Care  Goal: Optimized Coping Skills in Response to Life Stressors  Outcome: Progressing   Goal Outcome Evaluation:    Plan of Care Reviewed With: patient    Pt is up and about on unit calm and corporative with cares and complaint with medication denies medication side effect patient also denies MH SX . Offered no C/O pain or discomfort . Will continue POC

## 2023-08-13 NOTE — PLAN OF CARE
Problem: Psychotic Signs/Symptoms  Goal: Improved Behavioral Control (Psychotic Signs/Symptoms)  Outcome: Progressing   Goal Outcome Evaluation:    Plan of Care Reviewed With: patient      Patient is calm and cooperative, medication compliant. Denies MH concerns. Visible in milieu, social with peers. Appetite is good, denies pain or other concerns. Patient tentative discharge 8/14, medications will come from [facility] discharge pharmacy. Per pharmacy they have orders for medications and will deliver prior to discharge.

## 2023-08-14 VITALS
SYSTOLIC BLOOD PRESSURE: 125 MMHG | BODY MASS INDEX: 35.16 KG/M2 | DIASTOLIC BLOOD PRESSURE: 79 MMHG | HEART RATE: 91 BPM | RESPIRATION RATE: 16 BRPM | TEMPERATURE: 96.6 F | OXYGEN SATURATION: 99 % | WEIGHT: 204.81 LBS

## 2023-08-14 LAB
BASOPHILS # BLD AUTO: 0 10E3/UL (ref 0–0.2)
BASOPHILS NFR BLD AUTO: 1 %
EOSINOPHIL # BLD AUTO: 0 10E3/UL (ref 0–0.7)
EOSINOPHIL NFR BLD AUTO: 0 %
ERYTHROCYTE [DISTWIDTH] IN BLOOD BY AUTOMATED COUNT: 14.7 % (ref 10–15)
HCT VFR BLD AUTO: 42.2 % (ref 40–53)
HGB BLD-MCNC: 14 G/DL (ref 13.3–17.7)
IMM GRANULOCYTES # BLD: 0 10E3/UL
IMM GRANULOCYTES NFR BLD: 0 %
LYMPHOCYTES # BLD AUTO: 2 10E3/UL (ref 0.8–5.3)
LYMPHOCYTES NFR BLD AUTO: 28 %
MCH RBC QN AUTO: 27.2 PG (ref 26.5–33)
MCHC RBC AUTO-ENTMCNC: 33.2 G/DL (ref 31.5–36.5)
MCV RBC AUTO: 82 FL (ref 78–100)
MONOCYTES # BLD AUTO: 0.5 10E3/UL (ref 0–1.3)
MONOCYTES NFR BLD AUTO: 8 %
NEUTROPHILS # BLD AUTO: 4.4 10E3/UL (ref 1.6–8.3)
NEUTROPHILS NFR BLD AUTO: 63 %
NRBC # BLD AUTO: 0 10E3/UL
NRBC BLD AUTO-RTO: 0 /100
PLATELET # BLD AUTO: 182 10E3/UL (ref 150–450)
RBC # BLD AUTO: 5.14 10E6/UL (ref 4.4–5.9)
WBC # BLD AUTO: 6.9 10E3/UL (ref 4–11)

## 2023-08-14 PROCEDURE — 250N000013 HC RX MED GY IP 250 OP 250 PS 637: Performed by: STUDENT IN AN ORGANIZED HEALTH CARE EDUCATION/TRAINING PROGRAM

## 2023-08-14 PROCEDURE — 250N000013 HC RX MED GY IP 250 OP 250 PS 637

## 2023-08-14 PROCEDURE — 250N000013 HC RX MED GY IP 250 OP 250 PS 637: Performed by: PSYCHIATRY & NEUROLOGY

## 2023-08-14 PROCEDURE — G0177 OPPS/PHP; TRAIN & EDUC SERV: HCPCS

## 2023-08-14 PROCEDURE — 250N000013 HC RX MED GY IP 250 OP 250 PS 637: Performed by: EMERGENCY MEDICINE

## 2023-08-14 PROCEDURE — 85025 COMPLETE CBC W/AUTO DIFF WBC: CPT | Performed by: PSYCHIATRY & NEUROLOGY

## 2023-08-14 PROCEDURE — 36415 COLL VENOUS BLD VENIPUNCTURE: CPT | Performed by: PSYCHIATRY & NEUROLOGY

## 2023-08-14 PROCEDURE — 99239 HOSP IP/OBS DSCHRG MGMT >30: CPT | Performed by: PSYCHIATRY & NEUROLOGY

## 2023-08-14 RX ORDER — CLOZAPINE 100 MG/1
300 TABLET ORAL AT BEDTIME
Qty: 21 TABLET | Refills: 11 | Status: SHIPPED | OUTPATIENT
Start: 2023-08-14

## 2023-08-14 RX ORDER — CLOZAPINE 50 MG/1
50 TABLET ORAL DAILY
Qty: 7 TABLET | Refills: 11 | Status: SHIPPED | OUTPATIENT
Start: 2023-08-14

## 2023-08-14 RX ADMIN — PRAZOSIN HYDROCHLORIDE 1 MG: 1 CAPSULE ORAL at 08:37

## 2023-08-14 RX ADMIN — MENTHOL 1 PATCH: 205.5 PATCH TOPICAL at 08:44

## 2023-08-14 RX ADMIN — PANTOPRAZOLE SODIUM 40 MG: 40 TABLET, DELAYED RELEASE ORAL at 06:42

## 2023-08-14 RX ADMIN — TOPIRAMATE 100 MG: 100 TABLET, FILM COATED ORAL at 08:37

## 2023-08-14 RX ADMIN — APIXABAN 5 MG: 5 TABLET, FILM COATED ORAL at 08:37

## 2023-08-14 RX ADMIN — ACETAMINOPHEN 1000 MG: 500 TABLET, FILM COATED ORAL at 08:36

## 2023-08-14 RX ADMIN — FUROSEMIDE 20 MG: 20 TABLET ORAL at 08:37

## 2023-08-14 RX ADMIN — LISINOPRIL 5 MG: 5 TABLET ORAL at 08:37

## 2023-08-14 RX ADMIN — CHLORHEXIDINE GLUCONATE 0.12% ORAL RINSE 15 ML: 1.2 LIQUID ORAL at 08:35

## 2023-08-14 RX ADMIN — LEVOTHYROXINE SODIUM 50 MCG: 25 TABLET ORAL at 06:42

## 2023-08-14 RX ADMIN — ZINC SULFATE 220 MG (50 MG) CAPSULE 220 MG: CAPSULE at 08:37

## 2023-08-14 RX ADMIN — CLOZAPINE 50 MG: 50 TABLET ORAL at 08:36

## 2023-08-14 RX ADMIN — PREDNISOLONE ACETATE 1 DROP: 10 SUSPENSION/ DROPS OPHTHALMIC at 08:43

## 2023-08-14 RX ADMIN — DOCUSATE SODIUM 100 MG: 100 CAPSULE, LIQUID FILLED ORAL at 08:36

## 2023-08-14 RX ADMIN — LAMOTRIGINE 100 MG: 25 TABLET ORAL at 08:36

## 2023-08-14 RX ADMIN — CALCIUM POLYCARBOPHIL 625 MG: 625 TABLET, FILM COATED ORAL at 08:37

## 2023-08-14 ASSESSMENT — ACTIVITIES OF DAILY LIVING (ADL)
ADLS_ACUITY_SCORE: 44
HYGIENE/GROOMING: INDEPENDENT
ORAL_HYGIENE: INDEPENDENT
ADLS_ACUITY_SCORE: 44
DRESS: SCRUBS (BEHAVIORAL HEALTH)

## 2023-08-14 NOTE — PLAN OF CARE
Goal Outcome Evaluation:    Plan of Care Reviewed With: patient    Patient was discharged today back to his group home. Group home staff arrived at the unit at about 12.30pm and discharge instructions done with staff, medication changes explained and patient belongings given. Patient was excited and there was no resistance during the discharge. Medications and belongings were handed to the group home staff. Patient denied SI/SIB/HI/AVH, appeared happy to see the group home staff and discharging. Patient was escorted out at 1250pm.

## 2023-08-14 NOTE — PLAN OF CARE
"Goal Outcome Evaluation:    Plan of Care Reviewed With: patient      Patient has been walking the halls with his head phones on and singing. At times he was very loud and was asked to lower his voice. At one time he yelled \"Don't tell me what to do and get back to work\". He was redirected and then apologized. His thought process is disorganized. He has delusions of grandeur. He refused to check in. He denies pain. His affect is full range. His mood is hyperactive. He attended groups. He is medication compliant. His sleep and appetite are good. He will tentatively be discharging tomorrow. Pharmacy will bring discharge meds in the am. He uses a medical bed for safety due to unsteadiness and disorganization.                  "

## 2023-08-14 NOTE — PLAN OF CARE
Assessment/Intervention/Current Symtoms and Care Coordination:  Pt discussed in team rounds this AM. Pt has been pleasant. Pt discharging today at 11:00am. Sadaf and Huong to pick pt up and transport back to the group home.    Writer put out call to [facility] ([phone]) to inquire about referral form. Form will be faxed to the unit. [facility] requested pt be discharged with a 7 day supply of Clozaril. Writer informed MD and RN.    Jefersonr received email from Sadaf ([email or web address]) reporting that Huong's plane got cancelled and she will not be able to pick pt up so Saadf will be transporting pt at discharge. Sadaf reports a delay and that she will be on the unit closer to noon. Call scheduled for 10:30am.    Jefersonr spoke with Sadaf and  RN regarding pt's discharge. Sadaf inquired if pt has clothes and shoes, CTC to follow up. Sadaf requested extra copy of AVS at discharge. Sadaf and RN also inquired about medical issues pt experienced. Writer relayed plan to follow up with provider. Sadaf reports that pt will take pt out to lunch so he does not need to eat lunch here.    Writer confirmed pt has clothes to discharge in. Writer informed Sadaf.    Sadaf requested Clozaril be sent to [facility] pharmacy. Writer put out call to [facility] in [place] ([phone]) to inquire about process. Writer was directed to send form to Sadaf to fill out and fax back. Writer sent it to Sadaf via email. Writer was informed that provider here needs to send prescription and fill out form that will be sent to the unit via fax. Writer informed MD.    Writer sent documents and prescriptions to [facility] via fax (F: [phone]).    Discharge Plan or Goal:  Pt to return to  today     Barriers to Discharge:  Pt to discharge back to  today     Referral Status:  [facility]  Pt to start services on 8/21 after discharge     Legal Status:  Voluntary per  guardian     Contacts:  Guardian/Sister - Huong Keenan   P: [phone]     - Sadaf  P: [phone]    CM - Bree Olvera  E: [email or web address].     Upcoming Meetings and Dates/Important Information and next steps:  Pt to discharge on 8/14 at 11:00am with transportation provided by Sadaf  CTC call to Sadaf on 8/14 at 10:30am

## 2023-08-14 NOTE — PLAN OF CARE
Problem: Sleep Disturbance  Goal: Adequate Sleep/Rest  Outcome: Progressing   Goal Outcome Evaluation:       Patient is sound asleep at the start of the shift. He is using a medical bed for mobility and safety purposes. Nothing unusual noted. He slept for a total of 8 hours the whole night.

## 2023-08-14 NOTE — PROGRESS NOTES
08/13/23 2243   Group Therapy Session   Group Attendance attended group session   Time Session Began 1910   Time Session Ended 2000   Total Time (minutes) 50   Total # Attendees 4   Group Type psychotherapeutic   Group Topic Covered coping skills/lifestyle management;structured socialization   Group Session Detail process   Patient Response/Contribution cooperative with task;discussed personal experience with topic   Patient Participation Detail mood reported as fantastic, some redirecting needed

## 2023-08-14 NOTE — CARE PLAN
Occupational Therapy     08/14/23 1300   Group Therapy Session   Group Attendance attended group session   Time Session Began 1015   Time Session Ended 1115   Total Time (minutes) 50   Total # Attendees 7   Group Type expressive therapy;task skill   Group Topic Covered cognitive activities;coping skills/lifestyle management;leisure exploration/use of leisure time;problem-solving;structured socialization   Group Session Detail OT: Education on healthy activity engagement and creative hands-on endeavor (OT clinic) to increase concentration, focus, attention to task/detail, decision making, problem solving, frustration tolerance, task follow through, coping with stress, healthy leisure engagement, creative expression, and social engagement   Patient Response/Contribution cooperative with task;other (see comments)  (redirectable)   Patient Participation Detail Pt sat among peers to complete familiar creative hands on endeavor but did not engage in social interactions with peers. Pt min A from therapist to complete project and he needed assistance with correctly placing his stencils. Pt worked in a semi-messy and quick manner to complete his project. Pt reported he enjoyed the finished project and is looking forward to using the bag after discharge.

## 2023-08-14 NOTE — DISCHARGE SUMMARY
PSYCHIATRY  DISCHARGE SUMMARY     DATE OF DISCHARGE   08/14/2023       DISCHARGE DIAGNOSIS   Schizoaffective disorder, bipolar type (H)    Patient Active Problem List   Diagnosis     Closed head injury, initial encounter     Altered mental status, unspecified altered mental status type     Generalized weakness     Portal vein thrombosis     Pleural effusion     Generalized muscle weakness     Falls frequently     Other ascites     Acute pancreatitis, unspecified complication status, unspecified pancreatitis type     Pancreatic pseudocyst     Idiopathic acute pancreatitis, unspecified complication status     Allergic rhinitis     Fisher's esophagus     CTS (carpal tunnel syndrome)     Elevated liver enzymes     HTN (hypertension)     Hypothyroidism     Keratoconus     Major depressive disorder, recurrent episode, mild (H)     Intellectual disability     Morbid exogenous obesity (H)     Neuroleptic malignant syndrome     Obstructive sleep apnea syndrome     Bipolar affective disorder (H)     Seizure disorder (H)     Seizures, generalized convulsive (H)     Acute respiratory failure with hypoxia (H)     Anemia, unspecified     Anxiety disorder, unspecified     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)     Dietary zinc deficiency     Dry eye syndrome of bilateral lacrimal glands     Dry mouth, unspecified     Edema, unspecified     Gastro-esophageal reflux disease without esophagitis     Hyperosmolality and hypernatremia     Irritable bowel syndrome with constipation     Major depressive disorder, recurrent, unspecified (H)     Metabolic encephalopathy     Moderate protein-calorie malnutrition (H)     Other symbolic dysfunctions     Schizoaffective disorder, unspecified (H)     Thrombocytopenia, unspecified (H)     Unspecified asthma, uncomplicated     Urinary tract infection, site not specified     Vitamin D deficiency, unspecified     Schizoaffective disorder, bipolar type (H)          REASON FOR  ADMISSION   This is a 53 year old male with history of Schizoaffective disorder, bipolar type (H).     Assessment:   This patient is a 52 year old  male with history of schizoaffective disorder who presented to ED with agitation and delusions in context of insomnia and recent medication. Symptoms and presentation at this time is most consistent with schizoaffective disorder. I have discussed the risks, benefits, and alternatives of inpatient hospitalization and medication management. Patient will likely benefit from close psychiatric follow up upon discharge.  Trazodone was discontinued due to patient reporting worsening insomnia with this medication.  Olanzapine started in the ED was continued.     Inpatient psychiatric hospitalization is warranted at this time for safety, stabilization, and possible adjustment in medications.          Diagnoses:   #Schizoaffective disorder, bipolar type  #Intellectual disability       HOSPITAL COURSE   Admitted due to aforementioned presentation.  Education regarding diagnostic and treatment options with risks, benefits and alternatives and adequate verbalization of understanding.  Discussed reviewed in further detail, stressors and events leading to presentation.    Admitted on a voluntary status.  Patient has a guardian.    Patient was started on a multimodal therapy that included medications.  Patient was initially admitted to the unit [address]. as the patient was agitated and sexually preoccupied.  After multiple adjustments on the medications the patient was able to calm down.  Patient was put on Zyprexa, Risperdal and Topamax with the plan of eventually taper him off the Risperdal.  The patient was later on transferred to the unit 3B (geriatric inpatient unit).  While in the unit 3B patient was slowly taken off the Risperdal and Zyprexa was increased.  Despite these medication adjustments the patient continued to be agitated, not sleeping at night and he was  argumentative.  Patient continued to express multiple delusions thinking that he was , his parents were alive and that he was God.  After discussing the situation with the family we decided to slowly taper him off the Zyprexa and started him on Invega.  Patient's behavior improved but he continued to be agitated especially with the family.  After having a discussion with the guardian it was decided for the patient to start on Clozaril.  Clozaril was slowly increased to 50 mg in the morning and 300 mg at bedtime.  With these medication changes the patient was able to improve greatly.  In order to help with the irritability Topamax was increased to 100 mg 2 times a day.  Processing and Lamictal were added.  Eventually the patient was taken off the Invega as this medication cause problems with his gait.    During the hospitalization the patient had a thorough neurological evaluation.  Patient had a head CT scan, MRI and the lumbar puncture.  All of this test came back negative.  Patient was also followed regularly by the service of Occupational Therapy and internal medicine.  Patient also had physical therapy assessments and at this time he is able to walk without a walker.    The patient was seen and evaluated today in the consult room by himself, this was a face-to-face evaluation.  During my assessment the patient was pleasant, calm and cooperative.  Patient stated that he is doing great and denies having any side effects from the medications.  Patient was alert and oriented x 4.  He stated that he does not have any psychiatric symptoms.  Patient denies having any suicidal or homicidal ideations and he has been able to contract for safety.  During the hospital stay patient has not demonstrated any self-injurious behaviors and has consistently contract for safety.  Patient continues to be delusional and these seem to be his baseline.  He continues to think that the hospital is a mansion, his parents are alive  and that he is not .  Despite being delusional patient has not been acting on these delusions.  Patient has agreed to discharge to the group home.  When the staff from the group home came to pick him up patient was very calm and cooperative with them.  He stated that he wants to leave the hospital at this time.       MENTAL STATUS EXAM   Vitals: /79   Pulse 91   Temp (!) 96.6  F (35.9  C)   Resp 16   Wt 92.9 kg (204 lb 12.9 oz)   SpO2 99%   BMI 35.16 kg/m      Mental Status:  Appearance:  No apparent distress  Mood:  {Mood: Normal  Affect: full range was was congruent to speech  Suicidal Ideation: PRESENT / ABSENT: absent   Homicidal Ideation: PRESENT / ABSENT: absent   Thought process: [place]   Thought content: Remains delusional at baseline  Fund of Knowledge: Below average  Attention/Concentration: Fair  Language ability:  Intact  Memory:  Immediate recall intact, Short-term memory intact, and Long-term memory intact  Insight:  limited.  Judgement: fair  Orientation: Yes, x4  Psychomotor Behavior: normal or unremarkable    Muscle Strength and Tone: MuscleStrength: Normal and Atrophy  Gait and Station:  Stable without the use of a walker         DISCHARGE MEDICATIONS   Discharge Medication Options:   Discharge Medication List as of 8/14/2023 10:53 AM        START taking these medications    Details   acetaminophen (TYLENOL) 500 MG tablet Take 2 tablets (1,000 mg) by mouth 3 times daily, Disp-180 tablet, R-3, E-Prescribe      apixaban ANTICOAGULANT (ELIQUIS) 5 MG tablet Take 1 tablet (5 mg) by mouth 2 times daily, Disp-60 tablet, R-3, E-Prescribe      atropine 1 % ophthalmic solution Place 2 drops under the tongue 3 times daily as needed for secretions, Disp-15 mL, R-3, E-Prescribe      furosemide (LASIX) 20 MG tablet Take 1 tablet (20 mg) by mouth daily, Disp-30 tablet, R-3, E-Prescribe      hydrOXYzine (ATARAX) 25 MG tablet Take 1 tablet (25 mg) by mouth every 4 hours as needed for anxiety,  Disp-60 tablet, R-3, E-Prescribe      lamoTRIgine (LAMICTAL) 100 MG tablet Take 1 tablet (100 mg) by mouth daily, Disp-30 tablet, R-3, E-Prescribe      lisinopril (ZESTRIL) 5 MG tablet Take 1 tablet (5 mg) by mouth daily, Disp-30 tablet, R-3, E-Prescribe      melatonin 10 MG TABS tablet Take 1 tablet (10 mg) by mouth At Bedtime, Disp-30 tablet, R-3, E-Prescribe      prazosin (MINIPRESS) 1 MG capsule Take 1 capsule (1 mg) by mouth daily, Disp-30 capsule, R-3, E-Prescribe      !! cloZAPine (CLOZARIL) 100 MG tablet Take 3 tablets (300 mg) by mouth At Bedtime, Disp-7 tablet, R-11, E-Prescribe      !! cloZAPine (CLOZARIL) 50 MG tablet Take 1 tablet (50 mg) by mouth daily, Disp-7 tablet, R-11, E-Prescribe       !! - Potential duplicate medications found. Please discuss with provider.        CONTINUE these medications which have CHANGED    Details   calcium polycarbophil (FIBERCON) 625 MG tablet Take 1 tablet (625 mg) by mouth 2 times daily, Disp-120 tablet, R-3, E-Prescribe      topiramate (TOPAMAX) 100 MG tablet Take 1 tablet (100 mg) by mouth 2 times daily, Disp-60 tablet, R-3, E-Prescribe           CONTINUE these medications which have NOT CHANGED    Details   albuterol (PROAIR HFA/PROVENTIL HFA/VENTOLIN HFA) 108 (90 Base) MCG/ACT inhaler Inhale 2 puffs into the lungs every 6 hours as needed for shortness of breath, wheezing or cough, Historical      calcium carbonate-vitamin D (CALTRATE) 600-10 MG-MCG per tablet Take 1 tablet by mouth daily, Historical      carboxymethylcellulose PF (REFRESH PLUS) 0.5 % ophthalmic solution Place 1 drop into both eyes daily as needed for dry eyes, Historical      chlorhexidine 0.12 % solution Rinse or brush teeth and gums with 15 mL for 60 seconds every morning *nothing by mouth for 30 minutes following*, Historical      docusate sodium (COLACE) 100 MG capsule Take 100 mg by mouth daily, Historical      hydrocortisone (CORTAID) 1 % external cream Apply topically 2 times daily as needed  for rash or other (eczema)Historical      ipratropium (ATROVENT) 0.06 % nasal spray Spray 2 sprays into both nostrils 3 times daily At 8 AM, 4 PM, and 8 PM, Historical      levothyroxine (SYNTHROID/LEVOTHROID) 50 MCG tablet 1 tablet (50 mcg) by Oral or NG Tube route daily, No Print Out      loperamide (IMODIUM) 2 MG capsule Take 2 mg by mouth 4 times daily as needed for diarrhea, Historical      montelukast (SINGULAIR) 10 MG tablet Take 10 mg by mouth At Bedtime, Historical      multivitamin, therapeutic (THERA-VIT) TABS tablet Take 1 tablet by mouth daily, Historical      polyethylene glycol-propylene glycol (SYSTANE ULTRA) 0.4-0.3 % SOLN ophthalmic solution Place 1-2 drops into both eyes daily as needed for dry eyes, Historical      prednisoLONE acetate (PRED FORTE) 1 % ophthalmic suspension Instill one drop once per day into the right eye for glaucoma, Historical      senna (SENOKOT) 8.6 MG tablet Take 1 tablet by mouth 2 times daily as needed for constipation, Historical      White Petrolatum-Mineral Oil (REFRESH P.M.) OINT Apply a small amount into lower right eyelid at bedtime, Historical      zinc sulfate (ZINCATE) 220 (50 Zn) MG capsule 1 capsule (220 mg) by Oral or NG Tube route daily, No Print Out      pantoprazole (PROTONIX) 40 MG EC tablet Take 40 mg by mouth daily, Historical           STOP taking these medications       artificial saliva (BIOTENE MT) SOLN solution Comments:   Reason for Stopping:         ciclopirox (PENLAC) 8 % external solution Comments:   Reason for Stopping:         clonazePAM (KLONOPIN) 0.125 MG TBDP ODT tab Comments:   Reason for Stopping:         hypromellose-dextran (ARTIFICAL TEARS) 0.1-0.3 % ophthalmic solution Comments:   Reason for Stopping:         LORazepam (ATIVAN) 2 MG tablet Comments:   Reason for Stopping:         ondansetron (ZOFRAN ODT) 4 MG ODT tab Comments:   Reason for Stopping:         risperiDONE (RISPERDAL) 0.5 MG tablet Comments:   Reason for Stopping:          risperiDONE (RISPERDAL) 2 MG tablet Comments:   Reason for Stopping:         sertraline (ZOLOFT) 100 MG tablet Comments:   Reason for Stopping:         traZODone (DESYREL) 150 MG tablet Comments:   Reason for Stopping:               Medication adherence issues: MS Med Adherence Y/N: Yes, Hospitalization  Medication side effects: MEDICATION SIDE EFFECTS: no side effects reported         DISCHARGE PLAN   1.  Education given regarding diagnostic and treatment options with risks, benefits and alternatives with adequate verbalization of understanding.  2.  Discharge to group home.  Upon detailed review of risk factors, patient amenable for release.   3.  Continue aforementioned medications and associated medication changes with follow-up by outpatient mental health provider.  4.  Crisis management planning in place.    5.  Continue efforts for sobriety.    .  Nursing and  to review further discharge recommendations.     TOTAL TIME:  Greater than 30 minutes for discharge planning.    This note was created with help of Dragon dictation system. Grammatical / typing errors are not intentional.    Alejandra Watkins MD

## 2023-10-10 NOTE — PROGRESS NOTES
"Chief Complaint  Annual Exam    Subjective         History of Present Illness    Abelardo Ross 48 y.o. male presents for an Annual Wellness Visit.  He has been feeling well.  I reviewed health maintenance with him as part of my preventative care plan.    Vision exam: Up to date  Dental exam: Up to date  Exercise: 3 days per week  Alcohol: once per week  Tobacco: none        Objective   Vital Signs:   /83 (BP Location: Left arm, Patient Position: Sitting)   Pulse 77   Temp 97.4 øF (36.3 øC) (Oral)   Resp 16   Ht 177.8 cm (70\")   Wt 106 kg (233 lb 3.2 oz)   SpO2 98%   BMI 33.46 kg/mý      BMI is >= 30 and <35. (Class 1 Obesity). The following options were offered after discussion;: exercise counseling/recommendations and nutrition counseling/recommendations          Physical Exam  Vitals and nursing note reviewed.   Constitutional:       General: He is awake. He is not in acute distress.     Appearance: Normal appearance. He is well-developed and well-groomed. He is obese. He is not ill-appearing, toxic-appearing or diaphoretic.   HENT:      Head: Normocephalic and atraumatic.      Right Ear: Tympanic membrane, ear canal and external ear normal. No decreased hearing noted. No drainage, swelling or tenderness. There is no impacted cerumen. Tympanic membrane is not erythematous, retracted or bulging.      Left Ear: Tympanic membrane, ear canal and external ear normal. No decreased hearing noted. No drainage, swelling or tenderness. There is no impacted cerumen. Tympanic membrane is not erythematous, retracted or bulging.      Nose: Nose normal. No nasal tenderness, mucosal edema, congestion or rhinorrhea.      Right Nostril: No epistaxis.      Left Nostril: No epistaxis.      Right Turbinates: Not enlarged or swollen.      Left Turbinates: Not enlarged or swollen.      Right Sinus: No maxillary sinus tenderness or frontal sinus tenderness.      Left Sinus: No maxillary sinus tenderness or frontal sinus " [facility]    Medicine Progress Note - Hospitalist Service    Date of Admission:  5/2/2023  Jagdeep Walker is a 52 year old male admitted on 5/2/2023 for agitation and delusions to inpatient psychiatry, and treated inpatient for schizoaffective disorder, bipolar type. He has history otherwise significant for unspecified intellectual disabilities, GERD, hypothyroidism.    Interval History   Medicine re-consulted for lower extremity edema.    Patient reports lower extremity swelling started over last few days, R>L. Has been able to ambulate without difficulty. Mild pain on the right. Never happened before. No shortness of breath at all. No chest pain.    Assessment & Plan    # Bilateral lower extremity edema, R>L  - duration not entirely clear as patient a poor historian but staff just noticed this today  - most likely associated with amlodipine which was just started. Must rule out clot given acuity and unilateral asymmetry. Less likely heart failure in absence of other heart failure signs/symptoms.   Plan  - stop amlodipine   - bilateral lower extremity ultrasounds to rule out clot  - echocardiogram and bnp to assess for heart failure  - elevate lower extremities as much as possible  - once clot ruled out-> compression stockings  - consider lasix if not improving  - recheck labs tomorrow     # Hypertension  - as above     Will continue to follow    Disposition Plan     Expected Discharge Date: 05/23/2023                    SOCORRO HenaoC  Hospitalist Service  [facility]  Securely message with [employer] (more info)  Text page via [email or web address] Paging/Directory     Clinically Significant Risk Factors                  # Hypertension: Noted on problem list                 ______________________________________________________________________    Exam    Physical Exam    Vital Signs: Temp: 97.4  F (36.3  C) Temp src: Oral BP:  108/72 Pulse: 87     Resp: 18 SpO2: 98 % O2 Device: None (Room air)    Weight: 198 lbs 4.8 oz    General Appearance: Alert and oriented x3,   HEENT: Anicteric sclera, MMM   Respiratory: Breathing comfortably on room air, lungs CTAB without wheezing or crackles   Cardiovascular: RRR, S1, S2. No murmur noted  GI: Abdomen soft, non-tender with active bowel sounds. No guarding or rebound  Lymph/Hematologic: No peripheral edema, distal pulses palpable   Skin: No rash or jaundice   Musculoskeletal: 2+ pitting edema bilateral lower extremities R>L, intact distal pulses no sensorimotor deficits   Neurologic: No focal deficits, CN II-XII grossly intact    Data reviewed today     I reviewed all medications, new labs and imaging results over the last 24 hours. Please see discussion of these results in the A/P.    Data   Recent Labs   Lab 05/16/23  0836 05/12/23  0815   WBC  --  6.3   HGB  --  13.5   MCV  --  85   PLT  --  161    146*   POTASSIUM  --  4.1   CHLORIDE  --  111*   CO2  --  24   BUN  --  23.2*   CR  --  0.97   ANIONGAP  --  11   NASIR  --  10.7*   GLC  --  85   ALBUMIN  --  4.4   PROTTOTAL  --  7.5   BILITOTAL  --  0.5   ALKPHOS  --  145*   ALT  --  44   AST  --  36       No results found for this or any previous visit (from the past 24 hour(s)).    Billing    Medical Decision Making       60 MINUTES SPENT BY ME on the date of service doing chart review, history, exam, documentation & further activities per the note.           tenderness.      Mouth/Throat:      Lips: Pink. No lesions.      Mouth: Mucous membranes are moist.      Dentition: Normal dentition.      Tongue: No lesions. Tongue does not deviate from midline.      Pharynx: Oropharynx is clear. No pharyngeal swelling, oropharyngeal exudate, posterior oropharyngeal erythema or uvula swelling.      Tonsils: No tonsillar exudate or tonsillar abscesses.   Eyes:      General: Lids are normal. Vision grossly intact. No scleral icterus.        Right eye: No discharge or hordeolum.         Left eye: No discharge or hordeolum.      Extraocular Movements: Extraocular movements intact.      Right eye: Normal extraocular motion and no nystagmus.      Left eye: Normal extraocular motion and no nystagmus.      Conjunctiva/sclera: Conjunctivae normal.      Right eye: Right conjunctiva is not injected. No exudate or hemorrhage.     Left eye: Left conjunctiva is not injected. No exudate or hemorrhage.     Pupils: Pupils are equal, round, and reactive to light.   Neck:      Thyroid: No thyroid mass or thyromegaly.      Vascular: No carotid bruit.      Trachea: Trachea normal. No tracheal deviation.   Cardiovascular:      Rate and Rhythm: Normal rate and regular rhythm.      Pulses: Normal pulses.           Radial pulses are 2+ on the right side and 2+ on the left side.        Popliteal pulses are 2+ on the right side and 2+ on the left side.        Dorsalis pedis pulses are 2+ on the right side and 2+ on the left side.      Heart sounds: Normal heart sounds, S1 normal and S2 normal. No murmur heard.     No gallop.   Pulmonary:      Effort: Pulmonary effort is normal. No respiratory distress.      Breath sounds: Normal breath sounds. No decreased breath sounds, wheezing, rhonchi or rales.   Chest:      Chest wall: No tenderness.   Breasts:     Right: Normal. No swelling.      Left: Normal. No swelling.   Abdominal:      General: Bowel sounds are normal. There is no distension.      Palpations:  Abdomen is soft. There is no mass.      Tenderness: There is no abdominal tenderness. There is no guarding or rebound.      Hernia: No hernia is present.   Musculoskeletal:         General: No swelling or tenderness. Normal range of motion.      Right shoulder: Normal.      Left shoulder: Normal.      Right upper arm: Normal.      Left upper arm: Normal.      Right elbow: Normal.      Left elbow: Normal.      Right forearm: Normal.      Left forearm: Normal.      Right wrist: Normal.      Left wrist: Normal.      Right hand: Normal.      Left hand: Normal.      Cervical back: Full passive range of motion without pain, normal range of motion and neck supple. No edema, erythema, rigidity or tenderness. Normal range of motion.      Right lower leg: No edema.      Left lower leg: No edema.   Lymphadenopathy:      Head:      Right side of head: No submental, submandibular, tonsillar, preauricular, posterior auricular or occipital adenopathy.      Left side of head: No submental, submandibular, tonsillar, preauricular, posterior auricular or occipital adenopathy.      Cervical: No cervical adenopathy.      Right cervical: No superficial, deep or posterior cervical adenopathy.     Left cervical: No superficial, deep or posterior cervical adenopathy.      Upper Body:      Right upper body: No supraclavicular adenopathy.      Left upper body: No supraclavicular adenopathy.   Skin:     General: Skin is warm and dry.      Capillary Refill: Capillary refill takes less than 2 seconds.      Coloration: Skin is not pale.      Findings: No erythema or rash.   Neurological:      General: No focal deficit present.      Mental Status: He is alert and oriented to person, place, and time.      Cranial Nerves: No cranial nerve deficit.      Sensory: Sensation is intact. No sensory deficit.      Motor: Motor function is intact. No weakness or abnormal muscle tone.      Coordination: Coordination is intact. Romberg sign negative.  Coordination normal. Finger-Nose-Finger Test normal.      Gait: Gait is intact. Gait and tandem walk normal.      Deep Tendon Reflexes: Reflexes normal.      Reflex Scores:       Tricep reflexes are 2+ on the right side and 2+ on the left side.       Bicep reflexes are 2+ on the right side and 2+ on the left side.       Brachioradialis reflexes are 2+ on the right side and 2+ on the left side.       Patellar reflexes are 2+ on the right side and 2+ on the left side.       Achilles reflexes are 2+ on the right side and 2+ on the left side.  Psychiatric:         Attention and Perception: Attention normal.         Mood and Affect: Mood and affect normal.         Speech: Speech normal. Speech is not delayed or slurred.         Behavior: Behavior normal. Behavior is cooperative.         Thought Content: Thought content normal.         Cognition and Memory: Cognition and memory normal. Cognition is not impaired. Memory is not impaired. He does not exhibit impaired recent memory or impaired remote memory.         Judgment: Judgment normal.                         Assessment and Plan      Diagnoses and all orders for this visit:    1. Annual physical exam (Primary)  Comments:  Normal exam  Health maintenance updated, labs ordered  Healthy diet, increase exercise, weight loss  Return in one year for annual physical  Orders:  -     Comprehensive metabolic panel  -     Lipid panel  -     CBC and Differential  -     TSH    2. Screening for prostate cancer  -     PSA SCREENING            Follow Up     Return in about 1 year (around 10/10/2024) for Annual physical.    Patient was given instructions and counseling regarding his condition or for health maintenance advice. Please see specific information pulled into the AVS if appropriate.     Preventative counseling provided during visit regarding healthy diet, exercise, and the following:  Low glycemic index diet  Exercise 30 minutes most days of the week  Make sure you get  results on any labs or tests we ordered today  We discussed medications and how to take them as prescribed  Sleep 6-8 hours each night if possible  If you have not signed up for [employer], please activate your code ASAP from your After Visit Summary today    LDL goal <100  HDL goal >60  Triglyceride goal <150  BP goal =<130/80  Fasting glucose <100    -Return in one year for annual physical exam.

## 2023-10-27 NOTE — PROGRESS NOTES
Clinic Care Coordination Contact    Background: Care Coordination referral placed from [facility] discharge report for reason of patient meeting criteria for a TCM outreach call by [facility] team.    Assessment: Upon chart review, [facility] Team member will cancel/close the referral for TCM outreach due to reason below:    Patient has discharged to a Group home, Memory Care or Nursing Home    Plan: Care Coordination referral for TCM outreach canceled.    Dalia Chambers MA  [facility], [facility]   27-Oct-2023 20:26

## 2023-11-03 ENCOUNTER — HOSPITAL ENCOUNTER (EMERGENCY)
Facility: CLINIC | Age: 53
Discharge: HOME | End: 2023-11-03
Payer: MEDICARE

## 2023-12-21 ENCOUNTER — HOSPITAL ENCOUNTER (OUTPATIENT)
Facility: CLINIC | Age: 53
Setting detail: OBSERVATION
Discharge: NURSING, REHAB OR LONG-TERM CARE | End: 2023-12-22
Attending: EMERGENCY MEDICINE | Admitting: EMERGENCY MEDICINE
Payer: MEDICARE

## 2023-12-21 ENCOUNTER — APPOINTMENT (OUTPATIENT)
Dept: GENERAL RADIOLOGY | Facility: CLINIC | Age: 53
End: 2023-12-21
Attending: EMERGENCY MEDICINE
Payer: MEDICARE

## 2023-12-21 ENCOUNTER — APPOINTMENT (OUTPATIENT)
Dept: CT IMAGING | Facility: CLINIC | Age: 53
End: 2023-12-21
Attending: EMERGENCY MEDICINE
Payer: MEDICARE

## 2023-12-21 DIAGNOSIS — R53.1 WEAKNESS: ICD-10-CM
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
## 2023-12-21 DIAGNOSIS — T68.XXXA HYPOTHERMIA, INITIAL ENCOUNTER: ICD-10-CM

## 2023-12-21 LAB
ALBUMIN SERPL BCG-MCNC: 3.1 G/DL (ref 3.5–5.2)
ALBUMIN UR-MCNC: NEGATIVE MG/DL
ALP SERPL-CCNC: 157 U/L (ref 40–150)
ALT SERPL W P-5'-P-CCNC: 31 U/L (ref 0–70)
AMMONIA PLAS-SCNC: 60 UMOL/L (ref 16–60)
ANION GAP SERPL CALCULATED.3IONS-SCNC: 10 MMOL/L (ref 7–15)
APPEARANCE UR: CLEAR
AST SERPL W P-5'-P-CCNC: 30 U/L (ref 0–45)
ATRIAL RATE - MUSE: 60 BPM
BASOPHILS # BLD AUTO: 0 10E3/UL (ref 0–0.2)
BASOPHILS NFR BLD AUTO: 0 %
BILIRUB SERPL-MCNC: 0.4 MG/DL
BILIRUB UR QL STRIP: NEGATIVE
BUN SERPL-MCNC: 14.1 MG/DL (ref 6–20)
CALCIUM SERPL-MCNC: 8.9 MG/DL (ref 8.6–10)
CHLORIDE SERPL-SCNC: 106 MMOL/L (ref 98–107)
COLOR UR AUTO: NORMAL
CORTIS SERPL-MCNC: 11.6 UG/DL
CREAT SERPL-MCNC: 0.93 MG/DL (ref 0.67–1.17)
CRP SERPL-MCNC: 6.81 MG/L
DEPRECATED HCO3 PLAS-SCNC: 24 MMOL/L (ref 22–29)
DIASTOLIC BLOOD PRESSURE - MUSE: NORMAL MMHG
EGFRCR SERPLBLD CKD-EPI 2021: >90 ML/MIN/1.73M2
EOSINOPHIL # BLD AUTO: 0 10E3/UL (ref 0–0.7)
EOSINOPHIL NFR BLD AUTO: 0 %
ERYTHROCYTE [DISTWIDTH] IN BLOOD BY AUTOMATED COUNT: 17.2 % (ref 10–15)
GLUCOSE SERPL-MCNC: 104 MG/DL (ref 70–99)
GLUCOSE UR STRIP-MCNC: NEGATIVE MG/DL
HCO3 BLDV-SCNC: 21 MMOL/L (ref 21–28)
HCT VFR BLD AUTO: 35.5 % (ref 40–53)
HGB BLD-MCNC: 11.9 G/DL (ref 13.3–17.7)
HGB UR QL STRIP: NEGATIVE
HOLD SPECIMEN: NORMAL
IMM GRANULOCYTES # BLD: 0 10E3/UL
IMM GRANULOCYTES NFR BLD: 0 %
INTERPRETATION ECG - MUSE: NORMAL
KETONES UR STRIP-MCNC: NEGATIVE MG/DL
LACTATE BLD-SCNC: <0.3 MMOL/L
LEUKOCYTE ESTERASE UR QL STRIP: NEGATIVE
LIPASE SERPL-CCNC: 23 U/L (ref 13–60)
LYMPHOCYTES # BLD AUTO: 1.4 10E3/UL (ref 0.8–5.3)
LYMPHOCYTES NFR BLD AUTO: 33 %
MAGNESIUM SERPL-MCNC: 2.2 MG/DL (ref 1.7–2.3)
MCH RBC QN AUTO: 28.3 PG (ref 26.5–33)
MCHC RBC AUTO-ENTMCNC: 33.5 G/DL (ref 31.5–36.5)
MCV RBC AUTO: 85 FL (ref 78–100)
MONOCYTES # BLD AUTO: 0.4 10E3/UL (ref 0–1.3)
MONOCYTES NFR BLD AUTO: 8 %
NEUTROPHILS # BLD AUTO: 2.5 10E3/UL (ref 1.6–8.3)
NEUTROPHILS NFR BLD AUTO: 59 %
NITRATE UR QL: NEGATIVE
NRBC # BLD AUTO: 0 10E3/UL
NRBC BLD AUTO-RTO: 0 /100
P AXIS - MUSE: 48 DEGREES
PCO2 BLDV: 41 MM HG (ref 40–50)
PH BLDV: 7.32 [PH] (ref 7.32–7.43)
PH UR STRIP: 6.5 [PH] (ref 5–7)
PHOSPHATE SERPL-MCNC: 3.4 MG/DL (ref 2.5–4.5)
PLATELET # BLD AUTO: 127 10E3/UL (ref 150–450)
PO2 BLDV: 59 MM HG (ref 25–47)
POTASSIUM SERPL-SCNC: 3.3 MMOL/L (ref 3.4–5.3)
POTASSIUM SERPL-SCNC: 3.9 MMOL/L (ref 3.4–5.3)
PR INTERVAL - MUSE: 148 MS
PROCALCITONIN SERPL IA-MCNC: 0.08 NG/ML
PROT SERPL-MCNC: 5.3 G/DL (ref 6.4–8.3)
QRS DURATION - MUSE: 156 MS
QT - MUSE: 506 MS
QTC - MUSE: 506 MS
R AXIS - MUSE: 36 DEGREES
RBC # BLD AUTO: 4.2 10E6/UL (ref 4.4–5.9)
RBC URINE: 0 /HPF
SAO2 % BLDV: 88 % (ref 94–100)
SODIUM SERPL-SCNC: 140 MMOL/L (ref 135–145)
SP GR UR STRIP: 1.01 (ref 1–1.03)
SQUAMOUS EPITHELIAL: <1 /HPF
SYSTOLIC BLOOD PRESSURE - MUSE: NORMAL MMHG
T AXIS - MUSE: 55 DEGREES
TROPONIN T SERPL HS-MCNC: 24 NG/L
TROPONIN T SERPL HS-MCNC: 24 NG/L
TSH SERPL DL<=0.005 MIU/L-ACNC: 0.9 UIU/ML (ref 0.3–4.2)
UROBILINOGEN UR STRIP-MCNC: NORMAL MG/DL
VENTRICULAR RATE- MUSE: 60 BPM
WBC # BLD AUTO: 4.3 10E3/UL (ref 4–11)
WBC URINE: <1 /HPF

## 2023-12-21 PROCEDURE — 250N000011 HC RX IP 250 OP 636: Performed by: INTERNAL MEDICINE

## 2023-12-21 PROCEDURE — 80175 DRUG SCREEN QUAN LAMOTRIGINE: CPT | Performed by: EMERGENCY MEDICINE

## 2023-12-21 PROCEDURE — G1010 CDSM STANSON: HCPCS

## 2023-12-21 PROCEDURE — 96375 TX/PRO/DX INJ NEW DRUG ADDON: CPT

## 2023-12-21 PROCEDURE — C9113 INJ PANTOPRAZOLE SODIUM, VIA: HCPCS | Performed by: INTERNAL MEDICINE

## 2023-12-21 PROCEDURE — 36415 COLL VENOUS BLD VENIPUNCTURE: CPT | Performed by: INTERNAL MEDICINE

## 2023-12-21 PROCEDURE — 83690 ASSAY OF LIPASE: CPT | Performed by: EMERGENCY MEDICINE

## 2023-12-21 PROCEDURE — 96374 THER/PROPH/DIAG INJ IV PUSH: CPT

## 2023-12-21 PROCEDURE — 80053 COMPREHEN METABOLIC PANEL: CPT | Performed by: EMERGENCY MEDICINE

## 2023-12-21 PROCEDURE — 84484 ASSAY OF TROPONIN QUANT: CPT | Performed by: INTERNAL MEDICINE

## 2023-12-21 PROCEDURE — 82533 TOTAL CORTISOL: CPT | Performed by: INTERNAL MEDICINE

## 2023-12-21 PROCEDURE — 85025 COMPLETE CBC W/AUTO DIFF WBC: CPT | Performed by: EMERGENCY MEDICINE

## 2023-12-21 PROCEDURE — 83735 ASSAY OF MAGNESIUM: CPT | Performed by: INTERNAL MEDICINE

## 2023-12-21 PROCEDURE — 82140 ASSAY OF AMMONIA: CPT | Performed by: EMERGENCY MEDICINE

## 2023-12-21 PROCEDURE — 96361 HYDRATE IV INFUSION ADD-ON: CPT

## 2023-12-21 PROCEDURE — 84443 ASSAY THYROID STIM HORMONE: CPT | Performed by: EMERGENCY MEDICINE

## 2023-12-21 PROCEDURE — 86140 C-REACTIVE PROTEIN: CPT | Performed by: EMERGENCY MEDICINE

## 2023-12-21 PROCEDURE — 250N000013 HC RX MED GY IP 250 OP 250 PS 637: Performed by: INTERNAL MEDICINE

## 2023-12-21 PROCEDURE — 93005 ELECTROCARDIOGRAM TRACING: CPT

## 2023-12-21 PROCEDURE — 84100 ASSAY OF PHOSPHORUS: CPT | Performed by: INTERNAL MEDICINE

## 2023-12-21 PROCEDURE — G0378 HOSPITAL OBSERVATION PER HR: HCPCS

## 2023-12-21 PROCEDURE — 250N000009 HC RX 250: Performed by: EMERGENCY MEDICINE

## 2023-12-21 PROCEDURE — 84145 PROCALCITONIN (PCT): CPT | Performed by: INTERNAL MEDICINE

## 2023-12-21 PROCEDURE — 36415 COLL VENOUS BLD VENIPUNCTURE: CPT | Performed by: EMERGENCY MEDICINE

## 2023-12-21 PROCEDURE — 99285 EMERGENCY DEPT VISIT HI MDM: CPT | Mod: 25

## 2023-12-21 PROCEDURE — 74177 CT ABD & PELVIS W/CONTRAST: CPT | Mod: MG

## 2023-12-21 PROCEDURE — 258N000003 HC RX IP 258 OP 636: Performed by: INTERNAL MEDICINE

## 2023-12-21 PROCEDURE — 99222 1ST HOSP IP/OBS MODERATE 55: CPT | Mod: AI | Performed by: INTERNAL MEDICINE

## 2023-12-21 PROCEDURE — 71046 X-RAY EXAM CHEST 2 VIEWS: CPT

## 2023-12-21 PROCEDURE — 250N000011 HC RX IP 250 OP 636: Performed by: EMERGENCY MEDICINE

## 2023-12-21 PROCEDURE — 81001 URINALYSIS AUTO W/SCOPE: CPT | Performed by: EMERGENCY MEDICINE

## 2023-12-21 PROCEDURE — 82803 BLOOD GASES ANY COMBINATION: CPT

## 2023-12-21 PROCEDURE — 84132 ASSAY OF SERUM POTASSIUM: CPT | Mod: 91 | Performed by: INTERNAL MEDICINE

## 2023-12-21 PROCEDURE — 258N000003 HC RX IP 258 OP 636: Performed by: EMERGENCY MEDICINE

## 2023-12-21 RX ORDER — MULTIVITAMIN,THERAPEUTIC
1 TABLET ORAL DAILY
Status: DISCONTINUED | OUTPATIENT
Start: 2023-12-21 | End: 2023-12-22 | Stop reason: HOSPADM

## 2023-12-21 RX ORDER — IPRATROPIUM BROMIDE 42 UG/1
2 SPRAY, METERED NASAL 3 TIMES DAILY
Status: DISCONTINUED | OUTPATIENT
Start: 2023-12-21 | End: 2023-12-22 | Stop reason: HOSPADM

## 2023-12-21 RX ORDER — CEFTRIAXONE 2 G/1
2 INJECTION, POWDER, FOR SOLUTION INTRAMUSCULAR; INTRAVENOUS EVERY 24 HOURS
Status: DISCONTINUED | OUTPATIENT
Start: 2023-12-21 | End: 2023-12-22

## 2023-12-21 RX ORDER — IOPAMIDOL 755 MG/ML
101 INJECTION, SOLUTION INTRAVASCULAR ONCE
Status: COMPLETED | OUTPATIENT
Start: 2023-12-21 | End: 2023-12-21

## 2023-12-21 RX ORDER — QUETIAPINE FUMARATE 25 MG/1
50 TABLET, FILM COATED ORAL AT BEDTIME
Status: DISCONTINUED | OUTPATIENT
Start: 2023-12-21 | End: 2023-12-22

## 2023-12-21 RX ORDER — LAMOTRIGINE 100 MG/1
100 TABLET ORAL DAILY
Status: DISCONTINUED | OUTPATIENT
Start: 2023-12-21 | End: 2023-12-22 | Stop reason: HOSPADM

## 2023-12-21 RX ORDER — QUETIAPINE FUMARATE 50 MG/1
50 TABLET, FILM COATED ORAL AT BEDTIME
Status: ON HOLD | COMMUNITY
End: 2023-12-22

## 2023-12-21 RX ORDER — PROCHLORPERAZINE MALEATE 5 MG
10 TABLET ORAL EVERY 6 HOURS PRN
Status: DISCONTINUED | OUTPATIENT
Start: 2023-12-21 | End: 2023-12-22 | Stop reason: HOSPADM

## 2023-12-21 RX ORDER — MONTELUKAST SODIUM 10 MG/1
10 TABLET ORAL AT BEDTIME
Status: DISCONTINUED | OUTPATIENT
Start: 2023-12-21 | End: 2023-12-22 | Stop reason: HOSPADM

## 2023-12-21 RX ORDER — TOPIRAMATE 100 MG/1
100 TABLET, FILM COATED ORAL 2 TIMES DAILY
Status: DISCONTINUED | OUTPATIENT
Start: 2023-12-21 | End: 2023-12-22 | Stop reason: HOSPADM

## 2023-12-21 RX ORDER — ZINC SULFATE 50(220)MG
220 CAPSULE ORAL DAILY
Status: DISCONTINUED | OUTPATIENT
Start: 2023-12-21 | End: 2023-12-22 | Stop reason: HOSPADM

## 2023-12-21 RX ORDER — LIDOCAINE 40 MG/G
CREAM TOPICAL
Status: DISCONTINUED | OUTPATIENT
Start: 2023-12-21 | End: 2023-12-22 | Stop reason: HOSPADM

## 2023-12-21 RX ORDER — BISACODYL 10 MG
10 SUPPOSITORY, RECTAL RECTAL DAILY PRN
Status: DISCONTINUED | OUTPATIENT
Start: 2023-12-21 | End: 2023-12-22 | Stop reason: HOSPADM

## 2023-12-21 RX ORDER — ONDANSETRON 4 MG/1
4 TABLET, ORALLY DISINTEGRATING ORAL EVERY 6 HOURS PRN
Status: DISCONTINUED | OUTPATIENT
Start: 2023-12-21 | End: 2023-12-21

## 2023-12-21 RX ORDER — AMOXICILLIN 250 MG
2 CAPSULE ORAL 2 TIMES DAILY PRN
Status: DISCONTINUED | OUTPATIENT
Start: 2023-12-21 | End: 2023-12-22 | Stop reason: HOSPADM

## 2023-12-21 RX ORDER — CLOZAPINE 25 MG/1
50 TABLET ORAL AT BEDTIME
Status: DISCONTINUED | OUTPATIENT
Start: 2023-12-21 | End: 2023-12-22 | Stop reason: HOSPADM

## 2023-12-21 RX ORDER — POLYETHYLENE GLYCOL 3350 17 G/17G
17 POWDER, FOR SOLUTION ORAL 2 TIMES DAILY PRN
Status: DISCONTINUED | OUTPATIENT
Start: 2023-12-21 | End: 2023-12-22 | Stop reason: HOSPADM

## 2023-12-21 RX ORDER — PROCHLORPERAZINE 25 MG
25 SUPPOSITORY, RECTAL RECTAL EVERY 12 HOURS PRN
Status: DISCONTINUED | OUTPATIENT
Start: 2023-12-21 | End: 2023-12-22 | Stop reason: HOSPADM

## 2023-12-21 RX ORDER — ACETAMINOPHEN 650 MG/1
650 SUPPOSITORY RECTAL EVERY 4 HOURS PRN
Status: DISCONTINUED | OUTPATIENT
Start: 2023-12-21 | End: 2023-12-22 | Stop reason: HOSPADM

## 2023-12-21 RX ORDER — ACETAMINOPHEN 325 MG/1
650 TABLET ORAL EVERY 4 HOURS PRN
Status: DISCONTINUED | OUTPATIENT
Start: 2023-12-21 | End: 2023-12-22 | Stop reason: HOSPADM

## 2023-12-21 RX ORDER — CHOLECALCIFEROL (VITAMIN D3) 50 MCG
50 TABLET ORAL DAILY
Status: DISCONTINUED | OUTPATIENT
Start: 2023-12-21 | End: 2023-12-22 | Stop reason: HOSPADM

## 2023-12-21 RX ORDER — ONDANSETRON 2 MG/ML
4 INJECTION INTRAMUSCULAR; INTRAVENOUS EVERY 6 HOURS PRN
Status: DISCONTINUED | OUTPATIENT
Start: 2023-12-21 | End: 2023-12-21

## 2023-12-21 RX ORDER — CALCIUM CARBONATE/VITAMIN D3 600 MG-10
1 TABLET ORAL DAILY
Status: DISCONTINUED | OUTPATIENT
Start: 2023-12-21 | End: 2023-12-22 | Stop reason: HOSPADM

## 2023-12-21 RX ORDER — POTASSIUM CHLORIDE 1.5 G/1.58G
40 POWDER, FOR SOLUTION ORAL ONCE
Status: COMPLETED | OUTPATIENT
Start: 2023-12-21 | End: 2023-12-21

## 2023-12-21 RX ORDER — PANTOPRAZOLE SODIUM 40 MG/1
40 TABLET, DELAYED RELEASE ORAL DAILY
Status: DISCONTINUED | OUTPATIENT
Start: 2023-12-22 | End: 2023-12-22 | Stop reason: HOSPADM

## 2023-12-21 RX ORDER — LEVOTHYROXINE SODIUM 50 UG/1
50 TABLET ORAL DAILY
Status: DISCONTINUED | OUTPATIENT
Start: 2023-12-21 | End: 2023-12-22 | Stop reason: HOSPADM

## 2023-12-21 RX ORDER — ATROPINE SULFATE 10 MG/ML
2 SOLUTION/ DROPS OPHTHALMIC 3 TIMES DAILY PRN
Status: DISCONTINUED | OUTPATIENT
Start: 2023-12-21 | End: 2023-12-22 | Stop reason: HOSPADM

## 2023-12-21 RX ORDER — AZITHROMYCIN 500 MG/1
500 INJECTION, POWDER, LYOPHILIZED, FOR SOLUTION INTRAVENOUS EVERY 24 HOURS
Status: DISCONTINUED | OUTPATIENT
Start: 2023-12-21 | End: 2023-12-22

## 2023-12-21 RX ORDER — POLYETHYLENE GLYCOL 3350 17 G/17G
17 POWDER, FOR SOLUTION ORAL DAILY
Status: DISCONTINUED | OUTPATIENT
Start: 2023-12-22 | End: 2023-12-22 | Stop reason: HOSPADM

## 2023-12-21 RX ORDER — SODIUM CHLORIDE, SODIUM LACTATE, POTASSIUM CHLORIDE, CALCIUM CHLORIDE 600; 310; 30; 20 MG/100ML; MG/100ML; MG/100ML; MG/100ML
INJECTION, SOLUTION INTRAVENOUS CONTINUOUS
Status: DISCONTINUED | OUTPATIENT
Start: 2023-12-21 | End: 2023-12-22 | Stop reason: HOSPADM

## 2023-12-21 RX ORDER — CALCIUM POLYCARBOPHIL 625 MG 625 MG/1
625 TABLET ORAL 2 TIMES DAILY WITH MEALS
Status: DISCONTINUED | OUTPATIENT
Start: 2023-12-21 | End: 2023-12-22 | Stop reason: HOSPADM

## 2023-12-21 RX ORDER — AMOXICILLIN 250 MG
1 CAPSULE ORAL 2 TIMES DAILY PRN
Status: DISCONTINUED | OUTPATIENT
Start: 2023-12-21 | End: 2023-12-22 | Stop reason: HOSPADM

## 2023-12-21 RX ORDER — DOCUSATE SODIUM 100 MG/1
100 CAPSULE, LIQUID FILLED ORAL DAILY
Status: DISCONTINUED | OUTPATIENT
Start: 2023-12-21 | End: 2023-12-22 | Stop reason: HOSPADM

## 2023-12-21 RX ADMIN — IOPAMIDOL 101 ML: 755 INJECTION, SOLUTION INTRAVENOUS at 04:01

## 2023-12-21 RX ADMIN — Medication 50 MCG: at 13:32

## 2023-12-21 RX ADMIN — IPRATROPIUM BROMIDE 2 SPRAY: 42 SPRAY, METERED NASAL at 13:31

## 2023-12-21 RX ADMIN — ZINC SULFATE 220 MG (50 MG) CAPSULE 220 MG: CAPSULE at 13:32

## 2023-12-21 RX ADMIN — QUETIAPINE FUMARATE 50 MG: 25 TABLET ORAL at 21:29

## 2023-12-21 RX ADMIN — SODIUM CHLORIDE 69 ML: 9 INJECTION, SOLUTION INTRAVENOUS at 04:02

## 2023-12-21 RX ADMIN — DOCUSATE SODIUM 100 MG: 100 CAPSULE, LIQUID FILLED ORAL at 13:31

## 2023-12-21 RX ADMIN — IPRATROPIUM BROMIDE 2 SPRAY: 42 SPRAY, METERED NASAL at 21:57

## 2023-12-21 RX ADMIN — LAMOTRIGINE 100 MG: 100 TABLET ORAL at 13:30

## 2023-12-21 RX ADMIN — SODIUM CHLORIDE, POTASSIUM CHLORIDE, SODIUM LACTATE AND CALCIUM CHLORIDE: 600; 310; 30; 20 INJECTION, SOLUTION INTRAVENOUS at 07:56

## 2023-12-21 RX ADMIN — AZITHROMYCIN MONOHYDRATE 500 MG: 500 INJECTION, POWDER, LYOPHILIZED, FOR SOLUTION INTRAVENOUS at 13:58

## 2023-12-21 RX ADMIN — LEVOTHYROXINE SODIUM 50 MCG: 50 TABLET ORAL at 13:31

## 2023-12-21 RX ADMIN — TOPIRAMATE 100 MG: 100 TABLET, FILM COATED ORAL at 20:31

## 2023-12-21 RX ADMIN — CEFTRIAXONE SODIUM 2 G: 2 INJECTION, POWDER, FOR SOLUTION INTRAMUSCULAR; INTRAVENOUS at 13:28

## 2023-12-21 RX ADMIN — MULTIVITAMIN TABLET 1 TABLET: TABLET at 13:29

## 2023-12-21 RX ADMIN — Medication 1 TABLET: at 13:31

## 2023-12-21 RX ADMIN — PANTOPRAZOLE SODIUM 40 MG: 40 INJECTION, POWDER, FOR SOLUTION INTRAVENOUS at 07:59

## 2023-12-21 RX ADMIN — TOPIRAMATE 100 MG: 100 TABLET, FILM COATED ORAL at 13:29

## 2023-12-21 RX ADMIN — MONTELUKAST 10 MG: 10 TABLET, FILM COATED ORAL at 21:31

## 2023-12-21 RX ADMIN — POTASSIUM CHLORIDE 40 MEQ: 1.5 POWDER, FOR SOLUTION ORAL at 08:53

## 2023-12-21 RX ADMIN — CLOZAPINE 50 MG: 25 TABLET ORAL at 21:30

## 2023-12-21 RX ADMIN — SODIUM CHLORIDE 1000 ML: 9 INJECTION, SOLUTION INTRAVENOUS at 03:38

## 2023-12-21 RX ADMIN — CLOZAPINE 300 MG: 200 TABLET ORAL at 21:30

## 2023-12-21 ASSESSMENT — ACTIVITIES OF DAILY LIVING (ADL)
ADLS_ACUITY_SCORE: 35
ADLS_ACUITY_SCORE: 50
ADLS_ACUITY_SCORE: 40
ADLS_ACUITY_SCORE: 50
ADLS_ACUITY_SCORE: 49

## 2023-12-21 NOTE — ED NOTES
[facility]  ED Nurse Handoff Report    ED Chief complaint: Fall      ED Diagnosis:   Final diagnoses:   Hypothermia, initial encounter   Fall from standing, initial encounter   Weakness       Code Status: Full Code    Allergies:   Allergies   Allergen Reactions    Depakote [Valproic Acid] GI Disturbance       Patient Story: Jagdeep Walker is a 53 year old male with history of schizoaffective disorder, hypertension, and intellectual disability who presents to the ED with fall. Patient's condition and speech made the history difficult to obtain.  Patient is blind and anticoagulated. Patient reports at around dinner time he had fallen after tripping on a blanket. Patient uses a walker for ambulation and reports that he was walking with it today. He reports weakness, abdominal pain, back pain, and neck pain. Patient was unable to get up right away and states group home staff helped him. At ED, patient has a temperature of 91.7 F and a blood pressure of 97/63.    Focused Assessment:  Neuro: Pt A&O x 3. Confused about time/date. Pt hard to understand due to mumbling which EMS stated that group home said its normal.   Pt temp was 91.5 but improving slowly.     Treatments and/or interventions provided: IV, CT, Xray, Warm Fluids, Jh hugger, Labs, Straight cath, Male purewick  Patient's response to treatments and/or interventions: Pt temp increased    To be done/followed up on inpatient unit:  Temperature management    Does this patient have any cognitive concerns?: Disoriented to time    Activity level - Baseline/Home:  Walker  Activity Level - Current:   Unknown    Patient's Preferred language: English   Needed?: No    Isolation: None  Infection: Not Applicable  Patient tested for COVID 19 prior to admission: NO  Bariatric?: No    Vital Signs:   Vitals:    12/21/23 0254 12/21/23 0324 12/21/23 0500 12/21/23 0520   BP: 97/63 98/68 105/68    Pulse: 61 65 74    Resp:   20    Temp:    (!) 93.7  F  (34.3  C)   TempSrc:    Rectal   SpO2: 98% 98%     Weight:       Height:           Cardiac Rhythm:     Was the PSS-3 completed:   Yes  What interventions are required if any?               Family Comments: none  OBS brochure/video discussed/provided to patient/family: No      For the majority of the shift this patient's behavior was Green.   Behavioral interventions performed were none.    ED NURSE PHONE NUMBER: [phone]

## 2023-12-21 NOTE — PROGRESS NOTES
"Observation goals    -diagnostic tests and consults completed and resulted : not met    -vital signs normal or at patient baseline \" partially met    -returns to baseline functional status : not met    -safe disposition plan has been identified: not met. Pt lives at a group home     Nurse to notify provider when observation goals have been met and patient is ready for discharge.   "

## 2023-12-21 NOTE — PHARMACY-ADMISSION MEDICATION HISTORY
"Pharmacist Admission Medication History    Admission medication history is complete. The information provided in this note is only as accurate as the sources available at the time of the update.    Information Source(s): Patient, Facility (TCU/NH/) medication list/MAR, and facility () supervisor  via phone and N/A    Pertinent Information:   -apixaban: Facility staff unclear when/why this was discontinued but notes it is not an active order. Did note that patient a doctor's visit in November.   -confirmed with staff patient received all medications yesterday prior to admission    Changes made to PTA medication list:  Added: vitamin D, quetiapine  Deleted: albuterol, apixaban (see note above), Refresh Plus eye soln, furosemide, hydrocortisone cream, hydroxyzine, loperamide, Systane Ultra, prazosin, prednisolone eye susp, senna  Changed: atropine (TID PRN --> BID), calcium (added note about with food/breakfast), clozapine (added notes to 100 mg and 50 mg dose that total dose = 350 mg, 50 mg daily --> 50 mg at bedtime), ipratropium nasal (removed due times, unable to confirm), levothyroxine (removed \"or NG tube\" route), pantoprazole (added note 30 min before breakfast), zinc (removed \"or NG tube\" route)    Medication Affordability: not assessed    Allergies reviewed with patient and updates made in EHR: no    Medication History Completed By: Amaya Carpenter RPH 12/21/2023 9:28 AM    Prior to Admission medications    Medication Sig Last Dose Taking? Auth Provider Long Term End Date   acetaminophen (TYLENOL) 500 MG tablet Take 2 tablets (1,000 mg) by mouth 3 times daily 12/20/2023 at PM Yes Milind Dumas MD     atropine 1 % ophthalmic solution Place 2 drops under the tongue 3 times daily as needed for secretions  Patient taking differently: Place 2 drops under the tongue 2 times daily 12/20/2023 at PM Yes Milind Dumas MD     calcium carbonate-vitamin D (CALTRATE) 600-10 MG-MCG per tablet Take 1 tablet by mouth " daily With food, with breakfast 12/20/2023 Yes Unknown, Entered By History     calcium polycarbophil (FIBERCON) 625 MG tablet Take 1 tablet (625 mg) by mouth 2 times daily 12/20/2023 at PM Yes Milind Dumas MD No    chlorhexidine 0.12 % solution Rinse or brush teeth and gums with 15 mL for 60 seconds every morning *nothing by mouth for 30 minutes following* 12/20/2023 Yes Unknown, Entered By History     cholecalciferol (VITAMIN D3) 25 mcg (1000 units) capsule Take 2 capsules by mouth daily 12/20/2023 Yes Unknown, Entered By History     cloZAPine (CLOZARIL) 100 MG tablet Take 3 tablets (300 mg) by mouth At Bedtime  Patient taking differently: Take 300 mg by mouth at bedtime Take with 50 mg tablet for total dose = 350 mg 12/20/2023 Yes Alejandra Watkins MD Yes    cloZAPine (CLOZARIL) 50 MG tablet Take 1 tablet (50 mg) by mouth daily  Patient taking differently: Take 50 mg by mouth at bedtime With 100 mg tablets for total dose = 350 mg 12/20/2023 Yes Alejandra Watkins MD Yes    docusate sodium (COLACE) 100 MG capsule Take 100 mg by mouth daily 12/20/2023 Yes Unknown, Entered By History     ipratropium (ATROVENT) 0.06 % nasal spray Spray 2 sprays into both nostrils 3 times daily 12/20/2023 at PM Yes Unknown, Entered By History     lamoTRIgine (LAMICTAL) 100 MG tablet Take 1 tablet (100 mg) by mouth daily 12/20/2023 Yes Milind Dumas MD Yes    levothyroxine (SYNTHROID/LEVOTHROID) 50 MCG tablet 1 tablet (50 mcg) by Oral or NG Tube route daily  Patient taking differently: Take 50 mcg by mouth daily 12/20/2023 Yes Shamir Geiger MD Yes    lisinopril (ZESTRIL) 5 MG tablet Take 1 tablet (5 mg) by mouth daily 12/20/2023 Yes Milind Dumas MD Yes    melatonin 10 MG TABS tablet Take 1 tablet (10 mg) by mouth At Bedtime 12/20/2023 Yes Milind Dumas MD     montelukast (SINGULAIR) 10 MG tablet Take 10 mg by mouth At Bedtime 12/20/2023 Yes Unknown, Entered By History No    multivitamin, therapeutic (THERA-VIT)  TABS tablet Take 1 tablet by mouth daily 12/20/2023 Yes Unknown, Entered By History     pantoprazole (PROTONIX) 40 MG EC tablet Take 40 mg by mouth daily 30 minutes before breakfast 12/20/2023 Yes Reported, Patient     QUEtiapine (SEROQUEL) 50 MG tablet Take 50 mg by mouth at bedtime 12/20/2023 Yes Unknown, Entered By History Yes    topiramate (TOPAMAX) 100 MG tablet Take 1 tablet (100 mg) by mouth 2 times daily 12/20/2023 Yes Milind Dumas MD Yes    White Petrolatum-Mineral Oil (REFRESH P.M.) OINT Apply a small amount into lower right eyelid at bedtime 12/20/2023 Yes Unknown, Entered By History     zinc sulfate (ZINCATE) 220 (50 Zn) MG capsule 1 capsule (220 mg) by Oral or NG Tube route daily  Patient taking differently: Take 220 mg by mouth daily 12/20/2023 Yes Shamir Geiger MD

## 2023-12-21 NOTE — H&P
[facility]    History and Physical - Hospitalist Service       Date of Admission:  2023    Assessment & Plan      Jagdeep Walker is a 53 year old male admitted on 2023. He is brought to the emergency department after a fall at his group home.  Found to be hypothermic with borderline low blood pressure here in the emergency department.    Hypothermia: Unclear etiology at this time, but primary considerations would be malnutrition, cirrhosis, hypopituitarism, medications, and less likely sepsis.  Does not appear septic or toxic.  Does have underlying cirrhosis with hypoalbuminemia and hypokalemia so far identified suggesting at least moderate malnutrition.  Certainly patient's medications could be contributing to hypothermia as well including prazosin, Lamictal, Clozaril, Topamax.   -Add on magnesium, phosphorus  -Potassium, magnesium, and phosphorus replacement protocols in place  -Discontinuing lisinopril which appears to be a relatively recent medication in the setting of patient's underlying cirrhosis (diagnosis by imaging)  -Add on TSH  -Add on spot a.m. cortisol  -Bear hugger  -Add on CRP  -Chest x-ray  -Awaiting callback from guardian or group home regarding patient's baseline    Schizoaffective disorder, intellectual disability secondary to  anoxic brain injury, unspecified anxiety disorder: Longstanding history of delusional disorder noted by psychiatry as of 2023 visit. Patient appears to be on Topamax 100 mg twice daily, Clozaril 350 mg at bedtime, Lamictal 100 mg daily.  Patient's provided history is not necessarily accurate; is drooling but tells me that this is new because he is thirsty when in actuality he is on atropine drops for secretions.  -Plan to resume prior to admission antipsychotic therapies when reconciled by pharmacy.  Pending workup above for hypothermia and possible identification of any recent medication changes, could consider  "psychiatry consultation.    Cirrhosis by imaging: Uncertain etiology, noted to have splenomegaly, likely contributing to peripheral thrombocytopenia, and cirrhosis on recent abdominal MRI.  Recently seen by GI at [facility] (Dr Ayala) with alpha antitrypsin, ceruloplasmin, NORMAN, ASMA, quantitative immunoglobulins, transferrin saturation for hemochromatosis, viral hepatitides screen.  Lab evaluation appears to be unrevealing by review of outside records with some low iron and TIBC suggestive of chronic disease.  Note that there is also a plan for repeat EUS to evaluate for chronic pancreatitis in the future, and as of December 14 note, with negative serology workup, consideration for liver biopsy to prove cirrhosis.  -Continue with outpatient gastroenterology follow-up    [facility] IPMN: Noted on recent MR abdomen.  Recommendation for 2-year follow-up to ensure stability    History of DVT, portal vein thrombosis:  -Continue prior to admission Eliquis    History of esophagitis:  -Continue daily PPI therapy    Thyroid nodule: Noted to have a 1.5 cm right-sided thyroid nodule on CT of cervical spine.  -Outpatient thyroid ultrasound recommended          Diet:  Regular adult diet, thickened liquids  DVT Prophylaxis: DOAC  Santana Catheter: Not present  Lines: None     Cardiac Monitoring: None  Code Status:  Full code    Clinically Significant Risk Factors Present on Admission        # Hypokalemia: Lowest K = 3.3 mmol/L in last 2 days, will replace as needed       # Hypoalbuminemia: Lowest albumin = 3.1 g/dL at 12/21/2023  1:25 AM, will monitor as appropriate  # Drug Induced Coagulation Defect: home medication list includes an anticoagulant medication  # Thrombocytopenia: Lowest platelets = 127 in last 2 days, will monitor for bleeding   # Hypertension: Noted on problem list      # Overweight: Estimated body mass index is 28.7 kg/m  as calculated from the following:    Height as of this encounter: 1.778 m (5' 10\").    " Weight as of this encounter: 90.7 kg (200 lb).       # Asthma: noted on problem list        Disposition Plan    potentially 12/22/2023       Hi Burgos MD  Hospitalist Service  [facility]  Securely message with [employer] (more info)  Text page via [employer] Paging/Directory     ______________________________________________________________________    Chief Complaint   Fall at care facility    History is obtained from chart review, discussion with Dr. Simon in the emergency department, discussion with patient in the emergency department.  Patient has a history of intellectual disability (anoxic brain injury at birth) and has a guardian, and his history is not entirely clear in narrative or necessarily dependable.  For example, he exhibits drooling and tells me that this is new for him when it appears that he is on atropine drops for a history of sialorrhea.    History of Present Illness   Jagdeep Walker is a 53 year old male who presents to the emergency department after he tripped on a blanket and fell at his care facility.  He was brought to the emergency department for evaluation as he is anticoagulated on Eliquis in the setting of prior DVT and portal vein thrombosis, complained of neck pain.    In the emergency department, CT head and neck were negative for acute intracranial pathology, but he was found to have a temperature of 91.7 Fahrenheit and a blood pressure of 97/63.  A CT of the abdomen pelvis was performed demonstrating a notable stool burden, venous collaterals in the upper abdomen consistent with portal hypertension with known history of portal vein thrombosis, possible right lower lobe patchy opacities thought to be atelectatic, no acute pathology identified in the abdomen or pelvis.  He has some mild diffuse discomfort on palpation of his abdomen, and appears that he has had this in the past as well.    LFTs and lipase generally reassuring.  Noted to have some  hypokalemia, hypoalbuminemia.  History of moderate to severe protein calorie malnutrition.    Somewhat recent diagnosis of cirrhosis via imaging for which she has now been following with [facility] gastroenterology with most recent visit 12/14/2023.  At that visit, extensive serology testing which overall has been unrevealing as a cause of cirrhosis.  There is a plan for future EGD/EUS to evaluate for chronic pancreatitis and biopsy to evaluate Fisher's esophagus as well as a mention of possible liver biopsy to formalize diagnosis of cirrhosis by imaging.    Patient is alert and conversant, but he reports positive review of system when asked.  For example, he has no complaint of shortness of breath until he is asked about his breathing and he tells me he is very short of breath and feels as though he cannot breathe.  Tells me that this started just now.  He has no increased work of breathing, no hypoxia, clear lung sounds bilaterally.  Does have a history of delusions noted by psychiatry through outpatient record review.    CRP added on and minimally elevated at 6.81 with normal limit less than 5.  Lactic acid was less than 0.3.  Glucose normal at 104, ammonia normal at 60 without evidence of asterixis    TSH added on and has returned normal at 0.9.  Cortisol pending.    No history or report of exposure, and it appears that patient fell indoors.  Uncertain as to cause of patient's hypothermia at this time.    It is also not entirely clear as to patient's baseline as guardian and group home were unavailable.    History of complicated pancreatitis with hospitalization in June 2022.  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  Current lipase within normal limits and CT abdomen pelvis unrevealing.      Past Medical History    Past Medical History:   Diagnosis Date    Fisher esophagus     Benign essential hypertension     Blind right eye     failed corneal transplant     Intellectual disability     d/t anoxic brain injury at birth    Involuntary commitment     Hx    Obesity     JACLYN on CPAP     Pancreatitis     Portal vein thrombosis     Schizoaffective disorder, bipolar type (H)     Seizure disorder (H)     Thyroid disease        Past Surgical History   Past Surgical History:   Procedure Laterality Date    ENDOSCOPIC RETROGRADE CHOLANGIOPANCREATOGRAM N/A 7/28/2022    Procedure: ENDOSCOPIC RETROGRADE CHOLANGIOPANCREATOGRAPHY, Pancreatic duct stent exchange;  Surgeon: Massimo Clark MD;  Location:  OR    ENDOSCOPIC RETROGRADE CHOLANGIOPANCREATOGRAM COMPLEX N/A 6/21/2022    Procedure: ENDOSCOPIC RETROGRADE CHOLANGIOPANCREATOGRAPHY, COMPLEX;  Surgeon: Massimo Clark MD;  Location:  OR    ENDOSCOPIC ULTRASOUND UPPER GASTROINTESTINAL TRACT (GI) N/A 5/12/2022    Procedure: ENDOSCOPIC ULTRASOUND, ESOPHAGOSCOPY / UPPER GASTROINTESTINAL TRACT (GI);  Surgeon: Massimo Clark MD;  Location:  GI    ESOPHAGOSCOPY, GASTROSCOPY, DUODENOSCOPY (EGD), COMBINED N/A 5/12/2022    Procedure: ESOPHAGOGASTRODUODENOSCOPY, WITH BIOPSY;  Surgeon: Massimo Clark MD;  Location:  GI    IR CHEST TUBE PLACEMENT NON-TUNNELED RIGHT  6/18/2022    IR CHEST TUBE REPOSITION NON TUNNELED RIGHT  6/22/2022    IR GASTRO JEJUNOSTOMY TUBE CHANGE  7/22/2022    IR GASTRO JEJUNOSTOMY TUBE PLACEMENT  6/28/2022    IR LUMBAR PUNCTURE  7/26/2023       Prior to Admission Medications   Prior to Admission Medications   Prescriptions Last Dose Informant Patient Reported? Taking?   White Petrolatum-Mineral Oil (REFRESH P.M.) OINT   Yes No   Sig: Apply a small amount into lower right eyelid at bedtime   acetaminophen (TYLENOL) 500 MG tablet   No No   Sig: Take 2 tablets (1,000 mg) by mouth 3 times daily   albuterol (PROAIR HFA/PROVENTIL HFA/VENTOLIN HFA) 108 (90 Base) MCG/ACT inhaler   Yes No   Sig: Inhale 2 puffs into the lungs every 6 hours as needed for shortness of breath, wheezing or cough    apixaban ANTICOAGULANT (ELIQUIS) 5 MG tablet   No No   Sig: Take 1 tablet (5 mg) by mouth 2 times daily   atropine 1 % ophthalmic solution   No No   Sig: Place 2 drops under the tongue 3 times daily as needed for secretions   calcium carbonate-vitamin D (CALTRATE) 600-10 MG-MCG per tablet   Yes No   Sig: Take 1 tablet by mouth daily   calcium polycarbophil (FIBERCON) 625 MG tablet   No No   Sig: Take 1 tablet (625 mg) by mouth 2 times daily   carboxymethylcellulose PF (REFRESH PLUS) 0.5 % ophthalmic solution   Yes No   Sig: Place 1 drop into both eyes daily as needed for dry eyes   chlorhexidine 0.12 % solution   Yes No   Sig: Rinse or brush teeth and gums with 15 mL for 60 seconds every morning *nothing by mouth for 30 minutes following*   cloZAPine (CLOZARIL) 100 MG tablet   No No   Sig: Take 3 tablets (300 mg) by mouth At Bedtime   cloZAPine (CLOZARIL) 50 MG tablet   No No   Sig: Take 1 tablet (50 mg) by mouth daily   docusate sodium (COLACE) 100 MG capsule   Yes No   Sig: Take 100 mg by mouth daily   furosemide (LASIX) 20 MG tablet   No No   Sig: Take 1 tablet (20 mg) by mouth daily   hydrOXYzine (ATARAX) 25 MG tablet   No No   Sig: Take 1 tablet (25 mg) by mouth every 4 hours as needed for anxiety   hydrocortisone (CORTAID) 1 % external cream   Yes No   Sig: Apply topically 2 times daily as needed for rash or other (eczema)   ipratropium (ATROVENT) 0.06 % nasal spray   Yes No   Sig: Spray 2 sprays into both nostrils 3 times daily At 8 AM, 4 PM, and 8 PM   lamoTRIgine (LAMICTAL) 100 MG tablet   No No   Sig: Take 1 tablet (100 mg) by mouth daily   levothyroxine (SYNTHROID/LEVOTHROID) 50 MCG tablet  Nursing Home No No   Si tablet (50 mcg) by Oral or NG Tube route daily   lisinopril (ZESTRIL) 5 MG tablet   No No   Sig: Take 1 tablet (5 mg) by mouth daily   loperamide (IMODIUM) 2 MG capsule   Yes No   Sig: Take 2 mg by mouth 4 times daily as needed for diarrhea   melatonin 10 MG TABS tablet   No No   Sig: Take  1 tablet (10 mg) by mouth At Bedtime   montelukast (SINGULAIR) 10 MG tablet   Yes No   Sig: Take 10 mg by mouth At Bedtime   multivitamin, therapeutic (THERA-VIT) TABS tablet   Yes No   Sig: Take 1 tablet by mouth daily   pantoprazole (PROTONIX) 40 MG EC tablet   Yes No   Sig: Take 40 mg by mouth daily   polyethylene glycol-propylene glycol (SYSTANE ULTRA) 0.4-0.3 % SOLN ophthalmic solution   Yes No   Sig: Place 1-2 drops into both eyes daily as needed for dry eyes   prazosin (MINIPRESS) 1 MG capsule   No No   Sig: Take 1 capsule (1 mg) by mouth daily   prednisoLONE acetate (PRED FORTE) 1 % ophthalmic suspension   Yes No   Sig: Instill one drop once per day into the right eye for glaucoma   senna (SENOKOT) 8.6 MG tablet   Yes No   Sig: Take 1 tablet by mouth 2 times daily as needed for constipation   topiramate (TOPAMAX) 100 MG tablet   No No   Sig: Take 1 tablet (100 mg) by mouth 2 times daily   zinc sulfate (ZINCATE) 220 (50 Zn) MG capsule  Nursing Home No No   Si capsule (220 mg) by Oral or NG Tube route daily      Facility-Administered Medications: None           Physical Exam   Vital Signs: Temp: (!) 93.7  F (34.3  C) Temp src: Rectal BP: 105/68 Pulse: 74   Resp: 20 SpO2: 98 %      Weight: 200 lbs 0 oz    General Appearance: Slightly unkempt appearing 53-year-old male.  He appears older than stated age.  Eyes: Sclerosis of right cornea with known history of glaucoma and blindness.  HEENT: Wasting of muscles of mastication.  Drooling bilaterally  Respiratory: Breath sounds are clear bilaterally to auscultation without wheezes or crackles, fair effort  Cardiovascular: Regular rate and rhythm without murmur  GI: Mild diffuse tenderness without guarding throughout abdomen.  No palpable mass.  Lymph/Hematologic: Trace-1+ bilateral lower extremity edema  Skin: Some scabbed abrasions over bilateral knees, slightly greater on right  Musculoskeletal: moderate diffuse muscular wasting of extremities  Neurologic:  Alert and conversant.  Has occasional difficulty following commands appropriately and needs multiple prompts.  For instance, he is not able to inflate his cheeks during neurologic assessment as he keeps his mouth open during attempts.  He is able to complete plantar and dorsiflexion, needs guidance on flexion at the hip against resistance.  Either masked facies, or again difficulty following commands as he is unable to demonstrate a smile with effort; certainly could be parkinsonian with his antipsychotic therapies.  Psychiatric: Blunted affect.  Attends appropriately to provider.    Medical Decision Making       65 MINUTES SPENT BY ME on the date of service doing chart review, history, exam, documentation & further activities per the note.      Data     I have personally reviewed the following data over the past 24 hrs:    4.3  \   11.9 (L)   / 127 (L)     140 106 14.1 /  104 (H)   3.3 (L) 24 0.93 \     ALT: 31 AST: 30 AP: 157 (H) TBILI: 0.4   ALB: 3.1 (L) TOT PROTEIN: 5.3 (L) LIPASE: 23     TSH: 0.90 T4: N/A A1C: N/A     Procal: N/A CRP: 6.81 (H) Lactic Acid: <0.3         Imaging results reviewed over the past 24 hrs:   Recent Results (from the past 24 hour(s))   CT Abdomen Pelvis w Contrast    Narrative    EXAM: CT ABDOMEN PELVIS W CONTRAST  LOCATION: [facility]  DATE: 12/21/2023    INDICATION: Abdominal pain.  COMPARISON: CT abdomen and pelvis on 11/03/2023 and abdominal MRI on 11/04/2023  TECHNIQUE: CT scan of the abdomen and pelvis was performed after the injection of 101 mL Isovue 370 intravenously. Multiplanar reformats were obtained. Dose reduction techniques were used.    FINDINGS:   LOWER CHEST: Right lower lobe patchy ground-glass pulmonary opacities (series 4 image 4), could represent atelectasis versus infection. Posterior bibasilar pulmonary opacities, likely atelectasis.    ABDOMEN/PELVIS:    HEPATOBILIARY: No suspicious focal hepatic lesion. No radiodense  gallstones.    PANCREAS: No main pancreatic ductal dilatation or definite solid pancreatic mass.    SPLEEN: The spleen is enlarged measuring 13.3 cm in craniocaudal dimension.    ADRENAL GLANDS: No adrenal nodules.    KIDNEYS/BLADDER: There are two adjacent stones at the lower pole measuring up to 4 mm (series 4 image 114). No right kidney stone. No ureteral stone or hydronephrosis in either kidney. Bilateral hypodense foci in the kidneys, some can be characterized as   renal cysts including 3.9 cm cyst at the posterior aspect of the interpolar region of the left kidney while multiple others are subcentimeter and too small to characterize. There is 4.2 cm hypoattenuating focus at the interpolar region of the left   kidney with elevated Hounsfield unit of 23, could represent cyst with hemorrhagic/proteinaceous component.    BOWEL: No abnormally dilated bowel loops. Moderate amount of stool throughout the colon, nonspecific, can be seen with constipation. The appendix is visualized and appears normal.    PERITONEUM: No evidence of free fluid in the abdomen and pelvis. No free peritoneal or portal venous gas.    PELVIC ORGANS: Unremarkable.    VASCULATURE: Extensive venous collateral in the upper abdomen and in the mi hepatis. The main and right portal vein appears patent although the left portal vein is not well-seen, could be occluded.    LYMPH NODES: No significant abdominopelvic lymphadenopathy.    MUSCULOSKELETAL: Multilevel degenerative change in the spine. No suspicious osseous lesion.      Impression    IMPRESSION:   1.  No evidence of acute pathology in the abdomen or pelvis.  2.  Again noted extensive venous collaterals in the upper abdomen most prominent in the mi hepatis, appear similar to prior. The main and right portal vein appear patent, although the left portal vein is not well-seen, could be occluded.  3.  Splenomegaly.  4.  Right lower lobe patchy ground-glass pulmonary opacities, could be  infectious or atelectatic.  5.  Moderate amount of stool throughout the colon, nonspecific, can be seen with constipation.   Head CT w/o contrast    Narrative    EXAM: CT HEAD W/O CONTRAST  LOCATION: [facility]  DATE: 12/21/2023    INDICATION: Traumatic injury. Fall.  COMPARISON: 07/24/2023  TECHNIQUE: Routine CT Head without IV contrast. Multiplanar reformats. Dose reduction techniques were used.    FINDINGS:  INTRACRANIAL CONTENTS: No intracranial hemorrhage, extraaxial collection, or mass effect.  No CT evidence of acute infarct. Mild periventricular and subcortical white matter hypodensities. Stable volume loss and ventriculomegaly.     VISUALIZED ORBITS/SINUSES/MASTOIDS: Redemonstrated hyperdense right globe with a glaucoma shunt device. Postoperative changes of the left lens. Mild mucosal thickening of the right maxillary sinus. No middle ear or mastoid effusion.    BONES/SOFT TISSUES: No acute abnormality.      Impression    IMPRESSION:  1.  No CT evidence for acute intracranial process.  2.  Chronic changes as above.   CT Cervical Spine w/o Contrast    Narrative    EXAM: CT CERVICAL SPINE W/O CONTRAST  LOCATION: [facility]  DATE: 12/21/2023    INDICATION: Traumatic injury.  COMPARISON: 07/24/2023  TECHNIQUE: Routine CT Cervical Spine without IV contrast. Multiplanar reformats. Dose reduction techniques were used.    FINDINGS:  VERTEBRA: Stable alignment of vertebral body heights. No fracture or posttraumatic subluxation.     CANAL/FORAMINA: Stable degenerative changes, without high-grade spinal canal stenosis.    PARASPINAL: No prevertebral hematoma. Lung apices are clear. Heterogeneous attenuation of the thyroid gland with small calcifications. Dominant nodule on the right measures up to 1.5 cm in diameter.      Impression    IMPRESSION:  1.  No evidence of an acute displaced fracture of the cervical spine.  2.  Heterogeneous attenuation of the thyroid  gland, with a dominant nodule on the right that measures up to 1.5 cm in diameter.    REFERENCE:  Tej COLE et al. Managing Incidental Thyroid Nodules Detected on Imaging: White Paper of the ACR Incidental Thyroid Findings Committee. JACR 2015; 12:143-150.    Incidental thyroid nodule detected on CT or MRI without suspicious findings. Applies to general population without limited life expectancy or significant comorbidities.    Age greater than or equal to 35 years  Less than 1.5 cm: No further evaluation.  Greater than or equal to 1.5 cm: Evaluate with thyroid ultrasound.   XR Chest 2 Views    Narrative    EXAM: XR CHEST 2 VIEWS  LOCATION: [facility]  DATE: 12/21/2023    INDICATION: possible infiltrate  COMPARISON: None.      Impression    IMPRESSION: Negative chest.

## 2023-12-21 NOTE — ED NOTES
Bed: ED05  Expected date:   Expected time:   Means of arrival:   Comments:  540 53M increased weakness and falls

## 2023-12-21 NOTE — PROGRESS NOTES
Patient admitted early hours of this morning. See excellent H&P by Dr. Burgos.     D/W Pharm D and PTA meds reconciled.  - not on Apixaban at present. Need further collateral history.    -add Procalcitonin. Will add empirici Ceftriaxone and Azithro for now.  (CT Right lower lobe patchy ground-glass pulmonary opacities, could be infectious or atelectatic. )    Non billable.

## 2023-12-21 NOTE — PLAN OF CARE
"Goal Outcome Evaluation:      Plan of Care Reviewed With: patient      PRIMARY Concern: Pt here with fall at his group home per report. Per pt [facility], pt was SOB and weak that brought him to the hospital  SAFETY RISK Concerns (fall risk, behaviors, etc.): fall risk       Isolation/Type: none  Tests/Procedures for NEXT shift: labs in am   Consults? (Pending/following, signed-off?) psych, PT/CC/SW  Where is patient from? (Home, TCU, etc.): group home.   Other Important info for NEXT shift: mumbled speech at times. Baseline oer report. Pt has [facility] in file. Drinking large volume of fluids. Constantly asking for more drinks   Anticipated DC date & active delays: 1-2 days   _____________________________________________________________________________  SUMMARY NOTE:          Orientation/Cognitive: Pt is A/o to self and place. Garbled/mumbled speech. Baseline per report  Observation Goals (Met/ Not Met): not met  Mobility Level/Assist Equipment: not OOB yet. Baseline pt is up with 1 walker  Antibiotics & Plan (IV/po, length of tx left): IV ceftriaxone and Azithromycin  Pain Management: denies  pain   Tele/VS/O2\" SR on tele. BP soft in 90s. Temp 96.6  ABNL Lab/BG: Trop flat at 34  Diet: regular.   Bowel/Bladder: incontinence. Voids in large amount.   Skin Concerns: dry. Scab BLE, coccyx redness . Rash between groin   Drains/Devices: PIV, male external catheter   Patient Stated Goal for Today: none             "

## 2023-12-21 NOTE — ED PROVIDER NOTES
History     Chief Complaint:  Fall       The history is provided by the patient.      Jagdeep Walker is a 53 year old male with history of schizoaffective disorder, hypertension, and intellectual disability who presents to the ED with fall. Patient's condition and speech made the history difficult to obtain.  Patient is blind and anticoagulated. Patient reports at around dinner time he had fallen after tripping on a blanket. Patient uses a walker for ambulation and reports that he was walking with it today. He reports weakness, abdominal pain, back pain, and neck pain. Patient was unable to get up right away and states group home staff helped him. At ED, patient has a temperature of 91.7 F and a blood pressure of 97/63.     Independent Historian:    Sister, Huong, discusses that the patient has had gradually increasing weakness.  He has been having ongoing testing for liver cirrhosis of unclear etiology.  She had not been aware that the patient was coming to the hospital.    Review of External Notes:  I reviewed the admission summary from 11/3 discussing fall and abdominal pain along with intellectual disability, diarrhea, bipolar disorder     Medications:    Zincate  Topamax  Protonix  Singulair  Imodium  Zestril  Atarax  Lasix  Colace  Clozaril  Eliquis  Tylenol   Albuterol inhaler   Lamictal  Synthroid  Seroquel    Past Medical History:    Fisher esophagus  Benign essential hypertension  Blind right eye  Intellectual disability  Involuntary commitment   Obesity  Obstructive sleep apnea   Pancreatitis  Portal vein thrombosis  Schizoaffective disorder, bipolar type  Seizure disorder  Thyroid disease   Liver cirrhosis of liver  Pancreatic mass  Acute pancreatitis  Fisher's esophagus  Seizure disorder   Keratoconus  Neuroleptic malignant syndrome    Past Surgical History:  Chest tube placement non-tunneled right  Gastro jejunostomy tube placement  Gastro jejunostomy tube change  Lumbar puncture  Partial  "thyroidectomy   Corneal transplant  Cataract removal    Physical Exam   Patient Vitals for the past 24 hrs:   BP Temp Temp src Pulse Resp SpO2 Height Weight   12/21/23 0520 -- (!) 93.7  F (34.3  C) Rectal -- -- -- -- --   12/21/23 0500 105/68 -- -- 74 20 -- -- --   12/21/23 0324 98/68 -- -- 65 -- 98 % -- --   12/21/23 0254 97/63 -- -- 61 -- 98 % -- --   12/21/23 0228 -- (!) 91.7  F (33.2  C) Rectal -- -- -- -- --   12/21/23 0224 99/74 -- -- 60 -- 98 % -- --   12/21/23 0154 102/67 -- -- 60 -- 99 % -- --   12/21/23 0111 -- (!) 95.1  F (35.1  C) Temporal -- -- -- -- --   12/21/23 0110 -- (!) 91.5  F (33.1  C) Rectal -- -- -- -- --   12/21/23 0102 114/76 -- -- 60 16 100 % 1.778 m (5' 10\") 90.7 kg (200 lb)        Physical Exam  General: Alert, although slow to respond  Head: No signs of trauma.   CV: Normal rate and regular rhythm.    Resp: Effort normal and breath sounds normal. No respiratory distress.   GI: Soft. There is no tenderness.  No rebound or guarding.  Normal bowel sounds.  No CVA tenderness.  MSK: Normal range of motion. no edema. No Calf tenderness.  Neuro: The patient is alert and conversant.  Able to answer basic questions, although history is limited.  Strength in upper/lower extremities generally weak, but symmetrical. Sensation normal.   Skin: Skin is warm and dry. No rash noted.   Psych: normal mood and affect. behavior is normal.       Emergency Department Course   ECG  ECG results from 12/21/23   EKG 12-lead, tracing only     Value    Systolic Blood Pressure     Diastolic Blood Pressure     Ventricular Rate 60    Atrial Rate 60    NH Interval 148    QRS Duration 156        QTc 506    P Axis 48    R AXIS 36    T Axis 55    Interpretation ECG      Sinus rhythm  Right bundle branch block  Abnormal ECG  When compared with ECG of 27-JUN-2023 08:08,  T wave inversion now evident in Anterior leads  Confirmed by GENERATED REPORT, COMPUTER ([id number]),  Aasen, Bradley ([id number]) on 12/21/2023 1:23:46 " AM       Imaging:  CT Cervical Spine w/o Contrast   Final Result   IMPRESSION:   1.  No evidence of an acute displaced fracture of the cervical spine.   2.  Heterogeneous attenuation of the thyroid gland, with a dominant nodule on the right that measures up to 1.5 cm in diameter.      REFERENCE:   Tej COLE et al. Managing Incidental Thyroid Nodules Detected on Imaging: White Paper of the ACR Incidental Thyroid Findings Committee. JACR 2015; 12:143-150.      Incidental thyroid nodule detected on CT or MRI without suspicious findings. Applies to general population without limited life expectancy or significant comorbidities.      Age greater than or equal to 35 years   Less than 1.5 cm: No further evaluation.   Greater than or equal to 1.5 cm: Evaluate with thyroid ultrasound.      Head CT w/o contrast   Final Result   IMPRESSION:   1.  No CT evidence for acute intracranial process.   2.  Chronic changes as above.      CT Abdomen Pelvis w Contrast   Final Result   IMPRESSION:    1.  No evidence of acute pathology in the abdomen or pelvis.   2.  Again noted extensive venous collaterals in the upper abdomen most prominent in the mi hepatis, appear similar to prior. The main and right portal vein appear patent, although the left portal vein is not well-seen, could be occluded.   3.  Splenomegaly.   4.  Right lower lobe patchy ground-glass pulmonary opacities, could be infectious or atelectatic.   5.  Moderate amount of stool throughout the colon, nonspecific, can be seen with constipation.      XR Chest 2 Views    (Results Pending)     Report per radiology    Laboratory:  Labs Ordered and Resulted from Time of ED Arrival to Time of ED Departure   COMPREHENSIVE METABOLIC PANEL - Abnormal       Result Value    Sodium 140      Potassium 3.3 (*)     Carbon Dioxide (CO2) 24      Anion Gap 10      Urea Nitrogen 14.1      Creatinine 0.93      GFR Estimate >90      Calcium 8.9      Chloride 106      Glucose 104 (*)      Alkaline Phosphatase 157 (*)     AST 30      ALT 31      Protein Total 5.3 (*)     Albumin 3.1 (*)     Bilirubin Total 0.4     CBC WITH PLATELETS AND DIFFERENTIAL - Abnormal    WBC Count 4.3      RBC Count 4.20 (*)     Hemoglobin 11.9 (*)     Hematocrit 35.5 (*)     MCV 85      MCH 28.3      MCHC 33.5      RDW 17.2 (*)     Platelet Count 127 (*)     % Neutrophils 59      % Lymphocytes 33      % Monocytes 8      % Eosinophils 0      % Basophils 0      % Immature Granulocytes 0      NRBCs per 100 WBC 0      Absolute Neutrophils 2.5      Absolute Lymphocytes 1.4      Absolute Monocytes 0.4      Absolute Eosinophils 0.0      Absolute Basophils 0.0      Absolute Immature Granulocytes 0.0      Absolute NRBCs 0.0     ISTAT GASES LACTATE VENOUS POCT - Abnormal    Lactic Acid POCT <0.3      Bicarbonate Venous POCT 21      O2 Sat, Venous POCT 88 (*)     pCO2 Venous POCT 41      pH Venous POCT 7.32      pO2 Venous POCT 59 (*)    LIPASE - Normal    Lipase 23     ROUTINE UA WITH MICROSCOPIC REFLEX TO CULTURE   AMMONIA   TSH WITH FREE T4 REFLEX   CRP INFLAMMATION   CORTISOL   LAMOTRIGINE LEVEL   MAGNESIUM   PHOSPHORUS     Emergency Department Course & Assessments:    Interventions:  Medications   sodium chloride 0.9% BOLUS 1,000 mL (0 mLs Intravenous Stopped 12/21/23 0436)   Saline flush (69 mLs Intravenous $Given 12/21/23 0402)   iopamidol (ISOVUE-370) solution 101 mL (101 mLs Intravenous $Given 12/21/23 0401)      Assessments:  0323 I obtained the history and examined the patient as noted above.  0543 I rechecked and updated the patient.    Independent Interpretation (X-rays, CTs, rhythm strip):  On my independent interpretation of head CT there is no large ICH noted      Consultations/Discussion of Management or Tests:  0554 I discussed the case with Dr. Burgos (hospitalist) regarding the patient.  0602 I discussed with the patient's sister, Huong, regarding the patient.    Social Determinants of Health affecting care:  None      Disposition:  The patient was admitted to the hospital under the care of Dr. Burgos.     Impression & Plan    Medical Decision Making:  Jagdeep Walker presents after a fall and with reported weakness.  He is from a group home and apparently had a fall this evening.  Per report, he has had some increasing falls and weakness and having to use his walker.  On evaluation, the patient was alert and conversant, although somewhat slow to respond.  He does have a history of intellectual disability along with schizoaffective disorder.  There is no clear external signs of trauma.  Patient was noted to be hypothermic on repeated testing, including rectal exam.  The cause of this is not clear.  There is no history of cold exposure.  I considered infectious etiology, although at this point I do not have a clear source of infection.  I did obtain a CT of the head, neck, abdomen pelvis and these did not show any clear signs of acute process.  Chest x-ray did not show any signs of infiltrate or pneumonia the patient has not been having a cough or difficulty breathing.  Patient's lactic acid was normal.  I did attempt to contact the group home, but unfortunate was unable to.  I was able to speak with the patient's sister who is his medical decision-maker in the morning.  She was able to provide some background, although had not been aware that the patient had been sent to the hospital.  Given the hypothermia, and increasing weakness and falls, patient was admitted to the hospital service.    Diagnosis:    ICD-10-CM    1. Hypothermia, initial encounter  T68.XXXA       2. Fall from standing, initial encounter  W19.XXXA       3. Weakness  R53.1          Scribe Disclosure:  I, Freddy Wooten, am serving as a scribe at 3:32 AM on 12/21/2023 to document services personally performed by Milind Simon MD based on my observations and the provider's statements to me.    12/21/2023   Milind Simon MD Bergenstal, John A,  MD  12/21/23 0733

## 2023-12-21 NOTE — ED NOTES
Incontinent of urine every hour.  Pt states he can't reach for the urinal in time.  Pt drinking adequate amount of fluid.  Stopped IVF.

## 2023-12-21 NOTE — ED NOTES
"Replaced potassium PO.  Pt drinking a lot of fluids - frequently requesting for more \"pop.\"  Given patient 3 cans of diet lemon/lime for today.  Encouraging water.  Thickened.  Pt received IV ABs for pneumonia.  All other day time meds given.    Pt has been up standing on side of bed x3 for brief changes as patient continues to void in bed.  States can't get to urinal in time.  Attempted primo fit for night shift but did not work for him.  Pt needs frequent reminders, redirection.  "

## 2023-12-21 NOTE — ED TRIAGE NOTES
Pt presents to ED via EMS from group home for increased weakness and falls for 2 weeks. Pt A&O x 3. Confused on time/date. Pt at baseline per EMS. Pt stated last fall was today. No visible injuries per EMS. Group home staff states pt has been using a walker the past few days     Triage Assessment (Adult)       Row Name 12/21/23 0106          Triage Assessment    Airway WDL WDL        Respiratory WDL    Respiratory WDL WDL        Cardiac WDL    Cardiac WDL WDL        Cognitive/Neuro/Behavioral WDL    Cognitive/Neuro/Behavioral WDL level of consciousness;orientation     Level of Consciousness confused     Orientation disoriented to;time

## 2023-12-21 NOTE — ED NOTES
When checking pt rectal temp writer noticed pt urinated in bed. Pt sheets changed. Male purewick placed on pt. Warm blankets placed on pt.

## 2023-12-21 NOTE — PROVIDER NOTIFICATION
MD Notification    Notified Person: MD    Notified Person Name:  Pb    Notification Date/Time: 12/21/2023 1640      Notification Interaction: Vocera     Purpose of Notification:  pt [facility] called and wants to take pt off seroquel. she said she talked to pt psychiartry today to get him off the med. she thinks this medicine caused pt to get weaker. she would like to talk to you when you get a chance    Orders Received: Psych to review med tomorrow      Comments: Psych consulted

## 2023-12-21 NOTE — PROGRESS NOTES
Admission/Transfer from: ED  2 RN skin assessment completed. Yes  Name of 2nd RN: Jaki Fernandez RN  Significant findings include: coccyx redness, Bilateral knee scabs, R calf rash( scattered ) rash between groin  WOC Nurse Consult Ordered? No

## 2023-12-22 ENCOUNTER — APPOINTMENT (OUTPATIENT)
Dept: PHYSICAL THERAPY | Facility: CLINIC | Age: 53
End: 2023-12-22
Attending: INTERNAL MEDICINE
Payer: MEDICARE

## 2023-12-22 VITALS
WEIGHT: 171.96 LBS | TEMPERATURE: 96.6 F | SYSTOLIC BLOOD PRESSURE: 104 MMHG | DIASTOLIC BLOOD PRESSURE: 70 MMHG | RESPIRATION RATE: 18 BRPM | HEART RATE: 74 BPM | BODY MASS INDEX: 25.47 KG/M2 | OXYGEN SATURATION: 95 % | HEIGHT: 69 IN

## 2023-12-22 LAB
CRP SERPL-MCNC: 19.24 MG/L
LAMOTRIGINE SERPL-MCNC: 2.3 UG/ML
POTASSIUM SERPL-SCNC: 3.5 MMOL/L (ref 3.4–5.3)

## 2023-12-22 PROCEDURE — 99207 PR NO CHARGE LOS: CPT

## 2023-12-22 PROCEDURE — 250N000013 HC RX MED GY IP 250 OP 250 PS 637: Performed by: INTERNAL MEDICINE

## 2023-12-22 PROCEDURE — 97161 PT EVAL LOW COMPLEX 20 MIN: CPT | Mod: GP

## 2023-12-22 PROCEDURE — G0378 HOSPITAL OBSERVATION PER HR: HCPCS

## 2023-12-22 PROCEDURE — 99239 HOSP IP/OBS DSCHRG MGMT >30: CPT | Performed by: INTERNAL MEDICINE

## 2023-12-22 PROCEDURE — 97530 THERAPEUTIC ACTIVITIES: CPT | Mod: GP

## 2023-12-22 PROCEDURE — 84132 ASSAY OF SERUM POTASSIUM: CPT | Performed by: INTERNAL MEDICINE

## 2023-12-22 PROCEDURE — 86140 C-REACTIVE PROTEIN: CPT | Performed by: INTERNAL MEDICINE

## 2023-12-22 PROCEDURE — 36415 COLL VENOUS BLD VENIPUNCTURE: CPT | Performed by: INTERNAL MEDICINE

## 2023-12-22 RX ORDER — QUETIAPINE FUMARATE 25 MG/1
25 TABLET, FILM COATED ORAL AT BEDTIME
Status: DISCONTINUED | OUTPATIENT
Start: 2023-12-22 | End: 2023-12-22

## 2023-12-22 RX ADMIN — Medication 1 TABLET: at 09:20

## 2023-12-22 RX ADMIN — IPRATROPIUM BROMIDE 2 SPRAY: 42 SPRAY, METERED NASAL at 09:35

## 2023-12-22 RX ADMIN — TOPIRAMATE 100 MG: 100 TABLET, FILM COATED ORAL at 09:19

## 2023-12-22 RX ADMIN — CALCIUM POLYCARBOPHIL 625 MG: 625 TABLET, FILM COATED ORAL at 09:18

## 2023-12-22 RX ADMIN — LAMOTRIGINE 100 MG: 100 TABLET ORAL at 09:18

## 2023-12-22 RX ADMIN — DOCUSATE SODIUM 100 MG: 100 CAPSULE, LIQUID FILLED ORAL at 09:17

## 2023-12-22 RX ADMIN — LEVOTHYROXINE SODIUM 50 MCG: 50 TABLET ORAL at 09:19

## 2023-12-22 RX ADMIN — PANTOPRAZOLE SODIUM 40 MG: 40 TABLET, DELAYED RELEASE ORAL at 09:16

## 2023-12-22 RX ADMIN — Medication 50 MCG: at 09:16

## 2023-12-22 RX ADMIN — ZINC SULFATE 220 MG (50 MG) CAPSULE 220 MG: CAPSULE at 09:17

## 2023-12-22 RX ADMIN — MULTIVITAMIN TABLET 1 TABLET: TABLET at 09:17

## 2023-12-22 ASSESSMENT — ACTIVITIES OF DAILY LIVING (ADL)
ADLS_ACUITY_SCORE: 49
ADLS_ACUITY_SCORE: 41
ADLS_ACUITY_SCORE: 45
ADLS_ACUITY_SCORE: 49
ADLS_ACUITY_SCORE: 41

## 2023-12-22 NOTE — PLAN OF CARE
Goal Outcome Evaluation:    Summary:  From 12/21/23 to 12/22/23 [id number]  Care Plan Summary Note:  Orientation: to self only.   Observation Goals (met & not met): not met,   Activity Level: up with one with walker..   Fall Risk: yes  Behavior & Aggression Tool Color:green  Pain Management: na  ABNL VS/O2:   Diet: regular   Bowel/Bladder: incontinent.   Drains/Devices: IV SL  Tests/Procedures for next shift:   Anticipated DC date: TBD  Other Important Info: external catheter in place with adequate urine output.

## 2023-12-22 NOTE — PLAN OF CARE
Alert, can make his needs known, calm and cooperative, speech garbled at times, Q2 check and change, on regular, mildly thickened fluids, takes pills whole, multiple bowel movements today, discharge packet prepared for group home.

## 2023-12-22 NOTE — PROGRESS NOTES
"Observation goals     -diagnostic tests and consults completed and resulted : not met, waiting for PT, Psyche consult    -vital signs normal or at patient baseline \" partially met    -returns to baseline functional status : PT consult pending    -safe disposition plan has been identified: not met.    Nurse to notify provider when observation goals have been met and patient is ready for discharge.   "

## 2023-12-22 NOTE — PROGRESS NOTES
Care Management Discharge Note    Discharge Date: 12/22/2023       Discharge Disposition: Group Home    Discharge Services:      Discharge DME:      Discharge Transportation: health plan transportation    Private pay costs discussed: Not applicable    Does the patient's insurance plan have a 3 day qualifying hospital stay waiver?  No    PAS Confirmation Code:    Patient/family educated on Medicare website which has current facility and service quality ratings:      Education Provided on the Discharge Plan:    Persons Notified of Discharge Plans: Pt/GH/Guardian/Bedside RN/MD  Patient/Family in Agreement with the Plan: yes    Handoff Referral Completed: No    Additional Information:  Pt discharge back to  in [place]  Packet (with orders) sent with patient.  staff to meet at door 6.   Guardian updated of discharge plans back to .       Lisbet Talley RN

## 2023-12-22 NOTE — PROGRESS NOTES
"Observation goals     -diagnostic tests and consults completed and resulted : not met    -vital signs normal or at patient baseline \" partially met    -returns to baseline functional status : not met    -safe disposition plan has been identified: not met.    Nurse to notify provider when observation goals have been met and patient is ready for discharge.   "

## 2023-12-22 NOTE — PROGRESS NOTES
12/22/23 1253   Appointment Info   Signing Clinician's Name / Credentials (PT) Chris Parada DPT   Rehab Comments (PT) If Pt returning to group home keep on x6 week, if plan for DC to TCU decrease frequency   Living Environment   People in Home facility resident   Current Living Arrangements group home   Transportation Anticipated health plan transportation   Living Environment Comments Unclear home set up. Pt unable to state frequently going onto different tangents and at times speech garrbled and unable to understand.   Self-Care   Usual Activity Tolerance good   Current Activity Tolerance fair   Equipment Currently Used at Home walker, rolling   Fall history within last six months yes   Number of times patient has fallen within last six months 1   Activity/Exercise/Self-Care Comment Unclear PLOF Pt unable to state. Per chart uses a FWW for mobility. Unclear what he receives assistance with.   General Information   Onset of Illness/Injury or Date of Surgery 12/21/23   Referring Physician Burgos, Hi Peterson MD   Patient/Family Therapy Goals Statement (PT) To get stronger   Pertinent History of Current Problem (include personal factors and/or comorbidities that impact the POC) Jagdeep Walker is a 53 year old male admitted on 12/21/2023. He is brought to the emergency department after a fall at his group home.  Found to be hypothermic with borderline low blood pressure here in the emergency department.   Existing Precautions/Restrictions fall   Cognition   Affect/Mental Status (Cognition) unable/difficult to assess   Orientation Status (Cognition) unable/difficult to assess   Follows Commands (Cognition) follows one-step commands   Cognitive Status Comments Pt w/ garbbled speech. Difficult to understand at times   Pain Assessment   Patient Currently in Pain No   Range of Motion (ROM)   ROM Comment WFLs for mobility and transfers no formal testing completed   Strength (Manual Muscle Testing)   Strength Comments  Generalized weakness noted w/ mobility and transfers no formal testing completed   Bed Mobility   Comment, (Bed Mobility) Supine to sitting EOB w/ Min A x1   Transfers   Comment, (Transfers) Sit to stand w/ FWW and Min A x1   Gait/Stairs (Locomotion)   Comment, (Gait/Stairs) 2 ft w/ FWW and Min A x1   Balance   Balance Comments Decreased balance noted w/ standing mobility, multiple episodes of LOB   Clinical Impression   Criteria for Skilled Therapeutic Intervention Yes, treatment indicated   PT Diagnosis (PT) Impaired ambulation   Influenced by the following impairments Impaired strength, balance and activity tolerance   Functional limitations due to impairments Impaired ADLs, IADLs and functional mobility   Clinical Presentation (PT Evaluation Complexity) stable   Clinical Presentation Rationale Clinical judgment   Clinical Decision Making (Complexity) low complexity   Planned Therapy Interventions (PT) balance training;bed mobility training;gait training;home exercise program;patient/family education;stair training;strengthening;transfer training;progressive activity/exercise   Risk & Benefits of therapy have been explained evaluation/treatment results reviewed;care plan/treatment goals reviewed;risks/benefits reviewed;current/potential barriers reviewed;participants voiced agreement with care plan;participants included;patient   PT Total Evaluation Time   PT Eval, Low Complexity Minutes (94255) 10   Physical Therapy Goals   PT Frequency 6x/week   PT Predicted Duration/Target Date for Goal Attainment 01/01/24   PT Goals Bed Mobility;Transfers;Gait   PT: Bed Mobility Independent;Supine to/from sit   PT: Transfers Modified independent;Sit to/from stand;Assistive device   PT: Gait Modified independent;150 feet;Rolling walker   Interventions   Interventions Quick Adds Therapeutic Activity   Therapeutic Activity   Therapeutic Activities: dynamic activities to improve functional performance Minutes (89924) 25    Symptoms Noted During/After Treatment Fatigue   Treatment Detail/Skilled Intervention Pt supine in bed at start of session. Agreeable to PT. Crooked in bed head on bed rail. Garbbled speech noted difficulty to understand at times and follow commands. Educated on PT on plan for session. Agreeable initially. On attempts to get sitting EOB getting confused w/ plan for getting sitting get legs off to sides and then going back to supine. Pt then able to get sitting EOB w/ Min A x1 for initiation of task. Pt sitting EOB w/ SBA for balance progressing to independence. Noting he needs to take a BM. On first stand Pt w/ very poor balance losing balance posteriorly and attempting to step and having difficulty sequencing. Commode brought into room. Pt completing stand pivot transfer to commode w/ FWW and Min A x1. Improved balance from first stand but still decreased balance noted. Kyphotic posture throughout. FWW far in front of body. Cues for sequencing noted. Pt on commode for prolonged period of time. Large BM noted. Assist needed w/ pericares. Pt transfering back to EOB w/ FWW and Min A x1. Ambulating to bedside chair ~5 ft w/ FWW and Min A x1. Ambulating ~10 ft from bedside chair and EOB w/ FWW and Min A x1. Pt then ambulating an additional ~10 ft w/ FWW and Min A x1. Large LOB noted when backing up to EOB needing PT correction. Decreased balance noted throughout session. Pt high falls risk w/o assistance. Sit to supine w/ SBA. All needs met w/ call light in place and bed alarm on.   PT Discharge Planning   PT Plan Transfers, bed mobility, amb w/ chair follow, balance   PT Discharge Recommendation (DC Rec) home with assist;home with home care physical therapy;Transitional Care Facility   PT Rationale for DC Rec Pt below baseline mobility. Reportedly lives in group home and uses a FWW. Unclear baseline PLOF. Currently A x1 w/ FWW for short distance mobility. Decreased balance throughout session w/ multiple episodes of  LOB. If were to return to group home would need 24/7 assist for mobility and HHPT. If pt unable to get this level of assist would recommend DC to TCU to improve upon functional mobility and strengthening.   PT Brief overview of current status Min A w/ FWW   Total Session Time   Timed Code Treatment Minutes 25   Total Session Time (sum of timed and untimed services) 35

## 2023-12-22 NOTE — DISCHARGE SUMMARY
[facility]  Hospitalist Discharge Summary      Date of Admission:  2023  Date of Discharge:  2023  Discharging Provider: Monica Louise MD    Discharge Diagnoses   Hypothermia  Borderline hypotension  Generalized weakness  Schizoaffective disorder, intellectual disability secondary to  anoxic brain injury, unspecified anxiety disorder   Cirrhosis by imaging  Thrombocytopenia  History of esophagitis  [place] IPMN  History of DVT, portal vein thrombosis:  Thyroid nodule  Hypokalemia    Follow-ups Needed After Discharge   Follow-up Appointments     Follow-up and recommended labs and tests       Follow up with primary care provider, Joel Werner, within 7 days for   hospital follow-up.  Follow up with your psychiatrist within 1-2 weeks for hospital follow-up            Discharge Disposition   Discharged to group home  Condition at discharge: Stable    Hospital Course   Jagdeep Walker is a 53 year old male with hx of intellectual disability due to  anoxic brain injury, schizoaffective disorder, liver cirrhosis, and prior DVT/portal vein thrombosis on chronic AC with apixaban who presented to the ED on 2023 following a fall.     In the ED, he was noted to be hypothermic with inital temp of 91.5F and borderline hypotension with SBP 90s. Labs were significant only for hypokalemia (K 3.3) and elevated Alk-P (157); other LFTs wnl. TSH wnl. Fasting cortisol level wnl. UA bland. Blood cultures were not obtained. CT head and CT C-spine on arrival negative for acute injuries. CT abd/pelvis showed RLL patchy ground-glass opacities with question of infection vs atelectasis.    Patient was given a dose of ceftriaxone and azithromycin on admission, but these were discontinued after procal returned negative, and patient otherwise has not had a fever, cough, or shortness of breath to suggest pneumonia.      He was monitored for over 24 hours with overall  improvement in his temperature curve to 96-97F.     PTA lisinopril 5 mg daily was discontinued due to borderline blood pressures. BP 100s systolic at the time of discharge. He is eating and drinking, and demonstrating a good appetite at the time of discharge    Psychiatry was consulted this admission and recommended discontinuing Seroquel as group home staff felt that it was contributing to weakness. He was then evaluated and cleared by PT prior to discharge.     He continues his other home meds as previously prescribed. We recommend that he follow up with his primary care provider and psychiatrist within 1-2 weeks post-discharge.     Consultations This Hospital Stay   PHYSICAL THERAPY ADULT IP CONSULT  PSYCHIATRY IP CONSULT  CARE MANAGEMENT / SOCIAL WORK IP CONSULT    Code Status   Full Code    Time Spent on this Encounter   I, Monica Louise MD, personally saw the patient today and spent 45 minutes discharging this patient.       Monica Louise MD  [facility]  [address] [address]  Phone: [phone]  ______________________________________________________________________    Physical Exam   Vital Signs: Temp: (!) 96.6  F (35.9  C) Temp src: Temporal BP: 104/70 Pulse: 74   Resp: 18 SpO2: 95 % O2 Device: None (Room air)    Weight: 171 lbs 15.34 oz    Constitutional: Resting comfortably, NAD  HEENT: Sclera white, conjunctiva clear, EOMI, MMM  Respiratory: Breathing non-labored. Lungs CTAB - no wheezes/crackles/rhonchi  Cardiovascular: Heart RRR, no m/r/g. No pedal edema.   GI: +BS. Abd soft/NT  Lymph/Hematologic: No cervical LAD  Genitourinary: Not examined  Skin: Warm and dry. No rash.  Musculoskeletal: Normal muscle bulk and tone  Neurologic: Alert and appropriate, CARLOS  Psychiatric: Irritable    Primary Care Physician   Joel Werner    Discharge Orders      Reason for your hospital stay    Low temperature and borderline low  blood pressure. Your medications were adjusted this admission     Follow-up and recommended labs and tests     Follow up with primary care provider, Joel Werner, within 7 days for hospital follow-up.  Follow up with your psychiatrist within 1-2 weeks for hospital follow-up     Activity    Your activity upon discharge: activity as tolerated     Diet    Follow this diet upon discharge: Regular diet       Significant Results and Procedures   Most Recent 3 CBC's:  Recent Labs   Lab Test 12/21/23  0125 08/14/23  0713 08/08/23  0754   WBC 4.3 6.9 5.4   HGB 11.9* 14.0 13.1*   MCV 85 82 81   * 182 164     Most Recent 3 BMP's:  Recent Labs   Lab Test 12/22/23  0701 12/21/23  1317 12/21/23  0125 07/27/23  0655 07/26/23  0906   NA  --   --  140 141 141   POTASSIUM 3.5 3.9 3.3* 4.0 3.7   CHLORIDE  --   --  106 110* 109*   CO2  --   --  24 20* 18*   BUN  --   --  14.1 19.5 19.6   CR  --   --  0.93 0.95 0.87   ANIONGAP  --   --  10 11 14   NASIR  --   --  8.9 9.5 9.2   GLC  --   --  104* 114* 109*   ,   Results for orders placed or performed during the hospital encounter of 12/21/23   Head CT w/o contrast    Narrative    EXAM: CT HEAD W/O CONTRAST  LOCATION: [facility]  DATE: 12/21/2023    INDICATION: Traumatic injury. Fall.  COMPARISON: 07/24/2023  TECHNIQUE: Routine CT Head without IV contrast. Multiplanar reformats. Dose reduction techniques were used.    FINDINGS:  INTRACRANIAL CONTENTS: No intracranial hemorrhage, extraaxial collection, or mass effect.  No CT evidence of acute infarct. Mild periventricular and subcortical white matter hypodensities. Stable volume loss and ventriculomegaly.     VISUALIZED ORBITS/SINUSES/MASTOIDS: Redemonstrated hyperdense right globe with a glaucoma shunt device. Postoperative changes of the left lens. Mild mucosal thickening of the right maxillary sinus. No middle ear or mastoid effusion.    BONES/SOFT TISSUES: No acute abnormality.      Impression     IMPRESSION:  1.  No CT evidence for acute intracranial process.  2.  Chronic changes as above.   CT Cervical Spine w/o Contrast    Narrative    EXAM: CT CERVICAL SPINE W/O CONTRAST  LOCATION: [facility]  DATE: 12/21/2023    INDICATION: Traumatic injury.  COMPARISON: 07/24/2023  TECHNIQUE: Routine CT Cervical Spine without IV contrast. Multiplanar reformats. Dose reduction techniques were used.    FINDINGS:  VERTEBRA: Stable alignment of vertebral body heights. No fracture or posttraumatic subluxation.     CANAL/FORAMINA: Stable degenerative changes, without high-grade spinal canal stenosis.    PARASPINAL: No prevertebral hematoma. Lung apices are clear. Heterogeneous attenuation of the thyroid gland with small calcifications. Dominant nodule on the right measures up to 1.5 cm in diameter.      Impression    IMPRESSION:  1.  No evidence of an acute displaced fracture of the cervical spine.  2.  Heterogeneous attenuation of the thyroid gland, with a dominant nodule on the right that measures up to 1.5 cm in diameter.    REFERENCE:  Tej ESQUEDAK et al. Managing Incidental Thyroid Nodules Detected on Imaging: White Paper of the ACR Incidental Thyroid Findings Committee. JACR 2015; 12:143-150.    Incidental thyroid nodule detected on CT or MRI without suspicious findings. Applies to general population without limited life expectancy or significant comorbidities.    Age greater than or equal to 35 years  Less than 1.5 cm: No further evaluation.  Greater than or equal to 1.5 cm: Evaluate with thyroid ultrasound.   CT Abdomen Pelvis w Contrast    Narrative    EXAM: CT ABDOMEN PELVIS W CONTRAST  LOCATION: [facility]  DATE: 12/21/2023    INDICATION: Abdominal pain.  COMPARISON: CT abdomen and pelvis on 11/03/2023 and abdominal MRI on 11/04/2023  TECHNIQUE: CT scan of the abdomen and pelvis was performed after the injection of 101 mL Isovue 370 intravenously. Multiplanar  reformats were obtained. Dose reduction techniques were used.    FINDINGS:   LOWER CHEST: Right lower lobe patchy ground-glass pulmonary opacities (series 4 image 4), could represent atelectasis versus infection. Posterior bibasilar pulmonary opacities, likely atelectasis.    ABDOMEN/PELVIS:    HEPATOBILIARY: No suspicious focal hepatic lesion. No radiodense gallstones.    PANCREAS: No main pancreatic ductal dilatation or definite solid pancreatic mass.    SPLEEN: The spleen is enlarged measuring 13.3 cm in craniocaudal dimension.    ADRENAL GLANDS: No adrenal nodules.    KIDNEYS/BLADDER: There are two adjacent stones at the lower pole measuring up to 4 mm (series 4 image 114). No right kidney stone. No ureteral stone or hydronephrosis in either kidney. Bilateral hypodense foci in the kidneys, some can be characterized as   renal cysts including 3.9 cm cyst at the posterior aspect of the interpolar region of the left kidney while multiple others are subcentimeter and too small to characterize. There is 4.2 cm hypoattenuating focus at the interpolar region of the left   kidney with elevated Hounsfield unit of 23, could represent cyst with hemorrhagic/proteinaceous component.    BOWEL: No abnormally dilated bowel loops. Moderate amount of stool throughout the colon, nonspecific, can be seen with constipation. The appendix is visualized and appears normal.    PERITONEUM: No evidence of free fluid in the abdomen and pelvis. No free peritoneal or portal venous gas.    PELVIC ORGANS: Unremarkable.    VASCULATURE: Extensive venous collateral in the upper abdomen and in the mi hepatis. The main and right portal vein appears patent although the left portal vein is not well-seen, could be occluded.    LYMPH NODES: No significant abdominopelvic lymphadenopathy.    MUSCULOSKELETAL: Multilevel degenerative change in the spine. No suspicious osseous lesion.      Impression    IMPRESSION:   1.  No evidence of acute pathology in  the abdomen or pelvis.  2.  Again noted extensive venous collaterals in the upper abdomen most prominent in the mi hepatis, appear similar to prior. The main and right portal vein appear patent, although the left portal vein is not well-seen, could be occluded.  3.  Splenomegaly.  4.  Right lower lobe patchy ground-glass pulmonary opacities, could be infectious or atelectatic.  5.  Moderate amount of stool throughout the colon, nonspecific, can be seen with constipation.   XR Chest 2 Views    Narrative    EXAM: XR CHEST 2 VIEWS  LOCATION: [facility]  DATE: 12/21/2023    INDICATION: possible infiltrate  COMPARISON: None.      Impression    IMPRESSION: Negative chest.       Discharge Medications   Discharge Medication List as of 12/22/2023  1:23 PM        CONTINUE these medications which have NOT CHANGED    Details   acetaminophen (TYLENOL) 500 MG tablet Take 2 tablets (1,000 mg) by mouth 3 times daily, Disp-180 tablet, R-3, E-Prescribe      atropine 1 % ophthalmic solution Place 2 drops under the tongue 3 times daily as needed for secretions, Disp-15 mL, R-3, E-Prescribe      calcium carbonate-vitamin D (CALTRATE) 600-10 MG-MCG per tablet Take 1 tablet by mouth daily With food, with breakfast, Historical      calcium polycarbophil (FIBERCON) 625 MG tablet Take 1 tablet (625 mg) by mouth 2 times daily, Disp-120 tablet, R-3, E-Prescribe      chlorhexidine 0.12 % solution Rinse or brush teeth and gums with 15 mL for 60 seconds every morning *nothing by mouth for 30 minutes following*, Historical      cholecalciferol (VITAMIN D3) 25 mcg (1000 units) capsule Take 2 capsules by mouth daily, Historical      !! cloZAPine (CLOZARIL) 100 MG tablet Take 3 tablets (300 mg) by mouth At Bedtime, Disp-21 tablet, R-11, Local Print      !! cloZAPine (CLOZARIL) 50 MG tablet Take 1 tablet (50 mg) by mouth daily, Disp-7 tablet, R-11, Local Print      docusate sodium (COLACE) 100 MG capsule Take 100 mg by  mouth daily, Historical      ipratropium (ATROVENT) 0.06 % nasal spray Spray 2 sprays into both nostrils 3 times daily, Historical      lamoTRIgine (LAMICTAL) 100 MG tablet Take 1 tablet (100 mg) by mouth daily, Disp-30 tablet, R-3, E-Prescribe      levothyroxine (SYNTHROID/LEVOTHROID) 50 MCG tablet 1 tablet (50 mcg) by Oral or NG Tube route daily, No Print Out      melatonin 10 MG TABS tablet Take 1 tablet (10 mg) by mouth At Bedtime, Disp-30 tablet, R-3, E-Prescribe      montelukast (SINGULAIR) 10 MG tablet Take 10 mg by mouth At Bedtime, Historical      multivitamin, therapeutic (THERA-VIT) TABS tablet Take 1 tablet by mouth daily, Historical      pantoprazole (PROTONIX) 40 MG EC tablet Take 40 mg by mouth daily 30 minutes before breakfast, Historical      topiramate (TOPAMAX) 100 MG tablet Take 1 tablet (100 mg) by mouth 2 times daily, Disp-60 tablet, R-3, E-Prescribe      White Petrolatum-Mineral Oil (REFRESH P.M.) OINT Apply a small amount into lower right eyelid at bedtime, Historical      zinc sulfate (ZINCATE) 220 (50 Zn) MG capsule 1 capsule (220 mg) by Oral or NG Tube route daily, No Print Out       !! - Potential duplicate medications found. Please discuss with provider.        STOP taking these medications       lisinopril (ZESTRIL) 5 MG tablet Comments:   Reason for Stopping:         QUEtiapine (SEROQUEL) 50 MG tablet Comments:   Reason for Stopping:             Allergies   Allergies   Allergen Reactions    Depakote [Valproic Acid] GI Disturbance

## 2023-12-22 NOTE — CONSULTS
Initial Psychiatric Consult   Consult date: December 22, 2023         Reason for Consult, requesting source:    Review meds. [facility] discussion with psychiatrist- discontinue seroquel     Requesting source: Hospitalist     Labs and imaging reviewed. Discussed with RN, Michelle Crawford attending provider        HPI:   Per H&P from Dr. Burgos on 12/21/23:  Jagdeep Walker is a 53 year old male who presents to the emergency department after he tripped on a blanket and fell at his care facility.  He was brought to the emergency department for evaluation as he is anticoagulated on Eliquis in the setting of prior DVT and portal vein thrombosis, complained of neck pain.     In the emergency department, CT head and neck were negative for acute intracranial pathology, but he was found to have a temperature of 91.7 Fahrenheit and a blood pressure of 97/63.  A CT of the abdomen pelvis was performed demonstrating a notable stool burden, venous collaterals in the upper abdomen consistent with portal hypertension with known history of portal vein thrombosis, possible right lower lobe patchy opacities thought to be atelectatic, no acute pathology identified in the abdomen or pelvis.  He has some mild diffuse discomfort on palpation of his abdomen, and appears that he has had this in the past as well.     LFTs and lipase generally reassuring.  Noted to have some hypokalemia, hypoalbuminemia.  History of moderate to severe protein calorie malnutrition.     Somewhat recent diagnosis of cirrhosis via imaging for which she has now been following with [facility] gastro[facility]ology with most recent visit 12/14/2023.  At that visit, extensive serology testing which overall has been unrevealing as a cause of cirrhosis.  There is a plan for future EGD/EUS to evaluate for chronic pancreatitis and biopsy to evaluate Fisher's esophagus as well as a mention of possible liver biopsy to formalize diagnosis of cirrhosis by imaging.     Patient  is alert and conversant, but he reports positive review of system when asked.  For example, he has no complaint of shortness of breath until he is asked about his breathing and he tells me he is very short of breath and feels as though he cannot breathe.  Tells me that this started just now.  He has no increased work of breathing, no hypoxia, clear lung sounds bilaterally.  Does have a history of delusions noted by psychiatry through outpatient record review.     CRP added on and minimally elevated at 6.81 with normal limit less than 5.  Lactic acid was less than 0.3.  Glucose normal at 104, ammonia normal at 60 without evidence of asterixis     TSH added on and has returned normal at 0.9.  Cortisol pending.     No history or report of exposure, and it appears that patient fell indoors.  Uncertain as to cause of patient's hypothermia at this time.     It is also not entirely clear as to patient's baseline as guardian and group home were unavailable.     History of complicated pancreatitis with hospitalization in June 2022.  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  Current lipase within normal limits and CT abdomen pelvis unrevealing.      Psychiatry asked to review medications as Health Care Agent would like to discontinue Seroquel. Call to [facility], Huong Keenan, Legal Guardian to further discuss request. Huong tells me that Jagdeep has had difficulty sleeping on and off and that his outpatient psychiatrist recommended a short trial of Seroquel at bedtime to see if this would help. Huong reports that since starting Seroquel 2 weeks ago, Jagdeep appears to have become increasingly weaker and more confused, specifically at bedtime. Group home staff reported that they have him use his walker at night. Huong is unsure if this is due to Seroquel over the past 2 weeks or due to worsening medical condition. Huong tells me that Jagdeep sees Dr. Sauer through [facility] for psychiatric  medication management and that he has seen this provider for many years. They have also undergone a consultation for ECT and plan to further pursue this in 2024 as Jagdeep has found minimal relief from medications since his last hospitalization in 2023. It is noted during his last IP admission that patient is delusional at baseline. Huong feels as though Jagdeep is currently at baseline and is concerned about his ongoing medical concerns including recent diagnosis of cirrhosis. At this time Huong wants to defer all medication changes to Dr. Sauer following discontinuation of Seroquel. I reiterated the need for continued monitoring given that Jagdeep is taking Clozaril, weekly blood draws with CBC w/differential, Huong verbalized understanding.             Summary of Recommendations:   Discontinue bedtime Seroquel at this time per guardian request, guardian reports this does not appear to be helpful for sleep   At this time guardian requests to follow-up with OP Psychiatric provider for additional medication management, continue all PTA medications; Discussed continued monitoring needed given that patient is on Clozaril, weekly CBC with differential, labs reviewed  Please reconsult psychiatry as needed  Will defer to medical for discharge      DILLON Mariee CNP    Consult/Liaison Psychiatry   [facility]    Contact information available via [facility] Paging/Directory  If I am not available, then [facility] CL line ([phone]) should know who is covering our consult service.

## 2023-12-22 NOTE — CONSULTS
Care Management Initial Consult    General Information  Assessment completed with: Caregiver, Other,    Type of CM/SW Visit: Offer D/C Planning    Primary Care Provider verified and updated as needed: Yes   Readmission within the last 30 days:        Reason for Consult: discharge planning  Advance Care Planning:            Communication Assessment  Patient's communication style: spoken language (English or Bilingual)    Hearing Difficulty or Deaf: no   Wear Glasses or Blind: yes    Cognitive  Cognitive/Neuro/Behavioral: .WDL except  Level of Consciousness: alert  Arousal Level: opens eyes spontaneously  Orientation: other (see comments) (csn make his needs known)     Best Language: 1 - Mild to moderate  Speech: garbled    Living Environment:   People in home: facility resident     Current living Arrangements: group home      Able to return to prior arrangements: yes       Family/Social Support:  Care provided by: other (see comments)  Provides care for: no one  Marital Status: Single             Description of Support System:           Current Resources:   Patient receiving home care services: No     Community Resources:    Equipment currently used at home: walker, rolling  Supplies currently used at home:      Employment/Financial:  Employment Status: disabled        Financial Concerns:             Does the patient's insurance plan have a 3 day qualifying hospital stay waiver?  No    Lifestyle & Psychosocial Needs:  Social Determinants of Health     Food Insecurity: Not on file   Depression: Not on file   Housing Stability: Not on file   Tobacco Use: Low Risk  (7/27/2023)    Patient History     Smoking Tobacco Use: Never     Smokeless Tobacco Use: Never     Passive Exposure: Not on file   Financial Resource Strain: Not on file   Alcohol Use: Not At Risk (5/11/2023)    AUDIT-C     Frequency of Alcohol Consumption: Never     Average Number of Drinks: Patient does not drink     Frequency of Binge Drinking: Never    Transportation Needs: Not on file   Physical Activity: Not on file   Interpersonal Safety: Not on file   Stress: Not on file   Social Connections: Not on file       Functional Status:  Prior to admission patient needed assistance:   Dependent ADLs:: Ambulation-walker  Dependent IADLs:: Transportation, Money Management, Meal Preparation, Shopping, Cooking, Cleaning       Mental Health Status:          Chemical Dependency Status:                Values/Beliefs:  Spiritual, Cultural Beliefs, Tenriism Practices, Values that affect care:                 Additional Information:  Met with patient to discuss role in discharge planning. Pt lives at [facility] Number: [phone]  Nurse Mouna: [phone]   Manager Sadaf:     CC called main  number and was referred to nurse Mouna to discuss discharge planning. She noted discharge are coordinated with manager.  CC called manager Sadaf who noted Pt lives in house with 2 others, 24/7 support. Pt is normally indep with cares, uses a walker at times. No outside services.   provides medications, meals, transport to appointments.   Sadaf noted they need to do phone assessment with Supervisor to return    CC called Guardian Huong to discuss discharge plan.  She would like patient to return to  and feels he is most familiar in this environment and will do better, declined HHC as she feels he won't participate.  If  can't provide transportation sister Marli could transport.     CC received call from Sadaf and Supervisor Polly to review return.  Reviewed chart notes and PT.  They note staff can provide A/1 for cares.  They do not feel HHC is needed.     They can  patient at door 6 at 2pm (address provided)   They would like packet sent with    Bedside RN/MD/SAROJ notified of discharge this afternoon    Guardian updated via phone of discharge back to  at 1400.     Lisbet Talley RN

## 2023-12-22 NOTE — PROGRESS NOTES
"     Observation goals     -diagnostic tests and consults completed and resulted : not met    -vital signs normal or at patient baseline \" partially met    -returns to baseline functional status : not met    -safe disposition plan has been identified: not met.    Nurse to notify provider when observation goals have been met and patient is ready for discharge.         "

## 2023-12-23 ENCOUNTER — PATIENT OUTREACH (OUTPATIENT)
Dept: CARE COORDINATION | Facility: CLINIC | Age: 53
End: 2023-12-23
Payer: MEDICARE

## 2023-12-23 NOTE — PLAN OF CARE
Physical Therapy Discharge Summary    Reason for therapy discharge:    Discharged to group home    Progress towards therapy goal(s). See goals on Care Plan in [facility] electronic health record for goal details.  Goals partially met.  Barriers to achieving goals:   discharge from facility.    Therapy recommendation(s):    Continued therapy is recommended.  Rationale/Recommendations:  Pt below baseline mobility. Reportedly lives in group home and uses a FWW. Unclear baseline PLOF. Currently A x1 w/ FWW for short distance mobility. Decreased balance throughout session w/ multiple episodes of LOB. If were to return to group home would need 24/7 assist for mobility and HHPT. If pt unable to get this level of assist would recommend DC to TCU to improve upon functional mobility and strengthening.      *Discharging physical therapist did not work directly with patient. Therapy recommendation(s) taken from previous treating therapist's treatment note from last treatment session.

## 2023-12-23 NOTE — PROGRESS NOTES
[facility] Care [facility] [place]    Background: Transitional Care Management program identified per system criteria and reviewed by [facility] team for possible outreach.    Assessment: Upon chart review, CCR[facility] Team member will not proceed with patient outreach related to this episode of Transitional Care Management program due to reason below:    Non-[facility] TCU: CCRC team member noted patient discharged to TCU/ARU/LTACH. Patient is not established with a [facility] currently supported by Primary Care-Care Coordination therefore handoff to Primary Care-Care Coordination is not appropriate at this time.    Plan: Transitional Care Management episode addressed appropriately per reason noted above.      Eun   [facility]    *Connected Care Resource Team does NOT follow patient ongoing. Referrals are identified based on internal discharge reports and the outreach is to ensure patient has an understanding of their discharge instructions.

## 2024-01-08 NOTE — PLAN OF CARE
"  Problem: Disruptive Behavior  Goal: Improved Sleep (Disruptive Behavior)  Outcome: Progressing   Goal Outcome Evaluation:    Patient was asleep at the start of the shift. Woke up @ 12 mn and came out to the lounge. Redirected back to his room but he refused to listen. He said, \" I have slept all evening, so I don't have to sleep tonight.\" He was reminded that it was only past midnight and he has to go back to sleep. He insisted on staying in the lounge. He requested to have a shower. Request denied and unit policy on taking showers was reiterated to him. He continued on pacing along the hallway and around the unit while listening to music on his headphone. Later, he started pushing his call button every now and then. No amount of reminding would stop him from doing this.     Patient felt hungry during the night and requested for milk, cereals and pudding.   He has a No Roommate order due to intrusiveness and poor boundaries.     Slept for a total of 3.5 hours. No verbal outbursts and agitation noted the whole shift.                        " Nasal congestion and sneezing.Covid test scheduled day before as rapid. Pt states s/s started this past Friday 1/5/24, pt also informed to call office if s/s worsen or do not get better.

## 2024-04-29 NOTE — PLAN OF CARE
"Patient presents as grandiose, delusional, and intrusive with poor insight into the severity of his manic/ psychotic symptoms.  He is disorganized in his thoughts and speech.  Jagdeep has been visible in the milieu over the balance of this evening shift.  He mostly paces up and down the hallway, singing and dancing.  He has been perseverating on being discharged from the hospital, but he accepts reassurance that we are seeing progress toward stabilization while the treatment team continues to work on optimizing his medication regimen.  Jagdeep believes that he has received \"an enormous windfall of money from [employer]!\", and he reports that he has this in security with his other valuables.  He was willing to occasionally sit down to elevate his legs this evening, but he seemed to have a hard time sitting still.  Jagdeep accepted all of his scheduled medications without incident this evening.  He denies any SE's or acute physical concerns at this time. BP (!) 144/87 (BP Location: Left arm)   Pulse 104   Temp 97.4  F (36.3  C) (Temporal)   Resp 16   Wt 89.9 kg (198 lb 4.8 oz)   SpO2 97%   BMI 34.04 kg/m           " Consider adopting a mindfulness practice. You could search the internet for recordings of a practice called the \"body scan,\" or go for a short walk and pay attention to all of the details in your surrounding. You can also find more information about mindfulness online.   Return to see Dr. Lerner in 2 weeks.     MINDFULNESS    “Mindfulness means paying attention in a particular way: on purpose, in the present moment, and non-judgmentally.”   -Rajendra Mathew    \"Mindfulness is the basic human ability to be fully present, aware of where we are and what we’re doing, and not overly reactive or overwhelmed by what’s going on around us.\"   -Mindful [place]    Paying attention “on purpose”  Mindfulness involves paying attention “on purpose”. Mindfulness involves a conscious direction of our awareness. This can mean purposefully directing our attention to the breath, or a particular emotion, or an activity as simple as eating. Doing so allows us to actively shape the mind.    Paying attention “in the present moment”  Left to itself, the mind wanders through all kinds of thoughts -- including thoughts expressing anger, craving, depression, self-pity, and anxiety. As we indulge in these kinds of thoughts, we reinforce those emotions and cause ourselves to suffer. These thoughts usually center around the past or future. But the past no longer exists. The future is just a fantasy until it happens. The one moment we actually can experience -- the present moment -- is the one we seem most to avoid. By purposefully directing our awareness away from thoughts about the past or future and instead towards the “anchor” - our present moment experience - we decrease the effect of these thoughts on our lives.    Paying attention “non-judgmentally”  Mindfulness is an emotionally non-reactive state. We don't  that this experience is good and that one is bad. Or, if we do make those judgments, we don’t get upset in reaction to our

## 2025-04-07 NOTE — ED NOTES
PT straight cathed for urine. Urine sent to lab.    [de-identified] : INITIAL VISIT 02/20/2025 Mr. PERKINS is a 69-year-old man who was referred by Dr. Weber for Inspire hypoglossal nerve stimulator implant evaluation. He has moderate LIS. He has loud snoring, nighttime awakenings, and daytime fatigue.  He does not feel refreshed when wakes from sleep. Buena Sleepiness Scale:  4. He uses nasal autoPAP (5-20 cm H2O) about "2/3" of nights but doesnt feel much different when he does use it.  Mask has been uncomfortable lately. He tried a mandibular advancement device which gave him TMJ pain and kept breaking, so the stopped. Denies chronic nasal congestion. He has depression, for which he takes medication.  VISIT 04/07/2025 Mr. PERKINS underwent drug-induced sleep endoscopy (DISE) on March 14, 2025, at [facility]. Findings were FAVORABLE for benefit from Inspire hypoglossal nerve stimulator implant -- 100% anterior-posterior collapse at retropalatal level.  Base of tongue was prominent and resting on the epiglottis.  V-2, O-0, T-1, E-0 score = 3.    HOME SLEEP STUDY (9/17/2019), read at [facility] - Moderate LIS - AHI 29 (4% desaturation criteria)  - Snoring noted - O2 sat mean 95%, joseluis 76%.  O2 sat < 89% for 8.2 minutes of sleep time. - HR avg 55 bpm (40 - 81 range)

## 2025-07-30 ENCOUNTER — NURSE TRIAGE (OUTPATIENT)
Dept: FAMILY MEDICINE | Facility: CLINIC | Age: 55
End: 2025-07-30
Payer: MEDICARE

## 2025-07-30 NOTE — TELEPHONE ENCOUNTER
Cindy care coordinator from patient's group home called in with patient to report abdominal pain and wanted to see if patient needed to be seen in ER/UC.     Reports that patient complained of mild-moderate sharp pain in abdomen left of centerline. Patient reports drinking water and burping which caused the pain to subside. During call patient reported no pain or discomfort.     Per disposition, home care recommended. Gave care advice and advised if symptoms return, get worse, or new symptoms develop to call back.     Reason for Disposition   Mild abdominal pain    Additional Information   Negative: Passed out (e.g., fainted, lost consciousness, blacked out and was not responding)   Negative: Shock suspected (e.g., cold/pale/clammy skin, too weak to stand, low BP, rapid pulse)   Negative: Sounds like a life-threatening emergency to the triager   Negative: Followed an abdomen (stomach) injury   Negative: Chest pain   Negative: Pain is mainly in upper abdomen (if needed ask: 'is it mainly above the belly button?')   Negative: Abdomen bloating or swelling are main symptoms   Negative: SEVERE abdominal pain (e.g., excruciating)   Negative: Vomiting red blood or black (coffee ground) material   Negative: Blood in bowel movements  (Exception: Blood on surface of BM with constipation.)   Negative: Black or tarry bowel movements  (Exception: Chronic-unchanged black-grey BMs AND is taking iron pills or Pepto-Bismol.)   Negative: Unable to urinate (or only a few drops) and bladder feels very full   Negative: Pain in scrotum persists > 1 hour   Negative: MILD TO MODERATE constant pain lasting > 2 hours   Negative: MILD TO MODERATE constant pain lasting > 2 hours, and age > 60 years   Negative: Vomiting bile (green color)   Negative: Patient sounds very sick or weak to the triager   Negative: Vomiting and abdomen looks much more swollen than usual   Negative: White of the eyes have turned yellow (i.e., jaundice)   Negative:  "Blood in urine (red, pink, or tea-colored)   Negative: Fever > 103 F (39.4 C)   Negative: Fever > 101 F (38.3 C) and over 60 years of age   Negative: Fever > 100 F (37.8 C) and has diabetes mellitus or a weak immune system (e.g., HIV positive, cancer chemotherapy, organ transplant, splenectomy, chronic steroids)   Negative: Fever > 100 F (37.8 C) and bedridden (e.g., CVA, chronic illness, recovering from surgery)   Negative: MODERATE pain (e.g., interferes with normal activities) that comes and goes (cramps) lasts > 24 hours  (Exception: Pain with Vomiting or Diarrhea - see that Protocol.)   Negative: Patient wants to be seen   Negative: MILD pain (e.g., does not interfere with normal activities) and pain comes and goes (cramps) lasts > 48 hours  (Exception: This same abdominal pain is a chronic symptom recurrent or ongoing AND present > 4 weeks.)   Negative: Abdominal pain is a chronic symptom (recurrent or ongoing AND lasting > 4 weeks)    Answer Assessment - Initial Assessment Questions  1. LOCATION: \"Where does it hurt?\"       Left side toward center of abdomen, complaint of pain for 20 mins   2. RADIATION: \"Does the pain shoot anywhere else?\" (e.g., chest, back)      Sharp pain for a while  3. ONSET: \"When did the pain begin?\" (Minutes, hours or days ago)       About 20 mins ago  4. SUDDEN: \"Gradual or sudden onset?\"      Suddenly   5. PATTERN \"Does the pain come and go, or is it constant?\"      Constant   6. SEVERITY: \"How bad is the pain?\"  (e.g., Scale 1-10; mild, moderate, or severe)      Denies pain at this time but was mild when it was happening  7. RECURRENT SYMPTOM: \"Have you ever had this type of stomach pain before?\" If Yes, ask: \"When was the last time?\" and \"What happened that time?\"       no  8. CAUSE: \"What do you think is causing the stomach pain?\"      Trapped gas or indigestion from eating too fast  9. RELIEVING/AGGRAVATING FACTORS: \"What makes it better or worse?\" (e.g., antacids, bending or " "twisting motion, bowel movement)      Drank water then burped and pain went away  10. OTHER SYMPTOMS: \"Do you have any other symptoms?\" (e.g., back pain, diarrhea, fever, urination pain, vomiting)        Denies    Protocols used: Abdominal Pain - Male-A-[place]    " none

## (undated) DEVICE — ENDO DEVICE LOCKING AND BIOPSY CAP M00545261

## (undated) DEVICE — DRSG GAUZE 4X4" 3033

## (undated) DEVICE — SYR 10ML LL W/O NDL 302995

## (undated) DEVICE — RAD RX ISOVUE 300 (50ML) 61% IOPAMIDOL CHARGE PER ML

## (undated) DEVICE — ENDO SNARE POLYPECTOMY OVAL 15MM LOOP SD-240U-15

## (undated) DEVICE — SOL NACL 0.9% IRRIG 1000ML BOTTLE 2F7124

## (undated) DEVICE — Device

## (undated) DEVICE — SOL WATER IRRIG 1000ML BOTTLE 2F7114

## (undated) DEVICE — RAD RX OMNIPAQUE 240MG/ML (50ML) CHARGE PER ML Y250

## (undated) DEVICE — NDL CANNULA INTERLINK BLUNT 18GA

## (undated) DEVICE — ESU GROUND PAD ADULT W/CORD E7507

## (undated) RX ORDER — ONDANSETRON 2 MG/ML
INJECTION INTRAMUSCULAR; INTRAVENOUS
Status: DISPENSED
Start: 2022-06-21

## (undated) RX ORDER — LIDOCAINE HYDROCHLORIDE 10 MG/ML
INJECTION, SOLUTION INFILTRATION; PERINEURAL
Status: DISPENSED
Start: 2022-06-28

## (undated) RX ORDER — ONDANSETRON 2 MG/ML
INJECTION INTRAMUSCULAR; INTRAVENOUS
Status: DISPENSED
Start: 2022-07-28

## (undated) RX ORDER — FENTANYL CITRATE 50 UG/ML
INJECTION, SOLUTION INTRAMUSCULAR; INTRAVENOUS
Status: DISPENSED
Start: 2022-06-21

## (undated) RX ORDER — LIDOCAINE HYDROCHLORIDE 20 MG/ML
INJECTION, SOLUTION EPIDURAL; INFILTRATION; INTRACAUDAL; PERINEURAL
Status: DISPENSED
Start: 2022-07-28

## (undated) RX ORDER — LIDOCAINE HYDROCHLORIDE 10 MG/ML
INJECTION, SOLUTION INFILTRATION; PERINEURAL
Status: DISPENSED
Start: 2022-06-18

## (undated) RX ORDER — PROPOFOL 10 MG/ML
INJECTION, EMULSION INTRAVENOUS
Status: DISPENSED
Start: 2022-06-21

## (undated) RX ORDER — FENTANYL CITRATE 50 UG/ML
INJECTION, SOLUTION INTRAMUSCULAR; INTRAVENOUS
Status: DISPENSED
Start: 2022-06-22

## (undated) RX ORDER — FENTANYL CITRATE 50 UG/ML
INJECTION, SOLUTION INTRAMUSCULAR; INTRAVENOUS
Status: DISPENSED
Start: 2022-06-28

## (undated) RX ORDER — LIDOCAINE HYDROCHLORIDE 10 MG/ML
INJECTION, SOLUTION INFILTRATION; PERINEURAL
Status: DISPENSED
Start: 2022-06-22

## (undated) RX ORDER — FENTANYL CITRATE 50 UG/ML
INJECTION, SOLUTION INTRAMUSCULAR; INTRAVENOUS
Status: DISPENSED
Start: 2022-07-28